# Patient Record
Sex: FEMALE | Race: WHITE | NOT HISPANIC OR LATINO | Employment: OTHER | URBAN - METROPOLITAN AREA
[De-identification: names, ages, dates, MRNs, and addresses within clinical notes are randomized per-mention and may not be internally consistent; named-entity substitution may affect disease eponyms.]

---

## 2024-08-14 ENCOUNTER — APPOINTMENT (OUTPATIENT)
Dept: CT IMAGING | Facility: CLINIC | Age: 84
DRG: 871 | End: 2024-08-14
Attending: HOSPITALIST
Payer: COMMERCIAL

## 2024-08-14 ENCOUNTER — HOSPITAL ENCOUNTER (INPATIENT)
Facility: CLINIC | Age: 84
LOS: 2 days | Discharge: ANOTHER HOSPITAL | DRG: 871 | End: 2024-08-16
Attending: EMERGENCY MEDICINE | Admitting: HOSPITALIST
Payer: COMMERCIAL

## 2024-08-14 ENCOUNTER — APPOINTMENT (OUTPATIENT)
Dept: RADIOLOGY | Facility: CLINIC | Age: 84
DRG: 871 | End: 2024-08-14
Attending: EMERGENCY MEDICINE
Payer: COMMERCIAL

## 2024-08-14 DIAGNOSIS — N17.9 SEPSIS WITH ACUTE RENAL FAILURE, DUE TO UNSPECIFIED ORGANISM, UNSPECIFIED ACUTE RENAL FAILURE TYPE, UNSPECIFIED WHETHER SEPTIC SHOCK PRESENT (H): ICD-10-CM

## 2024-08-14 DIAGNOSIS — R65.20 SEPSIS WITH ACUTE RENAL FAILURE, DUE TO UNSPECIFIED ORGANISM, UNSPECIFIED ACUTE RENAL FAILURE TYPE, UNSPECIFIED WHETHER SEPTIC SHOCK PRESENT (H): ICD-10-CM

## 2024-08-14 DIAGNOSIS — A41.9 SEPSIS WITH ACUTE RENAL FAILURE, DUE TO UNSPECIFIED ORGANISM, UNSPECIFIED ACUTE RENAL FAILURE TYPE, UNSPECIFIED WHETHER SEPTIC SHOCK PRESENT (H): ICD-10-CM

## 2024-08-14 LAB
ALBUMIN SERPL BCG-MCNC: 4.1 G/DL (ref 3.5–5.2)
ALBUMIN UR-MCNC: 20 MG/DL
ALP SERPL-CCNC: 47 U/L (ref 40–150)
ALT SERPL W P-5'-P-CCNC: 19 U/L (ref 0–50)
ANION GAP SERPL CALCULATED.3IONS-SCNC: 10 MMOL/L (ref 7–15)
APPEARANCE UR: CLEAR
AST SERPL W P-5'-P-CCNC: 36 U/L (ref 0–45)
BASE EXCESS BLDV CALC-SCNC: 4.9 MMOL/L (ref -3–3)
BASOPHILS # BLD AUTO: 0 10E3/UL (ref 0–0.2)
BASOPHILS NFR BLD AUTO: 0 %
BILIRUB SERPL-MCNC: 1 MG/DL
BILIRUB UR QL STRIP: NEGATIVE
BUN SERPL-MCNC: 25.5 MG/DL (ref 8–23)
C PNEUM DNA SPEC QL NAA+PROBE: NOT DETECTED
CALCIUM SERPL-MCNC: 9.4 MG/DL (ref 8.8–10.4)
CHLORIDE SERPL-SCNC: 97 MMOL/L (ref 98–107)
COLOR UR AUTO: ABNORMAL
CREAT SERPL-MCNC: 1.92 MG/DL (ref 0.51–0.95)
EGFRCR SERPLBLD CKD-EPI 2021: 25 ML/MIN/1.73M2
ENTEROCOCCUS FAECALIS: NOT DETECTED
ENTEROCOCCUS FAECIUM: NOT DETECTED
EOSINOPHIL # BLD AUTO: 0 10E3/UL (ref 0–0.7)
EOSINOPHIL NFR BLD AUTO: 0 %
ERYTHROCYTE [DISTWIDTH] IN BLOOD BY AUTOMATED COUNT: 13.9 % (ref 10–15)
FLUAV H1 2009 PAND RNA SPEC QL NAA+PROBE: NOT DETECTED
FLUAV H1 RNA SPEC QL NAA+PROBE: NOT DETECTED
FLUAV H3 RNA SPEC QL NAA+PROBE: NOT DETECTED
FLUAV RNA SPEC QL NAA+PROBE: NEGATIVE
FLUAV RNA SPEC QL NAA+PROBE: NOT DETECTED
FLUBV RNA RESP QL NAA+PROBE: NEGATIVE
FLUBV RNA SPEC QL NAA+PROBE: NOT DETECTED
GLUCOSE SERPL-MCNC: 148 MG/DL (ref 70–99)
GLUCOSE UR STRIP-MCNC: NEGATIVE MG/DL
HADV DNA SPEC QL NAA+PROBE: NOT DETECTED
HCO3 BLDV-SCNC: 29 MMOL/L (ref 21–28)
HCO3 SERPL-SCNC: 26 MMOL/L (ref 22–29)
HCOV PNL SPEC NAA+PROBE: NOT DETECTED
HCT VFR BLD AUTO: 39 % (ref 35–47)
HGB BLD-MCNC: 13.1 G/DL (ref 11.7–15.7)
HGB UR QL STRIP: NEGATIVE
HMPV RNA SPEC QL NAA+PROBE: NOT DETECTED
HPIV1 RNA SPEC QL NAA+PROBE: NOT DETECTED
HPIV2 RNA SPEC QL NAA+PROBE: NOT DETECTED
HPIV3 RNA SPEC QL NAA+PROBE: NOT DETECTED
HPIV4 RNA SPEC QL NAA+PROBE: NOT DETECTED
IMM GRANULOCYTES # BLD: 0.1 10E3/UL
IMM GRANULOCYTES NFR BLD: 1 %
KETONES UR STRIP-MCNC: NEGATIVE MG/DL
LACTATE SERPL-SCNC: 1.7 MMOL/L (ref 0.7–2)
LEUKOCYTE ESTERASE UR QL STRIP: ABNORMAL
LISTERIA SPECIES (DETECTED/NOT DETECTED): NOT DETECTED
LYMPHOCYTES # BLD AUTO: 0.3 10E3/UL (ref 0.8–5.3)
LYMPHOCYTES NFR BLD AUTO: 3 %
M PNEUMO DNA SPEC QL NAA+PROBE: NOT DETECTED
MCH RBC QN AUTO: 31 PG (ref 26.5–33)
MCHC RBC AUTO-ENTMCNC: 33.6 G/DL (ref 31.5–36.5)
MCV RBC AUTO: 92 FL (ref 78–100)
MONOCYTES # BLD AUTO: 0.3 10E3/UL (ref 0–1.3)
MONOCYTES NFR BLD AUTO: 3 %
MRSA DNA SPEC QL NAA+PROBE: NEGATIVE
MUCOUS THREADS #/AREA URNS LPF: PRESENT /LPF
NEUTROPHILS # BLD AUTO: 9.8 10E3/UL (ref 1.6–8.3)
NEUTROPHILS NFR BLD AUTO: 93 %
NITRATE UR QL: NEGATIVE
NRBC # BLD AUTO: 0 10E3/UL
NRBC BLD AUTO-RTO: 0 /100
O2/TOTAL GAS SETTING VFR VENT: 35 %
OXYHGB MFR BLDV: 34 % (ref 70–75)
PCO2 BLDV: 43 MM HG (ref 40–50)
PH BLDV: 7.44 [PH] (ref 7.32–7.43)
PH UR STRIP: 6 [PH] (ref 5–7)
PLATELET # BLD AUTO: 142 10E3/UL (ref 150–450)
PO2 BLDV: 22 MM HG (ref 25–47)
POTASSIUM SERPL-SCNC: 3.9 MMOL/L (ref 3.4–5.3)
PROT SERPL-MCNC: 7 G/DL (ref 6.4–8.3)
RBC # BLD AUTO: 4.23 10E6/UL (ref 3.8–5.2)
RBC URINE: 2 /HPF
RSV RNA SPEC NAA+PROBE: NEGATIVE
RSV RNA SPEC QL NAA+PROBE: NOT DETECTED
RSV RNA SPEC QL NAA+PROBE: NOT DETECTED
RV+EV RNA SPEC QL NAA+PROBE: NOT DETECTED
SA TARGET DNA: POSITIVE
SAO2 % BLDV: 34.4 % (ref 70–75)
SARS-COV-2 RNA RESP QL NAA+PROBE: NEGATIVE
SODIUM SERPL-SCNC: 133 MMOL/L (ref 135–145)
SP GR UR STRIP: 1.01 (ref 1–1.03)
SQUAMOUS EPITHELIAL: 2 /HPF
STAPHYLOCOCCUS AUREUS: DETECTED
STAPHYLOCOCCUS EPIDERMIDIS: NOT DETECTED
STAPHYLOCOCCUS LUGDUNENSIS: NOT DETECTED
STREPTOCOCCUS AGALACTIAE: NOT DETECTED
STREPTOCOCCUS ANGINOSUS GROUP: NOT DETECTED
STREPTOCOCCUS PNEUMONIAE: NOT DETECTED
STREPTOCOCCUS PYOGENES: NOT DETECTED
STREPTOCOCCUS SPECIES: NOT DETECTED
UROBILINOGEN UR STRIP-MCNC: <2 MG/DL
WBC # BLD AUTO: 10.4 10E3/UL (ref 4–11)
WBC URINE: 4 /HPF

## 2024-08-14 PROCEDURE — 82805 BLOOD GASES W/O2 SATURATION: CPT | Performed by: EMERGENCY MEDICINE

## 2024-08-14 PROCEDURE — 99223 1ST HOSP IP/OBS HIGH 75: CPT | Performed by: STUDENT IN AN ORGANIZED HEALTH CARE EDUCATION/TRAINING PROGRAM

## 2024-08-14 PROCEDURE — 83605 ASSAY OF LACTIC ACID: CPT | Performed by: EMERGENCY MEDICINE

## 2024-08-14 PROCEDURE — 87186 SC STD MICRODIL/AGAR DIL: CPT | Performed by: EMERGENCY MEDICINE

## 2024-08-14 PROCEDURE — 71250 CT THORAX DX C-: CPT | Mod: MG

## 2024-08-14 PROCEDURE — 36415 COLL VENOUS BLD VENIPUNCTURE: CPT | Performed by: EMERGENCY MEDICINE

## 2024-08-14 PROCEDURE — 258N000003 HC RX IP 258 OP 636: Performed by: STUDENT IN AN ORGANIZED HEALTH CARE EDUCATION/TRAINING PROGRAM

## 2024-08-14 PROCEDURE — 250N000011 HC RX IP 250 OP 636: Performed by: STUDENT IN AN ORGANIZED HEALTH CARE EDUCATION/TRAINING PROGRAM

## 2024-08-14 PROCEDURE — 85041 AUTOMATED RBC COUNT: CPT | Performed by: EMERGENCY MEDICINE

## 2024-08-14 PROCEDURE — 96360 HYDRATION IV INFUSION INIT: CPT | Mod: 59

## 2024-08-14 PROCEDURE — 87633 RESP VIRUS 12-25 TARGETS: CPT | Performed by: STUDENT IN AN ORGANIZED HEALTH CARE EDUCATION/TRAINING PROGRAM

## 2024-08-14 PROCEDURE — 120N000001 HC R&B MED SURG/OB

## 2024-08-14 PROCEDURE — 250N000013 HC RX MED GY IP 250 OP 250 PS 637: Performed by: STUDENT IN AN ORGANIZED HEALTH CARE EDUCATION/TRAINING PROGRAM

## 2024-08-14 PROCEDURE — 258N000003 HC RX IP 258 OP 636: Performed by: HOSPITALIST

## 2024-08-14 PROCEDURE — 80053 COMPREHEN METABOLIC PANEL: CPT | Performed by: EMERGENCY MEDICINE

## 2024-08-14 PROCEDURE — 87640 STAPH A DNA AMP PROBE: CPT | Performed by: STUDENT IN AN ORGANIZED HEALTH CARE EDUCATION/TRAINING PROGRAM

## 2024-08-14 PROCEDURE — 71046 X-RAY EXAM CHEST 2 VIEWS: CPT

## 2024-08-14 PROCEDURE — 250N000011 HC RX IP 250 OP 636: Performed by: EMERGENCY MEDICINE

## 2024-08-14 PROCEDURE — 87149 DNA/RNA DIRECT PROBE: CPT | Performed by: EMERGENCY MEDICINE

## 2024-08-14 PROCEDURE — 250N000013 HC RX MED GY IP 250 OP 250 PS 637: Performed by: EMERGENCY MEDICINE

## 2024-08-14 PROCEDURE — 258N000003 HC RX IP 258 OP 636: Performed by: EMERGENCY MEDICINE

## 2024-08-14 PROCEDURE — 99291 CRITICAL CARE FIRST HOUR: CPT | Mod: 25

## 2024-08-14 PROCEDURE — 87637 SARSCOV2&INF A&B&RSV AMP PRB: CPT | Performed by: EMERGENCY MEDICINE

## 2024-08-14 PROCEDURE — 250N000011 HC RX IP 250 OP 636: Performed by: HOSPITALIST

## 2024-08-14 PROCEDURE — 81001 URINALYSIS AUTO W/SCOPE: CPT | Performed by: EMERGENCY MEDICINE

## 2024-08-14 RX ORDER — CEFAZOLIN SODIUM 2 G/100ML
2 INJECTION, SOLUTION INTRAVENOUS EVERY 12 HOURS
Status: DISCONTINUED | OUTPATIENT
Start: 2024-08-14 | End: 2024-08-16 | Stop reason: HOSPADM

## 2024-08-14 RX ORDER — VANCOMYCIN HYDROCHLORIDE
1500 ONCE
Status: COMPLETED | OUTPATIENT
Start: 2024-08-14 | End: 2024-08-14

## 2024-08-14 RX ORDER — CEFTRIAXONE 2 G/1
2 INJECTION, POWDER, FOR SOLUTION INTRAMUSCULAR; INTRAVENOUS EVERY 24 HOURS
Status: DISCONTINUED | OUTPATIENT
Start: 2024-08-15 | End: 2024-08-14

## 2024-08-14 RX ORDER — CARBOXYMETHYLCELLULOSE SODIUM 5 MG/ML
1 SOLUTION/ DROPS OPHTHALMIC
Status: DISCONTINUED | OUTPATIENT
Start: 2024-08-14 | End: 2024-08-16 | Stop reason: HOSPADM

## 2024-08-14 RX ORDER — ONDANSETRON 4 MG/1
4 TABLET, ORALLY DISINTEGRATING ORAL EVERY 6 HOURS PRN
Status: DISCONTINUED | OUTPATIENT
Start: 2024-08-14 | End: 2024-08-16 | Stop reason: HOSPADM

## 2024-08-14 RX ORDER — PIPERACILLIN SODIUM, TAZOBACTAM SODIUM 3; .375 G/15ML; G/15ML
3.38 INJECTION, POWDER, LYOPHILIZED, FOR SOLUTION INTRAVENOUS EVERY 8 HOURS
Status: DISCONTINUED | OUTPATIENT
Start: 2024-08-14 | End: 2024-08-14

## 2024-08-14 RX ORDER — SODIUM CHLORIDE 9 MG/ML
INJECTION, SOLUTION INTRAVENOUS CONTINUOUS
Status: DISCONTINUED | OUTPATIENT
Start: 2024-08-14 | End: 2024-08-14

## 2024-08-14 RX ORDER — ACETAMINOPHEN 650 MG/1
650 SUPPOSITORY RECTAL EVERY 4 HOURS PRN
Status: DISCONTINUED | OUTPATIENT
Start: 2024-08-14 | End: 2024-08-16 | Stop reason: HOSPADM

## 2024-08-14 RX ORDER — CEFTRIAXONE 2 G/1
2 INJECTION, POWDER, FOR SOLUTION INTRAMUSCULAR; INTRAVENOUS ONCE
Status: COMPLETED | OUTPATIENT
Start: 2024-08-14 | End: 2024-08-14

## 2024-08-14 RX ORDER — AMOXICILLIN 250 MG
1 CAPSULE ORAL 2 TIMES DAILY PRN
Status: DISCONTINUED | OUTPATIENT
Start: 2024-08-14 | End: 2024-08-16 | Stop reason: HOSPADM

## 2024-08-14 RX ORDER — LIDOCAINE 40 MG/G
CREAM TOPICAL
Status: DISCONTINUED | OUTPATIENT
Start: 2024-08-14 | End: 2024-08-16 | Stop reason: HOSPADM

## 2024-08-14 RX ORDER — HEPARIN SODIUM 5000 [USP'U]/.5ML
5000 INJECTION, SOLUTION INTRAVENOUS; SUBCUTANEOUS 2 TIMES DAILY
Status: DISCONTINUED | OUTPATIENT
Start: 2024-08-14 | End: 2024-08-16 | Stop reason: HOSPADM

## 2024-08-14 RX ORDER — ACETAMINOPHEN 325 MG/1
650 TABLET ORAL EVERY 4 HOURS PRN
Status: DISCONTINUED | OUTPATIENT
Start: 2024-08-14 | End: 2024-08-16 | Stop reason: HOSPADM

## 2024-08-14 RX ORDER — ACETAMINOPHEN 500 MG
500-1000 TABLET ORAL 2 TIMES DAILY PRN
Status: ON HOLD | COMMUNITY

## 2024-08-14 RX ORDER — ONDANSETRON 2 MG/ML
4 INJECTION INTRAMUSCULAR; INTRAVENOUS EVERY 6 HOURS PRN
Status: DISCONTINUED | OUTPATIENT
Start: 2024-08-14 | End: 2024-08-16 | Stop reason: HOSPADM

## 2024-08-14 RX ORDER — PIPERACILLIN SODIUM, TAZOBACTAM SODIUM 3; .375 G/15ML; G/15ML
3.38 INJECTION, POWDER, LYOPHILIZED, FOR SOLUTION INTRAVENOUS ONCE
Status: COMPLETED | OUTPATIENT
Start: 2024-08-14 | End: 2024-08-14

## 2024-08-14 RX ORDER — ACETAMINOPHEN 325 MG/1
650 TABLET ORAL ONCE
Status: COMPLETED | OUTPATIENT
Start: 2024-08-14 | End: 2024-08-14

## 2024-08-14 RX ORDER — CALCIUM CARBONATE 500 MG/1
1000 TABLET, CHEWABLE ORAL 4 TIMES DAILY PRN
Status: DISCONTINUED | OUTPATIENT
Start: 2024-08-14 | End: 2024-08-16 | Stop reason: HOSPADM

## 2024-08-14 RX ORDER — AMOXICILLIN 250 MG
2 CAPSULE ORAL 2 TIMES DAILY PRN
Status: DISCONTINUED | OUTPATIENT
Start: 2024-08-14 | End: 2024-08-16 | Stop reason: HOSPADM

## 2024-08-14 RX ORDER — AZITHROMYCIN 500 MG/5ML
500 INJECTION, POWDER, LYOPHILIZED, FOR SOLUTION INTRAVENOUS ONCE
Status: DISCONTINUED | OUTPATIENT
Start: 2024-08-14 | End: 2024-08-14

## 2024-08-14 RX ADMIN — ACETAMINOPHEN 650 MG: 325 TABLET ORAL at 03:01

## 2024-08-14 RX ADMIN — ACETAMINOPHEN 650 MG: 325 TABLET ORAL at 09:38

## 2024-08-14 RX ADMIN — ACETAMINOPHEN 650 MG: 325 TABLET ORAL at 16:08

## 2024-08-14 RX ADMIN — CEFAZOLIN SODIUM 2 G: 2 INJECTION, SOLUTION INTRAVENOUS at 19:29

## 2024-08-14 RX ADMIN — SODIUM CHLORIDE: 9 INJECTION, SOLUTION INTRAVENOUS at 10:18

## 2024-08-14 RX ADMIN — SODIUM CHLORIDE 1000 ML: 9 INJECTION, SOLUTION INTRAVENOUS at 03:00

## 2024-08-14 RX ADMIN — SODIUM CHLORIDE 1000 ML: 9 INJECTION, SOLUTION INTRAVENOUS at 12:24

## 2024-08-14 RX ADMIN — SODIUM CHLORIDE 1000 ML: 9 INJECTION, SOLUTION INTRAVENOUS at 04:38

## 2024-08-14 RX ADMIN — HEPARIN SODIUM 5000 UNITS: 5000 INJECTION, SOLUTION INTRAVENOUS; SUBCUTANEOUS at 20:15

## 2024-08-14 RX ADMIN — SODIUM CHLORIDE 1000 ML: 9 INJECTION, SOLUTION INTRAVENOUS at 19:29

## 2024-08-14 RX ADMIN — PIPERACILLIN AND TAZOBACTAM 3.38 G: 3; .375 INJECTION, POWDER, FOR SOLUTION INTRAVENOUS at 14:07

## 2024-08-14 RX ADMIN — SODIUM CHLORIDE, POTASSIUM CHLORIDE, SODIUM LACTATE AND CALCIUM CHLORIDE 1000 ML: 600; 310; 30; 20 INJECTION, SOLUTION INTRAVENOUS at 05:56

## 2024-08-14 RX ADMIN — CEFTRIAXONE SODIUM 2 G: 2 INJECTION, POWDER, FOR SOLUTION INTRAMUSCULAR; INTRAVENOUS at 04:40

## 2024-08-14 RX ADMIN — VANCOMYCIN HYDROCHLORIDE 1500 MG: 5 INJECTION, POWDER, LYOPHILIZED, FOR SOLUTION INTRAVENOUS at 05:24

## 2024-08-14 ASSESSMENT — ACTIVITIES OF DAILY LIVING (ADL)
ADLS_ACUITY_SCORE: 27
ADLS_ACUITY_SCORE: 38
ADLS_ACUITY_SCORE: 37
ADLS_ACUITY_SCORE: 37
ADLS_ACUITY_SCORE: 35
ADLS_ACUITY_SCORE: 37
DEPENDENT_IADLS:: TRANSPORTATION
ADLS_ACUITY_SCORE: 38
ADLS_ACUITY_SCORE: 38
ADLS_ACUITY_SCORE: 27
ADLS_ACUITY_SCORE: 38
ADLS_ACUITY_SCORE: 38
ADLS_ACUITY_SCORE: 24
ADLS_ACUITY_SCORE: 37
ADLS_ACUITY_SCORE: 38
ADLS_ACUITY_SCORE: 37
ADLS_ACUITY_SCORE: 24
ADLS_ACUITY_SCORE: 37
ADLS_ACUITY_SCORE: 23
ADLS_ACUITY_SCORE: 24
ADLS_ACUITY_SCORE: 37
ADLS_ACUITY_SCORE: 38

## 2024-08-14 ASSESSMENT — COLUMBIA-SUICIDE SEVERITY RATING SCALE - C-SSRS
6. HAVE YOU EVER DONE ANYTHING, STARTED TO DO ANYTHING, OR PREPARED TO DO ANYTHING TO END YOUR LIFE?: NO
1. IN THE PAST MONTH, HAVE YOU WISHED YOU WERE DEAD OR WISHED YOU COULD GO TO SLEEP AND NOT WAKE UP?: NO
2. HAVE YOU ACTUALLY HAD ANY THOUGHTS OF KILLING YOURSELF IN THE PAST MONTH?: NO

## 2024-08-14 NOTE — CONSULTS
Care Management Initial Consult      General Information  Assessment completed with: Patient, Spouse or significant other,    Type of CM/SW Visit: Initial Assessment  Primary Care Provider verified and updated as needed: Yes   Readmission within the last 30 days: no previous admission in last 30 days   Reason for Consult: discharge planning, health care directive, transportation  Advance Care Planning: Advance Care Planning Reviewed: concerns discussed, questions answered  Pt is working on completing HCD at home currently     Communication Assessment  Patient's communication style: spoken language (English or Bilingual)        Cognitive  Cognitive/Neuro/Behavioral: WDL                      Living Environment:   People in home: spouse     Current living Arrangements: house      Able to return to prior arrangements: yes     Family/Social Support:  Care provided by: self, spouse/significant other  Provides care for: no one, unable/limited ability to care for self  Marital Status:     Ed       Description of Support System: Supportive, Involved    Support Assessment: Adequate family and caregiver support, Adequate social supports    Current Resources:   Patient receiving home care services: No  Community Resources: None  Equipment currently used at home: grab bar, tub/shower  Supplies currently used at home: None    Employment/Financial:  Employment Status: retired     Financial Concerns: none   Referral to Financial Worker: No     Does the patient's insurance plan have a 3 day qualifying hospital stay waiver?  No    Lifestyle & Psychosocial Needs:  Social Determinants of Health     Food Insecurity: Not on file   Depression: Not at risk (3/25/2024)    Received from [facility]    PHQ-2     PHQ-2 TOTAL SCORE: 0   Housing Stability: Not on file   Tobacco Use: High Risk (5/6/2024)    Received from [facility]    Patient History      "Smoking Tobacco Use: Every Day     Smokeless Tobacco Use: Never     Passive Exposure: Not on file   Financial Resource Strain: High Risk (1/1/2022)    [facility]    Financial Resource Strain     Difficulty of Paying Living Expenses: Not on file     Difficulty of Paying Living Expenses: Not on file   Alcohol Use: Not on file   Transportation Needs: Not on file   Physical Activity: Not on file   Interpersonal Safety: Not on file   Stress: Not on file   Social Connections: Unknown (1/3/2024)    [facility] from [facility]    Social Connections     Frequency of Communication with Friends and Family: Not on file   Health Literacy: Not on file     Functional Status:  Prior to admission patient needed assistance:   Dependent ADLs:: Independent  Dependent IADLs:: Transportation  Assessment of Functional Status: Not at  functional baseline    Mental Health Status:  Mental Health Status: No Current Concerns       Chemical Dependency Status:  Chemical Dependency Status: No Current Concerns           Values/Beliefs:  Spiritual, Cultural Beliefs, Hoahaoism Practices, Values that affect care: no             Additional Information:  Writer met with pt and her  Ed to introduce Care Management(CM), obtain an initial assessment, and offer discharge support. Pt resides in a house with her  and is reported as mostly I/ADL independent when at baseline.  assists with transport and as needed in general. No DME use or current in-home/outpatient services identified. Pt and  do not state any concerns in regard to eventual return home after medical progression.    8/14 per DANNI Parker.-\"84 year old female with a pertinent history of Diastolic CHF, CKD 3, hypertension, hyperlipidemia, spinal stenosis of cervicothoracic region who presents to this ED for evaluation of generalized weakness and other complaints. The patient reports 2 days " "ago she developed \"the shakes\" as well as chills and generalized weakness.  The patient did not check for a fever at home, but reports feeling warm.  The patient also reports a productive cough with clear phlegm.  The patient also reports diarrhea as well as pain to her upper back.  The patient notes she has had left upper quadrant abdominal pain \"on and off\" for the past few months, but the patient's  notes that the patient has been evaluated for this.  The patient took Tylenol for her symptoms without adequate relief.  The patient denies any recent travel or sick contacts.\"    No current pending consults. Anticipate improvement and return home to the care of  at time of discharge. CM to follow for changes to current anticipated discharge disposition vs. CM signing off.  to transport.    Jordy Lopez RN      "

## 2024-08-14 NOTE — H&P
"[facility]    History and Physical - Hospitalist Service       Date of Admission:  8/14/2024    Assessment & Plan      Felicitas Elliott is a 84 year old female admitted on 8/14/2024. She has a past med history significant for diastolic CHF, CKD 3, hypertension, hyperlipidemia who presented with 2 days of fevers, chills and generalized weakness admitted for sepsis with unknown source at this time.    Sepsis-ED provider reports pneumonia/developing pneumonia as source however nothing on imaging and remains on room air.  She has no localizing symptoms, no urinary symptoms, she does have what she calls \" diarrhea\" which sounds like incontinence from coughing.  Has chronic left upper quadrant abdominal pain is not present now and patient reports is at baseline and has been worked up before.  -X-ray negative  -UA pending  -Blood culture pending  -Ceftriaxone, and vancomycin, discontinued azithromycin ordered by ED  -Stool studies  -Viral respiratory panel  -S/P 2L NS in ED, patient remains hypotensive will give additional bolus  -IVF after maintenance fluid  -If still hypotensive after 3L will start pressors, nursing to update on response to 3rd liter    HAMMAD on CKD 3b-Likely in the setting of decreased PO intake. No flank pain or CVA tenderness  -Trend  -Avoid nephrotoxins    Chronic problems:  HTN  CHF  HLD  -Med rec pending        Diet: Combination Diet Regular Diet Adult    DVT Prophylaxis: Pneumatic Compression Devices  Le Catheter: Not present  Lines: None     Cardiac Monitoring: ACTIVE order. Indication: Sepsis, hypotensive  Code Status: No CPR- Do NOT Intubate      Clinically Significant Risk Factors Present on Admission                          # Overweight: Estimated body mass index is 27.41 kg/m  as calculated from the following:    Height as of this encounter: 1.702 m (5' 7\").    Weight as of this encounter: 79.4 kg (175 lb).                    Disposition Plan     Medically Ready " "for Discharge: Anticipated in 2-4 Days           Werner Rojas MD  Hospitalist Service  [facility]  Securely message with [employer] (more info)  Text page via [employer] Paging/Directory     ______________________________________________________________________    Chief Complaint   Chills    History is obtained from the patient    History of Present Illness   Felicitas Elliott is a 84 year old female who per ED    \"The patient reports 2 days ago she developed \"the shakes\" as well as chills and generalized weakness.  The patient did not check for a fever at home, but reports feeling warm.  The patient also reports a productive cough with clear phlegm.  The patient also reports diarrhea as well as pain to her upper back.  The patient notes she has had left upper quadrant abdominal pain \"on and off\" for the past few months, but the patient's  notes that the patient has been evaluated for this.  The patient took Tylenol for her symptoms without adequate relief.  The patient denies any recent travel or sick contacts.   The patient denies constipation, dysuria, hematuria, or any other symptoms or complaints\"    Evaluated bedside in the emergency department, confirmed above history.  Does have a cough this is unchanged from her chronic cough also reports that her cough is causing her to lose control of both her bowel and bladder she thinks she may also have diarrhea but is unclear if she is actually having loose/diarrhea bowel movements or just from coughing difficult to pinpoint a history at times.    Past Medical History    History reviewed. No pertinent past medical history.    Past Surgical History   History reviewed. No pertinent surgical history.    Prior to Admission Medications   None           Physical Exam   Vital Signs: Temp: (!) 102  F (38.9  C) Temp src: Oral BP: (!) 83/50 Pulse: 90   Resp: 29 SpO2: 94 % O2 Device: None (Room air)    Weight: 175 lbs 0 oz    Gen:Fatigued, well nourished, " in no acute distress  HEENT: NC/AT, MMM, ALISIA, EOMI, Supple  CV: RRR, normal s1, normal s2, no m/r/g  Resp: CTAB, normal I/E effort, no additional respiratory sounds  Abd: +BS, non-tender, non-distended, no guarding or rebound tenderness, no CVA tenderness  Ext:No significant deformities or trauma, moving all ext freely  Skin: No erythema, no lesions or rashes.   Neuro:No focal neurologic deficit, AxOx4. Strength and sensation grossly intact  Psych: Pleasant, answering questions appropriately, normal mood/affect. Insight good, judgement intact.      Medical Decision Making       75 MINUTES SPENT BY ME on the date of service doing chart review, history, exam, documentation & further activities per the note.      Data   ------------------------- PAST 24 HR DATA REVIEWED -----------------------------------------------    I have personally reviewed the following data over the past 24 hrs:    10.4  \   13.1   / 142 (L)     133 (L) 97 (L) 25.5 (H) /  148 (H)   3.9 26 1.92 (H) \     ALT: 19 AST: 36 AP: 47 TBILI: 1.0   ALB: 4.1 TOT PROTEIN: 7.0 LIPASE: N/A     Procal: N/A CRP: N/A Lactic Acid: 1.7         Imaging results reviewed over the past 24 hrs:   Recent Results (from the past 24 hour(s))   Chest XR,  PA & LAT    Narrative    EXAM: XR CHEST 2 VIEWS  LOCATION: [facility]  DATE: 8/14/2024    INDICATION: Cough. Fever.  COMPARISON: Chest x-ray 2 views 3/22/2024 at 1138 hours.      Impression    IMPRESSION: Both lungs remain clear. No adenopathy or effusion. Normal cardiac size and pulmonary vascularity. Atherosclerotic thoracic aorta. Degenerative changes both shoulders and the spine. No significant interval change.

## 2024-08-14 NOTE — PHARMACY-VANCOMYCIN DOSING SERVICE
Pharmacy Vancomycin Initial Note  Date of Service 2024  Patient's  1940  84 year old, female    Indication: Sepsis    Current estimated CrCl = Estimated Creatinine Clearance: 23.7 mL/min (A) (based on SCr of 1.92 mg/dL (H)).    Creatinine for last 3 days  2024:  2:54 AM Creatinine 1.92 mg/dL    Recent Vancomycin Level(s) for last 3 days  No results found for requested labs within last 3 days.      Vancomycin IV Administrations (past 72 hours)                     vancomycin (VANCOCIN) 1,500 mg in 0.9% NaCl 250 mL intermittent infusion (mg) 1,500 mg New Bag 24 0524                    Nephrotoxins and other renal medications (From now, onward)      Start     Dose/Rate Route Frequency Ordered Stop    08/15/24 0500  vancomycin (VANCOCIN) 750 mg in 0.9% NaCl 250 mL intermittent infusion         750 mg  over 60 Minutes Intravenous EVERY 24 HOURS 24 0614      24 0500  vancomycin (VANCOCIN) 1,500 mg in 0.9% NaCl 250 mL intermittent infusion         1,500 mg  166.7 mL/hr over 90 Minutes Intravenous ONCE 24 0459              Contrast Orders - past 72 hours (72h ago, onward)      None            InsightRX Prediction of Planned Initial Vancomycin Regimen  Loading dose: 1500 mg at 05:24 on 24  Regimen: 750 mg IV every 24 hours.  Start time: 05:00 on 08/15/2024  Exposure target: AUC24 (range)400-600 mg/L.hr   AUC24,ss: 530 mg/L.hr  Probability of AUC24 > 400: 79 %  Ctrough,ss: 18.4 mg/L  Probability of Ctrough,ss > 20: 42 %  Probability of nephrotoxicity (Lodise ARIELA ): 15 %          Plan:  Start vancomycin  750 mg IV q24h.   Vancomycin monitoring method: AUC  Vancomycin therapeutic monitoring goal: 400-600 mg*h/L  Pharmacy will check vancomycin levels as appropriate in 1-3 Days.    Serum creatinine levels will be ordered daily for the first week of therapy and at least twice weekly for subsequent weeks.      Dari Thornton [facility]

## 2024-08-14 NOTE — PROGRESS NOTES
[facility]    Medicine Progress Note - Hospitalist Service    Date of Admission:  8/14/2024    Assessment & Plan   Felicitas Elliott is a 84 year old female admitted with undifferentiated sepsis.  She remains tachycardia and has had improvement in hypotension.  Bolus additional IVF given ongoing tachycardia.  Unclear etiology to sepsis.  Urinalysis had few WBCs.  CXR was unremarkable.  Intraabdominal infection is possible given recurrence of abdominal pain after a period of quiescence.  Further evaluation with CT c/a/p, without contrast given renal dysfunction.  Expand empiric abx to cover for intraabdominal infection.  Bacteremia due to superficial skin infection is possible, as she identifies a chronic issue with skin wounds.  She has multiple wounds noted on exam.  She does appear to have chronic kidney disease given Cr 1.7 in March 2024.    # Sepsis  - empiric abx  - cultures pending  - CT c/a/p  - bolus IVF    # Skin wounds  - appears to be a chronic problem  - outpatient followup    # Right renal lesion  - outpatient followup    # CKD 4  # HTN  # HLD  # HFpEF      Addendum:  Patient with episode of dizziness getting up to the bathroom.  She had associated hypotension, suspected to be related to sepsis.  IV bolus administered. Blood pressure since improved.  Patient subsequently able to get to the bathroom without dizziness.    Addendum:  Blood cx with MSSA, suspected related to wounds under breast and on abdomen.  Abx narrowed to cefazolin.  Repeat blood cx ordered for tomorrow, as well as a TTE.  Will need infectious disease consultation.  She has a history of bilateral knee replacement, which raises concern for this as a nidus for infection.    On followup exam, patient is unwell appearing and diaphoretic.  She confirms a history of bilateral knee replacement.  Bilateral knees without erythema or tenderness.  She is able to flex both knees.  She reports a history of chronic headache  "with pain behind her right eye, which has been ongoing for years.  She also reports chronic neck pain which has been worse lately.  On exam, she has midline and bilateral paraspinous C spine tenderness to palpation.  Further evaluation with MRI C spine to assess for abscess/osteomyelitis.    Addendum:  On subsequent following, clarified with patient regarding symptoms earlier.  Nurse had reported patient had burning eyes.  Patient confirmed this and associated it with fever.  She reports it is now resolved.  Denies blurry or double vision.  Denies numbness or tingling in the hands.  She is able to  with both hands on exam.  MRI C spine ordered previously.    If patient is not fluid responsive after this bolus, recommend starting norepinephrine and transfer to ICU.          Diet: Combination Diet Regular Diet Adult      Le Catheter: Not present  Lines: None     Cardiac Monitoring: ACTIVE order. Indication: Sepsis, hypotensive  Code Status: No CPR- Do NOT Intubate      Clinically Significant Risk Factors Present on Admission                          # Overweight: Estimated body mass index is 27.41 kg/m  as calculated from the following:    Height as of this encounter: 1.702 m (5' 7\").    Weight as of this encounter: 79.4 kg (175 lb).                    Disposition Plan     Medically Ready for Discharge: Anticipated in 2-4 Days             Noé Velez MD  Hospitalist Service  [facility]  Securely message with [email or web address] (more info)  Text page via [facility] Paging/Directory   ______________________________________________________________________    Interval History   Reports weakness has improved.  Has chronic dyspnea and cough.  Dyspnea is at baseline.  Denies chest pain or chest pressure.  Reports nausea with coughing hard.  Has had chronic left sided abdominal pain which had previously resolved.  This recurred two days ago.  Had two bowel movements yesterday which had a formed component.  " Denies having ticks and does not have pets.  Notes having chronic wounds, identifies having two under her breast and one on her abdomen.    Physical Exam   Vital Signs: Temp: 98.3  F (36.8  C) Temp src: Oral BP: 122/86 Pulse: 100   Resp: 27 SpO2: (!) 86 % O2 Device: None (Room air)    Weight: 175 lbs 0 oz    Gen:  lying in bed in no extremis  Neuro: alert, conversant  CV:  tachycardia, regular rhythm  Pulm: no acute resp distress, rhonchi bilaterally to anterior auscultation  GI:  abdomen soft, mild suprapubic and LUQ TTP  Skin:  two 5mm ulceration/wounds under the left breast and one crusted 5mm lesion on the right lower quadrant of the abdomen    Medical Decision Making             Data

## 2024-08-14 NOTE — PROGRESS NOTES
Patient admitted with sepsis, positive blood cultures with MSSA.  Per nursing, patient had a positive screen, based on standardized criteria, for TB.  Her presentation is not consistent with TB and fever can otherwise be explained by bacteremia.

## 2024-08-14 NOTE — PLAN OF CARE
Patient up to floor at 1045, vitals stable at this time, pressure 130/66 heart rate 107. Patient is on room air. Alert and oriented x4.     Receiving IV zosyn, and received another 1,000 ml fluid bolus since coming up to floor. Pt had a CT completed.     At 1300 patient needed to use restroom. Nursing assistant helping patient. Patient had a liquid bowel movement at this time. Patient standing up by toilet and wiping herself when she suddenly became dizzy and seemed to lose balance and suddenly sat back on the toilet. While siting she hit her left elbow and got a small cut, the area was cleaned and covered w/ a mepilex. Writer and NA helped patient back into bed. Blood pressure when returning was 98/57, a few minutes later pressure was 89/51. MD paged at this time. Pressure up to 104/57 shortly after.

## 2024-08-14 NOTE — ED TRIAGE NOTES
Pt reports fatigue, weakness, chills, and decreased appetite since Monday. Took a home covid test and was negative. Has cough, but states it is not changed from her usual cough. Is having intermittent L abdominal pain, though none at present.     Triage Assessment (Adult)       Row Name 08/14/24 0236          Triage Assessment    Airway WDL WDL        Respiratory WDL    Respiratory WDL X;cough     Cough Frequency infrequent        Skin Circulation/Temperature WDL    Skin Circulation/Temperature WDL WDL        Cardiac WDL    Cardiac WDL X;rhythm     Pulse Rate & Regularity tachycardic        Peripheral/Neurovascular WDL    Peripheral Neurovascular WDL WDL        Cognitive/Neuro/Behavioral WDL    Cognitive/Neuro/Behavioral WDL WDL

## 2024-08-14 NOTE — PHARMACY-ADMISSION MEDICATION HISTORY
Pharmacy Intern Admission Medication History    Admission medication history is complete. The information provided in this note is only as accurate as the sources available at the time of the update.    Information Source(s): Patient, Family member, Hospital records, and CareEverywhere/SureScripts via in-person    Pertinent Information: She takes tylenol every day, 2 tablets in the morning and 1 tablet before bedtime for arthritis, back pain and to help her sleep comfortably.     Changes made to PTA medication list:  Added: Tylenol  Deleted: None  Changed: None    Allergies reviewed with patient and updates made in EHR: yes    Medication History Completed By: Heidy Lockwood 8/14/2024 8:02 AM    PTA Med List   Medication Sig Last Dose    acetaminophen (TYLENOL) 500 MG tablet Take 500-1,000 mg by mouth every 6 hours as needed for mild pain 8/13/2024 at hs

## 2024-08-14 NOTE — ED PROVIDER NOTES
EMERGENCY DEPARTMENT ENCOUNTER      NAME: Felicitas Elliott  AGE: 84 year old female  YOB: 1940  MRN: [id number]  EVALUATION DATE & TIME: 8/14/2024  2:29 AM    PCP: No primary care provider on file.    ED PROVIDER: Jose Carlos Sanchez M.D.      Chief Complaint   Patient presents with    Fever    Fatigue         FINAL IMPRESSION:  1. Sepsis with acute renal failure, due to unspecified organism, unspecified acute renal failure type, unspecified whether septic shock present (H)          ED COURSE & MEDICAL DECISION MAKING:    Pertinent Labs & Imaging studies reviewed. (See chart for details)  84 year old female presents to the Emergency Department for evaluation of fever chills and cough.  Patient does have a fever.  Tachycardic.  Did develop some hypotension over the course of her ER visit.  Given IV fluids for this.  Does seem to be septic.  Initial concern for pneumonia so did start antibiotics for this.  Cultures are drawn.  COVID influenza RSV are negative.  Chest x-ray does not show obvious pneumonia.  Clinically this does seem to be a pneumonia though.  Given her sepsis I do think she needs to be admitted.  Does have a mildly elevated creatinine though looking back her baseline has been this high in the past.  Will continue to hydrate with fluids.  No signs of cardiac disease currently.  Will admit for further care.  Discussed with the hospitalist.    2:40 AM I met with the patient and her  to gather history and to perform my initial exam. I discussed the plan for care while in the Emergency Department.   4:35 AM I discussed the patient with Dr. Rojas from the hospitalist service who agrees to admit the patient.        At the conclusion of the encounter I discussed the results of all of the tests and the disposition. The questions were answered. The patient or family acknowledged understanding and was agreeable with the care plan.     Medical Decision Making  Obtained supplemental  history:Supplemental history obtained?: Documented in chart and Family Member/Significant Other  Reviewed external records: External records reviewed?: No  Care impacted by chronic illness:N/A  Care significantly affected by social determinants of health:N/A  Did you consider but not order tests?: Work up considered but not performed and documented in chart, if applicable  Did you interpret images independently?: Independent interpretation of ECG and images noted in documentation, when applicable.  Consultation discussion with other provider:Did you involve another provider (consultant, , pharmacy, etc.)?: I discussed the care with another health care provider, see documentation for details.  Admit.      Critical Care     Performed by: Dr Jose Carlos Sanchez  Authorized by: Dr Jose Carlos Sanchez  Total critical care time: 60 minutes  Critical care was necessary to treat or prevent imminent or life-threatening deterioration of the following conditions: sepsis  Critical care was time spent personally by me on the following activities: development of treatment plan with patient or surrogate, discussions with consultants, examination of patient, evaluation of patient's response to treatment, obtaining history from patient or surrogate, ordering and performing treatments and interventions, ordering and review of laboratory studies, ordering and review of radiographic studies, re-evaluation of patient's condition and monitoring for potential decompensation.  Critical care time was exclusive of separately billable procedures and treating other patients.       MEDICATIONS GIVEN IN THE EMERGENCY:  Medications   sodium chloride (PF) 0.9% PF flush 3 mL (has no administration in time range)   sodium chloride (PF) 0.9% PF flush 3 mL (3 mLs Intracatheter Not Given 8/14/24 0301)   sodium chloride 0.9% BOLUS 1,000 mL (0 mLs Intravenous Stopped 8/14/24 0514)     Followed by   sodium chloride 0.9 % infusion ( Intravenous Not Given 8/14/24  8115)   lidocaine 1 % 0.1-1 mL (has no administration in time range)   lidocaine (LMX4) cream (has no administration in time range)   sodium chloride (PF) 0.9% PF flush 3 mL (3 mLs Intracatheter Not Given 8/14/24 0451)   sodium chloride (PF) 0.9% PF flush 3 mL (has no administration in time range)   senna-docusate (SENOKOT-S/PERICOLACE) 8.6-50 MG per tablet 1 tablet (has no administration in time range)     Or   senna-docusate (SENOKOT-S/PERICOLACE) 8.6-50 MG per tablet 2 tablet (has no administration in time range)   calcium carbonate (TUMS) chewable tablet 1,000 mg (has no administration in time range)   acetaminophen (TYLENOL) tablet 650 mg (has no administration in time range)     Or   acetaminophen (TYLENOL) Suppository 650 mg (has no administration in time range)   ondansetron (ZOFRAN ODT) ODT tab 4 mg (has no administration in time range)     Or   ondansetron (ZOFRAN) injection 4 mg (has no administration in time range)   cefTRIAXone (ROCEPHIN) 2 g vial to attach to  ml bag for ADULTS or NS 50 ml bag for PEDS (has no administration in time range)   vancomycin (VANCOCIN) 1,500 mg in 0.9% NaCl 250 mL intermittent infusion (1,500 mg Intravenous $New Bag 8/14/24 0524)   lactated ringers BOLUS 1,000 mL (has no administration in time range)   sodium chloride 0.9% BOLUS 1,000 mL (0 mLs Intravenous Stopped 8/14/24 0430)   acetaminophen (TYLENOL) tablet 650 mg (650 mg Oral $Given 8/14/24 0301)   cefTRIAXone (ROCEPHIN) 2 g vial to attach to  ml bag for ADULTS or NS 50 ml bag for PEDS (0 g Intravenous Stopped 8/14/24 0514)       NEW PRESCRIPTIONS STARTED AT TODAY'S ER VISIT  New Prescriptions    No medications on file          =================================================================    HPI    Patient information was obtained from: Patient and her      Use of : N/A      Felicitas Schultzdell is a 84 year old female with a pertinent history of Diastolic CHF, CKD 3, hypertension,  "hyperlipidemia, spinal stenosis of cervicothoracic region who presents to this ED for evaluation of generalized weakness and other complaints    The patient reports 2 days ago she developed \"the shakes\" as well as chills and generalized weakness.  The patient did not check for a fever at home, but reports feeling warm.  The patient also reports a productive cough with clear phlegm.  The patient also reports diarrhea as well as pain to her upper back.  The patient notes she has had left upper quadrant abdominal pain \"on and off\" for the past few months, but the patient's  notes that the patient has been evaluated for this.  The patient took Tylenol for her symptoms without adequate relief.  The patient denies any recent travel or sick contacts.   The patient denies constipation, dysuria, hematuria, or any other symptoms or complaints    PAST MEDICAL HISTORY:  History reviewed. No pertinent past medical history.    PAST SURGICAL HISTORY:  History reviewed. No pertinent surgical history.        CURRENT MEDICATIONS:    Current Facility-Administered Medications   Medication Dose Route Frequency Provider Last Rate Last Admin    acetaminophen (TYLENOL) tablet 650 mg  650 mg Oral Q4H PRN Werner Rojas MD        Or    acetaminophen (TYLENOL) Suppository 650 mg  650 mg Rectal Q4H PRN Werner Rojas MD        calcium carbonate (TUMS) chewable tablet 1,000 mg  1,000 mg Oral 4x Daily PRN Werner Rojas MD        [START ON 8/15/2024] cefTRIAXone (ROCEPHIN) 2 g vial to attach to  ml bag for ADULTS or NS 50 ml bag for PEDS  2 g Intravenous Q24H Werner Rojas MD        lactated ringers BOLUS 1,000 mL  1,000 mL Intravenous Once Werner Rojas MD        lidocaine (LMX4) cream   Topical Q1H PRN Werner Rojas MD        lidocaine 1 % 0.1-1 mL  0.1-1 mL Other Q1H PRN Werner Rojas MD        ondansetron (ZOFRAN ODT) ODT tab 4 mg  4 mg Oral Q6H PRN Werner Rojas MD        Or    ondansetron (ZOFRAN) injection 4 mg  4 mg " "Intravenous Q6H PRN Werner Rojas MD        senna-docusate (SENOKOT-S/PERICOLACE) 8.6-50 MG per tablet 1 tablet  1 tablet Oral BID PRN Werner Rojas MD        Or    senna-docusate (SENOKOT-S/PERICOLACE) 8.6-50 MG per tablet 2 tablet  2 tablet Oral BID PRN Werner Rojas MD        sodium chloride (PF) 0.9% PF flush 3 mL  3 mL Intracatheter q1 min prn Jose Carlos Sanchez MD        sodium chloride (PF) 0.9% PF flush 3 mL  3 mL Intracatheter Q8H Jose Carlos Sanchez MD        sodium chloride (PF) 0.9% PF flush 3 mL  3 mL Intracatheter Q8H Werner Rojas MD        sodium chloride (PF) 0.9% PF flush 3 mL  3 mL Intracatheter q1 min prn Werner Rojas MD        sodium chloride 0.9 % infusion   Intravenous Continuous Jose Carlos Sanchez MD        vancomycin (VANCOCIN) 1,500 mg in 0.9% NaCl 250 mL intermittent infusion  1,500 mg Intravenous Once Werner Rojas .7 mL/hr at 08/14/24 0524 1,500 mg at 08/14/24 0524     No current outpatient medications on file.         ALLERGIES:  Allergies   Allergen Reactions    Aspirin Rash       FAMILY HISTORY:  History reviewed. No pertinent family history.    SOCIAL HISTORY:   Social History     Socioeconomic History    Marital status:      Social Determinants of Health      Received from [facility]    Financial Resource Strain    Received from [facility]    Social Connections       VITALS:  BP (!) 84/53   Pulse 85   Temp (!) 102  F (38.9  C) (Oral)   Resp 24   Ht 1.702 m (5' 7\")   Wt 79.4 kg (175 lb)   SpO2 94%   BMI 27.41 kg/m      PHYSICAL EXAM    Physical Exam  Vitals and nursing note reviewed.   Constitutional:       General: She is not in acute distress.     Appearance: She is not diaphoretic.   HENT:      Head: Atraumatic.      Mouth/Throat:      Pharynx: No oropharyngeal exudate.   Eyes:      General: No scleral icterus.     Pupils: Pupils are equal, round, and reactive to light.   Cardiovascular: "      Rate and Rhythm: Regular rhythm. Tachycardia present.      Heart sounds: Normal heart sounds.   Pulmonary:      Effort: No respiratory distress.      Breath sounds: Rhonchi (right base) present.   Abdominal:      Palpations: Abdomen is soft.      Tenderness: There is no abdominal tenderness. There is no guarding or rebound. Negative signs include Morales's sign.   Musculoskeletal:         General: No tenderness.   Skin:     General: Skin is warm.      Findings: No rash.   Neurological:      General: No focal deficit present.      Mental Status: She is alert.           LAB:  All pertinent labs reviewed and interpreted.  Labs Ordered and Resulted from Time of ED Arrival to Time of ED Departure   COMPREHENSIVE METABOLIC PANEL - Abnormal       Result Value    Sodium 133 (*)     Potassium 3.9      Carbon Dioxide (CO2) 26      Anion Gap 10      Urea Nitrogen 25.5 (*)     Creatinine 1.92 (*)     GFR Estimate 25 (*)     Calcium 9.4      Chloride 97 (*)     Glucose 148 (*)     Alkaline Phosphatase 47      AST 36      ALT 19      Protein Total 7.0      Albumin 4.1      Bilirubin Total 1.0     BLOOD GAS VENOUS - Abnormal    pH Venous 7.44 (*)     pCO2 Venous 43      pO2 Venous 22 (*)     Bicarbonate Venous 29 (*)     Base Excess/Deficit Venous 4.9 (*)     FIO2 35      Oxyhemoglobin Venous 34 (*)     O2 Sat, Venous 34.4 (*)    CBC WITH PLATELETS AND DIFFERENTIAL - Abnormal    WBC Count 10.4      RBC Count 4.23      Hemoglobin 13.1      Hematocrit 39.0      MCV 92      MCH 31.0      MCHC 33.6      RDW 13.9      Platelet Count 142 (*)     % Neutrophils 93      % Lymphocytes 3      % Monocytes 3      % Eosinophils 0      % Basophils 0      % Immature Granulocytes 1      NRBCs per 100 WBC 0      Absolute Neutrophils 9.8 (*)     Absolute Lymphocytes 0.3 (*)     Absolute Monocytes 0.3      Absolute Eosinophils 0.0      Absolute Basophils 0.0      Absolute Immature Granulocytes 0.1      Absolute NRBCs 0.0     LACTIC ACID WHOLE  BLOOD WITH 1X REPEAT IN 2 HR WHEN >2 - Normal    Lactic Acid, Initial 1.7     INFLUENZA A/B, RSV, & SARS-COV2 PCR - Normal    Influenza A PCR Negative      Influenza B PCR Negative      RSV PCR Negative      SARS CoV2 PCR Negative     ROUTINE UA WITH MICROSCOPIC REFLEX TO CULTURE   BLOOD CULTURE   MRSA MSSA PCR, NASAL SWAB   ENTERIC BACTERIA AND VIRUS PANEL BY PCR   RESPIRATORY PANEL PCR       RADIOLOGY:  Reviewed all pertinent imaging. Please see official radiology report.  Chest XR,  PA & LAT   Final Result   IMPRESSION: Both lungs remain clear. No adenopathy or effusion. Normal cardiac size and pulmonary vascularity. Atherosclerotic thoracic aorta. Degenerative changes both shoulders and the spine. No significant interval change.            I, Gail Kearns, am serving as a scribe to document services personally performed by Dr. Jose Carlos Sanchez, based on my observation and the provider's statements to me. I, Jose Carlos Sanchez MD attest that Gail Kearns is acting in a scribe capacity, has observed my performance of the services and has documented them in accordance with my direction.    Jose Carlos Sanchez M.D.  Emergency Medicine  [facility] EMERGENCY ROOM  [address] [facility] [id number] [phone]  Dept: [phone]       Jose Carlos Sanchez MD  08/14/24 0566

## 2024-08-15 ENCOUNTER — TELEPHONE (OUTPATIENT)
Dept: NEUROLOGY | Facility: CLINIC | Age: 84
End: 2024-08-15

## 2024-08-15 ENCOUNTER — APPOINTMENT (OUTPATIENT)
Dept: CARDIOLOGY | Facility: CLINIC | Age: 84
DRG: 871 | End: 2024-08-15
Attending: HOSPITALIST
Payer: COMMERCIAL

## 2024-08-15 ENCOUNTER — APPOINTMENT (OUTPATIENT)
Dept: MRI IMAGING | Facility: CLINIC | Age: 84
DRG: 871 | End: 2024-08-15
Attending: HOSPITALIST
Payer: COMMERCIAL

## 2024-08-15 ENCOUNTER — HOME INFUSION (PRE-WILLOW HOME INFUSION) (OUTPATIENT)
Dept: PHARMACY | Facility: CLINIC | Age: 84
End: 2024-08-15

## 2024-08-15 LAB
CREAT SERPL-MCNC: 1.41 MG/DL (ref 0.51–0.95)
CRP SERPL-MCNC: 310 MG/L
EGFRCR SERPLBLD CKD-EPI 2021: 37 ML/MIN/1.73M2
LVEF ECHO: NORMAL

## 2024-08-15 PROCEDURE — 99152 MOD SED SAME PHYS/QHP 5/>YRS: CPT | Performed by: INTERNAL MEDICINE

## 2024-08-15 PROCEDURE — 36415 COLL VENOUS BLD VENIPUNCTURE: CPT | Performed by: HOSPITALIST

## 2024-08-15 PROCEDURE — 82565 ASSAY OF CREATININE: CPT | Performed by: HOSPITALIST

## 2024-08-15 PROCEDURE — 72156 MRI NECK SPINE W/O & W/DYE: CPT

## 2024-08-15 PROCEDURE — 93306 TTE W/DOPPLER COMPLETE: CPT | Mod: 26 | Performed by: INTERNAL MEDICINE

## 2024-08-15 PROCEDURE — A9585 GADOBUTROL INJECTION: HCPCS | Performed by: HOSPITALIST

## 2024-08-15 PROCEDURE — 93306 TTE W/DOPPLER COMPLETE: CPT

## 2024-08-15 PROCEDURE — 120N000001 HC R&B MED SURG/OB

## 2024-08-15 PROCEDURE — 99207 PR NO CHARGE LOS: CPT | Performed by: PSYCHIATRY & NEUROLOGY

## 2024-08-15 PROCEDURE — 250N000011 HC RX IP 250 OP 636: Performed by: HOSPITALIST

## 2024-08-15 PROCEDURE — 99223 1ST HOSP IP/OBS HIGH 75: CPT | Mod: 25 | Performed by: INTERNAL MEDICINE

## 2024-08-15 PROCEDURE — 86140 C-REACTIVE PROTEIN: CPT | Performed by: STUDENT IN AN ORGANIZED HEALTH CARE EDUCATION/TRAINING PROGRAM

## 2024-08-15 PROCEDURE — 250N000013 HC RX MED GY IP 250 OP 250 PS 637: Performed by: HOSPITALIST

## 2024-08-15 PROCEDURE — 99222 1ST HOSP IP/OBS MODERATE 55: CPT | Performed by: STUDENT IN AN ORGANIZED HEALTH CARE EDUCATION/TRAINING PROGRAM

## 2024-08-15 PROCEDURE — 87040 BLOOD CULTURE FOR BACTERIA: CPT | Performed by: HOSPITALIST

## 2024-08-15 PROCEDURE — 255N000002 HC RX 255 OP 636: Performed by: HOSPITALIST

## 2024-08-15 PROCEDURE — 250N000013 HC RX MED GY IP 250 OP 250 PS 637: Performed by: STUDENT IN AN ORGANIZED HEALTH CARE EDUCATION/TRAINING PROGRAM

## 2024-08-15 PROCEDURE — 99232 SBSQ HOSP IP/OBS MODERATE 35: CPT | Performed by: HOSPITALIST

## 2024-08-15 RX ORDER — MAGNESIUM SULFATE HEPTAHYDRATE 40 MG/ML
2 INJECTION, SOLUTION INTRAVENOUS
Status: CANCELLED | OUTPATIENT
Start: 2024-08-15

## 2024-08-15 RX ORDER — POTASSIUM CHLORIDE 1500 MG/1
40 TABLET, EXTENDED RELEASE ORAL
Status: CANCELLED | OUTPATIENT
Start: 2024-08-15

## 2024-08-15 RX ORDER — GADOBUTROL 604.72 MG/ML
9 INJECTION INTRAVENOUS ONCE
Status: COMPLETED | OUTPATIENT
Start: 2024-08-15 | End: 2024-08-15

## 2024-08-15 RX ADMIN — ACETAMINOPHEN 650 MG: 325 TABLET ORAL at 20:55

## 2024-08-15 RX ADMIN — GADOBUTROL 9 ML: 604.72 INJECTION INTRAVENOUS at 10:17

## 2024-08-15 RX ADMIN — HEPARIN SODIUM 5000 UNITS: 5000 INJECTION, SOLUTION INTRAVENOUS; SUBCUTANEOUS at 08:53

## 2024-08-15 RX ADMIN — CEFAZOLIN SODIUM 2 G: 2 INJECTION, SOLUTION INTRAVENOUS at 06:47

## 2024-08-15 RX ADMIN — ACETAMINOPHEN 650 MG: 325 TABLET ORAL at 00:59

## 2024-08-15 RX ADMIN — CEFAZOLIN SODIUM 2 G: 2 INJECTION, SOLUTION INTRAVENOUS at 19:13

## 2024-08-15 RX ADMIN — ACETAMINOPHEN 650 MG: 325 TABLET ORAL at 10:53

## 2024-08-15 ASSESSMENT — ACTIVITIES OF DAILY LIVING (ADL)
ADLS_ACUITY_SCORE: 25
ADLS_ACUITY_SCORE: 27
ADLS_ACUITY_SCORE: 25
ADLS_ACUITY_SCORE: 24
ADLS_ACUITY_SCORE: 24
ADLS_ACUITY_SCORE: 25

## 2024-08-15 NOTE — PLAN OF CARE
Felicitas is A & O x4, up with 1x assist, GB, walker. BP variable. Blood culture positive for staph aureas. Has small lesion on left breast (underside), scab on low center abdomen. States they come and go. IV Ancef given, followed by 1000 mL bolus. Edema increasing in bilat hands, ankles, feet. Instructed to remove rings. Continued diarrhea, continent of bowel and bladder. Poor appetite, bites for dinner. IV painful, reddened, removed. Family present. MD visited with patient for diagnosis.       Problem: Infection  Goal: Absence of Infection Signs and Symptoms  Outcome: Not Progressing  Intervention: Prevent or Manage Infection  Recent Flowsheet Documentation  Taken 8/14/2024 1843 by Leisa Barajas, RN  Isolation Precautions: airborne precautions initiated  Taken 8/14/2024 1800 by Leisa Barajas, RN  Isolation Precautions: airborne precautions initiated

## 2024-08-15 NOTE — CONSULTS
"  HEART CARE CONSULTATON NOTE        Assessment/Recommendations   Assessment/Plan:  Severe aortic stenosis (peak onmi: 4.3 m/sec, SHU:1.0, mean gradient: 47 mmHG).  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     Plan:   MIGDALIA given bacteremia and poor valve visualization on TTE.    Outpatient Valve clinic evaluation   IV antibiotics per infectious disease specialist, note reviewed      Clinically Significant Risk Factors                              # Obesity: Estimated body mass index is 31.11 kg/m  as calculated from the following:    Height as of this encounter: 1.702 m (5' 7\").    Weight as of this encounter: 90.1 kg (198 lb 9.6 oz)., PRESENT ON ADMISSION       # Financial/Environmental Concerns: none           History of Present Illness/Subjective    HPI: Felicitas Elliott is a 84 year old female with no prior cardiac history who presented to [facility] with fever chills and rigors found to have sepsis associated with bacteremia Staph aureus possibly from a skin source.  Given patient had bacteremia she underwent transthoracic echocardiogram which did not demonstrate any clear evidence of endocarditis but demonstrated severe aortic stenosis.  Valves were not well-visualized.    Currently she denies any active chest pain.  She has chronic dyspnea.  She is an active smoker.  She denies any syncopal episode.  Does note some lightheadedness at times.    Echo:   Echocardiogram was personally reviewed.  Demonstrated left trickle ejection fraction of 60 to 65%.  Mild left hypertrophy.  There is significant mitral annular calcification.  Poor visualization of mitral leaflets.  Severe aortic calcification with severe aortic stenosis.  Personally reviewed images.    Reviewed ID notes       Physical Examination     VITALS: BP 93/54 (BP Location: Left arm)   Pulse 92   Temp 99.4 " " F (37.4  C) (Oral)   Resp 18   Ht 1.702 m (5' 7\")   Wt 90.1 kg (198 lb 9.6 oz)   SpO2 91%   BMI 31.11 kg/m    BMI: Body mass index is 31.11 kg/m .  Wt Readings from Last 3 Encounters:   08/15/24 90.1 kg (198 lb 9.6 oz)       Intake/Output Summary (Last 24 hours) at 8/15/2024 1314  Last data filed at 8/15/2024 0632  Gross per 24 hour   Intake 120 ml   Output 800 ml   Net -680 ml     General Appearance:   no distress, normal body habitus   ENT/Mouth: membranes moist, no oral lesions or bleeding gums.      EYES:  no scleral icterus, normal conjunctivae   Neck: no carotid bruits or thyromegaly   Chest/Lungs:   Decreased breath sounds.  Faint wheezing.  Frequent cough noted   Cardiovascular:   Regular.  3 out of 6 mid-to-late peaking systolic murmur.  No pitting edema.   Abdomen:  no organomegaly, masses, bruits, or tenderness; bowel sounds are present   Extremities: no cyanosis or clubbing       Neurologic: normal  bilateral, no tremors     Psychiatric: alert and oriented x3, calm           Lab Results    Chemistry/lipid CBC Cardiac Enzymes/BNP/TSH/INR   No results for input(s): \"CHOL\", \"HDL\", \"LDL\", \"TRIG\", \"CHOLHDLRATIO\" in the last 98599 hours.  No results for input(s): \"LDL\" in the last 50134 hours.  Recent Labs   Lab Test 08/15/24  0532 08/14/24 0254   NA  --  133*   POTASSIUM  --  3.9   CHLORIDE  --  97*   CO2  --  26   GLC  --  148*   BUN  --  25.5*   CR 1.41* 1.92*   GFRESTIMATED 37* 25*   KAELYN  --  9.4     Recent Labs   Lab Test 08/15/24  0532 08/14/24 0254   CR 1.41* 1.92*     No results for input(s): \"A1C\" in the last 04582 hours.       Recent Labs   Lab Test 08/14/24 0254   WBC 10.4   HGB 13.1   HCT 39.0   MCV 92   *     Recent Labs   Lab Test 08/14/24 0254   HGB 13.1    No results for input(s): \"TROPONINI\" in the last 61853 hours.  No results for input(s): \"BNP\", \"NTBNPI\", \"NTBNP\" in the last 20309 hours.  No results for input(s): \"TSH\" in the last 86114 hours.  No results for input(s): " "\"INR\" in the last 61744 hours.     Medical History  Surgical History Family History Social History   History reviewed. No pertinent past medical history.  History reviewed. No pertinent surgical history.  History reviewed. No pertinent family history.     Social History     Socioeconomic History    Marital status:      Spouse name: Not on file    Number of children: Not on file    Years of education: Not on file    Highest education level: Not on file   Occupational History    Not on file   Tobacco Use    Smoking status: Not on file    Smokeless tobacco: Not on file   Substance and Sexual Activity    Alcohol use: Not on file    Drug use: Not on file    Sexual activity: Not on file   Other Topics Concern    Not on file   Social History Narrative    Not on file     Social Determinants of Health     Financial Resource Strain: High Risk (1/1/2022)    [facility]    Financial Resource Strain     Difficulty of Paying Living Expenses: Not on file     Difficulty of Paying Living Expenses: Not on file   Food Insecurity: Not on file   Transportation Needs: Not on file   Physical Activity: Not on file   Stress: Not on file   Social Connections: Unknown (1/3/2024)    [facility] [employer] [facility]    Social Connections     Frequency of Communication with Friends and Family: Not on file   Interpersonal Safety: Not on file   Housing Stability: Not on file         Medications  Allergies   No current outpatient medications on file.        Allergies   Allergen Reactions    Aspirin Rash    Cats Rash         Vishnu Meadows, DO   "

## 2024-08-15 NOTE — PROGRESS NOTES
Consultation - INFECTIOUS DISEASE CONSULTATION  Felicitas Elliott ,  1940, MRN [id number]      Sepsis with acute renal failure, due to unspecified organism, unspecified acute renal failure type, unspecified whether septic shock present (H) [A41.9, R65.20, N17.9]    PCP: Carol, Timbo Malik, [phone]   Code status:  No CPR- Do NOT Intubate               Assessment:  Felicitas Elliott is a 84-year-old female with    MSSA bacteremia -positive blood culture on 2024.  Subsequent blood culture on 8/15/2024 in process.  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  With the associated neck pain, cervical discitis is of concern.  Cervical MRI could not totally rule this out.  On IV Ancef.  Feels better.      Recommendations:   -continue cefazolin   -TTE ordered   -consider repeat MRI before discharge   -miconazole to intertriginous areas     John Esqueda MD      HPI:    Felicitas Elliott is a 84 year old female. History is provided by patient.  She reports that she is feeling about 80% better fro yesterday. She denies shortness of breath, fever, chills, nausea, vomiting. She reports continued cervical neck pain, which she reports is chronic for her. She denies urinary frequency or burning. She does affirms new onset loose stools. Reports skin breakdown beneath breasts and in inguinal region, which has in the past had purulent drainage. She reports no additional concerns at this time.     Chief complaint: Active Problems:    Sepsis with acute renal failure, due to unspecified organism, unspecified acute renal failure type, unspecified whether septic shock present (H)      Medical History  Active Ambulatory Problems     Diagnosis Date Noted    No Active Ambulatory Problems     Resolved Ambulatory Problems     Diagnosis Date Noted    No Resolved Ambulatory Problems     No Additional Past Medical History         Surgical History  She  has no past surgical history on file.       Social  History  Reviewed, and she          Family History  Reviewed and noncontributory to present problem, and family history is not on file. No FH frequent infections   Psychosocial Needs  Social History     Social History Narrative    Not on file     Additional psychosocial needs reviewed per nursing assessment.       Allergies   Allergen Reactions    Aspirin Rash    Cats Rash      Medications Prior to Admission   Medication Sig Dispense Refill Last Dose    acetaminophen (TYLENOL) 500 MG tablet Take 500-1,000 mg by mouth 2 times daily as needed for other (arthritis)   8/13/2024 at hs        Review of Systems:  A 12 point comprehensive review of systems was negative except as noted. Physical Exam:  Temp:  [97  F (36.1  C)-100.1  F (37.8  C)] 99.4  F (37.4  C)  Pulse:  [] 92  Resp:  [18-22] 18  BP: ()/(51-67) 93/54  SpO2:  [91 %-94 %] 91 %    GEN: alert and oriented x3, NAD  HEAD: atraumatic  ENT: moist membranes, no thrush, anicteric sclera, no thrush.   NECK: supple, pain with palpation of cervical spine midline  CARDIOVASCULAR: regular rate and rhythm, soft systolic murmur, no rubs, or gallops  PULMONARY: lungs clear to ausculation bilaterally  ABDOMEN: soft, nontender, nondistended. Normal bowel sounds  SKIN: skin breakdown in intertriginous regions   LYMPHADENOPATHY: no cervical, supraclavicular, axillary, or inguinal lymphadenopathy  PSYCH: grossly intact  MUSCULOSKELETAL: no synovitis               Pertinent Labs  personally reviewed.   CBC RESULTS:   Recent Labs   Lab Test 08/14/24  0254   WBC 10.4   RBC 4.23   HGB 13.1   HCT 39.0   MCV 92   MCH 31.0   MCHC 33.6   RDW 13.9   *        Last Comprehensive Metabolic Panel:  Sodium   Date Value Ref Range Status   08/14/2024 133 (L) 135 - 145 mmol/L Final     Potassium   Date Value Ref Range Status   08/14/2024 3.9 3.4 - 5.3 mmol/L Final     Chloride   Date Value Ref Range Status   08/14/2024 97 (L) 98 - 107 mmol/L Final     Carbon Dioxide (CO2)  "  Date Value Ref Range Status   08/14/2024 26 22 - 29 mmol/L Final     Anion Gap   Date Value Ref Range Status   08/14/2024 10 7 - 15 mmol/L Final     Glucose   Date Value Ref Range Status   08/14/2024 148 (H) 70 - 99 mg/dL Final     Urea Nitrogen   Date Value Ref Range Status   08/14/2024 25.5 (H) 8.0 - 23.0 mg/dL Final     Creatinine   Date Value Ref Range Status   08/15/2024 1.41 (H) 0.51 - 0.95 mg/dL Final     GFR Estimate   Date Value Ref Range Status   08/15/2024 37 (L) >60 mL/min/1.73m2 Final     Comment:     eGFR calculated using 2021 CKD-EPI equation.     Calcium   Date Value Ref Range Status   08/14/2024 9.4 8.8 - 10.4 mg/dL Final     Comment:     Reference intervals for this test were updated on 7/16/2024 to reflect our healthy population more accurately. There may be differences in the flagging of prior results with similar values performed with this method. Those prior results can be interpreted in the context of the updated reference intervals.       No results found for: \"CRP\"     The following microbiology studies were personally reviewed:  No results found for: \"CULT\"    Urine Studies    Recent Labs   Lab Test 08/14/24  0825   LEUKEST 25 Marty/uL*   WBCU 4       Vancomycin Levels  No lab results found.    Invalid input(s): \"VANCO\"    MICROBIOLOGY DATA:    All cultures:  7-Day Micro Results       Collected Updated Procedure Result Status      08/15/2024 0532 08/15/2024 0542 Blood Culture Peripheral Blood [[id number]]   Peripheral Blood    In process Component Value   No component results               08/14/2024 0942 08/14/2024 1429 Respiratory Panel PCR [[id number]]    Swab from Nasopharyngeal    Final result Component Value   Adenovirus Not Detected   Coronavirus Not Detected   This test detects Coronavirus 229E, HKU1, NL63 and OC43 but does not distinguish between them. It does not detect MERS ( Respiratory Syndrome), SARS (Severe Acute Respiratory Syndrome) or 2019-nCoV (Novel " 2019) Coronavirus.   Human Metapneumovirus Not Detected   Human Rhin/Enterovirus Not Detected   Influenza A Not Detected   Influenza A, H1 Not Detected   Influenza A 2009 H1N1 Not Detected   Influenza A, H3 Not Detected   Influenza B Not Detected   Parainfluenza Virus 1 Not Detected   Parainfluenza Virus 2 Not Detected   Parainfluenza Virus 3 Not Detected   Parainfluenza Virus 4 Not Detected   Respiratory Syncytial Virus A Not Detected   Respiratory Syncytial Virus B Not Detected   Chlamydia Pneumoniae Not Detected   Mycoplasma Pneumoniae Not Detected            08/14/2024 0524 08/14/2024 1008 MRSA MSSA PCR, Nasal Swab [[id number]]    Swab from Nose    Final result Component Value   MRSA Target DNA Negative   SA Target DNA Positive            08/14/2024 0317 08/14/2024 0402 Symptomatic Influenza A/B, RSV, & SARS-CoV2 PCR (COVID-19) Nasopharyngeal [[id number]]    Swab from Nasopharyngeal    Final result Component Value   Influenza A PCR Negative   Influenza B PCR Negative   RSV PCR Negative   SARS CoV2 PCR Negative   NEGATIVE: SARS-CoV-2 (COVID-19) RNA not detected, presumed negative.            08/14/2024 0254 08/15/2024 1047 Blood Culture Peripheral Blood [[id number]]   (Abnormal)   Peripheral Blood    Preliminary result Component Value   Culture Positive on the 1st day of incubation  [P]     Staphylococcus aureus  [P]     2 of 2 bottles               08/14/2024 0254 08/14/2024 1815 Verigene GP Panel [[id number]]    (Abnormal)   Peripheral Blood    Final result Component Value   Staphylococcus aureus Detected   Positive for Staphylococcus aureus and negative for the mecA gene (not resistant to methicillin) by [employer]igene multiplex nucleic acid test. Final identification and antimicrobial susceptibility testing will be verified by standard methods.   Staphylococcus epidermidis Not Detected   Staphylococcus lugdunensis Not Detected   Enterococcus faecalis Not Detected   Enterococcus faecium Not Detected    Streptococcus species Not Detected   Streptococcus agalactiae Not Detected   Streptococcus anginosus group Not Detected   Streptococcus pneumoniae Not Detected   Streptococcus pyogenes Not Detected   Listeria species Not Detected                     Pertinent Radiology  personally reviewed.       CT Chest Abdomen Pelvis w/o Contrast    Result Date: 8/14/2024  EXAM: CT CHEST ABDOMEN PELVIS W/O CONTRAST LOCATION: [facility] DATE: 8/14/2024 INDICATION: undifferentiated sepsis, eval for source of infection COMPARISON: Radiograph 8/14/2024 0340 hours TECHNIQUE: CT scan of the chest, abdomen, and pelvis was performed without IV contrast. Multiplanar reformats were obtained. Dose reduction techniques were used. CONTRAST: None. FINDINGS: LUNGS AND PLEURA: Diffuse bronchial wall thickening. Mild emphysema. Dependent opacities represent atelectasis. There are a few tiny groundglass opacities in the upper lungs which measure up to 13 mm x 9 mm (series 4 image 65). A small noncalcified left pleural plaque is noted. MEDIASTINUM/AXILLAE: Mitral annular calcification. Aortic leaflet calcification. CORONARY ARTERY CALCIFICATION: Moderate. HEPATOBILIARY: Normal. PANCREAS: Normal. SPLEEN: Normal. ADRENAL GLANDS: A benign left adrenal adenoma does not require follow-up. KIDNEYS/BLADDER: Mildly atrophic left kidney. There is mild left greater than right perinephric stranding. No hydronephrosis or hydroureter. No renal or ureteral calculi. A 13 mm x 11 mm low-attenuation right renal lesion is incompletely assessed (series  3 image 205). BOWEL: Air-fluid levels in the colon. Mild colonic diverticulosis without diverticulitis. The appendix is 9 mm in maximum diameter. LYMPH NODES: Normal. VASCULATURE: Atherosclerotic disease. PELVIC ORGANS: Normal. MUSCULOSKELETAL: Normal.     IMPRESSION: 1.  Mild perinephric stranding suggests edema. Inflammation/infection is not excluded. No ureteral obstruction. A right  renal lesion is likely to be benign; ultrasound may be helpful. 2.  Air-fluid levels in the colon can be seen with liquid stool. The prominent appendix is likely within normal limits. 3.  Small subtle ground glass pulmonary opacities may be a nonspecific infectious or inflammatory process. These are not a source of sepsis. Bronchial wall thickening can be seen with bronchitis.    Chest XR,  PA & LAT    Result Date: 8/14/2024  EXAM: XR CHEST 2 VIEWS LOCATION: [facility] DATE: 8/14/2024 INDICATION: Cough. Fever. COMPARISON: Chest x-ray 2 views 3/22/2024 at 1138 hours.     IMPRESSION: Both lungs remain clear. No adenopathy or effusion. Normal cardiac size and pulmonary vascularity. Atherosclerotic thoracic aorta. Degenerative changes both shoulders and the spine. No significant interval change.

## 2024-08-15 NOTE — PROGRESS NOTES
Therapy: IV ABX  Insurance: [facility] Medicare Advantage     The patient has all Medicare products, which do not cover IV ABX in the home. (Pt would have coverage for short term TCU or IC). Below is what the patient would be responsible for if the patient wanted to go with [facility].   -Drug would go to the Part-D Prescription Plan- PT would be responsible for the co-pay per dispense.   -Patient would have to self-pay for the per-paulie ($30.00 daily)   -[facility] cannot bill for nursing, Pt will require outside agency for nursing needs.    In reference to Radha for admission date 08/14/2024 to check IVABX coverage.  Please contact Intake with any questions, [phone] or In Basket pool, [facility] ([phone]).

## 2024-08-15 NOTE — PROGRESS NOTES
[facility] Medicine PROGRESS NOTE      Identification/Summary:   83 yo f presented with fatigue, weakness, chills, poor appetite. Eval with no clear source of infection. Treated with cefazolin, vanco. Blood cultures ordered. Prelim blood cx pos. Also with HAMMAD.     Vitals today OK. Creat today improved to 1.4. verigene panel with MSSA. MR cervical spine ordered due to neck pain, echo ordered, ID consulted.    Assessment and Plan:  Sepsis due to unclear source  Blood cx pos for staph aureus, not MRSA  ?skin wounds as source  Continue cefazolin  Will consult ID  Check TTE  Has hx of bilateral total knees, not painful now  Hx of cervical spine surgery, more painful lately, MR neck pending    CKD, HAMMAD  Baseline creat around 1.6  Creat on admission of 1.92  Creat improved today to 1.4    Diet: Combination Diet Regular Diet Adult  Fluids: none  Pain meds:tylenol  Therapy:none  DVT Prophylaxis:  heparin  Code Status: No CPR- Do NOT Intubate  Medically Ready for Discharge: Anticipated in 2-4 Days        Interval History/Subjective:  Feeling better. Says she often has pustular skin lesions, none currently. No knee pain. Does have neck pain.     Physical Exam/Objective:  Gen: no acute distress, comfortable, alert, pleasant  ENT: no scleral icterus  Pulm: lungs are diminished, soft exp wheezing  CV: regular rate and rhythm, systolic murmur heard best at rt sternal border  MSK: knees look good  Derm: not pale, no jaundice  Psych: appropriate affect    Medications:   Current Facility-Administered Medications   Medication Dose Route Frequency Provider Last Rate Last Admin    ceFAZolin (ANCEF) 2 g in 100 mL D5W intermittent infusion  2 g Intravenous Q12H Noé Velez  mL/hr at 08/15/24 0647 2 g at 08/15/24 0647    heparin ANTICOAGULANT injection 5,000 Units  5,000 Units Subcutaneous BID Noé Velez MD   5,000 Units at 08/14/24 2015    sodium chloride (PF) 0.9% PF flush 3 mL  3 mL Intracatheter Q8H Laura  "Jose Carlos COLEMAN MD   3 mL at 08/15/24 0107    sodium chloride (PF) 0.9% PF flush 3 mL  3 mL Intracatheter Q8H Werner Rojas MD   3 mL at 08/15/24 0624     Clinically Significant Risk Factors                            # Obesity: Estimated body mass index is 31.11 kg/m  as calculated from the following:    Height as of this encounter: 1.702 m (5' 7\").    Weight as of this encounter: 90.1 kg (198 lb 9.6 oz)., PRESENT ON ADMISSION     # Financial/Environmental Concerns: none                  Catrachito Alexander DO   [facility]              "

## 2024-08-15 NOTE — PLAN OF CARE
Problem: Infection  Goal: Absence of Infection Signs and Symptoms  Outcome: Progressing  Intervention: Prevent or Manage Infection  Recent Flowsheet Documentation  Taken 8/15/2024 0853 by Fiona Phelps, RN  Isolation Precautions: airborne precautions initiated     Problem: Fever  Goal: Body Temperature in Desired Range  Outcome: Progressing   Goal Outcome Evaluation:    Pt A&Ox4, reports having a headache, prn tylenol given. Room air. Tolerating diet. Transfers with ast x1 & walker. Soft BP. Continues on IV ABX for sepsis. MRI and echo complete. Results pending.  present in room. Bed & chair alarm on, call light within reach.

## 2024-08-15 NOTE — CONSULTS
Received a page regarding this patient for cervical spinal stenosis.  Called back and will for her to hold for 8 minutes before hanging up.  There does appear to be cervical spinal stenosis but I do not see any clear cord signal change.  Appears she is admitted for sepsis.  I do not see any clear epidural abscess on my review.   Suspect there is no acute intervention needed, would recommend to reach out to neurosurgery regarding imaging review and need for follow-up if question of cervical spinal stenosis as stated in page.  If additional questions for neurology please contact back to assist.     Sandeep Ruiz, DO

## 2024-08-15 NOTE — PROGRESS NOTES
[facility]    Referral received  from Princess Alvarez RN CM, for IV antibiotics.    Benefits verified.  Pt has all Medicare products which do not fully cover IV antibiotics in the home.  Patient would be responsible for the copay and the supplies.  For the current IV antibiotic, Cefazolin, pt's cost would be $30/day.      Pt would need to be homebound in order to have coverage for home nurse visits.    Should pt require IV antibiotics at discharge, writer will speak with pt to review benefits, home infusion and to offer choice of agency/home infusion provider.    Thank you for the referral.    Alva Blackmon RN, BSN  [facility] Liaison  [phone] (Mon through Fri, 8:00 am-5:00 pm)  [phone] (Office)

## 2024-08-15 NOTE — PROGRESS NOTES
"CLINICAL NUTRITION SERVICES - ASSESSMENT NOTE     Nutrition Prescription    RECOMMENDATIONS FOR MDs/PROVIDERS TO ORDER:  None    Malnutrition Status:    Patient does not meet two of the established criteria necessary for diagnosing malnutrition    Recommendations already ordered by Registered Dietitian (RD):  Medical food supplement therapy - Ensure Enlive BID with meals    Future/Additional Recommendations:  Monitor po intake, ONS tolerance     REASON FOR ASSESSMENT  Felicitas Elliott is a/an 84 year old female assessed by the dietitian for Admission Nutrition Risk Screen for positive: unsure wt loss, no decreased appetite    NUTRITION HISTORY  Dx: Sepsis 2/2 unclear source, weakness, chills, poor appetite  PMH: total knee replacement, CKD    Met with pt and pt's sister in room this morning. Pt reports that her appetite decreased suddenly on Monday with onset of symptoms leading to her admission (shaking, chills, weakness). Since Monday her appetite has remained poor but seems to be improving gradually. Pt is mostly tolerating fruit.  Pt is agreeable to getting Ensure Enlive BID to help her meet protein/kcal needs while appetite is poor.     CURRENT NUTRITION ORDERS  Diet: Regular  Intake/Tolerance: Pt ordered breakfast and dinner yesterday, but only ate a few bites per flow sheets data    LABS  Labs reviewed  Na: 133 (L)  BUN: 25.5 (H)  Cr: 1.41 (H)  B     MEDICATIONS  Medications reviewed    Skin:  Edema: 1+ trace in hands, legs, ankles, feet    GI:   LBM on 8/15, diarrhea    ANTHROPOMETRICS  Height: 170.2 cm (5' 7\")  Most Recent Weight: 90.1 kg (198 lb 9.6 oz)    IBW: 61.6 kg  BMI: Obesity Grade I BMI 30-34.9  Weight History:   08/15/24 : 90.1 kg (198 lb 9.6 oz)  24 : 91.2 kg (201 lb)    Dosing Weight: 68.7 kg adjusted body wt    ASSESSED NUTRITION NEEDS  Estimated Energy Needs: 6607-6827 kcals/day (25 - 30 kcals/kg)  Justification: Maintenance  Estimated Protein Needs: 55-68 grams protein/day (0.8 - 1 " grams of pro/kg)  Justification: Maintenance  Estimated Fluid Needs: 1148-6180 mL/day (1 mL/kcal)   Justification: Maintenance    PHYSICAL FINDINGS  See malnutrition section below.    MALNUTRITION  % Intake: Decreased intake does not meet criteria  % Weight Loss: Weight loss does not meet criteria  Subcutaneous Fat Loss: None observed  Muscle Loss: None observed  Fluid Accumulation/Edema: Does not meet criteria  Malnutrition Diagnosis: Patient does not meet two of the established criteria necessary for diagnosing malnutrition    NUTRITION DIAGNOSIS  Inadequate oral intake related to poor appetite as evidenced by pt report of low appetite x3 days      INTERVENTIONS  Implementation   Medical food supplement therapy - Ensure Enlive BID with meals    Goals  Patient to consume % of nutritionally adequate meal trays TID, or the equivalent with supplements/snacks.     Monitoring/Evaluation  Progress toward goals will be monitored and evaluated per protocol.

## 2024-08-15 NOTE — PROGRESS NOTES
Care Management Follow Up    Length of Stay (days): 1    Expected Discharge Date: 08/16/2024     Concerns to be Addressed: care coordination, discharge planning      Patient plan of care discussed at interdisciplinary rounds: Yes    Anticipated Discharge Disposition: Home with possible home infusion)     Anticipated Discharge Services: None    Anticipated Discharge DME: None    Education Provided on the Discharge Plan: Yes (AVS per bedside RN)    Patient/Family in Agreement with the Plan: yes    Referrals Placed by CM/SW: [facility]         Additional Information:  CM reviewed chart. Anticipate discharge home with home infusion needs. RN CM will follow. Home infusion referral pending. Family to provide transportation home at discharge. RN CM to follow.         Referral pending  [facility]   [address] [facility] [id number]         Princess Alvarez RN  Care Coordinator

## 2024-08-15 NOTE — PLAN OF CARE
Problem: Adult Inpatient Plan of Care  Goal: Absence of Hospital-Acquired Illness or Injury  Intervention: Identify and Manage Fall Risk  Recent Flowsheet Documentation  Taken 8/15/2024 0424 by Queenie Youssef RN  Safety Promotion/Fall Prevention: safety round/check completed  Taken 8/15/2024 0059 by Queenie Youssef RN  Safety Promotion/Fall Prevention: safety round/check completed     Problem: Adult Inpatient Plan of Care  Goal: Optimal Comfort and Wellbeing  Outcome: Progressing  Intervention: Monitor Pain and Promote Comfort  Recent Flowsheet Documentation  Taken 8/15/2024 0059 by Queenie Youssef RN  Pain Management Interventions: medication (see MAR)   Goal Outcome Evaluation:       Pt is alert and oriented x4, can be forgetful. C/o headache relieved with PRN Tylenol. Infrequent productive cough noted. Dyspnea on exertion. Ambulates to the bathroom w/ assist of 1, gait belt and walker. O2 sat stable at 90-94%. Last BP checked was @ 06/32, soft BP 92/51 with MAP of 69. Dressing to left elbow intact. Episodes of diarrhea, small amount, x6 occurrences since yesterday evening. Denies abdominal cramping or discomfort.

## 2024-08-15 NOTE — PROGRESS NOTES
Notified by Dr Meadows that MIGDALIA to be scheduled for Friday.  Floor called and spoke with Fiona Phelps RN.  Notified that pt is to be NPO after midnight and may give medication in AM with small sip of water.  Tentative procedure time 1030.  Sylvia Purvis RN

## 2024-08-15 NOTE — PLAN OF CARE
Problem: Infection  Goal: Absence of Infection Signs and Symptoms  Outcome: Progressing     Problem: Fever  Goal: Body Temperature in Desired Range  Outcome: Progressing     Goal Outcome Evaluation:       Pt is afebrile, BP improved with last bolus. IV antibiotics.

## 2024-08-16 ENCOUNTER — APPOINTMENT (OUTPATIENT)
Dept: CARDIOLOGY | Facility: CLINIC | Age: 84
DRG: 871 | End: 2024-08-16
Attending: INTERNAL MEDICINE
Payer: COMMERCIAL

## 2024-08-16 ENCOUNTER — HOSPITAL ENCOUNTER (INPATIENT)
Facility: HOSPITAL | Age: 84
LOS: 31 days | Discharge: NURSING, REHAB OR LONG-TERM CARE | DRG: 853 | End: 2024-09-16
Attending: STUDENT IN AN ORGANIZED HEALTH CARE EDUCATION/TRAINING PROGRAM | Admitting: STUDENT IN AN ORGANIZED HEALTH CARE EDUCATION/TRAINING PROGRAM
Payer: COMMERCIAL

## 2024-08-16 VITALS
BODY MASS INDEX: 31.17 KG/M2 | OXYGEN SATURATION: 91 % | RESPIRATION RATE: 22 BRPM | SYSTOLIC BLOOD PRESSURE: 96 MMHG | DIASTOLIC BLOOD PRESSURE: 55 MMHG | HEIGHT: 67 IN | WEIGHT: 198.6 LBS | TEMPERATURE: 98.2 F | HEART RATE: 77 BPM

## 2024-08-16 DIAGNOSIS — Z98.61 POSTSURGICAL PERCUTANEOUS TRANSLUMINAL CORONARY ANGIOPLASTY STATUS: ICD-10-CM

## 2024-08-16 DIAGNOSIS — I44.2 ATRIOVENTRICULAR BLOCK, COMPLETE (H): ICD-10-CM

## 2024-08-16 DIAGNOSIS — I45.2 BIFASCICULAR BLOCK: ICD-10-CM

## 2024-08-16 DIAGNOSIS — Z95.1 S/P CABG (CORONARY ARTERY BYPASS GRAFT): Primary | ICD-10-CM

## 2024-08-16 DIAGNOSIS — I25.10 CORONARY ARTERY DISEASE INVOLVING NATIVE CORONARY ARTERY OF NATIVE HEART, UNSPECIFIED WHETHER ANGINA PRESENT: ICD-10-CM

## 2024-08-16 DIAGNOSIS — I05.9 MITRAL VALVE DISORDER: ICD-10-CM

## 2024-08-16 DIAGNOSIS — I35.0 NONRHEUMATIC AORTIC VALVE STENOSIS: ICD-10-CM

## 2024-08-16 PROBLEM — R78.81 BACTEREMIA: Status: ACTIVE | Noted: 2024-08-16

## 2024-08-16 PROBLEM — I38 ENDOCARDITIS: Status: ACTIVE | Noted: 2024-08-16

## 2024-08-16 PROBLEM — A41.9 SEPSIS (H): Status: ACTIVE | Noted: 2024-08-16

## 2024-08-16 LAB
ANION GAP SERPL CALCULATED.3IONS-SCNC: 10 MMOL/L (ref 7–15)
ATRIAL RATE - MUSE: 88 BPM
BACTERIA BLD CULT: ABNORMAL
BACTERIA BLD CULT: ABNORMAL
BUN SERPL-MCNC: 17.3 MG/DL (ref 8–23)
CALCIUM SERPL-MCNC: 8.8 MG/DL (ref 8.8–10.4)
CHLORIDE SERPL-SCNC: 104 MMOL/L (ref 98–107)
CREAT SERPL-MCNC: 1.36 MG/DL (ref 0.51–0.95)
DIASTOLIC BLOOD PRESSURE - MUSE: NORMAL MMHG
EGFRCR SERPLBLD CKD-EPI 2021: 38 ML/MIN/1.73M2
ERYTHROCYTE [DISTWIDTH] IN BLOOD BY AUTOMATED COUNT: 14.2 % (ref 10–15)
GLUCOSE SERPL-MCNC: 110 MG/DL (ref 70–99)
HBA1C MFR BLD: 6.4 %
HCO3 SERPL-SCNC: 23 MMOL/L (ref 22–29)
HCT VFR BLD AUTO: 32.6 % (ref 35–47)
HGB BLD-MCNC: 10.8 G/DL (ref 11.7–15.7)
INTERPRETATION ECG - MUSE: NORMAL
LVEF ECHO: NORMAL
MAGNESIUM SERPL-MCNC: 1.9 MG/DL (ref 1.7–2.3)
MCH RBC QN AUTO: 31.1 PG (ref 26.5–33)
MCHC RBC AUTO-ENTMCNC: 33.1 G/DL (ref 31.5–36.5)
MCV RBC AUTO: 94 FL (ref 78–100)
P AXIS - MUSE: 63 DEGREES
PLATELET # BLD AUTO: 95 10E3/UL (ref 150–450)
POTASSIUM SERPL-SCNC: 3.4 MMOL/L (ref 3.4–5.3)
PR INTERVAL - MUSE: 198 MS
QRS DURATION - MUSE: 146 MS
QT - MUSE: 418 MS
QTC - MUSE: 505 MS
R AXIS - MUSE: -31 DEGREES
RBC # BLD AUTO: 3.47 10E6/UL (ref 3.8–5.2)
SODIUM SERPL-SCNC: 137 MMOL/L (ref 135–145)
SYSTOLIC BLOOD PRESSURE - MUSE: NORMAL MMHG
T AXIS - MUSE: 23 DEGREES
VENTRICULAR RATE- MUSE: 88 BPM
WBC # BLD AUTO: 7.3 10E3/UL (ref 4–11)

## 2024-08-16 PROCEDURE — 36415 COLL VENOUS BLD VENIPUNCTURE: CPT | Performed by: HOSPITALIST

## 2024-08-16 PROCEDURE — 93005 ELECTROCARDIOGRAM TRACING: CPT | Performed by: HOSPITALIST

## 2024-08-16 PROCEDURE — 93320 DOPPLER ECHO COMPLETE: CPT | Mod: 26 | Performed by: INTERNAL MEDICINE

## 2024-08-16 PROCEDURE — 93010 ELECTROCARDIOGRAM REPORT: CPT | Performed by: STUDENT IN AN ORGANIZED HEALTH CARE EDUCATION/TRAINING PROGRAM

## 2024-08-16 PROCEDURE — 93325 DOPPLER ECHO COLOR FLOW MAPG: CPT | Mod: 26 | Performed by: INTERNAL MEDICINE

## 2024-08-16 PROCEDURE — 93312 ECHO TRANSESOPHAGEAL: CPT | Mod: 26 | Performed by: INTERNAL MEDICINE

## 2024-08-16 PROCEDURE — 258N000003 HC RX IP 258 OP 636: Performed by: STUDENT IN AN ORGANIZED HEALTH CARE EDUCATION/TRAINING PROGRAM

## 2024-08-16 PROCEDURE — 250N000013 HC RX MED GY IP 250 OP 250 PS 637: Performed by: INTERNAL MEDICINE

## 2024-08-16 PROCEDURE — 99232 SBSQ HOSP IP/OBS MODERATE 35: CPT | Performed by: HOSPITALIST

## 2024-08-16 PROCEDURE — 258N000003 HC RX IP 258 OP 636: Performed by: INTERNAL MEDICINE

## 2024-08-16 PROCEDURE — 93325 DOPPLER ECHO COLOR FLOW MAPG: CPT

## 2024-08-16 PROCEDURE — 250N000013 HC RX MED GY IP 250 OP 250 PS 637: Performed by: STUDENT IN AN ORGANIZED HEALTH CARE EDUCATION/TRAINING PROGRAM

## 2024-08-16 PROCEDURE — 250N000009 HC RX 250: Performed by: INTERNAL MEDICINE

## 2024-08-16 PROCEDURE — 99232 SBSQ HOSP IP/OBS MODERATE 35: CPT | Performed by: STUDENT IN AN ORGANIZED HEALTH CARE EDUCATION/TRAINING PROGRAM

## 2024-08-16 PROCEDURE — 85027 COMPLETE CBC AUTOMATED: CPT | Performed by: HOSPITALIST

## 2024-08-16 PROCEDURE — 99222 1ST HOSP IP/OBS MODERATE 55: CPT | Performed by: PHYSICIAN ASSISTANT

## 2024-08-16 PROCEDURE — 250N000011 HC RX IP 250 OP 636: Performed by: HOSPITALIST

## 2024-08-16 PROCEDURE — 83735 ASSAY OF MAGNESIUM: CPT | Performed by: FAMILY MEDICINE

## 2024-08-16 PROCEDURE — 36415 COLL VENOUS BLD VENIPUNCTURE: CPT | Performed by: STUDENT IN AN ORGANIZED HEALTH CARE EDUCATION/TRAINING PROGRAM

## 2024-08-16 PROCEDURE — 250N000011 HC RX IP 250 OP 636: Performed by: INTERNAL MEDICINE

## 2024-08-16 PROCEDURE — 99232 SBSQ HOSP IP/OBS MODERATE 35: CPT | Performed by: INTERNAL MEDICINE

## 2024-08-16 PROCEDURE — 120N000004 HC R&B MS OVERFLOW

## 2024-08-16 PROCEDURE — 80048 BASIC METABOLIC PNL TOTAL CA: CPT | Performed by: HOSPITALIST

## 2024-08-16 PROCEDURE — 83036 HEMOGLOBIN GLYCOSYLATED A1C: CPT | Performed by: STUDENT IN AN ORGANIZED HEALTH CARE EDUCATION/TRAINING PROGRAM

## 2024-08-16 PROCEDURE — 99223 1ST HOSP IP/OBS HIGH 75: CPT | Performed by: STUDENT IN AN ORGANIZED HEALTH CARE EDUCATION/TRAINING PROGRAM

## 2024-08-16 PROCEDURE — 93005 ELECTROCARDIOGRAM TRACING: CPT

## 2024-08-16 PROCEDURE — 87040 BLOOD CULTURE FOR BACTERIA: CPT | Performed by: HOSPITALIST

## 2024-08-16 PROCEDURE — 250N000011 HC RX IP 250 OP 636: Performed by: STUDENT IN AN ORGANIZED HEALTH CARE EDUCATION/TRAINING PROGRAM

## 2024-08-16 PROCEDURE — 250N000013 HC RX MED GY IP 250 OP 250 PS 637: Performed by: HOSPITALIST

## 2024-08-16 RX ORDER — FENTANYL CITRATE 50 UG/ML
INJECTION, SOLUTION INTRAMUSCULAR; INTRAVENOUS
Status: COMPLETED | OUTPATIENT
Start: 2024-08-16 | End: 2024-08-16

## 2024-08-16 RX ORDER — LIDOCAINE 40 MG/G
CREAM TOPICAL
Status: DISCONTINUED | OUTPATIENT
Start: 2024-08-16 | End: 2024-08-22

## 2024-08-16 RX ORDER — SODIUM CHLORIDE 9 MG/ML
30 INJECTION, SOLUTION INTRAVENOUS CONTINUOUS
Status: DISCONTINUED | OUTPATIENT
Start: 2024-08-16 | End: 2024-08-16 | Stop reason: HOSPADM

## 2024-08-16 RX ORDER — AMOXICILLIN 250 MG
2 CAPSULE ORAL 2 TIMES DAILY PRN
Status: DISCONTINUED | OUTPATIENT
Start: 2024-08-16 | End: 2024-09-16 | Stop reason: HOSPADM

## 2024-08-16 RX ORDER — ONDANSETRON 2 MG/ML
4 INJECTION INTRAMUSCULAR; INTRAVENOUS EVERY 6 HOURS PRN
Status: DISCONTINUED | OUTPATIENT
Start: 2024-08-16 | End: 2024-08-27

## 2024-08-16 RX ORDER — SODIUM CHLORIDE 9 MG/ML
30 INJECTION, SOLUTION INTRAVENOUS CONTINUOUS
Status: CANCELLED | OUTPATIENT
Start: 2024-08-16

## 2024-08-16 RX ORDER — LIDOCAINE HYDROCHLORIDE 20 MG/ML
SOLUTION OROPHARYNGEAL
Status: COMPLETED | OUTPATIENT
Start: 2024-08-16 | End: 2024-08-16

## 2024-08-16 RX ORDER — BENZOCAINE/MENTHOL 6 MG-10 MG
1 LOZENGE MUCOUS MEMBRANE 3 TIMES DAILY PRN
Status: ACTIVE | OUTPATIENT
Start: 2024-08-16 | End: 2024-08-18

## 2024-08-16 RX ORDER — LIDOCAINE 40 MG/G
CREAM TOPICAL
Status: DISCONTINUED | OUTPATIENT
Start: 2024-08-16 | End: 2024-09-07

## 2024-08-16 RX ORDER — AMOXICILLIN 250 MG
1 CAPSULE ORAL 2 TIMES DAILY PRN
Status: CANCELLED | OUTPATIENT
Start: 2024-08-16

## 2024-08-16 RX ORDER — CALCIUM CARBONATE 500 MG/1
1000 TABLET, CHEWABLE ORAL 4 TIMES DAILY PRN
Status: CANCELLED | OUTPATIENT
Start: 2024-08-16

## 2024-08-16 RX ORDER — AMOXICILLIN 250 MG
2 CAPSULE ORAL 2 TIMES DAILY PRN
Status: CANCELLED | OUTPATIENT
Start: 2024-08-16

## 2024-08-16 RX ORDER — HEPARIN SODIUM 5000 [USP'U]/.5ML
5000 INJECTION, SOLUTION INTRAVENOUS; SUBCUTANEOUS 2 TIMES DAILY
Status: DISCONTINUED | OUTPATIENT
Start: 2024-08-16 | End: 2024-08-27

## 2024-08-16 RX ORDER — ACETAMINOPHEN 325 MG/1
650 TABLET ORAL EVERY 4 HOURS PRN
Status: CANCELLED | OUTPATIENT
Start: 2024-08-16 | End: 2024-08-18

## 2024-08-16 RX ORDER — ONDANSETRON 4 MG/1
4 TABLET, ORALLY DISINTEGRATING ORAL EVERY 6 HOURS PRN
Status: DISCONTINUED | OUTPATIENT
Start: 2024-08-16 | End: 2024-08-27

## 2024-08-16 RX ORDER — CEFAZOLIN SODIUM 2 G/100ML
2 INJECTION, SOLUTION INTRAVENOUS EVERY 12 HOURS
Status: CANCELLED | OUTPATIENT
Start: 2024-08-16

## 2024-08-16 RX ORDER — LIDOCAINE 40 MG/G
CREAM TOPICAL
Status: DISCONTINUED | OUTPATIENT
Start: 2024-08-16 | End: 2024-08-16 | Stop reason: HOSPADM

## 2024-08-16 RX ORDER — CALCIUM CARBONATE 500 MG/1
1000 TABLET, CHEWABLE ORAL 4 TIMES DAILY PRN
Status: DISCONTINUED | OUTPATIENT
Start: 2024-08-16 | End: 2024-09-03

## 2024-08-16 RX ORDER — MAGNESIUM HYDROXIDE/ALUMINUM HYDROXICE/SIMETHICONE 120; 1200; 1200 MG/30ML; MG/30ML; MG/30ML
30 SUSPENSION ORAL EVERY 8 HOURS PRN
Status: CANCELLED | OUTPATIENT
Start: 2024-08-16 | End: 2024-08-18

## 2024-08-16 RX ORDER — ONDANSETRON 2 MG/ML
4 INJECTION INTRAMUSCULAR; INTRAVENOUS EVERY 6 HOURS PRN
Status: CANCELLED | OUTPATIENT
Start: 2024-08-16

## 2024-08-16 RX ORDER — ACETAMINOPHEN 325 MG/1
650 TABLET ORAL EVERY 4 HOURS PRN
Status: CANCELLED | OUTPATIENT
Start: 2024-08-16

## 2024-08-16 RX ORDER — HEPARIN SODIUM 5000 [USP'U]/.5ML
5000 INJECTION, SOLUTION INTRAVENOUS; SUBCUTANEOUS 2 TIMES DAILY
Status: CANCELLED | OUTPATIENT
Start: 2024-08-16

## 2024-08-16 RX ORDER — CEFAZOLIN SODIUM 2 G/100ML
2 INJECTION, SOLUTION INTRAVENOUS EVERY 12 HOURS
Status: DISCONTINUED | OUTPATIENT
Start: 2024-08-16 | End: 2024-08-17

## 2024-08-16 RX ORDER — ACETAMINOPHEN 650 MG/1
650 SUPPOSITORY RECTAL EVERY 4 HOURS PRN
Status: CANCELLED | OUTPATIENT
Start: 2024-08-16

## 2024-08-16 RX ORDER — ACETAMINOPHEN 650 MG/1
650 SUPPOSITORY RECTAL EVERY 4 HOURS PRN
Status: DISCONTINUED | OUTPATIENT
Start: 2024-08-16 | End: 2024-09-03

## 2024-08-16 RX ORDER — AMOXICILLIN 250 MG
1 CAPSULE ORAL 2 TIMES DAILY PRN
Status: DISCONTINUED | OUTPATIENT
Start: 2024-08-16 | End: 2024-09-16 | Stop reason: HOSPADM

## 2024-08-16 RX ORDER — LIDOCAINE 40 MG/G
CREAM TOPICAL
Status: CANCELLED | OUTPATIENT
Start: 2024-08-16

## 2024-08-16 RX ORDER — BENZOCAINE/MENTHOL 6 MG-10 MG
1 LOZENGE MUCOUS MEMBRANE 3 TIMES DAILY PRN
Status: CANCELLED | OUTPATIENT
Start: 2024-08-16 | End: 2024-08-18

## 2024-08-16 RX ORDER — CARBOXYMETHYLCELLULOSE SODIUM 5 MG/ML
1 SOLUTION/ DROPS OPHTHALMIC
Status: CANCELLED | OUTPATIENT
Start: 2024-08-16

## 2024-08-16 RX ORDER — ONDANSETRON 4 MG/1
4 TABLET, ORALLY DISINTEGRATING ORAL EVERY 6 HOURS PRN
Status: CANCELLED | OUTPATIENT
Start: 2024-08-16

## 2024-08-16 RX ORDER — ACETAMINOPHEN 325 MG/1
650 TABLET ORAL EVERY 4 HOURS PRN
Status: DISCONTINUED | OUTPATIENT
Start: 2024-08-16 | End: 2024-08-16

## 2024-08-16 RX ORDER — ACETAMINOPHEN 325 MG/1
650 TABLET ORAL EVERY 4 HOURS PRN
Status: DISCONTINUED | OUTPATIENT
Start: 2024-08-16 | End: 2024-09-03

## 2024-08-16 RX ORDER — CARBOXYMETHYLCELLULOSE SODIUM 5 MG/ML
1 SOLUTION/ DROPS OPHTHALMIC
Status: DISCONTINUED | OUTPATIENT
Start: 2024-08-16 | End: 2024-09-05

## 2024-08-16 RX ORDER — MAGNESIUM HYDROXIDE/ALUMINUM HYDROXICE/SIMETHICONE 120; 1200; 1200 MG/30ML; MG/30ML; MG/30ML
30 SUSPENSION ORAL EVERY 8 HOURS PRN
Status: ACTIVE | OUTPATIENT
Start: 2024-08-16 | End: 2024-08-18

## 2024-08-16 RX ORDER — POTASSIUM CHLORIDE 1500 MG/1
20 TABLET, EXTENDED RELEASE ORAL
Status: COMPLETED | OUTPATIENT
Start: 2024-08-16 | End: 2024-08-16

## 2024-08-16 RX ORDER — SODIUM CHLORIDE 9 MG/ML
INJECTION, SOLUTION INTRAVENOUS CONTINUOUS PRN
Status: COMPLETED | OUTPATIENT
Start: 2024-08-16 | End: 2024-08-16

## 2024-08-16 RX ORDER — SODIUM CHLORIDE 9 MG/ML
30 INJECTION, SOLUTION INTRAVENOUS CONTINUOUS
Status: DISCONTINUED | OUTPATIENT
Start: 2024-08-16 | End: 2024-08-17

## 2024-08-16 RX ADMIN — SODIUM CHLORIDE 30 ML/HR: 9 INJECTION, SOLUTION INTRAVENOUS at 19:24

## 2024-08-16 RX ADMIN — HEPARIN SODIUM 5000 UNITS: 5000 INJECTION, SOLUTION INTRAVENOUS; SUBCUTANEOUS at 11:13

## 2024-08-16 RX ADMIN — FENTANYL CITRATE 50 MCG: 50 INJECTION INTRAMUSCULAR; INTRAVENOUS at 12:10

## 2024-08-16 RX ADMIN — CEFAZOLIN SODIUM 2 G: 2 INJECTION, SOLUTION INTRAVENOUS at 07:01

## 2024-08-16 RX ADMIN — LIDOCAINE HYDROCHLORIDE 15 ML: 20 SOLUTION ORAL; TOPICAL at 12:08

## 2024-08-16 RX ADMIN — POTASSIUM CHLORIDE 20 MEQ: 1500 TABLET, EXTENDED RELEASE ORAL at 09:05

## 2024-08-16 RX ADMIN — TOPICAL ANESTHETIC 0.5 ML: 200 SPRAY DENTAL; PERIODONTAL at 12:09

## 2024-08-16 RX ADMIN — SODIUM CHLORIDE 50 ML/HR: 9 INJECTION, SOLUTION INTRAVENOUS at 11:53

## 2024-08-16 RX ADMIN — SODIUM CHLORIDE 500 ML: 9 INJECTION, SOLUTION INTRAVENOUS at 08:40

## 2024-08-16 RX ADMIN — MIDAZOLAM 1 MG: 1 INJECTION INTRAMUSCULAR; INTRAVENOUS at 12:15

## 2024-08-16 RX ADMIN — CEFAZOLIN SODIUM 2 G: 2 INJECTION, SOLUTION INTRAVENOUS at 19:24

## 2024-08-16 RX ADMIN — ACETAMINOPHEN 650 MG: 325 TABLET ORAL at 11:13

## 2024-08-16 RX ADMIN — ACETAMINOPHEN 650 MG: 325 TABLET ORAL at 00:59

## 2024-08-16 RX ADMIN — SODIUM CHLORIDE 30 ML/HR: 9 INJECTION, SOLUTION INTRAVENOUS at 09:44

## 2024-08-16 RX ADMIN — FENTANYL CITRATE 25 MCG: 50 INJECTION INTRAMUSCULAR; INTRAVENOUS at 12:14

## 2024-08-16 RX ADMIN — ACETAMINOPHEN 650 MG: 325 TABLET ORAL at 19:39

## 2024-08-16 RX ADMIN — MIDAZOLAM 1 MG: 1 INJECTION INTRAMUSCULAR; INTRAVENOUS at 12:11

## 2024-08-16 ASSESSMENT — ACTIVITIES OF DAILY LIVING (ADL)
ADLS_ACUITY_SCORE: 26
ADLS_ACUITY_SCORE: 26
ADLS_ACUITY_SCORE: 24
ADLS_ACUITY_SCORE: 24
ADLS_ACUITY_SCORE: 36
ADLS_ACUITY_SCORE: 24
ADLS_ACUITY_SCORE: 26
ADLS_ACUITY_SCORE: 24

## 2024-08-16 NOTE — TREATMENT PLAN
Patient tolerated MIGDALIA. Large posterior leaflet mitral valve vegetation and small aortic valve vegetation with severe aortic stenosis.  + Blood culture positive for MSSA.  Persistent fever today.  Recommend transfer to [facility] for CT surgery evaluation.  Case discussed with Dr. Alexander. Recommend telemetry monitoring for AV block.

## 2024-08-16 NOTE — PROGRESS NOTES
[facility]    Review of \[facility]\""'s benefit check: Pt has all Medicare products which do not fully cover IV antibiotics in the home.  Patient would be responsible for the copay and the supplies.  For the current IV antibiotic, Cefazolin, pt's cost would be $30/day.  Pt would need to be homebound in order to have coverage for home nurse visits.     Today, writer spoke with pt while still at [facility] to review benefits, home infusion and to offer choice of agency/home infusion provider.  Pt states she is on limited/fixed income and would need the daily cost to be as low as possible, should she need IV abx at discharge.  The cost for patient through \[facility]\"" for Cefazolin 2g q 24 hours, is $30/day.    Writer informed pt that I would have the [facility] check with [facility] to get a quote from them.  If Cefazolin is the ordered IV abx at discharge, another option could be the infusion center, however, they would need to be willing to put the Ancef on a CADD pump and she would need to go in to the infusion center 3x/week for a bag change.     Pt would need to be homebound in order to have home nursing visits covered under Medicare.  Currently, it appears pt is not homebound.  This may change, should pt require surgery.  Updated Alize Plasencia RN CM.    Alva Blackmon RN, BSN  [facility] Liaison  [phone] (Mon through Fri, 8:00 am-5:00 pm)  [phone] (Office)

## 2024-08-16 NOTE — Clinical Note
Tested the right ventricular lead. See vendor printout/MD dictation. [employer] MRI SureScan 3830-69cm  SN: [id number]  Exp: 6/30/2026

## 2024-08-16 NOTE — PROGRESS NOTES
[facility] Medicine PROGRESS NOTE      Identification/Summary:   83 yo f presented with fatigue, weakness, chills, poor appetite. Does not really have much for chronic medical conditions other than chronic back pain, furunculosis, uterine prolapse, tobacco use. Eval with no clear source of infection. Treated with cefazolin, vanco. Blood cultures ordered. Blood cultures pos for MSSA. Also with HAMMAD.     TTE with severe aortic stenosis. MR cervical spine with mod-severe spinal canal stenosis, facet complex edema concerning for septic arthropathy given recent increase in neck pain. Cardiology and neurosurgery consulted. MIGDALIA ordered, pending. ID consulted, remained on cefazolin    Assessment and Plan:  Sepsis due to unclear source  Severe aortic stenosis  Mod-severe cervical spinal stenosis, foraminal stenosis, facet arthropathy  Blood cx pos for MSSA  ?skin wounds as source  Continue cefazolin  Appreciate ID help  MIGDALIA pending  Will ask for neurosurg review of cervical MR, neck more painful lately    CKD, HAMMAD  Baseline creat around 1.6  Creat on admission of 1.92  Creat improved today to 1.4    Diet: Snacks/Supplements Adult: Ensure Enlive; With Meals  NPO for Medical/Clinical Reasons Except for: Meds  Fluids: none  Pain meds:tylenol  Therapy:none  DVT Prophylaxis:  heparin  Code Status: No CPR- Do NOT Intubate  Medically Ready for Discharge: Anticipated in 2-4 Days      Interval History/Subjective:  Had a fever this morning just before my visit. No chest pain, cough, dyspnea, abdominal pain, urinary complaints. No active skin lesions.     Physical Exam/Objective:  Gen: no acute distress, comfortable, alert, pleasant  ENT: no scleral icterus  Pulm: lungs are diminished, soft exp wheezing  CV: regular rate and rhythm  Derm: not pale, no jaundice, healing area on the lower abdominal wall without evidence of purulence, no drainage, no fluctuance or erythema  Psych: appropriate affect    Medications:   Current  "Facility-Administered Medications   Medication Dose Route Frequency Provider Last Rate Last Admin    ceFAZolin (ANCEF) 2 g in 100 mL D5W intermittent infusion  2 g Intravenous Q12H Noé Velez  mL/hr at 08/15/24 1913 2 g at 08/15/24 1913    heparin ANTICOAGULANT injection 5,000 Units  5,000 Units Subcutaneous BID Noé Velez MD   5,000 Units at 08/15/24 0853    miconazole (MICATIN) 2 % powder   Topical BID Alejandro Lynch,         sodium chloride (PF) 0.9% PF flush 3 mL  3 mL Intracatheter Q8H Vishnu Meadows,         sodium chloride (PF) 0.9% PF flush 3 mL  3 mL Intracatheter Q8H Catrachito Alexander,    3 mL at 08/15/24 1913    sodium chloride (PF) 0.9% PF flush 3 mL  3 mL Intracatheter Q8H Catrachito Alexander,    3 mL at 08/15/24 2055     Clinically Significant Risk Factors                # Thrombocytopenia: Lowest platelets = 95 in last 2 days, will monitor for bleeding             # Obesity: Estimated body mass index is 31.11 kg/m  as calculated from the following:    Height as of this encounter: 1.702 m (5' 7\").    Weight as of this encounter: 90.1 kg (198 lb 9.6 oz)., PRESENT ON ADMISSION       # Financial/Environmental Concerns: none                  Catrachito Alexander DO   [facility]              "

## 2024-08-16 NOTE — PROGRESS NOTES
Care Management Follow Up    Length of Stay (days): 2    Expected Discharge Date: 08/17/2024     Concerns to be Addressed:  discharge planning, IV abx    Patient plan of care discussed at interdisciplinary rounds: No    Anticipated Discharge Disposition: Home     Anticipated Discharge Services: Home Infusion vs OP Infusion  Anticipated Discharge DME: None    Patient/family educated on Medicare website which has current facility and service quality ratings: no  Education Provided on the Discharge Plan: Yes (AVS per bedside RN)  Patient/Family in Agreement with the Plan: yes    Referrals Placed by CM/SW:  Home Infusion  Private pay costs discussed: insurance costs out of pocket expenses    Additional Information:  Chart reviewed.  MIGDALIA today.    Per Alva Blackmon, RN Liaison for [facility], pt does not have full coverage through Medicare product for home IV abx infusions and pt's cost for meds and supplies will be $30/day.    Met with pt to discuss discharge planning and options for IV abx infusions if needed, including home infusions, TCU and OP infusions.  Pt states she does not like any of those options, but will wait to see what she needs for discharge.    11:10 AM  Per ID, plan for IV Cefazolin x 4 wks.    Received update from Alva Blackmon.  She met with pt to discuss IV abx infusion options.  She requested writer check with [facility] regarding out of pocket quote.    Referral sent and writer spoke with Johanna, who will review benefits and call back.    1:20 PM  Received call from Lexis at [facility].  She states pt's out of pockets costs for home IV abx, based on current plan of Cefazolin 2 gm every 24 hrs, would be $26.12/wk for meds and per paulie for supplies and pharmacy monitoring of $140/wk.  Total out of pocket cost would be $156.12/wk.    Met with pt and provided updated.  She states she is waiting to see what plan will be after MIGDALIA results.    1:22 PM  Cardiology update following MIGDALIA today,  with recommendation to transfer to [facility] for CT Surgery evaluation.    Alize Plasencia RN

## 2024-08-16 NOTE — PHARMACY-ADMISSION MEDICATION HISTORY
Admission medication history completed at [facility]. Please see Pharmacy Intern Admission Medication History note from 8/14/2024.

## 2024-08-16 NOTE — INTERVAL H&P NOTE
I have reviewed the surgical (or preoperative) H&P that is linked to this encounter, and examined the patient. There are no significant changes  .  MIGDALIA for bacteremia with severe aortic stenosis.    Clinical Conditions Present on Arrival:  Clinically Significant Risk Factors Present on Admission

## 2024-08-16 NOTE — PLAN OF CARE
Problem: Infection  Goal: Absence of Infection Signs and Symptoms  Outcome: Progressing     Problem: Fall Injury Risk  Goal: Absence of Fall and Fall-Related Injury  Outcome: Progressing     Goal Outcome Evaluation:       Pt doing well. States headache, tylenol was effective. Voiding well, having a little diarrhea still. BP improving. IV antibiotics. She will be NPO at 0000, for MIGDALIA procedure tomorrow.

## 2024-08-16 NOTE — Clinical Note
Tested the atrial lead. See vendor printout/MD dictation. [email or web address] MRI SureScan 5076-52cm  SN: [id number]  Exp: 6-

## 2024-08-16 NOTE — Clinical Note
Generator attached  Bharti ACOSTA [facility]  Model: [id number]  SN:[id number]  EXP:01/14/2026

## 2024-08-16 NOTE — DISCHARGE SUMMARY
[facility] MEDICINE  DISCHARGE SUMMARY     Primary Care Physician: Clinic, [facility]  Admission Date: 8/14/2024   Discharge Provider: Catrachito Alexander DO Discharge Date: 8/16/2024   Diet:   Active Diet and Nourishment Order   Procedures    Snacks/Supplements Adult: Ensure Enlive; With Meals    NPO for Medical/Clinical Reasons Except for: Meds       Code Status: No CPR- Do NOT Intubate   Activity: as tolerated        Condition at Discharge: Fair     REASON FOR PRESENTATION(See Admission Note for Details)   Fever, weakness  PRINCIPAL & ACTIVE DISCHARGE DIAGNOSES   Endocarditis, MSSA bacteremia  Severe aortic stenosis  PENDING LABS     Unresulted Labs Ordered in the Past 30 Days of this Admission       Date and Time Order Name Status Description    8/16/2024 12:01 AM Blood Culture Peripheral Blood In process     8/15/2024 12:01 AM Blood Culture Peripheral Blood Preliminary           PROCEDURES ( this hospitalization only)    MIGDALIA  RECOMMENDATIONS TO OUTPATIENT PROVIDER FOR F/U VISIT     N/a  DISPOSITION   [facility]  SUMMARY OF HOSPITAL COURSE:    85 yo f presented with fatigue, weakness, chills, poor appetite. Does not really have much for chronic medical conditions other than chronic back pain, furunculosis, uterine prolapse, tobacco use. Eval with no clear source of infection. Treated with cefazolin, vanco. Blood cultures ordered. Blood cultures pos for MSSA. Also with HAMMAD.      TTE with severe aortic stenosis. MR cervical spine with mod-severe spinal canal stenosis, facet complex edema concerning for septic arthropathy given recent increase in neck pain. Cardiology and neurosurgery consulted. MIGDALIA pos for mitral and aortic valvular vegetations. Transferred to [facility] for CT surgery evaluation.     Discharge Medications with Med changes:     Current Discharge Medication List        CONTINUE these medications which have NOT CHANGED    Details   acetaminophen  (TYLENOL) 500 MG tablet Take 500-1,000 mg by mouth 2 times daily as needed for other (arthritis)           Rationale for medication changes:    N/a  Consults   Infectious disease   Cardiology  Neurosurgery  Anticoagulation Information    Has been on subcutaneous heparin for VTE proph  SIGNIFICANT IMAGING FINDINGS     Results for orders placed or performed during the hospital encounter of 08/14/24   Chest XR,  PA & LAT    Impression    IMPRESSION: Both lungs remain clear. No adenopathy or effusion. Normal cardiac size and pulmonary vascularity. Atherosclerotic thoracic aorta. Degenerative changes both shoulders and the spine. No significant interval change.   CT Chest Abdomen Pelvis w/o Contrast    Impression    IMPRESSION:  1.  Mild perinephric stranding suggests edema. Inflammation/infection is not excluded. No ureteral obstruction. A right renal lesion is likely to be benign; ultrasound may be helpful.  2.  Air-fluid levels in the colon can be seen with liquid stool. The prominent appendix is likely within normal limits.  3.  Small subtle ground glass pulmonary opacities may be a nonspecific infectious or inflammatory process. These are not a source of sepsis. Bronchial wall thickening can be seen with bronchitis.   MR Cervical Spine w/o & w Contrast    Impression    IMPRESSION:  1.  Multilevel cervical spondylosis. Facet complex edema at C4-5 on the left is favored to be reactive/degenerative in nature. In the setting of bacteremia early changes of septic facet arthropathy are difficult to entirely exclude. Consider follow-up   MRI if the patient's symptoms are persistent or worsening. Otherwise, no evidence for spinal infection.  2.  At C4-5, moderate to severe spinal canal stenosis with asymmetric left ventral cord flattening. Severe right and moderate to severe left neural foraminal stenosis. Correlate with C5 radicular symptoms.  3.  At C5-6, moderate spinal canal stenosis and asymmetric left ventral cord  flattening. Severe bilateral neural foraminal stenosis. Correlate with C6 radicular symptoms.  4.  At C7-T1, mild spinal canal stenosis with severe right and mild to moderate left neural foraminal stenosis. Correlate with right C8 radicular symptoms.  5.  At C3-4, moderate bilateral neural foraminal stenosis.     Echocardiogram Complete   Result Value Ref Range    LVEF  60-65%    Echocardiogram MIGDALIA   Result Value Ref Range    LVEF  55-60%      SIGNIFICANT LABORATORY FINDINGS   See EMR  Discharge Orders   No discharge procedures on file.  Examination   Physical Exam   Temp:  [97.6  F (36.4  C)-100.3  F (37.9  C)] 99.3  F (37.4  C)  Pulse:  [80-98] 81  Resp:  [18-33] 23  BP: (101-139)/(54-77) 107/58  SpO2:  [86 %-98 %] 92 %  Wt Readings from Last 1 Encounters:   08/15/24 90.1 kg (198 lb 9.6 oz)       Please see EMR for more detailed significant labs, imaging, consultant notes etc.    I, Catrachito Alexander DO, personally saw the patient today and spent greater than 30 minutes discharging this patient.    Catrachito Alexander DO  [facility]    CC:Clinic, [facility]

## 2024-08-16 NOTE — CONSULTS
Neurosurgery Consult    HPI    Ms. Elliott is an 84-year-old female who was admitted on 8/14/2024 with 2 days of fever, chills, generalized weakness, and was admitted for sepsis of unknown source at that time.    She reports a history of chronic neck pain and back pain.    She underwent a cervical MRI yesterday which demonstrated degenerative changes in her cervical spine which appears similar to report from 2015.  Radiology commented on a left L4-5 degenerative facet, with findings that could not entirely rule out septic arthritis.    Today patient underwent a MIGDALIA, and was found to have large posterior leaflet mitral valve vegetation and a small aortic valve vegetation with severe aortic stenosis, patient is going to be transferred to [facility] for CT surgery evaluation, per cardiology.    Today patient denies radicular pain, denies numbness or weakness in her upper or lower extremities, denies loss of dexterity in her fingers, denies loss of coordination of her lower extremities.    She reports her neck pain is similar to her bit baseline of chronic neck pain which she has had for many years.    Medical history  diastolic CHF, CKD 3, hypertension, hyperlipidemia      B/P: 107/58, T: 99.3, P: 81, R: 23       Exam    Alert and oriented no acute distress  Bilateral upper extremities with 5/5 strength  Olga sign negative  Reflexes absent triceps, biceps, brachioradialis    Bilateral lower extremities with 5/5 strength  Reflexes absent patella/ankle  Negative straight leg raise bilaterally  Negative ankle clonus negative Babinski bilaterally    Cervical spine mildly tender to palpation in the midline    Imaging    Cervical MRI 8/15/2024 demonstrated    IMPRESSION:  1.  Multilevel cervical spondylosis. Facet complex edema at C4-5 on the left is favored to be reactive/degenerative in nature. In the setting of bacteremia early changes of septic facet arthropathy are difficult to entirely exclude. Consider  follow-up   MRI if the patient's symptoms are persistent or worsening. Otherwise, no evidence for spinal infection.  2.  At C4-5, moderate to severe spinal canal stenosis with asymmetric left ventral cord flattening. Severe right and moderate to severe left neural foraminal stenosis. Correlate with C5 radicular symptoms.  3.  At C5-6, moderate spinal canal stenosis and asymmetric left ventral cord flattening. Severe bilateral neural foraminal stenosis. Correlate with C6 radicular symptoms.  4.  At C7-T1, mild spinal canal stenosis with severe right and mild to moderate left neural foraminal stenosis. Correlate with right C8 radicular symptoms.  5.  At C3-4, moderate bilateral neural foraminal stenosis.    Report from 2015 MRI demonstrated    Impression:   1. Straightening with reversal of the normal cervical lordosis.  Focal kyphosis at the C4-5 level.   2. Moderate spinal canal narrowing C4-5 and C5-6 due to disc osteophyte protrusions and indentation on the cervical cord.  This is worse at C4-5.  Indentation of the left ventral cervical cord at C5-6.  Cord signal remains normal.   3. Multilevel bony foraminal narrowing worse at bilateral C4-5, bilateral C5-6, worse on the left.   4. Mild spinal canal narrowing C7-T1 and T1-T2 with small disc protrusions.    Assessment    Chronic neck pain  Cervical stenosis  and facet arthropathy at C4-5, without radiculopathy or myelopathy.  Mitral and aortic valve vegetations  MSSA Bacteremia    Plan:      No neurosurgical intervention recommended for the finding of left C4-5 facet arthropathy/possible septic arthritis in the setting of MSSA bacteremia. If neck pain worsens beyond her chronic baseline, despite antibiotic treatment and treatment of cardiac vegetations, recommend repeat cervical MRI with and without contrast for reevaluation to see if any further development of infectious findings are noted.    Neurosurgery will sign off at this time, please reconsult for  worsening symptoms.

## 2024-08-16 NOTE — PROGRESS NOTES
"[facility] Inpatient follow up       Patient:  Felicitas Elliott  Date of birth 1940, Medical record number [id number]  Date of Visit:  2024  Attending Physician: Catrachito Alexander,          Assessment and Recommendations:   Assessment:  Felicitas Elliott is a 84 year old female with   History of severe aortic stenosis.  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  MSSA bacteremia.  Positive blood culture on 2024.  Subsequent blood culture on 8/15 and  in process.  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  TTE with no evidence of vegetation.  Having neck pain and neck MRI showed some edema at C4-C5 on the left side.  No clear evidence of infection.    Recommendations:  Continue IV Ancef, renally dosed.  Follow-up pending daily blood cultures.  MIGDALIA.  May need to repeat cervical MRI if neck pain worsens.    Discussed with the patient, nursing staff.    ID will follow.    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  Will change to nafcillin.  Patient being transferred to [facility].  Switch can be made at [facility].    John Esqueda MD.  [facility].   [facility]  Office Telephone [phone].  Fax [phone]  [facility] paging            Interval History:     HPI:  The interval history was reviewed.   Doing okay today.  Chart reviewed.  TTE followed by MIGDALIA reviewed.  Endocarditis as above.    Pertinent cultures include:  No results found for: \"CULT\"    Recent Inflammatory Biomarkers:   Recent Labs   Lab Test 24  0557 24  0254   WBC 7.3 10.4            Review of Systems:   CONSTITUTIONAL:    Temp Max: Temp (24hrs), Av.3  F (36.8  C), Min:97.6  F (36.4  C), Max:99  F (37.2  C)   .  Negative except for findings in the HPI.           Current Medications (antimicrobials listed in bold):     Current Facility-Administered Medications   Medication Dose Route " Frequency Provider Last Rate Last Admin    ceFAZolin (ANCEF) 2 g in 100 mL D5W intermittent infusion  2 g Intravenous Q12H Noé Velez  mL/hr at 08/16/24 0701 2 g at 08/16/24 0701    heparin ANTICOAGULANT injection 5,000 Units  5,000 Units Subcutaneous BID Noé Velez MD   5,000 Units at 08/15/24 0853    miconazole (MICATIN) 2 % powder   Topical BID Alejandro Lynch, DO        sodium chloride (PF) 0.9% PF flush 3 mL  3 mL Intracatheter Q8H Vishnu Meadows, DO        sodium chloride (PF) 0.9% PF flush 3 mL  3 mL Intracatheter Q8H Catrachito Alexander, DO   3 mL at 08/15/24 1913    sodium chloride (PF) 0.9% PF flush 3 mL  3 mL Intracatheter Q8H Catrachito Alexander, DO   3 mL at 08/15/24 2055              Allergies:     Allergies   Allergen Reactions    Aspirin Rash    Cats Rash            Physical Exam:   Vitals were reviewed  Patient Vitals for the past 24 hrs:   BP Temp Temp src Pulse Resp SpO2   08/16/24 0831 139/77 -- -- 84 20 93 %   08/16/24 0407 125/70 97.6  F (36.4  C) Oral 88 22 93 %   08/15/24 2357 120/67 99  F (37.2  C) Oral 84 20 91 %   08/15/24 1641 115/54 98.2  F (36.8  C) Oral 87 18 95 %   08/15/24 1437 (P) 90/52 -- -- -- -- --       Physical Examination:  Gen: Pleasant in no acute distress.  HEENT: NCAT. EOMI. PERRL.  Neck: No bruit, JVD or thyromegaly.  Lungs: Clear to ascultation bilat with no crackles or wheezes.  Card: RRR. NSR. No RMG. Peripheral pulses present and symmetric. No edema.  Abd: Soft NT ND. No mass. Normal bowel sounds.  Skin: No rash.  Extr: No edema.  Neuro: Alert and oriented to place time and person.        Laboratory Data:   ID Labs:  Microbiology labs:   Contains critical data Blood Culture Peripheral Blood  Order: [id number]  Collected 8/14/2024  2:54 AM       Status: Final result       Visible to patient: No (inaccessible in MyChart)    Specimen Information: Peripheral Blood   0 Result Notes  Culture Positive on the 1st day of incubation Abnormal       Staphylococcus  aureus Panic    2 of 2 bottles        Resulting Agency: [facility]     Susceptibility     Staphylococcus aureus     RAMIRO     Ciprofloxacin <=0.5 ug/mL Susceptible *     Clindamycin 0.25 ug/mL Susceptible     Daptomycin 0.25 ug/mL Susceptible     Doxycycline <=0.5 ug/mL Susceptible     Erythromycin <=0.25 ug/mL Susceptible     Gentamicin <=0.5 ug/mL Susceptible     Inducible macrolide resistance test Negative ug/mL Negative *     Levofloxacin 0.25 ug/mL Susceptible *     Linezolid 2 ug/mL Susceptible *     Moxifloxacin <=0.25 ug/mL Susceptible *     Nitrofurantoin <=16 ug/mL Susceptible *     Oxacillin 0.5 ug/mL Susceptible 1     Rifampin <=0.5 ug/mL Susceptible *     Tetracycline <=1 ug/mL Susceptible     Tigecycline <=0.12 ug/mL Susceptible *     Trimethoprim/Sulfamethoxazole <=0.5/9.5 u... Susceptible     Vancomycin <=0.5 ug/mL Susceptible              * Suppressed Antibiotic   1 Oxacillin susceptible isolates are susceptible to cephalosporins (example: cefazolin and cephalexin) and beta lactam combination agents. Oxacillin resistant isolates are resistant to these agents.             Specimen Collected: 08/14/24  2:54 AM Last Resulted: 08/16/24  7:40 AM        No lab results found.  Recent Labs   Lab Test 08/16/24  0557 08/14/24  0254   WBC 7.3 10.4     Recent Labs   Lab Test 08/16/24  0557 08/15/24  0532 08/14/24  0254   CR 1.36* 1.41* 1.92*   GFRESTIMATED 38* 37* 25*       Hematology Studies  Recent Labs   Lab Test 08/16/24  0557 08/14/24  0254   WBC 7.3 10.4   HGB 10.8* 13.1   HCT 32.6* 39.0   PLT 95* 142*       Metabolic  Recent Labs   Lab Test 08/16/24  0557 08/15/24  0532 08/14/24  0254     --  133*   BUN 17.3  --  25.5*   CO2 23  --  26   CR 1.36* 1.41* 1.92*   GFRESTIMATED 38* 37* 25*       Hepatic Studies  Recent Labs   Lab Test 08/14/24  0254   BILITOTAL 1.0   ALKPHOS 47   ALBUMIN 4.1   AST 36   ALT 19       ImmunologlobulinsNo lab results found.         Imaging Data:   Reviewed   TTE   Interpretation  Summary     Left ventricular size, wall motion and function are normal. The ejection  fraction is 60-65%.  Normal right ventricle size and systolic function.  Severe valvular aortic stenosis.  There is no evidence of a mass or vegetation. This does not rule out  endocarditis. The study was technically difficult.  ________________________________________      Study Result    Narrative & Impression   EXAM: MR CERVICAL SPINE W/O and W CONTRAST  LOCATION: [facility]  DATE: 8/15/2024     INDICATION: MSSA bacteremia, neck pain,;?abscess or osteomyelitis  COMPARISON: None.  CONTRAST: 9 mL gadavist  TECHNIQUE: MRI Cervical Spine without and with IV contrast.     FINDINGS:   Reversal the cervical lordosis centered at C4-5. 2 mm retrolisthesis at C5-6 and 3 mm anterolisthesis at C7-T1. Solid interbody fusion at C6-7. Preserved vertebral body heights. Mild facet complex edema on the left at C4-C5 with minimal reactive   enhancement. No definite cord signal abnormality. No extraspinal abnormality.     Craniovertebral junction and C1-C2: Normal.     C2-C3: Preserved disc height. No herniation. Facet complex ankylosis and mild to moderate facet hypertrophy. No spinal canal stenosis. Mild bilateral neural foraminal stenosis.      C3-C4: Mild loss of disc height. Tiny central disc protrusion. Facet ankylosis and mild to moderate facet hypertrophy. No spinal canal stenosis. Moderate bilateral neural foraminal stenosis.      C4-C5: Advanced loss of disc height. Circumferential disc osteophyte complex and bilateral uncovertebral spurring. Mild bilateral facet arthropathy. Moderate to severe spinal canal stenosis with mild asymmetric left ventral cord flattening. Severe right   neural foraminal stenosis. Moderate to severe left neural foraminal stenosis.      C5-C6: Advanced loss of disc height and signal. Circumferential disc osteophyte complex with asymmetric left central component and left greater than  right uncovertebral spurring. Minimal facet arthropathy. Moderate spinal canal stenosis and asymmetric   left ventral cord flattening. Severe bilateral neural foraminal stenosis.      C6-C7: Solid interbody fusion. No significant facet arthropathy. No spinal canal stenosis. Mild bilateral neural foraminal stenosis.      C7-T1: Moderate to advanced loss of disc height. Shallow broad-based posterior disc bulge. Mild bilateral uncovertebral spurring. Moderate to advanced facet arthropathy, greater on the right. Mild spinal canal stenosis. Severe right neural foraminal   stenosis. Mild to moderate left neural foraminal stenosis.                                                                      IMPRESSION:  1.  Multilevel cervical spondylosis. Facet complex edema at C4-5 on the left is favored to be reactive/degenerative in nature. In the setting of bacteremia early changes of septic facet arthropathy are difficult to entirely exclude. Consider follow-up   MRI if the patient's symptoms are persistent or worsening. Otherwise, no evidence for spinal infection.  2.  At C4-5, moderate to severe spinal canal stenosis with asymmetric left ventral cord flattening. Severe right and moderate to severe left neural foraminal stenosis. Correlate with C5 radicular symptoms.  3.  At C5-6, moderate spinal canal stenosis and asymmetric left ventral cord flattening. Severe bilateral neural foraminal stenosis. Correlate with C6 radicular symptoms.  4.  At C7-T1, mild spinal canal stenosis with severe right and mild to moderate left neural foraminal stenosis. Correlate with right C8 radicular symptoms.  5.  At C3-4, moderate bilateral neural foraminal stenosis.

## 2024-08-16 NOTE — PLAN OF CARE
Problem: Infection  Goal: Absence of Infection Signs and Symptoms  Outcome: Progressing     Patient has been NPO since midnight for MIGDALIA procedure today. Complaints of some back pain and neck pain so PRN Tylenol was given. Vitals stable, blood pressures are improved. Up with assist of one, gait belt, and walker to the bathroom. Patient calls appropriately.

## 2024-08-16 NOTE — H&P
[facility]    History and Physical - Hospitalist Service       Date of Admission:  8/16/2024    Assessment & Plan    Assessment:  Felicitas Elliott is a 85 yo f presented with fatigue, weakness, chills, poor appetite. Does not really have much for chronic medical conditions other than chronic back pain, furunculosis, uterine prolapse, tobacco use. Eval with no clear source of infection. Treated with cefazolin, vanco. Blood cultures ordered. Blood cultures pos for MSSA. Also with HAMMAD. TTE with severe aortic stenosis. MR cervical spine with mod-severe spinal canal stenosis, facet complex edema concerning for septic arthropathy given recent increase in neck pain. Cardiology and neurosurgery consulted. MIGDALIA pos for mitral and aortic valvular vegetations. Transferred to [facility] for CT surgery evaluation.     Problem list and plan:  Sepsis due to unclear source(hypotension, fever, hypoxia, HAMMAD, bacteremia)  Severe aortic stenosis  Mod-severe cervical spinal stenosis, foraminal stenosis, facet arthropathy  Presented with fatigue, weakness, chills and poor appetite  Blood cx pos for MSSA  Questionable skin wounds as source  Continue cefazolin  ID following  As per cardiology patient tolerated MIGDALIA, large posterior leaflet mitral valve vegetation and small aortic valve vegetation with severe aortic stenosis and positive blood cultures for MSSA, patient with persistent fevers and cardiology recommended transfer to [facility] for CT surgery evaluation  Consulted neurosurg for review of cervical MR, neck more painful lately  Neurosurgery following, as per neurosurgery no current neurosurgical intervention recommended at the finding of left C4/C5 facet arthropathy/possible septic arthritis in the setting of MSSA bacteremia, if neck pain worsens beyond her chronic baseline despite antibiotic treatment and treatment of cardiac vegetation recommended repeating MRI with and without contrast for  "evaluation to see if any further development of infectious finding are noted and neurosurgery signed off.  Continue with gentle IV hydration  Continue Tylenol  CRP elevated, trend  Repeat blood culture showing no growth to date    Acute hypoxic respiratory failure  Patient was seen to be hypoxic and was placed on 2 L nasal cannula with improvement in oxygenation  Monitor vital signs as per protocol and wean off of oxygen as appropriate    Hyperglycemia most likely secondary to prediabetes  Hemoglobin A1c 6.4  Monitor blood sugar level intermittently    Mild and hypochloremia hyponatremia resolved  Monitor sodium and chloride levels  Gentle IV hydration     HAMMAD on CKD stage III  Baseline creat around 1.6  Creat on admission of 1.92  Creat improved today to 1.36  Continue gentle IV hydration   Monitor renal function closely    Dilutional anemia  Thrombocytopenia possibly in the setting of sepsis  Hemoglobin trended down from 13.1-10.8 suspecting most likely initially hemoconcentrated that improved with fluid resuscitation  Monitor CBC    DVT PPx  Heparin 5000 subcu every 12        Diet: Snacks/Supplements Adult: Ensure Enlive; With Meals  Regular Diet Adult    DVT Prophylaxis: Pneumatic Compression Devices  Le Catheter: Not present  Lines: None     Cardiac Monitoring: ACTIVE order. Indication: endocarditis  Code Status: No CPR- Do NOT Intubate      Clinically Significant Risk Factors Present on Admission                # Thrombocytopenia: Lowest platelets = 95 in last 2 days, will monitor for bleeding      # Anemia: based on hgb <11       # Obesity: Estimated body mass index is 31.73 kg/m  as calculated from the following:    Height as of this encounter: 1.702 m (5' 7\").    Weight as of this encounter: 91.9 kg (202 lb 9.6 oz).       # Financial/Environmental Concerns:         Disposition Plan     Medically Ready for Discharge: Anticipated in 2-4 Days     Saad J. Gondal, MD  Hospitalist Service  [facility]" [facility]  Securely message with [employer] (more info)  Text page via [facility] Paging/Directory     ______________________________________________________________________    Chief Complaint   Fatigue, weakness, chills, poor appetite    History is obtained from the patient    History of Present Illness   Felicitas Elliott is a 83 yo f presented with fatigue, weakness, chills, poor appetite. Does not really have much for chronic medical conditions other than chronic back pain, furunculosis, uterine prolapse, tobacco use. Eval with no clear source of infection. Treated with cefazolin, vanco. Blood cultures ordered. Blood cultures pos for MSSA. Also with HAMMAD. TTE with severe aortic stenosis. MR cervical spine with mod-severe spinal canal stenosis, facet complex edema concerning for septic arthropathy given recent increase in neck pain. Cardiology and neurosurgery consulted. MIGDALIA pos for mitral and aortic valvular vegetations. Transferred to [facility] for CT surgery evaluation.     Past Medical History    No past medical history on file.    Past Surgical History   No past surgical history on file.    Prior to Admission Medications   Prior to Admission Medications   Prescriptions Last Dose Informant Patient Reported? Taking?   acetaminophen (TYLENOL) 500 MG tablet Unknown at prn  Yes Yes   Sig: Take 500-1,000 mg by mouth 2 times daily as needed for other (arthritis)      Facility-Administered Medications: None        Review of Systems    The 10 point Review of Systems is negative other than noted in the HPI or here. Fatigue, weakness, chills, poor appetite    Physical Exam   Vital Signs: Temp: 99  F (37.2  C) Temp src: Oral BP: 113/65 Pulse: 86   Resp: 22 SpO2: 94 % O2 Device: None (Room air)    Weight: 202 lbs 9.6 oz    GENERAL: Patient was seen and examined at bedside with no acute distress  HENT:  Head is normocephalic, atraumatic.   EYES:  Eyes have anicteric sclerae without conjunctival injection   LUNGS: Bilateral air  entry, clear to auscultation bilaterally  CARDIOVASCULAR:  Regular rate and rhythm with no murmurs, gallops or rubs.  ABDOMEN:  Normal bowel sounds. Soft; nontender   EXT: no edema    SKIN:  No acute rashes.   NEUROLOGIC:  Grossly nonfocal.    Medical Decision Making       80 MINUTES SPENT BY ME on the date of service doing chart review, history, exam, documentation & further activities per the note.      Data     I have personally reviewed the following data over the past 24 hrs:    7.3  \   10.8 (L)   / 95 (L)     137 104 17.3 /  110 (H)   3.4 23 1.36 (H) \       Imaging results reviewed over the past 24 hrs:   Recent Results (from the past 24 hour(s))   Echocardiogram MIGDALIA   Result Value    LVEF  55-60%    Narrative    [id number]  [id number]  [id number]^JACOB^BRIAN^[place] [address]     Name: RONNIE MATTSON  MRN: [id number]  : 1940  Study Date: 2024 11:59 AM  Age: 84 yrs  Gender: Female  Patient Location: [facility]  Reason For Study: Aortic Valve Disorder  Ordering Physician: BRIAN JAMES  Performed By: MT/OSCAR     BSA: 2.0 m2  Height: 67 in  Weight: 198 lb  HR: 69  ______________________________________________________________________________  Procedure  Complete MIGDALIA Adult.  ______________________________________________________________________________  Interpretation Summary     1. The left ventricle is normal in size. Left ventricular function is  normal.The ejection fraction is 55-60%.  2. Normal right ventricle size and systolic function.  3. Large vegetation on mitral valve (8 mm x 15) attached to posterior leaflet.  4. There is a small vegetation on the aortic valve (6 mm). No evidence of  aortic root abscess identified.  5. Severe valvular aortic stenosis (SHU:0.8 cm2, peak nomi: 4.6 m/sec, mean  gradient: 51 mmHg).  ______________________________________________________________________________  MIGDALIA  Consent to the procedure  was obtained prior to sedation. There were no  complications associated with this procedure. I determined this patient to be  an appropriate candidate for the planned sedation and procedure and have  reassessed the patient immediately prior to sedation and procedure. Total  sedation time: 22 minutes of continuous bedside 1:1 monitoring. Patient was  sedated using Versed 2 mg. The heart rate, respiratory rate, oxygen  saturations, blood pressure, and response to care were monitored throughout  the procedure with the assistance of the nurse. Patient was sedated using  Fentanyl 75 mcg. 15mL viscous lidocaine. 0.5mL benzocaine spray. The procedure  was performed in the Echo Lab.     Left Ventricle  The left ventricle is normal in size. Left ventricular function is normal.The  ejection fraction is 55-60%. There is mild concentric left ventricular  hypertrophy. Normal left ventricular wall motion.     Right Ventricle  Normal right ventricle size and systolic function.     Atria  Normal left atrial size. Right atrial size is normal. There is no atrial shunt  seen.     Mitral Valve  There is mild to moderate mitral annular calcification. Vegetation on mitral  valve. There is mild (1+) mitral regurgitation. There is no mitral valve  stenosis.     Tricuspid Valve  Normal tricuspid valve. There is no vegetation on the tricuspid valve. There  is trace tricuspid regurgitation. There is no tricuspid stenosis.     Aortic Valve  There is a small vegetation or mass on the aortic valve. There is mild (1+)  aortic regurgitation. Severe valvular aortic stenosis.     Pulmonic Valve  The pulmonic valve is not well visualized. There is mild to moderate (1-2+)  pulmonic valvular regurgitation.     Vessels  Normal size aorta. An abscess cavity is not identified.     Pericardial/Pleural  The pericardium appears normal.  ______________________________________________________________________________  MMode/2D Measurements & Calculations  LVOT  diam: 2.1 cm  LVOT area: 3.6 cm2     Doppler Measurements & Calculations  Ao V2 max: 461.1 cm/sec  Ao max P.0 mmHg  Ao V2 mean: 339.6 cm/sec  Ao mean P.0 mmHg  Ao V2 VTI: 89.7 cm  SHU(I,D): 0.75 cm2  SHU(V,D): 0.80 cm2  LV V1 max P.2 mmHg  LV V1 max: 103.0 cm/sec  LV V1 VTI: 18.8 cm  SV(LVOT): 66.8 ml  SI(LVOT): 33.2 ml/m2  AV Chandan Ratio (DI): 0.22  SHU Index (cm2/m2): 0.37     ______________________________________________________________________________  Report approved by: Juan Antonio Gómez 2024 12:57 PM

## 2024-08-16 NOTE — PROGRESS NOTES
Pt tolerated MIGDALIA well see report for details.  Pt to remain NPO till 1332.  No hot food or liquids till 1832 today.  Pt running fever pre procedure.  Report to Su Wolf RN.  Sylvia Purvis RN

## 2024-08-16 NOTE — PROGRESS NOTES
"  HEART CARE CONSULTATON NOTE        Assessment/Recommendations   Assessment/Plan:  Severe aortic stenosis (peak nomi: 4.3 m/sec, SHU:1.0, mean gradient: 47 mmHG).  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     Plan:   MIGDALIA given bacteremia and poor valve visualization on TTE.  Plan today for around 11 AM  Outpatient Valve clinic evaluation, will request pending MIGDALIA results.    IV antibiotics per infectious disease specialist, note reviewed      Clinically Significant Risk Factors                # Thrombocytopenia: Lowest platelets = 95 in last 2 days, will monitor for bleeding               # Obesity: Estimated body mass index is 31.11 kg/m  as calculated from the following:    Height as of this encounter: 1.702 m (5' 7\").    Weight as of this encounter: 90.1 kg (198 lb 9.6 oz)., PRESENT ON ADMISSION       # Financial/Environmental Concerns: none           History of Present Illness/Subjective    HPI: Felicitas Elliott is a 84 year old female with no prior cardiac history who presented to [facility] with fever chills and rigors found to have sepsis associated with bacteremia Staph aureus possibly from a skin source.  Given patient had bacteremia she underwent transthoracic echocardiogram which did not demonstrate any clear evidence of endocarditis but demonstrated severe aortic stenosis.      No acute events overnight.  Patient denies any active chest pain or dyspnea.  No fevers or chills or rigors.  Planning for transesophageal echo today.      Echo:   Echocardiogram was personally reviewed.  Demonstrated left trickle ejection fraction of 60 to 65%.  Mild left hypertrophy.  There is significant mitral annular calcification.  Poor visualization of mitral leaflets.  Severe aortic calcification with severe aortic stenosis.  Personally reviewed images.           Physical " "Examination     VITALS: /77 (BP Location: Left arm, Patient Position: Sitting)   Pulse 84   Temp 97.6  F (36.4  C) (Oral)   Resp 20   Ht 1.702 m (5' 7\")   Wt 90.1 kg (198 lb 9.6 oz)   SpO2 93%   BMI 31.11 kg/m    BMI: Body mass index is 31.11 kg/m .  Wt Readings from Last 3 Encounters:   08/15/24 90.1 kg (198 lb 9.6 oz)       Intake/Output Summary (Last 24 hours) at 8/15/2024 1314  Last data filed at 8/15/2024 0632  Gross per 24 hour   Intake 120 ml   Output 800 ml   Net -680 ml     General Appearance:   no distress, normal body habitus   ENT/Mouth: membranes moist, no oral lesions or bleeding gums.      EYES:  no scleral icterus, normal conjunctivae   Neck: no carotid bruits or thyromegaly   Chest/Lungs:   Decreased breath sounds.  Faint wheezing.  Frequent cough noted   Cardiovascular:   Regular.  3 out of 6 mid-to-late peaking systolic murmur.  No pitting edema.   Abdomen:  no organomegaly, masses, bruits, or tenderness; bowel sounds are present   Extremities: no cyanosis or clubbing       Neurologic: normal  bilateral, no tremors     Psychiatric: alert and oriented x3, calm           Lab Results    Chemistry/lipid CBC Cardiac Enzymes/BNP/TSH/INR   No results for input(s): \"CHOL\", \"HDL\", \"LDL\", \"TRIG\", \"CHOLHDLRATIO\" in the last 96993 hours.  No results for input(s): \"LDL\" in the last 06545 hours.  Recent Labs   Lab Test 08/15/24  0532 08/14/24 0254   NA  --  133*   POTASSIUM  --  3.9   CHLORIDE  --  97*   CO2  --  26   GLC  --  148*   BUN  --  25.5*   CR 1.41* 1.92*   GFRESTIMATED 37* 25*   KAELYN  --  9.4     Recent Labs   Lab Test 08/15/24  0532 08/14/24  0254   CR 1.41* 1.92*     No results for input(s): \"A1C\" in the last 55819 hours.       Recent Labs   Lab Test 08/14/24 0254   WBC 10.4   HGB 13.1   HCT 39.0   MCV 92   *     Recent Labs   Lab Test 08/14/24  0254   HGB 13.1    No results for input(s): \"TROPONINI\" in the last 11814 hours.  No results for input(s): \"BNP\", \"NTBNPI\", " "\"NTBNP\" in the last 55700 hours.  No results for input(s): \"TSH\" in the last 28070 hours.  No results for input(s): \"INR\" in the last 61928 hours.     Medical History  Surgical History Family History Social History   History reviewed. No pertinent past medical history.  History reviewed. No pertinent surgical history.  History reviewed. No pertinent family history.     Social History     Socioeconomic History    Marital status:      Spouse name: Not on file    Number of children: Not on file    Years of education: Not on file    Highest education level: Not on file   Occupational History    Not on file   Tobacco Use    Smoking status: Not on file    Smokeless tobacco: Not on file   Substance and Sexual Activity    Alcohol use: Not on file    Drug use: Not on file    Sexual activity: Not on file   Other Topics Concern    Not on file   Social History Narrative    Not on file     Social Determinants of Health     Financial Resource Strain: High Risk (1/1/2022)    Received from [employer]    Financial Resource Strain     Difficulty of Paying Living Expenses: Not on file     Difficulty of Paying Living Expenses: Not on file   Food Insecurity: Not on file   Transportation Needs: Not on file   Physical Activity: Not on file   Stress: Not on file   Social Connections: Unknown (1/3/2024)    Received from [employer]    Social Connections     Frequency of Communication with Friends and Family: Not on file   Interpersonal Safety: Not on file   Housing Stability: Not on file         Medications  Allergies   No current outpatient medications on file.        Allergies   Allergen Reactions    Aspirin Rash    Cats Rash         Vishnu Meadows, DO   "

## 2024-08-17 ENCOUNTER — APPOINTMENT (OUTPATIENT)
Dept: MRI IMAGING | Facility: HOSPITAL | Age: 84
DRG: 853 | End: 2024-08-17
Attending: STUDENT IN AN ORGANIZED HEALTH CARE EDUCATION/TRAINING PROGRAM
Payer: COMMERCIAL

## 2024-08-17 LAB
ANION GAP SERPL CALCULATED.3IONS-SCNC: 11 MMOL/L (ref 7–15)
BUN SERPL-MCNC: 14 MG/DL (ref 8–23)
CALCIUM SERPL-MCNC: 8.3 MG/DL (ref 8.8–10.4)
CHLORIDE SERPL-SCNC: 102 MMOL/L (ref 98–107)
CREAT SERPL-MCNC: 1.23 MG/DL (ref 0.51–0.95)
CRP SERPL-MCNC: 242.3 MG/L
EGFRCR SERPLBLD CKD-EPI 2021: 43 ML/MIN/1.73M2
ERYTHROCYTE [DISTWIDTH] IN BLOOD BY AUTOMATED COUNT: 14.2 % (ref 10–15)
GLUCOSE SERPL-MCNC: 115 MG/DL (ref 70–99)
HCO3 SERPL-SCNC: 24 MMOL/L (ref 22–29)
HCT VFR BLD AUTO: 31.4 % (ref 35–47)
HGB BLD-MCNC: 10.3 G/DL (ref 11.7–15.7)
MAGNESIUM SERPL-MCNC: 1.7 MG/DL (ref 1.7–2.3)
MCH RBC QN AUTO: 31.2 PG (ref 26.5–33)
MCHC RBC AUTO-ENTMCNC: 32.8 G/DL (ref 31.5–36.5)
MCV RBC AUTO: 95 FL (ref 78–100)
PHOSPHATE SERPL-MCNC: 1.9 MG/DL (ref 2.5–4.5)
PHOSPHATE SERPL-MCNC: 2.8 MG/DL (ref 2.5–4.5)
PLATELET # BLD AUTO: 105 10E3/UL (ref 150–450)
POTASSIUM SERPL-SCNC: 3.4 MMOL/L (ref 3.4–5.3)
POTASSIUM SERPL-SCNC: 4.1 MMOL/L (ref 3.4–5.3)
RBC # BLD AUTO: 3.3 10E6/UL (ref 3.8–5.2)
SODIUM SERPL-SCNC: 137 MMOL/L (ref 135–145)
WBC # BLD AUTO: 7.5 10E3/UL (ref 4–11)

## 2024-08-17 PROCEDURE — 80048 BASIC METABOLIC PNL TOTAL CA: CPT | Performed by: STUDENT IN AN ORGANIZED HEALTH CARE EDUCATION/TRAINING PROGRAM

## 2024-08-17 PROCEDURE — 83735 ASSAY OF MAGNESIUM: CPT | Performed by: STUDENT IN AN ORGANIZED HEALTH CARE EDUCATION/TRAINING PROGRAM

## 2024-08-17 PROCEDURE — 250N000013 HC RX MED GY IP 250 OP 250 PS 637: Performed by: STUDENT IN AN ORGANIZED HEALTH CARE EDUCATION/TRAINING PROGRAM

## 2024-08-17 PROCEDURE — 99222 1ST HOSP IP/OBS MODERATE 55: CPT | Performed by: STUDENT IN AN ORGANIZED HEALTH CARE EDUCATION/TRAINING PROGRAM

## 2024-08-17 PROCEDURE — 258N000003 HC RX IP 258 OP 636: Performed by: STUDENT IN AN ORGANIZED HEALTH CARE EDUCATION/TRAINING PROGRAM

## 2024-08-17 PROCEDURE — 120N000004 HC R&B MS OVERFLOW

## 2024-08-17 PROCEDURE — 36415 COLL VENOUS BLD VENIPUNCTURE: CPT | Performed by: STUDENT IN AN ORGANIZED HEALTH CARE EDUCATION/TRAINING PROGRAM

## 2024-08-17 PROCEDURE — 36415 COLL VENOUS BLD VENIPUNCTURE: CPT | Performed by: INTERNAL MEDICINE

## 2024-08-17 PROCEDURE — 87040 BLOOD CULTURE FOR BACTERIA: CPT | Performed by: INTERNAL MEDICINE

## 2024-08-17 PROCEDURE — 85027 COMPLETE CBC AUTOMATED: CPT | Performed by: STUDENT IN AN ORGANIZED HEALTH CARE EDUCATION/TRAINING PROGRAM

## 2024-08-17 PROCEDURE — 86140 C-REACTIVE PROTEIN: CPT | Performed by: STUDENT IN AN ORGANIZED HEALTH CARE EDUCATION/TRAINING PROGRAM

## 2024-08-17 PROCEDURE — 84100 ASSAY OF PHOSPHORUS: CPT | Performed by: STUDENT IN AN ORGANIZED HEALTH CARE EDUCATION/TRAINING PROGRAM

## 2024-08-17 PROCEDURE — 250N000009 HC RX 250: Performed by: STUDENT IN AN ORGANIZED HEALTH CARE EDUCATION/TRAINING PROGRAM

## 2024-08-17 PROCEDURE — 99233 SBSQ HOSP IP/OBS HIGH 50: CPT | Performed by: INTERNAL MEDICINE

## 2024-08-17 PROCEDURE — 70553 MRI BRAIN STEM W/O & W/DYE: CPT

## 2024-08-17 PROCEDURE — 84100 ASSAY OF PHOSPHORUS: CPT | Performed by: INTERNAL MEDICINE

## 2024-08-17 PROCEDURE — 255N000002 HC RX 255 OP 636: Performed by: INTERNAL MEDICINE

## 2024-08-17 PROCEDURE — 250N000011 HC RX IP 250 OP 636: Mod: JZ | Performed by: STUDENT IN AN ORGANIZED HEALTH CARE EDUCATION/TRAINING PROGRAM

## 2024-08-17 PROCEDURE — 99232 SBSQ HOSP IP/OBS MODERATE 35: CPT | Performed by: INTERNAL MEDICINE

## 2024-08-17 PROCEDURE — 250N000013 HC RX MED GY IP 250 OP 250 PS 637: Performed by: INTERNAL MEDICINE

## 2024-08-17 PROCEDURE — A9585 GADOBUTROL INJECTION: HCPCS | Performed by: INTERNAL MEDICINE

## 2024-08-17 PROCEDURE — 84132 ASSAY OF SERUM POTASSIUM: CPT | Performed by: INTERNAL MEDICINE

## 2024-08-17 RX ORDER — NALOXONE HYDROCHLORIDE 0.4 MG/ML
0.2 INJECTION, SOLUTION INTRAMUSCULAR; INTRAVENOUS; SUBCUTANEOUS
Status: DISCONTINUED | OUTPATIENT
Start: 2024-08-17 | End: 2024-09-16 | Stop reason: HOSPADM

## 2024-08-17 RX ORDER — POTASSIUM CHLORIDE 1500 MG/1
40 TABLET, EXTENDED RELEASE ORAL ONCE
Status: COMPLETED | OUTPATIENT
Start: 2024-08-17 | End: 2024-08-17

## 2024-08-17 RX ORDER — GADOBUTROL 604.72 MG/ML
9 INJECTION INTRAVENOUS ONCE
Status: COMPLETED | OUTPATIENT
Start: 2024-08-17 | End: 2024-08-17

## 2024-08-17 RX ORDER — NALOXONE HYDROCHLORIDE 0.4 MG/ML
0.4 INJECTION, SOLUTION INTRAMUSCULAR; INTRAVENOUS; SUBCUTANEOUS
Status: DISCONTINUED | OUTPATIENT
Start: 2024-08-17 | End: 2024-09-16 | Stop reason: HOSPADM

## 2024-08-17 RX ADMIN — POTASSIUM & SODIUM PHOSPHATES POWDER PACK 280-160-250 MG 2 PACKET: 280-160-250 PACK at 16:32

## 2024-08-17 RX ADMIN — POTASSIUM & SODIUM PHOSPHATES POWDER PACK 280-160-250 MG 2 PACKET: 280-160-250 PACK at 20:56

## 2024-08-17 RX ADMIN — HEPARIN SODIUM 5000 UNITS: 10000 INJECTION, SOLUTION INTRAVENOUS; SUBCUTANEOUS at 10:04

## 2024-08-17 RX ADMIN — ACETAMINOPHEN 650 MG: 325 TABLET ORAL at 12:22

## 2024-08-17 RX ADMIN — ACETAMINOPHEN 650 MG: 325 TABLET ORAL at 16:47

## 2024-08-17 RX ADMIN — NAFCILLIN 12 G: 10 INJECTION, POWDER, FOR SOLUTION INTRAVENOUS at 16:32

## 2024-08-17 RX ADMIN — TRAMADOL HYDROCHLORIDE 25 MG: 50 TABLET, COATED ORAL at 21:05

## 2024-08-17 RX ADMIN — NAFCILLIN 8 G: 2 POWDER, FOR SOLUTION INTRAMUSCULAR; INTRAVENOUS at 12:11

## 2024-08-17 RX ADMIN — ACETAMINOPHEN 650 MG: 325 TABLET ORAL at 00:27

## 2024-08-17 RX ADMIN — HEPARIN SODIUM 5000 UNITS: 10000 INJECTION, SOLUTION INTRAVENOUS; SUBCUTANEOUS at 21:00

## 2024-08-17 RX ADMIN — POTASSIUM CHLORIDE 40 MEQ: 1500 TABLET, EXTENDED RELEASE ORAL at 12:12

## 2024-08-17 RX ADMIN — CEFAZOLIN SODIUM 2 G: 2 INJECTION, SOLUTION INTRAVENOUS at 07:22

## 2024-08-17 RX ADMIN — ACETAMINOPHEN 650 MG: 325 TABLET ORAL at 05:38

## 2024-08-17 RX ADMIN — GADOBUTROL 9 ML: 604.72 INJECTION INTRAVENOUS at 14:55

## 2024-08-17 RX ADMIN — POTASSIUM & SODIUM PHOSPHATES POWDER PACK 280-160-250 MG 2 PACKET: 280-160-250 PACK at 12:12

## 2024-08-17 ASSESSMENT — ACTIVITIES OF DAILY LIVING (ADL)
ADLS_ACUITY_SCORE: 26

## 2024-08-17 NOTE — PLAN OF CARE
Goal Outcome Evaluation:      Plan of Care Reviewed With: patient    Overall Patient Progress: improvingOverall Patient Progress: improving     Pt. Doing well overnight., although still having mild fevers and c/o headache. Tylenol relieved the headache, but it returned about 5 hours later. A few expiratory wheezes heard on auscultation in anterior lung fields. Pt. Denied nausea, dyspnea,or lightheadedness. VSS,  Continue to monitor.

## 2024-08-17 NOTE — CONSULTS
Consultation - INFECTIOUS DISEASE CONSULTATION  Felicitas Elliott,  1940, MRN [id number]      Valve Vegetation  Endocarditis  Bacteremia  Sepsis (H)    PCP: Carol, Timbo Malik, [phone]   Code status:  No CPR- Do NOT Intubate               Chief Complaint: Mitral valve endocarditis with MSSA bacteremia     Assessment:  Felicitas Elliott is a 84 year old old female with   History of severe aortic stenosis.  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  MSSA bacteremia.  Positive blood culture on 2024.  Subsequent blood culture on 8/15 and  in process.  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  Mitral and aortic valve endocarditis as evidenced with large vegetation on mitral valve (8 mm x 15) attached to posterior leaflet and a small vegetation on the aortic valve.  Neck pain worrisome for cervical discitis.  Neck MRI showed some edema at C4-C5 on the left side.  No clear evidence of infection.  Headache.  In a patient with MSSA bacteremia and aortic valve endocarditis, cerebral septic emboli need to be ruled out.    Recommendations:   Change IV cefazolin to continuous infusion of nafcillin.  Okay for pharmacy to change the dose.  Follow-up pending blood cultures.  CVTS consult pending.  MRI of the brain looking for cerebral septic emboli.  Daily blood cultures.  Will need to check immunoglobin level down the road.  Sister with history of hypogammaglobulinemia    Discussed with the patient, family at bedside, and nursing staff.    Thank you for letting us be part of the patient care team. We will follow.    John Esqueda MD,MD  [facility].   [facility]  Office Telephone [phone].  Fax [phone]  [facility] paging    HPI:    Felicitas Elliott is a 84 year old old female. History is provided by the patient, chart review  ID is asked to see this patient for MSSA bacteremia and mitral valve endocarditis.  The patient has a history of aortic  stenosis.  She was admitted to [facility] with sepsis and found to have MSSA bacteremia.  TTE followed by MIGDALIA confirmed mitral valve endocarditis and aortic valve endocarditis.  The patient is transferred to [facility] for CVTS consult.  She is currently on IV Ancef.  CRP significantly better.  Seen today, the patient is having a lot of headache.      ===========================================    Medical History  Aortic stenosis.  Hypertension     Surgical History  No history of recurrent infections.         Social History  Reviewed, and she          Family History  No family history of recurrent infections.   Psychosocial Needs  Social History     Social History Narrative    Not on file     Additional psychosocial needs reviewed per nursing assessment.       Allergies   Allergen Reactions    Aspirin Rash    Cats Rash        Medications Prior to Admission   Medication Sig Dispense Refill Last Dose    acetaminophen (TYLENOL) 500 MG tablet Take 500-1,000 mg by mouth 2 times daily as needed for other (arthritis)   Unknown at prn        Review of Systems:  Negative except for findings in the HPI Physical Exam:  Temp:  [98.2  F (36.8  C)-100.3  F (37.9  C)] 99.2  F (37.3  C)  Pulse:  [] 81  Resp:  [18-33] 18  BP: ()/(55-74) 125/66  SpO2:  [86 %-98 %] 91 %    Gen: Pleasant in no acute distress.  HEENT: NCAT. EOMI. PERRL.  Neck: No bruit, JVD or thyromegaly.  Lungs: Clear to ascultation bilat with no crackles or wheezes.  Card: RRR. NSR. No RMG. Peripheral pulses present and symmetric. No edema.  Abd: Soft NT ND. No mass. Normal bowel sounds.  Skin: No rash.  Extr: No edema.  Neuro: Alert and oriented to place time and person.        ============================    Pertinent Labs  Recent Labs   Lab 08/17/24  0428 08/16/24  0557 08/14/24  0254   WBC 7.5 7.3 10.4   HGB 10.3* 10.8* 13.1   HCT 31.4* 32.6* 39.0   * 95* 142*       Recent Labs   Lab 08/17/24  0428 08/16/24  0557 08/14/24  0254     137 133*   CO2 24 23 26   BUN 14.0 17.3 25.5*   ALBUMIN  --   --  4.1   ALKPHOS  --   --  47   ALT  --   --  19   AST  --   --  36       MICROBIOLOGY DATA:  Personally reviewed   Contains critical data Blood Culture Peripheral Blood  Order: [id number]  Collected 8/14/2024  2:54 AM       Status: Final result       Visible to patient: No (inaccessible in MyChart)    Specimen Information: Peripheral Blood   0 Result Notes  Culture Positive on the 1st day of incubation Abnormal       Staphylococcus aureus Panic    2 of 2 bottles        Resulting Agency: [facility]     Susceptibility     Staphylococcus aureus     RAMIRO     Ciprofloxacin <=0.5 ug/mL Susceptible *     Clindamycin 0.25 ug/mL Susceptible     Daptomycin 0.25 ug/mL Susceptible     Doxycycline <=0.5 ug/mL Susceptible     Erythromycin <=0.25 ug/mL Susceptible     Gentamicin <=0.5 ug/mL Susceptible     Inducible macrolide resistance test Negative ug/mL Negative *     Levofloxacin 0.25 ug/mL Susceptible *     Linezolid 2 ug/mL Susceptible *     Moxifloxacin <=0.25 ug/mL Susceptible *     Nitrofurantoin <=16 ug/mL Susceptible *     Oxacillin 0.5 ug/mL Susceptible 1     Rifampin <=0.5 ug/mL Susceptible *     Tetracycline <=1 ug/mL Susceptible     Tigecycline <=0.12 ug/mL Susceptible *     Trimethoprim/Sulfamethoxazole <=0.5/9.5 u... Susceptible     Vancomycin <=0.5 ug/mL Susceptible              * Suppressed Antibiotic   1 Oxacillin susceptible isolates are susceptible to cephalosporins (example: cefazolin and cephalexin) and beta lactam combination agents. Oxacillin resistant isolates are resistant to these agents.             Specimen Collected: 08/14/24  2:54 AM Last Resulted: 08/16/24  7:40 AM              Pertinent Radiology  personally reviewed.   Study Result    Narrative & Impression   EXAM: MR CERVICAL SPINE W/O and W CONTRAST  LOCATION: [facility]  DATE: 8/15/2024     INDICATION: MSSA bacteremia, neck pain,;?abscess or  osteomyelitis  COMPARISON: None.  CONTRAST: 9 mL gadavist  TECHNIQUE: MRI Cervical Spine without and with IV contrast.     FINDINGS:   Reversal the cervical lordosis centered at C4-5. 2 mm retrolisthesis at C5-6 and 3 mm anterolisthesis at C7-T1. Solid interbody fusion at C6-7. Preserved vertebral body heights. Mild facet complex edema on the left at C4-C5 with minimal reactive   enhancement. No definite cord signal abnormality. No extraspinal abnormality.     Craniovertebral junction and C1-C2: Normal.     C2-C3: Preserved disc height. No herniation. Facet complex ankylosis and mild to moderate facet hypertrophy. No spinal canal stenosis. Mild bilateral neural foraminal stenosis.      C3-C4: Mild loss of disc height. Tiny central disc protrusion. Facet ankylosis and mild to moderate facet hypertrophy. No spinal canal stenosis. Moderate bilateral neural foraminal stenosis.      C4-C5: Advanced loss of disc height. Circumferential disc osteophyte complex and bilateral uncovertebral spurring. Mild bilateral facet arthropathy. Moderate to severe spinal canal stenosis with mild asymmetric left ventral cord flattening. Severe right   neural foraminal stenosis. Moderate to severe left neural foraminal stenosis.      C5-C6: Advanced loss of disc height and signal. Circumferential disc osteophyte complex with asymmetric left central component and left greater than right uncovertebral spurring. Minimal facet arthropathy. Moderate spinal canal stenosis and asymmetric   left ventral cord flattening. Severe bilateral neural foraminal stenosis.      C6-C7: Solid interbody fusion. No significant facet arthropathy. No spinal canal stenosis. Mild bilateral neural foraminal stenosis.      C7-T1: Moderate to advanced loss of disc height. Shallow broad-based posterior disc bulge. Mild bilateral uncovertebral spurring. Moderate to advanced facet arthropathy, greater on the right. Mild spinal canal stenosis. Severe right neural  foraminal   stenosis. Mild to moderate left neural foraminal stenosis.                                                                      IMPRESSION:  1.  Multilevel cervical spondylosis. Facet complex edema at C4-5 on the left is favored to be reactive/degenerative in nature. In the setting of bacteremia early changes of septic facet arthropathy are difficult to entirely exclude. Consider follow-up   MRI if the patient's symptoms are persistent or worsening. Otherwise, no evidence for spinal infection.  2.  At C4-5, moderate to severe spinal canal stenosis with asymmetric left ventral cord flattening. Severe right and moderate to severe left neural foraminal stenosis. Correlate with C5 radicular symptoms.  3.  At C5-6, moderate spinal canal stenosis and asymmetric left ventral cord flattening. Severe bilateral neural foraminal stenosis. Correlate with C6 radicular symptoms.  4.  At C7-T1, mild spinal canal stenosis with severe right and mild to moderate left neural foraminal stenosis. Correlate with right C8 radicular symptoms.  5.  At C3-4, moderate bilateral neural foraminal stenosis.          MIGDALIA 8/16/2024  1. The left ventricle is normal in size. Left ventricular function is  normal.The ejection fraction is 55-60%.  2. Normal right ventricle size and systolic function.  3. Large vegetation on mitral valve (8 mm x 15) attached to posterior leaflet.  4. There is a small vegetation on the aortic valve (6 mm). No evidence of  aortic root abscess identified.  5. Severe valvular aortic stenosis (SHU:0.8 cm2, peak nomi: 4.6 m/sec, mean  gradient: 51 mmHg).

## 2024-08-17 NOTE — PLAN OF CARE
Goal Outcome Evaluation:      Plan of Care Reviewed With: patient    Overall Patient Progress: improvingOverall Patient Progress: improving    Outcome Evaluation: Pt ambulated x1 in hallway with nurse.  Pt up in chair all day.  Pt continues on room air.  Pt continues to be hemodynamically stable and on room air.  Nifcillin gtt started per order.  New dose of Nifcillin to start at 1600 per pharmacy instruction.  Pt continues with intermittent headache.  Tylenol helped.  Pt does have tramadol ordered if needed.   Lots of family involvement who have been updated at bedside per pt request.

## 2024-08-17 NOTE — CONSULTS
Cardiothoracic Surgery Consult Note    Consult Reason: Aortic and mitral valve endocarditis    HPI: Felicitas Elliott is a 84 year old female with PMH diastolic HF, CKD 3, HTN, and HLD who originally presented to [facility] with fevers, chills, rigors, headaches, and weakness.  She was found to have MSSA positive blood cultures and was started on Ancef.  Echographic evaluation of her heart via TTE and MIGDALIA showed mitral and aortic valve endocarditis in addition to severe aortic valve stenosis.  CV Surgery was consulted for consideration of surgical treatment.    Feeling better since having started antibiotics but does endorse progressive worsening of fatigue with activity of the past few weeks to months though she has been able to continue her usual ADL.    Pertinent Positives:  Edentulous with complete dentures on both top and bottom, hx of recurrent skin lesions to the thorax  Pertinent Negatives:  No dental pain or trauma  Activity:  Lives with spouse, independent in ADL, >4 mets activity tolerance    PMH:  No past medical history on file.  Spinal stenosis  HTN  HLD  Diastolic HF  Furunculosis  Tobacco use disorder  CKD 3    PSH:  No past surgical history on file.  Bilat knee replacement  Cervical fusion  Breast lumpectomy    FH:  No family history on file.  Daughter with hypogammaglobulinemia    SH:  Social History     Socioeconomic History    Marital status:      Social Determinants of Health      Received from [employer]    Financial Resource Strain    Received from [employer]    Social Connections       Home Meds:  Medications Prior to Admission   Medication Sig Dispense Refill Last Dose    acetaminophen (TYLENOL) 500 MG tablet Take 500-1,000 mg by mouth 2 times daily as needed for other (arthritis)   Unknown at prn       Allergies:  Allergies   Allergen Reactions    Aspirin Rash    Cats Rash       ROS: Negative except as noted  above, under HPI.    Physical Exam:  Temp:  [98.2  F (36.8  C)-100.1  F (37.8  C)] 99.2  F (37.3  C)  Pulse:  [] 81  Resp:  [18-33] 18  BP: ()/(55-74) 125/66  SpO2:  [86 %-98 %] 91 %    Gen: NAD, sitting up in chair visiting with family, conversant, pleasant, calm, cooperative on exam  HEENT: normocephalic, atraumatic cranium, EOMI, sclerae anicteric, midline trachea  Lungs: unlabored breathing on RA  CV: RRR on monitor  Musculoskeletal: HENDERSON, no gross deformities  Neuro: A&O, CN II-VII grossly intact, HENDERSON, face symmetric, speech clear  Mental: normal mood and affect, regular rate of speech, inattentive/short attention span at times    Labs:  ABG No lab results found in last 7 days.  CBC  Recent Labs   Lab 08/17/24 0428 08/16/24  0557 08/14/24  0254   WBC 7.5 7.3 10.4   HGB 10.3* 10.8* 13.1   * 95* 142*     BMP  Recent Labs   Lab 08/17/24 0428 08/16/24  0557 08/15/24  0532 08/14/24  0254    137  --  133*   POTASSIUM 3.4 3.4  --  3.9   CHLORIDE 102 104  --  97*   CO2 24 23  --  26   BUN 14.0 17.3  --  25.5*   CR 1.23* 1.36* 1.41* 1.92*   * 110*  --  148*     LFT  Recent Labs   Lab 08/14/24  0254   AST 36   ALT 19   ALKPHOS 47   BILITOTAL 1.0   ALBUMIN 4.1     PancreasNo lab results found in last 7 days.    Imaging:  No orders to display       A/P: Felicitas Elliott is a 84 year old female with MSSA bacteremia, severe aortic stenosis, and infective aortic and mitral valve endocarditis.    - No indication for emergent surgery; anticipate Ms. Elliott will likely be a surgical candidate once work-up is complete  - CT cor angio to evaluate for CAD  - Dental imaging not indicated as pt is edentulous without implanted prostheses  - Brain MRI and Neurology consultation to assess perioperative hemorrhagic stroke risk in s/o planned CPB heparinization  - Agree with ID consultation and antibiotic therapy along with serial BC  - Consider repeat MIGDALIA in early part of next week to re-evaluate veges once  BC have cleared  - Consider Derm consultation for further evaluation of chronic thoracic skin lesions to better understand if some course of treatment would reduce risk of recurrence and development of surgical site infection following cardiac surgery  - Thank you for the opportunity to participate in the care of this patient.    Patient seen and discussed with attending, Dr. Barba.    Eddie Way CNP, [facility]-  Cardiothoracic Surgery  Pager [phone]    11:05 AM  August 17, 2024

## 2024-08-17 NOTE — PROGRESS NOTES
[facility]    Medicine Progress Note - Hospitalist Service    Date of Admission:  8/16/2024    Assessment & Plan   83 yo female with history of chronic back pain, furunculosis, uterine prolapse, and tobacco use presents 8/14/24 with sepsis secondary to MSSA bacteremia and HAMMAD. MIGDALIA pos for mitral and aortic valvular vegetations. Transferred to [facility] 8/16/24 for CT surgery evaluation.          Sepsis due to MSSA bacteremia  MIGDALIA shows large posterior leaflet mitral valve vegetation and small aortic valve vegetation with severe aortic stenosis   LVEF 55-60%  -- cardiology following  -- ID following  -- continue IV nafcillin  -- CTS consult pending  -- MRI brain pending      Mod-severe cervical spinal stenosis, foraminal stenosis, facet arthropathy  MRI 8/15 showed degenerative changes in her cervical spine which appears similar to report from 2015. Radiology commented on a left L4-5 degenerative facet with findings that could not entirely rule out septic arthritis.   Neck pain is chronic and has no new or worsening symptoms  Neurosurgery recommends non operative management and to obtain repeat MRI with and w/o contrast if neck pain worsens beyond chronic baseline despite antibiotics and treatment of cardiac vegetations  -- supportive cares       Acute hypoxic respiratory failure: resolved  -- monitor      HAMMAD on CKD3: resolved  -- trend GFR for stability         Hyperglycemia most likely secondary to prediabetes  Hemoglobin A1c 6.4  -- Monitor glucoses intermittently           Dilutional anemia  Thrombocytopenia in the setting of sepsis  Hemoglobin trended down from 13.1-10 and now stable  -- trend CBC      Mild Hypokalemia: replace and recheck      Hypophosphatemia: replace and recheck       Diet: Snacks/Supplements Adult: Ensure Enlive; With Meals  Regular Diet Adult  Advance Diet as Tolerated: Clear Liquid Diet; Regular Diet Adult    DVT Prophylaxis: Heparin SQ  Le Catheter: Not  "present  Lines: None     Cardiac Monitoring: ACTIVE order. Indication: endocarditis  Code Status: No CPR- Do NOT Intubate      Clinically Significant Risk Factors Present on Admission                # Thrombocytopenia: Lowest platelets = 95 in last 2 days, will monitor for bleeding      # Anemia: based on hgb <11       # Obesity: Estimated body mass index is 30.78 kg/m  as calculated from the following:    Height as of this encounter: 1.702 m (5' 7\").    Weight as of this encounter: 89.1 kg (196 lb 8 oz).       # Financial/Environmental Concerns:                 Disposition Plan   Medically Ready for Discharge: Anticipated in 2-4 Days      Erick Patino DO  Hospitalist Service  [facility]  Securely message with [employer] (more info)  Text page via [employer] Paging/Directory   ______________________________________________________________________    Interval History   NAD. Denies any current complaints, wants to discharge to home    Physical Exam   Vital Signs: Temp: 98.9  F (37.2  C) Temp src: Oral BP: 118/74 Pulse: 91   Resp: 18 SpO2: 94 % O2 Device: None (Room air)    Weight: 196 lbs 8 oz  General: NAD  RESPIRATORY: Breathing nonlabored  CARDIOVASCULAR: No le edema bilat.   NEUROLOGIC: Motor and sensory intact, speech clear        >50 MINUTES SPENT BY ME on the date of service doing chart review, history, exam, documentation & further activities per the note.  Data       "

## 2024-08-17 NOTE — PROGRESS NOTES
"[facility]  Cardiac Electrophysiology  [address]  [place] [address]   Office: [phone]  Fax: [phone]     Cardiology Progress Note    Patient: Felicitas Elliott   : 1940       Assessment/Recommendations     MV and AV NVE in the setting of MSSA bacteremia - 2024 MIGDALIA with large posterior MV leaflet vegetation (8x15mm) and small aortic valve vegetation (6mm) without paravalvular extension, severe new valve dysfunction or heart failure symptoms.  Severe aortic stenosis.  Normal LV function.  - CT surgery consultation for consideration of AVR/MVR vs antibiotic therapy and valve clinic follow-up for aortic stenosis  - continue cefazolin  - appreciate ID consultation    Severe aortic stenosis - moderate aortic stenosis (mG 27mmHg, SHU 1.3cm ) by 2020 TTE.  Asymptomatic.  Mild mitral stenosis - moderate-severe mitral annular calcification  RBBB and borderline left axis deviation, QRS 146ms  HAMMAD on CKD         Interval Events   Low grade fevers - Tm 100.3F.  She notes feeling fatigued and having a headache.  She reports good functional status at baseline, including recently dancing at her granddaughter's wedding.  She denies syncope or chest pain.       Physical Examination  Review of Systems   VITALS: /58 (BP Location: Left arm)   Pulse 79   Temp 99.2  F (37.3  C) (Oral)   Resp 18   Ht 1.702 m (5' 7\")   Wt 89.1 kg (196 lb 8 oz)   SpO2 91%   BMI 30.78 kg/m    Wt Readings from Last 3 Encounters:   24 89.1 kg (196 lb 8 oz)   08/15/24 90.1 kg (198 lb 9.6 oz)       Intake/Output Summary (Last 24 hours) at 2024 0706  Last data filed at 2024 0032  Gross per 24 hour   Intake 220 ml   Output 350 ml   Net -130 ml     CONSTITUTIONAL: no distress  EYES:  Conjunctivae pink, sclerae clear.    E/N/T:  Oral mucosa pink, moist mucous membranes  RESPIRATORY:  Respiratory effort is normal  CARDIOVASCULAR:  PAVAN, normal S1 and S2  GASTROINTESTINAL: "  Abdomen without masses or tenderness  EXTREMITIES:  No clubbing or cyanosis.    MUSCULOSKELETAL:  Overall grossly normal muscle strength  SKIN:  Overall, skin warm and dry, no lesions.  NEURO/PSYCH:  Oriented x 3 with normal affect.   Constitutional:  No weight loss or loss of appetite    Cardiovascular: As detailed above  Respiratory: No cough  Genitourinary: No trouble urinating           Medical History  Surgical History   No past medical history on file. No past surgical history on file.      Family History Social History   No family history on file.            Medications  Allergies     Current Facility-Administered Medications:     acetaminophen (TYLENOL) tablet 650 mg, 650 mg, Oral, Q4H PRN, 650 mg at 08/17/24 0538 **OR** acetaminophen (TYLENOL) Suppository 650 mg, 650 mg, Rectal, Q4H PRN, Gondal, Saad J, MD    alum & mag hydroxide-simethicone (MAALOX) suspension 30 mL, 30 mL, Oral, Q8H PRN, Vishnu Meadows DO    calcium carbonate (TUMS) chewable tablet 1,000 mg, 1,000 mg, Oral, 4x Daily PRN, Gondal, Saad J, MD    carboxymethylcellulose PF (REFRESH PLUS) 0.5 % ophthalmic solution 1 drop, 1 drop, Both Eyes, Q1H PRN, Gondal, Saad J, MD    ceFAZolin (ANCEF) 2 g in 100 mL D5W intermittent infusion, 2 g, Intravenous, Q12H, Gondal, Saad J, MD, Last Rate: 200 mL/hr at 08/16/24 1924, 2 g at 08/16/24 1924    heparin ANTICOAGULANT injection 5,000 Units, 5,000 Units, Subcutaneous, BID, Gondal, Saad J, MD    hydrocortisone (CORTAID) 1 % cream 1 g, 1 g, Topical, TID PRN, Vishnu Meadows DO    lidocaine (LMX4) cream, , Topical, Q1H PRN, Gondal, Saad J, MD    lidocaine (LMX4) cream, , Topical, Q1H PRN, Gondal, Saad J, MD    lidocaine 1 % 0.1-1 mL, 0.1-1 mL, Other, Q1H PRN, Gondal, Saad J, MD    lidocaine 1 % 0.1-1 mL, 0.1-1 mL, Other, Q1H PRN, Gondal, Saad J, MD    miconazole (MICATIN) 2 % powder, , Topical, BID, Gondal, Saad J, MD    nicotine (NICORETTE) gum 2 mg, 2 mg, Buccal, Q1H PRN, Gondal, Saad J,  "MD    ondansetron (ZOFRAN ODT) ODT tab 4 mg, 4 mg, Oral, Q6H PRN **OR** ondansetron (ZOFRAN) injection 4 mg, 4 mg, Intravenous, Q6H PRN, Gondal, Saad J, MD    senna-docusate (SENOKOT-S/PERICOLACE) 8.6-50 MG per tablet 1 tablet, 1 tablet, Oral, BID PRN **OR** senna-docusate (SENOKOT-S/PERICOLACE) 8.6-50 MG per tablet 2 tablet, 2 tablet, Oral, BID PRN, Gondal, Saad J, MD    sodium chloride (PF) 0.9% PF flush 3 mL, 3 mL, Intracatheter, q1 min prn, Gondal, Saad J, MD    sodium chloride (PF) 0.9% PF flush 3 mL, 3 mL, Intracatheter, Q8H, Gondal, Saad J, MD    sodium chloride (PF) 0.9% PF flush 3 mL, 3 mL, Intracatheter, Q8H, Gondal, Saad J, MD    sodium chloride (PF) 0.9% PF flush 3 mL, 3 mL, Intracatheter, q1 min prn, Gondal, Saad J, MD    sodium chloride (PF) 0.9% PF flush 3 mL, 3 mL, Intracatheter, Q8H, Gondal, Saad J, MD, 3 mL at 08/16/24 1935    sodium chloride (PF) 0.9% PF flush 3 mL, 3 mL, Intracatheter, q1 min prn, Gondal, Saad J, MD    sodium chloride 0.9 % infusion, 30 mL/hr, Intravenous, Continuous, Gondal, Saad J, MD, Stopped at 08/17/24 0032     Allergies   Allergen Reactions    Aspirin Rash    Cats Rash          Lab Results    Chemistry CBC Cardiac Enzymes/BNP/TSH/INR   Recent Labs   Lab Test 08/17/24 0428      POTASSIUM 3.4   CHLORIDE 102   CO2 24   *   BUN 14.0   CR 1.23*   GFRESTIMATED 43*   KAELYN 8.3*     Recent Labs   Lab Test 08/17/24 0428 08/16/24  0557 08/15/24  0532   CR 1.23* 1.36* 1.41*          Recent Labs   Lab Test 08/17/24  0428   WBC 7.5   HGB 10.3*   HCT 31.4*   MCV 95   *     Recent Labs   Lab Test 08/17/24  0428 08/16/24  0557 08/14/24  0254   HGB 10.3* 10.8* 13.1    No results for input(s): \"TROPONINI\" in the last 98356 hours.  No results for input(s): \"BNP\", \"NTBNPI\", \"NTBNP\" in the last 28256 hours.  No results for input(s): \"TSH\" in the last 79113 hours.  No results for input(s): \"INR\" in the last 14697 hours.         Baltazar Brar MD  8/17/2024  7:06 " AM

## 2024-08-17 NOTE — PLAN OF CARE
Problem: Adult Inpatient Plan of Care  Goal: Plan of Care Review  Description: The Plan of Care Review/Shift note should be completed every shift.  The Outcome Evaluation is a brief statement about your assessment that the patient is improving, declining, or no change.  This information will be displayed automatically on your shift  note.  Outcome: Progressing     Problem: Adult Inpatient Plan of Care  Goal: Optimal Comfort and Wellbeing  Intervention: Monitor Pain and Promote Comfort  Recent Flowsheet Documentation  Taken 8/16/2024 1938 by Yulisa Black RN  Pain Management Interventions: medication (see MAR)     Problem: Risk for Delirium  Goal: Optimal Coping  Outcome: Progressing  Intervention: Optimize Psychosocial Adjustment to Delirium  Recent Flowsheet Documentation  Taken 8/16/2024 2126 by Yulisa Black RN  Supportive Measures:   active listening utilized   verbalization of feelings encouraged  Taken 8/16/2024 1715 by Yulisa Black RN  Supportive Measures:   active listening utilized   verbalization of feelings encouraged     Problem: Skin Injury Risk Increased  Goal: Skin Health and Integrity  Outcome: Progressing  Intervention: Plan: Nurse Driven Intervention: Moisture Management  Recent Flowsheet Documentation  Taken 8/16/2024 2126 by Yulisa Black RN  Moisture Interventions: Encourage regular toileting  Taken 8/16/2024 1715 by Yulisa Black RN  Moisture Interventions: Encourage regular toileting  Intervention: Optimize Skin Protection  Recent Flowsheet Documentation  Taken 8/16/2024 2126 by Yulisa Black RN  Activity Management: activity adjusted per tolerance  Taken 8/16/2024 1715 by Yulisa Black RN  Activity Management: activity adjusted per tolerance     Problem: Sepsis/Septic Shock  Goal: Optimal Coping  Outcome: Progressing  Intervention: Optimize Psychosocial Adjustment to Illness  Recent Flowsheet Documentation  Taken 8/16/2024 2126 by Yulisa Black RN  Supportive Measures:   active listening  utilized   verbalization of feelings encouraged  Taken 8/16/2024 1715 by Yulisa Black RN  Supportive Measures:   active listening utilized   verbalization of feelings encouraged   Goal Outcome Evaluation:                    Patient was a direct admit from [facility] this afternoon.     Alert. Oriented x 4.    PRN acetaminophen 650 po given around 1930 for headache with relief. That time her temp was 99.4. She's been having fever. Temp was 100.1 tonight. Will give another acetaminophen after 4 hrs from last dose.     Denied back pain at this time.    She has some shortness of breath with activity but not worst than her baseline. On room air, O2 sat mid 90s.     On IV atbx.     Very tired and weak. Standby assist, walker and gait belt which patient agreed. Falls education, precaution and interventions placed.

## 2024-08-18 ENCOUNTER — APPOINTMENT (OUTPATIENT)
Dept: CT IMAGING | Facility: HOSPITAL | Age: 84
DRG: 853 | End: 2024-08-18
Attending: PSYCHIATRY & NEUROLOGY
Payer: COMMERCIAL

## 2024-08-18 LAB
ANION GAP SERPL CALCULATED.3IONS-SCNC: 12 MMOL/L (ref 7–15)
BUN SERPL-MCNC: 13 MG/DL (ref 8–23)
CALCIUM SERPL-MCNC: 8.4 MG/DL (ref 8.8–10.4)
CHLORIDE SERPL-SCNC: 100 MMOL/L (ref 98–107)
CREAT SERPL-MCNC: 1.22 MG/DL (ref 0.51–0.95)
EGFRCR SERPLBLD CKD-EPI 2021: 44 ML/MIN/1.73M2
ERYTHROCYTE [DISTWIDTH] IN BLOOD BY AUTOMATED COUNT: 14.3 % (ref 10–15)
GLUCOSE SERPL-MCNC: 126 MG/DL (ref 70–99)
HCO3 SERPL-SCNC: 24 MMOL/L (ref 22–29)
HCT VFR BLD AUTO: 31.6 % (ref 35–47)
HGB BLD-MCNC: 10.4 G/DL (ref 11.7–15.7)
MAGNESIUM SERPL-MCNC: 1.7 MG/DL (ref 1.7–2.3)
MCH RBC QN AUTO: 31 PG (ref 26.5–33)
MCHC RBC AUTO-ENTMCNC: 32.9 G/DL (ref 31.5–36.5)
MCV RBC AUTO: 94 FL (ref 78–100)
PHOSPHATE SERPL-MCNC: 2.9 MG/DL (ref 2.5–4.5)
PLATELET # BLD AUTO: 126 10E3/UL (ref 150–450)
POTASSIUM SERPL-SCNC: 3.8 MMOL/L (ref 3.4–5.3)
RBC # BLD AUTO: 3.36 10E6/UL (ref 3.8–5.2)
SODIUM SERPL-SCNC: 136 MMOL/L (ref 135–145)
WBC # BLD AUTO: 7.5 10E3/UL (ref 4–11)

## 2024-08-18 PROCEDURE — 99232 SBSQ HOSP IP/OBS MODERATE 35: CPT | Performed by: INTERNAL MEDICINE

## 2024-08-18 PROCEDURE — 250N000011 HC RX IP 250 OP 636: Performed by: INTERNAL MEDICINE

## 2024-08-18 PROCEDURE — 36415 COLL VENOUS BLD VENIPUNCTURE: CPT | Performed by: STUDENT IN AN ORGANIZED HEALTH CARE EDUCATION/TRAINING PROGRAM

## 2024-08-18 PROCEDURE — 84100 ASSAY OF PHOSPHORUS: CPT | Performed by: INTERNAL MEDICINE

## 2024-08-18 PROCEDURE — 250N000013 HC RX MED GY IP 250 OP 250 PS 637: Performed by: STUDENT IN AN ORGANIZED HEALTH CARE EDUCATION/TRAINING PROGRAM

## 2024-08-18 PROCEDURE — 99232 SBSQ HOSP IP/OBS MODERATE 35: CPT | Performed by: STUDENT IN AN ORGANIZED HEALTH CARE EDUCATION/TRAINING PROGRAM

## 2024-08-18 PROCEDURE — 83735 ASSAY OF MAGNESIUM: CPT | Performed by: INTERNAL MEDICINE

## 2024-08-18 PROCEDURE — 120N000004 HC R&B MS OVERFLOW

## 2024-08-18 PROCEDURE — 250N000011 HC RX IP 250 OP 636: Performed by: STUDENT IN AN ORGANIZED HEALTH CARE EDUCATION/TRAINING PROGRAM

## 2024-08-18 PROCEDURE — 85027 COMPLETE CBC AUTOMATED: CPT | Performed by: INTERNAL MEDICINE

## 2024-08-18 PROCEDURE — 99223 1ST HOSP IP/OBS HIGH 75: CPT | Performed by: PSYCHIATRY & NEUROLOGY

## 2024-08-18 PROCEDURE — 250N000009 HC RX 250: Performed by: STUDENT IN AN ORGANIZED HEALTH CARE EDUCATION/TRAINING PROGRAM

## 2024-08-18 PROCEDURE — 80048 BASIC METABOLIC PNL TOTAL CA: CPT | Performed by: INTERNAL MEDICINE

## 2024-08-18 PROCEDURE — 258N000003 HC RX IP 258 OP 636: Performed by: STUDENT IN AN ORGANIZED HEALTH CARE EDUCATION/TRAINING PROGRAM

## 2024-08-18 PROCEDURE — 87040 BLOOD CULTURE FOR BACTERIA: CPT | Performed by: STUDENT IN AN ORGANIZED HEALTH CARE EDUCATION/TRAINING PROGRAM

## 2024-08-18 PROCEDURE — 70496 CT ANGIOGRAPHY HEAD: CPT

## 2024-08-18 PROCEDURE — 36415 COLL VENOUS BLD VENIPUNCTURE: CPT | Performed by: INTERNAL MEDICINE

## 2024-08-18 RX ORDER — LIDOCAINE 40 MG/G
CREAM TOPICAL EVERY 4 HOURS PRN
Status: DISCONTINUED | OUTPATIENT
Start: 2024-08-18 | End: 2024-08-22

## 2024-08-18 RX ORDER — IOPAMIDOL 755 MG/ML
75 INJECTION, SOLUTION INTRAVASCULAR ONCE
Status: COMPLETED | OUTPATIENT
Start: 2024-08-18 | End: 2024-08-18

## 2024-08-18 RX ORDER — FUROSEMIDE 10 MG/ML
20 INJECTION INTRAMUSCULAR; INTRAVENOUS
Status: DISCONTINUED | OUTPATIENT
Start: 2024-08-18 | End: 2024-08-19

## 2024-08-18 RX ORDER — TRAMADOL HYDROCHLORIDE 50 MG/1
50 TABLET ORAL EVERY 6 HOURS PRN
Status: DISCONTINUED | OUTPATIENT
Start: 2024-08-18 | End: 2024-09-07

## 2024-08-18 RX ADMIN — FUROSEMIDE 20 MG: 10 INJECTION, SOLUTION INTRAMUSCULAR; INTRAVENOUS at 10:34

## 2024-08-18 RX ADMIN — ACETAMINOPHEN 650 MG: 325 TABLET ORAL at 19:39

## 2024-08-18 RX ADMIN — HEPARIN SODIUM 5000 UNITS: 10000 INJECTION, SOLUTION INTRAVENOUS; SUBCUTANEOUS at 10:35

## 2024-08-18 RX ADMIN — FUROSEMIDE 20 MG: 10 INJECTION, SOLUTION INTRAMUSCULAR; INTRAVENOUS at 17:15

## 2024-08-18 RX ADMIN — ACETAMINOPHEN 650 MG: 325 TABLET ORAL at 00:55

## 2024-08-18 RX ADMIN — NAFCILLIN 12 G: 10 INJECTION, POWDER, FOR SOLUTION INTRAVENOUS at 15:45

## 2024-08-18 RX ADMIN — ACETAMINOPHEN 650 MG: 325 TABLET ORAL at 05:53

## 2024-08-18 RX ADMIN — HEPARIN SODIUM 5000 UNITS: 10000 INJECTION, SOLUTION INTRAVENOUS; SUBCUTANEOUS at 19:39

## 2024-08-18 RX ADMIN — IOPAMIDOL 75 ML: 755 INJECTION, SOLUTION INTRAVENOUS at 13:53

## 2024-08-18 ASSESSMENT — ACTIVITIES OF DAILY LIVING (ADL)
ADLS_ACUITY_SCORE: 26
ADLS_ACUITY_SCORE: 30
ADLS_ACUITY_SCORE: 26
ADLS_ACUITY_SCORE: 30
ADLS_ACUITY_SCORE: 26
ADLS_ACUITY_SCORE: 30
ADLS_ACUITY_SCORE: 30
ADLS_ACUITY_SCORE: 26

## 2024-08-18 NOTE — PROGRESS NOTES
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  Severe aortic stenosis.  Normal LV function.  - CT surgery consultation appreciated  - coronary CTA  - neurology consultation requested  - continue nafcillin  - appreciate ID consultation    Edema - likely related to fluid overload in the setting on ongoing therapies, aortic stenosis, diastolic dysfunction  - start lasix 20mg IV twice daily     Severe aortic stenosis - moderate aortic stenosis (mG 27mmHg, SHU 1.3cm ) by 2020 TTE.  Asymptomatic.  Mild mitral stenosis - moderate-severe mitral annular calcification  RBBB and borderline left axis deviation, QRS 146ms  HAMMAD on CKD         Interval Events   Low grade fevers - Tm 100.3F.    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  She notes feeling fatigued and ongoing intermittent headache.  She notes increased edema in her hands and legs.  She had lightheadedness with ambulation yesterday.       Physical Examination  Review of Systems   VITALS: /66 (BP Location: Left arm)   Pulse 76   Temp 97.9  F (36.6  C) (Oral)   Resp 18   Ht 1.702 m (5' 7\")   Wt 88.9 kg (196 lb)   SpO2 94%   BMI 30.70 kg/m    Wt Readings from Last 3 Encounters:   24 88.9 kg (196 lb)   08/15/24 90.1 kg (198 lb 9.6 oz)       Intake/Output Summary (Last 24 hours) at 2024 " 0739  Last data filed at 8/18/2024 0600  Gross per 24 hour   Intake 1351 ml   Output 950 ml   Net 401 ml     CONSTITUTIONAL: no distress  EYES:  Conjunctivae pink, sclerae clear.    E/N/T:  Oral mucosa pink, moist mucous membranes  RESPIRATORY:  Respiratory effort is normal  CARDIOVASCULAR:  PAVAN, normal S1 and S2  GASTROINTESTINAL:  Abdomen without masses or tenderness  EXTREMITIES:  No clubbing or cyanosis.    MUSCULOSKELETAL:  Overall grossly normal muscle strength  SKIN:  Overall, skin warm and dry, no lesions.  NEURO/PSYCH:  Oriented x 3 with normal affect.  Constitutional:  No weight loss or loss of appetite    Cardiovascular: As detailed above  Respiratory: No cough  Genitourinary: No trouble urinating           Medical History  Surgical History   No past medical history on file. No past surgical history on file.      Family History Social History   No family history on file.            Medications  Allergies     Current Facility-Administered Medications:     acetaminophen (TYLENOL) tablet 650 mg, 650 mg, Oral, Q4H PRN, 650 mg at 08/18/24 0553 **OR** acetaminophen (TYLENOL) Suppository 650 mg, 650 mg, Rectal, Q4H PRN, Gondal, Saad J, MD    alum & mag hydroxide-simethicone (MAALOX) suspension 30 mL, 30 mL, Oral, Q8H PRN, Vishnu Meaodws DO    calcium carbonate (TUMS) chewable tablet 1,000 mg, 1,000 mg, Oral, 4x Daily PRN, Gondal, Saad J, MD    carboxymethylcellulose PF (REFRESH PLUS) 0.5 % ophthalmic solution 1 drop, 1 drop, Both Eyes, Q1H PRN, Gondal, Saad J, MD    heparin ANTICOAGULANT injection 5,000 Units, 5,000 Units, Subcutaneous, BID, Gondal, Saad J, MD, 5,000 Units at 08/17/24 2100    hydrocortisone (CORTAID) 1 % cream 1 g, 1 g, Topical, TID PRN, Vishnu Meadows DO    lidocaine (LMX4) cream, , Topical, Q1H PRN, Gondal, Saad J, MD    lidocaine (LMX4) cream, , Topical, Q1H PRN, Gondal, Saad J, MD    lidocaine 1 % 0.1-1 mL, 0.1-1 mL, Other, Q1H PRN, Gondal, Saad J, MD    lidocaine 1 %  0.1-1 mL, 0.1-1 mL, Other, Q1H PRN, Gondal, Saad J, MD    miconazole (MICATIN) 2 % powder, , Topical, BID PRN, Erick Patino DO    nafcillin 12 g in sodium chloride 0.9 % 550 mL continuous infusion, 12 g, Intravenous, Q24H, John Esqueda MD, 12 g at 08/17/24 1632    naloxone (NARCAN) injection 0.2 mg, 0.2 mg, Intravenous, Q2 Min PRN **OR** naloxone (NARCAN) injection 0.4 mg, 0.4 mg, Intravenous, Q2 Min PRN **OR** naloxone (NARCAN) injection 0.2 mg, 0.2 mg, Intramuscular, Q2 Min PRN **OR** naloxone (NARCAN) injection 0.4 mg, 0.4 mg, Intramuscular, Q2 Min PRN, Erick Patino DO    nicotine (NICORETTE) gum 2 mg, 2 mg, Buccal, Q1H PRN, Gondal, Saad J, MD    ondansetron (ZOFRAN ODT) ODT tab 4 mg, 4 mg, Oral, Q6H PRN **OR** ondansetron (ZOFRAN) injection 4 mg, 4 mg, Intravenous, Q6H PRN, Gondal, Saad J, MD    senna-docusate (SENOKOT-S/PERICOLACE) 8.6-50 MG per tablet 1 tablet, 1 tablet, Oral, BID PRN **OR** senna-docusate (SENOKOT-S/PERICOLACE) 8.6-50 MG per tablet 2 tablet, 2 tablet, Oral, BID PRN, Gondal, Saad J, MD    sodium chloride (PF) 0.9% PF flush 3 mL, 3 mL, Intracatheter, q1 min prn, Gondal, Saad J, MD    sodium chloride (PF) 0.9% PF flush 3 mL, 3 mL, Intracatheter, Q8H, Gondal, Saad J, MD, 3 mL at 08/17/24 0856    traMADol (ULTRAM) half-tab 25 mg, 25 mg, Oral, Q6H PRN, Erick Patino DO, 25 mg at 08/17/24 2105     Allergies   Allergen Reactions    Aspirin Rash    Cats Rash          Lab Results    Chemistry CBC Cardiac Enzymes/BNP/TSH/INR   Recent Labs   Lab Test 08/18/24  0455      POTASSIUM 3.8   CHLORIDE 100   CO2 24   *   BUN 13.0   CR 1.22*   GFRESTIMATED 44*   KAELYN 8.4*     Recent Labs   Lab Test 08/18/24  0455 08/17/24  0428 08/16/24  0557   CR 1.22* 1.23* 1.36*          Recent Labs   Lab Test 08/18/24  0455   WBC 7.5   HGB 10.4*   HCT 31.6*   MCV 94   *     Recent Labs   Lab Test 08/18/24  0455 08/17/24  0428 08/16/24  0557   HGB 10.4* 10.3* 10.8*    No results  "for input(s): \"TROPONINI\" in the last 46124 hours.  No results for input(s): \"BNP\", \"NTBNPI\", \"NTBNP\" in the last 48604 hours.  No results for input(s): \"TSH\" in the last 16486 hours.  No results for input(s): \"INR\" in the last 86491 hours.         Baltazar Brar MD  8/18/2024  7:39 AM    "

## 2024-08-18 NOTE — PLAN OF CARE
"Goal Outcome Evaluation:      Plan of Care Reviewed With: patient    Overall Patient Progress: improvingOverall Patient Progress: improving     Pt. Doing well overnight - did get \"sick of laying in bed\" and moved to the recliner chair to sleep the rest of the night. Noted her oxygenation dropped while she was sleeping, so placed nasal cannula at 1.5L to maintain O2 saturation. Now back on room air 91-92%. Mild fever at beginning of shift of 100.3, normal at later recheck. Continues to c/o headache, partially relieved with prn tylenol. Denied dyspnea, nausea, or dizziness.VSS, continue to monitor.       "

## 2024-08-18 NOTE — PROGRESS NOTES
"    BRIEF CVTS PROGRESS NOTE    S:  Felicitas Elliott is a 84 year old female with PMH diastolic HF, CKD 3, HTN, and HLD who originally presented to [facility] with fevers, chills, rigors, headaches, and weakness.  She was found to have MSSA positive blood cultures and was started on Ancef.  Echographic evaluation of her heart via TTE and MIGDALIA showed mitral and aortic valve endocarditis in addition to severe aortic valve stenosis.  CV Surgery was consulted for consideration of surgical treatment.     8/16 BC returned positive overnight; MRI complete with notable findings.  Neurology consultation pending.  Otherwise, improvement in generalized symptoms of infection though she continues to have HAs.    O:  /64 (BP Location: Left arm)   Pulse 86   Temp 98  F (36.7  C) (Oral)   Resp 18   Ht 1.702 m (5' 7\")   Wt 88.9 kg (196 lb)   SpO2 95%   BMI 30.70 kg/m      CBC RESULTS:   Recent Labs   Lab Test 08/18/24 0455 08/17/24 0428 08/16/24  0557   WBC 7.5 7.5 7.3   HGB 10.4* 10.3* 10.8*   HCT 31.6* 31.4* 32.6*   * 105* 95*     CMP RESULTS:  Recent Labs   Lab Test 08/18/24 0455 08/17/24  1556 08/17/24 0428 08/16/24  0557 08/15/24  0532 08/14/24  0254     --  137 137  --  133*   POTASSIUM 3.8 4.1 3.4 3.4  --  3.9   CHLORIDE 100  --  102 104  --  97*   CO2 24  --  24 23  --  26   ANIONGAP 12  --  11 10  --  10   *  --  115* 110*  --  148*   BUN 13.0  --  14.0 17.3  --  25.5*   CR 1.22*  --  1.23* 1.36*   < > 1.92*   GFRESTIMATED 44*  --  43* 38*   < > 25*   KAELYN 8.4*  --  8.3* 8.8  --  9.4   BILITOTAL  --   --   --   --   --  1.0   ALKPHOS  --   --   --   --   --  47   ALT  --   --   --   --   --  19   AST  --   --   --   --   --  36    < > = values in this interval not displayed.     No lab results found.    Recent Results (from the past 48 hour(s))   Echocardiogram MIGDALIA   Result Value    LVEF  55-60%    [facility]    [id number]  Critical access hospital  [id number]  [id number]^JACOB^BRIAN^[facility]. " [place] [address]     Name: RONNIE MATTSON  MRN: [id number]  : 1940  Study Date: 2024 11:59 AM  Age: 84 yrs  Gender: Female  Patient Location: [facility]  Reason For Study: Aortic Valve Disorder  Ordering Physician: BRIAN JAMES  Performed By: MT/AD     BSA: 2.0 m2  Height: 67 in  Weight: 198 lb  HR: 69  ______________________________________________________________________________  Procedure  Complete MIGDALIA Adult.  ______________________________________________________________________________  Interpretation Summary     1. The left ventricle is normal in size. Left ventricular function is  normal.The ejection fraction is 55-60%.  2. Normal right ventricle size and systolic function.  3. Large vegetation on mitral valve (8 mm x 15) attached to posterior leaflet.  4. There is a small vegetation on the aortic valve (6 mm). No evidence of  aortic root abscess identified.  5. Severe valvular aortic stenosis (SHU:0.8 cm2, peak nomi: 4.6 m/sec, mean  gradient: 51 mmHg).  ______________________________________________________________________________  MIGDALIA  Consent to the procedure was obtained prior to sedation. There were no  complications associated with this procedure. I determined this patient to be  an appropriate candidate for the planned sedation and procedure and have  reassessed the patient immediately prior to sedation and procedure. Total  sedation time: 22 minutes of continuous bedside 1:1 monitoring. Patient was  sedated using Versed 2 mg. The heart rate, respiratory rate, oxygen  saturations, blood pressure, and response to care were monitored throughout  the procedure with the assistance of the nurse. Patient was sedated using  Fentanyl 75 mcg. 15mL viscous lidocaine. 0.5mL benzocaine spray. The procedure  was performed in the Echo Lab.     Left Ventricle  The left ventricle is normal in size. Left ventricular function is normal.The  ejection fraction is  55-60%. There is mild concentric left ventricular  hypertrophy. Normal left ventricular wall motion.     Right Ventricle  Normal right ventricle size and systolic function.     Atria  Normal left atrial size. Right atrial size is normal. There is no atrial shunt  seen.     Mitral Valve  There is mild to moderate mitral annular calcification. Vegetation on mitral  valve. There is mild (1+) mitral regurgitation. There is no mitral valve  stenosis.     Tricuspid Valve  Normal tricuspid valve. There is no vegetation on the tricuspid valve. There  is trace tricuspid regurgitation. There is no tricuspid stenosis.     Aortic Valve  There is a small vegetation or mass on the aortic valve. There is mild (1+)  aortic regurgitation. Severe valvular aortic stenosis.     Pulmonic Valve  The pulmonic valve is not well visualized. There is mild to moderate (1-2+)  pulmonic valvular regurgitation.     Vessels  Normal size aorta. An abscess cavity is not identified.     Pericardial/Pleural  The pericardium appears normal.  ______________________________________________________________________________  MMode/2D Measurements & Calculations  LVOT diam: 2.1 cm  LVOT area: 3.6 cm2     Doppler Measurements & Calculations  Ao V2 max: 461.1 cm/sec  Ao max P.0 mmHg  Ao V2 mean: 339.6 cm/sec  Ao mean P.0 mmHg  Ao V2 VTI: 89.7 cm  SHU(I,D): 0.75 cm2  SHU(V,D): 0.80 cm2  LV V1 max P.2 mmHg  LV V1 max: 103.0 cm/sec  LV V1 VTI: 18.8 cm  SV(LVOT): 66.8 ml  SI(LVOT): 33.2 ml/m2  AV Chandan Ratio (DI): 0.22  SHU Index (cm2/m2): 0.37     ______________________________________________________________________________  Report approved by: Juan Antnoio Gómez 2024 12:57 PM         MR Brain w/o & w Contrast    Narrative    EXAM: MR BRAIN W/O and W CONTRAST  LOCATION: [facility]  DATE: 2024    INDICATION: Headache in a patient with MSSA bacteremia secondary to aortic valve endocarditis.  Look for cerebral  septic emboli; Headache; Acute HA (< 3 months), no complicating features  COMPARISON: None.  CONTRAST: 9 ml gadavist  TECHNIQUE: Routine multiplanar multisequence head MRI without and with intravenous contrast.    FINDINGS: Assessment degraded due to motion.  INTRACRANIAL CONTENTS:   Apparent focus of diffusion restriction with T2/FLAIR hyperintensity within the left superior parietal lobule cortex is hyperintense on ADC map, representing T2 shine through.  Focus of signal abnormality measures 13 x 9 mm in the axial plane and does   not have internal enhancement.    Additional punctate foci of hyperintensity on diffusion weighted with similar signal characteristics (periventricular right corona radiata, left superior parietal lobule, and left cerebellar hemisphere).    Patchy and confluent nonspecific T2/FLAIR hyperintensities within the cerebral white matter most consistent with moderate chronic microvascular ischemic change. Moderate generalized cerebral atrophy. No hydrocephalus. Normal position of the cerebellar   tonsils. No discernible abnormal intracranial enhancement; however, assessment substantially degraded due to motion. 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    SELLA: No abnormality accounting for technique.    OSSEOUS STRUCTURES/SOFT TISSUES: Normal marrow signal. The major intracranial vascular flow voids are maintained.     ORBITS: No abnormality accounting for technique.     SINUSES/MASTOIDS: Mild mucosal thickening scattered about the paranasal sinuses. Scattered fluid/membrane thickening in the left mastoid air cells. No apparent mass in the posterior nasopharynx or skull base.       Impression    IMPRESSION: Assessment degraded due to motion.  1.  13 mm focus of cortical signal abnormality within the left superior parietal lobule which is favored to represent a subacute infarct; low-grade glial neoplasm could conceivably have a similar appearance. Hyperacute intraparenchymal hematoma could   also  have a similar appearance but is considered less likely. Recommend noncontrast head CT for further characterization. Also recommend follow-up MRI brain without and with contrast in 8-12 weeks to document expected evolution.  2.  Additional smaller foci of signal abnormality with similar characteristics favored to represent punctate subacute infarcts.       PHYSICAL EXAM  Gen: NAD, up in chair visiting with spouse  Neuro: A&Ox4, no focal deficits, face symmetric, speech clear  CV: WWP  Pulm: Unlabored breathing on RA    A/P:   Felicitas Elliott is a 84 year old female with MSSA bacteremia, severe aortic stenosis, and infective aortic and mitral valve endocarditis.      - Surgical planning pending completion of work-up and clearance of BC   - CT cor angio pending   - Appreciate Neurology input in review of brain imaging and surgical risk stratification in setting of perioperative systemic heparinization   - Additional imaging per Neurology; anticipate likely inclusion of carotids (to evaluate for carotid stenosis) on planned imaging however if not captured elsewhere, would recommend pursuit of dedicated carotid US   - Appreciate ongoing ID involvement including considerations r/t FH of hypogammaglobulinemia   - Continue IV abx per ID; anticipate prolonged course of therapy regardless of surgical intervention so will need PICC once BC cleared   - Follow serial BC for clearing; last positive result 8/16   - Consider repeat MIGDALIA in early part of next week to re-evaluate veges once BC have cleared    - Consider Derm consultation for further evaluation of chronic thoracic skin lesions to better understand if some course of treatment would reduce risk of recurrence and development of surgical site infection following cardiac surgery    - Appreciate primary team attention to surgical optimization and prehabilitation, as able, while work-up is ongoing     Patient seen and discussed with attending, Dr. Barba; CV Surgery will  continue to follow.    Eddie Way CNP, [facility]-  Cardiothoracic Surgery  Pager [phone]

## 2024-08-18 NOTE — PROGRESS NOTES
[facility]    Medicine Progress Note - Hospitalist Service    Date of Admission:  8/16/2024    Assessment & Plan   83 yo female with history of chronic back pain, furunculosis, uterine prolapse, and tobacco use presents 8/14/24 with sepsis secondary to MSSA bacteremia and HAMMAD. MIGDALIA pos for mitral and aortic valvular vegetations. Transferred to [facility] 8/16/24 for CT surgery evaluation.          Sepsis due to MSSA bacteremia  MIGDALIA shows large posterior leaflet mitral valve vegetation and small aortic valve vegetation with severe aortic stenosis   LVEF 55-60%  Found to have small stroke likely related to septic emboli  Also with slight volume overload likely related to valvular heart disease and diastolic CHF exacerbation  -- cardiology, ID, neuro and CTS following  -- continue IV nafcillin, repeat blood cultures until cleared, last + blood culture 8/16 for now  -- plan coronary CTA per cardiology  -- defer diuresis to cardiology        Acute and subacute CVA: likely related to septic emboli  MRI brain shows 13 mm focus of cortical signal abnormality within the left superior parietal lobule which is favored to represent a subacute infarct, smaller foci of signal abnormality with similar characteristics favored to represent punctate subacute infarcts   -- neurology obtaining CTA head and neck         HAMMAD on CKD3: resolved  -- trend GFR for stability         Mod-severe cervical spinal stenosis, foraminal stenosis, facet arthropathy  MRI 8/15 showed degenerative changes in her cervical spine which appears similar to report from 2015. Radiology commented on a left L4-5 degenerative facet with findings that could not entirely rule out septic arthritis.   Neck pain is chronic and has no new or worsening symptoms  Neurosurgery recommends non operative management and to obtain repeat MRI with and w/o contrast if neck pain worsens beyond chronic baseline despite antibiotics and treatment of cardiac  "vegetations  -- supportive cares  -- start lidocaine cream, tramadol and continue tylenol for pain control and headache control        Hyperglycemia most likely secondary to prediabetes  Hemoglobin A1c 6.4  -- Monitor glucoses intermittently           Dilutional anemia  Thrombocytopenia in the setting of sepsis  Hemoglobin trended down from 13.1-10 and now stable  -- trend CBC      Mild Hypokalemia: replaced      Hypophosphatemia: replaced       Diet: Snacks/Supplements Adult: Ensure Enlive; With Meals  Regular Diet Adult  Advance Diet as Tolerated: Clear Liquid Diet; Regular Diet Adult    DVT Prophylaxis: Heparin SQ  Le Catheter: Not present  Lines: None     Cardiac Monitoring: None  Code Status: No CPR- Do NOT Intubate      Clinically Significant Risk Factors                # Thrombocytopenia: Lowest platelets = 105 in last 2 days, will monitor for bleeding             # Obesity: Estimated body mass index is 30.7 kg/m  as calculated from the following:    Height as of this encounter: 1.702 m (5' 7\").    Weight as of this encounter: 88.9 kg (196 lb)., PRESENT ON ADMISSION       # Financial/Environmental Concerns:                 Disposition Plan   Medically Ready for Discharge: Anticipated in 2-4 Days      Erick Patino DO  Hospitalist Service  [facility]  Securely message with [employer] (more info)  Text page via [employer] Paging/Directory   ______________________________________________________________________    Interval History   NAD. Complains of intermittent headaches    Physical Exam   Vital Signs: Temp: 98  F (36.7  C) Temp src: Oral BP: 139/64 Pulse: 86   Resp: 18 SpO2: 95 % O2 Device: None (Room air) Oxygen Delivery: 1.5 LPM  Weight: 196 lbs 0 oz  General: NAD  RESPIRATORY: Breathing nonlabored  CARDIOVASCULAR: Marvin le edema bilat.   NEUROLOGIC: Motor and sensory intact, speech clear        >50 MINUTES SPENT BY ME on the date of service doing chart review, history, exam, " documentation & further activities per the note.  Data

## 2024-08-18 NOTE — PROGRESS NOTES
"[facility] Inpatient follow up       Patient:  Felicitas Elliott  Date of birth 1940, Medical record number [id number]  Date of Visit:  08/18/2024  Attending Physician: Erick Patino DO         Assessment and Recommendations:   Assessment:  Felicitas Elliott is a 84 year old female with   History of severe aortic stenosis.  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  MSSA bacteremia.  Positive blood culture on 8/14/2024 and 8/16.  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  Mitral and aortic valve endocarditis as evidenced with large vegetation on mitral valve (8 mm x 15) attached to posterior leaflet and a small vegetation on the aortic valve.  With the size of the vegetation combined with brain infarct, surgery is indicated.  Neck pain worrisome for cervical discitis.  Neck MRI showed some edema at C4-C5 on the left side.  No clear evidence of infection.  Abnormal brain MRI suggestive of subacute infarct. In a patient with MSSA bacteremia and aortic valve endocarditis, this is cerebral septic emboli.    Recommendations:  Continue IV nafcillin continuous infusion.  Monitor kidney function.  Daily blood cultures until negative.  Valve surgery is being arranged.  Will need to check immunoglobin level down the road. Sister with history of hypogammaglobulinemia     Discussed with the patient, nursing staff.    ID will follow.    John Esqueda MD.  [facility].   [facility]  Office Telephone [phone].  Fax [phone]  [facility] paging            Interval History:     HPI:  The interval history was reviewed.   Overall doing okay today.  MRI reviewed.  Results are as above.  Tolerating IV nafcillin.    Pertinent cultures include:  No results found for: \"CULT\"    Recent Inflammatory Biomarkers:   Recent Labs   Lab Test 08/18/24  0455 08/17/24  0428 08/16/24  0557 08/14/24  0254   WBC 7.5 7.5 7.3 10.4            Review of Systems: "   CONSTITUTIONAL:    Temp Max: Temp (24hrs), Av.8  F (37.1  C), Min:97.9  F (36.6  C), Max:100.3  F (37.9  C)   .  Negative except for findings in the HPI.           Current Medications (antimicrobials listed in bold):     Current Facility-Administered Medications   Medication Dose Route Frequency Provider Last Rate Last Admin    furosemide (LASIX) injection 20 mg  20 mg Intravenous two times daily Baltazar Brar MD   20 mg at 24 1034    heparin ANTICOAGULANT injection 5,000 Units  5,000 Units Subcutaneous BID Gondal, Saad J, MD   5,000 Units at 24 1035    nafcillin 12 g in sodium chloride 0.9 % 550 mL continuous infusion  12 g Intravenous Q24H John Esqueda MD   12 g at 24 1632    sodium chloride (PF) 0.9% PF flush 3 mL  3 mL Intracatheter Q8H Gondal, Saad J, MD   3 mL at 24 0856              Allergies:     Allergies   Allergen Reactions    Aspirin Rash    Cats Rash            Physical Exam:   Vitals were reviewed  Patient Vitals for the past 24 hrs:   BP Temp Temp src Pulse Resp SpO2 Weight   24 1130 139/64 98  F (36.7  C) Oral 86 18 95 % --   24 0900 98/58 97.9  F (36.6  C) Oral 81 18 92 % --   24 0537 115/66 97.9  F (36.6  C) Oral 76 18 94 % 88.9 kg (196 lb)   24 2347 128/70 100.3  F (37.9  C) Oral 85 18 93 % --   24 1932 102/58 99.5  F (37.5  C) Oral 76 18 94 % --   24 1541 108/58 98.9  F (37.2  C) Oral 96 18 94 % --       Physical Examination:  Gen: Pleasant in no acute distress.  HEENT: NCAT. EOMI. PERRL.  Neck: No bruit, JVD or thyromegaly.  Lungs: Clear to ascultation bilat with no crackles or wheezes.  Card: RRR. NSR. No RMG. Peripheral pulses present and symmetric. No edema.  Abd: Soft NT ND. No mass. Normal bowel sounds.  Skin: No rash.  Extr: No edema.  Neuro: Alert and oriented to place time and person. Cranial nerves II to XII intact. Motor and sensory intact. Normal gait.            Laboratory Data:   ID  Labs:  Microbiology labs:  Reviewed      No lab results found.  Recent Labs   Lab Test 08/18/24  0455 08/17/24  0428 08/16/24  0557 08/14/24  0254   WBC 7.5 7.5 7.3 10.4     Recent Labs   Lab Test 08/18/24  0455 08/17/24  0428 08/16/24  0557 08/15/24  0532   CR 1.22* 1.23* 1.36* 1.41*   GFRESTIMATED 44* 43* 38* 37*       Hematology Studies  Recent Labs   Lab Test 08/18/24  0455 08/17/24  0428 08/16/24  0557 08/14/24  0254   WBC 7.5 7.5 7.3 10.4   HGB 10.4* 10.3* 10.8* 13.1   HCT 31.6* 31.4* 32.6* 39.0   * 105* 95* 142*       Metabolic  Recent Labs   Lab Test 08/18/24  0455 08/17/24  0428 08/16/24  0557    137 137   BUN 13.0 14.0 17.3   CO2 24 24 23   CR 1.22* 1.23* 1.36*   GFRESTIMATED 44* 43* 38*       Hepatic Studies  Recent Labs   Lab Test 08/14/24  0254   BILITOTAL 1.0   ALKPHOS 47   ALBUMIN 4.1   AST 36   ALT 19       ImmunologlobulinsNo lab results found.         Imaging Data:   Reviewed     Study Result    Narrative & Impression   EXAM: MR BRAIN W/O and W CONTRAST  LOCATION: [facility]  DATE: 8/17/2024     INDICATION: Headache in a patient with MSSA bacteremia secondary to aortic valve endocarditis.  Look for cerebral septic emboli; Headache; Acute HA (< 3 months), no complicating features  COMPARISON: None.  CONTRAST: 9 ml gadavist  TECHNIQUE: Routine multiplanar multisequence head MRI without and with intravenous contrast.     FINDINGS: Assessment degraded due to motion.  INTRACRANIAL CONTENTS:   Apparent focus of diffusion restriction with T2/FLAIR hyperintensity within the left superior parietal lobule cortex is hyperintense on ADC map, representing T2 shine through.  Focus of signal abnormality measures 13 x 9 mm in the axial plane and does   not have internal enhancement.     Additional punctate foci of hyperintensity on diffusion weighted with similar signal characteristics (periventricular right corona radiata, left superior parietal lobule, and left cerebellar  hemisphere).     Patchy and confluent nonspecific T2/FLAIR hyperintensities within the cerebral white matter most consistent with moderate chronic microvascular ischemic change. Moderate generalized cerebral atrophy. No hydrocephalus. Normal position of the cerebellar   tonsils. No discernible abnormal intracranial enhancement; however, assessment substantially degraded due to motion. 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     SELLA: No abnormality accounting for technique.     OSSEOUS STRUCTURES/SOFT TISSUES: Normal marrow signal. The major intracranial vascular flow voids are maintained.      ORBITS: No abnormality accounting for technique.      SINUSES/MASTOIDS: Mild mucosal thickening scattered about the paranasal sinuses. Scattered fluid/membrane thickening in the left mastoid air cells. No apparent mass in the posterior nasopharynx or skull base.                                                                       IMPRESSION: Assessment degraded due to motion.  1.  13 mm focus of cortical signal abnormality within the left superior parietal lobule which is favored to represent a subacute infarct; low-grade glial neoplasm could conceivably have a similar appearance. Hyperacute intraparenchymal hematoma could   also have a similar appearance but is considered less likely. Recommend noncontrast head CT for further characterization. Also recommend follow-up MRI brain without and with contrast in 8-12 weeks to document expected evolution.  2.  Additional smaller foci of signal abnormality with similar characteristics favored to represent punctate subacute infarcts.

## 2024-08-18 NOTE — PLAN OF CARE
Problem: Adult Inpatient Plan of Care  Goal: Plan of Care Review  Description: The Plan of Care Review/Shift note should be completed every shift.  The Outcome Evaluation is a brief statement about your assessment that the patient is improving, declining, or no change.  This information will be displayed automatically on your shift  note.  8/17/2024 2124 by Yulisa Black RN  Outcome: Progressing  8/17/2024 2124 by Yulisa Black RN  Outcome: Progressing     Problem: Risk for Delirium  Goal: Optimal Coping  8/17/2024 2124 by Yulisa Black RN  Outcome: Progressing  8/17/2024 2124 by Yulisa Black RN  Outcome: Progressing  Intervention: Optimize Psychosocial Adjustment to Delirium  Recent Flowsheet Documentation  Taken 8/17/2024 2059 by Yulisa Black RN  Supportive Measures:   active listening utilized   verbalization of feelings encouraged  Taken 8/17/2024 1623 by Yulisa Black RN  Supportive Measures:   active listening utilized   verbalization of feelings encouraged     Problem: Sepsis/Septic Shock  Goal: Optimal Coping  8/17/2024 2124 by Yulisa Black RN  Outcome: Progressing  8/17/2024 2124 by Yulisa Black RN  Outcome: Progressing  Intervention: Optimize Psychosocial Adjustment to Illness  Recent Flowsheet Documentation  Taken 8/17/2024 2059 by Yulisa Black RN  Supportive Measures:   active listening utilized   verbalization of feelings encouraged  Taken 8/17/2024 1623 by Yulisa Black RN  Supportive Measures:   active listening utilized   verbalization of feelings encouraged     Problem: Pain Acute  Goal: Optimal Pain Control and Function  Outcome: Progressing  Intervention: Develop Pain Management Plan  Recent Flowsheet Documentation  Taken 8/17/2024 2048 by Yulisa Black RN  Pain Management Interventions:   medication (see MAR)   emotional support  Taken 8/17/2024 1623 by Yulisa Black RN  Pain Management Interventions:   medication (see MAR)   cold applied  Intervention: Prevent or Manage Pain  Recent  Flowsheet Documentation  Taken 8/17/2024 2059 by Yulisa Black RN  Medication Review/Management: medications reviewed  Taken 8/17/2024 1623 by Yulisa Black RN  Medication Review/Management: medications reviewed  Intervention: Optimize Psychosocial Wellbeing  Recent Flowsheet Documentation  Taken 8/17/2024 2059 by Yulisa Black RN  Supportive Measures:   active listening utilized   verbalization of feelings encouraged  Taken 8/17/2024 1623 by Yulisa Black RN  Supportive Measures:   active listening utilized   verbalization of feelings encouraged     Problem: Gas Exchange Impaired  Goal: Optimal Gas Exchange  Outcome: Progressing  Intervention: Optimize Oxygenation and Ventilation  Recent Flowsheet Documentation  Taken 8/17/2024 2059 by Yulisa Black RN  Head of Bed (HOB) Positioning: HOB at 30 degrees   Goal Outcome Evaluation:                    Alert. Oriented x 4.    Patient has chronic low back, patient chose to sit up in chair most of the time this afternoon to help ease her back pain. PRN acetaminophen 650 po and ice pack applied to back, bilateral shoulders with not much change to pain. PRN Toradol 25 mg po given at bedtime.     New dose started for Nefcillin continuous gtt this afternoon. IV site intact.     MD informed re patient's 8/16 blood culture result positive with cocci in clusters, MD ordered another blood culture.      Potassium recheck was 4.1, recheck tomorrow morning.    Phosphorous replaced per protocol.    Tired. Little weak. Standby assist with activity. Gait belt, walker for long distance. Falls precaution and interventions placed.

## 2024-08-18 NOTE — CONSULTS
"[facility]  Neurology Consultation    Patient Name:  Felicitas Elliott  MRN:  [id number]    :  1940  Date of Service:  2024  Primary care provider:  Clinic, [facility]      Neurology consultation service was asked to see Felicitas Elliott by Dr. Brar to evaluate septic emboli and anticoagulation risk.    Chief Complaint:  bactermia    History of Present Illness:   Felicitas Elliott is a 84 year old female with history of back.,  Tobacco use who presents with fatigue, weakness, chills and was found to have MSSA bacteremia.  As part of workup was also found to have aortic and mitral valve endocarditis as well as severe aortic stenosis.  ID and cardiothoracic surgery have already been consulted.  She is still up and cardiothoracic surgery is planning doing valve surgery although timing is not entirely clear.  They mention they would tell her bacteremia is further treated.  She is already on nafcillin.  Neurosurgery has been consulted for concern for septic facet arthritis in the cervical spine.  No surgical plan is recommended.    As part of workup they got a brain MRI that showed scattered embolic appearing infarcts.  Largest of which is in the left parietal lobe is approximately 1 cm.  Differential for this included low-grade glial neoplasm or hyperacute intraparenchymal hemorrhage although scattered other infarcts makes this less likely    ROS  A comprehensive ROS was performed and pertinent findings were included in HPI.     PMH  Chronic back pain    Medications   I have personally reviewed the patient's medication list.     Allergies  I have personally reviewed the patient's allergy list.     Social History  Tobacco use, denies intravenous drug use    Family History    Denies family history of stroke      Physical Examination   Vitals: BP 98/58 (BP Location: Left arm)   Pulse 81   Temp 97.9  F (36.6  C) (Oral)   Resp 18   Ht 1.702 m (5' 7\")   Wt " 88.9 kg (196 lb)   SpO2 92%   BMI 30.70 kg/m    General: Sitting in chair, NAD  Head: NC/AT  Eyes: no icterus,   Cardiac:  Extremities warm, no edema, regular rate  Respiratory: non-labored on RA  Skin: No rash or lesion on exposed skin  Psych: Mood pleasant, affect congruent  Neuro:  Mental status: Awake, alert, attentive, oriented to self, time, place, and circumstance. Language is fluent and coherent with intact comprehension of complex commands, naming and repetition.  Cranial nerves: PERRL, conjugate gaze, EOMI, facial sensation intact, face symmetric, shoulder shrug strong, tongue/uvula midline, no dysarthria.   Motor: Normal bulk and tone. No abnormal movements. 5/5 strength bilaterally in deltoids, biceps, triceps, hand , hip flexors, hip extensors, knee flexion, knee extension, plantarflexion, dorsiflexion.   Reflexes: Socks was present and symmetric in upper extremities.  Difficult to elicit lower extremity reflexes and patella due to surgical knees.  .  Sensory: Intact to light touch throughout no sensory extinction.    Coordination: FNF is finger intact without dysmetria  Gait: For    Investigations   I have personally reviewed pertinent labs, tests, and radiological imaging. Discussion of notable findings is included under Impression.     MRI brain personally reviewed there are scattered diffusion restriction lesions likely representing acute to subacute infarcts.  No clear ADC correlate.  Differential per radiology on 1 includes a hyperacute intraparenchymal hematoma.    Was patient transferred from outside hospital?   Yes - I have personally reviewed pertinent notes, labs, and images (if available) from outside hospital.    Reviewed CBC, BMP, mag Phos, hemoglobin A1c, CRP, blood cultures, UA    Impression  #Bacteremia  #Septic emboli  #Endocarditis  #Abnormal brain MRI    Ms. Elliott is a 84-year-old female with bacteremia and endocarditis who neurology is consulted primarily to assess  anticoagulation risk in the setting of possible cardiothoracic intervention.  It appears based on reviewing her notes that she is going to require surgery.  There is a course of risk with anticoagulation, but I suspect risk of not doing surgery is substantially higher.  Best assess the rest may help for optimal timing.  In particular the radiology report of questionable hyperacute intraparenchymal hematoma requires further workup as if she does have a hyperacute hemorrhage this would require likely a delay in confirming stability prior to pursuing any surgical options.  In addition we will get a CTA with a head CT to ensure no mycotic aneurysms although this would be early to develop in the course of bacteremia.  Received would further assess risk of surgery.      Recommendations  -CT head/CTA head/neck ordered.      Thank you for involving Neurology in the care of Felicitas SOM Elilott.      Sandeep Ruiz, DO      Medical Decision Making     Patient with septic emboli and aortic valve endocarditis.  High risk for further stroke and morbidity.  There is also question of an intraparenchymal hematoma on imaging that requires confirmation/management      NOTE(S)/MEDICAL RECORDS REVIEWED over the past 24 hours: CT surgery, medicine, ID, cardiology notes  Tests personally interpreted in the past 24 hours:  - BRAIN MRI showing as above  Tests ORDERED & REVIEWED in the past 24 hours:  -See above  -MRI cervical spine

## 2024-08-19 ENCOUNTER — APPOINTMENT (OUTPATIENT)
Dept: CT IMAGING | Facility: HOSPITAL | Age: 84
DRG: 853 | End: 2024-08-19
Attending: INTERNAL MEDICINE
Payer: COMMERCIAL

## 2024-08-19 PROBLEM — R03.0 BORDERLINE HYPERTENSION: Status: ACTIVE | Noted: 2024-04-08

## 2024-08-19 PROBLEM — N18.32 CKD STAGE 3B, GFR 30-44 ML/MIN (H): Status: ACTIVE | Noted: 2024-05-06

## 2024-08-19 PROBLEM — M48.03 SPINAL STENOSIS OF CERVICOTHORACIC REGION: Status: ACTIVE | Noted: 2021-03-24

## 2024-08-19 PROBLEM — E78.2 MIXED HYPERLIPIDEMIA: Status: ACTIVE | Noted: 2021-03-24

## 2024-08-19 LAB
ANION GAP SERPL CALCULATED.3IONS-SCNC: 14 MMOL/L (ref 7–15)
BACTERIA BLD CULT: ABNORMAL
BACTERIA BLD CULT: ABNORMAL
BSA FOR ECHO PROCEDURE: 0 M2
BUN SERPL-MCNC: 13.3 MG/DL (ref 8–23)
CALCIUM SERPL-MCNC: 8.6 MG/DL (ref 8.8–10.4)
CHLORIDE SERPL-SCNC: 98 MMOL/L (ref 98–107)
CREAT SERPL-MCNC: 1.33 MG/DL (ref 0.51–0.95)
EGFRCR SERPLBLD CKD-EPI 2021: 39 ML/MIN/1.73M2
GLUCOSE SERPL-MCNC: 110 MG/DL (ref 70–99)
HCO3 SERPL-SCNC: 28 MMOL/L (ref 22–29)
HOLD SPECIMEN: NORMAL
MAGNESIUM SERPL-MCNC: 1.7 MG/DL (ref 1.7–2.3)
NT-PROBNP SERPL-MCNC: 7005 PG/ML (ref 0–1800)
PHOSPHATE SERPL-MCNC: 3.7 MG/DL (ref 2.5–4.5)
POTASSIUM SERPL-SCNC: 3.2 MMOL/L (ref 3.4–5.3)
POTASSIUM SERPL-SCNC: 3.3 MMOL/L (ref 3.4–5.3)
POTASSIUM SERPL-SCNC: 3.3 MMOL/L (ref 3.4–5.3)
POTASSIUM SERPL-SCNC: 3.5 MMOL/L (ref 3.4–5.3)
SODIUM SERPL-SCNC: 140 MMOL/L (ref 135–145)

## 2024-08-19 PROCEDURE — 250N000011 HC RX IP 250 OP 636: Performed by: STUDENT IN AN ORGANIZED HEALTH CARE EDUCATION/TRAINING PROGRAM

## 2024-08-19 PROCEDURE — 36415 COLL VENOUS BLD VENIPUNCTURE: CPT | Performed by: INTERNAL MEDICINE

## 2024-08-19 PROCEDURE — 83880 ASSAY OF NATRIURETIC PEPTIDE: CPT | Performed by: INTERNAL MEDICINE

## 2024-08-19 PROCEDURE — 250N000013 HC RX MED GY IP 250 OP 250 PS 637: Performed by: INTERNAL MEDICINE

## 2024-08-19 PROCEDURE — 250N000011 HC RX IP 250 OP 636: Performed by: INTERNAL MEDICINE

## 2024-08-19 PROCEDURE — 99232 SBSQ HOSP IP/OBS MODERATE 35: CPT | Performed by: PSYCHIATRY & NEUROLOGY

## 2024-08-19 PROCEDURE — 75574 CT ANGIO HRT W/3D IMAGE: CPT

## 2024-08-19 PROCEDURE — 250N000009 HC RX 250: Performed by: STUDENT IN AN ORGANIZED HEALTH CARE EDUCATION/TRAINING PROGRAM

## 2024-08-19 PROCEDURE — 99232 SBSQ HOSP IP/OBS MODERATE 35: CPT | Performed by: INTERNAL MEDICINE

## 2024-08-19 PROCEDURE — 87040 BLOOD CULTURE FOR BACTERIA: CPT | Performed by: STUDENT IN AN ORGANIZED HEALTH CARE EDUCATION/TRAINING PROGRAM

## 2024-08-19 PROCEDURE — 84100 ASSAY OF PHOSPHORUS: CPT | Performed by: INTERNAL MEDICINE

## 2024-08-19 PROCEDURE — 80048 BASIC METABOLIC PNL TOTAL CA: CPT | Performed by: INTERNAL MEDICINE

## 2024-08-19 PROCEDURE — 99233 SBSQ HOSP IP/OBS HIGH 50: CPT | Performed by: INTERNAL MEDICINE

## 2024-08-19 PROCEDURE — 250N000013 HC RX MED GY IP 250 OP 250 PS 637: Performed by: STUDENT IN AN ORGANIZED HEALTH CARE EDUCATION/TRAINING PROGRAM

## 2024-08-19 PROCEDURE — 120N000004 HC R&B MS OVERFLOW

## 2024-08-19 PROCEDURE — 258N000003 HC RX IP 258 OP 636: Performed by: STUDENT IN AN ORGANIZED HEALTH CARE EDUCATION/TRAINING PROGRAM

## 2024-08-19 PROCEDURE — 75574 CT ANGIO HRT W/3D IMAGE: CPT | Mod: 26 | Performed by: INTERNAL MEDICINE

## 2024-08-19 PROCEDURE — 250N000009 HC RX 250: Performed by: INTERNAL MEDICINE

## 2024-08-19 PROCEDURE — 83735 ASSAY OF MAGNESIUM: CPT | Performed by: INTERNAL MEDICINE

## 2024-08-19 RX ORDER — NITROGLYCERIN 0.4 MG/1
0.4 TABLET SUBLINGUAL
Status: DISCONTINUED | OUTPATIENT
Start: 2024-08-19 | End: 2024-08-20 | Stop reason: HOSPADM

## 2024-08-19 RX ORDER — POTASSIUM CHLORIDE 1500 MG/1
40 TABLET, EXTENDED RELEASE ORAL ONCE
Status: COMPLETED | OUTPATIENT
Start: 2024-08-19 | End: 2024-08-19

## 2024-08-19 RX ORDER — METOPROLOL TARTRATE 1 MG/ML
5-15 INJECTION, SOLUTION INTRAVENOUS
Status: DISCONTINUED | OUTPATIENT
Start: 2024-08-19 | End: 2024-08-20 | Stop reason: HOSPADM

## 2024-08-19 RX ORDER — FUROSEMIDE 10 MG/ML
20 INJECTION INTRAMUSCULAR; INTRAVENOUS DAILY
Status: DISCONTINUED | OUTPATIENT
Start: 2024-08-20 | End: 2024-08-21

## 2024-08-19 RX ORDER — IOPAMIDOL 755 MG/ML
75 INJECTION, SOLUTION INTRAVASCULAR ONCE
Status: COMPLETED | OUTPATIENT
Start: 2024-08-19 | End: 2024-08-19

## 2024-08-19 RX ADMIN — IOPAMIDOL 75 ML: 755 INJECTION, SOLUTION INTRAVENOUS at 14:35

## 2024-08-19 RX ADMIN — ACETAMINOPHEN 650 MG: 325 TABLET ORAL at 07:04

## 2024-08-19 RX ADMIN — POTASSIUM CHLORIDE 40 MEQ: 1500 TABLET, EXTENDED RELEASE ORAL at 12:19

## 2024-08-19 RX ADMIN — ACETAMINOPHEN 650 MG: 325 TABLET ORAL at 18:36

## 2024-08-19 RX ADMIN — ACETAMINOPHEN 650 MG: 325 TABLET ORAL at 00:41

## 2024-08-19 RX ADMIN — HEPARIN SODIUM 5000 UNITS: 10000 INJECTION, SOLUTION INTRAVENOUS; SUBCUTANEOUS at 09:00

## 2024-08-19 RX ADMIN — NITROGLYCERIN 0.4 MG: 0.4 TABLET, ORALLY DISINTEGRATING SUBLINGUAL at 14:19

## 2024-08-19 RX ADMIN — HEPARIN SODIUM 5000 UNITS: 10000 INJECTION, SOLUTION INTRAVENOUS; SUBCUTANEOUS at 21:04

## 2024-08-19 RX ADMIN — FUROSEMIDE 20 MG: 10 INJECTION, SOLUTION INTRAMUSCULAR; INTRAVENOUS at 09:01

## 2024-08-19 RX ADMIN — POTASSIUM CHLORIDE 40 MEQ: 1500 TABLET, EXTENDED RELEASE ORAL at 07:04

## 2024-08-19 RX ADMIN — NAFCILLIN 12 G: 10 INJECTION, POWDER, FOR SOLUTION INTRAVENOUS at 15:00

## 2024-08-19 RX ADMIN — ACETAMINOPHEN 650 MG: 325 TABLET ORAL at 23:27

## 2024-08-19 RX ADMIN — METOPROLOL TARTRATE 5 MG: 5 INJECTION INTRAVENOUS at 14:15

## 2024-08-19 ASSESSMENT — ACTIVITIES OF DAILY LIVING (ADL)
ADLS_ACUITY_SCORE: 30
ADLS_ACUITY_SCORE: 29
ADLS_ACUITY_SCORE: 30

## 2024-08-19 NOTE — PLAN OF CARE
Problem: Pain Acute  Goal: Optimal Pain Control and Function  Outcome: Progressing  Intervention: Prevent or Manage Pain  Recent Flowsheet Documentation  Taken 8/19/2024 1700 by Lena Puente RN  Medication Review/Management: medications reviewed  Intervention: Optimize Psychosocial Wellbeing  Recent Flowsheet Documentation  Taken 8/19/2024 1700 by Lena Puente RN  Supportive Measures: active listening utilized     Problem: Sepsis/Septic Shock  Goal: Optimal Coping  Outcome: Progressing  Intervention: Optimize Psychosocial Adjustment to Illness  Recent Flowsheet Documentation  Taken 8/19/2024 1700 by Lena Puente RN  Supportive Measures: active listening utilized  Goal: Absence of Infection Signs and Symptoms  Outcome: Progressing  Intervention: Promote Recovery  Recent Flowsheet Documentation  Taken 8/19/2024 1700 by Lena Puente RN  Activity Management: activity adjusted per tolerance   Goal Outcome Evaluation:      A&O x4. C/o 7/10 headache pain. PRN Tylenol given x1. Family here visiting until after supper. Pt ate well. 1700 K+ lab came back within range- recheck in AM. Call-light within reach. Will continue to monitor. NPO at midnight.

## 2024-08-19 NOTE — PROGRESS NOTES
NEUROLOGY INPATIENT PROGRESS NOTE       [facility] [place]  [address] [place] [address]  Tel: [phone]  Fax: [phone]  [email or web address]     ASSESSMENT & PLAN   Date: 08/19/2024  Hospital Day#: 3  Visit diagnosis: Septic emboli    Left parietal septic emboli (Aortic& mitral vegetation)  84-year-old female with mitral and aortic valve vegetations, cigarette smoking admitted with fatigue fever and chill and was found to have MSSA bacteremia and acute kidney injury (history of CKD).  She was diagnosed with endocarditis and cardiothoracic surgery recommended valve surgery.  MRI of the brain showed 13 mm area of cortical signal abnormality within the left superior parietal lobule likely septic emboli.  However possibility of a low-grade glioma, subacute infarct and hemorrhage was raised  CT of the brain and CTA head and neck showed low-density lesion in the left parietal lobe corresponding to the changes noted on MRI scan and likely is a septic emboli.  CTA did not show any mycotic aneurysm.  Marked stenosis of the right P2 was noted.  Neurosurgery was consulted for multilevel cervical spondylosis with facet arthropathy at C4-C5 and although likely reactive/degenerative in the setting of bacteremia septic facet arthropathy possibility was also raised.  However, neurosurgery does not recommend any intervention at present  This is a difficult situation and she is at increased risk of perioperative complications but not doing surgery also carries significant risk.  As no hemorrhage was noted on CT brain, it is okay to proceed with surgery (once clearance of blood cultures)..  Studies have shown no significant increase in symptomatic hemorrhage/complications after valvular surgery in patients with ischemic stroke .  Nothing more to add from neurology standpoint.  I will sign off, please call if any questions    Neurology Discharge Planning :   MD LUCERO Emerson  [facility]     PROBLEM LIST      Patient Active Problem List   Diagnosis    Sepsis with acute renal failure, due to unspecified organism, unspecified acute renal failure type, unspecified whether septic shock present (H)    Bacteremia    Endocarditis    Sepsis (H)    Borderline hypertension    CKD stage 3b, GFR 30-44 ml/min (H)    Mixed hyperlipidemia    Spinal stenosis of cervicothoracic region       Past Medical History:   Patient  has no past medical history on file.     SUBJECTIVE     Patient alert and oriented, able to stand up with assist complain of some headache got better with Tylenol.     OBJECTIVE     Vital signs in last 24 hours  Temp:  [97.9  F (36.6  C)-100.1  F (37.8  C)] 99.6  F (37.6  C)  Pulse:  [81-87] 82  Resp:  [18-20] 18  BP: ()/(58-73) 120/73  SpO2:  [92 %-95 %] 93 %    Review of Systems   Pertinent items are noted in HPI.    General Physical Exam: Patient is alert and oriented x 3. Vital signs were reviewed and are documented in EMR. Neck was supple, no Carotid bruit, thyromegaly, JVD or lymphadenopathy noted.  Neurological Exam:  Patient is alert and oriented x 3 speech is normal cranial nerves II through XII are intact.  Normal mass and tone with 5/5 strength.  Reflexes are symmetric and present in upper extremity absent in the lower extremity toes equivocal.  Sensation intact to light touch.  No double simultaneous extinction.  No dysmetria noted on finger-nose testing.  Gait testing deferred.     DIAGNOSTIC STUDIES     Pertinent Radiology   Following imaging studies were reviewed:    CT  HEAD CT:   1.  Small focus of juxtacortical low attenuation within the left parietal lobe corresponds to signal abnormality seen on the prior MRI. As suggested previously this is favored to reflect evolving small vessel ischemic change; however, MRI follow-up to   exclude a low-grade neoplastic process is advised.   2.  No evidence for intracranial hemorrhage or significant  mass effect.   3.  Generalized brain atrophy and presumed chronic microvascular ischemic change.     HEAD CTA:   1.  Intracranial atherosclerosis including moderate to marked stenosis of the right P2 posterior cerebral artery segment.   2.  No definite proximal branch occlusion, aneurysm, or high flow vascular malformation identified.     NECK CTA:   1.  Atherosclerotic plaque at the carotid bifurcations bilaterally without hemodynamically significant stenosis in the neck vessels.   2.  No evidence for dissection.     MRI  BRAIN 8/17/2024  1.  13 mm focus of cortical signal abnormality within the left superior parietal lobule which is favored to represent a subacute infarct; low-grade glial neoplasm could conceivably have a similar appearance. Hyperacute intraparenchymal hematoma could   also have a similar appearance but is considered less likely. Recommend noncontrast head CT for further characterization. Also recommend follow-up MRI brain without and with contrast in 8-12 weeks to document expected evolution.   2.  Additional smaller foci of signal abnormality with similar characteristics favored to represent punctate subacute infarcts.     CERVICAL SPINE 8/15/2024   1. Straightening with reversal of the normal cervical lordosis.  Focal kyphosis at the C4-5 level.    2. Moderate spinal canal narrowing C4-5 and C5-6 due to disc osteophyte protrusions and indentation on the cervical cord.  This is worse at C4-5.  Indentation of the left ventral cervical cord at C5-6.  Cord signal remains normal.    3. Multilevel bony foraminal narrowing worse at bilateral C4-5, bilateral C5-6, worse on the left.    4. Mild spinal canal narrowing C7-T1 and T1-T2 with small disc protrusions.     TRANSESOPHAGEAL ECHOCARDIOGRAM 8/14/2024  1. The left ventricle is normal in size. Left ventricular function is  normal.The ejection fraction is 55-60%.  2. Normal right ventricle size and systolic function.  3. Large vegetation on mitral  valve (8 mm x 15) attached to posterior leaflet.  4. There is a small vegetation on the aortic valve (6 mm). No evidence of  aortic root abscess identified.  5. Severe valvular aortic stenosis (SHU:0.8 cm2, peak nomi: 4.6 m/sec, mean  gradient: 51 mmHg).    Pertinent Labs   Lab Results: Personally Reviewed  Recent Results (from the past 24 hour(s))   Blood Culture Peripheral Blood    Collection Time: 08/18/24  1:02 PM    Specimen: Peripheral Blood   Result Value Ref Range    Culture No growth after 12 hours    Potassium    Collection Time: 08/19/24  6:13 AM   Result Value Ref Range    Potassium 3.3 (L) 3.4 - 5.3 mmol/L   Magnesium    Collection Time: 08/19/24  6:13 AM   Result Value Ref Range    Magnesium 1.7 1.7 - 2.3 mg/dL   Phosphorus    Collection Time: 08/19/24  6:13 AM   Result Value Ref Range    Phosphorus 3.7 2.5 - 4.5 mg/dL         HOSPITAL MEDICATIONS     Current Facility-Administered Medications   Medication Dose Route Frequency Provider Last Rate Last Admin    furosemide (LASIX) injection 20 mg  20 mg Intravenous two times daily Baltazar Brar MD   20 mg at 08/18/24 1715    heparin ANTICOAGULANT injection 5,000 Units  5,000 Units Subcutaneous BID Gondal, Saad J, MD   5,000 Units at 08/18/24 1939    nafcillin 12 g in sodium chloride 0.9 % 550 mL continuous infusion  12 g Intravenous Q24H John Esqueda MD   12 g at 08/18/24 1545    sodium chloride (PF) 0.9% PF flush 3 mL  3 mL Intracatheter Q8H Gondal, Saad J, MD   3 mL at 08/19/24 0033        Total time spent for face to face visit, reviewing labs/imaging studies, counseling and coordination of care was: 45 Minutes More than 50% of this time was spent on counseling and coordination of care.      This note was dictated using voice recognition software.  Any grammatical or context distortions are unintentional and inherent to the software.

## 2024-08-19 NOTE — PROGRESS NOTES
"BRIEF CVTS PROGRESS NOTE    S:  Felicitas Elliott is a 84 year old female with PMH diastolic HF, CKD 3, HTN, and HLD who originally presented to [facility] with fevers, chills, rigors, headaches, and weakness.  She was found to have MSSA positive blood cultures and was started on Ancef.  Echographic evaluation of her heart via TTE and MIGDALIA showed mitral and aortic valve endocarditis in addition to severe aortic valve stenosis.  CV Surgery was consulted for consideration of surgical treatment.     Patient followed via chart check today. 8/16 BC positive. 8/17 and 8/18 NGTD. CTA head/neck w/o evidence of hemorrhage, neurology cleared patient for valvular surgery when BC clear.      O:  /65 (BP Location: Left arm)   Pulse 75   Temp 98.2  F (36.8  C) (Oral)   Resp 18   Ht 1.702 m (5' 7\")   Wt 86.3 kg (190 lb 3.2 oz)   SpO2 94%   BMI 29.79 kg/m      CBC RESULTS:   Recent Labs   Lab Test 08/18/24  0455 08/17/24  0428 08/16/24  0557   WBC 7.5 7.5 7.3   HGB 10.4* 10.3* 10.8*   HCT 31.6* 31.4* 32.6*   * 105* 95*     CMP RESULTS:  Recent Labs   Lab Test 08/19/24  1111 08/19/24  0613 08/18/24  0455 08/17/24  1556 08/17/24  0428 08/15/24  0532 08/14/24  0254   NA  --  140 136  --  137   < > 133*   POTASSIUM 3.2* 3.3*  3.3* 3.8   < > 3.4   < > 3.9   CHLORIDE  --  98 100  --  102   < > 97*   CO2  --  28 24  --  24   < > 26   ANIONGAP  --  14 12  --  11   < > 10   GLC  --  110* 126*  --  115*   < > 148*   BUN  --  13.3 13.0  --  14.0   < > 25.5*   CR  --  1.33* 1.22*  --  1.23*   < > 1.92*   GFRESTIMATED  --  39* 44*  --  43*   < > 25*   KAELYN  --  8.6* 8.4*  --  8.3*   < > 9.4   BILITOTAL  --   --   --   --   --   --  1.0   ALKPHOS  --   --   --   --   --   --  47   ALT  --   --   --   --   --   --  19   AST  --   --   --   --   --   --  36    < > = values in this interval not displayed.     No lab results found.    Recent Results (from the past 48 hour(s))   MR Brain w/o & w Contrast    Narrative    EXAM: " MR BRAIN W/O and W CONTRAST  LOCATION: [facility]  DATE: 8/17/2024    INDICATION: Headache in a patient with MSSA bacteremia secondary to aortic valve endocarditis.  Look for cerebral septic emboli; Headache; Acute HA (< 3 months), no complicating features  COMPARISON: None.  CONTRAST: 9 ml gadavist  TECHNIQUE: Routine multiplanar multisequence head MRI without and with intravenous contrast.    FINDINGS: Assessment degraded due to motion.  INTRACRANIAL CONTENTS:   Apparent focus of diffusion restriction with T2/FLAIR hyperintensity within the left superior parietal lobule cortex is hyperintense on ADC map, representing T2 shine through.  Focus of signal abnormality measures 13 x 9 mm in the axial plane and does   not have internal enhancement.    Additional punctate foci of hyperintensity on diffusion weighted with similar signal characteristics (periventricular right corona radiata, left superior parietal lobule, and left cerebellar hemisphere).    Patchy and confluent nonspecific T2/FLAIR hyperintensities within the cerebral white matter most consistent with moderate chronic microvascular ischemic change. Moderate generalized cerebral atrophy. No hydrocephalus. Normal position of the cerebellar   tonsils. No discernible abnormal intracranial enhancement; however, assessment substantially degraded due to motion. 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    SELLA: No abnormality accounting for technique.    OSSEOUS STRUCTURES/SOFT TISSUES: Normal marrow signal. The major intracranial vascular flow voids are maintained.     ORBITS: No abnormality accounting for technique.     SINUSES/MASTOIDS: Mild mucosal thickening scattered about the paranasal sinuses. Scattered fluid/membrane thickening in the left mastoid air cells. No apparent mass in the posterior nasopharynx or skull base.       Impression    IMPRESSION: Assessment degraded due to motion.  1.  13 mm focus of cortical signal abnormality  within the left superior parietal lobule which is favored to represent a subacute infarct; low-grade glial neoplasm could conceivably have a similar appearance. Hyperacute intraparenchymal hematoma could   also have a similar appearance but is considered less likely. Recommend noncontrast head CT for further characterization. Also recommend follow-up MRI brain without and with contrast in 8-12 weeks to document expected evolution.  2.  Additional smaller foci of signal abnormality with similar characteristics favored to represent punctate subacute infarcts.   CTA Head Neck with Contrast    Narrative    EXAM: CTA HEAD NECK W CONTRAST  LOCATION: [facility]  DATE: 8/18/2024    INDICATION: Follow-up abnormality on previous MRI. Endocarditis. 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  COMPARISON: MRI 8/17/2024.  CONTRAST: isovue 370 75ml  TECHNIQUE: Head and neck CT angiogram with IV contrast. Noncontrast head CT followed by axial helical CT images of the head and neck vessels obtained during the arterial phase of intravenous contrast administration. Axial 2D reconstructed images and   multiplanar 3D MIP reconstructed images of the head and neck vessels were performed by the technologist. Dose reduction techniques were used. All stenosis measurements made according to NASCET criteria unless otherwise specified.    FINDINGS:   NONCONTRAST HEAD CT:   INTRACRANIAL CONTENTS: No intracranial hemorrhage, extraaxial collection, or mass effect.  Juxtacortical low attenuation focus involving the left parietal lobe laterally consistent with findings on the previous MRI. A small focus of evolving subacute   infarct in this location is possible. Small focus of chronic lacunar change in the right cerebellum. No large territorial infarct identified. Mild presumed chronic small vessel ischemic changes. Mild generalized volume loss. No hydrocephalus.     VISUALIZED ORBITS/SINUSES/MASTOIDS: Prior bilateral cataract surgery.  Visualized portions of the orbits are otherwise unremarkable. No paranasal sinus mucosal disease. No middle ear or mastoid effusion.    BONES/SOFT TISSUES: No acute abnormality.    HEAD CTA:  ANTERIOR CIRCULATION: Calcified atherosclerotic plaque involving the carotid siphons bilaterally with minimal associated luminal stenosis. No evidence for proximal large vessel occlusion or high-grade stenosis elsewhere. No definite aneurysm or evidence   for high flow vascular malformation. Specifically, no definite vascular abnormality associated with the area of abnormal left parietal lobe low-attenuation. Standard [place] anatomy.    POSTERIOR CIRCULATION: Atherosclerotic changes involving the posterior cerebral arteries including moderate to marked stenosis of the right P2 posterior cerebral artery segment. No definite proximal large vessel occlusion. No additional stenosis. No   aneurysm or high flow vascular malformation identified. Dominant left and smaller right vertebral artery contribute to a normal basilar artery.     DURAL VENOUS SINUSES: Expected enhancement of the major dural venous sinuses.    NECK CTA:  RIGHT CAROTID: Mixed calcified and noncalcified atherosclerotic plaque involving the right carotid bulb results in 30-40% stenosis of the proximal right ICA segment. No evidence for dissection.    LEFT CAROTID: Atherosclerotic plaque results in approximately 30% stenosis in the left ICA. No dissection.    VERTEBRAL ARTERIES: No focal stenosis or dissection. Dominant left and smaller right vertebral arteries.    AORTIC ARCH: Vascular variant aortic arch origin left vertebral artery.  No significant stenosis at the origin of the great vessels.    NONVASCULAR STRUCTURES: Multilevel cervical spondylosis greatest at C4-5 and C5-6. In evaluating fusion at C6-7. Included lung apices are clear.      Impression    IMPRESSION:   HEAD CT:  1.  Small focus of juxtacortical low attenuation within the left parietal  lobe corresponds to signal abnormality seen on the prior MRI. As suggested previously this is favored to reflect evolving small vessel ischemic change; however, MRI follow-up to   exclude a low-grade neoplastic process is advised.  2.  No evidence for intracranial hemorrhage or significant mass effect.  3.  Generalized brain atrophy and presumed chronic microvascular ischemic change.    HEAD CTA:   1.  Intracranial atherosclerosis including moderate to marked stenosis of the right P2 posterior cerebral artery segment.  2.  No definite proximal branch occlusion, aneurysm, or high flow vascular malformation identified.    NECK CTA:  1.  Atherosclerotic plaque at the carotid bifurcations bilaterally without hemodynamically significant stenosis in the neck vessels.   2.  No evidence for dissection.         A/P:   Felicitas Elliott is a 84 year old female with MSSA bacteremia, severe aortic stenosis, and infective aortic and mitral valve endocarditis.      - Surgical candidacy pending completion of work-up and clearance of BC   - CT cor angio pending, remainder of workup complete   - Ok to proceed w/ CV surgery per neuro   - Appreciate ongoing ID involvement including considerations r/t FH of hypogammaglobulinemia   - Continue IV abx per ID; anticipate prolonged course of therapy regardless of surgical intervention so will need PICC once BC cleared   - Follow serial BC for clearing; last positive result 8/16   - Consider repeat MIGDALIA once BC clear   - Consider Derm consult for chronic thoracic skin lesions    - Remainder of cares per primary team  - CV surgery will continue to follow until candidacy for/timing of surgery can be determined       Jessica Lynch PA-C  [facility] Cardiothoracic Surgery  Pager: [phone]  August 19, 2024

## 2024-08-19 NOTE — PROGRESS NOTES
"[facility] Inpatient follow up       Patient:  Felicitas Elliott  Date of birth 1940, Medical record number [id number]  Date of Visit:  08/19/2024  Attending Physician: Erick Patino DO         Assessment and Recommendations:   Assessment:  Felicitas Elliott is a 84 year old female with   History of severe aortic stenosis.  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  MSSA bacteremia.  Positive blood culture on 8/14/2024 and 8/16.  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   Mitral and aortic valve endocarditis as evidenced with large vegetation on mitral valve (8 mm x 15) attached to posterior leaflet and a small vegetation on the aortic valve.  With the size of the vegetation combined with brain infarct, surgery is indicated.  Neck pain worrisome for cervical discitis.  Neck MRI showed some edema at C4-C5 on the left side.  No clear evidence of infection.  Abnormal brain MRI suggestive of subacute infarct. In a patient with MSSA bacteremia and aortic valve endocarditis, this is cerebral septic emboli. Active issue.     Recommendations:  Continue IV nafcillin continuous infusion.  Monitor kidney function.  Daily blood cultures until negative-last positive 8/16 so far  Valve surgery is being arranged.  Will need to check immunoglobin level down the road. Sister with history of hypogammaglobulinemia       Bell Gómez MD.  [facility].   [facility]  Office Telephone [phone].  Fax [phone]  [facility] paging            Interval History:     HPI:  The interval history was reviewed.   First visit by me. Tolerating antibiotics. Diuresing. Feels better today.     Pertinent cultures include:  No results found for: \"CULT\"    Recent Inflammatory Biomarkers:   Recent Labs   Lab Test 08/18/24  0455 08/17/24  0428 08/16/24  0557 08/14/24  0254   WBC 7.5 7.5 7.3 10.4            Review of Systems:   CONSTITUTIONAL:    Temp Max: Temp " (24hrs), Av.8  F (37.1  C), Min:97.9  F (36.6  C), Max:100.3  F (37.9  C)   .  Negative except for findings in the HPI.           Current Medications (antimicrobials listed in bold):     Current Facility-Administered Medications   Medication Dose Route Frequency Provider Last Rate Last Admin    [START ON 2024] furosemide (LASIX) injection 20 mg  20 mg Intravenous Daily Navneet Branch MD        heparin ANTICOAGULANT injection 5,000 Units  5,000 Units Subcutaneous BID Gondal, Saad J, MD   5,000 Units at 24 0900    nafcillin 12 g in sodium chloride 0.9 % 550 mL continuous infusion  12 g Intravenous Q24H John Esqueda MD   12 g at 24 1545    sodium chloride (PF) 0.9% PF flush 3 mL  3 mL Intracatheter Q8H Gondal, Saad J, MD   3 mL at 24 0033              Allergies:     Allergies   Allergen Reactions    Aspirin Rash    Cats Rash            Physical Exam:   Vitals were reviewed  Patient Vitals for the past 24 hrs:   BP Temp Temp src Pulse Resp SpO2 Weight   24 0731 125/65 98.2  F (36.8  C) Oral 75 18 94 % --   24 0521 -- -- -- -- -- -- 86.3 kg (190 lb 3.2 oz)   24 2331 120/73 99.6  F (37.6  C) Oral 82 18 93 % --   24 1926 107/58 100.1  F (37.8  C) Oral 87 19 93 % --   24 1900 -- 98.1  F (36.7  C) Oral -- -- -- --   24 1549 110/64 99.7  F (37.6  C) Oral 84 20 94 % --   24 1130 139/64 98  F (36.7  C) Oral 86 18 95 % --       Physical Examination:  Gen: Pleasant in no acute distress.  HEENT: NCAT. EOMI. PERRL.  Neck: No bruit, JVD or thyromegaly.  Lungs: Clear to ascultation bilat with no crackles or wheezes.  Card: RRR.   Abd: Soft NT ND. No mass. Normal bowel sounds.  Skin: No rash.  Extr: No edema.  Neuro: Alert and oriented to place time and person.            Laboratory Data:   ID Labs:  Microbiology labs:  Reviewed      No lab results found.  Recent Labs   Lab Test 24  0455 24  0428 24  0557 24  0254   WBC 7.5 7.5  7.3 10.4     Recent Labs   Lab Test 08/19/24  0613 08/18/24  0455 08/17/24  0428 08/16/24  0557   CR 1.33* 1.22* 1.23* 1.36*   GFRESTIMATED 39* 44* 43* 38*       Hematology Studies  Recent Labs   Lab Test 08/18/24  0455 08/17/24  0428 08/16/24  0557 08/14/24  0254   WBC 7.5 7.5 7.3 10.4   HGB 10.4* 10.3* 10.8* 13.1   HCT 31.6* 31.4* 32.6* 39.0   * 105* 95* 142*       Metabolic  Recent Labs   Lab Test 08/19/24  0613 08/18/24  0455 08/17/24  0428    136 137   BUN 13.3 13.0 14.0   CO2 28 24 24   CR 1.33* 1.22* 1.23*   GFRESTIMATED 39* 44* 43*       Hepatic Studies  Recent Labs   Lab Test 08/14/24 0254   BILITOTAL 1.0   ALKPHOS 47   ALBUMIN 4.1   AST 36   ALT 19       ImmunologlobulinsNo lab results found.         Imaging Data:   Reviewed     Study Result    Narrative & Impression   EXAM: MR BRAIN W/O and W CONTRAST  LOCATION: [facility]  DATE: 8/17/2024     INDICATION: Headache in a patient with MSSA bacteremia secondary to aortic valve endocarditis.  Look for cerebral septic emboli; Headache; Acute HA (< 3 months), no complicating features  COMPARISON: None.  CONTRAST: 9 ml gadavist  TECHNIQUE: Routine multiplanar multisequence head MRI without and with intravenous contrast.     FINDINGS: Assessment degraded due to motion.  INTRACRANIAL CONTENTS:   Apparent focus of diffusion restriction with T2/FLAIR hyperintensity within the left superior parietal lobule cortex is hyperintense on ADC map, representing T2 shine through.  Focus of signal abnormality measures 13 x 9 mm in the axial plane and does   not have internal enhancement.     Additional punctate foci of hyperintensity on diffusion weighted with similar signal characteristics (periventricular right corona radiata, left superior parietal lobule, and left cerebellar hemisphere).     Patchy and confluent nonspecific T2/FLAIR hyperintensities within the cerebral white matter most consistent with moderate chronic microvascular  ischemic change. Moderate generalized cerebral atrophy. No hydrocephalus. Normal position of the cerebellar   tonsils. No discernible abnormal intracranial enhancement; however, assessment substantially degraded due to motion. 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     SELLA: No abnormality accounting for technique.     OSSEOUS STRUCTURES/SOFT TISSUES: Normal marrow signal. The major intracranial vascular flow voids are maintained.      ORBITS: No abnormality accounting for technique.      SINUSES/MASTOIDS: Mild mucosal thickening scattered about the paranasal sinuses. Scattered fluid/membrane thickening in the left mastoid air cells. No apparent mass in the posterior nasopharynx or skull base.                                                                       IMPRESSION: Assessment degraded due to motion.  1.  13 mm focus of cortical signal abnormality within the left superior parietal lobule which is favored to represent a subacute infarct; low-grade glial neoplasm could conceivably have a similar appearance. Hyperacute intraparenchymal hematoma could   also have a similar appearance but is considered less likely. Recommend noncontrast head CT for further characterization. Also recommend follow-up MRI brain without and with contrast in 8-12 weeks to document expected evolution.  2.  Additional smaller foci of signal abnormality with similar characteristics favored to represent punctate subacute infarcts.

## 2024-08-19 NOTE — PROGRESS NOTES
Care Management Follow Up    Length of Stay (days): 3    Expected Discharge Date: 08/21/2024    Anticipated Discharge Plan:  Home, Home Care    Transportation: TBD    PT Recommendations:    OT Recommendations:        Barriers to Discharge: medical stability    Prior Living Situation:   with       Patient/Spokesperson Updated: No    Additional Information:  Chart reviewed. Patient transferred from . Assessed by JUSTIN at .     CM at  sent referrals to [facility] and [facility] for potential IV abx needs.     2:47 PM  Received a call from [facility] with [facility]. Patient does not have coverage for home infusion. They can get private pay quote if needed. Naficillin infusion is $500/week     Alicia Cunningham, RN

## 2024-08-19 NOTE — PROGRESS NOTES
[facility]    Medicine Progress Note - Hospitalist Service    Date of Admission:  8/16/2024    Assessment & Plan   83 yo female with history of chronic back pain, furunculosis, uterine prolapse, and tobacco use presents 8/14/24 with sepsis secondary to MSSA bacteremia and HAMMAD. MIGDALIA pos for mitral and aortic valvular vegetations. Transferred to [facility] 8/16/24 for CT surgery evaluation.          Sepsis due to MSSA bacteremia  MIGDALIA shows large posterior leaflet mitral valve vegetation and small aortic valve vegetation with severe aortic stenosis   LVEF 55-60%  Found to have small stroke likely related to septic emboli  Also with slight volume overload likely related to valvular heart disease and diastolic CHF exacerbation  -- cardiology, ID, and CTS following  -- continue IV nafcillin, repeat blood cultures until cleared, last + blood culture 8/16 for now  -- plan coronary CTA per cardiology  -- defer diuresis to cardiology        Acute and subacute CVA: likely related to septic emboli  MRI brain shows 13 mm focus of cortical signal abnormality within the left superior parietal lobule which is favored to represent a subacute infarct, smaller foci of signal abnormality with similar characteristics favored to represent punctate subacute infarcts   -- neurology obtained CTA head and neck showing no hemorrhage, there is intracranial atherosclerosis, moderate to marked stenosis of the right P2 posterior cerebral artery segment. No definite proximal branch occlusion, aneurysm, or high flow vascular malformation identified. Atherosclerotic plaque at the carotid bifurcations bilaterally without hemodynamically significant stenosis in the neck vessels.   No evidence for dissection.   -- no contraindications to valve surgery from neurologic standpoint         HAMMAD on CKD3:   -- GFR stable   -- diuretics reduced per cardiology  -- trend GFR for stability         Mod-severe cervical spinal stenosis,  "foraminal stenosis, facet arthropathy  MRI 8/15 showed degenerative changes in her cervical spine which appears similar to report from 2015. Radiology commented on a left L4-5 degenerative facet with findings that could not entirely rule out septic arthritis.   Neck pain is chronic and has no new or worsening symptoms  Neurosurgery recommends non operative management and to obtain repeat MRI with and w/o contrast if neck pain worsens beyond chronic baseline despite antibiotics and treatment of cardiac vegetations  -- supportive cares  -- continue lidocaine cream, tramadol and tylenol for pain control and headache control        Hyperglycemia most likely secondary to prediabetes  Hemoglobin A1c 6.4  -- Monitor glucoses intermittently           Dilutional anemia  Thrombocytopenia in the setting of sepsis  Hemoglobin trended down from 13.1-10 and now stable  -- trend CBC      Mild Hypokalemia: replace and recheck      Hypophosphatemia: replaced       Diet: Snacks/Supplements Adult: Ensure Enlive; With Meals  No Caffeine Diet  Room Service  Fluid restriction 1800 ML FLUID    DVT Prophylaxis: Heparin SQ  Le Catheter: Not present  Lines: None     Cardiac Monitoring: None  Code Status: No CPR- Do NOT Intubate      Clinically Significant Risk Factors        # Hypokalemia: Lowest K = 3.2 mmol/L in last 2 days, will replace as needed         # Thrombocytopenia: Lowest platelets = 126 in last 2 days, will monitor for bleeding             # Overweight: Estimated body mass index is 29.79 kg/m  as calculated from the following:    Height as of this encounter: 1.702 m (5' 7\").    Weight as of this encounter: 86.3 kg (190 lb 3.2 oz)., PRESENT ON ADMISSION       # Financial/Environmental Concerns:                 Disposition Plan   Medically Ready for Discharge: Anticipated in 2-4 Days      Erick Patino DO  Hospitalist Service  [facility]  Securely message with [employer] (more info)  Text page via " [facility] Paging/Directory   ______________________________________________________________________    Interval History   NAD. Denies any current complaints, headaches improved    Physical Exam   Vital Signs: Temp: 98.2  F (36.8  C) Temp src: Oral BP: 125/65 Pulse: 75   Resp: 18 SpO2: 94 % O2 Device: None (Room air)    Weight: 190 lbs 3.2 oz  General: NAD  RESPIRATORY: Breathing nonlabored  CARDIOVASCULAR: Trace le edema bilat.   NEUROLOGIC: Motor and sensory intact, speech clear        >45 MINUTES SPENT BY ME on the date of service doing chart review, history, exam, documentation & further activities per the note.  Data

## 2024-08-19 NOTE — PROGRESS NOTES
HEART CARE NOTE          Assessment/Recommendations     1. Severe valvular disease c/b ever HFpEF  Assessment / Plan  Nearing euvolemia - will reduce IV diuretic frequency to daily; continue to monitor renal function/repeat BMP, UOP and hemodynamics closely    Patient is high risk for adverse cardiac events 2/2 advanced age, frailty, valvular heart disease, renal dysfunction  GDMT as detailed below; mainstay of treatment for HFpEF includes diuretics and adequate BP control (class I) and SGLT2-I (class 2a); additional medical therapy (ARNI, MRA, ARB) demonstrated less robust evidence for indication but may be considered per guideline recommendations (2b); no indication for BBlockers     Current Pharmacotherapy AHA Guideline-Directed Medical Therapy   Losartan  - not started 2/2 renal dysfunction ARNI/ARB   Spironolactone not started  MRA   SGLT2 inhibitor: not started SGLT2-I    Furosemide IV Loop diuretic      2. 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 Anemia  Assessment / Plan  Management and supportive care per primary team    Plan of care discussed on August 19, 2024 with patient and family at bedside, and primary team overseeing patient's care      History of Present Illness/Subjective    Ms. Felicitas Elliott is a 84 year old female with a PMHx significant for (per Epic notation) presented with fatigue, weakness, chills, poor appetite. Does not really have much for chronic medical conditions other than chronic back pain, furunculosis, uterine prolapse, tobacco use     Today, Mrs. Elliott denies acute cardiac events or complaints; Management plan as detailed above    ECG: personally reviewed; normal sinus rhythm, nonspecific ST and T waves changes, RBBB, left axis  "deviation.    ECHO (personnaly Reviewed on 8/19/24):   1. The left ventricle is normal in size. Left ventricular function is  normal.The ejection fraction is 55-60%.  2. Normal right ventricle size and systolic function.  3. Large vegetation on mitral valve (8 mm x 15) attached to posterior leaflet.  4. There is a small vegetation on the aortic valve (6 mm). No evidence of  aortic root abscess identified.  5. Severe valvular aortic stenosis (SHU:0.8 cm2, peak nomi: 4.6 m/sec, mean  gradient: 51 mmHg).    [facility] remote HF monitoring (personally reviewed via external database on August 19, 2024):     Telemetry: personally reviewed August 19, 2024; notable for sinus rhythm     Lab results: personally reviewed August 19, 2024; notable for HAMMAD on CKD    Medical history and pertinent documents reviewed in Care Everywhere please where applicable see details above        Physical Examination Review of Systems   /73 (BP Location: Left arm)   Pulse 82   Temp 99.6  F (37.6  C) (Oral)   Resp 18   Ht 1.702 m (5' 7\")   Wt 88.9 kg (196 lb)   SpO2 93%   BMI 30.70 kg/m    Body mass index is 30.7 kg/m .  Wt Readings from Last 3 Encounters:   08/18/24 88.9 kg (196 lb)   08/15/24 90.1 kg (198 lb 9.6 oz)     General Appearance:   no distress, normal body habitus   ENT/Mouth: membranes moist, no oral lesions or bleeding gums.      EYES:  no scleral icterus, normal conjunctivae   Neck: no carotid bruits or thyromegaly   Chest/Lungs:   lungs are clear to auscultation, no rales or wheezing, equal chest wall expansion    Cardiovascular:   Regular. Normal first and second heart sounds with +PAVAN; no rubs, or gallops; the carotid, radial and posterior tibial pulses are intact, no JVD and mild LE edema bilaterally    Abdomen:  no organomegaly, masses, bruits, or tenderness; bowel sounds are present   Extremities: no cyanosis or clubbing   Skin: no xanthelasma, warm.    Neurologic: NAD     Psychiatric: alert and oriented x3, calm     A " "complete 10 systems ROS was reviewed  And is negative except what is listed in the HPI.          Medical History  Surgical History Family History Social History   No past medical history on file. No past surgical history on file. no family history of premature coronary artery disease Social History     Socioeconomic History    Marital status:      Spouse name: Not on file    Number of children: Not on file    Years of education: Not on file    Highest education level: Not on file   Occupational History    Not on file   Tobacco Use    Smoking status: Not on file    Smokeless tobacco: Not on file   Substance and Sexual Activity    Alcohol use: Not on file    Drug use: Not on file    Sexual activity: Not on file   Other Topics Concern    Not on file   Social History Narrative    Not on file     Social Determinants of Health     Financial Resource Strain: High Risk (1/1/2022)    Received from [facility] [employer]    Financial Resource Strain     Difficulty of Paying Living Expenses: Not on file     Difficulty of Paying Living Expenses: Not on file   Food Insecurity: Not on file   Transportation Needs: Not on file   Physical Activity: Not on file   Stress: Not on file   Social Connections: Unknown (1/3/2024)    Received from [facility]    Social Connections     Frequency of Communication with Friends and Family: Not on file   Interpersonal Safety: Not on file   Housing Stability: Not on file           Lab Results    Chemistry/lipid CBC Cardiac Enzymes/BNP/TSH/INR   Lab Results   Component Value Date    BUN 13.0 08/18/2024     08/18/2024    CO2 24 08/18/2024    Lab Results   Component Value Date    WBC 7.5 08/18/2024    HGB 10.4 (L) 08/18/2024    HCT 31.6 (L) 08/18/2024    MCV 94 08/18/2024     (L) 08/18/2024    No results found for: \"CKTOTAL\", \"CKMB\", \"TROPONINI\", \"BNP\", \"TSH\", \"INR\"  No results found for: \"CKTOTAL\", \"CKMB\", \"TROPONINI\"   "     Weight:    Wt Readings from Last 3 Encounters:   08/18/24 88.9 kg (196 lb)   08/15/24 90.1 kg (198 lb 9.6 oz)       Allergies  Allergies   Allergen Reactions    Aspirin Rash    Cats Rash         Surgical History  No past surgical history on file.    Social History  Tobacco:   History   Smoking Status    Not on file   Smokeless Tobacco    Not on file    Alcohol:   Social History    Substance and Sexual Activity      Alcohol use: Not on file   Illicit Drugs:   History   Drug Use Not on file       Family History  No family history on file.       Navneet Branch MD on 8/19/2024      cc: [facility]

## 2024-08-19 NOTE — PLAN OF CARE
Problem: Adult Inpatient Plan of Care  Goal: Plan of Care Review  Description: The Plan of Care Review/Shift note should be completed every shift.  The Outcome Evaluation is a brief statement about your assessment that the patient is improving, declining, or no change.  This information will be displayed automatically on your shift  note.  Outcome: Progressing     Problem: Risk for Delirium  Goal: Optimal Coping  Outcome: Progressing  Intervention: Optimize Psychosocial Adjustment to Delirium  Recent Flowsheet Documentation  Taken 8/19/2024 0041 by Patria Bob RN  Supportive Measures: active listening utilized     Problem: Risk for Delirium  Goal: Optimal Coping  Intervention: Optimize Psychosocial Adjustment to Delirium  Recent Flowsheet Documentation  Taken 8/19/2024 0041 by Patria Bob RN  Supportive Measures: active listening utilized     Problem: Sepsis/Septic Shock  Goal: Optimal Coping  Outcome: Progressing  Intervention: Optimize Psychosocial Adjustment to Illness  Recent Flowsheet Documentation  Taken 8/19/2024 0041 by Patria Bob RN  Supportive Measures: active listening utilized     Problem: Sepsis/Septic Shock  Goal: Optimal Coping  Intervention: Optimize Psychosocial Adjustment to Illness  Recent Flowsheet Documentation  Taken 8/19/2024 0041 by Patria Bob RN  Supportive Measures: active listening utilized     Problem: Pain Acute  Goal: Optimal Pain Control and Function  Outcome: Progressing  Intervention: Prevent or Manage Pain  Recent Flowsheet Documentation  Taken 8/19/2024 0041 by Patria Bob RN  Medication Review/Management: medications reviewed  Intervention: Optimize Psychosocial Wellbeing  Recent Flowsheet Documentation  Taken 8/19/2024 0041 by Patria Bob RN  Supportive Measures: active listening utilized     Problem: Pain Acute  Goal: Optimal Pain Control and Function  Intervention: Prevent or Manage Pain  Recent Flowsheet Documentation  Taken  8/19/2024 0041 by Patria Bob, RN  Medication Review/Management: medications reviewed     Problem: Pain Acute  Goal: Optimal Pain Control and Function  Intervention: Optimize Psychosocial Wellbeing  Recent Flowsheet Documentation  Taken 8/19/2024 0041 by Patria Bob RN  Supportive Measures: active listening utilized     Problem: Gas Exchange Impaired  Goal: Optimal Gas Exchange  Outcome: Progressing  Intervention: Optimize Oxygenation and Ventilation  Recent Flowsheet Documentation  Taken 8/19/2024 0041 by Patria Bob RN  Head of Bed (HOB) Positioning: HOB at 20-30 degrees     Problem: Gas Exchange Impaired  Goal: Optimal Gas Exchange  Intervention: Optimize Oxygenation and Ventilation  Recent Flowsheet Documentation  Taken 8/19/2024 0041 by Patria Bob RN  Head of Bed (HOB) Positioning: HOB at 20-30 degrees   Goal Outcome Evaluation:         Patient is A&Ox4, Tylenol 650 mg's given for headacheand effective. 02 sat's 93% on room air. Plan is CTA when scheduled. Nafcillin infusing at 22.9 ml's/hr. NPO for procedure.

## 2024-08-19 NOTE — PLAN OF CARE
Goal Outcome Evaluation:      Plan of Care Reviewed With: patient, family    Overall Patient Progress: improving  Problem: Adult Inpatient Plan of Care  Goal: Plan of Care Review  Description: The Plan of Care Review/Shift note should be completed every shift.  The Outcome Evaluation is a brief statement about your assessment that the patient is improving, declining, or no change.  This information will be displayed automatically on your shift  note.  Outcome: Progressing  Flowsheets (Taken 8/18/2024 2249)  Plan of Care Reviewed With:   patient   family  Overall Patient Progress: improving     Problem: Risk for Delirium  Goal: Optimal Coping  Outcome: Progressing     Problem: Skin Injury Risk Increased  Goal: Skin Health and Integrity  Outcome: Progressing  Intervention: Plan: Nurse Driven Intervention: Moisture Management  Recent Flowsheet Documentation  Taken 8/18/2024 1549 by Marie Figueroa, RN  Moisture Interventions: Encourage regular toileting  Intervention: Optimize Skin Protection  Recent Flowsheet Documentation  Taken 8/18/2024 1831 by Marie Figueroa, RN  Activity Management: ambulated to bathroom  Taken 8/18/2024 1549 by Marie Figueroa, RN  Activity Management:   activity adjusted per tolerance   activity encouraged     Problem: Sepsis/Septic Shock  Goal: Optimal Coping  Outcome: Progressing     Problem: Pain Acute  Goal: Optimal Pain Control and Function  Outcome: Progressing  Intervention: Prevent or Manage Pain  Recent Flowsheet Documentation  Taken 8/18/2024 1549 by Marie Figueroa, RN  Medication Review/Management: medications reviewed  Felicitas had a good shift. Family here much of the shift.  Up with standby assist. Complaints of headache, tylenol helpful. Swelling in hands and feet has improved after lasix.

## 2024-08-19 NOTE — PROGRESS NOTES
CCTA completed as ordered.  Tolerated well.  Received one dose of metoprolol for heart rate and nitroglycerin per protocol.  Interpretation pending.   Restrictions for test are removed at this time.  Sylvia Purvis RN

## 2024-08-19 NOTE — PLAN OF CARE
Goal Outcome Evaluation:      Plan of Care Reviewed With: patient    Overall Patient Progress: improvingOverall Patient Progress: improving    Outcome Evaluation: Pt up to bathroom independently.  Pt continues on room air.  Pt's LE edema much improved from yesterday.  Pt to have CT angio today.  Pt NPO since 1130am.

## 2024-08-20 ENCOUNTER — APPOINTMENT (OUTPATIENT)
Dept: CARDIOLOGY | Facility: HOSPITAL | Age: 84
DRG: 853 | End: 2024-08-20
Payer: COMMERCIAL

## 2024-08-20 PROBLEM — I35.0 NONRHEUMATIC AORTIC VALVE STENOSIS: Status: ACTIVE | Noted: 2024-08-20

## 2024-08-20 LAB
ABO/RH(D): NORMAL
ANION GAP SERPL CALCULATED.3IONS-SCNC: 16 MMOL/L (ref 7–15)
ANTIBODY SCREEN: NEGATIVE
BACTERIA BLD CULT: NO GROWTH
BUN SERPL-MCNC: 12.3 MG/DL (ref 8–23)
CALCIUM SERPL-MCNC: 8.7 MG/DL (ref 8.8–10.4)
CHLORIDE SERPL-SCNC: 97 MMOL/L (ref 98–107)
CREAT SERPL-MCNC: 1.27 MG/DL (ref 0.51–0.95)
EGFRCR SERPLBLD CKD-EPI 2021: 41 ML/MIN/1.73M2
GLUCOSE SERPL-MCNC: 99 MG/DL (ref 70–99)
HCO3 SERPL-SCNC: 26 MMOL/L (ref 22–29)
HOLD SPECIMEN: NORMAL
INR PPP: 1.18 (ref 0.85–1.15)
LVEF ECHO: NORMAL
MAGNESIUM SERPL-MCNC: 1.6 MG/DL (ref 1.7–2.3)
MAGNESIUM SERPL-MCNC: 1.7 MG/DL (ref 1.7–2.3)
PHOSPHATE SERPL-MCNC: 2.7 MG/DL (ref 2.5–4.5)
POTASSIUM SERPL-SCNC: 3.4 MMOL/L (ref 3.4–5.3)
POTASSIUM SERPL-SCNC: 3.4 MMOL/L (ref 3.4–5.3)
POTASSIUM SERPL-SCNC: 3.9 MMOL/L (ref 3.4–5.3)
SODIUM SERPL-SCNC: 139 MMOL/L (ref 135–145)
SPECIMEN EXPIRATION DATE: NORMAL

## 2024-08-20 PROCEDURE — 250N000013 HC RX MED GY IP 250 OP 250 PS 637: Performed by: STUDENT IN AN ORGANIZED HEALTH CARE EDUCATION/TRAINING PROGRAM

## 2024-08-20 PROCEDURE — 93325 DOPPLER ECHO COLOR FLOW MAPG: CPT | Mod: 26 | Performed by: INTERNAL MEDICINE

## 2024-08-20 PROCEDURE — 250N000009 HC RX 250: Performed by: INTERNAL MEDICINE

## 2024-08-20 PROCEDURE — 258N000003 HC RX IP 258 OP 636: Performed by: INTERNAL MEDICINE

## 2024-08-20 PROCEDURE — 93321 DOPPLER ECHO F-UP/LMTD STD: CPT | Mod: 26 | Performed by: INTERNAL MEDICINE

## 2024-08-20 PROCEDURE — 93325 DOPPLER ECHO COLOR FLOW MAPG: CPT

## 2024-08-20 PROCEDURE — 80048 BASIC METABOLIC PNL TOTAL CA: CPT | Performed by: INTERNAL MEDICINE

## 2024-08-20 PROCEDURE — 87040 BLOOD CULTURE FOR BACTERIA: CPT | Performed by: STUDENT IN AN ORGANIZED HEALTH CARE EDUCATION/TRAINING PROGRAM

## 2024-08-20 PROCEDURE — 250N000011 HC RX IP 250 OP 636: Performed by: INTERNAL MEDICINE

## 2024-08-20 PROCEDURE — C1887 CATHETER, GUIDING: HCPCS | Performed by: INTERNAL MEDICINE

## 2024-08-20 PROCEDURE — 36415 COLL VENOUS BLD VENIPUNCTURE: CPT | Performed by: INTERNAL MEDICINE

## 2024-08-20 PROCEDURE — 272N000001 HC OR GENERAL SUPPLY STERILE: Performed by: INTERNAL MEDICINE

## 2024-08-20 PROCEDURE — 93308 TTE F-UP OR LMTD: CPT | Mod: 26 | Performed by: INTERNAL MEDICINE

## 2024-08-20 PROCEDURE — 86900 BLOOD TYPING SEROLOGIC ABO: CPT | Performed by: INTERNAL MEDICINE

## 2024-08-20 PROCEDURE — 250N000013 HC RX MED GY IP 250 OP 250 PS 637: Performed by: INTERNAL MEDICINE

## 2024-08-20 PROCEDURE — 99152 MOD SED SAME PHYS/QHP 5/>YRS: CPT | Performed by: INTERNAL MEDICINE

## 2024-08-20 PROCEDURE — 120N000004 HC R&B MS OVERFLOW

## 2024-08-20 PROCEDURE — 85610 PROTHROMBIN TIME: CPT | Performed by: INTERNAL MEDICINE

## 2024-08-20 PROCEDURE — 99232 SBSQ HOSP IP/OBS MODERATE 35: CPT | Performed by: INTERNAL MEDICINE

## 2024-08-20 PROCEDURE — 255N000002 HC RX 255 OP 636: Performed by: INTERNAL MEDICINE

## 2024-08-20 PROCEDURE — 99207 PR NO CHARGE LOS: CPT | Performed by: INTERNAL MEDICINE

## 2024-08-20 PROCEDURE — 84100 ASSAY OF PHOSPHORUS: CPT | Performed by: INTERNAL MEDICINE

## 2024-08-20 PROCEDURE — 99233 SBSQ HOSP IP/OBS HIGH 50: CPT | Performed by: INTERNAL MEDICINE

## 2024-08-20 PROCEDURE — 93454 CORONARY ARTERY ANGIO S&I: CPT | Performed by: INTERNAL MEDICINE

## 2024-08-20 PROCEDURE — 83735 ASSAY OF MAGNESIUM: CPT | Performed by: INTERNAL MEDICINE

## 2024-08-20 PROCEDURE — 250N000011 HC RX IP 250 OP 636: Performed by: STUDENT IN AN ORGANIZED HEALTH CARE EDUCATION/TRAINING PROGRAM

## 2024-08-20 PROCEDURE — 84132 ASSAY OF SERUM POTASSIUM: CPT | Performed by: INTERNAL MEDICINE

## 2024-08-20 PROCEDURE — C1894 INTRO/SHEATH, NON-LASER: HCPCS | Performed by: INTERNAL MEDICINE

## 2024-08-20 PROCEDURE — 93454 CORONARY ARTERY ANGIO S&I: CPT | Mod: 26 | Performed by: INTERNAL MEDICINE

## 2024-08-20 PROCEDURE — B211YZZ FLUOROSCOPY OF MULTIPLE CORONARY ARTERIES USING OTHER CONTRAST: ICD-10-PCS | Performed by: INTERNAL MEDICINE

## 2024-08-20 PROCEDURE — P9047 ALBUMIN (HUMAN), 25%, 50ML: HCPCS | Performed by: INTERNAL MEDICINE

## 2024-08-20 PROCEDURE — 999N000248 HC STATISTIC IV INSERT WITH US BY RN

## 2024-08-20 PROCEDURE — 210N000001 HC R&B IMCU HEART CARE

## 2024-08-20 RX ORDER — FENTANYL CITRATE 50 UG/ML
INJECTION, SOLUTION INTRAMUSCULAR; INTRAVENOUS
Status: DISCONTINUED | OUTPATIENT
Start: 2024-08-20 | End: 2024-08-20 | Stop reason: HOSPADM

## 2024-08-20 RX ORDER — POTASSIUM CHLORIDE 1500 MG/1
40 TABLET, EXTENDED RELEASE ORAL ONCE
Status: COMPLETED | OUTPATIENT
Start: 2024-08-20 | End: 2024-08-20

## 2024-08-20 RX ORDER — IODIXANOL 320 MG/ML
INJECTION, SOLUTION INTRAVASCULAR
Status: DISCONTINUED | OUTPATIENT
Start: 2024-08-20 | End: 2024-08-20 | Stop reason: HOSPADM

## 2024-08-20 RX ORDER — LIDOCAINE 40 MG/G
CREAM TOPICAL
Status: DISCONTINUED | OUTPATIENT
Start: 2024-08-20 | End: 2024-08-20 | Stop reason: HOSPADM

## 2024-08-20 RX ORDER — ASPIRIN 325 MG
325 TABLET ORAL ONCE
Status: CANCELLED | OUTPATIENT
Start: 2024-08-20 | End: 2024-08-20

## 2024-08-20 RX ORDER — SODIUM CHLORIDE 9 MG/ML
75 INJECTION, SOLUTION INTRAVENOUS CONTINUOUS
Status: ACTIVE | OUTPATIENT
Start: 2024-08-20 | End: 2024-08-20

## 2024-08-20 RX ORDER — ATROPINE SULFATE 0.1 MG/ML
0.5 INJECTION INTRAVENOUS
Status: ACTIVE | OUTPATIENT
Start: 2024-08-20 | End: 2024-08-20

## 2024-08-20 RX ORDER — FLUMAZENIL 0.1 MG/ML
0.2 INJECTION, SOLUTION INTRAVENOUS
Status: ACTIVE | OUTPATIENT
Start: 2024-08-20 | End: 2024-08-20

## 2024-08-20 RX ORDER — FENTANYL CITRATE 50 UG/ML
25 INJECTION, SOLUTION INTRAMUSCULAR; INTRAVENOUS
Status: DISCONTINUED | OUTPATIENT
Start: 2024-08-20 | End: 2024-08-20 | Stop reason: HOSPADM

## 2024-08-20 RX ORDER — FENTANYL CITRATE 50 UG/ML
25 INJECTION, SOLUTION INTRAMUSCULAR; INTRAVENOUS
Status: DISCONTINUED | OUTPATIENT
Start: 2024-08-20 | End: 2024-08-22

## 2024-08-20 RX ORDER — NALOXONE HYDROCHLORIDE 0.4 MG/ML
0.2 INJECTION, SOLUTION INTRAMUSCULAR; INTRAVENOUS; SUBCUTANEOUS
Status: ACTIVE | OUTPATIENT
Start: 2024-08-20 | End: 2024-08-20

## 2024-08-20 RX ORDER — ALBUMIN (HUMAN) 12.5 G/50ML
50 SOLUTION INTRAVENOUS ONCE
Status: COMPLETED | OUTPATIENT
Start: 2024-08-20 | End: 2024-08-20

## 2024-08-20 RX ORDER — NALOXONE HYDROCHLORIDE 0.4 MG/ML
0.4 INJECTION, SOLUTION INTRAMUSCULAR; INTRAVENOUS; SUBCUTANEOUS
Status: ACTIVE | OUTPATIENT
Start: 2024-08-20 | End: 2024-08-20

## 2024-08-20 RX ORDER — HYDRALAZINE HYDROCHLORIDE 20 MG/ML
10 INJECTION INTRAMUSCULAR; INTRAVENOUS
Status: DISCONTINUED | OUTPATIENT
Start: 2024-08-20 | End: 2024-08-22

## 2024-08-20 RX ADMIN — HEPARIN SODIUM 5000 UNITS: 10000 INJECTION, SOLUTION INTRAVENOUS; SUBCUTANEOUS at 21:02

## 2024-08-20 RX ADMIN — ACETAMINOPHEN 650 MG: 325 TABLET ORAL at 23:16

## 2024-08-20 RX ADMIN — NAFCILLIN 12 G: 10 INJECTION, POWDER, FOR SOLUTION INTRAVENOUS at 14:42

## 2024-08-20 RX ADMIN — NAFCILLIN 12 G: 10 INJECTION, POWDER, FOR SOLUTION INTRAVENOUS at 16:07

## 2024-08-20 RX ADMIN — SODIUM CHLORIDE 75 ML/HR: 9 INJECTION, SOLUTION INTRAVENOUS at 16:07

## 2024-08-20 RX ADMIN — ACETAMINOPHEN 650 MG: 325 TABLET ORAL at 14:15

## 2024-08-20 RX ADMIN — ACETAMINOPHEN 650 MG: 325 TABLET ORAL at 19:18

## 2024-08-20 RX ADMIN — ACETAMINOPHEN 650 MG: 325 TABLET ORAL at 06:45

## 2024-08-20 RX ADMIN — POTASSIUM CHLORIDE 40 MEQ: 1500 TABLET, EXTENDED RELEASE ORAL at 08:21

## 2024-08-20 RX ADMIN — TRAMADOL HYDROCHLORIDE 50 MG: 50 TABLET, COATED ORAL at 21:02

## 2024-08-20 RX ADMIN — ALBUMIN HUMAN 50 G: 0.25 SOLUTION INTRAVENOUS at 06:31

## 2024-08-20 ASSESSMENT — ACTIVITIES OF DAILY LIVING (ADL)
ADLS_ACUITY_SCORE: 30

## 2024-08-20 NOTE — PROGRESS NOTES
HEART CARE NOTE          Assessment/Recommendations   1. Severe valvular disease c/b ever HFpEF  Assessment / Plan  Diuresing well on current regimen - no changes at this time; continue to monitor renal function/repeat BMP, UOP and hemodynamics closely    CT coronary concerning for LAD disease - will proceed with coronary angiogram to conc;ue pre-op testing  Patient is high risk for adverse cardiac events 2/2 advanced age, frailty, valvular heart disease, renal dysfunction  GDMT as detailed below; mainstay of treatment for HFpEF includes diuretics and adequate BP control (class I) and SGLT2-I (class 2a); additional medical therapy (ARNI, MRA, ARB) demonstrated less robust evidence for indication but may be considered per guideline recommendations (2b); no indication for BBlockers      Current Pharmacotherapy AHA Guideline-Directed Medical Therapy   Losartan  - not started 2/2 renal dysfunction ARNI/ARB   Spironolactone not started  MRA   SGLT2 inhibitor: not started SGLT2-I    Furosemide IV Loop diuretic       2. 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 Anemia  Assessment / Plan  Management and supportive care per primary team    Plan of care discussed on August 20, 2024 with patient and family at bedside, and primary team overseeing patient's care    History of Present Illness/Subjective    Ms. Felicitas Elliott is a 84 year old female with a PMHx significant for (per Epic notation) presented with fatigue, weakness, chills, poor appetite. Does not really have much for chronic medical conditions other than chronic back pain, furunculosis, uterine prolapse, tobacco use      Today, Mrs. Elliott denies acute cardiac events or complaints; Management plan as detailed  "above     ECG: personally reviewed; normal sinus rhythm, nonspecific ST and T waves changes, RBBB, left axis deviation.     ECHO (personnaly Reviewed on 8/19/24):   1. The left ventricle is normal in size. Left ventricular function is  normal.The ejection fraction is 55-60%.  2. Normal right ventricle size and systolic function.  3. Large vegetation on mitral valve (8 mm x 15) attached to posterior leaflet.  4. There is a small vegetation on the aortic valve (6 mm). No evidence of  aortic root abscess identified.  5. Severe valvular aortic stenosis (SHU:0.8 cm2, peak nomi: 4.6 m/sec, mean  gradient: 51 mmHg).    Telemetry: personally reviewed August 20, 2024; notable for sinus rhythm     Lab results: personally reviewed August 20, 2024; notable for elevated NTproBNP    Medical history and pertinent documents reviewed in Care Everywhere please where applicable see details above        Physical Examination Review of Systems   /66 (BP Location: Left arm)   Pulse 76   Temp 99.3  F (37.4  C) (Oral)   Resp 18   Ht 1.702 m (5' 7\")   Wt 86.2 kg (190 lb)   SpO2 91%   BMI 29.76 kg/m    Body mass index is 29.76 kg/m .  Wt Readings from Last 3 Encounters:   08/19/24 86.2 kg (190 lb)   08/15/24 90.1 kg (198 lb 9.6 oz)     General Appearance:   no distress, normal body habitus   ENT/Mouth: membranes moist, no oral lesions or bleeding gums.      EYES:  no scleral icterus, normal conjunctivae   Neck: no carotid bruits or thyromegaly   Chest/Lungs:   lungs are clear to auscultation, no rales or wheezing, equal chest wall expansion    Cardiovascular:   Regular. Normal first and second heart sounds with +PAVAN; rubs, or gallops; the carotid, radial and posterior tibial pulses are intact, no JVD and mild LE edema bilaterally    Abdomen:  no organomegaly, masses, bruits, or tenderness; bowel sounds are present   Extremities: no cyanosis or clubbing   Skin: no xanthelasma, warm.    Neurologic: NAD     Psychiatric: alert and " "oriented x3, calm     A complete 10 systems ROS was reviewed  And is negative except what is listed in the HPI.          Medical History  Surgical History Family History Social History   No past medical history on file. No past surgical history on file. no family history of premature coronary artery disease Social History     Socioeconomic History    Marital status:      Spouse name: Not on file    Number of children: Not on file    Years of education: Not on file    Highest education level: Not on file   Occupational History    Not on file   Tobacco Use    Smoking status: Not on file    Smokeless tobacco: Not on file   Substance and Sexual Activity    Alcohol use: Not on file    Drug use: Not on file    Sexual activity: Not on file   Other Topics Concern    Not on file   Social History Narrative    Not on file     Social Determinants of Health     Financial Resource Strain: High Risk (1/1/2022)    Received from [facility]    Financial Resource Strain     Difficulty of Paying Living Expenses: Not on file     Difficulty of Paying Living Expenses: Not on file   Food Insecurity: Not on file   Transportation Needs: Not on file   Physical Activity: Not on file   Stress: Not on file   Social Connections: Unknown (1/3/2024)    Received from [facility]    Social Connections     Frequency of Communication with Friends and Family: Not on file   Interpersonal Safety: Not on file   Housing Stability: Not on file           Lab Results    Chemistry/lipid CBC Cardiac Enzymes/BNP/TSH/INR   Lab Results   Component Value Date    BUN 13.3 08/19/2024     08/19/2024    CO2 28 08/19/2024    Lab Results   Component Value Date    WBC 7.5 08/18/2024    HGB 10.4 (L) 08/18/2024    HCT 31.6 (L) 08/18/2024    MCV 94 08/18/2024     (L) 08/18/2024    No results found for: \"CKTOTAL\", \"CKMB\", \"TROPONINI\", \"BNP\", \"TSH\", \"INR\"  No results found for: \"CKTOTAL\", " "\"CKMB\", \"TROPONINI\"       Weight:    Wt Readings from Last 3 Encounters:   08/19/24 86.2 kg (190 lb)   08/15/24 90.1 kg (198 lb 9.6 oz)       Allergies  Allergies   Allergen Reactions    Aspirin Rash    Cats Rash         Surgical History  No past surgical history on file.    Social History  Tobacco:   History   Smoking Status    Not on file   Smokeless Tobacco    Not on file    Alcohol:   Social History    Substance and Sexual Activity      Alcohol use: Not on file   Illicit Drugs:   History   Drug Use Not on file       Family History  No family history on file.       Navneet Branch MD on 8/20/2024      cc: [facility]    "

## 2024-08-20 NOTE — PROGRESS NOTES
"BRIEF CVTS PROGRESS NOTE    S:  Felicitas Elliott is a 84 year old female with PMH diastolic HF, CKD 3, HTN, and HLD who originally presented to [facility] with fevers, chills, rigors, headaches, and weakness.  She was found to have MSSA positive blood cultures and was started on Ancef.  Echographic evaluation of her heart via TTE and MIGDALIA showed mitral and aortic valve endocarditis in addition to severe aortic valve stenosis.  CV Surgery was consulted for consideration of surgical treatment.     Patient followed via chart check today. 8/16 BC positive. 8/17-8/20 NGTD. CTA head/neck w/o evidence of hemorrhage, neurology cleared patient for valvular surgery when BC clear. CTA coronary angiogram w/ evidence of LAD disease, cards planning for cath today.       O:  /59 (BP Location: Left arm)   Pulse 75   Temp 98.4  F (36.9  C) (Oral)   Resp 16   Ht 1.702 m (5' 7\")   Wt 86 kg (189 lb 9.6 oz)   SpO2 93%   BMI 29.70 kg/m      CBC RESULTS:   Recent Labs   Lab Test 08/18/24  0455 08/17/24  0428 08/16/24  0557   WBC 7.5 7.5 7.3   HGB 10.4* 10.3* 10.8*   HCT 31.6* 31.4* 32.6*   * 105* 95*     CMP RESULTS:  Recent Labs   Lab Test 08/20/24  0519 08/19/24  1705 08/19/24  1111 08/19/24  0613 08/18/24  0455 08/15/24  0532 08/14/24  0254     --   --  140 136   < > 133*   POTASSIUM 3.4  3.4 3.5 3.2* 3.3*  3.3* 3.8   < > 3.9   CHLORIDE 97*  --   --  98 100   < > 97*   CO2 26  --   --  28 24   < > 26   ANIONGAP 16*  --   --  14 12   < > 10   GLC 99  --   --  110* 126*   < > 148*   BUN 12.3  --   --  13.3 13.0   < > 25.5*   CR 1.27*  --   --  1.33* 1.22*   < > 1.92*   GFRESTIMATED 41*  --   --  39* 44*   < > 25*   KAELYN 8.7*  --   --  8.6* 8.4*   < > 9.4   BILITOTAL  --   --   --   --   --   --  1.0   ALKPHOS  --   --   --   --   --   --  47   ALT  --   --   --   --   --   --  19   AST  --   --   --   --   --   --  36    < > = values in this interval not displayed.     No lab results found.    Recent " Results (from the past 48 hour(s))   CTA Head Neck with Contrast    Narrative    EXAM: CTA HEAD NECK W CONTRAST  LOCATION: [facility]  DATE: 8/18/2024    INDICATION: Follow-up abnormality on previous MRI. Endocarditis. 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  COMPARISON: MRI 8/17/2024.  CONTRAST: isovue 370 75ml  TECHNIQUE: Head and neck CT angiogram with IV contrast. Noncontrast head CT followed by axial helical CT images of the head and neck vessels obtained during the arterial phase of intravenous contrast administration. Axial 2D reconstructed images and   multiplanar 3D MIP reconstructed images of the head and neck vessels were performed by the technologist. Dose reduction techniques were used. All stenosis measurements made according to NASCET criteria unless otherwise specified.    FINDINGS:   NONCONTRAST HEAD CT:   INTRACRANIAL CONTENTS: No intracranial hemorrhage, extraaxial collection, or mass effect.  Juxtacortical low attenuation focus involving the left parietal lobe laterally consistent with findings on the previous MRI. A small focus of evolving subacute   infarct in this location is possible. Small focus of chronic lacunar change in the right cerebellum. No large territorial infarct identified. Mild presumed chronic small vessel ischemic changes. Mild generalized volume loss. No hydrocephalus.     VISUALIZED ORBITS/SINUSES/MASTOIDS: Prior bilateral cataract surgery. Visualized portions of the orbits are otherwise unremarkable. No paranasal sinus mucosal disease. No middle ear or mastoid effusion.    BONES/SOFT TISSUES: No acute abnormality.    HEAD CTA:  ANTERIOR CIRCULATION: Calcified atherosclerotic plaque involving the carotid siphons bilaterally with minimal associated luminal stenosis. No evidence for proximal large vessel occlusion or high-grade stenosis elsewhere. No definite aneurysm or evidence   for high flow vascular malformation. Specifically, no definite vascular  abnormality associated with the area of abnormal left parietal lobe low-attenuation. Standard [place] anatomy.    POSTERIOR CIRCULATION: Atherosclerotic changes involving the posterior cerebral arteries including moderate to marked stenosis of the right P2 posterior cerebral artery segment. No definite proximal large vessel occlusion. No additional stenosis. No   aneurysm or high flow vascular malformation identified. Dominant left and smaller right vertebral artery contribute to a normal basilar artery.     DURAL VENOUS SINUSES: Expected enhancement of the major dural venous sinuses.    NECK CTA:  RIGHT CAROTID: Mixed calcified and noncalcified atherosclerotic plaque involving the right carotid bulb results in 30-40% stenosis of the proximal right ICA segment. No evidence for dissection.    LEFT CAROTID: Atherosclerotic plaque results in approximately 30% stenosis in the left ICA. No dissection.    VERTEBRAL ARTERIES: No focal stenosis or dissection. Dominant left and smaller right vertebral arteries.    AORTIC ARCH: Vascular variant aortic arch origin left vertebral artery.  No significant stenosis at the origin of the great vessels.    NONVASCULAR STRUCTURES: Multilevel cervical spondylosis greatest at C4-5 and C5-6. In evaluating fusion at C6-7. Included lung apices are clear.      Impression    IMPRESSION:   HEAD CT:  1.  Small focus of juxtacortical low attenuation within the left parietal lobe corresponds to signal abnormality seen on the prior MRI. As suggested previously this is favored to reflect evolving small vessel ischemic change; however, MRI follow-up to   exclude a low-grade neoplastic process is advised.  2.  No evidence for intracranial hemorrhage or significant mass effect.  3.  Generalized brain atrophy and presumed chronic microvascular ischemic change.    HEAD CTA:   1.  Intracranial atherosclerosis including moderate to marked stenosis of the right P2 posterior cerebral artery  segment.  2.  No definite proximal branch occlusion, aneurysm, or high flow vascular malformation identified.    NECK CTA:  1.  Atherosclerotic plaque at the carotid bifurcations bilaterally without hemodynamically significant stenosis in the neck vessels.   2.  No evidence for dissection.   CTA  ANGIOGRAM CORONARY ARTERY   Result Value    BSA 0.00    Narrative      Right dominant coronary artery system    Left main: Mild calcified stenosis in the ostial segment.    Left anterior descending artery: Moderate to severe mixed calcified   stenosis in the proximal segment and mild stenosis in the first diagonal   branch.    Left circumflex artery: Mild calcified stenosis in the proximal   segment.    Right coronary artery: Minimal calcified stenosis in the proximal   segment.    Mitral ValveThere is moderate valve leaflet calcification.    Aortic ValveThere is calcification of the aortic valve present.     Radiologist Consult For Cardiology    Narrative    OVERREAD: DETAILED [place] [facility] EXTRACARDIAC OVERREAD OF CARDIAC CT   LOCATION: [facility] [place]  DATE: 8/19/2024    INDICATION: Cardiology consultation after coronary CT angiogram.  TECHNIQUE: Dose reduction techniques were used.  COMPARISON: CT chest, abdomen and pelvis on 8/14/2024.  CONTRAST: Isovue 370 75mL.    FINDINGS:    LIMITED CHEST: No pleural effusion or pneumothorax in the visualized portion of the chest. Mild basilar pulmonary opacities, likely atelectasis.  LIMITED MEDIASTINUM: Partially visualized few mildly prominent but not significantly enlarged mediastinal and hilar lymph nodes, could be reactive.  LIMITED UPPER ABDOMEN: Limited evaluation of the upper abdomen due to lack of coverage. Partially visualized left breast nodule, could represent adrenal adenoma.      Impression    IMPRESSION:    1.  Partially visualized left adrenal nodule, indeterminate, could represent adrenal adenoma.  2.  Please refer to cardiologist's  dictation for the cardiac CT report.         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       - Surgical candidacy pending completion of work-up and clearance of BC   - Cardiac cath pending today, remainder of w/u complete   - Ok to proceed w/ CV surgery per neuro   - Appreciate ongoing ID involvement including considerations r/t FH of hypogammaglobulinemia   - Continue IV abx per ID; anticipate prolonged course of therapy regardless of surgical intervention so will need PICC once BC cleared   - Follow serial BC for clearing; last positive result 8/16   - Consider repeat MIGDALIA once BC clear   - Consider Derm consult for chronic thoracic skin lesions    - Remainder of cares per primary team  - CV surgery will continue to follow until surgical candidacy/timing can be determined.         Jesscia Lynch PA-C  [facility] Cardiothoracic Surgery  Pager: [phone]  August 20, 2024

## 2024-08-20 NOTE — PRE-PROCEDURE
GENERAL PRE-PROCEDURE:   Procedure:  Coronary angiogram, possible percutaneous coronary  Date/Time:  8/20/2024 9:34 AM    Written consent obtained?: Yes    Risks and benefits: Risks, benefits and alternatives were discussed    Consent given by:  Patient  Patient states understanding of procedure being performed: Yes    Patient's understanding of procedure matches consent: Yes    Procedure consent matches procedure scheduled: Yes    Expected level of sedation:  Moderate  Appropriately NPO:  Yes  ASA Class:  4 (Severe aortic stenosis, aortic valve endocarditis, MSSA bacteremia, HTN, HLD, tobacco use disorder, CKD stage IIIb, anemia chronic back pain)  Mallampati  :  Grade 2- soft palate, base of uvula, tonsillar pillars, and portion of posterior pharyngeal wall visible  Lungs:  Lungs clear with good breath sounds bilaterally  Heart:  Systolic murmur  History & Physical reviewed:  History and physical reviewed and updates made (see comment)  H&P Comments:  Clinically Significant Risk Factors Present on Admission    Cardiovascular : valvular heart disease with severe aortic stenosis and  moderate-severe mitral valve stenosis; complicated by aortic and mitral valve vegetations and MSSA bacteremia, HTN, HLD, tobacco use disorder    Fluid & Electrolyte Disorders : Not present on admission    Gastroenterology : Not present on admission    Hematology/Oncology : Anemia; in the setting of CKD and aortic stenosis     Nephrology : Not present on admission    Neurology : Not present on admission    Pulmonology : tobacco use disorder    Systemic : chronic back pain    Patient is a DNR/DNI and understands this will be placed on hold for today's procedure  Statement of review:  I have reviewed the lab findings, diagnostic data, medications, and the plan for sedation

## 2024-08-20 NOTE — PROGRESS NOTES
Interventional Cardiology    Chart reviewed. Patient seen and examined.   Procedure discussed with patient  and daughter at bedside.  Questions answered.   Right radial pulse good.  Agree with assessment and plan outlined by ROD Ye CNP  Will proceed with study as planned.  Den Wylie MD on 8/20/2024 at 1:00 PM

## 2024-08-20 NOTE — PROGRESS NOTES
[facility]    Medicine Progress Note - Hospitalist Service    Date of Admission:  8/16/2024    Assessment & Plan   85 yo female with history of chronic back pain, furunculosis, uterine prolapse, and tobacco use presents 8/14/24 with sepsis secondary to MSSA bacteremia and HAMMAD. MIGDALIA pos for mitral and aortic valvular vegetations. Transferred to [facility] 8/16/24 for CT surgery evaluation.          Sepsis due to MSSA bacteremia  MIGDALIA shows large posterior leaflet mitral valve vegetation and small aortic valve vegetation with severe aortic stenosis and mitral regurgitation  LVEF 55-60%  Found to have small stroke likely related to septic emboli  Also with slight volume overload likely related to valvular heart disease and diastolic CHF exacerbation  -- cardiology, ID, and CTS following  -- continue IV nafcillin, repeat blood cultures until cleared, last + blood culture 8/16 for now  -- plan coronary CTA per cardiology today, results still pending  -- defer diuresis to cardiology  -- defer derm consultation for history of chronic thoracic skin lesions as derm is not available here  -- defer to cardiology/CTS regarding timing of repeat MIGDALIA  -- defer timing of valve surgery to CTS pending coronary cath results and blood culture clearance        Acute and subacute CVA: likely related to septic emboli  MRI brain shows 13 mm focus of cortical signal abnormality within the left superior parietal lobule which is favored to represent a subacute infarct, smaller foci of signal abnormality with similar characteristics favored to represent punctate subacute infarcts   -- neurology obtained CTA head and neck showing no hemorrhage, there is intracranial atherosclerosis, moderate to marked stenosis of the right P2 posterior cerebral artery segment. No definite proximal branch occlusion, aneurysm, or high flow vascular malformation identified. Atherosclerotic plaque at the carotid bifurcations bilaterally  without hemodynamically significant stenosis in the neck vessels.   No evidence for dissection.   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 Radiology commented on a left L4-5 degenerative facet with findings that could not entirely rule out septic arthritis.   Neck pain is chronic and has no new or worsening symptoms  Neurosurgery recommends non operative management and to obtain repeat MRI with and w/o contrast if neck pain worsens beyond chronic baseline despite antibiotics and treatment of cardiac vegetations  -- supportive cares  -- continue lidocaine cream, tramadol and tylenol for pain control and headache control        Hyperglycemia most likely secondary to prediabetes  Hemoglobin A1c 6.4  -- Monitor glucoses intermittently           Dilutional anemia  Thrombocytopenia in the setting of sepsis  Hemoglobin trended down from 13.1-10 and now stable      Hypokalemia: replaced      Hypomagnesemia: replace and recheck      Hypophosphatemia: replaced       Diet: Snacks/Supplements Adult: Ensure Enlive; With Meals  Room Service  Fluid restriction 1800 ML FLUID  Advance Diet as Tolerated: Clear Liquid Diet    DVT Prophylaxis: Heparin SQ  Le Catheter: Not present  Lines: None     Cardiac Monitoring: ACTIVE order. Indication: Post- PCI/Angiogram (24 hours)  Code Status: Full Code      Clinically Significant Risk Factors        # Hypokalemia: Lowest K = 3.2 mmol/L in last 2 days, will replace as needed     # Hypomagnesemia: Lowest Mg = 1.6 mg/dL in last 2 days, will replace as needed    # Coagulation Defect: INR = 1.18 (Ref range: 0.85 - 1.15) and/or PTT = N/A, will monitor for bleeding              #  "Overweight: Estimated body mass index is 29.74 kg/m  as calculated from the following:    Height as of this encounter: 1.702 m (5' 7\").    Weight as of this encounter: 86.1 kg (189 lb 14.4 oz)., PRESENT ON ADMISSION       # Financial/Environmental Concerns:                 Disposition Plan   Medically Ready for Discharge: Anticipated in 2-4 Days      Erick Patino DO  Hospitalist Service  [facility]  Securely message with [employer] (more info)  Text page via [employer] Paging/Directory   ______________________________________________________________________    Interval History   NAD. Denies any current complaints    Physical Exam   Vital Signs: Temp: 99.5  F (37.5  C) Temp src: Oral BP: 127/64 Pulse: 85   Resp: 18 SpO2: 94 % O2 Device: None (Room air) Oxygen Delivery: 2 LPM  Weight: 189 lbs 14.4 oz  General: NAD  RESPIRATORY: Breathing nonlabored  CARDIOVASCULAR: Trace le edema bilat.   NEUROLOGIC: Motor and sensory intact, speech clear        >45 MINUTES SPENT BY ME on the date of service doing chart review, history, exam, documentation & further activities per the note.  Data       "

## 2024-08-20 NOTE — PLAN OF CARE
Problem: Pain Acute  Goal: Optimal Pain Control and Function  Outcome: Progressing  Intervention: Develop Pain Management Plan  Recent Flowsheet Documentation  Taken 8/20/2024 0000 by Lissett Alexander RN  Pain Management Interventions: medication (see MAR)  Intervention: Prevent or Manage Pain  Recent Flowsheet Documentation  Taken 8/20/2024 0400 by Lissett Alexander RN  Sensory Stimulation Regulation:   lighting decreased   quiet environment promoted  Sleep/Rest Enhancement:   noise level reduced   regular sleep/rest pattern promoted   room darkened  Medication Review/Management: medications reviewed  Taken 8/20/2024 0000 by Lissett Alexander RN  Sensory Stimulation Regulation:   lighting decreased   quiet environment promoted  Sleep/Rest Enhancement:   noise level reduced   regular sleep/rest pattern promoted   room darkened  Medication Review/Management: medications reviewed  Intervention: Optimize Psychosocial Wellbeing  Recent Flowsheet Documentation  Taken 8/20/2024 0400 by Lissett Alexander RN  Supportive Measures:   active listening utilized   decision-making supported  Taken 8/20/2024 0000 by Lissett Alexander RN  Supportive Measures:   active listening utilized   decision-making supported     Problem: Gas Exchange Impaired  Goal: Optimal Gas Exchange  Outcome: Progressing  Intervention: Optimize Oxygenation and Ventilation  Recent Flowsheet Documentation  Taken 8/20/2024 0400 by Lissett Alexander RN  Head of Bed (HOB) Positioning: HOB at 30-45 degrees  Taken 8/20/2024 0000 by Lissett Alexander RN  Head of Bed (HOB) Positioning: HOB at 30-45 degrees     Problem: Fluid Volume Excess  Goal: Fluid Balance  Outcome: Progressing  Intervention: Monitor and Manage Hypervolemia  Recent Flowsheet Documentation  Taken 8/20/2024 0400 by Lissett Alexander RN  Fluid/Electrolyte Management: fluids restricted  Taken 8/20/2024 0000 by Lissett Alexander RN  Fluid/Electrolyte Management: fluids  restricted     Goal Outcome Evaluation:  Pt endorses pain in her head that she describes as 7/10 throbbing, generalized all over pain. PRN tylenol given with some relief. NPO since MN in case of any procedure. Continuous IV abx given as scheduled. Pt ambulating independently in room. A/O. VSS. Will continue to monitor and notify MD of any changes.

## 2024-08-20 NOTE — PROGRESS NOTES
ID chart check  Getting angio. Continue nafcillin, will follow please call with questions  Bell Gómez MD  [facility]

## 2024-08-20 NOTE — PLAN OF CARE
Goal Outcome Evaluation:       Returned from cath lab at 1405. VSS. O2 sat on RA was 91%. Right wrist TR band site intact. 10cc air in TR band. C/o headache. Med. X1 with Tylenol 2 tabs PO. On bedrest. Instructed in keeping right arm quiet. Arm board in place. Taking sips H20. Family here. Echo tech here to do echo.

## 2024-08-20 NOTE — PLAN OF CARE
Goal Outcome Evaluation:       VSS. NPO except meds. K+3.4. Given 40meq K-dur PO per order. Consent signed per NP with pt. Given angio packet to review. Report given to CSC nurse. Down to CSC per w/c with chart to be prepped. Saline locked for procedure.

## 2024-08-20 NOTE — PLAN OF CARE
Problem: Cardiac Catheterization (Diagnostic/Interventional)  Goal: Absence of Bleeding  Outcome: Progressing  Goal: Absence of Contrast-Induced Injury  Outcome: Progressing  Goal: Stable Heart Rate and Rhythm  Outcome: Progressing  Intervention: Monitor and Manage Cardiac Rhythm Effect  Recent Flowsheet Documentation  Taken 8/20/2024 1517 by Lena Puente RN  Fluid/Electrolyte Management: fluids restricted  Goal: Absence of Embolism Signs and Symptoms  Outcome: Progressing  Goal: Anesthesia/Sedation Recovery  Intervention: Optimize Anesthesia Recovery  Recent Flowsheet Documentation  Taken 8/20/2024 1517 by Lena Puente RN  Safety Promotion/Fall Prevention:   assistive device/personal items within reach   clutter free environment maintained   nonskid shoes/slippers when out of bed  Reorientation Measures: clock in view  Goal: Absence of Vascular Access Complication  Outcome: Progressing   Goal Outcome Evaluation:    A&O x4. C/o 7/10 generalized headache pain. PRN Tylenol and Tramadol given for pain. The pt said she only had relief from pain with the Tylenol, and that the Tramadol was ineffective altogether. TR band removed this afternoon with no complications. She can make her needs known. Call-light within reach.       Tele- SR w/ 1AVB

## 2024-08-21 LAB
ANION GAP SERPL CALCULATED.3IONS-SCNC: 14 MMOL/L (ref 7–15)
BUN SERPL-MCNC: 11.1 MG/DL (ref 8–23)
CALCIUM SERPL-MCNC: 8.7 MG/DL (ref 8.8–10.4)
CHLORIDE SERPL-SCNC: 99 MMOL/L (ref 98–107)
CREAT SERPL-MCNC: 1.12 MG/DL (ref 0.51–0.95)
EGFRCR SERPLBLD CKD-EPI 2021: 48 ML/MIN/1.73M2
ERYTHROCYTE [DISTWIDTH] IN BLOOD BY AUTOMATED COUNT: 14.6 % (ref 10–15)
GLUCOSE SERPL-MCNC: 92 MG/DL (ref 70–99)
HCO3 SERPL-SCNC: 25 MMOL/L (ref 22–29)
HCT VFR BLD AUTO: 31 % (ref 35–47)
HGB BLD-MCNC: 10 G/DL (ref 11.7–15.7)
MAGNESIUM SERPL-MCNC: 1.7 MG/DL (ref 1.7–2.3)
MCH RBC QN AUTO: 31.3 PG (ref 26.5–33)
MCHC RBC AUTO-ENTMCNC: 32.3 G/DL (ref 31.5–36.5)
MCV RBC AUTO: 97 FL (ref 78–100)
PHOSPHATE SERPL-MCNC: 2.7 MG/DL (ref 2.5–4.5)
PLATELET # BLD AUTO: 271 10E3/UL (ref 150–450)
POTASSIUM SERPL-SCNC: 3.7 MMOL/L (ref 3.4–5.3)
RBC # BLD AUTO: 3.2 10E6/UL (ref 3.8–5.2)
SODIUM SERPL-SCNC: 138 MMOL/L (ref 135–145)
WBC # BLD AUTO: 9.4 10E3/UL (ref 4–11)

## 2024-08-21 PROCEDURE — 99232 SBSQ HOSP IP/OBS MODERATE 35: CPT | Performed by: INTERNAL MEDICINE

## 2024-08-21 PROCEDURE — 250N000009 HC RX 250: Performed by: INTERNAL MEDICINE

## 2024-08-21 PROCEDURE — 36415 COLL VENOUS BLD VENIPUNCTURE: CPT | Performed by: INTERNAL MEDICINE

## 2024-08-21 PROCEDURE — 85027 COMPLETE CBC AUTOMATED: CPT | Performed by: INTERNAL MEDICINE

## 2024-08-21 PROCEDURE — 250N000013 HC RX MED GY IP 250 OP 250 PS 637: Performed by: INTERNAL MEDICINE

## 2024-08-21 PROCEDURE — 250N000011 HC RX IP 250 OP 636: Performed by: STUDENT IN AN ORGANIZED HEALTH CARE EDUCATION/TRAINING PROGRAM

## 2024-08-21 PROCEDURE — 210N000001 HC R&B IMCU HEART CARE

## 2024-08-21 PROCEDURE — 83735 ASSAY OF MAGNESIUM: CPT | Performed by: INTERNAL MEDICINE

## 2024-08-21 PROCEDURE — 84100 ASSAY OF PHOSPHORUS: CPT | Performed by: INTERNAL MEDICINE

## 2024-08-21 PROCEDURE — 80048 BASIC METABOLIC PNL TOTAL CA: CPT | Performed by: INTERNAL MEDICINE

## 2024-08-21 PROCEDURE — 36569 INSJ PICC 5 YR+ W/O IMAGING: CPT

## 2024-08-21 PROCEDURE — 272N000450 HC KIT 4FR POWER PICC SINGLE LUMEN

## 2024-08-21 PROCEDURE — 120N000004 HC R&B MS OVERFLOW

## 2024-08-21 PROCEDURE — 258N000003 HC RX IP 258 OP 636: Performed by: INTERNAL MEDICINE

## 2024-08-21 PROCEDURE — 250N000013 HC RX MED GY IP 250 OP 250 PS 637: Performed by: STUDENT IN AN ORGANIZED HEALTH CARE EDUCATION/TRAINING PROGRAM

## 2024-08-21 PROCEDURE — 99233 SBSQ HOSP IP/OBS HIGH 50: CPT | Performed by: INTERNAL MEDICINE

## 2024-08-21 RX ORDER — LIDOCAINE 40 MG/G
CREAM TOPICAL
Status: DISCONTINUED | OUTPATIENT
Start: 2024-08-21 | End: 2024-08-22

## 2024-08-21 RX ORDER — BUMETANIDE 2 MG/1
2 TABLET ORAL DAILY
Status: DISCONTINUED | OUTPATIENT
Start: 2024-08-21 | End: 2024-08-22

## 2024-08-21 RX ADMIN — BUMETANIDE 2 MG: 2 TABLET ORAL at 09:31

## 2024-08-21 RX ADMIN — ACETAMINOPHEN 650 MG: 325 TABLET ORAL at 05:16

## 2024-08-21 RX ADMIN — ACETAMINOPHEN 650 MG: 325 TABLET ORAL at 13:22

## 2024-08-21 RX ADMIN — ACETAMINOPHEN 650 MG: 325 TABLET ORAL at 09:31

## 2024-08-21 RX ADMIN — ACETAMINOPHEN 650 MG: 325 TABLET ORAL at 18:09

## 2024-08-21 RX ADMIN — HEPARIN SODIUM 5000 UNITS: 10000 INJECTION, SOLUTION INTRAVENOUS; SUBCUTANEOUS at 09:31

## 2024-08-21 RX ADMIN — NAFCILLIN 12 G: 10 INJECTION, POWDER, FOR SOLUTION INTRAVENOUS at 15:51

## 2024-08-21 RX ADMIN — HEPARIN SODIUM 5000 UNITS: 10000 INJECTION, SOLUTION INTRAVENOUS; SUBCUTANEOUS at 22:16

## 2024-08-21 RX ADMIN — ACETAMINOPHEN 650 MG: 325 TABLET ORAL at 22:18

## 2024-08-21 RX ADMIN — LIDOCAINE HYDROCHLORIDE 2 ML: 10 INJECTION, SOLUTION EPIDURAL; INFILTRATION; INTRACAUDAL; PERINEURAL at 18:16

## 2024-08-21 ASSESSMENT — ACTIVITIES OF DAILY LIVING (ADL)
ADLS_ACUITY_SCORE: 30

## 2024-08-21 NOTE — PLAN OF CARE
Problem: Pain Acute  Goal: Optimal Pain Control and Function  Outcome: Progressing  Intervention: Develop Pain Management Plan  Recent Flowsheet Documentation  Taken 8/21/2024 0520 by Lissett Alexander RN  Pain Management Interventions: medication (see MAR)  Taken 8/21/2024 0000 by Lissett Alexander RN  Pain Management Interventions: emotional support  Intervention: Prevent or Manage Pain  Recent Flowsheet Documentation  Taken 8/21/2024 0400 by Lissett Alexander RN  Sensory Stimulation Regulation:   lighting decreased   quiet environment promoted  Sleep/Rest Enhancement:   noise level reduced   regular sleep/rest pattern promoted   room darkened  Medication Review/Management: medications reviewed  Taken 8/21/2024 0000 by Lissett Alexander RN  Sensory Stimulation Regulation:   lighting decreased   quiet environment promoted  Sleep/Rest Enhancement:   noise level reduced   regular sleep/rest pattern promoted   room darkened  Medication Review/Management: medications reviewed  Intervention: Optimize Psychosocial Wellbeing  Recent Flowsheet Documentation  Taken 8/21/2024 0400 by Lissett Alexander RN  Supportive Measures:   active listening utilized   decision-making supported  Taken 8/21/2024 0000 by Lissett Alexander RN  Supportive Measures:   active listening utilized   decision-making supported     Problem: Fluid Volume Excess  Goal: Fluid Balance  Outcome: Progressing  Intervention: Monitor and Manage Hypervolemia  Recent Flowsheet Documentation  Taken 8/21/2024 0400 by Lissett Alexander RN  Fluid/Electrolyte Management: fluids restricted  Taken 8/21/2024 0000 by Lissett Alexander RN  Fluid/Electrolyte Management: fluids restricted     Problem: Cardiac Catheterization (Diagnostic/Interventional)  Goal: Stable Heart Rate and Rhythm  Outcome: Progressing  Intervention: Monitor and Manage Cardiac Rhythm Effect  Recent Flowsheet Documentation  Taken 8/21/2024 0400 by Lissett Alexander  RN  Fluid/Electrolyte Management: fluids restricted  Taken 8/21/2024 0000 by Lissett Alexander RN  Fluid/Electrolyte Management: fluids restricted     Problem: Cardiac Catheterization (Diagnostic/Interventional)  Goal: Absence of Vascular Access Complication  Outcome: Progressing  Intervention: Prevent and Manage Access Complications  Recent Flowsheet Documentation  Taken 8/21/2024 0400 by Lissett Alexander RN  Activity Management: activity adjusted per tolerance  Head of Bed (HOB) Positioning: HOB at 20-30 degrees  Taken 8/21/2024 0000 by Lissett Alexander RN  Activity Management: activity adjusted per tolerance  Head of Bed (HOB) Positioning: HOB at 20-30 degrees     Goal Outcome Evaluation:  Pt endorses pain in her back (per pt from chronic arthritis) that she says travels up into her head and contributes to her headaches. PRN tylenol given with good relief. Right radial site WDL with pulses intact. Nafcillin cont infusion as scheduled. 1800 FR maintained. Up in chair early this morning. NSR. A/O. VSS. Will continue to monitor and notify MD of any changes.

## 2024-08-21 NOTE — PROGRESS NOTES
Care Management Follow Up    Length of Stay (days): 5    Expected Discharge Date: 08/24/2024    Anticipated Discharge Plan:  Home, Home Care    Transportation: TBD    PT Recommendations:    OT Recommendations:        Barriers to Discharge: medical stability    Prior Living Situation:   with       Patient/Spokesperson Updated: No    Additional Information:  Chart reviewed. Per ID will need 6 weeks IV abx. Per [facility] and [facility] patient's insurance will not cover home infusion.     Patient now on Nafcillin continuous. This is a barrier for outpatient infusion/ TCU placement for discharge. RNCM submitted referral to [facility] - uncertain if patient would qualify for this. Also awaiting PT OT consults for medical stability to assist with discharge planning.     Sylvia Reyes RN

## 2024-08-21 NOTE — PROCEDURES
"PICC Line Insertion Procedure Note  Pt. Name: Felicitas Elliott  MRN:        [id number]    Procedure: Insertion of a  single Lumen  4 fr  Bard SOLO (valved) Power PICC, Lot number [id number]    Indications: Long term Antibiotic    Contraindications : none    Procedure Details     Patient identified with 2 identifiers and \"Time Out\" conducted.  .     Central line insertion bundle followed: hand hygiene performed prior to procedure, site cleansed with cholraprep, hat, mask, sterile gloves, sterile gown worn, patient draped with maximum barrier head to toe drape, sterile field maintained.    The vein was assessed and found to be compressible and of adequate size.     Lidocaine 1% 2 ml administered sq to the insertion site. A 4 Fr PICC was inserted into the basilic vein of the right arm with ultrasound guidance. 1 attempt(s) required to access vein.   Catheter threaded without difficulty. Good blood return noted.    Modified Seldinger Technique used for insertion.    The 8 sharps that are included in the PICC insertion kit were accounted for and disposed of in the sharps container prior to breakdown of the sterile field.    Catheter secured with Statlock, biopatch and Tegaderm dressing applied.    Findings:    Total catheter length  40 cm, with 0 cm exposed. Mid upper arm circumference is 31 cm. Catheter was flushed with 30 cc NS. Patient  tolerated procedure well.    Tip placement verified by 3CG technology . Tip placement in the SVC.    CLABSI prevention brochure left at bedside.    Patient's primary RN notified PICC is ready for use.      Comments:        Dany Corneliustadell RN        "

## 2024-08-21 NOTE — PLAN OF CARE
Problem: Sepsis/Septic Shock  Goal: Optimal Coping  8/21/2024 1838 by eBcky Sage RN  Outcome: Progressing  8/21/2024 1837 by Becky Sage RN  Outcome: Progressing  Intervention: Optimize Psychosocial Adjustment to Illness  Recent Flowsheet Documentation  Taken 8/21/2024 1540 by Becky Sage RN  Supportive Measures:   active listening utilized   decision-making supported  Taken 8/21/2024 1326 by Becky Sage RN  Supportive Measures:   active listening utilized   decision-making supported  Taken 8/21/2024 0800 by Becky Sage RN  Supportive Measures:   active listening utilized   decision-making supported  Goal: Absence of Bleeding  8/21/2024 1838 by Becky Sage RN  Outcome: Progressing  8/21/2024 1837 by Becky Sage RN  Outcome: Progressing  Goal: Blood Glucose Level Within Targeted Range  8/21/2024 1838 by Becky Sage RN  Outcome: Progressing  8/21/2024 1837 by Becky Sage RN  Outcome: Progressing  Goal: Absence of Infection Signs and Symptoms  Outcome: Progressing  Intervention: Initiate Sepsis Management  Recent Flowsheet Documentation  Taken 8/21/2024 1540 by Becky Sage RN  Infection Prevention:   hand hygiene promoted   rest/sleep promoted  Taken 8/21/2024 1326 by Becky Sage RN  Infection Prevention:   hand hygiene promoted   rest/sleep promoted  Taken 8/21/2024 0800 by Becky Sage RN  Infection Prevention:   hand hygiene promoted   rest/sleep promoted  Intervention: Promote Stabilization  Recent Flowsheet Documentation  Taken 8/21/2024 1540 by Becky Sage RN  Fluid/Electrolyte Management: fluids restricted  Taken 8/21/2024 1326 by Becky Sage RN  Fluid/Electrolyte Management: fluids restricted  Taken 8/21/2024 0800 by Becky Sage RN  Fluid/Electrolyte Management: fluids restricted  Intervention: Promote Recovery  Recent Flowsheet Documentation  Taken 8/21/2024 1540 by Becky Sage RN  Sleep/Rest Enhancement:   noise  level reduced   regular sleep/rest pattern promoted   room darkened  Taken 8/21/2024 1326 by Becky Sage RN  Sleep/Rest Enhancement:   noise level reduced   regular sleep/rest pattern promoted   room darkened  Activity Management: activity adjusted per tolerance  Taken 8/21/2024 0800 by Becky Sage RN  Sleep/Rest Enhancement:   noise level reduced   regular sleep/rest pattern promoted   room darkened  Activity Management: activity adjusted per tolerance     Problem: Pain Acute  Goal: Optimal Pain Control and Function  Outcome: Progressing  Intervention: Develop Pain Management Plan  Recent Flowsheet Documentation  Taken 8/21/2024 1835 by Becky Sage RN  Pain Management Interventions: heat applied  Taken 8/21/2024 1809 by Becky Sage RN  Pain Management Interventions: medication (see MAR)  Taken 8/21/2024 1537 by Becky Sage RN  Pain Management Interventions: heat applied  Taken 8/21/2024 1423 by Becky Sage RN  Pain Management Interventions: heat applied  Taken 8/21/2024 1322 by Becky Sage RN  Pain Management Interventions: medication (see MAR)  Taken 8/21/2024 1031 by Becky Sage RN  Pain Management Interventions: heat applied  Taken 8/21/2024 0931 by Becky Sage RN  Pain Management Interventions:   medication (see MAR)   MD notified (comment)  Taken 8/21/2024 0749 by Becky Sage RN  Pain Management Interventions: distraction  Intervention: Prevent or Manage Pain  Recent Flowsheet Documentation  Taken 8/21/2024 1540 by Becyk Sage RN  Sensory Stimulation Regulation:   lighting decreased   quiet environment promoted  Sleep/Rest Enhancement:   noise level reduced   regular sleep/rest pattern promoted   room darkened  Medication Review/Management: medications reviewed  Taken 8/21/2024 1326 by Becky Sage RN  Sensory Stimulation Regulation:   lighting decreased   quiet environment promoted  Sleep/Rest Enhancement:   noise level reduced   regular  sleep/rest pattern promoted   room darkened  Medication Review/Management: medications reviewed  Taken 8/21/2024 0800 by Becky Sage, RN  Sensory Stimulation Regulation:   lighting decreased   quiet environment promoted  Sleep/Rest Enhancement:   noise level reduced   regular sleep/rest pattern promoted   room darkened  Medication Review/Management: medications reviewed  Intervention: Optimize Psychosocial Wellbeing  Recent Flowsheet Documentation  Taken 8/21/2024 1540 by Becky Sage, RN  Supportive Measures:   active listening utilized   decision-making supported  Taken 8/21/2024 1326 by Becky Sage RN  Supportive Measures:   active listening utilized   decision-making supported  Taken 8/21/2024 0800 by Becky Sage RN  Supportive Measures:   active listening utilized   decision-making supported   Goal Outcome Evaluation:  Patient to have CT TAVR angiogram to see if TAVR is even an option would need to go [facility] to have done. Patient had single lumen PICC placed today due to two iv's infiltrating. One on upper arm painful slightly red improved with ice. Continue to monitor. Strict I/O. Chronic back pain, requesting tylenol every 4 hours. T pump heating pump  improved pain from 6 to 4.

## 2024-08-21 NOTE — PROGRESS NOTES
HEART CARE NOTE          Assessment/Recommendations   1. Severe valvular disease c/b ever HFpEF  Assessment / Plan  Transition to oral diuretic regimen and continue to monitor renal function/repeat BMP, UOP and hemodynamics closely    Patient is high risk for adverse cardiac events 2/2 advanced age, frailty, valvular heart disease, renal dysfunction  GDMT as detailed below; mainstay of treatment for HFpEF includes diuretics and adequate BP control (class I) and SGLT2-I (class 2a); additional medical therapy (ARNI, MRA, ARB) demonstrated less robust evidence for indication but may be considered per guideline recommendations (2b); no indication for BBlockers      Current Pharmacotherapy AHA Guideline-Directed Medical Therapy   Losartan  - not started 2/2 renal dysfunction ARNI/ARB   Spironolactone not started  MRA   SGLT2 inhibitor: not started SGLT2-I    Bumex 2 mg BID Loop diuretic       2. Valvular heart disease  Assessment / Plan  Severe aortic stenosis and mod-severe MS c/b MMSA bacteremia and MV leaflet vegetation - management per CT surgery, neurology and ID - awaiting final recs regarding staged PCI vs MVR + AVR + CABG     3. 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 Anemia  Assessment / Plan  Management and supportive care per primary team     5. CAD  Assessment / Plan  Coronary angiogram significant for severe prox LAD and ost-prox Lcx lesions (both amenable to PCI) with mod D2 - awaiting final CTS recs    Plan of care discussed on August 21, 2024 with patient and family at bedside, and primary team overseeing patient's care      History of Present Illness/Subjective    Ms. Felicitas Elliott is a 84 year old female with a PMHx significant for (per Epic notation) presented with fatigue, weakness, chills, poor appetite. Does not really have much for chronic medical conditions other than chronic back pain, furunculosis, uterine prolapse,  "tobacco use     Today, Mrs. Elliott denies acute cardiac events or complaints; Management plan as detailed above     ECG: personally reviewed; normal sinus rhythm, nonspecific ST and T waves changes, RBBB, left axis deviation.     ECHO (personnaly Reviewed on 8/19/24):   1. The left ventricle is normal in size. Left ventricular function is  normal.The ejection fraction is 55-60%.  2. Normal right ventricle size and systolic function.  3. Large vegetation on mitral valve (8 mm x 15) attached to posterior leaflet.  4. There is a small vegetation on the aortic valve (6 mm). No evidence of  aortic root abscess identified.  5. Severe valvular aortic stenosis (SHU:0.8 cm2, peak nomi: 4.6 m/sec, mean  gradient: 51 mmHg).    Telemetry: personally reviewed August 21, 2024; notable for sinus     Lab results: personally reviewed August 21, 2024; notable for resolving HAMMAD    Medical history and pertinent documents reviewed in Care Everywhere please where applicable see details above        Physical Examination Review of Systems   BP (!) 145/76 (BP Location: Left arm)   Pulse 82   Temp 98.3  F (36.8  C) (Oral)   Resp 20   Ht 1.702 m (5' 7\")   Wt 86.1 kg (189 lb 14.4 oz)   SpO2 92%   BMI 29.74 kg/m    Body mass index is 29.74 kg/m .  Wt Readings from Last 3 Encounters:   08/20/24 86.1 kg (189 lb 14.4 oz)   08/15/24 90.1 kg (198 lb 9.6 oz)     General Appearance:   no distress, normal body habitus   ENT/Mouth: membranes moist, no oral lesions or bleeding gums.      EYES:  no scleral icterus, normal conjunctivae   Neck: no carotid bruits or thyromegaly   Chest/Lungs:   lungs are clear to auscultation, no rales or wheezing,  equal chest wall expansion    Cardiovascular:   Regular. Normal first and second heart sounds with +PAVAN; no rubs, or gallops; the carotid, radial and posterior tibial pulses are intact, no JVD and trace LE edema bilaterally    Abdomen:  no organomegaly, masses, bruits, or tenderness; bowel sounds are present "   Extremities: no cyanosis or clubbing   Skin: no xanthelasma, warm.    Neurologic: NAD     Psychiatric: alert and oriented x3, calm     A complete 10 systems ROS was reviewed  And is negative except what is listed in the HPI.          Medical History  Surgical History Family History Social History   No past medical history on file. No past surgical history on file. no family history of premature coronary artery disease Social History     Socioeconomic History    Marital status:      Spouse name: Not on file    Number of children: Not on file    Years of education: Not on file    Highest education level: Not on file   Occupational History    Not on file   Tobacco Use    Smoking status: Not on file    Smokeless tobacco: Not on file   Substance and Sexual Activity    Alcohol use: Not on file    Drug use: Not on file    Sexual activity: Not on file   Other Topics Concern    Not on file   Social History Narrative    Not on file     Social Determinants of Health     Financial Resource Strain: High Risk (1/1/2022)    Received from [facility]    Financial Resource Strain     Difficulty of Paying Living Expenses: Not on file     Difficulty of Paying Living Expenses: Not on file   Food Insecurity: Not on file   Transportation Needs: Not on file   Physical Activity: Not on file   Stress: Not on file   Social Connections: Unknown (1/3/2024)    Received from [facility]    Social Connections     Frequency of Communication with Friends and Family: Not on file   Interpersonal Safety: Not on file   Housing Stability: Not on file           Lab Results    Chemistry/lipid CBC Cardiac Enzymes/BNP/TSH/INR   Lab Results   Component Value Date    BUN 11.1 08/21/2024     08/21/2024    CO2 25 08/21/2024    Lab Results   Component Value Date    WBC 9.4 08/21/2024    HGB 10.0 (L) 08/21/2024    HCT 31.0 (L) 08/21/2024    MCV 97 08/21/2024     08/21/2024     "Lab Results   Component Value Date    INR 1.18 (H) 08/20/2024     No results found for: \"CKTOTAL\", \"CKMB\", \"TROPONINI\"       Weight:    Wt Readings from Last 3 Encounters:   08/20/24 86.1 kg (189 lb 14.4 oz)   08/15/24 90.1 kg (198 lb 9.6 oz)       Allergies  Allergies   Allergen Reactions    Aspirin Rash    Cats Rash         Surgical History  No past surgical history on file.    Social History  Tobacco:   History   Smoking Status    Not on file   Smokeless Tobacco    Not on file    Alcohol:   Social History    Substance and Sexual Activity      Alcohol use: Not on file   Illicit Drugs:   History   Drug Use Not on file       Family History  No family history on file.       Navneet Branch MD on 8/21/2024      cc: Clinic, [facility]    "

## 2024-08-21 NOTE — PROGRESS NOTES
[facility]    Medicine Progress Note - Hospitalist Service    Date of Admission:  8/16/2024    Assessment & Plan   83 yo female with history of chronic back pain, furunculosis, uterine prolapse, and tobacco use presents 8/14/24 with sepsis secondary to MSSA bacteremia and HAMMAD. MIGDALIA pos for mitral and aortic valvular vegetations. Transferred to [facility] 8/16/24 for CT surgery evaluation.      Sepsis due to MSSA bacteremia  Bacterial endocarditis  MIGDALIA shows large posterior leaflet mitral valve vegetation and small aortic valve vegetation with severe aortic stenosis and mitral regurgitation  LVEF 55-60%  Found to have small stroke likely related to septic emboli  Also with slight volume overload likely related to valvular heart disease and diastolic CHF exacerbation  -- cardiology, ID, and CTS following  -- continue IV nafcillin, repeat blood cultures until cleared, last + blood culture 8/16 for now  -- coronary angiogram 8/20 showed 70% prox LAD and 90% ost Cx to Prox Cx.  -- defer diuresis to cardiology. Changed to PO bumex 8/21  -- defer derm consultation for history of chronic thoracic skin lesions as derm is not available here  -- defer to cardiology/CTS regarding timing of repeat MIGDALIA  -- defer timing of valve surgery to CTS pending coronary cath results and blood culture clearance     Acute and subacute CVA: likely related to septic emboli  MRI brain shows 13 mm focus of cortical signal abnormality within the left superior parietal lobule which is favored to represent a subacute infarct, smaller foci of signal abnormality with similar characteristics favored to represent punctate subacute infarcts   -- neurology obtained CTA head and neck showing no hemorrhage, there is intracranial atherosclerosis, moderate to marked stenosis of the right P2 posterior cerebral artery segment. No definite proximal branch occlusion, aneurysm, or high flow vascular malformation identified. Atherosclerotic  plaque at the carotid bifurcations bilaterally without hemodynamically significant stenosis in the neck vessels.   No evidence for dissection.   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 Radiology commented on a left L4-5 degenerative facet with findings that could not entirely rule out septic arthritis.   Neck pain is chronic and has no new or worsening symptoms  Neurosurgery recommends non operative management and to obtain repeat MRI with and w/o contrast if neck pain worsens beyond chronic baseline despite antibiotics and treatment of cardiac vegetations  -- supportive cares  -- continue lidocaine cream, tramadol and tylenol for pain control and headache control     Hyperglycemia most likely secondary to prediabetes  Hemoglobin A1c 6.4  -- Monitor glucoses intermittently      Dilutional anemia  Thrombocytopenia in the setting of sepsis  Hemoglobin trended down from 13.1-10 and now stable     Hypokalemia: replaced     Hypomagnesemia: replace and recheck     Hypophosphatemia: replaced             Diet: Snacks/Supplements Adult: Ensure Enlive; With Meals  Room Service  Fluid restriction 1800 ML FLUID  Advance Diet as Tolerated: Regular Diet Adult    DVT Prophylaxis: Heparin SQ  Le Catheter: Not present  Lines: None     Cardiac Monitoring: ACTIVE order. Indication: Post- PCI/Angiogram (24 hours)  Code Status: Full Code      Clinically Significant Risk Factors            # Hypomagnesemia: Lowest Mg = 1.6 mg/dL in last 2 days, will replace as needed    # Coagulation Defect: INR = 1.18 (Ref range: 0.85 - 1.15) and/or PTT = N/A, will monitor for bleeding              # Obesity: Estimated body mass index is  "30.02 kg/m  as calculated from the following:    Height as of this encounter: 1.702 m (5' 7\").    Weight as of this encounter: 87 kg (191 lb 11.2 oz).      # Financial/Environmental Concerns:                 Disposition Plan     Medically Ready for Discharge: Anticipated in 2-4 Days             SERENA Pyle  Hospitalist Service  [facility]  Securely message with [employer] (more info)  Text page via [employer] Paging/Directory   ______________________________________________________________________    Interval History   Patient is new to me today. Sitting up in a recliner. Reported doing well with no chest pain, shortness of breath or palpitations at rest. No fever or chills. Surrounded by several family members; I updated them and answered their questions to the best of my abilities.     Physical Exam   Vital Signs: Temp: 98.6  F (37  C) Temp src: Oral BP: 97/53 Pulse: 88   Resp: 20 SpO2: 94 % O2 Device: None (Room air)    Weight: 191 lbs 11.2 oz      General: Not in obvious distress.  HEENT: NC, AT   Chest: Clear to auscultation bilaterally  Heart: S1S2 normal, regular. Systolic murmur noted  Abdomen: Soft. NT, ND. Bowel sounds- active.  Extremities: Trace leg swelling bilaterally  Neuro: Alert and awake, grossly non-focal      Medical Decision Making             Data     I have personally reviewed the following data over the past 24 hrs:    9.4  \   10.0 (L)   / 271     138 99 11.1 /  92   3.7 25 1.12 (H) \       Imaging results reviewed over the past 24 hrs:   No results found for this or any previous visit (from the past 24 hour(s)).  "

## 2024-08-21 NOTE — PLAN OF CARE
Patient was seen and examined by me.  She presented with a MSSA bacteremia and was found to have vegetations on her aortic and mitral valves.  The vegetation on the mitral valve is large.  She appears to have had a subacute infarct related to same.  She is on Nafcillin at the present time.  Her work-up has included a coronary angiogram done yesterday.  It revealed double vessel coronary artery disease involving the LAD and LCX systems.  Her STS calculated operative mortality is 12%.  This does not include the calcific involvement of her ascending aorta.  Dr. Briggs suggested that a BAV could be done and after some period of antibiotics a TAVR procedure could be done of the aortic valve.  We would have to hope that the mitral lesion would heal.  Additionally she would require stents in her LAD and LCX.  She would like to not have open heart surgery unless it is her only option.  I spoke to Dr. Branch and she will make arrangements for a CT TAVR angiogram to see if TAVR is even an option.  Dr. Barajas is also aware of this patient.

## 2024-08-21 NOTE — PROGRESS NOTES
"[facility] Inpatient follow up       Patient:  Felicitas Elliott  Date of birth 1940, Medical record number [id number]  Date of Visit:  08/21/2024  Attending Physician: Erick Patino DO         Assessment and Recommendations:   Assessment:  Felicitas Elliott is a 84 year old female with   History of severe aortic stenosis.  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  MSSA bacteremia.  Positive blood culture on 8/14/2024 and 8/16.  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   Mitral and aortic valve endocarditis as evidenced with large vegetation on mitral valve (8 mm x 15) attached to posterior leaflet and a small vegetation on the aortic valve.  With the size of the vegetation combined with brain infarct, surgery is indicated.  Neck pain worrisome for cervical discitis.  Neck MRI showed some edema at C4-C5 on the left side.  No clear evidence of infection.  Abnormal brain MRI suggestive of subacute infarct. In a patient with MSSA bacteremia and aortic valve endocarditis, this is cerebral septic emboli. Active issue.     Recommendations:  continue IV nafcillin continuous infusion.  Monitor kidney function.  Daily blood cultures until negative-last positive 8/16 so far  Ok for single lumen PICC anytime from ID standpoint  Valve surgery is being arranged-timing TBD.  Will need to check immunoglobin level down the road. Sister with history of hypogammaglobulinemia   Overall will require at least 6 weeks of IV nafcillin      Bell Gómez MD.  [facility].   [facility]  Office Telephone [phone].  Fax [phone]  [facility] paging            Interval History:     HPI:  The interval history was reviewed.   Tolerating antibiotics. Has visitors. Eating donuts.     Pertinent cultures include:  No results found for: \"CULT\"    Recent Inflammatory Biomarkers:   Recent Labs   Lab Test 08/21/24  0444 08/18/24  0455 08/17/24  0428 " 24  0557 24  0254   WBC 9.4 7.5 7.5 7.3 10.4            Review of Systems:   CONSTITUTIONAL:    Temp Max: Temp (24hrs), Av.8  F (37.1  C), Min:97.9  F (36.6  C), Max:100.3  F (37.9  C)   .  Negative except for findings in the HPI.           Current Medications (antimicrobials listed in bold):     Current Facility-Administered Medications   Medication Dose Route Frequency Provider Last Rate Last Admin    bumetanide (BUMEX) tablet 2 mg  2 mg Oral Daily Navneet Branch MD   2 mg at 24 0931    heparin ANTICOAGULANT injection 5,000 Units  5,000 Units Subcutaneous BID Gondal, Saad J, MD   5,000 Units at 24 0931    nafcillin 12 g in sodium chloride 0.9 % 550 mL continuous infusion  12 g Intravenous Q24H Erick Patino DO   12 g at 24 1607    sodium chloride (PF) 0.9% PF flush 3 mL  3 mL Intracatheter Q8H Gondal, Saad J, MD   3 mL at 24 1618              Allergies:     Allergies   Allergen Reactions    Aspirin Rash    Cats Rash            Physical Exam:   Vitals were reviewed  Patient Vitals for the past 24 hrs:   BP Temp Temp src Pulse Resp SpO2 Weight   24 0749 133/79 98.2  F (36.8  C) Oral 79 18 95 % --   24 0646 -- -- -- -- -- -- 87 kg (191 lb 11.2 oz)   24 0513 (!) 145/76 98.3  F (36.8  C) Oral 82 20 92 % --   24 0009 129/71 98.9  F (37.2  C) Oral 80 20 90 % --   24 1914 127/70 99.4  F (37.4  C) Oral 90 19 91 % --   24 1545 129/67 -- -- 84 -- -- --   24 1530 122/60 -- -- 84 -- -- --   24 1517 127/64 99.5  F (37.5  C) Oral 85 18 94 % --   24 1500 136/76 -- -- 84 -- 94 % --   24 1450 132/69 -- -- -- -- 91 % --   24 1435 136/74 -- -- 85 -- 92 % --   24 1420 138/73 -- -- 85 -- 90 % --   24 1405 (!) 142/70 99.1  F (37.3  C) Oral 87 26 91 % --       Physical Examination:  Gen: Pleasant in no acute distress. In chair.   HEENT: NCAT.   Lungs: no respiratory distress  Skin: No rash.  Extr: No  edema.  Neuro: Alert and oriented to place time and person.            Laboratory Data:   ID Labs:  Microbiology labs:  Reviewed      No lab results found.  Recent Labs   Lab Test 08/21/24 0444 08/18/24 0455 08/17/24  0428 08/16/24  0557 08/14/24  0254   WBC 9.4 7.5 7.5 7.3 10.4     Recent Labs   Lab Test 08/21/24  0444 08/20/24  0519 08/19/24  0613 08/18/24  0455   CR 1.12* 1.27* 1.33* 1.22*   GFRESTIMATED 48* 41* 39* 44*       Hematology Studies  Recent Labs   Lab Test 08/21/24 0444 08/18/24  0455 08/17/24  0428 08/16/24  0557 08/14/24  0254   WBC 9.4 7.5 7.5 7.3 10.4   HGB 10.0* 10.4* 10.3* 10.8* 13.1   HCT 31.0* 31.6* 31.4* 32.6* 39.0    126* 105* 95* 142*       Metabolic  Recent Labs   Lab Test 08/21/24 0444 08/20/24  0519 08/19/24  0613    139 140   BUN 11.1 12.3 13.3   CO2 25 26 28   CR 1.12* 1.27* 1.33*   GFRESTIMATED 48* 41* 39*       Hepatic Studies  Recent Labs   Lab Test 08/14/24 0254   BILITOTAL 1.0   ALKPHOS 47   ALBUMIN 4.1   AST 36   ALT 19       ImmunologlobulinsNo lab results found.         Imaging Data:   Reviewed     Study Result    Narrative & Impression   EXAM: MR BRAIN W/O and W CONTRAST  LOCATION: [facility]  DATE: 8/17/2024     INDICATION: Headache in a patient with MSSA bacteremia secondary to aortic valve endocarditis.  Look for cerebral septic emboli; Headache; Acute HA (< 3 months), no complicating features  COMPARISON: None.  CONTRAST: 9 ml gadavist  TECHNIQUE: Routine multiplanar multisequence head MRI without and with intravenous contrast.     FINDINGS: Assessment degraded due to motion.  INTRACRANIAL CONTENTS:   Apparent focus of diffusion restriction with T2/FLAIR hyperintensity within the left superior parietal lobule cortex is hyperintense on ADC map, representing T2 shine through.  Focus of signal abnormality measures 13 x 9 mm in the axial plane and does   not have internal enhancement.     Additional punctate foci of hyperintensity on  diffusion weighted with similar signal characteristics (periventricular right corona radiata, left superior parietal lobule, and left cerebellar hemisphere).     Patchy and confluent nonspecific T2/FLAIR hyperintensities within the cerebral white matter most consistent with moderate chronic microvascular ischemic change. Moderate generalized cerebral atrophy. No hydrocephalus. Normal position of the cerebellar   tonsils. No discernible abnormal intracranial enhancement; however, assessment substantially degraded due to motion. 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     SELLA: No abnormality accounting for technique.     OSSEOUS STRUCTURES/SOFT TISSUES: Normal marrow signal. The major intracranial vascular flow voids are maintained.      ORBITS: No abnormality accounting for technique.      SINUSES/MASTOIDS: Mild mucosal thickening scattered about the paranasal sinuses. Scattered fluid/membrane thickening in the left mastoid air cells. No apparent mass in the posterior nasopharynx or skull base.                                                                       IMPRESSION: Assessment degraded due to motion.  1.  13 mm focus of cortical signal abnormality within the left superior parietal lobule which is favored to represent a subacute infarct; low-grade glial neoplasm could conceivably have a similar appearance. Hyperacute intraparenchymal hematoma could   also have a similar appearance but is considered less likely. Recommend noncontrast head CT for further characterization. Also recommend follow-up MRI brain without and with contrast in 8-12 weeks to document expected evolution.  2.  Additional smaller foci of signal abnormality with similar characteristics favored to represent punctate subacute infarcts.

## 2024-08-22 ENCOUNTER — PREP FOR PROCEDURE (OUTPATIENT)
Dept: ADMINISTRATIVE | Facility: CLINIC | Age: 84
End: 2024-08-22
Payer: COMMERCIAL

## 2024-08-22 ENCOUNTER — APPOINTMENT (OUTPATIENT)
Dept: ULTRASOUND IMAGING | Facility: HOSPITAL | Age: 84
DRG: 853 | End: 2024-08-22
Attending: SURGERY
Payer: COMMERCIAL

## 2024-08-22 DIAGNOSIS — I25.10 CAD (CORONARY ARTERY DISEASE): Primary | ICD-10-CM

## 2024-08-22 DIAGNOSIS — I35.0 AS (AORTIC STENOSIS): ICD-10-CM

## 2024-08-22 DIAGNOSIS — I34.0 MITRAL REGURGITATION: ICD-10-CM

## 2024-08-22 LAB
APTT PPP: 37 SECONDS (ref 22–38)
BACTERIA BLD CULT: NO GROWTH
MAGNESIUM SERPL-MCNC: 1.7 MG/DL (ref 1.7–2.3)
PHOSPHATE SERPL-MCNC: 3.2 MG/DL (ref 2.5–4.5)
POTASSIUM SERPL-SCNC: 3.1 MMOL/L (ref 3.4–5.3)
POTASSIUM SERPL-SCNC: 3.3 MMOL/L (ref 3.4–5.3)
POTASSIUM SERPL-SCNC: 3.7 MMOL/L (ref 3.4–5.3)
PREALB SERPL-MCNC: 8.6 MG/DL (ref 20–40)

## 2024-08-22 PROCEDURE — 84132 ASSAY OF SERUM POTASSIUM: CPT | Performed by: INTERNAL MEDICINE

## 2024-08-22 PROCEDURE — 99233 SBSQ HOSP IP/OBS HIGH 50: CPT | Performed by: INTERNAL MEDICINE

## 2024-08-22 PROCEDURE — 250N000011 HC RX IP 250 OP 636: Performed by: STUDENT IN AN ORGANIZED HEALTH CARE EDUCATION/TRAINING PROGRAM

## 2024-08-22 PROCEDURE — 250N000013 HC RX MED GY IP 250 OP 250 PS 637: Performed by: INTERNAL MEDICINE

## 2024-08-22 PROCEDURE — 999N000111 HC STATISTIC OT IP EVAL DEFER

## 2024-08-22 PROCEDURE — 84100 ASSAY OF PHOSPHORUS: CPT | Performed by: INTERNAL MEDICINE

## 2024-08-22 PROCEDURE — 83735 ASSAY OF MAGNESIUM: CPT | Performed by: INTERNAL MEDICINE

## 2024-08-22 PROCEDURE — 99232 SBSQ HOSP IP/OBS MODERATE 35: CPT | Performed by: INTERNAL MEDICINE

## 2024-08-22 PROCEDURE — 84134 ASSAY OF PREALBUMIN: CPT | Performed by: SURGERY

## 2024-08-22 PROCEDURE — 93880 EXTRACRANIAL BILAT STUDY: CPT

## 2024-08-22 PROCEDURE — 250N000009 HC RX 250: Performed by: INTERNAL MEDICINE

## 2024-08-22 PROCEDURE — 120N000004 HC R&B MS OVERFLOW

## 2024-08-22 PROCEDURE — 258N000003 HC RX IP 258 OP 636: Performed by: INTERNAL MEDICINE

## 2024-08-22 PROCEDURE — 250N000013 HC RX MED GY IP 250 OP 250 PS 637: Performed by: STUDENT IN AN ORGANIZED HEALTH CARE EDUCATION/TRAINING PROGRAM

## 2024-08-22 PROCEDURE — 85730 THROMBOPLASTIN TIME PARTIAL: CPT | Performed by: SURGERY

## 2024-08-22 PROCEDURE — 210N000001 HC R&B IMCU HEART CARE

## 2024-08-22 RX ORDER — POTASSIUM CHLORIDE 1500 MG/1
40 TABLET, EXTENDED RELEASE ORAL ONCE
Status: COMPLETED | OUTPATIENT
Start: 2024-08-22 | End: 2024-08-22

## 2024-08-22 RX ORDER — BUMETANIDE 2 MG/1
2 TABLET ORAL
Status: DISCONTINUED | OUTPATIENT
Start: 2024-08-22 | End: 2024-08-25

## 2024-08-22 RX ORDER — METOLAZONE 2.5 MG/1
2.5 TABLET ORAL ONCE
Status: COMPLETED | OUTPATIENT
Start: 2024-08-22 | End: 2024-08-22

## 2024-08-22 RX ADMIN — POTASSIUM CHLORIDE 40 MEQ: 1500 TABLET, EXTENDED RELEASE ORAL at 05:34

## 2024-08-22 RX ADMIN — BUMETANIDE 2 MG: 2 TABLET ORAL at 17:45

## 2024-08-22 RX ADMIN — NAFCILLIN 12 G: 10 INJECTION, POWDER, FOR SOLUTION INTRAVENOUS at 15:13

## 2024-08-22 RX ADMIN — ACETAMINOPHEN 650 MG: 325 TABLET ORAL at 04:35

## 2024-08-22 RX ADMIN — POTASSIUM CHLORIDE 40 MEQ: 1500 TABLET, EXTENDED RELEASE ORAL at 13:26

## 2024-08-22 RX ADMIN — HEPARIN SODIUM 5000 UNITS: 10000 INJECTION, SOLUTION INTRAVENOUS; SUBCUTANEOUS at 10:12

## 2024-08-22 RX ADMIN — METOLAZONE 2.5 MG: 2.5 TABLET ORAL at 06:29

## 2024-08-22 RX ADMIN — ACETAMINOPHEN 650 MG: 325 TABLET ORAL at 23:44

## 2024-08-22 RX ADMIN — BUMETANIDE 2 MG: 2 TABLET ORAL at 08:44

## 2024-08-22 RX ADMIN — ACETAMINOPHEN 650 MG: 325 TABLET ORAL at 19:38

## 2024-08-22 RX ADMIN — ACETAMINOPHEN 650 MG: 325 TABLET ORAL at 13:26

## 2024-08-22 RX ADMIN — ACETAMINOPHEN 650 MG: 325 TABLET ORAL at 08:44

## 2024-08-22 RX ADMIN — HEPARIN SODIUM 5000 UNITS: 10000 INJECTION, SOLUTION INTRAVENOUS; SUBCUTANEOUS at 22:09

## 2024-08-22 ASSESSMENT — ACTIVITIES OF DAILY LIVING (ADL)
ADLS_ACUITY_SCORE: 30

## 2024-08-22 NOTE — PLAN OF CARE
Problem: Sepsis/Septic Shock  Goal: Optimal Coping  Outcome: Progressing  Goal: Absence of Bleeding  Outcome: Progressing  Goal: Blood Glucose Level Within Targeted Range  Outcome: Progressing  Goal: Absence of Infection Signs and Symptoms  Outcome: Progressing  Intervention: Initiate Sepsis Management  Recent Flowsheet Documentation  Taken 8/22/2024 0740 by Becky Sage, RN  Infection Prevention:   hand hygiene promoted   rest/sleep promoted  Goal: Optimal Nutrition Intake  Outcome: Progressing     Problem: Pain Acute  Goal: Optimal Pain Control and Function  Outcome: Progressing   Goal Outcome Evaluation:       Patient seen by dr Barajas. CT cancelled now plan for CABG. PICC in place, continuous nafcillin running. Tylenol and Heating pad PRN for lower back pain. Strict I/O.

## 2024-08-22 NOTE — PROGRESS NOTES
"[facility] Inpatient follow up       Patient:  Felicitas Elliott  Date of birth 1940, Medical record number [id number]  Date of Visit:  08/22/2024  Attending Physician: Erick Patino DO         Assessment and Recommendations:   Assessment:  Felicitas Elliott is a 84 year old female with   History of severe aortic stenosis.  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  MSSA bacteremia.  Positive blood culture on 8/14/2024 and 8/16.  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   Mitral and aortic valve endocarditis as evidenced with large vegetation on mitral valve (8 mm x 15) attached to posterior leaflet and a small vegetation on the aortic valve.  With the size of the vegetation combined with brain infarct, surgery is indicated.  Neck pain worrisome for cervical discitis.  Neck MRI showed some edema at C4-C5 on the left side.  No clear evidence of infection.  Abnormal brain MRI suggestive of subacute infarct. In a patient with MSSA bacteremia and aortic valve endocarditis, this is cerebral septic emboli. Active issue.     Recommendations:  continue IV nafcillin continuous infusion.  Monitor kidney function.  Daily blood cultures until negative-last positive 8/16 so far  Ok for single lumen PICC anytime from ID standpoint  Valve surgery is being arranged-planned 8/26  Will need to check immunoglobin level down the road. Sister with history of hypogammaglobulinemia   Overall will require at least 6 weeks of IV nafcillin      Bell Gómez MD.  [facility].   [facility]  Office Telephone [phone].  Fax [phone]  [facility] paging            Interval History:     HPI:  The interval history was reviewed.   Tolerating antibiotics. She states open heart surgery has been scheduled for Monday.     Pertinent cultures include:  No results found for: \"CULT\"    Recent Inflammatory Biomarkers:   Recent Labs   Lab Test 08/21/24  0444 " 24  0455 24  0428 24  0557 24  0254   WBC 9.4 7.5 7.5 7.3 10.4            Review of Systems:   CONSTITUTIONAL:    Temp Max: Temp (24hrs), Av.8  F (37.1  C), Min:97.9  F (36.6  C), Max:100.3  F (37.9  C)   .  Negative except for findings in the HPI.           Current Medications (antimicrobials listed in bold):     Current Facility-Administered Medications   Medication Dose Route Frequency Provider Last Rate Last Admin    bumetanide (BUMEX) tablet 2 mg  2 mg Oral BID Navneet Branch MD   2 mg at 24 0844    heparin ANTICOAGULANT injection 5,000 Units  5,000 Units Subcutaneous BID Gondal, Saad J, MD   5,000 Units at 24 1012    nafcillin 12 g in sodium chloride 0.9 % 550 mL continuous infusion  12 g Intravenous Q24H Erick Patino DO   12 g at 24 1551    sodium chloride (PF) 0.9% PF flush 3 mL  3 mL Intracatheter Q8H Gondal, Saad J, MD   3 mL at 24 0846              Allergies:     Allergies   Allergen Reactions    Aspirin Rash    Cats Rash            Physical Exam:   Vitals were reviewed  Patient Vitals for the past 24 hrs:   BP Temp Temp src Pulse Resp SpO2 Weight   24 1133 101/58 98  F (36.7  C) Oral 87 18 93 % --   24 0835 (!) 140/75 98.7  F (37.1  C) Oral 79 18 94 % --   24 0642 -- -- -- -- -- -- 87 kg (191 lb 11.2 oz)   24 0037 131/74 98.9  F (37.2  C) Oral 77 18 91 % --   24 1900 122/66 99.2  F (37.3  C) Oral 78 20 94 % --   24 1513 125/61 98.8  F (37.1  C) Oral 72 20 94 % --       Physical Examination:  Gen: Pleasant in no acute distress. In chair   HEENT: NCAT.   Lungs: no respiratory distress  Skin: No rash.  Extr: No edema.  Neuro: Alert and oriented to place time and person.            Laboratory Data:   ID Labs:  Microbiology labs:  Reviewed      No lab results found.  Recent Labs   Lab Test 24  0444 24  0455 24  0428 24  0557 24  0254   WBC 9.4 7.5 7.5 7.3 10.4     Recent Labs    Lab Test 08/21/24  0444 08/20/24  0519 08/19/24  0613 08/18/24  0455   CR 1.12* 1.27* 1.33* 1.22*   GFRESTIMATED 48* 41* 39* 44*       Hematology Studies  Recent Labs   Lab Test 08/21/24  0444 08/18/24  0455 08/17/24  0428 08/16/24  0557 08/14/24  0254   WBC 9.4 7.5 7.5 7.3 10.4   HGB 10.0* 10.4* 10.3* 10.8* 13.1   HCT 31.0* 31.6* 31.4* 32.6* 39.0    126* 105* 95* 142*       Metabolic  Recent Labs   Lab Test 08/21/24 0444 08/20/24  0519 08/19/24  0613    139 140   BUN 11.1 12.3 13.3   CO2 25 26 28   CR 1.12* 1.27* 1.33*   GFRESTIMATED 48* 41* 39*       Hepatic Studies  Recent Labs   Lab Test 08/14/24 0254   BILITOTAL 1.0   ALKPHOS 47   ALBUMIN 4.1   AST 36   ALT 19       ImmunologlobulinsNo lab results found.         Imaging Data:   Reviewed     Study Result    Narrative & Impression   EXAM: MR BRAIN W/O and W CONTRAST  LOCATION: [facility]  DATE: 8/17/2024     INDICATION: Headache in a patient with MSSA bacteremia secondary to aortic valve endocarditis.  Look for cerebral septic emboli; Headache; Acute HA (< 3 months), no complicating features  COMPARISON: None.  CONTRAST: 9 ml gadavist  TECHNIQUE: Routine multiplanar multisequence head MRI without and with intravenous contrast.     FINDINGS: Assessment degraded due to motion.  INTRACRANIAL CONTENTS:   Apparent focus of diffusion restriction with T2/FLAIR hyperintensity within the left superior parietal lobule cortex is hyperintense on ADC map, representing T2 shine through.  Focus of signal abnormality measures 13 x 9 mm in the axial plane and does   not have internal enhancement.     Additional punctate foci of hyperintensity on diffusion weighted with similar signal characteristics (periventricular right corona radiata, left superior parietal lobule, and left cerebellar hemisphere).     Patchy and confluent nonspecific T2/FLAIR hyperintensities within the cerebral white matter most consistent with moderate chronic  microvascular ischemic change. Moderate generalized cerebral atrophy. No hydrocephalus. Normal position of the cerebellar   tonsils. No discernible abnormal intracranial enhancement; however, assessment substantially degraded due to motion. 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     SELLA: No abnormality accounting for technique.     OSSEOUS STRUCTURES/SOFT TISSUES: Normal marrow signal. The major intracranial vascular flow voids are maintained.      ORBITS: No abnormality accounting for technique.      SINUSES/MASTOIDS: Mild mucosal thickening scattered about the paranasal sinuses. Scattered fluid/membrane thickening in the left mastoid air cells. No apparent mass in the posterior nasopharynx or skull base.                                                                       IMPRESSION: Assessment degraded due to motion.  1.  13 mm focus of cortical signal abnormality within the left superior parietal lobule which is favored to represent a subacute infarct; low-grade glial neoplasm could conceivably have a similar appearance. Hyperacute intraparenchymal hematoma could   also have a similar appearance but is considered less likely. Recommend noncontrast head CT for further characterization. Also recommend follow-up MRI brain without and with contrast in 8-12 weeks to document expected evolution.  2.  Additional smaller foci of signal abnormality with similar characteristics favored to represent punctate subacute infarcts.

## 2024-08-22 NOTE — PROGRESS NOTES
08/22/24 1400   Appointment Info   Appointment Canceled Reason (PT) Other (see Cancel Comments row)  (pt scheduled for CABG, will discontinue PT order at this time until after CABG, please reorder after CABG)

## 2024-08-22 NOTE — PLAN OF CARE
Problem: Risk for Delirium  Goal: Improved Sleep  Outcome: Progressing     Problem: Sepsis/Septic Shock  Goal: Absence of Infection Signs and Symptoms  Intervention: Initiate Sepsis Management  Recent Flowsheet Documentation  Taken 8/21/2024 2037 by Emmanuel Chavez RN  Infection Prevention:   hand hygiene promoted   rest/sleep promoted     Problem: Cardiac Catheterization (Diagnostic/Interventional)  Goal: Optimal Pain Control and Function  Outcome: Progressing   Goal Outcome Evaluation:             A&Ox4. Tele,NS w/first degree. VSS. RA. Has chronic back pain and requested tylenol every 4 hours and used T pump. 1800 F.R. PICC line WDL. Nafcillin running at 22.9 ml/hr. Declined Bed alarm overnight, was educated on why its used, still declined. Care clustered to promote sleep. 0435 pt got up to chair.

## 2024-08-22 NOTE — PROGRESS NOTES
HEART CARE NOTE          Assessment/Recommendations   1. Severe valvular disease c/b ever HFpEF  Assessment / Plan  Tolerating oral diuretic regimen - no changes at this time; continue to monitor UOP and renal function closely  Patient is high risk for adverse cardiac events 2/2 advanced age, frailty, valvular heart disease, renal dysfunction  GDMT as detailed below; mainstay of treatment for HFpEF includes diuretics and adequate BP control (class I) and SGLT2-I (class 2a); additional medical therapy (ARNI, MRA, ARB) demonstrated less robust evidence for indication but may be considered per guideline recommendations (2b); no indication for BBlockers      Current Pharmacotherapy AHA Guideline-Directed Medical Therapy   Losartan  - not started 2/2 renal dysfunction ARNI/ARB   Spironolactone not started  MRA   SGLT2 inhibitor: not started SGLT2-I    Bumex 2 mg BID Loop diuretic       2. Valvular heart disease  Assessment / Plan  Severe aortic stenosis and mod-severe MS c/b MMSA bacteremia and MV leaflet vegetation - management per CT surgery, neurology and ID - plan for AVR +MVR+ CABG on 8/26/24 with Dr. Renae     3. 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 Anemia  Assessment / Plan  Management and supportive care per primary team     5. CAD  Assessment / Plan  Coronary angiogram significant for severe prox LAD and ost-prox Lcx lesions (both amenable to PCI) with mod D2 - plan for staged PCI    Plan of care discussed on August 22, 2024 with patient at bedside, and primary team overseeing patient's care      History of Present Illness/Subjective    Ms. Felicitas Elliott is a 84 year old female with a PMHx significant for (per Epic notation) presented with fatigue, weakness, chills, poor appetite. Does not really have much for chronic medical conditions other than chronic back pain, furunculosis, uterine prolapse, tobacco use     "  Today, Mrs. Elliott denies acute cardiac events or complaints; Management plan as detailed above     ECG: personally reviewed; normal sinus rhythm, nonspecific ST and T waves changes, RBBB, left axis deviation.     ECHO (personnaly Reviewed on 8/19/24):   1. The left ventricle is normal in size. Left ventricular function is  normal.The ejection fraction is 55-60%.  2. Normal right ventricle size and systolic function.  3. Large vegetation on mitral valve (8 mm x 15) attached to posterior leaflet.  4. There is a small vegetation on the aortic valve (6 mm). No evidence of  aortic root abscess identified.  5. Severe valvular aortic stenosis (SHU:0.8 cm2, peak nomi: 4.6 m/sec, mean  gradient: 51 mmHg).    Telemetry: personally reviewed August 22, 2024; notable for sinus rhythm     Lab results: personally reviewed August 22, 2024; notable for hypokalemia    Medical history and pertinent documents reviewed in Care Everywhere please where applicable see details above        Physical Examination Review of Systems   /74 (BP Location: Left arm)   Pulse 77   Temp 98.9  F (37.2  C) (Oral)   Resp 18   Ht 1.702 m (5' 7\")   Wt 87 kg (191 lb 11.2 oz)   SpO2 91%   BMI 30.02 kg/m    Body mass index is 30.02 kg/m .  Wt Readings from Last 3 Encounters:   08/21/24 87 kg (191 lb 11.2 oz)   08/15/24 90.1 kg (198 lb 9.6 oz)     General Appearance:   no distress, normal body habitus   ENT/Mouth: membranes moist, no oral lesions or bleeding gums.      EYES:  no scleral icterus, normal conjunctivae   Neck: no carotid bruits or thyromegaly   Chest/Lungs:   lungs are clear to auscultation, no rales or wheezing, equal chest wall expansion    Cardiovascular:   Regular. Normal first and second heart sounds with +PAVAN; no rubs, or gallops; the carotid, radial and posterior tibial pulses are intact, no JVD or LE edema bilaterally    Abdomen:  no organomegaly, masses, bruits, or tenderness; bowel sounds are present   Extremities: no " cyanosis or clubbing   Skin: no xanthelasma, warm.    Neurologic: NAD     Psychiatric: alert and oriented x3, calm     A complete 10 systems ROS was reviewed  And is negative except what is listed in the HPI.          Medical History  Surgical History Family History Social History   No past medical history on file. Past Surgical History:   Procedure Laterality Date    CV CORONARY ANGIOGRAM N/A 8/20/2024    Procedure: CV CORONARY ANGIOGRAM;  Surgeon: Den Wylie MD;  Location: [facility] CV    no family history of premature coronary artery disease Social History     Socioeconomic History    Marital status:      Spouse name: Not on file    Number of children: Not on file    Years of education: Not on file    Highest education level: Not on file   Occupational History    Not on file   Tobacco Use    Smoking status: Not on file    Smokeless tobacco: Not on file   Substance and Sexual Activity    Alcohol use: Not on file    Drug use: Not on file    Sexual activity: Not on file   Other Topics Concern    Not on file   Social History Narrative    Not on file     Social Determinants of Health     Financial Resource Strain: High Risk (1/1/2022)    Received from [facility]    Financial Resource Strain     Difficulty of Paying Living Expenses: Not on file     Difficulty of Paying Living Expenses: Not on file   Food Insecurity: Not on file   Transportation Needs: Not on file   Physical Activity: Not on file   Stress: Not on file   Social Connections: Unknown (1/3/2024)    Received from [facility]    Social Connections     Frequency of Communication with Friends and Family: Not on file   Interpersonal Safety: Not on file   Housing Stability: Not on file           Lab Results    Chemistry/lipid CBC Cardiac Enzymes/BNP/TSH/INR   Lab Results   Component Value Date    BUN 11.1 08/21/2024     08/21/2024    CO2 25 08/21/2024    Lab Results  "  Component Value Date    WBC 9.4 08/21/2024    HGB 10.0 (L) 08/21/2024    HCT 31.0 (L) 08/21/2024    MCV 97 08/21/2024     08/21/2024    Lab Results   Component Value Date    INR 1.18 (H) 08/20/2024     No results found for: \"CKTOTAL\", \"CKMB\", \"TROPONINI\"       Weight:    Wt Readings from Last 3 Encounters:   08/21/24 87 kg (191 lb 11.2 oz)   08/15/24 90.1 kg (198 lb 9.6 oz)       Allergies  Allergies   Allergen Reactions    Aspirin Rash    Cats Rash         Surgical History  Past Surgical History:   Procedure Laterality Date    CV CORONARY ANGIOGRAM N/A 8/20/2024    Procedure: CV CORONARY ANGIOGRAM;  Surgeon: Den Wylie MD;  Location: [facility] LAB CV       Social History  Tobacco:   History   Smoking Status    Not on file   Smokeless Tobacco    Not on file    Alcohol:   Social History    Substance and Sexual Activity      Alcohol use: Not on file   Illicit Drugs:   History   Drug Use Not on file       Family History  No family history on file.       Navneet Branch MD on 8/22/2024      cc: Clinic, [facility]    "

## 2024-08-22 NOTE — PROGRESS NOTES
[facility]    Medicine Progress Note - Hospitalist Service    Date of Admission:  8/16/2024    Assessment & Plan   85 yo female with history of chronic back pain, furunculosis, uterine prolapse, and tobacco use presents 8/14/24 with sepsis secondary to MSSA bacteremia and HAMMAD. MIGDALIA pos for mitral and aortic valvular vegetations. Transferred to [facility] 8/16/24 for CT surgery evaluation.      Sepsis due to MSSA bacteremia  Bacterial endocarditis  MIGDALIA shows large posterior leaflet mitral valve vegetation and small aortic valve vegetation with severe aortic stenosis and mitral regurgitation  LVEF 55-60%  Found to have small stroke likely related to septic emboli  Also with slight volume overload likely related to valvular heart disease and diastolic CHF exacerbation  -- cardiology, ID, and CTS following  -- continue IV nafcillin, repeat blood cultures until cleared, last + blood culture 8/16 for now  -- coronary angiogram 8/20 showed 70% prox LAD and 90% ost Cx to Prox Cx.  -- defer diuresis to cardiology. Changed to PO bumex 8/21  -- defer derm consultation for history of chronic thoracic skin lesions as derm is not available here  -- defer to cardiology/CTS regarding timing of repeat MIGDALIA  -- possible open heart surgery on 8/26/24 per patient     Acute and subacute CVA: likely related to septic emboli  MRI brain shows 13 mm focus of cortical signal abnormality within the left superior parietal lobule which is favored to represent a subacute infarct, smaller foci of signal abnormality with similar characteristics favored to represent punctate subacute infarcts   -- neurology obtained CTA head and neck showing no hemorrhage, there is intracranial atherosclerosis, moderate to marked stenosis of the right P2 posterior cerebral artery segment. No definite proximal branch occlusion, aneurysm, or high flow vascular malformation identified. Atherosclerotic plaque at the carotid bifurcations  bilaterally without hemodynamically significant stenosis in the neck vessels.   No evidence for dissection.   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 Radiology commented on a left L4-5 degenerative facet with findings that could not entirely rule out septic arthritis.   Neck pain is chronic and has no new or worsening symptoms  Neurosurgery recommends non operative management and to obtain repeat MRI with and w/o contrast if neck pain worsens beyond chronic baseline despite antibiotics and treatment of cardiac vegetations  -- supportive cares  -- continue lidocaine cream, tramadol and tylenol for pain control and headache control     Hyperglycemia most likely secondary to prediabetes  Hemoglobin A1c 6.4  -- Monitor glucoses intermittently      Dilutional anemia  Thrombocytopenia in the setting of sepsis  Hemoglobin trended down from 13.1-10 and now stable     Hypokalemia: replaced     Hypomagnesemia: replace and recheck     Hypophosphatemia: replaced             Diet: Snacks/Supplements Adult: Ensure Enlive; With Meals  Room Service  Fluid restriction 1800 ML FLUID  Advance Diet as Tolerated: Regular Diet Adult    DVT Prophylaxis: Heparin SQ  El Catheter: Not present  Lines: PRESENT      PICC 08/21/24 Single Lumen Right Basilic Long term Antibiotic-Site Assessment: WDL    Cardiac Monitoring: ACTIVE order. Indication: Post- PCI/Angiogram (24 hours)  Code Status: Full Code      Clinically Significant Risk Factors        # Hypokalemia: Lowest K = 3.1 mmol/L in last 2 days, will replace as needed          # Coagulation Defect: INR = 1.18 (Ref range: 0.85 - 1.15) and/or PTT = N/A, will monitor for  "bleeding              # Obesity: Estimated body mass index is 30.02 kg/m  as calculated from the following:    Height as of this encounter: 1.702 m (5' 7\").    Weight as of this encounter: 87 kg (191 lb 11.2 oz).        # Financial/Environmental Concerns:                 Disposition Plan     Medically Ready for Discharge: Anticipate several days of hospitalization             SERENA Pyle  Hospitalist Service  [facility]  Securely message with [employer] (more info)  Text page via [email or web address] Paging/Directory   ______________________________________________________________________    Interval History   Patient seen and examined this morning.  was at bedside. Patient reports doing well with no chest pain or shortness of breath. No fever or chills. Patient/family state that Dr Renae was in this morning, and is planning for open heart surgery on Monday.     Physical Exam   Vital Signs: Temp: 98  F (36.7  C) Temp src: Oral BP: 101/58 Pulse: 87   Resp: 18 SpO2: 93 % O2 Device: None (Room air)    Weight: 191 lbs 11.2 oz      General: Not in obvious distress.  HEENT: NC, AT   Chest: Clear to auscultation bilaterally  Heart: S1S2 normal, regular. Systolic murmur noted  Abdomen: Soft. NT, ND. Bowel sounds- active.  Extremities: Trace leg swelling bilaterally  Neuro: Alert and awake, grossly non-focal      Medical Decision Making             Data     I have personally reviewed the following data over the past 24 hrs:    N/A  \   N/A   / N/A     N/A N/A N/A /  N/A   3.1 (L) N/A N/A \       Imaging results reviewed over the past 24 hrs:   No results found for this or any previous visit (from the past 24 hour(s)).  "

## 2024-08-22 NOTE — PROGRESS NOTES
ADDENDUM 8/22/24 at 820 AM- Structural/IC team spoke to Dr. Renae regarding patient. Concern for inability to clear MSSA endocarditis and feasibility of TAVR+PCI considering this. Dr. Renae will see patient today, likely plan for OHS-- TAVR CTA was cancelled. Structural team can be re consulted if needed.     RN Valve Team Inpatient Note-Brief Update      Summary: Patient is an 85 y/o female hospitalized for sepsis, acute resp failure, HAMMAD on CKD, anemia. Patient found to have significant endocarditis vegetation on her mitral valve and her aortic valve along with severe aortic stenosis. Pt underwent diagnostic cor angio with two vessel obstructive CAD. Pt was evaluated by the CT surgery team, patient may be a surgical candidate following completion of antibiotic regimen for treatment of vegetation. Structural/interventional team was consulted by CT surgery for evaluation of potential staged PCI and TAVR.      Plan: Patient will have TAVR CTA today to evaluate for access and candidacy for TAVR. Team would recommend if going PCI+TAVR route finishing appropriate course of antibiotics per ID team recommendations and confirming with imaging vegetation has cleared. Would bring patient back for OP follow up with our team prior to interventions. Dr. Turner will see patient today and evaluate/discuss if TAVR and PCI are an option for her.     Arnold Myers RN BSN  Structural Heart Coordinator   [facility]  [phone]  C: [phone]

## 2024-08-22 NOTE — PLAN OF CARE
Problem: Cardiovascular Surgery  Goal: Optimal Coping with Heart Surgery  Outcome: Progressing  Goal: Fluid and Electrolyte Balance  Intervention: Monitor and Manage Fluid and Electrolyte Balance  Recent Flowsheet Documentation  Taken 8/22/2024 1501 by Becky Sage, RN  Fluid/Electrolyte Management: fluids restricted     Goal Outcome Evaluation:  Patient down for Carotid ultrasound. Tylenol for back pain Q4, heating pad. Potassium protocol K. 3.7 recheck in am.

## 2024-08-23 LAB
ANION GAP SERPL CALCULATED.3IONS-SCNC: 12 MMOL/L (ref 7–15)
BACTERIA BLD CULT: NO GROWTH
BASOPHILS # BLD AUTO: 0 10E3/UL (ref 0–0.2)
BASOPHILS NFR BLD AUTO: 0 %
BUN SERPL-MCNC: 12 MG/DL (ref 8–23)
CALCIUM SERPL-MCNC: 8.9 MG/DL (ref 8.8–10.4)
CHLORIDE SERPL-SCNC: 94 MMOL/L (ref 98–107)
CREAT SERPL-MCNC: 1.5 MG/DL (ref 0.51–0.95)
EGFRCR SERPLBLD CKD-EPI 2021: 34 ML/MIN/1.73M2
EOSINOPHIL # BLD AUTO: 0.1 10E3/UL (ref 0–0.7)
EOSINOPHIL NFR BLD AUTO: 1 %
ERYTHROCYTE [DISTWIDTH] IN BLOOD BY AUTOMATED COUNT: 14.6 % (ref 10–15)
GLUCOSE SERPL-MCNC: 111 MG/DL (ref 70–99)
HCO3 SERPL-SCNC: 31 MMOL/L (ref 22–29)
HCT VFR BLD AUTO: 30.9 % (ref 35–47)
HGB BLD-MCNC: 10 G/DL (ref 11.7–15.7)
IMM GRANULOCYTES # BLD: 0.1 10E3/UL
IMM GRANULOCYTES NFR BLD: 1 %
LYMPHOCYTES # BLD AUTO: 1.4 10E3/UL (ref 0.8–5.3)
LYMPHOCYTES NFR BLD AUTO: 13 %
MAGNESIUM SERPL-MCNC: 1.6 MG/DL (ref 1.7–2.3)
MCH RBC QN AUTO: 31.1 PG (ref 26.5–33)
MCHC RBC AUTO-ENTMCNC: 32.4 G/DL (ref 31.5–36.5)
MCV RBC AUTO: 96 FL (ref 78–100)
MONOCYTES # BLD AUTO: 0.7 10E3/UL (ref 0–1.3)
MONOCYTES NFR BLD AUTO: 6 %
NEUTROPHILS # BLD AUTO: 8.3 10E3/UL (ref 1.6–8.3)
NEUTROPHILS NFR BLD AUTO: 78 %
NRBC # BLD AUTO: 0 10E3/UL
NRBC BLD AUTO-RTO: 0 /100
PHOSPHATE SERPL-MCNC: 4.1 MG/DL (ref 2.5–4.5)
PLATELET # BLD AUTO: 295 10E3/UL (ref 150–450)
POTASSIUM SERPL-SCNC: 3.1 MMOL/L (ref 3.4–5.3)
POTASSIUM SERPL-SCNC: 3.3 MMOL/L (ref 3.4–5.3)
POTASSIUM SERPL-SCNC: 3.5 MMOL/L (ref 3.4–5.3)
RBC # BLD AUTO: 3.22 10E6/UL (ref 3.8–5.2)
SODIUM SERPL-SCNC: 137 MMOL/L (ref 135–145)
WBC # BLD AUTO: 10.7 10E3/UL (ref 4–11)

## 2024-08-23 PROCEDURE — 250N000013 HC RX MED GY IP 250 OP 250 PS 637: Performed by: INTERNAL MEDICINE

## 2024-08-23 PROCEDURE — 120N000004 HC R&B MS OVERFLOW

## 2024-08-23 PROCEDURE — 250N000009 HC RX 250: Performed by: INTERNAL MEDICINE

## 2024-08-23 PROCEDURE — 84132 ASSAY OF SERUM POTASSIUM: CPT | Performed by: INTERNAL MEDICINE

## 2024-08-23 PROCEDURE — 82374 ASSAY BLOOD CARBON DIOXIDE: CPT | Performed by: INTERNAL MEDICINE

## 2024-08-23 PROCEDURE — 84100 ASSAY OF PHOSPHORUS: CPT | Performed by: INTERNAL MEDICINE

## 2024-08-23 PROCEDURE — 210N000001 HC R&B IMCU HEART CARE

## 2024-08-23 PROCEDURE — P9047 ALBUMIN (HUMAN), 25%, 50ML: HCPCS | Performed by: INTERNAL MEDICINE

## 2024-08-23 PROCEDURE — 99233 SBSQ HOSP IP/OBS HIGH 50: CPT | Performed by: INTERNAL MEDICINE

## 2024-08-23 PROCEDURE — 250N000011 HC RX IP 250 OP 636: Performed by: STUDENT IN AN ORGANIZED HEALTH CARE EDUCATION/TRAINING PROGRAM

## 2024-08-23 PROCEDURE — 80048 BASIC METABOLIC PNL TOTAL CA: CPT | Performed by: INTERNAL MEDICINE

## 2024-08-23 PROCEDURE — 258N000003 HC RX IP 258 OP 636: Performed by: INTERNAL MEDICINE

## 2024-08-23 PROCEDURE — 250N000011 HC RX IP 250 OP 636: Performed by: INTERNAL MEDICINE

## 2024-08-23 PROCEDURE — 85025 COMPLETE CBC W/AUTO DIFF WBC: CPT | Performed by: INTERNAL MEDICINE

## 2024-08-23 PROCEDURE — 250N000013 HC RX MED GY IP 250 OP 250 PS 637: Performed by: STUDENT IN AN ORGANIZED HEALTH CARE EDUCATION/TRAINING PROGRAM

## 2024-08-23 PROCEDURE — 99232 SBSQ HOSP IP/OBS MODERATE 35: CPT | Performed by: INTERNAL MEDICINE

## 2024-08-23 PROCEDURE — 83735 ASSAY OF MAGNESIUM: CPT | Performed by: INTERNAL MEDICINE

## 2024-08-23 RX ORDER — POTASSIUM CHLORIDE 1500 MG/1
40 TABLET, EXTENDED RELEASE ORAL ONCE
Status: COMPLETED | OUTPATIENT
Start: 2024-08-23 | End: 2024-08-23

## 2024-08-23 RX ORDER — LACTOBACILLUS RHAMNOSUS GG 10B CELL
1 CAPSULE ORAL 2 TIMES DAILY
Status: DISCONTINUED | OUTPATIENT
Start: 2024-08-23 | End: 2024-09-04

## 2024-08-23 RX ORDER — MAGNESIUM SULFATE HEPTAHYDRATE 40 MG/ML
2 INJECTION, SOLUTION INTRAVENOUS ONCE
Status: COMPLETED | OUTPATIENT
Start: 2024-08-23 | End: 2024-08-23

## 2024-08-23 RX ORDER — ALBUMIN (HUMAN) 12.5 G/50ML
50 SOLUTION INTRAVENOUS ONCE
Status: COMPLETED | OUTPATIENT
Start: 2024-08-23 | End: 2024-08-23

## 2024-08-23 RX ADMIN — ACETAMINOPHEN 650 MG: 325 TABLET ORAL at 20:15

## 2024-08-23 RX ADMIN — ACETAMINOPHEN 650 MG: 325 TABLET ORAL at 16:10

## 2024-08-23 RX ADMIN — NAFCILLIN 12 G: 10 INJECTION, POWDER, FOR SOLUTION INTRAVENOUS at 14:36

## 2024-08-23 RX ADMIN — POTASSIUM CHLORIDE 40 MEQ: 1500 TABLET, EXTENDED RELEASE ORAL at 05:17

## 2024-08-23 RX ADMIN — Medication 1 CAPSULE: at 20:16

## 2024-08-23 RX ADMIN — POTASSIUM CHLORIDE 40 MEQ: 1500 TABLET, EXTENDED RELEASE ORAL at 11:41

## 2024-08-23 RX ADMIN — HEPARIN SODIUM 5000 UNITS: 10000 INJECTION, SOLUTION INTRAVENOUS; SUBCUTANEOUS at 09:45

## 2024-08-23 RX ADMIN — HEPARIN SODIUM 5000 UNITS: 10000 INJECTION, SOLUTION INTRAVENOUS; SUBCUTANEOUS at 21:14

## 2024-08-23 RX ADMIN — ACETAMINOPHEN 650 MG: 325 TABLET ORAL at 08:54

## 2024-08-23 RX ADMIN — ALBUMIN HUMAN 50 G: 0.25 SOLUTION INTRAVENOUS at 08:36

## 2024-08-23 RX ADMIN — MAGNESIUM SULFATE HEPTAHYDRATE 2 G: 40 INJECTION, SOLUTION INTRAVENOUS at 09:45

## 2024-08-23 RX ADMIN — Medication 1 CAPSULE: at 11:41

## 2024-08-23 ASSESSMENT — ACTIVITIES OF DAILY LIVING (ADL)
ADLS_ACUITY_SCORE: 30
ADLS_ACUITY_SCORE: 30
ADLS_ACUITY_SCORE: 29
ADLS_ACUITY_SCORE: 36
ADLS_ACUITY_SCORE: 30
ADLS_ACUITY_SCORE: 30
ADLS_ACUITY_SCORE: 36
ADLS_ACUITY_SCORE: 30
ADLS_ACUITY_SCORE: 30
ADLS_ACUITY_SCORE: 29
ADLS_ACUITY_SCORE: 30
ADLS_ACUITY_SCORE: 36
ADLS_ACUITY_SCORE: 34
ADLS_ACUITY_SCORE: 30
ADLS_ACUITY_SCORE: 29
ADLS_ACUITY_SCORE: 30
ADLS_ACUITY_SCORE: 34
ADLS_ACUITY_SCORE: 36
ADLS_ACUITY_SCORE: 36

## 2024-08-23 NOTE — PROGRESS NOTES
[facility] Progress Note - Hospitalist Service    Date of Admission:  8/16/2024    Assessment & Plan     83 yo female with history of chronic back pain, furunculosis, uterine prolapse, and tobacco use presents 8/14/24 with sepsis secondary to MSSA bacteremia and HAMMAD. MIGDALIA pos for mitral and aortic valvular vegetations. Transferred to [facility] 8/16/24 for CT surgery evaluation.      #Sepsis due to MSSA bacteremia  #Acute bacterial endocarditis  #HFpEF with ADHF, hypervolemia resolved  #CAD  -MIGDALIA shows large posterior leaflet mitral valve vegetation and small aortic valve vegetation with severe aortic stenosis and mitral regurgitation  LVEF 55-60%  -Last BC positive with MSSA on 8/16/24.  BC's from 8/17-8/20 NGTD  -coronary angiogram (8/20/24) showed 70% prox LAD and 90% ost Cx to Prox Cx.  -IV Nafcillin continuous infusion  -plan for AVR +MVR+ CABG on 8/26/24 with Dr. Renae   -ID, Cardiology, CVS assistance appreciated     #Acute and subacute CVA  -likely related to septic emboli  -MRI brain showed 13 mm focus of cortical signal abnormality within the left superior parietal lobule favored to represent a subacute infarct, smaller foci of signal abnormality with similar characteristics favored to represent punctate subacute infarcts.   -CTA head and neck no hemorrhage, intracranial atherosclerosis, moderate to marked stenosis of the right P2 posterior cerebral artery segment. No definite proximal branch occlusion, aneurysm, or high flow vascular malformation identified. Atherosclerotic plaque at the carotid bifurcations bilaterally without hemodynamically significant stenosis in the neck vessels.   No evidence for dissection.   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     #Mod-severe cervical  "spinal stenosis, foraminal stenosis, facet arthropathy  -MRI 8/15 showed degenerative changes in her cervical spine which appears similar to report from 2015. Radiology commented on a left L4-5 degenerative facet with findings that could not entirely rule out septic arthritis.   -Neck pain is chronic and has no new or worsening symptoms  -Neurosurgery recommends non operative management and to obtain repeat MRI with and w/o contrast if neck pain worsens beyond chronic baseline despite antibiotics and treatment of cardiac vegetations  -supportive cares  -continue lidocaine cream, tramadol and tylenol for pain control and headache control     #Prediabetes  -Hemoglobin A1c 6.4  -Monitor glucoses intermittently  -Glucose 111 this morning      #Normocytic anemia due to hemodilution from hypervolemia, resolved  #Thrombocytopenia in the setting of sepsis, resolved    #Hypokalemia  -monitor and replace accordingly    #Hypomagnesemia  -monitor and replace accordingly    #Hypophosphatemia  -monitor and replace accordingly        Diet: Snacks/Supplements Adult: Ensure Enlive; With Meals  Room Service  Fluid restriction 1800 ML FLUID  Advance Diet as Tolerated: Regular Diet Adult    DVT Prophylaxis: Heparin SQ  Le Catheter: Not present  Lines: PRESENT      PICC 08/21/24 Single Lumen Right Basilic Long term Antibiotic-Site Assessment: WDL      Cardiac Monitoring: ACTIVE order. Indication: QTc prolonging medication (48 hours)  Code Status: Full Code      Clinically Significant Risk Factors        # Hypokalemia: Lowest K = 3.1 mmol/L in last 2 days, will replace as needed     # Hypomagnesemia: Lowest Mg = 1.6 mg/dL in last 2 days, will replace as needed                 # Overweight: Estimated body mass index is 29.15 kg/m  as calculated from the following:    Height as of this encounter: 1.702 m (5' 7\").    Weight as of this encounter: 84.4 kg (186 lb 1.6 oz).      # Financial/Environmental Concerns:                 Disposition " Plan     Medically Ready for Discharge: Anticipated in 5+ Days             Zach Foreman DO, DO  Hospitalist Service  [facility]  Securely message with [email or web address] (more info)  Text page via [employer] Paging/Directory   ______________________________________________________________________    Interval History   Afebrile. No acute events overnight.    Physical Exam   Vital Signs: Temp: 98.8  F (37.1  C) Temp src: Oral BP: 92/52 Pulse: 87   Resp: 18 SpO2: 94 % O2 Device: None (Room air)    Weight: 186 lbs 1.6 oz    Physical Examination:   General appearance - NAD  Eyes -sclera anicteric  Mouth - mucous membranes moist, pharynx normal without lesions  Lungs - CTAB, non labored  Heart - rrr. II/VI SM. No peripheral edema.  Abdomen - soft, nontender, nondistended, no masses or organomegaly, BS+  Neurological - a&ox3, non focal  Skin - no c/c/p    Lab/imaging reviewed

## 2024-08-23 NOTE — PLAN OF CARE
Problem: Risk for Delirium  Goal: Improved Sleep  Outcome: Progressing   Goal Outcome Evaluation:               A&Ox4. Tele,  SR w/1st degree and BBB., Tylenol  per pt request for chronic back pain. Has SOB w/movement. Denies dizziness or feeling lightheaded. SBA. Slept in chair overnight, pt states she can't sleep in bed because its hard on her back. Declined all alarms, was educated, and still declined. Good urine output overnight. CABG planned for Mon 8/26.

## 2024-08-23 NOTE — PROGRESS NOTES
Care Management Follow Up    Length of Stay (days): 7    Expected Discharge Date: 08/28/2024    Anticipated Discharge Plan:  TBD    Transportation: Anticipate Family/friend    PT Recommendations:  will need to re-consult after CABG on 8/26  OT Recommendations:   will need to re-consult after CABG on 8/26     Barriers to Discharge: medical stability, CABG on 8/26, PT/OT recommendations, 6 weeks IV ABX-per [facility] and [facility] pt's insurance will not cover home infusion. CM will send referral to [place].    Prior Living Situation: Lives in a house with her spouse. Independent at baseline. Family to transport at discharge.     Patient/Spokesperson Updated: No    Additional Information: none      Hien Carney, RN

## 2024-08-23 NOTE — PLAN OF CARE
Problem: Pain Acute  Goal: Optimal Pain Control and Function  Outcome: Progressing  Intervention: Develop Pain Management Plan  Recent Flowsheet Documentation  Taken 8/23/2024 8248 by Zollinger, Nancy K, RN  Pain Management Interventions: medication (see MAR)   Goal Outcome Evaluation:  Patient complaining of back pain.  Taking tylenol for pain relief.  Albumin infused.  K+ 3.3, replaced per protocol.  Received one IV mag bump.  Nafcillin drip infusing at 22.9ml/hr.  Plan for surgery on Monday.

## 2024-08-23 NOTE — PROGRESS NOTES
"CLINICAL NUTRITION SERVICES - ASSESSMENT NOTE     Nutrition Prescription    RECOMMENDATIONS FOR MDs/PROVIDERS TO ORDER:    Malnutrition Status:    Not noted    Recommendations already ordered by Registered Dietitian (RD):  Continue chocolate ensure enlive twice daily with breakfast and lunch  Encouraged po/protein intake    Future/Additional Recommendations:  Monitor po/supplement intake, weight, labs, need for diet education     REASON FOR ASSESSMENT  Felicitas Elliott is a/an 84 year old female assessed by the dietitian for LOS    Pt with past medical history of chronic back pain, furunculosis, uterine prolapse, and tobacco use admitted with sepsis secondary to MSSA bacteremia and HAMMAD.  MIGDALIA pos for mitral and aortic valvular vegetations. Transferred to [facility] 8/16/2024 for CT surgery evaluation. RUBIN on ACK3, Mod-severe cervical spinal stenosis, foraminal stenosis, facet arthropathy, Prediabetes ( hemoglobin A1C 6.4), dilutional anemia.    NUTRITION HISTORY  Pt states she watches her sodium intake at home.  She has had a poor appetite for weeks and does not feel hungry at all.  She is eating small amounts of food and taking 1 ensure enlive/day. After talking with her she will try to take 2 ensure enlive per day. She states her  does all the cooking at home now.    CURRENT NUTRITION ORDERS  Diet: Regular with fluid restriction of 1800 ml/day  Intake/Tolerance: Only 1 to 2 meals reacorded daily since 8/18 ( taking 0-100% of meals)    LABS  Labs reviewed: Na 137, K 3.3 (L), Cr 1.5 (H), Glu 111, Mg 1.6 (L), phos 4.1    MEDICATIONS  Medications reviewed: Magnesium sulfate, nafcillin, did get potassium replacement yesterday    ANTHROPOMETRICS  Height: 170.2 cm (5' 7\")  Most Recent Weight: 84.4 kg (186 lb 1.6 oz)    IBW: 61.4 kg ( 135 lb)  BMI: Overweight BMI 25-29.9  Weight History:   Wt Readings from Last 10 Encounters:   08/23/24 84.4 kg (186 lb 1.6 oz)   08/15/24 90.1 kg (198 lb 9.6 oz)   Weight down 12 lb in " 1 week could be fluid loss as was on bumex    Dosing Weight: 67.2 kg/ adjusted weight    ASSESSED NUTRITION NEEDS  Estimated Energy Needs: 2846-8864 kcals/day (25 - 30 kcals/kg)  Justification: Maintenance  Estimated Protein Needs: 54-67+ grams protein/day (0.6 - 0.8 grams of pro/kg)  Justification: CKD-upcoming surgery  Estimated Fluid Needs: 1800 mL/day   Justification: On a fluid restriction    PHYSICAL FINDINGS  See malnutrition section below.  Skin: Pale, warm.  Left elbow abrasion, abdominal scab  Sergey score=22, nutrition noted as adequate  GI: diarrhea  Last BM noted on 8/20/2024    MALNUTRITION:  % Weight Loss:  Weight loss does not meet criteria for malnutrition   % Intake:  <75% for > 7 days (moderate malnutrition)  Subcutaneous Fat Loss:  None observed  Muscle Loss:  None observed  Fluid Retention:  Trace 1+    Malnutrition Diagnosis: Patient does not meet two of the above criteria necessary for diagnosing malnutrition    NUTRITION DIAGNOSIS  Inadequate oral intake related to poor appetite as evidenced by variable po intake ( 0-100%)      INTERVENTIONS  Implementation  Nutrition Education: Will be provided if education needs arise   Continue ensure enlive twice daily    Goals  Patient to consume % of nutritionally adequate meals three times per day, or the equivalent with supplements/snacks.     Monitoring/Evaluation  Progress toward goals will be monitored and evaluated per protocol.

## 2024-08-23 NOTE — PROGRESS NOTES
HEART CARE NOTE          Assessment/Recommendations   1. Severe valvular disease c/b sever HFpEF  Assessment / Plan  HAMMAD noted - hold loop diuretic; give IV albumin; continue to monitor UOP and renal function closely  Patient is high risk for adverse cardiac events 2/2 advanced age, frailty, valvular heart disease, renal dysfunction  GDMT as detailed below; mainstay of treatment for HFpEF includes diuretics and adequate BP control (class I) and SGLT2-I (class 2a); additional medical therapy (ARNI, MRA, ARB) demonstrated less robust evidence for indication but may be considered per guideline recommendations (2b); no indication for BBlockers      Current Pharmacotherapy AHA Guideline-Directed Medical Therapy   Losartan  - not started 2/2 renal dysfunction ARNI/ARB   Spironolactone not started  MRA   SGLT2 inhibitor: not started SGLT2-I    Bumex 2 mg BID - held Loop diuretic       2. Valvular heart disease  Assessment / Plan  Severe aortic stenosis and mod-severe MS c/b MMSA bacteremia and MV leaflet vegetation - management per CT surgery, neurology and ID - plan for AVR +MVR+ CABG on 8/26/24 with Dr. Renae     3. 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 Anemia  Assessment / Plan  Management and supportive care per primary team     5. CAD  Assessment / Plan  Coronary angiogram significant for severe prox LAD and ost-prox Lcx lesions (both amenable to PCI) with mod D2 - plan for staged PCI    Plan of care discussed on August 23, 2024 with patient at bedside, and primary team overseeing patient's care      History of Present Illness/Subjective    Ms. Felicitas Elliott is a 84 year old female with a PMHx significant for (per Epic notation) presented with fatigue, weakness, chills, poor appetite. Does not really have much for chronic medical conditions other than chronic back pain, furunculosis, uterine prolapse, tobacco use     "  Today, Mrs. Elliott denies acute cardiac events or complaints; Management plan as detailed above     ECG: personally reviewed; normal sinus rhythm, nonspecific ST and T waves changes, RBBB, left axis deviation.     ECHO (personnaly Reviewed on 8/19/24):   1. The left ventricle is normal in size. Left ventricular function is  normal.The ejection fraction is 55-60%.  2. Normal right ventricle size and systolic function.  3. Large vegetation on mitral valve (8 mm x 15) attached to posterior leaflet.  4. There is a small vegetation on the aortic valve (6 mm). No evidence of  aortic root abscess identified.  5. Severe valvular aortic stenosis (SHU:0.8 cm2, peak nomi: 4.6 m/sec, mean  gradient: 51 mmHg).    Telemetry: personally reviewed August 23, 2024; notable for sinus rhythm     Lab results: personally reviewed August 23, 2024; notable for HAMMAD    Medical history and pertinent documents reviewed in Care Everywhere please where applicable see details above        Physical Examination Review of Systems   /70 (BP Location: Left arm)   Pulse 82   Temp 98.8  F (37.1  C) (Oral)   Resp 18   Ht 1.702 m (5' 7\")   Wt 84.4 kg (186 lb 1.6 oz)   SpO2 91%   BMI 29.15 kg/m    Body mass index is 29.15 kg/m .  Wt Readings from Last 3 Encounters:   08/23/24 84.4 kg (186 lb 1.6 oz)   08/15/24 90.1 kg (198 lb 9.6 oz)     General Appearance:   no distress, normal body habitus   ENT/Mouth: membranes moist, no oral lesions or bleeding gums.      EYES:  no scleral icterus, normal conjunctivae   Neck: no carotid bruits or thyromegaly   Chest/Lungs:   lungs are clear to auscultation, no rales or wheezing, equal chest wall expansion    Cardiovascular:   Regular. Normal first and second heart sounds with +PAVAN; no rubs, or gallops; the carotid, radial and posterior tibial pulses are intact, no JVD or LE edema bilaterally    Abdomen:  no organomegaly, masses, bruits, or tenderness; bowel sounds are present   Extremities: no cyanosis or " clubbing   Skin: no xanthelasma, warm.    Neurologic: NAD     Psychiatric: alert and oriented x3, calm     A complete 10 systems ROS was reviewed  And is negative except what is listed in the HPI.          Medical History  Surgical History Family History Social History   No past medical history on file. Past Surgical History:   Procedure Laterality Date    CV CORONARY ANGIOGRAM N/A 8/20/2024    Procedure: CV CORONARY ANGIOGRAM;  Surgeon: Den Wylie MD;  Location: [facility] CV    no family history of premature coronary artery disease Social History     Socioeconomic History    Marital status:      Spouse name: Not on file    Number of children: Not on file    Years of education: Not on file    Highest education level: Not on file   Occupational History    Not on file   Tobacco Use    Smoking status: Not on file    Smokeless tobacco: Not on file   Substance and Sexual Activity    Alcohol use: Not on file    Drug use: Not on file    Sexual activity: Not on file   Other Topics Concern    Not on file   Social History Narrative    Not on file     Social Determinants of Health     Financial Resource Strain: High Risk (1/1/2022)    [facility]    Financial Resource Strain     Difficulty of Paying Living Expenses: Not on file     Difficulty of Paying Living Expenses: Not on file   Food Insecurity: Not on file   Transportation Needs: Not on file   Physical Activity: Not on file   Stress: Not on file   Social Connections: Unknown (1/3/2024)    Received from [employer]    Social Connections     Frequency of Communication with Friends and Family: Not on file   Interpersonal Safety: Not on file   Housing Stability: Not on file           Lab Results    Chemistry/lipid CBC Cardiac Enzymes/BNP/TSH/INR   Lab Results   Component Value Date    BUN 11.1 08/21/2024     08/21/2024    CO2 25 08/21/2024    Lab Results   Component  "Value Date    WBC 9.4 08/21/2024    HGB 10.0 (L) 08/21/2024    HCT 31.0 (L) 08/21/2024    MCV 97 08/21/2024     08/21/2024    Lab Results   Component Value Date    INR 1.18 (H) 08/20/2024     No results found for: \"CKTOTAL\", \"CKMB\", \"TROPONINI\"       Weight:    Wt Readings from Last 3 Encounters:   08/23/24 84.4 kg (186 lb 1.6 oz)   08/15/24 90.1 kg (198 lb 9.6 oz)       Allergies  Allergies   Allergen Reactions    Aspirin Rash    Cats Rash         Surgical History  Past Surgical History:   Procedure Laterality Date    CV CORONARY ANGIOGRAM N/A 8/20/2024    Procedure: CV CORONARY ANGIOGRAM;  Surgeon: Den Wylie MD;  Location: [facility] LAB CV       Social History  Tobacco:   History   Smoking Status    Not on file   Smokeless Tobacco    Not on file    Alcohol:   Social History    Substance and Sexual Activity      Alcohol use: Not on file   Illicit Drugs:   History   Drug Use Not on file       Family History  No family history on file.       Navneet Branch MD on 8/23/2024      cc: [facility]    "

## 2024-08-23 NOTE — PROGRESS NOTES
Heart Failure Care Map  GOALS TO BE MET BEFORE DISCHARGE:    1. Decrease congestion and/or edema with diuretic therapy to achieve near optimal volume status.     Dyspnea improved: No, further care required to meet this goal. Please explain Has SOB with movement.    Edema improved: No, further care required to meet this goal. Please explain edema noted in LE's         Last 24 hour I/O:   Intake/Output Summary (Last 24 hours) at 8/23/2024 0304  Last data filed at 8/22/2024 2349  Gross per 24 hour   Intake 922 ml   Output 2275 ml   Net -1353 ml           Net I/O and Weights since admission:   07/24 0700 - 08/23 0659  In: 5409.25 [P.O.:4677; I.V.:732.25]  Out: 8200 [Urine:8200]  Net: -2790.75     Vitals:    08/16/24 1622 08/17/24 0028 08/18/24 0537 08/19/24 0521   Weight: 91.9 kg (202 lb 9.6 oz) 89.1 kg (196 lb 8 oz) 88.9 kg (196 lb) 86.3 kg (190 lb 3.2 oz)    08/19/24 1409 08/20/24 0632 08/20/24 1053 08/21/24 0646   Weight: 86.2 kg (190 lb) 86 kg (189 lb 9.6 oz) 86.1 kg (189 lb 14.4 oz) 87 kg (191 lb 11.2 oz)    08/22/24 0642   Weight: 87 kg (191 lb 11.2 oz)       2.  O2 sats > 90% on room air, or at prior home O2 therapy level.      Able to wean O2 this shift to keep sats above 90%?: Yes, satisfactory for discharge.   Does patient use Home O2? No          Current oxygenation status:   SpO2: 92 %     O2 Device: None (Room air),      3.  Tolerates ambulation and mobility near baseline.     Ambulation: Yes, satisfactory for discharge.   Times patient ambulated with staff this shift: 3    Please review the Heart Failure Care Map for additional HF goal outcomes.    Emmanuel Chavez RN  8/23/2024

## 2024-08-23 NOTE — PROGRESS NOTES
[facility] ID Inpatient follow up       Patient:  Felicitas Elliott  Date of birth 1940, Medical record number [id number]  Date of Visit:  08/23/2024  Attending Physician: Erick Patino DO         Assessment and Recommendations:   Assessment:  Felicitas Elliott is a 84 year old female with   History of severe aortic stenosis.  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  MSSA bacteremia.  Positive blood culture on 8/14/2024 and 8/16.  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   Mitral and aortic valve endocarditis as evidenced with large vegetation on mitral valve (8 mm x 15) attached to posterior leaflet and a small vegetation on the aortic valve.  With the size of the vegetation combined with brain infarct, surgery is indicated, planned 8/26. Active issue.   Neck pain worrisome for cervical discitis.  Neck MRI showed some edema at C4-C5 on the left side.  No clear evidence of infection.  Abnormal brain MRI suggestive of subacute infarct. In a patient with MSSA bacteremia and aortic valve endocarditis, this is cerebral septic emboli. Active issue.     Recommendations:  continue IV nafcillin continuous infusion.  Monitor kidney function.  Ok for single lumen PICC anytime from ID standpoint  Valve surgery is being arranged-planned 8/26  Will need to check immunoglobin level down the road. Sister with history of hypogammaglobulinemia   Overall will require at least 6 weeks of IV nafcillin  Please call with questions or change in clinical status over the weekend. ID will follow up next week.       Bell Gómez MD.  [facility].   [facility]  Office Telephone [phone].  Fax [phone]  [facility] paging            Interval History:     HPI:  The interval history was reviewed.   Doing ok. Tolerating antibiotics. Family visiting. Itchy area on back, no rash in that area.     Pertinent cultures include:  No results found for:  "\"CULT\"    Recent Inflammatory Biomarkers:   Recent Labs   Lab Test 24  0946 24  0444 24  0455 24  0428 24  0557 24  0254   WBC 10.7 9.4 7.5 7.5 7.3 10.4            Review of Systems:   CONSTITUTIONAL:    Temp Max: Temp (24hrs), Av.8  F (37.1  C), Min:97.9  F (36.6  C), Max:100.3  F (37.9  C)   .  Negative except for findings in the HPI.           Current Medications (antimicrobials listed in bold):     Current Facility-Administered Medications   Medication Dose Route Frequency Provider Last Rate Last Admin    [Held by provider] bumetanide (BUMEX) tablet 2 mg  2 mg Oral BID Navneet Branch MD   2 mg at 24 1745    heparin ANTICOAGULANT injection 5,000 Units  5,000 Units Subcutaneous BID Gondal, Saad J, MD   5,000 Units at 24 0945    lactobacillus rhamnosus (GG) (CULTURELL) capsule 1 capsule  1 capsule Oral BID Zach Foreman DO   1 capsule at 24 1141    nafcillin 12 g in sodium chloride 0.9 % 550 mL continuous infusion  12 g Intravenous Q24H Erick Patino DO   12 g at 24 1513    sodium chloride (PF) 0.9% PF flush 3 mL  3 mL Intracatheter Q8H Gondal, Saad J, MD   3 mL at 24 0836              Allergies:     Allergies   Allergen Reactions    Aspirin Rash    Cats Rash            Physical Exam:   Vitals were reviewed  Patient Vitals for the past 24 hrs:   BP Temp Temp src Pulse Resp SpO2 Weight   24 1113 92/52 98.8  F (37.1  C) Oral 87 18 -- --   24 0721 122/73 98.8  F (37.1  C) Oral 88 18 94 % --   24 0416 118/70 98.8  F (37.1  C) Oral 82 18 91 % --   24 0403 -- -- -- -- -- -- 84.4 kg (186 lb 1.6 oz)   24 2345 (!) 149/82 99.4  F (37.4  C) Oral 81 18 92 % --   24 1931 134/66 99.3  F (37.4  C) Oral 87 18 95 % --   24 1530 100/56 98.2  F (36.8  C) Oral 85 18 93 % --       Physical Examination:  Gen: Pleasant in no acute distress. In chair   HEENT: NCAT.   Lungs: no respiratory distress  Skin: No " rash.  Extr: No edema.  Neuro: Alert and oriented to place time and person.            Laboratory Data:   ID Labs:  Microbiology labs:  Reviewed      No lab results found.  Recent Labs   Lab Test 08/23/24  0946 08/21/24  0444 08/18/24  0455 08/17/24  0428 08/16/24  0557 08/14/24  0254   WBC 10.7 9.4 7.5 7.5 7.3 10.4     Recent Labs   Lab Test 08/23/24  0601 08/21/24  0444 08/20/24  0519 08/19/24  0613   CR 1.50* 1.12* 1.27* 1.33*   GFRESTIMATED 34* 48* 41* 39*       Hematology Studies  Recent Labs   Lab Test 08/23/24  0946 08/21/24  0444 08/18/24  0455 08/17/24  0428 08/16/24  0557 08/14/24  0254   WBC 10.7 9.4 7.5 7.5 7.3 10.4   HGB 10.0* 10.0* 10.4* 10.3* 10.8* 13.1   HCT 30.9* 31.0* 31.6* 31.4* 32.6* 39.0    271 126* 105* 95* 142*       Metabolic  Recent Labs   Lab Test 08/23/24  0601 08/21/24 0444 08/20/24  0519    138 139   BUN 12.0 11.1 12.3   CO2 31* 25 26   CR 1.50* 1.12* 1.27*   GFRESTIMATED 34* 48* 41*       Hepatic Studies  Recent Labs   Lab Test 08/14/24  0254   BILITOTAL 1.0   ALKPHOS 47   ALBUMIN 4.1   AST 36   ALT 19       ImmunologlobulinsNo lab results found.         Imaging Data:   Reviewed     Study Result    Narrative & Impression   EXAM: MR BRAIN W/O and W CONTRAST  LOCATION: [facility]  DATE: 8/17/2024     INDICATION: Headache in a patient with MSSA bacteremia secondary to aortic valve endocarditis.  Look for cerebral septic emboli; Headache; Acute HA (< 3 months), no complicating features  COMPARISON: None.  CONTRAST: 9 ml gadavist  TECHNIQUE: Routine multiplanar multisequence head MRI without and with intravenous contrast.     FINDINGS: Assessment degraded due to motion.  INTRACRANIAL CONTENTS:   Apparent focus of diffusion restriction with T2/FLAIR hyperintensity within the left superior parietal lobule cortex is hyperintense on ADC map, representing T2 shine through.  Focus of signal abnormality measures 13 x 9 mm in the axial plane and does   not have  internal enhancement.     Additional punctate foci of hyperintensity on diffusion weighted with similar signal characteristics (periventricular right corona radiata, left superior parietal lobule, and left cerebellar hemisphere).     Patchy and confluent nonspecific T2/FLAIR hyperintensities within the cerebral white matter most consistent with moderate chronic microvascular ischemic change. Moderate generalized cerebral atrophy. No hydrocephalus. Normal position of the cerebellar   tonsils. No discernible abnormal intracranial enhancement; however, assessment substantially degraded due to motion. 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     SELLA: No abnormality accounting for technique.     OSSEOUS STRUCTURES/SOFT TISSUES: Normal marrow signal. The major intracranial vascular flow voids are maintained.      ORBITS: No abnormality accounting for technique.      SINUSES/MASTOIDS: Mild mucosal thickening scattered about the paranasal sinuses. Scattered fluid/membrane thickening in the left mastoid air cells. No apparent mass in the posterior nasopharynx or skull base.                                                                       IMPRESSION: Assessment degraded due to motion.  1.  13 mm focus of cortical signal abnormality within the left superior parietal lobule which is favored to represent a subacute infarct; low-grade glial neoplasm could conceivably have a similar appearance. Hyperacute intraparenchymal hematoma could   also have a similar appearance but is considered less likely. Recommend noncontrast head CT for further characterization. Also recommend follow-up MRI brain without and with contrast in 8-12 weeks to document expected evolution.  2.  Additional smaller foci of signal abnormality with similar characteristics favored to represent punctate subacute infarcts.

## 2024-08-24 LAB
ALBUMIN UR-MCNC: 10 MG/DL
ANION GAP SERPL CALCULATED.3IONS-SCNC: 9 MMOL/L (ref 7–15)
APPEARANCE UR: CLEAR
BACTERIA #/AREA URNS HPF: ABNORMAL /HPF
BACTERIA BLD CULT: NO GROWTH
BILIRUB UR QL STRIP: NEGATIVE
BUN SERPL-MCNC: 14.1 MG/DL (ref 8–23)
CALCIUM SERPL-MCNC: 9.1 MG/DL (ref 8.8–10.4)
CHLORIDE SERPL-SCNC: 96 MMOL/L (ref 98–107)
CK SERPL-CCNC: 40 U/L (ref 26–192)
COLOR UR AUTO: ABNORMAL
CREAT SERPL-MCNC: 1.53 MG/DL (ref 0.51–0.95)
EGFRCR SERPLBLD CKD-EPI 2021: 33 ML/MIN/1.73M2
GLUCOSE SERPL-MCNC: 108 MG/DL (ref 70–99)
GLUCOSE UR STRIP-MCNC: NEGATIVE MG/DL
HCO3 SERPL-SCNC: 31 MMOL/L (ref 22–29)
HGB UR QL STRIP: NEGATIVE
HYALINE CASTS: 3 /LPF
KETONES UR STRIP-MCNC: NEGATIVE MG/DL
LEUKOCYTE ESTERASE UR QL STRIP: ABNORMAL
MAGNESIUM SERPL-MCNC: 2.2 MG/DL (ref 1.7–2.3)
NITRATE UR QL: NEGATIVE
PH UR STRIP: 7 [PH] (ref 5–7)
PHOSPHATE SERPL-MCNC: 3.4 MG/DL (ref 2.5–4.5)
POTASSIUM SERPL-SCNC: 3.5 MMOL/L (ref 3.4–5.3)
RBC URINE: 1 /HPF
SODIUM SERPL-SCNC: 136 MMOL/L (ref 135–145)
SP GR UR STRIP: 1.02 (ref 1–1.03)
SQUAMOUS EPITHELIAL: 11 /HPF
TRANSITIONAL EPI: 1 /HPF
UROBILINOGEN UR STRIP-MCNC: <2 MG/DL
WBC URINE: 13 /HPF

## 2024-08-24 PROCEDURE — 84100 ASSAY OF PHOSPHORUS: CPT | Performed by: INTERNAL MEDICINE

## 2024-08-24 PROCEDURE — 81001 URINALYSIS AUTO W/SCOPE: CPT | Performed by: INTERNAL MEDICINE

## 2024-08-24 PROCEDURE — 250N000011 HC RX IP 250 OP 636: Performed by: STUDENT IN AN ORGANIZED HEALTH CARE EDUCATION/TRAINING PROGRAM

## 2024-08-24 PROCEDURE — 258N000003 HC RX IP 258 OP 636: Performed by: INTERNAL MEDICINE

## 2024-08-24 PROCEDURE — 99233 SBSQ HOSP IP/OBS HIGH 50: CPT | Performed by: INTERNAL MEDICINE

## 2024-08-24 PROCEDURE — 250N000013 HC RX MED GY IP 250 OP 250 PS 637: Performed by: INTERNAL MEDICINE

## 2024-08-24 PROCEDURE — 87086 URINE CULTURE/COLONY COUNT: CPT | Performed by: INTERNAL MEDICINE

## 2024-08-24 PROCEDURE — 250N000009 HC RX 250: Performed by: INTERNAL MEDICINE

## 2024-08-24 PROCEDURE — 83735 ASSAY OF MAGNESIUM: CPT | Performed by: INTERNAL MEDICINE

## 2024-08-24 PROCEDURE — 80048 BASIC METABOLIC PNL TOTAL CA: CPT | Performed by: INTERNAL MEDICINE

## 2024-08-24 PROCEDURE — 250N000013 HC RX MED GY IP 250 OP 250 PS 637: Performed by: STUDENT IN AN ORGANIZED HEALTH CARE EDUCATION/TRAINING PROGRAM

## 2024-08-24 PROCEDURE — 120N000004 HC R&B MS OVERFLOW

## 2024-08-24 PROCEDURE — 99232 SBSQ HOSP IP/OBS MODERATE 35: CPT | Performed by: INTERNAL MEDICINE

## 2024-08-24 PROCEDURE — 210N000001 HC R&B IMCU HEART CARE

## 2024-08-24 PROCEDURE — 82550 ASSAY OF CK (CPK): CPT | Performed by: INTERNAL MEDICINE

## 2024-08-24 RX ADMIN — ACETAMINOPHEN 650 MG: 325 TABLET ORAL at 01:18

## 2024-08-24 RX ADMIN — ACETAMINOPHEN 650 MG: 325 TABLET ORAL at 05:23

## 2024-08-24 RX ADMIN — TRAMADOL HYDROCHLORIDE 50 MG: 50 TABLET, COATED ORAL at 17:37

## 2024-08-24 RX ADMIN — ACETAMINOPHEN 650 MG: 325 TABLET ORAL at 13:41

## 2024-08-24 RX ADMIN — ACETAMINOPHEN 650 MG: 325 TABLET ORAL at 09:12

## 2024-08-24 RX ADMIN — NAFCILLIN 12 G: 10 INJECTION, POWDER, FOR SOLUTION INTRAVENOUS at 13:41

## 2024-08-24 RX ADMIN — ACETAMINOPHEN 650 MG: 325 TABLET ORAL at 17:37

## 2024-08-24 RX ADMIN — Medication 1 CAPSULE: at 09:12

## 2024-08-24 RX ADMIN — HEPARIN SODIUM 5000 UNITS: 10000 INJECTION, SOLUTION INTRAVENOUS; SUBCUTANEOUS at 20:01

## 2024-08-24 RX ADMIN — Medication 1 CAPSULE: at 20:01

## 2024-08-24 RX ADMIN — HEPARIN SODIUM 5000 UNITS: 10000 INJECTION, SOLUTION INTRAVENOUS; SUBCUTANEOUS at 09:12

## 2024-08-24 ASSESSMENT — ACTIVITIES OF DAILY LIVING (ADL)
ADLS_ACUITY_SCORE: 29
ADLS_ACUITY_SCORE: 27
ADLS_ACUITY_SCORE: 29
ADLS_ACUITY_SCORE: 29
ADLS_ACUITY_SCORE: 27
ADLS_ACUITY_SCORE: 27
ADLS_ACUITY_SCORE: 29
ADLS_ACUITY_SCORE: 27
ADLS_ACUITY_SCORE: 29
ADLS_ACUITY_SCORE: 27
ADLS_ACUITY_SCORE: 27
ADLS_ACUITY_SCORE: 29
ADLS_ACUITY_SCORE: 27
ADLS_ACUITY_SCORE: 29
ADLS_ACUITY_SCORE: 27
ADLS_ACUITY_SCORE: 29
ADLS_ACUITY_SCORE: 27

## 2024-08-24 NOTE — PROGRESS NOTES
Cardiac surgery    Plan for AVR/MVR/CABG on Tuesday 8/27/2024. I was going to do it as a second case Monday, but a spot opened up Tuesday morning and I think that is best for a long complicated case. I talked to the patient.    Dylan Renae MD

## 2024-08-24 NOTE — PROGRESS NOTES
HEART CARE NOTE          Assessment/Recommendations   1. Severe valvular disease c/b sever HFpEF  Assessment / Plan  Renal function stable - continue to hold loop diuretic for today and continue to monitor UOP and renal function closely  Patient is high risk for adverse cardiac events 2/2 advanced age, frailty, valvular heart disease, renal dysfunction  GDMT as detailed below; mainstay of treatment for HFpEF includes diuretics and adequate BP control (class I) and SGLT2-I (class 2a); additional medical therapy (ARNI, MRA, ARB) demonstrated less robust evidence for indication but may be considered per guideline recommendations (2b); no indication for BBlockers      Current Pharmacotherapy AHA Guideline-Directed Medical Therapy   Losartan  - not started 2/2 renal dysfunction ARNI/ARB   Spironolactone not started  MRA   SGLT2 inhibitor: not started SGLT2-I    Bumex 2 mg BID - held Loop diuretic       2. Valvular heart disease  Assessment / Plan  Severe aortic stenosis and mod-severe MS c/b MMSA bacteremia and MV leaflet vegetation - management per CT surgery, neurology and ID - plan for AVR +MVR+ CABG on 8/27/24 with Dr. Renae     3. 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 Anemia  Assessment / Plan  Management and supportive care per primary team     5. CAD  Assessment / Plan  Coronary angiogram significant for severe prox LAD and ost-prox Lcx lesions (both amenable to PCI) with mod D2 - plan for staged PCI       Plan of care discussed on August 24, 2024 with patient at bedside, and primary team overseeing patient's care      History of Present Illness/Subjective    Ms. Felicitas Elliott is a 84 year old female with a PMHx significant for (per Epic notation) presented with fatigue, weakness, chills, poor appetite. Does not really have much for chronic medical conditions other than chronic back pain, furunculosis, uterine prolapse, tobacco  "use      Today, Mrs. Elliott denies acute cardiac events or complaints; Management plan as detailed above     ECG: personally reviewed; normal sinus rhythm, nonspecific ST and T waves changes, RBBB, left axis deviation.     ECHO (personnaly Reviewed on 8/19/24):   1. The left ventricle is normal in size. Left ventricular function is  normal.The ejection fraction is 55-60%.  2. Normal right ventricle size and systolic function.  3. Large vegetation on mitral valve (8 mm x 15) attached to posterior leaflet.  4. There is a small vegetation on the aortic valve (6 mm). No evidence of  aortic root abscess identified.  5. Severe valvular aortic stenosis (SHU:0.8 cm2, peak nomi: 4.6 m/sec, mean  gradient: 51 mmHg).    Telemetry: personally reviewed August 24, 2024; notable for sinus rhythm     Lab results: personally reviewed August 24, 2024; notable for stable renal function    Medical history and pertinent documents reviewed in Care Everywhere please where applicable see details above        Physical Examination Review of Systems   /73 (BP Location: Left arm)   Pulse 79   Temp 98.8  F (37.1  C) (Oral)   Resp 16   Ht 1.702 m (5' 7\")   Wt 85.4 kg (188 lb 4.8 oz)   SpO2 93%   BMI 29.49 kg/m    Body mass index is 29.49 kg/m .  Wt Readings from Last 3 Encounters:   08/24/24 85.4 kg (188 lb 4.8 oz)   08/15/24 90.1 kg (198 lb 9.6 oz)     General Appearance:   no distress, normal body habitus   ENT/Mouth: membranes moist, no oral lesions or bleeding gums.      EYES:  no scleral icterus, normal conjunctivae   Neck: no carotid bruits or thyromegaly   Chest/Lungs:   lungs are clear to auscultation, no rales or wheezing, equal chest wall expansion    Cardiovascular:   Regular. Normal first and second heart sounds with +PAVAN; no murmurs, rubs, or gallops; the carotid, radial and posterior tibial pulses are intact, no JVD or LE edema bilaterally    Abdomen:  no organomegaly, masses, bruits, or tenderness; bowel sounds are " present   Extremities: no cyanosis or clubbing   Skin: no xanthelasma, warm.    Neurologic: NAD     Psychiatric: alert and oriented x3, calm     A complete 10 systems ROS was reviewed  And is negative except what is listed in the HPI.          Medical History  Surgical History Family History Social History   No past medical history on file. Past Surgical History:   Procedure Laterality Date    CV CORONARY ANGIOGRAM N/A 8/20/2024    Procedure: CV CORONARY ANGIOGRAM;  Surgeon: Den Wylie MD;  Location: [facility] CV    no family history of premature coronary artery disease Social History     Socioeconomic History    Marital status:      Spouse name: Not on file    Number of children: Not on file    Years of education: Not on file    Highest education level: Not on file   Occupational History    Not on file   Tobacco Use    Smoking status: Not on file    Smokeless tobacco: Not on file   Substance and Sexual Activity    Alcohol use: Not on file    Drug use: Not on file    Sexual activity: Not on file   Other Topics Concern    Not on file   Social History Narrative    Not on file     Social Determinants of Health     Financial Resource Strain: High Risk (1/1/2022)    Received from [facility]    Financial Resource Strain     Difficulty of Paying Living Expenses: Not on file     Difficulty of Paying Living Expenses: Not on file   Food Insecurity: Not on file   Transportation Needs: Not on file   Physical Activity: Not on file   Stress: Not on file   Social Connections: Unknown (1/3/2024)    Received from [facility]    Social Connections     Frequency of Communication with Friends and Family: Not on file   Interpersonal Safety: High Risk (8/23/2024)    Interpersonal Safety     Do you feel physically and emotionally safe where you currently live?: No     Within the past 12 months, have you been hit, slapped, kicked or otherwise  "physically hurt by someone?: No     Within the past 12 months, have you been humiliated or emotionally abused in other ways by your partner or ex-partner?: No   Housing Stability: Not on file           Lab Results    Chemistry/lipid CBC Cardiac Enzymes/BNP/TSH/INR   Lab Results   Component Value Date    BUN 14.1 08/24/2024     08/24/2024    CO2 31 (H) 08/24/2024    Lab Results   Component Value Date    WBC 10.7 08/23/2024    HGB 10.0 (L) 08/23/2024    HCT 30.9 (L) 08/23/2024    MCV 96 08/23/2024     08/23/2024    Lab Results   Component Value Date    INR 1.18 (H) 08/20/2024     No results found for: \"CKTOTAL\", \"CKMB\", \"TROPONINI\"       Weight:    Wt Readings from Last 3 Encounters:   08/24/24 85.4 kg (188 lb 4.8 oz)   08/15/24 90.1 kg (198 lb 9.6 oz)       Allergies  Allergies   Allergen Reactions    Aspirin Rash    Cats Rash         Surgical History  Past Surgical History:   Procedure Laterality Date    CV CORONARY ANGIOGRAM N/A 8/20/2024    Procedure: CV CORONARY ANGIOGRAM;  Surgeon: Den Wylie MD;  Location: [facility] CV       Social History  Tobacco:   History   Smoking Status    Not on file   Smokeless Tobacco    Not on file    Alcohol:   Social History    Substance and Sexual Activity      Alcohol use: Not on file   Illicit Drugs:   History   Drug Use Not on file       Family History  No family history on file.       Navneet Branch MD on 8/24/2024      cc: [facility]    "

## 2024-08-24 NOTE — PROGRESS NOTES
[facility] Progress Note - Hospitalist Service    Date of Admission:  8/16/2024    Assessment & Plan   85 yo female with history of chronic back pain, furunculosis, uterine prolapse, and tobacco use presents 8/14/24 with sepsis secondary to MSSA bacteremia and HAMMAD. MIGDALIA pos for mitral and aortic valvular vegetations. Transferred to [facility] 8/16/24 for CT surgery evaluation.      #Sepsis due to MSSA bacteremia  #Acute bacterial endocarditis  #HFpEF with ADHF, hypervolemia resolved  #CAD  -MIGDALIA shows large posterior leaflet mitral valve vegetation and small aortic valve vegetation with severe aortic stenosis and mitral regurgitation  LVEF 55-60%  -Last BC positive with MSSA on 8/16/24.  BC's from 8/17-8/20 NGTD  -coronary angiogram (8/20/24) showed 70% prox LAD and 90% ost Cx to Prox Cx.  -IV Nafcillin continuous infusion  -plan for AVR +MVR+ CABG on 8/27/24 with Dr. Renae   -ID, Cardiology, CVS assistance appreciated     #Acute and subacute CVA  -likely related to septic emboli  -MRI brain showed 13 mm focus of cortical signal abnormality within the left superior parietal lobule favored to represent a subacute infarct, smaller foci of signal abnormality with similar characteristics favored to represent punctate subacute infarcts.   -CTA head and neck no hemorrhage, intracranial atherosclerosis, moderate to marked stenosis of the right P2 posterior cerebral artery segment. No definite proximal branch occlusion, aneurysm, or high flow vascular malformation identified. Atherosclerotic plaque at the carotid bifurcations bilaterally without hemodynamically significant stenosis in the neck vessels.   No evidence for dissection.   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  Baseline range 1.2-1.3  -net negative  2.1L  -Wt 91.9kg (8/16) -> 85.4kg today  -Check UA/UC, CK  -Bumex on hold  -Cr a.m.     #Mod-severe cervical spinal stenosis, foraminal stenosis, facet arthropathy  -MRI 8/15 showed degenerative changes in her cervical spine which appears similar to report from 2015. Radiology commented on a left L4-5 degenerative facet with findings that could not entirely rule out septic arthritis.   -Neck pain is chronic and has no new or worsening symptoms  -Neurosurgery recommends non operative management and to obtain repeat MRI with and w/o contrast if neck pain worsens beyond chronic baseline despite antibiotics and treatment of cardiac vegetations  -supportive cares  -continue lidocaine cream, tramadol and tylenol for pain control and headache control     #Prediabetes  -Hemoglobin A1c 6.4  -Monitor glucoses intermittently  -Glucose 108 this morning      #Normocytic anemia due to hemodilution from hypervolemia, resolved  #Thrombocytopenia in the setting of sepsis, resolved    #Hypokalemia  -monitor and replace accordingly  -K 3.5 today    #Hypomagnesemia  -monitor and replace accordingly  -Mg 2.2 today    #Hypophosphatemia  -monitor and replace accordingly  -Phos 3.4 today          Diet: Snacks/Supplements Adult: Ensure Enlive; With Meals  Room Service  Fluid restriction 1800 ML FLUID  Advance Diet as Tolerated: Regular Diet Adult    DVT Prophylaxis: Heparin SQ  Le Catheter: Not present  Lines: PRESENT      PICC 08/21/24 Single Lumen Right Basilic Long term Antibiotic-Site Assessment: WDL      Cardiac Monitoring: ACTIVE order. Indication: QTc prolonging medication (48 hours)  Code Status: Full Code      Clinically Significant Risk Factors        # Hypokalemia: Lowest K = 3.1 mmol/L in last 2 days, will replace as needed     # Hypomagnesemia: Lowest Mg = 1.6 mg/dL in last 2 days, will replace as needed                 # Overweight: Estimated body mass index is 29.49 kg/m  as calculated from the following:    Height as of  "this encounter: 1.702 m (5' 7\").    Weight as of this encounter: 85.4 kg (188 lb 4.8 oz).      # Financial/Environmental Concerns:                 Disposition Plan     Medically Ready for Discharge: Anticipated in 5+ Days             Zach Foreman DO,   Hospitalist Service  [facility]  Securely message with [employer] (more info)  Text page via [employer] Paging/Directory   ______________________________________________________________________    Interval History   Afebrile. No acute events overnight.    Physical Exam   Vital Signs: Temp: 98.7  F (37.1  C) Temp src: Oral BP: 111/55 Pulse: 85   Resp: 16 SpO2: 92 % O2 Device: None (Room air)    Weight: 188 lbs 4.8 oz    General appearance - NAD  Eyes -sclera anicteric  Lungs - CTAB, non labored  Heart - rrr. II/VI SM. trace peripheral edema.  Abdomen - soft, nontender, nondistended, BS+  Neurological - a&ox3, non focal  Skin - no c/c/p     Labs reviewed  "

## 2024-08-24 NOTE — PLAN OF CARE
Problem: Adult Inpatient Plan of Care  Goal: Absence of Hospital-Acquired Illness or Injury  Intervention: Identify and Manage Fall Risk  Recent Flowsheet Documentation  Taken 8/24/2024 1607 by Mona Sarabia RN  Safety Promotion/Fall Prevention: safety round/check completed  Taken 8/24/2024 1130 by Mona Sarabia RN  Safety Promotion/Fall Prevention: safety round/check completed  Taken 8/24/2024 0815 by Mona Sarabia RN  Safety Promotion/Fall Prevention: safety round/check completed     Problem: Risk for Delirium  Goal: Improved Behavioral Control  Intervention: Minimize Safety Risk  Recent Flowsheet Documentation  Taken 8/24/2024 1607 by Mona Sarabia RN  Communication Enhancement Strategies: call light answered in person  Enhanced Safety Measures: pain management  Taken 8/24/2024 1130 by Mona Sarabia RN  Communication Enhancement Strategies: call light answered in person  Enhanced Safety Measures: pain management  Taken 8/24/2024 0815 by Mona Sarabia RN  Communication Enhancement Strategies: call light answered in person  Enhanced Safety Measures: pain management     Problem: Skin Injury Risk Increased  Goal: Skin Health and Integrity  Intervention: Plan: Nurse Driven Intervention: Moisture Management  Recent Flowsheet Documentation  Taken 8/24/2024 1607 by Mona Sarabia RN  Moisture Interventions: Encourage regular toileting  Taken 8/24/2024 1130 by Mona Sarabia RN  Moisture Interventions: Encourage regular toileting  Taken 8/24/2024 0815 by Mona Sarabia RN  Moisture Interventions: Encourage regular toileting     Problem: Cardiac Catheterization (Diagnostic/Interventional)  Goal: Anesthesia/Sedation Recovery  Intervention: Optimize Anesthesia Recovery  Recent Flowsheet Documentation  Taken 8/24/2024 1607 by Mona Sarabia RN  Safety Promotion/Fall Prevention: safety round/check completed  Taken 8/24/2024 1130 by Mona Sarabia RN  Safety Promotion/Fall  Prevention: safety round/check completed  Taken 8/24/2024 0815 by Mona Sarabia RN  Safety Promotion/Fall Prevention: safety round/check completed     Problem: Cardiovascular Surgery  Goal: Anesthesia/Sedation Recovery  Intervention: Optimize Anesthesia Recovery  Recent Flowsheet Documentation  Taken 8/24/2024 1607 by Mona Sarabia RN  Safety Promotion/Fall Prevention: safety round/check completed  Taken 8/24/2024 1130 by Mona Sarabia RN  Safety Promotion/Fall Prevention: safety round/check completed  Taken 8/24/2024 0815 by Mona Sarabia RN  Safety Promotion/Fall Prevention: safety round/check completed   Goal Outcome Evaluation:    VSS No adverse effect r/t nafcillin administration. Remains ind in room without concern. Utilizes call light appropriately and is able to verbalize needs effectively.

## 2024-08-24 NOTE — PLAN OF CARE
Goal Outcome Evaluation:      Plan of Care Reviewed With: patient    Overall Patient Progress: improvingOverall Patient Progress: improving    Outcome Evaluation: A&Ox4.  Ambulates in room independently.  Tolerating room air.  Chronic neck/back pain moderate - heating pad and PRN acetaminophen helpful.  TELE:  SR with 1st degree AVB and BBB.  Reports diarrhea improved in consistency but still loose.  Per patient 4 BMs today.  Hair washed, CHG wipes done, clothing changed.      Problem: Pain Acute  Goal: Optimal Pain Control and Function  Outcome: Progressing  Intervention: Develop Pain Management Plan  Recent Flowsheet Documentation  Taken 8/23/2024 1650 by Khushbu Ojeda RN  Pain Management Interventions: heat applied  Taken 8/23/2024 1553 by Khushbu Ojeda RN  Pain Management Interventions:   medication (see MAR)   heat applied  Intervention: Prevent or Manage Pain  Recent Flowsheet Documentation  Taken 8/23/2024 2036 by Khushbu Ojeda RN  Sensory Stimulation Regulation: lighting decreased  Medication Review/Management: medications reviewed  Taken 8/23/2024 1600 by Khushbu Ojeda RN  Sensory Stimulation Regulation: lighting decreased  Medication Review/Management: medications reviewed

## 2024-08-24 NOTE — PLAN OF CARE
Problem: Sepsis/Septic Shock  Goal: Optimal Coping  Intervention: Optimize Psychosocial Adjustment to Illness  Recent Flowsheet Documentation  Taken 8/24/2024 0000 by Donta Fields RN  Supportive Measures: active listening utilized     Problem: Gas Exchange Impaired  Goal: Optimal Gas Exchange  Outcome: Progressing         Goal Outcome Evaluation:  Patient is aox4. VSS. Has SR with 1st degree AVB and inverted tw. Denied chest pain and dizziness. Patient has dyspnea with exertion. Lung sounds diminished w/ crackles on lower lobes. On room air. Patient also c/o headache and back/neck pain. Prn tylenol helpful. Patient prefers to sleep on recliner. SBA in the room. Call-light within reach. Nafcillin running at 22.9 ml/hr.

## 2024-08-25 LAB
ABO/RH(D): NORMAL
ANION GAP SERPL CALCULATED.3IONS-SCNC: 10 MMOL/L (ref 7–15)
ANTIBODY SCREEN: NEGATIVE
BACTERIA BLD CULT: NO GROWTH
BUN SERPL-MCNC: 14.5 MG/DL (ref 8–23)
CALCIUM SERPL-MCNC: 9 MG/DL (ref 8.8–10.4)
CHLORIDE SERPL-SCNC: 97 MMOL/L (ref 98–107)
CREAT SERPL-MCNC: 1.5 MG/DL (ref 0.51–0.95)
EGFRCR SERPLBLD CKD-EPI 2021: 34 ML/MIN/1.73M2
GLUCOSE SERPL-MCNC: 144 MG/DL (ref 70–99)
HCO3 SERPL-SCNC: 30 MMOL/L (ref 22–29)
HOLD SPECIMEN: NORMAL
MAGNESIUM SERPL-MCNC: 2.1 MG/DL (ref 1.7–2.3)
PHOSPHATE SERPL-MCNC: 3.5 MG/DL (ref 2.5–4.5)
POTASSIUM SERPL-SCNC: 3.5 MMOL/L (ref 3.4–5.3)
POTASSIUM SERPL-SCNC: 3.8 MMOL/L (ref 3.4–5.3)
SODIUM SERPL-SCNC: 137 MMOL/L (ref 135–145)
SPECIMEN EXPIRATION DATE: NORMAL

## 2024-08-25 PROCEDURE — 258N000003 HC RX IP 258 OP 636: Performed by: INTERNAL MEDICINE

## 2024-08-25 PROCEDURE — 83735 ASSAY OF MAGNESIUM: CPT | Performed by: INTERNAL MEDICINE

## 2024-08-25 PROCEDURE — 250N000013 HC RX MED GY IP 250 OP 250 PS 637: Performed by: INTERNAL MEDICINE

## 2024-08-25 PROCEDURE — 80048 BASIC METABOLIC PNL TOTAL CA: CPT | Performed by: INTERNAL MEDICINE

## 2024-08-25 PROCEDURE — 82310 ASSAY OF CALCIUM: CPT | Performed by: INTERNAL MEDICINE

## 2024-08-25 PROCEDURE — 86923 COMPATIBILITY TEST ELECTRIC: CPT | Performed by: SURGERY

## 2024-08-25 PROCEDURE — 250N000011 HC RX IP 250 OP 636: Performed by: STUDENT IN AN ORGANIZED HEALTH CARE EDUCATION/TRAINING PROGRAM

## 2024-08-25 PROCEDURE — 86900 BLOOD TYPING SEROLOGIC ABO: CPT | Performed by: SURGERY

## 2024-08-25 PROCEDURE — 250N000013 HC RX MED GY IP 250 OP 250 PS 637: Performed by: STUDENT IN AN ORGANIZED HEALTH CARE EDUCATION/TRAINING PROGRAM

## 2024-08-25 PROCEDURE — 84100 ASSAY OF PHOSPHORUS: CPT | Performed by: INTERNAL MEDICINE

## 2024-08-25 PROCEDURE — 250N000009 HC RX 250: Performed by: INTERNAL MEDICINE

## 2024-08-25 PROCEDURE — 120N000004 HC R&B MS OVERFLOW

## 2024-08-25 PROCEDURE — 99232 SBSQ HOSP IP/OBS MODERATE 35: CPT | Performed by: INTERNAL MEDICINE

## 2024-08-25 PROCEDURE — 86923 COMPATIBILITY TEST ELECTRIC: CPT | Performed by: ANESTHESIOLOGY

## 2024-08-25 PROCEDURE — 210N000001 HC R&B IMCU HEART CARE

## 2024-08-25 PROCEDURE — 99233 SBSQ HOSP IP/OBS HIGH 50: CPT | Performed by: INTERNAL MEDICINE

## 2024-08-25 RX ORDER — BUMETANIDE 2 MG/1
2 TABLET ORAL DAILY
Status: DISCONTINUED | OUTPATIENT
Start: 2024-08-25 | End: 2024-08-26

## 2024-08-25 RX ADMIN — ACETAMINOPHEN 650 MG: 325 TABLET ORAL at 15:59

## 2024-08-25 RX ADMIN — Medication 1 CAPSULE: at 20:56

## 2024-08-25 RX ADMIN — ACETAMINOPHEN 650 MG: 325 TABLET ORAL at 03:34

## 2024-08-25 RX ADMIN — Medication 1 CAPSULE: at 09:12

## 2024-08-25 RX ADMIN — HEPARIN SODIUM 5000 UNITS: 10000 INJECTION, SOLUTION INTRAVENOUS; SUBCUTANEOUS at 20:56

## 2024-08-25 RX ADMIN — BUMETANIDE 2 MG: 2 TABLET ORAL at 09:12

## 2024-08-25 RX ADMIN — ACETAMINOPHEN 650 MG: 325 TABLET ORAL at 20:54

## 2024-08-25 RX ADMIN — HEPARIN SODIUM 5000 UNITS: 10000 INJECTION, SOLUTION INTRAVENOUS; SUBCUTANEOUS at 09:13

## 2024-08-25 RX ADMIN — NAFCILLIN 12 G: 10 INJECTION, POWDER, FOR SOLUTION INTRAVENOUS at 13:20

## 2024-08-25 ASSESSMENT — ACTIVITIES OF DAILY LIVING (ADL)
ADLS_ACUITY_SCORE: 25

## 2024-08-25 NOTE — PROGRESS NOTES
[facility]    Medicine Progress Note - Hospitalist Service    Date of Admission:  8/16/2024    Assessment & Plan   83 yo female with history of chronic back pain, furunculosis, uterine prolapse, and tobacco use presents 8/14/24 with sepsis secondary to MSSA bacteremia and HAMMAD. MIGDALIA positive for mitral and aortic valvular vegetations. Transferred to [facility] 8/16/24 for CT surgery evaluation.  On Nafcillin continuous infusion via right PICC line.  Last positive BC with MSSA on 8/16/24.  BC's from 8/17-8/20 NGTD.  Coronary angiogram (8/20/24) showed 70% prox LAD and 90% ost Cx to Prox Cx.  Plan for AVR +MVR+ CABG on Tuesday, 8/27/24, with Dr. Renae.     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  Severe mitral valve stenosis with the mean gradient of mitral valve 10 mmHg.  Severe calcified aortic valve with reduced aortic valve excursion. The mean  gradient of aortic valve 41mmHg.  Small size mobile mass (3 x 4 mm) on aortic valve. Small size mobile mass (3 x 3 mm) on the posterior MV leaflet.  LVEF 55-60%  -Last BC positive with MSSA on 8/16/24.  BC's from 8/17-8/20 NGTD  -coronary angiogram (8/20/24) showed 70% prox LAD and 90% ost Cx to Prox Cx.  -IV Nafcillin continuous infusion  -plan for AVR +MVR+ CABG on Tuesday, 8/27/24, with Dr. Renae   -ID, Cardiology, CVS assistance appreciated     #Acute and subacute CVA  -likely related to septic emboli  -MRI brain showed 13 mm focus of cortical signal abnormality within the left superior parietal lobule favored to represent a subacute infarct, smaller foci of signal abnormality with similar characteristics favored to represent punctate subacute infarcts.   -CTA head and neck no hemorrhage, intracranial atherosclerosis, moderate to  marked stenosis of the right P2 posterior cerebral artery segment. No definite proximal branch occlusion, aneurysm, or high flow vascular malformation identified. Atherosclerotic plaque at the carotid bifurcations bilaterally without hemodynamically significant stenosis in the neck vessels.   No evidence for dissection.   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  Baseline range 1.2-1.3  -net negative 2.3L  -Wt 91.9kg (8/16) -> 85.4kg -> 86.3kgtoday  -UA no blood. 10mg/dL protein, 75 L.E., few bacteria, 13 WBC, 11 Squamous epithelial cells, 3 hyaline casts  -CK normal  -Bumex 2mg daily restarted today  -BMP a.m.     #Mod-severe cervical spinal stenosis, foraminal stenosis, facet arthropathy  -MRI 8/15 showed degenerative changes in her cervical spine which appears similar to report from 2015. Radiology commented on a left L4-5 degenerative facet with findings that could not entirely rule out septic arthritis.   -Neck pain is chronic and has no new or worsening symptoms  -Neurosurgery recommends non operative management and to obtain repeat MRI with and w/o contrast if neck pain worsens beyond chronic baseline despite antibiotics and treatment of cardiac vegetations  -supportive cares  -continue lidocaine cream, tramadol and tylenol for pain control and headache control     #Prediabetes  -Hemoglobin A1c 6.4  -Monitor glucoses intermittently  -Glucose 144 this morning      #Normocytic anemia due to hemodilution from hypervolemia, resolved  #Thrombocytopenia in the setting of sepsis, resolved    #Hypokalemia  -monitor and replace accordingly  -K 3.8 today    #Hypomagnesemia  -monitor and replace accordingly  -Mg 2.1 today    #Hypophosphatemia  -monitor and replace accordingly  -Phos 3.5 today          Diet: Snacks/Supplements Adult: Ensure Enlive; With  "Meals  Room Service  Fluid restriction 1800 ML FLUID  Advance Diet as Tolerated: Regular Diet Adult    DVT Prophylaxis: Heparin SQ  Le Catheter: Not present  Lines: PRESENT      PICC 08/21/24 Single Lumen Right Basilic Long term Antibiotic-Site Assessment: WDL      Cardiac Monitoring: ACTIVE order. Indication: QTc prolonging medication (48 hours)  Code Status: Full Code      Clinically Significant Risk Factors                            # Overweight: Estimated body mass index is 29.79 kg/m  as calculated from the following:    Height as of this encounter: 1.702 m (5' 7\").    Weight as of this encounter: 86.3 kg (190 lb 3.2 oz).      # Financial/Environmental Concerns:                 Disposition Plan     Medically Ready for Discharge: Anticipated in 5+ Days             Zach Foreman DO, DO  Hospitalist Service  [facility]  Securely message with [employer] (more info)  Text page via [employer] Paging/Directory   ______________________________________________________________________    Interval History   Afebrile. No acute events overnight.    Physical Exam   Vital Signs: Temp: 98.1  F (36.7  C) Temp src: Oral BP: 108/56 Pulse: 82   Resp: 16 SpO2: 92 % O2 Device: None (Room air)    Weight: 190 lbs 3.2 oz    General appearance - NAD  Lungs - CTAB, non labored  Heart - rrr. II/VI SM. trace peripheral edema.  Abdomen - soft, nontender, nondistended, BS+  Neurological - a&ox3, non focal  Skin - no c/c/p     Labs reviewed  "

## 2024-08-25 NOTE — PROGRESS NOTES
HEART CARE NOTE          Assessment/Recommendations   1. Severe valvular disease c/b sever HFpEF  Assessment / Plan  Renal function stable to improved while weight is trending up - resume loop diuretic  and continue to monitor UOP and renal function closely  Patient is high risk for adverse cardiac events 2/2 advanced age, frailty, valvular heart disease, renal dysfunction  GDMT as detailed below; mainstay of treatment for HFpEF includes diuretics and adequate BP control (class I) and SGLT2-I (class 2a); additional medical therapy (ARNI, MRA, ARB) demonstrated less robust evidence for indication but may be considered per guideline recommendations (2b); no indication for BBlockers      Current Pharmacotherapy AHA Guideline-Directed Medical Therapy   Losartan  - not started 2/2 renal dysfunction ARNI/ARB   Spironolactone not started  MRA   SGLT2 inhibitor: not started SGLT2-I    Bumex 2 mg daily Loop diuretic       2. Valvular heart disease  Assessment / Plan  Severe aortic stenosis and mod-severe MS c/b MMSA bacteremia and MV leaflet vegetation - management per CT surgery, neurology and ID - plan for AVR +MVR+ CABG on 8/27/24 with Dr. Renae     3. 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 Anemia  Assessment / Plan  Management and supportive care per primary team     5. CAD  Assessment / Plan  Coronary angiogram significant for severe prox LAD and ost-prox Lcx lesions - plan for CABG at the time of MVR + AVR       Plan of care discussed on August 25, 2024 with patient at bedside, and primary team overseeing patient's care      History of Present Illness/Subjective    Ms. Felicitas Elliott is a 84 year old female with a PMHx significant for (per Epic notation) presented with fatigue, weakness, chills, poor appetite. Does not really have much for chronic medical conditions other than chronic back pain, furunculosis, uterine prolapse, tobacco  "use      Today, Mrs. Elliott denies acute cardiac events or complaints; Management plan as detailed above     ECG: personally reviewed; normal sinus rhythm, nonspecific ST and T waves changes, RBBB, left axis deviation.     ECHO (personnaly Reviewed on 8/19/24):   1. The left ventricle is normal in size. Left ventricular function is  normal.The ejection fraction is 55-60%.  2. Normal right ventricle size and systolic function.  3. Large vegetation on mitral valve (8 mm x 15) attached to posterior leaflet.  4. There is a small vegetation on the aortic valve (6 mm). No evidence of  aortic root abscess identified.  5. Severe valvular aortic stenosis (SHU:0.8 cm2, peak nomi: 4.6 m/sec, mean  gradient: 51 mmHg).    Telemetry: personally reviewed August 25, 2024; notable for sinus     Lab results: personally reviewed August 25, 2024; notable for HAMMAD on CKD    Medical history and pertinent documents reviewed in Care Everywhere please where applicable see details above        Physical Examination Review of Systems   /66 (BP Location: Left arm)   Pulse 80   Temp 98.1  F (36.7  C) (Oral)   Resp 16   Ht 1.702 m (5' 7\")   Wt 86.3 kg (190 lb 3.2 oz)   SpO2 92%   BMI 29.79 kg/m    Body mass index is 29.79 kg/m .  Wt Readings from Last 3 Encounters:   08/25/24 86.3 kg (190 lb 3.2 oz)   08/15/24 90.1 kg (198 lb 9.6 oz)     General Appearance:   no distress, normal body habitus   ENT/Mouth: membranes moist, no oral lesions or bleeding gums.      EYES:  no scleral icterus, normal conjunctivae   Neck: no carotid bruits or thyromegaly   Chest/Lungs:   lungs are clear to auscultation, no rales or wheezing, equal chest wall expansion    Cardiovascular:   Regular. Normal first and second heart sounds with +PAVAN; no rubs, or gallops; the carotid, radial and posterior tibial pulses are intact, + JVD and trace - mild LE edema bilaterally    Abdomen:  no organomegaly, masses, bruits, or tenderness; bowel sounds are present "   Extremities: no cyanosis or clubbing   Skin: no xanthelasma, warm.    Neurologic: normal gait, normal  bilateral, no tremors     Psychiatric: alert and oriented x3, calm     A complete 10 systems ROS was reviewed  And is negative except what is listed in the HPI.          Medical History  Surgical History Family History Social History   No past medical history on file. Past Surgical History:   Procedure Laterality Date    CV CORONARY ANGIOGRAM N/A 8/20/2024    Procedure: CV CORONARY ANGIOGRAM;  Surgeon: Den Wylie MD;  Location: [facility] CATH LAB CV    no family history of premature coronary artery disease Social History     Socioeconomic History    Marital status:      Spouse name: Not on file    Number of children: Not on file    Years of education: Not on file    Highest education level: Not on file   Occupational History    Not on file   Tobacco Use    Smoking status: Not on file    Smokeless tobacco: Not on file   Substance and Sexual Activity    Alcohol use: Not on file    Drug use: Not on file    Sexual activity: Not on file   Other Topics Concern    Not on file   Social History Narrative    Not on file     Social Determinants of Health     Financial Resource Strain: Low Risk  (8/24/2024)    Financial Resource Strain     Within the past 12 months, have you or your family members you live with been unable to get utilities (heat, electricity) when it was really needed?: No   Food Insecurity: Low Risk  (8/24/2024)    Food Insecurity     Within the past 12 months, did you worry that your food would run out before you got money to buy more?: No     Within the past 12 months, did the food you bought just not last and you didn t have money to get more?: No   Transportation Needs: Low Risk  (8/24/2024)    Transportation Needs     Within the past 12 months, has lack of transportation kept you from medical appointments, getting your medicines, non-medical meetings or appointments, work, or from  "getting things that you need?: No   Physical Activity: Not on file   Stress: Not on file   Social Connections: Unknown (1/3/2024)    Received from [employer] [facility]    Social Connections     Frequency of Communication with Friends and Family: Not on file   Interpersonal Safety: High Risk (8/23/2024)    Interpersonal Safety     Do you feel physically and emotionally safe where you currently live?: No     Within the past 12 months, have you been hit, slapped, kicked or otherwise physically hurt by someone?: No     Within the past 12 months, have you been humiliated or emotionally abused in other ways by your partner or ex-partner?: No   Housing Stability: Low Risk  (8/24/2024)    Housing Stability     Do you have housing? : Yes     Are you worried about losing your housing?: No           Lab Results    Chemistry/lipid CBC Cardiac Enzymes/BNP/TSH/INR   Lab Results   Component Value Date    BUN 14.1 08/24/2024     08/24/2024    CO2 31 (H) 08/24/2024    Lab Results   Component Value Date    WBC 10.7 08/23/2024    HGB 10.0 (L) 08/23/2024    HCT 30.9 (L) 08/23/2024    MCV 96 08/23/2024     08/23/2024    Lab Results   Component Value Date    INR 1.18 (H) 08/20/2024     No results found for: \"CKTOTAL\", \"CKMB\", \"TROPONINI\"       Weight:    Wt Readings from Last 3 Encounters:   08/25/24 86.3 kg (190 lb 3.2 oz)   08/15/24 90.1 kg (198 lb 9.6 oz)       Allergies  Allergies   Allergen Reactions    Aspirin Rash    Cats Rash         Surgical History  Past Surgical History:   Procedure Laterality Date    CV CORONARY ANGIOGRAM N/A 8/20/2024    Procedure: CV CORONARY ANGIOGRAM;  Surgeon: Den Wylie MD;  Location: [facility] CV       Social History  Tobacco:   History   Smoking Status    Not on file   Smokeless Tobacco    Not on file    Alcohol:   Social History    Substance and Sexual Activity      Alcohol use: Not on file   Illicit Drugs:   History   Drug Use Not on file "       Family History  No family history on file.       Navneet Branch MD on 8/25/2024      cc: Clinic, [facility]ik

## 2024-08-25 NOTE — PLAN OF CARE
Problem: Adult Inpatient Plan of Care  Goal: Plan of Care Review  Outcome: Progressing  Flowsheets (Taken 8/25/2024 1434)  Plan of Care Reviewed With:   patient   family  Overall Patient Progress: improving  POC reviewed with patient and family at bedside. CABG education reviewed. All questions and concerns addressed. CABG folder and Ipad #2 left at bedside per family request. She has 7 children and many grandchildren visiting throughout the day, all supportive and attentive. Patient is in positive spirits that recovery will go well. CABG scheduled for 8/27 am. CT surgery team following.    Up ad dimple.  She has been practicing sternal precautions already. Heart pillow at bedside.      Problem: Sepsis/Septic Shock  Goal: Absence of Infection Signs and Symptoms  Outcome: Progressing  Intervention: Initiate Sepsis Management  Recent Flowsheet Documentation  Taken 8/25/2024 1300 by Caitlni Llanes RN  Infection Prevention:   environmental surveillance performed   hand hygiene promoted   rest/sleep promoted  Taken 8/25/2024 0900 by Caitlin Llanes RN  Infection Prevention:   environmental surveillance performed   hand hygiene promoted   rest/sleep promoted  Intervention: Promote Stabilization  Recent Flowsheet Documentation  Taken 8/25/2024 1300 by Caitlin Llanes RN  Fluid/Electrolyte Management: fluids restricted  Taken 8/25/2024 0900 by Caitlin Llanes RN  Fluid/Electrolyte Management: fluids restricted  Intervention: Promote Recovery  Recent Flowsheet Documentation  Taken 8/25/2024 1300 by Caitlin Llanes RN  Activity Management: up ad dimple  Taken 8/25/2024 0900 by Caitlin Llanes RN  Activity Management: up ad dimple  A-febrile.   WBC 10.7.  Around the clock Nafcillin infusing through R single lumen PICC line.   ID following. Continue 6 wk course of Nafcillin.        Problem: Cardiovascular Surgery  Goal: Fluid and Electrolyte Balance  Outcome: Progressing  Intervention: Monitor and Manage Fluid and Electrolyte  Balance  Recent Flowsheet Documentation  Taken 8/25/2024 1300 by Caitlin Llanes, RN  Fluid/Electrolyte Management: fluids restricted  Taken 8/25/2024 0900 by Caitlin Llanes, RN  Fluid/Electrolyte Management: fluids restricted  Wt Readings from Last 3 Encounters:   08/25/24 86.3 kg (190 lb 3.2 oz)   08/15/24 90.1 kg (198 lb 9.6 oz)   Cr 1.50.  Electrolytes within range.   Cards following.   PO Bumex resumed this am.   1800 ml fluid restriction.   Strict I&O's.   +2 BLE.  Fine crackles bilateral posterior lower lobes.  Denies sob. SpO2 >90% RA.      Goal Outcome Evaluation:      Plan of Care Reviewed With: patient, family    Overall Patient Progress: improvingOverall Patient Progress: improving

## 2024-08-25 NOTE — CONSULTS
SW received consult for SDOH-interpersonal safety. SW reached out to charge RN who confirmed this was marked incorrectly. Pt has no interpersonal safety concerns. Consult was discontinued.     JAM BakerW

## 2024-08-25 NOTE — PLAN OF CARE
[id number]    Problem: Adult Inpatient Plan of Care  Goal: Plan of Care Review  Description: The Plan of Care Review/Shift note should be completed every shift.  The Outcome Evaluation is a brief statement about your assessment that the patient is improving, declining, or no change.  This information will be displayed automatically on your shift  note.  Outcome: Progressing  Flowsheets (Taken 8/25/2024 0114)  Plan of Care Reviewed With: patient   Goal Outcome Evaluation:      Plan of Care Reviewed With: patient    Pt alert and oriented times 4. Vitals stable. C/o headache and lower back pain which are not new per pt. Gave tylenol for back pain and headache . Pain better after medication. Denied any SOB, lightheaded or dizziness.Pt ambulated to bathroom without any problem. Pt on K, mg and phos protocol. Recheck all labs in am per protocol. Pt has IV abx running through PICC line. All cares explained to pt.

## 2024-08-26 ENCOUNTER — TELEPHONE (OUTPATIENT)
Dept: CARDIOLOGY | Facility: CLINIC | Age: 84
End: 2024-08-26
Payer: COMMERCIAL

## 2024-08-26 ENCOUNTER — ANESTHESIA EVENT (OUTPATIENT)
Dept: SURGERY | Facility: HOSPITAL | Age: 84
DRG: 853 | End: 2024-08-26
Payer: COMMERCIAL

## 2024-08-26 DIAGNOSIS — I50.9 ACUTE DECOMPENSATED HEART FAILURE (H): Primary | ICD-10-CM

## 2024-08-26 LAB
ANION GAP SERPL CALCULATED.3IONS-SCNC: 11 MMOL/L (ref 7–15)
BACTERIA UR CULT: NORMAL
BASOPHILS # BLD AUTO: 0.1 10E3/UL (ref 0–0.2)
BASOPHILS NFR BLD AUTO: 1 %
BUN SERPL-MCNC: 16.3 MG/DL (ref 8–23)
CALCIUM SERPL-MCNC: 9.1 MG/DL (ref 8.8–10.4)
CHLORIDE SERPL-SCNC: 96 MMOL/L (ref 98–107)
CREAT SERPL-MCNC: 1.6 MG/DL (ref 0.51–0.95)
EGFRCR SERPLBLD CKD-EPI 2021: 31 ML/MIN/1.73M2
EOSINOPHIL # BLD AUTO: 0.2 10E3/UL (ref 0–0.7)
EOSINOPHIL NFR BLD AUTO: 2 %
ERYTHROCYTE [DISTWIDTH] IN BLOOD BY AUTOMATED COUNT: 14.9 % (ref 10–15)
GLUCOSE SERPL-MCNC: 110 MG/DL (ref 70–99)
HCO3 SERPL-SCNC: 30 MMOL/L (ref 22–29)
HCT VFR BLD AUTO: 31.9 % (ref 35–47)
HGB BLD-MCNC: 10.1 G/DL (ref 11.7–15.7)
IMM GRANULOCYTES # BLD: 0.1 10E3/UL
IMM GRANULOCYTES NFR BLD: 1 %
LYMPHOCYTES # BLD AUTO: 1.8 10E3/UL (ref 0.8–5.3)
LYMPHOCYTES NFR BLD AUTO: 17 %
MAGNESIUM SERPL-MCNC: 2.1 MG/DL (ref 1.7–2.3)
MCH RBC QN AUTO: 30.8 PG (ref 26.5–33)
MCHC RBC AUTO-ENTMCNC: 31.7 G/DL (ref 31.5–36.5)
MCV RBC AUTO: 97 FL (ref 78–100)
MONOCYTES # BLD AUTO: 0.9 10E3/UL (ref 0–1.3)
MONOCYTES NFR BLD AUTO: 9 %
NEUTROPHILS # BLD AUTO: 7.4 10E3/UL (ref 1.6–8.3)
NEUTROPHILS NFR BLD AUTO: 71 %
NRBC # BLD AUTO: 0 10E3/UL
NRBC BLD AUTO-RTO: 0 /100
PHOSPHATE SERPL-MCNC: 3.8 MG/DL (ref 2.5–4.5)
PLATELET # BLD AUTO: 345 10E3/UL (ref 150–450)
POTASSIUM SERPL-SCNC: 4 MMOL/L (ref 3.4–5.3)
RBC # BLD AUTO: 3.28 10E6/UL (ref 3.8–5.2)
SODIUM SERPL-SCNC: 137 MMOL/L (ref 135–145)
WBC # BLD AUTO: 10.4 10E3/UL (ref 4–11)

## 2024-08-26 PROCEDURE — 85025 COMPLETE CBC W/AUTO DIFF WBC: CPT | Performed by: INTERNAL MEDICINE

## 2024-08-26 PROCEDURE — 80048 BASIC METABOLIC PNL TOTAL CA: CPT | Performed by: INTERNAL MEDICINE

## 2024-08-26 PROCEDURE — 99232 SBSQ HOSP IP/OBS MODERATE 35: CPT | Performed by: INTERNAL MEDICINE

## 2024-08-26 PROCEDURE — 250N000013 HC RX MED GY IP 250 OP 250 PS 637: Performed by: INTERNAL MEDICINE

## 2024-08-26 PROCEDURE — 84100 ASSAY OF PHOSPHORUS: CPT | Performed by: INTERNAL MEDICINE

## 2024-08-26 PROCEDURE — 120N000004 HC R&B MS OVERFLOW

## 2024-08-26 PROCEDURE — 250N000013 HC RX MED GY IP 250 OP 250 PS 637: Performed by: STUDENT IN AN ORGANIZED HEALTH CARE EDUCATION/TRAINING PROGRAM

## 2024-08-26 PROCEDURE — 99233 SBSQ HOSP IP/OBS HIGH 50: CPT | Performed by: INTERNAL MEDICINE

## 2024-08-26 PROCEDURE — 250N000011 HC RX IP 250 OP 636: Performed by: STUDENT IN AN ORGANIZED HEALTH CARE EDUCATION/TRAINING PROGRAM

## 2024-08-26 PROCEDURE — 83735 ASSAY OF MAGNESIUM: CPT | Performed by: INTERNAL MEDICINE

## 2024-08-26 PROCEDURE — 258N000003 HC RX IP 258 OP 636: Performed by: INTERNAL MEDICINE

## 2024-08-26 PROCEDURE — 250N000009 HC RX 250: Performed by: INTERNAL MEDICINE

## 2024-08-26 RX ORDER — BUMETANIDE 1 MG/1
1 TABLET ORAL DAILY
Status: DISCONTINUED | OUTPATIENT
Start: 2024-08-26 | End: 2024-08-26

## 2024-08-26 RX ADMIN — NAFCILLIN 12 G: 10 INJECTION, POWDER, FOR SOLUTION INTRAVENOUS at 13:34

## 2024-08-26 RX ADMIN — Medication 1 CAPSULE: at 10:39

## 2024-08-26 RX ADMIN — HEPARIN SODIUM 5000 UNITS: 10000 INJECTION, SOLUTION INTRAVENOUS; SUBCUTANEOUS at 10:38

## 2024-08-26 RX ADMIN — ACETAMINOPHEN 650 MG: 325 TABLET ORAL at 06:01

## 2024-08-26 RX ADMIN — ACETAMINOPHEN 650 MG: 325 TABLET ORAL at 14:37

## 2024-08-26 RX ADMIN — Medication 1 CAPSULE: at 21:27

## 2024-08-26 RX ADMIN — ACETAMINOPHEN 650 MG: 325 TABLET ORAL at 21:27

## 2024-08-26 ASSESSMENT — ACTIVITIES OF DAILY LIVING (ADL)
ADLS_ACUITY_SCORE: 24
ADLS_ACUITY_SCORE: 25
ADLS_ACUITY_SCORE: 24
ADLS_ACUITY_SCORE: 24
ADLS_ACUITY_SCORE: 25
ADLS_ACUITY_SCORE: 24
ADLS_ACUITY_SCORE: 25
ADLS_ACUITY_SCORE: 24
ADLS_ACUITY_SCORE: 24
ADLS_ACUITY_SCORE: 25
ADLS_ACUITY_SCORE: 25
ADLS_ACUITY_SCORE: 24
ADLS_ACUITY_SCORE: 24
ADLS_ACUITY_SCORE: 25
ADLS_ACUITY_SCORE: 24

## 2024-08-26 ASSESSMENT — LIFESTYLE VARIABLES: TOBACCO_USE: 1

## 2024-08-26 NOTE — PROGRESS NOTES
"Brief CV Surgery Note        Briefly, Felicitas Elliott is a 84 year old female w/ a PMH of  diastolic HF, CKD 3, HTN, and HLD who originally presented to [facility] with fevers, chills, rigors, headaches, and weakness.  She was found to have MSSA positive blood cultures and was started on Ancef.  Echographic evaluation of her heart via TTE and MIGDALIA showed mitral and aortic valve endocarditis in addition to severe aortic valve stenosis. Coronary angiogram demonstrated double vessel severe coronary artery disease. CV Surgery was consulted for consideration of surgical treatment.    Patient seen and examined by our team today. Reports she feels strong and ready for surgery tomorrow. Denies SOB/chest pain/nausea/vomiting/fevers/chills. Patient and family have no questions surrounding surgery.     /55 (BP Location: Left arm)   Pulse 85   Temp 99.2  F (37.3  C) (Oral)   Resp 20   Ht 1.702 m (5' 7\")   Wt 86.2 kg (190 lb 1.6 oz)   SpO2 92%   BMI 29.77 kg/m       Labs today significant for Cr 1.6 (1.5), Hgb 10.1. Last +BC 8/16. BC 8/17-8/20 NGTD.    Remains on continuous nafcillin 12g [id number]. Diuresis held per cardiology in anticipation of surgery.    - Plan for AVR/MVR/CABG w/ Dr. Renae on 8/27/2024 at 0710.  - Preop workup complete.  - Preop orders signed and held.    - Remainder of cares per primary team.  - Please feel free to reach out w/ questions or concerns.       Jessica Lynch PA-C  [facility] Cardiothoracic Surgery  Pager: [phone]  August 26, 2024     "

## 2024-08-26 NOTE — PROGRESS NOTES
HEART CARE NOTE          Assessment/Recommendations   1. Severe valvular disease c/b sever HFpEF  Assessment / Plan  Hold loop diuretic in anticipation of OHS; continue to monitor UOP and renal function closely  Patient is high risk for adverse cardiac events 2/2 advanced age, frailty, valvular heart disease, renal dysfunction  GDMT as detailed below; mainstay of treatment for HFpEF includes diuretics and adequate BP control (class I) and SGLT2-I (class 2a); additional medical therapy (ARNI, MRA, ARB) demonstrated less robust evidence for indication but may be considered per guideline recommendations (2b); no indication for BBlockers      Current Pharmacotherapy AHA Guideline-Directed Medical Therapy   Losartan  - not started 2/2 renal dysfunction ARNI/ARB   Spironolactone not started  MRA   SGLT2 inhibitor: not started SGLT2-I    Bumex 2 mg daily - held Loop diuretic       2. Valvular heart disease  Assessment / Plan  Severe aortic stenosis and mod-severe MS c/b MMSA bacteremia and MV leaflet vegetation - management per CT surgery, neurology and ID - plan for AVR +MVR+ CABG on 8/27/24 with Dr. Renae     3. 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 Anemia  Assessment / Plan  Management and supportive care per primary team     5. CAD  Assessment / Plan  Coronary angiogram significant for severe prox LAD and ost-prox Lcx lesions - plan for CABG at the time of MVR + AVR    Plan of care discussed on August 26, 2024 with patient at bedside, and primary team overseeing patient's care      History of Present Illness/Subjective    Ms. Felicitas Elliott is a 84 year old female with a PMHx significant for (per Epic notation) presented with fatigue, weakness, chills, poor appetite. Does not really have much for chronic medical conditions other than chronic back pain, furunculosis, uterine prolapse, tobacco use      Today, Mrs. Elliott denies acute  "cardiac events or complaints; Management plan as detailed above     ECG: personally reviewed; normal sinus rhythm, nonspecific ST and T waves changes, RBBB, left axis deviation.     ECHO (personnaly Reviewed on 8/19/24):   1. The left ventricle is normal in size. Left ventricular function is  normal.The ejection fraction is 55-60%.  2. Normal right ventricle size and systolic function.  3. Large vegetation on mitral valve (8 mm x 15) attached to posterior leaflet.  4. There is a small vegetation on the aortic valve (6 mm). No evidence of  aortic root abscess identified.  5. Severe valvular aortic stenosis (SHU:0.8 cm2, peak nomi: 4.6 m/sec, mean  gradient: 51 mmHg).    Telemetry: personally reviewed August 26, 2024; notable for sinus rhythm     Lab results: personally reviewed August 26, 2024; notable for HAMMAD on CKD    Medical history and pertinent documents reviewed in Care Everywhere please where applicable see details above        Physical Examination Review of Systems   /68 (BP Location: Left arm)   Pulse 92   Temp 99.2  F (37.3  C) (Oral)   Resp 16   Ht 1.702 m (5' 7\")   Wt 86.2 kg (190 lb 1.6 oz)   SpO2 90%   BMI 29.77 kg/m    Body mass index is 29.77 kg/m .  Wt Readings from Last 3 Encounters:   08/26/24 86.2 kg (190 lb 1.6 oz)   08/15/24 90.1 kg (198 lb 9.6 oz)     General Appearance:   no distress, normal body habitus   ENT/Mouth: membranes moist, no oral lesions or bleeding gums.      EYES:  no scleral icterus, normal conjunctivae   Neck: no carotid bruits or thyromegaly   Chest/Lungs:   lungs are clear to auscultation, no rales or wheezing, equal chest wall expansion    Cardiovascular:   Regular. Normal first and second heart sounds with +PAVAN; no rubs, or gallops; the carotid, radial and posterior tibial pulses are intact, no JVD or LE edema bilaterally    Abdomen:  no organomegaly, masses, bruits, or tenderness; bowel sounds are present   Extremities: no cyanosis or clubbing   Skin: no " xanthelasma, warm.    Neurologic: NAD     Psychiatric: alert and oriented x3, calm     A complete 10 systems ROS was reviewed  And is negative except what is listed in the HPI.          Medical History  Surgical History Family History Social History   No past medical history on file. Past Surgical History:   Procedure Laterality Date    CV CORONARY ANGIOGRAM N/A 8/20/2024    Procedure: CV CORONARY ANGIOGRAM;  Surgeon: Den Wylie MD;  Location: [facility] CATH LAB CV    no family history of premature coronary artery disease Social History     Socioeconomic History    Marital status:      Spouse name: Not on file    Number of children: Not on file    Years of education: Not on file    Highest education level: Not on file   Occupational History    Not on file   Tobacco Use    Smoking status: Not on file    Smokeless tobacco: Not on file   Substance and Sexual Activity    Alcohol use: Not on file    Drug use: Not on file    Sexual activity: Not on file   Other Topics Concern    Not on file   Social History Narrative    Not on file     Social Determinants of Health     Financial Resource Strain: Low Risk  (8/24/2024)    Financial Resource Strain     Within the past 12 months, have you or your family members you live with been unable to get utilities (heat, electricity) when it was really needed?: No   Food Insecurity: Low Risk  (8/24/2024)    Food Insecurity     Within the past 12 months, did you worry that your food would run out before you got money to buy more?: No     Within the past 12 months, did the food you bought just not last and you didn t have money to get more?: No   Transportation Needs: Low Risk  (8/24/2024)    Transportation Needs     Within the past 12 months, has lack of transportation kept you from medical appointments, getting your medicines, non-medical meetings or appointments, work, or from getting things that you need?: No   Physical Activity: Not on file   Stress: Not on file   Social  "Connections: Unknown (1/3/2024)    Received from [facility] [employer] [facility]    Social Connections     Frequency of Communication with Friends and Family: Not on file   Interpersonal Safety: High Risk (8/23/2024)    Interpersonal Safety     Do you feel physically and emotionally safe where you currently live?: No     Within the past 12 months, have you been hit, slapped, kicked or otherwise physically hurt by someone?: No     Within the past 12 months, have you been humiliated or emotionally abused in other ways by your partner or ex-partner?: No   Housing Stability: Low Risk  (8/24/2024)    Housing Stability     Do you have housing? : Yes     Are you worried about losing your housing?: No           Lab Results    Chemistry/lipid CBC Cardiac Enzymes/BNP/TSH/INR   Lab Results   Component Value Date    BUN 14.5 08/25/2024     08/25/2024    CO2 30 (H) 08/25/2024    Lab Results   Component Value Date    WBC 10.7 08/23/2024    HGB 10.0 (L) 08/23/2024    HCT 30.9 (L) 08/23/2024    MCV 96 08/23/2024     08/23/2024    Lab Results   Component Value Date    INR 1.18 (H) 08/20/2024     No results found for: \"CKTOTAL\", \"CKMB\", \"TROPONINI\"       Weight:    Wt Readings from Last 3 Encounters:   08/26/24 86.2 kg (190 lb 1.6 oz)   08/15/24 90.1 kg (198 lb 9.6 oz)       Allergies  Allergies   Allergen Reactions    Aspirin Rash    Cats Rash         Surgical History  Past Surgical History:   Procedure Laterality Date    CV CORONARY ANGIOGRAM N/A 8/20/2024    Procedure: CV CORONARY ANGIOGRAM;  Surgeon: Den Wylie MD;  Location: [facility] CV       Social History  Tobacco:   History   Smoking Status    Not on file   Smokeless Tobacco    Not on file    Alcohol:   Social History    Substance and Sexual Activity      Alcohol use: Not on file   Illicit Drugs:   History   Drug Use Not on file       Family History  No family history on file.       Navneet Branch MD on 8/26/2024      cc: " Clinic, Timbo Malik

## 2024-08-26 NOTE — ANESTHESIA PREPROCEDURE EVALUATION
Anesthesia Pre-Procedure Evaluation    Patient: Felicitas Elliott   MRN: [id number] : 1940        Procedure : Procedure(s):  CORONARY ARTERY BYPASS GRAFT, WITH AORTIC VALVE REPLACEMENT, WITH INTERNAL MAMMARY ARTERY HARVEST, ENDOSCOPIC VESSEL PROCUREMENT, ANESTHESIA TRANSESOPHAGEAL ECHOCARDIOGRAM  MITRAL VALVE REPLACEMENT, PULMONARY VEIN ISOLATION  LIGATION, LEFT ATRIAL APPENDAGE, OPEN          No past medical history on file.   Past Surgical History:   Procedure Laterality Date     CV CORONARY ANGIOGRAM N/A 2024    Procedure: CV CORONARY ANGIOGRAM;  Surgeon: Den Wylie MD;  Location: [facility] CATH LAB CV      Allergies   Allergen Reactions     Aspirin Rash     Cats Rash      Social History     Tobacco Use     Smoking status: Not on file     Smokeless tobacco: Not on file   Substance Use Topics     Alcohol use: Not on file      Wt Readings from Last 1 Encounters:   24 86.2 kg (190 lb 1.6 oz)        Anesthesia Evaluation   Pt has had prior anesthetic.     No history of anesthetic complications       ROS/MED HX  ENT/Pulmonary:     (+)                tobacco use, Current use,        moderate,  COPD,              Neurologic:  - neg neurologic ROS     Cardiovascular: Comment: MIGDALIA Aug 2024  1. The left ventricle is normal in size. Left ventricular function is  normal.The ejection fraction is 55-60%.  2. Normal right ventricle size and systolic function.  3. Large vegetation on mitral valve (8 mm x 15) attached to posterior leaflet.  4. There is a small vegetation on the aortic valve (6 mm). No evidence of  aortic root abscess identified.  5. Severe valvular aortic stenosis (SHU:0.8 cm2, peak nomi: 4.6 m/sec, mean  gradient: 51 mmHg).      TTE Aug 2024  Left ventricular size, wall motion and function are normal. The ejection  fraction is 60-65%.  Normal right ventricle size and systolic function.  Severe mitral valve annulus calcification and thickened leaflets.  Severe mitral valve stenosis with the  mean gradient of mitral valve 10 mmHg.  Severe calcified aortic valve with reduced aortic valve excursion. The mean  gradient of aortic valve is 41mmHg.  There is a small size of mobile masss (3 x 4 mm) on aortic valve.  There is a small size of mobile mass (3 x 3 mm) on the posterior leaflet.     Vegetations on aortic valve and mitral valve are not well defined and diffult  to compare to MIGDALIA findings on 8-.    ECG: normal sinus rhythm, nonspecific ST and T waves changes, RBBB, left axis deviation.      (+)  - -  CAD (severe prox LAD and ost-prox Lcx lesions) -  - -                           valvular problems/murmurs type: AS MS.         METS/Exercise Tolerance:     Hematologic:     (+)      anemia,          Musculoskeletal: Comment: Per H&P 8/16/2024:  Neurosurgery following, as per neurosurgery no current neurosurgical intervention recommended at the finding of left C4/C5 facet arthropathy/possible septic arthritis in the setting of MSSA bacteremia, if neck pain worsens beyond her chronic baseline despite antibiotic treatment and treatment of cardiac vegetation recommended repeating MRI with and without contrast for evaluation to see if any further development of infectious finding are noted and neurosurgery signed off.  (+)  arthritis (cervical spine),             GI/Hepatic:  - neg GI/hepatic ROS     Renal/Genitourinary:     (+) renal disease, type: ARF,            Endo:     (+)               Obesity,       Psychiatric/Substance Use:       Infectious Disease:       Malignancy:       Other:            Physical Exam    Airway        Mallampati: II    Neck ROM: limited   Mouth opening: > 3 cm    Respiratory Devices and Support         Dental  no notable dental history   Comment: Upper and lower dentures    (+) Edentulous      Cardiovascular          Rhythm and rate: normal   (+) murmur       Pulmonary           (+) decreased breath sounds       Other findings: PICC on the right, PIV on the left    OUTSIDE  "LABS:  CBC:   Lab Results   Component Value Date    WBC 10.4 08/26/2024    WBC 10.7 08/23/2024    HGB 10.1 (L) 08/26/2024    HGB 10.0 (L) 08/23/2024    HCT 31.9 (L) 08/26/2024    HCT 30.9 (L) 08/23/2024     08/26/2024     08/23/2024     BMP:   Lab Results   Component Value Date     08/26/2024     08/25/2024    POTASSIUM 4.0 08/26/2024    POTASSIUM 3.5 08/25/2024    CHLORIDE 96 (L) 08/26/2024    CHLORIDE 97 (L) 08/25/2024    CO2 30 (H) 08/26/2024    CO2 30 (H) 08/25/2024    BUN 16.3 08/26/2024    BUN 14.5 08/25/2024    CR 1.60 (H) 08/26/2024    CR 1.50 (H) 08/25/2024     (H) 08/26/2024     (H) 08/25/2024     COAGS:   Lab Results   Component Value Date    PTT 37 08/22/2024    INR 1.18 (H) 08/20/2024     POC: No results found for: \"BGM\", \"HCG\", \"HCGS\"  HEPATIC:   Lab Results   Component Value Date    ALBUMIN 4.1 08/14/2024    PROTTOTAL 7.0 08/14/2024    ALT 19 08/14/2024    AST 36 08/14/2024    ALKPHOS 47 08/14/2024    BILITOTAL 1.0 08/14/2024     OTHER:   Lab Results   Component Value Date    LACT 1.7 08/14/2024    A1C 6.4 (H) 08/16/2024    KAELYN 9.1 08/26/2024    PHOS 3.8 08/26/2024    MAG 2.1 08/26/2024       Anesthesia Plan    ASA Status:  4    NPO Status:  NPO Appropriate    Anesthesia Type: General.     - Airway: ETT   Induction: Intravenous.   Maintenance: Inhalation.   Techniques and Equipment:     - Airway: Video-Laryngoscope     - Lines/Monitors: 2nd IV, Arterial Line, Central Line, CVP, MIGDALIA, NIRS            MIGDALIA Absolute Contra-indication: NONE            MIGDALIA Relative Contra-indication: NONE     - Blood: PRBC, Blood in Room, Cell Saver, T&C     - Drips/Meds: Epinephrine, Phenylephrine, Dexmed. infusion     Consents    Anesthesia Plan(s) and associated risks, benefits, and realistic alternatives discussed. Questions answered and patient/representative(s) expressed understanding.     - Discussed: Risks, Benefits and Alternatives for BOTH SEDATION and the PROCEDURE were " discussed     - Discussed with:  Patient      - Extended Intubation/Ventilatory Support Discussed: No.      - Patient is DNR/DNI Status: No     Use of blood products discussed: No .     Postoperative Care    Pain management: IV analgesics, Oral pain medications.     - Plan for long acting post-op opioid use   PONV prophylaxis: Ondansetron (or other 5HT-3), Dexamethasone or Solumedrol     Comments:    Other Comments: Glidescope, neutral intubation    Phenylephrine inline for induction    Ketamine  Precedex      RBA discussed re: line placement and MIGDALIA placement including infection, bleeding, nerve damage, damage to nearby structures.    Standard drips and lines.     Patient with appropriate questions which were answered. Patient encouraged to write down any questions that come up and she can ask the anesthesiologist in the morning.     Patient history and physical exam reviewed. NPO status appropriate. Focused physical and history performed, pre-op evaluation updated. RBA discussed re: anesthesia and patients questions answered.              Bell Kumar MD    I have reviewed the pertinent notes and labs in the chart from the past 30 days and (re)examined the patient.  Any updates or changes from those notes are reflected in this note.

## 2024-08-26 NOTE — PLAN OF CARE
Goal Outcome Evaluation:      Plan of Care Reviewed With: patient    Overall Patient Progress: improving Overall Patient Progress: improving    Outcome Evaluation: A&Ox4 with occasional forgetfulness. Pt ambulates in the room independently. Pt reported chronic pain ranging from 7-8, controlled with PRN Tylenol. Pt continues on room air.

## 2024-08-26 NOTE — PROGRESS NOTES
Care Management Follow Up    Length of Stay (days): 10    Expected Discharge Date: 09/01/2024    Anticipated Discharge Plan:  Home, Home Care    Transportation: TBD    PT Recommendations:    OT Recommendations:        Barriers to Discharge: medical stability    Prior Living Situation:   with       Patient/Spokesperson Updated: No    Additional Information:  Chart reviewed. CM following. CABG planned for tomorrow. Will need IV abx at discharge.     Alicia Cunningham RN

## 2024-08-26 NOTE — PLAN OF CARE
Goal Outcome Evaluation:      Plan of Care Reviewed With: patient    Overall Patient Progress: improvingOverall Patient Progress: improving           IV nafcillin running continuous. PICC dressing changed.  Plan for CABG, MVR, AVR tomorrow. Shower and clipped this evening. NPO at midnight. Education previously reviewed w/ pt. Heparin subcutaneous held this evening per provider.   Temp 100.3, MD made aware. Temp 98.8 later in evening.  PRN tylenol given for 8/10 neck pain, later rates pain 4/10.       AOx4; VSS on RA; SR w/ 1st degree AVB on tele; denies CP, SOB, dizziness.  Able to make needs known, call light in reach.    Lynette Strange RN

## 2024-08-26 NOTE — PROGRESS NOTES
[facility]    Medicine Progress Note - Hospitalist Service    Date of Admission:  8/16/2024    Assessment & Plan   83 yo female with history of chronic back pain, furunculosis, uterine prolapse, and tobacco use presents 8/14/24 with sepsis secondary to MSSA bacteremia and HAMMAD. MIGDALIA positive for mitral and aortic valvular vegetations. Transferred to [facility] 8/16/24 for CT surgery evaluation.  On Nafcillin continuous infusion via right PICC line.  Last positive BC with MSSA on 8/16/24.  BC's from 8/17-8/20 NGTD.  Coronary angiogram (8/20/24) showed 70% prox LAD and 90% ost Cx to Prox Cx.  Plan for AVR +MVR+ CABG on Tuesday, 8/27/24, with Dr. Renae.     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  Severe mitral valve stenosis with the mean gradient of mitral valve 10 mmHg.  Severe calcified aortic valve with reduced aortic valve excursion. The mean  gradient of aortic valve 41mmHg.  Small size mobile mass (3 x 4 mm) on aortic valve. Small size mobile mass (3 x 3 mm) on the posterior MV leaflet.  LVEF 55-60%  -Last BC positive with MSSA on 8/16/24.  BC's from 8/17-8/20 NGTD  -coronary angiogram (8/20/24) showed 70% prox LAD and 90% ost Cx to Prox Cx.  -IV Nafcillin continuous infusion  -plan for AVR +MVR+ CABG on Tuesday, 8/27/24, with Dr. Renae   -ID, Cardiology, CVS assistance appreciated     #Acute and subacute CVA  -likely related to septic emboli  -MRI brain showed 13 mm focus of cortical signal abnormality within the left superior parietal lobule favored to represent a subacute infarct, smaller foci of signal abnormality with similar characteristics favored to represent punctate subacute infarcts.   -CTA head and neck no hemorrhage, intracranial atherosclerosis, moderate to  marked stenosis of the right P2 posterior cerebral artery segment. No definite proximal branch occlusion, aneurysm, or high flow vascular malformation identified. Atherosclerotic plaque at the carotid bifurcations bilaterally without hemodynamically significant stenosis in the neck vessels.   No evidence for dissection.   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  Baseline range 1.2-1.3  -UA no blood. 10mg/dL protein, 75 L.E., few bacteria, 13 WBC, 11 Squamous epithelial cells, 3 hyaline casts  -CK normal     #Mod-severe cervical spinal stenosis, foraminal stenosis, facet arthropathy  -MRI 8/15 showed degenerative changes in her cervical spine which appears similar to report from 2015. Radiology commented on a left L4-5 degenerative facet with findings that could not entirely rule out septic arthritis.   -Neck pain is chronic and has no new or worsening symptoms  -Neurosurgery recommends non operative management and to obtain repeat MRI with and w/o contrast if neck pain worsens beyond chronic baseline despite antibiotics and treatment of cardiac vegetations  -supportive cares  -continue lidocaine cream, tramadol and tylenol for pain control and headache control     #Prediabetes  -Hemoglobin A1c 6.4  -Monitor glucoses intermittently  -Glucose 144 this morning      #Normocytic anemia due to hemodilution from hypervolemia, resolved  #Thrombocytopenia in the setting of sepsis, resolved     #Hypokalemia  -monitor and replace accordingly    #Hypomagnesemia  -monitor and replace accordingly    #Hypophosphatemia  -monitor and replace accordingly          Diet: Snacks/Supplements Adult: Ensure Enlive; With Meals  Room Service  Fluid restriction 1800 ML FLUID  Advance Diet as Tolerated: Regular Diet Adult    DVT Prophylaxis: Heparin SQ  Le Catheter: Not  "present  Lines: PRESENT      PICC 08/21/24 Single Lumen Right Basilic Long term Antibiotic-Site Assessment: WDL except;Drainage      Cardiac Monitoring: ACTIVE order. Indication: Acute decompensated heart failure (48 hours)  Code Status: Full Code      Clinically Significant Risk Factors                            # Overweight: Estimated body mass index is 29.77 kg/m  as calculated from the following:    Height as of this encounter: 1.702 m (5' 7\").    Weight as of this encounter: 86.2 kg (190 lb 1.6 oz).      # Financial/Environmental Concerns:                 Disposition Plan     Medically Ready for Discharge: Anticipated in 5+ Days             SERENA Pyle  Hospitalist Service  [facility]  Securely message with [employer] (more info)  Text page via [employer] Paging/Directory   ______________________________________________________________________    Interval History   Patient is new to me today. Sitting up in a recliner. No new issues overnight. Daughter at bedside. I updated the patient and her daughter on care plan.     Daughter's questions answered to the best of my knowledge.     Physical Exam   Vital Signs: Temp: 99.2  F (37.3  C) Temp src: Oral BP: 101/55 Pulse: 85   Resp: 20 SpO2: 92 % O2 Device: None (Room air)    Weight: 190 lbs 1.6 oz      General: Not in obvious distress.  HEENT: NC, AT   Chest: Clear to auscultation bilaterally  Heart: S1S2 normal, regular. No M/R/G  Abdomen: Soft. NT, ND. Bowel sounds- active.  Extremities: Trace pedal edema  Neuro: Alert and awake, grossly non-focal      Medical Decision Making             Data     I have personally reviewed the following data over the past 24 hrs:    10.4  \   10.1 (L)   / 345     137 96 (L) 16.3 /  110 (H)   4.0 30 (H) 1.60 (H) \       Imaging results reviewed over the past 24 hrs:   No results found for this or any previous visit (from the past 24 hour(s)).  "

## 2024-08-27 ENCOUNTER — APPOINTMENT (OUTPATIENT)
Dept: RADIOLOGY | Facility: HOSPITAL | Age: 84
DRG: 853 | End: 2024-08-27
Attending: PHYSICIAN ASSISTANT
Payer: COMMERCIAL

## 2024-08-27 ENCOUNTER — ANESTHESIA (OUTPATIENT)
Dept: SURGERY | Facility: HOSPITAL | Age: 84
DRG: 853 | End: 2024-08-27
Payer: COMMERCIAL

## 2024-08-27 PROBLEM — I05.9 MITRAL VALVE DISORDER: Status: ACTIVE | Noted: 2024-08-27

## 2024-08-27 PROBLEM — Z98.61 POSTSURGICAL PERCUTANEOUS TRANSLUMINAL CORONARY ANGIOPLASTY STATUS: Status: ACTIVE | Noted: 2024-08-27

## 2024-08-27 PROBLEM — I25.10 CORONARY ARTERY DISEASE INVOLVING NATIVE CORONARY ARTERY OF NATIVE HEART, UNSPECIFIED WHETHER ANGINA PRESENT: Status: ACTIVE | Noted: 2024-08-27

## 2024-08-27 LAB
ALBUMIN SERPL BCG-MCNC: 3.5 G/DL (ref 3.5–5.2)
ALP SERPL-CCNC: 40 U/L (ref 40–150)
ALT SERPL W P-5'-P-CCNC: 16 U/L (ref 0–50)
ANION GAP SERPL CALCULATED.3IONS-SCNC: 14 MMOL/L (ref 7–15)
APTT PPP: 63 SECONDS (ref 22–38)
APTT PPP: 71 SECONDS (ref 22–38)
APTT PPP: 96 SECONDS (ref 22–38)
AST SERPL W P-5'-P-CCNC: 63 U/L (ref 0–45)
BACTERIA SPEC CULT: NORMAL
BASE EXCESS BLDA CALC-SCNC: -0.4 MMOL/L (ref -3–3)
BASE EXCESS BLDA CALC-SCNC: -2 MMOL/L (ref -3–3)
BASE EXCESS BLDA CALC-SCNC: -2.5 MMOL/L (ref -3–3)
BASE EXCESS BLDA CALC-SCNC: -4.5 MMOL/L (ref -3–3)
BASE EXCESS BLDA CALC-SCNC: 2.1 MMOL/L (ref -3–3)
BASE EXCESS BLDA CALC-SCNC: 2.2 MMOL/L (ref -3–3)
BASE EXCESS BLDA CALC-SCNC: 4.1 MMOL/L (ref -3–3)
BASE EXCESS BLDA CALC-SCNC: 4.4 MMOL/L (ref -3–3)
BASE EXCESS BLDA CALC-SCNC: 5.3 MMOL/L (ref -3–3)
BASE EXCESS BLDV CALC-SCNC: -1.7 MMOL/L (ref -3–3)
BASE EXCESS BLDV CALC-SCNC: 2.7 MMOL/L (ref -3–3)
BASE EXCESS BLDV CALC-SCNC: 6.1 MMOL/L (ref -3–3)
BILIRUB SERPL-MCNC: 1.8 MG/DL
BLD PROD TYP BPU: NORMAL
BLOOD COMPONENT TYPE: NORMAL
BUN SERPL-MCNC: 12.9 MG/DL (ref 8–23)
CA-I BLD-MCNC: 3.9 MG/DL (ref 4.4–5.2)
CA-I BLD-MCNC: 4 MG/DL (ref 4.4–5.2)
CA-I BLD-MCNC: 4.2 MG/DL (ref 4.4–5.2)
CA-I BLD-MCNC: 4.3 MG/DL (ref 4.4–5.2)
CA-I BLD-MCNC: 4.5 MG/DL (ref 4.4–5.2)
CA-I BLD-MCNC: 4.5 MG/DL (ref 4.4–5.2)
CA-I BLD-MCNC: 4.7 MG/DL (ref 4.4–5.2)
CA-I BLD-MCNC: 4.8 MG/DL (ref 4.4–5.2)
CALCIUM SERPL-MCNC: 8.7 MG/DL (ref 8.8–10.4)
CHLORIDE SERPL-SCNC: 110 MMOL/L (ref 98–107)
CODING SYSTEM: NORMAL
COHGB MFR BLD: 96.8 % (ref 95–96)
CPB POCT: NO
CREAT SERPL-MCNC: 1.24 MG/DL (ref 0.51–0.95)
CROSSMATCH: NORMAL
EGFRCR SERPLBLD CKD-EPI 2021: 43 ML/MIN/1.73M2
ERYTHROCYTE [DISTWIDTH] IN BLOOD BY AUTOMATED COUNT: 16.5 % (ref 10–15)
FIBRINOGEN PPP-MCNC: 325 MG/DL (ref 170–510)
FIBRINOGEN PPP-MCNC: 349 MG/DL (ref 170–510)
GLUCOSE BLD-MCNC: 104 MG/DL (ref 70–99)
GLUCOSE BLD-MCNC: 119 MG/DL (ref 70–99)
GLUCOSE BLD-MCNC: 120 MG/DL (ref 70–99)
GLUCOSE BLD-MCNC: 131 MG/DL (ref 70–99)
GLUCOSE BLD-MCNC: 134 MG/DL (ref 70–99)
GLUCOSE BLD-MCNC: 135 MG/DL (ref 70–99)
GLUCOSE BLD-MCNC: 137 MG/DL (ref 70–99)
GLUCOSE BLD-MCNC: 154 MG/DL (ref 70–99)
GLUCOSE BLD-MCNC: 155 MG/DL (ref 70–99)
GLUCOSE BLD-MCNC: 176 MG/DL (ref 70–99)
GLUCOSE BLDC GLUCOMTR-MCNC: 119 MG/DL (ref 70–99)
GLUCOSE BLDC GLUCOMTR-MCNC: 123 MG/DL (ref 70–99)
GLUCOSE BLDC GLUCOMTR-MCNC: 125 MG/DL (ref 70–99)
GLUCOSE BLDC GLUCOMTR-MCNC: 155 MG/DL (ref 70–99)
GLUCOSE BLDC GLUCOMTR-MCNC: 161 MG/DL (ref 70–99)
GLUCOSE BLDC GLUCOMTR-MCNC: 173 MG/DL (ref 70–99)
GLUCOSE SERPL-MCNC: 133 MG/DL (ref 70–99)
GRAM STAIN RESULT: NORMAL
HCO3 BLD-SCNC: 20 MMOL/L (ref 21–28)
HCO3 BLDA-SCNC: 22 MMOL/L (ref 21–28)
HCO3 BLDA-SCNC: 24 MMOL/L (ref 21–28)
HCO3 BLDA-SCNC: 24 MMOL/L (ref 21–28)
HCO3 BLDA-SCNC: 26 MMOL/L (ref 21–28)
HCO3 BLDA-SCNC: 26 MMOL/L (ref 21–28)
HCO3 BLDA-SCNC: 28 MMOL/L (ref 21–28)
HCO3 BLDA-SCNC: 28 MMOL/L (ref 21–28)
HCO3 BLDA-SCNC: 29 MMOL/L (ref 21–28)
HCO3 BLDV-SCNC: 22 MMOL/L (ref 21–28)
HCO3 BLDV-SCNC: 26 MMOL/L (ref 21–28)
HCO3 BLDV-SCNC: 29 MMOL/L (ref 21–28)
HCO3 SERPL-SCNC: 22 MMOL/L (ref 22–29)
HCT VFR BLD AUTO: 22.5 % (ref 35–47)
HCT VFR BLD CALC: 27 % (ref 35–47)
HGB BLD-MCNC: 6.9 G/DL (ref 11.7–15.7)
HGB BLD-MCNC: 7.4 G/DL (ref 11.7–15.7)
HGB BLD-MCNC: 7.4 G/DL (ref 11.7–15.7)
HGB BLD-MCNC: 7.6 G/DL (ref 11.7–15.7)
HGB BLD-MCNC: 8.3 G/DL (ref 11.7–15.7)
HGB BLD-MCNC: 8.9 G/DL (ref 11.7–15.7)
HGB BLD-MCNC: 9 G/DL (ref 11.7–15.7)
HGB BLD-MCNC: 9.1 G/DL (ref 11.7–15.7)
HGB BLD-MCNC: 9.2 G/DL (ref 11.7–15.7)
HGB BLD-MCNC: 9.2 G/DL (ref 11.7–15.7)
HGB BLD-MCNC: 9.3 G/DL (ref 11.7–15.7)
HGB BLD-MCNC: 9.3 G/DL (ref 11.7–15.7)
HGB BLD-MCNC: 9.4 G/DL (ref 11.7–15.7)
INR PPP: 1.43 (ref 0.85–1.15)
INR PPP: 1.51 (ref 0.85–1.15)
INR PPP: 1.66 (ref 0.85–1.15)
ISSUE DATE AND TIME: NORMAL
LACTATE BLD-SCNC: 0.7 MMOL/L (ref 0.7–2)
LACTATE BLD-SCNC: 0.8 MMOL/L (ref 0.7–2)
LACTATE BLD-SCNC: 0.9 MMOL/L (ref 0.7–2)
LACTATE BLD-SCNC: 1.3 MMOL/L (ref 0.7–2)
LACTATE BLD-SCNC: 2.4 MMOL/L (ref 0.7–2)
LACTATE BLD-SCNC: 2.8 MMOL/L (ref 0.7–2)
LACTATE BLD-SCNC: 3.2 MMOL/L (ref 0.7–2)
LACTATE SERPL-SCNC: 3.2 MMOL/L (ref 0.7–2)
LACTATE SERPL-SCNC: 4.4 MMOL/L (ref 0.7–2)
MAGNESIUM SERPL-MCNC: 2.1 MG/DL (ref 1.7–2.3)
MAGNESIUM SERPL-MCNC: 2.5 MG/DL (ref 1.7–2.3)
MCH RBC QN AUTO: 31.4 PG (ref 26.5–33)
MCHC RBC AUTO-ENTMCNC: 32.9 G/DL (ref 31.5–36.5)
MCV RBC AUTO: 95 FL (ref 78–100)
O2/TOTAL GAS SETTING VFR VENT: 30 %
OXYHGB MFR BLDA: 100 % (ref 92–100)
OXYHGB MFR BLDA: 98 % (ref 92–100)
OXYHGB MFR BLDA: 98 % (ref 92–100)
OXYHGB MFR BLDA: 99 % (ref 92–100)
OXYHGB MFR BLDV: 66 % (ref 70–75)
OXYHGB MFR BLDV: 83 % (ref 70–75)
OXYHGB MFR BLDV: 93 % (ref 70–75)
PCO2 BLD: 31 MM HG (ref 35–45)
PCO2 BLDA: 40 MM HG (ref 35–45)
PCO2 BLDA: 41 MM HG (ref 35–45)
PCO2 BLDA: 42 MM HG (ref 35–45)
PCO2 BLDA: 44 MM HG (ref 35–45)
PCO2 BLDA: 45 MM HG (ref 35–45)
PCO2 BLDA: 47 MM HG (ref 35–45)
PCO2 BLDA: 48 MM HG (ref 35–45)
PCO2 BLDA: 50 MM HG (ref 35–45)
PCO2 BLDV: 58 MM HG (ref 40–50)
PCO2 BLDV: 60 MM HG (ref 40–50)
PCO2 BLDV: 63 MM HG (ref 40–50)
PEEP: 5 CM H2O
PH BLD: 7.42 [PH] (ref 7.35–7.45)
PH BLDA: 7.29 [PH] (ref 7.35–7.45)
PH BLDA: 7.34 [PH] (ref 7.35–7.45)
PH BLDA: 7.35 [PH] (ref 7.35–7.45)
PH BLDA: 7.39 [PH] (ref 7.35–7.45)
PH BLDA: 7.4 [PH] (ref 7.35–7.45)
PH BLDA: 7.41 [PH] (ref 7.35–7.45)
PH BLDA: 7.43 [PH] (ref 7.35–7.45)
PH BLDA: 7.46 [PH] (ref 7.35–7.45)
PH BLDV: 7.24 [PH] (ref 7.32–7.43)
PH BLDV: 7.28 [PH] (ref 7.32–7.43)
PH BLDV: 7.35 [PH] (ref 7.32–7.43)
PHOSPHATE SERPL-MCNC: 3 MG/DL (ref 2.5–4.5)
PHOSPHATE SERPL-MCNC: 3.5 MG/DL (ref 2.5–4.5)
PLATELET # BLD AUTO: 166 10E3/UL (ref 150–450)
PLATELET # BLD AUTO: 190 10E3/UL (ref 150–450)
PO2 BLD: 78 MM HG (ref 80–105)
PO2 BLDA: 171 MM HG (ref 80–105)
PO2 BLDA: 199 MM HG (ref 80–105)
PO2 BLDA: 216 MM HG (ref 80–105)
PO2 BLDA: 335 MM HG (ref 80–105)
PO2 BLDA: 426 MM HG (ref 80–105)
PO2 BLDA: 442 MM HG (ref 80–105)
PO2 BLDA: 463 MM HG (ref 80–105)
PO2 BLDA: 523 MM HG (ref 80–105)
PO2 BLDV: 38 MM HG (ref 25–47)
PO2 BLDV: 51 MM HG (ref 25–47)
PO2 BLDV: 76 MM HG (ref 25–47)
POTASSIUM BLD-SCNC: 3.2 MMOL/L (ref 3.4–5.3)
POTASSIUM BLD-SCNC: 3.3 MMOL/L (ref 3.4–5.3)
POTASSIUM BLD-SCNC: 3.4 MMOL/L (ref 3.4–5.3)
POTASSIUM BLD-SCNC: 3.4 MMOL/L (ref 3.4–5.3)
POTASSIUM BLD-SCNC: 3.8 MMOL/L (ref 3.4–5.3)
POTASSIUM BLD-SCNC: 3.9 MMOL/L (ref 3.4–5.3)
POTASSIUM BLD-SCNC: 3.9 MMOL/L (ref 3.4–5.3)
POTASSIUM BLD-SCNC: 4 MMOL/L (ref 3.4–5.3)
POTASSIUM BLD-SCNC: 4.1 MMOL/L (ref 3.4–5.3)
POTASSIUM BLD-SCNC: 4.3 MMOL/L (ref 3.4–5.3)
POTASSIUM BLD-SCNC: 4.7 MMOL/L (ref 3.4–5.3)
POTASSIUM SERPL-SCNC: 3.6 MMOL/L (ref 3.4–5.3)
POTASSIUM SERPL-SCNC: 3.7 MMOL/L (ref 3.4–5.3)
POTASSIUM SERPL-SCNC: 4.3 MMOL/L (ref 3.4–5.3)
PROT SERPL-MCNC: 5.6 G/DL (ref 6.4–8.3)
RBC # BLD AUTO: 2.36 10E6/UL (ref 3.8–5.2)
SAO2 % BLDA: 100 % (ref 92–100)
SAO2 % BLDA: 100 % (ref 95–96)
SAO2 % BLDA: 100 % (ref 95–96)
SAO2 % BLDA: 101 % (ref 95–96)
SAO2 % BLDA: 95 % (ref 92–100)
SAO2 % BLDV: 68 % (ref 70–75)
SAO2 % BLDV: 85 % (ref 70–75)
SAO2 % BLDV: 95 % (ref 70–75)
SODIUM BLD-SCNC: 138 MMOL/L (ref 135–145)
SODIUM BLD-SCNC: 139 MMOL/L (ref 135–145)
SODIUM BLD-SCNC: 140 MMOL/L (ref 135–145)
SODIUM BLD-SCNC: 141 MMOL/L (ref 135–145)
SODIUM BLD-SCNC: 142 MMOL/L (ref 135–145)
SODIUM BLD-SCNC: 142 MMOL/L (ref 135–145)
SODIUM BLD-SCNC: 144 MMOL/L (ref 135–145)
SODIUM BLD-SCNC: 144 MMOL/L (ref 135–145)
SODIUM BLD-SCNC: 146 MMOL/L (ref 135–145)
SODIUM SERPL-SCNC: 146 MMOL/L (ref 135–145)
UNIT ABO/RH: NORMAL
UNIT NUMBER: NORMAL
UNIT STATUS: NORMAL
UNIT TYPE ISBT: 5100
UNIT TYPE ISBT: 6200
UNIT TYPE ISBT: 9500
WBC # BLD AUTO: 15.3 10E3/UL (ref 4–11)

## 2024-08-27 PROCEDURE — 250N000011 HC RX IP 250 OP 636: Performed by: SURGERY

## 2024-08-27 PROCEDURE — 370N000017 HC ANESTHESIA TECHNICAL FEE, PER MIN: Performed by: SURGERY

## 2024-08-27 PROCEDURE — 33430 REPLACEMENT OF MITRAL VALVE: CPT | Performed by: SURGERY

## 2024-08-27 PROCEDURE — 250N000013 HC RX MED GY IP 250 OP 250 PS 637: Performed by: PHYSICIAN ASSISTANT

## 2024-08-27 PROCEDURE — 85610 PROTHROMBIN TIME: CPT | Performed by: SURGERY

## 2024-08-27 PROCEDURE — 999N000157 HC STATISTIC RCP TIME EA 10 MIN

## 2024-08-27 PROCEDURE — 250N000025 HC SEVOFLURANE, PER MIN: Performed by: SURGERY

## 2024-08-27 PROCEDURE — 250N000011 HC RX IP 250 OP 636: Performed by: NURSE ANESTHETIST, CERTIFIED REGISTERED

## 2024-08-27 PROCEDURE — 85049 AUTOMATED PLATELET COUNT: CPT | Performed by: SURGERY

## 2024-08-27 PROCEDURE — 258N000003 HC RX IP 258 OP 636: Performed by: NURSE ANESTHETIST, CERTIFIED REGISTERED

## 2024-08-27 PROCEDURE — 250N000011 HC RX IP 250 OP 636: Performed by: ANESTHESIOLOGY

## 2024-08-27 PROCEDURE — 87070 CULTURE OTHR SPECIMN AEROBIC: CPT | Performed by: SURGERY

## 2024-08-27 PROCEDURE — 33517 CABG ARTERY-VEIN SINGLE: CPT | Performed by: SURGERY

## 2024-08-27 PROCEDURE — 82330 ASSAY OF CALCIUM: CPT

## 2024-08-27 PROCEDURE — C1889 IMPLANT/INSERT DEVICE, NOC: HCPCS | Performed by: SURGERY

## 2024-08-27 PROCEDURE — 99207 PR NO CHARGE LOS: CPT | Performed by: INTERNAL MEDICINE

## 2024-08-27 PROCEDURE — 250N000009 HC RX 250: Performed by: NURSE ANESTHETIST, CERTIFIED REGISTERED

## 2024-08-27 PROCEDURE — 02BX0ZZ EXCISION OF THORACIC AORTA, ASCENDING/ARCH, OPEN APPROACH: ICD-10-PCS | Performed by: SURGERY

## 2024-08-27 PROCEDURE — 94002 VENT MGMT INPAT INIT DAY: CPT

## 2024-08-27 PROCEDURE — 33863 ASCENDING AORTIC GRAFT: CPT | Performed by: SURGERY

## 2024-08-27 PROCEDURE — 410N000004: Performed by: SURGERY

## 2024-08-27 PROCEDURE — 272N000004 HC RX 272: Performed by: SURGERY

## 2024-08-27 PROCEDURE — P9059 PLASMA, FRZ BETWEEN 8-24HOUR: HCPCS | Performed by: ANESTHESIOLOGY

## 2024-08-27 PROCEDURE — 82805 BLOOD GASES W/O2 SATURATION: CPT | Performed by: PHYSICIAN ASSISTANT

## 2024-08-27 PROCEDURE — 80053 COMPREHEN METABOLIC PANEL: CPT | Performed by: INTERNAL MEDICINE

## 2024-08-27 PROCEDURE — 85027 COMPLETE CBC AUTOMATED: CPT | Performed by: NURSE ANESTHETIST, CERTIFIED REGISTERED

## 2024-08-27 PROCEDURE — 84100 ASSAY OF PHOSPHORUS: CPT | Performed by: PHYSICIAN ASSISTANT

## 2024-08-27 PROCEDURE — 99291 CRITICAL CARE FIRST HOUR: CPT | Performed by: STUDENT IN AN ORGANIZED HEALTH CARE EDUCATION/TRAINING PROGRAM

## 2024-08-27 PROCEDURE — P9016 RBC LEUKOCYTES REDUCED: HCPCS | Performed by: ANESTHESIOLOGY

## 2024-08-27 PROCEDURE — C1898 LEAD, PMKR, OTHER THAN TRANS: HCPCS | Performed by: SURGERY

## 2024-08-27 PROCEDURE — 82330 ASSAY OF CALCIUM: CPT | Performed by: SURGERY

## 2024-08-27 PROCEDURE — 85610 PROTHROMBIN TIME: CPT | Performed by: PHYSICIAN ASSISTANT

## 2024-08-27 PROCEDURE — 85730 THROMBOPLASTIN TIME PARTIAL: CPT | Performed by: SURGERY

## 2024-08-27 PROCEDURE — 84132 ASSAY OF SERUM POTASSIUM: CPT | Performed by: STUDENT IN AN ORGANIZED HEALTH CARE EDUCATION/TRAINING PROGRAM

## 2024-08-27 PROCEDURE — C1713 ANCHOR/SCREW BN/BN,TIS/BN: HCPCS | Performed by: SURGERY

## 2024-08-27 PROCEDURE — 85730 THROMBOPLASTIN TIME PARTIAL: CPT | Performed by: PHYSICIAN ASSISTANT

## 2024-08-27 PROCEDURE — 021009W BYPASS CORONARY ARTERY, ONE ARTERY FROM AORTA WITH AUTOLOGOUS VENOUS TISSUE, OPEN APPROACH: ICD-10-PCS | Performed by: SURGERY

## 2024-08-27 PROCEDURE — 85730 THROMBOPLASTIN TIME PARTIAL: CPT | Performed by: NURSE ANESTHETIST, CERTIFIED REGISTERED

## 2024-08-27 PROCEDURE — 258N000003 HC RX IP 258 OP 636: Performed by: ANESTHESIOLOGY

## 2024-08-27 PROCEDURE — 85384 FIBRINOGEN ACTIVITY: CPT | Performed by: SURGERY

## 2024-08-27 PROCEDURE — 85610 PROTHROMBIN TIME: CPT | Performed by: NURSE ANESTHETIST, CERTIFIED REGISTERED

## 2024-08-27 PROCEDURE — 06BQ4ZZ EXCISION OF LEFT SAPHENOUS VEIN, PERCUTANEOUS ENDOSCOPIC APPROACH: ICD-10-PCS | Performed by: SURGERY

## 2024-08-27 PROCEDURE — 83735 ASSAY OF MAGNESIUM: CPT | Performed by: INTERNAL MEDICINE

## 2024-08-27 PROCEDURE — 250N000011 HC RX IP 250 OP 636: Performed by: STUDENT IN AN ORGANIZED HEALTH CARE EDUCATION/TRAINING PROGRAM

## 2024-08-27 PROCEDURE — 250N000009 HC RX 250: Performed by: PHYSICIAN ASSISTANT

## 2024-08-27 PROCEDURE — C1763 CONN TISS, NON-HUMAN: HCPCS | Performed by: SURGERY

## 2024-08-27 PROCEDURE — 250N000011 HC RX IP 250 OP 636: Performed by: PHYSICIAN ASSISTANT

## 2024-08-27 PROCEDURE — 99100 ANES PT EXTEME AGE<1 YR&>70: CPT | Performed by: NURSE ANESTHETIST, CERTIFIED REGISTERED

## 2024-08-27 PROCEDURE — 82805 BLOOD GASES W/O2 SATURATION: CPT

## 2024-08-27 PROCEDURE — 258N000003 HC RX IP 258 OP 636: Performed by: INTERNAL MEDICINE

## 2024-08-27 PROCEDURE — 250N000009 HC RX 250: Performed by: SURGERY

## 2024-08-27 PROCEDURE — 85018 HEMOGLOBIN: CPT | Performed by: SURGERY

## 2024-08-27 PROCEDURE — P9016 RBC LEUKOCYTES REDUCED: HCPCS | Performed by: SURGERY

## 2024-08-27 PROCEDURE — 82330 ASSAY OF CALCIUM: CPT | Performed by: PHYSICIAN ASSISTANT

## 2024-08-27 PROCEDURE — 87205 SMEAR GRAM STAIN: CPT | Performed by: SURGERY

## 2024-08-27 PROCEDURE — P9035 PLATELET PHERES LEUKOREDUCED: HCPCS | Performed by: ANESTHESIOLOGY

## 2024-08-27 PROCEDURE — 272N000202 HC AEROBIKA WITH MANOMETER

## 2024-08-27 PROCEDURE — 99232 SBSQ HOSP IP/OBS MODERATE 35: CPT | Performed by: INTERNAL MEDICINE

## 2024-08-27 PROCEDURE — C1760 CLOSURE DEV, VASC: HCPCS | Performed by: SURGERY

## 2024-08-27 PROCEDURE — 250N000013 HC RX MED GY IP 250 OP 250 PS 637: Performed by: SURGERY

## 2024-08-27 PROCEDURE — 02100Z9 BYPASS CORONARY ARTERY, ONE ARTERY FROM LEFT INTERNAL MAMMARY, OPEN APPROACH: ICD-10-PCS | Performed by: SURGERY

## 2024-08-27 PROCEDURE — 83605 ASSAY OF LACTIC ACID: CPT | Performed by: PHYSICIAN ASSISTANT

## 2024-08-27 PROCEDURE — P9012 CRYOPRECIPITATE EACH UNIT: HCPCS | Performed by: ANESTHESIOLOGY

## 2024-08-27 PROCEDURE — 02RG08Z REPLACEMENT OF MITRAL VALVE WITH ZOOPLASTIC TISSUE, OPEN APPROACH: ICD-10-PCS | Performed by: SURGERY

## 2024-08-27 PROCEDURE — 250N000012 HC RX MED GY IP 250 OP 636 PS 637: Performed by: SURGERY

## 2024-08-27 PROCEDURE — 410N000003 HC PER-PERFUSION 1ST 30 MIN: Performed by: SURGERY

## 2024-08-27 PROCEDURE — 33533 CABG ARTERIAL SINGLE: CPT | Performed by: NURSE ANESTHETIST, CERTIFIED REGISTERED

## 2024-08-27 PROCEDURE — 999N000065 XR CHEST PORT 1 VIEW

## 2024-08-27 PROCEDURE — 84100 ASSAY OF PHOSPHORUS: CPT | Performed by: INTERNAL MEDICINE

## 2024-08-27 PROCEDURE — 83605 ASSAY OF LACTIC ACID: CPT | Performed by: SURGERY

## 2024-08-27 PROCEDURE — 200N000001 HC R&B ICU

## 2024-08-27 PROCEDURE — 272N000002 HC OR SUPPLY OTHER OPNP: Performed by: SURGERY

## 2024-08-27 PROCEDURE — 250N000009 HC RX 250: Performed by: INTERNAL MEDICINE

## 2024-08-27 PROCEDURE — 84132 ASSAY OF SERUM POTASSIUM: CPT | Performed by: PHYSICIAN ASSISTANT

## 2024-08-27 PROCEDURE — 999N000141 HC STATISTIC PRE-PROCEDURE NURSING ASSESSMENT: Performed by: SURGERY

## 2024-08-27 PROCEDURE — 33533 CABG ARTERIAL SINGLE: CPT | Performed by: ANESTHESIOLOGY

## 2024-08-27 PROCEDURE — 258N000003 HC RX IP 258 OP 636: Performed by: SURGERY

## 2024-08-27 PROCEDURE — 999N000055 HC STATISTIC END TITIAL CO2 MONITORING

## 2024-08-27 PROCEDURE — 02RF08Z REPLACEMENT OF AORTIC VALVE WITH ZOOPLASTIC TISSUE, OPEN APPROACH: ICD-10-PCS | Performed by: SURGERY

## 2024-08-27 PROCEDURE — 87075 CULTR BACTERIA EXCEPT BLOOD: CPT | Performed by: SURGERY

## 2024-08-27 PROCEDURE — 360N000079 HC SURGERY LEVEL 6, PER MIN: Performed by: SURGERY

## 2024-08-27 PROCEDURE — 258N000003 HC RX IP 258 OP 636: Performed by: PHYSICIAN ASSISTANT

## 2024-08-27 PROCEDURE — 83735 ASSAY OF MAGNESIUM: CPT | Performed by: PHYSICIAN ASSISTANT

## 2024-08-27 PROCEDURE — 82803 BLOOD GASES ANY COMBINATION: CPT

## 2024-08-27 PROCEDURE — 5A1221Z PERFORMANCE OF CARDIAC OUTPUT, CONTINUOUS: ICD-10-PCS | Performed by: SURGERY

## 2024-08-27 PROCEDURE — 33533 CABG ARTERIAL SINGLE: CPT | Performed by: SURGERY

## 2024-08-27 PROCEDURE — 87102 FUNGUS ISOLATION CULTURE: CPT | Performed by: SURGERY

## 2024-08-27 PROCEDURE — 272N000001 HC OR GENERAL SUPPLY STERILE: Performed by: SURGERY

## 2024-08-27 PROCEDURE — C1762 CONN TISS, HUMAN(INC FASCIA): HCPCS | Performed by: SURGERY

## 2024-08-27 PROCEDURE — 93005 ELECTROCARDIOGRAM TRACING: CPT | Performed by: PHYSICIAN ASSISTANT

## 2024-08-27 PROCEDURE — 02UX0JZ SUPPLEMENT THORACIC AORTA, ASCENDING/ARCH WITH SYNTHETIC SUBSTITUTE, OPEN APPROACH: ICD-10-PCS | Performed by: SURGERY

## 2024-08-27 PROCEDURE — 85384 FIBRINOGEN ACTIVITY: CPT | Performed by: NURSE ANESTHETIST, CERTIFIED REGISTERED

## 2024-08-27 DEVICE — IMPLANTABLE DEVICE: Type: IMPLANTABLE DEVICE | Site: CHEST | Status: FUNCTIONAL

## 2024-08-27 DEVICE — GRAFT PERICARDIUM 8X14CM PERI-GUARD PG0814: Type: IMPLANTABLE DEVICE | Site: CHEST | Status: FUNCTIONAL

## 2024-08-27 DEVICE — STIMULAN® RAPID CURE PROVIDED STERILE FOR SINGLE PATIENT USE. STIMULAN® RAPID CURE CONTAINS CALCIUM SULFATE POWDER AND MIXING SOLUTION IN PRE-MEASURED QUANTITIES SO THAT WHEN MIXED TOGETHER IN A STERILE MIXING BOWL, THE RESULTANT PASTE IS TO BE DIGITALLY PACKED INTO OPEN BONE VOID/GAP TO SET INSITU OR PLACED INTO THE MOULD PROVIDED, THE MIXTURE SETS TO FORM BEADS. THE BIODEGRADABLE, RADIOPAQUE BEADS ARE RESORBED IN APPROXIMATELY 30 – 60 DAYS WHEN USED IN ACCORDANCE WITH THE DEVICE LABELLING. STIMULAN® RAPID CURE IS MANUFACTURED FROM SYNTHETIC IMPLANT GRADE CALCIUM SULFATE DIHYDRATE(CASO4.2H2O) THAT RESORBS AND IS REPLACED WITH BONE DURING THE HEALING PROCESS. ALSO, AS THE BONE VOID FILLER BEADS ARE BIODEGRADABLE AND BIOCOMPATIBLE, THEY MAY BE USED AT AN INFECTED SITE.
Type: IMPLANTABLE DEVICE | Site: CHEST | Status: FUNCTIONAL
Brand: STIMULAN® RAPID CURE

## 2024-08-27 RX ORDER — AMOXICILLIN 250 MG
1 CAPSULE ORAL 2 TIMES DAILY
Status: DISCONTINUED | OUTPATIENT
Start: 2024-08-27 | End: 2024-09-04

## 2024-08-27 RX ORDER — METHADONE HYDROCHLORIDE 10 MG/ML
10 INJECTION, SOLUTION INTRAMUSCULAR; INTRAVENOUS; SUBCUTANEOUS ONCE
Status: COMPLETED | OUTPATIENT
Start: 2024-08-27 | End: 2024-08-27

## 2024-08-27 RX ORDER — CEFAZOLIN SODIUM 2 G/100ML
2 INJECTION, SOLUTION INTRAVENOUS EVERY 8 HOURS
Status: COMPLETED | OUTPATIENT
Start: 2024-08-27 | End: 2024-08-28

## 2024-08-27 RX ORDER — SODIUM CHLORIDE, SODIUM GLUCONATE, SODIUM ACETATE, POTASSIUM CHLORIDE AND MAGNESIUM CHLORIDE 526; 502; 368; 37; 30 MG/100ML; MG/100ML; MG/100ML; MG/100ML; MG/100ML
1000 INJECTION, SOLUTION INTRAVENOUS
Status: DISCONTINUED | OUTPATIENT
Start: 2024-08-27 | End: 2024-08-27

## 2024-08-27 RX ORDER — HEPARIN SODIUM 1000 [USP'U]/ML
INJECTION, SOLUTION INTRAVENOUS; SUBCUTANEOUS PRN
Status: DISCONTINUED | OUTPATIENT
Start: 2024-08-27 | End: 2024-08-27

## 2024-08-27 RX ORDER — ACETAMINOPHEN 325 MG/1
975 TABLET ORAL EVERY 8 HOURS
Status: DISPENSED | OUTPATIENT
Start: 2024-08-27 | End: 2024-09-06

## 2024-08-27 RX ORDER — SODIUM CHLORIDE 9 MG/ML
INJECTION, SOLUTION INTRAVENOUS CONTINUOUS PRN
Status: DISCONTINUED | OUTPATIENT
Start: 2024-08-27 | End: 2024-08-27

## 2024-08-27 RX ORDER — LIDOCAINE HYDROCHLORIDE 10 MG/ML
INJECTION, SOLUTION INFILTRATION; PERINEURAL PRN
Status: DISCONTINUED | OUTPATIENT
Start: 2024-08-27 | End: 2024-08-27

## 2024-08-27 RX ORDER — CALCIUM CHLORIDE 100 MG/ML
INJECTION INTRAVENOUS; INTRAVENTRICULAR PRN
Status: DISCONTINUED | OUTPATIENT
Start: 2024-08-27 | End: 2024-08-27

## 2024-08-27 RX ORDER — ONDANSETRON 4 MG/1
4 TABLET, ORALLY DISINTEGRATING ORAL EVERY 6 HOURS PRN
Status: DISCONTINUED | OUTPATIENT
Start: 2024-08-27 | End: 2024-09-04

## 2024-08-27 RX ORDER — FENTANYL CITRATE 50 UG/ML
INJECTION, SOLUTION INTRAMUSCULAR; INTRAVENOUS PRN
Status: DISCONTINUED | OUTPATIENT
Start: 2024-08-27 | End: 2024-08-27

## 2024-08-27 RX ORDER — NOREPINEPHRINE BITARTRATE 0.02 MG/ML
.01-.1 INJECTION, SOLUTION INTRAVENOUS CONTINUOUS
Status: DISCONTINUED | OUTPATIENT
Start: 2024-08-27 | End: 2024-08-27 | Stop reason: HOSPADM

## 2024-08-27 RX ORDER — CEFAZOLIN SODIUM/WATER 2 G/20 ML
2 SYRINGE (ML) INTRAVENOUS
Status: COMPLETED | OUTPATIENT
Start: 2024-08-27 | End: 2024-08-27

## 2024-08-27 RX ORDER — LIDOCAINE HYDROCHLORIDE 10 MG/ML
INJECTION, SOLUTION INFILTRATION; PERINEURAL
Status: COMPLETED | OUTPATIENT
Start: 2024-08-27 | End: 2024-08-27

## 2024-08-27 RX ORDER — DEXMEDETOMIDINE HYDROCHLORIDE 4 UG/ML
.2-1 INJECTION, SOLUTION INTRAVENOUS CONTINUOUS
Status: DISCONTINUED | OUTPATIENT
Start: 2024-08-27 | End: 2024-08-27 | Stop reason: HOSPADM

## 2024-08-27 RX ORDER — HYDRALAZINE HYDROCHLORIDE 20 MG/ML
10 INJECTION INTRAMUSCULAR; INTRAVENOUS EVERY 30 MIN PRN
Status: DISCONTINUED | OUTPATIENT
Start: 2024-08-27 | End: 2024-09-16 | Stop reason: HOSPADM

## 2024-08-27 RX ORDER — SODIUM CHLORIDE, SODIUM LACTATE, POTASSIUM CHLORIDE, CALCIUM CHLORIDE 600; 310; 30; 20 MG/100ML; MG/100ML; MG/100ML; MG/100ML
INJECTION, SOLUTION INTRAVENOUS CONTINUOUS PRN
Status: DISCONTINUED | OUTPATIENT
Start: 2024-08-27 | End: 2024-08-27

## 2024-08-27 RX ORDER — KETAMINE HYDROCHLORIDE 10 MG/ML
INJECTION INTRAMUSCULAR; INTRAVENOUS PRN
Status: DISCONTINUED | OUTPATIENT
Start: 2024-08-27 | End: 2024-08-27

## 2024-08-27 RX ORDER — HEPARIN SODIUM 5000 [USP'U]/.5ML
5000 INJECTION, SOLUTION INTRAVENOUS; SUBCUTANEOUS EVERY 8 HOURS
Status: DISCONTINUED | OUTPATIENT
Start: 2024-08-28 | End: 2024-08-28

## 2024-08-27 RX ORDER — SODIUM CHLORIDE, SODIUM LACTATE, POTASSIUM CHLORIDE, CALCIUM CHLORIDE 600; 310; 30; 20 MG/100ML; MG/100ML; MG/100ML; MG/100ML
INJECTION, SOLUTION INTRAVENOUS CONTINUOUS
Status: DISCONTINUED | OUTPATIENT
Start: 2024-08-27 | End: 2024-08-27 | Stop reason: HOSPADM

## 2024-08-27 RX ORDER — DEXMEDETOMIDINE HYDROCHLORIDE 4 UG/ML
.1-1.2 INJECTION, SOLUTION INTRAVENOUS CONTINUOUS
Status: DISCONTINUED | OUTPATIENT
Start: 2024-08-27 | End: 2024-09-02

## 2024-08-27 RX ORDER — CALCIUM GLUCONATE 20 MG/ML
1 INJECTION, SOLUTION INTRAVENOUS
Status: DISPENSED | OUTPATIENT
Start: 2024-08-27 | End: 2024-09-04

## 2024-08-27 RX ORDER — OXYCODONE HYDROCHLORIDE 5 MG/1
5 TABLET ORAL EVERY 4 HOURS PRN
Status: DISCONTINUED | OUTPATIENT
Start: 2024-08-27 | End: 2024-09-03

## 2024-08-27 RX ORDER — ACETAMINOPHEN 650 MG/1
650 SUPPOSITORY RECTAL EVERY 8 HOURS
Status: DISPENSED | OUTPATIENT
Start: 2024-08-27 | End: 2024-08-30

## 2024-08-27 RX ORDER — GLYCOPYRROLATE 0.2 MG/ML
INJECTION, SOLUTION INTRAMUSCULAR; INTRAVENOUS PRN
Status: DISCONTINUED | OUTPATIENT
Start: 2024-08-27 | End: 2024-08-27

## 2024-08-27 RX ORDER — DEXMEDETOMIDINE HYDROCHLORIDE 4 UG/ML
.2-1.2 INJECTION, SOLUTION INTRAVENOUS CONTINUOUS
Status: DISCONTINUED | OUTPATIENT
Start: 2024-08-27 | End: 2024-08-27 | Stop reason: HOSPADM

## 2024-08-27 RX ORDER — CLOPIDOGREL BISULFATE 75 MG/1
75 TABLET ORAL DAILY
Status: DISCONTINUED | OUTPATIENT
Start: 2024-08-28 | End: 2024-09-16 | Stop reason: HOSPADM

## 2024-08-27 RX ORDER — MAGNESIUM HYDROXIDE 1200 MG/15ML
LIQUID ORAL PRN
Status: DISCONTINUED | OUTPATIENT
Start: 2024-08-27 | End: 2024-08-27 | Stop reason: HOSPADM

## 2024-08-27 RX ORDER — CHLORHEXIDINE GLUCONATE ORAL RINSE 1.2 MG/ML
10 SOLUTION DENTAL ONCE
Status: COMPLETED | OUTPATIENT
Start: 2024-08-27 | End: 2024-08-27

## 2024-08-27 RX ORDER — DEXTROSE MONOHYDRATE 25 G/50ML
25-50 INJECTION, SOLUTION INTRAVENOUS
Status: DISCONTINUED | OUTPATIENT
Start: 2024-08-27 | End: 2024-08-27

## 2024-08-27 RX ORDER — METHADONE HYDROCHLORIDE 10 MG/ML
INJECTION, SOLUTION INTRAMUSCULAR; INTRAVENOUS; SUBCUTANEOUS
Status: DISCONTINUED
Start: 2024-08-27 | End: 2024-08-27 | Stop reason: HOSPADM

## 2024-08-27 RX ORDER — ASPIRIN 81 MG/1
81 TABLET, CHEWABLE ORAL
Status: COMPLETED | OUTPATIENT
Start: 2024-08-27 | End: 2024-08-27

## 2024-08-27 RX ORDER — CEFAZOLIN SODIUM/WATER 2 G/20 ML
2 SYRINGE (ML) INTRAVENOUS SEE ADMIN INSTRUCTIONS
Status: DISCONTINUED | OUTPATIENT
Start: 2024-08-27 | End: 2024-08-27 | Stop reason: HOSPADM

## 2024-08-27 RX ORDER — LIDOCAINE 40 MG/G
CREAM TOPICAL
Status: DISCONTINUED | OUTPATIENT
Start: 2024-08-27 | End: 2024-08-27 | Stop reason: HOSPADM

## 2024-08-27 RX ORDER — DEXTROSE MONOHYDRATE 25 G/50ML
25-50 INJECTION, SOLUTION INTRAVENOUS
Status: DISCONTINUED | OUTPATIENT
Start: 2024-08-27 | End: 2024-09-16 | Stop reason: HOSPADM

## 2024-08-27 RX ORDER — PHENYLEPHRINE HCL IN 0.9% NACL 50MG/250ML
.1-4 PLASTIC BAG, INJECTION (ML) INTRAVENOUS CONTINUOUS PRN
Status: DISCONTINUED | OUTPATIENT
Start: 2024-08-27 | End: 2024-09-03

## 2024-08-27 RX ORDER — DEXMEDETOMIDINE HYDROCHLORIDE 4 UG/ML
INJECTION, SOLUTION INTRAVENOUS
Status: DISCONTINUED
Start: 2024-08-27 | End: 2024-08-27 | Stop reason: HOSPADM

## 2024-08-27 RX ORDER — PROPOFOL 10 MG/ML
INJECTION, EMULSION INTRAVENOUS PRN
Status: DISCONTINUED | OUTPATIENT
Start: 2024-08-27 | End: 2024-08-27

## 2024-08-27 RX ORDER — PHENYLEPHRINE HCL IN 0.9% NACL 50MG/250ML
.1-6 PLASTIC BAG, INJECTION (ML) INTRAVENOUS CONTINUOUS
Status: DISCONTINUED | OUTPATIENT
Start: 2024-08-27 | End: 2024-08-27 | Stop reason: HOSPADM

## 2024-08-27 RX ORDER — POTASSIUM CHLORIDE 29.8 MG/ML
20 INJECTION INTRAVENOUS
Status: COMPLETED | OUTPATIENT
Start: 2024-08-27 | End: 2024-08-27

## 2024-08-27 RX ORDER — VANCOMYCIN HYDROCHLORIDE 1 G/20ML
INJECTION, POWDER, LYOPHILIZED, FOR SOLUTION INTRAVENOUS PRN
Status: DISCONTINUED | OUTPATIENT
Start: 2024-08-27 | End: 2024-08-27 | Stop reason: HOSPADM

## 2024-08-27 RX ORDER — NEOSTIGMINE METHYLSULFATE 1 MG/ML
VIAL (ML) INJECTION PRN
Status: DISCONTINUED | OUTPATIENT
Start: 2024-08-27 | End: 2024-08-27

## 2024-08-27 RX ORDER — ASPIRIN 81 MG/1
162 TABLET, CHEWABLE ORAL
Status: COMPLETED | OUTPATIENT
Start: 2024-08-27 | End: 2024-08-27

## 2024-08-27 RX ORDER — NITROGLYCERIN 10 MG/100ML
INJECTION INTRAVENOUS PRN
Status: DISCONTINUED | OUTPATIENT
Start: 2024-08-27 | End: 2024-08-27

## 2024-08-27 RX ORDER — CALCIUM GLUCONATE 20 MG/ML
2 INJECTION, SOLUTION INTRAVENOUS
Status: DISPENSED | OUTPATIENT
Start: 2024-08-27 | End: 2024-09-04

## 2024-08-27 RX ORDER — PANTOPRAZOLE SODIUM 40 MG/1
40 TABLET, DELAYED RELEASE ORAL DAILY
Status: DISCONTINUED | OUTPATIENT
Start: 2024-08-28 | End: 2024-08-29

## 2024-08-27 RX ORDER — BISACODYL 10 MG
10 SUPPOSITORY, RECTAL RECTAL DAILY PRN
Status: DISCONTINUED | OUTPATIENT
Start: 2024-08-27 | End: 2024-09-16 | Stop reason: HOSPADM

## 2024-08-27 RX ORDER — PROTAMINE SULFATE 10 MG/ML
INJECTION, SOLUTION INTRAVENOUS PRN
Status: DISCONTINUED | OUTPATIENT
Start: 2024-08-27 | End: 2024-08-27

## 2024-08-27 RX ORDER — HYDROMORPHONE HCL IN WATER/PF 6 MG/30 ML
0.2 PATIENT CONTROLLED ANALGESIA SYRINGE INTRAVENOUS
Status: DISCONTINUED | OUTPATIENT
Start: 2024-08-27 | End: 2024-09-03

## 2024-08-27 RX ORDER — HYDROMORPHONE HCL IN WATER/PF 6 MG/30 ML
0.1 PATIENT CONTROLLED ANALGESIA SYRINGE INTRAVENOUS
Status: DISCONTINUED | OUTPATIENT
Start: 2024-08-27 | End: 2024-09-03

## 2024-08-27 RX ORDER — POTASSIUM CHLORIDE 29.8 MG/ML
INJECTION INTRAVENOUS PRN
Status: DISCONTINUED | OUTPATIENT
Start: 2024-08-27 | End: 2024-08-27

## 2024-08-27 RX ORDER — POLYETHYLENE GLYCOL 3350 17 G/17G
17 POWDER, FOR SOLUTION ORAL DAILY
Status: DISCONTINUED | OUTPATIENT
Start: 2024-08-28 | End: 2024-09-04

## 2024-08-27 RX ORDER — NICOTINE POLACRILEX 4 MG
15-30 LOZENGE BUCCAL
Status: DISCONTINUED | OUTPATIENT
Start: 2024-08-27 | End: 2024-09-16 | Stop reason: HOSPADM

## 2024-08-27 RX ORDER — PROCHLORPERAZINE MALEATE 5 MG
5 TABLET ORAL EVERY 6 HOURS PRN
Status: DISCONTINUED | OUTPATIENT
Start: 2024-08-27 | End: 2024-09-04

## 2024-08-27 RX ORDER — LIDOCAINE 4 G/G
1-2 PATCH TOPICAL EVERY 24 HOURS
Status: DISCONTINUED | OUTPATIENT
Start: 2024-08-27 | End: 2024-09-16 | Stop reason: HOSPADM

## 2024-08-27 RX ORDER — ACETAMINOPHEN 325 MG/1
650 TABLET ORAL EVERY 4 HOURS PRN
Status: DISCONTINUED | OUTPATIENT
Start: 2024-08-30 | End: 2024-08-30

## 2024-08-27 RX ORDER — NICOTINE POLACRILEX 4 MG
15-30 LOZENGE BUCCAL
Status: DISCONTINUED | OUTPATIENT
Start: 2024-08-27 | End: 2024-08-27

## 2024-08-27 RX ORDER — ONDANSETRON 2 MG/ML
4 INJECTION INTRAMUSCULAR; INTRAVENOUS EVERY 6 HOURS PRN
Status: DISCONTINUED | OUTPATIENT
Start: 2024-08-27 | End: 2024-09-04

## 2024-08-27 RX ORDER — PAPAVERINE HYDROCHLORIDE 30 MG/ML
INJECTION INTRAMUSCULAR; INTRAVENOUS PRN
Status: DISCONTINUED | OUTPATIENT
Start: 2024-08-27 | End: 2024-08-27 | Stop reason: HOSPADM

## 2024-08-27 RX ADMIN — ROCURONIUM BROMIDE 20 MG: 50 INJECTION, SOLUTION INTRAVENOUS at 10:18

## 2024-08-27 RX ADMIN — SODIUM CHLORIDE, POTASSIUM CHLORIDE, SODIUM LACTATE AND CALCIUM CHLORIDE: 600; 310; 30; 20 INJECTION, SOLUTION INTRAVENOUS at 06:32

## 2024-08-27 RX ADMIN — Medication 2 G: at 07:33

## 2024-08-27 RX ADMIN — SODIUM CHLORIDE 1500 MG: 9 INJECTION, SOLUTION INTRAVENOUS at 06:35

## 2024-08-27 RX ADMIN — PROPOFOL 40 MG: 10 INJECTION, EMULSION INTRAVENOUS at 07:53

## 2024-08-27 RX ADMIN — HYDROMORPHONE HYDROCHLORIDE 0.2 MG: 0.2 INJECTION, SOLUTION INTRAMUSCULAR; INTRAVENOUS; SUBCUTANEOUS at 17:18

## 2024-08-27 RX ADMIN — Medication 5 MG: at 07:53

## 2024-08-27 RX ADMIN — HYDROMORPHONE HYDROCHLORIDE 0.2 MG: 0.2 INJECTION, SOLUTION INTRAMUSCULAR; INTRAVENOUS; SUBCUTANEOUS at 19:57

## 2024-08-27 RX ADMIN — EPINEPHRINE 0.06 MCG/KG/MIN: 1 INJECTION INTRAMUSCULAR; INTRAVENOUS; SUBCUTANEOUS at 13:43

## 2024-08-27 RX ADMIN — DEXMEDETOMIDINE HYDROCHLORIDE 1.2 MCG/KG/HR: 400 INJECTION INTRAVENOUS at 21:00

## 2024-08-27 RX ADMIN — PHENYLEPHRINE HYDROCHLORIDE 250 MCG: 10 INJECTION INTRAVENOUS at 11:24

## 2024-08-27 RX ADMIN — NAFCILLIN 12 G: 10 INJECTION, POWDER, FOR SOLUTION INTRAVENOUS at 12:01

## 2024-08-27 RX ADMIN — FENTANYL CITRATE 100 MCG: 50 INJECTION INTRAMUSCULAR; INTRAVENOUS at 08:58

## 2024-08-27 RX ADMIN — CHLORHEXIDINE GLUCONATE 0.12% ORAL RINSE 10 ML: 1.2 LIQUID ORAL at 05:26

## 2024-08-27 RX ADMIN — LIDOCAINE HYDROCHLORIDE 2 ML: 10 INJECTION, SOLUTION INFILTRATION; PERINEURAL at 07:40

## 2024-08-27 RX ADMIN — SODIUM CHLORIDE: 0.9 INJECTION, SOLUTION INTRAVENOUS at 08:20

## 2024-08-27 RX ADMIN — HYDROMORPHONE HYDROCHLORIDE 0.2 MG: 0.2 INJECTION, SOLUTION INTRAMUSCULAR; INTRAVENOUS; SUBCUTANEOUS at 21:53

## 2024-08-27 RX ADMIN — FENTANYL CITRATE 50 MCG: 50 INJECTION INTRAMUSCULAR; INTRAVENOUS at 07:54

## 2024-08-27 RX ADMIN — POTASSIUM CHLORIDE 20 MEQ: 29.8 INJECTION, SOLUTION INTRAVENOUS at 19:58

## 2024-08-27 RX ADMIN — FENTANYL CITRATE 50 MCG: 50 INJECTION INTRAMUSCULAR; INTRAVENOUS at 07:23

## 2024-08-27 RX ADMIN — LIDOCAINE HYDROCHLORIDE 25 MG: 10 INJECTION, SOLUTION INFILTRATION; PERINEURAL at 07:41

## 2024-08-27 RX ADMIN — CALCIUM CHLORIDE INJECTION 250 MG: 100 INJECTION, SOLUTION INTRAVENOUS at 14:33

## 2024-08-27 RX ADMIN — PHENYLEPHRINE HYDROCHLORIDE 100 MCG: 10 INJECTION INTRAVENOUS at 08:07

## 2024-08-27 RX ADMIN — CALCIUM CHLORIDE INJECTION 250 MG: 100 INJECTION, SOLUTION INTRAVENOUS at 14:28

## 2024-08-27 RX ADMIN — SODIUM CHLORIDE, POTASSIUM CHLORIDE, SODIUM LACTATE AND CALCIUM CHLORIDE 1000 ML: 600; 310; 30; 20 INJECTION, SOLUTION INTRAVENOUS at 23:12

## 2024-08-27 RX ADMIN — POTASSIUM CHLORIDE 20 MEQ: 29.8 INJECTION, SOLUTION INTRAVENOUS at 18:27

## 2024-08-27 RX ADMIN — SODIUM CHLORIDE 1500 MG: 9 INJECTION, SOLUTION INTRAVENOUS at 19:55

## 2024-08-27 RX ADMIN — SODIUM CHLORIDE: 0.9 INJECTION, SOLUTION INTRAVENOUS at 12:03

## 2024-08-27 RX ADMIN — AMINOCAPROIC ACID 5 G: 250 INJECTION, SOLUTION INTRAVENOUS at 08:40

## 2024-08-27 RX ADMIN — PHENYLEPHRINE HYDROCHLORIDE 100 MCG: 10 INJECTION INTRAVENOUS at 14:35

## 2024-08-27 RX ADMIN — ROCURONIUM BROMIDE 20 MG: 50 INJECTION, SOLUTION INTRAVENOUS at 13:05

## 2024-08-27 RX ADMIN — SODIUM CHLORIDE, POTASSIUM CHLORIDE, SODIUM LACTATE AND CALCIUM CHLORIDE: 600; 310; 30; 20 INJECTION, SOLUTION INTRAVENOUS at 07:20

## 2024-08-27 RX ADMIN — NEOSTIGMINE METHYLSULFATE 4 MG: 1 INJECTION, SOLUTION INTRAVENOUS at 16:28

## 2024-08-27 RX ADMIN — PHENYLEPHRINE HYDROCHLORIDE 0.5 MCG/KG/MIN: 10 INJECTION INTRAVENOUS at 07:45

## 2024-08-27 RX ADMIN — PHENYLEPHRINE HYDROCHLORIDE 100 MCG: 10 INJECTION INTRAVENOUS at 15:31

## 2024-08-27 RX ADMIN — PROTAMINE SULFATE 300 MG: 10 INJECTION, SOLUTION INTRAVENOUS at 14:11

## 2024-08-27 RX ADMIN — DESMOPRESSIN ACETATE 26.12 MCG: 4 INJECTION, SOLUTION INTRAVENOUS; SUBCUTANEOUS at 14:14

## 2024-08-27 RX ADMIN — GLYCOPYRROLATE 0.7 MG: 0.2 INJECTION INTRAMUSCULAR; INTRAVENOUS at 16:28

## 2024-08-27 RX ADMIN — KETAMINE HYDROCHLORIDE 20 MG: 10 INJECTION INTRAMUSCULAR; INTRAVENOUS at 07:41

## 2024-08-27 RX ADMIN — POTASSIUM CHLORIDE 20 MEQ: 29.8 INJECTION, SOLUTION INTRAVENOUS at 15:00

## 2024-08-27 RX ADMIN — ROCURONIUM BROMIDE 60 MG: 50 INJECTION, SOLUTION INTRAVENOUS at 07:54

## 2024-08-27 RX ADMIN — PHENYLEPHRINE HYDROCHLORIDE 100 MCG: 10 INJECTION INTRAVENOUS at 08:46

## 2024-08-27 RX ADMIN — CALCIUM CHLORIDE INJECTION 500 MG: 100 INJECTION, SOLUTION INTRAVENOUS at 15:34

## 2024-08-27 RX ADMIN — PHENYLEPHRINE HYDROCHLORIDE 150 MCG: 10 INJECTION INTRAVENOUS at 08:35

## 2024-08-27 RX ADMIN — METOPROLOL TARTRATE 12.5 MG: 25 TABLET, FILM COATED ORAL at 05:32

## 2024-08-27 RX ADMIN — ACETAMINOPHEN 650 MG: 650 SUPPOSITORY RECTAL at 20:05

## 2024-08-27 RX ADMIN — LIDOCAINE 1 PATCH: 246 PATCH TOPICAL at 18:27

## 2024-08-27 RX ADMIN — ROCURONIUM BROMIDE 10 MG: 50 INJECTION, SOLUTION INTRAVENOUS at 15:04

## 2024-08-27 RX ADMIN — PHENYLEPHRINE HYDROCHLORIDE 100 MCG: 10 INJECTION INTRAVENOUS at 09:49

## 2024-08-27 RX ADMIN — CEFAZOLIN SODIUM 2 G: 2 INJECTION, SOLUTION INTRAVENOUS at 23:05

## 2024-08-27 RX ADMIN — INSULIN HUMAN 2 UNITS/HR: 1 INJECTION, SOLUTION INTRAVENOUS at 10:06

## 2024-08-27 RX ADMIN — PHENYLEPHRINE HYDROCHLORIDE 50 MCG: 10 INJECTION INTRAVENOUS at 15:39

## 2024-08-27 RX ADMIN — Medication 5 MG: at 07:55

## 2024-08-27 RX ADMIN — ROCURONIUM BROMIDE 20 MG: 50 INJECTION, SOLUTION INTRAVENOUS at 09:26

## 2024-08-27 RX ADMIN — NAFCILLIN 12 G: 10 INJECTION, POWDER, FOR SOLUTION INTRAVENOUS at 06:28

## 2024-08-27 RX ADMIN — PROTAMINE SULFATE 50 MG: 10 INJECTION, SOLUTION INTRAVENOUS at 14:46

## 2024-08-27 RX ADMIN — AMINOCAPROIC ACID 1 G: 250 INJECTION, SOLUTION INTRAVENOUS at 09:29

## 2024-08-27 RX ADMIN — ROCURONIUM BROMIDE 20 MG: 50 INJECTION, SOLUTION INTRAVENOUS at 11:49

## 2024-08-27 RX ADMIN — Medication 2 G: at 14:40

## 2024-08-27 RX ADMIN — SODIUM CHLORIDE, POTASSIUM CHLORIDE, SODIUM LACTATE AND CALCIUM CHLORIDE 1000 ML: 600; 310; 30; 20 INJECTION, SOLUTION INTRAVENOUS at 18:24

## 2024-08-27 RX ADMIN — DEXMEDETOMIDINE HYDROCHLORIDE 0.5 MCG/KG/HR: 400 INJECTION INTRAVENOUS at 08:56

## 2024-08-27 RX ADMIN — SODIUM CHLORIDE, POTASSIUM CHLORIDE, SODIUM LACTATE AND CALCIUM CHLORIDE 250 ML: 600; 310; 30; 20 INJECTION, SOLUTION INTRAVENOUS at 21:51

## 2024-08-27 RX ADMIN — HEPARIN SODIUM 30000 UNITS: 1000 INJECTION INTRAVENOUS; SUBCUTANEOUS at 09:24

## 2024-08-27 RX ADMIN — KETAMINE HYDROCHLORIDE 10 MG: 10 INJECTION INTRAMUSCULAR; INTRAVENOUS at 07:57

## 2024-08-27 ASSESSMENT — ACTIVITIES OF DAILY LIVING (ADL)
ADLS_ACUITY_SCORE: 26
ADLS_ACUITY_SCORE: 26
ADLS_ACUITY_SCORE: 29
ADLS_ACUITY_SCORE: 26
ADLS_ACUITY_SCORE: 29
ADLS_ACUITY_SCORE: 26
ADLS_ACUITY_SCORE: 29
ADLS_ACUITY_SCORE: 26
ADLS_ACUITY_SCORE: 29
ADLS_ACUITY_SCORE: 37
ADLS_ACUITY_SCORE: 26

## 2024-08-27 ASSESSMENT — COPD QUESTIONNAIRES
CAT_SEVERITY: MODERATE
COPD: 1

## 2024-08-27 NOTE — ANESTHESIA PROCEDURE NOTES
Airway       Patient location during procedure: OR       Procedure Start/Stop Times: 8/27/2024 7:57 AM  Staff -        CRNA: Chandler Penn APRN CRNA       Performed By: CRNA  Consent for Airway        Urgency: elective  Indications and Patient Condition       Indications for airway management: diana-procedural       Induction type:intravenous       Mask difficulty assessment: 1 - vent by mask    Final Airway Details       Final airway type: endotracheal airway       Successful airway: ETT - single  Endotracheal Airway Details        ETT size (mm): 7.0       Cuffed: yes       Cuff volume (mL): 8       Successful intubation technique: video laryngoscopy       VL Blade Size: Glidescope 3       Grade View of Cords: 1       Adjucts: stylet       Position: Right       Measured from: lips       Secured at (cm): 23       Bite block used: None    Post intubation assessment        Placement verified by: capnometry, equal breath sounds and chest rise        Number of attempts at approach: 1       Number of other approaches attempted: 0       Secured with: tape       Ease of procedure: easy       Dentition: Intact and Unchanged    Medication(s) Administered   Medication Administration Time: 8/27/2024 7:57 AM

## 2024-08-27 NOTE — OP NOTE
DATE OF SERVICE: 8/27/2024.      PREOPERATIVE DIAGNOSES:  1.  Severe aortic stenosis and moderate aortic regurgitation.  2.  Severe mitral stenosis.  3.  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  4.  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  5.  Severe multivessel coronary artery disease.      POSTOPERATIVE DIAGNOSES:  1.  Severe aortic stenosis and moderate aortic regurgitation.  2.  Severe mitral stenosis.  3.  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  4.  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  5.  Severe multivessel coronary artery disease.  6.  Aortic root abscess.      PROCEDURE PERFORMED:  Aortic root abscess debridement and aortic annular reconstruction.  Aortic root replacement (Konect composite 25 mm Silva Inspiris Resilia bioprosthetic valve within 30 mm Gelweave Valsalva graft with reimplantation of the coronary arteries).  Mitral valve replacement (31 mm St. Tim Silva bioprosthetic valve).  Coronary artery bypass grafting x 2 (reversed saphenous vein graft to the obtuse marginal branch of the left circumflex coronary artery, and pedicled left internal mammary artery to left anterior descending coronary artery).  Endoscopic vein harvest of the greater saphenous vein from the left lower extremity.     SURGEON:  Dylan Renae MD, PhD.    FIRST ASSISTANT:  Sandra Mario, surgical first assistant/surgical technician.    SECOND ASSISTANT: Maryam Anthony PA-C. The role of a physician assistant was necessary in order to procure the vein graft conduit.      ANESTHESIA:  General endotracheal anesthesia.      ANESTHESIOLOGIST: Montrell Baker MD.      ESTIMATED BLOOD LOSS:  1000 mL.      SPECIMEN:  Aortic sinuses and aortic valve cusps with attached vegetation. Mitral valve leaflets and chordae with attached vegetation.      INDICATIONS FOR PROCEDURE:  Ms. Felicitas Elliott is an 84-year-old woman with a history of severe aortic stenosis who now  has  recent methicillin sensitive Staphylococcus aureus bacteremia secondary to cutaneous pustulosis. She was treated with intravenous antibiotics and blood cultures have been negative from 8/17/24. Unfortunately, she developed mitral and aortic valve endocarditis with a large vegetation on the mitral valve (8 mm x 15) attached to posterior leaflet and a small vegetation on the aortic valve. No root abscess was seen on transesophageal echocardiography or CT scan. Brain MRI is suggestive of subacute infarct thought to be due to cerebral septic emboli. She also has acute kidney injury in the setting of chronic kidney disease. Coronary angiography demonstrates severe proximal left circumflex coronary artery disease and moderate-severe proximal left anterior descending coronary artery disease. She understands the risks and benefits of the procedure and wishes to undergo the operation.    I discussed the option of a bioprosthetic versus mechanical valves with the patients. The patient understands that a bioprosthetic valve would not require lifelong anticoagulation, but there is a risk of prosthetic valve degeneration. I also explained that while a mechanical valve has unlimited durability, this would require lifelong anticoagulation with Coumadin. There is also a small risk of hearing the valve click. The risk of infection is equal between the two. I recommend bioprosthetic valves given her advanced age, and she is in agreement with this.    OPERATIVE FINDINGS:  The ascending aorta was free of calcified plaque. The distal ascending aorta was less than 4 cm diameter. The aortic root was dilated to 5 cm. The aortic valve was trileaflet and there were multiple fenestrations at the commissures. The left main coronary ostium was slightly distal to the sinotubular junction, and right coronary ostium was in the usual anatomic position. After reperfusion and defibrillation, sinus rhythm resumed.  Left ventricular function was  low normal preoperatively and unchanged after bypass with low dose inotropic support.    The left internal mammary artery was 2 mm in diameter and had excellent flow.  The greater saphenous vein from the left lower extremity was 4-5 mm in diameter and suitable for conduit.  The ascending aorta had calcified plaque, and using epiaortic ultrasound, this was avoided for cannulation and aortic cross clamp placement.  The obtuse marginal branch of the left circumflex coronary artery was 2 mm in diameter and free of disease at the site of anastomosis.  The left anterior descending coronary artery 2 mm in diameter and free of disease at the site of anastomosis.      There was a small vegetation on the ventricular side of the non-coronary cusp. The aortic valve cusps were severely calcified, and the aortic annulus was mildly calcified. There was an aortic annular abscess just below the right side of the left-right commissure. This perforated into an abscess cavity posterior to the main pulmonary artery, and this contained green-brown purulent fluid. There was a large greater than 1 cm vegetation on the P2 scallop of the posterior leaflet of the mitral valve and this was adherent to the posterior mitral annular calcification. There was moderate mitral annular calcification.    After aortic root replacement, there was normal bioprosthetic valve function on transesophageal echocardiography, and after mitral valve replacement, there was normal bioprosthetic valve function on transesophageal echocardiography.    After reperfusion and defibrillation, junctional rhythm was present and ventricular pacing was commenced.  Left ventricular function was normal preoperatively and unchanged after bypass on low-dose inotropic support.      OPERATIVE DESCRIPTION IN DETAIL:  After obtaining informed consent, the patient was brought to the operating room and placed in the supine position on the operating room table.  Appropriate lines and  devices for monitoring were placed by the anesthesia team.  The patient underwent smooth induction of general anesthesia, and the trachea was intubated orally.  A right internal jugular Cordis introducer was placed, and a Gadsden-Dana catheter was placed through this.  The patient was prepped and draped, and a timeout was performed to confirm the correct patient identity, as well as the procedure to be performed.      A median sternotomy was performed and the left internal mammary artery was harvested.  The greater saphenous vein was harvested from the left lower extremity using endoscopic vein harvesting by the physician assistant, Maryam Anthony PA-C.      The patient was given full dose heparin to achieve and activated clotting time over 480 seconds, and after cannulation of the distal ascending aorta and the superior vena cava and inferior vena cava through the right atrium, cardiopulmonary bypass was commenced. In addition to a left ventricular vent placed through the right superior pulmonary vein, an antegrade cardioplegia cannula we placed, and after snaring the cavae, a retrograde cardioplegia cannula was placed through a right atriotomy. Next, the heart was arrested with 1 liter of cold retrograde blood cardioplegia followed 600 mL of handheld antegrade carioplegia. Subsequent maintenance doses of 300 mL of cold retrograde blood cardioplegia were given approximately every 20 minutes. Topical cold saline slush was applied and the patient was cooled to 30 degrees Celsius for additional protection,    The aorta was divided transversely just proximal to the aortic cross clamp and the aorta was excised down to the sinotubular junction. The aortic valve was examined with the above findings noted. The aortic sinuses and cusps were excised sharply. The aortic annulus was divided just to the right of the right-left commissure to unroof the annular abscess, and this was thoroughly debrided. The aortic annulus was  debrided meticulously. Through the right atriotomy, an interatrial septostomy was performed starting in the fossa ovalis and extending into the muscular interatrial septum. The right atriotomy was continued over onto the dome of the left atrium to establish an extended trans-septal approach. The mitral valve was exposed with the above findings noted. The mitral valve leaflets with attached cords and the posterior leaflet vegetation were excised. The posterior mitral annular calcification was debrided.     The following grafts were constructed in end-to-side fashion using running 7-0 Prolene:  A reversed saphenous vein graft was sewn to the mid obtuse marginal branch of the left circumflex coronary artery, and the pedicled left internal mammary artery was sewn to the mid left anterior descending coronary artery.     Turning attention to the mitral valve, pledgeted 2-0 Ethibond transannular sutures were placed in everting fashion (from the atrial to the ventricular side) circumferentially. After appropriate sizing, these annular sutures were brought through the sewing ring of a 31 mm St. Tim Medical Epic mitral valve. The valve was seated in place and the sutures were tied down and cut with the CoreKnot device. The motion of the valve leaflets and competence of the mitral valve was confirmed with saline. The dome of the left atrium and the interatrial septum were closed in two layers with running 4-0 Prolene in simple continuous fashion.    The left atrium, aortic root and left ventricle were irrigated with 500 mL of dilute betadine followed by 2 liters of antibiotic (vancomycin and gentamicin) containing cold saline. Next, pledgeted 2-0 Ethibond transannular sutures were placed in everting fashion (from the atrial to the ventricular side) circumferentially. After appropriate sizing, these annular sutures were brought through the sewing ring of a 31 mm St. Tim Medical Epic mitral valve. The valve was seated in place  "and the sutures were secured and cut with the CoreKnot device. The motion of the valve leaflets and competence of the mitral valve was confirmed with saline. The dome of the left atrium and the interatrial septum were closed in two layers with running 4-0 Prolene.      The aortic annulus was reconstructed with 2 strips of bovine pericardium; one along the ventricular side and one along the aortic side. These strips were fixed in placed with simple interrupted, pledgeted annular 2-0 Ethibond sutures placed circumferentially in everting fashion (aortic to ventricular side). These annular sutures were brought through the proximal sewing cuff of a Konect composite 25 mm Silva Inspiris Resilia bioprosthetic valve within 30 mm Gelweave Valsalva graft. The bioprosthetic valve and conduit was lowered into place and the sutures were secured and cut with the CoreKnot device. A new left main coronary ostium was fashioned sharply. The left main coronary button anastomosis was completed in end-to-side fashion with running 5-0 Prolene buttressing the suture line with a strip of bovine pericardium. A new right main coronary ostium was fashioned sharply. The right main coronary button anastomosis was completed in end-to-side fashion with running 5-0 Prolene buttressing the suture line with a strip of bovine pericardium.  The graft was cut to length in beveled fashion, and the distal aortic anastomosis was completed in end-to-end fashion with running 4-0 Prolene buttressed with a strip of bovine pericardium.     The proximal anastomosis of the vein graft was constructed on the ascending aorta in end-to-side fashion using running 6-0 Prolene under a single crossclamp.  The crossclamp was released after a retrograde dose of terminal warm blood cardioplegia (\"hotshot\") was delivered, and the heart was resuscitated.        All bleeding points were controlled.  A bipolar ventricular pacing lead was placed and brought out through a " separate stab incision. The patient weaned from cardiopulmonary bypass, was given protamine and decannulated.  A 24-[place] Xavier drain was placed in the mediastinum and brought out through a separate stab incision.  A 28-[place] chest tube was placed in the anterior mediastinum and brought out through a separate stab incision. A 24-[place] Xavier drain was placed in the left pleural space and brought out through a separate stab incision.  The sternal edges were reapposed with 3 interrupted #6 stainless steel sternal wires in the manubrium, 3 double wires in the body of the sternum, and one additional #6 stainless steel sternal wire at the lower aspect of the sternum.  The muscle, fascia, and skin were closed in layers with absorbable suture.  Dermabond was applied.  All sponge, needle and instrument counts were correct at the end of the case.          ETTA PAREKH MD

## 2024-08-27 NOTE — PROGRESS NOTES
[facility]    Medicine Progress Note - Hospitalist Service    Date of Admission:  8/16/2024    Assessment & Plan   85 yo female with history of chronic back pain, furunculosis, uterine prolapse, and tobacco use presents 8/14/24 with sepsis secondary to MSSA bacteremia and HAMMAD. MIGDALIA positive for mitral and aortic valvular vegetations. Transferred to [facility] 8/16/24 for CT surgery evaluation.  On Nafcillin continuous infusion via right PICC line.  Last positive BC with MSSA on 8/16/24.  BC's from 8/17-8/20 NGTD.  Coronary angiogram (8/20/24) showed 70% prox LAD and 90% ost Cx to Prox Cx.  Plan for AVR +MVR+ CABG on Tuesday, 8/27/24, with Dr. Renae.     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  Severe mitral valve stenosis with the mean gradient of mitral valve 10 mmHg.  Severe calcified aortic valve with reduced aortic valve excursion. The mean  gradient of aortic valve 41mmHg.  Small size mobile mass (3 x 4 mm) on aortic valve. Small size mobile mass (3 x 3 mm) on the posterior MV leaflet.  LVEF 55-60%  -Last BC positive with MSSA on 8/16/24.  BC's from 8/17-8/20 NGTD  -coronary angiogram (8/20/24) showed 70% prox LAD and 90% ost Cx to Prox Cx.  -IV Nafcillin continuous infusion  -plan for AVR +MVR+ CABG today with Dr. Renae   -ID, Cardiology, CVS assistance appreciated     #Acute and subacute CVA  -likely related to septic emboli  -MRI brain showed 13 mm focus of cortical signal abnormality within the left superior parietal lobule favored to represent a subacute infarct, smaller foci of signal abnormality with similar characteristics favored to represent punctate subacute infarcts.   -CTA head and neck no hemorrhage, intracranial atherosclerosis, moderate to marked stenosis of  the right P2 posterior cerebral artery segment. No definite proximal branch occlusion, aneurysm, or high flow vascular malformation identified. Atherosclerotic plaque at the carotid bifurcations bilaterally without hemodynamically significant stenosis in the neck vessels.   No evidence for dissection.   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  Baseline range 1.2-1.3  -UA no blood. 10mg/dL protein, 75 L.E., few bacteria, 13 WBC, 11 Squamous epithelial cells, 3 hyaline casts  -CK normal     #Mod-severe cervical spinal stenosis, foraminal stenosis, facet arthropathy  -MRI 8/15 showed degenerative changes in her cervical spine which appears similar to report from 2015. Radiology commented on a left L4-5 degenerative facet with findings that could not entirely rule out septic arthritis.   -Neck pain is chronic and has no new or worsening symptoms  -Neurosurgery recommends non operative management and to obtain repeat MRI with and w/o contrast if neck pain worsens beyond chronic baseline despite antibiotics and treatment of cardiac vegetations  -supportive cares  -continue lidocaine cream, tramadol and tylenol for pain control and headache control     #Prediabetes  -Hemoglobin A1c 6.4  -Monitor glucoses intermittently  -Glucose 144 this morning      #Normocytic anemia due to hemodilution from hypervolemia, resolved  #Thrombocytopenia in the setting of sepsis, resolved     #Hypokalemia  -monitor and replace accordingly    #Hypomagnesemia  -monitor and replace accordingly    #Hypophosphatemia  -monitor and replace accordingly          Diet: Snacks/Supplements Adult: Ensure Enlive; With Meals  Room Service  Fluid restriction 1800 ML FLUID  NPO per Anesthesia Guidelines for Procedure/Surgery Except for: Meds    DVT Prophylaxis: Heparin SQ  Le Catheter:  "PRESENT, indication:    Lines: PRESENT      PICC 08/21/24 Single Lumen Right Basilic Long term Antibiotic-Site Assessment: WDL      Cardiac Monitoring: ACTIVE order. Indication: Acute decompensated heart failure (48 hours)  Code Status: Full Code      Clinically Significant Risk Factors        # Hypokalemia: Lowest K = 3.2 mmol/L in last 2 days, will replace as needed   # Hypocalcemia: Lowest iCa = 3.9 mg/dL in last 2 days, will monitor and replace as appropriate      # Coagulation Defect: INR = 1.66 (Ref range: 0.85 - 1.15) and/or PTT = 71 Seconds (Ref range: 22 - 38 Seconds), will monitor for bleeding              # Overweight: Estimated body mass index is 29.84 kg/m  as calculated from the following:    Height as of this encounter: 1.702 m (5' 7\").    Weight as of this encounter: 86.4 kg (190 lb 8 oz).        # Financial/Environmental Concerns:                 Disposition Plan     Medically Ready for Discharge: Anticipated in 5+ Days             SERENA Pyle  Hospitalist Service  [facility]  Securely message with [employer] (more info)  Text page via [facility] Paging/Directory   ______________________________________________________________________    Interval History   No new issues overnight    Daughter's questions answered to the best of my knowledge.     Physical Exam   Vital Signs: Temp: 99  F (37.2  C) Temp src: Oral BP: 128/68 Pulse: 74   Resp: 16 SpO2: 92 % O2 Device: None (Room air)    Weight: 190 lbs 8 oz      General: Not in obvious distress.  HEENT: NC, AT   Chest: Clear to auscultation bilaterally  Heart: S1S2 normal, regular. No M/R/G  Abdomen: Soft. NT, ND. Bowel sounds- active.  Extremities: Trace pedal edema  Neuro: Alert and awake, grossly non-focal      Medical Decision Making             Data     I have personally reviewed the following data over the past 24 hrs:    N/A  \   7.4 (L)   / N/A     144 N/A N/A /  176 (H)   3.4 N/A N/A \     Procal: N/A CRP: N/A Lactic " "Acid: 3.2 (H)       INR:  1.66 (H) PTT:  71 (H)   D-dimer:  N/A Fibrinogen:  325       Imaging results reviewed over the past 24 hrs:   Recent Results (from the past 24 hour(s))   POC US Guidance Needle Placement    Narrative    Ultrasound was performed as guidance to an anesthesia procedure.  Click   \"PACS images\" hyperlink below to view any stored images.  For specific   procedure details, view procedure note authored by anesthesia.   POC US Guided Vascular Access    Narrative    Ultrasound was performed as guidance to an anesthesia procedure.  Click   \"PACS images\" hyperlink below to view any stored images.  For specific   procedure details, view procedure note authored by anesthesia.     "

## 2024-08-27 NOTE — PLAN OF CARE
Goal Outcome Evaluation:       Pt. Had an ok night. C/O some chronic back pain after being prepped for CABG/valve replacement surgery. Peridex rinse given, nasal pre-op pop-swab done, given metoprolol per pre-op order. Pt. States allergy to aspirin, so this was not given. VSS. Transferring at this time to pre-op.

## 2024-08-27 NOTE — PROGRESS NOTES
RT PROGRESS NOTE    VENT DAY # 1    CURRENT SETTINGS:  FiO2 (%): 80 %, Resp: 18, Vent Mode: CMV/AC, Resp Rate (Set): 18 breaths/min, Tidal Volume (Set, mL): 500 mL, PEEP (cm H2O): 5 cmH2O, Resp Rate (Set): 18 breaths/min, Tidal Volume (Set, mL): 500 mL, PEEP (cm H2O): 5 cmH2O    PATIENT PARAMETERS:  PIP 27  Pplat:  21  Pmean:  12  Compliance: 40  SBT: Yes   Secretions:  None   02 Sats:  100%  BS: clear to auscultations and decreased @ bases.     ETT SIZE 7.0 Secured at 23 cm at teeth/gums    Respiratory Medications:     ABG:     NOTE / SHIFT SUMMARY:       Pt has just arrived from OR and placed on vent with above mentioned vent settings. Weaning trial will be initiated when appropriate.    Efrem Garcia, RRT

## 2024-08-27 NOTE — OR NURSING
Dr Baker at bedside, he was informed of chronic back pain and tylenol use at home.  He was also informed last subcutaneous heparin was given last evening and that Felicitas refused her ASA because of her allergy concern with it.

## 2024-08-27 NOTE — ANESTHESIA POSTPROCEDURE EVALUATION
Patient: Felicitas Elliott    Procedure: Procedure(s):  CORONARY ARTERY BYPASS GRAFT TIMES TWO, WITH AORTIC ROOT REPLACEMENT, WITH LEFT INTERNAL MAMMARY ARTERY HARVEST, LEFT SAPHNENOUS ENDOSCOPIC VESSEL PROCUREMENT, ANESTHESIA TRANSESOPHAGEAL ECHOCARDIOGRAM, EPI-AORTIC ULTRASOUND  MITRAL VALVE REPLACEMENT,       Anesthesia Type:  General    Note:  Disposition: ICU            ICU Sign Out: Anesthesiologist/ICU physician sign out WAS performed   Postop Pain Control: Uneventful            Sign Out: Well controlled pain   PONV: No   Neuro/Psych:             Sign Out: PLANNED postop sedation   Airway/Respiratory:             Sign Out: AIRWAY IN SITU/Resp. Support               Airway in situ/Resp. Support: ETT                 Reason: Planned Pre-op   CV/Hemodynamics:             Sign Out: Detailed CV status               Blood Pressure: Normal               Rate/Rhythm: PM dependent               Perfusion:  Inotropes   Other NRE: NONE   DID A NON-ROUTINE EVENT OCCUR? No    Event details/Postop Comments:  Please do not fast track per surgeon's request.  Pt to stay ventilated overnoc.  Ventilator management per ICU.  Monitoring for coagulopathy/bleeding.         Last vitals:  Vitals:    08/27/24 0500 08/27/24 0611 08/27/24 1620   BP: 123/68 128/68    Pulse: 81 74 80   Resp: 18 16 18   Temp: 37.3  C (99.1  F) 37.2  C (99  F)    SpO2: 92% 92% 100%       Electronically Signed By: Montrell Baker MD  August 27, 2024  4:48 PM

## 2024-08-27 NOTE — CONSULTS
"Critical Care Consult Note     08/27/2024     Name: Felicitas Elliott MRN#: [id number]   Age: 84 year old YOB: 1940        Summary     Past medical history of moderate aortic stenosis, HFpEF, HTN, HLD, CKD 3, chronic intertriginous skin wounds, chronic neck pain      Presented 8/14/24 for chills & generalized weakness. 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 8/27/24 s/p bioprosthetic aortic & mitral valve replacement, aortic root replacement, & two vessel coronary artery bypass graft. Post-operative invasive mechanical ventilation requirement (8/27 - current) & hemodynamic instability requiring vasopressors/inotropes.        Assessment & Plan      Neurology, Psychiatry, Sedation, Analgesia:  L-parietal septic embolic stroke   8/17/24 MRI brain \"cortical signal abnormality within the left superior parietal lobule which is favored to represent a subacute infarct [...] smaller foci of signal abnormality with similar characteristics favored to represent punctate subacute infarcts.\" 8/18/24 CTA head & neck \"low attenuation within the left parietal lobe [...] this is favored to reflect evolving small vessel ischemic change; however, MRI follow-up to   exclude a low-grade neoplastic process is advised\"  - neurology was consulted     Cervical spinal stenosis   Chronic neck pain   8/15/24  cervical MRI \"multilevel cervical spondylosis [...] complex edema at C4-5 on the left is favored to be reactive/degenerative [...] septic facet arthropathy are difficult to entirely exclude [...] C4-5, moderate to severe spinal canal stenosis  [...] severe right and moderate to severe left neural foraminal stenosis  [...] C5-6, moderate spinal canal stenosis  [...]Severe bilateral neural foraminal stenosis  [...] C7-T1, mild spinal canal stenosis with severe right " "and mild to moderate left neural foraminal stenosis [...] C3-4, moderate bilateral neural foraminal stenosis.\"  - neurosurgery was consulted, no current surgical interventions advised  - consider cervical MRI w/wo if neck pain worsens to evaluate for sequelae of infection     Sedation & Analgesia   - RASS goal 0 to -1  - dexmedetomidine infusion    - prn APAP & tramadol     Cardiovascular:  Post-operative hemodynamic instability  Post-cardiopulmonary bypass vasoplegia & low cardiac output   - routine hemodynamic management per CV surgery: epinephrine, phenylephrine, & nicardipine have been made available   - MAP 60-70, SBP ; Preload: PPV <11%; Cardiac index > 2.2-2.5 L/min/m^2; ScVO2 >70%, SvO2 >60%    Severe aortic stenosis, aortic & mitral valve infective endocarditis, aortic root abscess  8/27/24 bioprosthetic aortic & mitral valve replacement, aortic root replacement    Severe multivessel CAD  8/27/24 2 vessel CABG    Preserved pre & post- operative biventricular systolic function   Left ventricular hypertrophy with impaired diastolic function     Carotid arterial stenosis, mild  8/22/24 carotid US    Post-operative bradyarrhythmia   S/p placement of temporary epicardial pacing wires   Presumed transient post-operative sinus node dysfunction or AV conduction defect.   - V paced at 80 BPM, underlying rhythm ~60 without convincing P waves reportedly     Respiratory, Airway:  Post-operative invasive mechanical ventilation   8/27/24 radiograph pending   - supplemental O2 to maintain spO2 >= 90-96% & PaO2 >= 60 mmHg  - lung protective ventilation (TV 6-8 mL/kg IBW, Pplat <30, driving pressure <15)   - target normocarbia pH 7.35 - 7.45, continuously monitored end tidal CO2    Post-operative chest tubes  - monitor output      Gastrointestinal:  No active issues     Renal, Acid-base, Electrolytes, Volume :  Non-oliguric HAMMAD on CKD   Baseline 1.2 - 1.3  - monitor     Infectious Disease:  MSSA bloodstream infection " "(last + culture 8/16/24)  Native aortic & mitral valve infective endocarditis   Question of cervical septic facet arthropathy   Presumed source intertriginous soft tissue lesions  - ID consulted   - nafcillin     Lennie-operative antibiotics per CV surgery     Hematology, Oncology:  Post-operative anemia  - monitor for bleeding: Hgb goal >7-8    Risk for post-cardiac bypass coagulopathy     Risk for post-cardiac bypass thrombocytopenia     High risk procedures for surgery bleeding  Including aortic root replacement, aortic arch surgery, bilateral internal mammary harvest, double valve, redo surgery, longer time on CPB    Endocrine:  MICU insulin/glucose protocol   - maintain BG of 140 to 180 mg/dL with a continuous IV insulin infusion    Checklist:  FEN: NPO  VTE ppx: subcutaneous UFH  GI ppx: pantoprazole   Bowel regimen: PEG & senna   VAP ppx: Head of bed >30 degrees, daily oral care  Lines/tube-size: R-internal jugular introducer & PAC, radial arterial line, PICC 8/21, chacko 8/27, ETT  Skin: sternotomy site clean & dry    PT/OT/SLP, early mobility: cardiac rehab when able   Code Status: full       History of the Present Illness      Airway - easy, glidescope, limited neck mobility    Methadone - 10 mg given    Pre-echo - preserved systolic function, LVH  Post-echo - \" \" with poor visualization of the bioprosthetic aortic valve, minimal mean gradient bioprosthetic mitral valve   Notable vitals & infusions - low dose epinephrine   Pacer settings & rate - V paced 80 BPM, native rhythm ~60s without convincing P waves    EBL - 1,500   Notable labs - Hgb 9.2, INR 1.5, ABG 7.34/44, SVO2 66   Products - 5 units PRBCs, 700+ cell saver, plts, cryo, FFP, & protamine    Reversal given - yes        Past Medical History      Moderate aortic stenosis, HFpEF, HTN, HLD, CKD 3, chronic intertriginous skin wounds, chronic neck pain        Social History      Reviewed in the electronic medical record      Family History      Reviewed " in the electronic medical record      Allergies      Allergies   Allergen Reactions    Aspirin Rash    Cats Rash    Nickel Rash         Review of Systems      Review of systems not obtained due to patient factors.      Medications      Prior to Admission Medications  Medications Prior to Admission   Medication Sig Dispense Refill Last Dose    acetaminophen (TYLENOL) 500 MG tablet Take 500-1,000 mg by mouth 2 times daily as needed for other (arthritis)   Unknown at prn         Physical Exam      Neurologic: Sedated. Does not open eyes, track, or follow commands. PERRL.   HEENT: Head and face normal. No nasal discharge. Oropharynx normal. Eyelids, conjunctiva, & sclera normal.   Neck: Neck appearance normal. No neck masses. Thyroid not enlarged.  Respiratory: Lungs clear bilaterally. No wheezes, crackles, or rhonchi.   Cardiovascular: Regular rate & rhythm  Heart sounds distant   Gastrointestinal: Soft. Obese.   Musculoskeletal: Skeletal configuration normal and muscle mass normal for age. Joint appearance overall normal.  Skin, Hair, & Nails: Skin color normal. Sternotomy site clean & dry  Hair & nails normal.  Extremities: 2+ lower extremity pitting edema. Warm      All pertinent vital signs, ventilator settings, I&Os, laboratory, microbiology, ECGs, & imaging data has been personally reviewed. Total Critical Care time, excluding procedures, was 50 minutes     VIRGINIA Park MD  Critical Care Medicine     \

## 2024-08-27 NOTE — ANESTHESIA PROCEDURE NOTES
Central Line/PA Catheter Placement    Pre-Procedure   Staff -        Anesthesiologist:  Montrell Baker MD       Performed By: anesthesiologist       Location: OR       Pre-Anesthestic Checklist: patient identified, IV checked, site marked, risks and benefits discussed, informed consent, monitors and equipment checked, pre-op evaluation and at physician/surgeon's request  Timeout:       Correct Patient: Yes        Correct Procedure: Yes        Correct Site: Yes        Correct Position: Yes        Correct Laterality: Yes   Line Placement:   This line was placed Post Induction starting at 8/27/2024 7:56 AM and ending at 8/27/2024 8:07 AM    Procedure   Procedure: central line       Laterality: right       Insertion Site: internal jugular.       Patient Position: Trendelenburg  Sterile Prep        All elements of maximal sterile barrier technique followed       Patient Prep/Sterile Barriers: draped, hand hygiene, gloves , hat , mask , draped, gown, sterile gel and probe cover       Skin prep: Chloraprep  Insertion/Injection        Technique: ultrasound guided and Seldinger Technique        1. Ultrasound was used to evaluate the access site.       2. Vein evaluated via ultrasound for patency/adequacy.       3. Using real-time ultrasound the needle/catheter was observed entering the artery/vein.       4. Permanent image was captured and entered into the patient's record.       5. The visualized structures were anatomically normal.       6. There were no apparent abnormal pathologic findings.       Introducer Type: 9 Fr, 2-lumen MAC        Type: Introducer  Narrative         Secured by: suture       Tegaderm and Biopatch dressing used.       Complications: None apparent,        blood aspirated from all lumens,        All lumens flushed: Yes       Verification method: Placement to be verified post-op and Ultrasound

## 2024-08-27 NOTE — ANESTHESIA CARE TRANSFER NOTE
Patient: Felicitas Elliott    Procedure: Procedure(s):  CORONARY ARTERY BYPASS GRAFT TIMES TWO, WITH AORTIC ROOT REPLACEMENT, WITH LEFT INTERNAL MAMMARY ARTERY HARVEST, LEFT SAPHNENOUS ENDOSCOPIC VESSEL PROCUREMENT, ANESTHESIA TRANSESOPHAGEAL ECHOCARDIOGRAM, EPI-AORTIC ULTRASOUND  MITRAL VALVE REPLACEMENT,       Diagnosis: CAD (coronary artery disease) [I25.10]  AS (aortic stenosis) [I35.0]  Mitral regurgitation [I34.0]  Diagnosis Additional Information: No value filed.    Anesthesia Type:   General     Note:    Oropharynx: ventilatory support  Level of Consciousness: iatrogenic sedation      Independent Airway: airway patency not satisfactory and stable  Dentition: dentition unchanged  Vital Signs Stable: post-procedure vital signs reviewed and stable  Report to RN Given: handoff report given  Patient transferred to: ICU    ICU Handoff: Call for PAUSE to initiate/utilize ICU HANDOFF, Identified Patient, Identified Responsible Provider, Reviewed the Pertinent Medical History, Discussed Surgical Course, Reviewed Intra-OP Anesthesia Management and Issues during Anesthesia, Set Expectations for Post Procedure Period and Allowed Opportunity for Questions and Acknowledgement of Understanding      Vitals:  Vitals Value Taken Time   /74    Temp 36.0    Pulse 80 08/27/24 1639   Resp 18 08/27/24 1620   SpO2 100 % 08/27/24 1639   Vitals shown include unfiled device data.    Electronically Signed By: CRUZITO Silva CRNA  August 27, 2024  4:40 PM

## 2024-08-27 NOTE — ANESTHESIA PROCEDURE NOTES
Perioperative MIGDALIA Procedure Note    Staff -        Anesthesiologist:  Montrell Baker MD       Performed By: anesthesiologist  Preanesthesia Checklist:  Patient identified, IV assessed, risks and benefits discussed, monitors and equipment assessed, procedure being performed at surgeon's request and anesthesia consent obtained.    MIGDALIA Probe Insertion    Probe Status PRE Insertion: NO obvious damage  Probe type:  Adult 3D  Bite block used:   Soft  Insertion Technique: Easy, no oropharyngeal manipulation  Insertion complications: None obvious  Billing Report:A MIGDALIA report is NOT being generated.  Probe Status POST Removal: NO obvious damage

## 2024-08-27 NOTE — ANESTHESIA PROCEDURE NOTES
Arterial Line Procedure Note    Pre-Procedure   Staff -        Anesthesiologist:  Montrell Baker MD       Performed By: anesthesiologist       Location: OR       Pre-Anesthestic Checklist: patient identified, IV checked, risks and benefits discussed, informed consent, monitors and equipment checked, pre-op evaluation and at physician/surgeon's request  Timeout:       Correct Patient: Yes        Correct Procedure: Yes        Correct Site: Yes        Correct Position: Yes   Line Placement:   This line was placed Pre Induction starting at 8/27/2024 7:40 AM and ending at 8/27/2024 7:45 AM  Procedure   Procedure: new line, arterial line and elective       Insertion Site: radial.  Sterile Prep        Standard elements of sterile barrier followed       Skin prep: Chloraprep  Insertion/Injection        Technique: ultrasound guided, Seldinger Technique and Kurt's test completed        1. Ultrasound was used to evaluate the access site.       2. Artery evaluated via ultrasound for patency/adequacy.       3. Using real-time ultrasound the needle/catheter was observed entering the artery/vein.       4. Permanent image was captured and entered into the patient's record.       5. The visualized structures were anatomically normal.       6. There were no apparent abnormal pathologic findings.       Catheter Type/Size: 20 G, 12 cm  Narrative         Secured by: suture       Tegaderm and Biopatch dressing used.       Complications: None apparent,        Arterial waveform: Yes        IBP within 10% of NIBP: Yes

## 2024-08-27 NOTE — PROGRESS NOTES
"[facility] ID Inpatient follow up        08/27/2024    Chart reviewed  Patient in OR           Assessment and Recommendations:   Assessment:  Felicitas Elliott is a 84 year old female with   History of severe aortic stenosis.  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  MSSA bacteremia.  Positive blood culture on 8/14/2024 and 8/16.  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  With the size of the vegetation combined with brain infarct, surgery is indicated, planned 8/26. Active issue.   Neck pain worrisome for cervical discitis.  Neck MRI showed some edema at C4-C5 on the left side.  No clear evidence of infection.  Abnormal brain MRI suggestive of subacute infarct. In a patient with MSSA bacteremia and aortic valve endocarditis, this is cerebral septic emboli. Active issue.     Recommendations:  continue IV nafcillin continuous infusion.  Duration 6 weeks  Monitor kidney function.  PICC placed 8/21  Valve surgery is being arranged-planned 8/27  Will need to check immunoglobin level down the road. Sister with history of hypogammaglobulinemia   Overall will require at least 6 weeks of IV nafcillin  Please see previous ID notes by Dr. Rico Lebron MD  [facility]  Answering Service: [phone]  On-Call ID provider: [phone], option: 9              Interval History:     HPI: chart reviewed  Patient in OR       The interval history was reviewed.   Doing ok. Tolerating antibiotics. Family visiting. Itchy area on back, no rash in that area.     Pertinent cultures include:  No results found for: \"CULT\"    Recent Inflammatory Biomarkers:   Recent Labs   Lab Test 08/26/24  0603 08/23/24  0946 08/21/24  0444 08/18/24  0455 08/17/24  0428 " 24  0557   WBC 10.4 10.7 9.4 7.5 7.5 7.3            Review of Systems:   CONSTITUTIONAL:    Temp Max: Temp (24hrs), Av.8  F (37.1  C), Min:97.9  F (36.6  C), Max:100.3  F (37.9  C)   .  Negative except for findings in the HPI.           Current Medications (antimicrobials listed in bold):     Current Facility-Administered Medications   Medication Dose Route Frequency Provider Last Rate Last Admin    cardioplegia low K - standard PERFUSION   PERFUSION On Call to OR Dylan Renae MD        cardioplegic soln Hi K - Standard PERFUSION   PERFUSION On Call to OR Dylan Renae MD        cardioplegic soln [place] - Standard PERFUSION   PERFUSION On Call to OR Dylan Renae MD        ceFAZolin Sodium (ANCEF) injection 2 g  2 g Intravenous Pre-Op/Pre-procedure x 1 dose Dylan Renae MD        ceFAZolin Sodium (ANCEF) injection 2 g  2 g Intravenous See Admin Instructions Dylan Renae MD        desmopressin (DDAVP) 26.12 mcg in sodium chloride 0.9 % 50 mL intermittent infusion  0.3 mcg/kg Intravenous Once Dylan Renae MD        [Auto Hold] heparin ANTICOAGULANT injection 5,000 Units  5,000 Units Subcutaneous BID Gondal, Saad J, MD   5,000 Units at 24 1038    heparin cell saver PERFUSION solution   PERFUSION On Call to OR Dylan Renae MD        [Auto Hold] lactobacillus rhamnosus (GG) (CULTURELL) capsule 1 capsule  1 capsule Oral BID Zach Foreman DO   1 capsule at 24    [Auto Hold] nafcillin 12 g in sodium chloride 0.9 % 550 mL continuous infusion  12 g Intravenous Q24H [address] [employer]   12 g at 24 0628    prime solution for OR INFUSION   Intravenous On Call to OR Dylan Renae MD        sodium chloride (PF) 0.9% PF flush 3 mL  3 mL Intracatheter Q8H Dylan Renae MD        sodium chloride (PF) 0.9% PF flush 3 mL  3 mL Intracatheter Q8H Bell Kumar MD         [Auto Hold] sodium chloride (PF) 0.9% PF flush 3 mL  3 mL Intracatheter Q8H Gondal, Saad J, MD   3 mL at 08/27/24 0029              Allergies:     Allergies   Allergen Reactions    Aspirin Rash    Cats Rash    Nickel Rash            Physical Exam:   Vitals were reviewed  Patient Vitals for the past 24 hrs:   BP Temp Temp src Pulse Resp SpO2 Weight   08/27/24 0611 128/68 99  F (37.2  C) Oral 74 16 92 % --   08/27/24 0500 123/68 99.1  F (37.3  C) Oral 81 18 92 % 86.4 kg (190 lb 8 oz)   08/26/24 2347 125/67 99.3  F (37.4  C) Oral 95 16 93 % --   08/26/24 1921 108/57 98.8  F (37.1  C) Oral 82 18 93 % --   08/26/24 1542 120/67 100.3  F (37.9  C) Oral 88 18 92 % --       Physical Examination:  Gen: Pleasant in no acute distress. In chair   HEENT: NCAT.   Lungs: no respiratory distress  Skin: No rash.  Extr: No edema.  Neuro: Alert and oriented to place time and person.            Laboratory Data:   ID Labs:  Microbiology labs:  Reviewed      No lab results found.  Recent Labs   Lab Test 08/26/24  0603 08/23/24  0946 08/21/24  0444 08/18/24  0455 08/17/24  0428 08/16/24  0557   WBC 10.4 10.7 9.4 7.5 7.5 7.3     Recent Labs   Lab Test 08/26/24  0603 08/25/24  0930 08/24/24  0526 08/23/24  0601   CR 1.60* 1.50* 1.53* 1.50*   GFRESTIMATED 31* 34* 33* 34*       Hematology Studies  Recent Labs   Lab Test 08/27/24  1113 08/27/24  1036 08/27/24  1000 08/27/24  0834 08/26/24  0603 08/23/24  0946 08/21/24  0444 08/18/24  0455 08/17/24  0428 08/16/24  0557   WBC  --   --   --   --  10.4 10.7 9.4 7.5 7.5 7.3   HGB 8.9* 8.3* 6.9* 9.3* 10.1* 10.0* 10.0* 10.4* 10.3* 10.8*   HCT  --   --   --   --  31.9* 30.9* 31.0* 31.6* 31.4* 32.6*   PLT  --   --   --   --  345 295 271 126* 105* 95*       Metabolic  Recent Labs   Lab Test 08/27/24  1113 08/27/24  1036 08/27/24  1000 08/27/24  0834 08/26/24  0603 08/25/24  0930 08/24/24  0526    139 138   < > 137 137 136   BUN  --   --   --   --  16.3 14.5 14.1   CO2  --   --   --   --  30* 30*  31*   CR  --   --   --   --  1.60* 1.50* 1.53*   GFRESTIMATED  --   --   --   --  31* 34* 33*    < > = values in this interval not displayed.       Hepatic Studies  Recent Labs   Lab Test 08/14/24  0254   BILITOTAL 1.0   ALKPHOS 47   ALBUMIN 4.1   AST 36   ALT 19       ImmunologlobulinsNo lab results found.         Imaging Data:   Reviewed     Study Result    Narrative & Impression   EXAM: MR BRAIN W/O and W CONTRAST  LOCATION: [facility]  DATE: 8/17/2024     INDICATION: Headache in a patient with MSSA bacteremia secondary to aortic valve endocarditis.  Look for cerebral septic emboli; Headache; Acute HA (< 3 months), no complicating features  COMPARISON: None.  CONTRAST: 9 ml gadavist  TECHNIQUE: Routine multiplanar multisequence head MRI without and with intravenous contrast.     FINDINGS: Assessment degraded due to motion.  INTRACRANIAL CONTENTS:   Apparent focus of diffusion restriction with T2/FLAIR hyperintensity within the left superior parietal lobule cortex is hyperintense on ADC map, representing T2 shine through.  Focus of signal abnormality measures 13 x 9 mm in the axial plane and does   not have internal enhancement.     Additional punctate foci of hyperintensity on diffusion weighted with similar signal characteristics (periventricular right corona radiata, left superior parietal lobule, and left cerebellar hemisphere).     Patchy and confluent nonspecific T2/FLAIR hyperintensities within the cerebral white matter most consistent with moderate chronic microvascular ischemic change. Moderate generalized cerebral atrophy. No hydrocephalus. Normal position of the cerebellar   tonsils. No discernible abnormal intracranial enhancement; however, assessment substantially degraded due to motion. 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     SELLA: No abnormality accounting for technique.     OSSEOUS STRUCTURES/SOFT TISSUES: Normal marrow signal. The major intracranial vascular flow voids  are maintained.      ORBITS: No abnormality accounting for technique.      SINUSES/MASTOIDS: Mild mucosal thickening scattered about the paranasal sinuses. Scattered fluid/membrane thickening in the left mastoid air cells. No apparent mass in the posterior nasopharynx or skull base.                                                                       IMPRESSION: Assessment degraded due to motion.  1.  13 mm focus of cortical signal abnormality within the left superior parietal lobule which is favored to represent a subacute infarct; low-grade glial neoplasm could conceivably have a similar appearance. Hyperacute intraparenchymal hematoma could   also have a similar appearance but is considered less likely. Recommend noncontrast head CT for further characterization. Also recommend follow-up MRI brain without and with contrast in 8-12 weeks to document expected evolution.  2.  Additional smaller foci of signal abnormality with similar characteristics favored to represent punctate subacute infarcts.

## 2024-08-28 ENCOUNTER — APPOINTMENT (OUTPATIENT)
Dept: RADIOLOGY | Facility: HOSPITAL | Age: 84
DRG: 853 | End: 2024-08-28
Attending: PHYSICIAN ASSISTANT
Payer: COMMERCIAL

## 2024-08-28 DIAGNOSIS — Z95.1 S/P CABG (CORONARY ARTERY BYPASS GRAFT): Primary | ICD-10-CM

## 2024-08-28 LAB
ALBUMIN SERPL BCG-MCNC: 3.3 G/DL (ref 3.5–5.2)
ALP SERPL-CCNC: 40 U/L (ref 40–150)
ALT SERPL W P-5'-P-CCNC: 15 U/L (ref 0–50)
ANION GAP SERPL CALCULATED.3IONS-SCNC: 15 MMOL/L (ref 7–15)
AST SERPL W P-5'-P-CCNC: 75 U/L (ref 0–45)
BASE EXCESS BLDA CALC-SCNC: -2.6 MMOL/L (ref -3–3)
BASE EXCESS BLDV CALC-SCNC: -0.4 MMOL/L (ref -3–3)
BILIRUB SERPL-MCNC: 1.6 MG/DL
BUN SERPL-MCNC: 14.5 MG/DL (ref 8–23)
CA-I BLD-MCNC: 4.7 MG/DL (ref 4.4–5.2)
CALCIUM SERPL-MCNC: 8.4 MG/DL (ref 8.8–10.4)
CHLORIDE SERPL-SCNC: 109 MMOL/L (ref 98–107)
COHGB MFR BLD: 95.4 % (ref 95–96)
CREAT SERPL-MCNC: 1.37 MG/DL (ref 0.51–0.95)
EGFRCR SERPLBLD CKD-EPI 2021: 38 ML/MIN/1.73M2
ERYTHROCYTE [DISTWIDTH] IN BLOOD BY AUTOMATED COUNT: 17.3 % (ref 10–15)
GLUCOSE BLDC GLUCOMTR-MCNC: 105 MG/DL (ref 70–99)
GLUCOSE BLDC GLUCOMTR-MCNC: 108 MG/DL (ref 70–99)
GLUCOSE BLDC GLUCOMTR-MCNC: 113 MG/DL (ref 70–99)
GLUCOSE BLDC GLUCOMTR-MCNC: 113 MG/DL (ref 70–99)
GLUCOSE BLDC GLUCOMTR-MCNC: 114 MG/DL (ref 70–99)
GLUCOSE BLDC GLUCOMTR-MCNC: 124 MG/DL (ref 70–99)
GLUCOSE BLDC GLUCOMTR-MCNC: 126 MG/DL (ref 70–99)
GLUCOSE BLDC GLUCOMTR-MCNC: 127 MG/DL (ref 70–99)
GLUCOSE BLDC GLUCOMTR-MCNC: 128 MG/DL (ref 70–99)
GLUCOSE BLDC GLUCOMTR-MCNC: 129 MG/DL (ref 70–99)
GLUCOSE BLDC GLUCOMTR-MCNC: 131 MG/DL (ref 70–99)
GLUCOSE BLDC GLUCOMTR-MCNC: 134 MG/DL (ref 70–99)
GLUCOSE BLDC GLUCOMTR-MCNC: 136 MG/DL (ref 70–99)
GLUCOSE BLDC GLUCOMTR-MCNC: 138 MG/DL (ref 70–99)
GLUCOSE SERPL-MCNC: 143 MG/DL (ref 70–99)
HCO3 BLD-SCNC: 21 MMOL/L (ref 21–28)
HCO3 BLDV-SCNC: 23 MMOL/L (ref 21–28)
HCO3 SERPL-SCNC: 20 MMOL/L (ref 22–29)
HCT VFR BLD AUTO: 27.6 % (ref 35–47)
HGB BLD-MCNC: 9.2 G/DL (ref 11.7–15.7)
INR PPP: 1.47 (ref 0.85–1.15)
MAGNESIUM SERPL-MCNC: 2.1 MG/DL (ref 1.7–2.3)
MCH RBC QN AUTO: 30 PG (ref 26.5–33)
MCHC RBC AUTO-ENTMCNC: 33.3 G/DL (ref 31.5–36.5)
MCV RBC AUTO: 90 FL (ref 78–100)
O2/TOTAL GAS SETTING VFR VENT: 30 %
O2/TOTAL GAS SETTING VFR VENT: 30 %
OXYHGB MFR BLDV: 55 % (ref 70–75)
PCO2 BLD: 27 MM HG (ref 35–45)
PCO2 BLDV: 33 MM HG (ref 40–50)
PEEP: 5 CM H2O
PH BLD: 7.49 [PH] (ref 7.35–7.45)
PH BLDV: 7.46 [PH] (ref 7.32–7.43)
PHOSPHATE SERPL-MCNC: 2.2 MG/DL (ref 2.5–4.5)
PLATELET # BLD AUTO: 200 10E3/UL (ref 150–450)
PO2 BLD: 63 MM HG (ref 80–105)
PO2 BLDV: 28 MM HG (ref 25–47)
POTASSIUM SERPL-SCNC: 3.9 MMOL/L (ref 3.4–5.3)
POTASSIUM SERPL-SCNC: 4 MMOL/L (ref 3.4–5.3)
PROT SERPL-MCNC: 5.3 G/DL (ref 6.4–8.3)
RBC # BLD AUTO: 3.07 10E6/UL (ref 3.8–5.2)
SAO2 % BLDA: 93 % (ref 92–100)
SAO2 % BLDV: 56 % (ref 70–75)
SODIUM SERPL-SCNC: 144 MMOL/L (ref 135–145)
WBC # BLD AUTO: 10.8 10E3/UL (ref 4–11)

## 2024-08-28 PROCEDURE — 250N000011 HC RX IP 250 OP 636: Performed by: PHYSICIAN ASSISTANT

## 2024-08-28 PROCEDURE — 85027 COMPLETE CBC AUTOMATED: CPT | Performed by: PHYSICIAN ASSISTANT

## 2024-08-28 PROCEDURE — 99291 CRITICAL CARE FIRST HOUR: CPT | Performed by: INTERNAL MEDICINE

## 2024-08-28 PROCEDURE — 258N000003 HC RX IP 258 OP 636: Performed by: SURGERY

## 2024-08-28 PROCEDURE — 250N000009 HC RX 250: Performed by: INTERNAL MEDICINE

## 2024-08-28 PROCEDURE — 250N000011 HC RX IP 250 OP 636: Performed by: SURGERY

## 2024-08-28 PROCEDURE — 999N000055 HC STATISTIC END TITIAL CO2 MONITORING

## 2024-08-28 PROCEDURE — 83735 ASSAY OF MAGNESIUM: CPT | Performed by: PHYSICIAN ASSISTANT

## 2024-08-28 PROCEDURE — 80053 COMPREHEN METABOLIC PANEL: CPT | Performed by: PHYSICIAN ASSISTANT

## 2024-08-28 PROCEDURE — 250N000011 HC RX IP 250 OP 636: Mod: JZ | Performed by: PHYSICIAN ASSISTANT

## 2024-08-28 PROCEDURE — 82330 ASSAY OF CALCIUM: CPT | Performed by: PHYSICIAN ASSISTANT

## 2024-08-28 PROCEDURE — 82805 BLOOD GASES W/O2 SATURATION: CPT | Performed by: PHYSICIAN ASSISTANT

## 2024-08-28 PROCEDURE — 99291 CRITICAL CARE FIRST HOUR: CPT | Mod: 24 | Performed by: STUDENT IN AN ORGANIZED HEALTH CARE EDUCATION/TRAINING PROGRAM

## 2024-08-28 PROCEDURE — 999N000253 HC STATISTIC WEANING TRIALS

## 2024-08-28 PROCEDURE — 999N000156 HC STATISTIC RCP CONSULT EA 30 MIN

## 2024-08-28 PROCEDURE — 258N000003 HC RX IP 258 OP 636: Performed by: PHYSICIAN ASSISTANT

## 2024-08-28 PROCEDURE — 250N000013 HC RX MED GY IP 250 OP 250 PS 637: Performed by: INTERNAL MEDICINE

## 2024-08-28 PROCEDURE — 71045 X-RAY EXAM CHEST 1 VIEW: CPT

## 2024-08-28 PROCEDURE — 250N000011 HC RX IP 250 OP 636: Performed by: INTERNAL MEDICINE

## 2024-08-28 PROCEDURE — 999N000009 HC STATISTIC AIRWAY CARE

## 2024-08-28 PROCEDURE — 250N000013 HC RX MED GY IP 250 OP 250 PS 637: Performed by: PHYSICIAN ASSISTANT

## 2024-08-28 PROCEDURE — P9047 ALBUMIN (HUMAN), 25%, 50ML: HCPCS | Performed by: INTERNAL MEDICINE

## 2024-08-28 PROCEDURE — 94003 VENT MGMT INPAT SUBQ DAY: CPT

## 2024-08-28 PROCEDURE — 250N000009 HC RX 250: Performed by: SURGERY

## 2024-08-28 PROCEDURE — 200N000001 HC R&B ICU

## 2024-08-28 PROCEDURE — 999N000259 HC STATISTIC EXTUBATION

## 2024-08-28 PROCEDURE — 99233 SBSQ HOSP IP/OBS HIGH 50: CPT | Performed by: INTERNAL MEDICINE

## 2024-08-28 PROCEDURE — 82805 BLOOD GASES W/O2 SATURATION: CPT | Performed by: SURGERY

## 2024-08-28 PROCEDURE — 250N000013 HC RX MED GY IP 250 OP 250 PS 637: Performed by: STUDENT IN AN ORGANIZED HEALTH CARE EDUCATION/TRAINING PROGRAM

## 2024-08-28 PROCEDURE — 250N000009 HC RX 250: Performed by: PHYSICIAN ASSISTANT

## 2024-08-28 PROCEDURE — 85610 PROTHROMBIN TIME: CPT | Performed by: PHYSICIAN ASSISTANT

## 2024-08-28 PROCEDURE — 258N000003 HC RX IP 258 OP 636: Performed by: INTERNAL MEDICINE

## 2024-08-28 PROCEDURE — 999N000157 HC STATISTIC RCP TIME EA 10 MIN

## 2024-08-28 PROCEDURE — 84100 ASSAY OF PHOSPHORUS: CPT | Performed by: PHYSICIAN ASSISTANT

## 2024-08-28 PROCEDURE — 84132 ASSAY OF SERUM POTASSIUM: CPT | Performed by: SURGERY

## 2024-08-28 RX ORDER — WARFARIN SODIUM 5 MG/1
5 TABLET ORAL
Status: COMPLETED | OUTPATIENT
Start: 2024-08-28 | End: 2024-08-28

## 2024-08-28 RX ORDER — FUROSEMIDE 10 MG/ML
20 INJECTION INTRAMUSCULAR; INTRAVENOUS
Status: DISCONTINUED | OUTPATIENT
Start: 2024-08-28 | End: 2024-08-28

## 2024-08-28 RX ORDER — ALBUMIN (HUMAN) 12.5 G/50ML
50 SOLUTION INTRAVENOUS ONCE
Status: COMPLETED | OUTPATIENT
Start: 2024-08-28 | End: 2024-08-28

## 2024-08-28 RX ORDER — ATORVASTATIN CALCIUM 40 MG/1
80 TABLET, FILM COATED ORAL EVERY EVENING
Status: DISCONTINUED | OUTPATIENT
Start: 2024-08-28 | End: 2024-09-16 | Stop reason: HOSPADM

## 2024-08-28 RX ORDER — FUROSEMIDE 10 MG/ML
80 INJECTION INTRAMUSCULAR; INTRAVENOUS EVERY 8 HOURS
Status: DISCONTINUED | OUTPATIENT
Start: 2024-08-28 | End: 2024-08-28

## 2024-08-28 RX ORDER — FUROSEMIDE 10 MG/ML
40 INJECTION INTRAMUSCULAR; INTRAVENOUS
Status: DISCONTINUED | OUTPATIENT
Start: 2024-08-28 | End: 2024-08-29

## 2024-08-28 RX ADMIN — ACETAMINOPHEN 975 MG: 325 TABLET ORAL at 11:43

## 2024-08-28 RX ADMIN — Medication 1 CAPSULE: at 20:09

## 2024-08-28 RX ADMIN — DEXMEDETOMIDINE HYDROCHLORIDE 1.2 MCG/KG/HR: 400 INJECTION INTRAVENOUS at 00:42

## 2024-08-28 RX ADMIN — CEFAZOLIN SODIUM 2 G: 2 INJECTION, SOLUTION INTRAVENOUS at 06:19

## 2024-08-28 RX ADMIN — SENNOSIDES AND DOCUSATE SODIUM 1 TABLET: 8.6; 5 TABLET ORAL at 20:09

## 2024-08-28 RX ADMIN — ACETAMINOPHEN 650 MG: 650 SUPPOSITORY RECTAL at 04:07

## 2024-08-28 RX ADMIN — CEFAZOLIN SODIUM 2 G: 2 INJECTION, SOLUTION INTRAVENOUS at 15:08

## 2024-08-28 RX ADMIN — OXYCODONE HYDROCHLORIDE 2.5 MG: 5 TABLET ORAL at 11:43

## 2024-08-28 RX ADMIN — ACETAMINOPHEN 975 MG: 325 TABLET ORAL at 20:09

## 2024-08-28 RX ADMIN — POTASSIUM PHOSPHATE, MONOBASIC POTASSIUM PHOSPHATE, DIBASIC 15 MMOL: 224; 236 INJECTION, SOLUTION, CONCENTRATE INTRAVENOUS at 08:20

## 2024-08-28 RX ADMIN — NICARDIPINE HYDROCHLORIDE 1 MG/HR: 0.2 INJECTION, SOLUTION INTRAVENOUS at 23:56

## 2024-08-28 RX ADMIN — HYDROMORPHONE HYDROCHLORIDE 0.2 MG: 0.2 INJECTION, SOLUTION INTRAMUSCULAR; INTRAVENOUS; SUBCUTANEOUS at 00:07

## 2024-08-28 RX ADMIN — WARFARIN SODIUM 5 MG: 5 TABLET ORAL at 18:48

## 2024-08-28 RX ADMIN — NAFCILLIN 12 G: 10 INJECTION, POWDER, FOR SOLUTION INTRAVENOUS at 12:14

## 2024-08-28 RX ADMIN — DEXMEDETOMIDINE HYDROCHLORIDE 1.2 MCG/KG/HR: 400 INJECTION INTRAVENOUS at 04:06

## 2024-08-28 RX ADMIN — FUROSEMIDE 40 MG: 10 INJECTION, SOLUTION INTRAMUSCULAR; INTRAVENOUS at 20:09

## 2024-08-28 RX ADMIN — FUROSEMIDE 80 MG: 10 INJECTION, SOLUTION INTRAVENOUS at 09:50

## 2024-08-28 RX ADMIN — OXYCODONE HYDROCHLORIDE 5 MG: 5 TABLET ORAL at 20:13

## 2024-08-28 RX ADMIN — HYDROMORPHONE HYDROCHLORIDE 0.2 MG: 0.2 INJECTION, SOLUTION INTRAMUSCULAR; INTRAVENOUS; SUBCUTANEOUS at 06:18

## 2024-08-28 RX ADMIN — HYDROMORPHONE HYDROCHLORIDE 0.2 MG: 0.2 INJECTION, SOLUTION INTRAMUSCULAR; INTRAVENOUS; SUBCUTANEOUS at 04:07

## 2024-08-28 RX ADMIN — ALBUMIN HUMAN 50 G: 0.25 SOLUTION INTRAVENOUS at 11:43

## 2024-08-28 RX ADMIN — EPINEPHRINE 0.04 MCG/KG/MIN: 1 INJECTION INTRAMUSCULAR; INTRAVENOUS; SUBCUTANEOUS at 14:50

## 2024-08-28 RX ADMIN — OXYCODONE HYDROCHLORIDE 5 MG: 5 TABLET ORAL at 15:28

## 2024-08-28 RX ADMIN — HYDROMORPHONE HYDROCHLORIDE 0.2 MG: 0.2 INJECTION, SOLUTION INTRAMUSCULAR; INTRAVENOUS; SUBCUTANEOUS at 13:34

## 2024-08-28 RX ADMIN — ONDANSETRON 4 MG: 2 INJECTION INTRAMUSCULAR; INTRAVENOUS at 16:44

## 2024-08-28 RX ADMIN — HYDROMORPHONE HYDROCHLORIDE 0.2 MG: 0.2 INJECTION, SOLUTION INTRAMUSCULAR; INTRAVENOUS; SUBCUTANEOUS at 02:08

## 2024-08-28 RX ADMIN — ATORVASTATIN CALCIUM 80 MG: 40 TABLET, FILM COATED ORAL at 20:10

## 2024-08-28 RX ADMIN — SODIUM CHLORIDE 1500 MG: 9 INJECTION, SOLUTION INTRAVENOUS at 06:11

## 2024-08-28 RX ADMIN — ACETAMINOPHEN 650 MG: 325 TABLET ORAL at 15:28

## 2024-08-28 RX ADMIN — HYDROMORPHONE HYDROCHLORIDE 0.2 MG: 0.2 INJECTION, SOLUTION INTRAMUSCULAR; INTRAVENOUS; SUBCUTANEOUS at 22:19

## 2024-08-28 ASSESSMENT — ACTIVITIES OF DAILY LIVING (ADL)
ADLS_ACUITY_SCORE: 36
ADLS_ACUITY_SCORE: 38
ADLS_ACUITY_SCORE: 36
ADLS_ACUITY_SCORE: 42
ADLS_ACUITY_SCORE: 36
ADLS_ACUITY_SCORE: 42
ADLS_ACUITY_SCORE: 37
ADLS_ACUITY_SCORE: 36
ADLS_ACUITY_SCORE: 42
ADLS_ACUITY_SCORE: 36
ADLS_ACUITY_SCORE: 36
ADLS_ACUITY_SCORE: 42
ADLS_ACUITY_SCORE: 36

## 2024-08-28 NOTE — PROGRESS NOTES
Care Management Follow Up    Length of Stay (days): 12    Expected Discharge Date: 09/03/2024    Anticipated Discharge Plan:   TBD    Transportation: TBD    PT Recommendations:  eval and recs pending  OT Recommendations:  eval and recs pending    Barriers to Discharge: medical stability, post procedure care and monitoring, infection, IV medications, O2 2L    Prior Living Situation:   Patient resides in a house with her  and is reported as mostly I/ADL independent when at baseline.  assists with transport and as needed in general. No DME use or current in-home/outpatient services identified.     Discussed  Partnership in Safe Discharge Planning  document with patient/family: No     Handoff Completed: No, handoff not indicated or clinically appropriate    Patient/Spokesperson Updated: No    Additional Information:  Medical:  Patient with PMH of moderate aortic stenosis, HFpEF, HTN, HLD, CKD 3, chronic intertriginous skin wounds, chronic neck pain.  Presented 8/14/24 for chills & generalized weakness. 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 8/27/24 s/p bioprosthetic aortic & mitral valve replacement, aortic root replacement, & two vessel coronary artery bypass graft. Post-operative invasive mechanical ventilation requirement (8/27 - 8/28) & hemodynamic instability requiring vasopressors/inotropes.      CT Surgery and Cardiology following.   Neurology consulted-signed off 8/19.  ID following and recommending 6 weeks of IV Nafcillin.      8/28/24:  Patient is not medically stable to initiate discharge planning.   Per CM notes: [facility] and [facility] patient's insurance will not cover home infusion.  If patient goes home at discharge, CM to follow up with Carrie regarding coverage and cost for home IV antibiotic.        Lindsey Mena RN

## 2024-08-28 NOTE — PROVIDER NOTIFICATION
08/28/24 1130   Vitals   Pulse 79   Oximeter Heart Rate 80 bpm   BP (!) 89/50   BP - Mean 63   Resp (!) 39   Art Line   Arterial Line BP 72/47   Arterial Line MAP (mmHg) 57 mmHg   Invasive Hemodynamic Monitoring   CVP (mmHg) 19 mmHg   Pulm Artery Pressure (PAP) 37/21   Pulm Artery Pressure (PAP) Mean 29 mmHg   Saturated Venous Oxygen (SVO2) 54 %   Cardiac Output (CO) 4.2 L/min   Cardiac Index (CI) 2.1 L/min/m2   Systemic Vasular Resistance (SVR) 741 (dyne*sec)/cm5   Oxygen Therapy   SpO2 95 %       Attempted to dangle, pt became hypotensive with symptoms of lightheadedness and feeling dizzy.

## 2024-08-28 NOTE — PROGRESS NOTES
HEART CARE NOTE          Assessment/Recommendations     1. Severe valvular disease c/b post-op cardiogenic shock  Assessment / Plan  Initiate IV diuresis; Patient is high risk for adverse cardiac events 2/2 advanced age, frailty, valvular heart disease, renal dysfunction  Currently on epi for hemodynamic support - wean pressors as tolerated  GDMT as detailed below; mainstay of treatment for HFpEF includes diuretics and adequate BP control (class I) and SGLT2-I (class 2a); additional medical therapy (ARNI, MRA, ARB) demonstrated less robust evidence for indication but may be considered per guideline recommendations (2b); no indication for BBlockers      Current Pharmacotherapy AHA Guideline-Directed Medical Therapy   Losartan  - not started 2/2 renal dysfunction ARNI/ARB   Spironolactone not started  MRA   SGLT2 inhibitor: not started SGLT2-I    Furosemide IV Loop diuretic       2. 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 Anemia  Assessment / Plan  Management and supportive care per primary team     5. CAD  Assessment / Plan  Coronary angiogram significant for severe prox LAD and ost-prox Lcx lesions - sp CABG x 2 vz + Bentall + MVR     Plan of care discussed on August 28, 2024 with patient and  at bedside, and primary team overseeing patient's care      Patient remains critically ill in the ICU requiring hemodynamic support via vasopressors s/p OHS c/b cardiogenic shock. 65 minutes spent on critical care.        History of Present Illness/Subjective    Ms. Felicitas Elliott is a 84 year old female with a PMHx significant for (per Epic notation) presented with fatigue, weakness, chills, poor appetite. Does not really have  "much for chronic medical conditions other than chronic back pain, furunculosis, uterine prolapse, tobacco use      Today, Mrs. Elliott denies acute cardiac events or complaints; Management plan as detailed above     ECG: personally reviewed; normal sinus rhythm, nonspecific ST and T waves changes, RBBB, left axis deviation.     ECHO (personnaly Reviewed on 8/19/24):   1. The left ventricle is normal in size. Left ventricular function is  normal.The ejection fraction is 55-60%.  2. Normal right ventricle size and systolic function.  3. Large vegetation on mitral valve (8 mm x 15) attached to posterior leaflet.  4. There is a small vegetation on the aortic valve (6 mm). No evidence of  aortic root abscess identified.  5. Severe valvular aortic stenosis (SHU:0.8 cm2, peak nomi: 4.6 m/sec, mean  gradient: 51 mmHg).    Telemetry: personally reviewed August 28, 2024; notable for paced rhythm    Lab results: personally reviewed August 28, 2024; notable for resolving HAMMAD    Medical history and pertinent documents reviewed in Care Everywhere please where applicable see details above        Physical Examination Review of Systems   /56   Pulse 80   Temp 99.7  F (37.6  C) (Pulmonary Artery)   Resp 29   Ht 1.702 m (5' 7\")   Wt 95.3 kg (210 lb 1.6 oz)   SpO2 97%   BMI 32.91 kg/m    Body mass index is 32.91 kg/m .  Wt Readings from Last 3 Encounters:   08/28/24 95.3 kg (210 lb 1.6 oz)   08/15/24 90.1 kg (198 lb 9.6 oz)     General Appearance:   no distress, normal body habitus   ENT/Mouth: membranes moist, no oral lesions or bleeding gums.      EYES:  no scleral icterus, normal conjunctivae   Neck: no carotid bruits or thyromegaly   Chest/Lungs:   lungs are clear to auscultation, no rales or wheezing, equal chest wall expansion    Cardiovascular:   Regular. Normal first and second heart sounds with no murmurs, rubs, or gallops; the carotid, radial and posterior tibial pulses are intact, + JVD and LE edema bilaterally  "   Abdomen:  no organomegaly, masses, bruits, or tenderness; bowel sounds are present   Extremities: no cyanosis or clubbing   Skin: no xanthelasma, warm.    Neurologic: NAD     Psychiatric: alert and oriented x3, calm     A complete 10 systems ROS was reviewed  And is negative except what is listed in the HPI.          Medical History  Surgical History Family History Social History   History reviewed. No pertinent past medical history. Past Surgical History:   Procedure Laterality Date    CV CORONARY ANGIOGRAM N/A 8/20/2024    Procedure: CV CORONARY ANGIOGRAM;  Surgeon: Den yWlie MD;  Location: [facility] CATH LAB CV    no family history of premature coronary artery disease Social History     Socioeconomic History    Marital status:      Spouse name: Not on file    Number of children: Not on file    Years of education: Not on file    Highest education level: Not on file   Occupational History    Not on file   Tobacco Use    Smoking status: Not on file    Smokeless tobacco: Not on file   Substance and Sexual Activity    Alcohol use: Not on file    Drug use: Not on file    Sexual activity: Not on file   Other Topics Concern    Not on file   Social History Narrative    Not on file     Social Determinants of Health     Financial Resource Strain: Low Risk  (8/24/2024)    Financial Resource Strain     Within the past 12 months, have you or your family members you live with been unable to get utilities (heat, electricity) when it was really needed?: No   Food Insecurity: Low Risk  (8/24/2024)    Food Insecurity     Within the past 12 months, did you worry that your food would run out before you got money to buy more?: No     Within the past 12 months, did the food you bought just not last and you didn t have money to get more?: No   Transportation Needs: Low Risk  (8/24/2024)    Transportation Needs     Within the past 12 months, has lack of transportation kept you from medical appointments, getting your  "medicines, non-medical meetings or appointments, work, or from getting things that you need?: No   Physical Activity: Not on file   Stress: Not on file   Social Connections: Unknown (1/3/2024)    Received from [facility]    Social Connections     Frequency of Communication with Friends and Family: Not on file   Interpersonal Safety: High Risk (8/23/2024)    Interpersonal Safety     Do you feel physically and emotionally safe where you currently live?: No     Within the past 12 months, have you been hit, slapped, kicked or otherwise physically hurt by someone?: No     Within the past 12 months, have you been humiliated or emotionally abused in other ways by your partner or ex-partner?: No   Housing Stability: Low Risk  (8/24/2024)    Housing Stability     Do you have housing? : Yes     Are you worried about losing your housing?: No           Lab Results    Chemistry/lipid CBC Cardiac Enzymes/BNP/TSH/INR   Lab Results   Component Value Date    BUN 14.5 08/28/2024     08/28/2024    CO2 20 (L) 08/28/2024    Lab Results   Component Value Date    WBC 10.8 08/28/2024    HGB 9.2 (L) 08/28/2024    HCT 27.6 (L) 08/28/2024    MCV 90 08/28/2024     08/28/2024    Lab Results   Component Value Date    INR 1.43 (H) 08/27/2024     No results found for: \"CKTOTAL\", \"CKMB\", \"TROPONINI\"       Weight:    Wt Readings from Last 3 Encounters:   08/28/24 95.3 kg (210 lb 1.6 oz)   08/15/24 90.1 kg (198 lb 9.6 oz)       Allergies  Allergies   Allergen Reactions    Aspirin Rash    Cats Rash    Nickel Rash         Surgical History  Past Surgical History:   Procedure Laterality Date    CV CORONARY ANGIOGRAM N/A 8/20/2024    Procedure: CV CORONARY ANGIOGRAM;  Surgeon: Den Wylie MD;  Location: [facility] CV       Social History  Tobacco:   History   Smoking Status    Not on file   Smokeless Tobacco    Not on file    Alcohol:   Social History    Substance and Sexual Activity      Alcohol " use: Not on file   Illicit Drugs:   History   Drug Use Not on file       Family History  History reviewed. No pertinent family history.       Navneet Branch MD on 8/28/2024      cc: Clinic, Timbo Malik

## 2024-08-28 NOTE — PROGRESS NOTES
"  Chief Complaint   Patient presents with     Worsing pain on Knees     for last 2 weeks.         HPI:   Troy Azevedo is a 29 y.o. male c/o bilateral knee pain for the last 2 weeks, worse over the last 2-3 days.  Worse in the morning for 5-10 minutes.  Has been awakening in the middle of the night with the pain.  No swelling. No redness.    No fevers.  No other joints hurt.  No known recent injuries.  No past history.  No giving out.  Left knee feels a little less secure.  Left knee pops some.  No locking up.    Had herniated disc this last summer, mild radiation into left leg.  Has been doing leg machines--stopped once the knees started hurting.  Goes to gym 4-5 times a week--inclined treadmill.   Plays basketball for 10-15 minutes. No pain with this.    The more he moves, the less it hurts.    Works in , working two jobs now.    Uses ibuprofen some.  Occasional heat.      ROS:  A 10 point comprehensive review of systems was negative except as noted.     Medications:  No current outpatient medications on file prior to visit.     No current facility-administered medications on file prior to visit.          Social History:  Social History     Tobacco Use     Smoking status: Former Smoker     Packs/day: 0.00     Types: Cigarettes     Last attempt to quit: 10/14/2016     Years since quitting: 3.0     Smokeless tobacco: Never Used   Substance Use Topics     Alcohol use: Not Currently         Physical Exam:   Vitals:    11/11/19 1828   BP: 116/76   Pulse: 64   Resp: 20   Temp: 98.4  F (36.9  C)   TempSrc: Oral   SpO2: 98%   Weight: 220 lb 4 oz (99.9 kg)   Height: 5' 9.5\" (1.765 m)       GEN:  NAD  KNEES:  BL--No effusion, no erythema,  no ecchymosis, no crepitus, no pain with pressure over patella  Pain:  none  ROM: full  McMurrays: negative  Anterior Drawer: negative  Ligamental laxity: none           Assessment/Plan:    1. Bilateral patellofemoral syndrome  naproxen (NAPROSYN) 500 MG tablet    " "Critical Care Progress Note     08/28/2024     Name: Felicitas Elliott MRN#: [id number]   Age: 84 year old YOB: 1940        Summary     Past medical history of moderate aortic stenosis, HFpEF, HTN, HLD, CKD 3, chronic intertriginous skin wounds, chronic neck pain       Presented 8/14/24 for chills & generalized weakness. 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 8/27/24 s/p bioprosthetic aortic & mitral valve replacement, aortic root replacement, & two vessel coronary artery bypass graft. Post-operative invasive mechanical ventilation requirement (8/27 - current) & hemodynamic instability requiring vasopressors/inotropes.        Interval Events     Agitation overnight. Remained on the ventilator     FiO2 35%, nicardipine + epinephrine for MAP>65 & SBP<110, given at least 3 L IVF overnight, CO/CI 4.1/2.1, CVP 14, PAP 34/19. UO 2 L yesterday & 250 mL since 0000. Edematous, weight 95.3 kg up from baseline closer to 90.1 kg, CVP mid 10s    Overnight BMP bicarb 20, AG 15, lactate 3.2 -> 4.4, mild hypo-P, Hgb stable 9.2, plts 200. Last INR overnight 1.43, fibrinogen 349    [place] - 1) pursuit of spontaneous awakening & breathing trial; 2) she appears grossly hypervolemic       Assessment & Plan      Neurology, Psychiatry, Sedation, Analgesia:  L-parietal septic embolic stroke   8/17/24 MRI brain \"cortical signal abnormality within the left superior parietal lobule which is favored to represent a subacute infarct [...] smaller foci of signal abnormality with similar characteristics favored to represent punctate subacute infarcts.\" 8/18/24 CTA head & neck \"low attenuation within the left parietal lobe [...] this is favored to reflect evolving small vessel ischemic change; however, MRI follow-up to   exclude a low-grade neoplastic process is " "advised\"  - neurology was consulted      Cervical spinal stenosis   Chronic neck pain   8/15/24  cervical MRI \"multilevel cervical spondylosis [...] complex edema at C4-5 on the left is favored to be reactive/degenerative [...] septic facet arthropathy are difficult to entirely exclude [...] C4-5, moderate to severe spinal canal stenosis  [...] severe right and moderate to severe left neural foraminal stenosis  [...] C5-6, moderate spinal canal stenosis  [...]Severe bilateral neural foraminal stenosis  [...] C7-T1, mild spinal canal stenosis with severe right and mild to moderate left neural foraminal stenosis [...] C3-4, moderate bilateral neural foraminal stenosis.\"  - neurosurgery was consulted, no current surgical interventions advised  - consider cervical MRI w/wo if neck pain worsens to evaluate for sequelae of infection      Sedation & Analgesia   - RASS goal 0 to -1  - dexmedetomidine infusion    - prn APAP & tramadol      Cardiovascular:  Post-operative hemodynamic instability  Post-cardiopulmonary bypass vasoplegia & low cardiac output   - routine hemodynamic management per CV surgery: epinephrine, phenylephrine, & nicardipine have been made available   - MAP 60-70, SBP ; Preload: PPV <11%; Cardiac index > 2.2-2.5 L/min/m^2; ScVO2 >70%, SvO2 >60%     Severe aortic stenosis, aortic & mitral valve infective endocarditis, aortic root abscess  8/27/24 bioprosthetic aortic & mitral valve replacement, aortic root replacement     Severe multivessel CAD  8/27/24 2 vessel CABG  - clopidogrel per CV surgery      Preserved pre & post- operative biventricular systolic function   Left ventricular hypertrophy with impaired diastolic function      Carotid arterial stenosis, mild  8/22/24 carotid US     Post-operative bradyarrhythmia   S/p placement of temporary epicardial pacing wires   Presumed transient post-operative sinus node dysfunction or AV conduction defect.   - V paced at 80 BPM, underlying rhythm ~60 " Ambulatory referral to PT/OT    Ambulatory referral to Orthopedics      Recommended naproxen two times a day for 14 days and then as needed.  Ice several times daily.  REferred for PT.  Also put in a referral for ortho if he wants to pursue that.          Víctor Peoples MD      11/11/2019    The following portions of the patient's history were reviewed and updated as appropriate: allergies, current medications, past family history, past medical history, past social history, past surgical history and problem list.       without convincing P waves reportedly      Respiratory, Airway:  Post-operative invasive mechanical ventilation   8/27/24 radiograph retrocardiac, left basilar opacity with blunting of the costophrenic angle    - supplemental O2 to maintain spO2 >= 90-96% & PaO2 >= 60 mmHg  - lung protective ventilation (TV 6-8 mL/kg IBW, Pplat <30, driving pressure <15)   - target normocarbia pH 7.35 - 7.45, continuously monitored end tidal CO2  - pursuit of spontaneous awakening & breathing trial     Post-operative chest tubes  - monitor output       Gastrointestinal:  No active issues      Renal, Acid-base, Electrolytes, Volume :  Non-oliguric HAMMAD on CKD   Baseline 1.2 - 1.3  - monitor     Hypervolemia  Grossly edematous throughout.      Infectious Disease:  MSSA bloodstream infection (last + culture 8/16/24)  Native aortic & mitral valve infective endocarditis   Question of cervical septic facet arthropathy   Presumed source intertriginous soft tissue lesions  - ID consulted   - nafcillin      Lennie-operative antibiotics per CV surgery      Hematology, Oncology:  Post-operative anemia  - monitor for bleeding: Hgb goal >7-8     Risk for post-cardiac bypass coagulopathy      Risk for post-cardiac bypass thrombocytopenia      High risk procedures for surgery bleeding  Including aortic root replacement, aortic arch surgery, bilateral internal mammary harvest, double valve, redo surgery, longer time on CPB     Endocrine:  MICU insulin/glucose protocol   - maintain BG of 140 to 180 mg/dL with a continuous IV insulin infusion     Checklist:  FEN: advance as tolerated   VTE ppx: subcutaneous UFH  GI ppx: pantoprazole   Bowel regimen: PEG & senna   VAP ppx: Head of bed >30 degrees, daily oral care  Lines/tube-size: R-internal jugular introducer & PAC 8/27, radial arterial line 8/27, PICC 8/21, chacko 8/27, ETT  Skin: sternotomy site clean & dry    PT/OT/SLP, early mobility: cardiac rehab when able   Code Status: full       Physical Exam       Neurologic: Sedated. Opens eyes, tracks, & follows commands in all extremities symmetrically   HEENT: Head and face normal. No nasal discharge. Oropharynx normal. Eyelids, conjunctiva, & sclera normal.   Neck: Neck appearance normal. No neck masses. Thyroid not enlarged.  Respiratory: Lungs clear bilaterally. No wheezes, crackles, or rhonchi.   Cardiovascular: Regular rate & rhythm  Heart sounds distant   Gastrointestinal: Soft. Obese.   Musculoskeletal: Skeletal configuration normal and muscle mass normal for age. Joint appearance overall normal.  Skin, Hair, & Nails: Skin color normal. Sternotomy site clean & dry  Hair & nails normal.  Extremities: 2+ lower extremity pitting edema. Warm     All pertinent vital signs, ventilator settings, I&Os, laboratory, microbiology, ECGs, & imaging data has been personally reviewed. Total Critical Care time, excluding procedures, was 50 minutes     VIRGINIA Park MD  Critical Care Medicine

## 2024-08-28 NOTE — PLAN OF CARE
[facility] - ICU    RN Progress Note:            Pertinent Assessments:      Please refer to flowsheet rows for full assessment     Cardiac monitor showing v-paced, underlying rhythm junctional 55-60's. CVP was 13 and now is 25. PA pressures were 32/17 and now 49/27. Cardiac Index remains >1.9.   At 1400 patient reports she is hear voices in her room, she stated she has been hearing them all day. She knows that the voices are hallucinations.  at bedside throughout shift.            Key Events - This Shift:       80 mg IV lasix given. Followed by 50 g 25% albumin. Robust urine output. Remains on epi 0.04, unable to wean. Attempted to wean and stand, pt became hypotensive with dizziness. Tolerating clear liquids.                 Barriers to Discharge / Downgrade:     ICU level cares.          Point of Contact Update: YES-OR-NO: Yes  If No, reason:    Name:   Phone Number:   Summary of Conversation: Updated  Ed throughout shift.

## 2024-08-28 NOTE — PHARMACY-ANTICOAGULATION SERVICE
Clinical Pharmacy - Warfarin Dosing Consult     Pharmacy has been consulted to manage this patient s warfarin therapy.  Indication: Mechanical Mitral Valve Replacement  Therapy Goal: INR 2.5-3.5  Provider/Team: Cardiothoracic surgery  Warfarin Prior to Admission: No  Significant drug interactions: tramadol and nafcillin    INR   Date Value Ref Range Status   08/28/2024 1.47 (H) 0.85 - 1.15 Final   08/27/2024 1.43 (H) 0.85 - 1.15 Final       Recommend warfarin 5 mg today.  Pharmacy will monitor Felicitas Elliott daily and order warfarin doses to achieve specified goal.      Please contact pharmacy as soon as possible if the warfarin needs to be held for a procedure or if the warfarin goals change.

## 2024-08-28 NOTE — PROGRESS NOTES
RESPIRATORY CARE NOTE     Patient Name: Felicitas Elliott  Today's Date: 8/28/2024         Patient extubated at 08:25 am and placed on 4LNC. BS clear to auscultations and decreased @ bases. Patient did exhibit fair productive cough upon extubation. Patient is able to verbalize upon extubation, no complications have been observed at this time.  Will continue to monitor closely.     Efrem Garcia, RRT

## 2024-08-28 NOTE — PROGRESS NOTES
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  MSSA bacteremia.  Positive blood culture on 8/14/2024 and 8/16.  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  With the size of the vegetation combined with brain infarct, status post CV surgery, see details below active issue.   Neck pain worrisome for cervical discitis.  Neck MRI showed some edema at C4-C5 on the left side.  No clear evidence of infection.  Abnormal brain MRI suggestive of subacute infarct. In a patient with MSSA bacteremia and aortic valve endocarditis, this is cerebral septic emboli. Active issue.     POSTOPERATIVE DIAGNOSES:  1.  Severe aortic stenosis and moderate aortic regurgitation.  2.  Severe mitral stenosis.  3.  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  4.  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  5.  Severe multivessel coronary artery disease.  6.  Aortic root abscess.      PROCEDURE PERFORMED: 8/27/24  Aortic root abscess debridement and aortic annular reconstruction.  Aortic root replacement (Konect composite 25 mm Silva Inspiris Resilia bioprosthetic valve within 30 mm Gelweave Valsalva graft with reimplantation of the coronary arteries).  Mitral valve replacement (31 mm St. Tim Silva bioprosthetic valve).  Coronary artery bypass grafting x 2 (reversed saphenous vein graft to the obtuse marginal branch of  "the left circumflex coronary artery, and pedicled left internal mammary artery to left anterior descending coronary artery).  Endoscopic vein harvest of the greater saphenous vein from the left lower extremity.    Recommendations:  continue IV nafcillin continuous infusion.  Duration 6 weeks  Monitor kidney function.  PICC placed   Monitor CBC, CMP  Status post vascular surgery, chest tube, CV surgery following closely  Will need to check immunoglobin level down the road. Sister with history of hypogammaglobulinemia   Overall will require at least 6 weeks of IV nafcillin  Please see previous ID notes by Dr. Gómez  Patient seen in ICU.  Appreciate input from patient's nurse at bedside      Nannette Lebron MD  [facility]  Answering Service: [phone]  On-Call ID provider: [phone], option: 9              Interval History:     HPI: chart reviewed  Patient seen in ICU   Chest tube in place, postop pain  Small amount of diarrhea  Discussed antibiotics with patient's nurse         The interval history was reviewed.   Doing ok. Tolerating antibiotics. Family visiting. Itchy area on back, no rash in that area.     Pertinent cultures include:  No results found for: \"CULT\"    Recent Inflammatory Biomarkers:   Recent Labs   Lab Test 24  0552 24  1448 24  0603 24  0946 24  0444 24  0455   WBC 10.8 15.3* 10.4 10.7 9.4 7.5            Review of Systems:   CONSTITUTIONAL:    Temp Max: Temp (24hrs), Av.8  F (37.1  C), Min:97.9  F (36.6  C), Max:100.3  F (37.9  C)   .  Negative except for findings in the HPI.           Current Medications (antimicrobials listed in bold):     Current Facility-Administered Medications   Medication Dose Route Frequency Provider Last Rate Last Admin    acetaminophen (TYLENOL) Suppository 650 mg  650 mg Rectal Q8H Maryam Anthony PA-C   650 mg at 24 0407    acetaminophen (TYLENOL) tablet 975 mg  975 mg Oral Q8H " Maryam Anthony PA-C   975 mg at 08/28/24 1143    atorvastatin (LIPITOR) tablet 80 mg  80 mg Oral QPM Jessica Tolbert PA-C        ceFAZolin (ANCEF) 2 g in 100 mL D5W intermittent infusion  2 g Intravenous Q8H Maryam Anthony PA-C 200 mL/hr at 08/28/24 0619 2 g at 08/28/24 0619    [Held by provider] clopidogrel (PLAVIX) tablet 75 mg  75 mg Oral Daily Maryam Anthony PA-C        furosemide (LASIX) injection 80 mg  80 mg Intravenous Q8H Navneet Branch MD   80 mg at 08/28/24 0950    lactobacillus rhamnosus (GG) (CULTURELL) capsule 1 capsule  1 capsule Oral BID Zach Foreman DO   1 capsule at 08/26/24 2127    Lidocaine (LIDOCARE) 4 % Patch 1-2 patch  1-2 patch Transdermal Q24H Maryam Anthony PA-C   1 patch at 08/27/24 1827    nafcillin 12 g in sodium chloride 0.9 % 550 mL continuous infusion  12 g Intravenous Q24H [address] [employer]   12 g at 08/28/24 1214    pantoprazole (PROTONIX) 2 mg/mL suspension 40 mg  40 mg Oral or NG Tube Daily Maryam Anthony PA-C        Or    pantoprazole (PROTONIX) EC tablet 40 mg  40 mg Oral Daily Maryam Anthony PA-C        polyethylene glycol (MIRALAX) Packet 17 g  17 g Oral Daily Maryam Anthony PA-C        senna-docusate (SENOKOT-S/PERICOLACE) 8.6-50 MG per tablet 1 tablet  1 tablet Oral BID Maryam Anthony PA-C        sodium chloride (PF) 0.9% PF flush 3 mL  3 mL Intracatheter Q8H Gondal, Saad J, MD   3 mL at 08/28/24 0820    warfarin ANTICOAGULANT (COUMADIN) tablet 5 mg  5 mg Oral ONCE at 18:00 Zach Foreman DO        Warfarin Dose Required Daily - Pharmacist Managed  1 each Oral See Admin Instructions Jessica Tolbert PA-C                  Allergies:     Allergies   Allergen Reactions    Aspirin Rash    Cats Rash    Nickel Rash            Physical Exam:   Vitals were reviewed  Patient Vitals for the past 24 hrs:   BP Temp Temp src Pulse Resp SpO2 Weight   08/28/24 1315 -- -- -- 79 30 95 % --    08/28/24 1300 -- 98.4  F (36.9  C) Pulm Art 79 30 95 % --   08/28/24 1254 -- -- -- -- 24 96 % --   08/28/24 1245 -- -- -- 79 28 95 % --   08/28/24 1230 -- -- -- 79 (!) 33 94 % --   08/28/24 1215 -- -- -- 79 30 95 % --   08/28/24 1200 -- 98.8  F (37.1  C) Pulm Art 79 25 94 % --   08/28/24 1145 -- -- -- 79 22 91 % --   08/28/24 1130 (!) 89/50 -- -- 79 (!) 39 95 % --   08/28/24 1115 -- -- -- 79 (!) 32 96 % --   08/28/24 1100 -- -- -- 79 30 96 % --   08/28/24 1045 -- -- -- 79 25 96 % --   08/28/24 1041 -- -- -- 80 24 97 % --   08/28/24 1030 -- -- -- 79 (!) 32 96 % --   08/28/24 1015 -- -- -- 79 30 97 % --   08/28/24 1000 -- 99.3  F (37.4  C) Pulm Art 79 29 97 % --   08/28/24 0945 -- -- -- 79 28 97 % --   08/28/24 0930 -- -- -- 80 26 97 % --   08/28/24 0915 -- -- -- 80 27 97 % --   08/28/24 0900 -- 99.5  F (37.5  C) Pulm Art 80 27 96 % --   08/28/24 0845 -- -- -- 80 29 97 % --   08/28/24 0830 -- -- -- 80 22 97 % --   08/28/24 0825 -- -- -- 80 22 97 % --   08/28/24 0815 -- -- -- 80 -- 97 % --   08/28/24 0800 -- 99.7  F (37.6  C) Pulm Art 80 20 96 % --   08/28/24 0745 -- -- -- 80 -- 96 % --   08/28/24 0730 -- 100  F (37.8  C) Pulm Art 80 -- 93 % --   08/28/24 0728 -- -- -- 80 22 94 % --   08/28/24 0715 -- -- -- 80 -- 95 % --   08/28/24 0700 -- 100  F (37.8  C) Pulm Art 80 20 95 % --   08/28/24 0645 -- 100.2  F (37.9  C) Pulm Art 80 -- 95 % --   08/28/24 0630 -- -- -- 80 -- 95 % --   08/28/24 0618 -- -- -- 80 -- 95 % --   08/28/24 0615 -- -- -- 80 -- 94 % --   08/28/24 0600 -- (!) 100.8  F (38.2  C) Pulm Art 80 20 95 % 95.3 kg (210 lb 1.6 oz)   08/28/24 0545 -- -- -- 80 -- 95 % --   08/28/24 0530 -- -- -- 80 -- 95 % --   08/28/24 0515 -- -- -- 80 -- 95 % --   08/28/24 0500 -- (!) 100.6  F (38.1  C) Pulm Art 80 20 95 % --   08/28/24 0445 -- -- -- 80 -- 95 % --   08/28/24 0442 -- -- -- 80 20 95 % --   08/28/24 0430 -- -- -- 80 -- 96 % --   08/28/24 0415 -- -- -- 80 -- 95 % --   08/28/24 0407 -- -- -- 80 -- 97 % --    08/28/24 0400 -- 100.2  F (37.9  C) Pulm Art 80 20 97 % --   08/28/24 0345 -- -- -- 80 -- 96 % --   08/28/24 0330 -- -- -- 80 -- 96 % --   08/28/24 0315 -- -- -- 80 -- 96 % --   08/28/24 0300 -- 99.7  F (37.6  C) Pulm Art 80 20 96 % --   08/28/24 0245 -- -- -- 80 -- 96 % --   08/28/24 0238 -- -- -- 80 -- 96 % --   08/28/24 0230 -- -- -- 80 -- 96 % --   08/28/24 0215 -- -- -- 80 -- 96 % --   08/28/24 0208 -- -- -- 80 -- 96 % --   08/28/24 0200 -- -- -- 80 -- 96 % --   08/28/24 0145 -- -- -- 80 -- 97 % --   08/28/24 0130 -- -- -- 80 -- 96 % --   08/28/24 0115 -- -- -- 80 -- 96 % --   08/28/24 0100 -- 99.5  F (37.5  C) -- 80 20 96 % --   08/28/24 0045 -- -- -- 80 -- 96 % --   08/28/24 0042 -- -- -- 80 -- 95 % --   08/28/24 0037 -- -- -- 80 -- 95 % --   08/28/24 0035 -- -- -- 80 20 94 % --   08/28/24 0030 -- -- -- 80 -- 95 % --   08/28/24 0015 -- -- -- 80 -- 96 % --   08/28/24 0007 -- -- -- 80 -- 95 % --   08/28/24 0000 -- 99.5  F (37.5  C) Pulm Art 80 20 96 % --   08/27/24 2345 -- -- -- 80 -- 96 % --   08/27/24 2330 -- -- -- 80 -- 96 % --   08/27/24 2315 -- -- -- 80 -- 97 % --   08/27/24 2300 -- 99  F (37.2  C) Pulm Art 80 20 96 % --   08/27/24 2255 -- -- -- 80 -- 96 % --   08/27/24 2250 -- -- -- 80 -- 97 % --   08/27/24 2245 -- -- -- 80 -- 99 % --   08/27/24 2230 -- -- -- 80 -- 99 % --   08/27/24 2227 -- -- -- 80 -- 99 % --   08/27/24 2215 -- -- -- 80 -- 98 % --   08/27/24 2200 -- 98.8  F (37.1  C) Pulm Art 80 20 98 % --   08/27/24 2153 -- -- -- 80 -- 97 % --   08/27/24 2145 -- -- -- 80 -- 98 % --   08/27/24 2130 -- -- -- 80 -- 99 % --   08/27/24 2115 -- -- -- 80 -- 99 % --   08/27/24 2100 -- 98.8  F (37.1  C) Pulm Art 80 20 98 % --   08/27/24 2045 109/56 -- -- 80 -- 99 % --   08/27/24 2030 95/53 -- -- 80 -- 98 % --   08/27/24 2027 -- -- -- 80 -- 98 % --   08/27/24 2017 -- -- -- 80 24 100 % --   08/27/24 2015 -- -- -- 80 -- 100 % --   08/27/24 2000 109/59 98.4  F (36.9  C) Pulm Art 80 20 100 % --   08/27/24 1957  109/59 -- -- 80 -- 100 % --   08/27/24 1945 105/59 -- -- 80 -- 100 % --   08/27/24 1930 104/58 -- -- 80 -- 100 % --   08/27/24 1915 106/55 -- -- 80 -- 100 % --   08/27/24 1900 100/56 -- -- 80 -- 100 % --   08/27/24 1845 109/59 -- -- 80 -- 100 % --   08/27/24 1830 106/57 97.3  F (36.3  C) Pulm Art 80 -- 100 % --   08/27/24 1815 109/57 -- -- 80 -- 100 % --   08/27/24 1800 110/59 97  F (36.1  C) Pulm Art 80 20 100 % --   08/27/24 1745 110/56 -- -- 80 -- 99 % --   08/27/24 1730 106/59 -- -- 80 -- 100 % --   08/27/24 1718 94/55 -- -- 80 -- 100 % --   08/27/24 1715 94/55 -- -- 80 -- 100 % --   08/27/24 1700 (!) 86/51 96.8  F (36  C) Pulm Art 80 20 98 % --   08/27/24 1645 111/69 -- -- 80 -- 100 % --   08/27/24 1620 -- -- -- 80 18 100 % --       Physical Examination:  Gen: Moderate distress, chest tubes  HEENT: NCAT.   CV: Sternal incision  Lungs: no respiratory distress  Skin: Warm  Extr: edema.  Neuro: Alert and oriented to place time and person.  Deconditioned, able to follow commands           Laboratory Data:   ID Labs:  Microbiology labs:  Reviewed      No lab results found.  Recent Labs   Lab Test 08/28/24  0552 08/27/24  1448 08/26/24  0603 08/23/24  0946 08/21/24  0444 08/18/24  0455   WBC 10.8 15.3* 10.4 10.7 9.4 7.5     Recent Labs   Lab Test 08/28/24  0012 08/27/24  1644 08/26/24  0603 08/25/24  0930   CR 1.37* 1.24* 1.60* 1.50*   GFRESTIMATED 38* 43* 31* 34*       Hematology Studies  Recent Labs   Lab Test 08/28/24  0552 08/27/24  2158 08/27/24  1648 08/27/24  1553 08/27/24  1451 08/27/24  1448 08/27/24  0834 08/26/24  0603 08/23/24  0946 08/21/24  0444 08/18/24  0455   WBC 10.8  --   --   --   --  15.3*  --  10.4 10.7 9.4 7.5   HGB 9.2* 9.1* 9.2* 9.0* 7.4* 7.4*   < > 10.1* 10.0* 10.0* 10.4*   HCT 27.6*  --  27*  --   --  22.5*  --  31.9* 30.9* 31.0* 31.6*    190  --   --   --  166  --  345 295 271 126*    < > = values in this interval not displayed.       Metabolic  Recent Labs   Lab Test 08/28/24  0012  08/27/24  1648 08/27/24  1644 08/27/24  0834 08/26/24  0603    144 146*   < > 137   BUN 14.5  --  12.9  --  16.3   CO2 20*  --  22  --  30*   CR 1.37*  --  1.24*  --  1.60*   GFRESTIMATED 38*  --  43*  --  31*    < > = values in this interval not displayed.       Hepatic Studies  Recent Labs   Lab Test 08/28/24  0012 08/27/24  1644 08/14/24  0254   BILITOTAL 1.6* 1.8* 1.0   ALKPHOS 40 40 47   ALBUMIN 3.3* 3.5 4.1   AST 75* 63* 36   ALT 15 16 19       ImmunologlobulinsNo lab results found.         Imaging Data:   Reviewed     Study Result    Narrative & Impression   EXAM: MR BRAIN W/O and W CONTRAST  LOCATION: [facility]  DATE: 8/17/2024     INDICATION: Headache in a patient with MSSA bacteremia secondary to aortic valve endocarditis.  Look for cerebral septic emboli; Headache; Acute HA (< 3 months), no complicating features  COMPARISON: None.  CONTRAST: 9 ml gadavist  TECHNIQUE: Routine multiplanar multisequence head MRI without and with intravenous contrast.     FINDINGS: Assessment degraded due to motion.  INTRACRANIAL CONTENTS:   Apparent focus of diffusion restriction with T2/FLAIR hyperintensity within the left superior parietal lobule cortex is hyperintense on ADC map, representing T2 shine through.  Focus of signal abnormality measures 13 x 9 mm in the axial plane and does   not have internal enhancement.     Additional punctate foci of hyperintensity on diffusion weighted with similar signal characteristics (periventricular right corona radiata, left superior parietal lobule, and left cerebellar hemisphere).     Patchy and confluent nonspecific T2/FLAIR hyperintensities within the cerebral white matter most consistent with moderate chronic microvascular ischemic change. Moderate generalized cerebral atrophy. No hydrocephalus. Normal position of the cerebellar   tonsils. No discernible abnormal intracranial enhancement; however, assessment substantially degraded due to motion.  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     SELLA: No abnormality accounting for technique.     OSSEOUS STRUCTURES/SOFT TISSUES: Normal marrow signal. The major intracranial vascular flow voids are maintained.      ORBITS: No abnormality accounting for technique.      SINUSES/MASTOIDS: Mild mucosal thickening scattered about the paranasal sinuses. Scattered fluid/membrane thickening in the left mastoid air cells. No apparent mass in the posterior nasopharynx or skull base.                                                                       IMPRESSION: Assessment degraded due to motion.  1.  13 mm focus of cortical signal abnormality within the left superior parietal lobule which is favored to represent a subacute infarct; low-grade glial neoplasm could conceivably have a similar appearance. Hyperacute intraparenchymal hematoma could   also have a similar appearance but is considered less likely. Recommend noncontrast head CT for further characterization. Also recommend follow-up MRI brain without and with contrast in 8-12 weeks to document expected evolution.  2.  Additional smaller foci of signal abnormality with similar characteristics favored to represent punctate subacute infarcts.       Medical Decision Making       MANAGEMENT DISCUSSED with the following over the past 24 hours: patient, ICU nurse, family   NOTE(S)/MEDICAL RECORDS REVIEWED over the past 24 hours: reviewed  Tests ORDERED & REVIEWED in the past 24 hours:  - See lab/imaging results included in the data section of the note  Medical complexity over the past 24 hours:  - Intensive monitoring for MEDICATION TOXICITY  - Decision regarding ESCALATION OF LEVEL OF CARE

## 2024-08-28 NOTE — PROGRESS NOTES
CT EXTUBATION FAST TRACK:    [facility] - ICU     FastTrack Candidate: No,  Extubation Goal Time ( 24 Hours ) 0800       Patient Status: Remains Intubated, Continue with 24 Goal time of 0800          Per Dr Renae it was decided to keep pt intubated overnight d/t length/difficulty of surgery and oozing after surgery; will wean right away in the AM in hopes to extubate.

## 2024-08-28 NOTE — PLAN OF CARE
[facility] - ICU    RN Progress Note:            Pertinent Assessments:      Please refer to flowsheet rows for full assessment     Epi and cardene titrated to kp MAP > 65 and SBP < 110 per Dr Renae's order. Pt becomes restless and agitated, bears down an drops BP into 60's systolic-pt bangs on side rails, tries to talk over ETT, air writes and was doing a weird R eye blinking looking like christopher code and then taping hand on bed rail same rhythm. Able to talk to pt and calm her down. Precedex titrated to RASS order see MAR. PRN dilaudid given for pain. Dr Renae as updated around 2300 with lab results/pt status-3L of LR given total throughout shift. UO is marginal. Titrated down precedex this AM to ready pt for weaning, she is very alert, calm and following commands.            Key Events - This Shift:       See above                Barriers to Discharge / Downgrade:     Pressors and intubated         Point of Contact Update: YES-OR-NO: Yes  If No, reason:   Name:  Phone Number:  Summary of Conversation:         Problem: Adult Inpatient Plan of Care  Goal: Absence of Hospital-Acquired Illness or Injury  Intervention: Prevent Infection  Recent Flowsheet Documentation  Taken 8/28/2024 0400 by Lissette Sky, RN  Infection Prevention:   environmental surveillance performed   equipment surfaces disinfected   hand hygiene promoted   rest/sleep promoted   single patient room provided  Taken 8/28/2024 0000 by Lissette Sky, RN  Infection Prevention:   environmental surveillance performed   equipment surfaces disinfected   hand hygiene promoted   rest/sleep promoted   single patient room provided  Taken 8/27/2024 2000 by Lissette Sky, RN  Infection Prevention:   environmental surveillance performed   equipment surfaces disinfected   hand hygiene promoted   rest/sleep promoted   single patient room provided     Problem: Sepsis/Septic Shock  Goal: Blood Glucose Level Within Targeted  Range  Intervention: Optimize Glycemic Control  Recent Flowsheet Documentation  Taken 8/28/2024 0400 by Lissette Sky RN  Glycemic Management: blood glucose monitored  Taken 8/28/2024 0000 by Lissette Sky RN  Glycemic Management: blood glucose monitored  Taken 8/27/2024 2000 by Lissette Sky RN  Glycemic Management: blood glucose monitored     Problem: Sepsis/Septic Shock  Goal: Absence of Infection Signs and Symptoms  Outcome: Progressing  Intervention: Initiate Sepsis Management  Recent Flowsheet Documentation  Taken 8/28/2024 0400 by Lissette Sky RN  Infection Prevention:   environmental surveillance performed   equipment surfaces disinfected   hand hygiene promoted   rest/sleep promoted   single patient room provided  Taken 8/28/2024 0000 by Lissette Sky RN  Infection Prevention:   environmental surveillance performed   equipment surfaces disinfected   hand hygiene promoted   rest/sleep promoted   single patient room provided  Taken 8/27/2024 2000 by Lissette Sky RN  Infection Prevention:   environmental surveillance performed   equipment surfaces disinfected   hand hygiene promoted   rest/sleep promoted   single patient room provided  Intervention: Promote Stabilization  Recent Flowsheet Documentation  Taken 8/28/2024 0400 by Lissette Sky RN  Lung Protection Measures:   optimal PEEP applied   ventilator synchrony promoted  Fluid/Electrolyte Management: electrolyte supplement adjusted  Taken 8/28/2024 0000 by Lissette Sky RN  Lung Protection Measures:   optimal PEEP applied   ventilator synchrony promoted  Fluid/Electrolyte Management: electrolyte supplement adjusted  Taken 8/27/2024 2000 by Lissette Sky RN  Lung Protection Measures:   optimal PEEP applied   ventilator synchrony promoted  Fluid/Electrolyte Management: electrolyte supplement adjusted  Intervention: Promote Recovery  Recent Flowsheet Documentation  Taken 8/28/2024 0400 by Lissette Sky RN  Airway/Ventilation Support: pulmonary  hygiene promoted  Sleep/Rest Enhancement:   awakenings minimized   comfort measures   medication   noise level reduced   regular sleep/rest pattern promoted   room darkened  Activity Management:   activity adjusted per tolerance   bedrest  Taken 8/28/2024 0000 by Lissette Sky RN  Airway/Ventilation Support: pulmonary hygiene promoted  Sleep/Rest Enhancement:   awakenings minimized   comfort measures   medication   noise level reduced   regular sleep/rest pattern promoted   room darkened  Activity Management:   activity adjusted per tolerance   bedrest  Taken 8/27/2024 2000 by Lissette Sky RN  Airway/Ventilation Support: pulmonary hygiene promoted  Sleep/Rest Enhancement:   awakenings minimized   comfort measures   medication   noise level reduced   regular sleep/rest pattern promoted   room darkened  Activity Management:   activity adjusted per tolerance   bedrest     Problem: Fluid Volume Excess  Goal: Fluid Balance  Outcome: Progressing  Intervention: Monitor and Manage Hypervolemia  Recent Flowsheet Documentation  Taken 8/28/2024 0400 by Lissette Sky RN  Fluid/Electrolyte Management: electrolyte supplement adjusted  Taken 8/28/2024 0000 by Lissette Sky RN  Fluid/Electrolyte Management: electrolyte supplement adjusted  Taken 8/27/2024 2000 by Lissette Sky RN  Fluid/Electrolyte Management: electrolyte supplement adjusted     Problem: Cardiovascular Surgery  Goal: Fluid and Electrolyte Balance  Intervention: Monitor and Manage Fluid and Electrolyte Balance  Recent Flowsheet Documentation  Taken 8/28/2024 0400 by Lissette Sky RN  Fluid/Electrolyte Management: electrolyte supplement adjusted  Taken 8/28/2024 0000 by Lissette Sky RN  Fluid/Electrolyte Management: electrolyte supplement adjusted  Taken 8/27/2024 2000 by Lissette Sky RN  Fluid/Electrolyte Management: electrolyte supplement adjusted   Goal Outcome Evaluation:      Plan of Care Reviewed With: patient    Overall Patient Progress: no  changeOverall Patient Progress: no change    Outcome Evaluation: Pt will be kept intubated through night and weaned in AM

## 2024-08-28 NOTE — CONSULTS
NUTRITION EDUCATION    REASON FOR ASSESSMENT:  Provider Order- Nutrition Education Post CV Surgery     Received standing order to educate patient.     Will follow and complete diet education after patient is extubated and/or is transferred to medical unit.

## 2024-08-28 NOTE — TREATMENT PLAN
RCAT Treatment Plan    Patient Score: 12  Patient Acuity: 3    Clinical Indication for Therapy: atelectasis    Therapy Ordered: Continue current plan of care.    Assessment Summary: Supplemental oxygen has been titrated from 4L down to 3LNC, pt still able to maintain adequate saturations. Volume expansion therapy initiated, education provided and pt demonstrated some competency with devices. Will re-evaluate RCAT lackey and as needed per respiratory therapist protocol.     Efrem Garcia, RT  8/28/2024

## 2024-08-28 NOTE — PROGRESS NOTES
CVTS Daily Progress Note   POD#1 s/p Aortic root abscess debridement and aortic annular reconstruction, aortic root replacement (Konect composite 25 mm Silva Inspiris Resilia bioprosthetic valve within 30 mm Gelweave Valsalva graft with reimplantation of the coronary arteries), Mitral valve replacement (31 mm St. Tim Silva bioprosthetic valve) and CAB x 2.  Attending: Dr Renae  LOS: 12    SUBJECTIVE/INTERVAL EVENTS:    Patient arrived to ICU from OR yesterday afternoon. She was extubated this am and weaning from pressors. No acute events overnight. Patient progressing well. Maintaining oxygen saturations on 4L NC. Normotensive on Epi 0.04. Seen in in bed. Pain well controlled. -BM / flatus. NPO. UOP adequate. Chest tube output appropriate. Hgb 9.2. Cr 1.37. Patient denies new chest pain, shortness of breath, abdominal pain, calf pain, nausea. Patient has no questions today.     OBJECTIVE:  Temp:  [96.8  F (36  C)-100.8  F (38.2  C)] 99.7  F (37.6  C)  Pulse:  [80] 80  Resp:  [18-24] 22  BP: ()/(51-69) 109/56  MAP:  [63 mmHg-86 mmHg] 74 mmHg  Arterial Line BP: ()/(51-70) 107/58  FiO2 (%):  [30 %-80 %] 35 %  SpO2:  [93 %-100 %] 97 %  Vitals:    08/24/24 0406 08/25/24 0458 08/26/24 0330 08/27/24 0500   Weight: 85.4 kg (188 lb 4.8 oz) 86.3 kg (190 lb 3.2 oz) 86.2 kg (190 lb 1.6 oz) 86.4 kg (190 lb 8 oz)    08/28/24 0600   Weight: 95.3 kg (210 lb 1.6 oz)       Clinically Significant Risk Factors        # Hypokalemia: Lowest K = 3.2 mmol/L in last 2 days, will replace as needed  # Hypernatremia: Highest Na = 146 mmol/L in last 2 days, will monitor as appropriate  # Hypocalcemia: Lowest Ca = 8.4 mg/dL in last 2 days, will monitor and replace as appropriate     # Hypoalbuminemia: Lowest albumin = 3.3 g/dL at 8/28/2024 12:12 AM, will monitor as appropriate    # Coagulation Defect: INR = 1.43 (Ref range: 0.85 - 1.15) and/or PTT = 96 Seconds (Ref range: 22 - 38 Seconds), will monitor for bleeding        "       # Obesity: Estimated body mass index is 32.91 kg/m  as calculated from the following:    Height as of this encounter: 1.702 m (5' 7\").    Weight as of this encounter: 95.3 kg (210 lb 1.6 oz).        # Financial/Environmental Concerns:            Current Medications:    Scheduled Meds:  Current Facility-Administered Medications   Medication Dose Route Frequency Provider Last Rate Last Admin    acetaminophen (TYLENOL) Suppository 650 mg  650 mg Rectal Q8H Maryam Anthony PA-C   650 mg at 08/28/24 0407    acetaminophen (TYLENOL) tablet 975 mg  975 mg Oral Q8H Maryam Anthony PA-C        ceFAZolin (ANCEF) 2 g in 100 mL D5W intermittent infusion  2 g Intravenous Q8H Maryam Anthony PA-C 200 mL/hr at 08/28/24 0619 2 g at 08/28/24 0619    clopidogrel (PLAVIX) tablet 75 mg  75 mg Oral Daily Maryam Anthony PA-C        heparin ANTICOAGULANT injection 5,000 Units  5,000 Units Subcutaneous Q8H Maryam Anthony PA-C        lactobacillus rhamnosus (GG) (CULTURELL) capsule 1 capsule  1 capsule Oral BID Zach Foreman DO   1 capsule at 08/26/24 2127    Lidocaine (LIDOCARE) 4 % Patch 1-2 patch  1-2 patch Transdermal Q24H Maryam Anthony PA-C   1 patch at 08/27/24 1827    nafcillin 12 g in sodium chloride 0.9 % 550 mL continuous infusion  12 g Intravenous Q24H [address] MBBS   12 g at 08/27/24 1201    pantoprazole (PROTONIX) 2 mg/mL suspension 40 mg  40 mg Oral or NG Tube Daily Maryam Anthony PA-C        Or    pantoprazole (PROTONIX) EC tablet 40 mg  40 mg Oral Daily Maryam Anthony PA-C        polyethylene glycol (MIRALAX) Packet 17 g  17 g Oral Daily Maryam Anthony PA-C        potassium phosphate 15 mmol in D5W 250 mL intermittent infusion  15 mmol Intravenous Once Dylan Renae MD   15 mmol at 08/28/24 0820    senna-docusate (SENOKOT-S/PERICOLACE) 8.6-50 MG per tablet 1 tablet  1 tablet Oral BID Maryam Anthony PA-C "        sodium chloride (PF) 0.9% PF flush 3 mL  3 mL Intracatheter Q8H Gondal, Saad J, MD   3 mL at 08/28/24 0820     Continuous Infusions:  Current Facility-Administered Medications   Medication Dose Route Frequency Provider Last Rate Last Admin    dexmedeTOMIDine (PRECEDEX) 4 mcg/mL in sodium chloride 0.9 % 100 mL infusion  0.1-1.2 mcg/kg/hr Intravenous Continuous aMryam Anthony PA-C   Stopped at 08/28/24 0810    EPINEPHrine (ADRENALIN) 5 mg in sodium chloride 0.9 % 250 mL infusion CENTRAL  0.01-0.1 mcg/kg/min Intravenous Continuous Dylan Renae MD 10.4 mL/hr at 08/28/24 0600 0.04 mcg/kg/min at 08/28/24 0600    insulin regular (MYXREDLIN) 1 unit/mL infusion  0-24 Units/hr Intravenous Continuous Maryam Anthony PA-C 1 mL/hr at 08/28/24 0817 1 Units/hr at 08/28/24 0817    niCARdipine 40 mg in 200 mL NS (CARDENE) infusion  0.5-15 mg/hr Intravenous Continuous Dylan Renae MD   Stopped at 08/28/24 0725    phenylephrine (ZEYAD-SYNEPHRINE) 50 mg in NaCl 0.9 % 250 mL infusion  0.1-4 mcg/kg/min Intravenous Continuous PRN Maryam Anthony PA-C         PRN Meds:.  Current Facility-Administered Medications   Medication Dose Route Frequency Provider Last Rate Last Admin    acetaminophen (TYLENOL) tablet 650 mg  650 mg Oral Q4H PRN Gondal, Saad J, MD   650 mg at 08/26/24 2127    Or    acetaminophen (TYLENOL) Suppository 650 mg  650 mg Rectal Q4H PRN Gondal, Saad J, MD        [START ON 8/30/2024] acetaminophen (TYLENOL) tablet 650 mg  650 mg Oral Q4H PRN Maryam Anthony PA-C        bisacodyl (DULCOLAX) suppository 10 mg  10 mg Rectal Daily PRN Maryam Anthony PA-C        calcium carbonate (TUMS) chewable tablet 1,000 mg  1,000 mg Oral 4x Daily PRN Gondal, Saad J, MD        calcium gluconate 1 g in 50 mL in sodium chloride intermittent infusion  1 g Intravenous Once PRN Maryam Anthony PA-C        calcium gluconate 2 g in  mL intermittent infusion  2  g Intravenous Once PRN Maryam Anthony PA-C        calcium gluconate 3 g in sodium chloride 0.9 % 100 mL intermittent infusion  3 g Intravenous Once PRN Maryam Anthony PA-C        carboxymethylcellulose PF (REFRESH PLUS) 0.5 % ophthalmic solution 1 drop  1 drop Both Eyes Q1H PRN Gondal, Saad J, MD        glucose gel 15-30 g  15-30 g Oral Q15 Min PRN Maryam Anthony PA-C        Or    dextrose 50 % injection 25-50 mL  25-50 mL Intravenous Q15 Min PRN Maryam Anthony PA-C        Or    glucagon injection 1 mg  1 mg Subcutaneous Q15 Min PRN Maryam Anthony PA-C        hydrALAZINE (APRESOLINE) injection 10 mg  10 mg Intravenous Q30 Min PRN Maryam Anthony PA-C        HYDROmorphone (DILAUDID) injection 0.1 mg  0.1 mg Intravenous Q2H PRN Maryam Anthony PA-C        Or    HYDROmorphone (DILAUDID) injection 0.2 mg  0.2 mg Intravenous Q2H PRN Maryam Anthony PA-C   0.2 mg at 08/28/24 0618    lactated ringers BOLUS 250 mL  250 mL Intravenous Q15 Min PRN Maryam Anthony PA-C   250 mL at 08/27/24 2151    lidocaine (LMX4) cream   Topical Q1H PRN Gondal, Saad J, MD        lidocaine 1 % 0.1-1 mL  0.1-1 mL Other Q1H PRN Gondal, Saad J, MD        magnesium hydroxide (MILK OF MAGNESIA) suspension 30 mL  30 mL Oral Daily PRN Maryam Anthony PA-C        miconazole (MICATIN) 2 % powder   Topical BID PRN Erick Patino DO        naloxone (NARCAN) injection 0.2 mg  0.2 mg Intravenous Q2 Min PRN Shawn Patinoinic A, DO        Or    naloxone (NARCAN) injection 0.4 mg  0.4 mg Intravenous Q2 Min PRN Carey Erick A, DO        Or    naloxone (NARCAN) injection 0.2 mg  0.2 mg Intramuscular Q2 Min PRN Shawn Patinoinic A, DO        Or    naloxone (NARCAN) injection 0.4 mg  0.4 mg Intramuscular Q2 Min PRN Erick Patino DO        nicotine (NICORETTE) gum 2 mg  2 mg Buccal Q1H PRN Gondal, Saad J, MD        ondansetron (ZOFRAN ODT) ODT tab 4 mg  4 mg Oral Q6H  PRN Maryam Anthony PA-C        Or    ondansetron (ZOFRAN) injection 4 mg  4 mg Intravenous Q6H PRN Maryam Anthony PA-C        oxyCODONE IR (ROXICODONE) half-tab 2.5 mg  2.5 mg Oral Q4H PRN Maryam Anthony PA-C        Or    oxyCODONE (ROXICODONE) tablet 5 mg  5 mg Oral Q4H PRN Maryam Anthony PA-C        phenylephrine (ZEYAD-SYNEPHRINE) 50 mg in NaCl 0.9 % 250 mL infusion  0.1-4 mcg/kg/min Intravenous Continuous PRN Maryam Anthony PA-C        prochlorperazine (COMPAZINE) injection 5 mg  5 mg Intravenous Q6H PRN Maryam Anthony PA-C        Or    prochlorperazine (COMPAZINE) tablet 5 mg  5 mg Oral Q6H PRN Maryam Anthony PA-C        senna-docusate (SENOKOT-S/PERICOLACE) 8.6-50 MG per tablet 1 tablet  1 tablet Oral BID PRN Gondal, Saad J, MD        Or    senna-docusate (SENOKOT-S/PERICOLACE) 8.6-50 MG per tablet 2 tablet  2 tablet Oral BID PRN Gondal, Saad J, MD        sodium chloride (PF) 0.9% PF flush 10-40 mL  10-40 mL Intracatheter Once PRN [address] MBBS        sodium chloride (PF) 0.9% PF flush 3 mL  3 mL Intracatheter q1 min prn Gondal, Saad J, MD        traMADol (ULTRAM) tablet 50 mg  50 mg Oral Q6H PRN Erick Patino, DO   50 mg at 08/24/24 9237       Cardiographics:    Telemetry monitoring demonstrates NSR with paced rate at 80 bmp per my personal review.    Imaging:  Results for orders placed or performed during the hospital encounter of 08/16/24   MR Brain w/o & w Contrast    Impression    IMPRESSION: Assessment degraded due to motion.  1.  13 mm focus of cortical signal abnormality within the left superior parietal lobule which is favored to represent a subacute infarct; low-grade glial neoplasm could conceivably have a similar appearance. Hyperacute intraparenchymal hematoma could   also have a similar appearance but is considered less likely. Recommend noncontrast head CT for further characterization. Also recommend follow-up MRI brain  without and with contrast in 8-12 weeks to document expected evolution.  2.  Additional smaller foci of signal abnormality with similar characteristics favored to represent punctate subacute infarcts.   CTA Head Neck with Contrast    Impression    IMPRESSION:   HEAD CT:  1.  Small focus of juxtacortical low attenuation within the left parietal lobe corresponds to signal abnormality seen on the prior MRI. As suggested previously this is favored to reflect evolving small vessel ischemic change; however, MRI follow-up to   exclude a low-grade neoplastic process is advised.  2.  No evidence for intracranial hemorrhage or significant mass effect.  3.  Generalized brain atrophy and presumed chronic microvascular ischemic change.    HEAD CTA:   1.  Intracranial atherosclerosis including moderate to marked stenosis of the right P2 posterior cerebral artery segment.  2.  No definite proximal branch occlusion, aneurysm, or high flow vascular malformation identified.    NECK CTA:  1.  Atherosclerotic plaque at the carotid bifurcations bilaterally without hemodynamically significant stenosis in the neck vessels.   2.  No evidence for dissection.   Radiologist Consult For Cardiology    Impression    IMPRESSION:    1.  Partially visualized left adrenal nodule, indeterminate, could represent adrenal adenoma.  2.  Please refer to cardiologist's dictation for the cardiac CT report.   US Carotid Bilateral    Impression    IMPRESSION:  1.  Mild plaque formation, velocities consistent with less than 50% stenosis in the right internal carotid artery.  2.  Mild plaque formation, velocities consistent with less than 50% stenosis in the left internal carotid artery.  3.  Flow within the vertebral arteries is antegrade.   XR Chest Port 1 View    Impression    IMPRESSION: Endotracheal tube tip 4.8 cm proximal to the anuj. Meigs-Dana catheter with tip in the pulmonary outflow tract. Mediastinal drain with aortic valve prosthesis. Sternotomy.  Small left pleural effusion with left basilar infiltrate.   XR Chest Port 1 View    Impression    IMPRESSION: Endotracheal tube in the distal aspect of the trachea. Sternotomy. AVR. Mediastinal drain. Right IJ [place]-Dana catheter with tip in the right ventricle/MPA. Left-sided chest tube. No pneumothorax. Mild cardiac enlargement with normal pulmonary   vascularity. Minimal left basilar atelectasis and pleural fluid. Degenerative changes in the spine.   Echocardiogram Limited   Result Value Ref Range    LVEF  60-65%    CTA  ANGIOGRAM CORONARY ARTERY   Result Value Ref Range    BSA 0.00 m2       Labs, personally reviewed.  Hemoglobin   Date Value Ref Range Status   08/28/2024 9.2 (L) 11.7 - 15.7 g/dL Final   08/27/2024 9.1 (L) 11.7 - 15.7 g/dL Final   08/27/2024 7.4 (L) 11.7 - 15.7 g/dL Final     Hemoglobin POCT   Date Value Ref Range Status   08/27/2024 9.2 (L) 11.7 - 15.7 g/dL Final   08/27/2024 9.0 (L) 11.7 - 15.7 g/dL Final   08/27/2024 7.4 (L) 11.7 - 15.7 g/dL Final     WBC Count   Date Value Ref Range Status   08/28/2024 10.8 4.0 - 11.0 10e3/uL Final   08/27/2024 15.3 (H) 4.0 - 11.0 10e3/uL Final   08/26/2024 10.4 4.0 - 11.0 10e3/uL Final     Platelet Count   Date Value Ref Range Status   08/28/2024 200 150 - 450 10e3/uL Final   08/27/2024 190 150 - 450 10e3/uL Final   08/27/2024 166 150 - 450 10e3/uL Final     Creatinine   Date Value Ref Range Status   08/28/2024 1.37 (H) 0.51 - 0.95 mg/dL Final   08/27/2024 1.24 (H) 0.51 - 0.95 mg/dL Final   08/26/2024 1.60 (H) 0.51 - 0.95 mg/dL Final     Potassium   Date Value Ref Range Status   08/28/2024 3.9 3.4 - 5.3 mmol/L Final   08/28/2024 4.0 3.4 - 5.3 mmol/L Final   08/27/2024 4.3 3.4 - 5.3 mmol/L Final     Potassium POCT   Date Value Ref Range Status   08/27/2024 3.4 3.4 - 5.3 mmol/L Final   08/27/2024 3.9 3.4 - 5.3 mmol/L Final   08/27/2024 3.4 3.4 - 5.3 mmol/L Final     Magnesium   Date Value Ref Range Status   08/28/2024 2.1 1.7 - 2.3 mg/dL Final   08/27/2024 2.5  (H) 1.7 - 2.3 mg/dL Final   08/27/2024 2.1 1.7 - 2.3 mg/dL Final          I/O:  I/O last 3 completed shifts:  In: 78450.49 [I.V.:4360.49; Other:760; IV Piggyback:2000]  Out: 3934 [Urine:1872; Blood:1500; Chest Tube:562]       Physical Exam:    General: Patient seen in bed. Awake and alert. Intubated but recently extubated. NAD. Conversant. Pleasant.   HEENT: JULIO, no sclera icterus, moist mucosa, ET tube secure, no tracheal deviation  CV: RRR on monitor. 2+ peripheral pulses in all extremities. Mild edema.   Pulm: Non-labored effort on 4L NC. Chest tubes in place, serosanguinous output, no air leak. Incision C/D/I.  Abd: Soft, NT, ND  : Le with pita urine  Ext: Mild pedal edema, SCDs in place, warm, distal pulses intact  Neuro:unable to fully eval-intubated      ASSESSMENT/PLAN:    Felicitas Elliott is a 84 year old female with a history of endocarditis, CAD, aortic stenosis and mitral valve disorder who is s/p Aortic root abscess debridement and aortic annular reconstruction, aortic root replacement (Konect composite 25 mm Silva Inspiris Resilia bioprosthetic valve within 30 mm Gelweave Valsalva graft with reimplantation of the coronary arteries), Mitral valve replacement (31 mm St. Tim Silva bioprosthetic valve) and CAB x 2.    Active Problems:    Bacteremia    Endocarditis    Sepsis (H)    Nonrheumatic aortic valve stenosis    Postsurgical percutaneous transluminal coronary angioplasty status    Mitral valve disorder    Coronary artery disease involving native coronary artery of native heart, unspecified whether angina present        NEURO:   - Scheduled Tylenol/lidocaine patches and PRN Tylenol/oxycodone/dilaudid for pain    CV:   - Pre-op EF 60-65%  - Normotensive  - Weaning pressors as hemodynamics allow, currently on Epi 0.04  - Beta blocker pending weaning from pressors  - ASA Allergy- Plavix 75 mg PO daily-held until PPM determined   - Atorvastatin 80 mg daily  - Chest tubes to remain today.  -  Cards following-apprec reqs  - New baseline echo before discharge and after CT removed  - Coumadin x 3 months per surgeon preference. INR goal 2.5-3.5. Rx to dose    PULM:   - Extubated POD#1  - Maintaining oxygen saturations on 4L NC  - Encourage pulmonary toilet    FEN/GI:  - Continue electrolyte replacement protocol  - Diet: Cardiac, ADAT   - Bowel regimen    RENAL:  - Adequate UOP/hr. Continue to monitor closely.  - Cr 1.37  - Le to remain in for close monitoring of I/O and during period of diuresis/relative immobility.   - Diuresis PRN and pending weaning from pressors. Lasix 80 mg IV q 8hrs per cards    HEME:  - Acute blood loss anemia post-op.   - Hgb 9.2, no bleeding concerns. Hep subcutaneous-stopped,   - Plavix 75 mg PO daily(held)- ASA allergy  - Coumadin pharmacy to dose. Goal INR 2.5-3.5 x 3 mos.    ID:  - Lennie op ppx complete, afebrile.  -ID following-apprec reqs- MSSA bacteremia. Cultures NGTD from 8/17          -Nafcillin for 6 wks          - new surgical cultures sent-pending  ENDO:   - Continue insulin gtt.     PPx:   - DVT: SCDs, SQ heparin TID, ambulation   - GI: Protonix 40mg PO daily    DISPO:   - Continue critical care in ICU  - Medically Ready for Discharge: Anticipated in 5+ Days       Patient discussed with Dr. Renae.      Jessica Tolbert PA-C  [facility] Cardiothoracic Surgery  Vocera or pager [phone]

## 2024-08-29 ENCOUNTER — APPOINTMENT (OUTPATIENT)
Dept: RADIOLOGY | Facility: HOSPITAL | Age: 84
DRG: 853 | End: 2024-08-29
Attending: INTERNAL MEDICINE
Payer: COMMERCIAL

## 2024-08-29 ENCOUNTER — APPOINTMENT (OUTPATIENT)
Dept: RADIOLOGY | Facility: HOSPITAL | Age: 84
DRG: 853 | End: 2024-08-29
Attending: STUDENT IN AN ORGANIZED HEALTH CARE EDUCATION/TRAINING PROGRAM
Payer: COMMERCIAL

## 2024-08-29 LAB
ALBUMIN SERPL BCG-MCNC: 3.7 G/DL (ref 3.5–5.2)
ALBUMIN SERPL BCG-MCNC: 3.8 G/DL (ref 3.5–5.2)
ALP SERPL-CCNC: 41 U/L (ref 40–150)
ALT SERPL W P-5'-P-CCNC: 19 U/L (ref 0–50)
ANION GAP SERPL CALCULATED.3IONS-SCNC: 19 MMOL/L (ref 7–15)
ANION GAP SERPL CALCULATED.3IONS-SCNC: 21 MMOL/L (ref 7–15)
AST SERPL W P-5'-P-CCNC: 137 U/L (ref 0–45)
ATRIAL RATE - MUSE: 53 BPM
BASE EXCESS BLDA CALC-SCNC: -3.9 MMOL/L (ref -3–3)
BASE EXCESS BLDA CALC-SCNC: -6.9 MMOL/L (ref -3–3)
BASE EXCESS BLDA CALC-SCNC: -9.9 MMOL/L (ref -3–3)
BASE EXCESS BLDV CALC-SCNC: -2.9 MMOL/L (ref -3–3)
BASE EXCESS BLDV CALC-SCNC: -6 MMOL/L (ref -3–3)
BASE EXCESS BLDV CALC-SCNC: -7.2 MMOL/L (ref -3–3)
BASOPHILS # BLD AUTO: 0 10E3/UL (ref 0–0.2)
BASOPHILS NFR BLD AUTO: 0 %
BILIRUB SERPL-MCNC: 1.2 MG/DL
BUN SERPL-MCNC: 19 MG/DL (ref 8–23)
BUN SERPL-MCNC: 21.3 MG/DL (ref 8–23)
C PNEUM DNA SPEC QL NAA+PROBE: NOT DETECTED
CA-I BLD-MCNC: 4.3 MG/DL (ref 4.4–5.2)
CA-I BLD-MCNC: 4.4 MG/DL (ref 4.4–5.2)
CA-I BLD-MCNC: 4.6 MG/DL (ref 4.4–5.2)
CALCIUM SERPL-MCNC: 8.3 MG/DL (ref 8.8–10.4)
CALCIUM SERPL-MCNC: 8.5 MG/DL (ref 8.8–10.4)
CHLORIDE SERPL-SCNC: 100 MMOL/L (ref 98–107)
CHLORIDE SERPL-SCNC: 102 MMOL/L (ref 98–107)
COHGB MFR BLD: 93.9 % (ref 95–96)
COHGB MFR BLD: 94 % (ref 95–96)
COHGB MFR BLD: 95.1 % (ref 95–96)
CREAT SERPL-MCNC: 2.14 MG/DL (ref 0.51–0.95)
CREAT SERPL-MCNC: 2.24 MG/DL (ref 0.51–0.95)
DIASTOLIC BLOOD PRESSURE - MUSE: NORMAL MMHG
EGFRCR SERPLBLD CKD-EPI 2021: 21 ML/MIN/1.73M2
EGFRCR SERPLBLD CKD-EPI 2021: 22 ML/MIN/1.73M2
EOSINOPHIL # BLD AUTO: 0 10E3/UL (ref 0–0.7)
EOSINOPHIL NFR BLD AUTO: 0 %
ERYTHROCYTE [DISTWIDTH] IN BLOOD BY AUTOMATED COUNT: 17.9 % (ref 10–15)
FLUAV H1 2009 PAND RNA SPEC QL NAA+PROBE: NOT DETECTED
FLUAV H1 RNA SPEC QL NAA+PROBE: NOT DETECTED
FLUAV H3 RNA SPEC QL NAA+PROBE: NOT DETECTED
FLUAV RNA SPEC QL NAA+PROBE: NOT DETECTED
FLUBV RNA SPEC QL NAA+PROBE: NOT DETECTED
GLUCOSE BLDC GLUCOMTR-MCNC: 100 MG/DL (ref 70–99)
GLUCOSE BLDC GLUCOMTR-MCNC: 101 MG/DL (ref 70–99)
GLUCOSE BLDC GLUCOMTR-MCNC: 108 MG/DL (ref 70–99)
GLUCOSE BLDC GLUCOMTR-MCNC: 117 MG/DL (ref 70–99)
GLUCOSE BLDC GLUCOMTR-MCNC: 119 MG/DL (ref 70–99)
GLUCOSE BLDC GLUCOMTR-MCNC: 94 MG/DL (ref 70–99)
GLUCOSE BLDC GLUCOMTR-MCNC: 97 MG/DL (ref 70–99)
GLUCOSE BLDC GLUCOMTR-MCNC: 98 MG/DL (ref 70–99)
GLUCOSE SERPL-MCNC: 94 MG/DL (ref 70–99)
GLUCOSE SERPL-MCNC: 95 MG/DL (ref 70–99)
HADV DNA SPEC QL NAA+PROBE: NOT DETECTED
HCO3 BLD-SCNC: 17 MMOL/L (ref 21–28)
HCO3 BLD-SCNC: 19 MMOL/L (ref 21–28)
HCO3 BLD-SCNC: 22 MMOL/L (ref 21–28)
HCO3 BLDV-SCNC: 19 MMOL/L (ref 21–28)
HCO3 BLDV-SCNC: 21 MMOL/L (ref 21–28)
HCO3 BLDV-SCNC: 24 MMOL/L (ref 21–28)
HCO3 SERPL-SCNC: 17 MMOL/L (ref 22–29)
HCO3 SERPL-SCNC: 21 MMOL/L (ref 22–29)
HCOV PNL SPEC NAA+PROBE: NOT DETECTED
HCT VFR BLD AUTO: 26.7 % (ref 35–47)
HGB BLD-MCNC: 8.7 G/DL (ref 11.7–15.7)
HMPV RNA SPEC QL NAA+PROBE: NOT DETECTED
HPIV1 RNA SPEC QL NAA+PROBE: NOT DETECTED
HPIV2 RNA SPEC QL NAA+PROBE: NOT DETECTED
HPIV3 RNA SPEC QL NAA+PROBE: NOT DETECTED
HPIV4 RNA SPEC QL NAA+PROBE: NOT DETECTED
IMM GRANULOCYTES # BLD: 0.2 10E3/UL
IMM GRANULOCYTES NFR BLD: 1 %
INR PPP: 2.28 (ref 0.85–1.15)
INTERPRETATION ECG - MUSE: NORMAL
LACTATE SERPL-SCNC: 1.3 MMOL/L (ref 0.7–2)
LYMPHOCYTES # BLD AUTO: 0.8 10E3/UL (ref 0.8–5.3)
LYMPHOCYTES NFR BLD AUTO: 5 %
M PNEUMO DNA SPEC QL NAA+PROBE: NOT DETECTED
MAGNESIUM SERPL-MCNC: 2.1 MG/DL (ref 1.7–2.3)
MAGNESIUM SERPL-MCNC: 2.2 MG/DL (ref 1.7–2.3)
MCH RBC QN AUTO: 30.4 PG (ref 26.5–33)
MCHC RBC AUTO-ENTMCNC: 32.6 G/DL (ref 31.5–36.5)
MCV RBC AUTO: 93 FL (ref 78–100)
MONOCYTES # BLD AUTO: 1.2 10E3/UL (ref 0–1.3)
MONOCYTES NFR BLD AUTO: 8 %
MRSA DNA SPEC QL NAA+PROBE: NEGATIVE
NEUTROPHILS # BLD AUTO: 13.1 10E3/UL (ref 1.6–8.3)
NEUTROPHILS NFR BLD AUTO: 86 %
NRBC # BLD AUTO: 0 10E3/UL
NRBC BLD AUTO-RTO: 0 /100
O2/TOTAL GAS SETTING VFR VENT: 100 %
O2/TOTAL GAS SETTING VFR VENT: 100 %
O2/TOTAL GAS SETTING VFR VENT: 60 %
O2/TOTAL GAS SETTING VFR VENT: 70 %
O2/TOTAL GAS SETTING VFR VENT: 90 %
O2/TOTAL GAS SETTING VFR VENT: 90 %
OXYHGB MFR BLDV: 57 % (ref 70–75)
OXYHGB MFR BLDV: 59 % (ref 70–75)
OXYHGB MFR BLDV: 60 % (ref 70–75)
P AXIS - MUSE: NORMAL DEGREES
PCO2 BLD: 35 MM HG (ref 35–45)
PCO2 BLD: 37 MM HG (ref 35–45)
PCO2 BLD: 42 MM HG (ref 35–45)
PCO2 BLDV: 38 MM HG (ref 40–50)
PCO2 BLDV: 43 MM HG (ref 40–50)
PCO2 BLDV: 51 MM HG (ref 40–50)
PEEP: 10 CM H2O
PH BLD: 7.29 [PH] (ref 7.35–7.45)
PH BLD: 7.32 [PH] (ref 7.35–7.45)
PH BLD: 7.33 [PH] (ref 7.35–7.45)
PH BLDV: 7.28 [PH] (ref 7.32–7.43)
PH BLDV: 7.29 [PH] (ref 7.32–7.43)
PH BLDV: 7.31 [PH] (ref 7.32–7.43)
PHOSPHATE SERPL-MCNC: 5.8 MG/DL (ref 2.5–4.5)
PHOSPHATE SERPL-MCNC: 6.2 MG/DL (ref 2.5–4.5)
PLATELET # BLD AUTO: 151 10E3/UL (ref 150–450)
PO2 BLD: 71 MM HG (ref 80–105)
PO2 BLD: 72 MM HG (ref 80–105)
PO2 BLD: 75 MM HG (ref 80–105)
PO2 BLDV: 34 MM HG (ref 25–47)
PO2 BLDV: 34 MM HG (ref 25–47)
PO2 BLDV: 37 MM HG (ref 25–47)
POTASSIUM SERPL-SCNC: 3.5 MMOL/L (ref 3.4–5.3)
POTASSIUM SERPL-SCNC: 4 MMOL/L (ref 3.4–5.3)
PR INTERVAL - MUSE: NORMAL MS
PROCALCITONIN SERPL IA-MCNC: 1.24 NG/ML
PROT SERPL-MCNC: 5.9 G/DL (ref 6.4–8.3)
QRS DURATION - MUSE: 122 MS
QT - MUSE: 504 MS
QTC - MUSE: 482 MS
R AXIS - MUSE: -67 DEGREES
RBC # BLD AUTO: 2.86 10E6/UL (ref 3.8–5.2)
RSV RNA SPEC QL NAA+PROBE: NOT DETECTED
RSV RNA SPEC QL NAA+PROBE: NOT DETECTED
RV+EV RNA SPEC QL NAA+PROBE: NOT DETECTED
SA TARGET DNA: NEGATIVE
SAO2 % BLDA: 92 % (ref 92–100)
SAO2 % BLDA: 93 % (ref 92–100)
SAO2 % BLDA: 93 % (ref 92–100)
SAO2 % BLDV: 57.7 % (ref 70–75)
SAO2 % BLDV: 60.8 % (ref 70–75)
SAO2 % BLDV: 61 % (ref 70–75)
SODIUM SERPL-SCNC: 140 MMOL/L (ref 135–145)
SODIUM SERPL-SCNC: 140 MMOL/L (ref 135–145)
SYSTOLIC BLOOD PRESSURE - MUSE: NORMAL MMHG
T AXIS - MUSE: 73 DEGREES
VENTRICULAR RATE- MUSE: 55 BPM
WBC # BLD AUTO: 15.3 10E3/UL (ref 4–11)

## 2024-08-29 PROCEDURE — 99207 PR NO BILLABLE SERVICE THIS VISIT: CPT | Performed by: INTERNAL MEDICINE

## 2024-08-29 PROCEDURE — 258N000003 HC RX IP 258 OP 636: Performed by: INTERNAL MEDICINE

## 2024-08-29 PROCEDURE — 250N000009 HC RX 250: Performed by: INTERNAL MEDICINE

## 2024-08-29 PROCEDURE — 80053 COMPREHEN METABOLIC PANEL: CPT | Performed by: INTERNAL MEDICINE

## 2024-08-29 PROCEDURE — 250N000013 HC RX MED GY IP 250 OP 250 PS 637: Performed by: PHYSICIAN ASSISTANT

## 2024-08-29 PROCEDURE — 94640 AIRWAY INHALATION TREATMENT: CPT | Mod: 76

## 2024-08-29 PROCEDURE — 90947 DIALYSIS REPEATED EVAL: CPT

## 2024-08-29 PROCEDURE — 84145 PROCALCITONIN (PCT): CPT | Performed by: STUDENT IN AN ORGANIZED HEALTH CARE EDUCATION/TRAINING PROGRAM

## 2024-08-29 PROCEDURE — 82805 BLOOD GASES W/O2 SATURATION: CPT | Performed by: INTERNAL MEDICINE

## 2024-08-29 PROCEDURE — 82330 ASSAY OF CALCIUM: CPT | Performed by: PHYSICIAN ASSISTANT

## 2024-08-29 PROCEDURE — 999N000065 XR CHEST PORT 1 VIEW

## 2024-08-29 PROCEDURE — 272N000054 HC CANNULA HIGH FLOW, ADULT

## 2024-08-29 PROCEDURE — 999N000157 HC STATISTIC RCP TIME EA 10 MIN

## 2024-08-29 PROCEDURE — 200N000001 HC R&B ICU

## 2024-08-29 PROCEDURE — 31500 INSERT EMERGENCY AIRWAY: CPT | Performed by: STUDENT IN AN ORGANIZED HEALTH CARE EDUCATION/TRAINING PROGRAM

## 2024-08-29 PROCEDURE — 99291 CRITICAL CARE FIRST HOUR: CPT | Mod: 24 | Performed by: STUDENT IN AN ORGANIZED HEALTH CARE EDUCATION/TRAINING PROGRAM

## 2024-08-29 PROCEDURE — 250N000011 HC RX IP 250 OP 636: Performed by: INTERNAL MEDICINE

## 2024-08-29 PROCEDURE — 87640 STAPH A DNA AMP PROBE: CPT | Performed by: STUDENT IN AN ORGANIZED HEALTH CARE EDUCATION/TRAINING PROGRAM

## 2024-08-29 PROCEDURE — 250N000013 HC RX MED GY IP 250 OP 250 PS 637: Performed by: INTERNAL MEDICINE

## 2024-08-29 PROCEDURE — 250N000009 HC RX 250: Performed by: STUDENT IN AN ORGANIZED HEALTH CARE EDUCATION/TRAINING PROGRAM

## 2024-08-29 PROCEDURE — 250N000013 HC RX MED GY IP 250 OP 250 PS 637: Performed by: STUDENT IN AN ORGANIZED HEALTH CARE EDUCATION/TRAINING PROGRAM

## 2024-08-29 PROCEDURE — 83735 ASSAY OF MAGNESIUM: CPT | Performed by: INTERNAL MEDICINE

## 2024-08-29 PROCEDURE — 84100 ASSAY OF PHOSPHORUS: CPT | Performed by: INTERNAL MEDICINE

## 2024-08-29 PROCEDURE — 82330 ASSAY OF CALCIUM: CPT | Performed by: INTERNAL MEDICINE

## 2024-08-29 PROCEDURE — 999N000215 HC STATISTIC HFNC ADULT NON-CPAP

## 2024-08-29 PROCEDURE — 85610 PROTHROMBIN TIME: CPT | Performed by: PHYSICIAN ASSISTANT

## 2024-08-29 PROCEDURE — 82805 BLOOD GASES W/O2 SATURATION: CPT | Performed by: SURGERY

## 2024-08-29 PROCEDURE — 5A1D90Z PERFORMANCE OF URINARY FILTRATION, CONTINUOUS, GREATER THAN 18 HOURS PER DAY: ICD-10-PCS | Performed by: INTERNAL MEDICINE

## 2024-08-29 PROCEDURE — 83605 ASSAY OF LACTIC ACID: CPT | Performed by: PHYSICIAN ASSISTANT

## 2024-08-29 PROCEDURE — 93005 ELECTROCARDIOGRAM TRACING: CPT

## 2024-08-29 PROCEDURE — 258N000003 HC RX IP 258 OP 636: Performed by: STUDENT IN AN ORGANIZED HEALTH CARE EDUCATION/TRAINING PROGRAM

## 2024-08-29 PROCEDURE — 36556 INSERT NON-TUNNEL CV CATH: CPT | Performed by: STUDENT IN AN ORGANIZED HEALTH CARE EDUCATION/TRAINING PROGRAM

## 2024-08-29 PROCEDURE — 99292 CRITICAL CARE ADDL 30 MIN: CPT | Mod: 24 | Performed by: STUDENT IN AN ORGANIZED HEALTH CARE EDUCATION/TRAINING PROGRAM

## 2024-08-29 PROCEDURE — 250N000011 HC RX IP 250 OP 636: Performed by: STUDENT IN AN ORGANIZED HEALTH CARE EDUCATION/TRAINING PROGRAM

## 2024-08-29 PROCEDURE — 82805 BLOOD GASES W/O2 SATURATION: CPT | Performed by: STUDENT IN AN ORGANIZED HEALTH CARE EDUCATION/TRAINING PROGRAM

## 2024-08-29 PROCEDURE — 94640 AIRWAY INHALATION TREATMENT: CPT

## 2024-08-29 PROCEDURE — 250N000011 HC RX IP 250 OP 636: Performed by: PHYSICIAN ASSISTANT

## 2024-08-29 PROCEDURE — 99223 1ST HOSP IP/OBS HIGH 75: CPT | Mod: 25 | Performed by: INTERNAL MEDICINE

## 2024-08-29 PROCEDURE — 87641 MR-STAPH DNA AMP PROBE: CPT | Performed by: STUDENT IN AN ORGANIZED HEALTH CARE EDUCATION/TRAINING PROGRAM

## 2024-08-29 PROCEDURE — 999N000065 XR ABDOMEN PORT 1 VIEW

## 2024-08-29 PROCEDURE — 71045 X-RAY EXAM CHEST 1 VIEW: CPT

## 2024-08-29 PROCEDURE — 83735 ASSAY OF MAGNESIUM: CPT | Performed by: SURGERY

## 2024-08-29 PROCEDURE — 87205 SMEAR GRAM STAIN: CPT | Performed by: STUDENT IN AN ORGANIZED HEALTH CARE EDUCATION/TRAINING PROGRAM

## 2024-08-29 PROCEDURE — 99233 SBSQ HOSP IP/OBS HIGH 50: CPT | Performed by: INTERNAL MEDICINE

## 2024-08-29 PROCEDURE — 93010 ELECTROCARDIOGRAM REPORT: CPT | Performed by: STUDENT IN AN ORGANIZED HEALTH CARE EDUCATION/TRAINING PROGRAM

## 2024-08-29 PROCEDURE — 99291 CRITICAL CARE FIRST HOUR: CPT | Performed by: INTERNAL MEDICINE

## 2024-08-29 PROCEDURE — P9047 ALBUMIN (HUMAN), 25%, 50ML: HCPCS | Performed by: INTERNAL MEDICINE

## 2024-08-29 PROCEDURE — 84100 ASSAY OF PHOSPHORUS: CPT | Performed by: SURGERY

## 2024-08-29 PROCEDURE — 85025 COMPLETE CBC W/AUTO DIFF WBC: CPT | Performed by: INTERNAL MEDICINE

## 2024-08-29 PROCEDURE — 5A1945Z RESPIRATORY VENTILATION, 24-96 CONSECUTIVE HOURS: ICD-10-PCS | Performed by: STUDENT IN AN ORGANIZED HEALTH CARE EDUCATION/TRAINING PROGRAM

## 2024-08-29 PROCEDURE — 94002 VENT MGMT INPAT INIT DAY: CPT

## 2024-08-29 PROCEDURE — 87633 RESP VIRUS 12-25 TARGETS: CPT | Performed by: STUDENT IN AN ORGANIZED HEALTH CARE EDUCATION/TRAINING PROGRAM

## 2024-08-29 PROCEDURE — 90945 DIALYSIS ONE EVALUATION: CPT | Performed by: INTERNAL MEDICINE

## 2024-08-29 PROCEDURE — 250N000009 HC RX 250: Performed by: PHYSICIAN ASSISTANT

## 2024-08-29 RX ORDER — CALCIUM CHLORIDE, MAGNESIUM CHLORIDE, SODIUM CHLORIDE, SODIUM BICARBONATE, POTASSIUM CHLORIDE AND SODIUM PHOSPHATE DIBASIC DIHYDRATE 3.68; 3.05; 6.34; 3.09; .314; .187 G/L; G/L; G/L; G/L; G/L; G/L
200 INJECTION INTRAVENOUS CONTINUOUS
Status: DISCONTINUED | OUTPATIENT
Start: 2024-08-29 | End: 2024-09-07

## 2024-08-29 RX ORDER — NOREPINEPHRINE BITARTRATE 0.02 MG/ML
.01-.6 INJECTION, SOLUTION INTRAVENOUS CONTINUOUS
Status: DISCONTINUED | OUTPATIENT
Start: 2024-08-29 | End: 2024-08-29

## 2024-08-29 RX ORDER — DOBUTAMINE HYDROCHLORIDE 200 MG/100ML
1.5 INJECTION INTRAVENOUS CONTINUOUS
Status: DISCONTINUED | OUTPATIENT
Start: 2024-08-29 | End: 2024-09-05

## 2024-08-29 RX ORDER — POTASSIUM CHLORIDE 7.45 MG/ML
10 INJECTION INTRAVENOUS ONCE
Status: COMPLETED | OUTPATIENT
Start: 2024-08-29 | End: 2024-08-29

## 2024-08-29 RX ORDER — ONDANSETRON 2 MG/ML
4 INJECTION INTRAMUSCULAR; INTRAVENOUS ONCE
Status: COMPLETED | OUTPATIENT
Start: 2024-08-29 | End: 2024-08-29

## 2024-08-29 RX ORDER — CEFTAZIDIME 1 G/1
1 INJECTION, POWDER, FOR SOLUTION INTRAMUSCULAR; INTRAVENOUS EVERY 8 HOURS
Status: DISCONTINUED | OUTPATIENT
Start: 2024-08-29 | End: 2024-08-30

## 2024-08-29 RX ORDER — PANTOPRAZOLE SODIUM 40 MG/1
40 TABLET, DELAYED RELEASE ORAL
Status: DISCONTINUED | OUTPATIENT
Start: 2024-08-29 | End: 2024-09-16 | Stop reason: HOSPADM

## 2024-08-29 RX ORDER — FENTANYL CITRATE 50 UG/ML
100 INJECTION, SOLUTION INTRAMUSCULAR; INTRAVENOUS ONCE
Status: COMPLETED | OUTPATIENT
Start: 2024-08-29 | End: 2024-08-29

## 2024-08-29 RX ORDER — SODIUM CHLORIDE FOR INHALATION 3 %
3 VIAL, NEBULIZER (ML) INHALATION EVERY 6 HOURS
Status: DISCONTINUED | OUTPATIENT
Start: 2024-08-29 | End: 2024-09-03

## 2024-08-29 RX ORDER — DOBUTAMINE HYDROCHLORIDE 200 MG/100ML
5 INJECTION INTRAVENOUS CONTINUOUS
Status: DISCONTINUED | OUTPATIENT
Start: 2024-08-29 | End: 2024-08-29

## 2024-08-29 RX ORDER — FUROSEMIDE 10 MG/ML
80 INJECTION INTRAMUSCULAR; INTRAVENOUS EVERY 8 HOURS
Status: DISCONTINUED | OUTPATIENT
Start: 2024-08-29 | End: 2024-08-29

## 2024-08-29 RX ORDER — WARFARIN SODIUM 1 MG/1
1 TABLET ORAL
Status: COMPLETED | OUTPATIENT
Start: 2024-08-29 | End: 2024-08-29

## 2024-08-29 RX ORDER — CEFTAZIDIME 2 G/1
2 INJECTION, POWDER, FOR SOLUTION INTRAVENOUS EVERY 24 HOURS
Status: DISCONTINUED | OUTPATIENT
Start: 2024-08-29 | End: 2024-08-29

## 2024-08-29 RX ORDER — CHLOROTHIAZIDE SODIUM 500 MG/1
500 INJECTION INTRAVENOUS ONCE
Status: COMPLETED | OUTPATIENT
Start: 2024-08-29 | End: 2024-08-29

## 2024-08-29 RX ORDER — FUROSEMIDE 10 MG/ML
20 INJECTION INTRAMUSCULAR; INTRAVENOUS ONCE
Status: COMPLETED | OUTPATIENT
Start: 2024-08-29 | End: 2024-08-29

## 2024-08-29 RX ORDER — MAGNESIUM SULFATE HEPTAHYDRATE 40 MG/ML
2 INJECTION, SOLUTION INTRAVENOUS EVERY 8 HOURS PRN
Status: DISCONTINUED | OUTPATIENT
Start: 2024-08-29 | End: 2024-09-07

## 2024-08-29 RX ORDER — POTASSIUM CHLORIDE 29.8 MG/ML
20 INJECTION INTRAVENOUS EVERY 8 HOURS PRN
Status: DISCONTINUED | OUTPATIENT
Start: 2024-08-29 | End: 2024-09-07

## 2024-08-29 RX ORDER — ALBUTEROL SULFATE 0.83 MG/ML
2.5 SOLUTION RESPIRATORY (INHALATION)
Status: DISCONTINUED | OUTPATIENT
Start: 2024-08-29 | End: 2024-09-03

## 2024-08-29 RX ORDER — CALCIUM CHLORIDE, MAGNESIUM CHLORIDE, SODIUM CHLORIDE, SODIUM BICARBONATE, POTASSIUM CHLORIDE AND SODIUM PHOSPHATE DIBASIC DIHYDRATE 3.68; 3.05; 6.34; 3.09; .314; .187 G/L; G/L; G/L; G/L; G/L; G/L
10 INJECTION INTRAVENOUS CONTINUOUS
Status: DISCONTINUED | OUTPATIENT
Start: 2024-08-29 | End: 2024-09-07

## 2024-08-29 RX ORDER — FUROSEMIDE 10 MG/ML
80 INJECTION INTRAMUSCULAR; INTRAVENOUS ONCE
Status: DISCONTINUED | OUTPATIENT
Start: 2024-08-29 | End: 2024-08-29 | Stop reason: DRUGHIGH

## 2024-08-29 RX ORDER — FUROSEMIDE 10 MG/ML
60 INJECTION INTRAMUSCULAR; INTRAVENOUS
Status: DISCONTINUED | OUTPATIENT
Start: 2024-08-29 | End: 2024-08-29

## 2024-08-29 RX ORDER — CALCIUM CHLORIDE, MAGNESIUM CHLORIDE, SODIUM CHLORIDE, SODIUM BICARBONATE, POTASSIUM CHLORIDE AND SODIUM PHOSPHATE DIBASIC DIHYDRATE 3.68; 3.05; 6.34; 3.09; .314; .187 G/L; G/L; G/L; G/L; G/L; G/L
24.5 INJECTION INTRAVENOUS CONTINUOUS
Status: DISCONTINUED | OUTPATIENT
Start: 2024-08-29 | End: 2024-09-07

## 2024-08-29 RX ORDER — ALBUMIN (HUMAN) 12.5 G/50ML
50 SOLUTION INTRAVENOUS ONCE
Status: COMPLETED | OUTPATIENT
Start: 2024-08-29 | End: 2024-08-29

## 2024-08-29 RX ORDER — CALCIUM GLUCONATE 20 MG/ML
2 INJECTION, SOLUTION INTRAVENOUS EVERY 8 HOURS PRN
Status: DISCONTINUED | OUTPATIENT
Start: 2024-08-29 | End: 2024-09-07

## 2024-08-29 RX ORDER — ETOMIDATE 2 MG/ML
10 INJECTION INTRAVENOUS ONCE
Status: COMPLETED | OUTPATIENT
Start: 2024-08-29 | End: 2024-08-29

## 2024-08-29 RX ORDER — CHLORHEXIDINE GLUCONATE ORAL RINSE 1.2 MG/ML
15 SOLUTION DENTAL 2 TIMES DAILY
Status: DISCONTINUED | OUTPATIENT
Start: 2024-08-29 | End: 2024-09-01

## 2024-08-29 RX ADMIN — POTASSIUM CHLORIDE 10 MEQ: 7.46 INJECTION, SOLUTION INTRAVENOUS at 06:50

## 2024-08-29 RX ADMIN — CALCIUM GLUCONATE 1 G: 20 INJECTION, SOLUTION INTRAVENOUS at 22:44

## 2024-08-29 RX ADMIN — ALBUTEROL SULFATE 2.5 MG: 2.5 SOLUTION RESPIRATORY (INHALATION) at 19:59

## 2024-08-29 RX ADMIN — FUROSEMIDE 15 MG/HR: 10 INJECTION, SOLUTION INTRAVENOUS at 09:41

## 2024-08-29 RX ADMIN — DEXMEDETOMIDINE HYDROCHLORIDE 0.2 MCG/KG/HR: 400 INJECTION INTRAVENOUS at 15:59

## 2024-08-29 RX ADMIN — HYDROMORPHONE HYDROCHLORIDE 0.2 MG: 0.2 INJECTION, SOLUTION INTRAMUSCULAR; INTRAVENOUS; SUBCUTANEOUS at 00:19

## 2024-08-29 RX ADMIN — NOREPINEPHRINE BITARTRATE 0.07 MCG/KG/MIN: 1 INJECTION, SOLUTION, CONCENTRATE INTRAVENOUS at 16:21

## 2024-08-29 RX ADMIN — PANTOPRAZOLE SODIUM 40 MG: 40 INJECTION, POWDER, FOR SOLUTION INTRAVENOUS at 11:07

## 2024-08-29 RX ADMIN — SODIUM CHLORIDE 30 MG/ML INHALATION SOLUTION 3 ML: 30 SOLUTION INHALANT at 19:59

## 2024-08-29 RX ADMIN — LIDOCAINE 2 PATCH: 246 PATCH TOPICAL at 18:25

## 2024-08-29 RX ADMIN — Medication 25 MCG/HR: at 12:21

## 2024-08-29 RX ADMIN — CHLORHEXIDINE GLUCONATE 0.12% ORAL RINSE 15 ML: 1.2 LIQUID ORAL at 20:40

## 2024-08-29 RX ADMIN — ETOMIDATE 10 MG: 20 INJECTION, SOLUTION INTRAVENOUS at 12:14

## 2024-08-29 RX ADMIN — Medication 1 CAPSULE: at 20:38

## 2024-08-29 RX ADMIN — CALCIUM CHLORIDE, MAGNESIUM CHLORIDE, SODIUM CHLORIDE, SODIUM BICARBONATE, POTASSIUM CHLORIDE AND SODIUM PHOSPHATE DIBASIC DIHYDRATE 12.5 ML/KG/HR: 3.68; 3.05; 6.34; 3.09; .314; .187 INJECTION INTRAVENOUS at 19:10

## 2024-08-29 RX ADMIN — CALCIUM CHLORIDE, MAGNESIUM CHLORIDE, SODIUM CHLORIDE, SODIUM BICARBONATE, POTASSIUM CHLORIDE AND SODIUM PHOSPHATE DIBASIC DIHYDRATE 200 ML/HR: 3.68; 3.05; 6.34; 3.09; .314; .187 INJECTION INTRAVENOUS at 15:31

## 2024-08-29 RX ADMIN — ACETAMINOPHEN 975 MG: 325 TABLET ORAL at 16:52

## 2024-08-29 RX ADMIN — HYDROMORPHONE HYDROCHLORIDE 0.2 MG: 0.2 INJECTION, SOLUTION INTRAMUSCULAR; INTRAVENOUS; SUBCUTANEOUS at 09:20

## 2024-08-29 RX ADMIN — ALBUTEROL SULFATE 2.5 MG: 2.5 SOLUTION RESPIRATORY (INHALATION) at 11:18

## 2024-08-29 RX ADMIN — DOBUTAMINE HYDROCHLORIDE 2.5 MCG/KG/MIN: 200 INJECTION INTRAVENOUS at 08:37

## 2024-08-29 RX ADMIN — CEFTAZIDIME 1 G: 1 INJECTION, POWDER, FOR SOLUTION INTRAMUSCULAR; INTRAVENOUS at 18:25

## 2024-08-29 RX ADMIN — ROCURONIUM 100 MG: 50 INJECTION, SOLUTION INTRAVENOUS at 12:07

## 2024-08-29 RX ADMIN — CALCIUM CHLORIDE, MAGNESIUM CHLORIDE, SODIUM CHLORIDE, SODIUM BICARBONATE, POTASSIUM CHLORIDE AND SODIUM PHOSPHATE DIBASIC DIHYDRATE 12.5 ML/KG/HR: 3.68; 3.05; 6.34; 3.09; .314; .187 INJECTION INTRAVENOUS at 15:16

## 2024-08-29 RX ADMIN — FENTANYL CITRATE 100 MCG: 50 INJECTION, SOLUTION INTRAMUSCULAR; INTRAVENOUS at 12:17

## 2024-08-29 RX ADMIN — SODIUM CHLORIDE 30 MG/ML INHALATION SOLUTION 3 ML: 30 SOLUTION INHALANT at 14:30

## 2024-08-29 RX ADMIN — CALCIUM GLUCONATE 2 G: 20 INJECTION, SOLUTION INTRAVENOUS at 16:18

## 2024-08-29 RX ADMIN — CALCIUM CHLORIDE, MAGNESIUM CHLORIDE, SODIUM CHLORIDE, SODIUM BICARBONATE, POTASSIUM CHLORIDE AND SODIUM PHOSPHATE DIBASIC DIHYDRATE 12.5 ML/KG/HR: 3.68; 3.05; 6.34; 3.09; .314; .187 INJECTION INTRAVENOUS at 23:00

## 2024-08-29 RX ADMIN — ALBUMIN HUMAN 50 G: 0.25 SOLUTION INTRAVENOUS at 08:22

## 2024-08-29 RX ADMIN — PROCHLORPERAZINE EDISYLATE 5 MG: 5 INJECTION INTRAMUSCULAR; INTRAVENOUS at 06:58

## 2024-08-29 RX ADMIN — FUROSEMIDE 20 MG: 10 INJECTION, SOLUTION INTRAMUSCULAR; INTRAVENOUS at 08:16

## 2024-08-29 RX ADMIN — Medication 50 MCG: at 21:45

## 2024-08-29 RX ADMIN — ATORVASTATIN CALCIUM 80 MG: 40 TABLET, FILM COATED ORAL at 20:38

## 2024-08-29 RX ADMIN — HYDROMORPHONE HYDROCHLORIDE 0.2 MG: 0.2 INJECTION, SOLUTION INTRAMUSCULAR; INTRAVENOUS; SUBCUTANEOUS at 03:27

## 2024-08-29 RX ADMIN — VASOPRESSIN 2.4 UNITS/HR: 20 INJECTION, SOLUTION INTRAMUSCULAR; SUBCUTANEOUS at 22:06

## 2024-08-29 RX ADMIN — WARFARIN SODIUM 1 MG: 1 TABLET ORAL at 18:26

## 2024-08-29 RX ADMIN — SENNOSIDES AND DOCUSATE SODIUM 1 TABLET: 8.6; 5 TABLET ORAL at 20:39

## 2024-08-29 RX ADMIN — Medication 50 MCG: at 20:33

## 2024-08-29 RX ADMIN — MIDAZOLAM HYDROCHLORIDE 4 MG: 1 INJECTION, SOLUTION INTRAMUSCULAR; INTRAVENOUS at 12:17

## 2024-08-29 RX ADMIN — ONDANSETRON 4 MG: 2 INJECTION INTRAMUSCULAR; INTRAVENOUS at 06:30

## 2024-08-29 RX ADMIN — CALCIUM CHLORIDE, MAGNESIUM CHLORIDE, SODIUM CHLORIDE, SODIUM BICARBONATE, POTASSIUM CHLORIDE AND SODIUM PHOSPHATE DIBASIC DIHYDRATE 12.5 ML/KG/HR: 3.68; 3.05; 6.34; 3.09; .314; .187 INJECTION INTRAVENOUS at 15:19

## 2024-08-29 RX ADMIN — FUROSEMIDE 60 MG: 10 INJECTION, SOLUTION INTRAMUSCULAR; INTRAVENOUS at 07:41

## 2024-08-29 RX ADMIN — CALCIUM CHLORIDE, MAGNESIUM CHLORIDE, SODIUM CHLORIDE, SODIUM BICARBONATE, POTASSIUM CHLORIDE AND SODIUM PHOSPHATE DIBASIC DIHYDRATE 12.5 ML/KG/HR: 3.68; 3.05; 6.34; 3.09; .314; .187 INJECTION INTRAVENOUS at 22:56

## 2024-08-29 RX ADMIN — ALBUTEROL SULFATE 2.5 MG: 2.5 SOLUTION RESPIRATORY (INHALATION) at 14:30

## 2024-08-29 RX ADMIN — CHLOROTHIAZIDE SODIUM 500 MG: 500 INJECTION, POWDER, LYOPHILIZED, FOR SOLUTION INTRAVENOUS at 11:00

## 2024-08-29 RX ADMIN — ONDANSETRON 4 MG: 2 INJECTION INTRAMUSCULAR; INTRAVENOUS at 06:31

## 2024-08-29 RX ADMIN — ACETAMINOPHEN 975 MG: 325 TABLET ORAL at 20:37

## 2024-08-29 RX ADMIN — SODIUM CHLORIDE 30 MG/ML INHALATION SOLUTION 3 ML: 30 SOLUTION INHALANT at 11:14

## 2024-08-29 RX ADMIN — NOREPINEPHRINE BITARTRATE 0.03 MCG/KG/MIN: 0.02 INJECTION, SOLUTION INTRAVENOUS at 11:50

## 2024-08-29 RX ADMIN — CEFTAZIDIME 2 G: 2 INJECTION, POWDER, FOR SOLUTION INTRAVENOUS at 09:45

## 2024-08-29 ASSESSMENT — ACTIVITIES OF DAILY LIVING (ADL)
ADLS_ACUITY_SCORE: 42

## 2024-08-29 NOTE — PROGRESS NOTES
Care Management Follow Up    Length of Stay (days): 13    Expected Discharge Date: 09/03/2024    Anticipated Discharge Plan:   To be determined.     Transportation: TBD    PT Recommendations:  Pending  OT Recommendations:  Pending    Barriers to Discharge: medical stability, post procedure care monitoring, IV antibiotics (Fortaz and Nafcillin), IV medications (diuresis), Highflow oxygen (60 liters, 100% FiO2). Nephrology consult pending, ID following.     Prior Living Situation:   Patient resides in a house with her  and is reported as mostly independent when at baseline.  assists with transport and as needed in general. No DME use or current in-home/outpatient services identified.     Discussed  Partnership in Safe Discharge Planning  document with patient/family: No     Handoff Completed: No, handoff not indicated or clinically appropriate    Patient/Spokesperson Updated: No    Additional Information:  Patient is not medically stable to initiate discharge planning.   Per CM notes: [facility] and [facility] patient's insurance will not cover home infusion.  If patient goes home at discharge, CM to follow up with Carrie regarding coverage and cost for home IV antibiotic.    Next steps:  Watching for team recommendations.     Alicia Fajardo RN

## 2024-08-29 NOTE — CONSULTS
NEPHROLOGY CONSULTATION      ASSESSMENT/PLAN:  84 year old female with hx of CHF, HFpEF , CKD 3/4, HLD , left renal atrophy, CAD and chronic neck pain , admitted with chills and weakness. Found to have MSSA bacteremia cb MV and AV infective endocarditis , 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 , MvCAD: underwent biprothetic Avand MV replacement , Aortic root replacement and 2vCABG,. Post Sx cb Pedro , anuria , acute resp failure and hemodynamic instability on pressers. With worsening resp status and need for oxygenation , concerns for near re-intubation and anuria with no response to diuresis nephrology consulted for advanced CKD , Pedro and volume management    PEDRO   anuria and anephric rise in creat in last 24hrs  Bl CKD 3/4 with creatinine 1.3-1.8 and GFR ~30-35  UA with hyaline casts , mild proteins and WBC  UPCR 0.1g/g minimal  Imaging with left renal cortical thinning ~8cm kidney , rt 9.7cm kideny with simple cysts (4/2024)  Follows with [facility]  CKD has been attributed to long standing NSAID use : reviewed extensive rheum work up -ve in 4/2024 , no prior paraprotein work up  Current PEDRO , appears CRS with likely a hemodynamic insult to the kidney; with recent embolic process hard to exclude embolic disease , will check complements and LDH  --> with anuria , started on lasix 80mg and followed with lasix infusion at 15mg/hr , will add one time diuril 500mg and follow on response  Low threshold to start on CRRT with immanent intubation 100% fiO2 on HFNC, did not tolerate BiPAP  --> discussed with ICU , will follow on response closely  -> follow on BMP at 2pm , unless started on CRRT then labs per CRRT protocols  --> strict IO    Hypervolemia  Hypotension with likely cardiogenic shock state  PTA no anti Htn , BP managed with diet alone  --> on Dobutamine , was on Phenyleph and epi prior to that post Sx--> BP wo improvement post 200gm albumin  --> with low BP/low Renal plasma flow , unlikely  to respond to diuresis : will plan for CRRT after initial trial with diuresis    Electrolytes stable  Mild acidosis    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  Monitor on Labs    Mild hypokalemia  Monitor on labs    Mild hypernatremia , improved    Lactic acidosis  Improved with better hemodynamic support  Trend per primary team    Anemia  Acute on chronic  Likely inflamm  --> transfuse if less then 8 or per cardiology recs    MSSA bacteremia  Cb valve endocarditis  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  Some concerns for cervical septic facet arthropathy  On Naficillin  ID following    Acute resp failure  On HF NC with 100% FiO2  Sec to hypervolemia and cardiogenic shock  Volume management as above  Management o resp failure per ICU  High risk for re-intubation    MV and AV endocarditis   Aortic Root Abscess   CT Sx dr Renae, following  8/27/24: s/p Aortic root abscess debridement and replacement AV and MV replacement and CABG  x 2       Thank you for the consultation   We will follow    Moses Mcnair MD  [facility]  [phone]    CC:HAMMAD , Anuria  CKD 3/4    REASON FOR CONSULTATION: We are asked to see pt by Dr Park    HISTORY OF PRESENT ILLNESS:  84 year old female with hx of CHF, HFpEF , CKD 3/4, HLD , left renal atrophy, CAD and chronic neck pain , admitted with chills and weakness. Found to have MSSA bacteremia cb MV and AV infective endocarditis , 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 , MvCAD: underwent biprothetic Avand MV replacement , Aortic root replacement and 2vCABG,. Post Sx cb Hammad , anuria , acute resp failure and hemodynamic instability on pressers. With worsening resp status and need for oxygenation , concerns for near re-intubation and anuria with no response to diuresis nephrology consulted for advanced CKD , Hammad and volume management  She is seen at bedside , appears very exhausted , report significant dyspnea. Did not tolerate BiPAP and  vomited  UO 50ccafter lasix 80mg x2 and starting on infusion now  Had >8.8L on fluids an colloid for stabilization around cardiac Sx , and with hemodynamic issues , UO has declined tremendously. Creatinine anephric rise in last 24hrs  She has minimal drainage from the CT   She had creatinine at her baseline with wide fluctuations as she has OP as well , but post Sx has been declining quite rapidly      REVIEW OF SYSTEMS:  ROS was completely reviewed and otherwise negative and non-contributory    History reviewed. No pertinent past medical history.    Social History     Socioeconomic History    Marital status:      Spouse name: Not on file    Number of children: Not on file    Years of education: Not on file    Highest education level: Not on file   Occupational History    Not on file   Tobacco Use    Smoking status: Not on file    Smokeless tobacco: Not on file   Substance and Sexual Activity    Alcohol use: Not on file    Drug use: Not on file    Sexual activity: Not on file   Other Topics Concern    Not on file   Social History Narrative    Not on file     Social Determinants of Health     Financial Resource Strain: Low Risk  (8/24/2024)    Financial Resource Strain     Within the past 12 months, have you or your family members you live with been unable to get utilities (heat, electricity) when it was really needed?: No   Food Insecurity: Low Risk  (8/24/2024)    Food Insecurity     Within the past 12 months, did you worry that your food would run out before you got money to buy more?: No     Within the past 12 months, did the food you bought just not last and you didn t have money to get more?: No   Transportation Needs: Low Risk  (8/24/2024)    Transportation Needs     Within the past 12 months, has lack of transportation kept you from medical appointments, getting your medicines, non-medical meetings or appointments, work, or from getting things that you need?: No   Physical Activity: Not on file    Stress: Not on file   Social Connections: Unknown (1/3/2024)    Received from [facility]    Social Connections     Frequency of Communication with Friends and Family: Not on file   Interpersonal Safety: High Risk (8/23/2024)    Interpersonal Safety     Do you feel physically and emotionally safe where you currently live?: No     Within the past 12 months, have you been hit, slapped, kicked or otherwise physically hurt by someone?: No     Within the past 12 months, have you been humiliated or emotionally abused in other ways by your partner or ex-partner?: No   Housing Stability: Low Risk  (8/24/2024)    Housing Stability     Do you have housing? : Yes     Are you worried about losing your housing?: No       History reviewed. No pertinent family history.    Allergies   Allergen Reactions    Aspirin Rash    Cats Rash    Nickel Rash       MEDICATIONS:  Current Facility-Administered Medications   Medication Dose Route Frequency Provider Last Rate Last Admin    acetaminophen (TYLENOL) Suppository 650 mg  650 mg Rectal Q8H Maryam Anthony PA-C   650 mg at 08/28/24 0407    acetaminophen (TYLENOL) tablet 975 mg  975 mg Oral Q8H Maryam Anthony PA-C   975 mg at 08/28/24 2009    sodium chloride (NEBUSAL) 3 % neb solution 3 mL  3 mL Nebulization Q6H Zach Park MD        And    albuterol (PROVENTIL) neb solution 2.5 mg  2.5 mg Nebulization Q6H Zach Park MD        atorvastatin (LIPITOR) tablet 80 mg  80 mg Oral QPM Jessica Tolbert PA-C   80 mg at 08/28/24 2010    [Held by provider] clopidogrel (PLAVIX) tablet 75 mg  75 mg Oral Daily Maryam Anthony PA-C        furosemide (LASIX) injection 60 mg  60 mg Intravenous BID Jessica Tolbert PA-C   60 mg at 08/29/24 0741    lactobacillus rhamnosus (GG) (CULTURELL) capsule 1 capsule  1 capsule Oral BID Zach Foreman,    1 capsule at 08/28/24 2009    Lidocaine (LIDOCARE) 4 % Patch 1-2 patch  1-2 patch Transdermal  "Q24H Maryam Anthony PA-C   1 patch at 08/27/24 1827    nafcillin 12 g in sodium chloride 0.9 % 550 mL continuous infusion  12 g Intravenous Q24H [address] [employer]   12 g at 08/28/24 1214    pantoprazole (PROTONIX) 2 mg/mL suspension 40 mg  40 mg Oral or NG Tube Daily Maryam Anthony PA-C        Or    pantoprazole (PROTONIX) EC tablet 40 mg  40 mg Oral Daily Maryam Anthony PA-C        polyethylene glycol (MIRALAX) Packet 17 g  17 g Oral Daily Maryam Anthony PA-C        senna-docusate (SENOKOT-S/PERICOLACE) 8.6-50 MG per tablet 1 tablet  1 tablet Oral BID Maryam Anthony PA-C   1 tablet at 08/28/24 2009    sodium chloride (PF) 0.9% PF flush 3 mL  3 mL Intracatheter Q8H Gondal, Saad J, MD   3 mL at 08/29/24 0820    Warfarin Dose Required Daily - Pharmacist Managed  1 each Oral See Admin Instructions Jessica Tolbert PA-C             PHYSICAL EXAM    BP (!) 89/50   Pulse 79   Temp 97.3  F (36.3  C) (Pulmonary Artery)   Resp (!) 32   Ht 1.702 m (5' 7\")   Wt 101 kg (222 lb 10.6 oz)   SpO2 94%   BMI 34.87 kg/m        Intake/Output Summary (Last 24 hours) at 8/29/2024 0852  Last data filed at 8/29/2024 0822  Gross per 24 hour   Intake 1281.8 ml   Output 3030 ml   Net -1748.2 ml       Awake but fatigued and NAD  HEENT NC/AT; perrla; OP clear without lesions; mmm  Neck supple without LAD, TM  CV; RRR without rub or murmur  Lung: clear and equal; no extra sounds: HFNC  Ab: soft and NT; not distended; normal bs  Ext: +++ edema and well perfused  Skin; no rash  Neuro; grossly intact  Rt Cordis    LABORATORIES    Recent Labs   Lab 08/29/24  0359 08/28/24  0552 08/27/24  2158 08/27/24  1648 08/27/24  1451 08/27/24  1448   WBC 15.3* 10.8  --   --   --  15.3*   HGB 8.7* 9.2* 9.1* 9.2*   < > 7.4*   HCT 26.7* 27.6*  --  27*  --  22.5*    200 190  --   --  166    < > = values in this interval not displayed.     Recent Labs   Lab 08/29/24  0359 08/28/24  0012 08/27/24  1648 " "08/27/24  1644    144 144 146*   CO2 21* 20*  --  22   BUN 19.0 14.5  --  12.9   ALKPHOS 41 40  --  40   ALT 19 15  --  16   * 75*  --  63*     Recent Labs   Lab 08/29/24  0359 08/28/24  1000 08/27/24  2158 08/27/24  1644 08/27/24  1448   INR 2.28* 1.47* 1.43* 1.51* 1.66*   PTT  --   --  96* 63* 71*     Invalid input(s): \"FERRITIN\"  No results for input(s): \"IRON\" in the last 168 hours.    Invalid input(s): \"TIBC\"    I reviewed all labs and imaging    Thank you for the consultation we will follow    Moses Mcnair MD  [facility]  [phone]     "

## 2024-08-29 NOTE — PROGRESS NOTES
HEART CARE NOTE          Assessment/Recommendations   1. Severe valvular disease c/b post-op cardiogenic shock  Assessment / Plan  HAMMAD noted - However, patient remains significantly volume overloaded with increasing respiratory requirements - give IV albumin and continue IV diuresis; Patient is high risk for adverse cardiac events 2/2 advanced age, frailty, valvular heart disease, renal dysfunction  Currently on epi for hemodynamic support - wean pressors as tolerated and transition to dobutamine   GDMT as detailed below; mainstay of treatment for HFpEF includes diuretics and adequate BP control (class I) and SGLT2-I (class 2a); additional medical therapy (ARNI, MRA, ARB) demonstrated less robust evidence for indication but may be considered per guideline recommendations (2b); no indication for BBlockers      Current Pharmacotherapy AHA Guideline-Directed Medical Therapy   Losartan  - not started 2/2 renal dysfunction ARNI/ARB   Spironolactone not started  MRA   SGLT2 inhibitor: not started SGLT2-I    Furosemide gtt Loop diuretic       2. 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 Anemia  Assessment / Plan  Management and supportive care per primary team     5. CAD  Assessment / Plan  Coronary angiogram significant for severe prox LAD and ost-prox Lcx lesions - sp CABG x 2 vz + Bentall + MVR        Plan of care discussed on August 29, 2024 with patient at bedside, and primary team overseeing patient's care    Patient remains critically ill in the ICU requiring hemodynamic support via vasopressors s/p OHS c/b cardiogenic shock. 65 minutes spent on critical care time.       History of Present Illness/Subjective    Ms. Felicitas KEARNS  "Earl is a 84 year old female with a PMHx significant for (per Epic notation) presented with fatigue, weakness, chills, poor appetite. Does not really have much for chronic medical conditions other than chronic back pain, furunculosis, uterine prolapse, tobacco use      Today, Mrs. Elliott denies acute cardiac events or complaints; Management plan as detailed above     ECG: personally reviewed; normal sinus rhythm, nonspecific ST and T waves changes, RBBB, left axis deviation.     ECHO (personnaly Reviewed on 8/19/24):   1. The left ventricle is normal in size. Left ventricular function is  normal.The ejection fraction is 55-60%.  2. Normal right ventricle size and systolic function.  3. Large vegetation on mitral valve (8 mm x 15) attached to posterior leaflet.  4. There is a small vegetation on the aortic valve (6 mm). No evidence of  aortic root abscess identified.  5. Severe valvular aortic stenosis (SHU:0.8 cm2, peak nomi: 4.6 m/sec, mean  gradient: 51 mmHg).    Telemetry: personally reviewed August 29, 2024; notable for paced rhythm     Lab results: personally reviewed August 29, 2024; notable for HAMMAD    Medical history and pertinent documents reviewed in Care Everywhere please where applicable see details above        Physical Examination Review of Systems   BP (!) 89/50   Pulse 80   Temp 97.3  F (36.3  C) (Pulmonary Artery)   Resp 29   Ht 1.702 m (5' 7\")   Wt 95.3 kg (210 lb 1.6 oz)   SpO2 92%   BMI 32.91 kg/m    Body mass index is 32.91 kg/m .  Wt Readings from Last 3 Encounters:   08/28/24 95.3 kg (210 lb 1.6 oz)   08/15/24 90.1 kg (198 lb 9.6 oz)     General Appearance:   no distress, normal body habitus   ENT/Mouth: membranes moist, no oral lesions or bleeding gums.      EYES:  no scleral icterus, normal conjunctivae   Neck: no carotid bruits or thyromegaly   Chest/Lungs:   lungs are clear to auscultation, no rales or wheezing, equal chest wall expansion    Cardiovascular:   Regular. Normal first and " second heart sounds with no murmurs, rubs, or gallops; the carotid, radial and posterior tibial pulses are intact, + JVD and LE edema bilaterally    Abdomen:  no organomegaly, masses, bruits, or tenderness; bowel sounds are present   Extremities: no cyanosis or clubbing   Skin: no xanthelasma, warm.    Neurologic: NAD     Psychiatric: alert and oriented x3, calm     A complete 10 systems ROS was reviewed  And is negative except what is listed in the HPI.          Medical History  Surgical History Family History Social History   History reviewed. No pertinent past medical history. Past Surgical History:   Procedure Laterality Date    CORONARY ARTERY BYPASS GRAFT, WITH AORTIC VALVE REPLACEMENT, WITH ENDOSCOPIC VESSEL PROCUREMENT N/A 8/27/2024    Procedure: CORONARY ARTERY BYPASS GRAFT TIMES TWO, WITH AORTIC ROOT REPLACEMENT, WITH LEFT INTERNAL MAMMARY ARTERY HARVEST, LEFT SAPHNENOUS ENDOSCOPIC VESSEL PROCUREMENT,;  Surgeon: Dylan Renae MD;  Location: [facility] OR    CV CORONARY ANGIOGRAM N/A 8/20/2024    Procedure: CV CORONARY ANGIOGRAM;  Surgeon: Den Wylie MD;  Location: [facility] CATH LAB CV    REPLACE VALVE MITRAL N/A 8/27/2024    Procedure: MITRAL VALVE REPLACEMENT,;  Surgeon: Dylan Renae MD;  Location: [facility] OR    TRANSESOPHAGEAL ECHOCARDIOGRAM INTRAOPERATIVE  8/27/2024    Procedure: ANESTHESIA TRANSESOPHAGEAL ECHOCARDIOGRAM, EPI-AORTIC ULTRASOUND;  Surgeon: Dylan Renae MD;  Location: [facility] OR    no family history of premature coronary artery disease Social History     Socioeconomic History    Marital status:      Spouse name: Not on file    Number of children: Not on file    Years of education: Not on file    Highest education level: Not on file   Occupational History    Not on file   Tobacco Use    Smoking status: Not on file    Smokeless tobacco: Not on file   Substance and Sexual Activity    Alcohol use: Not on file    Drug use:  Not on file    Sexual activity: Not on file   Other Topics Concern    Not on file   Social History Narrative    Not on file     Social Determinants of Health     Financial Resource Strain: Low Risk  (8/24/2024)    Financial Resource Strain     Within the past 12 months, have you or your family members you live with been unable to get utilities (heat, electricity) when it was really needed?: No   Food Insecurity: Low Risk  (8/24/2024)    Food Insecurity     Within the past 12 months, did you worry that your food would run out before you got money to buy more?: No     Within the past 12 months, did the food you bought just not last and you didn t have money to get more?: No   Transportation Needs: Low Risk  (8/24/2024)    Transportation Needs     Within the past 12 months, has lack of transportation kept you from medical appointments, getting your medicines, non-medical meetings or appointments, work, or from getting things that you need?: No   Physical Activity: Not on file   Stress: Not on file   Social Connections: Unknown (1/3/2024)    Received from [facility] [employer] [facility]    Social Connections     Frequency of Communication with Friends and Family: Not on file   Interpersonal Safety: High Risk (8/23/2024)    Interpersonal Safety     Do you feel physically and emotionally safe where you currently live?: No     Within the past 12 months, have you been hit, slapped, kicked or otherwise physically hurt by someone?: No     Within the past 12 months, have you been humiliated or emotionally abused in other ways by your partner or ex-partner?: No   Housing Stability: Low Risk  (8/24/2024)    Housing Stability     Do you have housing? : Yes     Are you worried about losing your housing?: No           Lab Results    Chemistry/lipid CBC Cardiac Enzymes/BNP/TSH/INR   Lab Results   Component Value Date    BUN 19.0 08/29/2024     08/29/2024    CO2 21 (L) 08/29/2024    Lab Results   Component Value  "Date    WBC 15.3 (H) 08/29/2024    HGB 8.7 (L) 08/29/2024    HCT 26.7 (L) 08/29/2024    MCV 93 08/29/2024     08/29/2024    Lab Results   Component Value Date    INR 2.28 (H) 08/29/2024     No results found for: \"CKTOTAL\", \"CKMB\", \"TROPONINI\"       Weight:    Wt Readings from Last 3 Encounters:   08/28/24 95.3 kg (210 lb 1.6 oz)   08/15/24 90.1 kg (198 lb 9.6 oz)       Allergies  Allergies   Allergen Reactions    Aspirin Rash    Cats Rash    Nickel Rash         Surgical History  Past Surgical History:   Procedure Laterality Date    CORONARY ARTERY BYPASS GRAFT, WITH AORTIC VALVE REPLACEMENT, WITH ENDOSCOPIC VESSEL PROCUREMENT N/A 8/27/2024    Procedure: CORONARY ARTERY BYPASS GRAFT TIMES TWO, WITH AORTIC ROOT REPLACEMENT, WITH LEFT INTERNAL MAMMARY ARTERY HARVEST, LEFT SAPHNENOUS ENDOSCOPIC VESSEL PROCUREMENT,;  Surgeon: Dylan Renae MD;  Location: [facility] OR    CV CORONARY ANGIOGRAM N/A 8/20/2024    Procedure: CV CORONARY ANGIOGRAM;  Surgeon: Den Wylie MD;  Location: [facility] CATH LAB CV    REPLACE VALVE MITRAL N/A 8/27/2024    Procedure: MITRAL VALVE REPLACEMENT,;  Surgeon: Dylan Renae MD;  Location: [facility] OR    TRANSESOPHAGEAL ECHOCARDIOGRAM INTRAOPERATIVE  8/27/2024    Procedure: ANESTHESIA TRANSESOPHAGEAL ECHOCARDIOGRAM, EPI-AORTIC ULTRASOUND;  Surgeon: Dylan Renae MD;  Location: [facility] OR       Social History  Tobacco:   History   Smoking Status    Not on file   Smokeless Tobacco    Not on file    Alcohol:   Social History    Substance and Sexual Activity      Alcohol use: Not on file   Illicit Drugs:   History   Drug Use Not on file       Family History  History reviewed. No pertinent family history.       Navneet Branch MD on 8/29/2024      cc: Carol, [facility]    "

## 2024-08-29 NOTE — PLAN OF CARE
Goal Outcome Evaluation:    Restraints initiated due to reintubation pulling at lines, ETT. Spouse notified and understands.

## 2024-08-29 NOTE — PROGRESS NOTES
[facility] note    Felicitas remains in ICU and since she required re-intubation today , [facility] team will sign off now and defer care to ICU and [facility] teams. Please re-consult us when she is ready to move out of ICU.

## 2024-08-29 NOTE — PROCEDURES
[facility]  Procedure: Ultrasound guided R-femoral central venous catheter placement: double lumen HD Catheter        Indication: Renal replacement therapy for renal failure   Consent: Informed consent obtained   Performed by: VIRGINIA Park MD  Medications: 5 mL Lidocaine 1%    Universal Protocol: Patient Identification was verified. Time out was performed. Site was identified. The skin overlying the vein was sterilized with Chlorhexidine. Sterile barrier precautions were utilized: hands washed, sterile gloves, gown, drape, bouffant cap, and eye protection were applied.     Summary: The left internal jugular vein was cannulated but the wire could not be advanced greater than 15 cm. The needle & wire were removed & pressure was applied. The right femoral vein was visualized on ultrasound. The vein was successfully cannulated on the first pass. Dark, low flow venous blood was visualized and the vein was then threaded with a guidewire using the Seldinger technique.  The needle was removed and ultrasound visualized the wire in the vein. A dilator was threaded over the wire followed by the catheter. The wire was removed. The catheter was sutured into place. Blood returned through all ports & flushed. The patient tolerated the procedure well without immediate complications. Minimal estimated blood loss. The area was cleansed and a sterile dressing was applied.      VIRGINIA Park MD  Critical Care Medicine

## 2024-08-29 NOTE — PROGRESS NOTES
Patient is alert/oriented; desaturated to low 80s increased O2; did not tolerate CPAP via V60. Remains on high flow 50 lpm/90% FIO2; sats low 90s.    Alexandr Daniel, RT

## 2024-08-29 NOTE — PROGRESS NOTES
ICU Update Note    Pt with increased FiO2 needs overnight. Was switched to HFNC.  ABG with some hypercapnia. CXR with atelectasis and vasc congestion  Gave lasix, switched to bipap overnight, was titrated down to 75%, comfortable    This morning became nauseated and had a small emesis, switched back to HFNC     Concern for progression of resp failure and need for re-intubation.    Jenifer Nance,   Pulmonary and Critical Care Attending  pgr [phone]

## 2024-08-29 NOTE — PLAN OF CARE
[facility] - ICU    RN Progress Note:            Pertinent Assessments:      Please refer to flowsheet rows for full assessment     As charted            Key Events - This Shift:       - Epi titrated to meet parameters, Cardene started due to SBP >140 & elevated SVR, titrated off by end of shift  - Insulin titrated as ordered  - pt had smear of BM x1 during shift  - pt had a productive cough throughout night, O2 sat able to recover after exertion of cough until 0000. At this time O2 sat in mid 80s for a sustained period of time, Oxymask applied at 15L, no change in O2 saturation, RT and provider notified, HFNC started but pt was switched to Cpap shortly after. Pt able to tolerate Cpap for a while, but started coughing again. At this point pt switched back to HFNC. Pt managedon HFNC, but coughs progressed, O2 sat didn't recover from high 80s, pt placed on Cpap, morning labs drawn, provider notified of results, pt placed on Bipap. Pt tolerated Bipap until she had an episode of emesis and switched over to HFNC.                Barriers to Discharge / Downgrade:     HFNC/Bipap/Cpap, pressors         Problem: Adult Inpatient Plan of Care  Goal: Absence of Hospital-Acquired Illness or Injury  Intervention: Prevent Infection  Recent Flowsheet Documentation  Taken 8/29/2024 0400 by Werner Méndez, RN  Infection Prevention:   environmental surveillance performed   equipment surfaces disinfected   hand hygiene promoted   rest/sleep promoted   single patient room provided  Taken 8/29/2024 0000 by Werner Méndez, RN  Infection Prevention:   environmental surveillance performed   equipment surfaces disinfected   hand hygiene promoted   rest/sleep promoted   single patient room provided  Taken 8/28/2024 2000 by Werner Méndez, RN  Infection Prevention:   environmental surveillance performed   equipment surfaces disinfected   hand hygiene promoted   rest/sleep promoted   single patient room  provided     Problem: Risk for Delirium  Goal: Optimal Coping  Outcome: Progressing  Intervention: Optimize Psychosocial Adjustment to Delirium  Recent Flowsheet Documentation  Taken 8/29/2024 0400 by Werner Méndez RN  Supportive Measures:   active listening utilized   decision-making supported   positive reinforcement provided  Taken 8/29/2024 0000 by Werner Méndez RN  Supportive Measures:   active listening utilized   decision-making supported   positive reinforcement provided  Taken 8/28/2024 2000 by Werner Méndez RN  Supportive Measures:   active listening utilized   decision-making supported   positive reinforcement provided     Problem: Risk for Delirium  Goal: Improved Behavioral Control  Outcome: Progressing  Intervention: Prevent and Manage Agitation  Recent Flowsheet Documentation  Taken 8/29/2024 0400 by Werner Méndez RN  Environment Familiarity/Consistency: daily routine followed  Taken 8/29/2024 0000 by Werner Méndez RN  Environment Familiarity/Consistency: daily routine followed  Taken 8/28/2024 2000 by Werner Méndez RN  Environment Familiarity/Consistency: daily routine followed  Intervention: Minimize Safety Risk  Recent Flowsheet Documentation  Taken 8/29/2024 0400 by Werner Méndez RN  Communication Enhancement Strategies:   call light answered in person   one-step directions provided  Enhanced Safety Measures:   pain management   review medications for side effects with activity   room near unit station  Taken 8/29/2024 0000 by Werner Méndez RN  Communication Enhancement Strategies:   call light answered in person   one-step directions provided  Enhanced Safety Measures:   pain management   review medications for side effects with activity   room near unit station  Taken 8/28/2024 2000 by Werner Méndez RN  Communication Enhancement Strategies:   call light answered in person   one-step directions provided  Enhanced Safety Measures:   pain management   review  medications for side effects with activity   room near unit station     Problem: Risk for Delirium  Goal: Improved Attention and Thought Clarity  Outcome: Progressing  Intervention: Maximize Cognitive Function  Recent Flowsheet Documentation  Taken 8/29/2024 0400 by Werner Méndez, RN  Sensory Stimulation Regulation:   care clustered   lighting decreased   quiet environment promoted  Reorientation Measures:   calendar in view   clock in view  Taken 8/29/2024 0000 by Werner Méndez, RN  Sensory Stimulation Regulation:   care clustered   lighting decreased   quiet environment promoted  Reorientation Measures:   calendar in view   clock in view  Taken 8/28/2024 2000 by Werner Méndez, RN  Sensory Stimulation Regulation:   care clustered   lighting decreased   quiet environment promoted  Reorientation Measures:   calendar in view   clock in view

## 2024-08-29 NOTE — PROGRESS NOTES
CVTS Daily Progress Note   POD#2 s/p Aortic root abscess debridement and aortic annular reconstruction, aortic root replacement (Konect composite 25 mm Silva Inspiris Resilia bioprosthetic valve within 30 mm Gelweave Valsalva graft with reimplantation of the coronary arteries), Mitral valve replacement (31 mm St. Tim Silva bioprosthetic valve) and CAB x 2.  Attending: Dr Renae  LOS: 13    SUBJECTIVE/INTERVAL EVENTS:    She had increased oxygen needs overnight requiring HFNC. Didn't tolerate CPAP. NSR.  Normotensive.Weaned off Epi and Dobutamine gtt started. Lasix gtt started. CXR with congestion. Seen in in bed. Pain well controlled. +BM / +flatus. Tolerating Cl liquid diet. UOP adequate. Chest tube output appropriate. Hgb 8.7. Cr 2.14(1.37). INR 2.28.  Patient denies new chest pain, shortness of breath, abdominal pain, calf pain, nausea. Patient has no questions today.     OBJECTIVE:  Temp:  [97.3  F (36.3  C)-98.8  F (37.1  C)] 97.3  F (36.3  C)  Pulse:  [79-80] 79  Resp:  [0-39] 30  BP: (89)/(50) 89/50  MAP:  [57 mmHg-96 mmHg] 68 mmHg  Arterial Line BP: ()/(47-76) 103/52  FiO2 (%):  [32 %-100 %] 100 %  SpO2:  [84 %-97 %] 94 %  Vitals:    08/25/24 0458 08/26/24 0330 08/27/24 0500 08/28/24 0600   Weight: 86.3 kg (190 lb 3.2 oz) 86.2 kg (190 lb 1.6 oz) 86.4 kg (190 lb 8 oz) 95.3 kg (210 lb 1.6 oz)    08/29/24 0400   Weight: 101 kg (222 lb 10.6 oz)       Clinically Significant Risk Factors         # Hypernatremia: Highest Na = 146 mmol/L in last 2 days, will monitor as appropriate  # Hypocalcemia: Lowest Ca = 8.3 mg/dL in last 2 days, will monitor and replace as appropriate    # Anion Gap Metabolic Acidosis: Highest Anion Gap = 19 mmol/L in last 2 days, will monitor and treat as appropriate  # Hypoalbuminemia: Lowest albumin = 3.3 g/dL at 8/28/2024 12:12 AM, will monitor as appropriate      # Acute Kidney Injury, unspecified: based on a >150% or 0.3 mg/dL increase in last creatinine compared to past  "90 day average, will monitor renal function            # Obesity: Estimated body mass index is 34.87 kg/m  as calculated from the following:    Height as of this encounter: 1.702 m (5' 7\").    Weight as of this encounter: 101 kg (222 lb 10.6 oz).        # Financial/Environmental Concerns:     # History of CABG: noted on surgical history        Current Medications:    Scheduled Meds:  Current Facility-Administered Medications   Medication Dose Route Frequency Provider Last Rate Last Admin    acetaminophen (TYLENOL) Suppository 650 mg  650 mg Rectal Q8H Maryam Anthony PA-C   650 mg at 08/28/24 0407    acetaminophen (TYLENOL) tablet 975 mg  975 mg Oral Q8H Maryam Anthony PA-C   975 mg at 08/28/24 2009    sodium chloride (NEBUSAL) 3 % neb solution 3 mL  3 mL Nebulization Q6H Zach Park MD        And    albuterol (PROVENTIL) neb solution 2.5 mg  2.5 mg Nebulization Q6H Zach Park MD        atorvastatin (LIPITOR) tablet 80 mg  80 mg Oral QPM Jessica Tolbert PA-C   80 mg at 08/28/24 2010    cefTAZidime (FORTAZ) 2 g vial to attach to  ml bag for ADULTS or NS 50 ml bag for PEDS  2 g Intravenous Q24H Nannette Lebron MD   2 g at 08/29/24 0945    [Held by provider] clopidogrel (PLAVIX) tablet 75 mg  75 mg Oral Daily Maryam Anthony PA-C        lactobacillus rhamnosus (GG) (CULTURELL) capsule 1 capsule  1 capsule Oral BID Zach Foreman DO   1 capsule at 08/28/24 2009    Lidocaine (LIDOCARE) 4 % Patch 1-2 patch  1-2 patch Transdermal Q24H Maryam Anthony PA-C   1 patch at 08/27/24 1827    [Held by provider] nafcillin 12 g in sodium chloride 0.9 % 550 mL continuous infusion  12 g Intravenous Q24H [address] MBBS   12 g at 08/28/24 1214    pantoprazole (PROTONIX) 2 mg/mL suspension 40 mg  40 mg Oral or NG Tube QAM AC Maryam Anthony PA-C        Or    pantoprazole (PROTONIX) EC tablet 40 mg  40 mg Oral KIN Maryam Chicas PA-C        Or    " pantoprazole (PROTONIX) IV push injection 40 mg  40 mg Intravenous QAM AC Maryam Anthony PA-C        polyethylene glycol (MIRALAX) Packet 17 g  17 g Oral Daily Maryam Anthony PA-C        senna-docusate (SENOKOT-S/PERICOLACE) 8.6-50 MG per tablet 1 tablet  1 tablet Oral BID Maryam Anthony PA-C   1 tablet at 08/28/24 2009    sodium chloride (PF) 0.9% PF flush 3 mL  3 mL Intracatheter Q8H Gondal, Saad J, MD   3 mL at 08/29/24 0820    Warfarin Dose Required Daily - Pharmacist Managed  1 each Oral See Admin Instructions Jessica Tolbert PA-C         Continuous Infusions:  Current Facility-Administered Medications   Medication Dose Route Frequency Provider Last Rate Last Admin    dexmedeTOMIDine (PRECEDEX) 4 mcg/mL in sodium chloride 0.9 % 100 mL infusion  0.1-1.2 mcg/kg/hr Intravenous Continuous Maryam Anthony PA-C   Stopped at 08/28/24 0810    DOBUTamine (DOBUTREX) 500 mg in D5W 250 mL infusion (adult std conc)  2.5 mcg/kg/min Intravenous Continuous Jessica Tolbert PA-C 14.3 mL/hr at 08/29/24 0951 5 mcg/kg/min at 08/29/24 0951    EPINEPHrine (ADRENALIN) 5 mg in sodium chloride 0.9 % 250 mL infusion CENTRAL  0.01-0.1 mcg/kg/min Intravenous Continuous Dylan Renae MD   Stopped at 08/29/24 0844    furosemide (LASIX) 500 mg/50mL infusion ADULT MAX CONC  15 mg/hr Intravenous Continuous Navneet Branch MD 1.5 mL/hr at 08/29/24 0941 15 mg/hr at 08/29/24 0941    insulin regular (MYXREDLIN) 1 unit/mL infusion  0-24 Units/hr Intravenous Continuous Maryam Anthony PA-C   Held at 08/29/24 0614    niCARdipine 40 mg in 200 mL NS (CARDENE) infusion  0.5-15 mg/hr Intravenous Continuous Dylan Renae MD   Stopped at 08/29/24 0705    phenylephrine (ZEYAD-SYNEPHRINE) 50 mg in NaCl 0.9 % 250 mL infusion  0.1-4 mcg/kg/min Intravenous Continuous PRN Maryam Anthony PA-C         PRN Meds:.  Current Facility-Administered Medications   Medication Dose Route  Frequency Provider Last Rate Last Admin    acetaminophen (TYLENOL) tablet 650 mg  650 mg Oral Q4H PRN Gondal, Saad J, MD   650 mg at 08/28/24 1528    Or    acetaminophen (TYLENOL) Suppository 650 mg  650 mg Rectal Q4H PRN Gondal, Saad J, MD        [START ON 8/30/2024] acetaminophen (TYLENOL) tablet 650 mg  650 mg Oral Q4H PRN Maryam Anthony PA-C        bisacodyl (DULCOLAX) suppository 10 mg  10 mg Rectal Daily PRN Maryam Anthony PA-C        calcium carbonate (TUMS) chewable tablet 1,000 mg  1,000 mg Oral 4x Daily PRN Gondal, Saad J, MD        calcium gluconate 1 g in 50 mL in sodium chloride intermittent infusion  1 g Intravenous Once PRN Maryam Anthony PA-C        calcium gluconate 2 g in  mL intermittent infusion  2 g Intravenous Once PRN Maryam Anthony PA-C        calcium gluconate 3 g in sodium chloride 0.9 % 100 mL intermittent infusion  3 g Intravenous Once PRN Maryam Anthony PA-C        carboxymethylcellulose PF (REFRESH PLUS) 0.5 % ophthalmic solution 1 drop  1 drop Both Eyes Q1H PRN Gondal, Saad J, MD        glucose gel 15-30 g  15-30 g Oral Q15 Min PRN Maryam Anthony PA-C        Or    dextrose 50 % injection 25-50 mL  25-50 mL Intravenous Q15 Min PRN Maryam Anthony PA-C        Or    glucagon injection 1 mg  1 mg Subcutaneous Q15 Min PRN Maryam Anthony PA-C        hydrALAZINE (APRESOLINE) injection 10 mg  10 mg Intravenous Q30 Min PRN Maryam Anthony PA-C        HYDROmorphone (DILAUDID) injection 0.1 mg  0.1 mg Intravenous Q2H PRN Maryam Anthony PA-C        Or    HYDROmorphone (DILAUDID) injection 0.2 mg  0.2 mg Intravenous Q2H PRN Maryam Anthony PA-C   0.2 mg at 08/29/24 0920    lactated ringers BOLUS 250 mL  250 mL Intravenous Q15 Min PRN Maryam Anthony PA-C   250 mL at 08/27/24 2151    lidocaine (LMX4) cream   Topical Q1H PRN Gondal, Saad J, MD        lidocaine 1 % 0.1-1 mL  0.1-1 mL Other Q1H  PRN Gondal, Saad J, MD        magnesium hydroxide (MILK OF MAGNESIA) suspension 30 mL  30 mL Oral Daily PRN Maryam Anthony PA-C        miconazole (MICATIN) 2 % powder   Topical BID PRN Erick Patino A, DO        naloxone (NARCAN) injection 0.2 mg  0.2 mg Intravenous Q2 Min PRN Carey Erick A, DO        Or    naloxone (NARCAN) injection 0.4 mg  0.4 mg Intravenous Q2 Min PRN Carey Erick A, DO        Or    naloxone (NARCAN) injection 0.2 mg  0.2 mg Intramuscular Q2 Min PRN Carey Erick A, DO        Or    naloxone (NARCAN) injection 0.4 mg  0.4 mg Intramuscular Q2 Min PRN Erick Patino DO        nicotine (NICORETTE) gum 2 mg  2 mg Buccal Q1H PRN Gondal, Saad J, MD        ondansetron (ZOFRAN ODT) ODT tab 4 mg  4 mg Oral Q6H PRN Maryam Anthony PA-C        Or    ondansetron (ZOFRAN) injection 4 mg  4 mg Intravenous Q6H PRN Maryam Anthony PA-C   4 mg at 08/29/24 0630    oxyCODONE IR (ROXICODONE) half-tab 2.5 mg  2.5 mg Oral Q4H PRN Maryam Anthony PA-C   2.5 mg at 08/28/24 1143    Or    oxyCODONE (ROXICODONE) tablet 5 mg  5 mg Oral Q4H PRN Maryam Anthony PA-C   5 mg at 08/28/24 2013    phenylephrine (ZEYAD-SYNEPHRINE) 50 mg in NaCl 0.9 % 250 mL infusion  0.1-4 mcg/kg/min Intravenous Continuous PRN Maryam Anthony PA-C        prochlorperazine (COMPAZINE) injection 5 mg  5 mg Intravenous Q6H PRN Maryam Anthony PA-C   5 mg at 08/29/24 0658    Or    prochlorperazine (COMPAZINE) tablet 5 mg  5 mg Oral Q6H PRN Maryam Anthony PA-C        senna-docusate (SENOKOT-S/PERICOLACE) 8.6-50 MG per tablet 1 tablet  1 tablet Oral BID PRN Gondal, Saad J, MD        Or    senna-docusate (SENOKOT-S/PERICOLACE) 8.6-50 MG per tablet 2 tablet  2 tablet Oral BID PRN Gondal, Saad J, MD        sodium chloride (PF) 0.9% PF flush 10-40 mL  10-40 mL Intracatheter Once PRN [address] MBBS        sodium chloride (PF) 0.9% PF flush 3 mL  3 mL Intracatheter q1  min prn Gondal, Saad J, MD        traMADol (ULTRAM) tablet 50 mg  50 mg Oral Q6H PRN Erick Patino, DO   50 mg at 08/24/24 3902       Cardiographics:    Telemetry monitoring demonstrates NSR with paced rate at 80 bmp per my personal review.    Imaging:  Results for orders placed or performed during the hospital encounter of 08/16/24   MR Brain w/o & w Contrast    Impression    IMPRESSION: Assessment degraded due to motion.  1.  13 mm focus of cortical signal abnormality within the left superior parietal lobule which is favored to represent a subacute infarct; low-grade glial neoplasm could conceivably have a similar appearance. Hyperacute intraparenchymal hematoma could   also have a similar appearance but is considered less likely. Recommend noncontrast head CT for further characterization. Also recommend follow-up MRI brain without and with contrast in 8-12 weeks to document expected evolution.  2.  Additional smaller foci of signal abnormality with similar characteristics favored to represent punctate subacute infarcts.   CTA Head Neck with Contrast    Impression    IMPRESSION:   HEAD CT:  1.  Small focus of juxtacortical low attenuation within the left parietal lobe corresponds to signal abnormality seen on the prior MRI. As suggested previously this is favored to reflect evolving small vessel ischemic change; however, MRI follow-up to   exclude a low-grade neoplastic process is advised.  2.  No evidence for intracranial hemorrhage or significant mass effect.  3.  Generalized brain atrophy and presumed chronic microvascular ischemic change.    HEAD CTA:   1.  Intracranial atherosclerosis including moderate to marked stenosis of the right P2 posterior cerebral artery segment.  2.  No definite proximal branch occlusion, aneurysm, or high flow vascular malformation identified.    NECK CTA:  1.  Atherosclerotic plaque at the carotid bifurcations bilaterally without hemodynamically significant stenosis in the  neck vessels.   2.  No evidence for dissection.   Radiologist Consult For Cardiology    Impression    IMPRESSION:    1.  Partially visualized left adrenal nodule, indeterminate, could represent adrenal adenoma.  2.  Please refer to cardiologist's dictation for the cardiac CT report.   US Carotid Bilateral    Impression    IMPRESSION:  1.  Mild plaque formation, velocities consistent with less than 50% stenosis in the right internal carotid artery.  2.  Mild plaque formation, velocities consistent with less than 50% stenosis in the left internal carotid artery.  3.  Flow within the vertebral arteries is antegrade.   XR Chest Port 1 View    Impression    IMPRESSION: Endotracheal tube tip 4.8 cm proximal to the anuj. Rutledge-Dana catheter with tip in the pulmonary outflow tract. Mediastinal drain with aortic valve prosthesis. Sternotomy. Small left pleural effusion with left basilar infiltrate.   XR Chest Port 1 View    Impression    IMPRESSION: Endotracheal tube in the distal aspect of the trachea. Sternotomy. AVR. Mediastinal drain. Right IJ Rutledge-Dana catheter with tip in the right ventricle/MPA. Left-sided chest tube. No pneumothorax. Mild cardiac enlargement with normal pulmonary   vascularity. Minimal left basilar atelectasis and pleural fluid. Degenerative changes in the spine.   Echocardiogram Limited   Result Value Ref Range    LVEF  60-65%    CTA  ANGIOGRAM CORONARY ARTERY   Result Value Ref Range    BSA 0.00 m2       Labs, personally reviewed.  Hemoglobin   Date Value Ref Range Status   08/29/2024 8.7 (L) 11.7 - 15.7 g/dL Final   08/28/2024 9.2 (L) 11.7 - 15.7 g/dL Final   08/27/2024 9.1 (L) 11.7 - 15.7 g/dL Final     WBC Count   Date Value Ref Range Status   08/29/2024 15.3 (H) 4.0 - 11.0 10e3/uL Final   08/28/2024 10.8 4.0 - 11.0 10e3/uL Final   08/27/2024 15.3 (H) 4.0 - 11.0 10e3/uL Final     Platelet Count   Date Value Ref Range Status   08/29/2024 151 150 - 450 10e3/uL Final   08/28/2024 200 150 - 450  10e3/uL Final   08/27/2024 190 150 - 450 10e3/uL Final     Creatinine   Date Value Ref Range Status   08/29/2024 2.14 (H) 0.51 - 0.95 mg/dL Final   08/28/2024 1.37 (H) 0.51 - 0.95 mg/dL Final   08/27/2024 1.24 (H) 0.51 - 0.95 mg/dL Final     Potassium   Date Value Ref Range Status   08/29/2024 3.5 3.4 - 5.3 mmol/L Final   08/28/2024 3.9 3.4 - 5.3 mmol/L Final   08/28/2024 4.0 3.4 - 5.3 mmol/L Final     Potassium POCT   Date Value Ref Range Status   08/27/2024 3.4 3.4 - 5.3 mmol/L Final   08/27/2024 3.9 3.4 - 5.3 mmol/L Final   08/27/2024 3.4 3.4 - 5.3 mmol/L Final     Magnesium   Date Value Ref Range Status   08/29/2024 2.1 1.7 - 2.3 mg/dL Final   08/28/2024 2.1 1.7 - 2.3 mg/dL Final   08/27/2024 2.5 (H) 1.7 - 2.3 mg/dL Final          I/O:  I/O last 3 completed shifts:  In: 1250.36 [I.V.:1050.36]  Out: 2990 [Urine:2330; Chest Tube:660]       Physical Exam:    General: Patient seen in bed. Awake and alert.  Pleasant.   HEENT: JULIO, no sclera icterus, moist mucosa  CV: RRR on monitor. 2+ peripheral pulses in all extremities. Mild edema.   Pulm: Non-labored effort on HFNC. Chest tubes in place, serosanguinous output, no air leak. Incision C/D/I.  Abd: Soft, NT, ND  : Le with pita urine  Ext: Mild pedal edema, SCDs in place, warm, distal pulses intact  Neuro:CNs grossly intact      ASSESSMENT/PLAN:    Felicitas Elliott is a 84 year old female with a history of endocarditis, CAD, aortic stenosis and mitral valve disorder who is s/p Aortic root abscess debridement and aortic annular reconstruction, aortic root replacement (Konect composite 25 mm Silva Inspiris Resilia bioprosthetic valve within 30 mm Gelweave Valsalva graft with reimplantation of the coronary arteries), Mitral valve replacement (31 mm St. Tim Silva bioprosthetic valve) and CAB x 2.    Active Problems:    Bacteremia    Endocarditis    Sepsis (H)    Nonrheumatic aortic valve stenosis    Postsurgical percutaneous transluminal coronary angioplasty  status    Mitral valve disorder    Coronary artery disease involving native coronary artery of native heart, unspecified whether angina present        NEURO:   - Scheduled Tylenol/lidocaine patches and PRN Tylenol/oxycodone/dilaudid for pain    CV:   - Pre-op EF 60-65%  - Normotensive  - Weaning pressors as hemodynamics allow, currently on Epi 0.04-off this am  - Dobutamine gtt started  - Beta blocker pending weaning from pressors  - ASA Allergy- Plavix 75 mg PO daily-held until PPM determined   - Atorvastatin 80 mg daily  - Chest tubes to remain today.  - Cards following-apprec reqs  - New baseline echo before discharge and after CT removed  - Coumadin x 3 months per surgeon preference. INR goal 2.5-3.5. Rx to dose. INR 2.14    PULM:   - Extubated POD#1  - Maintaining oxygen saturations on HFNC  - Encourage pulmonary toilet  - CXR-8/29 mild venous congestion,Small right pleural effusion has developed with associated passive atelectasis right base.    FEN/GI:  - Continue electrolyte replacement protocol  - Diet: Cardiac, ADAT   - Bowel regimen    RENAL:  - Adequate UOP/hr. Continue to monitor closely.  - Cr 2.14(1.37)  - Le to remain in for close monitoring of I/O and during period of diuresis/relative immobility.   - Diuresis with Lasix gtt  -Consult nephrology-apprec reqs    HEME:  - Acute blood loss anemia post-op.   - Hgb 8.7, no bleeding concerns. Hep subcutaneous-stopped,   - Plavix 75 mg PO daily(held)- ASA allergy  - Coumadin pharmacy to dose. Goal INR 2.5-3.5 x 3 mos. INR 2.28(1.47)    ID:  - Lennie op ppx complete, afebrile.  -ID following-apprec reqs- MSSA bacteremia. Cultures NGTD from 8/17          -Nafcillin for 6 wks-held today with increase Cr          - new surgical cultures sent-pending    ENDO:   - Continue insulin gtt.     PPx:   - DVT: SCDs, SQ heparin TID, ambulation   - GI: Protonix 40mg PO daily    DISPO:   - Continue critical care in ICU  - Medically Ready for Discharge: Anticipated in 5+  Days       Patient discussed with Dr. Renae.      Jessica Tolbert PA-C  [facility] Cardiothoracic Surgery  [place] or pager [phone]

## 2024-08-29 NOTE — PROGRESS NOTES
"Critical Care Progress Note     08/29/2024     Name: Felicitas Elliott MRN#: [id number]   Age: 84 year old YOB: 1940        Summary     Past medical history of moderate aortic stenosis, HFpEF, HTN, HLD, CKD 3, chronic intertriginous skin wounds, chronic neck pain       Presented 8/14/24 for chills & generalized weakness. 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 8/27/24 s/p bioprosthetic aortic & mitral valve replacement, aortic root replacement, & two vessel coronary artery bypass graft. Course complicated by post operative hypoxemic respiratory failure due to cardiogenic pulmonary edema, atelectasis, & possible nosocomial aspiration in conjunction with cardiogenic-distributive shock       Interval Events     Progressive hypoxemia overnight. CPAP, emesis, to HFNC. Given more furosemide.     Febrile yesterday morning 38.2. remains on epi & nicardipine overnight, now off. CVP low 20s overnight now supine mid 10's, weight continues to rise, PAP 40s/20s, CO/CI 5/2.5, last SVO2 56. UO insufficient, 2 L yesterday, net even.     BMP bicarb 21, AG up 19, Cr 2.14 (up from 1.37, 1.24), AST uptrending 137, WC waxing waning 15.3 (10.8, 15.3), Hgb stable 8.7, plts decreased 151.     INR 2.28 on warfarin     Radiograph yesterday with progressive right basilar opacity with blunting of the costophrenic angle    Pleural US demonstrates bilateral B lines, small right sided pleural effusion      [place] - 1) failed diuresis trial, CRRT; 2) empirically broadening of antimicrobials to cover hospital acquired respiratory pathogens; 3) intubation, higher PEEP lower TV strategy (lung protective)         Assessment & Plan      Neurology, Psychiatry, Sedation, Analgesia:  L-parietal septic embolic stroke   8/17/24 MRI brain \"cortical signal abnormality within the left " "superior parietal lobule which is favored to represent a subacute infarct [...] smaller foci of signal abnormality with similar characteristics favored to represent punctate subacute infarcts.\" 8/18/24 CTA head & neck \"low attenuation within the left parietal lobe [...] this is favored to reflect evolving small vessel ischemic change; however, MRI follow-up to   exclude a low-grade neoplastic process is advised\"  - neurology was consulted      Cervical spinal stenosis   Chronic neck pain   8/15/24  cervical MRI \"multilevel cervical spondylosis [...] complex edema at C4-5 on the left is favored to be reactive/degenerative [...] septic facet arthropathy are difficult to entirely exclude [...] C4-5, moderate to severe spinal canal stenosis  [...] severe right and moderate to severe left neural foraminal stenosis  [...] C5-6, moderate spinal canal stenosis  [...]Severe bilateral neural foraminal stenosis  [...] C7-T1, mild spinal canal stenosis with severe right and mild to moderate left neural foraminal stenosis [...] C3-4, moderate bilateral neural foraminal stenosis.\"  - neurosurgery was consulted, no current surgical interventions advised  - consider cervical MRI w/wo if neck pain worsens to evaluate for sequelae of infection      Sedation & Analgesia   - RASS goal 0 to -1  - dexmedetomidine & fentanyl infusions    - prn APAP & tramadol      Cardiovascular:  Post-operative hemodynamic instability  Post-cardiopulmonary bypass cardiogenic & distributive   - hemodynamic management per CV surgery & cardiology: dobutamine & NE  - MAP 60-70, SBP ; Preload: PPV <11%; Cardiac index > 2.2-2.5 L/min/m^2; ScVO2 >70%, SvO2 >60%  - CRRT as below      Severe aortic stenosis, aortic & mitral valve infective endocarditis, aortic root abscess  8/27/24 bioprosthetic aortic & mitral valve replacement, aortic root replacement  - warfarin x 3 months per surgeon preference. INR goal 2.5-3.5      Severe multivessel CAD  8/27/24 2 " vessel CABG  - clopidogrel per CV surgery      Preserved pre & post- operative biventricular systolic function   Left ventricular hypertrophy with impaired diastolic function      Carotid arterial stenosis, mild  8/22/24 carotid US     Post-operative bradyarrhythmia   S/p placement of temporary epicardial pacing wires   Presumed transient post-operative sinus node dysfunction or AV conduction defect.   - V paced at 80 BPM, underlying rhythm ~60 without convincing P waves reportedly      Respiratory, Airway:  Acute hypoxemic respiratory failure   Cardiogenic pulmonary edema, suspect nosocomial aspiration pneumonia-pneumonitis   Overnight 8/28-8/29 over 30 minutes period exhibited progressive rapidly worsening hypoxemia, nausea, vomiting, & multiple sequelae of hypervolemia with oliguria. 8/29/24 radiograph with progressive right basilar opacity & blunting of the costophrenic angle. 8/29/24 pleural US small right pleural effusion, no ipsilateral chest tube   - supplemental O2 to maintain spO2 >= 90-96% & PaO2 >= 60 mmHg  - lung protective ventilation (TV 6-8 mL/kg IBW, Pplat <30, driving pressure <15)   - target normocarbia pH 7.35 - 7.45, continuously monitored end tidal CO2  - empiric antimicrobials & infectious evaluation as below  - CRRT for UF as below     Post-operative chest tubes  - monitor output       Gastrointestinal:  No active issues      Renal, Acid-base, Electrolytes, Volume :  Oliguric 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 Baseline 1.2 - 1.3  - nephrology consulted for CRRT-UF     Hypervolemia  Grossly edematous throughout. Elevated CVP. 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   Question of cervical septic facet arthropathy   Presumed source intertriginous soft tissue lesions  - ID consulted   - hold nafcillin while  broadened, as below     Concern for nosocomial aspiration pneumonia-pneumonitis   - check procal, sputum culture, MRSA nares, viral panel   - broadened to ceftazidime, per ID  - consider vancomycin pending MRSA nares      Lennie-operative antibiotics per CV surgery      Hematology, Oncology:  Post-operative anemia  - monitor for bleeding: Hgb goal >7-8     Risk for post-cardiac bypass coagulopathy      Risk for post-cardiac bypass thrombocytopenia      Endocrine:  MICU insulin/glucose protocol   - maintain BG of 140 to 180 mg/dL with a continuous IV insulin infusion     Checklist:  FEN: NPO  VTE ppx: therapeutic warfarin   GI ppx: pantoprazole   Bowel regimen: PEG & senna   VAP ppx: Head of bed >30 degrees, daily oral care  Lines/tube-size: R-internal jugular introducer & PAC 8/27, radial arterial line 8/27, PICC 8/21, R-femoral HD catheter 8/29, chacko 8/27, ETT 8/29   Skin: sternotomy site clean & dry    PT/OT/SLP, early mobility: cardiac rehab when able   Code Status: full       Physical Exam      Neurologic: Sedated. Opens eyes, tracks, & follows commands in all extremities symmetrically   HEENT: Head and face normal. No nasal discharge. Oropharynx normal. Eyelids, conjunctiva, & sclera normal.   Neck: Neck appearance normal. No neck masses. Thyroid not enlarged.  Respiratory: Crackles bilaterally. No wheezes or rhonchi.   Cardiovascular: Regular rate & rhythm  Heart sounds distant   Gastrointestinal: Soft. Obese.   Musculoskeletal: Skeletal configuration normal and muscle mass normal for age. Joint appearance overall normal.  Skin, Hair, & Nails: Skin color normal. Sternotomy site clean & dry  Hair & nails normal.  Extremities: 2+ lower extremity pitting edema. Warm     All pertinent vital signs, ventilator settings, I&Os, laboratory, microbiology, ECGs, & imaging data has been personally reviewed. Total Critical Care time, excluding procedures, was 50 minutes     VIRGINIA Park MD  Critical Care Medicine

## 2024-08-29 NOTE — PROGRESS NOTES
[facility] Inpatient follow up        08/29/2024    Chart reviewed  Patient seen in ICU             Assessment and Recommendations:   Assessment:  Felicitas Elliott is a 84 year old female with     History of severe aortic stenosis  S/P aortic root abscess debridement and aortic annular reconstruction, aortic root replacement, bioprosthetic aortic valve on 8/27/2024  Acute respiratory failure, extubated 8/28/2024, reintubated 8/29/2024, acute kidney injury, currently on CRRT  MRSA nares negative, respiratory PCR negative  HAMMAD on CKD.  MSSA bacteremia.  Positive blood culture on 8/14/2024 and 8/16.  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  With the size of the vegetation combined with brain infarct, status post CV surgery, see details below active issue.   Neck pain worrisome for cervical discitis.  Neck MRI showed some edema at C4-C5 on the left side.  No clear evidence of infection.  Abnormal brain MRI suggestive of subacute infarct. In a patient with MSSA bacteremia and aortic valve endocarditis, this is cerebral septic emboli. Active issue.     POSTOPERATIVE DIAGNOSES:  1.  Severe aortic stenosis and moderate aortic regurgitation.  2.  Severe mitral stenosis.  3.  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  4.  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  5.  Severe multivessel coronary artery disease.  6.  Aortic root abscess.      PROCEDURE PERFORMED: 8/27/24  Aortic root abscess debridement and aortic annular reconstruction.  Aortic root replacement (Konect composite 25 mm Silva Inspiris Resilia bioprosthetic valve within 30 mm Gelweave Valsalva graft with reimplantation of the coronary arteries).  Mitral valve  "replacement (31 mm St. Tim Silva bioprosthetic valve).  Coronary artery bypass grafting x 2 (reversed saphenous vein graft to the obtuse marginal branch of the left circumflex coronary artery, and pedicled left internal mammary artery to left anterior descending coronary artery).  Endoscopic vein harvest of the greater saphenous vein from the left lower extremity.    Recommendations:  Acute respiratory failure, pneumonia, switch nafcillin to ceftazidime, renal dosing  Currently intubated, discussed with ICU nurse at bedside  Discussed with ICU attending  Hold IV nafcillin continuous infusion.  Per previous notes plan IV antibiotics duration 6 weeks  Monitor kidney function.  PICC placed 8/21  Monitor CBC, CMP  Status post vascular surgery, chest tube, CV surgery following closely  Will need to check immunoglobin level in 4 to 6 weeks sister with history of hypogammaglobulinemia   Overall will require at least 6 weeks of IV antibiotics, holding nafcillin due to acute kidney injury  Please see previous ID notes by Dr. Gómez  Patient seen in ICU.  Appreciate input from patient's nurse at bedside  Discussed with ICU staff.        Nannette Lebron MD  [facility]  Answering Service: [phone]  On-Call ID provider: [phone], option: 9              Interval History:     HPI: chart reviewed  Patient seen in ICU -intubated 8/29/2024   Had emesis, decrease in urine output  Currently getting CRRT  Previous note:  Chest tube in place, postop pain  Small amount of diarrhea  Discussed antibiotics with patient's nurse         The interval history was reviewed.   Doing ok. Tolerating antibiotics. Family visiting. Itchy area on back, no rash in that area.     Pertinent cultures include:  No results found for: \"CULT\"    Recent Inflammatory Biomarkers:   Recent Labs   Lab Test 08/29/24  0359 08/28/24  0552 08/27/24  1448 08/26/24  0603 08/23/24  0946 08/21/24  0444   PCAL 1.24*  --   --   --   --   " --    WBC 15.3* 10.8 15.3* 10.4 10.7 9.4            Review of Systems:   CONSTITUTIONAL:    Temp Max: Temp (24hrs), Av.8  F (37.1  C), Min:97.9  F (36.6  C), Max:100.3  F (37.9  C)   .  Negative except for findings in the HPI.           Current Medications (antimicrobials listed in bold):     Current Facility-Administered Medications   Medication Dose Route Frequency Provider Last Rate Last Admin    acetaminophen (TYLENOL) Suppository 650 mg  650 mg Rectal Q8H Maryam Anthony PA-C   650 mg at 24 0407    acetaminophen (TYLENOL) tablet 975 mg  975 mg Oral Q8H Maryam Anthony PA-C   975 mg at 24    sodium chloride (NEBUSAL) 3 % neb solution 3 mL  3 mL Nebulization Q6H Zach Park MD   3 mL at 24 1114    And    albuterol (PROVENTIL) neb solution 2.5 mg  2.5 mg Nebulization Q6H Zach Park MD   2.5 mg at 24 1118    atorvastatin (LIPITOR) tablet 80 mg  80 mg Oral QPM Jessica Tolbert PA-C   80 mg at 24    cefTAZidime (FORTAZ) 1 g vial to attach to  ml bag for ADULTS or NS 50 ml bag for PEDS  1 g Intravenous Q8H Alize Flores MD        [Held by provider] clopidogrel (PLAVIX) tablet 75 mg  75 mg Oral Daily Maryam Anthony PA-C        sodium chloride 0.9% DIALYSIS Cath LOCK - RED Lumen  10 mL Intracatheter Once in dialysis/CRRT Moses Mcnair MD        Followed by    heparin 1000 unit/mL DIALYSIS Cath LOCK - RED Lumen  1.3-2.6 mL Intracatheter Once in dialysis/CRRT Moses Mcnair MD        sodium chloride 0.9% DIALYSIS Cath LOCK - BLUE Lumen  10 mL Intracatheter Once in dialysis/CRRT Moses Mcnair MD        Followed by    heparin 1000 unit/mL DIALYSIS Cath LOCK -BLUE Lumen  1.3-2.6 mL Intracatheter Once in dialysis/CRRT Moses Mcnair MD        lactobacillus rhamnosus (GG) (CULTURELL) capsule 1 capsule  1 capsule Oral BID Zach Foreman,    1 capsule at 24    Lidocaine (LIDOCARE) 4 % Patch 1-2 patch  1-2 patch Transdermal  Q24H Maryam Anthony PA-C   1 patch at 08/27/24 1827    [Held by provider] nafcillin 12 g in sodium chloride 0.9 % 550 mL continuous infusion  12 g Intravenous Q24H [address] [employer]   12 g at 08/28/24 1214    pantoprazole (PROTONIX) 2 mg/mL suspension 40 mg  40 mg Oral or NG Tube QAM AC Maryam Anthony PA-C        Or    pantoprazole (PROTONIX) EC tablet 40 mg  40 mg Oral QAM AC Maryam Anthony PA-C        Or    pantoprazole (PROTONIX) IV push injection 40 mg  40 mg Intravenous QAM AC Maryam Anthony PA-C   40 mg at 08/29/24 1107    polyethylene glycol (MIRALAX) Packet 17 g  17 g Oral Daily Maryam Anthony PA-C        senna-docusate (SENOKOT-S/PERICOLACE) 8.6-50 MG per tablet 1 tablet  1 tablet Oral BID Maryam Anthony PA-C   1 tablet at 08/28/24 2009    sodium chloride (PF) 0.9% PF flush 3 mL  3 mL Intracatheter Q8H Gondal, Saad J, MD   3 mL at 08/29/24 0820    warfarin ANTICOAGULANT (COUMADIN) tablet 1 mg  1 mg Oral ONCE at 18:00 Alize Flores MD        Warfarin Dose Required Daily - Pharmacist Managed  1 each Oral See Admin Instructions Jessica Tolbert PA-C                  Allergies:     Allergies   Allergen Reactions    Aspirin Rash    Cats Rash    Nickel Rash            Physical Exam:   Vitals were reviewed  Patient Vitals for the past 24 hrs:   Temp Temp src Pulse Resp SpO2 Weight   08/29/24 1300 -- -- 79 24 94 % --   08/29/24 1245 -- -- 80 24 94 % --   08/29/24 1230 -- -- 82 24 93 % --   08/29/24 1215 -- -- 83 29 94 % --   08/29/24 1200 -- -- 82 (!) 33 91 % --   08/29/24 1145 -- -- 82 30 92 % --   08/29/24 1130 -- -- 80 30 92 % --   08/29/24 1115 -- -- 80 (!) 32 92 % --   08/29/24 1100 -- -- 81 (!) 32 92 % --   08/29/24 1045 -- -- 80 (!) 31 92 % --   08/29/24 1030 -- -- 80 30 91 % --   08/29/24 1015 -- -- 79 (!) 32 91 % --   08/29/24 1000 -- -- 79 27 92 % --   08/29/24 0945 -- -- 79 30 92 % --   08/29/24 0930 -- -- 79 30 93 % --   08/29/24 0915 -- --  79 30 93 % --   08/29/24 0900 -- -- 79 30 94 % --   08/29/24 0845 -- -- 79 (!) 31 93 % --   08/29/24 0831 -- -- -- -- 94 % --   08/29/24 0830 -- -- 79 (!) 32 94 % --   08/29/24 0815 -- -- 79 30 95 % --   08/29/24 0800 -- -- 79 (!) 34 94 % --   08/29/24 0745 -- -- 79 (!) 31 94 % --   08/29/24 0730 -- -- 80 (!) 31 94 % --   08/29/24 0715 -- -- 80 (!) 32 95 % --   08/29/24 0700 -- -- 80 (!) 32 94 % --   08/29/24 0600 -- -- 80 (!) 31 93 % --   08/29/24 0500 -- -- 80 27 94 % --   08/29/24 0400 97.3  F (36.3  C) Pulm Art 80 29 92 % 101 kg (222 lb 10.6 oz)   08/29/24 0335 -- -- -- -- 96 % --   08/29/24 0300 -- -- 80 22 91 % --   08/29/24 0200 -- -- 80 28 93 % --   08/29/24 0100 -- -- 80 (!) 0 91 % --   08/29/24 0034 -- -- -- -- 90 % --   08/29/24 0019 -- -- -- -- (!) 84 % --   08/29/24 0000 97.9  F (36.6  C) Pulm Art 80 24 93 % --   08/28/24 2300 -- -- 80 25 94 % --   08/28/24 2200 -- -- 79 (!) 32 93 % --   08/28/24 2100 -- -- 80 29 93 % --   08/28/24 2000 98.4  F (36.9  C) Pulm Art 80 26 93 % --   08/28/24 1945 -- -- 80 26 92 % --   08/28/24 1930 -- -- 80 27 90 % --   08/28/24 1915 -- -- 80 26 90 % --   08/28/24 1900 -- -- 80 27 90 % --   08/28/24 1845 -- -- 80 22 90 % --   08/28/24 1830 -- -- 80 (!) 33 90 % --   08/28/24 1815 -- -- 80 25 91 % --   08/28/24 1800 98.2  F (36.8  C) Pulm Art 80 28 90 % --   08/28/24 1745 -- -- 80 24 91 % --   08/28/24 1730 -- -- 80 27 90 % --   08/28/24 1715 -- -- 80 25 91 % --   08/28/24 1700 -- -- 80 (!) 32 (!) 88 % --   08/28/24 1645 -- -- 80 (!) 32 92 % --   08/28/24 1630 -- -- 80 30 93 % --   08/28/24 1615 -- -- 79 29 94 % --   08/28/24 1600 -- -- 79 27 94 % --   08/28/24 1545 -- -- 79 26 94 % --   08/28/24 1540 -- -- 79 28 94 % --   08/28/24 1530 -- -- 79 26 94 % --   08/28/24 1520 -- -- 79 30 94 % --   08/28/24 1515 -- -- 79 28 95 % --   08/28/24 1510 -- -- 79 24 94 % --   08/28/24 1500 -- -- 79 27 95 % --   08/28/24 1450 -- -- 79 29 95 % --   08/28/24 1445 -- -- 79 28 95 % --    08/28/24 1440 -- -- 79 30 94 % --   08/28/24 1430 -- -- 79 27 94 % --   08/28/24 1420 -- -- 79 27 94 % --   08/28/24 1415 -- -- 79 26 95 % --   08/28/24 1410 -- -- 79 28 95 % --       Physical Examination:    FiO2 (%): 80 %, Resp: 26, Vent Mode: CMV/AC, Resp Rate (Set): 26 breaths/min, Tidal Volume (Set, mL): 400 mL, PEEP (cm H2O): 10 cmH2O, Pressure Support (cm H2O): (S) 5 cmH2O, Resp Rate (Set): 26 breaths/min, Tidal Volume (Set, mL): 400 mL, PEEP (cm H2O): 10 cmH2O    Gen: Intubated  HEENT: ETT  CV: Sternal incision, chest tube  Lungs: coarse, intubated  Skin: Warm  Extr: edema.  Neuro: intubated, opens eyes           Laboratory Data:   ID Labs:  Microbiology labs:  Reviewed      No lab results found.  Recent Labs   Lab Test 08/29/24  0359 08/28/24  0552 08/27/24  1448 08/26/24  0603 08/23/24  0946 08/21/24  0444   WBC 15.3* 10.8 15.3* 10.4 10.7 9.4     Recent Labs   Lab Test 08/29/24  0359 08/28/24  0012 08/27/24  1644 08/26/24  0603   CR 2.14* 1.37* 1.24* 1.60*   GFRESTIMATED 22* 38* 43* 31*       Hematology Studies  Recent Labs   Lab Test 08/29/24  0359 08/28/24  0552 08/27/24  2158 08/27/24  1648 08/27/24  1553 08/27/24  1451 08/27/24  1448 08/27/24  0834 08/26/24  0603 08/23/24  0946 08/21/24  0444   WBC 15.3* 10.8  --   --   --   --  15.3*  --  10.4 10.7 9.4   HGB 8.7* 9.2* 9.1* 9.2* 9.0* 7.4* 7.4*   < > 10.1* 10.0* 10.0*   HCT 26.7* 27.6*  --  27*  --   --  22.5*  --  31.9* 30.9* 31.0*    200 190  --   --   --  166  --  345 295 271    < > = values in this interval not displayed.       Metabolic  Recent Labs   Lab Test 08/29/24 0359 08/28/24  0012 08/27/24  1648 08/27/24  1644    144 144 146*   BUN 19.0 14.5  --  12.9   CO2 21* 20*  --  22   CR 2.14* 1.37*  --  1.24*   GFRESTIMATED 22* 38*  --  43*       Hepatic Studies  Recent Labs   Lab Test 08/29/24 0359 08/28/24 0012 08/27/24  1644   BILITOTAL 1.2 1.6* 1.8*   ALKPHOS 41 40 40   ALBUMIN 3.7 3.3* 3.5   * 75* 63*   ALT 19 15 16        ImmunologlobulinsNo lab results found.         Imaging Data:   Reviewed     Study Result    Narrative & Impression   EXAM: MR BRAIN W/O and W CONTRAST  LOCATION: [facility]  DATE: 8/17/2024     INDICATION: Headache in a patient with MSSA bacteremia secondary to aortic valve endocarditis.  Look for cerebral septic emboli; Headache; Acute HA (< 3 months), no complicating features  COMPARISON: None.  CONTRAST: 9 ml gadavist  TECHNIQUE: Routine multiplanar multisequence head MRI without and with intravenous contrast.     FINDINGS: Assessment degraded due to motion.  INTRACRANIAL CONTENTS:   Apparent focus of diffusion restriction with T2/FLAIR hyperintensity within the left superior parietal lobule cortex is hyperintense on ADC map, representing T2 shine through.  Focus of signal abnormality measures 13 x 9 mm in the axial plane and does   not have internal enhancement.     Additional punctate foci of hyperintensity on diffusion weighted with similar signal characteristics (periventricular right corona radiata, left superior parietal lobule, and left cerebellar hemisphere).     Patchy and confluent nonspecific T2/FLAIR hyperintensities within the cerebral white matter most consistent with moderate chronic microvascular ischemic change. Moderate generalized cerebral atrophy. No hydrocephalus. Normal position of the cerebellar   tonsils. No discernible abnormal intracranial enhancement; however, assessment substantially degraded due to motion. 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     SELLA: No abnormality accounting for technique.     OSSEOUS STRUCTURES/SOFT TISSUES: Normal marrow signal. The major intracranial vascular flow voids are maintained.      ORBITS: No abnormality accounting for technique.      SINUSES/MASTOIDS: Mild mucosal thickening scattered about the paranasal sinuses. Scattered fluid/membrane thickening in the left mastoid air cells. No apparent mass in the posterior  nasopharynx or skull base.                                                                       IMPRESSION: Assessment degraded due to motion.  1.  13 mm focus of cortical signal abnormality within the left superior parietal lobule which is favored to represent a subacute infarct; low-grade glial neoplasm could conceivably have a similar appearance. Hyperacute intraparenchymal hematoma could   also have a similar appearance but is considered less likely. Recommend noncontrast head CT for further characterization. Also recommend follow-up MRI brain without and with contrast in 8-12 weeks to document expected evolution.  2.  Additional smaller foci of signal abnormality with similar characteristics favored to represent punctate subacute infarcts.       Medical Decision Making       MANAGEMENT DISCUSSED with the following over the past 24 hours: ICU nurse, ICU staff physician   NOTE(S)/MEDICAL RECORDS REVIEWED over the past 24 hours: reviewed  Tests ORDERED & REVIEWED in the past 24 hours:  - See lab/imaging results included in the data section of the note  Medical complexity over the past 24 hours:  - Intensive monitoring for MEDICATION TOXICITY  - Decision regarding ESCALATION OF LEVEL OF CARE

## 2024-08-30 ENCOUNTER — APPOINTMENT (OUTPATIENT)
Dept: ULTRASOUND IMAGING | Facility: HOSPITAL | Age: 84
DRG: 853 | End: 2024-08-30
Attending: STUDENT IN AN ORGANIZED HEALTH CARE EDUCATION/TRAINING PROGRAM
Payer: COMMERCIAL

## 2024-08-30 LAB
ABO/RH(D): NORMAL
ALBUMIN SERPL BCG-MCNC: 3.5 G/DL (ref 3.5–5.2)
ALBUMIN SERPL BCG-MCNC: 3.7 G/DL (ref 3.5–5.2)
ALBUMIN SERPL BCG-MCNC: 3.8 G/DL (ref 3.5–5.2)
ALBUMIN SERPL BCG-MCNC: 3.8 G/DL (ref 3.5–5.2)
ALBUMIN UR-MCNC: 20 MG/DL
ALP SERPL-CCNC: 39 U/L (ref 40–150)
ALP SERPL-CCNC: 49 U/L (ref 40–150)
ALT SERPL W P-5'-P-CCNC: 155 U/L (ref 0–50)
ALT SERPL W P-5'-P-CCNC: 207 U/L (ref 0–50)
ANION GAP SERPL CALCULATED.3IONS-SCNC: 20 MMOL/L (ref 7–15)
ANION GAP SERPL CALCULATED.3IONS-SCNC: 23 MMOL/L (ref 7–15)
ANION GAP SERPL CALCULATED.3IONS-SCNC: 23 MMOL/L (ref 7–15)
ANION GAP SERPL CALCULATED.3IONS-SCNC: 24 MMOL/L (ref 7–15)
ANTIBODY SCREEN: NEGATIVE
APPEARANCE UR: ABNORMAL
APTT PPP: 52 SECONDS (ref 22–38)
AST SERPL W P-5'-P-CCNC: 1002 U/L (ref 0–45)
AST SERPL W P-5'-P-CCNC: 1172 U/L (ref 0–45)
B-OH-BUTYR SERPL-SCNC: 3.41 MMOL/L
BACTERIA #/AREA URNS HPF: ABNORMAL /HPF
BASE EXCESS BLDA CALC-SCNC: -2.2 MMOL/L (ref -3–3)
BASE EXCESS BLDV CALC-SCNC: -8.6 MMOL/L (ref -3–3)
BILIRUB DIRECT SERPL-MCNC: 0.44 MG/DL (ref 0–0.3)
BILIRUB SERPL-MCNC: 0.7 MG/DL
BILIRUB SERPL-MCNC: 0.9 MG/DL
BILIRUB UR QL STRIP: NEGATIVE
BLD PROD TYP BPU: NORMAL
BLOOD COMPONENT TYPE: NORMAL
BUN SERPL-MCNC: 14 MG/DL (ref 8–23)
BUN SERPL-MCNC: 15.3 MG/DL (ref 8–23)
BUN SERPL-MCNC: 15.7 MG/DL (ref 8–23)
BUN SERPL-MCNC: 18.7 MG/DL (ref 8–23)
C DIFF TOX B STL QL: NEGATIVE
CA-I BLD-MCNC: 4.2 MG/DL (ref 4.4–5.2)
CA-I BLD-MCNC: 4.4 MG/DL (ref 4.4–5.2)
CA-I BLD-MCNC: 4.5 MG/DL (ref 4.4–5.2)
CA-I BLD-MCNC: 4.5 MG/DL (ref 4.4–5.2)
CALCIUM SERPL-MCNC: 8.3 MG/DL (ref 8.8–10.4)
CALCIUM SERPL-MCNC: 8.4 MG/DL (ref 8.8–10.4)
CALCIUM SERPL-MCNC: 8.6 MG/DL (ref 8.8–10.4)
CALCIUM SERPL-MCNC: 8.7 MG/DL (ref 8.8–10.4)
CHLORIDE SERPL-SCNC: 101 MMOL/L (ref 98–107)
CHLORIDE SERPL-SCNC: 102 MMOL/L (ref 98–107)
CK SERPL-CCNC: 190 U/L (ref 26–192)
CODING SYSTEM: NORMAL
COHGB MFR BLD: 95.3 % (ref 95–96)
COLOR UR AUTO: ABNORMAL
CREAT SERPL-MCNC: 1.31 MG/DL (ref 0.51–0.95)
CREAT SERPL-MCNC: 1.48 MG/DL (ref 0.51–0.95)
CREAT SERPL-MCNC: 1.51 MG/DL (ref 0.51–0.95)
CREAT SERPL-MCNC: 1.87 MG/DL (ref 0.51–0.95)
CROSSMATCH: NORMAL
EGFRCR SERPLBLD CKD-EPI 2021: 26 ML/MIN/1.73M2
EGFRCR SERPLBLD CKD-EPI 2021: 34 ML/MIN/1.73M2
EGFRCR SERPLBLD CKD-EPI 2021: 35 ML/MIN/1.73M2
EGFRCR SERPLBLD CKD-EPI 2021: 40 ML/MIN/1.73M2
ERYTHROCYTE [DISTWIDTH] IN BLOOD BY AUTOMATED COUNT: 17.6 % (ref 10–15)
ERYTHROCYTE [DISTWIDTH] IN BLOOD BY AUTOMATED COUNT: 18.1 % (ref 10–15)
ERYTHROCYTE [DISTWIDTH] IN BLOOD BY AUTOMATED COUNT: 18.1 % (ref 10–15)
FIBRINOGEN PPP-MCNC: 416 MG/DL (ref 170–510)
GLUCOSE BLDC GLUCOMTR-MCNC: 101 MG/DL (ref 70–99)
GLUCOSE BLDC GLUCOMTR-MCNC: 103 MG/DL (ref 70–99)
GLUCOSE BLDC GLUCOMTR-MCNC: 108 MG/DL (ref 70–99)
GLUCOSE BLDC GLUCOMTR-MCNC: 115 MG/DL (ref 70–99)
GLUCOSE SERPL-MCNC: 115 MG/DL (ref 70–99)
GLUCOSE SERPL-MCNC: 93 MG/DL (ref 70–99)
GLUCOSE SERPL-MCNC: 95 MG/DL (ref 70–99)
GLUCOSE SERPL-MCNC: 95 MG/DL (ref 70–99)
GLUCOSE UR STRIP-MCNC: NEGATIVE MG/DL
HCO3 BLD-SCNC: 22 MMOL/L (ref 21–28)
HCO3 BLDV-SCNC: 18 MMOL/L (ref 21–28)
HCO3 SERPL-SCNC: 15 MMOL/L (ref 22–29)
HCO3 SERPL-SCNC: 15 MMOL/L (ref 22–29)
HCO3 SERPL-SCNC: 16 MMOL/L (ref 22–29)
HCO3 SERPL-SCNC: 17 MMOL/L (ref 22–29)
HCT VFR BLD AUTO: 23.1 % (ref 35–47)
HCT VFR BLD AUTO: 25.9 % (ref 35–47)
HCT VFR BLD AUTO: 26.1 % (ref 35–47)
HEMOCCULT STL QL: POSITIVE
HGB BLD-MCNC: 7.3 G/DL (ref 11.7–15.7)
HGB BLD-MCNC: 8.5 G/DL (ref 11.7–15.7)
HGB BLD-MCNC: 8.5 G/DL (ref 11.7–15.7)
HGB UR QL STRIP: NEGATIVE
HYALINE CASTS: 4 /LPF
INR PPP: 1.84 (ref 0.85–1.15)
INR PPP: 10 (ref 0.85–1.15)
INR PPP: 2.6 (ref 0.85–1.15)
INR PPP: 8.38 (ref 0.85–1.15)
INR PPP: 9.47 (ref 0.85–1.15)
ISSUE DATE AND TIME: NORMAL
KETONES UR STRIP-MCNC: NEGATIVE MG/DL
LACTATE SERPL-SCNC: 1.2 MMOL/L (ref 0.7–2)
LACTATE SERPL-SCNC: 1.7 MMOL/L (ref 0.7–2)
LACTATE SERPL-SCNC: 2.5 MMOL/L (ref 0.7–2)
LDH SERPL L TO P-CCNC: 1324 U/L (ref 0–250)
LEUKOCYTE ESTERASE UR QL STRIP: NEGATIVE
MAGNESIUM SERPL-MCNC: 2.2 MG/DL (ref 1.7–2.3)
MAGNESIUM SERPL-MCNC: 2.3 MG/DL (ref 1.7–2.3)
MAGNESIUM SERPL-MCNC: 2.4 MG/DL (ref 1.7–2.3)
MCH RBC QN AUTO: 29.8 PG (ref 26.5–33)
MCH RBC QN AUTO: 30 PG (ref 26.5–33)
MCH RBC QN AUTO: 30.4 PG (ref 26.5–33)
MCHC RBC AUTO-ENTMCNC: 31.6 G/DL (ref 31.5–36.5)
MCHC RBC AUTO-ENTMCNC: 32.6 G/DL (ref 31.5–36.5)
MCHC RBC AUTO-ENTMCNC: 32.8 G/DL (ref 31.5–36.5)
MCV RBC AUTO: 92 FL (ref 78–100)
MCV RBC AUTO: 93 FL (ref 78–100)
MCV RBC AUTO: 94 FL (ref 78–100)
MUCOUS THREADS #/AREA URNS LPF: PRESENT /LPF
NITRATE UR QL: NEGATIVE
O2/TOTAL GAS SETTING VFR VENT: 35 %
O2/TOTAL GAS SETTING VFR VENT: 60 %
OXYHGB MFR BLDV: 56 % (ref 70–75)
PCO2 BLD: 31 MM HG (ref 35–45)
PCO2 BLDV: 36 MM HG (ref 40–50)
PEEP: 8 CM H2O
PH BLD: 7.46 [PH] (ref 7.35–7.45)
PH BLDV: 7.3 [PH] (ref 7.32–7.43)
PH UR STRIP: 5 [PH] (ref 5–7)
PHOSPHATE SERPL-MCNC: 4.1 MG/DL (ref 2.5–4.5)
PHOSPHATE SERPL-MCNC: 5.2 MG/DL (ref 2.5–4.5)
PHOSPHATE SERPL-MCNC: 5.3 MG/DL (ref 2.5–4.5)
PLATELET # BLD AUTO: 100 10E3/UL (ref 150–450)
PLATELET # BLD AUTO: 76 10E3/UL (ref 150–450)
PLATELET # BLD AUTO: 91 10E3/UL (ref 150–450)
PO2 BLD: 68 MM HG (ref 80–105)
PO2 BLDV: 36 MM HG (ref 25–47)
POTASSIUM SERPL-SCNC: 3.8 MMOL/L (ref 3.4–5.3)
POTASSIUM SERPL-SCNC: 3.9 MMOL/L (ref 3.4–5.3)
POTASSIUM SERPL-SCNC: 4.1 MMOL/L (ref 3.4–5.3)
POTASSIUM SERPL-SCNC: 4.2 MMOL/L (ref 3.4–5.3)
PROT SERPL-MCNC: 5.7 G/DL (ref 6.4–8.3)
PROT SERPL-MCNC: 6 G/DL (ref 6.4–8.3)
RBC # BLD AUTO: 2.45 10E6/UL (ref 3.8–5.2)
RBC # BLD AUTO: 2.8 10E6/UL (ref 3.8–5.2)
RBC # BLD AUTO: 2.83 10E6/UL (ref 3.8–5.2)
RBC URINE: 1 /HPF
SAO2 % BLDA: 93 % (ref 92–100)
SAO2 % BLDV: 57 % (ref 70–75)
SODIUM SERPL-SCNC: 139 MMOL/L (ref 135–145)
SODIUM SERPL-SCNC: 139 MMOL/L (ref 135–145)
SODIUM SERPL-SCNC: 140 MMOL/L (ref 135–145)
SODIUM SERPL-SCNC: 140 MMOL/L (ref 135–145)
SP GR UR STRIP: 1.01 (ref 1–1.03)
SPECIMEN EXPIRATION DATE: NORMAL
SQUAMOUS EPITHELIAL: 1 /HPF
TROPONIN T SERPL HS-MCNC: 897 NG/L
TROPONIN T SERPL HS-MCNC: 997 NG/L
UNIT ABO/RH: NORMAL
UNIT NUMBER: NORMAL
UNIT STATUS: NORMAL
UNIT TYPE ISBT: 5100
UNIT TYPE ISBT: 5100
UNIT TYPE ISBT: 9500
UROBILINOGEN UR STRIP-MCNC: <2 MG/DL
WBC # BLD AUTO: 10.4 10E3/UL (ref 4–11)
WBC # BLD AUTO: 12.5 10E3/UL (ref 4–11)
WBC # BLD AUTO: 12.7 10E3/UL (ref 4–11)
WBC URINE: 4 /HPF

## 2024-08-30 PROCEDURE — 82248 BILIRUBIN DIRECT: CPT | Performed by: INTERNAL MEDICINE

## 2024-08-30 PROCEDURE — 250N000011 HC RX IP 250 OP 636: Performed by: STUDENT IN AN ORGANIZED HEALTH CARE EDUCATION/TRAINING PROGRAM

## 2024-08-30 PROCEDURE — 94003 VENT MGMT INPAT SUBQ DAY: CPT

## 2024-08-30 PROCEDURE — 250N000011 HC RX IP 250 OP 636: Performed by: PHYSICIAN ASSISTANT

## 2024-08-30 PROCEDURE — 90945 DIALYSIS ONE EVALUATION: CPT | Performed by: INTERNAL MEDICINE

## 2024-08-30 PROCEDURE — 250N000013 HC RX MED GY IP 250 OP 250 PS 637: Performed by: PHYSICIAN ASSISTANT

## 2024-08-30 PROCEDURE — 83605 ASSAY OF LACTIC ACID: CPT | Performed by: STUDENT IN AN ORGANIZED HEALTH CARE EDUCATION/TRAINING PROGRAM

## 2024-08-30 PROCEDURE — 250N000011 HC RX IP 250 OP 636: Mod: JW | Performed by: PHYSICIAN ASSISTANT

## 2024-08-30 PROCEDURE — 250N000013 HC RX MED GY IP 250 OP 250 PS 637: Performed by: STUDENT IN AN ORGANIZED HEALTH CARE EDUCATION/TRAINING PROGRAM

## 2024-08-30 PROCEDURE — 82247 BILIRUBIN TOTAL: CPT | Performed by: INTERNAL MEDICINE

## 2024-08-30 PROCEDURE — 82805 BLOOD GASES W/O2 SATURATION: CPT | Performed by: INTERNAL MEDICINE

## 2024-08-30 PROCEDURE — 82272 OCCULT BLD FECES 1-3 TESTS: CPT | Performed by: PHYSICIAN ASSISTANT

## 2024-08-30 PROCEDURE — 250N000009 HC RX 250: Performed by: PHYSICIAN ASSISTANT

## 2024-08-30 PROCEDURE — 83735 ASSAY OF MAGNESIUM: CPT | Performed by: INTERNAL MEDICINE

## 2024-08-30 PROCEDURE — 250N000011 HC RX IP 250 OP 636: Performed by: INTERNAL MEDICINE

## 2024-08-30 PROCEDURE — P9016 RBC LEUKOCYTES REDUCED: HCPCS | Performed by: PHYSICIAN ASSISTANT

## 2024-08-30 PROCEDURE — 85610 PROTHROMBIN TIME: CPT | Performed by: PHYSICIAN ASSISTANT

## 2024-08-30 PROCEDURE — 84100 ASSAY OF PHOSPHORUS: CPT | Performed by: INTERNAL MEDICINE

## 2024-08-30 PROCEDURE — 258N000003 HC RX IP 258 OP 636: Performed by: PHYSICIAN ASSISTANT

## 2024-08-30 PROCEDURE — 87040 BLOOD CULTURE FOR BACTERIA: CPT | Performed by: STUDENT IN AN ORGANIZED HEALTH CARE EDUCATION/TRAINING PROGRAM

## 2024-08-30 PROCEDURE — 86160 COMPLEMENT ANTIGEN: CPT | Performed by: INTERNAL MEDICINE

## 2024-08-30 PROCEDURE — 200N000001 HC R&B ICU

## 2024-08-30 PROCEDURE — 87493 C DIFF AMPLIFIED PROBE: CPT | Performed by: INTERNAL MEDICINE

## 2024-08-30 PROCEDURE — 76705 ECHO EXAM OF ABDOMEN: CPT

## 2024-08-30 PROCEDURE — 82374 ASSAY BLOOD CARBON DIOXIDE: CPT | Performed by: PHYSICIAN ASSISTANT

## 2024-08-30 PROCEDURE — 83615 LACTATE (LD) (LDH) ENZYME: CPT | Performed by: INTERNAL MEDICINE

## 2024-08-30 PROCEDURE — 250N000009 HC RX 250: Performed by: STUDENT IN AN ORGANIZED HEALTH CARE EDUCATION/TRAINING PROGRAM

## 2024-08-30 PROCEDURE — 250N000013 HC RX MED GY IP 250 OP 250 PS 637: Performed by: INTERNAL MEDICINE

## 2024-08-30 PROCEDURE — 999N000157 HC STATISTIC RCP TIME EA 10 MIN

## 2024-08-30 PROCEDURE — 85027 COMPLETE CBC AUTOMATED: CPT | Performed by: INTERNAL MEDICINE

## 2024-08-30 PROCEDURE — 258N000003 HC RX IP 258 OP 636: Performed by: INTERNAL MEDICINE

## 2024-08-30 PROCEDURE — 84484 ASSAY OF TROPONIN QUANT: CPT | Performed by: PHYSICIAN ASSISTANT

## 2024-08-30 PROCEDURE — 94640 AIRWAY INHALATION TREATMENT: CPT

## 2024-08-30 PROCEDURE — P9059 PLASMA, FRZ BETWEEN 8-24HOUR: HCPCS | Performed by: PHYSICIAN ASSISTANT

## 2024-08-30 PROCEDURE — 82330 ASSAY OF CALCIUM: CPT | Performed by: PHYSICIAN ASSISTANT

## 2024-08-30 PROCEDURE — 99291 CRITICAL CARE FIRST HOUR: CPT | Mod: 24 | Performed by: STUDENT IN AN ORGANIZED HEALTH CARE EDUCATION/TRAINING PROGRAM

## 2024-08-30 PROCEDURE — 99233 SBSQ HOSP IP/OBS HIGH 50: CPT | Performed by: INTERNAL MEDICINE

## 2024-08-30 PROCEDURE — 84155 ASSAY OF PROTEIN SERUM: CPT | Performed by: INTERNAL MEDICINE

## 2024-08-30 PROCEDURE — 258N000003 HC RX IP 258 OP 636: Performed by: STUDENT IN AN ORGANIZED HEALTH CARE EDUCATION/TRAINING PROGRAM

## 2024-08-30 PROCEDURE — 999N000287 HC ICU ADULT ROUNDING, EACH 10 MINS

## 2024-08-30 PROCEDURE — 99291 CRITICAL CARE FIRST HOUR: CPT | Performed by: INTERNAL MEDICINE

## 2024-08-30 PROCEDURE — 84484 ASSAY OF TROPONIN QUANT: CPT | Performed by: STUDENT IN AN ORGANIZED HEALTH CARE EDUCATION/TRAINING PROGRAM

## 2024-08-30 PROCEDURE — 999N000009 HC STATISTIC AIRWAY CARE

## 2024-08-30 PROCEDURE — 82330 ASSAY OF CALCIUM: CPT | Performed by: INTERNAL MEDICINE

## 2024-08-30 PROCEDURE — 250N000009 HC RX 250: Performed by: INTERNAL MEDICINE

## 2024-08-30 PROCEDURE — 86900 BLOOD TYPING SEROLOGIC ABO: CPT | Performed by: PHYSICIAN ASSISTANT

## 2024-08-30 PROCEDURE — 36415 COLL VENOUS BLD VENIPUNCTURE: CPT | Performed by: STUDENT IN AN ORGANIZED HEALTH CARE EDUCATION/TRAINING PROGRAM

## 2024-08-30 PROCEDURE — 85730 THROMBOPLASTIN TIME PARTIAL: CPT | Performed by: PHYSICIAN ASSISTANT

## 2024-08-30 PROCEDURE — 84075 ASSAY ALKALINE PHOSPHATASE: CPT | Performed by: INTERNAL MEDICINE

## 2024-08-30 PROCEDURE — 82550 ASSAY OF CK (CPK): CPT | Performed by: STUDENT IN AN ORGANIZED HEALTH CARE EDUCATION/TRAINING PROGRAM

## 2024-08-30 PROCEDURE — 82010 KETONE BODYS QUAN: CPT | Performed by: STUDENT IN AN ORGANIZED HEALTH CARE EDUCATION/TRAINING PROGRAM

## 2024-08-30 PROCEDURE — 258N000001 HC RX 258: Performed by: STUDENT IN AN ORGANIZED HEALTH CARE EDUCATION/TRAINING PROGRAM

## 2024-08-30 PROCEDURE — 84460 ALANINE AMINO (ALT) (SGPT): CPT | Performed by: INTERNAL MEDICINE

## 2024-08-30 PROCEDURE — 83605 ASSAY OF LACTIC ACID: CPT | Performed by: PHYSICIAN ASSISTANT

## 2024-08-30 PROCEDURE — 93005 ELECTROCARDIOGRAM TRACING: CPT | Performed by: PHYSICIAN ASSISTANT

## 2024-08-30 PROCEDURE — 81001 URINALYSIS AUTO W/SCOPE: CPT | Performed by: STUDENT IN AN ORGANIZED HEALTH CARE EDUCATION/TRAINING PROGRAM

## 2024-08-30 PROCEDURE — 250N000011 HC RX IP 250 OP 636: Mod: JZ | Performed by: INTERNAL MEDICINE

## 2024-08-30 PROCEDURE — 250N000009 HC RX 250: Performed by: SURGERY

## 2024-08-30 PROCEDURE — 3E043XZ INTRODUCTION OF VASOPRESSOR INTO CENTRAL VEIN, PERCUTANEOUS APPROACH: ICD-10-PCS | Performed by: INTERNAL MEDICINE

## 2024-08-30 PROCEDURE — 86923 COMPATIBILITY TEST ELECTRIC: CPT | Performed by: PHYSICIAN ASSISTANT

## 2024-08-30 PROCEDURE — 85384 FIBRINOGEN ACTIVITY: CPT | Performed by: PHYSICIAN ASSISTANT

## 2024-08-30 PROCEDURE — 94640 AIRWAY INHALATION TREATMENT: CPT | Mod: 76

## 2024-08-30 RX ORDER — ALBUMIN (HUMAN) 12.5 G/50ML
50 SOLUTION INTRAVENOUS EVERY 8 HOURS PRN
Status: DISCONTINUED | OUTPATIENT
Start: 2024-08-30 | End: 2024-09-06

## 2024-08-30 RX ORDER — VANCOMYCIN HYDROCHLORIDE 50 MG/ML
125 KIT ORAL 2 TIMES DAILY
Status: DISCONTINUED | OUTPATIENT
Start: 2024-08-30 | End: 2024-09-02

## 2024-08-30 RX ORDER — DEXTROSE 20 G/100ML
INJECTION, SOLUTION INTRAVENOUS CONTINUOUS
Status: DISCONTINUED | OUTPATIENT
Start: 2024-08-30 | End: 2024-08-31

## 2024-08-30 RX ORDER — CEFEPIME HYDROCHLORIDE 2 G/1
2 INJECTION, POWDER, FOR SOLUTION INTRAVENOUS EVERY 12 HOURS
Status: COMPLETED | OUTPATIENT
Start: 2024-08-30 | End: 2024-09-05

## 2024-08-30 RX ORDER — CEFAZOLIN SODIUM 1 G/50ML
750 SOLUTION INTRAVENOUS EVERY 12 HOURS
Status: DISCONTINUED | OUTPATIENT
Start: 2024-08-31 | End: 2024-09-01

## 2024-08-30 RX ORDER — NICOTINE POLACRILEX 4 MG
15-30 LOZENGE BUCCAL
Status: DISCONTINUED | OUTPATIENT
Start: 2024-08-30 | End: 2024-08-30

## 2024-08-30 RX ORDER — DEXTROSE MONOHYDRATE 25 G/50ML
25-50 INJECTION, SOLUTION INTRAVENOUS
Status: DISCONTINUED | OUTPATIENT
Start: 2024-08-30 | End: 2024-08-30

## 2024-08-30 RX ADMIN — CALCIUM CHLORIDE, MAGNESIUM CHLORIDE, SODIUM CHLORIDE, SODIUM BICARBONATE, POTASSIUM CHLORIDE AND SODIUM PHOSPHATE DIBASIC DIHYDRATE 12.5 ML/KG/HR: 3.68; 3.05; 6.34; 3.09; .314; .187 INJECTION INTRAVENOUS at 06:43

## 2024-08-30 RX ADMIN — CALCIUM CHLORIDE, MAGNESIUM CHLORIDE, SODIUM CHLORIDE, SODIUM BICARBONATE, POTASSIUM CHLORIDE AND SODIUM PHOSPHATE DIBASIC DIHYDRATE 22.5 ML/KG/HR: 3.68; 3.05; 6.34; 3.09; .314; .187 INJECTION INTRAVENOUS at 11:58

## 2024-08-30 RX ADMIN — CALCIUM GLUCONATE 1 G: 20 INJECTION, SOLUTION INTRAVENOUS at 11:13

## 2024-08-30 RX ADMIN — DEXTROSE: 20 INJECTION, SOLUTION INTRAVENOUS at 17:30

## 2024-08-30 RX ADMIN — VASOPRESSIN 2.4 UNITS/HR: 20 INJECTION, SOLUTION INTRAMUSCULAR; SUBCUTANEOUS at 04:54

## 2024-08-30 RX ADMIN — ACETAMINOPHEN 975 MG: 325 TABLET ORAL at 13:36

## 2024-08-30 RX ADMIN — ALBUTEROL SULFATE 2.5 MG: 2.5 SOLUTION RESPIRATORY (INHALATION) at 19:58

## 2024-08-30 RX ADMIN — CALCIUM CHLORIDE, MAGNESIUM CHLORIDE, SODIUM CHLORIDE, SODIUM BICARBONATE, POTASSIUM CHLORIDE AND SODIUM PHOSPHATE DIBASIC DIHYDRATE 12.5 ML/KG/HR: 3.68; 3.05; 6.34; 3.09; .314; .187 INJECTION INTRAVENOUS at 10:41

## 2024-08-30 RX ADMIN — CEFEPIME HYDROCHLORIDE 2 G: 2 INJECTION, POWDER, FOR SOLUTION INTRAVENOUS at 16:34

## 2024-08-30 RX ADMIN — HYDROCORTISONE SODIUM SUCCINATE 50 MG: 100 INJECTION, POWDER, FOR SOLUTION INTRAMUSCULAR; INTRAVENOUS at 16:28

## 2024-08-30 RX ADMIN — CALCIUM CHLORIDE, MAGNESIUM CHLORIDE, SODIUM CHLORIDE, SODIUM BICARBONATE, POTASSIUM CHLORIDE AND SODIUM PHOSPHATE DIBASIC DIHYDRATE 200 ML/HR: 3.68; 3.05; 6.34; 3.09; .314; .187 INJECTION INTRAVENOUS at 17:20

## 2024-08-30 RX ADMIN — Medication 50 MCG: at 17:01

## 2024-08-30 RX ADMIN — DEXMEDETOMIDINE HYDROCHLORIDE 0.5 MCG/KG/HR: 400 INJECTION INTRAVENOUS at 12:11

## 2024-08-30 RX ADMIN — DEXMEDETOMIDINE HYDROCHLORIDE 0.5 MCG/KG/HR: 400 INJECTION INTRAVENOUS at 02:47

## 2024-08-30 RX ADMIN — HYDROCORTISONE SODIUM SUCCINATE 50 MG: 100 INJECTION, POWDER, FOR SOLUTION INTRAMUSCULAR; INTRAVENOUS at 10:25

## 2024-08-30 RX ADMIN — Medication 50 MCG: at 01:45

## 2024-08-30 RX ADMIN — HYDROCORTISONE SODIUM SUCCINATE 50 MG: 100 INJECTION, POWDER, FOR SOLUTION INTRAMUSCULAR; INTRAVENOUS at 23:13

## 2024-08-30 RX ADMIN — CALCIUM CHLORIDE, MAGNESIUM CHLORIDE, SODIUM CHLORIDE, SODIUM BICARBONATE, POTASSIUM CHLORIDE AND SODIUM PHOSPHATE DIBASIC DIHYDRATE 22.5 ML/KG/HR: 3.68; 3.05; 6.34; 3.09; .314; .187 INJECTION INTRAVENOUS at 09:57

## 2024-08-30 RX ADMIN — Medication 1 CAPSULE: at 20:31

## 2024-08-30 RX ADMIN — SODIUM CHLORIDE 30 MG/ML INHALATION SOLUTION 3 ML: 30 SOLUTION INHALANT at 19:58

## 2024-08-30 RX ADMIN — NOREPINEPHRINE BITARTRATE 0.1 MCG/KG/MIN: 1 INJECTION, SOLUTION, CONCENTRATE INTRAVENOUS at 18:36

## 2024-08-30 RX ADMIN — DOBUTAMINE HYDROCHLORIDE 5 MCG/KG/MIN: 200 INJECTION INTRAVENOUS at 18:27

## 2024-08-30 RX ADMIN — ONDANSETRON 4 MG: 2 INJECTION INTRAMUSCULAR; INTRAVENOUS at 17:01

## 2024-08-30 RX ADMIN — CALCIUM CHLORIDE, MAGNESIUM CHLORIDE, SODIUM CHLORIDE, SODIUM BICARBONATE, POTASSIUM CHLORIDE AND SODIUM PHOSPHATE DIBASIC DIHYDRATE 22.5 ML/KG/HR: 3.68; 3.05; 6.34; 3.09; .314; .187 INJECTION INTRAVENOUS at 14:18

## 2024-08-30 RX ADMIN — ALBUTEROL SULFATE 2.5 MG: 2.5 SOLUTION RESPIRATORY (INHALATION) at 01:26

## 2024-08-30 RX ADMIN — CALCIUM CHLORIDE, MAGNESIUM CHLORIDE, SODIUM CHLORIDE, SODIUM BICARBONATE, POTASSIUM CHLORIDE AND SODIUM PHOSPHATE DIBASIC DIHYDRATE 12.5 ML/KG/HR: 3.68; 3.05; 6.34; 3.09; .314; .187 INJECTION INTRAVENOUS at 14:37

## 2024-08-30 RX ADMIN — CALCIUM CHLORIDE, MAGNESIUM CHLORIDE, SODIUM CHLORIDE, SODIUM BICARBONATE, POTASSIUM CHLORIDE AND SODIUM PHOSPHATE DIBASIC DIHYDRATE 22.5 ML/KG/HR: 3.68; 3.05; 6.34; 3.09; .314; .187 INJECTION INTRAVENOUS at 16:31

## 2024-08-30 RX ADMIN — PHYTONADIONE 5 MG: 10 INJECTION, EMULSION INTRAMUSCULAR; INTRAVENOUS; SUBCUTANEOUS at 08:35

## 2024-08-30 RX ADMIN — VANCOMYCIN HYDROCHLORIDE 2000 MG: 1 INJECTION, POWDER, LYOPHILIZED, FOR SOLUTION INTRAVENOUS at 12:11

## 2024-08-30 RX ADMIN — SODIUM BICARBONATE 50 MEQ: 84 INJECTION, SOLUTION INTRAVENOUS at 15:56

## 2024-08-30 RX ADMIN — CALCIUM CHLORIDE, MAGNESIUM CHLORIDE, SODIUM CHLORIDE, SODIUM BICARBONATE, POTASSIUM CHLORIDE AND SODIUM PHOSPHATE DIBASIC DIHYDRATE 22.5 ML/KG/HR: 3.68; 3.05; 6.34; 3.09; .314; .187 INJECTION INTRAVENOUS at 20:45

## 2024-08-30 RX ADMIN — CALCIUM CHLORIDE, MAGNESIUM CHLORIDE, SODIUM CHLORIDE, SODIUM BICARBONATE, POTASSIUM CHLORIDE AND SODIUM PHOSPHATE DIBASIC DIHYDRATE 22.5 ML/KG/HR: 3.68; 3.05; 6.34; 3.09; .314; .187 INJECTION INTRAVENOUS at 23:03

## 2024-08-30 RX ADMIN — CALCIUM CHLORIDE, MAGNESIUM CHLORIDE, SODIUM CHLORIDE, SODIUM BICARBONATE, POTASSIUM CHLORIDE AND SODIUM PHOSPHATE DIBASIC DIHYDRATE 12.5 ML/KG/HR: 3.68; 3.05; 6.34; 3.09; .314; .187 INJECTION INTRAVENOUS at 22:22

## 2024-08-30 RX ADMIN — VANCOMYCIN HYDROCHLORIDE 125 MG: KIT at 21:21

## 2024-08-30 RX ADMIN — ALBUTEROL SULFATE 2.5 MG: 2.5 SOLUTION RESPIRATORY (INHALATION) at 16:13

## 2024-08-30 RX ADMIN — CALCIUM CHLORIDE, MAGNESIUM CHLORIDE, SODIUM CHLORIDE, SODIUM BICARBONATE, POTASSIUM CHLORIDE AND SODIUM PHOSPHATE DIBASIC DIHYDRATE 22.5 ML/KG/HR: 3.68; 3.05; 6.34; 3.09; .314; .187 INJECTION INTRAVENOUS at 18:29

## 2024-08-30 RX ADMIN — ALBUTEROL SULFATE 2.5 MG: 2.5 SOLUTION RESPIRATORY (INHALATION) at 07:37

## 2024-08-30 RX ADMIN — CHLORHEXIDINE GLUCONATE 0.12% ORAL RINSE 15 ML: 1.2 LIQUID ORAL at 20:30

## 2024-08-30 RX ADMIN — CALCIUM CHLORIDE, MAGNESIUM CHLORIDE, SODIUM CHLORIDE, SODIUM BICARBONATE, POTASSIUM CHLORIDE AND SODIUM PHOSPHATE DIBASIC DIHYDRATE 12.5 ML/KG/HR: 3.68; 3.05; 6.34; 3.09; .314; .187 INJECTION INTRAVENOUS at 06:46

## 2024-08-30 RX ADMIN — CALCIUM CHLORIDE, MAGNESIUM CHLORIDE, SODIUM CHLORIDE, SODIUM BICARBONATE, POTASSIUM CHLORIDE AND SODIUM PHOSPHATE DIBASIC DIHYDRATE 12.5 ML/KG/HR: 3.68; 3.05; 6.34; 3.09; .314; .187 INJECTION INTRAVENOUS at 02:50

## 2024-08-30 RX ADMIN — CALCIUM GLUCONATE 1 G: 20 INJECTION, SOLUTION INTRAVENOUS at 06:49

## 2024-08-30 RX ADMIN — SODIUM BICARBONATE 30 MEQ/HR: 84 INJECTION, SOLUTION INTRAVENOUS at 23:15

## 2024-08-30 RX ADMIN — SODIUM CHLORIDE 30 MG/ML INHALATION SOLUTION 3 ML: 30 SOLUTION INHALANT at 07:37

## 2024-08-30 RX ADMIN — Medication 50 MCG: at 21:39

## 2024-08-30 RX ADMIN — Medication 1 CAPSULE: at 08:23

## 2024-08-30 RX ADMIN — Medication 50 MCG: at 11:24

## 2024-08-30 RX ADMIN — LIDOCAINE 1 PATCH: 246 PATCH TOPICAL at 17:16

## 2024-08-30 RX ADMIN — PANTOPRAZOLE SODIUM 40 MG: 40 INJECTION, POWDER, FOR SOLUTION INTRAVENOUS at 06:51

## 2024-08-30 RX ADMIN — SENNOSIDES AND DOCUSATE SODIUM 1 TABLET: 8.6; 5 TABLET ORAL at 20:30

## 2024-08-30 RX ADMIN — Medication 50 MCG: at 18:20

## 2024-08-30 RX ADMIN — CALCIUM CHLORIDE, MAGNESIUM CHLORIDE, SODIUM CHLORIDE, SODIUM BICARBONATE, POTASSIUM CHLORIDE AND SODIUM PHOSPHATE DIBASIC DIHYDRATE 12.5 ML/KG/HR: 3.68; 3.05; 6.34; 3.09; .314; .187 INJECTION INTRAVENOUS at 02:48

## 2024-08-30 RX ADMIN — CALCIUM CHLORIDE, MAGNESIUM CHLORIDE, SODIUM CHLORIDE, SODIUM BICARBONATE, POTASSIUM CHLORIDE AND SODIUM PHOSPHATE DIBASIC DIHYDRATE 12.5 ML/KG/HR: 3.68; 3.05; 6.34; 3.09; .314; .187 INJECTION INTRAVENOUS at 18:18

## 2024-08-30 RX ADMIN — SODIUM BICARBONATE 50 MEQ: 84 INJECTION, SOLUTION INTRAVENOUS at 15:40

## 2024-08-30 RX ADMIN — CHLORHEXIDINE GLUCONATE 0.12% ORAL RINSE 15 ML: 1.2 LIQUID ORAL at 08:29

## 2024-08-30 RX ADMIN — DEXMEDETOMIDINE HYDROCHLORIDE 0.5 MCG/KG/HR: 400 INJECTION INTRAVENOUS at 21:18

## 2024-08-30 RX ADMIN — DOBUTAMINE HYDROCHLORIDE 5 MCG/KG/MIN: 200 INJECTION INTRAVENOUS at 01:58

## 2024-08-30 RX ADMIN — ACETAMINOPHEN 975 MG: 325 TABLET ORAL at 04:16

## 2024-08-30 RX ADMIN — SODIUM CHLORIDE 750 MG: 9 INJECTION, SOLUTION INTRAVENOUS at 23:20

## 2024-08-30 RX ADMIN — SODIUM CHLORIDE 30 MG/ML INHALATION SOLUTION 3 ML: 30 SOLUTION INHALANT at 01:28

## 2024-08-30 RX ADMIN — SODIUM CHLORIDE 30 MG/ML INHALATION SOLUTION 3 ML: 30 SOLUTION INHALANT at 16:14

## 2024-08-30 RX ADMIN — CEFTAZIDIME 1 G: 1 INJECTION, POWDER, FOR SOLUTION INTRAMUSCULAR; INTRAVENOUS at 10:26

## 2024-08-30 RX ADMIN — CEFTAZIDIME 1 G: 1 INJECTION, POWDER, FOR SOLUTION INTRAMUSCULAR; INTRAVENOUS at 01:56

## 2024-08-30 ASSESSMENT — ACTIVITIES OF DAILY LIVING (ADL)
ADLS_ACUITY_SCORE: 44

## 2024-08-30 NOTE — PROGRESS NOTES
[facility] Inpatient follow up        08/30/2024    Chart reviewed  Patient seen in ICU             Assessment and Recommendations:   Assessment:  Felicitas Elliott is a 84 year old female with     Respiratory failure, shock liver  Decreased urine output, on dobutamine norepinephrine and vasopressin  Intubated  History of severe aortic stenosis  S/P aortic root abscess debridement and aortic annular reconstruction, aortic root replacement, bioprosthetic aortic valve on 8/27/2024  Acute respiratory failure, extubated 8/28/2024, reintubated 8/29/2024, acute kidney injury, currently on CRRT- Gram negative bacilli in resp culture-     2+ Enterobacter cloacae complex     MRSA nares negative, respiratory PCR negative  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  MSSA bacteremia.  Positive blood culture on 8/14/2024 and 8/16.  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  With the size of the vegetation combined with brain infarct, status post CV surgery, see details below active issue.   Neck pain worrisome for cervical discitis.  Neck MRI showed some edema at C4-C5 on the left side.  No clear evidence of infection.  Abnormal brain MRI suggestive of subacute infarct. In a patient with MSSA bacteremia and aortic valve endocarditis, this is cerebral septic emboli. Active issue.     POSTOPERATIVE DIAGNOSES:  1.  Severe aortic stenosis and moderate aortic regurgitation.  2.  Severe mitral stenosis.  3.  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  4.  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  5.  Severe multivessel coronary artery disease.  6.  Aortic root abscess.      PROCEDURE PERFORMED: 8/27/24  Aortic root abscess debridement and aortic annular  reconstruction.  Aortic root replacement (Konect composite 25 mm Silva Inspiris Resilia bioprosthetic valve within 30 mm Gelweave Valsalva graft with reimplantation of the coronary arteries).  Mitral valve replacement (31 mm St. Tim Silva bioprosthetic valve).  Coronary artery bypass grafting x 2 (reversed saphenous vein graft to the obtuse marginal branch of the left circumflex coronary artery, and pedicled left internal mammary artery to left anterior descending coronary artery).  Endoscopic vein harvest of the greater saphenous vein from the left lower extremity.    Recommendations:  Follow susceptibility results of Enterobacter.  If cephalosporin intermediate or resistant, may need to switch to meropenem, renal dosing.  Acute respiratory failure, pneumonia, switch nafcillin to ceftazidime, renal dosing  Currently intubated, discussed with ICU nurse at bedside  IV vancomycin until cultures finalized and stop if no MRSA isolated  Empiric oral vancomycin for C. difficile prophylaxis, diarrhea, rectal tube in place.  C. difficile negative  Hold IV nafcillin continuous infusion.  Per previous notes plan IV antibiotics duration 6 weeks  Monitor kidney function.  PICC placed 8/21  Monitor CBC, CMP  Status post vascular surgery, chest tube, CV surgery following closely  Will need to check immunoglobin level in 4 to 6 weeks sister with history of hypogammaglobulinemia   Overall will require at least 6 weeks of IV antibiotics, holding nafcillin due to acute kidney injury  Please see previous ID notes by Dr. Gómez  Discussed with ICU nurse at bedside          Nannette Lebron MD  [facility]  Answering Service: [phone]  On-Call ID provider: [phone], option: 9              Interval History:     HPI: chart reviewed  Opens eyes  Remains on pressors  Received blood transfusion  Chest tube in place  Diarrhea, C. difficile PCR negative  Previous note:  Patient seen in ICU -intubated  "2024   Had emesis, decrease in urine output  Currently getting CRRT  Previous note:  Chest tube in place, postop pain  Small amount of diarrhea  Discussed antibiotics with patient's nurse         The interval history was reviewed.   Doing ok. Tolerating antibiotics. Family visiting. Itchy area on back, no rash in that area.     Pertinent cultures include:  No results found for: \"CULT\"    Recent Inflammatory Biomarkers:   Recent Labs   Lab Test 24  0437 24  0359 24  0552 24  1448 24  0603 24  0946   PCAL  --  1.24*  --   --   --   --    WBC 10.4 15.3* 10.8 15.3* 10.4 10.7            Review of Systems:   CONSTITUTIONAL:    Temp Max: Temp (24hrs), Av.8  F (37.1  C), Min:97.9  F (36.6  C), Max:100.3  F (37.9  C)   .  Negative except for findings in the HPI.           Current Medications (antimicrobials listed in bold):     Current Facility-Administered Medications   Medication Dose Route Frequency Provider Last Rate Last Admin    acetaminophen (TYLENOL) Suppository 650 mg  650 mg Rectal Q8H Maryam Anthony PA-C   650 mg at 24 0407    acetaminophen (TYLENOL) tablet 975 mg  975 mg Oral Q8H Maryam Anthony PA-C   975 mg at 24 0416    sodium chloride (NEBUSAL) 3 % neb solution 3 mL  3 mL Nebulization Q6H Zach Park MD   3 mL at 24 0737    And    albuterol (PROVENTIL) neb solution 2.5 mg  2.5 mg Nebulization Q6H Zach Park MD   2.5 mg at 24 0737    atorvastatin (LIPITOR) tablet 80 mg  80 mg Oral QPM Jessica Tolbert PA-C   80 mg at 24    cefTAZidime (FORTAZ) 1 g vial to attach to  ml bag for ADULTS or NS 50 ml bag for PEDS  1 g Intravenous Q8H Alize Flores MD   1 g at 24 1026    chlorhexidine (PERIDEX) 0.12 % solution 15 mL  15 mL Mouth/Throat BID Zach Park MD   15 mL at 24 0829    [Held by provider] clopidogrel (PLAVIX) tablet 75 mg  75 mg Oral Daily Maryam Anthony PA-C        " hydrocortisone sodium succinate PF (solu-CORTEF) injection 50 mg  50 mg Intravenous Q6H Zach Park MD   50 mg at 08/30/24 1025    lactobacillus rhamnosus (GG) (CULTURELL) capsule 1 capsule  1 capsule Oral BID Zach Foreman DO   1 capsule at 08/30/24 0823    Lidocaine (LIDOCARE) 4 % Patch 1-2 patch  1-2 patch Transdermal Q24H Maryam Anthony PA-C   2 patch at 08/29/24 1825    [Held by provider] nafcillin 12 g in sodium chloride 0.9 % 550 mL continuous infusion  12 g Intravenous Q24H [address] [employer]   12 g at 08/28/24 1214    pantoprazole (PROTONIX) 2 mg/mL suspension 40 mg  40 mg Oral or NG Tube QAM AC Maryam Anthony PA-C        Or    pantoprazole (PROTONIX) EC tablet 40 mg  40 mg Oral QAM AC Maryam Anthony PA-C        Or    pantoprazole (PROTONIX) IV push injection 40 mg  40 mg Intravenous QAM AC Maryam Anthony PA-C   40 mg at 08/30/24 0651    polyethylene glycol (MIRALAX) Packet 17 g  17 g Oral Daily Maryam Anthony PA-C        senna-docusate (SENOKOT-S/PERICOLACE) 8.6-50 MG per tablet 1 tablet  1 tablet Oral BID Maryam Anthony PA-C   1 tablet at 08/29/24 2039    sodium chloride (PF) 0.9% PF flush 3 mL  3 mL Intracatheter Q8H Gondal, Saad J, MD   3 mL at 08/30/24 0813    vancomycin (VANCOCIN) 2,000 mg in sodium chloride 0.9 % 250 mL intermittent infusion  2,000 mg (central catheter) Intravenous Once Zach Park MD   2,000 mg at 08/30/24 1211    [START ON 8/31/2024] vancomycin (VANCOCIN) 750 mg in sodium chloride 0.9 % 250 mL intermittent infusion  750 mg Intravenous Q12H Zach Park MD        [Held by provider] Warfarin Dose Required Daily - Pharmacist Managed  1 each Oral See Admin Instructions Jessica Tolbert PA-C                  Allergies:     Allergies   Allergen Reactions    Aspirin Rash    Cats Rash    Nickel Rash            Physical Exam:   Vitals were reviewed  Patient Vitals for the past 24 hrs:   Temp Temp src Pulse Resp SpO2  Weight   08/30/24 1300 98.2  F (36.8  C) Pulm Art 92 27 95 % --   08/30/24 1245 98.2  F (36.8  C) Pulm Art 92 29 95 % --   08/30/24 1230 98.4  F (36.9  C) Pulm Art 95 27 94 % --   08/30/24 1215 -- -- 97 28 93 % --   08/30/24 1200 98.1  F (36.7  C) Pulm Art 94 26 95 % --   08/30/24 1145 -- -- 93 26 95 % --   08/30/24 1130 -- -- 98 26 96 % --   08/30/24 1120 98.4  F (36.9  C) Pulm Art 97 29 96 % --   08/30/24 1115 -- -- 95 27 95 % --   08/30/24 1100 98.4  F (36.9  C) Pulm Art 98 28 95 % --   08/30/24 1045 -- -- 92 29 95 % --   08/30/24 1030 98.4  F (36.9  C) Pulm Art 91 27 96 % --   08/30/24 1015 -- -- 91 29 95 % --   08/30/24 1000 98.4  F (36.9  C) Pulm Art 91 29 95 % --   08/30/24 0945 -- -- 91 29 95 % --   08/30/24 0930 -- -- 87 29 96 % --   08/30/24 0915 -- -- 102 27 98 % --   08/30/24 0900 -- -- 82 24 97 % --   08/30/24 0845 -- -- 79 23 97 % --   08/30/24 0830 -- -- 79 26 96 % --   08/30/24 0815 -- -- 80 26 97 % --   08/30/24 0800 99.1  F (37.3  C) Pulm Art 80 26 97 % --   08/30/24 0745 -- -- 80 26 96 % --   08/30/24 0730 -- -- 80 26 97 % --   08/30/24 0715 -- -- 80 26 100 % --   08/30/24 0700 99.3  F (37.4  C) -- 80 26 100 % --   08/30/24 0645 -- -- 80 26 100 % --   08/30/24 0630 -- -- 80 26 100 % --   08/30/24 0615 -- -- 80 26 100 % --   08/30/24 0600 99.5  F (37.5  C) -- 79 26 100 % --   08/30/24 0545 -- -- 79 26 100 % --   08/30/24 0530 -- -- 79 26 100 % --   08/30/24 0515 -- -- 79 26 100 % --   08/30/24 0500 99.1  F (37.3  C) -- 79 26 100 % --   08/30/24 0450 -- -- -- -- 100 % --   08/30/24 0445 -- -- 79 26 100 % --   08/30/24 0430 -- -- 79 26 100 % --   08/30/24 0415 -- -- 79 26 100 % --   08/30/24 0400 99  F (37.2  C) Pulm Art 79 26 100 % 99.8 kg (220 lb)   08/30/24 0345 -- -- 79 26 100 % --   08/30/24 0330 -- -- 79 26 100 % --   08/30/24 0315 -- -- 79 26 100 % --   08/30/24 0300 99.1  F (37.3  C) -- 79 26 100 % --   08/30/24 0245 -- -- 79 26 99 % --   08/30/24 0230 -- -- 79 26 99 % --   08/30/24 0215 --  -- 79 26 100 % --   08/30/24 0200 99.1  F (37.3  C) -- 79 26 100 % --   08/30/24 0145 -- -- 79 26 100 % --   08/30/24 0136 -- -- 79 26 100 % --   08/30/24 0130 -- -- 79 26 100 % --   08/30/24 0126 -- -- -- -- 98 % --   08/30/24 0115 -- -- 79 26 98 % --   08/30/24 0100 99.1  F (37.3  C) -- 79 26 97 % --   08/30/24 0056 -- -- 79 27 (!) 89 % --   08/30/24 0045 -- -- 80 26 100 % --   08/30/24 0030 -- -- 80 26 99 % --   08/30/24 0015 -- -- 80 26 99 % --   08/30/24 0000 99.1  F (37.3  C) Pulm Art 80 26 98 % --   08/29/24 2348 -- -- 80 26 98 % --   08/29/24 2345 -- -- 80 26 98 % --   08/29/24 2330 -- -- 80 26 98 % --   08/29/24 2315 -- -- 80 26 98 % --   08/29/24 2300 98.8  F (37.1  C) -- 80 26 98 % --   08/29/24 2254 -- -- 80 26 98 % --   08/29/24 2245 -- -- 80 26 98 % --   08/29/24 2240 -- -- 80 26 98 % --   08/29/24 2235 -- -- 80 26 98 % --   08/29/24 2230 -- -- 80 26 98 % --   08/29/24 2215 -- -- 80 26 98 % --   08/29/24 2200 98.8  F (37.1  C) -- 80 26 97 % --   08/29/24 2145 -- -- 80 26 98 % --   08/29/24 2130 -- -- 80 26 98 % --   08/29/24 2115 -- -- 80 26 97 % --   08/29/24 2100 99  F (37.2  C) Pulm Art 80 26 98 % --   08/29/24 2045 -- -- 80 23 97 % --   08/29/24 2036 -- -- 80 26 97 % --   08/29/24 2030 -- -- 80 26 97 % --   08/29/24 2025 -- -- 80 26 97 % --   08/29/24 2015 -- -- 80 26 97 % --   08/29/24 2000 99.1  F (37.3  C) Pulm Art 80 26 97 % --   08/29/24 1959 -- -- 80 26 97 % --   08/29/24 1945 -- -- 80 26 97 % --   08/29/24 1930 -- -- 80 26 97 % --   08/29/24 1915 -- -- 80 26 97 % --   08/29/24 1900 -- -- 80 26 97 % --   08/29/24 1854 -- -- 80 26 97 % --   08/29/24 1845 -- -- 80 26 97 % --   08/29/24 1830 -- -- 80 26 96 % --   08/29/24 1815 -- -- 80 26 96 % --   08/29/24 1800 98.8  F (37.1  C) Pulm Art 80 26 96 % --   08/29/24 1745 -- -- 80 26 96 % --   08/29/24 1730 -- -- 80 26 96 % --   08/29/24 1715 -- -- 80 26 95 % --   08/29/24 1700 -- -- 80 26 95 % --   08/29/24 1645 -- -- 80 26 95 % --   08/29/24 1630  -- -- 80 26 95 % --   08/29/24 1615 -- -- 80 26 95 % --   08/29/24 1600 99  F (37.2  C) Pulm Art 80 26 95 % --   08/29/24 1545 -- -- 80 26 94 % --   08/29/24 1530 -- -- 79 26 94 % --   08/29/24 1515 -- -- 80 26 93 % --   08/29/24 1500 -- -- 82 (!) 31 93 % --   08/29/24 1445 -- -- 79 26 93 % --   08/29/24 1430 -- -- 79 26 94 % --   08/29/24 1415 -- -- 79 24 94 % --   08/29/24 1400 -- -- 79 24 94 % --   08/29/24 1345 -- -- 80 24 94 % --   08/29/24 1330 -- -- 80 24 93 % --   08/29/24 1315 -- -- 80 24 93 % --       Physical Examination:    FiO2 (%): 35 %, Resp: 27, Vent Mode: CMV/AC, Resp Rate (Set): 26 breaths/min, Tidal Volume (Set, mL): 400 mL, PEEP (cm H2O): 8 cmH2O, Resp Rate (Set): 26 breaths/min, Tidal Volume (Set, mL): 400 mL, PEEP (cm H2O): 8 cmH2O    Gen: Intubated  HEENT: ETT opens eyes  PICC, Femoral catheter for dialysis, rectal tube  CV: Sternal incision, chest tube  Lungs: coarse, intubated  Skin: Warm  Extr: edema.  Neuro: intubated, opens eyes           Laboratory Data:   ID Labs:  Microbiology labs:  Reviewed      No lab results found.  Recent Labs   Lab Test 08/30/24  0437 08/29/24  0359 08/28/24  0552 08/27/24  1448 08/26/24  0603 08/23/24  0946   WBC 10.4 15.3* 10.8 15.3* 10.4 10.7     Recent Labs   Lab Test 08/30/24  0437 08/29/24  2030 08/29/24  0359 08/28/24  0012   CR 1.87* 2.24* 2.14* 1.37*   GFRESTIMATED 26* 21* 22* 38*       Hematology Studies  Recent Labs   Lab Test 08/30/24  0437 08/29/24  0359 08/28/24  0552 08/27/24  2158 08/27/24  1648 08/27/24  1553 08/27/24  1451 08/27/24  1448 08/27/24  0834 08/26/24  0603 08/23/24  0946   WBC 10.4 15.3* 10.8  --   --   --   --  15.3*  --  10.4 10.7   HGB 7.3* 8.7* 9.2* 9.1* 9.2* 9.0*   < > 7.4*   < > 10.1* 10.0*   HCT 23.1* 26.7* 27.6*  --  27*  --   --  22.5*  --  31.9* 30.9*   * 151 200 190  --   --   --  166  --  345 295    < > = values in this interval not displayed.       Metabolic  Recent Labs   Lab Test 08/30/24 0437 08/29/24  2030  08/29/24  0359    140 140   BUN 18.7 21.3 19.0   CO2 17* 17* 21*   CR 1.87* 2.24* 2.14*   GFRESTIMATED 26* 21* 22*       Hepatic Studies  Recent Labs   Lab Test 08/30/24  0437 08/29/24 2030 08/29/24 0359 08/28/24  0012   BILITOTAL 0.7  --  1.2 1.6*   ALKPHOS 39*  --  41 40   ALBUMIN 3.5 3.8 3.7 3.3*   AST 1,002*  --  137* 75*   *  --  19 15       ImmunologlobulinsNo lab results found.         Imaging Data:   Reviewed     Study Result    Narrative & Impression   EXAM: MR BRAIN W/O and W CONTRAST  LOCATION: [facility]  DATE: 8/17/2024     INDICATION: Headache in a patient with MSSA bacteremia secondary to aortic valve endocarditis.  Look for cerebral septic emboli; Headache; Acute HA (< 3 months), no complicating features  COMPARISON: None.  CONTRAST: 9 ml gadavist  TECHNIQUE: Routine multiplanar multisequence head MRI without and with intravenous contrast.     FINDINGS: Assessment degraded due to motion.  INTRACRANIAL CONTENTS:   Apparent focus of diffusion restriction with T2/FLAIR hyperintensity within the left superior parietal lobule cortex is hyperintense on ADC map, representing T2 shine through.  Focus of signal abnormality measures 13 x 9 mm in the axial plane and does   not have internal enhancement.     Additional punctate foci of hyperintensity on diffusion weighted with similar signal characteristics (periventricular right corona radiata, left superior parietal lobule, and left cerebellar hemisphere).     Patchy and confluent nonspecific T2/FLAIR hyperintensities within the cerebral white matter most consistent with moderate chronic microvascular ischemic change. Moderate generalized cerebral atrophy. No hydrocephalus. Normal position of the cerebellar   tonsils. No discernible abnormal intracranial enhancement; however, assessment substantially degraded due to motion. 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     SELLA: No abnormality accounting for technique.      OSSEOUS STRUCTURES/SOFT TISSUES: Normal marrow signal. The major intracranial vascular flow voids are maintained.      ORBITS: No abnormality accounting for technique.      SINUSES/MASTOIDS: Mild mucosal thickening scattered about the paranasal sinuses. Scattered fluid/membrane thickening in the left mastoid air cells. No apparent mass in the posterior nasopharynx or skull base.                                                                       IMPRESSION: Assessment degraded due to motion.  1.  13 mm focus of cortical signal abnormality within the left superior parietal lobule which is favored to represent a subacute infarct; low-grade glial neoplasm could conceivably have a similar appearance. Hyperacute intraparenchymal hematoma could   also have a similar appearance but is considered less likely. Recommend noncontrast head CT for further characterization. Also recommend follow-up MRI brain without and with contrast in 8-12 weeks to document expected evolution.  2.  Additional smaller foci of signal abnormality with similar characteristics favored to represent punctate subacute infarcts.       Medical Decision Making       MANAGEMENT DISCUSSED with the following over the past 24 hours: ICU nurse at bedside   NOTE(S)/MEDICAL RECORDS REVIEWED over the past 24 hours: reviewed  Tests ORDERED & REVIEWED in the past 24 hours:  - See lab/imaging results included in the data section of the note  Medical complexity over the past 24 hours:  - Intensive monitoring for MEDICATION TOXICITY  - Decision regarding ESCALATION OF LEVEL OF CARE

## 2024-08-30 NOTE — PROGRESS NOTES
[facility] - ICU    RN Progress Note:            Pertinent Assessments:      Please refer to flowsheet rows for full assessment     Early in shift decreased UOP, even with diuril and lasix. Increased work of breathing. SBP<100 epinephrine changed to dobutamine and norepinephrine.            Key Events - This Shift:       Pt intubated, dialysis catheter placed and CRRT started. Drips include dobutamine, lasix, norepinephrine, fentanyl and precedex.                  Barriers to Discharge / Downgrade:     Critically ill         Point of Contact Update: YES-OR-NO: Yes  If No, reason: na  Name:spouse  Phone Number:at bedside  Summary of Conversation: updated throughout shift by MD and RN

## 2024-08-30 NOTE — PROGRESS NOTES
Renal progress note  CC:PEDRO  Assessment and Plan:  84 year old female with hx of CHF, HFpEF , CKD 3/4, HLD , left renal atrophy, CAD and chronic neck pain , admitted with chills and weakness. Found to have MSSA bacteremia cb MV and AV infective endocarditis , 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 , MvCAD: underwent biprothetic Avand MV replacement , Aortic root replacement and 2vCABG,. Post Sx cb Pedro , anuria , acute resp failure and hemodynamic instability on pressers. With worsening resp status and need for oxygenation , concerns for near re-intubation and anuria with no response to diuresis nephrology consulted for advanced CKD , Pedro and volume management     PEDRO   anuria and anephric rise in creat in last 24hrs  Bl CKD 3/4 with creatinine 1.3-1.8 and GFR ~30-35  UA with hyaline casts , mild proteins and WBC  UPCR 0.1g/g minimal  Imaging with left renal cortical thinning ~8cm kidney , rt 9.7cm kideny with simple cysts (4/2024)  Follows with [facility]  CKD has been attributed to long standing NSAID use : reviewed extensive rheum work up -ve in 4/2024 , no prior paraprotein work up  Current PEDRO , appears CRS with likely a hemodynamic insult to the kidney; with recent embolic process hard to exclude embolic disease , will check complements and LDH  --> with anuria and hypervolemia  Started on CRRT with significant hemodynamic instability  CRRT prescription increased to 35ml/kg from 25ml/kg with acidosis   UFR changed to I=O  --> Labs per CRRT protocols , 4K  --> strict IO     Hypervolemia  Hypotension with likely cardiogenic shock state  PTA no anti Htn , BP managed with diet alone  --> on Dobutamine , was on Phenyleph and epi prior to that post Sx--> BP wo improvement started on vaso , levo and dobutamine currently, received pRBC and FFP  --> plans for PRN albumin for increased vasopresser needs  Goal UFR decreased to I=O  --> follow with CRRT  Now off lasix     Worsening acidosis bicarb  down  Lactic acidosis--> checks per primary  Acidosis worsening despite increased prescription  Will dose x1 50meq bicarb      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  Monitor on Labs     Mild hypokalemia  Monitor on labs     Mild hypernatremia , improved     Anemia  Acute on chronic  Likely inflamm  --> transfuse if less then 8 or per cardiology recs     MSSA bacteremia  Cb valve endocarditis  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  Thrombocytopenia and coagulopathy concerning for septic shock  Some concerns for cervical septic facet arthropathy  On Naficillin  ID following     Acute resp failure  On HF NC with 100% FiO2  Sec to hypervolemia and cardiogenic shock  Volume management as above  Management o resp failure per ICU  High risk for re-intubation     MV and AV endocarditis   Aortic Root Abscess   CT Sx dr Renae, following  8/27/24: s/p Aortic root abscess debridement and replacement AV and MV replacement and CABG  x 2         Thank you for the consultation we will follow  Moses Mcnair MD  [facility]  [phone]      Subjective  Patient continues to decline with hypoTN and likely cardiogenic and septuc shock , on multiple pressers  UFR decreased but still hypoTN , receiving blood and Ffp  Plans reviewed with cardiology  Opens eyes on mild sedation  Oxygenation needs better    Objective    Vital signs in last 24 hours  Temp:  [98.8  F (37.1  C)-99.5  F (37.5  C)] 99.1  F (37.3  C)  Pulse:  [79-83] 80  Resp:  [23-33] 26  MAP:  [54 mmHg-89 mmHg] 67 mmHg  Arterial Line BP: ()/(38-68) 104/49  FiO2 (%):  [60 %-100 %] 60 %  SpO2:  [89 %-100 %] 97 %  Weight:   [unfilled]    Intake/Output last 3 shifts  I/O last 3 completed shifts:  In: 2732.74 [I.V.:2282.74; NG/GT:250]  Out: 3436.5 [Urine:500; Other:2536.5; Stool:50; Chest Tube:350]  Intake/Output this shift:  I/O this shift:  In: 197.43 [I.V.:197.43]  Out: 427.5 [Urine:48; Other:339.5; Chest Tube:40]    Physical  Exam  Intubated NAD  CV: RRR +  murmur or rub  Lung: clear and equal; no extra sounds  Ab: soft and NT; not distended; normal bs  Ext: + edema and well perfused  Skin; no rash    Pertinent Labs   Lab Results   Component Value Date    WBC 10.4 08/30/2024    HGB 7.3 (L) 08/30/2024    HCT 23.1 (L) 08/30/2024    MCV 94 08/30/2024     (L) 08/30/2024     Lab Results   Component Value Date    BUN 18.7 08/30/2024     08/30/2024    CO2 17 (L) 08/30/2024       Lab Results   Component Value Date    ALBUMIN 3.5 08/30/2024     Lab Results   Component Value Date    PHOS 5.2 (H) 08/30/2024     I reviewed all lab results  Moses Mcnair MD

## 2024-08-30 NOTE — PLAN OF CARE
Goal Outcome Evaluation:      Plan of Care Reviewed With: patient, spouse, family    Overall Patient Progress: no changeOverall Patient Progress: no change    Outcome Evaluation: Intubated, pt opens eyes to voice, follows commands, calm/cooperative. CRRT to net even, due to presser needs.  see orders for titration, keeping MAP closer to 85 before decreasing needs and above 65 with 1 unit PRBC and 2 Units FFP.  Family at bedside.  Updated through CV surgeon

## 2024-08-30 NOTE — PLAN OF CARE
Problem: Gas Exchange Impaired  Goal: Optimal Gas Exchange  8/30/2024 0537 by Cristian Camacho, RN  Outcome: Progressing    Intervention: Optimize Oxygenation and Ventilation      Goal Outcome Evaluation:  [facility] - ICU    RN Progress Note:            Pertinent Assessments:      Please refer to flowsheet rows for full assessment     VSS on pressors, low grade fever. V-paced 100% per telemetry. Rass -1, follows command.            Key Events - This Shift:         -Increased pressor needs, vasopressin gtt ordered at fix rate. Levophed titrated off at this time.  -Patient tolerating CRRT well, pulling net negative 100 mL/hr.  -Producing some urine, but minimal.  -Large watery BM, dignicare placed for skin protection.                Barriers to Discharge / Downgrade:     Need for ICU level care: ETT/on mech vent, CRRT, pressors,

## 2024-08-30 NOTE — PROGRESS NOTES
CVTS Daily Progress Note   POD#3 s/p Aortic root abscess debridement and aortic annular reconstruction, aortic root replacement (Konect composite 25 mm Silva Inspiris Resilia bioprosthetic valve within 30 mm Gelweave Valsalva graft with reimplantation of the coronary arteries), Mitral valve replacement (31 mm St. Tim Islva bioprosthetic valve) and CAB x 2.  Attending: Dr Renae  LOS: 14    SUBJECTIVE/INTERVAL EVENTS:    Reintubated yesterday afternoon for hypoxia and resp failure. CRRT initiated with 2.5L removed. Minimal urine output. Dobutamine 5 mg, Norepi 0.22 and Vaso 2.4 and weaning as tolerated. Seen in in bed. Pain well controlled. +BM / +flatus with rectal tube in place. Occult blood positive today. NPO. Chest tube output appropriate. Hgb 7.3. Transfuse 1u PRBC;s today. INR 9.4. Vit K admin. Cr 1.87.  Patient denies new chest pain, shortness of breath, abdominal pain, calf pain, nausea. Patient unable to ask questions dt the above.    OBJECTIVE:  Temp:  [98.4  F (36.9  C)-99.5  F (37.5  C)] 98.4  F (36.9  C)  Pulse:  [] 97  Resp:  [23-33] 29  MAP:  [54 mmHg-89 mmHg] 79 mmHg  Arterial Line BP: ()/(38-68) 124/58  FiO2 (%):  [35 %-80 %] 35 %  SpO2:  [89 %-100 %] 96 %  Vitals:    08/26/24 0330 08/27/24 0500 08/28/24 0600 08/29/24 0400   Weight: 86.2 kg (190 lb 1.6 oz) 86.4 kg (190 lb 8 oz) 95.3 kg (210 lb 1.6 oz) 101 kg (222 lb 10.6 oz)    08/30/24 0400   Weight: 99.8 kg (220 lb)       Clinically Significant Risk Factors          # Hypocalcemia: Lowest Ca = 8.3 mg/dL in last 2 days, will monitor and replace as appropriate    # Anion Gap Metabolic Acidosis: Highest Anion Gap = 21 mmol/L in last 2 days, will monitor and treat as appropriate  # Hypoalbuminemia: Lowest albumin = 3.3 g/dL at 8/28/2024 12:12 AM, will monitor as appropriate     # Thrombocytopenia: Lowest platelets = 100 in last 2 days, will monitor for bleeding  # Acute Kidney Injury, unspecified: based on a >150% or 0.3 mg/dL  "increase in last creatinine compared to past 90 day average, will monitor renal function            # Obesity: Estimated body mass index is 34.46 kg/m  as calculated from the following:    Height as of this encounter: 1.702 m (5' 7\").    Weight as of this encounter: 99.8 kg (220 lb).        # Financial/Environmental Concerns:     # History of CABG: noted on surgical history        Current Medications:    Scheduled Meds:  Current Facility-Administered Medications   Medication Dose Route Frequency Provider Last Rate Last Admin    acetaminophen (TYLENOL) Suppository 650 mg  650 mg Rectal Q8H Maryam Anthony PA-C   650 mg at 08/28/24 0407    acetaminophen (TYLENOL) tablet 975 mg  975 mg Oral Q8H Maryam Anthony PA-C   975 mg at 08/30/24 0416    sodium chloride (NEBUSAL) 3 % neb solution 3 mL  3 mL Nebulization Q6H Zach Park MD   3 mL at 08/30/24 0737    And    albuterol (PROVENTIL) neb solution 2.5 mg  2.5 mg Nebulization Q6H Zach Park MD   2.5 mg at 08/30/24 0737    atorvastatin (LIPITOR) tablet 80 mg  80 mg Oral QPM Jessica Tolbert PA-C   80 mg at 08/29/24 2038    cefTAZidime (FORTAZ) 1 g vial to attach to  ml bag for ADULTS or NS 50 ml bag for PEDS  1 g Intravenous Q8H Alize Flores MD   1 g at 08/30/24 1026    chlorhexidine (PERIDEX) 0.12 % solution 15 mL  15 mL Mouth/Throat BID Zach Park MD   15 mL at 08/30/24 0829    [Held by provider] clopidogrel (PLAVIX) tablet 75 mg  75 mg Oral Daily Maryam Anthony PA-C        hydrocortisone sodium succinate PF (solu-CORTEF) injection 50 mg  50 mg Intravenous Q6H Zach Park MD   50 mg at 08/30/24 1025    lactobacillus rhamnosus (GG) (CULTURELL) capsule 1 capsule  1 capsule Oral BID Zach Foreman DO   1 capsule at 08/30/24 0823    Lidocaine (LIDOCARE) 4 % Patch 1-2 patch  1-2 patch Transdermal Q24H Maryam Anthony PA-C   2 patch at 08/29/24 1825    [Held by provider] nafcillin 12 g in sodium chloride 0.9 % " 550 mL continuous infusion  12 g Intravenous Q24H [address] MBBS   12 g at 08/28/24 1214    pantoprazole (PROTONIX) 2 mg/mL suspension 40 mg  40 mg Oral or NG Tube QAM AC Maryam Anthony PA-C        Or    pantoprazole (PROTONIX) EC tablet 40 mg  40 mg Oral QAM AC Maryam Anthony PA-C        Or    pantoprazole (PROTONIX) IV push injection 40 mg  40 mg Intravenous QAM AC Maryam Anthony PA-C   40 mg at 08/30/24 0651    polyethylene glycol (MIRALAX) Packet 17 g  17 g Oral Daily Maryam Anthony PA-C        senna-docusate (SENOKOT-S/PERICOLACE) 8.6-50 MG per tablet 1 tablet  1 tablet Oral BID Maryam Anthony PA-C   1 tablet at 08/29/24 2039    sodium chloride (PF) 0.9% PF flush 3 mL  3 mL Intracatheter Q8H Gondal, Saad J, MD   3 mL at 08/30/24 0813    vancomycin (VANCOCIN) 2,000 mg in sodium chloride 0.9 % 250 mL intermittent infusion  2,000 mg (central catheter) Intravenous Once Zach Park MD        [START ON 8/31/2024] vancomycin (VANCOCIN) 750 mg in sodium chloride 0.9 % 250 mL intermittent infusion  750 mg Intravenous Q12H Zach Park MD        [Held by provider] Warfarin Dose Required Daily - Pharmacist Managed  1 each Oral See Admin Instructions Jessica Tolbert PA-C         Continuous Infusions:  Current Facility-Administered Medications   Medication Dose Route Frequency Provider Last Rate Last Admin    CRRT Pre-Filter replacement solution for CVVHD & CVVHDF (Phoxillum BK4/2.5)  12.5 mL/kg/hr CRRT Continuous Moses Mcnair MD 1,300 mL/hr at 08/30/24 1041 12.5 mL/kg/hr at 08/30/24 1041    CRRT Replacement solution for CVVHD and CVVHDF premixed replacement solution (Phoxillum 4/2.5)  200 mL/hr CRRT Continuous Moses Mcnair  mL/hr at 08/29/24 1531 200 mL/hr at 08/29/24 1531    dexmedeTOMIDine (PRECEDEX) 4 mcg/mL in sodium chloride 0.9 % 100 mL infusion  0.1-1.2 mcg/kg/hr Intravenous Continuous Maryam Anthony PA-C 10.8 mL/hr at 08/30/24 0600 0.5  mcg/kg/hr at 08/30/24 0600    dialysate for CVVHD & CVVHDF premixed replacement solution (Phoxillum 4/2.5) - total potassium 4 mEq/L  22.5 mL/kg/hr CRRT Continuous Moses Mcnair MD 2,300 mL/hr at 08/30/24 0957 22.5 mL/kg/hr at 08/30/24 0957    DOBUTamine (DOBUTREX) 500 mg in D5W 250 mL infusion (adult std conc)  5 mcg/kg/min Intravenous Continuous Navneet Branch MD 14.3 mL/hr at 08/30/24 0600 5 mcg/kg/min at 08/30/24 0600    EPINEPHrine (ADRENALIN) 5 mg in sodium chloride 0.9 % 250 mL infusion CENTRAL  0.01-0.1 mcg/kg/min Intravenous Continuous Dylan Renae MD   Stopped at 08/29/24 0844    fentaNYL (SUBLIMAZE) infusion   mcg/hr Intravenous Continuous Zach Park MD 1 mL/hr at 08/30/24 0812 50 mcg/hr at 08/30/24 0812    insulin regular (MYXREDLIN) 1 unit/mL infusion  0-24 Units/hr Intravenous Continuous Maryam Anthony PA-C   Held at 08/29/24 0614    niCARdipine 40 mg in 200 mL NS (CARDENE) infusion  0.5-15 mg/hr Intravenous Continuous Dylan Renae MD   Stopped at 08/29/24 0705    No heparin required   Does not apply Continuous PRN Moses Mcnair MD        norepinephrine (LEVOPHED) 16 mg in sodium chloride 0.9 % 250 mL infusion CENTRAL  0.01-0.6 mcg/kg/min Intravenous Continuous Zach Park MD 20.8 mL/hr at 08/30/24 0916 0.22 mcg/kg/min at 08/30/24 0916    phenylephrine (ZEYAD-SYNEPHRINE) 50 mg in NaCl 0.9 % 250 mL infusion  0.1-4 mcg/kg/min Intravenous Continuous PRN Maryam Anthony PA-C        vasopressin (VASOSTRICT) 20 Units in sodium chloride 0.9 % 100 mL standard conc infusion  2.4 Units/hr Intravenous Continuous Navneet Branch MD 10 mL/hr at 08/30/24 1032 2 Units/hr at 08/30/24 1032     PRN Meds:.  Current Facility-Administered Medications   Medication Dose Route Frequency Provider Last Rate Last Admin    acetaminophen (TYLENOL) tablet 650 mg  650 mg Oral Q4H PRN Gondal, Saad J, MD   650 mg at 08/28/24 1528    Or    acetaminophen (TYLENOL)  Suppository 650 mg  650 mg Rectal Q4H PRN Gondal, Saad J, MD        acetaminophen (TYLENOL) tablet 650 mg  650 mg Oral Q4H PRN Maryam Anthony PA-C        bisacodyl (DULCOLAX) suppository 10 mg  10 mg Rectal Daily PRN Maryam Anthony PA-C        calcium carbonate (TUMS) chewable tablet 1,000 mg  1,000 mg Oral 4x Daily PRN Gondal, Saad J, MD        calcium gluconate 1 g in 50 mL in sodium chloride intermittent infusion  1 g Intravenous Once PRN Maryam Anthony PA-C   1 g at 08/30/24 1113    calcium gluconate 2 g in  mL intermittent infusion  2 g Intravenous Q8H PRN Moses Mcnair MD   2 g at 08/29/24 1618    calcium gluconate 2 g in  mL intermittent infusion  2 g Intravenous Once PRN Maryam Anthony PA-C        calcium gluconate 3 g in sodium chloride 0.9 % 100 mL intermittent infusion  3 g Intravenous Once PRN Maryam Anthony PA-C        calcium gluconate 4 g in sodium chloride 0.9 % 100 mL intermittent infusion  4 g Intravenous Q8H PRN Moses Mcnair MD        carboxymethylcellulose PF (REFRESH PLUS) 0.5 % ophthalmic solution 1 drop  1 drop Both Eyes Q1H PRN Gondal, Saad J, MD        glucose gel 15-30 g  15-30 g Oral Q15 Min PRN Maryam Anthony PA-C        Or    dextrose 50 % injection 25-50 mL  25-50 mL Intravenous Q15 Min PRN Maryam Anthony PA-C        Or    glucagon injection 1 mg  1 mg Subcutaneous Q15 Min PRN Maryam Anthony PA-C        fentaNYL (SUBLIMAZE) 50 mcg/mL bolus from pump  50 mcg Intravenous Q1H PRN Zach Park MD   50 mcg at 08/30/24 1124    hydrALAZINE (APRESOLINE) injection 10 mg  10 mg Intravenous Q30 Min PRN Maryam Anthony PA-C        HYDROmorphone (DILAUDID) injection 0.1 mg  0.1 mg Intravenous Q2H PRN Maryam Anthony PA-C        Or    HYDROmorphone (DILAUDID) injection 0.2 mg  0.2 mg Intravenous Q2H PRN Maryam Anthony PA-C   0.2 mg at 08/29/24 0920    lactated ringers BOLUS 250 mL  250 mL  Intravenous Q15 Min PRN Maryam Anthony PA-C   250 mL at 08/27/24 2151    lidocaine (LMX4) cream   Topical Q1H PRN Gondal, Saad J, MD        lidocaine 1 % 0.1-1 mL  0.1-1 mL Other Q1H PRN Gondal, Saad J, MD        magnesium hydroxide (MILK OF MAGNESIA) suspension 30 mL  30 mL Oral Daily PRN Maryam Anthony PA-C        magnesium sulfate 2 g in 50 mL sterile water intermittent infusion  2 g Intravenous Q8H PRN Moses Mcnair MD        miconazole (MICATIN) 2 % powder   Topical BID PRN Erick Patino DO        naloxone (NARCAN) injection 0.2 mg  0.2 mg Intravenous Q2 Min PRN Shawn Patinoinic A, DO        Or    naloxone (NARCAN) injection 0.4 mg  0.4 mg Intravenous Q2 Min PRN Shawn Patinoinic A, DO        Or    naloxone (NARCAN) injection 0.2 mg  0.2 mg Intramuscular Q2 Min PRN Shawn Patinoinic A, DO        Or    naloxone (NARCAN) injection 0.4 mg  0.4 mg Intramuscular Q2 Min PRN Erick Patino A, DO        nicotine (NICORETTE) gum 2 mg  2 mg Buccal Q1H PRN Gondal, Saad J, MD        No heparin required   Does not apply Continuous PRN Moses Mcnair MD        ondansetron (ZOFRAN ODT) ODT tab 4 mg  4 mg Oral Q6H PRN Maryam Anthony PA-C        Or    ondansetron (ZOFRAN) injection 4 mg  4 mg Intravenous Q6H PRN Maryam Anthony PA-C   4 mg at 08/29/24 0630    oxyCODONE IR (ROXICODONE) half-tab 2.5 mg  2.5 mg Oral Q4H PRN Maryam Anthony PA-C   2.5 mg at 08/28/24 1143    Or    oxyCODONE (ROXICODONE) tablet 5 mg  5 mg Oral Q4H PRN Maryam Anthony PA-C   5 mg at 08/28/24 2013    phenylephrine (ZEYAD-SYNEPHRINE) 50 mg in NaCl 0.9 % 250 mL infusion  0.1-4 mcg/kg/min Intravenous Continuous PRN Maryam Anthony PA-C        potassium chloride 20 mEq in 50 mL intermittent infusion  20 mEq Intravenous Q8H PRN Moses Mcnair MD        prochlorperazine (COMPAZINE) injection 5 mg  5 mg Intravenous Q6H PRN Maryam Anthony PA-C   5 mg at 08/29/24 0658    Or     prochlorperazine (COMPAZINE) tablet 5 mg  5 mg Oral Q6H PRN Maryam Anthony PA-C        senna-docusate (SENOKOT-S/PERICOLACE) 8.6-50 MG per tablet 1 tablet  1 tablet Oral BID PRN Gondal, Saad J, MD        Or    senna-docusate (SENOKOT-S/PERICOLACE) 8.6-50 MG per tablet 2 tablet  2 tablet Oral BID PRN Gondal, Saad J, MD        sodium chloride (PF) 0.9% PF flush 10-40 mL  10-40 mL Intracatheter Once PRN [address] MBBS        sodium chloride (PF) 0.9% PF flush 3 mL  3 mL Intracatheter q1 min prn Gondal, Saad J, MD        sodium chloride 0.9% BOLUS 1-250 mL  1-250 mL Intravenous Q1H PRN Jessica Tolbert PA-C        sodium phosphate 15 mmol in sodium chloride 0.9 % 250 mL intermittent infusion  15 mmol Intravenous Q8H PRN Moses Mcnair MD        traMADol (ULTRAM) tablet 50 mg  50 mg Oral Q6H PRN Erick Patino DO   50 mg at 08/24/24 1757       Cardiographics:    Telemetry monitoring demonstrates NSR with short run of afib with paced rate at 90 bmp per my personal review.    Imaging:  Results for orders placed or performed during the hospital encounter of 08/16/24   MR Brain w/o & w Contrast    Impression    IMPRESSION: Assessment degraded due to motion.  1.  13 mm focus of cortical signal abnormality within the left superior parietal lobule which is favored to represent a subacute infarct; low-grade glial neoplasm could conceivably have a similar appearance. Hyperacute intraparenchymal hematoma could   also have a similar appearance but is considered less likely. Recommend noncontrast head CT for further characterization. Also recommend follow-up MRI brain without and with contrast in 8-12 weeks to document expected evolution.  2.  Additional smaller foci of signal abnormality with similar characteristics favored to represent punctate subacute infarcts.   CTA Head Neck with Contrast    Impression    IMPRESSION:   HEAD CT:  1.  Small focus of juxtacortical low attenuation within the left parietal lobe  corresponds to signal abnormality seen on the prior MRI. As suggested previously this is favored to reflect evolving small vessel ischemic change; however, MRI follow-up to   exclude a low-grade neoplastic process is advised.  2.  No evidence for intracranial hemorrhage or significant mass effect.  3.  Generalized brain atrophy and presumed chronic microvascular ischemic change.    HEAD CTA:   1.  Intracranial atherosclerosis including moderate to marked stenosis of the right P2 posterior cerebral artery segment.  2.  No definite proximal branch occlusion, aneurysm, or high flow vascular malformation identified.    NECK CTA:  1.  Atherosclerotic plaque at the carotid bifurcations bilaterally without hemodynamically significant stenosis in the neck vessels.   2.  No evidence for dissection.   Radiologist Consult For Cardiology    Impression    IMPRESSION:    1.  Partially visualized left adrenal nodule, indeterminate, could represent adrenal adenoma.  2.  Please refer to cardiologist's dictation for the cardiac CT report.   US Carotid Bilateral    Impression    IMPRESSION:  1.  Mild plaque formation, velocities consistent with less than 50% stenosis in the right internal carotid artery.  2.  Mild plaque formation, velocities consistent with less than 50% stenosis in the left internal carotid artery.  3.  Flow within the vertebral arteries is antegrade.   XR Chest Port 1 View    Impression    IMPRESSION: Endotracheal tube tip 4.8 cm proximal to the anuj. [place] catheter with tip in the pulmonary outflow tract. Mediastinal drain with aortic valve prosthesis. Sternotomy. Small left pleural effusion with left basilar infiltrate.   XR Chest Port 1 View    Impression    IMPRESSION: Endotracheal tube in the distal aspect of the trachea. Sternotomy. AVR. Mediastinal drain. Right IJ [place] catheter with tip in the right ventricle/MPA. Left-sided chest tube. No pneumothorax. Mild cardiac enlargement with normal  pulmonary   vascularity. Minimal left basilar atelectasis and pleural fluid. Degenerative changes in the spine.   Echocardiogram Limited   Result Value Ref Range    LVEF  60-65%    CTA  ANGIOGRAM CORONARY ARTERY   Result Value Ref Range    BSA 0.00 m2       Labs, personally reviewed.  Hemoglobin   Date Value Ref Range Status   08/30/2024 7.3 (L) 11.7 - 15.7 g/dL Final   08/29/2024 8.7 (L) 11.7 - 15.7 g/dL Final   08/28/2024 9.2 (L) 11.7 - 15.7 g/dL Final     WBC Count   Date Value Ref Range Status   08/30/2024 10.4 4.0 - 11.0 10e3/uL Final   08/29/2024 15.3 (H) 4.0 - 11.0 10e3/uL Final   08/28/2024 10.8 4.0 - 11.0 10e3/uL Final     Platelet Count   Date Value Ref Range Status   08/30/2024 100 (L) 150 - 450 10e3/uL Final   08/29/2024 151 150 - 450 10e3/uL Final   08/28/2024 200 150 - 450 10e3/uL Final     Creatinine   Date Value Ref Range Status   08/30/2024 1.87 (H) 0.51 - 0.95 mg/dL Final   08/29/2024 2.24 (H) 0.51 - 0.95 mg/dL Final   08/29/2024 2.14 (H) 0.51 - 0.95 mg/dL Final     Potassium   Date Value Ref Range Status   08/30/2024 3.9 3.4 - 5.3 mmol/L Final   08/29/2024 4.0 3.4 - 5.3 mmol/L Final   08/29/2024 3.5 3.4 - 5.3 mmol/L Final     Potassium POCT   Date Value Ref Range Status   08/27/2024 3.4 3.4 - 5.3 mmol/L Final   08/27/2024 3.9 3.4 - 5.3 mmol/L Final   08/27/2024 3.4 3.4 - 5.3 mmol/L Final     Magnesium   Date Value Ref Range Status   08/30/2024 2.2 1.7 - 2.3 mg/dL Final   08/29/2024 2.2 1.7 - 2.3 mg/dL Final   08/29/2024 2.1 1.7 - 2.3 mg/dL Final          I/O:  I/O last 3 completed shifts:  In: 2732.74 [I.V.:2282.74; NG/GT:250]  Out: 3436.5 [Urine:500; Other:2536.5; Stool:50; Chest Tube:350]       Physical Exam:    General: Patient seen in bed. Lightly sedated. Responds and opens eyes appropriately.    HEENT: JULIO, no sclera icterus, moist mucosa. ETT with deviation,  CV: RRR on monitor. 2+ peripheral pulses in all extremities. Mild edema.   Pulm: Non-labored effort on CMV. Chest tubes in place,  serosanguinous output, no air leak. Incision C/D/I.  Abd: Soft, NT, ND  : Le with pita urine  Ext: Mild pedal edema, SCDs in place, warm, distal pulses intact. Right femoral CRRT line in place.   Neuro:CNs grossly intact      ASSESSMENT/PLAN:    Felicitas Elliott is a 84 year old female with a history of endocarditis, CAD, aortic stenosis and mitral valve disorder who is s/p Aortic root abscess debridement and aortic annular reconstruction, aortic root replacement (Konect composite 25 mm Silva Inspiris Resilia bioprosthetic valve within 30 mm Gelweave Valsalva graft with reimplantation of the coronary arteries), Mitral valve replacement (31 mm St. Tim Silva bioprosthetic valve) and CAB x 2.    Active Problems:    Bacteremia    Endocarditis    Sepsis (H)    Nonrheumatic aortic valve stenosis    Postsurgical percutaneous transluminal coronary angioplasty status    Mitral valve disorder    Coronary artery disease involving native coronary artery of native heart, unspecified whether angina present        NEURO:   - Scheduled Tylenol/lidocaine patches and PRN Tylenol/oxycodone/dilaudid for pain    CV:   - Pre-op EF 60-65%  - Normotensive  - Weaning pressors as hemodynamics allow, currently on Dobutamine 5 mg, Vaso 2.4u and Norepi 0.22  - Beta blocker pending weaning from pressors  - ASA Allergy- Plavix 75 mg PO daily-held until PPM determined   - Atorvastatin 80 mg daily  - Chest tubes to remain today.  - Cards following-apprec reqs  - New baseline echo before discharge and after CT removed  - Coumadin x 3 months per surgeon preference. INR goal 2.5-3.5. Rx to dose. INR 9.4(2.14). Held today. Vit K and FP plasma given    PULM:   - Extubated POD#1, reintubated POD #2  - Maintaining oxygen saturations on CMV  - Encourage pulmonary toilet  - CXR-8/29 mild venous congestion,Small right pleural effusion has developed with associated passive atelectasis right base.    FEN/GI:  - Continue electrolyte replacement  protocol  - Diet: Cardiac, ADAT   - Bowel regimen    RENAL:  - Adequate UOP/hr. Continue to monitor closely.  - Cr 1.87(2.14)(1.37)  - Le to remain in for close monitoring of I/O and during period of diuresis/relative immobility.   - Diuresis with Lasix gtt-stopped  -Consult nephrology-apprec reqs         CRRT 8/29. Removed 2.5L. Keep net even today.    HEME:  - Acute blood loss anemia post-op.   - Hgb 7.3, Hep subcutaneous-stopped,  - Occult blood positive POD#3, rectal tube in place   - Plavix 75 mg PO daily(held)- ASA allergy  - Coumadin pharmacy to dose. Goal INR 2.5-3.5 x 3 mos. INR 9.4(2.28)(1.47)-Held       -Vit K 5 mg and 2 FFP given 8/30       -Recheck INR, CBC, CMP,Trop. Lactate q 8 hrs.    ID:  - Lennie op ppx complete, afebrile.  -ID following-apprec reqs- MSSA bacteremia. Cultures NGTD from 8/17          -Nafcillin for 6 wks-held today with increase Cr          - new surgical cultures sent-pending          - Vanc 2,000 mg then 750 mg q 12 hrs and ceftazidime 1 gm q 8hrs    ENDO:   - Continue insulin gtt.     PPx:   - DVT: SCDs, SQ heparin TID, ambulation   - GI: Protonix 40mg PO daily    DISPO:   - Continue critical care in ICU  - Medically Ready for Discharge: Anticipated in 5+ Days       Patient discussed with Dr. Renae.      Jessica Tolbert PA-C  [facility]  Vocera or pager [phone]

## 2024-08-30 NOTE — PROGRESS NOTES
MAP70's with .1Levo and 5 dobutamine.  CVP 18-19 started pulling 50 with CRRT at 1700.  No increase needs in Levo, Vaso remains off.  D20 initiated due to elevated quatnitative ketones, will check Q hour blood sugars and start insulin gtt when BS >150

## 2024-08-30 NOTE — PROGRESS NOTES
Critical Care Progress Note     08/30/2024     Name: Felicitas Elliott MRN#: [id number]   Age: 84 year old YOB: 1940        Summary     Past medical history of moderate aortic stenosis, HFpEF, HTN, HLD, CKD 3, chronic intertriginous skin wounds, chronic neck pain       Presented 8/14/24 for chills & generalized weakness. 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 8/27/24 s/p bioprosthetic aortic & mitral valve replacement, aortic root replacement, & two vessel coronary artery bypass graft. Course complicated by post operative hypoxemic respiratory failure due to cardiogenic pulmonary edema, atelectasis, & possible Enterobacter hospital acquired pneumonia in conjunction with cardiogenic-distributive shock, oliguric HAMMAD requiring CRRT, euglycemic ketoacidosis, & suspected ischemic hepatitis       Interval Events     Atrial fibrillation     Improved oxygenation. Started vasopressin & NE was weaned to off. CVP 10-15, CO/CI 4.7/2.4, PAP 30-40s/10s. Net - 700 mL yesterday & 800 mL overnight. Elevated transaminases AST >>> ALT with normal ALP & Tbili. CBC WC down 10.4, Hgb down-drifting 7.3, plts 100 (151-200)    VBG 7.3/36 & SVO2 57     4 T score 3 low probability HIT (fall>50%, timing >10 days, no known thrombus, definite alternative causes in sepsis, CRRT, post-cardiac surgery     No new micro data     Supratherapeutic INR 9.4, no bleeding     [place]:   - 1) elevated AST out of proportion to ALT/bili, will check US liver doppler, troponin, CK, & cardiac POCUS  - 2) consider empiric NAC pending trajectory   - 3) vitamin K per supratherapeutic INR per surgery   - 4) MAP goal ~85 mmHg + albumin per cardiology   - 5) pacer rate 90 BPM  - 6) change ceftazidime to cefepime to cover Enterobacter in sputum & the MSSA, add empiric vanco in the setting of  "severe mixed shock while cultures are pending  - 7) she may have euglycemic ketosis, potentially due to the CRRT (PMID: 56800299), &/or starvation ketosis therefore will start D20 infusion & insulin infusion       Assessment & Plan      Neurology, Psychiatry, Sedation, Analgesia:  L-parietal septic embolic stroke   8/17/24 MRI brain \"cortical signal abnormality within the left superior parietal lobule which is favored to represent a subacute infarct [...] smaller foci of signal abnormality with similar characteristics favored to represent punctate subacute infarcts.\" 8/18/24 CTA head & neck \"low attenuation within the left parietal lobe [...] this is favored to reflect evolving small vessel ischemic change; however, MRI follow-up to   exclude a low-grade neoplastic process is advised\"  - neurology was consulted      Cervical spinal stenosis   Chronic neck pain   8/15/24  cervical MRI \"multilevel cervical spondylosis [...] complex edema at C4-5 on the left is favored to be reactive/degenerative [...] septic facet arthropathy are difficult to entirely exclude [...] C4-5, moderate to severe spinal canal stenosis  [...] severe right and moderate to severe left neural foraminal stenosis  [...] C5-6, moderate spinal canal stenosis  [...]Severe bilateral neural foraminal stenosis  [...] C7-T1, mild spinal canal stenosis with severe right and mild to moderate left neural foraminal stenosis [...] C3-4, moderate bilateral neural foraminal stenosis.\"  - neurosurgery was consulted, no current surgical interventions advised  - consider cervical MRI w/wo if neck pain worsens to evaluate for sequelae of infection      Sedation & Analgesia   - RASS goal 0 to -1  - dexmedetomidine & fentanyl infusions    - prn APAP & tramadol      Cardiovascular:  Cardiogenic & distributive shock   Etiology multifactorial, post-cardiopulmonary bypass vasoplegia & low cardiac output syndrome in conjunction with sepsis due to residual aortic root " abscess identified intraoperatively & possible Enterobacter HAP  - remains on dobutamine  NE &   - hydrocortisone 50 mg q6h   - MAP goal ~85 mmHg     Severe aortic stenosis, aortic & mitral valve infective endocarditis, aortic root abscess  8/27/24 bioprosthetic aortic & mitral valve replacement, aortic root replacement  - warfarin x 3 months per surgeon preference. INR goal 2.5-3.5      Severe multivessel CAD  8/27/24 2 vessel CABG  - clopidogrel per CV surgery      Preserved pre & post- operative biventricular systolic function   Left ventricular hypertrophy with impaired diastolic function      Carotid arterial stenosis, mild  8/22/24 carotid US     Post-operative atrial fibrillation slow ventricular response   S/p placement of temporary epicardial pacing wires   Presumed post-operative sinus node dysfunction or AV conduction defect.   - V paced at 90 BPM     Respiratory, Airway:  Acute hypoxemic respiratory failure - improved   Cardiogenic pulmonary edema, suspect nosocomial aspiration pneumonia-pneumonitis   Overnight 8/28-8/29 over 30 minutes period exhibited progressive rapidly worsening hypoxemia, nausea, vomiting, & multiple sequelae of hypervolemia with oliguria. 8/29/24 radiograph with progressive right basilar opacity & blunting of the costophrenic angle. 8/29/24 pleural US small right pleural effusion, no ipsilateral chest tube   - supplemental O2 to maintain spO2 >= 90-96% & PaO2 >= 60 mmHg  - lung protective ventilation (TV 6-8 mL/kg IBW, Pplat <30, driving pressure <15)   - target normocarbia pH 7.35 - 7.45, continuously monitored end tidal CO2  - empiric antimicrobials & infectious evaluation as below    Post-operative chest tubes  - monitor output       Gastrointestinal:  Acute liver injury   Suspect ischemic hepatitis. Differential includes medications/toxins, viral hepatitis, autoimmune hepatitis, Budd-Chiari syndrome, hepatic artery thrombus, veno-occlusive disease, secondary hemophagocytic  lymphohistiocytosis, et al  - consider further evaluation   - ordered liver ultrasound with doppler to evaluate for portal vein thrombus or hepatic arterial thormbus   - trend liver panels, INR, fibrinogen (transfuse for <100 mg/dL)  - remains on CRRT which should reduce serum level of ammonia if elevated   - consider N-Acetylcysteine for non-acetaminophen RACHEL (PMID [id number]; PMID 42909222; PMID 70326299; PMID [id number])  - continue prophylactic PPI & avoid hypotonic fluid     Renal, Acid-base, Electrolytes, Volume :  Oliguric 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 Baseline 1.2 - 1.3  - nephrology consulted for CRRT-UF     Hypervolemia  Grossly edematous throughout. Elevated CVP. Weight up several kilograms    Ketoacidosis, euglycemic   Etiology unclear, suspect CRRT implicated (PMID: [id number]) &/or starvation ketosis. 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   Question of cervical septic facet arthropathy   Presumed source intertriginous soft tissue lesions  - ID consulted   - hold nafcillin while broadened, as below     Concern for Enterbacter hospital acquired pneumonia    - pending - 8/29 sputum culture; 830 blood culture, UA with reflex   - negative - 8/29 MRSA nares, viral panel   - broadened to cefepime & vancomycin      Lennie-operative antibiotics per CV surgery      Hematology, Oncology:  Post-operative anemia  - monitor for bleeding: Hgb goal >7-8      Thrombocytopenia   Etiology multifactorial, sepsis, beta lactam antimicrobials, CRRT, & post-cardiopulmonary bypass, at least. Anticipate fall in platelet count after cardiopulmonary bypass in most patients, typically to levels approximately half of  baseline, abel between postoperative days 2-4, & persists for 4 - 6 days following surgery. Anti-PF4-heparin CASPER testing is positive in 25% to 50% of patients between postoperative days 3 and 10, while HIT is confirmed in only 1% to 4% of patients following CPB   - 8/30/24 4 T score 3 low probability HIT (fall>50%, timing >10 days, no known thrombus, definite alternative causes in sepsis, CRRT, post-cardiac surgery     Supratherapeutic INR   - vitamin K 5 mg per CV surgery     Endocrine:  MICU insulin/glucose protocol   - maintain BG of 140 to 180 mg/dL with a continuous IV insulin infusion     Euglycemic ketoacidosis, as above     Checklist:  FEN: NPO  VTE ppx: supratherapeutic INR  GI ppx: pantoprazole   Bowel regimen: PEG & senna   VAP ppx: Head of bed >30 degrees, daily oral care  Lines/tube-size: R-internal jugular introducer & PAC 8/27, radial arterial line 8/27, PICC 8/21, R-femoral HD catheter 8/29, chacko 8/27, ETT 8/29   Skin: sternotomy site clean & dry    PT/OT/SLP, early mobility: cardiac rehab when able   Code Status: full       Physical Exam      Neurologic: Sedated. Opens eyes, tracks, & follows commands in all extremities symmetrically   HEENT: Head and face normal. No nasal discharge. Oropharynx normal. Eyelids, conjunctiva, & sclera normal.   Neck: Neck appearance normal. No neck masses. Thyroid not enlarged.  Respiratory: Lungs clear bilaterally. No wheezes or rhonchi.   Cardiovascular: Regular rate & rhythm  Heart sounds distant   Gastrointestinal: Soft. Obese.   Musculoskeletal: Skeletal configuration normal and muscle mass normal for age. Joint appearance overall normal.  Skin, Hair, & Nails: Skin color normal. Sternotomy site clean & dry  Hair & nails normal.  Extremities: 2+ lower extremity pitting edema. Warm     All pertinent vital signs, ventilator settings, I&Os, laboratory, microbiology, ECGs, & imaging data has been personally reviewed. Total Critical Care time, excluding procedures,  was 50 minutes     VIRGINIA Park MD  Critical Care Medicine

## 2024-08-30 NOTE — PROGRESS NOTES
INR high given vitamin K US ordered, stool sent, pacer on stand by.  Rhythm Afib . EKG ordered Notified NP

## 2024-08-30 NOTE — PROGRESS NOTES
"    ICU Cross Cover Note (08/29/2024)     Date of Hospital Admission: 8/16/2024  Code Status: Full Code    Reason for Visit  Shock    Summary  84 year old female with PMH of moderate aortic stenosis, HFpEF, HTN, HLD, CKD 3, chronic intertriginous skin wounds, chronic neck pain. She presented 8/14/24 for chills & generalized weakness. 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 8/27/24 s/p bioprosthetic aortic & mitral valve replacement, aortic root replacement, & two vessel coronary artery bypass graft. Course complicated by post operative hypoxemic respiratory failure due to cardiogenic pulmonary edema, atelectasis, & possible nosocomial aspiration in conjunction with cardiogenic-distributive shock     Subjective  She is seen and examined in the intensive care unit tonight.  She has had increased Levophed requirements, currently up to 0.18.  She was started on CRRT earlier this evening.    Objective   Vital Signs  Blood pressure (!) 89/50, pulse 80, temperature 99  F (37.2  C), temperature source Pulmonary Artery, resp. rate 26, height 1.702 m (5' 7\"), weight 101 kg (222 lb 10.6 oz), SpO2 98%.  I/O last 3 completed shifts:  In: 1035.91 [I.V.:835.91]  Out: 2290 [Urine:1640; Chest Tube:650]  FiO2 (%): 60 %, Resp: 26, Vent Mode: CMV/AC, Resp Rate (Set): 26 breaths/min, Tidal Volume (Set, mL): 400 mL, PEEP (cm H2O): 10 cmH2O, Pressure Support (cm H2O): (S) 5 cmH2O, Resp Rate (Set): 26 breaths/min, Tidal Volume (Set, mL): 400 mL, PEEP (cm H2O): 10 cmH2O    Physical Exam   General:  Ill looking, intubated  Head:  normocephalic, atraumatic    Eyes: conjunctiva clear, PERRL.   Throat:  Orally intubated. No erythema, exudates or lesions.   Neck:  Supple, no masses  Heart:  RRR, no murmur   Lungs:  Coarse bilaterally. .   Abdomen:  Soft, NT/ND, no HSM, no masses. "   Extremities: No deformities, clubbing, cyanosis, or edema.   Neurologic: Intubated, and sedated, moving all extremities.      Diagnostic Data  The following portions of the patient's chart were reviewed: current medications, problem list, laboratory workup, and diagnostic data.    Most Recent Pertinent Labs  Lab Results   Component Value Date    WBC 15.3 (H) 08/29/2024    HGB 8.7 (L) 08/29/2024    HCT 26.7 (L) 08/29/2024     08/29/2024     08/29/2024    POTASSIUM 4.0 08/29/2024    CHLORIDE 102 08/29/2024    CO2 17 (L) 08/29/2024    BUN 21.3 08/29/2024    CR 2.24 (H) 08/29/2024    GLC 94 08/29/2024    NTBNPI 7,005 (H) 08/19/2024     (H) 08/29/2024    ALT 19 08/29/2024    ALKPHOS 41 08/29/2024    INR 2.28 (H) 08/29/2024     Infusion Medications  Current Facility-Administered Medications   Medication Dose Route Frequency Provider Last Rate Last Admin    CRRT Pre-Filter replacement solution for CVVHD & CVVHDF (Phoxillum BK4/2.5)  12.5 mL/kg/hr CRRT Continuous Moses Mcnair MD 1,300 mL/hr at 08/29/24 1910 12.5 mL/kg/hr at 08/29/24 1910    CRRT Replacement solution for CVVHD and CVVHDF premixed replacement solution (Phoxillum 4/2.5)  200 mL/hr CRRT Continuous Moses Mcnair  mL/hr at 08/29/24 1531 200 mL/hr at 08/29/24 1531    dexmedeTOMIDine (PRECEDEX) 4 mcg/mL in sodium chloride 0.9 % 100 mL infusion  0.1-1.2 mcg/kg/hr Intravenous Continuous Maryam Anthony PA-C 4.3 mL/hr at 08/29/24 2000 0.2 mcg/kg/hr at 08/29/24 2000    dialysate for CVVHD & CVVHDF premixed replacement solution (Phoxillum 4/2.5) - total potassium 4 mEq/L  12.5 mL/kg/hr CRRT Continuous Moses Mcnair MD 1,300 mL/hr at 08/29/24 1910 12.5 mL/kg/hr at 08/29/24 1910    DOBUTamine (DOBUTREX) 500 mg in D5W 250 mL infusion (adult std conc)  5 mcg/kg/min Intravenous Continuous Navneet Branch MD 14.3 mL/hr at 08/29/24 2000 5 mcg/kg/min at 08/29/24 2000    EPINEPHrine (ADRENALIN) 5 mg in sodium chloride 0.9 % 250 mL  infusion CENTRAL  0.01-0.1 mcg/kg/min Intravenous Continuous Dylan Renae MD   Stopped at 08/29/24 0844    fentaNYL (SUBLIMAZE) infusion   mcg/hr Intravenous Continuous Zach Park MD 1 mL/hr at 08/29/24 2000 50 mcg/hr at 08/29/24 2000    insulin regular (MYXREDLIN) 1 unit/mL infusion  0-24 Units/hr Intravenous Continuous Maryam Anthony PA-C   Held at 08/29/24 0614    niCARdipine 40 mg in 200 mL NS (CARDENE) infusion  0.5-15 mg/hr Intravenous Continuous Dylan Renae MD   Stopped at 08/29/24 0705    No heparin required   Does not apply Continuous PRN Moses Mcnair MD        norepinephrine (LEVOPHED) 16 mg in sodium chloride 0.9 % 250 mL infusion CENTRAL  0.01-0.6 mcg/kg/min Intravenous Continuous Zach Park MD 13.3 mL/hr at 08/29/24 2130 0.14 mcg/kg/min at 08/29/24 2130    phenylephrine (ZEYAD-SYNEPHRINE) 50 mg in NaCl 0.9 % 250 mL infusion  0.1-4 mcg/kg/min Intravenous Continuous PRN Maryam Anthony PA-C        vasopressin (VASOSTRICT) 20 Units in sodium chloride 0.9 % 100 mL standard conc infusion  2.4 Units/hr Intravenous Continuous Domingo Nagy MD         Scheduled Medications  Current Facility-Administered Medications   Medication Dose Route Frequency Provider Last Rate Last Admin    acetaminophen (TYLENOL) Suppository 650 mg  650 mg Rectal Q8H Maryam Anthony PA-C   650 mg at 08/28/24 0407    acetaminophen (TYLENOL) tablet 975 mg  975 mg Oral Q8H Maryam Anthony PA-C   975 mg at 08/29/24 2037    sodium chloride (NEBUSAL) 3 % neb solution 3 mL  3 mL Nebulization Q6H Zach Park MD   3 mL at 08/29/24 1959    And    albuterol (PROVENTIL) neb solution 2.5 mg  2.5 mg Nebulization Q6H Zach Park MD   2.5 mg at 08/29/24 1959    atorvastatin (LIPITOR) tablet 80 mg  80 mg Oral QPM Jessica Tolbert PA-C   80 mg at 08/29/24 2038    cefTAZidime (FORTAZ) 1 g vial to attach to  ml bag for ADULTS or NS 50 ml bag for PEDS  1 g Intravenous  Q8H Alize Flores MD   1 g at 08/29/24 1825    chlorhexidine (PERIDEX) 0.12 % solution 15 mL  15 mL Mouth/Throat BID Zach Park MD   15 mL at 08/29/24 2040    [Held by provider] clopidogrel (PLAVIX) tablet 75 mg  75 mg Oral Daily Maryam Anthony PA-C        lactobacillus rhamnosus (GG) (CULTURELL) capsule 1 capsule  1 capsule Oral BID Zach Foreman DO   1 capsule at 08/29/24 2038    Lidocaine (LIDOCARE) 4 % Patch 1-2 patch  1-2 patch Transdermal Q24H Maryam Anthony PA-C   2 patch at 08/29/24 1825    [Held by provider] nafcillin 12 g in sodium chloride 0.9 % 550 mL continuous infusion  12 g Intravenous Q24H [address] [employer]   12 g at 08/28/24 1214    pantoprazole (PROTONIX) 2 mg/mL suspension 40 mg  40 mg Oral or NG Tube QAM AC Maryam Anthony PA-C        Or    pantoprazole (PROTONIX) EC tablet 40 mg  40 mg Oral QAM AC Maryam Anthony PA-C        Or    pantoprazole (PROTONIX) IV push injection 40 mg  40 mg Intravenous QAM AC Maryam Anthony PA-C   40 mg at 08/29/24 1107    polyethylene glycol (MIRALAX) Packet 17 g  17 g Oral Daily Maryam Anthony PA-C        senna-docusate (SENOKOT-S/PERICOLACE) 8.6-50 MG per tablet 1 tablet  1 tablet Oral BID Maryam Anthony PA-C   1 tablet at 08/29/24 2039    sodium chloride (PF) 0.9% PF flush 3 mL  3 mL Intracatheter Q8H Gondal, Saad J, MD   3 mL at 08/29/24 0820    Warfarin Dose Required Daily - Pharmacist Managed  1 each Oral See Admin Instructions Jessica Tolbert PA-C            Assessment & Plan   Active Problems:    Bacteremia    Endocarditis    Sepsis (H)    Nonrheumatic aortic valve stenosis    Postsurgical percutaneous transluminal coronary angioplasty status    Mitral valve disorder    Coronary artery disease involving native coronary artery of native heart, unspecified whether angina present      - On CRRT with net fluid removal of negative 100 mL/h as tolerated.  She is anuric.  Thus, I  will stop her Lasix infusion.  - Add vasopressin given hypotension and shock.  - Titrate Levophed for MAP of 65.  - On dobutamine 5 per cardiology.  Checking SVO 2.    Critical care time excluding any procedural time is 35 minutes.    Domingo Nagy MD  Pager [phone]  [employer] ([facility])   Vocera Web Console ([employer])

## 2024-08-30 NOTE — PHARMACY-VANCOMYCIN DOSING SERVICE
Pharmacy Vancomycin Initial Note  Date of Service 2024  Patient's  1940  84 year old, female    Indication: Aspiration Pneumonia    Current estimated CrCl = Estimated Creatinine Clearance: 27.2 mL/min (A) (based on SCr of 1.87 mg/dL (H)).    Creatinine for last 3 days  2024:  4:44 PM Creatinine 1.24 mg/dL  2024: 12:12 AM Creatinine 1.37 mg/dL  2024:  3:59 AM Creatinine 2.14 mg/dL;  8:30 PM Creatinine 2.24 mg/dL  2024:  4:37 AM Creatinine 1.87 mg/dL    Recent Vancomycin Level(s) for last 3 days  No results found for requested labs within last 3 days.      Vancomycin IV Administrations (past 72 hours)                     vancomycin (VANCOCIN) 1,500 mg in sodium chloride 0.9 % 250 mL intermittent infusion (mg) 1,500 mg New Bag 24 0611     1,500 mg New Bag 24 195                    Nephrotoxins and other renal medications (From now, onward)      Start     Dose/Rate Route Frequency Ordered Stop    24 0000  vancomycin (VANCOCIN) 750 mg in sodium chloride 0.9 % 250 mL intermittent infusion         750 mg  over 60 Minutes Intravenous EVERY 12 HOURS 24 1112      24 1200  vancomycin (VANCOCIN) 2,000 mg in sodium chloride 0.9 % 250 mL intermittent infusion         2,000 mg (central catheter)  over 90 Minutes Intravenous ONCE 24 0955      24 2200  vasopressin (VASOSTRICT) 20 Units in sodium chloride 0.9 % 100 mL standard conc infusion         2.4 Units/hr  12 mL/hr  Intravenous CONTINUOUS 24 2130      24 1500  norepinephrine (LEVOPHED) 16 mg in sodium chloride 0.9 % 250 mL infusion CENTRAL         0.01-0.6 mcg/kg/min × 101 kg  0.9-56.8 mL/hr  Intravenous CONTINUOUS 24 1449      24 1600  [Held by provider]  nafcillin 12 g in sodium chloride 0.9 % 550 mL continuous infusion        (On hold since yesterday at 0907 until manually unheld; held by Nannette Lebron MDHold Reason: Acute Kidney Injury)    12 g  20 mL/hr over 24 Hours  Intravenous EVERY 24 HOURS 08/20/24 0716              Contrast Orders - past 72 hours (72h ago, onward)      None                  Plan:  Start vancomycin  2000 mg loading dose then 750 mg (7.5mg/kg) IV q12h while on CRRT  Vancomycin monitoring method: Renal Replacement Therapy  Vancomycin therapeutic monitoring goal: 15-20 mg/L  Pharmacy will check vancomycin levels as appropriate in 1-3 Days.    Serum creatinine levels will be ordered  q8h with CRRT labs .      Vianey Lenz RPH     no

## 2024-08-30 NOTE — PROGRESS NOTES
Care Management Follow Up    Length of Stay (days): 14    Expected Discharge Date: 09/03/2024    Anticipated Discharge Plan:  TBD    Transportation: TBD    PT Recommendations:    OT Recommendations:        Barriers to Discharge: medical stability, Reintubated 8/29 due to hypoxia and resp failure.    Prior Living Situation:   Patient resides in a house with her  and is reported as mostly independent when at baseline.  assists with transport and as needed in general. No DME use or current in-home/outpatient services identified.       Discussed  Partnership in Safe Discharge Planning  document with patient/family: No     Handoff Completed: No, handoff not indicated or clinically appropriate    Patient/Spokesperson Updated: No    Additional Information:  Pt is intubated and not medically stable to initiate discharge planning. Per previous CM notes: [facility] and [facility] patient's insurance will not cover home infusion. If patient goes home at discharge, CM to follow up with [facility] regarding coverage and cost for home IV antibiotic.     Next Steps: follow for medical progression, recommendations and final discharge plan.    Hien Carney RN

## 2024-08-30 NOTE — PROGRESS NOTES
RT PROGRESS NOTE    VENT DAY# 2    CURRENT SETTINGS:   FiO2 (%): (S) 35 %, Resp: 26, Vent Mode: CMV/AC, Resp Rate (Set): 26 breaths/min, Tidal Volume (Set, mL): 400 mL, PEEP (cm H2O): (S) 8 cmH2O, Resp Rate (Set): 26 breaths/min, Tidal Volume (Set, mL): 400 mL, PEEP (cm H2O): (S) 8 cmH2O    ETT SIZE 7.5 Secured at 23 cm at teeth/gums    NOTE / SHIFT SUMMARY:  FiO2 weaned to 35%, PEEP changed to +8.     Rolanda Helms, RT

## 2024-08-30 NOTE — PLAN OF CARE
Goal Outcome Evaluation:      Plan of Care Reviewed With: patient, spouse    Overall Patient Progress: improvingOverall Patient Progress: improving    Outcome Evaluation: Continue net zero.  Vaso off per Dr. Barajas, after blood products and MAP cont 70-80.  given 2 amps bicarb and addressed with nephrology.  Levo infuse and order for  Albumin if presser titration needed

## 2024-08-30 NOTE — PROGRESS NOTES
HEART CARE NOTE          Assessment/Recommendations     1. Severe valvular disease c/b post-op cardiogenic shock  Assessment / Plan  Patient re-intubated 2/2 worsening respiratory requirements with subsequent initiation of CRRT  Currently on vaso and dobutamine for hemodynamic support - hold og weaning while requiring CRRT  GDMT as detailed below; mainstay of treatment for HFpEF includes diuretics and adequate BP control (class I) and SGLT2-I (class 2a); additional medical therapy (ARNI, MRA, ARB) demonstrated less robust evidence for indication but may be considered per guideline recommendations (2b); no indication for BBlockers      Current Pharmacotherapy AHA Guideline-Directed Medical Therapy   Losartan  - not started 2/2 renal dysfunction ARNI/ARB   Spironolactone not started  MRA   SGLT2 inhibitor: not started SGLT2-I    Furosemide gtt - currently on CRRT Loop diuretic       2. 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 Anemia  Assessment / Plan  Management and supportive care per primary team     5. CAD  Assessment / Plan  Coronary angiogram significant for severe prox LAD and ost-prox Lcx lesions - sp CABG x 2 vz + Bentall + MVR      6. Afib   Assessment / Plan  Afib noted with hemodynamic instability - improvement in hemodynamics noted with increased pacing rates to 90; continue high heart rates as it appears necessary for cardiac compensation     Patient remains critically ill in the ICU requiring hemodynamic support via vasopressors s/p OHS c/b cardiogenic shock. 70 minutes spent on critical care time    History of Present Illness/Subjective    Ms. Felicitas Elliott is a 84 year old female with a PMHx significant for  "(per Epic notation) presented with fatigue, weakness, chills, poor appetite. Does not really have much for chronic medical conditions other than chronic back pain, furunculosis, uterine prolapse, tobacco use      Today, Mrs. Elliott is intubated and sedated; Management plan as detailed above     ECG: personally reviewed; normal sinus rhythm, nonspecific ST and T waves changes, RBBB, left axis deviation.     ECHO (personnaly Reviewed on 8/19/24):   1. The left ventricle is normal in size. Left ventricular function is  normal.The ejection fraction is 55-60%.  2. Normal right ventricle size and systolic function.  3. Large vegetation on mitral valve (8 mm x 15) attached to posterior leaflet.  4. There is a small vegetation on the aortic valve (6 mm). No evidence of  aortic root abscess identified.  5. Severe valvular aortic stenosis (SHU:0.8 cm2, peak nomi: 4.6 m/sec, mean  gradient: 51 mmHg).    Telemetry: personally reviewed August 30, 2024; notable for paced rhythm     Medical history and pertinent documents reviewed in Care Everywhere please where applicable see details above        Physical Examination Review of Systems   BP (!) 89/50   Pulse 80   Temp 99.1  F (37.3  C)   Resp 26   Ht 1.702 m (5' 7\")   Wt 99.8 kg (220 lb)   SpO2 100%   BMI 34.46 kg/m    Body mass index is 34.46 kg/m .  Wt Readings from Last 3 Encounters:   08/30/24 99.8 kg (220 lb)   08/15/24 90.1 kg (198 lb 9.6 oz)     General Appearance:   Intubated/sedated   ENT/Mouth: membranes moist, no oral lesions or bleeding gums.      EYES:  no scleral icterus, normal conjunctivae   Neck: no carotid bruits or thyromegaly   Chest/Lungs:   lungs are clear to auscultation, no rales or wheezing, equal chest wall expansion    Cardiovascular:   Regular. Normal first and second heart sounds with no murmurs, rubs, or gallops; the carotid, radial and posterior tibial pulses are intact, anasarca    Abdomen:  no organomegaly, masses, bruits, or tenderness; bowel " sounds are present   Extremities: no cyanosis or clubbing   Skin: no xanthelasma, warm.    Neurologic: Intubated/sedated     Psychiatric: Intubated/sedated    A complete 10 systems ROS was reviewed  And is negative except what is listed in the HPI.          Medical History  Surgical History Family History Social History   History reviewed. No pertinent past medical history. Past Surgical History:   Procedure Laterality Date    CORONARY ARTERY BYPASS GRAFT, WITH AORTIC VALVE REPLACEMENT, WITH ENDOSCOPIC VESSEL PROCUREMENT N/A 8/27/2024    Procedure: CORONARY ARTERY BYPASS GRAFT TIMES TWO, WITH AORTIC ROOT REPLACEMENT, WITH LEFT INTERNAL MAMMARY ARTERY HARVEST, LEFT SAPHNENOUS ENDOSCOPIC VESSEL PROCUREMENT,;  Surgeon: Dylan Renae MD;  Location: [facility] [place] OR    CV CORONARY ANGIOGRAM N/A 8/20/2024    Procedure: CV CORONARY ANGIOGRAM;  Surgeon: Den Wylie MD;  Location: [facility] CATH LAB CV    REPLACE VALVE MITRAL N/A 8/27/2024    Procedure: MITRAL VALVE REPLACEMENT,;  Surgeon: Dylan Renae MD;  Location: [facility] [place] OR    TRANSESOPHAGEAL ECHOCARDIOGRAM INTRAOPERATIVE  8/27/2024    Procedure: ANESTHESIA TRANSESOPHAGEAL ECHOCARDIOGRAM, EPI-AORTIC ULTRASOUND;  Surgeon: Dylan Renae MD;  Location: [facility] [place] OR    no family history of premature coronary artery disease Social History     Socioeconomic History    Marital status:      Spouse name: Not on file    Number of children: Not on file    Years of education: Not on file    Highest education level: Not on file   Occupational History    Not on file   Tobacco Use    Smoking status: Not on file    Smokeless tobacco: Not on file   Substance and Sexual Activity    Alcohol use: Not on file    Drug use: Not on file    Sexual activity: Not on file   Other Topics Concern    Not on file   Social History Narrative    Not on file     Social Determinants of Health     Financial Resource Strain: Low Risk  (8/24/2024)     Financial Resource Strain     Within the past 12 months, have you or your family members you live with been unable to get utilities (heat, electricity) when it was really needed?: No   Food Insecurity: Low Risk  (8/24/2024)    Food Insecurity     Within the past 12 months, did you worry that your food would run out before you got money to buy more?: No     Within the past 12 months, did the food you bought just not last and you didn t have money to get more?: No   Transportation Needs: Low Risk  (8/24/2024)    Transportation Needs     Within the past 12 months, has lack of transportation kept you from medical appointments, getting your medicines, non-medical meetings or appointments, work, or from getting things that you need?: No   Physical Activity: Not on file   Stress: Not on file   Social Connections: Unknown (1/3/2024)    Received from [facility] [employer] [facility]    Social Connections     Frequency of Communication with Friends and Family: Not on file   Interpersonal Safety: High Risk (8/23/2024)    Interpersonal Safety     Do you feel physically and emotionally safe where you currently live?: No     Within the past 12 months, have you been hit, slapped, kicked or otherwise physically hurt by someone?: No     Within the past 12 months, have you been humiliated or emotionally abused in other ways by your partner or ex-partner?: No   Housing Stability: Low Risk  (8/24/2024)    Housing Stability     Do you have housing? : Yes     Are you worried about losing your housing?: No           Lab Results    Chemistry/lipid CBC Cardiac Enzymes/BNP/TSH/INR   Lab Results   Component Value Date    BUN 18.7 08/30/2024     08/30/2024    CO2 17 (L) 08/30/2024    Lab Results   Component Value Date    WBC 10.4 08/30/2024    HGB 7.3 (L) 08/30/2024    HCT 23.1 (L) 08/30/2024    MCV 94 08/30/2024     (L) 08/30/2024    Lab Results   Component Value Date    INR 2.28 (H) 08/29/2024     No results found  "for: \"CKTOTAL\", \"CKMB\", \"TROPONINI\"       Weight:    Wt Readings from Last 3 Encounters:   08/30/24 99.8 kg (220 lb)   08/15/24 90.1 kg (198 lb 9.6 oz)       Allergies  Allergies   Allergen Reactions    Aspirin Rash    Cats Rash    Nickel Rash         Surgical History  Past Surgical History:   Procedure Laterality Date    CORONARY ARTERY BYPASS GRAFT, WITH AORTIC VALVE REPLACEMENT, WITH ENDOSCOPIC VESSEL PROCUREMENT N/A 8/27/2024    Procedure: CORONARY ARTERY BYPASS GRAFT TIMES TWO, WITH AORTIC ROOT REPLACEMENT, WITH LEFT INTERNAL MAMMARY ARTERY HARVEST, LEFT SAPHNENOUS ENDOSCOPIC VESSEL PROCUREMENT,;  Surgeon: Dylan Renae MD;  Location: [facility] [place]    CV CORONARY ANGIOGRAM N/A 8/20/2024    Procedure: CV CORONARY ANGIOGRAM;  Surgeon: Den Wylie MD;  Location: [facility] CATH LAB CV    REPLACE VALVE MITRAL N/A 8/27/2024    Procedure: MITRAL VALVE REPLACEMENT,;  Surgeon: Dylan Renae MD;  Location: [facility] [place]    TRANSESOPHAGEAL ECHOCARDIOGRAM INTRAOPERATIVE  8/27/2024    Procedure: ANESTHESIA TRANSESOPHAGEAL ECHOCARDIOGRAM, EPI-AORTIC ULTRASOUND;  Surgeon: Dylan Renae MD;  Location: [facility] [place] OR       Social History  Tobacco:   History   Smoking Status    Not on file   Smokeless Tobacco    Not on file    Alcohol:   Social History    Substance and Sexual Activity      Alcohol use: Not on file   Illicit Drugs:   History   Drug Use Not on file       Family History  History reviewed. No pertinent family history.       Navneet Branch MD on 8/30/2024      cc: Carol, [facility] McGuffey    "

## 2024-08-30 NOTE — PROGRESS NOTES
Seen at bedside  Initiated on CRRT 25ml/kg/hr dose  4K   UFR 50-100ml/hr with max 2L/day for now  Pt intubated on 60% fiO2  On levo and dobutamine    Will follow labs per CRRT protocols    Moses Mcnair MD  [facility]  [phone]

## 2024-08-30 NOTE — PROGRESS NOTES
"CLINICAL NUTRITION SERVICES - REASSESSMENT NOTE     Nutrition Prescription    RECOMMENDATIONS FOR MDs/PROVIDERS TO ORDER:  Recommend Diet adv vs Nutrition Support 1-2 days    Malnutrition Status:    Patient does not meet two of the  criteria necessary for diagnosing malnutrition     Recommendations already ordered by Registered Dietitian (RD):  None at this time     Future/Additional Recommendations:  Monitor diet adv vs nutrition support, weight, labs, I/Os.   Provide Diet education post CV Surgery  as able      EVALUATION OF THE PROGRESS TOWARD GOALS   Diet: NPO    Day 4 NPO/CLD   Pt eating po this admit, last oral intakes 8/26     NEW FINDINGS   Discussed pt in care rounds this AM. Pt re-intubated 8/29 with worsening respiratory requirements with subsequent initiation of CRRT. OG clamped.     ANTHROPOMETRICS  Height: 170.2 cm (5' 7\")  Most Recent Weight: 99.8 kg (220 lb)    Weight History: Current weight up, likely r/t fluid status and/or bed weight inaccurate   Date/Time Weight Weight Method   08/30/24 0400 99.8 kg (220 lb) Bed scale   08/29/24 0400 101 kg (222 lb 10.6 oz) Bed scale   08/28/24 0600 95.3 kg (210 lb 1.6 oz) Bed scale   08/27/24 0500 86.4 kg (190 lb 8 oz) Standing scale   08/26/24 0330 86.2 kg (190 lb 1.6 oz) Standing scale   08/25/24 0458 86.3 kg (190 lb 3.2 oz) Standing scale   08/24/24 0406 85.4 kg (188 lb 4.8 oz) Standing scale   08/23/24 0403 84.4 kg (186 lb 1.6 oz) Standing scale   08/22/24 0642 87 kg (191 lb 11.2 oz) --   08/21/24 0646 87 kg (191 lb 11.2 oz) Standing scale   08/20/24 1053 86.1 kg (189 lb 14.4 oz) --   08/20/24 0632 86 kg (189 lb 9.6 oz) Standing scale   08/19/24 1409 86.2 kg (190 lb) --   08/19/24 0521 86.3 kg (190 lb 3.2 oz) Standing scale   08/18/24 0537 88.9 kg (196 lb) Standing scale   08/17/24 0028 89.1 kg (196 lb 8 oz) Standing scale   08/16/24 1622 91.9 kg (202 lb 9.6 oz) Standing scale     Wt Readings from Last 10 Encounters:   08/30/24 99.8 kg (220 lb)   08/15/24 " 90.1 kg (198 lb 9.6 oz)   3/25/24  95 kg (109 lb 8 oz)  6/18/21  210 lb     PHYSICAL FINDINGS/GI CONCERNS  See malnutrition section below.  Per [employer]:  Intubated   OG, clamped   Rounded abd  CRRT, 2 L OP this AM   Rectal tube, 70 ml OP this AM   X1 unmeasured BM this AM  Chest tubes   Trace/Mild edema hands, BLE   Surgical sites   Elbow skin tear    LABS  Reviewed  Creat 1.87(H)   Phos 5.2(H)   Alk Phos 39(L)   (H)   AST 1002(H)   Bili Direct 0.44(H)  BG  last 24 hrs     MEDICATIONS  Reviewed  Continuous precedex, dialysate, dobutamine, fentanyl, levophed, vasopressin   Scheduled lipitor, fortaz, peridex, solu-cortef, protonix, miralax, senna, vancomycin    MALNUTRITION  Malnutrition Diagnosis: Patient does not meet two of the above criteria necessary for diagnosing malnutrition     CURRENT NUTRITION DIAGNOSIS  Inadequate oral intake related to intubation as evidenced by NPO.   Evaluation: Declining    INTERVENTIONS  Implementation  Recommend Diet adv vs Nutrition Support 1-2 days    Education- Will follow and complete diet education after patient is extubated and/or is transferred to medical unit.      Goals  Diet adv vs Nutrition Support 1-2 days - NEW   Patient to consume % of nutritionally adequate meals three times per day, or the equivalent with supplements/snacks - Not Met     Monitoring/Evaluation  Progress toward goals will be monitored and evaluated per protocol.

## 2024-08-30 NOTE — PLAN OF CARE
Goal Outcome Evaluation:         Allow Map to 85 then titrate vaso according to order, keep MAP above 65 but slowly titrating pressers allowing MAP to be above 85 before additionally titrating gtts.  Keep pacer at HR 90. Transfuse 1 unit PRBC, INR recheck 10, Jessica aware. UA,stool, blood cx and Liver US complete.  Use albumin prn after transfusion as blood pressure requires.  No fluid removasl with CRRT at this time. CV surgeon, cardiologist and intensivist in room and agree with plan.  Nephrology updated

## 2024-08-31 LAB
ALBUMIN SERPL BCG-MCNC: 3.4 G/DL (ref 3.5–5.2)
ALBUMIN SERPL BCG-MCNC: 3.4 G/DL (ref 3.5–5.2)
ALBUMIN SERPL BCG-MCNC: 3.5 G/DL (ref 3.5–5.2)
ALBUMIN SERPL BCG-MCNC: 3.6 G/DL (ref 3.5–5.2)
ALBUMIN SERPL BCG-MCNC: 3.7 G/DL (ref 3.5–5.2)
ALP SERPL-CCNC: 49 U/L (ref 40–150)
ALP SERPL-CCNC: 53 U/L (ref 40–150)
ALP SERPL-CCNC: 54 U/L (ref 40–150)
ALP SERPL-CCNC: 54 U/L (ref 40–150)
ALT SERPL W P-5'-P-CCNC: 167 U/L (ref 0–50)
ALT SERPL W P-5'-P-CCNC: 216 U/L (ref 0–50)
ALT SERPL W P-5'-P-CCNC: 228 U/L (ref 0–50)
ALT SERPL W P-5'-P-CCNC: 294 U/L (ref 0–50)
ANION GAP SERPL CALCULATED.3IONS-SCNC: 17 MMOL/L (ref 7–15)
ANION GAP SERPL CALCULATED.3IONS-SCNC: 17 MMOL/L (ref 7–15)
ANION GAP SERPL CALCULATED.3IONS-SCNC: 18 MMOL/L (ref 7–15)
ANION GAP SERPL CALCULATED.3IONS-SCNC: 19 MMOL/L (ref 7–15)
ANION GAP SERPL CALCULATED.3IONS-SCNC: 21 MMOL/L (ref 7–15)
ANION GAP SERPL CALCULATED.3IONS-SCNC: 21 MMOL/L (ref 7–15)
ANION GAP SERPL CALCULATED.3IONS-SCNC: 24 MMOL/L (ref 7–15)
AST SERPL W P-5'-P-CCNC: 1172 U/L (ref 0–45)
AST SERPL W P-5'-P-CCNC: 1533 U/L (ref 0–45)
AST SERPL W P-5'-P-CCNC: 555 U/L (ref 0–45)
AST SERPL W P-5'-P-CCNC: 919 U/L (ref 0–45)
ATRIAL RATE - MUSE: 83 BPM
B-OH-BUTYR SERPL-SCNC: 3.97 MMOL/L
BACTERIA SPT CULT: ABNORMAL
BACTERIA SPT CULT: ABNORMAL
BASE EXCESS BLDA CALC-SCNC: -6.2 MMOL/L (ref -3–3)
BASE EXCESS BLDV CALC-SCNC: -1.1 MMOL/L (ref -3–3)
BILIRUB DIRECT SERPL-MCNC: 0.41 MG/DL (ref 0–0.3)
BILIRUB SERPL-MCNC: 0.8 MG/DL
BILIRUB SERPL-MCNC: 0.9 MG/DL
BLD PROD TYP BPU: NORMAL
BLOOD COMPONENT TYPE: NORMAL
BUN SERPL-MCNC: 11.7 MG/DL (ref 8–23)
BUN SERPL-MCNC: 11.7 MG/DL (ref 8–23)
BUN SERPL-MCNC: 12 MG/DL (ref 8–23)
BUN SERPL-MCNC: 12.1 MG/DL (ref 8–23)
BUN SERPL-MCNC: 13.6 MG/DL (ref 8–23)
BUN SERPL-MCNC: 13.8 MG/DL (ref 8–23)
BUN SERPL-MCNC: 13.8 MG/DL (ref 8–23)
CA-I BLD-MCNC: 4.2 MG/DL (ref 4.4–5.2)
CA-I BLD-MCNC: 4.3 MG/DL (ref 4.4–5.2)
CA-I BLD-MCNC: 4.4 MG/DL (ref 4.4–5.2)
CA-I BLD-MCNC: 4.5 MG/DL (ref 4.4–5.2)
CALCIUM SERPL-MCNC: 8 MG/DL (ref 8.8–10.4)
CALCIUM SERPL-MCNC: 8 MG/DL (ref 8.8–10.4)
CALCIUM SERPL-MCNC: 8.1 MG/DL (ref 8.8–10.4)
CALCIUM SERPL-MCNC: 8.3 MG/DL (ref 8.8–10.4)
CALCIUM SERPL-MCNC: 8.3 MG/DL (ref 8.8–10.4)
CALCIUM SERPL-MCNC: 8.4 MG/DL (ref 8.8–10.4)
CALCIUM SERPL-MCNC: 8.4 MG/DL (ref 8.8–10.4)
CHLORIDE SERPL-SCNC: 102 MMOL/L (ref 98–107)
CHLORIDE SERPL-SCNC: 102 MMOL/L (ref 98–107)
CHLORIDE SERPL-SCNC: 103 MMOL/L (ref 98–107)
CHLORIDE SERPL-SCNC: 104 MMOL/L (ref 98–107)
CHLORIDE SERPL-SCNC: 104 MMOL/L (ref 98–107)
CHLORIDE SERPL-SCNC: 105 MMOL/L (ref 98–107)
CHLORIDE SERPL-SCNC: 99 MMOL/L (ref 98–107)
CODING SYSTEM: NORMAL
COHGB MFR BLD: 98.8 % (ref 95–96)
CREAT SERPL-MCNC: 1.06 MG/DL (ref 0.51–0.95)
CREAT SERPL-MCNC: 1.13 MG/DL (ref 0.51–0.95)
CREAT SERPL-MCNC: 1.16 MG/DL (ref 0.51–0.95)
CREAT SERPL-MCNC: 1.19 MG/DL (ref 0.51–0.95)
CREAT SERPL-MCNC: 1.33 MG/DL (ref 0.51–0.95)
CREAT SERPL-MCNC: 1.37 MG/DL (ref 0.51–0.95)
CREAT SERPL-MCNC: 1.37 MG/DL (ref 0.51–0.95)
DIASTOLIC BLOOD PRESSURE - MUSE: NORMAL MMHG
EGFRCR SERPLBLD CKD-EPI 2021: 38 ML/MIN/1.73M2
EGFRCR SERPLBLD CKD-EPI 2021: 38 ML/MIN/1.73M2
EGFRCR SERPLBLD CKD-EPI 2021: 39 ML/MIN/1.73M2
EGFRCR SERPLBLD CKD-EPI 2021: 45 ML/MIN/1.73M2
EGFRCR SERPLBLD CKD-EPI 2021: 46 ML/MIN/1.73M2
EGFRCR SERPLBLD CKD-EPI 2021: 48 ML/MIN/1.73M2
EGFRCR SERPLBLD CKD-EPI 2021: 52 ML/MIN/1.73M2
ERYTHROCYTE [DISTWIDTH] IN BLOOD BY AUTOMATED COUNT: 17.8 % (ref 10–15)
ERYTHROCYTE [DISTWIDTH] IN BLOOD BY AUTOMATED COUNT: 18.1 % (ref 10–15)
GLUCOSE BLDC GLUCOMTR-MCNC: 106 MG/DL (ref 70–99)
GLUCOSE BLDC GLUCOMTR-MCNC: 108 MG/DL (ref 70–99)
GLUCOSE BLDC GLUCOMTR-MCNC: 109 MG/DL (ref 70–99)
GLUCOSE BLDC GLUCOMTR-MCNC: 111 MG/DL (ref 70–99)
GLUCOSE BLDC GLUCOMTR-MCNC: 117 MG/DL (ref 70–99)
GLUCOSE BLDC GLUCOMTR-MCNC: 122 MG/DL (ref 70–99)
GLUCOSE BLDC GLUCOMTR-MCNC: 126 MG/DL (ref 70–99)
GLUCOSE BLDC GLUCOMTR-MCNC: 127 MG/DL (ref 70–99)
GLUCOSE BLDC GLUCOMTR-MCNC: 145 MG/DL (ref 70–99)
GLUCOSE SERPL-MCNC: 108 MG/DL (ref 70–99)
GLUCOSE SERPL-MCNC: 114 MG/DL (ref 70–99)
GLUCOSE SERPL-MCNC: 121 MG/DL (ref 70–99)
GLUCOSE SERPL-MCNC: 124 MG/DL (ref 70–99)
GLUCOSE SERPL-MCNC: 126 MG/DL (ref 70–99)
GLUCOSE SERPL-MCNC: 140 MG/DL (ref 70–99)
GLUCOSE SERPL-MCNC: 140 MG/DL (ref 70–99)
GRAM STAIN RESULT: ABNORMAL
HCO3 BLD-SCNC: 19 MMOL/L (ref 21–28)
HCO3 BLDV-SCNC: 24 MMOL/L (ref 21–28)
HCO3 SERPL-SCNC: 16 MMOL/L (ref 22–29)
HCO3 SERPL-SCNC: 19 MMOL/L (ref 22–29)
HCO3 SERPL-SCNC: 20 MMOL/L (ref 22–29)
HCO3 SERPL-SCNC: 22 MMOL/L (ref 22–29)
HCO3 SERPL-SCNC: 22 MMOL/L (ref 22–29)
HCT VFR BLD AUTO: 23.9 % (ref 35–47)
HCT VFR BLD AUTO: 24.4 % (ref 35–47)
HGB BLD-MCNC: 7.9 G/DL (ref 11.7–15.7)
HGB BLD-MCNC: 8.2 G/DL (ref 11.7–15.7)
INR PPP: 1.4 (ref 0.85–1.15)
INR PPP: 1.48 (ref 0.85–1.15)
INR PPP: 1.56 (ref 0.85–1.15)
INR PPP: 1.65 (ref 0.85–1.15)
INTERPRETATION ECG - MUSE: NORMAL
ISSUE DATE AND TIME: NORMAL
LACTATE SERPL-SCNC: 1.1 MMOL/L (ref 0.7–2)
MAGNESIUM SERPL-MCNC: 2.4 MG/DL (ref 1.7–2.3)
MAGNESIUM SERPL-MCNC: 2.4 MG/DL (ref 1.7–2.3)
MAGNESIUM SERPL-MCNC: 2.5 MG/DL (ref 1.7–2.3)
MCH RBC QN AUTO: 30 PG (ref 26.5–33)
MCH RBC QN AUTO: 30.1 PG (ref 26.5–33)
MCHC RBC AUTO-ENTMCNC: 33.1 G/DL (ref 31.5–36.5)
MCHC RBC AUTO-ENTMCNC: 33.6 G/DL (ref 31.5–36.5)
MCV RBC AUTO: 90 FL (ref 78–100)
MCV RBC AUTO: 91 FL (ref 78–100)
O2/TOTAL GAS SETTING VFR VENT: 35 %
O2/TOTAL GAS SETTING VFR VENT: 35 %
OXYHGB MFR BLDV: 56 % (ref 70–75)
P AXIS - MUSE: NORMAL DEGREES
PCO2 BLD: 30 MM HG (ref 35–45)
PCO2 BLDV: 37 MM HG (ref 40–50)
PEEP: 8 CM H2O
PF4 HEPARIN CMPLX AB SER QL: NEGATIVE
PH BLD: 7.41 [PH] (ref 7.35–7.45)
PH BLDV: 7.42 [PH] (ref 7.32–7.43)
PHOSPHATE SERPL-MCNC: 2.9 MG/DL (ref 2.5–4.5)
PHOSPHATE SERPL-MCNC: 3 MG/DL (ref 2.5–4.5)
PHOSPHATE SERPL-MCNC: 3.3 MG/DL (ref 2.5–4.5)
PLATELET # BLD AUTO: 53 10E3/UL (ref 150–450)
PLATELET # BLD AUTO: 65 10E3/UL (ref 150–450)
PO2 BLD: 108 MM HG (ref 80–105)
PO2 BLDV: 31 MM HG (ref 25–47)
POTASSIUM SERPL-SCNC: 3.2 MMOL/L (ref 3.4–5.3)
POTASSIUM SERPL-SCNC: 3.2 MMOL/L (ref 3.4–5.3)
POTASSIUM SERPL-SCNC: 3.4 MMOL/L (ref 3.4–5.3)
POTASSIUM SERPL-SCNC: 3.5 MMOL/L (ref 3.4–5.3)
POTASSIUM SERPL-SCNC: 3.6 MMOL/L (ref 3.4–5.3)
POTASSIUM SERPL-SCNC: 3.7 MMOL/L (ref 3.4–5.3)
POTASSIUM SERPL-SCNC: 3.9 MMOL/L (ref 3.4–5.3)
PR INTERVAL - MUSE: NORMAL MS
PROT SERPL-MCNC: 5.6 G/DL (ref 6.4–8.3)
PROT SERPL-MCNC: 5.7 G/DL (ref 6.4–8.3)
PROT SERPL-MCNC: 5.8 G/DL (ref 6.4–8.3)
PROT SERPL-MCNC: 5.9 G/DL (ref 6.4–8.3)
QRS DURATION - MUSE: 148 MS
QT - MUSE: 432 MS
QTC - MUSE: 514 MS
R AXIS - MUSE: 11 DEGREES
RBC # BLD AUTO: 2.63 10E6/UL (ref 3.8–5.2)
RBC # BLD AUTO: 2.72 10E6/UL (ref 3.8–5.2)
SAO2 % BLDA: 97 % (ref 92–100)
SAO2 % BLDV: 57.1 % (ref 70–75)
SODIUM SERPL-SCNC: 139 MMOL/L (ref 135–145)
SODIUM SERPL-SCNC: 142 MMOL/L (ref 135–145)
SODIUM SERPL-SCNC: 143 MMOL/L (ref 135–145)
SODIUM SERPL-SCNC: 143 MMOL/L (ref 135–145)
SYSTOLIC BLOOD PRESSURE - MUSE: NORMAL MMHG
T AXIS - MUSE: -51 DEGREES
TROPONIN T SERPL HS-MCNC: 513 NG/L
TROPONIN T SERPL HS-MCNC: 602 NG/L
TROPONIN T SERPL HS-MCNC: 875 NG/L
TROPONIN T SERPL HS-MCNC: 896 NG/L
UNIT ABO/RH: NORMAL
UNIT NUMBER: NORMAL
UNIT STATUS: NORMAL
UNIT TYPE ISBT: 5100
VENTRICULAR RATE- MUSE: 85 BPM
WBC # BLD AUTO: 10.1 10E3/UL (ref 4–11)
WBC # BLD AUTO: 10.5 10E3/UL (ref 4–11)

## 2024-08-31 PROCEDURE — 250N000011 HC RX IP 250 OP 636: Performed by: INTERNAL MEDICINE

## 2024-08-31 PROCEDURE — 250N000009 HC RX 250: Performed by: INTERNAL MEDICINE

## 2024-08-31 PROCEDURE — 999N000009 HC STATISTIC AIRWAY CARE

## 2024-08-31 PROCEDURE — 99291 CRITICAL CARE FIRST HOUR: CPT | Performed by: INTERNAL MEDICINE

## 2024-08-31 PROCEDURE — 80053 COMPREHEN METABOLIC PANEL: CPT | Performed by: PHYSICIAN ASSISTANT

## 2024-08-31 PROCEDURE — 84100 ASSAY OF PHOSPHORUS: CPT | Performed by: INTERNAL MEDICINE

## 2024-08-31 PROCEDURE — 82330 ASSAY OF CALCIUM: CPT | Performed by: INTERNAL MEDICINE

## 2024-08-31 PROCEDURE — 82805 BLOOD GASES W/O2 SATURATION: CPT | Performed by: INTERNAL MEDICINE

## 2024-08-31 PROCEDURE — 250N000013 HC RX MED GY IP 250 OP 250 PS 637: Performed by: INTERNAL MEDICINE

## 2024-08-31 PROCEDURE — 90945 DIALYSIS ONE EVALUATION: CPT | Performed by: INTERNAL MEDICINE

## 2024-08-31 PROCEDURE — 250N000013 HC RX MED GY IP 250 OP 250 PS 637: Performed by: PHYSICIAN ASSISTANT

## 2024-08-31 PROCEDURE — 82330 ASSAY OF CALCIUM: CPT | Performed by: SURGERY

## 2024-08-31 PROCEDURE — 94640 AIRWAY INHALATION TREATMENT: CPT

## 2024-08-31 PROCEDURE — 250N000013 HC RX MED GY IP 250 OP 250 PS 637: Performed by: STUDENT IN AN ORGANIZED HEALTH CARE EDUCATION/TRAINING PROGRAM

## 2024-08-31 PROCEDURE — 82805 BLOOD GASES W/O2 SATURATION: CPT | Performed by: SURGERY

## 2024-08-31 PROCEDURE — 94003 VENT MGMT INPAT SUBQ DAY: CPT

## 2024-08-31 PROCEDURE — 250N000011 HC RX IP 250 OP 636: Performed by: SURGERY

## 2024-08-31 PROCEDURE — 82040 ASSAY OF SERUM ALBUMIN: CPT | Performed by: INTERNAL MEDICINE

## 2024-08-31 PROCEDURE — 99232 SBSQ HOSP IP/OBS MODERATE 35: CPT | Performed by: INTERNAL MEDICINE

## 2024-08-31 PROCEDURE — 999N000157 HC STATISTIC RCP TIME EA 10 MIN

## 2024-08-31 PROCEDURE — 83605 ASSAY OF LACTIC ACID: CPT | Performed by: PHYSICIAN ASSISTANT

## 2024-08-31 PROCEDURE — 250N000011 HC RX IP 250 OP 636: Performed by: PHYSICIAN ASSISTANT

## 2024-08-31 PROCEDURE — P9035 PLATELET PHERES LEUKOREDUCED: HCPCS | Performed by: RADIOLOGY

## 2024-08-31 PROCEDURE — 85027 COMPLETE CBC AUTOMATED: CPT | Performed by: INTERNAL MEDICINE

## 2024-08-31 PROCEDURE — 250N000009 HC RX 250: Performed by: PHYSICIAN ASSISTANT

## 2024-08-31 PROCEDURE — 258N000003 HC RX IP 258 OP 636: Performed by: STUDENT IN AN ORGANIZED HEALTH CARE EDUCATION/TRAINING PROGRAM

## 2024-08-31 PROCEDURE — 93010 ELECTROCARDIOGRAM REPORT: CPT | Performed by: STUDENT IN AN ORGANIZED HEALTH CARE EDUCATION/TRAINING PROGRAM

## 2024-08-31 PROCEDURE — 85041 AUTOMATED RBC COUNT: CPT | Performed by: INTERNAL MEDICINE

## 2024-08-31 PROCEDURE — 83735 ASSAY OF MAGNESIUM: CPT | Performed by: INTERNAL MEDICINE

## 2024-08-31 PROCEDURE — 200N000001 HC R&B ICU

## 2024-08-31 PROCEDURE — 82010 KETONE BODYS QUAN: CPT | Performed by: INTERNAL MEDICINE

## 2024-08-31 PROCEDURE — 99233 SBSQ HOSP IP/OBS HIGH 50: CPT | Performed by: INTERNAL MEDICINE

## 2024-08-31 PROCEDURE — 94640 AIRWAY INHALATION TREATMENT: CPT | Mod: 76

## 2024-08-31 PROCEDURE — 86022 PLATELET ANTIBODIES: CPT | Performed by: PHYSICIAN ASSISTANT

## 2024-08-31 PROCEDURE — 250N000009 HC RX 250: Performed by: STUDENT IN AN ORGANIZED HEALTH CARE EDUCATION/TRAINING PROGRAM

## 2024-08-31 PROCEDURE — 250N000011 HC RX IP 250 OP 636: Performed by: STUDENT IN AN ORGANIZED HEALTH CARE EDUCATION/TRAINING PROGRAM

## 2024-08-31 PROCEDURE — 93005 ELECTROCARDIOGRAM TRACING: CPT

## 2024-08-31 PROCEDURE — 85610 PROTHROMBIN TIME: CPT | Performed by: PHYSICIAN ASSISTANT

## 2024-08-31 PROCEDURE — 84484 ASSAY OF TROPONIN QUANT: CPT | Performed by: PHYSICIAN ASSISTANT

## 2024-08-31 PROCEDURE — 82248 BILIRUBIN DIRECT: CPT | Performed by: INTERNAL MEDICINE

## 2024-08-31 PROCEDURE — 93005 ELECTROCARDIOGRAM TRACING: CPT | Performed by: SURGERY

## 2024-08-31 RX ORDER — DEXTROSE MONOHYDRATE 100 MG/ML
INJECTION, SOLUTION INTRAVENOUS CONTINUOUS PRN
Status: DISCONTINUED | OUTPATIENT
Start: 2024-08-31 | End: 2024-09-07

## 2024-08-31 RX ADMIN — SODIUM CHLORIDE 750 MG: 9 INJECTION, SOLUTION INTRAVENOUS at 12:37

## 2024-08-31 RX ADMIN — DEXMEDETOMIDINE HYDROCHLORIDE 0.5 MCG/KG/HR: 400 INJECTION INTRAVENOUS at 06:29

## 2024-08-31 RX ADMIN — DOBUTAMINE HYDROCHLORIDE 4 MCG/KG/MIN: 200 INJECTION INTRAVENOUS at 12:42

## 2024-08-31 RX ADMIN — HYDROCORTISONE SODIUM SUCCINATE 50 MG: 100 INJECTION, POWDER, FOR SOLUTION INTRAMUSCULAR; INTRAVENOUS at 10:35

## 2024-08-31 RX ADMIN — CALCIUM CHLORIDE, MAGNESIUM CHLORIDE, SODIUM CHLORIDE, SODIUM BICARBONATE, POTASSIUM CHLORIDE AND SODIUM PHOSPHATE DIBASIC DIHYDRATE 24.5 ML/KG/HR: 3.68; 3.05; 6.34; 3.09; .314; .187 INJECTION INTRAVENOUS at 18:08

## 2024-08-31 RX ADMIN — CALCIUM CHLORIDE, MAGNESIUM CHLORIDE, SODIUM CHLORIDE, SODIUM BICARBONATE, POTASSIUM CHLORIDE AND SODIUM PHOSPHATE DIBASIC DIHYDRATE 22.5 ML/KG/HR: 3.68; 3.05; 6.34; 3.09; .314; .187 INJECTION INTRAVENOUS at 08:49

## 2024-08-31 RX ADMIN — SODIUM CHLORIDE 30 MG/ML INHALATION SOLUTION 3 ML: 30 SOLUTION INHALANT at 02:20

## 2024-08-31 RX ADMIN — ALBUTEROL SULFATE 2.5 MG: 2.5 SOLUTION RESPIRATORY (INHALATION) at 20:07

## 2024-08-31 RX ADMIN — CALCIUM CHLORIDE, MAGNESIUM CHLORIDE, SODIUM CHLORIDE, SODIUM BICARBONATE, POTASSIUM CHLORIDE AND SODIUM PHOSPHATE DIBASIC DIHYDRATE 22.5 ML/KG/HR: 3.68; 3.05; 6.34; 3.09; .314; .187 INJECTION INTRAVENOUS at 10:51

## 2024-08-31 RX ADMIN — CALCIUM CHLORIDE, MAGNESIUM CHLORIDE, SODIUM CHLORIDE, SODIUM BICARBONATE, POTASSIUM CHLORIDE AND SODIUM PHOSPHATE DIBASIC DIHYDRATE 22.5 ML/KG/HR: 3.68; 3.05; 6.34; 3.09; .314; .187 INJECTION INTRAVENOUS at 15:20

## 2024-08-31 RX ADMIN — CALCIUM CHLORIDE, MAGNESIUM CHLORIDE, SODIUM CHLORIDE, SODIUM BICARBONATE, POTASSIUM CHLORIDE AND SODIUM PHOSPHATE DIBASIC DIHYDRATE 24.5 ML/KG/HR: 3.68; 3.05; 6.34; 3.09; .314; .187 INJECTION INTRAVENOUS at 22:15

## 2024-08-31 RX ADMIN — CALCIUM CHLORIDE, MAGNESIUM CHLORIDE, SODIUM CHLORIDE, SODIUM BICARBONATE, POTASSIUM CHLORIDE AND SODIUM PHOSPHATE DIBASIC DIHYDRATE 12.5 ML/KG/HR: 3.68; 3.05; 6.34; 3.09; .314; .187 INJECTION INTRAVENOUS at 13:48

## 2024-08-31 RX ADMIN — CALCIUM CHLORIDE, MAGNESIUM CHLORIDE, SODIUM CHLORIDE, SODIUM BICARBONATE, POTASSIUM CHLORIDE AND SODIUM PHOSPHATE DIBASIC DIHYDRATE 12.5 ML/KG/HR: 3.68; 3.05; 6.34; 3.09; .314; .187 INJECTION INTRAVENOUS at 06:23

## 2024-08-31 RX ADMIN — CEFEPIME HYDROCHLORIDE 2 G: 2 INJECTION, POWDER, FOR SOLUTION INTRAVENOUS at 05:19

## 2024-08-31 RX ADMIN — PANTOPRAZOLE SODIUM 40 MG: 40 INJECTION, POWDER, FOR SOLUTION INTRAVENOUS at 06:34

## 2024-08-31 RX ADMIN — Medication 50 MCG: at 20:04

## 2024-08-31 RX ADMIN — DEXMEDETOMIDINE HYDROCHLORIDE 1.2 MCG/KG/HR: 400 INJECTION INTRAVENOUS at 21:21

## 2024-08-31 RX ADMIN — CALCIUM CHLORIDE, MAGNESIUM CHLORIDE, SODIUM CHLORIDE, SODIUM BICARBONATE, POTASSIUM CHLORIDE AND SODIUM PHOSPHATE DIBASIC DIHYDRATE 12.5 ML/KG/HR: 3.68; 3.05; 6.34; 3.09; .314; .187 INJECTION INTRAVENOUS at 02:14

## 2024-08-31 RX ADMIN — NICARDIPINE HYDROCHLORIDE 2.5 MG/HR: 0.2 INJECTION, SOLUTION INTRAVENOUS at 12:09

## 2024-08-31 RX ADMIN — Medication 50 MCG: at 09:56

## 2024-08-31 RX ADMIN — CALCIUM CHLORIDE, MAGNESIUM CHLORIDE, SODIUM CHLORIDE, SODIUM BICARBONATE, POTASSIUM CHLORIDE AND SODIUM PHOSPHATE DIBASIC DIHYDRATE 22.5 ML/KG/HR: 3.68; 3.05; 6.34; 3.09; .314; .187 INJECTION INTRAVENOUS at 13:07

## 2024-08-31 RX ADMIN — Medication 50 MCG: at 11:35

## 2024-08-31 RX ADMIN — DEXMEDETOMIDINE HYDROCHLORIDE 0.9 MCG/KG/HR: 400 INJECTION INTRAVENOUS at 12:33

## 2024-08-31 RX ADMIN — SODIUM BICARBONATE 30 MEQ/HR: 84 INJECTION, SOLUTION INTRAVENOUS at 08:13

## 2024-08-31 RX ADMIN — SODIUM CHLORIDE 30 MG/ML INHALATION SOLUTION 3 ML: 30 SOLUTION INHALANT at 13:00

## 2024-08-31 RX ADMIN — POTASSIUM CHLORIDE 20 MEQ: 29.8 INJECTION, SOLUTION INTRAVENOUS at 05:57

## 2024-08-31 RX ADMIN — Medication 1 CAPSULE: at 08:30

## 2024-08-31 RX ADMIN — DEXMEDETOMIDINE HYDROCHLORIDE 1 MCG/KG/HR: 400 INJECTION INTRAVENOUS at 17:08

## 2024-08-31 RX ADMIN — Medication 75 MCG/HR: at 01:21

## 2024-08-31 RX ADMIN — ONDANSETRON 4 MG: 2 INJECTION INTRAMUSCULAR; INTRAVENOUS at 07:34

## 2024-08-31 RX ADMIN — CALCIUM GLUCONATE 1 G: 20 INJECTION, SOLUTION INTRAVENOUS at 18:38

## 2024-08-31 RX ADMIN — ALBUTEROL SULFATE 2.5 MG: 2.5 SOLUTION RESPIRATORY (INHALATION) at 13:00

## 2024-08-31 RX ADMIN — Medication 50 MCG: at 05:04

## 2024-08-31 RX ADMIN — SODIUM CHLORIDE 30 MG/ML INHALATION SOLUTION 3 ML: 30 SOLUTION INHALANT at 20:07

## 2024-08-31 RX ADMIN — CALCIUM GLUCONATE 2 G: 20 INJECTION, SOLUTION INTRAVENOUS at 07:03

## 2024-08-31 RX ADMIN — CALCIUM CHLORIDE, MAGNESIUM CHLORIDE, SODIUM CHLORIDE, SODIUM BICARBONATE, POTASSIUM CHLORIDE AND SODIUM PHOSPHATE DIBASIC DIHYDRATE 10 ML/KG/HR: 3.68; 3.05; 6.34; 3.09; .314; .187 INJECTION INTRAVENOUS at 23:56

## 2024-08-31 RX ADMIN — SODIUM BICARBONATE 30 MEQ/HR: 84 INJECTION, SOLUTION INTRAVENOUS at 04:56

## 2024-08-31 RX ADMIN — CALCIUM CHLORIDE, MAGNESIUM CHLORIDE, SODIUM CHLORIDE, SODIUM BICARBONATE, POTASSIUM CHLORIDE AND SODIUM PHOSPHATE DIBASIC DIHYDRATE 24.5 ML/KG/HR: 3.68; 3.05; 6.34; 3.09; .314; .187 INJECTION INTRAVENOUS at 20:17

## 2024-08-31 RX ADMIN — SODIUM BICARBONATE 30 MEQ/HR: 84 INJECTION, SOLUTION INTRAVENOUS at 01:42

## 2024-08-31 RX ADMIN — Medication 1 CAPSULE: at 21:10

## 2024-08-31 RX ADMIN — Medication 50 MCG: at 16:48

## 2024-08-31 RX ADMIN — CALCIUM CHLORIDE, MAGNESIUM CHLORIDE, SODIUM CHLORIDE, SODIUM BICARBONATE, POTASSIUM CHLORIDE AND SODIUM PHOSPHATE DIBASIC DIHYDRATE 10 ML/KG/HR: 3.68; 3.05; 6.34; 3.09; .314; .187 INJECTION INTRAVENOUS at 18:38

## 2024-08-31 RX ADMIN — CEFEPIME HYDROCHLORIDE 2 G: 2 INJECTION, POWDER, FOR SOLUTION INTRAVENOUS at 17:08

## 2024-08-31 RX ADMIN — CHLORHEXIDINE GLUCONATE 0.12% ORAL RINSE 15 ML: 1.2 LIQUID ORAL at 08:30

## 2024-08-31 RX ADMIN — CALCIUM CHLORIDE, MAGNESIUM CHLORIDE, SODIUM CHLORIDE, SODIUM BICARBONATE, POTASSIUM CHLORIDE AND SODIUM PHOSPHATE DIBASIC DIHYDRATE 22.5 ML/KG/HR: 3.68; 3.05; 6.34; 3.09; .314; .187 INJECTION INTRAVENOUS at 01:18

## 2024-08-31 RX ADMIN — Medication 50 MCG: at 17:37

## 2024-08-31 RX ADMIN — VANCOMYCIN HYDROCHLORIDE 125 MG: KIT at 21:24

## 2024-08-31 RX ADMIN — CALCIUM GLUCONATE 1 G: 20 INJECTION, SOLUTION INTRAVENOUS at 12:28

## 2024-08-31 RX ADMIN — CALCIUM CHLORIDE, MAGNESIUM CHLORIDE, SODIUM CHLORIDE, SODIUM BICARBONATE, POTASSIUM CHLORIDE AND SODIUM PHOSPHATE DIBASIC DIHYDRATE 200 ML/HR: 3.68; 3.05; 6.34; 3.09; .314; .187 INJECTION INTRAVENOUS at 19:27

## 2024-08-31 RX ADMIN — SODIUM CHLORIDE 30 MG/ML INHALATION SOLUTION 3 ML: 30 SOLUTION INHALANT at 07:29

## 2024-08-31 RX ADMIN — CALCIUM CHLORIDE, MAGNESIUM CHLORIDE, SODIUM CHLORIDE, SODIUM BICARBONATE, POTASSIUM CHLORIDE AND SODIUM PHOSPHATE DIBASIC DIHYDRATE 22.5 ML/KG/HR: 3.68; 3.05; 6.34; 3.09; .314; .187 INJECTION INTRAVENOUS at 03:25

## 2024-08-31 RX ADMIN — CALCIUM CHLORIDE, MAGNESIUM CHLORIDE, SODIUM CHLORIDE, SODIUM BICARBONATE, POTASSIUM CHLORIDE AND SODIUM PHOSPHATE DIBASIC DIHYDRATE 12.5 ML/KG/HR: 3.68; 3.05; 6.34; 3.09; .314; .187 INJECTION INTRAVENOUS at 10:13

## 2024-08-31 RX ADMIN — CHLORHEXIDINE GLUCONATE 0.12% ORAL RINSE 15 ML: 1.2 LIQUID ORAL at 21:10

## 2024-08-31 RX ADMIN — ALBUTEROL SULFATE 2.5 MG: 2.5 SOLUTION RESPIRATORY (INHALATION) at 07:29

## 2024-08-31 RX ADMIN — POTASSIUM CHLORIDE 20 MEQ: 29.8 INJECTION, SOLUTION INTRAVENOUS at 13:17

## 2024-08-31 RX ADMIN — Medication 50 MCG: at 07:45

## 2024-08-31 RX ADMIN — VANCOMYCIN HYDROCHLORIDE 125 MG: KIT at 08:32

## 2024-08-31 RX ADMIN — Medication 50 MCG: at 06:44

## 2024-08-31 RX ADMIN — LIDOCAINE 1 PATCH: 246 PATCH TOPICAL at 17:34

## 2024-08-31 RX ADMIN — ALBUTEROL SULFATE 2.5 MG: 2.5 SOLUTION RESPIRATORY (INHALATION) at 02:20

## 2024-08-31 RX ADMIN — HYDROCORTISONE SODIUM SUCCINATE 50 MG: 100 INJECTION, POWDER, FOR SOLUTION INTRAMUSCULAR; INTRAVENOUS at 05:18

## 2024-08-31 RX ADMIN — Medication 50 MCG: at 01:21

## 2024-08-31 RX ADMIN — CALCIUM CHLORIDE, MAGNESIUM CHLORIDE, SODIUM CHLORIDE, SODIUM BICARBONATE, POTASSIUM CHLORIDE AND SODIUM PHOSPHATE DIBASIC DIHYDRATE 22.5 ML/KG/HR: 3.68; 3.05; 6.34; 3.09; .314; .187 INJECTION INTRAVENOUS at 06:20

## 2024-08-31 ASSESSMENT — ACTIVITIES OF DAILY LIVING (ADL)
ADLS_ACUITY_SCORE: 44

## 2024-08-31 NOTE — PROGRESS NOTES
HEART CARE NOTE          Assessment/Recommendations     1. Severe valvular disease c/b post-op cardiogenic shock  Assessment / Plan  Patient intubated 2/2 worsening respiratory requirements with subsequent initiation of CRRT  Currently on dobutamine for hemodynamic support - hold on weaning while requiring CRRT  GDMT as detailed below; mainstay of treatment for HFpEF includes diuretics and adequate BP control (class I) and SGLT2-I (class 2a); additional medical therapy (ARNI, MRA, ARB) demonstrated less robust evidence for indication but may be considered per guideline recommendations (2b); no indication for BBlockers      Current Pharmacotherapy AHA Guideline-Directed Medical Therapy   Losartan  - not started 2/2 renal dysfunction ARNI/ARB   Spironolactone not started  MRA   SGLT2 inhibitor: not started SGLT2-I    Furosemide gtt - currently on CRRT Loop diuretic       2. 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 Anemia  Assessment / Plan  Management and supportive care per primary team     5. CAD  Assessment / Plan  Coronary angiogram significant for severe prox LAD and ost-prox Lcx lesions - sp CABG x 2 vz + Bentall + MVR      6. Afib   Assessment / Plan  Afib noted with hemodynamic instability - improvement in hemodynamics noted with increased pacing rates to 80; continue high heart rates as it appears necessary for cardiac compensation     7. Elevated liver enzymes  - continue to monitor    Patient remains critically ill in the ICU requiring hemodynamic support via vasopressors s/p OHS c/b cardiogenic shock. 70 minutes spent on critical care time    History of Present Illness/Subjective    Ms. Felicitas Elliott is a 84 year  Subjective:       Patient ID: Sana Crenshaw is a 71 y.o. female.    Chief Complaint: Sinus Problem and Cough      HPI Comments:       Current Outpatient Medications:     albuterol (PROVENTIL/VENTOLIN HFA) 90 mcg/actuation inhaler, Inhale 2 puffs into the lungs every 6 (six) hours as needed for Wheezing. Dispense with spacer., Disp: 18 g, Rfl: 3    amLODIPine (NORVASC) 5 MG tablet, TAKE 1 TABLET EVERY DAY, Disp: 90 tablet, Rfl: 3    aspirin (ECOTRIN) 81 MG EC tablet, Take 81 mg by mouth once daily., Disp: , Rfl:     DULoxetine (CYMBALTA) 30 MG capsule, TAKE 1 CAPSULE ONE TIME DAILY, Disp: 90 capsule, Rfl: 0    levothyroxine (SYNTHROID) 50 MCG tablet, TAKE 1 TABLET EVERY DAY, Disp: 90 tablet, Rfl: 2    lisinopriL (PRINIVIL,ZESTRIL) 20 MG tablet, Take 1 tablet (20 mg total) by mouth once daily., Disp: 90 tablet, Rfl: 1    metoprolol succinate (TOPROL-XL) 100 MG 24 hr tablet, Take 1 tablet (100 mg total) by mouth once daily., Disp: 90 tablet, Rfl: 3    omeprazole (PRILOSEC) 20 MG capsule, Take 1 capsule (20 mg total) by mouth 2 (two) times daily., Disp: 180 capsule, Rfl: 3    oxybutynin (DITROPAN-XL) 10 MG 24 hr tablet, Take 10 mg by mouth once daily., Disp: , Rfl:     rOPINIRole (REQUIP) 1 MG tablet, TAKE 1 TABLET BY MOUTH IN  THE EVENING, Disp: 90 tablet, Rfl: 3    amoxicillin-clavulanate 875-125mg (AUGMENTIN) 875-125 mg per tablet, Take 1 tablet by mouth every 12 (twelve) hours., Disp: 20 tablet, Rfl: 0    promethazine-dextromethorphan (PROMETHAZINE-DM) 6.25-15 mg/5 mL Syrp, Take 5 mLs by mouth 3 (three) times daily as needed., Disp: 118 mL, Rfl: 0    tiZANidine (ZANAFLEX) 4 MG tablet, Take 1 tablet (4 mg total) by mouth every 8 (eight) hours as needed (muscle spasms)., Disp: 270 tablet, Rfl: 1  No current facility-administered medications for this visit.    Facility-Administered Medications Ordered in Other Visits:     chlorhexidine 0.12 % solution 10 mL, 10 mL, Mouth/Throat, On Call Procedure, Scarlet Cooney,  "PA-C    lactated ringers infusion, , Intravenous, Continuous, Franky Hartman MD, Last Rate: 20 mL/hr at 01/08/21 0731, New Bag at 01/08/21 0925    lactated ringers infusion, , Intravenous, Continuous, Estevan Rodriguez MD, Stopped at 02/23/21 1645      Prolonged cough over the last 2-3 weeks.  Recently exposed to influenza.  Now has green nasal discharge in wheezing.  Some chills headache but no body aches, fever or GI symptoms.  Using Robitussin and albuterol.  Some wheezing or shortness a breath    Has started physical therapy twice week.  Says she finds it "hard"    Sinus Problem  Associated symptoms include chills, coughing and a sore throat. Pertinent negatives include no ear pain or headaches.   Cough  The current episode started 1 to 4 weeks ago. The problem has been rapidly worsening. The problem occurs constantly. The cough is Productive of brown sputum. Associated symptoms include chills, a fever, myalgias, nasal congestion, a sore throat, sweats and wheezing. Pertinent negatives include no chest pain, ear congestion, ear pain, headaches, heartburn, hemoptysis, postnasal drip, rash or rhinorrhea. The symptoms are aggravated by lying down. She has tried OTC cough suppressant and a beta-agonist inhaler for the symptoms. The treatment provided no relief. Her past medical history is significant for asthma and bronchitis. There is no history of bronchiectasis, COPD, environmental allergies or pneumonia.     Review of Systems   Constitutional:  Positive for chills and fever.   HENT:  Positive for sore throat. Negative for ear pain, postnasal drip and rhinorrhea.    Respiratory:  Positive for cough and wheezing. Negative for hemoptysis.    Cardiovascular:  Negative for chest pain.   Gastrointestinal:  Negative for heartburn.   Musculoskeletal:  Positive for myalgias.   Skin:  Negative for rash.   Allergic/Immunologic: Negative for environmental allergies.   Neurological:  Negative for headaches.     " "old female with a PMHx significant for (per Epic notation) presented with fatigue, weakness, chills, poor appetite. Does not really have much for chronic medical conditions other than chronic back pain, furunculosis, uterine prolapse, tobacco use      Today, Mrs. Elliott is intubated and sedated; Management plan as detailed above     ECG: personally reviewed; normal sinus rhythm, nonspecific ST and T waves changes, RBBB, left axis deviation.     ECHO (personnaly Reviewed on 8/19/24):   1. The left ventricle is normal in size. Left ventricular function is  normal.The ejection fraction is 55-60%.  2. Normal right ventricle size and systolic function.  3. Large vegetation on mitral valve (8 mm x 15) attached to posterior leaflet.  4. There is a small vegetation on the aortic valve (6 mm). No evidence of  aortic root abscess identified.  5. Severe valvular aortic stenosis (SHU:0.8 cm2, peak nomi: 4.6 m/sec, mean  gradient: 51 mmHg).    Telemetry: personally reviewed August 31, 2024; notable for paced rhythm     Medical history and pertinent documents reviewed in Care Everywhere please where applicable see details above        Physical Examination Review of Systems   BP (!) 89/50   Pulse 84   Temp 98.6  F (37  C) (Pulmonary Artery)   Resp 26   Ht 1.702 m (5' 7\")   Wt 99.4 kg (219 lb 1.6 oz)   SpO2 99%   BMI 34.32 kg/m    Body mass index is 34.32 kg/m .  Wt Readings from Last 3 Encounters:   08/31/24 99.4 kg (219 lb 1.6 oz)   08/15/24 90.1 kg (198 lb 9.6 oz)     General Appearance:   Intubated/sedated   ENT/Mouth: membranes moist, no oral lesions or bleeding gums.      EYES:  no scleral icterus, normal conjunctivae   Neck: no carotid bruits or thyromegaly   Chest/Lungs:   lungs are clear to auscultation, no rales or wheezing, equal chest wall expansion    Cardiovascular:   Regular. Normal first and second heart sounds with no murmurs, rubs, or gallops; the carotid, radial and posterior tibial pulses are intact, " "  Objective:      Vitals:    01/29/24 1038 01/29/24 1053   BP: (!) 142/70 119/78   Pulse: 108 65   Resp: 19    Temp: 99.2 °F (37.3 °C)    TempSrc: Tympanic    SpO2: 100%    Weight: 93.6 kg (206 lb 7.4 oz)    Height: 5' 2" (1.575 m)    PainSc: 0-No pain      Physical Exam  Vitals and nursing note reviewed.   Constitutional:       General: She is not in acute distress.     Appearance: She is well-developed. She is ill-appearing. She is not diaphoretic.   HENT:      Head: Normocephalic.      Nose: Mucosal edema and rhinorrhea present.      Mouth/Throat:      Pharynx: Pharyngeal swelling present. No oropharyngeal exudate or posterior oropharyngeal erythema.      Tonsils: No tonsillar exudate.   Neck:      Thyroid: No thyromegaly.   Cardiovascular:      Rate and Rhythm: Normal rate and regular rhythm.      Heart sounds: Normal heart sounds. No murmur heard.  Pulmonary:      Effort: Pulmonary effort is normal.      Breath sounds: Normal breath sounds. No wheezing or rales.   Abdominal:      General: There is no distension.      Palpations: Abdomen is soft.   Musculoskeletal:      Cervical back: Neck supple.   Lymphadenopathy:      Cervical: No cervical adenopathy.   Skin:     General: Skin is warm and dry.   Neurological:      Mental Status: She is alert and oriented to person, place, and time.   Psychiatric:         Behavior: Behavior normal.         Thought Content: Thought content normal.         Judgment: Judgment normal.         Assessment:       1. Acute non-recurrent sinusitis, unspecified location    2. Limited mobility    3. Essential hypertension    4. Prediabetes    5. Severe obesity with body mass index (BMI) of 36.0 to 36.9 with serious comorbidity    6. Hypothyroidism, unspecified type        Plan:   Acute non-recurrent sinusitis, unspecified location  Comments:  Augmentin.  Fluids and rest.    Limited mobility  Comments:  Participating in PT    Essential " anasarca    Abdomen:  no organomegaly, masses, bruits, or tenderness; bowel sounds are present   Extremities: no cyanosis or clubbing   Skin: no xanthelasma, warm.    Neurologic: Intubated/sedated     Psychiatric: Intubated/sedated    A complete 10 systems ROS was reviewed  And is negative except what is listed in the HPI.          Medical History  Surgical History Family History Social History   History reviewed. No pertinent past medical history. Past Surgical History:   Procedure Laterality Date    CORONARY ARTERY BYPASS GRAFT, WITH AORTIC VALVE REPLACEMENT, WITH ENDOSCOPIC VESSEL PROCUREMENT N/A 8/27/2024    Procedure: CORONARY ARTERY BYPASS GRAFT TIMES TWO, WITH AORTIC ROOT REPLACEMENT, WITH LEFT INTERNAL MAMMARY ARTERY HARVEST, LEFT SAPHNENOUS ENDOSCOPIC VESSEL PROCUREMENT,;  Surgeon: Dylan Renae MD;  Location: [facility] OR    CV CORONARY ANGIOGRAM N/A 8/20/2024    Procedure: CV CORONARY ANGIOGRAM;  Surgeon: Den Wylie MD;  Location: [facility] CATH LAB CV    REPLACE VALVE MITRAL N/A 8/27/2024    Procedure: MITRAL VALVE REPLACEMENT,;  Surgeon: Dylan Renae MD;  Location: [facility] OR    TRANSESOPHAGEAL ECHOCARDIOGRAM INTRAOPERATIVE  8/27/2024    Procedure: ANESTHESIA TRANSESOPHAGEAL ECHOCARDIOGRAM, EPI-AORTIC ULTRASOUND;  Surgeon: Dylan Renae MD;  Location: [facility] OR    no family history of premature coronary artery disease Social History     Socioeconomic History    Marital status:      Spouse name: Not on file    Number of children: Not on file    Years of education: Not on file    Highest education level: Not on file   Occupational History    Not on file   Tobacco Use    Smoking status: Not on file    Smokeless tobacco: Not on file   Substance and Sexual Activity    Alcohol use: Not on file    Drug use: Not on file    Sexual activity: Not on file   Other Topics Concern    Not on file   Social History Narrative    Not on file     Social  Determinants of Health     Financial Resource Strain: Low Risk  (8/24/2024)    Financial Resource Strain     Within the past 12 months, have you or your family members you live with been unable to get utilities (heat, electricity) when it was really needed?: No   Food Insecurity: Low Risk  (8/24/2024)    Food Insecurity     Within the past 12 months, did you worry that your food would run out before you got money to buy more?: No     Within the past 12 months, did the food you bought just not last and you didn t have money to get more?: No   Transportation Needs: Low Risk  (8/24/2024)    Transportation Needs     Within the past 12 months, has lack of transportation kept you from medical appointments, getting your medicines, non-medical meetings or appointments, work, or from getting things that you need?: No   Physical Activity: Not on file   Stress: Not on file   Social Connections: Unknown (1/3/2024)    Received from [facility]    Social Connections     Frequency of Communication with Friends and Family: Not on file   Interpersonal Safety: High Risk (8/23/2024)    Interpersonal Safety     Do you feel physically and emotionally safe where you currently live?: No     Within the past 12 months, have you been hit, slapped, kicked or otherwise physically hurt by someone?: No     Within the past 12 months, have you been humiliated or emotionally abused in other ways by your partner or ex-partner?: No   Housing Stability: Low Risk  (8/24/2024)    Housing Stability     Do you have housing? : Yes     Are you worried about losing your housing?: No           Lab Results    Chemistry/lipid CBC Cardiac Enzymes/BNP/TSH/INR   Lab Results   Component Value Date    BUN 13.8 08/31/2024    BUN 13.8 08/31/2024     08/31/2024     08/31/2024    CO2 19 (L) 08/31/2024    CO2 19 (L) 08/31/2024    Lab Results   Component Value Date    WBC 10.5 08/31/2024    HGB 7.9 (L) 08/31/2024    HCT 23.9  hypertension  Comments:  Controlled    Prediabetes  Comments:  A1c 6.0    Severe obesity with body mass index (BMI) of 36.0 to 36.9 with serious comorbidity  Comments:  No change in weight    Hypothyroidism, unspecified type  Comments:  Euthyroid on current dose    Other orders  -     amoxicillin-clavulanate 875-125mg (AUGMENTIN) 875-125 mg per tablet; Take 1 tablet by mouth every 12 (twelve) hours.  Dispense: 20 tablet; Refill: 0  -     promethazine-dextromethorphan (PROMETHAZINE-DM) 6.25-15 mg/5 mL Syrp; Take 5 mLs by mouth 3 (three) times daily as needed.  Dispense: 118 mL; Refill: 0             "(L) 08/31/2024    MCV 91 08/31/2024    PLT 65 (L) 08/31/2024    Lab Results   Component Value Date    INR 1.65 (H) 08/31/2024     No results found for: \"CKTOTAL\", \"CKMB\", \"TROPONINI\"       Weight:    Wt Readings from Last 3 Encounters:   08/31/24 99.4 kg (219 lb 1.6 oz)   08/15/24 90.1 kg (198 lb 9.6 oz)       Allergies  Allergies   Allergen Reactions    Aspirin Rash    Cats Rash    Nickel Rash         Surgical History  Past Surgical History:   Procedure Laterality Date    CORONARY ARTERY BYPASS GRAFT, WITH AORTIC VALVE REPLACEMENT, WITH ENDOSCOPIC VESSEL PROCUREMENT N/A 8/27/2024    Procedure: CORONARY ARTERY BYPASS GRAFT TIMES TWO, WITH AORTIC ROOT REPLACEMENT, WITH LEFT INTERNAL MAMMARY ARTERY HARVEST, LEFT SAPHNENOUS ENDOSCOPIC VESSEL PROCUREMENT,;  Surgeon: Dylan Renae MD;  Location: [facility] [place] [place] OR    CV CORONARY ANGIOGRAM N/A 8/20/2024    Procedure: CV CORONARY ANGIOGRAM;  Surgeon: Den Wylie MD;  Location: [facility] CATH LAB CV    REPLACE VALVE MITRAL N/A 8/27/2024    Procedure: MITRAL VALVE REPLACEMENT,;  Surgeon: Dylan Renae MD;  Location: [facility] [place] [place] OR    TRANSESOPHAGEAL ECHOCARDIOGRAM INTRAOPERATIVE  8/27/2024    Procedure: ANESTHESIA TRANSESOPHAGEAL ECHOCARDIOGRAM, EPI-AORTIC ULTRASOUND;  Surgeon: Dylan Renae MD;  Location: [facility] [place] [place] OR       Social History  Tobacco:   History   Smoking Status    Not on file   Smokeless Tobacco    Not on file    Alcohol:   Social History    Substance and Sexual Activity      Alcohol use: Not on file   Illicit Drugs:   History   Drug Use Not on file       Family History  History reviewed. No pertinent family history.       Jaswant Solis MD  Clinical Cardiac Electrophysiology        cc: Clinic, [facility]    "

## 2024-08-31 NOTE — PROGRESS NOTES
Pacemaker HR turned to 80 with EKG showing sinus rhythm HR 84. Began junctional and bigeminal ectopy so  HR turned back to 90.  Now 100% paced no ectopy, no change in presser support

## 2024-08-31 NOTE — PROGRESS NOTES
-160, pt agitated when awake. Given fent bolus' and increased gtt, increased Precedex.   Pt calm, follows commands. Blood pressure remains elevated, started cardene, clarified with Jessica CUEVAS. Family at bedside

## 2024-08-31 NOTE — PLAN OF CARE
Problem: Gas Exchange Impaired  Goal: Optimal Gas Exchange  Outcome: Progressing  Intervention: Optimize Oxygenation and Ventilation    Problem: Fluid Volume Excess  Goal: Fluid Balance  Outcome: Progressing  Intervention: Monitor and Manage Hypervolemia    Goal Outcome Evaluation:  [facility] - ICU    RN Progress Note:            Pertinent Assessments:      Please refer to flowsheet rows for full assessment     VSS/low grade fever. Pacemaker set rate prior to this shift at 92 bpm, patient V-paced 100% rhythm per telemetry, Minimal UOP per chacko. Minimal serosanguineous output in chest tube. Tolerating [facility] ventilator well, increased cough noted.           Key Events - This Shift:       -Levophed titrated off, Vaso remained off. Continued dobutamine at fixed rate as ordered. Patient maintained map >65 the entire shift.  -NPO. D20 continued. Blood sugar remained <150 this shift.  -Bicarb gtt started per nephrology  -CRRT circuit changed due to high filter/membrane pressure. Blood was returned, patient tolerated well. Otherwise tolerating CRRT well, pulling  net negative as tolerated as ordered.  -Calcium gluconate 2g this am for ionized Ca level 4.2.   -Potassium 3.2, 20mEq KCL given per CRRT prn order.           Barriers to Discharge / Downgrade:     Need for ICU level care[facility], sedatives, Dobutamine

## 2024-08-31 NOTE — PROGRESS NOTES
"Critical Care Progress Note     08/31/2024     Name: Felicitas Elliott MRN#: [id number]   Age: 84 year old YOB: 1940        Summary     Past medical history of moderate aortic stenosis, HFpEF, HTN, HLD, CKD 3, chronic intertriginous skin wounds, chronic neck pain       Presented 8/14/24 for chills & generalized weakness. 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 8/27/24 s/p bioprosthetic aortic & mitral valve replacement, aortic root replacement, & two vessel coronary artery bypass graft. Course complicated by post operative hypoxemic respiratory failure due to cardiogenic pulmonary edema, atelectasis, & possible Enterobacter hospital acquired pneumonia in conjunction with cardiogenic-distributive shock, oliguric HAMMAD requiring CRRT, euglycemic ketoacidosis, & suspected ischemic hepatitis       Interval Events     Remains on CRRT, net  mL/hr, still edematous  AGMA with elevated ketones persists, on D20 infusion  Able to awaken, some agitation      Assessment & Plan      Neurology, Psychiatry, Sedation, Analgesia:  L-parietal septic embolic stroke   8/17/24 MRI brain \"cortical signal abnormality within the left superior parietal lobule which is favored to represent a subacute infarct [...] smaller foci of signal abnormality with similar characteristics favored to represent punctate subacute infarcts.\" 8/18/24 CTA head & neck \"low attenuation within the left parietal lobe [...] this is favored to reflect evolving small vessel ischemic change; however, MRI follow-up to   exclude a low-grade neoplastic process is advised\"  - neurology was consulted  - ongoing daily neuro assessment  - awakens, follows commands, no obvious focal deficits on exam     Cervical spinal stenosis   Chronic neck pain   8/15/24  cervical MRI \"multilevel cervical " "spondylosis [...] complex edema at C4-5 on the left is favored to be reactive/degenerative [...] septic facet arthropathy are difficult to entirely exclude [...] C4-5, moderate to severe spinal canal stenosis  [...] severe right and moderate to severe left neural foraminal stenosis  [...] C5-6, moderate spinal canal stenosis  [...]Severe bilateral neural foraminal stenosis  [...] C7-T1, mild spinal canal stenosis with severe right and mild to moderate left neural foraminal stenosis [...] C3-4, moderate bilateral neural foraminal stenosis.\"  - neurosurgery was consulted, no current surgical interventions advised  - consider cervical MRI w/wo if neck pain worsens to evaluate for sequelae of infection      Sedation & Analgesia   - RASS goal 0 to -1  - dexmedetomidine & fentanyl infusions    - prn APAP & tramadol     Cardiovascular:  Cardiogenic & distributive shock   Etiology multifactorial, post-cardiopulmonary bypass vasoplegia & low cardiac output syndrome in conjunction with sepsis due to residual aortic root abscess identified intraoperatively & possible Enterobacter HAP  - appreciate cardiology and CT surgery  - dobutamine ongoing  - off vasopressin and norepi  - will stop hydrocortisone  - MAP goal ~85 mmHg     Severe aortic stenosis, aortic & mitral valve infective endocarditis, aortic root abscess  8/27/24 bioprosthetic aortic & mitral valve replacement, aortic root replacement  - CT surgery managing: warfarin x 3 months, INR goal 2.5-3.5, no bridging needed     Severe multivessel CAD  8/27/24 2 vessel CABG  - clopidogrel on hold      Preserved pre & post- operative biventricular systolic function   Left ventricular hypertrophy with impaired diastolic function      Carotid arterial stenosis, mild  8/22/24 carotid US     Post-operative atrial fibrillation slow ventricular response   S/p placement of temporary epicardial pacing wires   Presumed post-operative sinus node dysfunction or AV conduction defect.   - V " paced at 90 BPM     Respiratory, Airway:  Acute hypoxemic respiratory failure - improved   Cardiogenic pulmonary edema, suspect nosocomial aspiration pneumonia-pneumonitis   Overnight 8/28-8/29 over 30 minutes period exhibited progressive rapidly worsening hypoxemia, nausea, vomiting, & multiple sequelae of hypervolemia with oliguria. 8/29/24 radiograph with progressive right basilar opacity & blunting of the costophrenic angle. 8/29/24 pleural US small right pleural effusion, no ipsilateral chest tube   - supplemental O2 to maintain spO2 >= 90-96% & PaO2 >= 60 mmHg  - lung protective ventilation (TV 6-8 mL/kg IBW, Pplat <30, driving pressure <15)   - target normocarbia pH 7.35 - 7.45, continuously monitored end tidal CO2  - empiric antimicrobials & infectious evaluation as below  - UF on CRRT, clinically still hypervolemic  - daily sedation holiday and SBT, likely tomorrow when more euvolemic and acidosis improved, ideally when off CRRT but not required    Post-operative chest tubes  - monitor output       Gastrointestinal:  Acute liver injury   Suspect ischemic hepatitis. Differential includes medications/toxins, viral hepatitis, autoimmune hepatitis, Budd-Chiari syndrome, hepatic artery thrombus, veno-occlusive disease, secondary hemophagocytic lymphohistiocytosis, et al  - consider further evaluation   - ordered liver ultrasound with doppler to evaluate for portal vein thrombus or hepatic arterial thormbus   - trend liver panels, INR, fibrinogen (transfuse for <100 mg/dL)  - remains on CRRT which should reduce serum level of ammonia if elevated   - consider N-Acetylcysteine for non-acetaminophen RACHEL (PMID 48029909; PMID 61507946; PMID 82948391; PMID 37159504), though transaminases now downtrending  - continue prophylactic PPI & avoid hypotonic fluid     Renal, Acid-base, Electrolytes, Volume :  Oliguric 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  Baseline 1.2 - 1.3. 8/31: UOP 10 mL/hr, still edematous  - appreciate nephrology consultation  - continue CRRT with UF today, discussed with nephrology  - may be able to transition to IHD soon  - right femoral dialysis catheter, possible IR for temporary or tunneled left internal jugular (has right internal jugular introducer with PAC); prior attempted bedside left internal jugular dialysis catheter aborted due to inability to advance wire    Hypervolemia  Grossly edematous throughout. Elevated CVP. Weight up several kilograms    Ketoacidosis, euglycemic   Etiology unclear, suspect CRRT implicated (PMID: [id number]) &/or starvation ketosis. 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   Question of cervical septic facet arthropathy   Presumed source intertriginous soft tissue lesions  - appreciate ID consultation  - hold nafcillin while broadened, as below     Concern for Enterbacter hospital acquired pneumonia    - pending - 8/29 sputum culture; 8m30 blood culture, UA with reflex   - negative - 8/29 MRSA nares, viral panel   - broadened to cefepime & vancomycin      Lennie-operative antibiotics per CV surgery      Hematology, Oncology:  Post-operative anemia  - monitor for bleeding: Hgb goal >7-8      Thrombocytopenia   Etiology multifactorial, sepsis, beta lactam antimicrobials, CRRT, & post-cardiopulmonary bypass, at least. Anticipate fall in platelet count after cardiopulmonary bypass in most patients, typically to levels approximately half of baseline, abel between postoperative days 2-4, & persists for 4 - 6 days following surgery. Anti-PF4-heparin CASPER testing is positive in 25% to 50% of patients between postoperative days 3 and 10, while HIT is confirmed in only 1% to 4% of patients following CPB    - 8/30/24 4 T score 3 low probability HIT (fall>50%, timing >10 days, no known thrombus, definite alternative causes in sepsis, CRRT, post-cardiac surgery)    Supratherapeutic INR   - vitamin K 5 mg per CV surgery     Endocrine:  MICU insulin/glucose protocol   - maintain BG of 140 to 180 mg/dL, currently on D20 for euglycemic ketoacidosis, not needing insulin     Euglycemic ketoacidosis, as above     Checklist:  FEN: NPO, start enteral nutrition via OG tube  VTE ppx: warfarin with goal INR 2.5-3.5  GI ppx: pantoprazole   Bowel regimen: PEG & senna   VAP ppx: Head of bed >30 degrees, daily oral care, chlorhexidine  Lines/tube-size: R-internal jugular introducer & PAC 8/27, radial arterial line 8/27, PICC 8/21, R-femoral HD catheter 8/29, chacko 8/27, ETT 8/29   Skin: sternotomy site clean & dry    PT/OT/SLP, early mobility: cardiac rehab when able   Code Status: full       Physical Exam      Neurologic: Sedated. Opens eyes, tracks, & follows commands in all extremities symmetrically   HEENT: Head and face normal. No nasal discharge. Oropharynx normal. Eyelids, conjunctiva, & sclera normal.   Neck: Neck appearance normal. No neck masses. Thyroid not enlarged.  Respiratory: Lungs clear bilaterally. No wheezes or rhonchi.   Cardiovascular: Regular rate & rhythm  Heart sounds distant   Gastrointestinal: Soft. Obese.   Musculoskeletal: Skeletal configuration normal and muscle mass normal for age. Joint appearance overall normal.  Skin, Hair, & Nails: Skin color normal. Sternotomy site clean & dry  Hair & nails normal.  Extremities: 1+ lower extremity pitting edema. Warm     All pertinent vital signs, ventilator settings, I&Os, laboratory, microbiology, ECGs, & imaging data has been personally reviewed. Total Critical Care time, excluding procedures, was 66 minutes     Zachary Gonzalez MD  Pulmonary and Critical Care Medicine  [facility]  Office [phone]  he/him

## 2024-08-31 NOTE — PROGRESS NOTES
Labs reviewed  PH 7.46 , Pco2 31 and bicarb 16  Checked after 100meq of bicarb   Will recheck ~1100 start on 30meq/hr bicarb 1:1  Ketones+, started on D20 noted  Started on gentle UF     Will follow on labs     Moses Mcnair MD  [facility]  [phone]

## 2024-08-31 NOTE — PROGRESS NOTES
Renal progress note  CC:PEDRO  Assessment and Plan:  84 year old female with hx of CHF, HFpEF , CKD 3/4, HLD , left renal atrophy, CAD and chronic neck pain , admitted with chills and weakness. Found to have MSSA bacteremia cb MV and AV infective endocarditis , 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 , MvCAD: underwent biprothetic Avand MV replacement , Aortic root replacement and 2vCABG,. Post Sx cb Pedro , anuria , acute resp failure and hemodynamic instability on pressers. With worsening resp status and need for oxygenation , concerns for near re-intubation and anuria with no response to diuresis nephrology consulted for advanced CKD , Pedro and volume management     PEDRO anuric  anuria and anephric rise in creat in last 24hrs  Bl CKD 3/4 with creatinine 1.3-1.8 and GFR ~30-35  UA with hyaline casts , mild proteins and WBC  UPCR 0.1g/g minimal  Imaging with left renal cortical thinning ~8cm kidney , rt 9.7cm kideny with simple cysts (4/2024)  Follows with [facility]  CKD has been attributed to long standing NSAID use : reviewed extensive rheum work up -ve in 4/2024 , no prior paraprotein work up  Current PEDRO , appears CRS with likely a hemodynamic insult to the kidney; with recent embolic process hard to exclude embolic disease , will check complements and LDH  --> with anuria and hypervolemia  Started on CRRT with significant hemodynamic instability  CRRT prescription increased to 35ml/kg with acidosis   UFR 50-100ml/hr as tolerated  --> Ordered peripheral bicarb 30 mEq/h due to ongoing acidosis  --> Labs per CRRT protocols , 4K, saline potassium replacement  --> strict IO    Access femoral   Unable to place left IJ may need to consult IR for nontunneled catheter placement: With ongoing need for CRRT     Hypervolemia  Hypotension with likely cardiogenic shock state  PTA no anti Htn , BP managed with diet alone  --> on Dobutamine , was also on vaso and levo after correction of acidosis PRBCs and  FFP's yesterday now stabilizing blood pressure and plans for dobutamine wean  --> plans for PRN albumin for increased vasopresser needs  Goal UFR  per  --> follow with CRRT  Now off lasix     Worsening acidosis bicarb down  Had to be started on peripheral bicarb for stability  Lactic acidosis--> improving   Ketosis likely starvation ketosis now ED 20  With high-dose CRRT     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  Monitor on Labs     Mild hypokalemia  Monitor on labs replace per protocol     Mild hypernatremia , improved     Anemia  Acute on chronic  Likely inflamm  --> transfuse if less then 8 or per cardiology recs     MSSA bacteremia  Cb valve endocarditis  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  Thrombocytopenia and coagulopathy concerning for septic shock  Some concerns for cervical septic facet arthropathy  On Naficillin  ID following     Acute resp failure  Sec to hypervolemia and cardiogenic shock  Volume management as above  Management o resp failure per ICU  Reintubated 8/29/2024 improving FiO2 needs with UF  Vent management per ICU     MV and AV endocarditis   Aortic Root Abscess   CT Sx dr Renae, following  8/27/24: s/p Aortic root abscess debridement and replacement AV and MV replacement and CABG  x 2         Thank you for the consultation we will follow  Moses Mcnair MD  [facility]  [phone]      Subjective  Patient continues on mechanical ventilation oxygenation   needs have been stable has been tolerating UF far better minimal sedation   opens eyes and follows commands remains with minimal to no urine output  Acidosis mildly improved overnight with starting of peripheral bicarb  Case was reviewed with ICU and CT surgery  Will continue with femoral line for now  Possible plan for IR placed left IJ if needed    Objective    Vital signs in last 24 hours  Temp:  [98.1  F (36.7  C)-99.3  F (37.4  C)] 98.8  F (37.1  C)  Pulse:  [] 92  Resp:   [23-33] 26  MAP:  [56 mmHg-195 mmHg] 106 mmHg  Arterial Line BP: ()/() 156/80  FiO2 (%):  [35 %] 35 %  SpO2:  [87 %-100 %] 100 %  Weight:   [unfilled]    Intake/Output last 3 shifts  I/O last 3 completed shifts:  In: 5058.98 [I.V.:3617.98; Other:373; NG/GT:240]  Out: 4934 [Urine:362; Other:4162; Stool:20; Chest Tube:390]  Intake/Output this shift:  I/O this shift:  In: 129.45 [I.V.:129.45]  Out: 296.6 [Other:296.6]    Physical Exam  Intubated NAD  CV: RRR +  murmur or rub  Lung: clear and equal; no extra sounds  Ab: soft and NT; not distended; normal bs  Ext: + edema and well perfused  Skin; no rash    Pertinent Labs   Lab Results   Component Value Date    WBC 10.5 08/31/2024    HGB 7.9 (L) 08/31/2024    HCT 23.9 (L) 08/31/2024    MCV 91 08/31/2024    PLT 65 (L) 08/31/2024     Lab Results   Component Value Date    BUN 13.8 08/31/2024    BUN 13.8 08/31/2024     08/31/2024     08/31/2024    CO2 19 (L) 08/31/2024    CO2 19 (L) 08/31/2024       Lab Results   Component Value Date    ALBUMIN 3.4 (L) 08/31/2024    ALBUMIN 3.4 (L) 08/31/2024     Lab Results   Component Value Date    PHOS 3.3 08/31/2024     I reviewed all lab results  Moses Mcnair MD

## 2024-08-31 NOTE — PROGRESS NOTES
RT PROGRESS NOTE    VENT DAY# 3    CURRENT SETTINGS:   FiO2 (%): 35 %, Resp: 26, Vent Mode: CMV/AC, Resp Rate (Set): 24 breaths/min, Tidal Volume (Set, mL): 420 mL, PEEP (cm H2O): 5 cmH2O, Resp Rate (Set): 24 breaths/min, Tidal Volume (Set, mL): 420 mL, PEEP (cm H2O): 5 cmH2O    Rolanda Helms, RT

## 2024-08-31 NOTE — CONSULTS
CLINICAL NUTRITION SERVICES - REASSESSMENT NOTE     Nutrition Prescription                                                                                                                                                                                                                                                                                                                                                                                                                                        RECOMMENDATIONS FOR MDs/PROVIDERS TO ORDER:                                                                                                                                                                                                                                                                                                                                                                                                                                                                                                                                                                                                                                                                                                                                                                                                                                                                                                                                                                                                                                                                                                                                                                                                                                                                                                                                                                                                                                                                                                                                                                                                                                                                                                                                                                                                                                                                                                                                                                  Recommendations already ordered by Registered Dietitian (RD):  Recommend starting with vital high protein at 15 ml/hr x 8 hrs and advancing  By 10 ml q 8 hrs to a goal rate of 65 ml/hr.  Recommend H2O flushes of 90 ml  Three times per day to provide: 1560 ml/Calories, 136 g protein, 173 g CHO,  0 g fiber and 1304 ml fluid (TF) plus H2O vfxakvf=5899 ml fluid/day.  We have   Cartons of vital HP which has a hang time of 8 hrs    Future/Additional Recommendations:  Follow TF tolerance, fluid status, labs, weight   Consulted for RD to order TF per medical nutrition therapy guidelines  Pt has an OG tube                                                                                                                                                                                                                                                                                                                                                                                                                                                                                                                                                                                                                                                                                                                                 "                                                                                                                                                                                                                                                                                                                                                                                                                                                                                                                  Past medical history of moderate aortic stenosis                                                 HFpEF, hypertension, HLD, CDK 3, chronic intertriginous skin wounds, chronic neck pain.  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 8/27/2024 s/p bioprosthetic aortic and mitral valve replacement, aortic root replacement and 2 vessel coronary artery bypass graft. Post operative hypoxemic respiratory failure due to cardiogenic pulmonary edema, atelectasis and possible enterobacter hospital acquired pneumonia in conjunction with cardiogenic distributive shock, oliguric ADKI requiring CRRT, euglycemia keto acidosis and suspected ischemic hepatitis    EVALUATION OF THE PROGRESS TOWARD GOALS   Diet: NPO/intubated  Per pt's , Ed, pt ate ~ 2 meals per day.  She was recently following a lower sodium diet and Ed states she lost some weight but nothing drastic.     ANTHROPOMETRICS  Height: 170.2 cm (5' 7\")  Most Recent Weight: 99.4 kg (219 lb 1.6 oz)    IBW: 61.4 kg  BMI: Obesity Grade I BMI 30-34.9  Weight History:   Wt Readings from Last 10 Encounters:   08/31/24 99.4 kg (219 lb 1.6 oz)   08/15/24 90.1 kg (198 lb 9.6 oz)     Estimated nutrition needs:  Dosing weight= 61.4 Kg ( IBW)    Estimated energy needs: 7641-6959+/- Calories/day ( 25-30 Freddy/Kg)  Justification: " vented  Estimated protein needs:: 123-154 g/day ( 2-2.5 g/Kg)  Justification CRRT  Estimated fluid needs: 1535 +/- ml/day ( 25 +/- ml/Kg)  Justification: per provider pending fluid status    PHYSICAL FINDINGS  See malnutrition section below.  Skin: pale, warm, dry  Blister right lower chest  Sergey score= 13, nutrition noted as very poor  GI: Abdomen rounded  + diarrhea  Last BM 8/30/2024    LABS  Reviewed: Na 142, K 3.2 (L), urea nitrogen 13.8, Cr 1.37 (H), Ca 8 (L), Mg 2.4 (H), phos 3.3,  (H), AST 1,172 (H), Glu 140 (H), 145 (H)    MEDICATIONS  Reviewed: cefepime, peridex, culturell, pantoprazole, miralax, senna-docusate, vancomycin    CURRENT NUTRITION DIAGNOSIS  Inadequate oral intake related to vent as evidenced by NPO/ need for TF      INTERVENTIONS  Implementation  Start TF Vital HP at 15 ml/hr x 8 hrs and advance by 10 ml q 8 hrs to a goal rate of 65 ml/hr to provide: 1560 ml/Calories, 136 g protein, 173 g CHO, 0 g fiber and 1304 ml fluid (TF) Recommend 90 ml H2O flush 3 times per day and RD will follow fluid status    Goals  Diet advancement vs nutrition support within 2-3 days.    Monitoring/Evaluation  Progress toward goals will be monitored and evaluated per protocol.

## 2024-08-31 NOTE — PROGRESS NOTES
Care Management Follow Up    Length of Stay (days): 15    Expected Discharge Date: 09/04/2024    Anticipated Discharge Plan:  To be determined.  Goal was Home, Home Care    Transportation: TBD    PT Recommendations:  Pending  OT Recommendations:   Pending     Barriers to Discharge: medical stability (intubated, renal function, ID following)     Prior Living Situation:  Patient resides in a house with her  and is reported as mostly independent when at baseline.  assists with transport and as needed in general. No DME use or current in-home/outpatient services identified.        Discussed  Partnership in Safe Discharge Planning  document with patient/family: No     Handoff Completed: No, handoff not indicated or clinically appropriate    Patient/Spokesperson Updated: No    Additional Information:  Initial goal was home but will need to have therapy evaluation and recommendations when appropriate.     Next Steps: CM will continue to monitor progression of care, review team recommendations and provide discharge planning assist as needed.      Alicia Fajardo RN

## 2024-08-31 NOTE — PROGRESS NOTES
CVTS Daily Progress Note   POD#4 s/p Aortic root abscess debridement and aortic annular reconstruction, aortic root replacement (Konect composite 25 mm Silva Inspiris Resilia bioprosthetic valve within 30 mm Gelweave Valsalva graft with reimplantation of the coronary arteries), Mitral valve replacement (31 mm St. Tim Silva bioprosthetic valve) and CAB x 2.  Attending: Dr Renae  LOS: 15    SUBJECTIVE/INTERVAL EVENTS:    No acute events overnight. Remains intubated. Pulling 100 with CRRT. Minimal urine output. Dobutamine 5 mg. Levo and vaso off.  Seen in in bed. Pain well controlled. +BM / +flatus with rectal tube in place. Occult blood positive today. NPO. Chest tube output appropriate. Hgb 7.9. after 1u PRBC's. INR 1.65. Plt 65. HIT sent today.  Patient denies new chest pain, shortness of breath, abdominal pain, calf pain, nausea. Patient unable to ask questions dt the above.    OBJECTIVE:  Temp:  [98.1  F (36.7  C)-99.3  F (37.4  C)] 98.6  F (37  C)  Pulse:  [80-98] 87  Resp:  [26-33] 26  MAP:  [69 mmHg-195 mmHg] 98 mmHg  Arterial Line BP: ()/() 141/75  FiO2 (%):  [35 %] 35 %  SpO2:  [87 %-100 %] 99 %  Vitals:    08/27/24 0500 08/28/24 0600 08/29/24 0400 08/30/24 0400   Weight: 86.4 kg (190 lb 8 oz) 95.3 kg (210 lb 1.6 oz) 101 kg (222 lb 10.6 oz) 99.8 kg (220 lb)    08/31/24 0400   Weight: 99.4 kg (219 lb 1.6 oz)       Clinically Significant Risk Factors        # Hypokalemia: Lowest K = 3.2 mmol/L in last 2 days, will replace as needed   # Hypocalcemia: Lowest Ca = 8 mg/dL in last 2 days, will monitor and replace as appropriate    # Anion Gap Metabolic Acidosis: Highest Anion Gap = 24 mmol/L in last 2 days, will monitor and treat as appropriate  # Hypoalbuminemia: Lowest albumin = 3.3 g/dL at 8/28/2024 12:12 AM, will monitor as appropriate     # Thrombocytopenia: Lowest platelets = 65 in last 2 days, will monitor for bleeding             # Obesity: Estimated body mass index is 34.32 kg/m  as  "calculated from the following:    Height as of this encounter: 1.702 m (5' 7\").    Weight as of this encounter: 99.4 kg (219 lb 1.6 oz).        # Financial/Environmental Concerns:     # History of CABG: noted on surgical history        Current Medications:    Scheduled Meds:  Current Facility-Administered Medications   Medication Dose Route Frequency Provider Last Rate Last Admin    [Held by provider] acetaminophen (TYLENOL) tablet 975 mg  975 mg Oral Q8H Maryam Anthony PA-C   975 mg at 08/30/24 1336    sodium chloride (NEBUSAL) 3 % neb solution 3 mL  3 mL Nebulization Q6H Zach Park MD   3 mL at 08/31/24 0729    And    albuterol (PROVENTIL) neb solution 2.5 mg  2.5 mg Nebulization Q6H Zach Park MD   2.5 mg at 08/31/24 0729    [Held by provider] atorvastatin (LIPITOR) tablet 80 mg  80 mg Oral QPM Jessica Tolbert PA-C   80 mg at 08/29/24 2038    ceFEPIme (MAXIPIME) 2 g vial to attach to  mL bag for ADULTS or NS 50 mL bag for PEDS  2 g Intravenous Q12H Zach Park MD   2 g at 08/31/24 0519    chlorhexidine (PERIDEX) 0.12 % solution 15 mL  15 mL Mouth/Throat BID Zach Park MD   15 mL at 08/31/24 0830    [Held by provider] clopidogrel (PLAVIX) tablet 75 mg  75 mg Oral Daily Maryam Anthony PA-C        lactobacillus rhamnosus (GG) (CULTURELL) capsule 1 capsule  1 capsule Oral BID Zach Foreman DO   1 capsule at 08/31/24 0830    Lidocaine (LIDOCARE) 4 % Patch 1-2 patch  1-2 patch Transdermal Q24H Maryam Anthony PA-C   1 patch at 08/30/24 1716    pantoprazole (PROTONIX) 2 mg/mL suspension 40 mg  40 mg Oral or NG Tube QAM AC Maryam Anthony PA-C        Or    pantoprazole (PROTONIX) EC tablet 40 mg  40 mg Oral QAM AC Gabrielle, Maryam Rema, PA-C        Or    pantoprazole (PROTONIX) IV push injection 40 mg  40 [facility], PA-C   40 mg at 08/31/24 0634    polyethylene glycol (MIRALAX) Packet 17 g  17 g Oral Daily Maryam Anthony " MARION Hodgson        senna-docusate (SENOKOT-S/PERICOLACE) 8.6-50 MG per tablet 1 tablet  1 tablet Oral BID Maryam Anthony PA-C   1 tablet at 08/30/24 2030    sodium chloride (PF) 0.9% PF flush 3 mL  3 mL Intracatheter Q8H Gondal, Saad J, MD   3 mL at 08/31/24 0831    vancomycin (FIRVANQ) oral solution 125 mg  125 mg Oral BID Nannette Lebron MD   125 mg at 08/31/24 0832    vancomycin (VANCOCIN) 750 mg in sodium chloride 0.9 % 250 mL intermittent infusion  750 mg Intravenous Q12H Zach Park MD   750 mg at 08/30/24 2320    [Held by provider] Warfarin Dose Required Daily - Pharmacist Managed  1 each Oral See Admin Instructions Jessica Tolbert PA-C         Continuous Infusions:  Current Facility-Administered Medications   Medication Dose Route Frequency Provider Last Rate Last Admin    CRRT Pre-Filter replacement solution for CVVHD & CVVHDF (Phoxillum BK4/2.5)  12.5 mL/kg/hr CRRT Continuous Moses Mcnair MD 1,300 mL/hr at 08/31/24 1013 12.5 mL/kg/hr at 08/31/24 1013    CRRT Replacement solution for CVVHD and CVVHDF premixed replacement solution (Phoxillum 4/2.5)  200 mL/hr CRRT Continuous Moses Mcnair  mL/hr at 08/30/24 1720 200 mL/hr at 08/30/24 1720    dexmedeTOMIDine (PRECEDEX) 4 mcg/mL in sodium chloride 0.9 % 100 mL infusion  0.1-1.2 mcg/kg/hr Intravenous Continuous Maryam Anthony PA-C 17.3 mL/hr at 08/31/24 0853 0.8 mcg/kg/hr at 08/31/24 0853    dextrose 20 % infusion   Intravenous Continuous Zach Park MD 20 mL/hr at 08/31/24 0600 Rate Verify at 08/31/24 0600    dialysate for CVVHD & CVVHDF premixed replacement solution (Phoxillum 4/2.5) - total potassium 4 mEq/L  22.5 mL/kg/hr CRRT Continuous Moses Mcnair MD 2,300 mL/hr at 08/31/24 0849 22.5 mL/kg/hr at 08/31/24 0849    DOBUTamine (DOBUTREX) 500 mg in D5W 250 mL infusion (adult std conc)  5 mcg/kg/min Intravenous Continuous Jessica Tolbert PA-C 11.4 mL/hr at 08/31/24 0944 4 mcg/kg/min at 08/31/24 0944    fentaNYL (SUBLIMAZE)  infusion   mcg/hr Intravenous Continuous Zach Park MD 1 mL/hr at 08/31/24 0821 50 mcg/hr at 08/31/24 0821    insulin regular (MYXREDLIN) 1 unit/mL infusion  0-24 Units/hr Intravenous Continuous Zach Park MD   Held at 08/30/24 2029    niCARdipine 40 mg in 200 mL NS (CARDENE) infusion  0.5-15 mg/hr Intravenous Continuous Dylan Renae MD   Stopped at 08/29/24 0705    No heparin required   Does not apply Continuous PRN Moses Mcnair MD        norepinephrine (LEVOPHED) 16 mg in sodium chloride 0.9 % 250 mL infusion CENTRAL  0.01-0.6 mcg/kg/min Intravenous Continuous Domingo Nagy MD   Stopped at 08/30/24 2329    phenylephrine (ZEYAD-SYNEPHRINE) 50 mg in NaCl 0.9 % 250 mL infusion  0.1-4 mcg/kg/min Intravenous Continuous PRN Maryam Anthony PA-C        vasopressin (VASOSTRICT) 20 Units in sodium chloride 0.9 % 100 mL standard conc infusion  2.4 Units/hr Intravenous Continuous Navneet Branch MD   Stopped at 08/30/24 1530     PRN Meds:.  Current Facility-Administered Medications   Medication Dose Route Frequency Provider Last Rate Last Admin    acetaminophen (TYLENOL) tablet 650 mg  650 mg Oral Q4H PRN Gondal, Saad J, MD   650 mg at 08/28/24 1528    Or    acetaminophen (TYLENOL) Suppository 650 mg  650 mg Rectal Q4H PRN Gondal, Saad J, MD        albumin human 25 % injection 50 g  50 g Intravenous Q8H PRN Moses Mcnair MD        bisacodyl (DULCOLAX) suppository 10 mg  10 mg Rectal Daily PRN Maryam Anthony PA-C        calcium carbonate (TUMS) chewable tablet 1,000 mg  1,000 mg Oral 4x Daily PRN Gondal, Saad J, MD        calcium gluconate 1 g in 50 mL in sodium chloride intermittent infusion  1 g Intravenous Once PRN Maryam Anthony PA-C   1 g at 08/30/24 1113    calcium gluconate 2 g in  mL intermittent infusion  2 g Intravenous Q8H PRN Moses Mcnair MD   2 g at 08/29/24 1618    calcium gluconate 2 g in  mL intermittent infusion  2 g Intravenous Once  PRN Maryam Anthony PA-C   2 g at 08/31/24 0703    calcium gluconate 3 g in sodium chloride 0.9 % 100 mL intermittent infusion  3 g Intravenous Once PRN Maryam Anthony PA-C        calcium gluconate 4 g in sodium chloride 0.9 % 100 mL intermittent infusion  4 g Intravenous Q8H PRN Moses Mcnair MD        carboxymethylcellulose PF (REFRESH PLUS) 0.5 % ophthalmic solution 1 drop  1 drop Both Eyes Q1H PRN Gondal, Saad J, MD        glucose gel 15-30 g  15-30 g Oral Q15 Min PRN Maryam Anthony PA-C        Or    dextrose 50 % injection 25-50 mL  25-50 mL Intravenous Q15 Min PRN Maryam Anthony PA-C        Or    glucagon injection 1 mg  1 mg Subcutaneous Q15 Min PRN Maryam Anthony PA-C        fentaNYL (SUBLIMAZE) 50 mcg/mL bolus from pump  50 mcg Intravenous Q1H PRN Zach Park MD   50 mcg at 08/31/24 0956    hydrALAZINE (APRESOLINE) injection 10 mg  10 mg Intravenous Q30 Min PRN Maryam Anthony PA-C        HYDROmorphone (DILAUDID) injection 0.1 mg  0.1 mg Intravenous Q2H PRN Maryam Anthony PA-C        Or    HYDROmorphone (DILAUDID) injection 0.2 mg  0.2 mg Intravenous Q2H PRN Maryam Anthony PA-C   0.2 mg at 08/29/24 0920    lactated ringers BOLUS 250 mL  250 mL Intravenous Q15 Min PRN Maryam Anthony PA-C   250 mL at 08/27/24 2151    lidocaine (LMX4) cream   Topical Q1H PRN Gondal, Saad J, MD        lidocaine 1 % 0.1-1 mL  0.1-1 mL Other Q1H PRN Gondal, Saad J, MD        magnesium hydroxide (MILK OF MAGNESIA) suspension 30 mL  30 mL Oral Daily PRN Maryam Anthony PA-C        magnesium sulfate 2 g in 50 mL sterile water intermittent infusion  2 g Intravenous Q8H PRN Moses Mcnair MD        miconazole (MICATIN) 2 % powder   Topical BID PRN Erick Patino,         naloxone (NARCAN) injection 0.2 mg  0.2 mg Intravenous Q2 Min PRN Erick Patino,         Or    naloxone (NARCAN) injection 0.4 mg  0.4 mg Intravenous Q2 Min PRN  Erick Patino DO        Or    naloxone (NARCAN) injection 0.2 mg  0.2 mg Intramuscular Q2 Min PRN Erick Patino DO        Or    naloxone (NARCAN) injection 0.4 mg  0.4 mg Intramuscular Q2 Min PRN Erick Patino DO        nicotine (NICORETTE) gum 2 mg  2 mg Buccal Q1H PRN Gondal, Saad J, MD        No heparin required   Does not apply Continuous PRN Moses Mcnair MD        ondansetron (ZOFRAN ODT) ODT tab 4 mg  4 mg Oral Q6H PRN Maryam Anthony PA-C        Or    ondansetron (ZOFRAN) injection 4 mg  4 mg Intravenous Q6H PRN Maryam Anthony PA-C   4 mg at 08/31/24 0734    oxyCODONE IR (ROXICODONE) half-tab 2.5 mg  2.5 mg Oral Q4H PRN Maryam Anthony PA-C   2.5 mg at 08/28/24 1143    Or    oxyCODONE (ROXICODONE) tablet 5 mg  5 mg Oral Q4H PRN Maryam Anthony PA-C   5 mg at 08/28/24 2013    phenylephrine (ZEYAD-SYNEPHRINE) 50 mg in NaCl 0.9 % 250 mL infusion  0.1-4 mcg/kg/min Intravenous Continuous PRN Maryam Anthony PA-C        potassium chloride 20 mEq in 50 mL intermittent infusion  20 mEq Intravenous Q8H PRN Moses Mcnair MD 50 mL/hr at 08/31/24 0557 20 mEq at 08/31/24 0557    prochlorperazine (COMPAZINE) injection 5 mg  5 mg Intravenous Q6H PRN Maryam Anthony PA-C   5 mg at 08/29/24 0658    Or    prochlorperazine (COMPAZINE) tablet 5 mg  5 mg Oral Q6H PRN Maryam Anthony PA-C        senna-docusate (SENOKOT-S/PERICOLACE) 8.6-50 MG per tablet 1 tablet  1 tablet Oral BID PRN Gondal, Saad J, MD        Or    senna-docusate (SENOKOT-S/PERICOLACE) 8.6-50 MG per tablet 2 tablet  2 tablet Oral BID PRN Gondal, Saad J, MD        sodium chloride (PF) 0.9% PF flush 10-40 mL  10-40 mL Intracatheter Once PRN [address] MBBS        sodium chloride (PF) 0.9% PF flush 3 mL  3 mL Intracatheter q1 min prn Gondal, Saad J, MD        sodium chloride 0.9% BOLUS 1-250 mL  1-250 mL Intravenous Q1H PRN Jessica Tolbert PA-C        sodium phosphate 15 mmol in sodium  chloride 0.9 % 250 mL intermittent infusion  15 mmol Intravenous Q8H PRN Moses Mcnair MD        traMADol (ULTRAM) tablet 50 mg  50 mg Oral Q6H PRN Erick Patino DO   50 mg at 08/24/24 2684       Cardiographics:    Telemetry monitoring demonstrates NSR with paced rate at 80 bmp per my personal review.    Imaging:  Results for orders placed or performed during the hospital encounter of 08/16/24   MR Brain w/o & w Contrast    Impression    IMPRESSION: Assessment degraded due to motion.  1.  13 mm focus of cortical signal abnormality within the left superior parietal lobule which is favored to represent a subacute infarct; low-grade glial neoplasm could conceivably have a similar appearance. Hyperacute intraparenchymal hematoma could   also have a similar appearance but is considered less likely. Recommend noncontrast head CT for further characterization. Also recommend follow-up MRI brain without and with contrast in 8-12 weeks to document expected evolution.  2.  Additional smaller foci of signal abnormality with similar characteristics favored to represent punctate subacute infarcts.   CTA Head Neck with Contrast    Impression    IMPRESSION:   HEAD CT:  1.  Small focus of juxtacortical low attenuation within the left parietal lobe corresponds to signal abnormality seen on the prior MRI. As suggested previously this is favored to reflect evolving small vessel ischemic change; however, MRI follow-up to   exclude a low-grade neoplastic process is advised.  2.  No evidence for intracranial hemorrhage or significant mass effect.  3.  Generalized brain atrophy and presumed chronic microvascular ischemic change.    HEAD CTA:   1.  Intracranial atherosclerosis including moderate to marked stenosis of the right P2 posterior cerebral artery segment.  2.  No definite proximal branch occlusion, aneurysm, or high flow vascular malformation identified.    NECK CTA:  1.  Atherosclerotic plaque at the carotid bifurcations  bilaterally without hemodynamically significant stenosis in the neck vessels.   2.  No evidence for dissection.   Radiologist Consult For Cardiology    Impression    IMPRESSION:    1.  Partially visualized left adrenal nodule, indeterminate, could represent adrenal adenoma.  2.  Please refer to cardiologist's dictation for the cardiac CT report.   US Carotid Bilateral    Impression    IMPRESSION:  1.  Mild plaque formation, velocities consistent with less than 50% stenosis in the right internal carotid artery.  2.  Mild plaque formation, velocities consistent with less than 50% stenosis in the left internal carotid artery.  3.  Flow within the vertebral arteries is antegrade.   XR Chest Port 1 View    Impression    IMPRESSION: Endotracheal tube tip 4.8 cm proximal to the anuj. [place] catheter with tip in the pulmonary outflow tract. Mediastinal drain with aortic valve prosthesis. Sternotomy. Small left pleural effusion with left basilar infiltrate.   XR Chest Port 1 View    Impression    IMPRESSION: Endotracheal tube in the distal aspect of the trachea. Sternotomy. AVR. Mediastinal drain. Right IJ [place]-D[place] catheter with tip in the right ventricle/MPA. Left-sided chest tube. No pneumothorax. Mild cardiac enlargement with normal pulmonary   vascularity. Minimal left basilar atelectasis and pleural fluid. Degenerative changes in the spine.   Echocardiogram Limited   Result Value Ref Range    LVEF  60-65%    CTA  ANGIOGRAM CORONARY ARTERY   Result Value Ref Range    BSA 0.00 m2       Labs, personally reviewed.  Hemoglobin   Date Value Ref Range Status   08/31/2024 7.9 (L) 11.7 - 15.7 g/dL Final   08/30/2024 8.5 (L) 11.7 - 15.7 g/dL Final   08/30/2024 8.5 (L) 11.7 - 15.7 g/dL Final     WBC Count   Date Value Ref Range Status   08/31/2024 10.5 4.0 - 11.0 10e3/uL Final   08/30/2024 12.5 (H) 4.0 - 11.0 10e3/uL Final   08/30/2024 12.7 (H) 4.0 - 11.0 10e3/uL Final     Platelet Count   Date Value Ref Range Status    08/31/2024 65 (L) 150 - 450 10e3/uL Final   08/30/2024 76 (L) 150 - 450 10e3/uL Final   08/30/2024 91 (L) 150 - 450 10e3/uL Final     Creatinine   Date Value Ref Range Status   08/31/2024 1.37 (H) 0.51 - 0.95 mg/dL Final   08/31/2024 1.37 (H) 0.51 - 0.95 mg/dL Final   08/30/2024 1.33 (H) 0.51 - 0.95 mg/dL Final     Potassium   Date Value Ref Range Status   08/31/2024 3.2 (L) 3.4 - 5.3 mmol/L Final   08/31/2024 3.2 (L) 3.4 - 5.3 mmol/L Final   08/30/2024 3.6 3.4 - 5.3 mmol/L Final     Potassium POCT   Date Value Ref Range Status   08/27/2024 3.4 3.4 - 5.3 mmol/L Final   08/27/2024 3.9 3.4 - 5.3 mmol/L Final   08/27/2024 3.4 3.4 - 5.3 mmol/L Final     Magnesium   Date Value Ref Range Status   08/31/2024 2.4 (H) 1.7 - 2.3 mg/dL Final   08/30/2024 2.4 (H) 1.7 - 2.3 mg/dL Final   08/30/2024 2.3 1.7 - 2.3 mg/dL Final          I/O:  I/O last 3 completed shifts:  In: 5058.98 [I.V.:3617.98; Other:373; NG/GT:240]  Out: 4934 [Urine:362; Other:4162; Stool:20; Chest Tube:390]       Physical Exam:    General: Patient seen in bed. Lightly sedated. Responds and opens eyes appropriately.    HEENT: JULIO, no sclera icterus, moist mucosa. ETT with deviation,  CV: RRR on monitor. 2+ peripheral pulses in all extremities. Mild edema.   Pulm: Non-labored effort on CMV 35%. Chest tubes in place, serosanguinous output, no air leak. Incision C/D/I.  Abd: Soft, NT, ND  : Le with pita urine  Ext: Mild pedal edema, SCDs in place, warm, distal pulses intact. Right femoral CRRT line in place.   Neuro:CNs grossly intact      ASSESSMENT/PLAN:    Felicitas Elliott is a 84 year old female with a history of endocarditis, CAD, aortic stenosis and mitral valve disorder who is s/p Aortic root abscess debridement and aortic annular reconstruction, aortic root replacement (Konect composite 25 mm Silva Inspiris Resilia bioprosthetic valve within 30 mm Gelweave Valsalva graft with reimplantation of the coronary arteries), Mitral valve replacement (31  mm St. Tim Silva bioprosthetic valve) and CAB x 2.    Active Problems:    Bacteremia    Endocarditis    Sepsis (H)    Nonrheumatic aortic valve stenosis    Postsurgical percutaneous transluminal coronary angioplasty status    Mitral valve disorder    Coronary artery disease involving native coronary artery of native heart, unspecified whether angina present        NEURO:   - Scheduled Tylenol/lidocaine patches and PRN Tylenol/oxycodone/dilaudid for pain    CV:   - Pre-op EF 60-65%  - Normotensive  - Weaning pressors as hemodynamics allow, currently on Dobutamine 4 mcg- decrease every 8 hrs by 1 mcg if SBP stable  - Vaso  and Norepi OFF  - Beta blocker pending weaning from pressors  - ASA Allergy- Plavix 75 mg PO daily-held until PPM determined   - Atorvastatin 80 mg daily  - Chest tubes to remain today.  - Cards following-apprec reqs  - New baseline echo before discharge and after CT removed  - Coumadin x 3 months per surgeon preference. INR goal 2.5-3.5. Rx to dose. INR 1.65- Held today.       PULM:   - Extubated POD#1, reintubated POD #2  - Maintaining oxygen saturations on CMV  - Encourage pulmonary toilet  - CXR-8/29 mild venous congestion,Small right pleural effusion has developed with associated passive atelectasis right base.    FEN/GI:  - Continue electrolyte replacement protocol  - Diet: Cardiac, ADAT   - Bowel regimen    RENAL:  - Adequate UOP/hr. Continue to monitor closely.  - Cr 1.37(1.87)  - Le to remain in for close monitoring of I/O and during period of diuresis/relative immobility.   - Diuresis with Lasix gtt-stopped  -Consult nephrology-apprec reqs         CRRT 8/29. Removed 2.5L.Pulling 100 if able to tolerate    HEME:  - Acute blood loss anemia post-op.   - Hgb 7.9, Hep subcutaneous-stopped,  - Occult blood positive POD#3, rectal tube in place   - Plavix 75 mg PO daily(held)- ASA allergy  - Coumadin pharmacy to dose. Goal INR 2.5-3.5 x 3 mos. INR 1.65(9.4)(2.28)(1.47)-Held       -Vit K 5 mg  and 2 FFP given 8/30       -Recheck INR, CBC, CMP,Trop. Lactate q 8 hrs.  - Plt 65-HIT sent    ID:  - Lennie op ppx complete, afebrile.  - WBC 10.5  -ID following-apprec reqs- MSSA bacteremia. Cultures NGTD from 8/17          -Nafcillin for 6 wks-held today with increase Cr          - new surgical cultures sent-pending          - Vanc 2,000 mg then 750 mg q 12 hrs          -  ceftazidime 1 gm q 8 hrs-stopped 8/30          - Cefepime 2 gm q 12 hrs  - Blood cultures 8/30-NGTD    ENDO:   - Continue insulin gtt.     PPx:   - DVT: SCDs, SQ heparin TID, ambulation   - GI: Protonix 40mg PO daily    DISPO:   - Continue critical care in ICU  - Medically Ready for Discharge: Anticipated in 5+ Days       Patient discussed with Dr. Renae and seen with Dr Dayron Tolbert PA-C  [facility] Cardioth[facility]acic Surgery  Vocera or pager [phone]

## 2024-08-31 NOTE — PROGRESS NOTES
With recurrent filter clotting  Decreased pre filter to 10ml/kg, dialysate increased to 24.5ml/kg  Added heparin in circuit  Follow on CBC , if platelets drop  <50 will need to discontinue heparin again  BFR increase to 250 if tolerated     Follow on next draw    Moses Mcnair MD  [facility]  [phone]

## 2024-08-31 NOTE — PLAN OF CARE
Goal Outcome Evaluation:      Plan of Care Reviewed With: patient    Overall Patient Progress: improvingOverall Patient Progress: improving    Outcome Evaluation: Pulling 100 with CRRT, MAP 80-90 with Levo and vaso off.  Dobutamine remains at 5; bicarb gtt until 10:30; Pacer with back up rate of 80. pt appears sinus and will confirm with 12 lead. Pt more agitated when awake; increase precedex. decrease peep, sat remain 99% on current vent settlings.  MD's discussed plan for today and will continue intubation with attempt to continue to pull fluid with CRRT as long as hemodynamically stable

## 2024-08-31 NOTE — PROGRESS NOTES
[facility] Inpatient follow up        08/31/2024    Chart reviewed  Patient seen in ICU             Assessment and Recommendations:   Assessment:  Felicitas Elliott is a 84 year old female with     Respiratory failure, shock liver  Decreased urine output, on dobutamine norepinephrine and vasopressin  Intubated  History of severe aortic stenosis  S/P aortic root abscess debridement and aortic annular reconstruction, aortic root replacement, bioprosthetic aortic valve on 8/27/2024  Acute respiratory failure, extubated 8/28/2024, reintubated 8/29/2024, acute kidney injury, currently on CRRT- Gram negative bacilli in resp culture-     2+ Enterobacter cloacae complex     MRSA nares negative, respiratory PCR negative  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  MSSA bacteremia.  Positive blood culture on 8/14/2024 and 8/16.  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  With the size of the vegetation combined with brain infarct, status post CV surgery, see details below active issue.   Neck pain worrisome for cervical discitis.  Neck MRI showed some edema at C4-C5 on the left side.  No clear evidence of infection.  Abnormal brain MRI suggestive of subacute infarct. In a patient with MSSA bacteremia and aortic valve endocarditis, this is cerebral septic emboli. Active issue.     POSTOPERATIVE DIAGNOSES:  1.  Severe aortic stenosis and moderate aortic regurgitation.  2.  Severe mitral stenosis.  3.  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  4.  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  5.  Severe multivessel coronary artery disease.  6.  Aortic root abscess.      PROCEDURE PERFORMED: 8/27/24  Aortic root abscess debridement and aortic annular  reconstruction.  Aortic root replacement (Konect composite 25 mm Silva Inspiris Resilia bioprosthetic valve within 30 mm Gelweave Valsalva graft with reimplantation of the coronary arteries).  Mitral valve replacement (31 mm St. Tim Silva bioprosthetic valve).  Coronary artery bypass grafting x 2 (reversed saphenous vein graft to the obtuse marginal branch of the left circumflex coronary artery, and pedicled left internal mammary artery to left anterior descending coronary artery).  Endoscopic vein harvest of the greater saphenous vein from the left lower extremity.    Recommendations:  Follow susceptibility results of Enterobacter.  Cefepime for now  Off nafcillin. Per previous notes plan IV antibiotics duration 6 weeks  Currently intubated, discussed with ICU nurse at bedside  IV vancomycin until cultures finalized and stop if no MRSA isolated, stop soon.  Empiric oral vancomycin for C. difficile prophylaxis, diarrhea, rectal tube in place.  C. difficile negative  PICC placed   Monitor CBC, CMP  Status post vascular surgery, chest tube, CV surgery following closely  Will need to check immunoglobin level in 4 to 6 weeks sister with history of hypogammaglobulinemia   Overall will require at least 6 weeks of IV antibiotics, holding nafcillin due to acute kidney injury      Fernando Wynne MD   [facility]  Answering Service: [phone]  On-Call ID provider: [phone], option: 9              Interval History:     HPI: discussed with sister at bedside. Noted she has had wounds in groin and under breasts in the past.     Not much for ETT secretions per nurse.        Recent Inflammatory Biomarkers:   Recent Labs   Lab Test 24  1141 24  0416 24  1322 24  0437 24  0359   PCAL  --   --   --   --   --  1.24*   WBC 10.1 10.5 12.5* 12.7* 10.4 15.3*            Review of Systems:   CONSTITUTIONAL:    Temp Max: Temp (24hrs), Av.8  F (37.1   C), Min:97.9  F (36.6  C), Max:100.3  F (37.9  C)   .  Negative except for findings in the HPI.           Current Medications (antimicrobials listed in bold):     Current Facility-Administered Medications   Medication Dose Route Frequency Provider Last Rate Last Admin    [Held by provider] acetaminophen (TYLENOL) tablet 975 mg  975 mg Oral Q8H Maryam Anthony PA-C   975 mg at 08/30/24 1336    sodium chloride (NEBUSAL) 3 % neb solution 3 mL  3 mL Nebulization Q6H Zach Park MD   3 mL at 08/31/24 0729    And    albuterol (PROVENTIL) neb solution 2.5 mg  2.5 mg Nebulization Q6H Zach Park MD   2.5 mg at 08/31/24 0729    [Held by provider] atorvastatin (LIPITOR) tablet 80 mg  80 mg Oral QPM Jessica Tolbert PA-C   80 mg at 08/29/24 2038    ceFEPIme (MAXIPIME) 2 g vial to attach to  mL bag for ADULTS or NS 50 mL bag for PEDS  2 g Intravenous Q12H Zach Park MD   2 g at 08/31/24 0519    chlorhexidine (PERIDEX) 0.12 % solution 15 mL  15 mL Mouth/Throat BID Zach Park MD   15 mL at 08/31/24 0830    [Held by provider] clopidogrel (PLAVIX) tablet 75 mg  75 mg Oral Daily Maryam Anthony PA-C        lactobacillus rhamnosus (GG) (CULTURELL) capsule 1 capsule  1 capsule Oral BID Zach Foreman DO   1 capsule at 08/31/24 0830    Lidocaine (LIDOCARE) 4 % Patch 1-2 patch  1-2 patch Transdermal Q24H Maryam Anthony PA-C   1 patch at 08/30/24 1716    pantoprazole (PROTONIX) 2 mg/mL suspension 40 mg  40 mg Oral or NG Tube QAM AC Maryam Anthony PA-C        Or    pantoprazole (PROTONIX) EC tablet 40 mg  40 mg Oral QAM AC Maryam Anthony PA-C        Or    pantoprazole (PROTONIX) IV push injection 40 mg  40 mg Intravenous QAM AC Maryam Anthony PA-C   40 mg at 08/31/24 0634    polyethylene glycol (MIRALAX) Packet 17 g  17 g Oral Daily Maryam Anthony PA-C        senna-docusate (SENOKOT-S/PERICOLACE) 8.6-50 MG per tablet 1 tablet  1 tablet Oral BID  Maryam Anthony PA-C   1 tablet at 08/30/24 2030    sodium chloride (PF) 0.9% PF flush 3 mL  3 mL Intracatheter Q8H Gondal, Saad J, MD   3 mL at 08/31/24 0831    vancomycin (FIRVANQ) oral solution 125 mg  125 mg Oral BID Nannette Lebron MD   125 mg at 08/31/24 0832    vancomycin (VANCOCIN) 750 mg in sodium chloride 0.9 % 250 mL intermittent infusion  750 mg Intravenous Q12H Zach Park MD   750 mg at 08/30/24 2320    [Held by provider] Warfarin Dose Required Daily - Pharmacist Managed  1 each Oral See Admin Instructions Jessica Tolbert PA-C                  Allergies:     Allergies   Allergen Reactions    Aspirin Rash    Cats Rash    Nickel Rash            Physical Exam:   Vitals were reviewed  Patient Vitals for the past 24 hrs:   Temp Temp src Pulse Resp SpO2 Weight   08/31/24 1220 -- -- 81 26 97 % --   08/31/24 1215 -- -- 84 26 99 % --   08/31/24 1200 99  F (37.2  C) Pulm Art 80 26 99 % --   08/31/24 1150 -- -- 83 26 99 % --   08/31/24 1145 -- -- 82 26 99 % --   08/31/24 1137 -- -- 81 26 99 % --   08/31/24 1130 -- -- 83 26 99 % --   08/31/24 1115 -- -- 83 26 98 % --   08/31/24 1100 -- -- 84 27 99 % --   08/31/24 1045 -- -- 82 26 100 % --   08/31/24 1030 -- -- 87 26 99 % --   08/31/24 1015 -- -- 80 26 99 % --   08/31/24 1000 -- -- 81 26 100 % --   08/31/24 0945 -- -- 85 26 99 % --   08/31/24 0930 -- -- 85 26 99 % --   08/31/24 0915 -- -- 85 26 100 % --   08/31/24 0900 -- -- 84 26 99 % --   08/31/24 0845 -- -- 84 26 99 % --   08/31/24 0830 -- -- 85 26 99 % --   08/31/24 0815 -- -- 83 26 99 % --   08/31/24 0800 98.6  F (37  C) Pulm Art 86 26 100 % --   08/31/24 0745 -- -- 92 26 99 % --   08/31/24 0730 -- -- 92 26 100 % --   08/31/24 0715 -- -- 92 26 99 % --   08/31/24 0700 98.8  F (37.1  C) -- 92 26 99 % --   08/31/24 0645 -- -- 92 26 98 % --   08/31/24 0630 -- -- 92 26 99 % --   08/31/24 0615 -- -- 92 26 98 % --   08/31/24 0600 99  F (37.2  C) -- 92 26 98 % --   08/31/24 0545 -- -- 92 26 99 % --    08/31/24 0530 -- -- 92 26 99 % --   08/31/24 0515 -- -- 92 26 99 % --   08/31/24 0500 -- -- 92 26 99 % --   08/31/24 0445 -- -- 92 26 99 % --   08/31/24 0438 -- -- 92 -- 100 % --   08/31/24 0430 -- -- 92 26 99 % --   08/31/24 0415 -- -- 92 26 99 % --   08/31/24 0400 98.8  F (37.1  C) Pulm Art 92 26 99 % 99.4 kg (219 lb 1.6 oz)   08/31/24 0345 -- -- 92 26 99 % --   08/31/24 0330 -- -- 92 26 98 % --   08/31/24 0315 -- -- 92 26 98 % --   08/31/24 0300 -- -- 92 26 98 % --   08/31/24 0245 -- -- 92 26 98 % --   08/31/24 0230 -- -- 92 26 99 % --   08/31/24 0220 -- -- 92 26 99 % --   08/31/24 0215 -- -- 92 26 99 % --   08/31/24 0200 98.6  F (37  C) -- 92 26 99 % --   08/31/24 0145 -- -- 92 26 99 % --   08/31/24 0130 -- -- 92 26 99 % --   08/31/24 0115 -- -- 92 26 99 % --   08/31/24 0100 98.4  F (36.9  C) -- 92 26 100 % --   08/31/24 0045 -- -- 92 26 100 % --   08/31/24 0030 -- -- 92 26 99 % --   08/31/24 0022 -- -- 92 -- 100 % --   08/31/24 0015 -- -- 92 26 100 % --   08/31/24 0000 98.6  F (37  C) Pulm Art 92 26 100 % --   08/30/24 2345 -- -- 92 26 100 % --   08/30/24 2330 -- -- 92 26 100 % --   08/30/24 2329 -- -- 92 26 100 % --   08/30/24 2315 -- -- 92 26 99 % --   08/30/24 2300 98.8  F (37.1  C) -- 92 26 99 % --   08/30/24 2245 -- -- 92 26 99 % --   08/30/24 2230 -- -- 92 26 99 % --   08/30/24 2226 -- -- 92 26 99 % --   08/30/24 2215 -- -- 92 26 99 % --   08/30/24 2200 99  F (37.2  C) -- 93 26 99 % --   08/30/24 2150 -- -- 92 28 99 % --   08/30/24 2148 -- -- 92 29 98 % --   08/30/24 2145 -- -- 92 (!) 33 98 % --   08/30/24 2130 -- -- 92 28 97 % --   08/30/24 2118 -- -- 92 27 99 % --   08/30/24 2115 -- -- 92 27 99 % --   08/30/24 2100 99.1  F (37.3  C) -- 92 26 98 % --   08/30/24 2045 -- -- 92 26 98 % --   08/30/24 2030 -- -- 92 26 98 % --   08/30/24 2015 -- -- 92 29 99 % --   08/30/24 2004 -- -- 92 -- 98 % --   08/30/24 2000 99.1  F (37.3  C) Pulm Art 92 26 98 % --   08/30/24 1945 -- -- 92 26 98 % --   08/30/24 1930 --  -- 92 26 98 % --   08/30/24 1915 -- -- 92 28 98 % --   08/30/24 1900 99.3  F (37.4  C) Pulm Art 92 27 98 % --   08/30/24 1845 -- -- 92 27 98 % --   08/30/24 1830 -- -- 92 28 98 % --   08/30/24 1815 -- -- 92 29 97 % --   08/30/24 1800 99.1  F (37.3  C) Pulm Art 92 27 97 % --   08/30/24 1745 -- -- 92 27 97 % --   08/30/24 1730 -- -- 92 29 97 % --   08/30/24 1715 -- -- 92 26 97 % --   08/30/24 1700 99  F (37.2  C) Pulm Art 92 27 94 % --   08/30/24 1645 -- -- 92 28 97 % --   08/30/24 1630 99  F (37.2  C) Pulm Art 92 27 97 % --   08/30/24 1615 -- -- 92 26 96 % --   08/30/24 1600 98.8  F (37.1  C) Pulm Art 92 26 96 % --   08/30/24 1545 -- -- 92 28 97 % --   08/30/24 1530 -- -- 92 27 96 % --   08/30/24 1515 -- -- 92 26 96 % --   08/30/24 1500 99  F (37.2  C) Pulm Art 92 28 96 % --   08/30/24 1445 99  F (37.2  C) Pulm Art 92 27 96 % --   08/30/24 1430 -- -- 92 26 95 % --   08/30/24 1415 98.8  F (37.1  C) Pulm Art 92 26 95 % --   08/30/24 1400 98.6  F (37  C) Pulm Art 92 27 95 % --   08/30/24 1350 98.4  F (36.9  C) Pulm Art 92 26 95 % --   08/30/24 1345 98.4  F (36.9  C) Pulm Art 92 28 95 % --   08/30/24 1330 98.4  F (36.9  C) Pulm Art 92 28 (!) 87 % --   08/30/24 1315 98.4  F (36.9  C) Pulm Art 92 27 94 % --   08/30/24 1300 98.2  F (36.8  C) Pulm Art 92 27 95 % --   08/30/24 1245 98.2  F (36.8  C) Pulm Art 92 29 95 % --       Physical Examination:    FiO2 (%): 35 %, Resp: 26, Vent Mode: CMV/AC, Resp Rate (Set): 26 breaths/min, Tidal Volume (Set, mL): 420 mL, PEEP (cm H2O): 8 cmH2O, Resp Rate (Set): 26 breaths/min, Tidal Volume (Set, mL): 420 mL, PEEP (cm H2O): 8 cmH2O    Gen: Intubated  HEENT: ETT opens eyes  PICC, Femoral catheter for dialysis, rectal tube  CV: Sternal incision, chest tube  Lungs: coarse, intubated, chest tubes.  Skin: Warm  Extr: edema.  Neuro: intubated, opens eyes  Art line           Laboratory Data:   ID Labs:  Microbiology labs:  Reviewed      No lab results found.  Recent Labs   Lab Test  08/31/24  1141 08/31/24 0416 08/30/24 2017 08/30/24 1322 08/30/24 0437 08/29/24  0359   WBC 10.1 10.5 12.5* 12.7* 10.4 15.3*     Recent Labs   Lab Test 08/31/24  1141 08/31/24  0416 08/30/24 2220 08/30/24 2017   CR 1.19* 1.37*  1.37* 1.33* 1.31*   GFRESTIMATED 45* 38*  38* 39* 40*       Hematology Studies  Recent Labs   Lab Test 08/31/24  1141 08/31/24 0416 08/30/24 2017 08/30/24 1322 08/30/24 0437 08/29/24  0359   WBC 10.1 10.5 12.5* 12.7* 10.4 15.3*   HGB 8.2* 7.9* 8.5* 8.5* 7.3* 8.7*   HCT 24.4* 23.9* 25.9* 26.1* 23.1* 26.7*   PLT 53* 65* 76* 91* 100* 151       Metabolic  Recent Labs   Lab Test 08/31/24 1141 08/31/24 0416 08/30/24 2220    142  142 139   BUN 11.7 13.8  13.8 13.6   CO2 22 19*  19* 16*   CR 1.19* 1.37*  1.37* 1.33*   GFRESTIMATED 45* 38*  38* 39*       Hepatic Studies  Recent Labs   Lab Test 08/31/24  1141 08/31/24 0416 08/30/24 2220 08/30/24 2017 08/30/24  1322   BILITOTAL  --  0.8 0.9  --  0.9   ALKPHOS  --  49 54  --  49   ALBUMIN 3.6 3.4*  3.4* 3.7   < > 3.8  3.8   AST  --  1,172* 1,533*  --  1,172*   ALT  --  228* 294*  --  207*    < > = values in this interval not displayed.     Susceptibility data from last 90 days.  Collected Specimen Info Organism Clindamycin Daptomycin Doxycycline Erythromycin Gentamicin Oxacillin Tetracycline Trimethoprim/Sulfamethoxazole  Vancomycin   08/29/24 Sputum from Expectorate Enterobacter cloacae complex              Normal samara            08/16/24 Peripheral Blood Staphylococcus aureus            08/14/24 Peripheral Blood Staphylococcus aureus  S  S  S  S  S  S  S  S  S             Imaging Data:   Reviewed     Study Result    Narrative & Impression   EXAM: MR BRAIN W/O and W CONTRAST  LOCATION: [facility]  DATE: 8/17/2024     INDICATION: Headache in a patient with MSSA bacteremia secondary to aortic valve endocarditis.  Look for cerebral septic emboli; Headache; Acute HA (< 3 months), no complicating  features  COMPARISON: None.  CONTRAST: 9 ml gadavist  TECHNIQUE: Routine multiplanar multisequence head MRI without and with intravenous contrast.     FINDINGS: Assessment degraded due to motion.  INTRACRANIAL CONTENTS:   Apparent focus of diffusion restriction with T2/FLAIR hyperintensity within the left superior parietal lobule cortex is hyperintense on ADC map, representing T2 shine through.  Focus of signal abnormality measures 13 x 9 mm in the axial plane and does   not have internal enhancement.     Additional punctate foci of hyperintensity on diffusion weighted with similar signal characteristics (periventricular right corona radiata, left superior parietal lobule, and left cerebellar hemisphere).     Patchy and confluent nonspecific T2/FLAIR hyperintensities within the cerebral white matter most consistent with moderate chronic microvascular ischemic change. Moderate generalized cerebral atrophy. No hydrocephalus. Normal position of the cerebellar   tonsils. No discernible abnormal intracranial enhancement; however, assessment substantially degraded due to motion. 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     SELLA: No abnormality accounting for technique.     OSSEOUS STRUCTURES/SOFT TISSUES: Normal marrow signal. The major intracranial vascular flow voids are maintained.      ORBITS: No abnormality accounting for technique.      SINUSES/MASTOIDS: Mild mucosal thickening scattered about the paranasal sinuses. Scattered fluid/membrane thickening in the left mastoid air cells. No apparent mass in the posterior nasopharynx or skull base.                                                                       IMPRESSION: Assessment degraded due to motion.  1.  13 mm focus of cortical signal abnormality within the left superior parietal lobule which is favored to represent a subacute infarct; low-grade glial neoplasm could conceivably have a similar appearance. Hyperacute intraparenchymal hematoma could   also have a  similar appearance but is considered less likely. Recommend noncontrast head CT for further characterization. Also recommend follow-up MRI brain without and with contrast in 8-12 weeks to document expected evolution.  2.  Additional smaller foci of signal abnormality with similar characteristics favored to represent punctate subacute infarcts.       Medical Decision Making       MANAGEMENT DISCUSSED with the following over the past 24 hours: ICU nurse at bedside   NOTE(S)/MEDICAL RECORDS REVIEWED over the past 24 hours: reviewed  Tests ORDERED & REVIEWED in the past 24 hours:  - See lab/imaging results included in the data section of the note  Medical complexity over the past 24 hours:  - Intensive monitoring for MEDICATION TOXICITY  - Decision regarding ESCALATION OF LEVEL OF CARE

## 2024-09-01 ENCOUNTER — APPOINTMENT (OUTPATIENT)
Dept: INTERVENTIONAL RADIOLOGY/VASCULAR | Facility: HOSPITAL | Age: 84
DRG: 853 | End: 2024-09-01
Attending: RADIOLOGY
Payer: COMMERCIAL

## 2024-09-01 LAB
ALBUMIN SERPL BCG-MCNC: 3.1 G/DL (ref 3.5–5.2)
ALBUMIN SERPL BCG-MCNC: 3.3 G/DL (ref 3.5–5.2)
ALBUMIN SERPL BCG-MCNC: 3.4 G/DL (ref 3.5–5.2)
ALBUMIN SERPL BCG-MCNC: 3.4 G/DL (ref 3.5–5.2)
ALBUMIN SERPL BCG-MCNC: 3.5 G/DL (ref 3.5–5.2)
ALBUMIN SERPL BCG-MCNC: 3.5 G/DL (ref 3.5–5.2)
ALP SERPL-CCNC: 51 U/L (ref 40–150)
ALP SERPL-CCNC: 53 U/L (ref 40–150)
ALP SERPL-CCNC: 55 U/L (ref 40–150)
ALP SERPL-CCNC: 57 U/L (ref 40–150)
ALT SERPL W P-5'-P-CCNC: 129 U/L (ref 0–50)
ALT SERPL W P-5'-P-CCNC: 132 U/L (ref 0–50)
ALT SERPL W P-5'-P-CCNC: 153 U/L (ref 0–50)
ALT SERPL W P-5'-P-CCNC: 159 U/L (ref 0–50)
ANION GAP SERPL CALCULATED.3IONS-SCNC: 12 MMOL/L (ref 7–15)
ANION GAP SERPL CALCULATED.3IONS-SCNC: 12 MMOL/L (ref 7–15)
ANION GAP SERPL CALCULATED.3IONS-SCNC: 15 MMOL/L (ref 7–15)
ANION GAP SERPL CALCULATED.3IONS-SCNC: 16 MMOL/L (ref 7–15)
ANION GAP SERPL CALCULATED.3IONS-SCNC: 17 MMOL/L (ref 7–15)
ANION GAP SERPL CALCULATED.3IONS-SCNC: 18 MMOL/L (ref 7–15)
AST SERPL W P-5'-P-CCNC: 282 U/L (ref 0–45)
AST SERPL W P-5'-P-CCNC: 309 U/L (ref 0–45)
AST SERPL W P-5'-P-CCNC: 426 U/L (ref 0–45)
AST SERPL W P-5'-P-CCNC: 462 U/L (ref 0–45)
BACTERIA TISS BX CULT: NO GROWTH
BASE EXCESS BLDA CALC-SCNC: -4 MMOL/L (ref -3–3)
BASE EXCESS BLDV CALC-SCNC: -2.4 MMOL/L (ref -3–3)
BASO STIPL BLD QL SMEAR: PRESENT
BILIRUB DIRECT SERPL-MCNC: 0.4 MG/DL (ref 0–0.3)
BILIRUB SERPL-MCNC: 0.9 MG/DL
BILIRUB SERPL-MCNC: 1 MG/DL
BILIRUB SERPL-MCNC: 1 MG/DL
BILIRUB SERPL-MCNC: 1.2 MG/DL
BLD PROD TYP BPU: NORMAL
BLOOD COMPONENT TYPE: NORMAL
BUN SERPL-MCNC: 12.5 MG/DL (ref 8–23)
BUN SERPL-MCNC: 12.6 MG/DL (ref 8–23)
BUN SERPL-MCNC: 12.7 MG/DL (ref 8–23)
BUN SERPL-MCNC: 12.9 MG/DL (ref 8–23)
BUN SERPL-MCNC: 14.6 MG/DL (ref 8–23)
BUN SERPL-MCNC: 15.3 MG/DL (ref 8–23)
BURR CELLS BLD QL SMEAR: SLIGHT
CA-I BLD-MCNC: 4.4 MG/DL (ref 4.4–5.2)
CALCIUM SERPL-MCNC: 7.5 MG/DL (ref 8.8–10.4)
CALCIUM SERPL-MCNC: 7.8 MG/DL (ref 8.8–10.4)
CALCIUM SERPL-MCNC: 7.8 MG/DL (ref 8.8–10.4)
CALCIUM SERPL-MCNC: 8 MG/DL (ref 8.8–10.4)
CALCIUM SERPL-MCNC: 8.1 MG/DL (ref 8.8–10.4)
CALCIUM SERPL-MCNC: 8.2 MG/DL (ref 8.8–10.4)
CHLORIDE SERPL-SCNC: 101 MMOL/L (ref 98–107)
CHLORIDE SERPL-SCNC: 102 MMOL/L (ref 98–107)
CHLORIDE SERPL-SCNC: 103 MMOL/L (ref 98–107)
CHLORIDE SERPL-SCNC: 105 MMOL/L (ref 98–107)
CHLORIDE SERPL-SCNC: 107 MMOL/L (ref 98–107)
CHLORIDE SERPL-SCNC: 108 MMOL/L (ref 98–107)
CODING SYSTEM: NORMAL
COHGB MFR BLD: 97.2 % (ref 95–96)
CREAT SERPL-MCNC: 0.95 MG/DL (ref 0.51–0.95)
CREAT SERPL-MCNC: 0.97 MG/DL (ref 0.51–0.95)
CREAT SERPL-MCNC: 1.01 MG/DL (ref 0.51–0.95)
CREAT SERPL-MCNC: 1.05 MG/DL (ref 0.51–0.95)
CREAT SERPL-MCNC: 1.07 MG/DL (ref 0.51–0.95)
CREAT SERPL-MCNC: 1.14 MG/DL (ref 0.51–0.95)
EGFRCR SERPLBLD CKD-EPI 2021: 47 ML/MIN/1.73M2
EGFRCR SERPLBLD CKD-EPI 2021: 51 ML/MIN/1.73M2
EGFRCR SERPLBLD CKD-EPI 2021: 52 ML/MIN/1.73M2
EGFRCR SERPLBLD CKD-EPI 2021: 55 ML/MIN/1.73M2
EGFRCR SERPLBLD CKD-EPI 2021: 57 ML/MIN/1.73M2
EGFRCR SERPLBLD CKD-EPI 2021: 59 ML/MIN/1.73M2
ERYTHROCYTE [DISTWIDTH] IN BLOOD BY AUTOMATED COUNT: 17.5 % (ref 10–15)
ERYTHROCYTE [DISTWIDTH] IN BLOOD BY AUTOMATED COUNT: 17.9 % (ref 10–15)
ERYTHROCYTE [DISTWIDTH] IN BLOOD BY AUTOMATED COUNT: 17.9 % (ref 10–15)
GLUCOSE BLDC GLUCOMTR-MCNC: 101 MG/DL (ref 70–99)
GLUCOSE BLDC GLUCOMTR-MCNC: 105 MG/DL (ref 70–99)
GLUCOSE BLDC GLUCOMTR-MCNC: 110 MG/DL (ref 70–99)
GLUCOSE BLDC GLUCOMTR-MCNC: 113 MG/DL (ref 70–99)
GLUCOSE BLDC GLUCOMTR-MCNC: 79 MG/DL (ref 70–99)
GLUCOSE BLDC GLUCOMTR-MCNC: 87 MG/DL (ref 70–99)
GLUCOSE SERPL-MCNC: 100 MG/DL (ref 70–99)
GLUCOSE SERPL-MCNC: 110 MG/DL (ref 70–99)
GLUCOSE SERPL-MCNC: 110 MG/DL (ref 70–99)
GLUCOSE SERPL-MCNC: 69 MG/DL (ref 70–99)
GLUCOSE SERPL-MCNC: 72 MG/DL (ref 70–99)
GLUCOSE SERPL-MCNC: 99 MG/DL (ref 70–99)
HCO3 BLD-SCNC: 21 MMOL/L (ref 21–28)
HCO3 BLDV-SCNC: 22 MMOL/L (ref 21–28)
HCO3 SERPL-SCNC: 19 MMOL/L (ref 22–29)
HCO3 SERPL-SCNC: 20 MMOL/L (ref 22–29)
HCT VFR BLD AUTO: 24.1 % (ref 35–47)
HCT VFR BLD AUTO: 24.3 % (ref 35–47)
HCT VFR BLD AUTO: 24.5 % (ref 35–47)
HGB BLD-MCNC: 7.9 G/DL (ref 11.7–15.7)
HGB BLD-MCNC: 7.9 G/DL (ref 11.7–15.7)
HGB BLD-MCNC: 8 G/DL (ref 11.7–15.7)
INR PPP: 1.38 (ref 0.85–1.15)
ISSUE DATE AND TIME: NORMAL
LACTATE SERPL-SCNC: 0.8 MMOL/L (ref 0.7–2)
MAGNESIUM SERPL-MCNC: 2.2 MG/DL (ref 1.7–2.3)
MAGNESIUM SERPL-MCNC: 2.3 MG/DL (ref 1.7–2.3)
MAGNESIUM SERPL-MCNC: 2.3 MG/DL (ref 1.7–2.3)
MCH RBC QN AUTO: 30 PG (ref 26.5–33)
MCH RBC QN AUTO: 30.2 PG (ref 26.5–33)
MCH RBC QN AUTO: 30.4 PG (ref 26.5–33)
MCHC RBC AUTO-ENTMCNC: 32.2 G/DL (ref 31.5–36.5)
MCHC RBC AUTO-ENTMCNC: 32.8 G/DL (ref 31.5–36.5)
MCHC RBC AUTO-ENTMCNC: 32.9 G/DL (ref 31.5–36.5)
MCV RBC AUTO: 92 FL (ref 78–100)
MCV RBC AUTO: 92 FL (ref 78–100)
MCV RBC AUTO: 94 FL (ref 78–100)
O2/TOTAL GAS SETTING VFR VENT: 30 %
O2/TOTAL GAS SETTING VFR VENT: 35 %
OXYHGB MFR BLDV: 50 % (ref 70–75)
PCO2 BLD: 36 MM HG (ref 35–45)
PCO2 BLDV: 31 MM HG (ref 40–50)
PEEP: 5 CM H2O
PH BLD: 7.37 [PH] (ref 7.35–7.45)
PH BLDV: 7.45 [PH] (ref 7.32–7.43)
PHOSPHATE SERPL-MCNC: 2.7 MG/DL (ref 2.5–4.5)
PHOSPHATE SERPL-MCNC: 2.9 MG/DL (ref 2.5–4.5)
PHOSPHATE SERPL-MCNC: 3.6 MG/DL (ref 2.5–4.5)
PLAT MORPH BLD: ABNORMAL
PLATELET # BLD AUTO: 46 10E3/UL (ref 150–450)
PLATELET # BLD AUTO: 63 10E3/UL (ref 150–450)
PLATELET # BLD AUTO: 74 10E3/UL (ref 150–450)
PO2 BLD: 85 MM HG (ref 80–105)
PO2 BLDV: 26 MM HG (ref 25–47)
POTASSIUM SERPL-SCNC: 3.6 MMOL/L (ref 3.4–5.3)
POTASSIUM SERPL-SCNC: 3.7 MMOL/L (ref 3.4–5.3)
POTASSIUM SERPL-SCNC: 3.7 MMOL/L (ref 3.4–5.3)
POTASSIUM SERPL-SCNC: 3.8 MMOL/L (ref 3.4–5.3)
POTASSIUM SERPL-SCNC: 3.9 MMOL/L (ref 3.4–5.3)
POTASSIUM SERPL-SCNC: 4.5 MMOL/L (ref 3.4–5.3)
PROT SERPL-MCNC: 5.2 G/DL (ref 6.4–8.3)
PROT SERPL-MCNC: 5.6 G/DL (ref 6.4–8.3)
PROT SERPL-MCNC: 5.7 G/DL (ref 6.4–8.3)
PROT SERPL-MCNC: 6 G/DL (ref 6.4–8.3)
RBC # BLD AUTO: 2.6 10E6/UL (ref 3.8–5.2)
RBC # BLD AUTO: 2.63 10E6/UL (ref 3.8–5.2)
RBC # BLD AUTO: 2.65 10E6/UL (ref 3.8–5.2)
RBC MORPH BLD: ABNORMAL
SAO2 % BLDA: 95 % (ref 92–100)
SAO2 % BLDV: 50.6 % (ref 70–75)
SODIUM SERPL-SCNC: 136 MMOL/L (ref 135–145)
SODIUM SERPL-SCNC: 137 MMOL/L (ref 135–145)
SODIUM SERPL-SCNC: 139 MMOL/L (ref 135–145)
SODIUM SERPL-SCNC: 140 MMOL/L (ref 135–145)
SODIUM SERPL-SCNC: 140 MMOL/L (ref 135–145)
SODIUM SERPL-SCNC: 141 MMOL/L (ref 135–145)
TROPONIN T SERPL HS-MCNC: 600 NG/L
TROPONIN T SERPL HS-MCNC: 656 NG/L
TROPONIN T SERPL HS-MCNC: 659 NG/L
UFH PPP CHRO-ACNC: <0.1 IU/ML
UNIT ABO/RH: NORMAL
UNIT NUMBER: NORMAL
UNIT STATUS: NORMAL
UNIT TYPE ISBT: 5100
VANCOMYCIN SERPL-MCNC: 24.3 UG/ML
WBC # BLD AUTO: 10.5 10E3/UL (ref 4–11)
WBC # BLD AUTO: 9.3 10E3/UL (ref 4–11)
WBC # BLD AUTO: 9.4 10E3/UL (ref 4–11)

## 2024-09-01 PROCEDURE — 84100 ASSAY OF PHOSPHORUS: CPT | Performed by: INTERNAL MEDICINE

## 2024-09-01 PROCEDURE — 250N000013 HC RX MED GY IP 250 OP 250 PS 637: Performed by: INTERNAL MEDICINE

## 2024-09-01 PROCEDURE — 82040 ASSAY OF SERUM ALBUMIN: CPT | Performed by: INTERNAL MEDICINE

## 2024-09-01 PROCEDURE — 999N000157 HC STATISTIC RCP TIME EA 10 MIN

## 2024-09-01 PROCEDURE — 84155 ASSAY OF PROTEIN SERUM: CPT | Performed by: INTERNAL MEDICINE

## 2024-09-01 PROCEDURE — P9035 PLATELET PHERES LEUKOREDUCED: HCPCS | Performed by: PHYSICIAN ASSISTANT

## 2024-09-01 PROCEDURE — 999N000259 HC STATISTIC EXTUBATION

## 2024-09-01 PROCEDURE — 94640 AIRWAY INHALATION TREATMENT: CPT | Mod: 76

## 2024-09-01 PROCEDURE — 85520 HEPARIN ASSAY: CPT | Performed by: INTERNAL MEDICINE

## 2024-09-01 PROCEDURE — C1769 GUIDE WIRE: HCPCS

## 2024-09-01 PROCEDURE — 250N000011 HC RX IP 250 OP 636: Performed by: STUDENT IN AN ORGANIZED HEALTH CARE EDUCATION/TRAINING PROGRAM

## 2024-09-01 PROCEDURE — 84484 ASSAY OF TROPONIN QUANT: CPT | Performed by: PHYSICIAN ASSISTANT

## 2024-09-01 PROCEDURE — 82805 BLOOD GASES W/O2 SATURATION: CPT | Performed by: INTERNAL MEDICINE

## 2024-09-01 PROCEDURE — 999N000185 HC STATISTIC TRANSPORT TIME EA 15 MIN

## 2024-09-01 PROCEDURE — 80202 ASSAY OF VANCOMYCIN: CPT | Performed by: INTERNAL MEDICINE

## 2024-09-01 PROCEDURE — 80069 RENAL FUNCTION PANEL: CPT | Performed by: INTERNAL MEDICINE

## 2024-09-01 PROCEDURE — 250N000013 HC RX MED GY IP 250 OP 250 PS 637: Performed by: PHYSICIAN ASSISTANT

## 2024-09-01 PROCEDURE — 999N000287 HC ICU ADULT ROUNDING, EACH 10 MINS

## 2024-09-01 PROCEDURE — 999N000009 HC STATISTIC AIRWAY CARE

## 2024-09-01 PROCEDURE — 90945 DIALYSIS ONE EVALUATION: CPT | Performed by: INTERNAL MEDICINE

## 2024-09-01 PROCEDURE — 83605 ASSAY OF LACTIC ACID: CPT | Performed by: PHYSICIAN ASSISTANT

## 2024-09-01 PROCEDURE — 82805 BLOOD GASES W/O2 SATURATION: CPT | Performed by: SURGERY

## 2024-09-01 PROCEDURE — 85027 COMPLETE CBC AUTOMATED: CPT | Performed by: INTERNAL MEDICINE

## 2024-09-01 PROCEDURE — 272N000500 HC NEEDLE CR2

## 2024-09-01 PROCEDURE — 94003 VENT MGMT INPAT SUBQ DAY: CPT

## 2024-09-01 PROCEDURE — 83735 ASSAY OF MAGNESIUM: CPT | Performed by: INTERNAL MEDICINE

## 2024-09-01 PROCEDURE — 250N000009 HC RX 250: Performed by: PHYSICIAN ASSISTANT

## 2024-09-01 PROCEDURE — 200N000001 HC R&B ICU

## 2024-09-01 PROCEDURE — 250N000011 HC RX IP 250 OP 636: Performed by: RADIOLOGY

## 2024-09-01 PROCEDURE — 82330 ASSAY OF CALCIUM: CPT | Performed by: INTERNAL MEDICINE

## 2024-09-01 PROCEDURE — 84460 ALANINE AMINO (ALT) (SGPT): CPT | Performed by: INTERNAL MEDICINE

## 2024-09-01 PROCEDURE — 250N000009 HC RX 250: Performed by: STUDENT IN AN ORGANIZED HEALTH CARE EDUCATION/TRAINING PROGRAM

## 2024-09-01 PROCEDURE — 80069 RENAL FUNCTION PANEL: CPT | Performed by: PHYSICIAN ASSISTANT

## 2024-09-01 PROCEDURE — 84075 ASSAY ALKALINE PHOSPHATASE: CPT | Performed by: INTERNAL MEDICINE

## 2024-09-01 PROCEDURE — 77001 FLUOROGUIDE FOR VEIN DEVICE: CPT

## 2024-09-01 PROCEDURE — 255N000002 HC RX 255 OP 636: Performed by: RADIOLOGY

## 2024-09-01 PROCEDURE — 250N000011 HC RX IP 250 OP 636: Mod: JZ | Performed by: PHYSICIAN ASSISTANT

## 2024-09-01 PROCEDURE — 99291 CRITICAL CARE FIRST HOUR: CPT | Performed by: INTERNAL MEDICINE

## 2024-09-01 PROCEDURE — 82248 BILIRUBIN DIRECT: CPT | Performed by: INTERNAL MEDICINE

## 2024-09-01 PROCEDURE — 250N000013 HC RX MED GY IP 250 OP 250 PS 637: Performed by: STUDENT IN AN ORGANIZED HEALTH CARE EDUCATION/TRAINING PROGRAM

## 2024-09-01 PROCEDURE — 84450 TRANSFERASE (AST) (SGOT): CPT | Performed by: INTERNAL MEDICINE

## 2024-09-01 PROCEDURE — 250N000009 HC RX 250: Performed by: INTERNAL MEDICINE

## 2024-09-01 PROCEDURE — C1752 CATH,HEMODIALYSIS,SHORT-TERM: HCPCS

## 2024-09-01 PROCEDURE — 94640 AIRWAY INHALATION TREATMENT: CPT

## 2024-09-01 PROCEDURE — 258N000003 HC RX IP 258 OP 636: Performed by: STUDENT IN AN ORGANIZED HEALTH CARE EDUCATION/TRAINING PROGRAM

## 2024-09-01 PROCEDURE — 99232 SBSQ HOSP IP/OBS MODERATE 35: CPT | Performed by: INTERNAL MEDICINE

## 2024-09-01 PROCEDURE — 85610 PROTHROMBIN TIME: CPT | Performed by: PHYSICIAN ASSISTANT

## 2024-09-01 PROCEDURE — 82247 BILIRUBIN TOTAL: CPT | Performed by: INTERNAL MEDICINE

## 2024-09-01 PROCEDURE — 999N000253 HC STATISTIC WEANING TRIALS

## 2024-09-01 PROCEDURE — 99233 SBSQ HOSP IP/OBS HIGH 50: CPT | Performed by: INTERNAL MEDICINE

## 2024-09-01 RX ORDER — CEFAZOLIN SODIUM 1 G/50ML
750 SOLUTION INTRAVENOUS EVERY 24 HOURS
Status: DISCONTINUED | OUTPATIENT
Start: 2024-09-02 | End: 2024-09-02

## 2024-09-01 RX ORDER — HEPARIN SODIUM 1000 [USP'U]/ML
2800 INJECTION, SOLUTION INTRAVENOUS; SUBCUTANEOUS ONCE
Status: COMPLETED | OUTPATIENT
Start: 2024-09-01 | End: 2024-09-01

## 2024-09-01 RX ORDER — HEPARIN SODIUM 1000 [USP'U]/ML
2500 INJECTION, SOLUTION INTRAVENOUS; SUBCUTANEOUS ONCE
Status: DISCONTINUED | OUTPATIENT
Start: 2024-09-01 | End: 2024-09-01

## 2024-09-01 RX ADMIN — ALBUTEROL SULFATE 2.5 MG: 2.5 SOLUTION RESPIRATORY (INHALATION) at 20:29

## 2024-09-01 RX ADMIN — Medication 50 MCG: at 01:51

## 2024-09-01 RX ADMIN — PANTOPRAZOLE SODIUM 40 MG: 40 INJECTION, POWDER, FOR SOLUTION INTRAVENOUS at 07:37

## 2024-09-01 RX ADMIN — CALCIUM CHLORIDE, MAGNESIUM CHLORIDE, SODIUM CHLORIDE, SODIUM BICARBONATE, POTASSIUM CHLORIDE AND SODIUM PHOSPHATE DIBASIC DIHYDRATE 10 ML/KG/HR: 3.68; 3.05; 6.34; 3.09; .314; .187 INJECTION INTRAVENOUS at 17:14

## 2024-09-01 RX ADMIN — LIDOCAINE 1 PATCH: 246 PATCH TOPICAL at 18:22

## 2024-09-01 RX ADMIN — Medication 50 MCG: at 07:55

## 2024-09-01 RX ADMIN — Medication 1 CAPSULE: at 08:29

## 2024-09-01 RX ADMIN — CALCIUM CHLORIDE, MAGNESIUM CHLORIDE, SODIUM CHLORIDE, SODIUM BICARBONATE, POTASSIUM CHLORIDE AND SODIUM PHOSPHATE DIBASIC DIHYDRATE 24.5 ML/KG/HR: 3.68; 3.05; 6.34; 3.09; .314; .187 INJECTION INTRAVENOUS at 23:24

## 2024-09-01 RX ADMIN — CHLORHEXIDINE GLUCONATE 0.12% ORAL RINSE 15 ML: 1.2 LIQUID ORAL at 08:29

## 2024-09-01 RX ADMIN — HEPARIN SODIUM 2800 UNITS: 1000 INJECTION INTRAVENOUS; SUBCUTANEOUS at 12:02

## 2024-09-01 RX ADMIN — VANCOMYCIN HYDROCHLORIDE 125 MG: KIT at 08:29

## 2024-09-01 RX ADMIN — CALCIUM CHLORIDE, MAGNESIUM CHLORIDE, SODIUM CHLORIDE, SODIUM BICARBONATE, POTASSIUM CHLORIDE AND SODIUM PHOSPHATE DIBASIC DIHYDRATE 10 ML/KG/HR: 3.68; 3.05; 6.34; 3.09; .314; .187 INJECTION INTRAVENOUS at 23:24

## 2024-09-01 RX ADMIN — ALBUTEROL SULFATE 2.5 MG: 2.5 SOLUTION RESPIRATORY (INHALATION) at 08:15

## 2024-09-01 RX ADMIN — SODIUM CHLORIDE 30 MG/ML INHALATION SOLUTION 3 ML: 30 SOLUTION INHALANT at 08:15

## 2024-09-01 RX ADMIN — Medication 50 MCG: at 11:30

## 2024-09-01 RX ADMIN — Medication 1 CAPSULE: at 21:17

## 2024-09-01 RX ADMIN — CALCIUM CHLORIDE, MAGNESIUM CHLORIDE, SODIUM CHLORIDE, SODIUM BICARBONATE, POTASSIUM CHLORIDE AND SODIUM PHOSPHATE DIBASIC DIHYDRATE 24.5 ML/KG/HR: 3.68; 3.05; 6.34; 3.09; .314; .187 INJECTION INTRAVENOUS at 15:10

## 2024-09-01 RX ADMIN — TRAMADOL HYDROCHLORIDE 50 MG: 50 TABLET, COATED ORAL at 21:16

## 2024-09-01 RX ADMIN — DEXMEDETOMIDINE HYDROCHLORIDE 1.2 MCG/KG/HR: 400 INJECTION INTRAVENOUS at 07:54

## 2024-09-01 RX ADMIN — IOHEXOL 10 ML: 350 INJECTION, SOLUTION INTRAVENOUS at 12:07

## 2024-09-01 RX ADMIN — CALCIUM CHLORIDE, MAGNESIUM CHLORIDE, SODIUM CHLORIDE, SODIUM BICARBONATE, POTASSIUM CHLORIDE AND SODIUM PHOSPHATE DIBASIC DIHYDRATE 24.5 ML/KG/HR: 3.68; 3.05; 6.34; 3.09; .314; .187 INJECTION INTRAVENOUS at 06:33

## 2024-09-01 RX ADMIN — Medication 75 MCG/HR: at 02:22

## 2024-09-01 RX ADMIN — CEFEPIME HYDROCHLORIDE 2 G: 2 INJECTION, POWDER, FOR SOLUTION INTRAVENOUS at 04:14

## 2024-09-01 RX ADMIN — SODIUM CHLORIDE 750 MG: 9 INJECTION, SOLUTION INTRAVENOUS at 00:13

## 2024-09-01 RX ADMIN — SODIUM CHLORIDE 750 MG: 9 INJECTION, SOLUTION INTRAVENOUS at 13:48

## 2024-09-01 RX ADMIN — CALCIUM GLUCONATE 1 G: 20 INJECTION, SOLUTION INTRAVENOUS at 04:52

## 2024-09-01 RX ADMIN — SODIUM CHLORIDE 30 MG/ML INHALATION SOLUTION 3 ML: 30 SOLUTION INHALANT at 20:29

## 2024-09-01 RX ADMIN — CALCIUM CHLORIDE, MAGNESIUM CHLORIDE, SODIUM CHLORIDE, SODIUM BICARBONATE, POTASSIUM CHLORIDE AND SODIUM PHOSPHATE DIBASIC DIHYDRATE 24.5 ML/KG/HR: 3.68; 3.05; 6.34; 3.09; .314; .187 INJECTION INTRAVENOUS at 08:29

## 2024-09-01 RX ADMIN — CALCIUM GLUCONATE 1 G: 20 INJECTION, SOLUTION INTRAVENOUS at 23:37

## 2024-09-01 RX ADMIN — CALCIUM CHLORIDE, MAGNESIUM CHLORIDE, SODIUM CHLORIDE, SODIUM BICARBONATE, POTASSIUM CHLORIDE AND SODIUM PHOSPHATE DIBASIC DIHYDRATE 24.5 ML/KG/HR: 3.68; 3.05; 6.34; 3.09; .314; .187 INJECTION INTRAVENOUS at 13:06

## 2024-09-01 RX ADMIN — HYDROMORPHONE HYDROCHLORIDE 0.2 MG: 0.2 INJECTION, SOLUTION INTRAMUSCULAR; INTRAVENOUS; SUBCUTANEOUS at 22:24

## 2024-09-01 RX ADMIN — CALCIUM CHLORIDE, MAGNESIUM CHLORIDE, SODIUM CHLORIDE, SODIUM BICARBONATE, POTASSIUM CHLORIDE AND SODIUM PHOSPHATE DIBASIC DIHYDRATE 24.5 ML/KG/HR: 3.68; 3.05; 6.34; 3.09; .314; .187 INJECTION INTRAVENOUS at 02:20

## 2024-09-01 RX ADMIN — CEFEPIME HYDROCHLORIDE 2 G: 2 INJECTION, POWDER, FOR SOLUTION INTRAVENOUS at 18:22

## 2024-09-01 RX ADMIN — CALCIUM CHLORIDE, MAGNESIUM CHLORIDE, SODIUM CHLORIDE, SODIUM BICARBONATE, POTASSIUM CHLORIDE AND SODIUM PHOSPHATE DIBASIC DIHYDRATE 24.5 ML/KG/HR: 3.68; 3.05; 6.34; 3.09; .314; .187 INJECTION INTRAVENOUS at 04:25

## 2024-09-01 RX ADMIN — DEXMEDETOMIDINE HYDROCHLORIDE 1.2 MCG/KG/HR: 400 INJECTION INTRAVENOUS at 13:06

## 2024-09-01 RX ADMIN — SENNOSIDES AND DOCUSATE SODIUM 1 TABLET: 8.6; 5 TABLET ORAL at 21:17

## 2024-09-01 RX ADMIN — CALCIUM CHLORIDE, MAGNESIUM CHLORIDE, SODIUM CHLORIDE, SODIUM BICARBONATE, POTASSIUM CHLORIDE AND SODIUM PHOSPHATE DIBASIC DIHYDRATE 24.5 ML/KG/HR: 3.68; 3.05; 6.34; 3.09; .314; .187 INJECTION INTRAVENOUS at 19:20

## 2024-09-01 RX ADMIN — CALCIUM CHLORIDE, MAGNESIUM CHLORIDE, SODIUM CHLORIDE, SODIUM BICARBONATE, POTASSIUM CHLORIDE AND SODIUM PHOSPHATE DIBASIC DIHYDRATE 10 ML/KG/HR: 3.68; 3.05; 6.34; 3.09; .314; .187 INJECTION INTRAVENOUS at 09:26

## 2024-09-01 RX ADMIN — CALCIUM CHLORIDE, MAGNESIUM CHLORIDE, SODIUM CHLORIDE, SODIUM BICARBONATE, POTASSIUM CHLORIDE AND SODIUM PHOSPHATE DIBASIC DIHYDRATE 24.5 ML/KG/HR: 3.68; 3.05; 6.34; 3.09; .314; .187 INJECTION INTRAVENOUS at 00:15

## 2024-09-01 RX ADMIN — VANCOMYCIN HYDROCHLORIDE 125 MG: KIT at 21:17

## 2024-09-01 RX ADMIN — DEXMEDETOMIDINE HYDROCHLORIDE 1.2 MCG/KG/HR: 400 INJECTION INTRAVENOUS at 01:16

## 2024-09-01 RX ADMIN — ALBUTEROL SULFATE 2.5 MG: 2.5 SOLUTION RESPIRATORY (INHALATION) at 02:28

## 2024-09-01 RX ADMIN — CALCIUM CHLORIDE, MAGNESIUM CHLORIDE, SODIUM CHLORIDE, SODIUM BICARBONATE, POTASSIUM CHLORIDE AND SODIUM PHOSPHATE DIBASIC DIHYDRATE 10 ML/KG/HR: 3.68; 3.05; 6.34; 3.09; .314; .187 INJECTION INTRAVENOUS at 05:07

## 2024-09-01 RX ADMIN — CALCIUM GLUCONATE 1 G: 20 INJECTION, SOLUTION INTRAVENOUS at 14:50

## 2024-09-01 RX ADMIN — Medication 50 MCG: at 06:50

## 2024-09-01 RX ADMIN — Medication 50 MCG: at 12:30

## 2024-09-01 RX ADMIN — CALCIUM CHLORIDE, MAGNESIUM CHLORIDE, SODIUM CHLORIDE, SODIUM BICARBONATE, POTASSIUM CHLORIDE AND SODIUM PHOSPHATE DIBASIC DIHYDRATE 200 ML/HR: 3.68; 3.05; 6.34; 3.09; .314; .187 INJECTION INTRAVENOUS at 23:25

## 2024-09-01 RX ADMIN — SODIUM CHLORIDE 30 MG/ML INHALATION SOLUTION 3 ML: 30 SOLUTION INHALANT at 02:27

## 2024-09-01 RX ADMIN — DEXMEDETOMIDINE HYDROCHLORIDE 1.2 MCG/KG/HR: 400 INJECTION INTRAVENOUS at 05:00

## 2024-09-01 RX ADMIN — Medication 50 MCG: at 04:05

## 2024-09-01 RX ADMIN — CALCIUM CHLORIDE, MAGNESIUM CHLORIDE, SODIUM CHLORIDE, SODIUM BICARBONATE, POTASSIUM CHLORIDE AND SODIUM PHOSPHATE DIBASIC DIHYDRATE 24.5 ML/KG/HR: 3.68; 3.05; 6.34; 3.09; .314; .187 INJECTION INTRAVENOUS at 21:19

## 2024-09-01 RX ADMIN — Medication 50 MCG: at 10:30

## 2024-09-01 ASSESSMENT — ACTIVITIES OF DAILY LIVING (ADL)
ADLS_ACUITY_SCORE: 40

## 2024-09-01 NOTE — PLAN OF CARE
[facility] - ICU    RN Progress Note:            Pertinent Assessments:      Please refer to flowsheet rows for full assessment     Hemodynamics, CRRT           Key Events - This Shift:     Pt has tolerated 100ml pull via CRRT and has been without filter issues overnight. Making small amounts of urine. Remains intubated, lungs clear with scantseretions via ETT. Tolerating slow wan from Dobutamine. Titrating Nicardipine to keep SBP<140.                Barriers to Discharge / Downgrade:   CRRT, Remains intubated. Invasive lines         Problem: Sepsis/Septic Shock  Goal: Blood Glucose Level Within Targeted Range  Outcome: Progressing     Problem: Gas Exchange Impaired  Goal: Optimal Gas Exchange  Outcome: Progressing  Intervention: Optimize Oxygenation and Ventilation  Recent Flowsheet Documentation  Taken 9/1/2024 0400 by Hien Mart, RN  Head of Bed (HOB) Positioning: HOB at 15 degrees    Problem: Fluid Volume Excess  Goal: Fluid Balance  Outcome: Progressing     Problem: Cardiovascular Surgery  Goal: Effective Cardiac Function  Outcome: Progressing    Goal Outcome Evaluation:      Plan of Care Reviewed With: patient    Overall Patient Progress: improvingOverall Patient Progress: improving    Outcome Evaluation: Continues to tolerate 100mls of pull from CRRT. No filter issues overnight.

## 2024-09-01 NOTE — PROGRESS NOTES
Pt extubated at 1625 per MD. Placed on 2L NC. BS are clear, no stridor noted. Pt has a moderate nonproductive cough. (+) cuff leak prior to extubation. Pt able to verbalize.   Rolanda Helms, RT

## 2024-09-01 NOTE — PROGRESS NOTES
[facility] Inpatient follow up        09/01/2024    Chart reviewed  Patient seen in ICU             Assessment and Recommendations:   Assessment:  Felicitas Elliott is a 84 year old female with     Respiratory failure, shock liver  Decreased urine output, on dobutamine norepinephrine and vasopressin  Intubated  History of severe aortic stenosis  S/P aortic root abscess debridement and aortic annular reconstruction, aortic root replacement, bioprosthetic aortic valve on 8/27/2024  Acute respiratory failure, extubated 8/28/2024, reintubated 8/29/2024, acute kidney injury, currently on CRRT- Gram negative bacilli in resp culture-     2+ Enterobacter cloacae complex, S cefepime, R ceftriaxone     MRSA nares negative, respiratory PCR negative  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  MSSA bacteremia.  Positive blood culture on 8/14/2024 and 8/16.  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  With the size of the vegetation combined with brain infarct, status post CV surgery, see details below active issue.   Neck pain worrisome for cervical discitis.  Neck MRI showed some edema at C4-C5 on the left side.  No clear evidence of infection.  Abnormal brain MRI suggestive of subacute infarct. In a patient with MSSA bacteremia and aortic valve endocarditis, this is cerebral septic emboli. Active issue.     POSTOPERATIVE DIAGNOSES:  1.  Severe aortic stenosis and moderate aortic regurgitation.  2.  Severe mitral stenosis.  3.  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  4.  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  5.  Severe multivessel coronary artery disease.  6.  Aortic root abscess.      PROCEDURE PERFORMED: 8/27/24  Aortic root abscess debridement and  aortic annular reconstruction.  Aortic root replacement (Konect composite 25 mm Silva Inspiris Resilia bioprosthetic valve within 30 mm Gelweave Valsalva graft with reimplantation of the coronary arteries).  Mitral valve replacement (31 mm St. Tim Silva bioprosthetic valve).  Coronary artery bypass grafting x 2 (reversed saphenous vein graft to the obtuse marginal branch of the left circumflex coronary artery, and pedicled left internal mammary artery to left anterior descending coronary artery).  Endoscopic vein harvest of the greater saphenous vein from the left lower extremity.    Recommendations:  Cefepime for now, would likely plan this for enterobacter plus vancomycin for MSSA coverage 7 days, then go back to cefazolin.  Off nafcillin. Per previous notes plan IV antibiotics duration 6 weeks  Currently intubated, discussed with ICU nurse at bedside  Empiric oral vancomycin for C. difficile prophylaxis, diarrhea, rectal tube in place.  C. difficile negative  PICC placed 8/21  Monitor CBC, CMP  Status post vascular surgery, chest tube, CV surgery following closely  Will need to check immunoglobin level in 4 to 6 weeks sister with history of hypogammaglobulinemia   Overall will require at least 6 weeks of IV antibiotics, holding nafcillin due to acute kidney injury      Fernando Wynne MD   [facility]  Answering Service: [phone]  On-Call ID provider: [phone], option: 9              Interval History:     HPI: to have HD catheter today    Not much for ETT secretions per nurse.        Recent Inflammatory Biomarkers:   Recent Labs   Lab Test 09/01/24  0356 08/31/24  1818 08/31/24  1141 08/31/24  0416 08/30/24 2017 08/30/24  1322 08/30/24  0437 08/29/24  0359   PCAL  --   --   --   --   --   --   --  1.24*   WBC 10.5 10.8 10.1 10.5 12.5* 12.7*   < > 15.3*    < > = values in this interval not displayed.            Review of Systems:   CONSTITUTIONAL:    Temp Max: Temp  (24hrs), Av.8  F (37.1  C), Min:97.9  F (36.6  C), Max:100.3  F (37.9  C)   .  Negative except for findings in the HPI.           Current Medications (antimicrobials listed in bold):     Current Facility-Administered Medications   Medication Dose Route Frequency Provider Last Rate Last Admin    [Held by provider] acetaminophen (TYLENOL) tablet 975 mg  975 mg Oral Q8H Maryam Anthony PA-C   975 mg at 24 1336    sodium chloride (NEBUSAL) 3 % neb solution 3 mL  3 mL Nebulization Q6H Zach Park MD   3 mL at 24 0815    And    albuterol (PROVENTIL) neb solution 2.5 mg  2.5 mg Nebulization Q6H Zach Park MD   2.5 mg at 24 0815    [Held by provider] atorvastatin (LIPITOR) tablet 80 mg  80 mg Oral QPM Jessica Tolbert PA-C   80 mg at 24    ceFEPIme (MAXIPIME) 2 g vial to attach to  mL bag for ADULTS or NS 50 mL bag for PEDS  2 g Intravenous Q12H Zach Park MD   2 g at 24 0414    chlorhexidine (PERIDEX) 0.12 % solution 15 mL  15 mL Mouth/Throat BID Zach Park MD   15 mL at 24 0829    [Held by provider] clopidogrel (PLAVIX) tablet 75 mg  75 mg Oral Daily Maryam Anthony PA-C        lactobacillus rhamnosus (GG) (CULTURELL) capsule 1 capsule  1 capsule Oral BID Zach Foreman DO   1 capsule at 24 0829    Lidocaine (LIDOCARE) 4 % Patch 1-2 patch  1-2 patch Transdermal Q24H Maryam Anthony PA-C   1 patch at 24 1734    pantoprazole (PROTONIX) 2 mg/mL suspension 40 mg  40 mg Oral or NG Tube QAM AC Maryam Anthony PA-C        Or    pantoprazole (PROTONIX) EC tablet 40 mg  40 mg Oral QAM AC Maryam Anthony PA-C        Or    pantoprazole (PROTONIX) IV push injection 40 mg  40 mg Intravenous QAM AC Maryam Anthony PA-C   40 mg at 24 0737    polyethylene glycol (MIRALAX) Packet 17 g  17 g Oral Daily Maryam Anthony PA-C        senna-docusate (SENOKOT-S/PERICOLACE) 8.6-50 MG per tablet 1  tablet  1 tablet Oral BID Maryam Anthony PA-C   1 tablet at 08/30/24 2030    sodium chloride (PF) 0.9% PF flush 3 mL  3 mL Intracatheter Q8H Gondal, Saad J, MD   3 mL at 09/01/24 0830    vancomycin (FIRVANQ) oral solution 125 mg  125 mg Oral BID Nannette Lebron MD   125 mg at 09/01/24 0829    vancomycin (VANCOCIN) 750 mg in sodium chloride 0.9 % 250 mL intermittent infusion  750 mg Intravenous Q12H Zach Park MD   750 mg at 09/01/24 0013    [Held by provider] Warfarin Dose Required Daily - Pharmacist Managed  1 each Oral See Admin Instructions Jessica Tolbert PA-C                  Allergies:     Allergies   Allergen Reactions    Aspirin Rash    Cats Rash    Nickel Rash            Physical Exam:   Vitals were reviewed  Patient Vitals for the past 24 hrs:   BP Temp Temp src Pulse Resp SpO2 Weight   09/01/24 1000 117/70 -- -- 89 24 98 % --   09/01/24 0945 -- -- -- 89 24 98 % --   09/01/24 0930 -- -- -- 89 24 98 % --   09/01/24 0915 124/75 -- -- 89 24 98 % --   09/01/24 0900 -- 99  F (37.2  C) Pulm Art 89 24 99 % --   09/01/24 0845 -- -- -- 89 24 98 % --   09/01/24 0830 112/63 -- -- 89 24 96 % --   09/01/24 0815 -- -- -- 89 24 97 % --   09/01/24 0800 122/72 99  F (37.2  C) Oral 90 24 97 % --   09/01/24 0758 -- -- -- 89 24 97 % --   09/01/24 0745 -- -- -- 89 24 96 % --   09/01/24 0730 -- -- -- 89 24 96 % --   09/01/24 0715 -- -- -- 89 24 98 % --   09/01/24 0700 -- -- -- 89 24 97 % --   09/01/24 0648 -- -- -- 89 24 97 % --   09/01/24 0630 -- -- -- 89 24 98 % --   09/01/24 0600 -- 98.8  F (37.1  C) Core 89 24 98 % --   09/01/24 0530 -- -- -- 89 24 96 % --   09/01/24 0500 -- -- -- 89 24 97 % --   09/01/24 0430 -- -- -- 89 24 97 % --   09/01/24 0409 -- -- -- 89 -- 98 % --   09/01/24 0400 -- 99  F (37.2  C) -- 90 24 99 % 98.4 kg (216 lb 14.4 oz)   09/01/24 0345 -- -- -- 89 24 97 % --   09/01/24 0330 -- -- -- 89 24 98 % --   09/01/24 0300 -- -- -- 89 24 96 % --   09/01/24 0245 -- -- -- 89 24 96 % --   09/01/24  0230 -- -- -- 89 24 96 % --   09/01/24 0200 -- 99.1  F (37.3  C) -- 89 24 96 % --   09/01/24 0130 -- -- -- 89 24 95 % --   09/01/24 0100 -- -- -- 89 24 96 % --   09/01/24 0030 -- -- -- 89 24 96 % --   09/01/24 0000 -- 99.1  F (37.3  C) -- 89 24 95 % --   08/31/24 2350 -- -- -- 89 -- 95 % --   08/31/24 2330 -- -- -- 89 24 95 % --   08/31/24 2315 -- -- -- 89 24 96 % --   08/31/24 2300 -- 99.3  F (37.4  C) Oral 89 24 95 % --   08/31/24 2245 -- -- -- 89 24 96 % --   08/31/24 2230 -- -- -- 89 24 96 % --   08/31/24 2215 -- -- -- 89 24 97 % --   08/31/24 2200 -- 99.3  F (37.4  C) Pulm Art 89 24 97 % --   08/31/24 2145 -- -- -- 89 24 97 % --   08/31/24 2130 -- -- -- 89 24 97 % --   08/31/24 2115 -- -- -- 89 24 97 % --   08/31/24 2100 -- 99.5  F (37.5  C) Pulm Art 89 24 96 % --   08/31/24 2045 -- 99.5  F (37.5  C) Pulm Art 90 (!) 36 (!) 89 % --   08/31/24 2030 -- -- -- 89 24 99 % --   08/31/24 2015 -- -- -- 89 24 95 % --   08/31/24 2010 -- -- -- 89 20 96 % --   08/31/24 2007 -- -- -- 89 23 96 % --   08/31/24 2005 -- 99.5  F (37.5  C) Pulm Art 89 (!) 32 96 % --   08/31/24 2000 -- -- -- -- -- 96 % --   08/31/24 1955 -- -- -- 89 24 99 % --   08/31/24 1945 -- -- -- 89 24 98 % --   08/31/24 1930 -- -- -- 89 24 99 % --   08/31/24 1915 -- -- -- 89 24 97 % --   08/31/24 1900 -- 99.3  F (37.4  C) Pulm Art 89 24 97 % --   08/31/24 1845 -- -- -- 89 24 97 % --   08/31/24 1830 -- -- -- 89 24 98 % --   08/31/24 1815 -- -- -- 89 24 96 % --   08/31/24 1800 -- 99.5  F (37.5  C) Pulm Art 89 24 97 % --   08/31/24 1745 -- -- -- 89 24 96 % --   08/31/24 1730 -- -- -- 89 24 96 % --   08/31/24 1715 -- -- -- 89 24 97 % --   08/31/24 1700 -- -- -- 89 26 96 % --   08/31/24 1645 -- -- -- 89 24 97 % --   08/31/24 1630 -- -- -- 89 24 96 % --   08/31/24 1615 -- -- -- 89 24 95 % --   08/31/24 1600 -- 99.3  F (37.4  C) Pulm Art 89 24 97 % --   08/31/24 1545 -- -- -- 89 24 97 % --   08/31/24 1530 -- -- -- 89 24 97 % --   08/31/24 1515 -- -- -- 89 24 97 % --    08/31/24 1500 -- -- -- 89 24 97 % --   08/31/24 1445 -- -- -- 89 24 97 % --   08/31/24 1430 -- -- -- 89 24 97 % --   08/31/24 1415 -- -- -- 89 24 97 % --   08/31/24 1400 -- 99.1  F (37.3  C) Pulm Art 89 24 97 % --   08/31/24 1345 -- -- -- 89 24 97 % --   08/31/24 1330 -- -- -- 83 26 96 % --   08/31/24 1315 -- -- -- 83 24 96 % --   08/31/24 1300 -- 98.8  F (37.1  C) Pulm Art 81 26 98 % --   08/31/24 1245 -- -- -- 80 26 96 % --   08/31/24 1230 -- -- -- 81 26 96 % --   08/31/24 1220 -- -- -- 81 26 97 % --   08/31/24 1215 -- -- -- 84 26 99 % --   08/31/24 1200 -- 99  F (37.2  C) Pulm Art 80 26 99 % --   08/31/24 1150 -- -- -- 83 26 99 % --       Physical Examination:    FiO2 (%): 35 %, Resp: 24, Vent Mode: CMV/AC, Resp Rate (Set): 24 breaths/min, Tidal Volume (Set, mL): 420 mL, PEEP (cm H2O): 5 cmH2O, Resp Rate (Set): 24 breaths/min, Tidal Volume (Set, mL): 420 mL, PEEP (cm H2O): 5 cmH2O    Gen: Intubated  HEENT: ETT opens eyes  PICC, Femoral catheter for dialysis, rectal tube  CV: Sternal incision, chest tube  Lungs: coarse, intubated, chest tubes.  Skin: Warm  Extr: edema.  Neuro: intubated, opens eyes  Art line           Laboratory Data:   ID Labs:  Microbiology labs:  Reviewed      No lab results found.  Recent Labs   Lab Test 09/01/24  0356 08/31/24  1818 08/31/24  1141 08/31/24  0416 08/30/24 2017 08/30/24  1322   WBC 10.5 10.8 10.1 10.5 12.5* 12.7*     Recent Labs   Lab Test 09/01/24  0559 09/01/24 0356 08/31/24 2208 08/31/24 1957   CR 1.01* 1.05* 1.06* 1.13*   GFRESTIMATED 55* 52* 52* 48*       Hematology Studies  Recent Labs   Lab Test 09/01/24  0356 08/31/24  1818 08/31/24  1141 08/31/24 0416 08/30/24 2017 08/30/24  1322   WBC 10.5 10.8 10.1 10.5 12.5* 12.7*   HGB 8.0* 8.0* 8.2* 7.9* 8.5* 8.5*   HCT 24.3* 24.3* 24.4* 23.9* 25.9* 26.1*   PLT 63*  --  53* 65* 76* 91*       Metabolic  Recent Labs   Lab Test 09/01/24  0559 09/01/24 0356 08/31/24 2208    140 142   BUN 12.9 12.5 12.0   CO2 19* 19*  19*   CR 1.01* 1.05* 1.06*   GFRESTIMATED 55* 52* 52*       Hepatic Studies  Recent Labs   Lab Test 09/01/24  0559 09/01/24  0356 08/31/24  2208   BILITOTAL 1.0 0.9 0.9   ALKPHOS 53 57 53   ALBUMIN 3.4* 3.5 3.5   * 462* 555*   * 159* 167*     Susceptibility data from last 90 days.  Collected Specimen Info Organism Ampicillin Ampicillin/Sulbactam Cefepime Ceftazidime Ceftriaxone Ciprofloxacin Clindamycin Daptomycin Doxycycline Erythromycin Gentamicin Levofloxacin Meropenem Oxacillin   08/29/24 Sputum from Expectorate Enterobacter cloacae complex R R  S R R  S      S  S  S      Normal samara                 08/16/24 Peripheral Blood Staphylococcus aureus                 08/14/24 Peripheral Blood Staphylococcus aureus        S  S  S  S  S    S     Collected Specimen Info Organism Piperacillin/Tazobactam Tetracycline Tobramycin Trimethoprim/Sulfamethoxazole  Vancomycin   08/29/24 Sputum from Expectorate Enterobacter cloacae complex R   S  S      Normal smaara        08/16/24 Peripheral Blood Staphylococcus aureus        08/14/24 Peripheral Blood Staphylococcus aureus   S   S  S             Imaging Data:   Reviewed     Study Result    Narrative & Impression   EXAM: MR BRAIN W/O and W CONTRAST  LOCATION: [facility]  DATE: 8/17/2024     INDICATION: Headache in a patient with MSSA bacteremia secondary to aortic valve endocarditis.  Look for cerebral septic emboli; Headache; Acute HA (< 3 months), no complicating features  COMPARISON: None.  CONTRAST: 9 ml gadavist  TECHNIQUE: Routine multiplanar multisequence head MRI without and with intravenous contrast.     FINDINGS: Assessment degraded due to motion.  INTRACRANIAL CONTENTS:   Apparent focus of diffusion restriction with T2/FLAIR hyperintensity within the left superior parietal lobule cortex is hyperintense on ADC map, representing T2 shine through.  Focus of signal abnormality measures 13 x 9 mm in the axial plane and does   not have  internal enhancement.     Additional punctate foci of hyperintensity on diffusion weighted with similar signal characteristics (periventricular right corona radiata, left superior parietal lobule, and left cerebellar hemisphere).     Patchy and confluent nonspecific T2/FLAIR hyperintensities within the cerebral white matter most consistent with moderate chronic microvascular ischemic change. Moderate generalized cerebral atrophy. No hydrocephalus. Normal position of the cerebellar   tonsils. No discernible abnormal intracranial enhancement; however, assessment substantially degraded due to motion. 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     SELLA: No abnormality accounting for technique.     OSSEOUS STRUCTURES/SOFT TISSUES: Normal marrow signal. The major intracranial vascular flow voids are maintained.      ORBITS: No abnormality accounting for technique.      SINUSES/MASTOIDS: Mild mucosal thickening scattered about the paranasal sinuses. Scattered fluid/membrane thickening in the left mastoid air cells. No apparent mass in the posterior nasopharynx or skull base.                                                                       IMPRESSION: Assessment degraded due to motion.  1.  13 mm focus of cortical signal abnormality within the left superior parietal lobule which is favored to represent a subacute infarct; low-grade glial neoplasm could conceivably have a similar appearance. Hyperacute intraparenchymal hematoma could   also have a similar appearance but is considered less likely. Recommend noncontrast head CT for further characterization. Also recommend follow-up MRI brain without and with contrast in 8-12 weeks to document expected evolution.  2.  Additional smaller foci of signal abnormality with similar characteristics favored to represent punctate subacute infarcts.

## 2024-09-01 NOTE — PROGRESS NOTES
HEART CARE NOTE          Assessment/Recommendations     1. Severe valvular disease c/b post-op cardiogenic shock  Assessment / Plan  Patient intubated 2/2 worsening respiratory requirements with subsequent initiation of CRRT  Currently on dobutamine for hemodynamic support - hold on weaning while requiring CRRT  GDMT as detailed below; mainstay of treatment for HFpEF includes diuretics and adequate BP control (class I) and SGLT2-I (class 2a); additional medical therapy (ARNI, MRA, ARB) demonstrated less robust evidence for indication but may be considered per guideline recommendations (2b); no indication for BBlockers      Current Pharmacotherapy AHA Guideline-Directed Medical Therapy   Losartan  - not started 2/2 renal dysfunction ARNI/ARB   Spironolactone not started  MRA   SGLT2 inhibitor: not started SGLT2-I    Furosemide gtt - currently on CRRT Loop diuretic       2. 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 Anemia  Assessment / Plan  Management and supportive care per primary team     5. CAD s/p CABG  Assessment / Plan  Start statins and aspirin if and when feasible(Hb 8 and occult blood positive)     6. Afib   Assessment / Plan  Afib noted with hemodynamic instability - improvement in hemodynamics noted with increased pacing rates to 90; continue high heart rates as it appears necessary for cardiac compensation     7. Shock liver  - continue to monitor  - improvement noted since yesterday    Patient remains critically ill in the ICU requiring hemodynamic support via vasopressors s/p OHS c/b cardiogenic shock. 70 minutes spent on critical care time    History of Present Illness/Subjective    Ms. Felicitas Elliott is a 84  "year old female with a PMHx significant for (per Epic notation) presented with fatigue, weakness, chills, poor appetite. Does not really have much for chronic medical conditions other than chronic back pain, furunculosis, uterine prolapse, tobacco use      Today, Mrs. Elliott is intubated and sedated; Management plan as detailed above     ECG: personally reviewed; normal sinus rhythm, nonspecific ST and T waves changes, RBBB, left axis deviation.     ECHO (personnaly Reviewed on 8/19/24):   1. The left ventricle is normal in size. Left ventricular function is  normal.The ejection fraction is 55-60%.  2. Normal right ventricle size and systolic function.  3. Large vegetation on mitral valve (8 mm x 15) attached to posterior leaflet.  4. There is a small vegetation on the aortic valve (6 mm). No evidence of  aortic root abscess identified.  5. Severe valvular aortic stenosis (SHU:0.8 cm2, peak nomi: 4.6 m/sec, mean  gradient: 51 mmHg).    Telemetry: personally reviewed September 1, 2024; notable for paced rhythm     Medical history and pertinent documents reviewed in Care Everywhere please where applicable see details above        Physical Examination Review of Systems   /72   Pulse 89   Temp 99  F (37.2  C) (Oral)   Resp 24   Ht 1.702 m (5' 7\")   Wt 98.4 kg (216 lb 14.4 oz)   SpO2 97%   BMI 33.97 kg/m    Body mass index is 33.97 kg/m .  Wt Readings from Last 3 Encounters:   09/01/24 98.4 kg (216 lb 14.4 oz)   08/15/24 90.1 kg (198 lb 9.6 oz)     General Appearance:   Intubated/sedated   ENT/Mouth: membranes moist, no oral lesions or bleeding gums.      EYES:  no scleral icterus, normal conjunctivae   Neck: no carotid bruits or thyromegaly   Chest/Lungs:   lungs are clear to auscultation, no rales or wheezing, equal chest wall expansion    Cardiovascular:   Regular. Normal first and second heart sounds with no murmurs, rubs, or gallops; the carotid, radial and posterior tibial pulses are intact, anasarca  "   Abdomen:  no organomegaly, masses, bruits, or tenderness; bowel sounds are present   Extremities: no cyanosis or clubbing   Skin: no xanthelasma, warm.    Neurologic: Intubated/sedated     Psychiatric: Intubated/sedated    A complete 10 systems ROS was reviewed  And is negative except what is listed in the HPI.          Medical History  Surgical History Family History Social History   History reviewed. No pertinent past medical history. Past Surgical History:   Procedure Laterality Date    CORONARY ARTERY BYPASS GRAFT, WITH AORTIC VALVE REPLACEMENT, WITH ENDOSCOPIC VESSEL PROCUREMENT N/A 8/27/2024    Procedure: CORONARY ARTERY BYPASS GRAFT TIMES TWO, WITH AORTIC ROOT REPLACEMENT, WITH LEFT INTERNAL MAMMARY ARTERY HARVEST, LEFT SAPHNENOUS ENDOSCOPIC VESSEL PROCUREMENT,;  Surgeon: Dylan Renae MD;  Location: [facility] OR    CV CORONARY ANGIOGRAM N/A 8/20/2024    Procedure: CV CORONARY ANGIOGRAM;  Surgeon: Den Wylie MD;  Location: [facility] CATH LAB CV    REPLACE VALVE MITRAL N/A 8/27/2024    Procedure: MITRAL VALVE REPLACEMENT,;  Surgeon: Dylan Renae MD;  Location: [facility] OR    TRANSESOPHAGEAL ECHOCARDIOGRAM INTRAOPERATIVE  8/27/2024    Procedure: ANESTHESIA TRANSESOPHAGEAL ECHOCARDIOGRAM, EPI-AORTIC ULTRASOUND;  Surgeon: Dylan Renae MD;  Location: [facility] OR    no family history of premature coronary artery disease Social History     Socioeconomic History    Marital status:      Spouse name: Not on file    Number of children: Not on file    Years of education: Not on file    Highest education level: Not on file   Occupational History    Not on file   Tobacco Use    Smoking status: Not on file    Smokeless tobacco: Not on file   Substance and Sexual Activity    Alcohol use: Not on file    Drug use: Not on file    Sexual activity: Not on file   Other Topics Concern    Not on file   Social History Narrative    Not on file     Social  Determinants of Health     Financial Resource Strain: Low Risk  (8/24/2024)    Financial Resource Strain     Within the past 12 months, have you or your family members you live with been unable to get utilities (heat, electricity) when it was really needed?: No   Food Insecurity: Low Risk  (8/24/2024)    Food Insecurity     Within the past 12 months, did you worry that your food would run out before you got money to buy more?: No     Within the past 12 months, did the food you bought just not last and you didn t have money to get more?: No   Transportation Needs: Low Risk  (8/24/2024)    Transportation Needs     Within the past 12 months, has lack of transportation kept you from medical appointments, getting your medicines, non-medical meetings or appointments, work, or from getting things that you need?: No   Physical Activity: Not on file   Stress: Not on file   Social Connections: Unknown (1/3/2024)    Received [facility]    Social Connections     Frequency of Communication with Friends and Family: Not on file   Interpersonal Safety: High Risk (8/23/2024)    Interpersonal Safety     Do you feel physically and emotionally safe where you currently live?: No     Within the past 12 months, have you been hit, slapped, kicked or otherwise physically hurt by someone?: No     Within the past 12 months, have you been humiliated or emotionally abused in other ways by your partner or ex-partner?: No   Housing Stability: Low Risk  (8/24/2024)    Housing Stability     Do you have housing? : Yes     Are you worried about losing your housing?: No           Lab Results    Chemistry/lipid CBC Cardiac Enzymes/BNP/TSH/INR   Lab Results   Component Value Date    BUN 12.9 09/01/2024     09/01/2024    CO2 19 (L) 09/01/2024    Lab Results   Component Value Date    WBC 10.5 09/01/2024    HGB 8.0 (L) 09/01/2024    HCT 24.3 (L) 09/01/2024    MCV 92 09/01/2024    PLT 63 (L) 09/01/2024    Lab  "Results   Component Value Date    INR 1.38 (H) 09/01/2024     No results found for: \"CKTOTAL\", \"CKMB\", \"TROPONINI\"       Weight:    Wt Readings from Last 3 Encounters:   09/01/24 98.4 kg (216 lb 14.4 oz)   08/15/24 90.1 kg (198 lb 9.6 oz)       Allergies  Allergies   Allergen Reactions    Aspirin Rash    Cats Rash    Nickel Rash         Surgical History  Past Surgical History:   Procedure Laterality Date    CORONARY ARTERY BYPASS GRAFT, WITH AORTIC VALVE REPLACEMENT, WITH ENDOSCOPIC VESSEL PROCUREMENT N/A 8/27/2024    Procedure: CORONARY ARTERY BYPASS GRAFT TIMES TWO, WITH AORTIC ROOT REPLACEMENT, WITH LEFT INTERNAL MAMMARY ARTERY HARVEST, LEFT SAPHNENOUS ENDOSCOPIC VESSEL PROCUREMENT,;  Surgeon: Dylan Renae MD;  Location: [facility] OR    CV CORONARY ANGIOGRAM N/A 8/20/2024    Procedure: CV CORONARY ANGIOGRAM;  Surgeon: Den Wylie MD;  Location: [facility] CATH LAB CV    REPLACE VALVE MITRAL N/A 8/27/2024    Procedure: MITRAL VALVE REPLACEMENT,;  Surgeon: Dylan Renae MD;  Location: [facility] OR    TRANSESOPHAGEAL ECHOCARDIOGRAM INTRAOPERATIVE  8/27/2024    Procedure: ANESTHESIA TRANSESOPHAGEAL ECHOCARDIOGRAM, EPI-AORTIC ULTRASOUND;  Surgeon: Dylan Renae MD;  Location: [facility] OR       Social History  Tobacco:   History   Smoking Status    Not on file   Smokeless Tobacco    Not on file    Alcohol:   Social History    Substance and Sexual Activity      Alcohol use: Not on file   Illicit Drugs:   History   Drug Use Not on file       Family History  History reviewed. No pertinent family history.       Jaswant Solis MD  Clinical Cardiac Electrophysiology        cc: Carol, [facility]    "

## 2024-09-01 NOTE — PROCEDURES
[facility]    Procedure: Non-tunneled dialysis catheter placement    Date/Time: 9/1/2024 12:04 PM    Performed by: Catrachito Reyes MD  Authorized by: Catrachito Reyes MD  IR Fellow Physician:    Pre Procedure Diagnosis: HAMMAD  Post Procedure Diagnosis: Same    UNIVERSAL PROTOCOL   Site Marked: NA  Prior Images Obtained and Reviewed:  NA  Required items: Required blood products, implants, devices and special equipment available    Patient identity confirmed:  Verbally with patient  NA - No sedation, light sedation, or local anesthesia  Confirmation Checklist:  Patient's identity using two indicators  Universal Protocol: the Joint Commission Universal Protocol was followed      SEDATION    Patient Sedated: No    Findings: Successful non-tunneled dialysis catheter placement via the left internal jugular.      PROCEDURE    Length of time physician/provider present for 1:1 monitoring during sedation:  0 min    Pool Reyes MD  Interventional Radiology

## 2024-09-01 NOTE — PROGRESS NOTES
Patient was transported to , on parapac.  Patient tolerated well.  Transport was done with no complications.  Was placed back on ventilator on the following settings:    FiO2 (%): 35 %, Resp: 24, Vent Mode: CMV/AC, Resp Rate (Set): 24 breaths/min, Tidal Volume (Set, mL): 420 mL, PEEP (cm H2O): 5 cmH2O, Resp Rate (Set): 24 breaths/min, Tidal Volume (Set, mL): 420 mL, PEEP (cm H2O): 5 cmH2O    Rolanda Helms, RT

## 2024-09-01 NOTE — PROGRESS NOTES
"Critical Care Progress Note     09/01/2024     Name: Felicitas Elliott MRN#: [id number]   Age: 84 year old YOB: 1940        Summary     Past medical history of moderate aortic stenosis, HFpEF, HTN, HLD, CKD 3, chronic intertriginous skin wounds, chronic neck pain       Presented 8/14/24 for chills & generalized weakness. 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 8/27/24 s/p bioprosthetic aortic & mitral valve replacement, aortic root replacement, & two vessel coronary artery bypass graft. Course complicated by post operative hypoxemic respiratory failure due to cardiogenic pulmonary edema, atelectasis, & possible Enterobacter hospital acquired pneumonia in conjunction with cardiogenic-distributive shock, oliguric HAMMAD requiring CRRT, euglycemic ketoacidosis, & suspected ischemic hepatitis       Interval Events     Remains on CRRT, net  mL/hr, volume status improving      Assessment & Plan      Neurology, Psychiatry, Sedation, Analgesia:  L-parietal septic embolic stroke   8/17/24 MRI brain \"cortical signal abnormality within the left superior parietal lobule which is favored to represent a subacute infarct [...] smaller foci of signal abnormality with similar characteristics favored to represent punctate subacute infarcts.\" 8/18/24 CTA head & neck \"low attenuation within the left parietal lobe [...] this is favored to reflect evolving small vessel ischemic change; however, MRI follow-up to   exclude a low-grade neoplastic process is advised\"  - neurology was consulted  - ongoing daily neuro assessment  - awakens, follows commands, no obvious focal deficits on exam     Cervical spinal stenosis   Chronic neck pain   8/15/24  cervical MRI \"multilevel cervical spondylosis [...] complex edema at C4-5 on the left is favored to be " "reactive/degenerative [...] septic facet arthropathy are difficult to entirely exclude [...] C4-5, moderate to severe spinal canal stenosis  [...] severe right and moderate to severe left neural foraminal stenosis  [...] C5-6, moderate spinal canal stenosis  [...]Severe bilateral neural foraminal stenosis  [...] C7-T1, mild spinal canal stenosis with severe right and mild to moderate left neural foraminal stenosis [...] C3-4, moderate bilateral neural foraminal stenosis.\"  - neurosurgery was consulted, no current surgical interventions advised  - consider cervical MRI w/wo if neck pain worsens to evaluate for sequelae of infection      Sedation & Analgesia   - RASS goal 0 to -1  - dexmedetomidine & fentanyl infusions    - prn APAP & tramadol     Cardiovascular:  Cardiogenic & distributive shock   Etiology multifactorial, post-cardiopulmonary bypass vasoplegia & low cardiac output syndrome in conjunction with sepsis due to residual aortic root abscess identified intraoperatively & possible Enterobacter HAP  - appreciate cardiology and CT surgery  - dobutamine ongoing at 1  - off vasopressin and norepi  - stress-dose hydrocortisone discontinued on 8/31  - MAP goal ~85 mmHg     Severe aortic stenosis, aortic & mitral valve infective endocarditis, aortic root abscess  8/27/24 bioprosthetic aortic & mitral valve replacement, aortic root replacement  - CT surgery managing: warfarin x 3 months, INR goal 2.5-3.5, no bridging needed     Severe multivessel CAD  8/27/24 2 vessel CABG  - clopidogrel on hold      Preserved pre & post- operative biventricular systolic function   Left ventricular hypertrophy with impaired diastolic function      Carotid arterial stenosis, mild  8/22/24 carotid US     Post-operative atrial fibrillation slow ventricular response   S/p placement of temporary epicardial pacing wires   Presumed post-operative sinus node dysfunction or AV conduction defect.   - V paced at 90 BPM     Respiratory, " Airway:  Acute hypoxemic respiratory failure - improved   Cardiogenic pulmonary edema, suspect nosocomial aspiration pneumonia-pneumonitis   Overnight 8/28-8/29 over 30 minutes period exhibited progressive rapidly worsening hypoxemia, nausea, vomiting, & multiple sequelae of hypervolemia with oliguria. 8/29/24 radiograph with progressive right basilar opacity & blunting of the costophrenic angle. 8/29/24 pleural US small right pleural effusion, no ipsilateral chest tube   - supplemental O2 to maintain spO2 >= 90-96% & PaO2 >= 60 mmHg  - lung protective ventilation (TV 6-8 mL/kg IBW, Pplat <30, driving pressure <15)   - target normocarbia pH 7.35 - 7.45, continuously monitored end tidal CO2  - empiric antimicrobials & infectious evaluation as below  - UF on CRRT, clinically still hypervolemic  - daily sedation holiday and SBT, hope to extubate soon    Post-operative chest tubes  - monitor output       Gastrointestinal:  Acute liver injury   Suspect ischemic hepatitis. Differential includes medications/toxins, viral hepatitis, autoimmune hepatitis, Budd-Chiari syndrome, hepatic artery thrombus, veno-occlusive disease, secondary hemophagocytic lymphohistiocytosis, et al  - consider further evaluation   - ordered liver ultrasound with doppler to evaluate for portal vein thrombus or hepatic arterial thormbus   - trend liver panels, INR, fibrinogen (transfuse for <100 mg/dL)  - remains on CRRT which should reduce serum level of ammonia if elevated   - consider N-Acetylcysteine for non-acetaminophen penitentiary (PMID 55445420; PMID 39077232; PMID 96006291; PMID 45681434), though transaminases now downtrending  - continue prophylactic PPI & avoid hypotonic fluid     Renal, Acid-base, Electrolytes, Volume :  Oliguric 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 Baseline 1.2 - 1.3. 8/31: UOP 10 mL/hr, still edematous  - appreciate nephrology consultation  - continue CRRT with  UF today, discussed with nephrology  - may be able to transition to IHD soon  - right femoral dialysis catheter, changed to left internal jugular temporary catheter by IR on 9/1    Hypervolemia  Grossly edematous throughout. Elevated CVP. Weight up several kilograms    Ketoacidosis, euglycemic   Etiology unclear, suspect CRRT implicated (PMID: [id number]) &/or starvation ketosis. 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   Question of cervical septic facet arthropathy   Presumed source intertriginous soft tissue lesions  - appreciate ID consultation  - hold nafcillin while broadened, as below     Concern for Enterobacter hospital acquired pneumonia    - pending - 8/29 sputum culture; 8/30 blood culture, UA with reflex   - negative - 8/29 MSSA nares, viral panel  - appreciate ID  - cefepime and vanco x 7 days, then back to cefazolin  - empiric PO vanco for C diff prophylaxis    Lennie-operative antibiotics per CV surgery      Hematology, Oncology:  Post-operative anemia  - monitor for bleeding: Hgb goal >7-8      Thrombocytopenia   Etiology multifactorial, sepsis, beta lactam antimicrobials, CRRT, & post-cardiopulmonary bypass, at least. Anticipate fall in platelet count after cardiopulmonary bypass in most patients, typically to levels approximately half of baseline, abel between postoperative days 2-4, & persists for 4 - 6 days following surgery. Anti-PF4-heparin CASPER testing is positive in 25% to 50% of patients between postoperative days 3 and 10, while HIT is confirmed in only 1% to 4% of patients following CPB   - 8/30/24 4 T score 3 low probability HIT (fall>50%, timing >10 days, no known thrombus, definite alternative causes in sepsis, CRRT, post-cardiac surgery)    Supratherapeutic INR   - vitamin K 5 mg per CV surgery      Endocrine:  MICU insulin/glucose protocol   - maintain BG of 140 to 180 mg/dL, currently on D20 for euglycemic ketoacidosis, not needing insulin     Euglycemic ketoacidosis, as above     Checklist:  FEN: NPO, start enteral nutrition via OG tube  VTE ppx: warfarin with goal INR 2.5-3.5  GI ppx: pantoprazole   Bowel regimen: PEG & senna   VAP ppx: Head of bed >30 degrees, daily oral care, chlorhexidine  Lines/tube-size: R-internal jugular introducer & PAC 8/27, radial arterial line 8/27, PICC 8/21, R-femoral HD catheter 8/29, chacko 8/27, ETT 8/29   Skin: sternotomy site clean & dry    PT/OT/SLP, early mobility: cardiac rehab when able   Communication: Updated the patient's spouse, Ed, by telephone.  Code Status: full       Physical Exam      Neurologic: Sedated. Opens eyes, tracks, & follows commands in all extremities symmetrically   HEENT: Head and face normal. No nasal discharge. Oropharynx normal. Eyelids, conjunctiva, & sclera normal.   Neck: Neck appearance normal. No neck masses. Thyroid not enlarged.  Respiratory: Lungs clear bilaterally. No wheezes or rhonchi.   Cardiovascular: Regular rate & rhythm  Heart sounds distant   Gastrointestinal: Soft. Obese.   Musculoskeletal: Skeletal configuration normal and muscle mass normal for age. Joint appearance overall normal.  Skin, Hair, & Nails: Skin color normal. Sternotomy site clean & dry  Hair & nails normal.  Extremities: 1+ lower extremity pitting edema. Warm     All pertinent vital signs, ventilator settings, I&Os, laboratory, microbiology, ECGs, & imaging data has been personally reviewed. Total Critical Care time, excluding procedures, was 63 minutes     Zachary Gonzalez MD  Pulmonary and Critical Care Medicine  [facility]  Office [phone]  he/him

## 2024-09-01 NOTE — PLAN OF CARE
Goal Outcome Evaluation:      Plan of Care Reviewed With: patient, family    Overall Patient Progress: improvingOverall Patient Progress: improving    Outcome Evaluation: Pt restless when awake, follows commands, attempts to sit up, tolerating CRRT, blood flow decreased, clarified with CV surg regarding anticoag. plan to do SBT despite groin access and CRRT, waiting direction from Nephrology.  Tube feeding tolerated with gradual increase, gradual decrease dobutamine per CV surg.

## 2024-09-01 NOTE — PLAN OF CARE
Goal Outcome Evaluation:      Plan of Care Reviewed With: patient, spouse    Overall Patient Progress: improvingOverall Patient Progress: improving    Outcome Evaluation: Taken to IR to replace dialysis catheter, now in left IJ. Right femoral dc'd.  pt able to sit, SBT for 11/2 hours with ABG drawn prior to extubation.  pt answers orientation year/hospital/name/family members.  [place]. follows commands.  Calm/ cooperative. family at bedside.  Dobutamin increased to 2 due to low CI; will monitor and titrate as able.  Platelets low, given 1 pack and continue to monitor.

## 2024-09-01 NOTE — PROGRESS NOTES
"Care Management Follow Up    Length of Stay (days): 16    Expected Discharge Date: 09/04/2024    Anticipated Discharge Plan:  TBD    Transportation: TBD    PT Recommendations:    OT Recommendations:        Barriers to Discharge: medical stability/ Vent Day #4. On multiple gtts. ID following. Neph following. Non tunneled dialysis cath placed today . CTS surgery following POD#4 s/p Aortic root abscess debridement and aortic annular reconstruction, aortic root replacement , Mitral valve replacement and CAB x 2.     Prior Living Situation: \"Patient resides in a house with her  and is reported as mostly independent when at baseline.  assists with transport and as needed in general. No DME use or current in-home/outpatient services identified. \"    Discussed  Partnership in Safe Discharge Planning  document with patient/family: No     Handoff Completed: Not at this time     Patient/Spokesperson Updated: No    Additional Information:    Cm updates:  No Cm updates at this time. Pt remains on vent support.        Next Steps: Cm will continue to follow plan of care,review recommendations ,and assist with any discharge needs anticipated.       Nayeli Gamez RN      "

## 2024-09-01 NOTE — PHARMACY-VANCOMYCIN DOSING SERVICE
Pharmacy Vancomycin Note  Date of Service 2024  Patient's  [id number]   84 year old, female    Indication: Aspiration Pneumonia  Day of Therapy: 3  Current vancomycin regimen:  750 mg IV q12h  Current vancomycin monitoring method: Trough (Method 2 = manual dose calculation)  Current vancomycin therapeutic monitoring goal: 15-20 mg/L    Current estimated CrCl = Estimated Creatinine Clearance: 49.9 mL/min (A) (based on SCr of 1.01 mg/dL (H)).    Creatinine for last 3 days  2024:  8:30 PM Creatinine 2.24 mg/dL  2024:  4:37 AM Creatinine 1.87 mg/dL;  1:22 PM Creatinine 1.51 mg/dL;  1:22 PM Creatinine 1.48 mg/dL;  8:17 PM Creatinine 1.31 mg/dL; 10:20 PM Creatinine 1.33 mg/dL  2024:  4:16 AM Creatinine 1.37 mg/dL;  4:16 AM Creatinine 1.37 mg/dL; 11:41 AM Creatinine 1.19 mg/dL; 11:41 AM Creatinine 1.16 mg/dL;  7:57 PM Creatinine 1.13 mg/dL; 10:08 PM Creatinine 1.06 mg/dL  2024:  3:56 AM Creatinine 1.05 mg/dL;  5:59 AM Creatinine 1.01 mg/dL    Recent Vancomycin Levels (past 3 days)  2024: 10:55 AM Vancomycin 24.3 ug/mL    Vancomycin IV Administrations (past 72 hours)                     vancomycin (VANCOCIN) 750 mg in sodium chloride 0.9 % 250 mL intermittent infusion (mg) 750 mg New Bag 24 1348     750 mg New Bag  0013     750 mg New Bag 24 1237     750 mg New Bag 24 2320    vancomycin (VANCOCIN) 2,000 mg in sodium chloride 0.9 % 250 mL intermittent infusion (mg) 2,000 mg New Bag 24 1211                    Nephrotoxins and other renal medications (From now, onward)      Start     Dose/Rate Route Frequency Ordered Stop    24 0200  vancomycin (VANCOCIN) 750 mg in sodium chloride 0.9 % 250 mL intermittent infusion         750 mg  over 60 Minutes Intravenous EVERY 24 HOURS 24 1406      08/30/24 2100  vancomycin (FIRVANQ) oral solution 125 mg         125 mg Oral 2 TIMES DAILY 24 1450      24 2200  vasopressin (VASOSTRICT) 20 Units in sodium  chloride 0.9 % 100 mL standard conc infusion         2.4 Units/hr  12 mL/hr  Intravenous CONTINUOUS 08/29/24 2130      08/29/24 1500  norepinephrine (LEVOPHED) 16 mg in sodium chloride 0.9 % 250 mL infusion CENTRAL         0.01-0.6 mcg/kg/min × 101 kg  0.9-56.8 mL/hr  Intravenous CONTINUOUS 08/29/24 1449                 Contrast Orders - past 72 hours (72h ago, onward)      Start     Dose/Rate Route Frequency Stop    09/01/24 1230  iohexol (OMNIPAQUE) 350 MG/ML injectable solution 50 mL         50 mL Intravenous ONCE 09/01/24 1207            Interpretation of levels and current regimen:  Vancomycin level is reflective of supratherapeutic level      Plan:  Decrease Dose to vancomycin 750mg IV q24h  Vancomycin monitoring method: Trough (Method 2 = manual dose calculation)  Vancomycin therapeutic monitoring goal: 15-20 mg/L  Pharmacy will check vancomycin levels as appropriate in 1-3 Days.  Plan will change if/when patient changes from CRRT to iHD  Serum creatinine levels will be ordered  per CRRT orders .    Vianey Lenz [facility]

## 2024-09-01 NOTE — PROGRESS NOTES
Patient Name: Felicitas Elliott  Medical Record Number: [id number]  Today's Date: 9/1/2024    Procedure: non tunneled  Proceduralist: dr carrington    Sedation medications administered: 0 mg midazolam and 0 mcg fentanyl   Sedation time: 0 minutes    Primary RN remained with pt during duration of visit to IR for non tunneled placement report given while in IR and pt transported back to room

## 2024-09-01 NOTE — PROGRESS NOTES
Renal progress note  CC:PEDRO  Assessment and Plan:  84 year old female with hx of CHF, HFpEF , CKD 3/4, HLD , left renal atrophy, CAD and chronic neck pain , admitted with chills and weakness. Found to have MSSA bacteremia cb MV and AV infective endocarditis , 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 , MvCAD: underwent biprothetic Avand MV replacement , Aortic root replacement and 2vCABG,. Post Sx cb Pedro , anuria , acute resp failure and hemodynamic instability on pressers. With worsening resp status and need for oxygenation , concerns for near re-intubation and anuria with no response to diuresis nephrology consulted for advanced CKD , Pedro and volume management     PEDRO anuric  anuria and anephric rise in creat in last 24hrs  Bl CKD 3/4 with creatinine 1.3-1.8 and GFR ~30-35  UA with hyaline casts , mild proteins and WBC  UPCR 0.1g/g minimal  Imaging with left renal cortical thinning ~8cm kidney , rt 9.7cm kideny with simple cysts (4/2024)  Follows with [facility]  CKD has been attributed to long standing NSAID use : reviewed extensive rheum work up -ve in 4/2024 , no prior paraprotein work up  Current PEDRO , appears CRS with likely a hemodynamic insult to the kidney; with recent embolic process hard to exclude embolic disease , will check complements and LDH  --> with anuria and hypervolemia  Started on CRRT with significant hemodynamic instability  CRRT prescription increased to 35ml/kg with acidosis 10ml/kg pre filter and 24.5 ml/kg dialysate K4  UFR 50-150ml/hr as tolerated: D 20 for ketosis  Now off sodium bicarb with stable results  --> Labs per CRRT protocols , 4K, saline potassium replacement  --> strict IO    Access femoral   Unable to place left IJ on 8/29, placed fem , started on CRRT  LIJ placed by IR, nontunneled   Will continue on CRRt if anticipated long term needs then tunneled line next wk     Hypervolemia  Hypotension with likely cardiogenic shock state  PTA no anti Htn , BP  managed with diet alone  --> on Dobutamine , was also on vaso and levo after correction of acidosis PRBCs and FFP's yesterday now stabilizing blood pressure and plans for dobutamine wean  --> plans for PRN albumin for increased vasopresser needs  Goal UFR  per hr  --> follow with CRRT  Now off lasix     Worsening acidosis bicarb down  Had to be started on peripheral bicarb for stability  Lactic acidosis--> improving   Ketosis likely starvation ketosis now ED 20  With high-dose CRRT     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  Monitor on Labs     Mild hypokalemia  Monitor on labs replace per protocol     Mild hypernatremia , improved     Anemia  Acute on chronic  Likely inflamm  --> transfuse if less then 8 or per cardiology recs     MSSA bacteremia  Cb valve endocarditis  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  Thrombocytopenia and coagulopathy concerning for septic shock  Some concerns for cervical septic facet arthropathy  On Naficillin  ID following     Acute resp failure  Sec to hypervolemia and cardiogenic shock  Volume management as above  Management o resp failure per ICU  Reintubated 8/29/2024 improving FiO2 needs with UF  Vent management per ICU     MV and AV endocarditis   Aortic Root Abscess   CT Sx dr Renae, following  8/27/24: s/p Aortic root abscess debridement and replacement AV and MV replacement and CABG  x 2         Thank you for the consultation we will follow  Moses Mncair MD  [facility]  [phone]      Subjective  Patient continues on mechanical ventilation oxygenation , planning wean  LIJ non tunneled placed by IR today  needs have been stable has been tolerating UF far better minimal sedation   opens eyes and follows commands remains with minimal urine output  Acidosis mildly improved   Labs reviewed  Low platelets limit heparin in circuit  Filter clotted x2 over last 12 hrs , hoping better results with LIJ, BFR increased to  250    Objective    Vital signs in last 24 hours  Temp:  [98.8  F (37.1  C)-99.5  F (37.5  C)] 99  F (37.2  C)  Pulse:  [80-90] 89  Resp:  [20-36] 24  MAP:  [0 mmHg-175 mmHg] 90 mmHg  Arterial Line BP: (0-197)/(0-187) 130/70  FiO2 (%):  [35 %] 35 %  SpO2:  [89 %-100 %] 96 %  Weight:   [unfilled]    Intake/Output last 3 shifts  I/O last 3 completed shifts:  In: 4002.29 [I.V.:3253.29; NG/GT:509]  Out: 5887.2 [Urine:307; Other:5230.2; Stool:70; Chest Tube:280]  Intake/Output this shift:  I/O this shift:  In: 107.2 [I.V.:73.2; NG/GT:34]  Out: 218.8 [Other:218.8]    Physical Exam  Intubated NAD  CV: RRR +  murmur or rub  Lung: clear and equal; no extra sounds  Ab: soft and NT; not distended; normal bs  Ext: + edema and well perfused  Skin; no rash    Pertinent Labs   Lab Results   Component Value Date    WBC 10.5 09/01/2024    HGB 8.0 (L) 09/01/2024    HCT 24.3 (L) 09/01/2024    MCV 92 09/01/2024    PLT 63 (L) 09/01/2024     Lab Results   Component Value Date    BUN 12.9 09/01/2024     09/01/2024    CO2 19 (L) 09/01/2024       Lab Results   Component Value Date    ALBUMIN 3.4 (L) 09/01/2024     Lab Results   Component Value Date    PHOS 2.9 09/01/2024     I reviewed all lab results  Moses Mcnair MD

## 2024-09-02 ENCOUNTER — APPOINTMENT (OUTPATIENT)
Dept: SPEECH THERAPY | Facility: HOSPITAL | Age: 84
DRG: 853 | End: 2024-09-02
Attending: INTERNAL MEDICINE
Payer: COMMERCIAL

## 2024-09-02 LAB
ALBUMIN SERPL BCG-MCNC: 3.4 G/DL (ref 3.5–5.2)
ALBUMIN SERPL BCG-MCNC: 3.5 G/DL (ref 3.5–5.2)
ALBUMIN SERPL BCG-MCNC: 3.6 G/DL (ref 3.5–5.2)
ALBUMIN SERPL BCG-MCNC: 3.6 G/DL (ref 3.5–5.2)
ALP SERPL-CCNC: 57 U/L (ref 40–150)
ALP SERPL-CCNC: 59 U/L (ref 40–150)
ALP SERPL-CCNC: 59 U/L (ref 40–150)
ALP SERPL-CCNC: 60 U/L (ref 40–150)
ALT SERPL W P-5'-P-CCNC: 139 U/L (ref 0–50)
ALT SERPL W P-5'-P-CCNC: 147 U/L (ref 0–50)
ALT SERPL W P-5'-P-CCNC: 181 U/L (ref 0–50)
ALT SERPL W P-5'-P-CCNC: 287 U/L (ref 0–50)
ANION GAP SERPL CALCULATED.3IONS-SCNC: 16 MMOL/L (ref 7–15)
ANION GAP SERPL CALCULATED.3IONS-SCNC: 16 MMOL/L (ref 7–15)
ANION GAP SERPL CALCULATED.3IONS-SCNC: 18 MMOL/L (ref 7–15)
ANION GAP SERPL CALCULATED.3IONS-SCNC: 18 MMOL/L (ref 7–15)
ANION GAP SERPL CALCULATED.3IONS-SCNC: 19 MMOL/L (ref 7–15)
ANION GAP SERPL CALCULATED.3IONS-SCNC: 20 MMOL/L (ref 7–15)
AST SERPL W P-5'-P-CCNC: 276 U/L (ref 0–45)
AST SERPL W P-5'-P-CCNC: 296 U/L (ref 0–45)
AST SERPL W P-5'-P-CCNC: 586 U/L (ref 0–45)
AST SERPL W P-5'-P-CCNC: 992 U/L (ref 0–45)
BASE EXCESS BLDV CALC-SCNC: -6.1 MMOL/L (ref -3–3)
BASO STIPL BLD QL SMEAR: PRESENT
BILIRUB DIRECT SERPL-MCNC: 0.58 MG/DL (ref 0–0.3)
BILIRUB SERPL-MCNC: 1.2 MG/DL
BILIRUB SERPL-MCNC: 1.3 MG/DL
BUN SERPL-MCNC: 11.7 MG/DL (ref 8–23)
BUN SERPL-MCNC: 13.7 MG/DL (ref 8–23)
BUN SERPL-MCNC: 14.3 MG/DL (ref 8–23)
BUN SERPL-MCNC: 16 MG/DL (ref 8–23)
BUN SERPL-MCNC: 16.2 MG/DL (ref 8–23)
BUN SERPL-MCNC: 16.9 MG/DL (ref 8–23)
BURR CELLS BLD QL SMEAR: SLIGHT
C3 SERPL-MCNC: 61 MG/DL (ref 81–157)
C4 SERPL-MCNC: 10 MG/DL (ref 13–39)
CA-I BLD-MCNC: 4.3 MG/DL (ref 4.4–5.2)
CA-I BLD-MCNC: 4.5 MG/DL (ref 4.4–5.2)
CA-I BLD-MCNC: 4.5 MG/DL (ref 4.4–5.2)
CA-I BLD-MCNC: 4.7 MG/DL (ref 4.4–5.2)
CALCIUM SERPL-MCNC: 7.9 MG/DL (ref 8.8–10.4)
CALCIUM SERPL-MCNC: 8 MG/DL (ref 8.8–10.4)
CALCIUM SERPL-MCNC: 8 MG/DL (ref 8.8–10.4)
CALCIUM SERPL-MCNC: 8.2 MG/DL (ref 8.8–10.4)
CALCIUM SERPL-MCNC: 8.3 MG/DL (ref 8.8–10.4)
CALCIUM SERPL-MCNC: 8.3 MG/DL (ref 8.8–10.4)
CHLORIDE SERPL-SCNC: 100 MMOL/L (ref 98–107)
CHLORIDE SERPL-SCNC: 102 MMOL/L (ref 98–107)
CHLORIDE SERPL-SCNC: 102 MMOL/L (ref 98–107)
CHLORIDE SERPL-SCNC: 103 MMOL/L (ref 98–107)
CHLORIDE SERPL-SCNC: 104 MMOL/L (ref 98–107)
CHLORIDE SERPL-SCNC: 104 MMOL/L (ref 98–107)
CREAT SERPL-MCNC: 0.95 MG/DL (ref 0.51–0.95)
CREAT SERPL-MCNC: 1.12 MG/DL (ref 0.51–0.95)
CREAT SERPL-MCNC: 1.15 MG/DL (ref 0.51–0.95)
CREAT SERPL-MCNC: 1.27 MG/DL (ref 0.51–0.95)
CREAT SERPL-MCNC: 1.28 MG/DL (ref 0.51–0.95)
CREAT SERPL-MCNC: 1.28 MG/DL (ref 0.51–0.95)
EGFRCR SERPLBLD CKD-EPI 2021: 41 ML/MIN/1.73M2
EGFRCR SERPLBLD CKD-EPI 2021: 47 ML/MIN/1.73M2
EGFRCR SERPLBLD CKD-EPI 2021: 48 ML/MIN/1.73M2
EGFRCR SERPLBLD CKD-EPI 2021: 59 ML/MIN/1.73M2
ELLIPTOCYTES BLD QL SMEAR: SLIGHT
ERYTHROCYTE [DISTWIDTH] IN BLOOD BY AUTOMATED COUNT: 18.1 % (ref 10–15)
ERYTHROCYTE [DISTWIDTH] IN BLOOD BY AUTOMATED COUNT: 18.3 % (ref 10–15)
ERYTHROCYTE [DISTWIDTH] IN BLOOD BY AUTOMATED COUNT: 18.6 % (ref 10–15)
GLUCOSE BLDC GLUCOMTR-MCNC: 74 MG/DL (ref 70–99)
GLUCOSE BLDC GLUCOMTR-MCNC: 75 MG/DL (ref 70–99)
GLUCOSE BLDC GLUCOMTR-MCNC: 76 MG/DL (ref 70–99)
GLUCOSE BLDC GLUCOMTR-MCNC: 79 MG/DL (ref 70–99)
GLUCOSE BLDC GLUCOMTR-MCNC: 80 MG/DL (ref 70–99)
GLUCOSE BLDC GLUCOMTR-MCNC: 88 MG/DL (ref 70–99)
GLUCOSE SERPL-MCNC: 76 MG/DL (ref 70–99)
GLUCOSE SERPL-MCNC: 77 MG/DL (ref 70–99)
GLUCOSE SERPL-MCNC: 78 MG/DL (ref 70–99)
GLUCOSE SERPL-MCNC: 78 MG/DL (ref 70–99)
GLUCOSE SERPL-MCNC: 89 MG/DL (ref 70–99)
GLUCOSE SERPL-MCNC: 90 MG/DL (ref 70–99)
HCO3 BLDV-SCNC: 21 MMOL/L (ref 21–28)
HCO3 SERPL-SCNC: 18 MMOL/L (ref 22–29)
HCO3 SERPL-SCNC: 19 MMOL/L (ref 22–29)
HCO3 SERPL-SCNC: 20 MMOL/L (ref 22–29)
HCT VFR BLD AUTO: 25.7 % (ref 35–47)
HCT VFR BLD AUTO: 26.4 % (ref 35–47)
HCT VFR BLD AUTO: 27.2 % (ref 35–47)
HGB BLD-MCNC: 8.1 G/DL (ref 11.7–15.7)
HGB BLD-MCNC: 8.3 G/DL (ref 11.7–15.7)
HGB BLD-MCNC: 8.4 G/DL (ref 11.7–15.7)
INR PPP: 1.48 (ref 0.85–1.15)
MAGNESIUM SERPL-MCNC: 2.3 MG/DL (ref 1.7–2.3)
MAGNESIUM SERPL-MCNC: 2.4 MG/DL (ref 1.7–2.3)
MAGNESIUM SERPL-MCNC: 2.5 MG/DL (ref 1.7–2.3)
MCH RBC QN AUTO: 29.8 PG (ref 26.5–33)
MCH RBC QN AUTO: 30.1 PG (ref 26.5–33)
MCH RBC QN AUTO: 30.5 PG (ref 26.5–33)
MCHC RBC AUTO-ENTMCNC: 30.9 G/DL (ref 31.5–36.5)
MCHC RBC AUTO-ENTMCNC: 31.4 G/DL (ref 31.5–36.5)
MCHC RBC AUTO-ENTMCNC: 31.5 G/DL (ref 31.5–36.5)
MCV RBC AUTO: 96 FL (ref 78–100)
MCV RBC AUTO: 97 FL (ref 78–100)
MCV RBC AUTO: 97 FL (ref 78–100)
O2/TOTAL GAS SETTING VFR VENT: 30 %
OXYHGB MFR BLDV: 50 % (ref 70–75)
PCO2 BLDV: 45 MM HG (ref 40–50)
PH BLDV: 7.27 [PH] (ref 7.32–7.43)
PHOSPHATE SERPL-MCNC: 4.3 MG/DL (ref 2.5–4.5)
PHOSPHATE SERPL-MCNC: 5 MG/DL (ref 2.5–4.5)
PHOSPHATE SERPL-MCNC: 5.2 MG/DL (ref 2.5–4.5)
PLAT MORPH BLD: ABNORMAL
PLATELET # BLD AUTO: 49 10E3/UL (ref 150–450)
PLATELET # BLD AUTO: 60 10E3/UL (ref 150–450)
PLATELET # BLD AUTO: 61 10E3/UL (ref 150–450)
PO2 BLDV: 31 MM HG (ref 25–47)
POTASSIUM SERPL-SCNC: 4.5 MMOL/L (ref 3.4–5.3)
POTASSIUM SERPL-SCNC: 4.7 MMOL/L (ref 3.4–5.3)
POTASSIUM SERPL-SCNC: 4.7 MMOL/L (ref 3.4–5.3)
POTASSIUM SERPL-SCNC: 4.8 MMOL/L (ref 3.4–5.3)
POTASSIUM SERPL-SCNC: 4.8 MMOL/L (ref 3.4–5.3)
POTASSIUM SERPL-SCNC: 4.9 MMOL/L (ref 3.4–5.3)
PROT SERPL-MCNC: 5.9 G/DL (ref 6.4–8.3)
PROT SERPL-MCNC: 6 G/DL (ref 6.4–8.3)
PROT SERPL-MCNC: 6 G/DL (ref 6.4–8.3)
PROT SERPL-MCNC: 6.2 G/DL (ref 6.4–8.3)
RBC # BLD AUTO: 2.69 10E6/UL (ref 3.8–5.2)
RBC # BLD AUTO: 2.72 10E6/UL (ref 3.8–5.2)
RBC # BLD AUTO: 2.82 10E6/UL (ref 3.8–5.2)
RBC MORPH BLD: ABNORMAL
SAO2 % BLDV: 51 % (ref 70–75)
SODIUM SERPL-SCNC: 138 MMOL/L (ref 135–145)
SODIUM SERPL-SCNC: 139 MMOL/L (ref 135–145)
SODIUM SERPL-SCNC: 140 MMOL/L (ref 135–145)
SODIUM SERPL-SCNC: 140 MMOL/L (ref 135–145)
TROPONIN T SERPL HS-MCNC: 498 NG/L
TROPONIN T SERPL HS-MCNC: 549 NG/L
TROPONIN T SERPL HS-MCNC: 950 NG/L
UFH PPP CHRO-ACNC: <0.1 IU/ML
WBC # BLD AUTO: 10.6 10E3/UL (ref 4–11)
WBC # BLD AUTO: 12.8 10E3/UL (ref 4–11)
WBC # BLD AUTO: 13.2 10E3/UL (ref 4–11)

## 2024-09-02 PROCEDURE — 85027 COMPLETE CBC AUTOMATED: CPT | Performed by: INTERNAL MEDICINE

## 2024-09-02 PROCEDURE — 93010 ELECTROCARDIOGRAM REPORT: CPT | Mod: 76 | Performed by: STUDENT IN AN ORGANIZED HEALTH CARE EDUCATION/TRAINING PROGRAM

## 2024-09-02 PROCEDURE — 258N000003 HC RX IP 258 OP 636: Performed by: INTERNAL MEDICINE

## 2024-09-02 PROCEDURE — 82805 BLOOD GASES W/O2 SATURATION: CPT | Performed by: SURGERY

## 2024-09-02 PROCEDURE — 250N000011 HC RX IP 250 OP 636: Performed by: INTERNAL MEDICINE

## 2024-09-02 PROCEDURE — 250N000011 HC RX IP 250 OP 636

## 2024-09-02 PROCEDURE — 200N000001 HC R&B ICU

## 2024-09-02 PROCEDURE — 250N000009 HC RX 250: Performed by: PHYSICIAN ASSISTANT

## 2024-09-02 PROCEDURE — 250N000009 HC RX 250: Performed by: INTERNAL MEDICINE

## 2024-09-02 PROCEDURE — 85520 HEPARIN ASSAY: CPT | Performed by: INTERNAL MEDICINE

## 2024-09-02 PROCEDURE — 99233 SBSQ HOSP IP/OBS HIGH 50: CPT | Performed by: INTERNAL MEDICINE

## 2024-09-02 PROCEDURE — 83735 ASSAY OF MAGNESIUM: CPT | Performed by: INTERNAL MEDICINE

## 2024-09-02 PROCEDURE — 84100 ASSAY OF PHOSPHORUS: CPT | Performed by: INTERNAL MEDICINE

## 2024-09-02 PROCEDURE — 250N000011 HC RX IP 250 OP 636: Performed by: RADIOLOGY

## 2024-09-02 PROCEDURE — 80048 BASIC METABOLIC PNL TOTAL CA: CPT | Performed by: PHYSICIAN ASSISTANT

## 2024-09-02 PROCEDURE — 999N000157 HC STATISTIC RCP TIME EA 10 MIN

## 2024-09-02 PROCEDURE — 80069 RENAL FUNCTION PANEL: CPT | Performed by: INTERNAL MEDICINE

## 2024-09-02 PROCEDURE — 99232 SBSQ HOSP IP/OBS MODERATE 35: CPT | Performed by: INTERNAL MEDICINE

## 2024-09-02 PROCEDURE — 93005 ELECTROCARDIOGRAM TRACING: CPT

## 2024-09-02 PROCEDURE — 85610 PROTHROMBIN TIME: CPT | Performed by: PHYSICIAN ASSISTANT

## 2024-09-02 PROCEDURE — 250N000013 HC RX MED GY IP 250 OP 250 PS 637: Performed by: INTERNAL MEDICINE

## 2024-09-02 PROCEDURE — 250N000011 HC RX IP 250 OP 636: Performed by: STUDENT IN AN ORGANIZED HEALTH CARE EDUCATION/TRAINING PROGRAM

## 2024-09-02 PROCEDURE — 84484 ASSAY OF TROPONIN QUANT: CPT | Performed by: PHYSICIAN ASSISTANT

## 2024-09-02 PROCEDURE — 80053 COMPREHEN METABOLIC PANEL: CPT | Performed by: PHYSICIAN ASSISTANT

## 2024-09-02 PROCEDURE — 250N000009 HC RX 250: Performed by: STUDENT IN AN ORGANIZED HEALTH CARE EDUCATION/TRAINING PROGRAM

## 2024-09-02 PROCEDURE — 250N000011 HC RX IP 250 OP 636: Performed by: PHYSICIAN ASSISTANT

## 2024-09-02 PROCEDURE — 84155 ASSAY OF PROTEIN SERUM: CPT | Performed by: INTERNAL MEDICINE

## 2024-09-02 PROCEDURE — 90945 DIALYSIS ONE EVALUATION: CPT | Performed by: INTERNAL MEDICINE

## 2024-09-02 PROCEDURE — 94640 AIRWAY INHALATION TREATMENT: CPT

## 2024-09-02 PROCEDURE — 82330 ASSAY OF CALCIUM: CPT | Performed by: PHYSICIAN ASSISTANT

## 2024-09-02 PROCEDURE — 99291 CRITICAL CARE FIRST HOUR: CPT | Performed by: INTERNAL MEDICINE

## 2024-09-02 PROCEDURE — 94640 AIRWAY INHALATION TREATMENT: CPT | Mod: 76

## 2024-09-02 PROCEDURE — 82330 ASSAY OF CALCIUM: CPT | Performed by: INTERNAL MEDICINE

## 2024-09-02 PROCEDURE — 250N000013 HC RX MED GY IP 250 OP 250 PS 637: Performed by: PHYSICIAN ASSISTANT

## 2024-09-02 PROCEDURE — 92610 EVALUATE SWALLOWING FUNCTION: CPT | Mod: GN

## 2024-09-02 PROCEDURE — 82248 BILIRUBIN DIRECT: CPT | Performed by: INTERNAL MEDICINE

## 2024-09-02 PROCEDURE — 90947 DIALYSIS REPEATED EVAL: CPT

## 2024-09-02 RX ORDER — VANCOMYCIN HYDROCHLORIDE 125 MG/1
125 CAPSULE ORAL 2 TIMES DAILY
Status: DISCONTINUED | OUTPATIENT
Start: 2024-09-02 | End: 2024-09-04

## 2024-09-02 RX ADMIN — CALCIUM CHLORIDE, MAGNESIUM CHLORIDE, SODIUM CHLORIDE, SODIUM BICARBONATE, POTASSIUM CHLORIDE AND SODIUM PHOSPHATE DIBASIC DIHYDRATE 24.5 ML/KG/HR: 3.68; 3.05; 6.34; 3.09; .314; .187 INJECTION INTRAVENOUS at 22:25

## 2024-09-02 RX ADMIN — CALCIUM CHLORIDE, MAGNESIUM CHLORIDE, SODIUM CHLORIDE, SODIUM BICARBONATE, POTASSIUM CHLORIDE AND SODIUM PHOSPHATE DIBASIC DIHYDRATE 200 ML/HR: 3.68; 3.05; 6.34; 3.09; .314; .187 INJECTION INTRAVENOUS at 18:08

## 2024-09-02 RX ADMIN — HYDROMORPHONE HYDROCHLORIDE 0.2 MG: 0.2 INJECTION, SOLUTION INTRAMUSCULAR; INTRAVENOUS; SUBCUTANEOUS at 19:55

## 2024-09-02 RX ADMIN — PANTOPRAZOLE SODIUM 40 MG: 40 INJECTION, POWDER, FOR SOLUTION INTRAVENOUS at 09:45

## 2024-09-02 RX ADMIN — CEFEPIME HYDROCHLORIDE 2 G: 2 INJECTION, POWDER, FOR SOLUTION INTRAVENOUS at 05:23

## 2024-09-02 RX ADMIN — SODIUM CHLORIDE 30 MG/ML INHALATION SOLUTION 3 ML: 30 SOLUTION INHALANT at 08:13

## 2024-09-02 RX ADMIN — CALCIUM CHLORIDE, MAGNESIUM CHLORIDE, SODIUM CHLORIDE, SODIUM BICARBONATE, POTASSIUM CHLORIDE AND SODIUM PHOSPHATE DIBASIC DIHYDRATE 24.5 ML/KG/HR: 3.68; 3.05; 6.34; 3.09; .314; .187 INJECTION INTRAVENOUS at 12:53

## 2024-09-02 RX ADMIN — ALBUTEROL SULFATE 2.5 MG: 2.5 SOLUTION RESPIRATORY (INHALATION) at 19:36

## 2024-09-02 RX ADMIN — CALCIUM CHLORIDE, MAGNESIUM CHLORIDE, SODIUM CHLORIDE, SODIUM BICARBONATE, POTASSIUM CHLORIDE AND SODIUM PHOSPHATE DIBASIC DIHYDRATE 24.5 ML/KG/HR: 3.68; 3.05; 6.34; 3.09; .314; .187 INJECTION INTRAVENOUS at 16:59

## 2024-09-02 RX ADMIN — CALCIUM CHLORIDE, MAGNESIUM CHLORIDE, SODIUM CHLORIDE, SODIUM BICARBONATE, POTASSIUM CHLORIDE AND SODIUM PHOSPHATE DIBASIC DIHYDRATE 10 ML/KG/HR: 3.68; 3.05; 6.34; 3.09; .314; .187 INJECTION INTRAVENOUS at 23:14

## 2024-09-02 RX ADMIN — HYDROMORPHONE HYDROCHLORIDE 0.2 MG: 0.2 INJECTION, SOLUTION INTRAMUSCULAR; INTRAVENOUS; SUBCUTANEOUS at 02:32

## 2024-09-02 RX ADMIN — ALBUTEROL SULFATE 2.5 MG: 2.5 SOLUTION RESPIRATORY (INHALATION) at 08:13

## 2024-09-02 RX ADMIN — CALCIUM GLUCONATE 2 G: 20 INJECTION, SOLUTION INTRAVENOUS at 15:14

## 2024-09-02 RX ADMIN — CALCIUM CHLORIDE, MAGNESIUM CHLORIDE, SODIUM CHLORIDE, SODIUM BICARBONATE, POTASSIUM CHLORIDE AND SODIUM PHOSPHATE DIBASIC DIHYDRATE 24.5 ML/KG/HR: 3.68; 3.05; 6.34; 3.09; .314; .187 INJECTION INTRAVENOUS at 20:42

## 2024-09-02 RX ADMIN — CALCIUM CHLORIDE, MAGNESIUM CHLORIDE, SODIUM CHLORIDE, SODIUM BICARBONATE, POTASSIUM CHLORIDE AND SODIUM PHOSPHATE DIBASIC DIHYDRATE 10 ML/KG/HR: 3.68; 3.05; 6.34; 3.09; .314; .187 INJECTION INTRAVENOUS at 12:52

## 2024-09-02 RX ADMIN — ONDANSETRON 4 MG: 2 INJECTION INTRAMUSCULAR; INTRAVENOUS at 19:48

## 2024-09-02 RX ADMIN — AMIODARONE HYDROCHLORIDE 1 MG/MIN: 1.8 INJECTION, SOLUTION INTRAVENOUS at 16:51

## 2024-09-02 RX ADMIN — HEPARIN SODIUM 3000 UNITS: 1000 INJECTION INTRAVENOUS; SUBCUTANEOUS at 05:41

## 2024-09-02 RX ADMIN — SODIUM CHLORIDE 30 MG/ML INHALATION SOLUTION 3 ML: 30 SOLUTION INHALANT at 02:19

## 2024-09-02 RX ADMIN — AMIODARONE HYDROCHLORIDE 150 MG: 1.5 INJECTION, SOLUTION INTRAVENOUS at 16:21

## 2024-09-02 RX ADMIN — ALBUTEROL SULFATE 2.5 MG: 2.5 SOLUTION RESPIRATORY (INHALATION) at 02:19

## 2024-09-02 RX ADMIN — CALCIUM CHLORIDE, MAGNESIUM CHLORIDE, SODIUM CHLORIDE, SODIUM BICARBONATE, POTASSIUM CHLORIDE AND SODIUM PHOSPHATE DIBASIC DIHYDRATE 24.5 ML/KG/HR: 3.68; 3.05; 6.34; 3.09; .314; .187 INJECTION INTRAVENOUS at 19:05

## 2024-09-02 RX ADMIN — Medication 1 CAPSULE: at 09:45

## 2024-09-02 RX ADMIN — ONDANSETRON 4 MG: 2 INJECTION INTRAMUSCULAR; INTRAVENOUS at 07:23

## 2024-09-02 RX ADMIN — HYDROMORPHONE HYDROCHLORIDE 0.2 MG: 0.2 INJECTION, SOLUTION INTRAMUSCULAR; INTRAVENOUS; SUBCUTANEOUS at 13:18

## 2024-09-02 RX ADMIN — SODIUM CHLORIDE 30 MG/ML INHALATION SOLUTION 3 ML: 30 SOLUTION INHALANT at 19:36

## 2024-09-02 RX ADMIN — CALCIUM CHLORIDE, MAGNESIUM CHLORIDE, SODIUM CHLORIDE, SODIUM BICARBONATE, POTASSIUM CHLORIDE AND SODIUM PHOSPHATE DIBASIC DIHYDRATE 200 ML/HR: 3.68; 3.05; 6.34; 3.09; .314; .187 INJECTION INTRAVENOUS at 12:52

## 2024-09-02 RX ADMIN — HEPARIN SODIUM 3000 UNITS: 1000 INJECTION INTRAVENOUS; SUBCUTANEOUS at 05:40

## 2024-09-02 RX ADMIN — CEFEPIME HYDROCHLORIDE 2 G: 2 INJECTION, POWDER, FOR SOLUTION INTRAVENOUS at 16:24

## 2024-09-02 RX ADMIN — AMIODARONE HYDROCHLORIDE 0.5 MG/MIN: 1.8 INJECTION, SOLUTION INTRAVENOUS at 22:21

## 2024-09-02 RX ADMIN — LIDOCAINE 2 PATCH: 246 PATCH TOPICAL at 17:47

## 2024-09-02 RX ADMIN — SODIUM CHLORIDE 750 MG: 9 INJECTION, SOLUTION INTRAVENOUS at 02:05

## 2024-09-02 RX ADMIN — VANCOMYCIN HYDROCHLORIDE 125 MG: 125 CAPSULE ORAL at 20:21

## 2024-09-02 RX ADMIN — CALCIUM CHLORIDE, MAGNESIUM CHLORIDE, SODIUM CHLORIDE, SODIUM BICARBONATE, POTASSIUM CHLORIDE AND SODIUM PHOSPHATE DIBASIC DIHYDRATE 24.5 ML/KG/HR: 3.68; 3.05; 6.34; 3.09; .314; .187 INJECTION INTRAVENOUS at 14:58

## 2024-09-02 RX ADMIN — CALCIUM CHLORIDE, MAGNESIUM CHLORIDE, SODIUM CHLORIDE, SODIUM BICARBONATE, POTASSIUM CHLORIDE AND SODIUM PHOSPHATE DIBASIC DIHYDRATE 10 ML/KG/HR: 3.68; 3.05; 6.34; 3.09; .314; .187 INJECTION INTRAVENOUS at 03:23

## 2024-09-02 RX ADMIN — CALCIUM CHLORIDE, MAGNESIUM CHLORIDE, SODIUM CHLORIDE, SODIUM BICARBONATE, POTASSIUM CHLORIDE AND SODIUM PHOSPHATE DIBASIC DIHYDRATE 24.5 ML/KG/HR: 3.68; 3.05; 6.34; 3.09; .314; .187 INJECTION INTRAVENOUS at 03:26

## 2024-09-02 RX ADMIN — VANCOMYCIN HYDROCHLORIDE 125 MG: KIT at 10:02

## 2024-09-02 RX ADMIN — TRAMADOL HYDROCHLORIDE 50 MG: 50 TABLET, COATED ORAL at 06:20

## 2024-09-02 RX ADMIN — SODIUM CHLORIDE 30 MG/ML INHALATION SOLUTION 3 ML: 30 SOLUTION INHALANT at 13:22

## 2024-09-02 RX ADMIN — ALBUTEROL SULFATE 2.5 MG: 2.5 SOLUTION RESPIRATORY (INHALATION) at 13:22

## 2024-09-02 RX ADMIN — CALCIUM CHLORIDE, MAGNESIUM CHLORIDE, SODIUM CHLORIDE, SODIUM BICARBONATE, POTASSIUM CHLORIDE AND SODIUM PHOSPHATE DIBASIC DIHYDRATE 24.5 ML/KG/HR: 3.68; 3.05; 6.34; 3.09; .314; .187 INJECTION INTRAVENOUS at 17:09

## 2024-09-02 RX ADMIN — HYDROMORPHONE HYDROCHLORIDE 0.2 MG: 0.2 INJECTION, SOLUTION INTRAMUSCULAR; INTRAVENOUS; SUBCUTANEOUS at 06:20

## 2024-09-02 ASSESSMENT — ACTIVITIES OF DAILY LIVING (ADL)
ADLS_ACUITY_SCORE: 40

## 2024-09-02 NOTE — PROGRESS NOTES
"Care Management Follow Up    Length of Stay (days): 17    Expected Discharge Date: 09/05/2024    Anticipated Discharge Plan:  TBD    Transportation: TBD    PT Recommendations:    OT Recommendations:        Barriers to Discharge: medical stability, surgery following POD#5 s/p Aortic root abscess debridement and aortic annular reconstruction, aortic root replacement , Mitral valve replacement and CAB x 2. extubated 9/1, cont on Dobutamine gtt     Prior Living Situation:  \"Patient resides in a house with her  and is reported as mostly independent when at baseline.  assists with transport and as needed in general. No DME use or current in-home/outpatient services identified. \"     Discussed  Partnership in Safe Discharge Planning  document with patient/family: No     Handoff Completed: No, handoff not indicated or clinically appropriate    Patient/Spokesperson Updated: No    Additional Information:  Chart reviewed and plan of care discussed in ICU rounds. Pt was extubated yesterday. Plans are to cont to monitor for improvement. Cont IV ABX and pt will likely need these at discharge.     Next Steps: follow for medical progression and recommendations    Hien Carney RN      "

## 2024-09-02 NOTE — PROGRESS NOTES
CVTS Daily Progress Note   POD#6 s/p Aortic root abscess debridement and aortic annular reconstruction, aortic root replacement (Konect composite 25 mm Silva Inspiris Resilia bioprosthetic valve within 30 mm Gelweave Valsalva graft with reimplantation of the coronary arteries), Mitral valve replacement (31 mm St. Tim Silva bioprosthetic valve) and CAB x 2.  Attending: Dr Renae  LOS: 17    SUBJECTIVE/INTERVAL EVENTS:    No acute events overnight. Awake and alert. Extubated yesterday. Maintaining oxygenation on 3 L NC. CRRT stopped dt tubing clotted. Plan for HD today. Minimal urine output. Dobutamine 2.5 mg for CI>2 and during HD run. Seen in in bed. Pain well controlled. Rectal tube in place. Occult blood positive. Tolerating clear liquids.Chest tube output appropriate. Hgb 8.4(8.0). INR 1.48. Plt 61. HIT negative.  Patient denies new chest pain, shortness of breath, abdominal pain, calf pain, nausea. Patient unable to ask questions dt the above.    OBJECTIVE:  Temp:  [98.6  F (37  C)-99.3  F (37.4  C)] 98.6  F (37  C)  Pulse:  [56-89] 66  Resp:  [15-30] 20  BP: (110-138)/(56-80) 116/62  MAP:  [75 mmHg-159 mmHg] 87 mmHg  Arterial Line BP: (103-175)/() 132/62  FiO2 (%):  [30 %] 30 %  SpO2:  [47 %-100 %] 70 %  Vitals:    08/29/24 0400 08/30/24 0400 08/31/24 0400 09/01/24 0400   Weight: 101 kg (222 lb 10.6 oz) 99.8 kg (220 lb) 99.4 kg (219 lb 1.6 oz) 98.4 kg (216 lb 14.4 oz)    09/02/24 0400   Weight: 95.9 kg (211 lb 6.7 oz)       Clinically Significant Risk Factors          # Hypocalcemia: Lowest iCa = 4.3 mg/dL in last 2 days, will monitor and replace as appropriate    # Anion Gap Metabolic Acidosis: Highest Anion Gap = 19 mmol/L in last 2 days, will monitor and treat as appropriate  # Hypoalbuminemia: Lowest albumin = 3.1 g/dL at 9/1/2024  1:45 PM, will monitor as appropriate    # Coagulation Defect: INR = 1.48 (Ref range: 0.85 - 1.15) and/or PTT = 52 Seconds (Ref range: 22 - 38 Seconds), will  "monitor for bleeding  # Thrombocytopenia: Lowest platelets = 46 in last 2 days, will monitor for bleeding             # Obesity: Estimated body mass index is 33.11 kg/m  as calculated from the following:    Height as of this encounter: 1.702 m (5' 7\").    Weight as of this encounter: 95.9 kg (211 lb 6.7 oz).        # Financial/Environmental Concerns:     # History of CABG: noted on surgical history        Current Medications:    Scheduled Meds:  Current Facility-Administered Medications   Medication Dose Route Frequency Provider Last Rate Last Admin    [Held by provider] acetaminophen (TYLENOL) tablet 975 mg  975 mg Oral Q8H Maryam Anthony PA-C   975 mg at 08/30/24 1336    sodium chloride (NEBUSAL) 3 % neb solution 3 mL  3 mL Nebulization Q6H Zach Park MD   3 mL at 09/02/24 0813    And    albuterol (PROVENTIL) neb solution 2.5 mg  2.5 mg Nebulization Q6H Zach Park MD   2.5 mg at 09/02/24 0813    [Held by provider] atorvastatin (LIPITOR) tablet 80 mg  80 mg Oral QPM Jessica Tolbert PA-C   80 mg at 08/29/24 2038    ceFEPIme (MAXIPIME) 2 g vial to attach to  mL bag for ADULTS or NS 50 mL bag for PEDS  2 g Intravenous Q12H Zach Park MD   2 g at 09/02/24 0523    [Held by provider] clopidogrel (PLAVIX) tablet 75 mg  75 mg Oral Daily Maryam Anthony PA-C        lactobacillus rhamnosus (GG) (CULTURELL) capsule 1 capsule  1 capsule Oral BID Zach Foreman DO   1 capsule at 09/02/24 0945    Lidocaine (LIDOCARE) 4 % Patch 1-2 patch  1-2 patch Transdermal Q24H Maryam Anthony PA-C   1 patch at 09/01/24 1822    pantoprazole (PROTONIX) 2 mg/mL suspension 40 mg  40 mg Oral or NG Tube QAM AC Maryam Anthony PA-C        Or    pantoprazole (PROTONIX) EC tablet 40 mg  40 mg Oral QAM AC Maryam Anthony PA-C        Or    pantoprazole (PROTONIX) IV push injection 40 mg  40 mg Intravenous QAM Maryam Chicas PA-C   40 mg at 09/02/24 0945    polyethylene " glycol (MIRALAX) Packet 17 g  17 g Oral Daily Maryam Anthony PA-C        senna-docusate (SENOKOT-S/PERICOLACE) 8.6-50 MG per tablet 1 tablet  1 tablet Oral BID Maryam Anthony PA-C   1 tablet at 09/01/24 2117    sodium chloride (PF) 0.9% PF flush 3 mL  3 mL Intracatheter Q8H Gondal, Saad J, MD   3 mL at 09/02/24 0947    vancomycin (VANCOCIN) 750 mg in sodium chloride 0.9 % 250 mL intermittent infusion  750 mg Intravenous Q24H Fernando Wynne MD   750 mg at 09/02/24 0205    vancomycin (VANCOCIN) capsule 125 mg  125 mg Oral BID Nannette Lebron MD        [Held by provider] Warfarin Dose Required Daily - Pharmacist Managed  1 each Oral See Admin Instructions Jessica Tolbert PA-C         Continuous Infusions:  Current Facility-Administered Medications   Medication Dose Route Frequency Provider Last Rate Last Admin    CRRT Pre-Filter replacement solution for CVVHD & CVVHDF (Phoxillum BK4/2.5)  10 mL/kg/hr CRRT Continuous Moses Mcnair MD 1,000 mL/hr at 09/02/24 0323 10 mL/kg/hr at 09/02/24 0323    CRRT Replacement solution for CVVHD and CVVHDF premixed replacement solution (Phoxillum 4/2.5)  200 mL/hr CRRT Continuous Moses Mcnair  mL/hr at 09/01/24 2325 200 mL/hr at 09/01/24 2325    dextrose 10% infusion   Intravenous Continuous PRN Zachary Gonzalez MD        dialysate for CVVHD & CVVHDF premixed replacement solution (Phoxillum 4/2.5) - total potassium 4 mEq/L  24.5 mL/kg/hr CRRT Continuous Moses Mcnair MD 2,500 mL/hr at 09/02/24 0326 24.5 mL/kg/hr at 09/02/24 0326    DOBUTamine (DOBUTREX) 500 mg in D5W 250 mL infusion (adult std conc)  2.5 mcg/kg/min Intravenous Continuous Jessica Tolbert PA-C 7.1 mL/hr at 09/02/24 0945 2.5 mcg/kg/min at 09/02/24 0945    heparin (porcine) 20,000 units in 20 mL ANTICOAGULANT infusion (syringe from pharmacy)  500-1,500 Units/hr CRRT Continuous Moses Mcnair MD   Held at 08/31/24 1928    insulin regular (MYXREDLIN) 1 unit/mL infusion  0-24 Units/hr  Intravenous Continuous Zach Park MD   Held at 08/30/24 2029    niCARdipine 40 mg in 200 mL NS (CARDENE) infusion  0.5-15 mg/hr Intravenous Continuous Dylan Renae MD   Stopped at 09/01/24 1459    No heparin required   Does not apply Continuous PRN Moses Mcnair MD        phenylephrine (ZEYAD-SYNEPHRINE) 50 mg in NaCl 0.9 % 250 mL infusion  0.1-4 mcg/kg/min Intravenous Continuous PRN Maryam Anthony PA-C         PRN Meds:.  Current Facility-Administered Medications   Medication Dose Route Frequency Provider Last Rate Last Admin    acetaminophen (TYLENOL) tablet 650 mg  650 mg Oral Q4H PRN Gondal, Saad J, MD   650 mg at 08/28/24 1528    Or    acetaminophen (TYLENOL) Suppository 650 mg  650 mg Rectal Q4H PRN Gondal, Saad J, MD        albumin human 25 % injection 50 g  50 g Intravenous Q8H PRN Moses Mcnair MD        bisacodyl (DULCOLAX) suppository 10 mg  10 mg Rectal Daily PRN Maryam Anthony PA-C        calcium carbonate (TUMS) chewable tablet 1,000 mg  1,000 mg Oral 4x Daily PRN Gondal, Saad J, MD        calcium gluconate 1 g in 50 mL in sodium chloride intermittent infusion  1 g Intravenous Once PRN Maryam Anthony PA-C   1 g at 09/01/24 2337    calcium gluconate 2 g in  mL intermittent infusion  2 g Intravenous Q8H PRN Moses Mcnair MD   2 g at 08/29/24 1618    calcium gluconate 2 g in  mL intermittent infusion  2 g Intravenous Once PRN Maryam Anthony PA-C   2 g at 08/31/24 0703    calcium gluconate 3 g in sodium chloride 0.9 % 100 mL intermittent infusion  3 g Intravenous Once PRN Maryam Anthony PA-C        calcium gluconate 4 g in sodium chloride 0.9 % 100 mL intermittent infusion  4 g Intravenous Q8H PRN Moses Mcnair MD        carboxymethylcellulose PF (REFRESH PLUS) 0.5 % ophthalmic solution 1 drop  1 drop Both Eyes Q1H PRN Gondal, Saad J, MD        dextrose 10% infusion   Intravenous Continuous PRN Zachary Gonzalez MD         glucose gel 15-30 g  15-30 g Oral Q15 Min PRN Maryam Anthony PA-C        Or    dextrose 50 % injection 25-50 mL  25-50 mL Intravenous Q15 Min PRN Maryam Anthony PA-C        Or    glucagon injection 1 mg  1 mg Subcutaneous Q15 Min PRN Maryam Anthony PA-C        fentaNYL (SUBLIMAZE) 50 mcg/mL bolus from pump  50 mcg Intravenous Q1H PRN Zach Park MD   50 mcg at 09/01/24 1230    hydrALAZINE (APRESOLINE) injection 10 mg  10 mg Intravenous Q30 Min PRN Maryam Anthony PA-C        HYDROmorphone (DILAUDID) injection 0.1 mg  0.1 mg Intravenous Q2H PRN Maryam Anthony PA-C        Or    HYDROmorphone (DILAUDID) injection 0.2 mg  0.2 mg Intravenous Q2H PRN Maryam Anthony PA-C   0.2 mg at 09/02/24 0620    lactated ringers BOLUS 250 mL  250 mL Intravenous Q15 Min PRN Maryam Anthony PA-C   250 mL at 08/27/24 2151    lidocaine (LMX4) cream   Topical Q1H PRN Gondal, Saad J, MD        lidocaine 1 % 0.1-1 mL  0.1-1 mL Other Q1H PRN Gondal, Saad J, MD        magnesium hydroxide (MILK OF MAGNESIA) suspension 30 mL  30 mL Oral Daily PRN Maryam Anthony PA-C        magnesium sulfate 2 g in 50 mL sterile water intermittent infusion  2 g Intravenous Q8H PRN Moses Mcnair MD        miconazole (MICATIN) 2 % powder   Topical BID PRN Erick Patino DO        naloxone (NARCAN) injection 0.2 mg  0.2 mg Intravenous Q2 Min PRN Erick Patino DO        Or    naloxone (NARCAN) injection 0.4 mg  0.4 mg Intravenous Q2 Min PRN Shawn Patinoinic A, DO        Or    naloxone (NARCAN) injection 0.2 mg  0.2 mg Intramuscular Q2 Min PRN Erick Patino A DO        Or    naloxone (NARCAN) injection 0.4 mg  0.4 mg Intramuscular Q2 Min PRN Erick Patino DO        nicotine (NICORETTE) gum 2 mg  2 mg Buccal Q1H PRN Gondal, Saad J, MD        No heparin required   Does not apply Continuous PRN Moses Mcnair MD        ondansetron (ZOFRAN ODT) ODT tab 4 mg  4 mg Oral Q6H PRN  Maryam Anthony PA-C        Or    ondansetron (ZOFRAN) injection 4 mg  4 mg Intravenous Q6H PRN Maryam Anthony PA-C   4 mg at 09/02/24 0723    oxyCODONE IR (ROXICODONE) half-tab 2.5 mg  2.5 mg Oral Q4H PRN Maryam Anthony PA-C   2.5 mg at 08/28/24 1143    Or    oxyCODONE (ROXICODONE) tablet 5 mg  5 mg Oral Q4H PRN Maryam Anthony PA-C   5 mg at 08/28/24 2013    phenylephrine (ZEYAD-SYNEPHRINE) 50 mg in NaCl 0.9 % 250 mL infusion  0.1-4 mcg/kg/min Intravenous Continuous PRN Maryam Anthony PA-C        potassium chloride 20 mEq in 50 mL intermittent infusion  20 mEq Intravenous Q8H PRN Moses Mcnair MD 50 mL/hr at 08/31/24 1317 20 mEq at 08/31/24 1317    prochlorperazine (COMPAZINE) injection 5 mg  5 mg Intravenous Q6H PRN Maryam Anthony PA-C   5 mg at 08/29/24 0658    Or    prochlorperazine (COMPAZINE) tablet 5 mg  5 mg Oral Q6H PRN Maryam Anthony PA-C        senna-docusate (SENOKOT-S/PERICOLACE) 8.6-50 MG per tablet 1 tablet  1 tablet Oral BID PRN Gondal, Saad J, MD        Or    senna-docusate (SENOKOT-S/PERICOLACE) 8.6-50 MG per tablet 2 tablet  2 tablet Oral BID PRN Gondal, Saad J, MD        sodium chloride (PF) 0.9% PF flush 10-40 mL  10-40 mL Intracatheter Once PRN [address] MBBS        sodium chloride (PF) 0.9% PF flush 3 mL  3 mL Intracatheter q1 min prn Gondal, Saad J, MD        sodium chloride 0.9% BOLUS 1-250 mL  1-250 mL Intravenous Q1H PRN Jessica Tolbert PA-C        sodium phosphate 15 mmol in sodium chloride 0.9 % 250 mL intermittent infusion  15 mmol Intravenous Q8H PRN Moses Mcnair MD        traMADol (ULTRAM) tablet 50 mg  50 mg Oral Q6H PRN Erick Patino DO   50 mg at 09/02/24 0620       Cardiographics:    Telemetry monitoring demonstrates NSR with rate in the 60's bmp per my personal review.    Imaging:  Results for orders placed or performed during the hospital encounter of 08/16/24   MR Brain w/o & w Contrast    Impression     IMPRESSION: Assessment degraded due to motion.  1.  13 mm focus of cortical signal abnormality within the left superior parietal lobule which is favored to represent a subacute infarct; low-grade glial neoplasm could conceivably have a similar appearance. Hyperacute intraparenchymal hematoma could   also have a similar appearance but is considered less likely. Recommend noncontrast head CT for further characterization. Also recommend follow-up MRI brain without and with contrast in 8-12 weeks to document expected evolution.  2.  Additional smaller foci of signal abnormality with similar characteristics favored to represent punctate subacute infarcts.   CTA Head Neck with Contrast    Impression    IMPRESSION:   HEAD CT:  1.  Small focus of juxtacortical low attenuation within the left parietal lobe corresponds to signal abnormality seen on the prior MRI. As suggested previously this is favored to reflect evolving small vessel ischemic change; however, MRI follow-up to   exclude a low-grade neoplastic process is advised.  2.  No evidence for intracranial hemorrhage or significant mass effect.  3.  Generalized brain atrophy and presumed chronic microvascular ischemic change.    HEAD CTA:   1.  Intracranial atherosclerosis including moderate to marked stenosis of the right P2 posterior cerebral artery segment.  2.  No definite proximal branch occlusion, aneurysm, or high flow vascular malformation identified.    NECK CTA:  1.  Atherosclerotic plaque at the carotid bifurcations bilaterally without hemodynamically significant stenosis in the neck vessels.   2.  No evidence for dissection.   Radiologist Consult For Cardiology    Impression    IMPRESSION:    1.  Partially visualized left adrenal nodule, indeterminate, could represent adrenal adenoma.  2.  Please refer to cardiologist's dictation for the cardiac CT report.   US Carotid Bilateral    Impression    IMPRESSION:  1.  Mild plaque formation, velocities consistent  with less than 50% stenosis in the right internal carotid artery.  2.  Mild plaque formation, velocities consistent with less than 50% stenosis in the left internal carotid artery.  3.  Flow within the vertebral arteries is antegrade.   XR Chest Port 1 View    Impression    IMPRESSION: Endotracheal tube tip 4.8 cm proximal to the anuj. Partridge-Dana catheter with tip in the pulmonary outflow tract. Mediastinal drain with aortic valve prosthesis. Sternotomy. Small left pleural effusion with left basilar infiltrate.   XR Chest Port 1 View    Impression    IMPRESSION: Endotracheal tube in the distal aspect of the trachea. Sternotomy. AVR. Mediastinal drain. Right IJ Partridge-Dana catheter with tip in the right ventricle/MPA. Left-sided chest tube. No pneumothorax. Mild cardiac enlargement with normal pulmonary   vascularity. Minimal left basilar atelectasis and pleural fluid. Degenerative changes in the spine.   Echocardiogram Limited   Result Value Ref Range    LVEF  60-65%    CTA  ANGIOGRAM CORONARY ARTERY   Result Value Ref Range    BSA 0.00 m2       Labs, personally reviewed.  Hemoglobin   Date Value Ref Range Status   09/02/2024 8.4 (L) 11.7 - 15.7 g/dL Final   09/02/2024 8.1 (L) 11.7 - 15.7 g/dL Final   09/01/2024 7.9 (L) 11.7 - 15.7 g/dL Final     WBC Count   Date Value Ref Range Status   09/02/2024 12.8 (H) 4.0 - 11.0 10e3/uL Final   09/02/2024 10.6 4.0 - 11.0 10e3/uL Final   09/01/2024 9.4 4.0 - 11.0 10e3/uL Final     Platelet Count   Date Value Ref Range Status   09/02/2024 61 (L) 150 - 450 10e3/uL Final   09/02/2024 60 (L) 150 - 450 10e3/uL Final   09/01/2024 74 (L) 150 - 450 10e3/uL Final     Creatinine   Date Value Ref Range Status   09/02/2024 1.27 (H) 0.51 - 0.95 mg/dL Final   09/02/2024 1.28 (H) 0.51 - 0.95 mg/dL Final   09/02/2024 0.95 0.51 - 0.95 mg/dL Final     Potassium   Date Value Ref Range Status   09/02/2024 4.8 3.4 - 5.3 mmol/L Final   09/02/2024 4.7 3.4 - 5.3 mmol/L Final   09/02/2024 4.5 3.4 - 5.3  mmol/L Final     Potassium POCT   Date Value Ref Range Status   08/27/2024 3.4 3.4 - 5.3 mmol/L Final   08/27/2024 3.9 3.4 - 5.3 mmol/L Final   08/27/2024 3.4 3.4 - 5.3 mmol/L Final     Magnesium   Date Value Ref Range Status   09/02/2024 2.4 (H) 1.7 - 2.3 mg/dL Final   09/02/2024 2.3 1.7 - 2.3 mg/dL Final   09/01/2024 2.2 1.7 - 2.3 mg/dL Final          I/O:  I/O last 3 completed shifts:  In: 2397.01 [P.O.:510; I.V.:1355.01; NG/GT:264]  Out: 4318.6 [Urine:336; Other:3573.6; Stool:90; Chest Tube:319]       Physical Exam:    General: Patient seen in bed. Awake and alert. NAD. Pleasant and conversant   HEENT: JULIO, no sclera icterus, moist mucosa.   CV: RRR on monitor. 2+ peripheral pulses in all extremities. Mild edema.   Pulm: Non-labored effort on 3L NC. Chest tubes in place, serosanguinous output, no air leak. Incision C/D/I.  Abd: Soft, NT, ND  : Le with pita urine  Ext: Mod pedal edema, SCDs in place, warm, distal pulses intact.  Neuro:CNs grossly intact      ASSESSMENT/PLAN:    Felicitas Elliott is a 84 year old female with a history of endocarditis, CAD, aortic stenosis and mitral valve disorder who is s/p Aortic root abscess debridement and aortic annular reconstruction, aortic root replacement (Konect composite 25 mm Silva Inspiris Resilia bioprosthetic valve within 30 mm Gelweave Valsalva graft with reimplantation of the coronary arteries), Mitral valve replacement (31 mm St. Tim Silva bioprosthetic valve) and CAB x 2.    Active Problems:    Bacteremia    Endocarditis    Sepsis (H)    Nonrheumatic aortic valve stenosis    Postsurgical percutaneous transluminal coronary angioplasty status    Mitral valve disorder    Coronary artery disease involving native coronary artery of native heart, unspecified whether angina present        NEURO:   - Scheduled Tylenol/lidocaine patches and PRN Tylenol/oxycodone/dilaudid for pain    CV:   - Pre-op EF 60-65%  - Normotensive  - Currently on Dobutamine 2.5 mcg  -  Vaso,Norepi and nicardipine OFF  - Beta blocker pending weaning from pressors  - ASA Allergy- Plavix 75 mg PO daily-held until PPM determined and Plt above 100  - Atorvastatin 80 mg daily  - Chest tubes to remain today.  - Cards following-apprec reqs  - New baseline echo before discharge and after CT removed  - Coumadin x 3 months per surgeon preference. INR goal 2.5-3.5. Rx to dose. INR 1.65- Held today.       PULM:   - Extubated POD#1, reintubated POD #2, Extubated POD#5  - Maintaining oxygen saturations on 3 L NC  - Encourage pulmonary toilet  - CXR-8/29 mild venous congestion,Small right pleural effusion has developed with associated passive atelectasis right base.    FEN/GI:  - Continue electrolyte replacement protocol  - Diet: Cardiac, ADAT   - Bowel regimen    RENAL:  - Adequate UOP/hr. Continue to monitor closely.  - Cr 1.27  - Le to remain in for close monitoring of I/O and during period of diuresis/relative immobility.   -HD intermittent   -Consult nephrology-apprec reqs         CRRT 8/29- stopped 9/2    HEME:  - Acute blood loss anemia post-op.   - Hgb 8.0, Hep subcutaneous-stopped,  - Occult blood positive POD#3, rectal tube in place   - Plavix 75 mg PO daily(held)- ASA allergy  - Coumadin pharmacy to dose- Held Goal INR 2.5-3.5 x 3 mos. INR 1.48       -Vit K 5 mg and 2 FFP given 8/30       -Recheck INR, CBC, CMP,Trop. Lactate q 8 hrs.  - Plt 65-HIT negative  - Hold plavix and coumadin until plt >100    ID:  - Lennie op ppx complete, afebrile.  - WBC 12.8(10.6)  -ID following-apprec reqs- MSSA bacteremia. Cultures NGTD from 8/17          -Nafcillin -stopped          - new surgical cultures sent-pending          - Vanc 2,000 mg then 750 mg q 12 hrs          -  ceftazidime 1 gm q 8 hrs-stopped 8/30          - Cefepime 2 gm q 12 hrs  - Blood cultures 8/30-NGTD  - resp cultures-gram neg bacilli, 2+ Enterobacter cloacae complex    ENDO:   - A1c 6.4  - Euglycemic     PPx:   - DVT: SCDs, SQ heparin TID,  ambulation   - GI: Protonix 40mg PO daily    DISPO:   - Continue critical care in ICU  - Medically Ready for Discharge: Anticipated in 5+ Days       Patient discussed with Dr. Renae and seen with Dr Dayron Tolbert PA-C  [facility] Cardiothoracic Surgery  Vocera or pager [phone]

## 2024-09-02 NOTE — PROGRESS NOTES
Speech-Language Pathology: Clinical Swallow Evaluation     09/02/24 1200   Appointment Info   Signing Clinician's Name / Credentials (SLP) Ashley Mckay MA CCC-SLP   General Information   Onset of Illness/Injury or Date of Surgery 08/16/24   Referring Physician Dr. Gonzalez   Pertinent History of Current Problem Intubated 8/29/24-9/1/24; difficulty with liquid medication per RN; Per EMR: 8/14/24 for chills & generalized weakness. 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 8/27/24 s/p bioprosthetic aortic & mitral valve replacement, aortic root replacement, & two vessel coronary artery bypass graft. Course complicated by post operative hypoxemic respiratory failure due to cardiogenic pulmonary edema, atelectasis, & possible Enterobacter hospital acquired pneumonia in conjunction with cardiogenic-distributive shock, oliguric HAMMAD requiring CRRT, euglycemic ketoacidosis, & suspected ischemic hepatitis   General Observations Alert and cooperative, easily fatigued; nausea after intake, RN notified   Type of Evaluation   Type of Evaluation Swallow Evaluation   Oral Motor   Oral Musculature generally intact   Facial Symmetry (Oral Motor)   Comment, Facial Symmetry (Oral Motor) WFL-no overt asymmetry at rest; questionable reduced left labial movement with speaking and OME   Lip Function (Oral Motor)   Comment, Lip Function (Oral Motor) WFL   Tongue Function (Oral Motor)   Comment, Tongue Function (Oral Motor) WFL   Cough/Swallow/Gag Reflex (Oral Motor)   Gag Reflex (Oral Motor) hyperactive   Volitional Throat Clear/Cough (Oral Motor) reduced strength   Volitional Swallow (Oral Motor) mildly delayed   Vocal Quality/Secretion Management (Oral Motor)   Vocal Quality (Oral Motor) WFL  (appears weak but WFL)   General Swallowing Observations   Past History of Dysphagia  None per patient or records   Comment, General Swallowing Observations RN reports patient had difficulty with a liquid medication earlier and concern for dysphagia   Current Diet/Method of Nutritional Intake (General Swallowing Observations, NIS) NPO   Swallowing Evaluation Clinical swallow evaluation   Clinical Swallow Evaluation   Clinical Swallow Evaluation Textures Trialed thin liquids;pureed   Clinical Swallow Eval: Thin Liquid Texture Trial   Mode of Presentation, Thin Liquids spoon;straw;fed by clinician   Volume of Liquid or Food Presented 5 sips   Oral Phase of Swallow WFL   Pharyngeal Phase of Swallow repeated swallows;throat clearing  (questionable related cough x1)   Diagnostic Statement nausea with coughing and gagging after liquid wash   Clinical Swallow Evaluation: Puree Solid Texture Trial   Mode of Presentation, Puree spoon;fed by clinician   Volume of Puree Presented 3 tsp   Oral Phase, Puree WFL   Pharyngeal Phase, Puree intact   Diagnostic Statement nausea with coughing and gagging after puree then liquid wash   Esophageal Phase of Swallow   Esophageal comments gagging after intake   Swallowing Recommendations   Diet Consistency Recommendations ice chips only;NPO  (meds crushed in puree as needed)   Medication Administration Recommendations, Swallowing (SLP) per patient preference   Instrumental Assessment Recommendations VFSS (videofluoroscopic swallowing study)  (likely to be needed; not appropriat yet at this time given nausea after intake and significant weakness)   Comment, Swallowing Recommendations Vague signs of dysphagia and aspiration, high risk of fatigue with intake along with nausea concerns.   General Therapy Interventions   Planned Therapy Interventions Dysphagia Treatment   Clinical Impression   Criteria for Skilled Therapeutic Interventions Met (SLP Eval) Yes, treatment indicated   SLP Diagnosis Dysphagia   Risks & Benefits of therapy have been explained evaluation/treatment  results reviewed;care plan/treatment goals reviewed;participants included;patient   Clinical Impression Comments Clinical Swallow Evaluation completed. Patient had no direct s/s aspiration with puree and subtle s/s aspiration with thin liquids.  Signs of dysphagia are noted and patient is very weak and deconditioned. Oral motor function was grossly intact other than mild asymmetry on left with speaking. Mastication was not tested, patient declined solids. Hyolaryngeal elevation appears intact. Recommend conservative management at this time and reassess tomorrow. VFSS is likely to be needed.   SLP Total Evaluation Time   Eval: oral/pharyngeal swallow function, clinical swallow Minutes (59425) 15   SLP Goals   Therapy Frequency (SLP Eval) daily   SLP Predicted Duration/Target Date for Goal Attainment 09/09/24   SLP Goals Swallow   SLP: Safely tolerate diet without signs/symptoms of aspiration One P.O. texture   SLP Discharge Planning   SLP Plan Tue 1/7; trial intake, VFSS if indicated and ready   SLP Rationale for DC Rec Ongoing ST to be determined   SLP Brief overview of current status  NPO except ice chips and meds in puree as needed. SLP to follow for further assessment as n/v resolve and strength improves.   Total Session Time   Total Session Time (sum of timed and untimed services) 15

## 2024-09-02 NOTE — PROGRESS NOTES
Renal progress note  CC:PEDRO  Assessment and Plan:  84 year old female with hx of CHF, HFpEF , CKD 3/4, HLD , left renal atrophy, CAD and chronic neck pain , admitted with chills and weakness. Found to have MSSA bacteremia cb MV and AV infective endocarditis , 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 , MvCAD: underwent biprothetic Avand MV replacement , Aortic root replacement and 2vCABG,. Post Sx cb Pedro , anuria , acute resp failure and hemodynamic instability on pressers. With worsening resp status and need for oxygenation , concerns for near re-intubation and anuria with no response to diuresis nephrology consulted for advanced CKD , Pedro and volume management     PEDRO anuric  anuria and anephric rise in creat in last 24hrs  Bl CKD 3/4 with creatinine 1.3-1.8 and GFR ~30-35  UA with hyaline casts , mild proteins and WBC  UPCR 0.1g/g minimal  Imaging with left renal cortical thinning ~8cm kidney , rt 9.7cm kideny with simple cysts (4/2024)  Follows with [facility]  CKD has been attributed to long standing NSAID use : reviewed extensive rheum work up -ve in 4/2024 , no prior paraprotein work up  Current PEDRO , appears CRS with likely a hemodynamic insult to the kidney; with recent embolic process hard to exclude embolic disease , complements pending , LDH very high 1300+   --> with near anuria (5-15ml/hrUO) and hypervolemia  Started on CRRT with significant hemodynamic instability  CRRT prescription increased to 35ml/kg with acidosis 10ml/kg pre filter and 24.5 ml/kg dialysate K4  UFR 50-150ml/hr as tolerated: D 20 for ketosis  Now off sodium bicarb inf with stable results  Held CRRT at 4am and labs with worsening creat and acidosis , still +8-9kg and poor UO, will not tolerate transitional HD at ths point so will resume CRRT and re-evaluate in 48-72hrs  --> Labs per CRRT protocols , 4K, saline potassium replacement  --> strict IO    Access femoral   Unable to place left IJ on 8/29, placed fem    LIJ placed by IR, nontunneled 9/1/24  Will continue on CRRt if anticipated long term needs then tunneled line next wk     Hypervolemia  Hypotension with likely cardiogenic shock state  PTA no anti Htn , BP managed with diet alone  --> on Dobutamine , was also on vaso and levo after correction of acidosis PRBCs and FFP's yesterday now stabilizing blood pressure and plans for dobutamine wean  --> plans for PRN albumin for increased vasopresser needs  Goal UFR  per hr  --> follow with CRRT  Now off lasix     Worsening acidosis bicarb down  Had to be started on peripheral bicarb for stability  Lactic acidosis--> improving   Ketosis likely starvation ketosis now ED 20  With high-dose CRRT     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  Monitor on Labs     Mild hypokalemia  Monitor on labs replace per protocol     Mild hypernatremia , improved     Anemia  Acute on chronic  Likely inflamm  --> transfuse if less then 8 or per cardiology recs     MSSA bacteremia  Enterobacter in resp culture  Cb valve endocarditis  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  Thrombocytopenia and coagulopathy concerning for septic shock  Some concerns for cervical septic facet arthropathy  On Naficillin-- on cefepime and vanco since 8/30/24 due to decompensation and resp failure  ID following     Acute resp failure  Sec to hypervolemia and cardiogenic shock  Volume management as above  Management o resp failure per ICU  Reintubated 8/29/2024 improving FiO2 needs with UF  Vent management per ICU     MV and AV endocarditis   Aortic Root Abscess   CT Sx dr Renae, following  8/27/24: s/p Aortic root abscess debridement and replacement AV and MV replacement and CABG  x 2         Thank you for the consultation we will follow  Moses Mcnair MD  [facility]  [phone]      Subjective  Patient extubated and on NC O2  Doesnot want reintubation  Minimal UO and some resp distress but tolerating  Will resume  CRRT since she is still not strong enough hemodynamically to tolerate IHD  And needs UF 8-9kg  Discussed with her and family at bedside still not much recovery with 5-15ml/hr UO   Will continue on CRRT and re eval in 48-72hrs      Objective    Vital signs in last 24 hours  Temp:  [98.6  F (37  C)-99.3  F (37.4  C)] 98.6  F (37  C)  Pulse:  [79-90] 79  Resp:  [15-30] 26  BP: (106-138)/(56-80) 116/62  MAP:  [77 mmHg-174 mmHg] 94 mmHg  Arterial Line BP: (111-188)/() 143/68  FiO2 (%):  [30 %] 30 %  SpO2:  [81 %-100 %] 95 %  Weight:   [unfilled]    Intake/Output last 3 shifts  I/O last 3 completed shifts:  In: 2397.01 [P.O.:510; I.V.:1355.01; NG/GT:264]  Out: 4318.6 [Urine:336; Other:3573.6; Stool:90; Chest Tube:319]  Intake/Output this shift:  I/O this shift:  In: -   Out: 70 [Stool:70]    Physical Exam  Intubated NAD  CV: RRR +  murmur or rub  Lung: clear and equal; no extra sounds  Ab: soft and NT; not distended; normal bs  Ext: + edema and well perfused  Skin; no rash    Pertinent Labs   Lab Results   Component Value Date    WBC 10.6 09/02/2024    HGB 8.1 (L) 09/02/2024    HCT 25.7 (L) 09/02/2024    MCV 96 09/02/2024    PLT 60 (L) 09/02/2024     Lab Results   Component Value Date    BUN 11.7 09/02/2024     09/02/2024    CO2 20 (L) 09/02/2024       Lab Results   Component Value Date    ALBUMIN 3.4 (L) 09/02/2024     Lab Results   Component Value Date    PHOS 4.3 09/02/2024     I reviewed all lab results  Moses Mcnair MD

## 2024-09-02 NOTE — PROGRESS NOTES
"Critical Care Progress Note     09/02/2024     Name: Felicitas Elliott MRN#: [id number]   Age: 84 year old YOB: 1940        Summary     Past medical history of moderate aortic stenosis, HFpEF, HTN, HLD, CKD 3, chronic intertriginous skin wounds, chronic neck pain       Presented 8/14/24 for chills & generalized weakness. 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 8/27/24 s/p bioprosthetic aortic & mitral valve replacement, aortic root replacement, & two vessel coronary artery bypass graft. Course complicated by post operative hypoxemic respiratory failure due to cardiogenic pulmonary edema, atelectasis, & possible Enterobacter hospital acquired pneumonia in conjunction with cardiogenic-distributive shock, oliguric HAMMAD requiring CRRT, euglycemic ketoacidosis, & suspected ischemic hepatitis, extubated on 9/1.      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 Alert, conversant. CRRT filter clotted. Holding off, checking labs, possible IHD.      Assessment & Plan      Neurology, Psychiatry, Sedation, Analgesia:  L-parietal septic embolic stroke   8/17/24 MRI brain \"cortical signal abnormality within the left superior parietal lobule which is favored to represent a subacute infarct [...] smaller foci of signal abnormality with similar characteristics favored to represent punctate subacute infarcts.\" 8/18/24 CTA head & neck \"low attenuation within the left parietal lobe [...] this is favored to reflect evolving small vessel ischemic change; however, MRI follow-up to   exclude a low-grade neoplastic process is advised.\" Post-extubation on 9/1, patient was talking rapidly to  about \"inhumane treatment\" and lots of Catholic talk, calm and conversant 9/2, does not seem overtly delirious.  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 vs expected deconditioning     Cervical spinal stenosis   Chronic neck pain   8/15/24  cervical MRI \"multilevel cervical spondylosis [...] complex edema at C4-5 on the left is favored to be reactive/degenerative [...] septic facet arthropathy are difficult to entirely exclude [...] C4-5, moderate to severe spinal canal stenosis  [...] severe right and moderate to severe left neural foraminal stenosis  [...] C5-6, moderate spinal canal stenosis  [...]Severe bilateral neural foraminal stenosis  [...] C7-T1, mild spinal canal stenosis with severe right and mild to moderate left neural foraminal stenosis [...] C3-4, moderate bilateral neural foraminal stenosis.\"  - neurosurgery was consulted, no current surgical interventions advised  - consider cervical MRI w/wo if neck pain worsens to evaluate for sequelae of infection      Sedation & Analgesia   - prn APAP & tramadol     Cardiovascular:  Cardiogenic & distributive shock   Etiology multifactorial, post-cardiopulmonary bypass vasoplegia & low cardiac output syndrome in conjunction with sepsis due to residual aortic root abscess identified intraoperatively & possible Enterobacter HAP  - appreciate cardiology and CT surgery  - dobutamine ongoing at 2.5  - off vasopressin and norepi  - stress-dose hydrocortisone discontinued on 8/31  - MAP goal ~85 mmHg     Severe aortic stenosis, aortic & mitral valve infective endocarditis, aortic root abscess  8/27/24 bioprosthetic aortic & mitral valve replacement, aortic root replacement  - CT surgery managing: warfarin x 3 months, INR goal 2.5-3.5, no bridging needed, on hold until PLT >100     Severe multivessel CAD  8/27/24 2 vessel CABG  - clopidogrel on hold until PLT >100     Preserved pre & post- operative biventricular systolic function   Left ventricular hypertrophy with impaired " diastolic function      Carotid arterial stenosis, mild  8/22/24 carotid US     Post-operative atrial fibrillation slow ventricular response   S/p placement of temporary epicardial pacing wires   Presumed post-operative sinus node dysfunction or AV conduction defect.   - V paced at 90 BPM     Respiratory, Airway:  Acute hypoxemic respiratory failure - improved   Cardiogenic pulmonary edema, suspect nosocomial aspiration pneumonia-pneumonitis   Overnight 8/28-8/29 over 30 minutes period exhibited progressive rapidly worsening hypoxemia, nausea, vomiting, & multiple sequelae of hypervolemia with oliguria. 8/29/24 radiograph with progressive right basilar opacity & blunting of the costophrenic angle. 8/29/24 pleural US small right pleural effusion, no ipsilateral chest tube. Extubated on 9/1.  - supplemental O2 to maintain spO2 >= 90-96% & PaO2 >= 60 mmHg  - empiric antimicrobials & infectious evaluation as below  - IS, PT/OT  - CRRT of AM of 9/1 with filter clot, possible initiation of IHD for UF, making some urine but still oliguric - addendum: continuing CRRT per nephrology    Post-operative chest tubes  - monitor output  - management per CTS     Gastrointestinal:  Acute liver injury   Suspect ischemic hepatitis. Differential includes medications/toxins, viral hepatitis, autoimmune hepatitis, Budd-Chiari syndrome, hepatic artery thrombus, veno-occlusive disease, secondary hemophagocytic lymphohistiocytosis. RUQ US with GB thickening with pericholecystic fluid, possible acalculous cholecystitis. No portal venous or hepatic arterial thrombus seen.  - supportive care  - transaminases downtrended and stable  - continue prophylactic PPI     Renal, Acid-base, Electrolytes, Volume :  Oliguric 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 Baseline 1.2 - 1.3. 8/31: UOP 10-15 mL/hr, still edematous  - appreciate nephrology consultation  - was on CRRT, filter clotted AM  of 9/1, leaving off with possible initiation of IHD - addendum: continuing CRRT per nephrology  - right femoral dialysis catheter, changed to left internal jugular temporary catheter by IR on 9/1    Hypervolemia  See above    Ketoacidosis, euglycemic   Etiology unclear, suspect CRRT implicated (PMID: [id number]) &/or starvation ketosis. Last A1c 6.4    - D20 gtt transitioned to enteral nutrition via OG, now extubated, SLP eval  - insulin infusion for ketoacidosis, now discontinued     Infectious Disease:  MSSA bloodstream infection (last + culture 8/16/24)  Native aortic & mitral valve infective endocarditis   Question of cervical septic facet arthropathy   Presumed source intertriginous soft tissue lesions  - appreciate ID consultation  - hold nafcillin while broadened, as below    Concern for Enterobacter hospital acquired pneumonia    - pending - 8/29 sputum culture; 8/30 blood culture, UA with reflex   - negative - 8/29 MSSA nares, viral panel  - appreciate ID  - cefepime and vanco x 7 days, then back to cefazolin  - empiric PO vanco for C diff prophylaxis    Lennie-operative antibiotics per CV surgery      Hematology, Oncology:  Post-operative anemia  - monitor for bleeding: Hgb goal >7-8      Thrombocytopenia   Etiology multifactorial, sepsis, beta lactam antimicrobials, CRRT, & post-cardiopulmonary bypass, at least. Anticipate fall in platelet count after cardiopulmonary bypass in most patients, typically to levels approximately half of baseline, abel between postoperative days 2-4, & persists for 4 - 6 days following surgery. Anti-PF4-heparin CASPER testing is positive in 25% to 50% of patients between postoperative days 3 and 10, while HIT is confirmed in only 1% to 4% of patients following CPB   - 8/30/24 4 T score 3 low probability HIT (fall>50%, timing >10 days, no known thrombus, definite alternative causes in sepsis, CRRT, post-cardiac surgery); HIT screen negative  - PLT 61, monitoring for improvement,  transfuse as needed    Anticoagulation  Eventual goal INR 2.5-3.5  - warfarin on hold until PLT >100  - management per CTS    Endocrine:  MICU insulin/glucose protocol   - maintain BG of 140 to 180 mg/dL     Euglycemic ketoacidosis  - was on D20 without insulin, improved     Checklist:  FEN: SLP consultation  VTE ppx: warfarin with goal INR 2.5-3.5, currently on hold with thrombocytopenia, management per CTS  GI ppx: pantoprazole   Bowel regimen: PEG & senna   VAP ppx: Head of bed >30 degrees, daily oral care, chlorhexidine  Lines/tube-size: R-internal jugular introducer & PAC 8/27, radial arterial line 8/27, PICC 8/21, left internal jugular dialysis catheter 9/1, chacko 8/27   Skin: sternotomy site clean & dry    PT/OT/SLP, early mobility: cardiac rehab when able   Communication: Updated the patient's spouse, Ed, by telephone on 9/1.  Code Status: full       Physical Exam      Neurologic: alert, no distress, conversant and seems oriented, no focal deficits but generalized muscle weakness, decent hand squeeze and plantar flexion bilaterally but difficult to hold arms/legs against gravity  HEENT: Head and face normal. No nasal discharge. Oropharynx normal. Eyelids, conjunctiva, & sclera normal.   Neck: Neck appearance normal. No neck masses. Thyroid not enlarged. Left internal jugular catheter nonerythematous, nontender.  Respiratory: Lungs clear bilaterally. No wheezes or rhonchi.   Cardiovascular: Regular rate & rhythm  Heart sounds distant   Gastrointestinal: Soft. Obese.   Musculoskeletal: Skeletal configuration normal and muscle mass normal for age. Joint appearance overall normal.  Skin, Hair, & Nails: Skin color normal. Sternotomy site clean & dry  Hair & nails normal.  Extremities: 1+ lower extremity pitting edema. Warm     All pertinent vital signs, ventilator settings, I&Os, laboratory, microbiology, ECGs, & imaging data has been personally reviewed. Total Critical Care time, excluding procedures, was 59  minutes     Zachary Gonzalez MD  Pulmonary and Critical Care Medicine  [facility]  Office [phone]  he/him

## 2024-09-02 NOTE — PLAN OF CARE
"    [facility] - ICU    RN Progress Note:            Pertinent Assessments:      Please refer to flowsheet rows for full assessment     Hemodynamics, pain         Key Events - This Shift:   Pt awake most of night, states she doesn't sleep well \" on a good day\". C/O neck and back pain. Pain meds given as able and ice pack to neck helpful. Tolerating 150 ml pull on CRRT. Dobutamine off at 0200. Passed dysphagia, tolerating clear liquids. Blood sugars in the 70s.             Barriers to Discharge / Downgrade:     CRRT, Invasive lines         Problem: Risk for Delirium  Goal: Improved Sleep  Outcome: Progressing     Problem: Sepsis/Septic Shock  Goal: Optimal Nutrition Intake  Outcome: Progressing     Problem: Comorbidity Management  Goal: Maintenance of Osteoarthritis Symptom Control  Outcome: Progressing  Intervention: Maintain Osteoarthritis Symptom Control  Recent Flowsheet Documentation  Taken 9/2/2024 0000 by Hien Mart RN  Activity Management: bedrest  Medication Review/Management: medications reviewed  Taken 9/1/2024 2000 by Hien Mart, RN  Activity Management:   bedrest   activity adjusted per tolerance  Medication Review/Management: medications reviewed     Problem: Fluid Volume Excess  Goal: Fluid Balance  Outcome: Progressing  Intervention: Monitor and Manage Hypervolemia  Recent Flowsheet Documentation  Taken 9/2/2024 0000 by Hien Mart, RN  Skin Protection: pulse oximeter probe site changed    Goal Outcome Evaluation:      Plan of Care Reviewed With: patient    Overall Patient Progress: improvingOverall Patient Progress: improving    Outcome Evaluation: Tolerating 150 ml pull from CRRT. Dobutamine off at 0200.      "

## 2024-09-02 NOTE — PROGRESS NOTES
[facility] Inpatient follow up        09/02/2024    Chart reviewed  Patient seen in ICU             Assessment and Recommendations:   Assessment:  Felicitas Elliott is a 84 year old female with     Respiratory failure, shock liver  Decreased urine output, on dobutamine norepinephrine and vasopressin  Intubated  History of severe aortic stenosis  S/P aortic root abscess debridement and aortic annular reconstruction, aortic root replacement, bioprosthetic aortic valve on 8/27/2024  Acute respiratory failure, extubated 8/28/2024, reintubated 8/29/2024, acute kidney injury, currently on CRRT- Gram negative bacilli in resp culture-     2+ Enterobacter cloacae complex, S cefepime, R ceftriaxone     MRSA nares negative, respiratory PCR negative  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  MSSA bacteremia.  Positive blood culture on 8/14/2024 and 8/16.  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  With the size of the vegetation combined with brain infarct, status post CV surgery, see details below active issue.   Neck pain worrisome for cervical discitis.  Neck MRI showed some edema at C4-C5 on the left side.  No clear evidence of infection.  Abnormal brain MRI suggestive of subacute infarct. In a patient with MSSA bacteremia and aortic valve endocarditis, this is cerebral septic emboli. Active issue.     POSTOPERATIVE DIAGNOSES:  1.  Severe aortic stenosis and moderate aortic regurgitation.  2.  Severe mitral stenosis.  3.  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  4.  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  5.  Severe multivessel coronary artery disease.  6.  Aortic root abscess.      PROCEDURE PERFORMED: 8/27/24  Aortic root abscess debridement and  aortic annular reconstruction.  Aortic root replacement (Konect composite 25 mm Silva Inspiris Resilia bioprosthetic valve within 30 mm Gelweave Valsalva graft with reimplantation of the coronary arteries).  Mitral valve replacement (31 mm [employer]. [employer] Silva bioprosthetic valve).  Coronary artery bypass grafting x 2 (reversed saphenous vein graft to the obtuse marginal branch of the left circumflex coronary artery, and pedicled left internal mammary artery to left anterior descending coronary artery).  Endoscopic vein harvest of the greater saphenous vein from the left lower extremity.    Recommendations:  Cefepime for now, would likely plan this for enterobacter plus vancomycin for MSSA coverage 7 days, then go back to cefazolin.  Off nafcillin. Per previous notes plan IV antibiotics duration 6 weeks  Currently intubated,   Stop oral vanco  PICC placed   Monitor CBC, CMP  Status post vascular surgery, chest tube, CV surgery following closely  Will need to check immunoglobin level in 4 to 6 weeks sister with history of hypogammaglobulinemia   Overall will require at least 6 weeks of IV antibiotics, holding nafcillin due to acute kidney injury      RU Portillo MD  [facility]  Answering Service: [phone]  On-Call ID provider: [phone], option: 9              Interval History:     HPI: Extubated.  Sister here.  The pt is totally lucid.  CRRT as in renal note.        Recent Inflammatory Biomarkers:   Recent Labs   Lab Test 24  0903 24  0420 24  2022 24  1345 24  0356 24  1818 24  0437 24  0359   PCAL  --   --   --   --   --   --   --  1.24*   WBC 12.8* 10.6 9.4 9.3 10.5 10.8   < > 15.3*    < > = values in this interval not displayed.            Review of Systems:   CONSTITUTIONAL:    Temp Max: Temp (24hrs), Av.8  F (37.1  C), Min:97.9  F (36.6  C), Max:100.3  F (37.9  C)   .  Negative except for findings in the HPI.            Current Medications (antimicrobials listed in bold):     Current Facility-Administered Medications   Medication Dose Route Frequency Provider Last Rate Last Admin    [Held by provider] acetaminophen (TYLENOL) tablet 975 mg  975 mg Oral Q8H Maryam Anthony PA-C   975 mg at 08/30/24 1336    sodium chloride (NEBUSAL) 3 % neb solution 3 mL  3 mL Nebulization Q6H Zach Park MD   3 mL at 09/02/24 0813    And    albuterol (PROVENTIL) neb solution 2.5 mg  2.5 mg Nebulization Q6H Zach Park MD   2.5 mg at 09/02/24 0813    [Held by provider] atorvastatin (LIPITOR) tablet 80 mg  80 mg Oral QPM Jessica Tolbert PA-C   80 mg at 08/29/24 2038    ceFEPIme (MAXIPIME) 2 g vial to attach to  mL bag for ADULTS or NS 50 mL bag for PEDS  2 g Intravenous Q12H Zach Park MD   2 g at 09/02/24 0523    [Held by provider] clopidogrel (PLAVIX) tablet 75 mg  75 mg Oral Daily Maryam Anthony PA-C        lactobacillus rhamnosus (GG) (CULTURELL) capsule 1 capsule  1 capsule Oral BID Zach Foreman DO   1 capsule at 09/02/24 0945    Lidocaine (LIDOCARE) 4 % Patch 1-2 patch  1-2 patch Transdermal Q24H Maryam Anthony PA-C   1 patch at 09/01/24 1822    pantoprazole (PROTONIX) 2 mg/mL suspension 40 mg  40 mg Oral or NG Tube QAM AC Maryam Anthony PA-C        Or    pantoprazole (PROTONIX) EC tablet 40 mg  40 mg Oral QAM AC Maryam Anthony PA-C        Or    pantoprazole (PROTONIX) IV push injection 40 mg  40 mg Intravenous QAM AC Maryam Anthony PA-C   40 mg at 09/02/24 0945    polyethylene glycol (MIRALAX) Packet 17 g  17 g Oral Daily Maryam Anthony PA-C        senna-docusate (SENOKOT-S/PERICOLACE) 8.6-50 MG per tablet 1 tablet  1 tablet Oral BID Maryam Anthony PA-C   1 tablet at 09/01/24 2117    sodium chloride (PF) 0.9% PF flush 3 mL  3 mL Intracatheter Q8H Gondal, Saad J, MD   3 mL at 09/02/24 0947    vancomycin (FIRVANQ) oral solution 125 mg  125 mg Oral BID  Nannette Lebron MD   125 mg at 09/01/24 2117    vancomycin (VANCOCIN) 750 mg in sodium chloride 0.9 % 250 mL intermittent infusion  750 mg Intravenous Q24H Fernando Wynne MD   750 mg at 09/02/24 0205    [Held by provider] Warfarin Dose Required Daily - Pharmacist Managed  1 each Oral See Admin Instructions Jessica Tolbert PA-C                  Allergies:     Allergies   Allergen Reactions    Aspirin Rash    Cats Rash    Nickel Rash            Physical Exam:   Vitals were reviewed  Patient Vitals for the past 24 hrs:   BP Temp Temp src Pulse Resp SpO2 Weight   09/02/24 0815 -- -- -- 79 21 97 % --   09/02/24 0813 -- -- -- 79 26 95 % --   09/02/24 0800 -- -- -- 79 17 98 % --   09/02/24 0745 -- -- -- 79 18 100 % --   09/02/24 0730 -- -- -- 89 25 95 % --   09/02/24 0715 -- -- -- 89 18 96 % --   09/02/24 0700 -- -- -- 89 29 96 % --   09/02/24 0600 -- 98.6  F (37  C) -- 89 19 97 % --   09/02/24 0500 -- -- -- 89 22 97 % --   09/02/24 0400 -- 98.6  F (37  C) -- 89 19 91 % 95.9 kg (211 lb 6.7 oz)   09/02/24 0300 -- -- -- 89 22 95 % --   09/02/24 0219 -- -- -- 89 24 96 % --   09/02/24 0200 -- 98.8  F (37.1  C) -- 89 20 93 % --   09/02/24 0100 -- -- -- 89 20 95 % --   09/02/24 0000 -- 98.8  F (37.1  C) Pulm Art 89 21 95 % --   09/01/24 2300 116/62 -- -- 89 28 94 % --   09/01/24 2200 116/59 98.8  F (37.1  C) Pulm Art 89 19 95 % --   09/01/24 2100 112/56 -- -- 89 19 96 % --   09/01/24 2029 -- -- -- 89 19 100 % --   09/01/24 2000 110/56 98.8  F (37.1  C) -- 89 25 96 % --   09/01/24 1930 -- -- -- 89 26 94 % --   09/01/24 1915 -- -- -- 89 20 95 % --   09/01/24 1900 112/77 98.8  F (37.1  C) Pulm Art 89 28 94 % --   09/01/24 1845 -- -- -- 89 18 95 % --   09/01/24 1830 -- -- -- 89 24 96 % --   09/01/24 1815 -- -- -- 89 21 95 % --   09/01/24 1800 114/73 99  F (37.2  C) Pulm Art 89 30 94 % --   09/01/24 1745 -- -- -- 89 23 95 % --   09/01/24 1730 -- -- -- 89 18 94 % --   09/01/24 1715 -- 99  F (37.2  C) Pulm Art 89 28 94 % --    09/01/24 1700 119/59 -- -- 89 23 92 % --   09/01/24 1645 -- 99.1  F (37.3  C) Pulm Art 89 21 (!) 81 % --   09/01/24 1630 -- -- -- 89 23 94 % --   09/01/24 1615 -- -- -- 89 15 98 % --   09/01/24 1600 114/68 99  F (37.2  C) Pulm Art 89 16 98 % --   09/01/24 1545 -- 99  F (37.2  C) Pulm Art 89 18 98 % --   09/01/24 1530 -- -- -- 89 17 98 % --   09/01/24 1515 -- -- -- 89 16 98 % --   09/01/24 1500 112/65 99.3  F (37.4  C) Pulm Art 89 16 96 % --   09/01/24 1456 -- -- -- 89 17 93 % --   09/01/24 1445 123/75 -- -- 89 24 98 % --   09/01/24 1430 119/76 -- -- 89 24 99 % --   09/01/24 1415 118/74 -- -- 89 24 99 % --   09/01/24 1400 130/80 99.1  F (37.3  C) Pulm Art 89 24 99 % --   09/01/24 1345 129/80 -- -- 89 24 98 % --   09/01/24 1330 129/80 -- -- 89 24 99 % --   09/01/24 1315 133/80 -- -- 89 24 97 % --   09/01/24 1300 119/73 -- -- 89 24 97 % --   09/01/24 1245 121/73 -- -- 89 24 97 % --   09/01/24 1145 125/67 -- -- -- -- -- --   09/01/24 1130 138/75 -- -- -- -- -- --   09/01/24 1115 118/77 -- -- 89 24 99 % --   09/01/24 1100 106/61 -- -- 89 24 98 % --   09/01/24 1045 -- -- -- 90 24 98 % --   09/01/24 1030 -- -- -- 89 24 97 % --   09/01/24 1015 -- -- -- 89 24 98 % --   09/01/24 1000 117/70 -- -- 89 24 98 % --       Physical Examination:    FiO2 (%): 30 %, Resp: 21, Vent Mode: CPAP/PS, Resp Rate (Set): 24 breaths/min, Tidal Volume (Set, mL): 420 mL, PEEP (cm H2O): 5 cmH2O, Pressure Support (cm H2O): 5 cmH2O, Resp Rate (Set): 24 breaths/min, Tidal Volume (Set, mL): 420 mL, PEEP (cm H2O): 5 cmH2O    Gen: Intubated  HEENT: ETT opens eyes  PICC, Femoral catheter for dialysis, rectal tube  CV: Sternal incision, chest tube  Lungs: coarse, intubated, chest tubes.  Skin: Warm  Extr: edema. Neha L leg  Neuro: intubated, opens eyes  Art line           Laboratory Data:   ID Labs:  Microbiology labs:  Reviewed      No lab results found.  Recent Labs   Lab Test 09/02/24  0903 09/02/24  0420 09/01/24  2022 09/01/24  1345 09/01/24  0356  08/31/24  1818   WBC 12.8* 10.6 9.4 9.3 10.5 10.8     Recent Labs   Lab Test 09/02/24 0420 09/01/24 2211 09/01/24 2022 09/01/24  1345   CR 0.95 0.97* 0.95 1.14*  1.07*   GFRESTIMATED 59* 57* 59* 47*  51*       Hematology Studies  Recent Labs   Lab Test 09/02/24  0903 09/02/24 0420 09/01/24 2022 09/01/24  1345 09/01/24  0356 08/31/24  1818   WBC 12.8* 10.6 9.4 9.3 10.5 10.8   HGB 8.4* 8.1* 7.9* 7.9* 8.0* 8.0*   HCT 27.2* 25.7* 24.5* 24.1* 24.3* 24.3*   PLT 61* 60* 74* 46* 63*  --        Metabolic  Recent Labs   Lab Test 09/02/24 0420 09/01/24 2211 09/01/24 2022    136 137   BUN 11.7 12.6 12.7   CO2 20* 19* 20*   CR 0.95 0.97* 0.95   GFRESTIMATED 59* 57* 59*       Hepatic Studies  Recent Labs   Lab Test 09/02/24 0420 09/01/24 2211 09/01/24 2022 09/01/24  1345   BILITOTAL 1.2 1.2  --  1.0   ALKPHOS 59 55  --  51   ALBUMIN 3.4* 3.5 3.4* 3.3*  3.1*   * 282*  --  309*   * 129*  --  132*     Susceptibility data from last 90 days.  Collected Specimen Info Organism Ampicillin Ampicillin/Sulbactam Cefepime Ceftazidime Ceftriaxone Ciprofloxacin Clindamycin Daptomycin Doxycycline Erythromycin Gentamicin Levofloxacin Meropenem Oxacillin   08/29/24 Sputum from Expectorate Enterobacter cloacae complex R R  S R R  S      S  S  S      Normal samara                 08/16/24 Peripheral Blood Staphylococcus aureus                 08/14/24 Peripheral Blood Staphylococcus aureus        S  S  S  S  S    S     Collected Specimen Info Organism Piperacillin/Tazobactam Tetracycline Tobramycin Trimethoprim/Sulfamethoxazole  Vancomycin   08/29/24 Sputum from Expectorate Enterobacter cloacae complex R   S  S      Normal samara        08/16/24 Peripheral Blood Staphylococcus aureus        08/14/24 Peripheral Blood Staphylococcus aureus   S   S  S             Imaging Data:   Reviewed     Study Result    Narrative & Impression   EXAM: MR BRAIN W/O and W CONTRAST  LOCATION: [facility]  DATE:  8/17/2024     INDICATION: Headache in a patient with MSSA bacteremia secondary to aortic valve endocarditis.  Look for cerebral septic emboli; Headache; Acute HA (< 3 months), no complicating features  COMPARISON: None.  CONTRAST: 9 ml gadavist  TECHNIQUE: Routine multiplanar multisequence head MRI without and with intravenous contrast.     FINDINGS: Assessment degraded due to motion.  INTRACRANIAL CONTENTS:   Apparent focus of diffusion restriction with T2/FLAIR hyperintensity within the left superior parietal lobule cortex is hyperintense on ADC map, representing T2 shine through.  Focus of signal abnormality measures 13 x 9 mm in the axial plane and does   not have internal enhancement.     Additional punctate foci of hyperintensity on diffusion weighted with similar signal characteristics (periventricular right corona radiata, left superior parietal lobule, and left cerebellar hemisphere).     Patchy and confluent nonspecific T2/FLAIR hyperintensities within the cerebral white matter most consistent with moderate chronic microvascular ischemic change. Moderate generalized cerebral atrophy. No hydrocephalus. Normal position of the cerebellar   tonsils. No discernible abnormal intracranial enhancement; however, assessment substantially degraded due to motion. 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     SELLA: No abnormality accounting for technique.     OSSEOUS STRUCTURES/SOFT TISSUES: Normal marrow signal. The major intracranial vascular flow voids are maintained.      ORBITS: No abnormality accounting for technique.      SINUSES/MASTOIDS: Mild mucosal thickening scattered about the paranasal sinuses. Scattered fluid/membrane thickening in the left mastoid air cells. No apparent mass in the posterior nasopharynx or skull base.                                                                       IMPRESSION: Assessment degraded due to motion.  1.  13 mm focus of cortical signal abnormality within the left superior  parietal lobule which is favored to represent a subacute infarct; low-grade glial neoplasm could conceivably have a similar appearance. Hyperacute intraparenchymal hematoma could   also have a similar appearance but is considered less likely. Recommend noncontrast head CT for further characterization. Also recommend follow-up MRI brain without and with contrast in 8-12 weeks to document expected evolution.  2.  Additional smaller foci of signal abnormality with similar characteristics favored to represent punctate subacute infarcts.

## 2024-09-02 NOTE — PROGRESS NOTES
Critical Care Note    At 1845 the patient and her spouse and daughter asked the nurse to call me into the room. They did so to express that she wants to change her code status to DNR/DNI. I did not initiate this conversation. The patient clearly stated this wish. I did explain that the CT surgery team would likely want to discuss this further with her. I however will respect her explicitly stated wish and place a DNR/DNI order pending further conversation with the CT surgery team.    Zachary Gonzalez MD  Pulmonary and Critical Care Medicine  [facility]  Office [phone]  he/him

## 2024-09-02 NOTE — PROGRESS NOTES
HEART CARE NOTE          Assessment/Recommendations     1. Severe valvular disease c/b post-op cardiogenic shock  Assessment / Plan  Extubated on 9/1, now on NC  Weaned off dobutamine yesterday  GDMT as detailed below; mainstay of treatment for HFpEF includes diuretics and adequate BP control (class I) and SGLT2-I (class 2a); additional medical therapy (ARNI, MRA, ARB) demonstrated less robust evidence for indication but may be considered per guideline recommendations (2b); no indication for BBlockers      Current Pharmacotherapy AHA Guideline-Directed Medical Therapy   Losartan  - not started 2/2 renal dysfunction ARNI/ARB   Spironolactone not started  MRA   SGLT2 inhibitor: not started SGLT2-I    Furosemide gtt - currently on CRRT Loop diuretic       2. 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 Anemia  Assessment / Plan  Management and supportive care per primary team     5. CAD s/p CABG  Assessment / Plan  Start statins and aspirin if and when feasible(Hb 8 and occult blood positive)     6. Afib   Assessment / Plan  In sinus bradycardia with significant first degree AV delay on telemetry  Avoid AV node blocking agents  Consider Zio patch monitor at discharge  CHADSVA score ~4 or higher    7. Shock liver  - continue to monitor  - improvement noted     Patient remains critically ill in the ICU. 70 minutes spent on critical care time    History of Present Illness/Subjective    Ms. Felicitas Elliott is a 84 year old female with a PMHx significant for (per Epic notation) presented with fatigue, weakness, chills, poor appetite. Does not really have much for chronic medical conditions other  "than chronic back pain, furunculosis, uterine prolapse, tobacco use      Extubated at 1625 on 9/1.    Of pressors and off CRRT.     ECHO (personnaly Reviewed on 8/19/24):   1. The left ventricle is normal in size. Left ventricular function is  normal.The ejection fraction is 55-60%.  2. Normal right ventricle size and systolic function.  3. Large vegetation on mitral valve (8 mm x 15) attached to posterior leaflet.  4. There is a small vegetation on the aortic valve (6 mm). No evidence of  aortic root abscess identified.  5. Severe valvular aortic stenosis (SHU:0.8 cm2, peak nomi: 4.6 m/sec, mean  gradient: 51 mmHg).    Telemetry: personally reviewed September 2, 2024;sinus bradycardia with significant AV delay     Medical history and pertinent documents reviewed in Care Everywhere please where applicable see details above        Physical Examination Review of Systems   /62   Pulse 79   Temp 98.6  F (37  C)   Resp 21   Ht 1.702 m (5' 7\")   Wt 95.9 kg (211 lb 6.7 oz)   SpO2 97%   BMI 33.11 kg/m    Body mass index is 33.11 kg/m .  Wt Readings from Last 3 Encounters:   09/02/24 95.9 kg (211 lb 6.7 oz)   08/15/24 90.1 kg (198 lb 9.6 oz)     General Appearance:   Awake and comfortable in bed   ENT/Mouth: membranes moist, no oral lesions or bleeding gums.      EYES:  no scleral icterus, normal conjunctivae   Neck: no carotid bruits or thyromegaly   Chest/Lungs:   lungs are clear to auscultation, no rales or wheezing, equal chest wall expansion    Cardiovascular:   Regular. Normal first and second heart sounds with no murmurs, rubs, or gallops; the carotid, radial and posterior tibial pulses are intact, anasarca    Abdomen:  no organomegaly, masses, bruits, or tenderness; bowel sounds are present   Extremities: no cyanosis or clubbing   Skin: no xanthelasma, warm.    Neurologic: Following commands     Psychiatric: Stable affect    A complete 10 systems ROS was reviewed  And is negative except what is listed in " the [facility].          Medical History  Surgical History Family History Social History   History reviewed. No pertinent past medical history. Past Surgical History:   Procedure Laterality Date    CORONARY ARTERY BYPASS GRAFT, WITH AORTIC VALVE REPLACEMENT, WITH ENDOSCOPIC VESSEL PROCUREMENT N/A 8/27/2024    Procedure: CORONARY ARTERY BYPASS GRAFT TIMES TWO, WITH AORTIC ROOT REPLACEMENT, WITH LEFT INTERNAL MAMMARY ARTERY HARVEST, LEFT SAPHNENOUS ENDOSCOPIC VESSEL PROCUREMENT,;  Surgeon: Dylan Renae MD;  Location: [facility] OR    CV CORONARY ANGIOGRAM N/A 8/20/2024    Procedure: CV CORONARY ANGIOGRAM;  Surgeon: Den Wylie MD;  Location: [facility] CATH LAB CV    IR CVC NON TUNNEL PLACEMENT > 5 YRS  9/1/2024    REPLACE VALVE MITRAL N/A 8/27/2024    Procedure: MITRAL VALVE REPLACEMENT,;  Surgeon: Dylan Renae MD;  Location: [facility] OR    TRANSESOPHAGEAL ECHOCARDIOGRAM INTRAOPERATIVE  8/27/2024    Procedure: ANESTHESIA TRANSESOPHAGEAL ECHOCARDIOGRAM, EPI-AORTIC ULTRASOUND;  Surgeon: Dylan Renae MD;  Location: [facility] OR    no family history of premature coronary artery disease Social History     Socioeconomic History    Marital status:      Spouse name: Not on file    Number of children: Not on file    Years of education: Not on file    Highest education level: Not on file   Occupational History    Not on file   Tobacco Use    Smoking status: Not on file    Smokeless tobacco: Not on file   Substance and Sexual Activity    Alcohol use: Not on file    Drug use: Not on file    Sexual activity: Not on file   Other Topics Concern    Not on file   Social History Narrative    Not on file     Social Determinants of Health     Financial Resource Strain: Low Risk  (8/24/2024)    Financial Resource Strain     Within the past 12 months, have you or your family members you live with been unable to get utilities (heat, electricity) when it was really needed?: No   Food  "Insecurity: Low Risk  (8/24/2024)    Food Insecurity     Within the past 12 months, did you worry that your food would run out before you got money to buy more?: No     Within the past 12 months, did the food you bought just not last and you didn t have money to get more?: No   Transportation Needs: Low Risk  (8/24/2024)    Transportation Needs     Within the past 12 months, has lack of transportation kept you from medical appointments, getting your medicines, non-medical meetings or appointments, work, or from getting things that you need?: No   Physical Activity: Not on file   Stress: Not on file   Social Connections: Unknown (1/3/2024)    Received from [facility]    Social Connections     Frequency of Communication with Friends and Family: Not on file   Interpersonal Safety: High Risk (8/23/2024)    Interpersonal Safety     Do you feel physically and emotionally safe where you currently live?: No     Within the past 12 months, have you been hit, slapped, kicked or otherwise physically hurt by someone?: No     Within the past 12 months, have you been humiliated or emotionally abused in other ways by your partner or ex-partner?: No   Housing Stability: Low Risk  (8/24/2024)    Housing Stability     Do you have housing? : Yes     Are you worried about losing your housing?: No           Lab Results    Chemistry/lipid CBC Cardiac Enzymes/BNP/TSH/INR   Lab Results   Component Value Date    BUN 11.7 09/02/2024     09/02/2024    CO2 20 (L) 09/02/2024    Lab Results   Component Value Date    WBC 10.6 09/02/2024    HGB 8.1 (L) 09/02/2024    HCT 25.7 (L) 09/02/2024    MCV 96 09/02/2024    PLT 60 (L) 09/02/2024    Lab Results   Component Value Date    INR 1.48 (H) 09/02/2024     No results found for: \"CKTOTAL\", \"CKMB\", \"TROPONINI\"       Weight:    Wt Readings from Last 3 Encounters:   09/02/24 95.9 kg (211 lb 6.7 oz)   08/15/24 90.1 kg (198 lb 9.6 oz)       Allergies  Allergies "   Allergen Reactions    Aspirin Rash    Cats Rash    Nickel Rash         Surgical History  Past Surgical History:   Procedure Laterality Date    CORONARY ARTERY BYPASS GRAFT, WITH AORTIC VALVE REPLACEMENT, WITH ENDOSCOPIC VESSEL PROCUREMENT N/A 8/27/2024    Procedure: CORONARY ARTERY BYPASS GRAFT TIMES TWO, WITH AORTIC ROOT REPLACEMENT, WITH LEFT INTERNAL MAMMARY ARTERY HARVEST, LEFT SAPHNENOUS ENDOSCOPIC VESSEL PROCUREMENT,;  Surgeon: Dylan Renae MD;  Location: [facility] OR    CV CORONARY ANGIOGRAM N/A 8/20/2024    Procedure: CV CORONARY ANGIOGRAM;  Surgeon: Den Wylie MD;  Location: [facility] CATH LAB CV    IR CVC NON TUNNEL PLACEMENT > 5 YRS  9/1/2024    REPLACE VALVE MITRAL N/A 8/27/2024    Procedure: MITRAL VALVE REPLACEMENT,;  Surgeon: Dylan Renae MD;  Location: [facility] OR    TRANSESOPHAGEAL ECHOCARDIOGRAM INTRAOPERATIVE  8/27/2024    Procedure: ANESTHESIA TRANSESOPHAGEAL ECHOCARDIOGRAM, EPI-AORTIC ULTRASOUND;  Surgeon: Dylan Renae MD;  Location: [facility] OR       Social History  Tobacco:   History   Smoking Status    Not on file   Smokeless Tobacco    Not on file    Alcohol:   Social History    Substance and Sexual Activity      Alcohol use: Not on file   Illicit Drugs:   History   Drug Use Not on file       Family History  History reviewed. No pertinent family history.       Jaswant Solis MD  Clinical Cardiac Electrophysiology        cc: Clinic, [facility]

## 2024-09-02 NOTE — PHARMACY-VANCOMYCIN DOSING SERVICE
Pharmacy Vancomycin Note  Date of Service 2024  Patient's  1940   84 year old, female    Indication: Aspiration Pneumonia  Day of Therapy: 4  Current vancomycin regimen:  750mg IV q24h --   Current vancomycin monitoring method: Trough (Method 1 = dosing nomogram)  Current vancomycin therapeutic monitoring goal: 15-20 mg/L      Current estimated CrCl = Estimated Creatinine Clearance: 39.2 mL/min (A) (based on SCr of 1.27 mg/dL (H)).    Creatinine for last 3 days  2024:  1:22 PM Creatinine 1.51 mg/dL;  1:22 PM Creatinine 1.48 mg/dL;  8:17 PM Creatinine 1.31 mg/dL; 10:20 PM Creatinine 1.33 mg/dL  2024:  4:16 AM Creatinine 1.37 mg/dL;  4:16 AM Creatinine 1.37 mg/dL; 11:41 AM Creatinine 1.19 mg/dL; 11:41 AM Creatinine 1.16 mg/dL;  7:57 PM Creatinine 1.13 mg/dL; 10:08 PM Creatinine 1.06 mg/dL  2024:  3:56 AM Creatinine 1.05 mg/dL;  5:59 AM Creatinine 1.01 mg/dL;  1:45 PM Creatinine 1.14 mg/dL;  1:45 PM Creatinine 1.07 mg/dL;  8:22 PM Creatinine 0.95 mg/dL; 10:11 PM Creatinine 0.97 mg/dL  2024:  4:20 AM Creatinine 0.95 mg/dL;  9:03 AM Creatinine 1.27 mg/dL;  9:03 AM Creatinine 1.28 mg/dL    Recent Vancomycin Levels (past 3 days)  2024: 10:55 AM Vancomycin 24.3 ug/mL    Vancomycin IV Administrations (past 72 hours)                     vancomycin (VANCOCIN) 750 mg in sodium chloride 0.9 % 250 mL intermittent infusion (mg) 750 mg New Bag 24 0205    vancomycin (VANCOCIN) 750 mg in sodium chloride 0.9 % 250 mL intermittent infusion (mg) 750 mg New Bag 24 1348     750 mg New Bag  0013     750 mg New Bag 24 1237     750 mg New Bag 24 2320                    Nephrotoxins and other renal medications (From now, onward)      Start     Dose/Rate Route Frequency Ordered Stop    24 2100  vancomycin (VANCOCIN) capsule 125 mg         125 mg Oral 2 TIMES DAILY 24 1021      24 1300  vancomycin place horne - receiving intermittent dosing         1  "each Intravenous SEE ADMIN INSTRUCTIONS 09/02/24 1301                 Contrast Orders - past 72 hours (72h ago, onward)      Start     Dose/Rate Route Frequency Stop    09/01/24 1230  iohexol (OMNIPAQUE) 350 MG/ML injectable solution 50 mL         50 mL Intravenous ONCE 09/01/24 1207            Interpretation of levels and current regimen:  Dose adjusted on 9/1 based on level.  Last dose given 9/2 0205, CRRT paused for 6-8 hours due to \"increased membrane pressure\" alarm and decision made to continue CRRT this afternoon    Pausing CRRT will affect vancomycin clearance, will temporarily change to intermittent dosing, check level with 9/3 AM labs, and assess ongoing dosing at that time    Plan:  Temporarily change to intermittent dosing for paused CRRT  Vancomycin monitoring method: Trough (Method 1 = dosing nomogram)  Vancomycin therapeutic monitoring goal: 15-20 mg/L  Pharmacy will check vancomycin levels as appropriate in  9/3 AM labs .  Serum creatinine levels will be ordered  with CRRT labs .    Vianey Lenz RPH    "

## 2024-09-02 NOTE — TREATMENT PLAN
"CRRT alarming \" increased membrane pressure\". TMP, and filter pressure increasing. Updated Nephrology. Will stop CRRT for now and re-evaluate labs at 0900 and decide if HD is needed. Has remained off Dobutamine. Making small amounts of urine.  "

## 2024-09-03 LAB
ABO/RH(D): NORMAL
ALBUMIN SERPL BCG-MCNC: 3.2 G/DL (ref 3.5–5.2)
ALBUMIN SERPL BCG-MCNC: 3.3 G/DL (ref 3.5–5.2)
ALBUMIN SERPL BCG-MCNC: 3.4 G/DL (ref 3.5–5.2)
ALBUMIN SERPL BCG-MCNC: 3.4 G/DL (ref 3.5–5.2)
ALBUMIN SERPL BCG-MCNC: 3.5 G/DL (ref 3.5–5.2)
ALBUMIN SERPL BCG-MCNC: 3.5 G/DL (ref 3.5–5.2)
ALP SERPL-CCNC: 60 U/L (ref 40–150)
ALT SERPL W P-5'-P-CCNC: 248 U/L (ref 0–50)
ALT SERPL W P-5'-P-CCNC: 278 U/L (ref 0–50)
ALT SERPL W P-5'-P-CCNC: 307 U/L (ref 0–50)
ANION GAP SERPL CALCULATED.3IONS-SCNC: 20 MMOL/L (ref 7–15)
ANION GAP SERPL CALCULATED.3IONS-SCNC: 21 MMOL/L (ref 7–15)
ANION GAP SERPL CALCULATED.3IONS-SCNC: 22 MMOL/L (ref 7–15)
ANTIBODY SCREEN: NEGATIVE
AST SERPL W P-5'-P-CCNC: 1001 U/L (ref 0–45)
AST SERPL W P-5'-P-CCNC: 545 U/L (ref 0–45)
AST SERPL W P-5'-P-CCNC: 692 U/L (ref 0–45)
ATRIAL RATE - MUSE: 66 BPM
ATRIAL RATE - MUSE: 68 BPM
BACTERIA TISS BX CULT: NORMAL
BILIRUB DIRECT SERPL-MCNC: 0.68 MG/DL (ref 0–0.3)
BILIRUB SERPL-MCNC: 1 MG/DL
BILIRUB SERPL-MCNC: 1 MG/DL
BILIRUB SERPL-MCNC: 1.3 MG/DL
BLD PROD TYP BPU: NORMAL
BLOOD COMPONENT TYPE: NORMAL
BUN SERPL-MCNC: 13.3 MG/DL (ref 8–23)
BUN SERPL-MCNC: 13.3 MG/DL (ref 8–23)
BUN SERPL-MCNC: 14.1 MG/DL (ref 8–23)
BUN SERPL-MCNC: 14.6 MG/DL (ref 8–23)
BUN SERPL-MCNC: 18.7 MG/DL (ref 8–23)
BUN SERPL-MCNC: 19.2 MG/DL (ref 8–23)
CA-I BLD-MCNC: 4.4 MG/DL (ref 4.4–5.2)
CA-I BLD-MCNC: 4.5 MG/DL (ref 4.4–5.2)
CA-I BLD-MCNC: 4.5 MG/DL (ref 4.4–5.2)
CALCIUM SERPL-MCNC: 7.6 MG/DL (ref 8.8–10.4)
CALCIUM SERPL-MCNC: 7.7 MG/DL (ref 8.8–10.4)
CALCIUM SERPL-MCNC: 7.9 MG/DL (ref 8.8–10.4)
CALCIUM SERPL-MCNC: 7.9 MG/DL (ref 8.8–10.4)
CALCIUM SERPL-MCNC: 8.3 MG/DL (ref 8.8–10.4)
CALCIUM SERPL-MCNC: 8.3 MG/DL (ref 8.8–10.4)
CHLORIDE SERPL-SCNC: 101 MMOL/L (ref 98–107)
CHLORIDE SERPL-SCNC: 101 MMOL/L (ref 98–107)
CHLORIDE SERPL-SCNC: 102 MMOL/L (ref 98–107)
CHLORIDE SERPL-SCNC: 102 MMOL/L (ref 98–107)
CHLORIDE SERPL-SCNC: 99 MMOL/L (ref 98–107)
CHLORIDE SERPL-SCNC: 99 MMOL/L (ref 98–107)
CODING SYSTEM: NORMAL
CREAT SERPL-MCNC: 0.9 MG/DL (ref 0.51–0.95)
CREAT SERPL-MCNC: 1 MG/DL (ref 0.51–0.95)
CREAT SERPL-MCNC: 1.07 MG/DL (ref 0.51–0.95)
CREAT SERPL-MCNC: 1.07 MG/DL (ref 0.51–0.95)
CREAT SERPL-MCNC: 1.23 MG/DL (ref 0.51–0.95)
CREAT SERPL-MCNC: 1.25 MG/DL (ref 0.51–0.95)
CROSSMATCH: NORMAL
DIASTOLIC BLOOD PRESSURE - MUSE: NORMAL MMHG
DIASTOLIC BLOOD PRESSURE - MUSE: NORMAL MMHG
EGFRCR SERPLBLD CKD-EPI 2021: 42 ML/MIN/1.73M2
EGFRCR SERPLBLD CKD-EPI 2021: 43 ML/MIN/1.73M2
EGFRCR SERPLBLD CKD-EPI 2021: 51 ML/MIN/1.73M2
EGFRCR SERPLBLD CKD-EPI 2021: 51 ML/MIN/1.73M2
EGFRCR SERPLBLD CKD-EPI 2021: 55 ML/MIN/1.73M2
EGFRCR SERPLBLD CKD-EPI 2021: 63 ML/MIN/1.73M2
ERYTHROCYTE [DISTWIDTH] IN BLOOD BY AUTOMATED COUNT: 18 % (ref 10–15)
ERYTHROCYTE [DISTWIDTH] IN BLOOD BY AUTOMATED COUNT: 18.5 % (ref 10–15)
ERYTHROCYTE [DISTWIDTH] IN BLOOD BY AUTOMATED COUNT: 18.6 % (ref 10–15)
ERYTHROCYTE [DISTWIDTH] IN BLOOD BY AUTOMATED COUNT: 18.8 % (ref 10–15)
GLUCOSE BLDC GLUCOMTR-MCNC: 137 MG/DL (ref 70–99)
GLUCOSE BLDC GLUCOMTR-MCNC: 75 MG/DL (ref 70–99)
GLUCOSE SERPL-MCNC: 119 MG/DL (ref 70–99)
GLUCOSE SERPL-MCNC: 137 MG/DL (ref 70–99)
GLUCOSE SERPL-MCNC: 144 MG/DL (ref 70–99)
GLUCOSE SERPL-MCNC: 76 MG/DL (ref 70–99)
GLUCOSE SERPL-MCNC: 76 MG/DL (ref 70–99)
GLUCOSE SERPL-MCNC: 97 MG/DL (ref 70–99)
HCO3 SERPL-SCNC: 17 MMOL/L (ref 22–29)
HCO3 SERPL-SCNC: 18 MMOL/L (ref 22–29)
HCO3 SERPL-SCNC: 18 MMOL/L (ref 22–29)
HCT VFR BLD AUTO: 23.6 % (ref 35–47)
HCT VFR BLD AUTO: 24.3 % (ref 35–47)
HCT VFR BLD AUTO: 24.8 % (ref 35–47)
HCT VFR BLD AUTO: 25.4 % (ref 35–47)
HGB BLD-MCNC: 7.3 G/DL (ref 11.7–15.7)
HGB BLD-MCNC: 7.6 G/DL (ref 11.7–15.7)
HGB BLD-MCNC: 7.8 G/DL (ref 11.7–15.7)
HGB BLD-MCNC: 8 G/DL (ref 11.7–15.7)
INR PPP: 1.6 (ref 0.85–1.15)
INTERPRETATION ECG - MUSE: NORMAL
INTERPRETATION ECG - MUSE: NORMAL
ISSUE DATE AND TIME: NORMAL
LACTATE SERPL-SCNC: 1.2 MMOL/L (ref 0.7–2)
MAGNESIUM SERPL-MCNC: 2.4 MG/DL (ref 1.7–2.3)
MAGNESIUM SERPL-MCNC: 2.4 MG/DL (ref 1.7–2.3)
MAGNESIUM SERPL-MCNC: 2.6 MG/DL (ref 1.7–2.3)
MCH RBC QN AUTO: 29.9 PG (ref 26.5–33)
MCH RBC QN AUTO: 30.2 PG (ref 26.5–33)
MCH RBC QN AUTO: 30.2 PG (ref 26.5–33)
MCH RBC QN AUTO: 30.7 PG (ref 26.5–33)
MCHC RBC AUTO-ENTMCNC: 30.6 G/DL (ref 31.5–36.5)
MCHC RBC AUTO-ENTMCNC: 30.7 G/DL (ref 31.5–36.5)
MCHC RBC AUTO-ENTMCNC: 30.9 G/DL (ref 31.5–36.5)
MCHC RBC AUTO-ENTMCNC: 32.9 G/DL (ref 31.5–36.5)
MCV RBC AUTO: 91 FL (ref 78–100)
MCV RBC AUTO: 98 FL (ref 78–100)
MCV RBC AUTO: 98 FL (ref 78–100)
MCV RBC AUTO: 99 FL (ref 78–100)
P AXIS - MUSE: NORMAL DEGREES
P AXIS - MUSE: NORMAL DEGREES
PATH REV: NORMAL
PHOSPHATE SERPL-MCNC: 4.3 MG/DL (ref 2.5–4.5)
PHOSPHATE SERPL-MCNC: 4.8 MG/DL (ref 2.5–4.5)
PHOSPHATE SERPL-MCNC: 5.4 MG/DL (ref 2.5–4.5)
PLAT MORPH BLD: NORMAL
PLATELET # BLD AUTO: 40 10E3/UL (ref 150–450)
PLATELET # BLD AUTO: 41 10E3/UL (ref 150–450)
PLATELET # BLD AUTO: 42 10E3/UL (ref 150–450)
PLATELET # BLD AUTO: 42 10E3/UL (ref 150–450)
POTASSIUM SERPL-SCNC: 4.3 MMOL/L (ref 3.4–5.3)
POTASSIUM SERPL-SCNC: 4.4 MMOL/L (ref 3.4–5.3)
POTASSIUM SERPL-SCNC: 4.5 MMOL/L (ref 3.4–5.3)
POTASSIUM SERPL-SCNC: 4.5 MMOL/L (ref 3.4–5.3)
POTASSIUM SERPL-SCNC: 4.6 MMOL/L (ref 3.4–5.3)
POTASSIUM SERPL-SCNC: 4.6 MMOL/L (ref 3.4–5.3)
PR INTERVAL - MUSE: 432 MS
PR INTERVAL - MUSE: NORMAL MS
PROT SERPL-MCNC: 5.8 G/DL (ref 6.4–8.3)
PROT SERPL-MCNC: 5.9 G/DL (ref 6.4–8.3)
PROT SERPL-MCNC: 6.2 G/DL (ref 6.4–8.3)
QRS DURATION - MUSE: 100 MS
QRS DURATION - MUSE: 94 MS
QT - MUSE: 414 MS
QT - MUSE: 458 MS
QTC - MUSE: 462 MS
QTC - MUSE: 480 MS
R AXIS - MUSE: -33 DEGREES
R AXIS - MUSE: -42 DEGREES
RBC # BLD AUTO: 2.38 10E6/UL (ref 3.8–5.2)
RBC # BLD AUTO: 2.52 10E6/UL (ref 3.8–5.2)
RBC # BLD AUTO: 2.58 10E6/UL (ref 3.8–5.2)
RBC # BLD AUTO: 2.68 10E6/UL (ref 3.8–5.2)
RBC MORPH BLD: NORMAL
SODIUM SERPL-SCNC: 137 MMOL/L (ref 135–145)
SODIUM SERPL-SCNC: 137 MMOL/L (ref 135–145)
SODIUM SERPL-SCNC: 139 MMOL/L (ref 135–145)
SODIUM SERPL-SCNC: 140 MMOL/L (ref 135–145)
SODIUM SERPL-SCNC: 140 MMOL/L (ref 135–145)
SODIUM SERPL-SCNC: 141 MMOL/L (ref 135–145)
SPECIMEN EXPIRATION DATE: NORMAL
SYSTOLIC BLOOD PRESSURE - MUSE: NORMAL MMHG
SYSTOLIC BLOOD PRESSURE - MUSE: NORMAL MMHG
T AXIS - MUSE: -12 DEGREES
T AXIS - MUSE: -23 DEGREES
TROPONIN T SERPL HS-MCNC: 430 NG/L
TROPONIN T SERPL HS-MCNC: 563 NG/L
TROPONIN T SERPL HS-MCNC: 569 NG/L
UFH PPP CHRO-ACNC: <0.1 IU/ML
UNIT ABO/RH: NORMAL
UNIT NUMBER: NORMAL
UNIT STATUS: NORMAL
UNIT TYPE ISBT: 9500
VANCOMYCIN SERPL-MCNC: 15.6 UG/ML
VENTRICULAR RATE- MUSE: 66 BPM
VENTRICULAR RATE- MUSE: 75 BPM
WBC # BLD AUTO: 10.8 10E3/UL (ref 4–11)
WBC # BLD AUTO: 13.2 10E3/UL (ref 4–11)
WBC # BLD AUTO: 14.1 10E3/UL (ref 4–11)
WBC # BLD AUTO: 14.2 10E3/UL (ref 4–11)

## 2024-09-03 PROCEDURE — 82040 ASSAY OF SERUM ALBUMIN: CPT | Performed by: INTERNAL MEDICINE

## 2024-09-03 PROCEDURE — 85027 COMPLETE CBC AUTOMATED: CPT | Performed by: INTERNAL MEDICINE

## 2024-09-03 PROCEDURE — 250N000009 HC RX 250: Performed by: INTERNAL MEDICINE

## 2024-09-03 PROCEDURE — 99232 SBSQ HOSP IP/OBS MODERATE 35: CPT

## 2024-09-03 PROCEDURE — 272N000202 HC AEROBIKA WITH MANOMETER

## 2024-09-03 PROCEDURE — 94640 AIRWAY INHALATION TREATMENT: CPT

## 2024-09-03 PROCEDURE — 86923 COMPATIBILITY TEST ELECTRIC: CPT | Performed by: SURGERY

## 2024-09-03 PROCEDURE — 999N000157 HC STATISTIC RCP TIME EA 10 MIN

## 2024-09-03 PROCEDURE — 82040 ASSAY OF SERUM ALBUMIN: CPT | Performed by: PHYSICIAN ASSISTANT

## 2024-09-03 PROCEDURE — 83735 ASSAY OF MAGNESIUM: CPT | Performed by: INTERNAL MEDICINE

## 2024-09-03 PROCEDURE — 99291 CRITICAL CARE FIRST HOUR: CPT | Performed by: INTERNAL MEDICINE

## 2024-09-03 PROCEDURE — 80202 ASSAY OF VANCOMYCIN: CPT | Performed by: INTERNAL MEDICINE

## 2024-09-03 PROCEDURE — 250N000011 HC RX IP 250 OP 636: Performed by: PHYSICIAN ASSISTANT

## 2024-09-03 PROCEDURE — 258N000003 HC RX IP 258 OP 636: Performed by: INTERNAL MEDICINE

## 2024-09-03 PROCEDURE — 99232 SBSQ HOSP IP/OBS MODERATE 35: CPT | Performed by: INTERNAL MEDICINE

## 2024-09-03 PROCEDURE — 99233 SBSQ HOSP IP/OBS HIGH 50: CPT | Performed by: INTERNAL MEDICINE

## 2024-09-03 PROCEDURE — 82374 ASSAY BLOOD CARBON DIOXIDE: CPT | Performed by: INTERNAL MEDICINE

## 2024-09-03 PROCEDURE — 85610 PROTHROMBIN TIME: CPT | Performed by: PHYSICIAN ASSISTANT

## 2024-09-03 PROCEDURE — 94640 AIRWAY INHALATION TREATMENT: CPT | Mod: 76

## 2024-09-03 PROCEDURE — 82330 ASSAY OF CALCIUM: CPT | Performed by: INTERNAL MEDICINE

## 2024-09-03 PROCEDURE — 80069 RENAL FUNCTION PANEL: CPT | Performed by: INTERNAL MEDICINE

## 2024-09-03 PROCEDURE — 250N000009 HC RX 250: Performed by: STUDENT IN AN ORGANIZED HEALTH CARE EDUCATION/TRAINING PROGRAM

## 2024-09-03 PROCEDURE — 90947 DIALYSIS REPEATED EVAL: CPT

## 2024-09-03 PROCEDURE — 84100 ASSAY OF PHOSPHORUS: CPT | Performed by: INTERNAL MEDICINE

## 2024-09-03 PROCEDURE — 82374 ASSAY BLOOD CARBON DIOXIDE: CPT | Performed by: PHYSICIAN ASSISTANT

## 2024-09-03 PROCEDURE — 250N000013 HC RX MED GY IP 250 OP 250 PS 637: Performed by: PHYSICIAN ASSISTANT

## 2024-09-03 PROCEDURE — 85520 HEPARIN ASSAY: CPT | Performed by: INTERNAL MEDICINE

## 2024-09-03 PROCEDURE — 82248 BILIRUBIN DIRECT: CPT | Performed by: INTERNAL MEDICINE

## 2024-09-03 PROCEDURE — 250N000009 HC RX 250: Performed by: PHYSICIAN ASSISTANT

## 2024-09-03 PROCEDURE — 200N000001 HC R&B ICU

## 2024-09-03 PROCEDURE — 83605 ASSAY OF LACTIC ACID: CPT | Performed by: SURGERY

## 2024-09-03 PROCEDURE — 250N000011 HC RX IP 250 OP 636: Performed by: STUDENT IN AN ORGANIZED HEALTH CARE EDUCATION/TRAINING PROGRAM

## 2024-09-03 PROCEDURE — P9016 RBC LEUKOCYTES REDUCED: HCPCS | Performed by: SURGERY

## 2024-09-03 PROCEDURE — 86900 BLOOD TYPING SEROLOGIC ABO: CPT | Performed by: SURGERY

## 2024-09-03 PROCEDURE — 250N000011 HC RX IP 250 OP 636: Performed by: INTERNAL MEDICINE

## 2024-09-03 PROCEDURE — 84484 ASSAY OF TROPONIN QUANT: CPT | Performed by: PHYSICIAN ASSISTANT

## 2024-09-03 RX ORDER — CEFAZOLIN SODIUM 1 G/50ML
750 SOLUTION INTRAVENOUS EVERY 24 HOURS
Status: DISCONTINUED | OUTPATIENT
Start: 2024-09-03 | End: 2024-09-06

## 2024-09-03 RX ORDER — ALBUTEROL SULFATE 0.83 MG/ML
2.5 SOLUTION RESPIRATORY (INHALATION) EVERY 6 HOURS PRN
Status: DISCONTINUED | OUTPATIENT
Start: 2024-09-03 | End: 2024-09-16 | Stop reason: HOSPADM

## 2024-09-03 RX ORDER — SODIUM CHLORIDE FOR INHALATION 3 %
3 VIAL, NEBULIZER (ML) INHALATION EVERY 6 HOURS PRN
Status: DISCONTINUED | OUTPATIENT
Start: 2024-09-03 | End: 2024-09-16 | Stop reason: HOSPADM

## 2024-09-03 RX ORDER — FUROSEMIDE 10 MG/ML
100 INJECTION INTRAMUSCULAR; INTRAVENOUS ONCE
Status: COMPLETED | OUTPATIENT
Start: 2024-09-04 | End: 2024-09-04

## 2024-09-03 RX ADMIN — CALCIUM GLUCONATE 1 G: 20 INJECTION, SOLUTION INTRAVENOUS at 14:24

## 2024-09-03 RX ADMIN — ONDANSETRON 4 MG: 2 INJECTION INTRAMUSCULAR; INTRAVENOUS at 07:16

## 2024-09-03 RX ADMIN — CALCIUM CHLORIDE, MAGNESIUM CHLORIDE, SODIUM CHLORIDE, SODIUM BICARBONATE, POTASSIUM CHLORIDE AND SODIUM PHOSPHATE DIBASIC DIHYDRATE 24.5 ML/KG/HR: 3.68; 3.05; 6.34; 3.09; .314; .187 INJECTION INTRAVENOUS at 14:00

## 2024-09-03 RX ADMIN — ALBUTEROL SULFATE 2.5 MG: 2.5 SOLUTION RESPIRATORY (INHALATION) at 09:14

## 2024-09-03 RX ADMIN — AMIODARONE HYDROCHLORIDE 0.5 MG/MIN: 1.8 INJECTION, SOLUTION INTRAVENOUS at 22:50

## 2024-09-03 RX ADMIN — ACETYLCYSTEINE 19180 MG: 200 INJECTION, SOLUTION INTRAVENOUS at 10:46

## 2024-09-03 RX ADMIN — CEFEPIME HYDROCHLORIDE 2 G: 2 INJECTION, POWDER, FOR SOLUTION INTRAVENOUS at 04:32

## 2024-09-03 RX ADMIN — LIDOCAINE 1 PATCH: 246 PATCH TOPICAL at 17:40

## 2024-09-03 RX ADMIN — ONDANSETRON 4 MG: 2 INJECTION INTRAMUSCULAR; INTRAVENOUS at 20:59

## 2024-09-03 RX ADMIN — PANTOPRAZOLE SODIUM 40 MG: 40 INJECTION, POWDER, FOR SOLUTION INTRAVENOUS at 08:11

## 2024-09-03 RX ADMIN — ONDANSETRON 4 MG: 2 INJECTION INTRAMUSCULAR; INTRAVENOUS at 00:26

## 2024-09-03 RX ADMIN — CALCIUM CHLORIDE, MAGNESIUM CHLORIDE, SODIUM CHLORIDE, SODIUM BICARBONATE, POTASSIUM CHLORIDE AND SODIUM PHOSPHATE DIBASIC DIHYDRATE 24.5 ML/KG/HR: 3.68; 3.05; 6.34; 3.09; .314; .187 INJECTION INTRAVENOUS at 09:53

## 2024-09-03 RX ADMIN — PROCHLORPERAZINE EDISYLATE 5 MG: 5 INJECTION INTRAMUSCULAR; INTRAVENOUS at 09:17

## 2024-09-03 RX ADMIN — ONDANSETRON 4 MG: 2 INJECTION INTRAMUSCULAR; INTRAVENOUS at 12:11

## 2024-09-03 RX ADMIN — AMIODARONE HYDROCHLORIDE 0.5 MG/MIN: 1.8 INJECTION, SOLUTION INTRAVENOUS at 10:24

## 2024-09-03 RX ADMIN — CALCIUM CHLORIDE, MAGNESIUM CHLORIDE, SODIUM CHLORIDE, SODIUM BICARBONATE, POTASSIUM CHLORIDE AND SODIUM PHOSPHATE DIBASIC DIHYDRATE 10 ML/KG/HR: 3.68; 3.05; 6.34; 3.09; .314; .187 INJECTION INTRAVENOUS at 23:06

## 2024-09-03 RX ADMIN — CEFEPIME HYDROCHLORIDE 2 G: 2 INJECTION, POWDER, FOR SOLUTION INTRAVENOUS at 16:39

## 2024-09-03 RX ADMIN — SODIUM CHLORIDE 30 MG/ML INHALATION SOLUTION 3 ML: 30 SOLUTION INHALANT at 09:14

## 2024-09-03 RX ADMIN — SODIUM CHLORIDE 30 MG/ML INHALATION SOLUTION 3 ML: 30 SOLUTION INHALANT at 02:24

## 2024-09-03 RX ADMIN — PROCHLORPERAZINE EDISYLATE 5 MG: 5 INJECTION INTRAMUSCULAR; INTRAVENOUS at 03:45

## 2024-09-03 RX ADMIN — SODIUM CHLORIDE 750 MG: 9 INJECTION, SOLUTION INTRAVENOUS at 08:53

## 2024-09-03 RX ADMIN — ACETYLCYSTEINE 6.25 MG/KG/HR: 200 INJECTION, SOLUTION INTRAVENOUS at 14:38

## 2024-09-03 RX ADMIN — CALCIUM CHLORIDE, MAGNESIUM CHLORIDE, SODIUM CHLORIDE, SODIUM BICARBONATE, POTASSIUM CHLORIDE AND SODIUM PHOSPHATE DIBASIC DIHYDRATE 24.5 ML/KG/HR: 3.68; 3.05; 6.34; 3.09; .314; .187 INJECTION INTRAVENOUS at 21:36

## 2024-09-03 RX ADMIN — ALBUTEROL SULFATE 2.5 MG: 2.5 SOLUTION RESPIRATORY (INHALATION) at 02:24

## 2024-09-03 RX ADMIN — PROCHLORPERAZINE EDISYLATE 5 MG: 5 INJECTION INTRAMUSCULAR; INTRAVENOUS at 16:34

## 2024-09-03 RX ADMIN — CALCIUM CHLORIDE, MAGNESIUM CHLORIDE, SODIUM CHLORIDE, SODIUM BICARBONATE, POTASSIUM CHLORIDE AND SODIUM PHOSPHATE DIBASIC DIHYDRATE 24.5 ML/KG/HR: 3.68; 3.05; 6.34; 3.09; .314; .187 INJECTION INTRAVENOUS at 02:11

## 2024-09-03 RX ADMIN — CALCIUM CHLORIDE, MAGNESIUM CHLORIDE, SODIUM CHLORIDE, SODIUM BICARBONATE, POTASSIUM CHLORIDE AND SODIUM PHOSPHATE DIBASIC DIHYDRATE 24.5 ML/KG/HR: 3.68; 3.05; 6.34; 3.09; .314; .187 INJECTION INTRAVENOUS at 03:54

## 2024-09-03 RX ADMIN — HYDROMORPHONE HYDROCHLORIDE 0.2 MG: 0.2 INJECTION, SOLUTION INTRAMUSCULAR; INTRAVENOUS; SUBCUTANEOUS at 01:34

## 2024-09-03 RX ADMIN — CALCIUM CHLORIDE, MAGNESIUM CHLORIDE, SODIUM CHLORIDE, SODIUM BICARBONATE, POTASSIUM CHLORIDE AND SODIUM PHOSPHATE DIBASIC DIHYDRATE 24.5 ML/KG/HR: 3.68; 3.05; 6.34; 3.09; .314; .187 INJECTION INTRAVENOUS at 23:42

## 2024-09-03 RX ADMIN — CALCIUM CHLORIDE, MAGNESIUM CHLORIDE, SODIUM CHLORIDE, SODIUM BICARBONATE, POTASSIUM CHLORIDE AND SODIUM PHOSPHATE DIBASIC DIHYDRATE 24.5 ML/KG/HR: 3.68; 3.05; 6.34; 3.09; .314; .187 INJECTION INTRAVENOUS at 00:23

## 2024-09-03 ASSESSMENT — ACTIVITIES OF DAILY LIVING (ADL)
ADLS_ACUITY_SCORE: 44
ADLS_ACUITY_SCORE: 40
ADLS_ACUITY_SCORE: 44
ADLS_ACUITY_SCORE: 40
ADLS_ACUITY_SCORE: 44
ADLS_ACUITY_SCORE: 44

## 2024-09-03 NOTE — PROGRESS NOTES
"CLINICAL NUTRITION SERVICES - BRIEF NOTE    EVALUATION OF THE PROGRESS TOWARD GOALS   Diet: NPO     NEW FINDINGS   Discussed pt in care rounds this AM. Pt extubated 9/1/24, EN tube feeds discontinued- pt was on tube feeds 8/31-9/1. Ongoing CRRT. Per nsg, pt with nausea overnight. Plan for SLP to re-evaluate today. Pt remains NPO, not appropriate for diet education at this time.     Trace/Mild edema   Surgical sites   Rectal tube: 140 ml OP 9/2, 15 ml OP this AM  I/Os: -3.2 L since 8/20  Elbow skin tear  Height: 170.2 cm (5' 7\")  Most Recent Weight: 95.9 kg (211 lb 6.7 oz)    Weight History: No significant wt changes     Labs: Creat 1.07(H), Mg 2.6(H), Phos 5.4(H), (H), AST 1001(H), Bili Direct 0.68(H), Bili T 1.3(H), BG 75-79 last 24 hrs     MALNUTRITION  Malnutrition Diagnosis: Patient does not meet two of the criteria necessary for diagnosing malnutrition     INTERVENTIONS  Implementation  Recommend diet advance vs Nutrition Support as able     Education- Will provider diet education post CV Surgery as able    Goals  Diet advancement vs nutrition support within 2-3 days - Not Met, NPO now SLP to eval  Pt to meet nutritional needs - NEW  Electrolytes WNL - NEW  BG >70 - NEW     Monitoring/Evaluation  Progress toward goals will be monitored and evaluated per protocol.   "

## 2024-09-03 NOTE — PROGRESS NOTES
[facility] Inpatient follow up        09/03/2024    Chart reviewed  Patient seen in ICU             Assessment and Recommendations:   Assessment:  Felicitas Elliott is a 84 year old female with     Respiratory failure, shock liver  Decreased urine output, on dobutamine norepinephrine and vasopressin  Intubated  History of severe aortic stenosis  S/P aortic root abscess debridement and aortic annular reconstruction, aortic root replacement, bioprosthetic aortic valve on 8/27/2024  Acute respiratory failure, extubated 8/28/2024, reintubated 8/29/2024, acute kidney injury, currently on CRRT- Gram negative bacilli in resp culture-     2+ Enterobacter cloacae complex, S cefepime, R ceftriaxone     MRSA nares negative, respiratory PCR negative  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  MSSA bacteremia.  Positive blood culture on 8/14/2024 and 8/16.  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  With the size of the vegetation combined with brain infarct, status post CV surgery, see details below active issue.   Neck pain worrisome for cervical discitis.  Neck MRI showed some edema at C4-C5 on the left side.  No clear evidence of infection.  Abnormal brain MRI suggestive of subacute infarct. In a patient with MSSA bacteremia and aortic valve endocarditis, this is cerebral septic emboli. Active issue.     POSTOPERATIVE DIAGNOSES:  1.  Severe aortic stenosis and moderate aortic regurgitation.  2.  Severe mitral stenosis.  3.  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  4.  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  5.  Severe multivessel coronary artery disease.  6.  Aortic root abscess.      PROCEDURE PERFORMED: 8/27/24  Aortic root abscess debridement and  aortic annular reconstruction.  Aortic root replacement (Konect composite 25 mm Silva Inspiris Resilia bioprosthetic valve within 30 mm Gelweave Valsalva graft with reimplantation of the coronary arteries).  Mitral valve replacement (31 mm [employer]. Tim Silva bioprosthetic valve).  Coronary artery bypass grafting x 2 (reversed saphenous vein graft to the obtuse marginal branch of the left circumflex coronary artery, and pedicled left internal mammary artery to left anterior descending coronary artery).  Endoscopic vein harvest of the greater saphenous vein from the left lower extremity.    Recommendations:  Cefepime for now, would likely plan this for enterobacter plus vancomycin for MSSA coverage 7 days (until 9/5), then go back to cefazolin.  Off nafcillin. Per previous notes plan IV antibiotics duration 6 weeks  Currently extubated,   Stop oral vanco  PICC placed 8/21  Monitor CBC, CMP  Status post vascular surgery, chest tube, CV surgery following closely  Will need to check immunoglobin level in 4 to 6 weeks sister with history of hypogammaglobulinemia   Overall will require at least 6 weeks of IV antibiotics, holding nafcillin due to acute kidney injury    HEAVEN AGUIRRE MD   [facility]  Answering Service: [phone]  On-Call ID provider: [phone], option: 9              Interval History:     HPI: Extubated.  daughter here.  The pt is totally lucid.  CRRT as in renal note. Feels sore.       Recent Inflammatory Biomarkers:   Recent Labs   Lab Test 09/03/24  1130 09/03/24  0411 09/02/24  2017 09/02/24  0903 09/02/24  0420 09/01/24 2022 08/30/24  0437 08/29/24  0359   PCAL  --   --   --   --   --   --   --  1.24*   WBC 13.2* 14.2* 13.2* 12.8* 10.6 9.4   < > 15.3*    < > = values in this interval not displayed.            Review of Systems:      Negative except for findings in the HPI.           Current Medications (antimicrobials listed in bold):     Current  Facility-Administered Medications   Medication Dose Route Frequency Provider Last Rate Last Admin    [Held by provider] acetaminophen (TYLENOL) tablet 975 mg  975 mg Oral Q8H Maryam Anthony PA-C   975 mg at 08/30/24 1336    acetylcysteine (ACETADOTE) 19,180 mg in D5W STEP ONE infusion  200 mg/kg Intravenous Once Zachary Gonzalez  mL/hr at 09/03/24 1046 19,180 mg at 09/03/24 1046    [Held by provider] atorvastatin (LIPITOR) tablet 80 mg  80 mg Oral QPM Jessica Tolbert PA-C   80 mg at 08/29/24 2038    ceFEPIme (MAXIPIME) 2 g vial to attach to  mL bag for ADULTS or NS 50 mL bag for PEDS  2 g Intravenous Q12H Zach Park MD   2 g at 09/03/24 0432    [Held by provider] clopidogrel (PLAVIX) tablet 75 mg  75 mg Oral Daily Maryam Anthony PA-C        [START ON 9/4/2024] furosemide (LASIX) injection 100 mg  100 mg Intravenous Once Cheryle Jung MD        lactobacillus rhamnosus (GG) (CULTURELL) capsule 1 capsule  1 capsule Oral BID Zach Froeman DO   1 capsule at 09/02/24 0945    Lidocaine (LIDOCARE) 4 % Patch 1-2 patch  1-2 patch Transdermal Q24H Maryam Anthony PA-C   2 patch at 09/02/24 1747    [Held by provider] metoprolol tartrate (LOPRESSOR) half-tab 12.5 mg  12.5 mg Oral BID Jessica Tolbert PA-C        pantoprazole (PROTONIX) 2 mg/mL suspension 40 mg  40 mg Oral or NG Tube QAM AC Maryam Anthony PA-C        Or    pantoprazole (PROTONIX) EC tablet 40 mg  40 mg Oral QAM AC Maryam Anthony PA-C        Or    pantoprazole (PROTONIX) IV push injection 40 mg  40 mg Intravenous QAM AC Maryam Anthony PA-C   40 mg at 09/03/24 0811    polyethylene glycol (MIRALAX) Packet 17 g  17 g Oral Daily Maryam Anthony PA-C        senna-docusate (SENOKOT-S/PERICOLACE) 8.6-50 MG per tablet 1 tablet  1 tablet Oral BID Maryam Anthony PA-C   1 tablet at 09/01/24 2117    sodium chloride (PF) 0.9% PF flush 3 mL  3 mL Intracatheter Q8H Gondal, Saad J, MD    3 mL at 09/03/24 0813    vancomycin (VANCOCIN) 750 mg in sodium chloride 0.9 % 250 mL intermittent infusion  750 mg Intravenous Q24H Zachary Gonzalez MD   750 mg at 09/03/24 0853    vancomycin (VANCOCIN) capsule 125 mg  125 mg Oral BID Nannette Lebron MD   125 mg at 09/02/24 2021    [Held by provider] Warfarin Dose Required Daily - Pharmacist Managed  1 each Oral See Admin Instructions Jessica Tolbert PA-C                  Allergies:     Allergies   Allergen Reactions    Aspirin Rash    Cats Rash    Nickel Rash            Physical Exam:   Vitals were reviewed  Patient Vitals for the past 24 hrs:   BP Temp Temp src Pulse Resp SpO2   09/03/24 1245 118/64 -- -- 91 19 98 %   09/03/24 1230 122/62 -- -- 91 26 96 %   09/03/24 1215 133/66 -- -- 91 19 96 %   09/03/24 1200 139/70 97.7  F (36.5  C) Pulm Art 91 21 98 %   09/03/24 1145 109/58 -- -- 91 14 97 %   09/03/24 1130 110/55 -- -- 91 13 97 %   09/03/24 1115 117/55 -- -- 91 12 96 %   09/03/24 1100 129/60 98.1  F (36.7  C) Pulm Art 91 14 96 %   09/03/24 1045 138/61 -- -- 91 18 (!) 81 %   09/03/24 1030 111/58 -- -- 91 28 92 %   09/03/24 1015 108/57 -- -- 91 14 97 %   09/03/24 1000 112/55 98.2  F (36.8  C) Pulm Art 91 14 98 %   09/03/24 0945 104/54 -- -- 91 16 100 %   09/03/24 0930 119/57 -- -- 91 18 100 %   09/03/24 0915 122/58 -- -- 91 25 99 %   09/03/24 0900 121/59 98.4  F (36.9  C) Pulm Art 91 20 97 %   09/03/24 0845 127/63 -- -- 91 20 97 %   09/03/24 0830 108/55 -- -- 60 21 97 %   09/03/24 0815 111/53 -- -- 60 19 97 %   09/03/24 0800 122/58 98.4  F (36.9  C) Pulm Art 60 20 98 %   09/03/24 0745 -- -- -- 60 24 96 %   09/03/24 0730 126/64 -- -- 60 17 96 %   09/03/24 0715 -- -- -- 61 22 95 %   09/03/24 0700 117/58 -- -- 61 14 98 %   09/03/24 0645 -- -- -- 60 15 98 %   09/03/24 0630 133/60 -- -- 61 19 97 %   09/03/24 0615 -- -- -- 61 19 98 %   09/03/24 0600 124/60 -- -- 61 17 97 %   09/03/24 0530 (!) 145/74 -- -- 67 19 96 %   09/03/24 0500 101/59 -- -- 63 15 99 %    09/03/24 0430 104/57 98.6  F (37  C) -- 65 14 99 %   09/03/24 0400 113/55 -- -- 68 16 98 %   09/03/24 0330 105/52 98.4  F (36.9  C) Pulm Art 64 15 99 %   09/03/24 0300 104/51 -- -- 65 17 97 %   09/03/24 0230 (!) 147/70 -- -- 66 20 100 %   09/03/24 0200 118/55 -- -- 62 16 100 %   09/03/24 0130 (!) 142/66 98.2  F (36.8  C) Pulm Art 65 19 100 %   09/03/24 0100 136/65 -- -- 69 22 98 %   09/03/24 0030 (!) 146/64 -- -- 68 21 98 %   09/03/24 0000 128/60 98.2  F (36.8  C) Pulm Art 65 16 100 %   09/02/24 2330 (!) 142/65 -- -- 65 19 99 %   09/02/24 2300 118/58 -- -- 63 14 100 %   09/02/24 2230 121/57 98.2  F (36.8  C) Pulm Art 64 19 100 %   09/02/24 2222 -- -- -- 64 19 100 %   09/02/24 2200 132/62 -- -- 64 17 100 %   09/02/24 2130 126/58 -- -- 63 17 100 %   09/02/24 2100 123/57 98.4  F (36.9  C) Pulm Art 64 19 100 %   09/02/24 2030 136/60 -- -- 65 21 94 %   09/02/24 2000 125/58 -- -- 66 21 100 %   09/02/24 1930 128/60 98.6  F (37  C) Pulm Art 64 23 100 %   09/02/24 1915 -- -- -- 61 22 100 %   09/02/24 1900 131/61 -- -- 60 22 100 %   09/02/24 1830 124/58 -- -- 58 23 100 %   09/02/24 1815 113/60 -- -- 63 24 100 %   09/02/24 1800 -- -- -- 56 21 91 %   09/02/24 1745 -- -- -- 66 21 96 %   09/02/24 1730 -- -- -- 64 18 98 %   09/02/24 1715 -- -- -- 64 19 98 %   09/02/24 1700 -- 98.6  F (37  C) Pulm Art 62 20 97 %   09/02/24 1645 -- -- -- 55 26 93 %   09/02/24 1638 -- -- -- 54 23 96 %   09/02/24 1630 -- -- -- 61 21 95 %   09/02/24 1615 -- -- -- 78 21 96 %   09/02/24 1600 -- 98.6  F (37  C) Pulm Art 77 18 97 %   09/02/24 1545 -- -- -- 66 19 96 %   09/02/24 1530 -- -- -- 74 18 97 %   09/02/24 1515 -- -- -- 73 20 98 %   09/02/24 1500 -- -- -- 72 19 98 %   09/02/24 1445 -- -- -- 71 17 95 %   09/02/24 1430 -- -- -- 72 19 95 %   09/02/24 1415 -- -- -- 72 19 98 %   09/02/24 1400 -- -- -- 73 22 98 %   09/02/24 1345 -- -- -- 72 18 96 %   09/02/24 1330 -- -- -- 71 21 100 %   09/02/24 1322 -- -- -- 71 22 97 %   09/02/24 1315 -- -- -- 72 21 97  %   09/02/24 1300 -- -- -- 74 23 96 %       Physical Examination:    Resp: 19    Gen: Intubated  HEENT: ETT opens eyes  PICC, Femoral catheter for dialysis, rectal tube  CV: Sternal incision,    Lungs: coarse, intubated, chest tubes.  Skin: Warm  Extr: edema. Neha L leg  Neuro: intubated, opens eyes  Art line           Laboratory Data:   ID Labs:  Microbiology labs:  7-Day Micro Results       Collected Updated Procedure Result Status      08/30/2024 1020 09/03/2024 1207 Blood Culture Hand, Left [[id number]]   Blood from Hand, Left    Preliminary result Component Value   Culture No growth after 4 days  [P]                08/30/2024 0117 08/30/2024 0357 C. difficile Toxin B PCR with reflex to C. difficile Antigen and Toxins A/B EIA [[id number]]    Stool from Per Rectum    Final result Component Value   C Difficile Toxin B by PCR Negative   A negative result does not exclude actual disease due to C. difficile and may be due to improper collection, handling and storage of the specimen or the number of organisms in the specimen is below the detection limit of the assay.            08/29/2024 0906 08/29/2024 1505 Respiratory Panel PCR [[id number]]    Swab from Nasopharyngeal    Final result Component Value   Adenovirus Not Detected   Coronavirus Not Detected   This test detects Coronavirus 229E, HKU1, NL63 and OC43 but does not distinguish between them. It does not detect MERS ( Respiratory Syndrome), SARS (Severe Acute Respiratory Syndrome) or 2019-nCoV (Novel 2019) Coronavirus.   Human Metapneumovirus Not Detected   Human Rhin/Enterovirus Not Detected   Influenza A Not Detected   Influenza A, H1 Not Detected   Influenza A 2009 H1N1 Not Detected   Influenza A, H3 Not Detected   Influenza B Not Detected   Parainfluenza Virus 1 Not Detected   Parainfluenza Virus 2 Not Detected   Parainfluenza Virus 3 Not Detected   Parainfluenza Virus 4 Not Detected   Respiratory Syncytial Virus A Not Detected    Respiratory Syncytial Virus B Not Detected   Chlamydia Pneumoniae Not Detected   Mycoplasma Pneumoniae Not Detected            08/29/2024 0906 08/29/2024 1427 MRSA MSSA PCR, Nasal Swab [[id number]]    Swab from Nares, Bilateral    Final result Component Value   MRSA Target DNA Negative   SA Target DNA Negative            08/29/2024 0853 08/31/2024 2053 Respiratory Aerobic Bacterial Culture [[id number]]    (Abnormal)   Sputum from Expectorate    Final result Component Value   Culture 2+ Enterobacter cloacae complex    2+ Normal samara   Gram Stain Result <10 Squamous epithelial cells/low power field    <25 PMNs/low power field    1+ Mixed samara        Susceptibility        Enterobacter cloacae complex      RAMIRO      Amikacin <=2 ug/mL Susceptible  [*]       Ampicillin  Resistant  [1]       Ampicillin/ Sulbactam  Resistant  [1]       Cefazolin  Resistant  [*,1]       Cefepime <=1 ug/mL Susceptible      Cefoxitin  Resistant  [*,1]       Ceftazidime  Resistant      Ceftriaxone  Resistant      Ciprofloxacin <=0.25 ug/mL Susceptible      Gentamicin <=1 ug/mL Susceptible      Levofloxacin <=0.12 ug/mL Susceptible      Meropenem <=0.25 ug/mL Susceptible      Nitrofurantoin 64 ug/mL Intermediate  [*]       Piperacillin/Tazobactam  Resistant      Tobramycin <=1 ug/mL Susceptible      Trimethoprim/Sulfamethoxazole <=1/19 ug/mL Susceptible                   [*]  Suppressed Antibiotic     [1]  Intrinsically Resistant               Susceptibility Comments       Enterobacter cloacae complex    Enterobacter cloacae, Klebsiella aerogenes, and Citrobacter freundii have moderate to high levels of inducible AmpC ß-lactamase expression. The use of 3rd generation cephalosporins including ceftriaxone and ceftazidime, as well as   piperacillin-tazobactam, should be avoided for invasive infections, regardless of susceptibility results.                     Susceptibility data from last 90 days.  Collected Specimen Info Organism  Ampicillin Ampicillin/Sulbactam Cefepime Ceftazidime Ceftriaxone Ciprofloxacin Clindamycin Daptomycin Doxycycline Erythromycin Gentamicin Levofloxacin Meropenem Oxacillin   08/29/24 Sputum from Expectorate Enterobacter cloacae complex R R  S R R  S      S  S  S      Normal samara                 08/16/24 Peripheral Blood Staphylococcus aureus                 08/14/24 Peripheral Blood Staphylococcus aureus        S  S  S  S  S    S     Collected Specimen Info Organism Piperacillin/Tazobactam Tetracycline Tobramycin Trimethoprim/Sulfamethoxazole  Vancomycin   08/29/24 Sputum from Expectorate Enterobacter cloacae complex R   S  S      Normal samara        08/16/24 Peripheral Blood Staphylococcus aureus        08/14/24 Peripheral Blood Staphylococcus aureus   S   S  S       Reviewed      No lab results found.  Recent Labs   Lab Test 09/03/24  1130 09/03/24 0411 09/02/24 2017 09/02/24 0903 09/02/24 0420 09/01/24 2022   WBC 13.2* 14.2* 13.2* 12.8* 10.6 9.4     Recent Labs   Lab Test 09/03/24  1130 09/03/24 0411 09/02/24 2210 09/02/24 2017   CR 1.00* 1.07*  1.07* 1.12* 1.15*   GFRESTIMATED 55* 51*  51* 48* 47*       Hematology Studies  Recent Labs   Lab Test 09/03/24  1130 09/03/24 0411 09/02/24 2017 09/02/24  0903 09/02/24 0420 09/01/24 2022   WBC 13.2* 14.2* 13.2* 12.8* 10.6 9.4   HGB 7.6* 7.8* 8.3* 8.4* 8.1* 7.9*   HCT 24.8* 25.4* 26.4* 27.2* 25.7* 24.5*   PLT 42* 41* 49* 61* 60* 74*       Metabolic  Recent Labs   Lab Test 09/03/24  1130 09/03/24 0411 09/02/24 2210    137  137 138   BUN 14.6 13.3  13.3 13.7   CO2 17* 17*  17* 18*   CR 1.00* 1.07*  1.07* 1.12*   GFRESTIMATED 55* 51*  51* 48*       Hepatic Studies  Recent Labs   Lab Test 09/03/24  1130 09/03/24  0411 09/02/24  2210 09/02/24 2017 09/02/24  1431   BILITOTAL  --  1.3* 1.3*  --  1.2   ALKPHOS  --  60 60  --  57   ALBUMIN 3.4* 3.5  3.5 3.6   < > 3.5   AST  --  1,001* 992*  --  586*   ALT  --  307* 287*  --  181*    < > = values in  this interval not displayed.          Imaging Data:   Reviewed     IR CVC Non Tunnel Placement > 5 Yrs    Result Date: 9/1/2024  [place] [facility] LOCATION: [facility] DATE: 9/1/2024 PROCEDURE: NON-TUNNELED DIALYSIS CATHETER PLACEMENT INTERVENTIONAL RADIOLOGIST: RU Reyes MD. INDICATION: HAMMAD requiring hemodialysis. CONSENT: The risks, benefits and alternatives of non-tunneled dialysis catheter placement were discussed with the patient in detail. All questions were answered. Informed consent was given to proceed with the procedure. MODERATE SEDATION: None. CONTRAST: None. ANTIBIOTICS: None. ADDITIONAL MEDICATIONS: Heparin 3800 U IV FLUOROSCOPIC TIME: 2.0 minutes. RADIATION DOSE: Air Kerma: 36.04 mGy. COMPLICATIONS: No immediate complications. STERILE BARRIER TECHNIQUE: Maximum sterile barrier technique was used. Cutaneous antisepsis was performed at the operative site with application of 2% chlorhexidine and large sterile drape. Prior to the procedure, the  and assistant performed hand hygiene and wore hat, mask, sterile gown, and sterile gloves during the entire procedure. PROCEDURE: Limited left neck ultrasound was performed which demonstrated a patent internal jugular vein; images saved to the permanent patient medical record. The overlying skin and subcutaneous soft tissues were anesthetized using 1% lidocaine. Under ultrasound guidance, the left internal jugular vein was accessed using a micropuncture needle; images saved of the permanent patient medical record. Access was secured using a 4 Bangladeshi transitional sheath. A 115 network disksson guidewire was advanced into the SVC. Under fluoroscopic guidance, the overlying skin and subcutaneous soft tissues were dilated and a 15.5 Bangladeshi nontunneled dialysis catheter was placed with the tip within the cavoatrial junction. The catheter was tested and found to flush and aspirate appropriately. The catheter was heparinized and secured to  the skin.     IMPRESSION:  1.  Successful non-tunneled dialysis catheter placement. PLAN: 1. Dialysis catheter is ready to be used. 2. Nontunneled right groin dialysis catheter can be removed in ICU.       Study Result    Narrative & Impression   EXAM: MR BRAIN W/O and W CONTRAST  LOCATION: [facility]  DATE: 8/17/2024     INDICATION: Headache in a patient with MSSA bacteremia secondary to aortic valve endocarditis.  Look for cerebral septic emboli; Headache; Acute HA (< 3 months), no complicating features  COMPARISON: None.  CONTRAST: 9 ml gadavist  TECHNIQUE: Routine multiplanar multisequence head MRI without and with intravenous contrast.     FINDINGS: Assessment degraded due to motion.  INTRACRANIAL CONTENTS:   Apparent focus of diffusion restriction with T2/FLAIR hyperintensity within the left superior parietal lobule cortex is hyperintense on ADC map, representing T2 shine through.  Focus of signal abnormality measures 13 x 9 mm in the axial plane and does   not have internal enhancement.     Additional punctate foci of hyperintensity on diffusion weighted with similar signal characteristics (periventricular right corona radiata, left superior parietal lobule, and left cerebellar hemisphere).     Patchy and confluent nonspecific T2/FLAIR hyperintensities within the cerebral white matter most consistent with moderate chronic microvascular ischemic change. Moderate generalized cerebral atrophy. No hydrocephalus. Normal position of the cerebellar   tonsils. No discernible abnormal intracranial enhancement; however, assessment substantially degraded due to motion. 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     SELLA: No abnormality accounting for technique.     OSSEOUS STRUCTURES/SOFT TISSUES: Normal marrow signal. The major intracranial vascular flow voids are maintained.      ORBITS: No abnormality accounting for technique.      SINUSES/MASTOIDS: Mild mucosal thickening scattered about the  paranasal sinuses. Scattered fluid/membrane thickening in the left mastoid air cells. No apparent mass in the posterior nasopharynx or skull base.                                                                       IMPRESSION: Assessment degraded due to motion.  1.  13 mm focus of cortical signal abnormality within the left superior parietal lobule which is favored to represent a subacute infarct; low-grade glial neoplasm could conceivably have a similar appearance. Hyperacute intraparenchymal hematoma could   also have a similar appearance but is considered less likely. Recommend noncontrast head CT for further characterization. Also recommend follow-up MRI brain without and with contrast in 8-12 weeks to document expected evolution.  2.  Additional smaller foci of signal abnormality with similar characteristics favored to represent punctate subacute infarcts.

## 2024-09-03 NOTE — PROGRESS NOTES
HEART CARE NOTE          Assessment/Recommendations   1. Severe valvular disease c/b post-op cardiogenic shock  Assessment / Plan  Extubated on 9/1, now on NC; tolerated slow dobutamine wean - will hold on further weaning for today given ongoing CRRT and stable hemodynamics on current regimen  GDMT as detailed below; mainstay of treatment for HFpEF includes diuretics and adequate BP control (class I) and SGLT2-I (class 2a); additional medical therapy (ARNI, MRA, ARB) demonstrated less robust evidence for indication but may be considered per guideline recommendations (2b); no indication for BBlockers      Current Pharmacotherapy AHA Guideline-Directed Medical Therapy   Losartan  - not started 2/2 renal dysfunction ARNI/ARB   Spironolactone not started  MRA   SGLT2 inhibitor: not started SGLT2-I    Furosemide gtt - currently on CRRT Loop diuretic       2. 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 End organ dysfunction  Assessment / Plan  CRS; CRRT/volume removal per nephrology  Shock liver - hemodynamic support as above; continue to monitor UOP and renal fucntion closely     4. Anemia  Assessment / Plan  Management and supportive care per primary team     5. CAD s/p CABG  Assessment / Plan  Add and titrate GDMT as hemodynamics tolerate     6. Afib   Assessment / Plan  In sinus bradycardia with significant first degree AV delay on telemetry - temp pacer @ 90 with good hemodynamic response   Avoid AV node blocking agents  CHADSVA score ~4 or higher    Plan of care discussed on September 3, 2024 with patient and family at bedside, and primary team overseeing patient's care    Patient remains critically ill in the ICU requiring hemodynamic support via vasopressors s/p OHS c/b cardiogenic shock. 50 minutes spent on critical care time       History  "of Present Illness/Subjective    Ms. Felicitas Elliott is a 84 year old female with a PMHx significant for (per Epic notation) presented with fatigue, weakness, chills, poor appetite. Does not really have much for chronic medical conditions other than chronic back pain, furunculosis, uterine prolapse, tobacco use      Today, Mrs. Elliott denies acute cardiac events or complaints; Management plan as detailed above     ECHO (personnaly Reviewed on 8/19/24):   1. The left ventricle is normal in size. Left ventricular function is  normal.The ejection fraction is 55-60%.  2. Normal right ventricle size and systolic function.  3. Large vegetation on mitral valve (8 mm x 15) attached to posterior leaflet.  4. There is a small vegetation on the aortic valve (6 mm). No evidence of  aortic root abscess identified.  5. Severe valvular aortic stenosis (SHU:0.8 cm2, peak nomi: 4.6 m/sec, mean  gradient: 51 mmHg).    Telemetry: personally reviewed September 3, 2024; notable for paced rhythm     Medical history and pertinent documents reviewed in Care Everywhere please where applicable see details above        Physical Examination Review of Systems   /59   Pulse 63   Temp 98.6  F (37  C)   Resp 15   Ht 1.702 m (5' 7\")   Wt 95.9 kg (211 lb 6.7 oz)   SpO2 99%   BMI 33.11 kg/m    Body mass index is 33.11 kg/m .  Wt Readings from Last 3 Encounters:   09/02/24 95.9 kg (211 lb 6.7 oz)   08/15/24 90.1 kg (198 lb 9.6 oz)     General Appearance:   no distress, normal body habitus   ENT/Mouth: membranes moist, no oral lesions or bleeding gums.      EYES:  no scleral icterus, normal conjunctivae   Neck: no carotid bruits or thyromegaly   Chest/Lungs:   lungs are clear to auscultation, no rales or wheezing, equal chest wall expansion    Cardiovascular:   Regular. Normal first and second heart sounds with no murmurs, rubs, or gallops; the carotid, radial and posterior tibial pulses are intact, + JVD and LE edema bilaterally    Abdomen:  " no organomegaly, masses, bruits, or tenderness; bowel sounds are present   Extremities: no cyanosis or clubbing   Skin: no xanthelasma, warm.    Neurologic: NAD     Psychiatric: alert and calm     A complete 10 systems ROS was reviewed  And is negative except what is listed in the HPI.          Medical History  Surgical History Family History Social History   History reviewed. No pertinent past medical history. Past Surgical History:   Procedure Laterality Date    CORONARY ARTERY BYPASS GRAFT, WITH AORTIC VALVE REPLACEMENT, WITH ENDOSCOPIC VESSEL PROCUREMENT N/A 8/27/2024    Procedure: CORONARY ARTERY BYPASS GRAFT TIMES TWO, WITH AORTIC ROOT REPLACEMENT, WITH LEFT INTERNAL MAMMARY ARTERY HARVEST, LEFT SAPHNENOUS ENDOSCOPIC VESSEL PROCUREMENT,;  Surgeon: Dylan Renae MD;  Location: [facility] OR    CV CORONARY ANGIOGRAM N/A 8/20/2024    Procedure: CV CORONARY ANGIOGRAM;  Surgeon: Den Wylie MD;  Location: [facility] CATH LAB CV    IR CVC NON TUNNEL PLACEMENT > 5 YRS  9/1/2024    REPLACE VALVE MITRAL N/A 8/27/2024    Procedure: MITRAL VALVE REPLACEMENT,;  Surgeon: Dylan Renae MD;  Location: [facility] OR    TRANSESOPHAGEAL ECHOCARDIOGRAM INTRAOPERATIVE  8/27/2024    Procedure: ANESTHESIA TRANSESOPHAGEAL ECHOCARDIOGRAM, EPI-AORTIC ULTRASOUND;  Surgeon: Dylan Renae MD;  Location: [facility] OR    no family history of premature coronary artery disease Social History     Socioeconomic History    Marital status:      Spouse name: Not on file    Number of children: Not on file    Years of education: Not on file    Highest education level: Not on file   Occupational History    Not on file   Tobacco Use    Smoking status: Not on file    Smokeless tobacco: Not on file   Substance and Sexual Activity    Alcohol use: Not on file    Drug use: Not on file    Sexual activity: Not on file   Other Topics Concern    Not on file   Social History Narrative    Not on file      Social Determinants of Health     Financial Resource Strain: Low Risk  (8/24/2024)    Financial Resource Strain     Within the past 12 months, have you or your family members you live with been unable to get utilities (heat, electricity) when it was really needed?: No   Food Insecurity: Low Risk  (8/24/2024)    Food Insecurity     Within the past 12 months, did you worry that your food would run out before you got money to buy more?: No     Within the past 12 months, did the food you bought just not last and you didn t have money to get more?: No   Transportation Needs: Low Risk  (8/24/2024)    Transportation Needs     Within the past 12 months, has lack of transportation kept you from medical appointments, getting your medicines, non-medical meetings or appointments, work, or from getting things that you need?: No   Physical Activity: Not on file   Stress: Not on file   Social Connections: Unknown (1/3/2024)    Received from [facility]    Social Connections     Frequency of Communication with Friends and Family: Not on file   Interpersonal Safety: High Risk (8/23/2024)    Interpersonal Safety     Do you feel physically and emotionally safe where you currently live?: No     Within the past 12 months, have you been hit, slapped, kicked or otherwise physically hurt by someone?: No     Within the past 12 months, have you been humiliated or emotionally abused in other ways by your partner or ex-partner?: No   Housing Stability: Low Risk  (8/24/2024)    Housing Stability     Do you have housing? : Yes     Are you worried about losing your housing?: No           Lab Results    Chemistry/lipid CBC Cardiac Enzymes/BNP/TSH/INR   Lab Results   Component Value Date    BUN 13.3 09/03/2024    BUN 13.3 09/03/2024     09/03/2024     09/03/2024    CO2 17 (L) 09/03/2024    CO2 17 (L) 09/03/2024    Lab Results   Component Value Date    WBC 14.2 (H) 09/03/2024    HGB 7.8 (L) 09/03/2024  "   HCT 25.4 (L) 09/03/2024    MCV 98 09/03/2024    PLT 41 (LL) 09/03/2024    Lab Results   Component Value Date    INR 1.60 (H) 09/03/2024     No results found for: \"CKTOTAL\", \"CKMB\", \"TROPONINI\"       Weight:    Wt Readings from Last 3 Encounters:   09/02/24 95.9 kg (211 lb 6.7 oz)   08/15/24 90.1 kg (198 lb 9.6 oz)       Allergies  Allergies   Allergen Reactions    Aspirin Rash    Cats Rash    Nickel Rash         Surgical History  Past Surgical History:   Procedure Laterality Date    CORONARY ARTERY BYPASS GRAFT, WITH AORTIC VALVE REPLACEMENT, WITH ENDOSCOPIC VESSEL PROCUREMENT N/A 8/27/2024    Procedure: CORONARY ARTERY BYPASS GRAFT TIMES TWO, WITH AORTIC ROOT REPLACEMENT, WITH LEFT INTERNAL MAMMARY ARTERY HARVEST, LEFT SAPHNENOUS ENDOSCOPIC VESSEL PROCUREMENT,;  Surgeon: Dylan Renae MD;  Location: [facility] OR    CV CORONARY ANGIOGRAM N/A 8/20/2024    Procedure: CV CORONARY ANGIOGRAM;  Surgeon: Den Wylie MD;  Location: [facility] CATH LAB CV    IR CVC NON TUNNEL PLACEMENT > 5 YRS  9/1/2024    REPLACE VALVE MITRAL N/A 8/27/2024    Procedure: MITRAL VALVE REPLACEMENT,;  Surgeon: Dylan Renae MD;  Location: [facility] OR    TRANSESOPHAGEAL ECHOCARDIOGRAM INTRAOPERATIVE  8/27/2024    Procedure: ANESTHESIA TRANSESOPHAGEAL ECHOCARDIOGRAM, EPI-AORTIC ULTRASOUND;  Surgeon: Dylan Renae MD;  Location: [facility] OR       Social History  Tobacco:   History   Smoking Status    Not on file   Smokeless Tobacco    Not on file    Alcohol:   Social History    Substance and Sexual Activity      Alcohol use: Not on file   Illicit Drugs:   History   Drug Use Not on file       Family History  History reviewed. No pertinent family history.       Navneet Branch MD on 9/3/2024      cc: [facility], [facility]    "

## 2024-09-03 NOTE — PROGRESS NOTES
[facility]  [facility]   Follow up    Felicitas Elliott   MRN: [id number]  : 1940   DOA: 2024   CC: ARF      Assessment and Plan:  84 year old female    ARF/CKD: has underlying CKD with baseline CR 1.3-1.8; now ARF in the setting of cardiac surgery;  continues to have very poor urine output; trial off CRRT yesterday resulted in more acidosis so CRRT resumed; tolerating well now; continue for now; using LIJ placed by IR, nontunneled 24 for access; will consider another trial off CRRT tomorrow based on progress/clinical status  Volume status; elevated; UF with CRRT at 150 cchour; will try diuretics challenge in am  MSSA bacteremia; on abx and ID following  MV and AV infective endocarditis , 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: s/p biprothetic AoV and MV replacement, Aortic root replacement and 2vCABG  HoTN with septic and cardiogenic shock; on dobutamine and off other pressors; bp has stabilized  Acidosis; lactate and ARF and starvation ketoacidosis; improved with high dose CRRT  Anemia; following hgb  Hyperlipid; statin on hold  CAD: s/p CAB 2 v as part of valve surgery  A fib; on amio gtt  Resp failure; extubated; no plan to reintubate if deteriorates  Hepatitis; s/p acetylcysteine  Dispo; DNR, DNI      Subjective  Pt new to me; chart and meds reviewed  Pt says weak and tired; not in any pain unless she moves  Code status changed to DNR/DNI  On CRRT and removing 150 ml/hour; tolerating  Pt says not SOB or CP  Objective    Vital signs in last 24 hours  Temp:  [98.2  F (36.8  C)-98.6  F (37  C)] 98.2  F (36.8  C)  Pulse:  [54-91] 91  Resp:  [14-31] 14  BP: (101-147)/(51-74) 112/55  MAP:  [54 mmHg-162 mmHg] 94 mmHg  Arterial Line BP: ()/() 118/79  SpO2:  [70 %-100 %] 98 %      Intake/Output last 3 shifts  I/O last 3 completed shifts:  In: 1356.43 [I.V.:1354.23; Other:2.2]  Out: 3650.8 [Urine:85; Other:3052.8; Stool:95;  Blood:23; Chest Tube:395]  Intake/Output this shift:  I/O this shift:  In: 447.1 [I.V.:447.1]  Out: 403 [Urine:12; Other:361; Stool:10; Chest Tube:20]    Physical Exam  Alert/oriented x 3, awake, NAD; appears tired; on O2  CV: RRR without murmur or rub  Lung: clear and equal; no extra sounds  Ab: soft and NT; not distended; normal bs  Ext: some edema and well perfused  Skin; no rash    Pertinent Labs     Last Renal Panel:  Sodium   Date Value Ref Range Status   09/03/2024 137 135 - 145 mmol/L Final   09/03/2024 137 135 - 145 mmol/L Final     Potassium   Date Value Ref Range Status   09/03/2024 4.6 3.4 - 5.3 mmol/L Final   09/03/2024 4.6 3.4 - 5.3 mmol/L Final     Potassium POCT   Date Value Ref Range Status   08/27/2024 3.4 3.4 - 5.3 mmol/L Final     Chloride   Date Value Ref Range Status   09/03/2024 99 98 - 107 mmol/L Final   09/03/2024 99 98 - 107 mmol/L Final     Carbon Dioxide (CO2)   Date Value Ref Range Status   09/03/2024 17 (L) 22 - 29 mmol/L Final   09/03/2024 17 (L) 22 - 29 mmol/L Final     Anion Gap   Date Value Ref Range Status   09/03/2024 21 (H) 7 - 15 mmol/L Final   09/03/2024 21 (H) 7 - 15 mmol/L Final     Glucose   Date Value Ref Range Status   09/03/2024 76 70 - 99 mg/dL Final   09/03/2024 76 70 - 99 mg/dL Final     GLUCOSE BY METER POCT   Date Value Ref Range Status   09/03/2024 75 70 - 99 mg/dL Final     Urea Nitrogen   Date Value Ref Range Status   09/03/2024 13.3 8.0 - 23.0 mg/dL Final   09/03/2024 13.3 8.0 - 23.0 mg/dL Final     Creatinine   Date Value Ref Range Status   09/03/2024 1.07 (H) 0.51 - 0.95 mg/dL Final   09/03/2024 1.07 (H) 0.51 - 0.95 mg/dL Final     GFR Estimate   Date Value Ref Range Status   09/03/2024 51 (L) >60 mL/min/1.73m2 Final     Comment:     eGFR calculated using 2021 CKD-EPI equation.   09/03/2024 51 (L) >60 mL/min/1.73m2 Final     Comment:     eGFR calculated using 2021 CKD-EPI equation.  eGFR calculated using 2021 CKD-EPI equation.     Calcium   Date Value Ref Range  Status   09/03/2024 8.3 (L) 8.8 - 10.4 mg/dL Final     Comment:     Reference intervals for this test were updated on 7/16/2024 to reflect our healthy population more accurately. There may be differences in the flagging of prior results with similar values performed with this method. Those prior results can be interpreted in the context of the updated reference intervals.   09/03/2024 8.3 (L) 8.8 - 10.4 mg/dL Final     Comment:     Reference intervals for this test were updated on 7/16/2024 to reflect our healthy population more accurately. There may be differences in the flagging of prior results with similar values performed with this method. Those prior results can be interpreted in the context of the updated reference intervals.  Reference intervals for this test were updated on 7/16/2024 to reflect our healthy population more accurately. There may be differences in the flagging of prior results with similar values performed with this method. Those prior results can be interpreted in the context of the updated reference intervals.     Phosphorus   Date Value Ref Range Status   09/03/2024 5.4 (H) 2.5 - 4.5 mg/dL Final     Albumin   Date Value Ref Range Status   09/03/2024 3.5 3.5 - 5.2 g/dL Final   09/03/2024 3.5 3.5 - 5.2 g/dL Final     Recent Labs   Lab 09/03/24  0411 09/02/24 2017 09/02/24  0903 09/02/24  0420 09/01/24 2022   HGB 7.8* 8.3* 8.4* 8.1* 7.9*          I reviewed all lab results  Cheryle Jung MD

## 2024-09-03 NOTE — PLAN OF CARE
Goal Outcome Evaluation:                      Minimal urine output this shift. Lab values reported to nephrology, Dr. Mcnair here and spoke to pt and family of the need to restart CRRT. CRRT restarted at 1305.

## 2024-09-03 NOTE — PROGRESS NOTES
[facility] Progress Note    Saw pt Felicitas Elliott and offered [employer] sacrament of anointing for the healing of the sick.     Fr. Beto Coulter

## 2024-09-03 NOTE — CONSULTS
[facility]    Felicitas Elliott   [address]  [facility] [address]  84 year old female  Admission Date/Time: 8/16/2024  4:20 PM    Primary Care Provider:  [facility], [facility]    Requesting Physician: Cris att. providers found      REASON FOR THE CONSULT:  elevated liver tests.    HPI:   Felicitas Elliott is a 84 year old female with PMH diastolic HF, CKD 3, HTN, and HLD who originally presented to [facility] with fevers, chills, rigors, headaches, and weakness.  She was found to have MSSA positive blood cultures and was started on Ancef.  Echographic evaluation of her heart via TTE and MIGDALIA showed mitral and aortic valve endocarditis in addition to severe aortic valve stenosis.      She underwent aortic root abscess debridement, aortic root reconstruction and replacement, MVR, and CAB x 2 on 8/27/24 complicated cardiogenic pulmonary edema, respiratory failure, HAMMAD requiring CRRT. She was extubated 9/1/24.    Liver tests started to rise significantly on 8/30 with  and AST 1002. It carl further on 8/31. It started to improve on 9/1 with  and and  on 9/2. It is up again today with  and AST 1001.    She denies abdominal pain. Only complaint is nausea.      REVIEW OF SYSTEMS: 13 point review of systems is negative except that noted above.    PAST MEDICAL HISTORY: History reviewed. No pertinent past medical history.    PAST SURGICAL HISTORY:   Past Surgical History:   Procedure Laterality Date    CORONARY ARTERY BYPASS GRAFT, WITH AORTIC VALVE REPLACEMENT, WITH ENDOSCOPIC VESSEL PROCUREMENT N/A 8/27/2024    Procedure: CORONARY ARTERY BYPASS GRAFT TIMES TWO, WITH AORTIC ROOT REPLACEMENT, WITH LEFT INTERNAL MAMMARY ARTERY HARVEST, LEFT SAPHNENOUS ENDOSCOPIC VESSEL PROCUREMENT,;  Surgeon: Dylan Renae MD;  Location: [facility] OR    CV CORONARY ANGIOGRAM N/A 8/20/2024    Procedure: CV CORONARY ANGIOGRAM;  Surgeon: Den Wylie MD;   Location: [facility] CATH LAB CV    IR CVC NON TUNNEL PLACEMENT > 5 YRS  9/1/2024    REPLACE VALVE MITRAL N/A 8/27/2024    Procedure: MITRAL VALVE REPLACEMENT,;  Surgeon: Dylan Renae MD;  [place] [facility] [place]    TRANSESOPHAGEAL ECHOCARDIOGRAM INTRAOPERATIVE  8/27/2024    Procedure: ANESTHESIA TRANSESOPHAGEAL ECHOCARDIOGRAM, EPI-AORTIC ULTRASOUND;  Surgeon: Dylan Renae MD;  [place] [facility] [place]       FAMILY HISTORY: History reviewed. No pertinent family history.    SOCIAL HISTORY:   Social History     Tobacco Use    Smoking status: Not on file    Smokeless tobacco: Not on file   Substance Use Topics    Alcohol use: Not on file        MEDS:  Medications Prior to Admission   Medication Sig Dispense Refill Last Dose    acetaminophen (TYLENOL) 500 MG tablet Take 500-1,000 mg by mouth 2 times daily as needed for other (arthritis)   Unknown at prn       ALLERGIES/SENSITIVITIES: Aspirin, Cats, and Nickel    MEDICATIONS:  No current outpatient medications on file.       PHYSICAL EXAM:  Temp:  [98.1  F (36.7  C)-98.6  F (37  C)] 98.1  F (36.7  C)  Pulse:  [54-91] 91  Resp:  [12-31] 14  BP: (101-147)/(51-74) 109/58  MAP:  [54 mmHg-162 mmHg] 94 mmHg  Arterial Line BP: ()/() 118/79  SpO2:  [81 %-100 %] 97 %  Body mass index is 33.11 kg/m .  GEN: Chronically ill-appearing 84 year old in no acute distress.  HEENT: sclera anicteric, moist mucous membranes.   LYMPH: No cervical lymphadenopathy  PULM: Nonlabored breathing. Breath sounds equal.   CARDIO: Regular rate  GI: Non-distended. Soft.  Non-tender to palpation.  No guarding. No rebound tenderness.  EXT: warm, no lower extremity edema  NEURO: Alert. No focal defects.   PSYCH: Mental status appropriate, mood and affect normal.    SKIN: No rashes  MSK: No joint abnormalities    LABORATORY DATA:  CBC RESULTS:   Recent Labs   Lab Test 09/03/24  1130   WBC 13.2*   RBC 2.52*   HGB 7.6*   HCT 24.8*   MCV 98   MCH 30.2   MCHC 30.6*    RDW 18.6*   PLT 42*        CMP Results:   Recent Labs   Lab Test 09/03/24  1130 09/03/24  0411    137  137   POTASSIUM 4.5 4.6  4.6   CHLORIDE 102 99  99   CO2 17* 17*  17*   ANIONGAP 22* 21*  21*   GLC 97 76  76   BUN 14.6 13.3  13.3   CR 1.00* 1.07*  1.07*   BILITOTAL  --  1.3*   ALKPHOS  --  60   ALT  --  307*   AST  --  1,001*        INR Results:   Recent Labs   Lab Test 09/03/24  0411   INR 1.60*          RELEVANT IMAGING:      EXAM: US ABDOMEN LIMITED W ABDOMEN DOPPLER LIMITED  LOCATION: [facility]  DATE: 8/30/2024     INDICATION: Evaluate for portal venous or hepatic arterial thrombus.  COMPARISON: None.  TECHNIQUE: Limited abdominal ultrasound. Color flow with spectral Doppler and waveform analysis performed.     FINDINGS: Examination is technically compromised due to patient body habitus and large bandage across the mid abdomen.     GALLBLADDER: No cholelithiasis or sludge is identified. The gallbladder wall is thickened with adjacent pericholecystic fluid.      BILE DUCTS: The common bile duct measures 8 mm in diameter. This is within normal limits given patient's age.     LIVER: Normal parenchyma with smooth contour. No focal mass.      RIGHT KIDNEY: No hydronephrosis. There is a 1.6 cm benign-appearing cyst involving the lateral aspect of the right kidney. This does not require any further follow-up.      PANCREAS: Obscured by bowel gas.     No ascites.     ABDOMINAL DUPLEX: The hepatic artery, hepatic veins, IVC, portal veins, and splenic vein are patent with antegrade flow and without evidence of thrombus.                                                                      IMPRESSION:  1.  Gallbladder wall thickening with pericholecystic fluid. There is no obvious cholelithiasis or gallbladder sludge. These findings could reflect a calculus cholecystitis or be reactive from adjacent hepatic parenchymal disease.  2.  No evidence of portal venous or hepatic  arterial thrombus.    ASSESSMENT:   84 year old female with PMH diastolic HF, CKD 3, HTN, and HLD who came with sepsis from infective endocarditis. She underwent aortic root abscess debridement, aortic root reconstruction and replacement, MVR, and CAB x 2 on 8/27/24 complicated cardiogenic pulmonary edema, respiratory failure, HAMMAD requiring CRRT. Liver tests started to rise on 8/30. US showed gallbladder wall thickening and pericholecystic fluid without sludge or stones noted. There was also no portal, hepatic vein or arterial thrombus. I believe her elevated LFTs still compatible with ischemic hepatitis and less likely from acalculous cholecystitis. No hepatic arterial or venous thrombus noted as well. Another potential etiology is hepatoxicity to meds that is has been or currently on.     PLAN:  Continue supportive measures.  Trend LFTs.  Repeat RUQ US.    45 minutes of total time providing patient care, including patient evaluation, reviewing documents/test results, and .          Kurt Flores MD  Thank you for the opportunity to participate in the care of this patient.   Please feel free to call me with any questions or concerns.  Phone number [phone].            CC: [facility]

## 2024-09-03 NOTE — PROGRESS NOTES
"CVTS Daily Progress Note   POD#7 s/p Aortic root abscess debridement and aortic annular reconstruction, aortic root replacement (Konect composite 25 mm Silva Inspiris Resilia bioprosthetic valve within 30 mm Gelweave Valsalva graft with reimplantation of the coronary arteries), Mitral valve replacement (31 mm St. Tim Silva bioprosthetic valve) and CAB x 2.  Attending: Dr Renae  LOS: 18    SUBJECTIVE/INTERVAL EVENTS:    Some a-fib overnight; now NSR s/p amiodarone bolus and gtt. Patient and family did opt for code status change to DNR/DNI yesterday. CRRT clotted off overnight. Minimal urine output. Otherwise, normotensive on dobutamine at 1.5. Somewhat drowsy this morning and complains of being uncomfortable \"all over\". Maintaining oxygenation on 3 L NC. Seen in in bed. Pain well controlled. Rectal tube in place. Awaiting speec clearance for diet..Chest tube output appropriate. Hgb 7.8 (8.4). INR 1.46. Plt 41. LFT overall stable. HIT negative. WBC 14.2; remains on empiric Vanc/cefepime. Patient denies new chest pain, shortness of breath, abdominal pain, calf pain, nausea. She has no questions today.    OBJECTIVE:  Temp:  [98.2  F (36.8  C)-98.6  F (37  C)] 98.2  F (36.8  C)  Pulse:  [54-91] 91  Resp:  [14-31] 14  BP: (101-147)/(51-74) 112/55  MAP:  [54 mmHg-162 mmHg] 94 mmHg  Arterial Line BP: ()/() 118/79  SpO2:  [70 %-100 %] 98 %  Vitals:    08/29/24 0400 08/30/24 0400 08/31/24 0400 09/01/24 0400   Weight: 101 kg (222 lb 10.6 oz) 99.8 kg (220 lb) 99.4 kg (219 lb 1.6 oz) 98.4 kg (216 lb 14.4 oz)    09/02/24 0400   Weight: 95.9 kg (211 lb 6.7 oz)       Clinically Significant Risk Factors          # Hypocalcemia: Lowest iCa = 4.3 mg/dL in last 2 days, will monitor and replace as appropriate    # Anion Gap Metabolic Acidosis: Highest Anion Gap = 21 mmol/L in last 2 days, will monitor and treat as appropriate  # Hypoalbuminemia: Lowest albumin = 3.1 g/dL at 9/1/2024  1:45 PM, will monitor as " "appropriate    # Coagulation Defect: INR = 1.60 (Ref range: 0.85 - 1.15) and/or PTT = 52 Seconds (Ref range: 22 - 38 Seconds), will monitor for bleeding  # Thrombocytopenia: Lowest platelets = 41 in last 2 days, will monitor for bleeding          #Acute blood loss anemia: Lowest Hgb this hospitalization: 6.9 g/dL. Will continue to monitor and treat/transfuse as appropriate.     # Obesity: Estimated body mass index is 33.11 kg/m  as calculated from the following:    Height as of this encounter: 1.702 m (5' 7\").    Weight as of this encounter: 95.9 kg (211 lb 6.7 oz).        # Financial/Environmental Concerns:     # History of CABG: noted on surgical history        Current Medications:    Scheduled Meds:  Current Facility-Administered Medications   Medication Dose Route Frequency Provider Last Rate Last Admin    [Held by provider] acetaminophen (TYLENOL) tablet 975 mg  975 mg Oral Q8H Maryam Anthony PA-C   975 mg at 08/30/24 1336    acetylcysteine (ACETADOTE) 19,180 mg in D5W STEP ONE infusion  200 mg/kg Intravenous Once Zachary Gonzalez MD        [Held by provider] atorvastatin (LIPITOR) tablet 80 mg  80 mg Oral QPM Jessica Tolbert PA-C   80 mg at 08/29/24 2038    ceFEPIme (MAXIPIME) 2 g vial to attach to  mL bag for ADULTS or NS 50 mL bag for PEDS  2 g Intravenous Q12H Zach Park MD   2 g at 09/03/24 0432    [Held by provider] clopidogrel (PLAVIX) tablet 75 mg  75 mg Oral Daily Maryam Anthony PA-C        lactobacillus rhamnosus (GG) (CULTURELL) capsule 1 capsule  1 capsule Oral BID Zach Foreman DO   1 capsule at 09/02/24 0945    Lidocaine (LIDOCARE) 4 % Patch 1-2 patch  1-2 patch Transdermal Q24H Maryam Anthony PA-C   2 patch at 09/02/24 1747    [Held by provider] metoprolol tartrate (LOPRESSOR) half-tab 12.5 mg  12.5 mg Oral BID Jessica Tolbert PA-C        pantoprazole (PROTONIX) 2 mg/mL suspension 40 mg  40 mg Oral or NG Tube Maryam Tucker PA-C     "    Or    pantoprazole (PROTONIX) EC tablet 40 mg  40 mg Oral QAM AC Maryam Anthony PA-C        Or    pantoprazole (PROTONIX) IV push injection 40 mg  40 mg Intravenous QAM AC Maryam Anthony PA-C   40 mg at 09/03/24 0811    polyethylene glycol (MIRALAX) Packet 17 g  17 g Oral Daily Maryam Anthony PA-C        senna-docusate (SENOKOT-S/PERICOLACE) 8.6-50 MG per tablet 1 tablet  1 tablet Oral BID Maryam Anthony PA-C   1 tablet at 09/01/24 2117    sodium chloride (PF) 0.9% PF flush 3 mL  3 mL Intracatheter Q8H Gondal, Saad J, MD   3 mL at 09/03/24 0813    vancomycin (VANCOCIN) 750 mg in sodium chloride 0.9 % 250 mL intermittent infusion  750 mg Intravenous Q24H Zachary Gonzalez MD   750 mg at 09/03/24 0853    vancomycin (VANCOCIN) capsule 125 mg  125 mg Oral BID Nannette Lebron MD   125 mg at 09/02/24 2021    [Held by provider] Warfarin Dose Required Daily - Pharmacist Managed  1 each Oral See Admin Instructions Jessica Tolbert PA-C         Continuous Infusions:  Current Facility-Administered Medications   Medication Dose Route Frequency Provider Last Rate Last Admin    acetylcysteine (ACETADOTE) 6,000 mg in D5W STEP TWO infusion  6.25 mg/kg/hr Intravenous Continuous Zachary Gonzalez MD        amiodarone (NEXTERONE) 1.8 mg/mL in dextrose 5% 200 mL ADULT STANDARD infusion  0.5 mg/min Intravenous Continuous Jessica oTlbert PA-C 16.7 mL/hr at 09/02/24 2221 0.5 mg/min at 09/02/24 2221    CRRT Pre-Filter replacement solution for CVVHD & CVVHDF (Phoxillum BK4/2.5)  10 mL/kg/hr CRRT Continuous Moses Mcnair MD 1,000 mL/hr at 09/02/24 2314 10 mL/kg/hr at 09/02/24 2314    CRRT Replacement solution for CVVHD and CVVHDF premixed replacement solution (Phoxillum 4/2.5)  200 mL/hr CRRT Continuous Moses Mcnair  mL/hr at 09/02/24 1808 200 mL/hr at 09/02/24 1808    dextrose 10% infusion   Intravenous Continuous PRN Zachary Gonzalez MD        dialysate for CVVHD & CVVHDF premixed  replacement solution (Phoxillum 4/2.5) - total potassium 4 mEq/L  24.5 mL/kg/hr CRRT Continuous Moses Mcnair MD 2,500 mL/hr at 09/03/24 0953 24.5 mL/kg/hr at 09/03/24 0953    DOBUTamine (DOBUTREX) 500 mg in D5W 250 mL infusion (adult std conc)  2.5 mcg/kg/min Intravenous Continuous Jessica Tolbert PA-C 4.3 mL/hr at 09/03/24 0336 1.5 mcg/kg/min at 09/03/24 0336    heparin (porcine) 20,000 units in 20 mL ANTICOAGULANT infusion (syringe from pharmacy)  500-1,500 Units/hr CRRT Continuous Moses Mcnair MD   Held at 08/31/24 1928    No heparin required   Does not apply Continuous PRN Moses Mcnair MD         PRN Meds:.  Current Facility-Administered Medications   Medication Dose Route Frequency Provider Last Rate Last Admin    albumin human 25 % injection 50 g  50 g Intravenous Q8H PRN Moses Mcnair MD        sodium chloride (NEBUSAL) 3 % neb solution 3 mL  3 mL Nebulization Q6H PRN Zachary Gonzalez MD        And    albuterol (PROVENTIL) neb solution 2.5 mg  2.5 mg Nebulization Q6H PRN Zachary Gonzalez MD        bisacodyl (DULCOLAX) suppository 10 mg  10 mg Rectal Daily PRN Maryam Anthony PA-C        calcium carbonate (TUMS) chewable tablet 1,000 mg  1,000 mg Oral 4x Daily PRN Gondal, Saad J, MD        calcium gluconate 1 g in 50 mL in sodium chloride intermittent infusion  1 g Intravenous Once PRN Maryam Anthony PA-C   1 g at 09/01/24 2337    calcium gluconate 2 g in  mL intermittent infusion  2 g Intravenous Q8H PRN Moses Mcnair MD   2 g at 09/02/24 1514    calcium gluconate 2 g in  mL intermittent infusion  2 g Intravenous Once PRN Maryam Anthony PA-C   2 g at 08/31/24 0703    calcium gluconate 3 g in sodium chloride 0.9 % 100 mL intermittent infusion  3 g Intravenous Once PRN Maryam Anthony PA-C        calcium gluconate 4 g in sodium chloride 0.9 % 100 mL intermittent infusion  4 g Intravenous Q8H PRN Moses Mcnair MD        carboxymethylcellulose PF  (REFRESH PLUS) 0.5 % ophthalmic solution 1 drop  1 drop Both Eyes Q1H PRN Gondal, Saad J, MD        dextrose 10% infusion   Intravenous Continuous PRN Zachary Gonzalez MD        glucose gel 15-30 g  15-30 g Oral Q15 Min PRN Maryam Anthony PA-C        Or    dextrose 50 % injection 25-50 mL  25-50 mL Intravenous Q15 Min PRN Maryam Anthony PA-C        Or    glucagon injection 1 mg  1 mg Subcutaneous Q15 Min PRN Maryam Anthony PA-C        hydrALAZINE (APRESOLINE) injection 10 mg  10 mg Intravenous Q30 Min PRN Maryam Anthony PA-C        lactated ringers BOLUS 250 mL  250 mL Intravenous Q15 Min PRN Maryam Anthony PA-C   250 mL at 08/27/24 2151    lidocaine (LMX4) cream   Topical Q1H PRN Gondal, Saad J, MD        lidocaine 1 % 0.1-1 mL  0.1-1 mL Other Q1H PRN Gondal, Saad J, MD        magnesium hydroxide (MILK OF MAGNESIA) suspension 30 mL  30 mL Oral Daily PRN Maryam Anthony PA-C        magnesium sulfate 2 g in 50 mL sterile water intermittent infusion  2 g Intravenous Q8H PRN Moses Mcnair MD        miconazole (MICATIN) 2 % powder   Topical BID PRN Erick Patino DO        naloxone (NARCAN) injection 0.2 mg  0.2 mg Intravenous Q2 Min PRN Erick Patino DO        Or    naloxone (NARCAN) injection 0.4 mg  0.4 mg Intravenous Q2 Min PRN Erick Patino DO        Or    naloxone (NARCAN) injection 0.2 mg  0.2 mg Intramuscular Q2 Min PRN Erick Patino DO        Or    naloxone (NARCAN) injection 0.4 mg  0.4 mg Intramuscular Q2 Min PRN Erick Patino DO        nicotine (NICORETTE) gum 2 mg  2 mg Buccal Q1H PRN Gondal, Saad J, MD        No heparin required   Does not apply Continuous PRN Moses Mcnair MD        ondansetron (ZOFRAN ODT) ODT tab 4 mg  4 mg Oral Q6H PRN Maryam Anthony PA-C        Or    ondansetron (ZOFRAN) injection 4 mg  4 mg Intravenous Q6H PRN Maryam Anthony PA-C   4 mg at 09/03/24 0716    potassium chloride 20  mEq in 50 mL intermittent infusion  20 mEq Intravenous Q8H PRN Moses Mcnair MD 50 mL/hr at 08/31/24 1317 20 mEq at 08/31/24 1317    prochlorperazine (COMPAZINE) injection 5 mg  5 mg Intravenous Q6H PRN Maryam Anthony PA-C   5 mg at 09/03/24 0917    Or    prochlorperazine (COMPAZINE) tablet 5 mg  5 mg Oral Q6H PRN Maryam Anthony PA-C        senna-docusate (SENOKOT-S/PERICOLACE) 8.6-50 MG per tablet 1 tablet  1 tablet Oral BID PRN Gondal, Saad J, MD        Or    senna-docusate (SENOKOT-S/PERICOLACE) 8.6-50 MG per tablet 2 tablet  2 tablet Oral BID PRN Gondal, Saad J, MD        sodium chloride (PF) 0.9% PF flush 10-40 mL  10-40 mL Intracatheter Once PRN [address] MBBS        sodium chloride (PF) 0.9% PF flush 3 mL  3 mL Intracatheter q1 min prn Gondal, Saad J, MD        sodium chloride 0.9% BOLUS 1-250 mL  1-250 mL Intravenous Q1H PRN Jessica Tolbert PA-C        sodium phosphate 15 mmol in sodium chloride 0.9 % 250 mL intermittent infusion  15 mmol Intravenous Q8H PRN Moses Mcnair MD        traMADol (ULTRAM) tablet 50 mg  50 mg Oral Q6H PRN Erick Patino DO   50 mg at 09/02/24 0620       Cardiographics:    Telemetry monitoring demonstrates NSR with rate in the 60s per my personal review.    Imaging:  Results for orders placed or performed during the hospital encounter of 08/16/24   MR Brain w/o & w Contrast    Impression    IMPRESSION: Assessment degraded due to motion.  1.  13 mm focus of cortical signal abnormality within the left superior parietal lobule which is favored to represent a subacute infarct; low-grade glial neoplasm could conceivably have a similar appearance. Hyperacute intraparenchymal hematoma could   also have a similar appearance but is considered less likely. Recommend noncontrast head CT for further characterization. Also recommend follow-up MRI brain without and with contrast in 8-12 weeks to document expected evolution.  2.  Additional smaller foci of signal  abnormality with similar characteristics favored to represent punctate subacute infarcts.   CTA Head Neck with Contrast    Impression    IMPRESSION:   HEAD CT:  1.  Small focus of juxtacortical low attenuation within the left parietal lobe corresponds to signal abnormality seen on the prior MRI. As suggested previously this is favored to reflect evolving small vessel ischemic change; however, MRI follow-up to   exclude a low-grade neoplastic process is advised.  2.  No evidence for intracranial hemorrhage or significant mass effect.  3.  Generalized brain atrophy and presumed chronic microvascular ischemic change.    HEAD CTA:   1.  Intracranial atherosclerosis including moderate to marked stenosis of the right P2 posterior cerebral artery segment.  2.  No definite proximal branch occlusion, aneurysm, or high flow vascular malformation identified.    NECK CTA:  1.  Atherosclerotic plaque at the carotid bifurcations bilaterally without hemodynamically significant stenosis in the neck vessels.   2.  No evidence for dissection.   Radiologist Consult For Cardiology    Impression    IMPRESSION:    1.  Partially visualized left adrenal nodule, indeterminate, could represent adrenal adenoma.  2.  Please refer to cardiologist's dictation for the cardiac CT report.   US Carotid Bilateral    Impression    IMPRESSION:  1.  Mild plaque formation, velocities consistent with less than 50% stenosis in the right internal carotid artery.  2.  Mild plaque formation, velocities consistent with less than 50% stenosis in the left internal carotid artery.  3.  Flow within the vertebral arteries is antegrade.   XR Chest Port 1 View    Impression    IMPRESSION: Endotracheal tube tip 4.8 cm proximal to the anuj. Ray-Dana catheter with tip in the pulmonary outflow tract. Mediastinal drain with aortic valve prosthesis. Sternotomy. Small left pleural effusion with left basilar infiltrate.   XR Chest Port 1 View    Impression    IMPRESSION:  Endotracheal tube in the distal aspect of the trachea. Sternotomy. AVR. Mediastinal drain. Right IJ [place]-Dana catheter with tip in the right ventricle/MPA. Left-sided chest tube. No pneumothorax. Mild cardiac enlargement with normal pulmonary   vascularity. Minimal left basilar atelectasis and pleural fluid. Degenerative changes in the spine.   Echocardiogram Limited   Result Value Ref Range    LVEF  60-65%    CTA  ANGIOGRAM CORONARY ARTERY   Result Value Ref Range    BSA 0.00 m2       Labs, personally reviewed.  Hemoglobin   Date Value Ref Range Status   09/03/2024 7.8 (L) 11.7 - 15.7 g/dL Final   09/02/2024 8.3 (L) 11.7 - 15.7 g/dL Final   09/02/2024 8.4 (L) 11.7 - 15.7 g/dL Final     WBC Count   Date Value Ref Range Status   09/03/2024 14.2 (H) 4.0 - 11.0 10e3/uL Final   09/02/2024 13.2 (H) 4.0 - 11.0 10e3/uL Final   09/02/2024 12.8 (H) 4.0 - 11.0 10e3/uL Final     Platelet Count   Date Value Ref Range Status   09/03/2024 41 (LL) 150 - 450 10e3/uL Final   09/02/2024 49 (LL) 150 - 450 10e3/uL Final   09/02/2024 61 (L) 150 - 450 10e3/uL Final     Creatinine   Date Value Ref Range Status   09/03/2024 1.07 (H) 0.51 - 0.95 mg/dL Final   09/03/2024 1.07 (H) 0.51 - 0.95 mg/dL Final   09/02/2024 1.12 (H) 0.51 - 0.95 mg/dL Final     Potassium   Date Value Ref Range Status   09/03/2024 4.6 3.4 - 5.3 mmol/L Final   09/03/2024 4.6 3.4 - 5.3 mmol/L Final   09/02/2024 4.9 3.4 - 5.3 mmol/L Final     Potassium POCT   Date Value Ref Range Status   08/27/2024 3.4 3.4 - 5.3 mmol/L Final   08/27/2024 3.9 3.4 - 5.3 mmol/L Final   08/27/2024 3.4 3.4 - 5.3 mmol/L Final     Magnesium   Date Value Ref Range Status   09/03/2024 2.6 (H) 1.7 - 2.3 mg/dL Final   09/02/2024 2.5 (H) 1.7 - 2.3 mg/dL Final   09/02/2024 2.4 (H) 1.7 - 2.3 mg/dL Final          I/O:  I/O last 3 completed shifts:  In: 1356.43 [I.V.:1354.23; Other:2.2]  Out: 3650.8 [Urine:85; Other:3052.8; Stool:95; Blood:23; Chest Tube:395]       Physical Exam:    General: Patient  seen in bed. Sleepy but arousable. NAD.   HEENT: JULIO, no sclera icterus, moist mucosa.   CV: RRR on monitor. 2+ peripheral pulses in all extremities. Mild edema.   Pulm: Non-labored effort on 3L NC. Chest tubes in place, serosanguinous output, no air leak. Incision C/D/I.  Abd: Soft, NT, ND  : Le with pita urine  Ext: Mod pedal edema, SCDs in place, warm, distal pulses intact.  Neuro:CNs grossly intact      ASSESSMENT/PLAN:    Felicitas Elliott is a 84 year old female with a history of endocarditis, CAD, aortic stenosis and mitral valve disorder who is s/p Aortic root abscess debridement and aortic annular reconstruction, aortic root replacement (Konect composite 25 mm Silva Inspiris Resilia bioprosthetic valve within 30 mm Gelweave Valsalva graft with reimplantation of the coronary arteries), Mitral valve replacement (31 mm St. Tim Silva bioprosthetic valve) and CAB x 2.    Active Problems:    Bacteremia    Endocarditis    Sepsis (H)    Nonrheumatic aortic valve stenosis    Postsurgical percutaneous transluminal coronary angioplasty status    Mitral valve disorder    Coronary artery disease involving native coronary artery of native heart, unspecified whether angina present        NEURO:   - Tylenol held given LFT bump. PRN Tramadol  for pain  - PRN nicorette gum    CV:   - Pre-op EF 60-65%  - Normotensive  - Currently on Dobutamine 1.5 mcg  - Continue amio gtt  - Beta blocker pending weaning from pressors  - ASA allergy - Plavix 75mg PO daily-held for now, likely til plt >100  - Atorvastatin 80 mg daily (held in light of LFTs)  - Chest tubes removed this AM; TPW remain due to plt/poss need for pacing  - Cards following--appreciate recs  - New baseline echo before discharge and after CT removed  - Coumadin x3 months per surgeon preference--currently held.    PULM:   - Extubated POD#1, reintubated POD #2, Extubated POD#5  - Maintaining oxygen saturations on 3L NC  - Encourage pulmonary toilet  - CXR-8/29  mild venous congestion,Small right pleural effusion has developed with associated passive atelectasis right base.    FEN/GI:  - Continue electrolyte replacement protocol  - Diet: Cardiac, ADAT   - Bowel regimen    RENAL:  - Becoming anuric  - Le to remain in for close monitoring of I/O and during period of diuresis/relative immobility.   - Nephrology consult and managing CRRT--apprecaite    HEME:  - Acute blood loss anemia post-op.   - Hgb 7.8, hep subcutaneous currently held  - Occult blood positive POD#3, rectal tube in place   - Plavix 75mg PO daily(held) - ASA allergy  - Coumadin pharmacy to dose- Held Goal INR 2.5-3.5 x 3 mos.--held       -Recheck INR, CBC, CMP,Trop. Lactate q 8 hrs.  - HIT negative. Plt 41 today  - Likely hold plavix and coumadin until plt >100    ID:  - Lennie op ppx complete, afebrile.  - WBC 14.2 (12.8)  -ID following-apprec reqs- MSSA bacteremia.Cultures NGTD from 8/17          - Nafcillin -stopped          - Surgical cultures sent-pending          - Vanc 2,000 mg then 750mg q12 hrs          -  ceftazidime 1gm q 8 hrs-stopped 8/30          - Cefepime 2 gm q 12 hrs  - Blood cultures 8/30-NGTD  - Resp cultures-gram neg bacilli, 2+ Enterobacter cloacae complex    ENDO:   - A1c 6.4  - Euglycemic     PPx:   - DVT: SCDs, SQ heparin TID, ambulation   - GI: Protonix 40mg PO daily    DISPO:   - Continue critical care in ICU  - Medically Ready for Discharge: Anticipated in 5+ Days       Patient discussed with Dr. Renae.        _______  Maryam Anthony PA-C  Cardiothoracic Surgery  [phone]

## 2024-09-03 NOTE — PROGRESS NOTES
SPIRITUAL [facility] Progress Note    Saw pt Felicitas Elliott and offered Yarsanism sacrament of anointing for the healing of the sick.     Fr. Beto Coulter

## 2024-09-03 NOTE — PLAN OF CARE
Problem: Risk for Delirium  Goal: Improved Sleep  Outcome: Progressing     Problem: Skin Injury Risk Increased  Goal: Skin Health and Integrity  Outcome: Progressing  Intervention: Plan: Nurse Driven Intervention: Moisture Management  Recent Flowsheet Documentation  Taken 9/3/2024 0400 by Corina Brown RN  Moisture Interventions:   No brief in bed   Fecal collection device   Urinary collection device   Perineal cleanser  Taken 9/3/2024 0215 by Corina Brown RN  Bathing/Skin Care:   bath, chlorhexidine wipes   wipes, CHG   electrode patches/site rotation   linen changed  Taken 9/3/2024 0000 by Corina Brown RN  Moisture Interventions:   No brief in bed   Fecal collection device   Urinary collection device   Perineal cleanser  Taken 9/2/2024 2000 by Corina Brown RN  Moisture Interventions:   No brief in bed   Fecal collection device   Urinary collection device   Perineal cleanser  Intervention: Plan: Nurse Driven Intervention: Friction and Shear  Recent Flowsheet Documentation  Taken 9/3/2024 0400 by Corina Brown RN  Friction/Shear Interventions: HOB 30 degrees or less  Taken 9/3/2024 0000 by Corina Brown RN  Friction/Shear Interventions: HOB 30 degrees or less  Taken 9/2/2024 2000 by Corina Brown RN  Friction/Shear Interventions: HOB 30 degrees or less  Intervention: Optimize Skin Protection  Recent Flowsheet Documentation  Taken 9/3/2024 0430 by Corina Brown RN  Head of Bed (HOB) Positioning: HOB at 30 degrees  Taken 9/3/2024 0400 by Corina Brown RN  Pressure Reduction Techniques: heels elevated off bed  Skin Protection: pulse oximeter probe site changed  Head of Bed (HOB) Positioning: HOB at 30 degrees  Taken 9/3/2024 0215 by Corina Brown RN  Head of Bed (HOB) Positioning: HOB at 30 degrees  Taken 9/3/2024 0037 by Corina Brown RN  Head of Bed (HOB) Positioning: HOB at 30 degrees  Taken 9/3/2024 0000 by Corina Brown RN  Pressure Reduction Techniques: heels elevated off  bed  Skin Protection: pulse oximeter probe site changed  Head of Bed (HOB) Positioning: HOB at 30 degrees  Taken 9/2/2024 2040 by Corina Brown RN  Head of Bed (HOB) Positioning: HOB at 30 degrees  Taken 9/2/2024 2000 by Corina Brown RN  Pressure Reduction Techniques: heels elevated off bed  Skin Protection: pulse oximeter probe site changed  Head of Bed (HOB) Positioning: HOB at 30 degrees     Problem: Pain Acute  Goal: Optimal Pain Control and Function  Outcome: Progressing  Intervention: Develop Pain Management Plan  Recent Flowsheet Documentation  Taken 9/3/2024 0134 by Corina Brown RN  Pain Management Interventions: medication (see MAR)  Taken 9/2/2024 1955 by Corina Brown RN  Pain Management Interventions: medication (see MAR)  Intervention: Prevent or Manage Pain  Recent Flowsheet Documentation  Taken 9/3/2024 0400 by Corina Brown RN  Medication Review/Management: medications reviewed  Taken 9/3/2024 0000 by Corina Brown RN  Medication Review/Management: medications reviewed  Taken 9/2/2024 2000 by Corina Brown RN  Medication Review/Management: medications reviewed   Goal Outcome Evaluation:       Pt's vitals stable. Sleeping between cares.  Nauseas throughout night, zofran given as ordered and compazine given as ordered.  Pt went into Afib around 0345, rate 50-60's.  Dr. Oro notified of pt's heart rhythm.   Pt's CRRT filter clotted off around 0515.  Attempted to several times to start new filter set without success.  Day shift nurse given report on situation.  Day nurse getting new machine with new filter set started.     Corina Morataya RN

## 2024-09-03 NOTE — PROGRESS NOTES
Family asking what would happen if patients breathing became bad in the night. ( Pt has been on 3L oxygen all day with no respiratory distress) Family stated she would not want to be intubated if it came to that. Dr. Gonzalez was called into room to discuss with pt and family.

## 2024-09-03 NOTE — PHARMACY-VANCOMYCIN DOSING SERVICE
Pharmacy Vancomycin Note  Date of Service September 3, 2024  Patient's  1940   84 year old, female    Indication: Aspiration Pneumonia  Day of Therapy: 5  Current vancomycin regimen:  Intermittent as CRRT had stopped  Current vancomycin monitoring method: Trough (Method 2 = manual dose calculation)  Current vancomycin therapeutic monitoring goal: 15-20 mg/L      Current estimated CrCl = Estimated Creatinine Clearance: 46.5 mL/min (A) (based on SCr of 1.07 mg/dL (H)).    Creatinine for last 3 days  2024: 11:41 AM Creatinine 1.19 mg/dL; 11:41 AM Creatinine 1.16 mg/dL;  7:57 PM Creatinine 1.13 mg/dL; 10:08 PM Creatinine 1.06 mg/dL  2024:  3:56 AM Creatinine 1.05 mg/dL;  5:59 AM Creatinine 1.01 mg/dL;  1:45 PM Creatinine 1.14 mg/dL;  1:45 PM Creatinine 1.07 mg/dL;  8:22 PM Creatinine 0.95 mg/dL; 10:11 PM Creatinine 0.97 mg/dL  2024:  4:20 AM Creatinine 0.95 mg/dL;  9:03 AM Creatinine 1.27 mg/dL;  9:03 AM Creatinine 1.28 mg/dL;  2:31 PM Creatinine 1.28 mg/dL;  8:17 PM Creatinine 1.15 mg/dL; 10:10 PM Creatinine 1.12 mg/dL  9/3/2024:  4:11 AM Creatinine 1.07 mg/dL;  4:11 AM Creatinine 1.07 mg/dL    Recent Vancomycin Levels (past 3 days)  2024: 10:55 AM Vancomycin 24.3 ug/mL  9/3/2024:  4:11 AM Vancomycin 15.6 ug/mL    Vancomycin IV Administrations (past 72 hours)                     vancomycin (VANCOCIN) 750 mg in sodium chloride 0.9 % 250 mL intermittent infusion (mg) 750 mg New Bag 24 0853    vancomycin (VANCOCIN) 750 mg in sodium chloride 0.9 % 250 mL intermittent infusion (mg) 750 mg New Bag 24 0205    vancomycin (VANCOCIN) 750 mg in sodium chloride 0.9 % 250 mL intermittent infusion (mg) 750 mg New Bag 24 1348     750 mg New Bag  0013     750 mg New Bag 24 1237                    Nephrotoxins and other renal medications (From now, onward)      Start     Dose/Rate Route Frequency Ordered Stop    24 0800  vancomycin (VANCOCIN) 750 mg in sodium chloride 0.9 % 250 mL  intermittent infusion         750 mg  over 60 Minutes Intravenous EVERY 24 HOURS 09/03/24 0738      09/02/24 2100  vancomycin (VANCOCIN) capsule 125 mg         125 mg Oral 2 TIMES DAILY 09/02/24 1021                 Contrast Orders - past 72 hours (72h ago, onward)      Start     Dose/Rate Route Frequency Stop    09/01/24 1230  iohexol (OMNIPAQUE) 350 MG/ML injectable solution 50 mL         50 mL Intravenous ONCE 09/01/24 1207            Interpretation of levels and current regimen:  Vancomycin level is reflective of therapeutic level    Has serum creatinine changed greater than 50% in last 72 hours: On CRRT    Urine output:  diminished urine output    Renal Function:  ARF on CRRT    Plan:  Resume vancomycin 750 mg IV q24h now that CRRT has been resumed and vancomycin level is within range.  Vancomycin monitoring method: Trough (Method 2 = manual dose calculation)  Vancomycin therapeutic monitoring goal: 15-20 mg/L  Pharmacy will check vancomycin levels as appropriate in 1-3 Days.  Serum creatinine levels will be ordered daily for the first week of therapy and at least twice weekly for subsequent weeks.    Debbi Rowe, [facility]

## 2024-09-03 NOTE — PLAN OF CARE
Goal Outcome Evaluation:      Plan of Care Reviewed With: patient, spouse, family    Overall Patient Progress: improvingOverall Patient Progress: improving    Outcome Evaluation: Pt states weak,tired and discouraged.  answers orientation appropriate, follows commands.  CT's out, ice for back discomfort.  c/o nausea, given compazine.  pacer set to 92 per Dr. Renae DO NOT DECREASE.  Dobutamine @ 1.5 this will be fixed rate per Dr. Branch.  Amio @ .5; leave IV route for now.  Folye with minimal output.  Lungs clear dim, oxygen at 3L

## 2024-09-03 NOTE — PROGRESS NOTES
"Critical Care Progress Note     09/03/2024     Name: Felicitas Elliott MRN#: [id number]   Age: 84 year old YOB: 1940        Summary     Past medical history of moderate aortic stenosis, HFpEF, HTN, HLD, CKD 3, chronic intertriginous skin wounds, chronic neck pain       Presented 8/14/24 for chills & generalized weakness. 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 8/27/24 s/p bioprosthetic aortic & mitral valve replacement, aortic root replacement, & two vessel coronary artery bypass graft. Course complicated by post operative hypoxemic respiratory failure due to cardiogenic pulmonary edema, atelectasis, & possible Enterobacter hospital acquired pneumonia in conjunction with cardiogenic-distributive shock, oliguric HAMMAD requiring CRRT, euglycemic ketoacidosis, & suspected ischemic hepatitis, extubated on 9/1.      Interval Events     Patient changed her code status to DNR/DNI. Still on CRRT. Transaminases back up, not improving now. PLT 42.      Assessment & Plan      Neurology, Psychiatry, Sedation, Analgesia:  L-parietal septic embolic stroke   8/17/24 MRI brain \"cortical signal abnormality within the left superior parietal lobule which is favored to represent a subacute infarct [...] smaller foci of signal abnormality with similar characteristics favored to represent punctate subacute infarcts.\" 8/18/24 CTA head & neck \"low attenuation within the left parietal lobe [...] this is favored to reflect evolving small vessel ischemic change; however, MRI follow-up to   exclude a low-grade neoplastic process is advised.\" Post-extubation on 9/1, patient was talking rapidly to  about \"inhumane treatment\" and lots of Taoism talk, calm and conversant 9/2, does not seem overtly delirious.  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 vs deconditioning     Cervical spinal stenosis   Chronic neck pain   8/15/24  cervical MRI \"multilevel cervical spondylosis [...] complex edema at C4-5 on the left is favored to be reactive/degenerative [...] septic facet arthropathy are difficult to entirely exclude [...] C4-5, moderate to severe spinal canal stenosis  [...] severe right and moderate to severe left neural foraminal stenosis  [...] C5-6, moderate spinal canal stenosis  [...]Severe bilateral neural foraminal stenosis  [...] C7-T1, mild spinal canal stenosis with severe right and mild to moderate left neural foraminal stenosis [...] C3-4, moderate bilateral neural foraminal stenosis.\"  - neurosurgery was consulted, no current surgical interventions advised  - consider cervical MRI w/wo if neck pain worsens to evaluate for sequelae of infection     Generalized weakness  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 Able to squeeze bilaterally, good plantar flexion, but hard to lift limbs against gravity.  - aggressive PT/OT as able     Sedation & Analgesia   - prn APAP & tramadol     Cardiovascular:  Cardiogenic & distributive shock   Etiology multifactorial, post-cardiopulmonary bypass vasoplegia & low cardiac output syndrome in conjunction with sepsis due to residual aortic root abscess identified intraoperatively & possible Enterobacter HAP  - appreciate cardiology and CT surgery  - dobutamine ongoing at 1.5  - off vasopressin and norepi  - stress-dose hydrocortisone discontinued on 8/31  - MAP goal ~85 mmHg     Severe aortic stenosis, aortic & mitral valve infective endocarditis, aortic root abscess  8/27/24 bioprosthetic aortic & mitral valve replacement, aortic root replacement  - CT surgery managing: warfarin x 3 months, INR goal 2.5-3.5, no bridging needed, on hold until PLT >100, now down to 4.2  - plan per CT surgery     Severe " multivessel CAD  8/27/24 2 vessel CABG  - clopidogrel on hold until PLT >100     Preserved pre & post- operative biventricular systolic function   Left ventricular hypertrophy with impaired diastolic function      Carotid arterial stenosis, mild  8/22/24 carotid US     Post-operative atrial fibrillation slow ventricular response   S/p placement of temporary epicardial pacing wires   Presumed post-operative sinus node dysfunction or AV conduction defect.   - V-paced at 90 BPM, continue per CTS     Respiratory, Airway:  Acute hypoxemic respiratory failure - improved   Cardiogenic pulmonary edema, suspect nosocomial aspiration pneumonia-pneumonitis   Overnight 8/28-8/29 over 30 minutes period exhibited progressive rapidly worsening hypoxemia, nausea, vomiting, & multiple sequelae of hypervolemia with oliguria. 8/29/24 radiograph with progressive right basilar opacity & blunting of the costophrenic angle. 8/29/24 pleural US small right pleural effusion, no ipsilateral chest tube. Extubated on 9/1.  - supplemental O2 to maintain spO2 >= 90-96% & PaO2 >= 60 mmHg  - empiric antimicrobials & infectious evaluation as below  - IS, PT/OT  - CRRT ongoing with  mL/hr    Post-operative chest tubes  - CTS removing 9/3     Gastrointestinal:  Acute liver injury   Suspect ischemic hepatitis. Differential includes medications/toxins, viral hepatitis, autoimmune hepatitis, Budd-Chiari syndrome, hepatic artery thrombus, veno-occlusive disease, secondary hemophagocytic lymphohistiocytosis. RUQ US with GB thickening with pericholecystic fluid, possible acalculous cholecystitis. No portal venous or hepatic arterial thrombus seen. Likely ischemic hepatitis due to cardiorenal and CRRT perfusion issues. AST back up, now 1001, , tbili 1.3.  - GI consult; appreciated  - start IV acetylcysteine  - supportive care  - monitor  - continue prophylactic PPI     Renal, Acid-base, Electrolytes, Volume :  Oliguric HAMMAD on CKD   Suspected  congestive nephropathy &/or ischemic ATN in the setting of multifactorial shock. Baseline 1.2 - 1.3. 8/31: UOP 10-15 mL/hr, still edematous  - appreciate nephrology consultation  - ongoing CRRT via non-tunneled left internal jugular dialysis catheter    Hypervolemia  See above    Ketoacidosis, euglycemic   Etiology unclear, suspect CRRT implicated (PMID: [id number]) &/or starvation ketosis. 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   Question of cervical septic facet arthropathy   Presumed source intertriginous soft tissue lesions  - appreciate ID consultation  - hold nafcillin while broadened, as below    Concern for Enterobacter hospital acquired pneumonia    - pending - 8/29 sputum culture; 8/30 blood culture, UA with reflex   - negative - 8/29 MSSA nares, viral panel  - appreciate ID  - cefepime and vanco x 7 days, then back to cefazolin  - empiric PO vanco for C diff prophylaxis    Lennie-operative antibiotics per CV surgery      Hematology, Oncology:  Post-operative anemia  - monitor for bleeding: Hgb goal >7-8      Thrombocytopenia   Etiology multifactorial, sepsis, beta lactam antimicrobials, CRRT, & post-cardiopulmonary bypass, at least. 8/30/24 4 T score 3 low probability HIT (fall>50%, timing >10 days, no known thrombus, definite alternative causes in sepsis, CRRT, post-cardiac surgery); HIT screen negative. PLT down to 41.  - hold warfarin per CTS  - monitor for PLT improvement  - consider hematology consult if continues to fall    Anticoagulation  Eventual goal INR 2.5-3.5  - warfarin on hold until PLT >100  - management per CTS    Endocrine:  MICU insulin/glucose protocol   - maintain BG of 140 to 180 mg/dL     Euglycemic ketoacidosis  - was on D20  without insulin, improved     Checklist:  FEN: SLP consultation  VTE ppx: warfarin with goal INR 2.5-3.5, currently on hold with thrombocytopenia, management per CTS  GI ppx: pantoprazole   Bowel regimen: PEG & senna   VAP ppx: Head of bed >30 degrees, daily oral care, chlorhexidine  Lines/tube-size: R-internal jugular introducer & PAC 8/27, radial arterial line 8/27, PICC 8/21, left internal jugular dialysis catheter 9/1, chacko 8/27 , chest tubes being removed 9/3 by CTS  Skin: sternotomy site clean & dry    PT/OT/SLP, early mobility: cardiac rehab when able   Communication: Communicated with patient and spouse in person.  Code Status: DNR/DNI. Patient elected to change her code status on 9/2.      Physical Exam      Neurologic: alert, no distress, conversant and seems oriented, no focal deficits but generalized muscle weakness, decent hand squeeze and plantar flexion bilaterally but difficult to hold arms/legs against gravity  HEENT: Head and face normal. No nasal discharge. Oropharynx normal. Eyelids, conjunctiva, & sclera normal.   Neck: Neck appearance normal. No neck masses. Thyroid not enlarged. Left internal jugular catheter nonerythematous, nontender.  Respiratory: Lungs clear bilaterally. No wheezes or rhonchi.   Cardiovascular: Regular rate & rhythm  Heart sounds distant   Gastrointestinal: Soft. Obese.   Musculoskeletal: Skeletal configuration normal and muscle mass normal for age. Joint appearance overall normal.  Skin, Hair, & Nails: Skin color normal. Sternotomy site clean & dry  Hair & nails normal.  Extremities: 1+ lower extremity pitting edema. Warm     All pertinent vital signs, ventilator settings, I&Os, laboratory, microbiology, ECGs, & imaging data has been personally reviewed. Total Critical Care time, excluding procedures, was 59 minutes     Zachary Gonzalez MD  Pulmonary and Critical Care Medicine  [facility]  Office [phone]  he/him

## 2024-09-03 NOTE — PROGRESS NOTES
[facility] - ICU    RN Progress Note:            Pertinent Assessments:      Please refer to flowsheet rows for full assessment     CRRT restarted. Dobutamine on and off this shift per orders. Pt in afib, Amio bolus given and pt converted to SR 60's. Pacer set at backup rate of 50.            Key Events - This Shift:       CRRT restarted, code status changed per pt and family request. [place] removed. Dobutamine infusinf to keep CI>2.                Barriers to Discharge / Downgrade:     CRRT         Point of Contact Update: YES-OR-NO: Yes  If No, reason: NA  Name: and kids  Phone Number:at bedside  Summary of Conversation: updated plan of care.

## 2024-09-03 NOTE — PLAN OF CARE
Goal Outcome Evaluation:      Plan of Care Reviewed With: patient    Overall Patient Progress: no changeOverall Patient Progress: no change    Outcome Evaluation: Pt states weak,tired and discouraged.  answers orientation appropriate, follows commands.  CT's out, ice for back discomfort.  c/o nausea, given compazine.  pacer set to 92 per Dr. Renae DO NOT DECREASE.  Dobutamine @ 1.5 this will be fixed rate per Dr. Branch.  Amio @ .5; leave IV route for now.  Folye with minimal output.  Lungs clear dim, oxygen at 3L

## 2024-09-03 NOTE — TREATMENT PLAN
CRRT filter with high pressure alarm, unable to bypass alarm, or return blood.  Distribution contacting other sites for replacement filters. Renal notified of delay

## 2024-09-03 NOTE — PROGRESS NOTES
Texted Jessica from CT surgery to let her know of pt and family request to change code status and that they discussed with Dr. Gonzalez and code status was changed.

## 2024-09-03 NOTE — PROGRESS NOTES
SPIRITUAL HEALTH SERVICES Progress Note    Saw pt Felicitas Elliott and offered [employer] sacrament of anointing for the healing of the sick.     Fr. Beto Coulter

## 2024-09-03 NOTE — PROGRESS NOTES
"Care Management Follow Up    Length of Stay (days): 18    Expected Discharge Date: 09/06/2024    Anticipated Discharge Plan:  TBD    Transportation: TBD    PT Recommendations:    OT Recommendations:        Barriers to Discharge: medical stability,  surgery following POD#7 s/p Aortic root abscess debridement and aortic annular reconstruction, aortic root replacement , Mitral valve replacement and CAB x 2. extubated 9/1,  pt remains on CRRT and dobutamine gtt    Prior Living Situation:    \"Patient resides in a house with her  and is reported as mostly independent when at baseline.  assists with transport and as needed in general. No DME use or current in-home/outpatient services identified. \"     Discussed  Partnership in Safe Discharge Planning  document with patient/family: No     Handoff Completed: No, handoff not indicated or clinically appropriate    Patient/Spokesperson Updated: No    Additional Information:  Chart reviewed and plan of care discussed in ICU rounds: pt was extubated on 9/1. Pt remains on CRRT and dobutamine gtt. Also have PT/OT start working with pt as tolerates. Pt will likely need IV ABX at discharge.     Next Steps: follow for medical progression and recommendations    Hien Carney RN     "

## 2024-09-04 ENCOUNTER — APPOINTMENT (OUTPATIENT)
Dept: RADIOLOGY | Facility: HOSPITAL | Age: 84
DRG: 853 | End: 2024-09-04
Attending: INTERNAL MEDICINE
Payer: COMMERCIAL

## 2024-09-04 ENCOUNTER — APPOINTMENT (OUTPATIENT)
Dept: SPEECH THERAPY | Facility: HOSPITAL | Age: 84
DRG: 853 | End: 2024-09-04
Attending: STUDENT IN AN ORGANIZED HEALTH CARE EDUCATION/TRAINING PROGRAM
Payer: COMMERCIAL

## 2024-09-04 LAB
ALBUMIN SERPL BCG-MCNC: 3.4 G/DL (ref 3.5–5.2)
ALBUMIN SERPL BCG-MCNC: 3.4 G/DL (ref 3.5–5.2)
ALBUMIN SERPL BCG-MCNC: 3.5 G/DL (ref 3.5–5.2)
ALP SERPL-CCNC: 63 U/L (ref 40–150)
ALP SERPL-CCNC: 67 U/L (ref 40–150)
ALP SERPL-CCNC: 69 U/L (ref 40–150)
ALT SERPL W P-5'-P-CCNC: 203 U/L (ref 0–50)
ALT SERPL W P-5'-P-CCNC: 222 U/L (ref 0–50)
ALT SERPL W P-5'-P-CCNC: 235 U/L (ref 0–50)
AMYLASE SERPL-CCNC: 61 U/L (ref 28–100)
ANION GAP SERPL CALCULATED.3IONS-SCNC: 15 MMOL/L (ref 7–15)
ANION GAP SERPL CALCULATED.3IONS-SCNC: 16 MMOL/L (ref 7–15)
ANION GAP SERPL CALCULATED.3IONS-SCNC: 19 MMOL/L (ref 7–15)
ANION GAP SERPL CALCULATED.3IONS-SCNC: 20 MMOL/L (ref 7–15)
AST SERPL W P-5'-P-CCNC: 345 U/L (ref 0–45)
AST SERPL W P-5'-P-CCNC: 382 U/L (ref 0–45)
AST SERPL W P-5'-P-CCNC: 485 U/L (ref 0–45)
BACTERIA BLD CULT: NO GROWTH
BASE EXCESS BLDV CALC-SCNC: -7.4 MMOL/L (ref -3–3)
BILIRUB DIRECT SERPL-MCNC: 0.62 MG/DL (ref 0–0.3)
BILIRUB SERPL-MCNC: 1.1 MG/DL
BILIRUB SERPL-MCNC: 1.2 MG/DL
BILIRUB SERPL-MCNC: 1.2 MG/DL
BUN SERPL-MCNC: 12.2 MG/DL (ref 8–23)
BUN SERPL-MCNC: 12.9 MG/DL (ref 8–23)
BUN SERPL-MCNC: 13.3 MG/DL (ref 8–23)
BUN SERPL-MCNC: 14.6 MG/DL (ref 8–23)
BUN SERPL-MCNC: 14.6 MG/DL (ref 8–23)
BUN SERPL-MCNC: 15 MG/DL (ref 8–23)
CA-I BLD-MCNC: 4.4 MG/DL (ref 4.4–5.2)
CA-I BLD-MCNC: 4.5 MG/DL (ref 4.4–5.2)
CA-I BLD-MCNC: 4.5 MG/DL (ref 4.4–5.2)
CALCIUM SERPL-MCNC: 7.9 MG/DL (ref 8.8–10.4)
CALCIUM SERPL-MCNC: 8 MG/DL (ref 8.8–10.4)
CALCIUM SERPL-MCNC: 8 MG/DL (ref 8.8–10.4)
CHLORIDE SERPL-SCNC: 101 MMOL/L (ref 98–107)
CHLORIDE SERPL-SCNC: 101 MMOL/L (ref 98–107)
CHLORIDE SERPL-SCNC: 102 MMOL/L (ref 98–107)
CHLORIDE SERPL-SCNC: 103 MMOL/L (ref 98–107)
CREAT SERPL-MCNC: 0.97 MG/DL (ref 0.51–0.95)
CREAT SERPL-MCNC: 0.98 MG/DL (ref 0.51–0.95)
CREAT SERPL-MCNC: 1.1 MG/DL (ref 0.51–0.95)
CREAT SERPL-MCNC: 1.1 MG/DL (ref 0.51–0.95)
EGFRCR SERPLBLD CKD-EPI 2021: 49 ML/MIN/1.73M2
EGFRCR SERPLBLD CKD-EPI 2021: 49 ML/MIN/1.73M2
EGFRCR SERPLBLD CKD-EPI 2021: 57 ML/MIN/1.73M2
ERYTHROCYTE [DISTWIDTH] IN BLOOD BY AUTOMATED COUNT: 18.7 % (ref 10–15)
ERYTHROCYTE [DISTWIDTH] IN BLOOD BY AUTOMATED COUNT: 19.3 % (ref 10–15)
ERYTHROCYTE [DISTWIDTH] IN BLOOD BY AUTOMATED COUNT: 19.5 % (ref 10–15)
GLUCOSE BLDC GLUCOMTR-MCNC: 102 MG/DL (ref 70–99)
GLUCOSE BLDC GLUCOMTR-MCNC: 107 MG/DL (ref 70–99)
GLUCOSE SERPL-MCNC: 100 MG/DL (ref 70–99)
GLUCOSE SERPL-MCNC: 105 MG/DL (ref 70–99)
GLUCOSE SERPL-MCNC: 109 MG/DL (ref 70–99)
GLUCOSE SERPL-MCNC: 109 MG/DL (ref 70–99)
GLUCOSE SERPL-MCNC: 121 MG/DL (ref 70–99)
GLUCOSE SERPL-MCNC: 94 MG/DL (ref 70–99)
HCO3 BLDV-SCNC: 19 MMOL/L (ref 21–28)
HCO3 SERPL-SCNC: 18 MMOL/L (ref 22–29)
HCO3 SERPL-SCNC: 19 MMOL/L (ref 22–29)
HCO3 SERPL-SCNC: 20 MMOL/L (ref 22–29)
HCT VFR BLD AUTO: 26.2 % (ref 35–47)
HCT VFR BLD AUTO: 26.9 % (ref 35–47)
HCT VFR BLD AUTO: 27.5 % (ref 35–47)
HGB BLD-MCNC: 8.3 G/DL (ref 11.7–15.7)
HGB BLD-MCNC: 8.5 G/DL (ref 11.7–15.7)
HGB BLD-MCNC: 8.7 G/DL (ref 11.7–15.7)
INR PPP: 1.67 (ref 0.85–1.15)
LIPASE SERPL-CCNC: 44 U/L (ref 13–60)
MAGNESIUM SERPL-MCNC: 2.4 MG/DL (ref 1.7–2.3)
MAGNESIUM SERPL-MCNC: 2.4 MG/DL (ref 1.7–2.3)
MAGNESIUM SERPL-MCNC: 2.6 MG/DL (ref 1.7–2.3)
MCH RBC QN AUTO: 30.1 PG (ref 26.5–33)
MCH RBC QN AUTO: 30.4 PG (ref 26.5–33)
MCH RBC QN AUTO: 31 PG (ref 26.5–33)
MCHC RBC AUTO-ENTMCNC: 31.6 G/DL (ref 31.5–36.5)
MCHC RBC AUTO-ENTMCNC: 31.6 G/DL (ref 31.5–36.5)
MCHC RBC AUTO-ENTMCNC: 31.7 G/DL (ref 31.5–36.5)
MCV RBC AUTO: 95 FL (ref 78–100)
MCV RBC AUTO: 96 FL (ref 78–100)
MCV RBC AUTO: 98 FL (ref 78–100)
O2/TOTAL GAS SETTING VFR VENT: 21 %
OXYHGB MFR BLDV: 59 % (ref 70–75)
PCO2 BLDV: 39 MM HG (ref 40–50)
PH BLDV: 7.3 [PH] (ref 7.32–7.43)
PHOSPHATE SERPL-MCNC: 3.6 MG/DL (ref 2.5–4.5)
PHOSPHATE SERPL-MCNC: 3.7 MG/DL (ref 2.5–4.5)
PHOSPHATE SERPL-MCNC: 3.8 MG/DL (ref 2.5–4.5)
PLATELET # BLD AUTO: 33 10E3/UL (ref 150–450)
PLATELET # BLD AUTO: 36 10E3/UL (ref 150–450)
PLATELET # BLD AUTO: 37 10E3/UL (ref 150–450)
PO2 BLDV: 34 MM HG (ref 25–47)
POTASSIUM SERPL-SCNC: 4 MMOL/L (ref 3.4–5.3)
POTASSIUM SERPL-SCNC: 4.1 MMOL/L (ref 3.4–5.3)
POTASSIUM SERPL-SCNC: 4.2 MMOL/L (ref 3.4–5.3)
POTASSIUM SERPL-SCNC: 4.2 MMOL/L (ref 3.4–5.3)
POTASSIUM SERPL-SCNC: 4.3 MMOL/L (ref 3.4–5.3)
POTASSIUM SERPL-SCNC: 4.3 MMOL/L (ref 3.4–5.3)
PROT SERPL-MCNC: 6 G/DL (ref 6.4–8.3)
PROT SERPL-MCNC: 6.1 G/DL (ref 6.4–8.3)
PROT SERPL-MCNC: 6.2 G/DL (ref 6.4–8.3)
RBC # BLD AUTO: 2.68 10E6/UL (ref 3.8–5.2)
RBC # BLD AUTO: 2.82 10E6/UL (ref 3.8–5.2)
RBC # BLD AUTO: 2.86 10E6/UL (ref 3.8–5.2)
SAO2 % BLDV: 60.7 % (ref 70–75)
SODIUM SERPL-SCNC: 137 MMOL/L (ref 135–145)
SODIUM SERPL-SCNC: 138 MMOL/L (ref 135–145)
SODIUM SERPL-SCNC: 139 MMOL/L (ref 135–145)
SODIUM SERPL-SCNC: 140 MMOL/L (ref 135–145)
TROPONIN T SERPL HS-MCNC: 364 NG/L
TROPONIN T SERPL HS-MCNC: 402 NG/L
TROPONIN T SERPL HS-MCNC: 437 NG/L
UFH PPP CHRO-ACNC: <0.1 IU/ML
WBC # BLD AUTO: 15.1 10E3/UL (ref 4–11)
WBC # BLD AUTO: 16.3 10E3/UL (ref 4–11)
WBC # BLD AUTO: 16.4 10E3/UL (ref 4–11)

## 2024-09-04 PROCEDURE — 83735 ASSAY OF MAGNESIUM: CPT | Performed by: INTERNAL MEDICINE

## 2024-09-04 PROCEDURE — 250N000011 HC RX IP 250 OP 636: Performed by: STUDENT IN AN ORGANIZED HEALTH CARE EDUCATION/TRAINING PROGRAM

## 2024-09-04 PROCEDURE — 83690 ASSAY OF LIPASE: CPT | Performed by: PHYSICIAN ASSISTANT

## 2024-09-04 PROCEDURE — 85027 COMPLETE CBC AUTOMATED: CPT | Performed by: INTERNAL MEDICINE

## 2024-09-04 PROCEDURE — 82150 ASSAY OF AMYLASE: CPT | Performed by: PHYSICIAN ASSISTANT

## 2024-09-04 PROCEDURE — 250N000011 HC RX IP 250 OP 636

## 2024-09-04 PROCEDURE — 99291 CRITICAL CARE FIRST HOUR: CPT | Performed by: INTERNAL MEDICINE

## 2024-09-04 PROCEDURE — 258N000003 HC RX IP 258 OP 636: Performed by: INTERNAL MEDICINE

## 2024-09-04 PROCEDURE — 250N000011 HC RX IP 250 OP 636: Performed by: PHYSICIAN ASSISTANT

## 2024-09-04 PROCEDURE — 85610 PROTHROMBIN TIME: CPT | Performed by: PHYSICIAN ASSISTANT

## 2024-09-04 PROCEDURE — 250N000011 HC RX IP 250 OP 636: Performed by: INTERNAL MEDICINE

## 2024-09-04 PROCEDURE — 92526 ORAL FUNCTION THERAPY: CPT | Mod: GN

## 2024-09-04 PROCEDURE — 85520 HEPARIN ASSAY: CPT | Performed by: INTERNAL MEDICINE

## 2024-09-04 PROCEDURE — 84484 ASSAY OF TROPONIN QUANT: CPT | Performed by: PHYSICIAN ASSISTANT

## 2024-09-04 PROCEDURE — 250N000009 HC RX 250: Performed by: PHYSICIAN ASSISTANT

## 2024-09-04 PROCEDURE — 80069 RENAL FUNCTION PANEL: CPT | Performed by: INTERNAL MEDICINE

## 2024-09-04 PROCEDURE — 250N000013 HC RX MED GY IP 250 OP 250 PS 637: Performed by: PHYSICIAN ASSISTANT

## 2024-09-04 PROCEDURE — 74018 RADEX ABDOMEN 1 VIEW: CPT

## 2024-09-04 PROCEDURE — 200N000001 HC R&B ICU

## 2024-09-04 PROCEDURE — 99232 SBSQ HOSP IP/OBS MODERATE 35: CPT | Performed by: INTERNAL MEDICINE

## 2024-09-04 PROCEDURE — 90947 DIALYSIS REPEATED EVAL: CPT

## 2024-09-04 PROCEDURE — 82805 BLOOD GASES W/O2 SATURATION: CPT | Performed by: SURGERY

## 2024-09-04 PROCEDURE — 84100 ASSAY OF PHOSPHORUS: CPT | Performed by: INTERNAL MEDICINE

## 2024-09-04 PROCEDURE — 99232 SBSQ HOSP IP/OBS MODERATE 35: CPT

## 2024-09-04 PROCEDURE — 82330 ASSAY OF CALCIUM: CPT | Performed by: INTERNAL MEDICINE

## 2024-09-04 PROCEDURE — 82248 BILIRUBIN DIRECT: CPT | Performed by: INTERNAL MEDICINE

## 2024-09-04 PROCEDURE — 82040 ASSAY OF SERUM ALBUMIN: CPT | Performed by: PHYSICIAN ASSISTANT

## 2024-09-04 PROCEDURE — 250N000013 HC RX MED GY IP 250 OP 250 PS 637: Performed by: INTERNAL MEDICINE

## 2024-09-04 PROCEDURE — 250N000009 HC RX 250: Performed by: INTERNAL MEDICINE

## 2024-09-04 RX ORDER — ONDANSETRON 2 MG/ML
8 INJECTION INTRAMUSCULAR; INTRAVENOUS EVERY 6 HOURS PRN
Status: DISCONTINUED | OUTPATIENT
Start: 2024-09-04 | End: 2024-09-13

## 2024-09-04 RX ORDER — ONDANSETRON 4 MG/1
8 TABLET, ORALLY DISINTEGRATING ORAL EVERY 6 HOURS PRN
Status: DISCONTINUED | OUTPATIENT
Start: 2024-09-04 | End: 2024-09-13

## 2024-09-04 RX ORDER — LACTOBACILLUS RHAMNOSUS GG 10B CELL
1 CAPSULE ORAL 2 TIMES DAILY
Status: DISCONTINUED | OUTPATIENT
Start: 2024-09-04 | End: 2024-09-13

## 2024-09-04 RX ORDER — POLYETHYLENE GLYCOL 3350 17 G/17G
17 POWDER, FOR SOLUTION ORAL DAILY
Status: DISCONTINUED | OUTPATIENT
Start: 2024-09-05 | End: 2024-09-13

## 2024-09-04 RX ORDER — VANCOMYCIN HYDROCHLORIDE 50 MG/ML
125 KIT ORAL 2 TIMES DAILY
Status: DISCONTINUED | OUTPATIENT
Start: 2024-09-04 | End: 2024-09-05

## 2024-09-04 RX ORDER — ONDANSETRON 2 MG/ML
4 INJECTION INTRAMUSCULAR; INTRAVENOUS EVERY 6 HOURS
Status: COMPLETED | OUTPATIENT
Start: 2024-09-04 | End: 2024-09-05

## 2024-09-04 RX ORDER — DEXTROSE MONOHYDRATE 100 MG/ML
INJECTION, SOLUTION INTRAVENOUS CONTINUOUS PRN
Status: DISCONTINUED | OUTPATIENT
Start: 2024-09-04 | End: 2024-09-16 | Stop reason: HOSPADM

## 2024-09-04 RX ORDER — AMINO ACIDS/PROTEIN HYDROLYS 11G-40/45
1 LIQUID IN PACKET (ML) ORAL 2 TIMES DAILY
Status: DISCONTINUED | OUTPATIENT
Start: 2024-09-04 | End: 2024-09-09

## 2024-09-04 RX ADMIN — CALCIUM CHLORIDE, MAGNESIUM CHLORIDE, SODIUM CHLORIDE, SODIUM BICARBONATE, POTASSIUM CHLORIDE AND SODIUM PHOSPHATE DIBASIC DIHYDRATE 10 ML/KG/HR: 3.68; 3.05; 6.34; 3.09; .314; .187 INJECTION INTRAVENOUS at 14:21

## 2024-09-04 RX ADMIN — CALCIUM CHLORIDE, MAGNESIUM CHLORIDE, SODIUM CHLORIDE, SODIUM BICARBONATE, POTASSIUM CHLORIDE AND SODIUM PHOSPHATE DIBASIC DIHYDRATE 24.5 ML/KG/HR: 3.68; 3.05; 6.34; 3.09; .314; .187 INJECTION INTRAVENOUS at 08:17

## 2024-09-04 RX ADMIN — SENNOSIDES 5 ML: 8.8 LIQUID ORAL at 20:22

## 2024-09-04 RX ADMIN — CALCIUM CHLORIDE, MAGNESIUM CHLORIDE, SODIUM CHLORIDE, SODIUM BICARBONATE, POTASSIUM CHLORIDE AND SODIUM PHOSPHATE DIBASIC DIHYDRATE 24.5 ML/KG/HR: 3.68; 3.05; 6.34; 3.09; .314; .187 INJECTION INTRAVENOUS at 14:12

## 2024-09-04 RX ADMIN — Medication 1 CAPSULE: at 20:23

## 2024-09-04 RX ADMIN — ONDANSETRON 4 MG: 2 INJECTION INTRAMUSCULAR; INTRAVENOUS at 15:49

## 2024-09-04 RX ADMIN — DOBUTAMINE HYDROCHLORIDE 1.5 MCG/KG/MIN: 200 INJECTION INTRAVENOUS at 06:40

## 2024-09-04 RX ADMIN — FUROSEMIDE 100 MG: 10 INJECTION, SOLUTION INTRAVENOUS at 08:41

## 2024-09-04 RX ADMIN — LIDOCAINE 1 PATCH: 246 PATCH TOPICAL at 17:53

## 2024-09-04 RX ADMIN — CALCIUM CHLORIDE, MAGNESIUM CHLORIDE, SODIUM CHLORIDE, SODIUM BICARBONATE, POTASSIUM CHLORIDE AND SODIUM PHOSPHATE DIBASIC DIHYDRATE 24.5 ML/KG/HR: 3.68; 3.05; 6.34; 3.09; .314; .187 INJECTION INTRAVENOUS at 01:38

## 2024-09-04 RX ADMIN — ACETYLCYSTEINE 6.25 MG/KG/HR: 200 INJECTION, SOLUTION INTRAVENOUS at 01:37

## 2024-09-04 RX ADMIN — CALCIUM CHLORIDE, MAGNESIUM CHLORIDE, SODIUM CHLORIDE, SODIUM BICARBONATE, POTASSIUM CHLORIDE AND SODIUM PHOSPHATE DIBASIC DIHYDRATE 24.5 ML/KG/HR: 3.68; 3.05; 6.34; 3.09; .314; .187 INJECTION INTRAVENOUS at 03:44

## 2024-09-04 RX ADMIN — CEFEPIME HYDROCHLORIDE 2 G: 2 INJECTION, POWDER, FOR SOLUTION INTRAVENOUS at 05:07

## 2024-09-04 RX ADMIN — CALCIUM CHLORIDE, MAGNESIUM CHLORIDE, SODIUM CHLORIDE, SODIUM BICARBONATE, POTASSIUM CHLORIDE AND SODIUM PHOSPHATE DIBASIC DIHYDRATE 24.5 ML/KG/HR: 3.68; 3.05; 6.34; 3.09; .314; .187 INJECTION INTRAVENOUS at 20:58

## 2024-09-04 RX ADMIN — CALCIUM CHLORIDE, MAGNESIUM CHLORIDE, SODIUM CHLORIDE, SODIUM BICARBONATE, POTASSIUM CHLORIDE AND SODIUM PHOSPHATE DIBASIC DIHYDRATE 24.5 ML/KG/HR: 3.68; 3.05; 6.34; 3.09; .314; .187 INJECTION INTRAVENOUS at 17:00

## 2024-09-04 RX ADMIN — VANCOMYCIN HYDROCHLORIDE 125 MG: KIT at 20:22

## 2024-09-04 RX ADMIN — ONDANSETRON 4 MG: 2 INJECTION INTRAMUSCULAR; INTRAVENOUS at 22:26

## 2024-09-04 RX ADMIN — CALCIUM CHLORIDE, MAGNESIUM CHLORIDE, SODIUM CHLORIDE, SODIUM BICARBONATE, POTASSIUM CHLORIDE AND SODIUM PHOSPHATE DIBASIC DIHYDRATE 24.5 ML/KG/HR: 3.68; 3.05; 6.34; 3.09; .314; .187 INJECTION INTRAVENOUS at 18:55

## 2024-09-04 RX ADMIN — CALCIUM CHLORIDE, MAGNESIUM CHLORIDE, SODIUM CHLORIDE, SODIUM BICARBONATE, POTASSIUM CHLORIDE AND SODIUM PHOSPHATE DIBASIC DIHYDRATE 10 ML/KG/HR: 3.68; 3.05; 6.34; 3.09; .314; .187 INJECTION INTRAVENOUS at 09:35

## 2024-09-04 RX ADMIN — CALCIUM CHLORIDE, MAGNESIUM CHLORIDE, SODIUM CHLORIDE, SODIUM BICARBONATE, POTASSIUM CHLORIDE AND SODIUM PHOSPHATE DIBASIC DIHYDRATE 24.5 ML/KG/HR: 3.68; 3.05; 6.34; 3.09; .314; .187 INJECTION INTRAVENOUS at 10:17

## 2024-09-04 RX ADMIN — SODIUM CHLORIDE 750 MG: 9 INJECTION, SOLUTION INTRAVENOUS at 07:33

## 2024-09-04 RX ADMIN — CEFEPIME HYDROCHLORIDE 2 G: 2 INJECTION, POWDER, FOR SOLUTION INTRAVENOUS at 16:47

## 2024-09-04 RX ADMIN — CALCIUM CHLORIDE, MAGNESIUM CHLORIDE, SODIUM CHLORIDE, SODIUM BICARBONATE, POTASSIUM CHLORIDE AND SODIUM PHOSPHATE DIBASIC DIHYDRATE 10 ML/KG/HR: 3.68; 3.05; 6.34; 3.09; .314; .187 INJECTION INTRAVENOUS at 03:44

## 2024-09-04 RX ADMIN — Medication 60 ML: at 20:21

## 2024-09-04 RX ADMIN — AMIODARONE HYDROCHLORIDE 0.5 MG/MIN: 1.8 INJECTION, SOLUTION INTRAVENOUS at 20:11

## 2024-09-04 RX ADMIN — AMIODARONE HYDROCHLORIDE 0.5 MG/MIN: 1.8 INJECTION, SOLUTION INTRAVENOUS at 09:15

## 2024-09-04 RX ADMIN — DOCUSATE SODIUM 50 MG: 50 LIQUID ORAL at 20:27

## 2024-09-04 RX ADMIN — CALCIUM CHLORIDE, MAGNESIUM CHLORIDE, SODIUM CHLORIDE, SODIUM BICARBONATE, POTASSIUM CHLORIDE AND SODIUM PHOSPHATE DIBASIC DIHYDRATE 10 ML/KG/HR: 3.68; 3.05; 6.34; 3.09; .314; .187 INJECTION INTRAVENOUS at 19:52

## 2024-09-04 RX ADMIN — PANTOPRAZOLE SODIUM 40 MG: 40 INJECTION, POWDER, FOR SOLUTION INTRAVENOUS at 06:40

## 2024-09-04 RX ADMIN — ONDANSETRON 8 MG: 2 INJECTION INTRAMUSCULAR; INTRAVENOUS at 10:11

## 2024-09-04 ASSESSMENT — ACTIVITIES OF DAILY LIVING (ADL)
ADLS_ACUITY_SCORE: 44

## 2024-09-04 NOTE — PROGRESS NOTES
[facility] DIGESTIVE HEALTH PROGRESS NOTE      Subjective:              Awake/alert, tired, still nauseous     Objective:                  Vital signs in last 24 hrs;  Temp:  [97.5  F (36.4  C)-98.8  F (37.1  C)] 98.8  F (37.1  C)  Pulse:  [91-92] 92  Resp:  [13-38] 16  BP: ()/(51-84) 130/62  SpO2:  [89 %-100 %] 100 %    Physical Exam:   General: Weak. Awake.   Cardiovascular: Regular rate. No edema  Chest: Non-labored breathing. Symmetric chest rise.   Abdomen: soft, non-tender, non-distended  Neurologic: Alert, no focal defects.     Current Labs:  CBC RESULTS:   Recent Labs   Lab Test 09/04/24  1130   WBC 16.3*   RBC 2.86*   HGB 8.7*   HCT 27.5*   MCV 96   MCH 30.4   MCHC 31.6   RDW 19.3*   PLT 36*        CMP Results:   Recent Labs   Lab Test 09/04/24  1354      POTASSIUM 4.3   CHLORIDE 102   CO2 18*   ANIONGAP 20*   GLC 94   BUN 12.2   CR 0.97*   BILITOTAL 1.2   ALKPHOS 67   *   *        INR Results:   Recent Labs   Lab Test 09/04/24  0608   INR 1.67*          Assessment:       84 year old female with PMH diastolic HF, CKD 3, HTN, and HLD who came with sepsis from infective endocarditis. She underwent aortic root abscess debridement, aortic root reconstruction and replacement, MVR, and CAB x 2 on 8/27/24 complicated cardiogenic pulmonary edema, respiratory failure, HAMMAD requiring CRRT. Liver tests started to rise on 8/30. US showed gallbladder wall thickening and pericholecystic fluid without sludge or stones noted. There was also no portal, hepatic vein or arterial thrombus. LFTs significantly improved today compatible with ischemic hepatitis and less likely from acalculous cholecystitis. No hepatic arterial or venous thrombus noted as well. Another potential etiology is hepatoxicity to meds that she has been or currently on.          Plan:     Cancel repeat US. Repeat only if LFTs rise again.  Trend LFTs.  Continue supportive cares.  GI will sign off.    15 minutes of total time providing  patient care, including patient evaluation, reviewing documents/test results, and .          Kurt Flores MD  Thank you for the opportunity to participate in the care of this patient.   Please feel free to call me with any questions or concerns.  Phone number [phone].

## 2024-09-04 NOTE — CONSULTS
"CLINICAL NUTRITION SERVICES - REASSESSMENT NOTE     Nutrition Prescription    RECOMMENDATIONS FOR MDs/PROVIDERS TO ORDER:    Malnutrition Status:    Patient does not meet two of the criteria necessary for diagnosing malnutrition, at risk     Recommendations already ordered by Registered Dietitian (RD):  Enteral Nutrition - Initiate TF Novasource Renal at 15 ml/hr advancing by 10 ml q 8 hrs to goal 35 ml/hr x 24  hrs + 2 pkt Prosource TF20   FWF: 60 ml q 4 hrs     Future/Additional Recommendations:  Monitor TF teagan/adv, weight, labs, I/Os.      Felicitas Elliott is a/an 84 year old female assessed by the dietitian for Provider Order - Registered Dietitian to Assess and Order TF per Medical Nutrition Therapy Protocol    EVALUATION OF THE PROGRESS TOWARD GOALS   Diet: NPO  Intake: Very Poor    8/26-30 NPO/CL   8/31-9/1 NPO + TF while pt intubated. TF at 45 ml/hr prior to discontinued with extubation. TF meeting <75% nutrition needs  Pt with inadequate nutrition intakes <75% estimated nutrition needs >/= 9 days     NEW FINDINGS   Discussed pt in care rounds this AM. Pt with ongoing inadequate po intakes r/t NPO- need for SLP evaluation and poor appetite.     ANTHROPOMETRICS  Height: 170.2 cm (5' 7\")  Most Recent Weight: 92.2 kg (203 lb 4.2 oz)    Weight History: Wt changes difficult to assess r/t fluid changes, limited hx   Date/Time Weight Weight Method   09/04/24 0400 92.2 kg (203 lb 4.2 oz) Bed scale   09/02/24 0400 95.9 kg (211 lb 6.7 oz) Bed scale   09/01/24 0400 98.4 kg (216 lb 14.4 oz) Bed scale   08/31/24 0400 99.4 kg (219 lb 1.6 oz) Bed scale   08/30/24 0400 99.8 kg (220 lb) Bed scale   08/29/24 0400 101 kg (222 lb 10.6 oz) Bed scale   08/28/24 0600 95.3 kg (210 lb 1.6 oz) Bed scale   08/27/24 0500 86.4 kg (190 lb 8 oz) Standing scale   08/26/24 0330 86.2 kg (190 lb 1.6 oz) Standing scale   08/25/24 0458 86.3 kg (190 lb 3.2 oz) Standing scale   08/24/24 0406 85.4 kg (188 lb 4.8 oz) Standing scale   08/23/24 0403 " 84.4 kg (186 lb 1.6 oz) Standing scale   08/22/24 0642 87 kg (191 lb 11.2 oz) --   08/21/24 0646 87 kg (191 lb 11.2 oz) Standing scale   08/20/24 1053 86.1 kg (189 lb 14.4 oz) --   08/20/24 0632 86 kg (189 lb 9.6 oz) Standing scale   08/19/24 1409 86.2 kg (190 lb) --   08/19/24 0521 86.3 kg (190 lb 3.2 oz) Standing scale   08/18/24 0537 88.9 kg (196 lb) Standing scale   08/17/24 0028 89.1 kg (196 lb 8 oz) Standing scale   08/16/24 1622 91.9 kg (202 lb 9.6 oz) Standing scale   8/15/24  90.1 kg (198 lb 9.6 oz)  3/25/24  95 kg (209 lb 8 oz)     Dosing Weight: 67.2 kg/ adjusted weight     ASSESSED NUTRITION NEEDS  Estimated Energy Needs: 1520-6211+ kcals/day (25 - 30+ kcals/kg)  Justification: Increased Needs, CRRT  Estimated Protein Needs: 100-134+ grams protein/day (1.5-2+ grams of pro/kg)  Justification: Increased Needs, CRRT  Estimated Fluid Needs: 1680 mL/day (25 mL/kg)   Justification: Maintenance     PHYSICAL FINDINGS/GI CONCERNS  See malnutrition section below.  Per Flowhseets:  Intermittent nausea   Rectal tube: 25 ml 9/3, 10 ml AM  Surgical sites   Elbow skin tear  Edema: Trace/Mild hands, BLE  CRRT OP: 3.68 L 9/3, 3.13 L AM  UOP: 78 ml 9/3, 77 ml AM   I/Os: -4.78 L since 8/21    LABS  Reviewed  Creat 0.97(H)   Mg 2.4(H)   (H), (H)   Bili direct 0.62(H)   BG  last 24 hrs     MEDICATIONS  Reviewed  Continuous amiodarone, dobutamine  Scheduled culturell, zofran, protonix, mialax, senna, vancomycin    MALNUTRITION  Malnutrition Diagnosis: Patient does not meet two of the criteria necessary for diagnosing malnutrition, at risk     CURRENT NUTRITION DIAGNOSIS  Inadequate oral intake related to acute on chronic illness as evidenced by NPO.   Evaluation: No change    INTERVENTIONS  Implementation  Enteral Nutrition - Initiate TF Novasource Renal at 15 ml/hr advancing by 10 ml q 8 hrs to goal 35 ml/hr x 24  hrs + 2 pkt Prosource TF20   FWF: 60 ml q 4 hrs   TF + Modulars Provide: 840 ml/day, 1840  kcal, 116 g protein, 154 g cho, 84 g fat, 0 g fiber, 602 ml free water + 360 ml free water from flushes = 962 ml/day     Nutrition Education- Will provide as able Post CV Surgery     Goals  TF Tolerance - NEW  Diet advancement vs nutrition support within 2-3 days - Met, TF consult this afternoon  Pt to meet nutritional needs - Not Met, not progressing   Electrolytes WNL - Not Met, hypermagnesemia this AM  BG >70 - Met     Monitoring/Evaluation  Progress toward goals will be monitored and evaluated per protocol.

## 2024-09-04 NOTE — PLAN OF CARE
Problem: Fluid Volume Excess  Goal: Fluid Balance  Outcome: Progressing  Intervention: Monitor and Manage Hypervolemia    Goal Outcome Evaluation:  [facility] - ICU    RN Progress Note:            Pertinent Assessments:      Please refer to flowsheet rows for full assessment     Stable VS. Afebrile. SOB at times, otherwise Maintaining SpO2 on 2L NC. Dry heaving, nauseated, PRN zofran givenx1. V-paced 100%, HR 92. Pt slept between cares.            Key Events - This Shift:       -Received 1U pRBC after CRRT was started last evening, Hgb 8.5 this am.  -Pulled 150 ml on CRRT throughout the night, tolerated well.  -High filter pressure on CRRT this  morning, filter/circuit changed. Blood was returned to patient, patient tolerated well.   -Platelet level 37 this AM.           Barriers to Discharge / Downgrade:     CRRT

## 2024-09-04 NOTE — PROGRESS NOTES
PULMONARY / CRITICAL CARE PROGRESS NOTE    Date / Time of Admission:  8/16/2024  4:20 PM    Assessment:     Felicitas Elliott is a 84 year old female with history of HTN, HFpEF, moderate aortic stenosis, CKD 3, chronic intertriginous skin wounds, chronic neck pain.     Presented 8/14/24 for chills & generalized weakness. 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 8/27/24 s/p bioprosthetic aortic & mitral valve replacement, aortic root replacement, & two vessel coronary artery bypass graft. Course complicated by post operative hypoxemic respiratory failure due to cardiogenic pulmonary edema, atelectasis, & Enterobacter hospital acquired pneumonia. Treated for both cardiogenic-distributive shock, oliguric HAMMAD requiring CRRT, euglycemic ketoacidosis, & ischemic hepatitis.   Patient was extubated on 9/1.    S/p bioprosthetic aortic & mitral valve replacement, aortic root replacement and CABG x 2 on 8/27/24   Severe aortic stenosis, aortic & mitral valve infective endocarditis, aortic root abscess  Left parietal septic embolic stroke  MSSA bacteremia   Enterobacter pneumonia   Post op cardiogenic shock   Post op atrial fibrillation   Acute on CKD  Acute liver injury, ischemic hepatitis  Hx HTN, CAD  Thrombocytopenia   HIPA negative. Likely drug induced thrombocytopenia. Abx per ID recommendation    Advance Directives:  DNR    Plan:   Titrate FiO2 to keep SpO2 > 92%, currently on 2 LPM  On low dose dobutamine   Amiodarone drip   Abx per ID recommendation cefepime and vancomycin  Nephrology service is following  Currently on CRRT  Poor PO intake, on dysphagia diet  Place NJ tube and start tube feedings   Continue speech therapy sessions  PPI for GI prophylaxis   Monitor LFTs  Glucose level monitoring   DVT prophylaxis , SCDs, anticoagulation on hold due to  thrombocytopenia       Please contact me if you have any questions.  Total critical care time, not including separately billable procedure time: 45 minutes  This patient had a high probability of imminent or life threatening deterioration due to acute respiratory failure which required my direct attention, intervention and personal management.     Aurelio Lim  Pulmonary / Critical Care  09/04/2024  2:28 PM          ICU DAILY CHECKLIST                           Can patient transfer out of MICU? no    FAST HUG:    Feeding:  Feeding: yes.  Patient is receiving ORAL and TUBE FEEDS    Le: yes  Analgesia/Sedation:no    Thromboembolic prophylaxis: Yes; Mode:  SCDs  HOB>30:  Yes  Stress Ulcer Protocol Active: Yes; Mode: PPI  Glycemic Control: Any glucose > 180 no; Mode of Insulin Therapy: Sliding Scale Insulin    INTUBATED:  Can patient have daily waking:  No  Can patient have spontaneous breathing trial:  No    Restraints? no    PHYSICAL THERAPY AND MOBILITY:  Can patient have PT and mobility trial: yes  Activity: PT, OT    Subjective:   HPI:  Felicitas Elliott is a 84 year old female with history of HTN, HFpEF, moderate aortic stenosis, CKD 3, chronic intertriginous skin wounds, chronic neck pain.     Presented 8/14/24 for chills & generalized weakness. 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 8/27/24 s/p bioprosthetic aortic & mitral valve replacement, aortic root replacement, & two vessel coronary artery bypass graft. Course complicated by post operative hypoxemic respiratory failure due to cardiogenic pulmonary edema, atelectasis, & Enterobacter hospital acquired pneumonia. Treated for both cardiogenic-distributive shock, oliguric HAMMAD requiring CRRT, euglycemic ketoacidosis, & ischemic hepatitis.   Patient was extubated on 9/1.    Events  overnight  - Decrease O2 requirements to 2 LPM  - Decrease appetite  - Anuric, on CRRT  - On dobutamine and amiodarone drip    Allergies: Aspirin, Cats, and Nickel     MEDS:  Current Facility-Administered Medications   Medication Dose Route Frequency Provider Last Rate Last Admin    [Held by provider] acetaminophen (TYLENOL) tablet 975 mg  975 mg Oral Q8H Maryam Anthony PA-C   975 mg at 08/30/24 1336    albumin human 25 % injection 50 g  50 g Intravenous Q8H PRN Moses Mcnair MD        sodium chloride (NEBUSAL) 3 % neb solution 3 mL  3 mL Nebulization Q6H PRN Zachary Gonzalez MD        And    albuterol (PROVENTIL) neb solution 2.5 mg  2.5 mg Nebulization Q6H PRN Zachary Gonzalez MD        amiodarone (NEXTERONE) 1.8 mg/mL in dextrose 5% 200 mL ADULT STANDARD infusion  0.5 mg/min Intravenous Continuous Jessica Tolbert PA-C 16.7 mL/hr at 09/04/24 0915 0.5 mg/min at 09/04/24 0915    [Held by provider] atorvastatin (LIPITOR) tablet 80 mg  80 mg Oral QPM Jessica Tolbert PA-C   80 mg at 08/29/24 2038    bisacodyl (DULCOLAX) suppository 10 mg  10 mg Rectal Daily PRN Maryam Anthony PA-C        calcium gluconate 1 g in 50 mL in sodium chloride intermittent infusion  1 g Intravenous Once PRN Maryam Anthony PA-C   1 g at 09/03/24 1424    calcium gluconate 2 g in  mL intermittent infusion  2 g Intravenous Q8H PRN Moses Mcnair MD   2 g at 09/02/24 1514    calcium gluconate 2 g in  mL intermittent infusion  2 g Intravenous Once PRN Maryam Anthony PA-C   2 g at 08/31/24 0703    calcium gluconate 3 g in sodium chloride 0.9 % 100 mL intermittent infusion  3 g Intravenous Once PRN Maryam Anthony PA-C        calcium gluconate 4 g in sodium chloride 0.9 % 100 mL intermittent infusion  4 g Intravenous Q8H PRN Moses Mcnair MD        carboxymethylcellulose PF (REFRESH PLUS) 0.5 % ophthalmic solution 1 drop  1 drop Both Eyes Q1H PRN Gondal, Saad J, MD        ceFEPIme  (MAXIPIME) 2 g vial to attach to  mL bag for ADULTS or NS 50 mL bag for PEDS  2 g Intravenous Q12H Fede Kaufman MD   2 g at 09/04/24 0507    [Held by provider] clopidogrel (PLAVIX) tablet 75 mg  75 mg Oral Daily Maryam Anthony PA-C        CRRT Pre-Filter replacement solution for CVVHD & CVVHDF (Phoxillum BK4/2.5)  10 mL/kg/hr CRRT Continuous Moses Mcnair MD 1,000 mL/hr at 09/04/24 1421 10 mL/kg/hr at 09/04/24 1421    CRRT Replacement solution for CVVHD and CVVHDF premixed replacement solution (Phoxillum 4/2.5)  200 mL/hr CRRT Continuous Moses Mcnair  mL/hr at 09/02/24 1808 200 mL/hr at 09/02/24 1808    dextrose 10% infusion   Intravenous Continuous PRN Aurelio Huang MD        dextrose 10% infusion   Intravenous Continuous PRN Zachary Gonzalez MD        glucose gel 15-30 g  15-30 g Oral Q15 Min PRN Maryam Anthony PA-C        Or    dextrose 50 % injection 25-50 mL  25-50 mL Intravenous Q15 Min PRN Maryam Anthony PA-C        Or    glucagon injection 1 mg  1 mg Subcutaneous Q15 Min PRN Maryam Anthony PA-C        dialysate for CVVHD & CVVHDF premixed replacement solution (Phoxillum 4/2.5) - total potassium 4 mEq/L  24.5 mL/kg/hr CRRT Continuous Moses Mcnair MD 2,500 mL/hr at 09/04/24 1412 24.5 mL/kg/hr at 09/04/24 1412    DOBUTamine (DOBUTREX) 500 mg in D5W 250 mL infusion (adult std conc)  1.5 mcg/kg/min Intravenous Continuous Navneet Branch MD 4.3 mL/hr at 09/04/24 0640 1.5 mcg/kg/min at 09/04/24 0640    sennosides (SENOKOT) syrup 5 mL  5 mL Oral BID Aurelio Huang MD        And    docusate (COLACE) 50 MG/5ML liquid 50 mg  50 mg Oral BID Aurelio Huang MD        heparin (porcine) 20,000 units in 20 mL ANTICOAGULANT infusion (syringe from pharmacy)  500-1,500 Units/hr CRRT Continuous Moses Mcnair MD   Held at 08/31/24 1928    hydrALAZINE (APRESOLINE) injection 10 mg  10 mg Intravenous Q30 Min PRN Gabrielle,  Maryam Hodgson PA-C        lactated ringers BOLUS 250 mL  250 mL Intravenous Q15 Min PRN Maryam Anthony PA-C   250 mL at 08/27/24 2151    lactobacillus rhamnosus (GG) (CULTURELL) capsule 1 capsule  1 capsule Oral or Feeding Tube BID Aurelio Huang MD        Lidocaine (LIDOCARE) 4 % Patch 1-2 patch  1-2 patch Transdermal Q24H Maryam Anthony PA-C   1 patch at 09/03/24 1740    lidocaine (LMX4) cream   Topical Q1H PRN Gondal, Saad J, MD        lidocaine 1 % 0.1-1 mL  0.1-1 mL Other Q1H PRN Gondal, Saad J, MD        magnesium hydroxide (MILK OF MAGNESIA) suspension 30 mL  30 mL Oral Daily PRN Maryam Anthony PA-C        magnesium sulfate 2 g in 50 mL sterile water intermittent infusion  2 g Intravenous Q8H PRN Moses Mcnair MD        [Held by provider] metoprolol tartrate (LOPRESSOR) half-tab 12.5 mg  12.5 mg Oral BID Jessica Tolbert PA-C        miconazole (MICATIN) 2 % powder   Topical BID PRN Erick Patino, DO        naloxone (NARCAN) injection 0.2 mg  0.2 mg Intravenous Q2 Min PRN Shawn Patinoinic A, DO        Or    naloxone (NARCAN) injection 0.4 mg  0.4 mg Intravenous Q2 Min PRN Shawn Patinoinic A, DO        Or    naloxone (NARCAN) injection 0.2 mg  0.2 mg Intramuscular Q2 Min PRN Shawn Patinoinic A, DO        Or    naloxone (NARCAN) injection 0.4 mg  0.4 mg Intramuscular Q2 Min PRN Erick Patino, DO        nicotine (NICORETTE) gum 2 mg  2 mg Buccal Q1H PRN Gondal, Saad J, MD        No heparin required   Does not apply Continuous PRN Moses Mcnair MD        ondansetron (ZOFRAN ODT) ODT tab 8 mg  8 mg Oral Q6H PRN Maryam Anthony PA-C        Or    ondansetron (ZOFRAN) injection 8 mg  8 mg Intravenous Q6H PRN Maryam Anthony PA-C   8 mg at 09/04/24 1011    ondansetron (ZOFRAN) injection 4 mg  4 mg Intravenous Q6H Cheryle Jung MD        pantoprazole (PROTONIX) 2 mg/mL suspension 40 mg  40 mg Oral or NG Tube Maryam Tucker PA-C         Or    pantoprazole (PROTONIX) EC tablet 40 mg  40 mg Oral QAM AC Maryam Anthony PA-C        Or    pantoprazole (PROTONIX) IV push injection 40 mg  40 mg Intravenous QAM AC Maryam Anthony PA-C   40 mg at 09/04/24 0640    [START ON 9/5/2024] polyethylene glycol (MIRALAX) Packet 17 g  17 g Oral or Feeding Tube Daily Aurelio Huang MD        potassium chloride 20 mEq in 50 mL intermittent infusion  20 mEq Intravenous Q8H PRN Moses Mcnair MD 50 mL/hr at 08/31/24 1317 20 mEq at 08/31/24 1317    Prosource TF20 ENfit Compatibl EN LIQD (PROSOURCE TF20) packet 60 mL  1 packet Per Feeding Tube BID Aurelio Huang MD        senna-docusate (SENOKOT-S/PERICOLACE) 8.6-50 MG per tablet 1 tablet  1 tablet Oral BID PRN Gondal, Saad J, MD        Or    senna-docusate (SENOKOT-S/PERICOLACE) 8.6-50 MG per tablet 2 tablet  2 tablet Oral BID PRN Gondal, Saad J, MD        sodium chloride (PF) 0.9% PF flush 10-40 mL  10-40 mL Intracatheter Once PRN [address] MBBS        sodium chloride (PF) 0.9% PF flush 3 mL  3 mL Intracatheter q1 min prn Gondal, Saad J, MD        sodium chloride (PF) 0.9% PF flush 3 mL  3 mL Intracatheter Q8H Gondal, Saad J, MD   3 mL at 09/04/24 0818    sodium chloride 0.9% BOLUS 1-250 mL  1-250 mL Intravenous Q1H PRN Jessica Tolbert PA-C        sodium phosphate 15 mmol in sodium chloride 0.9 % 250 mL intermittent infusion  15 mmol Intravenous Q8H PRN Moses Mcnair MD        traMADol (ULTRAM) tablet 50 mg  50 mg Oral Q6H PRN Erick Patino, DO   50 mg at 09/02/24 0620    vancomycin (FIRVANQ) oral solution 125 mg  125 mg Oral or Feeding Tube BID ZeAurelio Mendoza MD        vancomycin (VANCOCIN) 750 mg in sodium chloride 0.9 % 250 mL intermittent infusion  750 mg Intravenous Q24H Zachary Gonzalez MD   750 mg at 09/04/24 0733    [Held by provider] Warfarin Dose Required Daily - Pharmacist Managed  1 each Oral See Admin Instructions Jessica Tolbert,  "MARION           Objective:   VITALS:  /68   Pulse 92   Temp 98.8  F (37.1  C) (Pulmonary Artery)   Resp 20   Ht 1.702 m (5' 7\")   Wt 92.2 kg (203 lb 4.2 oz)   SpO2 98%   BMI 31.84 kg/m    VENT:  Resp: 20  EXAM:   Gen: awake, alert, mild distress due to generalized weakness  HEENT: pink conjunctiva, moist mucosa  Neck: no thyromegaly, masses or JVD  Lungs: discrete ronchi  CV: regular, no murmurs   Abdomen: soft, NT, BS wnl  Ext: trace edema  Neuro: CN II-XII intact, non focal       Data Review:  Recent Labs   Lab 09/04/24  1354 09/04/24  1130 09/04/24  0414 09/04/24  0411 09/03/24  2200 09/03/24  1953   GLC 94 100* 107* 109*  109* 137* 137*        09/04/24 13:54   Sodium 140   Potassium 4.3   Chloride 102   Carbon Dioxide (CO2) 18 (L)   Urea Nitrogen 12.2   Creatinine 0.97 (H)   GFR Estimate 57 (L)   Calcium 7.9 (L)   Anion Gap 20 (H)   Albumin 3.4 (L)   Protein Total 6.1 (L)   Alkaline Phosphatase 67    (H)    (H)   Bilirubin Total 1.2   Calcium Ionized Whole Blood 4.4   Glucose 94      09/04/24 04:11 09/04/24 11:30   WBC 15.1 (H) 16.3 (H)   Hemoglobin 8.5 (L) 8.7 (L)   Hematocrit 26.9 (L) 27.5 (L)   Platelet Count 37 (LL) 36 (LL)   RBC Count 2.82 (L) 2.86 (L)   MCV 95 96   MCH 30.1 30.4   MCHC 31.6 31.6   RDW 18.7 (H) 19.3 (H)     Respiratory culture 2+ Enterobacter cloacae complex Abnormal       2+ Normal samara               Gram Stain Result <10 Squamous epithelial cells/low power field      <25 PMNs/low power field      1+ Mixed samara              Resulting Agency: [facility]     Susceptibility       Enterobacter cloacae complex     RAMIRO     Ampicillin  Resistant 1     Ampicillin/ Sulbactam  Resistant 1     Cefepime <=1 ug/mL Susceptible     Ceftazidime  Resistant     Ceftriaxone  Resistant     Ciprofloxacin <=0.25 ug/mL Susceptible     Gentamicin <=1 ug/mL Susceptible     Levofloxacin <=0.12 ug/mL Susceptible     Meropenem <=0.25 ug/mL Susceptible     Piperacillin/Tazobactam  Resistant     " Tobramycin <=1 ug/mL Susceptible     Trimethoprim/Sulfamethoxazole <=1/19 ug/mL Susceptible                   Blood Culture Positive on the 2nd day of incubation Abnormal       Staphylococcus aureus Panic    1 of 1 bottles  Susceptibilities done on previous cultures        Echocardiogram 8/20/2024  Interpretation Summary   Left ventricular size, wall motion and function are normal. The ejection  fraction is 60-65%.  Normal right ventricle size and systolic function.  Severe mitral valve annulus calcification and thickened leaflets.  Severe mitral valve stenosis with the mean gradient of mitral valve 10 mmHg.  Severe calcified aortic valve with reduced aortic valve excursion. The mean  gradient of aortic valve is 41mmHg.  There is a small size of mobile masss (3 x 4 mm) on aortic valve.  There is a small size of mobile mass (3 x 3 mm) on the posterior leaflet.  Vegetations on aortic valve and mitral valve are not well defined and diffult  to compare to MIGDALIA findings on 8-.    XR CHEST PORT 1 VIEW  LOCATION: [facility]  DATE: 8/29/2024  INDICATION: evaluate position of ETT  COMPARISON: Film earlier today.                                                      IMPRESSION: Patient's been reintubated. Endotracheal tube is in the mid trachea, 6 cm from the anuj.   Poststernotomy changes with AVR and MVR with mediastinal and pleural chest tubes. Right IJ Costa-Dana catheter is in the main pulmonary outflow tract.  Trace bilateral pleural effusions, decreased on the right. No pneumothorax. Heart and pulmonary vascularity are normal    XR ABDOMEN PORT 1 VIEW  LOCATION: [facility]  DATE: 9/4/2024   INDICATION: confirmation of NJ tube  COMPARISON: None.  IMPRESSION: Feeding tube passes through the stomach tip in the proximal duodenum.    IR CATHETER PLACEMENT 9/1/2024  IMPRESSION:    1.  Successful non-tunneled dialysis catheter placement.  PLAN:  1. Dialysis catheter is  ready to be used.  2. Nontunneled right groin dialysis catheter can be removed in ICU.    By:  Aurelio Lim MD, 09/04/2024  2:28 PM    Primary Care Physician:  Timbo Medina

## 2024-09-04 NOTE — PROGRESS NOTES
New CRRT filter received, CRRT restarted with new filter  at ~2134 tonight, patient tolerating well. 1U pRBC started subsequently per Dr. Renae.

## 2024-09-04 NOTE — PLAN OF CARE
Problem: Adult Inpatient Plan of Care  Goal: Plan of Care Review  Description: The Plan of Care Review/Shift note should be completed every shift.  The Outcome Evaluation is a brief statement about your assessment that the patient is improving, declining, or no change.  This information will be displayed automatically on your shift  note.  9/4/2024 1541 by Sandra Samaniego RN  Outcome: Progressing  Flowsheets (Taken 9/4/2024 1541)  Plan of Care Reviewed With:   patient   spouse  Overall Patient Progress: improving  9/4/2024 1541 by Sandra Samaniego RN  Outcome: Not Progressing  Flowsheets (Taken 9/4/2024 1541)  Plan of Care Reviewed With:   patient   spouse  Overall Patient Progress: improving  9/4/2024 1540 by Sandra Samaniego RN  Outcome: Progressing  9/4/2024 1539 by Sandra Samaniego RN  Outcome: Progressing  9/4/2024 1539 by Sandra Samaniego RN  Outcome: Progressing  9/4/2024 1535 by Sandra Samaniego RN  Outcome: Progressing  Flowsheets (Taken 9/4/2024 1535)  Outcome Evaluation: No results from Lasix this am, Nausea less frequent and pt more awwake today.  Agrred to tube feeding with NJ placed with Cortrak and verified with xray.  Pt tolerated procedure. + bowel sounds, liquid stool with rectal tube in place  Plan of Care Reviewed With:   spouse   patient  Overall Patient Progress: improving  9/4/2024 0850 by Sandra Samaniego RN  Outcome: Progressing  Flowsheets (Taken 9/4/2024 0850)  Outcome Evaluation: Pt remains nauseated,sleepy, discouraged.  CRRT discussed with nephrology. given Lasix and decreased rate.  sitting in bed in chair position and visiting with .  Unable to attempt chair due to CRRT on left and [place] access on Right.  Plan of Care Reviewed With: patient  Overall Patient Progress: improving  Goal: Absence of Hospital-Acquired Illness or Injury  Intervention: Identify and Manage Fall Risk  Recent Flowsheet Documentation  Taken 9/4/2024 1200 by Sandra Samaniego RN  Safety Promotion/Fall  Prevention:   increased rounding and observation   lighting adjusted   safety round/check completed   room door open   room near nurse's station  Taken 9/4/2024 0800 by Sandra Samaniego RN  Safety Promotion/Fall Prevention:   increased rounding and observation   lighting adjusted   safety round/check completed   room door open   room near nurse's station  Intervention: Prevent Skin Injury  Recent Flowsheet Documentation  Taken 9/4/2024 1200 by Sandra Samaniego RN  Body Position:   turned   weight shifting  Skin Protection: pulse oximeter probe site changed  Device Skin Pressure Protection: tubing/devices free from skin contact  Taken 9/4/2024 0930 by Sandra Samaniego RN  Body Position: sitting up in bed  Taken 9/4/2024 0800 by Sandra Samaniego RN  Body Position:   turned   left  Skin Protection: pulse oximeter probe site changed  Device Skin Pressure Protection: tubing/devices free from skin contact  Intervention: Prevent and Manage VTE (Venous Thromboembolism) Risk  Recent Flowsheet Documentation  Taken 9/4/2024 1200 by Sandra Samaniego RN  VTE Prevention/Management: SCDs on (sequential compression devices)  Taken 9/4/2024 0800 by Sandra Samaniego RN  VTE Prevention/Management: SCDs on (sequential compression devices)  Intervention: Prevent Infection  Recent Flowsheet Documentation  Taken 9/4/2024 1200 by Sandra Samaniego RN  Infection Prevention:   environmental surveillance performed   equipment surfaces disinfected   hand hygiene promoted   personal protective equipment utilized   rest/sleep promoted   single patient room provided   visitors restricted/screened  Taken 9/4/2024 0800 by Sandra Samaniego RN  Infection Prevention:   environmental surveillance performed   equipment surfaces disinfected   hand hygiene promoted   personal protective equipment utilized   rest/sleep promoted   single patient room provided   visitors restricted/screened  Goal: Optimal Comfort and Wellbeing  Intervention: Provide  Person-Centered Care  Recent Flowsheet Documentation  Taken 9/4/2024 1200 by Sandra Samaniego RN  Trust Relationship/Rapport:   care explained   reassurance provided  Taken 9/4/2024 0800 by Sandra Samaniego RN  Trust Relationship/Rapport:   care explained   reassurance provided     Problem: Risk for Delirium  Goal: Optimal Coping  Intervention: Optimize Psychosocial Adjustment to Delirium  Recent Flowsheet Documentation  Taken 9/4/2024 1200 by Sandra Samaniego RN  Supportive Measures:   positive reinforcement provided   active listening utilized  Taken 9/4/2024 0800 by Sandra Samaniego RN  Supportive Measures:   positive reinforcement provided   active listening utilized  Goal: Improved Behavioral Control  Intervention: Prevent and Manage Agitation  Recent Flowsheet Documentation  Taken 9/4/2024 1200 by Sandra Samaniego RN  Environment Familiarity/Consistency: daily routine followed  Taken 9/4/2024 0800 by Sandra Samaniego RN  Environment Familiarity/Consistency: daily routine followed  Intervention: Minimize Safety Risk  Recent Flowsheet Documentation  Taken 9/4/2024 1200 by Sandra Samaniego RN  Communication Enhancement Strategies:   call light answered in person   one-step directions provided  Enhanced Safety Measures:   pain management   monitor patients coagulation values  Trust Relationship/Rapport:   care explained   reassurance provided  Taken 9/4/2024 0800 by Sandra Samaniego RN  Communication Enhancement Strategies:   call light answered in person   one-step directions provided  Enhanced Safety Measures:   pain management   monitor patients coagulation values  Trust Relationship/Rapport:   care explained   reassurance provided  Goal: Improved Attention and Thought Clarity  Intervention: Maximize Cognitive Function  Recent Flowsheet Documentation  Taken 9/4/2024 1200 by Sandra Samaniego RN  Sensory Stimulation Regulation:   care clustered   lighting decreased   quiet environment promoted  Reorientation  Measures:   calendar in view   clock in view  Taken 9/4/2024 0800 by Sandra Samaniego RN  Sensory Stimulation Regulation:   care clustered   lighting decreased   quiet environment promoted  Reorientation Measures:   calendar in view   clock in view     Problem: Skin Injury Risk Increased  Goal: Skin Health and Integrity  Intervention: Plan: Nurse Driven Intervention: Moisture Management  Recent Flowsheet Documentation  Taken 9/4/2024 1200 by Sandra Samaniego RN  Moisture Interventions:   No brief in bed   Fecal collection device   Urinary collection device   Perineal cleanser  Bathing/Skin Care:   wipes, CHG   bath, partial  Taken 9/4/2024 0800 by Sandra Samaniego RN  Moisture Interventions:   No brief in bed   Fecal collection device   Urinary collection device   Perineal cleanser  Bathing/Skin Care:   back care   bath, partial   wipes, CHG   linen changed   incontinence care  Intervention: Plan: Nurse Driven Intervention: Friction and Shear  Recent Flowsheet Documentation  Taken 9/4/2024 1200 by Sandra Samaniego RN  Friction/Shear Interventions: HOB 30 degrees or less  Taken 9/4/2024 0800 by Sandra Samaniego RN  Friction/Shear Interventions: HOB 30 degrees or less  Intervention: Optimize Skin Protection  Recent Flowsheet Documentation  Taken 9/4/2024 1200 by Sandra Samaniego RN  Pressure Reduction Techniques: heels elevated off bed  Skin Protection: pulse oximeter probe site changed  Activity Management: bedrest  Head of Bed (HOB) Positioning: HOB at 60-90 degrees  Taken 9/4/2024 0930 by Sandra Samaniego RN  Head of Bed (HOB) Positioning: HOB at 60-90 degrees  Taken 9/4/2024 0800 by Sandra Samaniego RN  Pressure Reduction Techniques: heels elevated off bed  Skin Protection: pulse oximeter probe site changed  Activity Management: bedrest  Head of Bed (HOB) Positioning: HOB at 60-90 degrees     Problem: Sepsis/Septic Shock  Goal: Optimal Coping  Intervention: Optimize Psychosocial Adjustment to Illness  Recent  Flowsheet Documentation  Taken 9/4/2024 1200 by aSndra Samaniego RN  Supportive Measures:   positive reinforcement provided   active listening utilized  Taken 9/4/2024 0800 by Sandra Samaniego RN  Supportive Measures:   positive reinforcement provided   active listening utilized  Goal: Absence of Bleeding  Intervention: Monitor and Manage Bleeding  Recent Flowsheet Documentation  Taken 9/4/2024 1200 by Sandra Samaniego RN  Bleeding Precautions: gentle oral care promoted  Taken 9/4/2024 0800 by Sandra Samaniego RN  Bleeding Precautions: gentle oral care promoted  Goal: Absence of Infection Signs and Symptoms  Intervention: Initiate Sepsis Management  Recent Flowsheet Documentation  Taken 9/4/2024 1200 by Sandra Samaniego RN  Infection Prevention:   environmental surveillance performed   equipment surfaces disinfected   hand hygiene promoted   personal protective equipment utilized   rest/sleep promoted   single patient room provided   visitors restricted/screened  Taken 9/4/2024 0800 by Sandra Samaniego RN  Infection Prevention:   environmental surveillance performed   equipment surfaces disinfected   hand hygiene promoted   personal protective equipment utilized   rest/sleep promoted   single patient room provided   visitors restricted/screened  Intervention: Promote Recovery  Recent Flowsheet Documentation  Taken 9/4/2024 1200 by Sandra Samaniego RN  Activity Management: bedrest  Taken 9/4/2024 0800 by Sandra Samaniego RN  Activity Management: bedrest     Problem: Pain Acute  Goal: Optimal Pain Control and Function  Intervention: Prevent or Manage Pain  Recent Flowsheet Documentation  Taken 9/4/2024 1200 by Sandra Samaniego RN  Sensory Stimulation Regulation:   care clustered   lighting decreased   quiet environment promoted  Medication Review/Management: medications reviewed  Taken 9/4/2024 0800 by Sandra Samaniego RN  Sensory Stimulation Regulation:   care clustered   lighting decreased   quiet environment  promoted  Medication Review/Management: medications reviewed  Intervention: Optimize Psychosocial Wellbeing  Recent Flowsheet Documentation  Taken 9/4/2024 1200 by Sandra Samaniego RN  Supportive Measures:   positive reinforcement provided   active listening utilized  Taken 9/4/2024 0800 by Sandra Samaniego RN  Supportive Measures:   positive reinforcement provided   active listening utilized     Problem: Gas Exchange Impaired  Goal: Optimal Gas Exchange  Intervention: Optimize Oxygenation and Ventilation  Recent Flowsheet Documentation  Taken 9/4/2024 1200 by Sandra Samaniego RN  Head of Bed (HOB) Positioning: HOB at 60-90 degrees  Taken 9/4/2024 0930 by Sandra Samaniego RN  Head of Bed (HOB) Positioning: HOB at 60-90 degrees  Taken 9/4/2024 0800 by Sandra Samaniego RN  Head of Bed (HOB) Positioning: HOB at 60-90 degrees     Problem: Comorbidity Management  Goal: Maintenance of Osteoarthritis Symptom Control  Intervention: Maintain Osteoarthritis Symptom Control  Recent Flowsheet Documentation  Taken 9/4/2024 1200 by Sandra Samaniego RN  Assistive Device Utilized: lift device  Activity Management: bedrest  Medication Review/Management: medications reviewed  Taken 9/4/2024 0800 by Sandra Samaniego RN  Assistive Device Utilized: lift device  Activity Management: bedrest  Medication Review/Management: medications reviewed     Problem: Fluid Volume Excess  Goal: Fluid Balance  Intervention: Monitor and Manage Hypervolemia  Recent Flowsheet Documentation  Taken 9/4/2024 1200 by Sandra Samaniego RN  Skin Protection: pulse oximeter probe site changed  Taken 9/4/2024 0800 by Sandra Samaniego RN  Skin Protection: pulse oximeter probe site changed     Problem: Cardiac Catheterization (Diagnostic/Interventional)  Goal: Stable Heart Rate and Rhythm  Intervention: Monitor and Manage Cardiac Rhythm Effect  Recent Flowsheet Documentation  Taken 9/4/2024 1200 by Sandra Samaniego RN  Dysrhythmia Management: pacing wires  maintained  Taken 9/4/2024 0800 by Sandra Samaniego RN  Dysrhythmia Management: pacing wires maintained  Goal: Absence of Embolism Signs and Symptoms  Intervention: Prevent or Manage Embolism  Recent Flowsheet Documentation  Taken 9/4/2024 1200 by Sandra Samaniego RN  VTE Prevention/Management: SCDs on (sequential compression devices)  Taken 9/4/2024 0800 by Sandra Samaniego RN  VTE Prevention/Management: SCDs on (sequential compression devices)  Goal: Anesthesia/Sedation Recovery  Intervention: Optimize Anesthesia Recovery  Recent Flowsheet Documentation  Taken 9/4/2024 1200 by Sandra Samaniego RN  Safety Promotion/Fall Prevention:   increased rounding and observation   lighting adjusted   safety round/check completed   room door open   room near nurse's station  Reorientation Measures:   calendar in view   clock in view  Taken 9/4/2024 0800 by Sandra Samaniego RN  Safety Promotion/Fall Prevention:   increased rounding and observation   lighting adjusted   safety round/check completed   room door open   room near nurse's station  Reorientation Measures:   calendar in view   clock in view  Goal: Absence of Vascular Access Complication  Intervention: Prevent and Manage Access Complications  Recent Flowsheet Documentation  Taken 9/4/2024 1200 by Sandra Samaniego RN  Bleeding Precautions: gentle oral care promoted  Activity Management: bedrest  Head of Bed (HOB) Positioning: HOB at 60-90 degrees  Taken 9/4/2024 0930 by Sandra Samaniego RN  Head of Bed (HOB) Positioning: HOB at 60-90 degrees  Taken 9/4/2024 0800 by Sandra Samaniego RN  Bleeding Precautions: gentle oral care promoted  Activity Management: bedrest  Head of Bed (HOB) Positioning: HOB at 60-90 degrees     Problem: Cardiovascular Surgery  Goal: Improved Activity Tolerance  Intervention: Optimize Tolerance for Activity  Recent Flowsheet Documentation  Taken 9/4/2024 1200 by Sandra Samaniego RN  Environmental Support: calm environment promoted  Taken 9/4/2024  0800 by Sandra Samaniego RN  Environmental Support: calm environment promoted  Goal: Optimal Coping with Heart Surgery  Intervention: Support Psychosocial Response to Surgery  Recent Flowsheet Documentation  Taken 9/4/2024 1200 by Sandra Samaniego RN  Supportive Measures:   positive reinforcement provided   active listening utilized  Taken 9/4/2024 0800 by Sandra Samaniego RN  Supportive Measures:   positive reinforcement provided   active listening utilized  Goal: Effective Cardiac Function  Intervention: Optimize Cardiac Output and Blood Flow  Recent Flowsheet Documentation  Taken 9/4/2024 1200 by Sandra Samaniego RN  Dysrhythmia Management: pacing wires maintained  Taken 9/4/2024 0800 by Sandra Samaniego RN  Dysrhythmia Management: pacing wires maintained  Goal: Optimal Cerebral Tissue Perfusion  Intervention: Protect and Optimize Cerebral Perfusion  Recent Flowsheet Documentation  Taken 9/4/2024 1200 by Sandra Samaniego RN  Sensory Stimulation Regulation:   care clustered   lighting decreased   quiet environment promoted  Head of Bed (HOB) Positioning: HOB at 60-90 degrees  Taken 9/4/2024 0930 by Sandra Samaniego RN  Head of Bed (HOB) Positioning: HOB at 60-90 degrees  Taken 9/4/2024 0800 by Sandra Samaniego RN  Sensory Stimulation Regulation:   care clustered   lighting decreased   quiet environment promoted  Head of Bed (HOB) Positioning: HOB at 60-90 degrees  Goal: Absence of Infection Signs and Symptoms  Intervention: Prevent or Manage Infection  Recent Flowsheet Documentation  Taken 9/4/2024 1200 by Sandra Samaniego RN  Infection Prevention:   environmental surveillance performed   equipment surfaces disinfected   hand hygiene promoted   personal protective equipment utilized   rest/sleep promoted   single patient room provided   visitors restricted/screened  Taken 9/4/2024 0800 by Sandra Samaniego RN  Infection Prevention:   environmental surveillance performed   equipment surfaces disinfected   hand  hygiene promoted   personal protective equipment utilized   rest/sleep promoted   single patient room provided   visitors restricted/screened  Goal: Anesthesia/Sedation Recovery  Intervention: Optimize Anesthesia Recovery  Recent Flowsheet Documentation  Taken 9/4/2024 1200 by Sandra Samaniego RN  Safety Promotion/Fall Prevention:   increased rounding and observation   lighting adjusted   safety round/check completed   room door open   room near nurse's station  Reorientation Measures:   calendar in view   clock in view  Taken 9/4/2024 0800 by Sandra Samaniego RN  Safety Promotion/Fall Prevention:   increased rounding and observation   lighting adjusted   safety round/check completed   room door open   room near nurse's station  Reorientation Measures:   calendar in view   clock in view  Goal: Nausea and Vomiting Relief  Intervention: Prevent or Manage Nausea and Vomiting  Recent Flowsheet Documentation  Taken 9/4/2024 1200 by Sandra Samaniego RN  Nausea/Vomiting Interventions:   slow deep breathing encouraged   antiemetic  Taken 9/4/2024 0800 by Sandra Samaniego RN  Nausea/Vomiting Interventions:   slow deep breathing encouraged   antiemetic  Goal: Effective Oxygenation and Ventilation  Intervention: Promote Airway Secretion Clearance  Recent Flowsheet Documentation  Taken 9/4/2024 1200 by Sandra Samaniego RN  Cough And Deep Breathing: done with encouragement  Taken 9/4/2024 0800 by Sandra Samaniego RN  Cough And Deep Breathing: done with encouragement  Intervention: Optimize Oxygenation and Ventilation  Recent Flowsheet Documentation  Taken 9/4/2024 1200 by Sandra Samaniego RN  Chest Tube Safety: (sites draining purulent drainage; MD aware) other (see comments)  Taken 9/4/2024 0800 by Sandra Samaniego RN  Chest Tube Safety: (sites draining purulent drainage; MD aware) other (see comments)     Problem: Restraint, Nonviolent  Goal: Absence of Harm or Injury  Intervention: Protect Dignity, Rights and Personal  Wellbeing  Recent Flowsheet Documentation  Taken 9/4/2024 1200 by Sandra Samaniego RN  Trust Relationship/Rapport:   care explained   reassurance provided  Taken 9/4/2024 0800 by Sandra Samaniego RN  Trust Relationship/Rapport:   care explained   reassurance provided  Intervention: Protect Skin and Joint Integrity  Recent Flowsheet Documentation  Taken 9/4/2024 1200 by Sandra Samaniego RN  Body Position:   turned   weight shifting  Skin Protection: pulse oximeter probe site changed  Range of Motion: active ROM (range of motion) encouraged  Taken 9/4/2024 0930 by Sandra Samaniego RN  Body Position: sitting up in bed  Taken 9/4/2024 0800 by Sandra Samaniego RN  Body Position:   turned   left  Skin Protection: pulse oximeter probe site changed  Range of Motion: active ROM (range of motion) encouraged     Problem: CRRT (Continuous Renal Replacement Therapy)  Goal: Safe, Effective Therapy Delivery  Intervention: Optimize Device Care and Function  Recent Flowsheet Documentation  Taken 9/4/2024 1200 by Sandra Samaniego RN  Bleeding Precautions: gentle oral care promoted  Taken 9/4/2024 0800 by Sandra Samaniego RN  Bleeding Precautions: gentle oral care promoted  Goal: Hemodynamic Stability  Intervention: Optimize Blood Flow  Recent Flowsheet Documentation  Taken 9/4/2024 1200 by Sandra Samaniego RN  Bleeding Precautions: gentle oral care promoted  Taken 9/4/2024 0800 by Sandra Samaniego RN  Bleeding Precautions: gentle oral care promoted  Goal: Absence of Infection Signs and Symptoms  Intervention: Prevent or Manage Infection  Recent Flowsheet Documentation  Taken 9/4/2024 1200 by Sandra Samaniego RN  Infection Prevention:   environmental surveillance performed   equipment surfaces disinfected   hand hygiene promoted   personal protective equipment utilized   rest/sleep promoted   single patient room provided   visitors restricted/screened  Taken 9/4/2024 0800 by Sandra Samaniego RN  Infection Prevention:   environmental  surveillance performed   equipment surfaces disinfected   hand hygiene promoted   personal protective equipment utilized   rest/sleep promoted   single patient room provided   visitors restricted/screened   Goal Outcome Evaluation:      Plan of Care Reviewed With: patient, spouse    Overall Patient Progress: improvingOverall Patient Progress: improving    Outcome Evaluation: No results from Lasix this am, Nausea less frequent and pt more awwake today.  Agrred to tube feeding with [place] placed with Cortrak and verified with xray.  Pt tolerated procedure. + bowel sounds, liquid stool with rectal tube in place

## 2024-09-04 NOTE — PLAN OF CARE
Goal Outcome Evaluation:      Plan of Care Reviewed With: patient    Overall Patient Progress: improvingOverall Patient Progress: improving    Outcome Evaluation: Pt remains nauseated,sleepy, discouraged.  CRRT discussed with nephrology. given Lasix and decreased rate.  sitting in bed in chair position and visiting with .  Unable to attempt chair due to CRRT on left and [place] access on Right.

## 2024-09-04 NOTE — PROGRESS NOTES
HEART CARE NOTE          Assessment/Recommendations   1. Severe valvular disease c/b post-op cardiogenic shock  Assessment / Plan  Hemodynamics stable and tolerating RRT  - continue dobutamine fixed dosing without weaning for today as MVO2 remains marginal; inotrope not only supports cardiac hemodynamics, but is also necessary for renal and hepatic perfusion  GDMT as detailed below; mainstay of treatment for HFpEF includes diuretics and adequate BP control (class I) and SGLT2-I (class 2a); additional medical therapy (ARNI, MRA, ARB) demonstrated less robust evidence for indication but may be considered per guideline recommendations (2b); no indication for BBlockers      Current Pharmacotherapy AHA Guideline-Directed Medical Therapy   Losartan  - not started 2/2 renal dysfunction ARNI/ARB   Spironolactone not started  MRA   SGLT2 inhibitor: not started SGLT2-I    Furosemide gtt - currently on CRRT Loop diuretic       2. 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 End organ dysfunction  Assessment / Plan  CRS; CRRT/volume removal per nephrology  Shock liver - resolving; hemodynamic support as above; continue to monitor UOP and renal fucntion closely     4. Anemia  Assessment / Plan  Management and supportive care per primary team     5. CAD s/p CABG  Assessment / Plan  Add and titrate GDMT as hemodynamics tolerate     6. Afib   Assessment / Plan  In sinus bradycardia with significant first degree AV delay on telemetry - temp pacer @ 90 with good hemodynamic response   Avoid AV node blocking agents  CHADSVA score ~4 or higher    Plan of care discussed on September 4, 2024 with patient and  at bedside, and primary team overseeing patient's care    Patient remains critically ill in the ICU requiring hemodynamic support via vasopressors s/p  "OHS c/b cardiogenic shock. 60 minutes spent on critical care time     History of Present Illness/Subjective    Ms. Felicitas Elliott is a 84 year old female with a PMHx significant for (per Epic notation) presented with fatigue, weakness, chills, poor appetite. Does not really have much for chronic medical conditions other than chronic back pain, furunculosis, uterine prolapse, tobacco use      Today, Mrs. Elliott denies acute cardiac events or complaints; Management plan as detailed above      ECHO (personnaly Reviewed on 8/19/24):   1. The left ventricle is normal in size. Left ventricular function is  normal.The ejection fraction is 55-60%.  2. Normal right ventricle size and systolic function.  3. Large vegetation on mitral valve (8 mm x 15) attached to posterior leaflet.  4. There is a small vegetation on the aortic valve (6 mm). No evidence of  aortic root abscess identified.  5. Severe valvular aortic stenosis (SHU:0.8 cm2, peak nomi: 4.6 m/sec, mean  gradient: 51 mmHg).    Telemetry: personally reviewed September 4, 2024; notable for paced rhythm     Medical history and pertinent documents reviewed in Care Everywhere please where applicable see details above        Physical Examination Review of Systems   /72   Pulse 92   Temp 98.4  F (36.9  C) (Pulmonary Artery)   Resp 22   Ht 1.702 m (5' 7\")   Wt 92.2 kg (203 lb 4.2 oz)   SpO2 95%   BMI 31.84 kg/m    Body mass index is 31.84 kg/m .  Wt Readings from Last 3 Encounters:   09/04/24 92.2 kg (203 lb 4.2 oz)   08/15/24 90.1 kg (198 lb 9.6 oz)     General Appearance:   no distress, normal body habitus   ENT/Mouth: membranes moist, no oral lesions or bleeding gums.      EYES:  no scleral icterus, normal conjunctivae   Neck: no carotid bruits or thyromegaly   Chest/Lungs:   lungs are clear to auscultation, no rales or wheezing, equal chest wall expansion    Cardiovascular:   Regular. Normal first and second heart sounds with no murmurs, rubs, or gallops; the " carotid, radial and posterior tibial pulses are intact, + JVD and LE edema bilaterally    Abdomen:  no organomegaly, masses, bruits, or tenderness; bowel sounds are present   Extremities: no cyanosis or clubbing   Skin: no xanthelasma, warm.    Neurologic: NAD     Psychiatric: alert and oriented x3, calm     A complete 10 systems ROS was reviewed  And is negative except what is listed in the HPI.          Medical History  Surgical History Family History Social History   History reviewed. No pertinent past medical history. Past Surgical History:   Procedure Laterality Date    CORONARY ARTERY BYPASS GRAFT, WITH AORTIC VALVE REPLACEMENT, WITH ENDOSCOPIC VESSEL PROCUREMENT N/A 8/27/2024    Procedure: CORONARY ARTERY BYPASS GRAFT TIMES TWO, WITH AORTIC ROOT REPLACEMENT, WITH LEFT INTERNAL MAMMARY ARTERY HARVEST, LEFT SAPHNENOUS ENDOSCOPIC VESSEL PROCUREMENT,;  Surgeon: Dylan Renae MD;  Location: [facility] [place]    CV CORONARY ANGIOGRAM N/A 8/20/2024    Procedure: CV CORONARY ANGIOGRAM;  Surgeon: Den Wylie MD;  Location: [facility] CATH LAB CV    IR CVC NON TUNNEL PLACEMENT > 5 YRS  9/1/2024    REPLACE VALVE MITRAL N/A 8/27/2024    Procedure: MITRAL VALVE REPLACEMENT,;  Surgeon: Dylan Renae MD;  Location: [facility] [place] OR    TRANSESOPHAGEAL ECHOCARDIOGRAM INTRAOPERATIVE  8/27/2024    Procedure: ANESTHESIA TRANSESOPHAGEAL ECHOCARDIOGRAM, EPI-AORTIC ULTRASOUND;  Surgeon: Dylan Renae MD;  Location: [facility] [place] OR    no family history of premature coronary artery disease Social History     Socioeconomic History    Marital status:      Spouse name: Not on file    Number of children: Not on file    Years of education: Not on file    Highest education level: Not on file   Occupational History    Not on file   Tobacco Use    Smoking status: Not on file    Smokeless tobacco: Not on file   Substance and Sexual Activity    Alcohol use: Not on file    Drug use: Not on file     Sexual activity: Not on file   Other Topics Concern    Not on file   Social History Narrative    Not on file     Social Determinants of Health     Financial Resource Strain: Low Risk  (8/24/2024)    Financial Resource Strain     Within the past 12 months, have you or your family members you live with been unable to get utilities (heat, electricity) when it was really needed?: No   Food Insecurity: Low Risk  (8/24/2024)    Food Insecurity     Within the past 12 months, did you worry that your food would run out before you got money to buy more?: No     Within the past 12 months, did the food you bought just not last and you didn t have money to get more?: No   Transportation Needs: Low Risk  (8/24/2024)    Transportation Needs     Within the past 12 months, has lack of transportation kept you from medical appointments, getting your medicines, non-medical meetings or appointments, work, or from getting things that you need?: No   Physical Activity: Not on file   Stress: Not on file   Social Connections: Unknown (1/3/2024)    Received from [facility]    Social Connections     Frequency of Communication with Friends and Family: Not on file   Interpersonal Safety: High Risk (8/23/2024)    Interpersonal Safety     Do you feel physically and emotionally safe where you currently live?: No     Within the past 12 months, have you been hit, slapped, kicked or otherwise physically hurt by someone?: No     Within the past 12 months, have you been humiliated or emotionally abused in other ways by your partner or ex-partner?: No   Housing Stability: Low Risk  (8/24/2024)    Housing Stability     Do you have housing? : Yes     Are you worried about losing your housing?: No           Lab Results    Chemistry/lipid CBC Cardiac Enzymes/BNP/TSH/INR   Lab Results   Component Value Date    BUN 14.6 09/04/2024    BUN 14.6 09/04/2024     09/04/2024     09/04/2024    CO2 18 (L) 09/04/2024     "CO2 18 (L) 09/04/2024    Lab Results   Component Value Date    WBC 15.1 (H) 09/04/2024    HGB 8.5 (L) 09/04/2024    HCT 26.9 (L) 09/04/2024    MCV 95 09/04/2024    PLT 37 (LL) 09/04/2024    Lab Results   Component Value Date    INR 1.67 (H) 09/04/2024     No results found for: \"CKTOTAL\", \"CKMB\", \"TROPONINI\"       Weight:    Wt Readings from Last 3 Encounters:   09/04/24 92.2 kg (203 lb 4.2 oz)   08/15/24 90.1 kg (198 lb 9.6 oz)       Allergies  Allergies   Allergen Reactions    Aspirin Rash    Cats Rash    Nickel Rash         Surgical History  Past Surgical History:   Procedure Laterality Date    CORONARY ARTERY BYPASS GRAFT, WITH AORTIC VALVE REPLACEMENT, WITH ENDOSCOPIC VESSEL PROCUREMENT N/A 8/27/2024    Procedure: CORONARY ARTERY BYPASS GRAFT TIMES TWO, WITH AORTIC ROOT REPLACEMENT, WITH LEFT INTERNAL MAMMARY ARTERY HARVEST, LEFT SAPHNENOUS ENDOSCOPIC VESSEL PROCUREMENT,;  Surgeon: Dylan Renae MD;  Location: [facility] [place] OR    CV CORONARY ANGIOGRAM N/A 8/20/2024    Procedure: CV CORONARY ANGIOGRAM;  Surgeon: Den Wylie MD;  Location: [facility] CATH LAB CV    IR CVC NON TUNNEL PLACEMENT > 5 YRS  9/1/2024    REPLACE VALVE MITRAL N/A 8/27/2024    Procedure: MITRAL VALVE REPLACEMENT,;  Surgeon: Dylan Renae MD;  Location: [facility] [place] OR    TRANSESOPHAGEAL ECHOCARDIOGRAM INTRAOPERATIVE  8/27/2024    Procedure: ANESTHESIA TRANSESOPHAGEAL ECHOCARDIOGRAM, EPI-AORTIC ULTRASOUND;  Surgeon: Dylan Renae MD;  Location: [facility] [place] [facility]       Social History  Tobacco:   History   Smoking Status    Not on file   Smokeless Tobacco    Not on file    Alcohol:   Social History    Substance and Sexual Activity      Alcohol use: Not on file   Illicit Drugs:   History   Drug Use Not on file       Family History  History reviewed. No pertinent family history.       Navneet Branch MD on 9/4/2024      cc: Clinic, [facility]    "

## 2024-09-04 NOTE — PROGRESS NOTES
[facility] Inpatient follow up        09/04/2024    Chart reviewed  Patient seen in ICU             Assessment and Recommendations:   Assessment:  Felicitas Elliott is a 84 year old female with     Respiratory failure, shock liver  Decreased urine output, on dobutamine norepinephrine and vasopressin  Intubated  History of severe aortic stenosis  S/P aortic root abscess debridement and aortic annular reconstruction, aortic root replacement, bioprosthetic aortic valve on 8/27/2024  Acute respiratory failure, extubated 8/28/2024, reintubated 8/29/2024, acute kidney injury, currently on CRRT- Gram negative bacilli in resp culture-     2+ Enterobacter cloacae complex, S cefepime, R ceftriaxone     MRSA nares negative, respiratory PCR negative  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  MSSA bacteremia.  Positive blood culture on 8/14/2024 and 8/16.  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  With the size of the vegetation combined with brain infarct, status post CV surgery, see details below active issue.   Neck pain worrisome for cervical discitis.  Neck MRI showed some edema at C4-C5 on the left side.  No clear evidence of infection.  Abnormal brain MRI suggestive of subacute infarct. In a patient with MSSA bacteremia and aortic valve endocarditis, this is cerebral septic emboli. Active issue.     POSTOPERATIVE DIAGNOSES:  1.  Severe aortic stenosis and moderate aortic regurgitation.  2.  Severe mitral stenosis.  3.  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  4.  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  5.  Severe multivessel coronary artery disease.  6.  Aortic root abscess.      PROCEDURE PERFORMED: 8/27/24  Aortic root abscess debridement and  aortic annular reconstruction.  Aortic root replacement (Konect composite 25 mm Silva Inspiris Resilia bioprosthetic valve within 30 mm Gelweave Valsalva graft with reimplantation of the coronary arteries).  Mitral valve replacement (31 mm [employer]. [employer] Silva bioprosthetic valve).  Coronary artery bypass grafting x 2 (reversed saphenous vein graft to the obtuse marginal branch of the left circumflex coronary artery, and pedicled left internal mammary artery to left anterior descending coronary artery).  Endoscopic vein harvest of the greater saphenous vein from the left lower extremity.    Recommendations:  Cefepime for now, would likely plan this for enterobacter plus vancomycin for MSSA coverage 7 days (until 9/5), then continue vancomycin.   Off nafcillin. Per previous notes plan IV antibiotics duration 6 weeks  Currently extubated,   Stop oral vanco  PICC placed 8/21  Monitor CBC, CMP--thrombocytopenia, seems to be coincident with cefepime usage.   Status post vascular surgery, chest tube, CV surgery following closely  Will need to check immunoglobin level in 4 to 6 weeks sister with history of hypogammaglobulinemia   Overall will require at least 6 weeks of IV antibiotics, holding nafcillin due to acute kidney injury    HEAVEN AGUIRRE MD   [facility]  Answering Service: [phone]  On-Call ID provider: [phone], option: 9              Interval History:     HPI: Extubated.  sister here.  The pt is totally lucid.  CRRT as in renal note. Feels sore.       Recent Inflammatory Biomarkers:   Recent Labs   Lab Test 09/04/24 0411 09/03/24  1948 09/03/24  1130 09/03/24  0411 09/02/24 2017 09/02/24  0903 08/30/24  0437 08/29/24  0359   PCAL  --   --   --   --   --   --   --  1.24*   WBC 15.1* 14.1* 13.2* 14.2* 13.2* 12.8*   < > 15.3*    < > = values in this interval not displayed.            Review of Systems:      Negative except for findings in the HPI.           Current  Medications (antimicrobials listed in bold):     Current Facility-Administered Medications   Medication Dose Route Frequency Provider Last Rate Last Admin    [Held by provider] acetaminophen (TYLENOL) tablet 975 mg  975 mg Oral Q8H Maryam Anthony PA-C   975 mg at 08/30/24 1336    [Held by provider] atorvastatin (LIPITOR) tablet 80 mg  80 mg Oral QPM Jessica Tolbert PA-C   80 mg at 08/29/24 2038    ceFEPIme (MAXIPIME) 2 g vial to attach to  mL bag for ADULTS or NS 50 mL bag for PEDS  2 g Intravenous Q12H Zach Park MD   2 g at 09/04/24 0507    [Held by provider] clopidogrel (PLAVIX) tablet 75 mg  75 mg Oral Daily Maryam Anthony PA-C        lactobacillus rhamnosus (GG) (CULTURELL) capsule 1 capsule  1 capsule Oral BID Zach Foreman DO   1 capsule at 09/02/24 0945    Lidocaine (LIDOCARE) 4 % Patch 1-2 patch  1-2 patch Transdermal Q24H Maryam Anthony PA-C   1 patch at 09/03/24 1740    [Held by provider] metoprolol tartrate (LOPRESSOR) half-tab 12.5 mg  12.5 mg Oral BID Jessica Tolbert PA-C        ondansetron (ZOFRAN) injection 4 mg  4 mg Intravenous Q6H Cheryle Jung MD        pantoprazole (PROTONIX) 2 mg/mL suspension 40 mg  40 mg Oral or NG Tube QAM AC Maryam Anthony PA-C        Or    pantoprazole (PROTONIX) EC tablet 40 mg  40 mg Oral QAM AC Maryam Anthony PA-C        Or    pantoprazole (PROTONIX) IV push injection 40 mg  40 mg Intravenous QAM AC Maryam Anthony PA-C   40 mg at 09/04/24 0640    polyethylene glycol (MIRALAX) Packet 17 g  17 g Oral Daily Maryam Anthony PA-C        senna-docusate (SENOKOT-S/PERICOLACE) 8.6-50 MG per tablet 1 tablet  1 tablet Oral BID Maryam Anthony PA-C   1 tablet at 09/01/24 2117    sodium chloride (PF) 0.9% PF flush 3 mL  3 mL Intracatheter Q8H Gondal, Saad J, MD   3 mL at 09/04/24 0818    vancomycin (VANCOCIN) 750 mg in sodium chloride 0.9 % 250 mL intermittent infusion  750 mg Intravenous Q24H  Zachary Gonzalez MD   750 mg at 09/04/24 0733    vancomycin (VANCOCIN) capsule 125 mg  125 mg Oral BID Nannette Lebron MD   125 mg at 09/02/24 2021    [Held by provider] Warfarin Dose Required Daily - Pharmacist Managed  1 each Oral See Admin Instructions Jessica Tolbert PA-C                  Allergies:     Allergies   Allergen Reactions    Aspirin Rash    Cats Rash    Nickel Rash            Physical Exam:   Vitals were reviewed  Patient Vitals for the past 24 hrs:   BP Temp Temp src Pulse Resp SpO2 Weight   09/04/24 1015 139/72 -- -- 92 20 98 % --   09/04/24 1000 132/72 98.1  F (36.7  C) Pulm Art 92 21 100 % --   09/04/24 0945 (!) 142/69 -- -- 92 21 100 % --   09/04/24 0930 134/66 -- -- 92 19 100 % --   09/04/24 0915 139/68 -- -- 92 21 99 % --   09/04/24 0900 (!) 141/68 97.9  F (36.6  C) Pulm Art 92 22 99 % --   09/04/24 0845 129/59 -- -- 92 22 100 % --   09/04/24 0830 (!) 159/69 -- -- 92 20 100 % --   09/04/24 0815 (!) 148/70 -- -- 91 29 100 % --   09/04/24 0800 135/61 97.9  F (36.6  C) Pulm Art 91 (!) 38 100 % --   09/04/24 0745 (!) 147/67 -- -- 91 24 90 % --   09/04/24 0730 133/69 -- -- 92 19 98 % --   09/04/24 0715 122/65 -- -- 92 20 100 % --   09/04/24 0700 130/62 -- -- 92 19 99 % --   09/04/24 0645 139/60 -- -- 92 20 98 % --   09/04/24 0630 129/63 -- -- 92 22 98 % --   09/04/24 0615 131/72 -- -- 92 22 95 % --   09/04/24 0600 131/76 98.4  F (36.9  C) Pulm Art 92 27 97 % --   09/04/24 0545 132/69 -- -- 92 20 96 % --   09/04/24 0530 136/64 -- -- 92 22 97 % --   09/04/24 0515 130/63 -- -- 92 18 96 % --   09/04/24 0500 133/63 98.2  F (36.8  C) -- 92 21 96 % --   09/04/24 0445 (!) 142/67 -- -- 92 21 98 % --   09/04/24 0430 127/57 -- -- 92 19 98 % --   09/04/24 0415 (!) 141/66 -- -- 92 22 100 % --   09/04/24 0400 122/58 98.1  F (36.7  C) Pulm Art 92 19 99 % 92.2 kg (203 lb 4.2 oz)   09/04/24 0345 139/63 -- -- 92 21 99 % --   09/04/24 0330 (!) 142/70 -- -- 92 20 99 % --   09/04/24 0315 112/55 -- -- 92 15 100 %  --   09/04/24 0300 120/58 98.4  F (36.9  C) -- 92 15 100 % --   09/04/24 0245 121/55 -- -- 92 15 100 % --   09/04/24 0230 114/61 -- -- 92 17 100 % --   09/04/24 0215 120/60 -- -- 92 15 100 % --   09/04/24 0200 119/60 -- -- 92 15 97 % --   09/04/24 0145 123/63 -- -- 92 22 99 % --   09/04/24 0130 122/65 -- -- 92 17 100 % --   09/04/24 0115 124/61 -- -- 92 17 100 % --   09/04/24 0100 (!) 142/75 98.2  F (36.8  C) -- 92 20 98 % --   09/04/24 0045 (!) 147/84 -- -- 92 -- 99 % --   09/04/24 0030 139/66 -- -- 92 19 98 % --   09/04/24 0015 -- -- -- 92 14 100 % --   09/04/24 0010 125/64 98.2  F (36.8  C) -- 92 18 100 % --   09/04/24 0000 138/71 98.2  F (36.8  C) Pulm Art 92 17 100 % --   09/03/24 2345 136/70 -- -- 92 19 100 % --   09/03/24 2330 128/70 -- -- 92 22 100 % --   09/03/24 2315 116/71 -- -- 92 17 100 % --   09/03/24 2300 132/67 98.1  F (36.7  C) -- 92 17 99 % --   09/03/24 2245 132/65 -- -- 92 18 96 % --   09/03/24 2230 127/68 98.1  F (36.7  C) -- 92 17 95 % --   09/03/24 2215 117/60 97.9  F (36.6  C) -- 92 16 98 % --   09/03/24 2212 -- -- -- 92 20 99 % --   09/03/24 2210 120/58 97.9  F (36.6  C) Pulm Art 92 16 99 % --   09/03/24 2200 113/58 -- -- 92 17 99 % --   09/03/24 2145 127/59 -- -- 92 18 98 % --   09/03/24 2138 126/59 -- -- 92 18 96 % --   09/03/24 2136 123/60 -- -- 92 16 97 % --   09/03/24 2130 139/60 -- -- 92 17 97 % --   09/03/24 2115 130/57 -- -- 92 16 97 % --   09/03/24 2100 133/63 97.7  F (36.5  C) -- 92 19 98 % --   09/03/24 2045 127/61 -- -- 92 15 97 % --   09/03/24 2030 130/64 -- -- 92 17 96 % --   09/03/24 2015 127/66 -- -- 92 15 95 % --   09/03/24 2000 125/66 97.9  F (36.6  C) Pulm Art 92 16 97 % --   09/03/24 1945 129/65 -- -- 92 15 97 % --   09/03/24 1930 135/67 -- -- 92 22 96 % --   09/03/24 1915 110/55 -- -- 92 13 98 % --   09/03/24 1900 116/59 -- -- 92 15 96 % --   09/03/24 1845 116/57 -- -- 92 15 96 % --   09/03/24 1830 115/55 -- -- 92 14 97 % --   09/03/24 1815 120/60 -- -- 92 15 96 % --    09/03/24 1800 98/57 97.7  F (36.5  C) Pulm Art 92 15 93 % --   09/03/24 1745 93/54 -- -- 92 28 95 % --   09/03/24 1730 102/57 -- -- 92 18 99 % --   09/03/24 1715 95/51 -- -- 92 17 97 % --   09/03/24 1705 107/56 -- -- 92 19 96 % --   09/03/24 1700 98/57 97.5  F (36.4  C) Pulm Art 92 19 95 % --   09/03/24 1645 104/59 -- -- 92 19 95 % --   09/03/24 1630 119/58 -- -- 92 20 95 % --   09/03/24 1615 107/57 -- -- 92 16 97 % --   09/03/24 1600 105/56 97.5  F (36.4  C) Pulm Art 92 16 98 % --   09/03/24 1545 112/55 -- -- 92 20 99 % --   09/03/24 1530 108/57 -- -- 92 20 97 % --   09/03/24 1515 116/56 -- -- 92 20 96 % --   09/03/24 1500 125/60 97.3  F (36.3  C) Pulm Art 92 21 95 % --   09/03/24 1445 99/54 -- -- 92 14 100 % --   09/03/24 1430 104/59 -- -- 92 16 98 % --   09/03/24 1415 109/55 -- -- 92 18 97 % --   09/03/24 1400 115/57 97.7  F (36.5  C) Pulm Art 92 19 97 % --   09/03/24 1345 119/58 -- -- 92 19 96 % --   09/03/24 1330 107/56 -- -- 92 18 98 % --   09/03/24 1315 105/59 -- -- 92 17 98 % --   09/03/24 1300 107/56 -- -- 91 17 98 % --   09/03/24 1245 118/64 -- -- 91 19 98 % --   09/03/24 1230 122/62 -- -- 91 26 96 % --   09/03/24 1215 133/66 -- -- 91 19 96 % --   09/03/24 1200 139/70 97.7  F (36.5  C) Pulm Art 91 21 98 % --   09/03/24 1145 109/58 -- -- 91 14 97 % --   09/03/24 1130 110/55 -- -- 91 13 97 % --   09/03/24 1115 117/55 -- -- 91 12 96 % --   09/03/24 1100 129/60 98.1  F (36.7  C) Pulm Art 91 14 96 % --   09/03/24 1045 138/61 -- -- 91 18 (!) 81 % --   09/03/24 1030 111/58 -- -- 91 28 92 % --       Physical Examination:    Resp: 20    Gen: Intubated  HEENT: ETT opens eyes  PICC, Femoral catheter for dialysis, rectal tube  CV: Sternal incision,    Lungs: coarse, intubated, chest tubes.  Skin: Warm  Extr: edema. Neha L leg  Neuro: intubated, opens eyes  Art line           Laboratory Data:   ID Labs:  Microbiology labs:  7-Day Micro Results       Collected Updated Procedure Result Status      08/30/2024 1020  09/03/2024 1207 Blood Culture Hand, Left [[id number]]   Blood from Hand, Left    Preliminary result Component Value   Culture No growth after 4 days  [P]                08/30/2024 0117 08/30/2024 0357 C. difficile Toxin B PCR with reflex to C. difficile Antigen and Toxins A/B EIA [[id number]]    Stool from Per Rectum    Final result Component Value   C Difficile Toxin B by PCR Negative   A negative result does not exclude actual disease due to C. difficile and may be due to improper collection, handling and storage of the specimen or the number of organisms in the specimen is below the detection limit of the assay.            08/29/2024 0906 08/29/2024 1505 Respiratory Panel PCR [[id number]]    Swab from Nasopharyngeal    Final result Component Value   Adenovirus Not Detected   Coronavirus Not Detected   This test detects Coronavirus 229E, HKU1, NL63 and OC43 but does not distinguish between them. It does not detect MERS ( Respiratory Syndrome), SARS (Severe Acute Respiratory Syndrome) or 2019-nCoV (Novel 2019) Coronavirus.   Human Metapneumovirus Not Detected   Human Rhin/Enterovirus Not Detected   Influenza A Not Detected   Influenza A, H1 Not Detected   Influenza A 2009 H1N1 Not Detected   Influenza A, H3 Not Detected   Influenza B Not Detected   Parainfluenza Virus 1 Not Detected   Parainfluenza Virus 2 Not Detected   Parainfluenza Virus 3 Not Detected   Parainfluenza Virus 4 Not Detected   Respiratory Syncytial Virus A Not Detected   Respiratory Syncytial Virus B Not Detected   Chlamydia Pneumoniae Not Detected   Mycoplasma Pneumoniae Not Detected            08/29/2024 0906 08/29/2024 1427 MRSA MSSA PCR, Nasal Swab [[id number]]    Swab from Nares, Bilateral    Final result Component Value   MRSA Target DNA Negative   SA Target DNA Negative            08/29/2024 0853 08/31/2024 2053 Respiratory Aerobic Bacterial Culture [[id number]]    (Abnormal)   Sputum from Expectorate    Final result  Component Value   Culture 2+ Enterobacter cloacae complex    2+ Normal samara   Gram Stain Result <10 Squamous epithelial cells/low power field    <25 PMNs/low power field    1+ Mixed samara        Susceptibility        Enterobacter cloacae complex      RAMIRO      Amikacin <=2 ug/mL Susceptible  [*]       Ampicillin  Resistant  [1]       Ampicillin/ Sulbactam  Resistant  [1]       Cefazolin  Resistant  [*,1]       Cefepime <=1 ug/mL Susceptible      Cefoxitin  Resistant  [*,1]       Ceftazidime  Resistant      Ceftriaxone  Resistant      Ciprofloxacin <=0.25 ug/mL Susceptible      Gentamicin <=1 ug/mL Susceptible      Levofloxacin <=0.12 ug/mL Susceptible      Meropenem <=0.25 ug/mL Susceptible      Nitrofurantoin 64 ug/mL Intermediate  [*]       Piperacillin/Tazobactam  Resistant      Tobramycin <=1 ug/mL Susceptible      Trimethoprim/Sulfamethoxazole <=1/19 ug/mL Susceptible                   [*]  Suppressed Antibiotic     [1]  Intrinsically Resistant               Susceptibility Comments       Enterobacter cloacae complex    Enterobacter cloacae, Klebsiella aerogenes, and Citrobacter freundii have moderate to high levels of inducible AmpC ß-lactamase expression. The use of 3rd generation cephalosporins including ceftriaxone and ceftazidime, as well as   piperacillin-tazobactam, should be avoided for invasive infections, regardless of susceptibility results.                     Susceptibility data from last 90 days.  Collected Specimen Info Organism Ampicillin Ampicillin/Sulbactam Cefepime Ceftazidime Ceftriaxone Ciprofloxacin Clindamycin Daptomycin Doxycycline Erythromycin Gentamicin Levofloxacin Meropenem Oxacillin   08/29/24 Sputum from Expectorate Enterobacter cloacae complex R R  S R R  S      S  S  S      Normal samara                 08/16/24 Peripheral Blood Staphylococcus aureus                 08/14/24 Peripheral Blood Staphylococcus aureus        S  S  S  S  S    S     Collected Specimen Info Organism  Piperacillin/Tazobactam Tetracycline Tobramycin Trimethoprim/Sulfamethoxazole  Vancomycin   08/29/24 Sputum from Expectorate Enterobacter cloacae complex R   S  S      Normal samara        08/16/24 Peripheral Blood Staphylococcus aureus        08/14/24 Peripheral Blood Staphylococcus aureus   S   S  S       Reviewed      No lab results found.  Recent Labs   Lab Test 09/04/24 0411 09/03/24 1948 09/03/24 1130 09/03/24 0411 09/02/24 2017 09/02/24  0903   WBC 15.1* 14.1* 13.2* 14.2* 13.2* 12.8*     Recent Labs   Lab Test 09/04/24 0411 09/03/24 2200 09/03/24 1948 09/03/24  1346   CR 1.10*  1.10* 1.25* 1.23* 0.90   GFRESTIMATED 49*  49* 42* 43* 63       Hematology Studies  Recent Labs   Lab Test 09/04/24 0411 09/03/24 1948 09/03/24 1130 09/03/24 0411 09/02/24 2017 09/02/24  0903   WBC 15.1* 14.1* 13.2* 14.2* 13.2* 12.8*   HGB 8.5* 7.3* 7.6* 7.8* 8.3* 8.4*   HCT 26.9* 23.6* 24.8* 25.4* 26.4* 27.2*   PLT 37* 40* 42* 41* 49* 61*       Metabolic  Recent Labs   Lab Test 09/04/24 0411 09/03/24 2200 09/03/24 1948     139 140 139   BUN 14.6  14.6 19.2 18.7   CO2 18*  18* 18* 17*   CR 1.10*  1.10* 1.25* 1.23*   GFRESTIMATED 49*  49* 42* 43*       Hepatic Studies  Recent Labs   Lab Test 09/04/24 0411 09/03/24 2200 09/03/24 1948 09/03/24  1346   BILITOTAL 1.1 1.0  --  1.0   ALKPHOS 63 60  --  60   ALBUMIN 3.5  3.5 3.3* 3.2* 3.4*   * 545*  --  692*   * 248*  --  278*          Imaging Data:   Reviewed     IR CVC Non Tunnel Placement > 5 Yrs    Result Date: 9/1/2024  [place] [facility] LOCATION: [facility] DATE: 9/1/2024 PROCEDURE: NON-TUNNELED DIALYSIS CATHETER PLACEMENT INTERVENTIONAL RADIOLOGIST: RU Reyes MD. INDICATION: HAMMAD requiring hemodialysis. CONSENT: The risks, benefits and alternatives of non-tunneled dialysis catheter placement were discussed with the patient in detail. All questions were answered. Informed consent was given to proceed with the  procedure. MODERATE SEDATION: None. CONTRAST: None. ANTIBIOTICS: None. ADDITIONAL MEDICATIONS: Heparin 3800 U IV FLUOROSCOPIC TIME: 2.0 minutes. RADIATION DOSE: Air Kerma: 36.04 mGy. COMPLICATIONS: No immediate complications. STERILE BARRIER TECHNIQUE: Maximum sterile barrier technique was used. Cutaneous antisepsis was performed at the operative site with application of 2% chlorhexidine and large sterile drape. Prior to the procedure, the  and assistant performed hand hygiene and wore hat, mask, sterile gown, and sterile gloves during the entire procedure. PROCEDURE: Limited left neck ultrasound was performed which demonstrated a patent internal jugular vein; images saved to the permanent patient medical record. The overlying skin and subcutaneous soft tissues were anesthetized using 1% lidocaine. Under ultrasound guidance, the left internal jugular vein was accessed using a micropuncture needle; images saved of the permanent patient medical record. Access was secured using a 4 Indonesian transitional sheath. A [employer] guidewire was advanced into the SVC. Under fluoroscopic guidance, the overlying skin and subcutaneous soft tissues were dilated and a 15.5 Indonesian nontunneled dialysis catheter was placed with the tip within the cavoatrial junction. The catheter was tested and found to flush and aspirate appropriately. The catheter was heparinized and secured to the skin.     IMPRESSION:  1.  Successful non-tunneled dialysis catheter placement. PLAN: 1. Dialysis catheter is ready to be used. 2. Nontunneled right groin dialysis catheter can be removed in ICU.       Study Result    Narrative & Impression   EXAM: MR BRAIN W/O and W CONTRAST  LOCATION: [facility]  DATE: 8/17/2024     INDICATION: Headache in a patient with MSSA bacteremia secondary to aortic valve endocarditis.  Look for cerebral septic emboli; Headache; Acute HA (< 3 months), no complicating features  COMPARISON:  None.  CONTRAST: 9 ml gadavist  TECHNIQUE: Routine multiplanar multisequence head MRI without and with intravenous contrast.     FINDINGS: Assessment degraded due to motion.  INTRACRANIAL CONTENTS:   Apparent focus of diffusion restriction with T2/FLAIR hyperintensity within the left superior parietal lobule cortex is hyperintense on ADC map, representing T2 shine through.  Focus of signal abnormality measures 13 x 9 mm in the axial plane and does   not have internal enhancement.     Additional punctate foci of hyperintensity on diffusion weighted with similar signal characteristics (periventricular right corona radiata, left superior parietal lobule, and left cerebellar hemisphere).     Patchy and confluent nonspecific T2/FLAIR hyperintensities within the cerebral white matter most consistent with moderate chronic microvascular ischemic change. Moderate generalized cerebral atrophy. No hydrocephalus. Normal position of the cerebellar   tonsils. No discernible abnormal intracranial enhancement; however, assessment substantially degraded due to motion. 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     SELLA: No abnormality accounting for technique.     OSSEOUS STRUCTURES/SOFT TISSUES: Normal marrow signal. The major intracranial vascular flow voids are maintained.      ORBITS: No abnormality accounting for technique.      SINUSES/MASTOIDS: Mild mucosal thickening scattered about the paranasal sinuses. Scattered fluid/membrane thickening in the left mastoid air cells. No apparent mass in the posterior nasopharynx or skull base.                                                                       IMPRESSION: Assessment degraded due to motion.  1.  13 mm focus of cortical signal abnormality within the left superior parietal lobule which is favored to represent a subacute infarct; low-grade glial neoplasm could conceivably have a similar appearance. Hyperacute intraparenchymal hematoma could   also have a similar appearance but  is considered less likely. Recommend noncontrast head CT for further characterization. Also recommend follow-up MRI brain without and with contrast in 8-12 weeks to document expected evolution.  2.  Additional smaller foci of signal abnormality with similar characteristics favored to represent punctate subacute infarcts.

## 2024-09-04 NOTE — PROGRESS NOTES
[facility]  [facility]   Follow up    Felicitas Elliott   MRN: [id number]  : 1940   DOA: 2024   CC: ARF      Assessment and Plan:  84 year old female    ARF/CKD: has underlying CKD with baseline CR 1.3-1.8; now ARF in the setting of cardiac surgery;   tolerating CRRT and good control of lytes/acidosis; continues to have very poor urine output in the setting of volume overload;  continued slow UF with CRRT will promote more hemodynamic stability than IHD; will re-eval again tomorroww based on progress/clinical status  Volume status; elevated; UF with CRRT at 150 cchour; diuretics challenge this am without response  MSSA bacteremia; on abx and ID following  MV and AV infective endocarditis , 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: s/p biprothetic AoV and MV replacement, Aortic root replacement and 2vCABG  HoTN with septic and cardiogenic shock; resolved HoTN; on dobutamine and off other pressors  Acidosis; lactate and ARF and starvation ketoacidosis; improved with high dose CRRT  Anemia; following hgb  Hyperlipid; statin on hold  CAD: s/p CAB 2 v as part of valve surgery  A fib; on amio gtt  Resp failure; extubated; no plan to reintubate if deteriorates  Hepatitis; s/p acetylcysteine  Dispo; DNR, DNI  Nausea; try scheduled anti-emetics for 24 hours      Subjective  SOB when sitting up in bed; same as yesterday; still weak and tired  Poor urine response; lasix given today  Having nausea and dry heaves  Objective    Vital signs in last 24 hours  Temp:  [97.3  F (36.3  C)-98.4  F (36.9  C)] 97.9  F (36.6  C)  Pulse:  [91-92] 92  Resp:  [12-38] 22  BP: ()/(51-84) 141/68  SpO2:  [81 %-100 %] 99 %      Intake/Output last 3 shifts  I/O last 3 completed shifts:  In: 3244.97 [I.V.:2952.27]  Out: 4499 [Urine:108; Other:4351; Stool:20; Chest Tube:20]  Intake/Output this shift:  I/O this shift:  In: 631.4 [I.V.:557.4; Other:74]  Out: 794.7  [Urine:14; Other:770.7; Stool:10]    Physical Exam  Alert/oriented x 3, awake, NAD; appears tired; on O2  CV: RRR without murmur or rub  Lung:  decreased at bases  Ab: soft and NT; not distended; normal bs  Ext: ++edema and well perfused  Skin; no rash    Pertinent Labs     Last Renal Panel:  Sodium   Date Value Ref Range Status   09/04/2024 139 135 - 145 mmol/L Final   09/04/2024 139 135 - 145 mmol/L Final     Potassium   Date Value Ref Range Status   09/04/2024 4.2 3.4 - 5.3 mmol/L Final   09/04/2024 4.2 3.4 - 5.3 mmol/L Final     Potassium POCT   Date Value Ref Range Status   08/27/2024 3.4 3.4 - 5.3 mmol/L Final     Chloride   Date Value Ref Range Status   09/04/2024 101 98 - 107 mmol/L Final   09/04/2024 101 98 - 107 mmol/L Final     Carbon Dioxide (CO2)   Date Value Ref Range Status   09/04/2024 18 (L) 22 - 29 mmol/L Final   09/04/2024 18 (L) 22 - 29 mmol/L Final     Anion Gap   Date Value Ref Range Status   09/04/2024 20 (H) 7 - 15 mmol/L Final   09/04/2024 20 (H) 7 - 15 mmol/L Final     Glucose   Date Value Ref Range Status   09/04/2024 109 (H) 70 - 99 mg/dL Final   09/04/2024 109 (H) 70 - 99 mg/dL Final     GLUCOSE BY METER POCT   Date Value Ref Range Status   09/04/2024 107 (H) 70 - 99 mg/dL Final     Urea Nitrogen   Date Value Ref Range Status   09/04/2024 14.6 8.0 - 23.0 mg/dL Final   09/04/2024 14.6 8.0 - 23.0 mg/dL Final     Creatinine   Date Value Ref Range Status   09/04/2024 1.10 (H) 0.51 - 0.95 mg/dL Final   09/04/2024 1.10 (H) 0.51 - 0.95 mg/dL Final     GFR Estimate   Date Value Ref Range Status   09/04/2024 49 (L) >60 mL/min/1.73m2 Final     Comment:     eGFR calculated using 2021 CKD-EPI equation.   09/04/2024 49 (L) >60 mL/min/1.73m2 Final     Comment:     eGFR calculated using 2021 CKD-EPI equation.  eGFR calculated using 2021 CKD-EPI equation.     Calcium   Date Value Ref Range Status   09/04/2024 7.9 (L) 8.8 - 10.4 mg/dL Final     Comment:     Reference intervals for this test were updated  on 7/16/2024 to reflect our healthy population more accurately. There may be differences in the flagging of prior results with similar values performed with this method. Those prior results can be interpreted in the context of the updated reference intervals.   09/04/2024 7.9 (L) 8.8 - 10.4 mg/dL Final     Comment:     Reference intervals for this test were updated on 7/16/2024 to reflect our healthy population more accurately. There may be differences in the flagging of prior results with similar values performed with this method. Those prior results can be interpreted in the context of the updated reference intervals.  Reference intervals for this test were updated on 7/16/2024 to reflect our healthy population more accurately. There may be differences in the flagging of prior results with similar values performed with this method. Those prior results can be interpreted in the context of the updated reference intervals.     Phosphorus   Date Value Ref Range Status   09/04/2024 3.7 2.5 - 4.5 mg/dL Final     Albumin   Date Value Ref Range Status   09/04/2024 3.5 3.5 - 5.2 g/dL Final   09/04/2024 3.5 3.5 - 5.2 g/dL Final     Recent Labs   Lab 09/04/24  0411 09/03/24  1948 09/03/24  1130 09/03/24  0411 09/02/24 2017   HGB 8.5* 7.3* 7.6* 7.8* 8.3*          I reviewed all lab results  Cheryle Jung MD

## 2024-09-04 NOTE — PROGRESS NOTES
CVTS Daily Progress Note   POD#8 s/p aortic root abscess debridement and aortic annular reconstruction, aortic root replacement (Konect composite 25 mm Silva Inspiris Resilia bioprosthetic valve within 30 mm Gelweave Valsalva graft with reimplantation of the coronary arteries), mitral valve replacement (31 mm St. Tim Silva bioprosthetic valve) and CAB x 2.  Attending: Dr Renae  LOS: 19    SUBJECTIVE/INTERVAL EVENTS:    Np acute events overnight. Normotensive on dobutamine at 1.5. Paced at 90bpm. Discouraged overall. Maintaining oxygenation on 2L NC. Seen in in bed. Pain well controlled. Rectal tube in place. Still with nausea. Hgb 8.5. INR 1.60. Plt 41. LFT overall stable/downtrending. WBC 16.3; remains on empiric Vanc/cefepime. ID following. Remains on CRRT. Patient denies new chest pain, shortness of breath, abdominal pain, calf pain, nausea. She has no questions today.    OBJECTIVE:  Temp:  [97.3  F (36.3  C)-98.4  F (36.9  C)] 97.9  F (36.6  C)  Pulse:  [91-92] 92  Resp:  [12-38] 21  BP: ()/(51-84) 139/68  SpO2:  [81 %-100 %] 99 %  Vitals:    08/30/24 0400 08/31/24 0400 09/01/24 0400 09/02/24 0400   Weight: 99.8 kg (220 lb) 99.4 kg (219 lb 1.6 oz) 98.4 kg (216 lb 14.4 oz) 95.9 kg (211 lb 6.7 oz)    09/04/24 0400   Weight: 92.2 kg (203 lb 4.2 oz)       Clinically Significant Risk Factors          # Hypocalcemia: Lowest Ca = 7.6 mg/dL in last 2 days, will monitor and replace as appropriate    # Anion Gap Metabolic Acidosis: Highest Anion Gap = 22 mmol/L in last 2 days, will monitor and treat as appropriate  # Hypoalbuminemia: Lowest albumin = 3.1 g/dL at 9/1/2024  1:45 PM, will monitor as appropriate    # Coagulation Defect: INR = 1.67 (Ref range: 0.85 - 1.15) and/or PTT = 52 Seconds (Ref range: 22 - 38 Seconds), will monitor for bleeding  # Thrombocytopenia: Lowest platelets = 37 in last 2 days, will monitor for bleeding          #Acute blood loss anemia: Lowest Hgb this hospitalization: 6.9 g/dL.  "Will continue to monitor and treat/transfuse as appropriate.     # Obesity: Estimated body mass index is 31.84 kg/m  as calculated from the following:    Height as of this encounter: 1.702 m (5' 7\").    Weight as of this encounter: 92.2 kg (203 lb 4.2 oz).        # Financial/Environmental Concerns:     # History of CABG: noted on surgical history        Current Medications:    Scheduled Meds:  Current Facility-Administered Medications   Medication Dose Route Frequency Provider Last Rate Last Admin    [Held by provider] acetaminophen (TYLENOL) tablet 975 mg  975 mg Oral Q8H Maryam Anthony PA-C   975 mg at 08/30/24 1336    [Held by provider] atorvastatin (LIPITOR) tablet 80 mg  80 mg Oral QPM Jessica Tolbert PA-C   80 mg at 08/29/24 2038    ceFEPIme (MAXIPIME) 2 g vial to attach to  mL bag for ADULTS or NS 50 mL bag for PEDS  2 g Intravenous Q12H Zach Park MD   2 g at 09/04/24 0507    [Held by provider] clopidogrel (PLAVIX) tablet 75 mg  75 mg Oral Daily Maryam Anthony PA-C        lactobacillus rhamnosus (GG) (CULTURELL) capsule 1 capsule  1 capsule Oral BID Zach Foreman DO   1 capsule at 09/02/24 0945    Lidocaine (LIDOCARE) 4 % Patch 1-2 patch  1-2 patch Transdermal Q24H Maryam Anthony PA-C   1 patch at 09/03/24 1740    [Held by provider] metoprolol tartrate (LOPRESSOR) half-tab 12.5 mg  12.5 mg Oral BID Jessica Tolbert PA-C        pantoprazole (PROTONIX) 2 mg/mL suspension 40 mg  40 mg Oral or NG Tube QAM AC Maryam Anthony PA-C        Or    pantoprazole (PROTONIX) EC tablet 40 mg  40 mg Oral QAM AC Maryam Anthony PA-C        Or    pantoprazole (PROTONIX) IV push injection 40 mg  40 mg Intravenous QAM AC Maryam Anthony PA-C   40 mg at 09/04/24 0640    polyethylene glycol (MIRALAX) Packet 17 g  17 g Oral Daily Maryam Anthony PA-C        senna-docusate (SENOKOT-S/PERICOLACE) 8.6-50 MG per tablet 1 tablet  1 tablet Oral BID Maryam Anthony " MARION Hodgson   1 tablet at 09/01/24 2117    sodium chloride (PF) 0.9% PF flush 3 mL  3 mL Intracatheter Q8H Gondal, Saad J, MD   3 mL at 09/04/24 0818    vancomycin (VANCOCIN) 750 mg in sodium chloride 0.9 % 250 mL intermittent infusion  750 mg Intravenous Q24H Zachary Gonzalez MD   750 mg at 09/04/24 0733    vancomycin (VANCOCIN) capsule 125 mg  125 mg Oral BID Nannette Lebron MD   125 mg at 09/02/24 2021    [Held by provider] Warfarin Dose Required Daily - Pharmacist Managed  1 each Oral See Admin Instructions Jessica Tolbert PA-C         Continuous Infusions:  Current Facility-Administered Medications   Medication Dose Route Frequency Provider Last Rate Last Admin    acetylcysteine (ACETADOTE) 6,000 mg in D5W STEP TWO infusion  6.25 mg/kg/hr Intravenous Continuous Zachary Gonzalez MD 49.9 mL/hr at 09/04/24 0600 6.25 mg/kg/hr at 09/04/24 0600    amiodarone (NEXTERONE) 1.8 mg/mL in dextrose 5% 200 mL ADULT STANDARD infusion  0.5 mg/min Intravenous Continuous Jessica Tolbert PA-C 16.7 mL/hr at 09/04/24 0915 0.5 mg/min at 09/04/24 0915    CRRT Pre-Filter replacement solution for CVVHD & CVVHDF (Phoxillum BK4/2.5)  10 mL/kg/hr CRRT Continuous Moses Mcnair MD 1,000 mL/hr at 09/04/24 0344 10 mL/kg/hr at 09/04/24 0344    CRRT Replacement solution for CVVHD and CVVHDF premixed replacement solution (Phoxillum 4/2.5)  200 mL/hr CRRT Continuous Moses Mcnair  mL/hr at 09/02/24 1808 200 mL/hr at 09/02/24 1808    dextrose 10% infusion   Intravenous Continuous PRN Zachary Gonzalez MD        dialysate for CVVHD & CVVHDF premixed replacement solution (Phoxillum 4/2.5) - total potassium 4 mEq/L  24.5 mL/kg/hr CRRT Continuous Moses Mcnair MD 2,500 mL/hr at 09/04/24 0817 24.5 mL/kg/hr at 09/04/24 0817    DOBUTamine (DOBUTREX) 500 mg in D5W 250 mL infusion (adult std conc)  1.5 mcg/kg/min Intravenous Continuous Navneet Branch MD 4.3 mL/hr at 09/04/24 0640 1.5 mcg/kg/min at 09/04/24 0640    heparin  (porcine) 20,000 units in 20 mL ANTICOAGULANT infusion (syringe from pharmacy)  500-1,500 Units/hr CRRT Continuous Moses Mcnair MD   Held at 08/31/24 1928    No heparin required   Does not apply Continuous PRN Moses Mcnair MD         PRN Meds:.  Current Facility-Administered Medications   Medication Dose Route Frequency Provider Last Rate Last Admin    albumin human 25 % injection 50 g  50 g Intravenous Q8H PRN Moses Mcnair MD        sodium chloride (NEBUSAL) 3 % neb solution 3 mL  3 mL Nebulization Q6H PRN Zachary Gonzalez MD        And    albuterol (PROVENTIL) neb solution 2.5 mg  2.5 mg Nebulization Q6H PRN Zachary Gonzalez MD        bisacodyl (DULCOLAX) suppository 10 mg  10 mg Rectal Daily PRN Maryam Anthony PA-C        calcium gluconate 1 g in 50 mL in sodium chloride intermittent infusion  1 g Intravenous Once PRN Maryam Anthony PA-C   1 g at 09/03/24 1424    calcium gluconate 2 g in  mL intermittent infusion  2 g Intravenous Q8H PRN Moses Mcnair MD   2 g at 09/02/24 1514    calcium gluconate 2 g in  mL intermittent infusion  2 g Intravenous Once PRN Maryam Anthony PA-C   2 g at 08/31/24 0703    calcium gluconate 3 g in sodium chloride 0.9 % 100 mL intermittent infusion  3 g Intravenous Once PRN Maryam Anthony PA-C        calcium gluconate 4 g in sodium chloride 0.9 % 100 mL intermittent infusion  4 g Intravenous Q8H PRN Moses Mcnair MD        carboxymethylcellulose PF (REFRESH PLUS) 0.5 % ophthalmic solution 1 drop  1 drop Both Eyes Q1H PRN Gondal, Saad J, MD        dextrose 10% infusion   Intravenous Continuous PRN Zachary Gonzalez MD        glucose gel 15-30 g  15-30 g Oral Q15 Min PRN Maryam Anthony PA-C        Or    dextrose 50 % injection 25-50 mL  25-50 mL Intravenous Q15 Min PRN Maryam Anthony PA-C        Or    glucagon injection 1 mg  1 mg Subcutaneous Q15 Min PRN Maryam Anthony PA-C        hydrALAZINE  (APRESOLINE) injection 10 mg  10 mg Intravenous Q30 Min PRN Maryam Anthony PA-C        lactated ringers BOLUS 250 mL  250 mL Intravenous Q15 Min PRN Maryam Anthony PA-C   250 mL at 08/27/24 2151    lidocaine (LMX4) cream   Topical Q1H PRN Gondal, Saad J, MD        lidocaine 1 % 0.1-1 mL  0.1-1 mL Other Q1H PRN Gondal, Saad J, MD        magnesium hydroxide (MILK OF MAGNESIA) suspension 30 mL  30 mL Oral Daily PRN Maryam Anthony PA-C        magnesium sulfate 2 g in 50 mL sterile water intermittent infusion  2 g Intravenous Q8H PRN Moses Mcnair MD        miconazole (MICATIN) 2 % powder   Topical BID PRN Erick Patino DO        naloxone (NARCAN) injection 0.2 mg  0.2 mg Intravenous Q2 Min PRN Erick Patino DO        Or    naloxone (NARCAN) injection 0.4 mg  0.4 mg Intravenous Q2 Min PRN Erick Ptaino DO        Or    naloxone (NARCAN) injection 0.2 mg  0.2 mg Intramuscular Q2 Min PRN Erick Patino A, DO        Or    naloxone (NARCAN) injection 0.4 mg  0.4 mg Intramuscular Q2 Min PRN Erick Patino DO        nicotine (NICORETTE) gum 2 mg  2 mg Buccal Q1H PRN Gondal, Saad J, MD        No heparin required   Does not apply Continuous PRN Moses Mcnair MD        ondansetron (ZOFRAN ODT) ODT tab 4 mg  4 mg Oral Q6H PRN Maryam Anthony PA-C        Or    ondansetron (ZOFRAN) injection 4 mg  4 mg Intravenous Q6H PRN Maryam Anthony PA-C   4 mg at 09/03/24 2059    potassium chloride 20 mEq in 50 mL intermittent infusion  20 mEq Intravenous Q8H PRN Moses Mcnair MD 50 mL/hr at 08/31/24 1317 20 mEq at 08/31/24 1317    prochlorperazine (COMPAZINE) injection 5 mg  5 mg Intravenous Q6H PRN Maryam Anthony PA-C   5 mg at 09/03/24 1634    Or    prochlorperazine (COMPAZINE) tablet 5 mg  5 mg Oral Q6H PRN Maryam Anthony PA-C        senna-docusate (SENOKOT-S/PERICOLACE) 8.6-50 MG per tablet 1 tablet  1 tablet Oral BID PRN Gondal, Saad J, MD        Or     senna-docusate (SENOKOT-S/PERICOLACE) 8.6-50 MG per tablet 2 tablet  2 tablet Oral BID PRN Gondal, Saad J, MD        sodium chloride (PF) 0.9% PF flush 10-40 mL  10-40 mL Intracatheter Once PRN [address] MBBS        sodium chloride (PF) 0.9% PF flush 3 mL  3 mL Intracatheter q1 min prn Gondal, Saad J, MD        sodium chloride 0.9% BOLUS 1-250 mL  1-250 mL Intravenous Q1H PRN Jessica Tolbert PA-C        sodium phosphate 15 mmol in sodium chloride 0.9 % 250 mL intermittent infusion  15 mmol Intravenous Q8H PRN Moses Mcnair MD        traMADol (ULTRAM) tablet 50 mg  50 mg Oral Q6H PRN Erick Patino DO   50 mg at 09/02/24 0620       Cardiographics:    Telemetry monitoring demonstrates paced rhythm at 90bpm per my personal review.    Imaging:  Results for orders placed or performed during the hospital encounter of 08/16/24   MR Brain w/o & w Contrast    Impression    IMPRESSION: Assessment degraded due to motion.  1.  13 mm focus of cortical signal abnormality within the left superior parietal lobule which is favored to represent a subacute infarct; low-grade glial neoplasm could conceivably have a similar appearance. Hyperacute intraparenchymal hematoma could   also have a similar appearance but is considered less likely. Recommend noncontrast head CT for further characterization. Also recommend follow-up MRI brain without and with contrast in 8-12 weeks to document expected evolution.  2.  Additional smaller foci of signal abnormality with similar characteristics favored to represent punctate subacute infarcts.   CTA Head Neck with Contrast    Impression    IMPRESSION:   HEAD CT:  1.  Small focus of juxtacortical low attenuation within the left parietal lobe corresponds to signal abnormality seen on the prior MRI. As suggested previously this is favored to reflect evolving small vessel ischemic change; however, MRI follow-up to   exclude a low-grade neoplastic process is advised.  2.  No evidence for  intracranial hemorrhage or significant mass effect.  3.  Generalized brain atrophy and presumed chronic microvascular ischemic change.    HEAD CTA:   1.  Intracranial atherosclerosis including moderate to marked stenosis of the right P2 posterior cerebral artery segment.  2.  No definite proximal branch occlusion, aneurysm, or high flow vascular malformation identified.    NECK CTA:  1.  Atherosclerotic plaque at the carotid bifurcations bilaterally without hemodynamically significant stenosis in the neck vessels.   2.  No evidence for dissection.   Radiologist Consult For Cardiology    Impression    IMPRESSION:    1.  Partially visualized left adrenal nodule, indeterminate, could represent adrenal adenoma.  2.  Please refer to cardiologist's dictation for the cardiac CT report.   US Carotid Bilateral    Impression    IMPRESSION:  1.  Mild plaque formation, velocities consistent with less than 50% stenosis in the right internal carotid artery.  2.  Mild plaque formation, velocities consistent with less than 50% stenosis in the left internal carotid artery.  3.  Flow within the vertebral arteries is antegrade.   XR Chest Port 1 View    Impression    IMPRESSION: Endotracheal tube tip 4.8 cm proximal to the anuj. Dallas-Dana catheter with tip in the pulmonary outflow tract. Mediastinal drain with aortic valve prosthesis. Sternotomy. Small left pleural effusion with left basilar infiltrate.   XR Chest Port 1 View    Impression    IMPRESSION: Endotracheal tube in the distal aspect of the trachea. Sternotomy. AVR. Mediastinal drain. Right IJ Dallas-Dana catheter with tip in the right ventricle/MPA. Left-sided chest tube. No pneumothorax. Mild cardiac enlargement with normal pulmonary   vascularity. Minimal left basilar atelectasis and pleural fluid. Degenerative changes in the spine.   Echocardiogram Limited   Result Value Ref Range    LVEF  60-65%    CTA  ANGIOGRAM CORONARY ARTERY   Result Value Ref Range    BSA 0.00 m2        Labs, personally reviewed.  Hemoglobin   Date Value Ref Range Status   09/04/2024 8.5 (L) 11.7 - 15.7 g/dL Final   09/03/2024 7.3 (L) 11.7 - 15.7 g/dL Final   09/03/2024 7.6 (L) 11.7 - 15.7 g/dL Final     WBC Count   Date Value Ref Range Status   09/04/2024 15.1 (H) 4.0 - 11.0 10e3/uL Final   09/03/2024 14.1 (H) 4.0 - 11.0 10e3/uL Final   09/03/2024 13.2 (H) 4.0 - 11.0 10e3/uL Final     Platelet Count   Date Value Ref Range Status   09/04/2024 37 (LL) 150 - 450 10e3/uL Final   09/03/2024 40 (LL) 150 - 450 10e3/uL Final   09/03/2024 42 (LL) 150 - 450 10e3/uL Final     Creatinine   Date Value Ref Range Status   09/04/2024 1.10 (H) 0.51 - 0.95 mg/dL Final   09/04/2024 1.10 (H) 0.51 - 0.95 mg/dL Final   09/03/2024 1.25 (H) 0.51 - 0.95 mg/dL Final     Potassium   Date Value Ref Range Status   09/04/2024 4.2 3.4 - 5.3 mmol/L Final   09/04/2024 4.2 3.4 - 5.3 mmol/L Final   09/03/2024 4.3 3.4 - 5.3 mmol/L Final     Potassium POCT   Date Value Ref Range Status   08/27/2024 3.4 3.4 - 5.3 mmol/L Final   08/27/2024 3.9 3.4 - 5.3 mmol/L Final   08/27/2024 3.4 3.4 - 5.3 mmol/L Final     Magnesium   Date Value Ref Range Status   09/04/2024 2.4 (H) 1.7 - 2.3 mg/dL Final   09/03/2024 2.4 (H) 1.7 - 2.3 mg/dL Final   09/03/2024 2.4 (H) 1.7 - 2.3 mg/dL Final          I/O:  I/O last 3 completed shifts:  In: 3244.97 [I.V.:2952.27]  Out: 4499 [Urine:108; Other:4351; Stool:20; Chest Tube:20]       Physical Exam:    General: Patient seen in bed. NAD.   HEENT: JULIO, no sclera icterus, moist mucosa.   CV: Paced rhythm on monitor. 2+ peripheral pulses in all extremities. Mild edema.   Pulm: Non-labored effort on 2L NC. Incision C/D/I.  Abd: Soft, NT, ND  : Le with pita urine  Ext: Mod pedal edema, SCDs in place, warm, distal pulses intact.  Neuro: CNs grossly intact      ASSESSMENT/PLAN:    Felicitas Elliott is a 84 year old female with a history of endocarditis, CAD, aortic stenosis and mitral valve disorder who is s/p Aortic root  abscess debridement and aortic annular reconstruction, aortic root replacement (Konect composite 25 mm Silva Inspiris Resilia bioprosthetic valve within 30 mm Gelweave Valsalva graft with reimplantation of the coronary arteries), Mitral valve replacement (31 mm St. Tim Silva bioprosthetic valve) and CABx2.    Active Problems:    Bacteremia    Endocarditis    Sepsis (H)    Nonrheumatic aortic valve stenosis    Postsurgical percutaneous transluminal coronary angioplasty status    Mitral valve disorder    Coronary artery disease involving native coronary artery of native heart, unspecified whether angina present        NEURO:   - Tylenol held given LFT bump. PRN Tramadol  for pain  - PRN nicorette gum    CV:   - Pre-op EF 60-65%  - Normotensive  - Currently on Dobutamine 1.5mcg  - Amio gtt  - Beta blocker pending weaning from pressors/improvement in HR  - ASA allergy - Plavix 75mg PO daily-held for now, likely til plt >100  - Atorvastatin 80 mg daily (held in light of LFTs)  - Chest tubes removed 9/3; TPW remain due to plt/need for pacing  - Cards following--appreciate recs  - New baseline echo before discharge and after CT removed  - Coumadin x3 months per surgeon preference--currently held. INR goal 2-3 (clarified with pharmacy)    PULM:   - Extubated POD#1, reintubated POD #2, Extubated POD#5  - Maintaining oxygen saturations on 2L NC  - Encourage pulmonary toilet    FEN/GI:  - Continue electrolyte replacement protocol  - Diet: Cardiac, ADAT   - Bowel regimen  - Consider tube feeds if not cleared by speech  - Nausea, improving. Amylase and lipase WNL. GI following; appreciate    RENAL:  - Becoming anuric  - Le to remain in for close monitoring of I/O and during period of diuresis/relative immobility.   - Nephrology consult and managing CRRT--appreciate.    HEME:  - Acute blood loss anemia post-op.   - Hgb 8.5, hep subcutaneous currently held  - Occult blood positive POD#3. Rectal tube in place   - Plavix  75mg PO daily (held) - ASA allergy  - Coumadin pharmacy to dose-held.  - HIT negative. Plt 41 today  - Likely hold Plavix and coumadin until plt >100    ID:  - Lennie op ppx complete, afebrile.  - WBC 15.1  - ID following-appreciate recs  - Pertinent culture history/susceptibilties:   Susceptibility data from last 90 days.  Collected Specimen Info Organism Ampicillin Ampicillin/Sulbactam Cefepime Ceftazidime Ceftriaxone Ciprofloxacin Clindamycin Daptomycin Doxycycline Erythromycin Gentamicin Levofloxacin Meropenem Oxacillin   08/29/24 Sputum from Expectorate Enterobacter cloacae complex R R  S R R  S      S  S  S      Normal samara                 08/16/24 Peripheral Blood Staphylococcus aureus                 08/14/24 Peripheral Blood Staphylococcus aureus        S  S  S  S  S    S     Collected Specimen Info Organism Piperacillin/Tazobactam Tetracycline Tobramycin Trimethoprim/Sulfamethoxazole  Vancomycin   08/29/24 Sputum from Expectorate Enterobacter cloacae complex R   S  S      Normal samara        08/16/24 Peripheral Blood Staphylococcus aureus        08/14/24 Peripheral Blood Staphylococcus aureus   S   S  S     - Antibiotic history:   - 08/17 - 08/28 Nafcillin   - 08/29 - 08/30 Ceftazidine   - 08/30 - current Cefepime   - 08/30 - current IV and PO Vanco    ENDO:   - A1c 6.4  - Euglycemic     PPx:   - DVT: SCDs, SQ heparin TID (held), ambulation when able  - GI: Protonix 40mg IV/PO daily    DISPO:   - Continue critical care in ICU  - Medically Ready for Discharge: Anticipated in 5+ Days         Patient discussed with Dr. Renae.        _______  Maryam Anthony PA-C  Cardiothoracic Surgery  [phone]

## 2024-09-05 ENCOUNTER — APPOINTMENT (OUTPATIENT)
Dept: SPEECH THERAPY | Facility: HOSPITAL | Age: 84
DRG: 853 | End: 2024-09-05
Attending: STUDENT IN AN ORGANIZED HEALTH CARE EDUCATION/TRAINING PROGRAM
Payer: COMMERCIAL

## 2024-09-05 LAB
ALBUMIN SERPL BCG-MCNC: 3.5 G/DL (ref 3.5–5.2)
ALBUMIN SERPL BCG-MCNC: 3.7 G/DL (ref 3.5–5.2)
ALBUMIN SERPL BCG-MCNC: 3.7 G/DL (ref 3.5–5.2)
ALP SERPL-CCNC: 75 U/L (ref 40–150)
ALP SERPL-CCNC: 88 U/L (ref 40–150)
ALP SERPL-CCNC: 94 U/L (ref 40–150)
ALT SERPL W P-5'-P-CCNC: 168 U/L (ref 0–50)
ALT SERPL W P-5'-P-CCNC: 185 U/L (ref 0–50)
ALT SERPL W P-5'-P-CCNC: 188 U/L (ref 0–50)
ANION GAP SERPL CALCULATED.3IONS-SCNC: 11 MMOL/L (ref 7–15)
ANION GAP SERPL CALCULATED.3IONS-SCNC: 11 MMOL/L (ref 7–15)
ANION GAP SERPL CALCULATED.3IONS-SCNC: 13 MMOL/L (ref 7–15)
ANION GAP SERPL CALCULATED.3IONS-SCNC: 9 MMOL/L (ref 7–15)
AST SERPL W P-5'-P-CCNC: 242 U/L (ref 0–45)
AST SERPL W P-5'-P-CCNC: 267 U/L (ref 0–45)
AST SERPL W P-5'-P-CCNC: 307 U/L (ref 0–45)
BASE EXCESS BLDV CALC-SCNC: -3.5 MMOL/L (ref -3–3)
BILIRUB DIRECT SERPL-MCNC: 0.59 MG/DL (ref 0–0.3)
BILIRUB SERPL-MCNC: 1.1 MG/DL
BILIRUB SERPL-MCNC: 1.1 MG/DL
BILIRUB SERPL-MCNC: 1.3 MG/DL
BUN SERPL-MCNC: 14.3 MG/DL (ref 8–23)
BUN SERPL-MCNC: 14.3 MG/DL (ref 8–23)
BUN SERPL-MCNC: 15.2 MG/DL (ref 8–23)
BUN SERPL-MCNC: 15.8 MG/DL (ref 8–23)
BUN SERPL-MCNC: 16.3 MG/DL (ref 8–23)
BUN SERPL-MCNC: 16.7 MG/DL (ref 8–23)
CA-I BLD-MCNC: 4.5 MG/DL (ref 4.4–5.2)
CALCIUM SERPL-MCNC: 7.9 MG/DL (ref 8.8–10.4)
CALCIUM SERPL-MCNC: 8 MG/DL (ref 8.8–10.4)
CALCIUM SERPL-MCNC: 8.1 MG/DL (ref 8.8–10.4)
CHLORIDE SERPL-SCNC: 102 MMOL/L (ref 98–107)
CHLORIDE SERPL-SCNC: 102 MMOL/L (ref 98–107)
CHLORIDE SERPL-SCNC: 104 MMOL/L (ref 98–107)
CHLORIDE SERPL-SCNC: 104 MMOL/L (ref 98–107)
CHLORIDE SERPL-SCNC: 106 MMOL/L (ref 98–107)
CHLORIDE SERPL-SCNC: 106 MMOL/L (ref 98–107)
CREAT SERPL-MCNC: 0.93 MG/DL (ref 0.51–0.95)
CREAT SERPL-MCNC: 0.94 MG/DL (ref 0.51–0.95)
CREAT SERPL-MCNC: 0.95 MG/DL (ref 0.51–0.95)
CREAT SERPL-MCNC: 0.95 MG/DL (ref 0.51–0.95)
CREAT SERPL-MCNC: 0.97 MG/DL (ref 0.51–0.95)
CREAT SERPL-MCNC: 0.98 MG/DL (ref 0.51–0.95)
EGFRCR SERPLBLD CKD-EPI 2021: 57 ML/MIN/1.73M2
EGFRCR SERPLBLD CKD-EPI 2021: 57 ML/MIN/1.73M2
EGFRCR SERPLBLD CKD-EPI 2021: 59 ML/MIN/1.73M2
EGFRCR SERPLBLD CKD-EPI 2021: 59 ML/MIN/1.73M2
EGFRCR SERPLBLD CKD-EPI 2021: 60 ML/MIN/1.73M2
EGFRCR SERPLBLD CKD-EPI 2021: 60 ML/MIN/1.73M2
ERYTHROCYTE [DISTWIDTH] IN BLOOD BY AUTOMATED COUNT: 19.9 % (ref 10–15)
ERYTHROCYTE [DISTWIDTH] IN BLOOD BY AUTOMATED COUNT: 20 % (ref 10–15)
ERYTHROCYTE [DISTWIDTH] IN BLOOD BY AUTOMATED COUNT: 20.1 % (ref 10–15)
GLUCOSE SERPL-MCNC: 134 MG/DL (ref 70–99)
GLUCOSE SERPL-MCNC: 134 MG/DL (ref 70–99)
GLUCOSE SERPL-MCNC: 139 MG/DL (ref 70–99)
GLUCOSE SERPL-MCNC: 149 MG/DL (ref 70–99)
GLUCOSE SERPL-MCNC: 150 MG/DL (ref 70–99)
GLUCOSE SERPL-MCNC: 153 MG/DL (ref 70–99)
HCO3 BLDV-SCNC: 23 MMOL/L (ref 21–28)
HCO3 SERPL-SCNC: 21 MMOL/L (ref 22–29)
HCO3 SERPL-SCNC: 21 MMOL/L (ref 22–29)
HCO3 SERPL-SCNC: 22 MMOL/L (ref 22–29)
HCO3 SERPL-SCNC: 23 MMOL/L (ref 22–29)
HCO3 SERPL-SCNC: 23 MMOL/L (ref 22–29)
HCO3 SERPL-SCNC: 24 MMOL/L (ref 22–29)
HCT VFR BLD AUTO: 25.3 % (ref 35–47)
HCT VFR BLD AUTO: 25.5 % (ref 35–47)
HCT VFR BLD AUTO: 25.9 % (ref 35–47)
HGB BLD-MCNC: 8.1 G/DL (ref 11.7–15.7)
HGB BLD-MCNC: 8.2 G/DL (ref 11.7–15.7)
HGB BLD-MCNC: 8.3 G/DL (ref 11.7–15.7)
INR PPP: 1.49 (ref 0.85–1.15)
MAGNESIUM SERPL-MCNC: 2.5 MG/DL (ref 1.7–2.3)
MAGNESIUM SERPL-MCNC: 2.6 MG/DL (ref 1.7–2.3)
MAGNESIUM SERPL-MCNC: 2.6 MG/DL (ref 1.7–2.3)
MCH RBC QN AUTO: 31 PG (ref 26.5–33)
MCH RBC QN AUTO: 31.7 PG (ref 26.5–33)
MCH RBC QN AUTO: 31.9 PG (ref 26.5–33)
MCHC RBC AUTO-ENTMCNC: 31.3 G/DL (ref 31.5–36.5)
MCHC RBC AUTO-ENTMCNC: 32.4 G/DL (ref 31.5–36.5)
MCHC RBC AUTO-ENTMCNC: 32.5 G/DL (ref 31.5–36.5)
MCV RBC AUTO: 97 FL (ref 78–100)
MCV RBC AUTO: 98 FL (ref 78–100)
MCV RBC AUTO: 99 FL (ref 78–100)
O2/TOTAL GAS SETTING VFR VENT: 21 %
OXYHGB MFR BLDV: 57 % (ref 70–75)
PCO2 BLDV: 45 MM HG (ref 40–50)
PH BLDV: 7.32 [PH] (ref 7.32–7.43)
PHOSPHATE SERPL-MCNC: 2.8 MG/DL (ref 2.5–4.5)
PHOSPHATE SERPL-MCNC: 3.1 MG/DL (ref 2.5–4.5)
PHOSPHATE SERPL-MCNC: 3.6 MG/DL (ref 2.5–4.5)
PLATELET # BLD AUTO: 28 10E3/UL (ref 150–450)
PLATELET # BLD AUTO: 28 10E3/UL (ref 150–450)
PLATELET # BLD AUTO: 32 10E3/UL (ref 150–450)
PO2 BLDV: 33 MM HG (ref 25–47)
POTASSIUM SERPL-SCNC: 3.5 MMOL/L (ref 3.4–5.3)
POTASSIUM SERPL-SCNC: 3.7 MMOL/L (ref 3.4–5.3)
POTASSIUM SERPL-SCNC: 3.8 MMOL/L (ref 3.4–5.3)
PROT SERPL-MCNC: 6.1 G/DL (ref 6.4–8.3)
PROT SERPL-MCNC: 6.2 G/DL (ref 6.4–8.3)
PROT SERPL-MCNC: 6.4 G/DL (ref 6.4–8.3)
RBC # BLD AUTO: 2.57 10E6/UL (ref 3.8–5.2)
RBC # BLD AUTO: 2.61 10E6/UL (ref 3.8–5.2)
RBC # BLD AUTO: 2.62 10E6/UL (ref 3.8–5.2)
SAO2 % BLDV: 58.9 % (ref 70–75)
SODIUM SERPL-SCNC: 136 MMOL/L (ref 135–145)
SODIUM SERPL-SCNC: 136 MMOL/L (ref 135–145)
SODIUM SERPL-SCNC: 138 MMOL/L (ref 135–145)
SODIUM SERPL-SCNC: 139 MMOL/L (ref 135–145)
SODIUM SERPL-SCNC: 139 MMOL/L (ref 135–145)
SODIUM SERPL-SCNC: 140 MMOL/L (ref 135–145)
TROPONIN T SERPL HS-MCNC: 340 NG/L
TROPONIN T SERPL HS-MCNC: 399 NG/L
TROPONIN T SERPL HS-MCNC: 444 NG/L
UFH PPP CHRO-ACNC: <0.1 IU/ML
WBC # BLD AUTO: 15.8 10E3/UL (ref 4–11)
WBC # BLD AUTO: 15.9 10E3/UL (ref 4–11)
WBC # BLD AUTO: 16.3 10E3/UL (ref 4–11)

## 2024-09-05 PROCEDURE — 99232 SBSQ HOSP IP/OBS MODERATE 35: CPT | Performed by: INTERNAL MEDICINE

## 2024-09-05 PROCEDURE — 250N000013 HC RX MED GY IP 250 OP 250 PS 637: Performed by: INTERNAL MEDICINE

## 2024-09-05 PROCEDURE — 250N000009 HC RX 250: Performed by: INTERNAL MEDICINE

## 2024-09-05 PROCEDURE — 250N000009 HC RX 250: Performed by: PHYSICIAN ASSISTANT

## 2024-09-05 PROCEDURE — 250N000013 HC RX MED GY IP 250 OP 250 PS 637: Performed by: PHYSICIAN ASSISTANT

## 2024-09-05 PROCEDURE — 83735 ASSAY OF MAGNESIUM: CPT | Performed by: INTERNAL MEDICINE

## 2024-09-05 PROCEDURE — 90947 DIALYSIS REPEATED EVAL: CPT

## 2024-09-05 PROCEDURE — 85610 PROTHROMBIN TIME: CPT | Performed by: PHYSICIAN ASSISTANT

## 2024-09-05 PROCEDURE — 99233 SBSQ HOSP IP/OBS HIGH 50: CPT | Performed by: INTERNAL MEDICINE

## 2024-09-05 PROCEDURE — 99232 SBSQ HOSP IP/OBS MODERATE 35: CPT

## 2024-09-05 PROCEDURE — 999N000287 HC ICU ADULT ROUNDING, EACH 10 MINS

## 2024-09-05 PROCEDURE — 84484 ASSAY OF TROPONIN QUANT: CPT | Performed by: PHYSICIAN ASSISTANT

## 2024-09-05 PROCEDURE — 82805 BLOOD GASES W/O2 SATURATION: CPT | Performed by: SURGERY

## 2024-09-05 PROCEDURE — 85027 COMPLETE CBC AUTOMATED: CPT | Performed by: INTERNAL MEDICINE

## 2024-09-05 PROCEDURE — 85048 AUTOMATED LEUKOCYTE COUNT: CPT | Performed by: INTERNAL MEDICINE

## 2024-09-05 PROCEDURE — 200N000001 HC R&B ICU

## 2024-09-05 PROCEDURE — 92526 ORAL FUNCTION THERAPY: CPT | Mod: GN

## 2024-09-05 PROCEDURE — 80069 RENAL FUNCTION PANEL: CPT | Performed by: PHYSICIAN ASSISTANT

## 2024-09-05 PROCEDURE — 80069 RENAL FUNCTION PANEL: CPT | Performed by: INTERNAL MEDICINE

## 2024-09-05 PROCEDURE — 82248 BILIRUBIN DIRECT: CPT | Performed by: INTERNAL MEDICINE

## 2024-09-05 PROCEDURE — 99291 CRITICAL CARE FIRST HOUR: CPT | Performed by: INTERNAL MEDICINE

## 2024-09-05 PROCEDURE — 84100 ASSAY OF PHOSPHORUS: CPT | Performed by: INTERNAL MEDICINE

## 2024-09-05 PROCEDURE — 250N000011 HC RX IP 250 OP 636: Performed by: INTERNAL MEDICINE

## 2024-09-05 PROCEDURE — 250N000011 HC RX IP 250 OP 636

## 2024-09-05 PROCEDURE — 82330 ASSAY OF CALCIUM: CPT | Performed by: INTERNAL MEDICINE

## 2024-09-05 PROCEDURE — 250N000011 HC RX IP 250 OP 636: Performed by: PHYSICIAN ASSISTANT

## 2024-09-05 PROCEDURE — 85520 HEPARIN ASSAY: CPT | Performed by: INTERNAL MEDICINE

## 2024-09-05 PROCEDURE — 258N000003 HC RX IP 258 OP 636: Performed by: INTERNAL MEDICINE

## 2024-09-05 RX ORDER — DOBUTAMINE HYDROCHLORIDE 200 MG/100ML
0.75 INJECTION INTRAVENOUS CONTINUOUS
Status: DISCONTINUED | OUTPATIENT
Start: 2024-09-05 | End: 2024-09-10

## 2024-09-05 RX ADMIN — CALCIUM CHLORIDE, MAGNESIUM CHLORIDE, SODIUM CHLORIDE, SODIUM BICARBONATE, POTASSIUM CHLORIDE AND SODIUM PHOSPHATE DIBASIC DIHYDRATE 24.5 ML/KG/HR: 3.68; 3.05; 6.34; 3.09; .314; .187 INJECTION INTRAVENOUS at 05:17

## 2024-09-05 RX ADMIN — CEFEPIME HYDROCHLORIDE 2 G: 2 INJECTION, POWDER, FOR SOLUTION INTRAVENOUS at 04:15

## 2024-09-05 RX ADMIN — CALCIUM CHLORIDE, MAGNESIUM CHLORIDE, SODIUM CHLORIDE, SODIUM BICARBONATE, POTASSIUM CHLORIDE AND SODIUM PHOSPHATE DIBASIC DIHYDRATE 24.5 ML/KG/HR: 3.68; 3.05; 6.34; 3.09; .314; .187 INJECTION INTRAVENOUS at 08:48

## 2024-09-05 RX ADMIN — SODIUM CHLORIDE 750 MG: 9 INJECTION, SOLUTION INTRAVENOUS at 08:35

## 2024-09-05 RX ADMIN — CALCIUM CHLORIDE, MAGNESIUM CHLORIDE, SODIUM CHLORIDE, SODIUM BICARBONATE, POTASSIUM CHLORIDE AND SODIUM PHOSPHATE DIBASIC DIHYDRATE 24.5 ML/KG/HR: 3.68; 3.05; 6.34; 3.09; .314; .187 INJECTION INTRAVENOUS at 10:58

## 2024-09-05 RX ADMIN — Medication 60 ML: at 20:07

## 2024-09-05 RX ADMIN — LIDOCAINE 1 PATCH: 246 PATCH TOPICAL at 17:11

## 2024-09-05 RX ADMIN — AMIODARONE HYDROCHLORIDE 0.5 MG/MIN: 1.8 INJECTION, SOLUTION INTRAVENOUS at 07:45

## 2024-09-05 RX ADMIN — ONDANSETRON 4 MG: 2 INJECTION INTRAMUSCULAR; INTRAVENOUS at 22:12

## 2024-09-05 RX ADMIN — VANCOMYCIN HYDROCHLORIDE 125 MG: KIT at 08:35

## 2024-09-05 RX ADMIN — CALCIUM CHLORIDE, MAGNESIUM CHLORIDE, SODIUM CHLORIDE, SODIUM BICARBONATE, POTASSIUM CHLORIDE AND SODIUM PHOSPHATE DIBASIC DIHYDRATE 10 ML/KG/HR: 3.68; 3.05; 6.34; 3.09; .314; .187 INJECTION INTRAVENOUS at 06:12

## 2024-09-05 RX ADMIN — CALCIUM CHLORIDE, MAGNESIUM CHLORIDE, SODIUM CHLORIDE, SODIUM BICARBONATE, POTASSIUM CHLORIDE AND SODIUM PHOSPHATE DIBASIC DIHYDRATE 24.5 ML/KG/HR: 3.68; 3.05; 6.34; 3.09; .314; .187 INJECTION INTRAVENOUS at 07:05

## 2024-09-05 RX ADMIN — CALCIUM CHLORIDE, MAGNESIUM CHLORIDE, SODIUM CHLORIDE, SODIUM BICARBONATE, POTASSIUM CHLORIDE AND SODIUM PHOSPHATE DIBASIC DIHYDRATE 10 ML/KG/HR: 3.68; 3.05; 6.34; 3.09; .314; .187 INJECTION INTRAVENOUS at 22:04

## 2024-09-05 RX ADMIN — Medication 1 CAPSULE: at 08:36

## 2024-09-05 RX ADMIN — TRAMADOL HYDROCHLORIDE 50 MG: 50 TABLET, COATED ORAL at 20:23

## 2024-09-05 RX ADMIN — ONDANSETRON 4 MG: 2 INJECTION INTRAMUSCULAR; INTRAVENOUS at 16:10

## 2024-09-05 RX ADMIN — Medication 60 ML: at 08:44

## 2024-09-05 RX ADMIN — ONDANSETRON 4 MG: 2 INJECTION INTRAMUSCULAR; INTRAVENOUS at 09:50

## 2024-09-05 RX ADMIN — CALCIUM CHLORIDE, MAGNESIUM CHLORIDE, SODIUM CHLORIDE, SODIUM BICARBONATE, POTASSIUM CHLORIDE AND SODIUM PHOSPHATE DIBASIC DIHYDRATE 24.5 ML/KG/HR: 3.68; 3.05; 6.34; 3.09; .314; .187 INJECTION INTRAVENOUS at 19:55

## 2024-09-05 RX ADMIN — CALCIUM CHLORIDE, MAGNESIUM CHLORIDE, SODIUM CHLORIDE, SODIUM BICARBONATE, POTASSIUM CHLORIDE AND SODIUM PHOSPHATE DIBASIC DIHYDRATE 24.5 ML/KG/HR: 3.68; 3.05; 6.34; 3.09; .314; .187 INJECTION INTRAVENOUS at 13:00

## 2024-09-05 RX ADMIN — CALCIUM CHLORIDE, MAGNESIUM CHLORIDE, SODIUM CHLORIDE, SODIUM BICARBONATE, POTASSIUM CHLORIDE AND SODIUM PHOSPHATE DIBASIC DIHYDRATE 24.5 ML/KG/HR: 3.68; 3.05; 6.34; 3.09; .314; .187 INJECTION INTRAVENOUS at 15:04

## 2024-09-05 RX ADMIN — CALCIUM CHLORIDE, MAGNESIUM CHLORIDE, SODIUM CHLORIDE, SODIUM BICARBONATE, POTASSIUM CHLORIDE AND SODIUM PHOSPHATE DIBASIC DIHYDRATE 200 ML/HR: 3.68; 3.05; 6.34; 3.09; .314; .187 INJECTION INTRAVENOUS at 17:08

## 2024-09-05 RX ADMIN — CALCIUM CHLORIDE, MAGNESIUM CHLORIDE, SODIUM CHLORIDE, SODIUM BICARBONATE, POTASSIUM CHLORIDE AND SODIUM PHOSPHATE DIBASIC DIHYDRATE 24.5 ML/KG/HR: 3.68; 3.05; 6.34; 3.09; .314; .187 INJECTION INTRAVENOUS at 01:27

## 2024-09-05 RX ADMIN — CALCIUM CHLORIDE, MAGNESIUM CHLORIDE, SODIUM CHLORIDE, SODIUM BICARBONATE, POTASSIUM CHLORIDE AND SODIUM PHOSPHATE DIBASIC DIHYDRATE 24.5 ML/KG/HR: 3.68; 3.05; 6.34; 3.09; .314; .187 INJECTION INTRAVENOUS at 22:04

## 2024-09-05 RX ADMIN — Medication 1 CAPSULE: at 20:09

## 2024-09-05 RX ADMIN — CALCIUM CHLORIDE, MAGNESIUM CHLORIDE, SODIUM CHLORIDE, SODIUM BICARBONATE, POTASSIUM CHLORIDE AND SODIUM PHOSPHATE DIBASIC DIHYDRATE 24.5 ML/KG/HR: 3.68; 3.05; 6.34; 3.09; .314; .187 INJECTION INTRAVENOUS at 03:21

## 2024-09-05 RX ADMIN — CALCIUM CHLORIDE, MAGNESIUM CHLORIDE, SODIUM CHLORIDE, SODIUM BICARBONATE, POTASSIUM CHLORIDE AND SODIUM PHOSPHATE DIBASIC DIHYDRATE 10 ML/KG/HR: 3.68; 3.05; 6.34; 3.09; .314; .187 INJECTION INTRAVENOUS at 11:09

## 2024-09-05 RX ADMIN — CALCIUM CHLORIDE, MAGNESIUM CHLORIDE, SODIUM CHLORIDE, SODIUM BICARBONATE, POTASSIUM CHLORIDE AND SODIUM PHOSPHATE DIBASIC DIHYDRATE 10 ML/KG/HR: 3.68; 3.05; 6.34; 3.09; .314; .187 INJECTION INTRAVENOUS at 01:15

## 2024-09-05 RX ADMIN — CALCIUM CHLORIDE, MAGNESIUM CHLORIDE, SODIUM CHLORIDE, SODIUM BICARBONATE, POTASSIUM CHLORIDE AND SODIUM PHOSPHATE DIBASIC DIHYDRATE 24.5 ML/KG/HR: 3.68; 3.05; 6.34; 3.09; .314; .187 INJECTION INTRAVENOUS at 18:02

## 2024-09-05 RX ADMIN — PANTOPRAZOLE SODIUM 40 MG: 40 INJECTION, POWDER, FOR SOLUTION INTRAVENOUS at 06:30

## 2024-09-05 RX ADMIN — ONDANSETRON 4 MG: 2 INJECTION INTRAMUSCULAR; INTRAVENOUS at 04:15

## 2024-09-05 ASSESSMENT — ACTIVITIES OF DAILY LIVING (ADL)
ADLS_ACUITY_SCORE: 44
ADLS_ACUITY_SCORE: 40
ADLS_ACUITY_SCORE: 44
ADLS_ACUITY_SCORE: 40
ADLS_ACUITY_SCORE: 44
ADLS_ACUITY_SCORE: 40
ADLS_ACUITY_SCORE: 44
ADLS_ACUITY_SCORE: 44
ADLS_ACUITY_SCORE: 40
ADLS_ACUITY_SCORE: 44
ADLS_ACUITY_SCORE: 40
ADLS_ACUITY_SCORE: 40

## 2024-09-05 NOTE — PROGRESS NOTES
CLINICAL NUTRITION SERVICES - BRIEF NOTE    EVALUATION OF THE PROGRESS TOWARD GOALS   Diet: NPO  Nutrition Support: Enteral Nutrition - TF Novasource Renal at goal 35 ml/hr x 24  hrs + 2 pkt Prosource TF20   FWF: 60 ml q 4 hrs   TF + Modulars Provide: 840 ml/day, 1840 kcal, 116 g protein, 154 g cho, 84 g fat, 0 g fiber, 602 ml free water + 360 ml free water from flushes = 962 ml/day  Intake/Tolerance:   Pt tolerating tube feeds at goal 35 ml/hr, at goal rater since this AM. Pt with some nausea and report abdomen feeling empty. Pt requesting to eat po, working with SLP     Discussed pt in care rounds this AM. Pt remains on CRRT. Tube feed initiated yesterday 9/4, at goal rate this AM. Plan for VFSS as able per nsg with SLP following.     Edema: Trace/Mild/Moderate hands, BLE  Rectal Tube OP: 625 ml AM, 20 ml 9/4   I/Os: -7.8 L since 8/22  Most Recent Weight: 90.5 kg (199 lb 8.3 oz)   Labs: Mg 2.6(H), (H), (H), Bili Direct 0.59(H),     MALNUTRITION  % Weight Loss:  Difficult to assess  % Intake:  <75% for > 7 days (moderate malnutrition)  Subcutaneous Fat Loss:  Mild Buccal region depletion   Muscle Loss:  Temporal region moderate depletion, Clavicle bone region mild/moderate depletion, and Acromion bone region mild/moderate depletion  Fluid Retention:  Trace/Mild/Moderate hands, BLE    Malnutrition Diagnosis: Moderate malnutrition  In Context of:  Acute illness or injury    INTERVENTIONS  Implementation  Enteral Nutrition - Continue Current TF Regimen at goal rate     Nutrition Education- Will provide as able Post CV Surgery     Monitoring/Evaluation  Progress toward goals will be monitored and evaluated per protocol.

## 2024-09-05 NOTE — PROGRESS NOTES
"Care Management Follow Up    Length of Stay (days): 20    Expected Discharge Date: 09/10/2024    Anticipated Discharge Plan:  TBD    Transportation: TBD    PT Recommendations:    OT Recommendations:        Barriers to Discharge: medical stability  Prior Living Situation:    \"Patient resides in a house with her  and is reported as mostly independent when at baseline.  assists with transport and as needed in general. No DME use or current in-home/outpatient services identified. \"     Discussed  Partnership in Safe Discharge Planning  document with patient/family: No     Handoff Completed: No, handoff not indicated or clinically appropriate    Patient/Spokesperson Updated: No    Additional Information:  Discussed patient in ICU rounds. Remains on CRRT and TF.     Per ID note, will need 6 weeks of IV abx.     Referral to LTACH pending.     Next Steps: follow for medical progression and recommendations    Sylvia Reyes RN     "

## 2024-09-05 NOTE — PROGRESS NOTES
Pulmonary/Critical Care Consult Team Note    Felicitas Elliott,  1940, MRN [id number]  Admitting Dx: Valve Vegetation  Endocarditis  Bacteremia  Sepsis (H)  Postsurgical percutaneous transluminal coronary angioplasty status  Nonrheumatic aortic valve stenosis  Coronary artery disease involving native coronary artery of native heart, unspecified whether angina present  Mitral valve disorder  Date / Time of Admission:  2024  4:20 PM    Recent Events:  Intake/Output last 3 shifts:  I/O last 3 completed shifts:  In: 2457.6 [I.V.:1727.6; Other:185; NG/GT:323]  Out: 5464.7 [Urine:105; Other:4939.7; Stool:420]  She is comfortable  Reading and has family in room  She does cough and gag with certain PO food  Remains of CRRT, will clot off intermittently    Assessment/Plan: Felicitas Elliott is a 84 year old female with PMHx of HTN, HFpEF, moderate aortic stenosis, CKD 3, chronic intertriginous skin wounds, chronic neck pain.     Presented 24 for chills & generalized weakness. 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 24 s/p bioprosthetic aortic & mitral valve replacement, aortic root replacement, & two vessel coronary artery bypass graft. Course complicated by post operative hypoxemic respiratory failure due to cardiogenic pulmonary edema, atelectasis, & Enterobacter hospital acquired pneumonia. Treated for both cardiogenic-distributive shock, oliguric HAMMAD requiring CRRT, euglycemic ketoacidosis, & ischemic hepatitis. Patient was extubated on .    ID: MSSA bacteremia and Enterobacter pneumonia   - ID following and appreciate recs  - Cefepime stopped  due to concerns of dropping PLT count   - on Oral and IV Vancomycin - will need 6wks of IV Abx    CV: multivessel coronary artery disease. 24 s/p bioprosthetic aortic & mitral valve  replacement, aortic root replacement, & two vessel coronary artery bypass graft.  - cardiology following  - inotrope support with dobutamine fixed dose 1.5  - off amiodarone    Acute renal failure  - continue CRRT, pulling 150cc/hr  - nephrology following  - not making urine with lasix challenge    Pulm: Acute resp failure - multifactorial (PNA, pEdema, atelectasis) extubated 9/1  - no reintubation if she deteriorates    GI: Acute hepatic Injury likely due to ischemia  - trending down  - GI was consulted and has signed off    FEN: concern for dysphagia  - NJ in place, tube feeds  - PPI for ppx    Heme: Thrombocytopenia   - HIPA negative. Likely drug induced thrombocytopenia.   - switched Abx 9/4, will follow counts    DVT prophylaxis , SCDs, anticoagulation on hold due to thrombocytopenia     Total critical care time, not including separately billable procedure time: 45 minutes  This patient had a high probability of imminent or life threatening deterioration due to acute respiratory failure which required my direct attention, intervention and personal management.     Risk Factors Present on Admission:  Clinically Significant Risk Factors          # Hypocalcemia: Lowest Ca = 7.6 mg/dL in last 2 days, will monitor and replace as appropriate    # Anion Gap Metabolic Acidosis: Highest Anion Gap = 22 mmol/L in last 2 days, will monitor and treat as appropriate  # Hypoalbuminemia: Lowest albumin = 3.1 g/dL at 9/1/2024  1:45 PM, will monitor as appropriate  # Coagulation Defect: INR = 1.49 (Ref range: 0.85 - 1.15) and/or PTT = 52 Seconds (Ref range: 22 - 38 Seconds), will monitor for bleeding  # Thrombocytopenia: Lowest platelets = 32 in last 2 days, will monitor for bleeding          #Acute blood loss anemia: Lowest Hgb this hospitalization: 6.9 g/dL. Will continue to monitor and treat/transfuse as appropriate.     # Obesity: Estimated body mass index is 31.25 kg/m  as calculated from the following:    Height as of this  "encounter: 1.702 m (5' 7\").    Weight as of this encounter: 90.5 kg (199 lb 8.3 oz).      # Financial/Environmental Concerns:     # History of CABG: noted on surgical history            Code Status: No CPR- Do NOT Intubate         Jenifer Nance, DO  Pulmonary and Critical Care Attending  pgr [phone]    Allergies   Allergen Reactions    Aspirin Rash    Cats Rash    Nickel Rash       Meds: See MAR    Physical Exam:  /56   Pulse 92   Temp 98.4  F (36.9  C) (Pulmonary Artery)   Resp 22   Ht 1.702 m (5' 7\")   Wt 90.5 kg (199 lb 8.3 oz)   SpO2 96%   BMI 31.25 kg/m    Intake/Output this shift:  I/O this shift:  In: 407.8 [I.V.:191.8; NG/GT:140]  Out: 716.6 [Urine:5; Other:636.6; Stool:75]  GEN: sitting up in bed, NAD  HEENT: MMM, NG in place  CVS: regular rhythm, no murmurs  RESP: CTA BL, poor air mvmt   ABD: Soft, No abdominal pain with palpation, no guarding, no rigidity  EXT: Warm, well perfused, trace dependent LE edema   NEURO: moving all extremities, nonfocal  PSYCH: pleasant    Pertinent Labs: Latest lab results in EHR personally reviewed.   CMP  Recent Labs   Lab 09/05/24 0413 09/04/24 2232 09/04/24 2010 09/04/24 2008 09/04/24  1354 09/04/24  1130 09/04/24 0414 09/04/24  0411     136 138  --  137 140 139  --  139  139   POTASSIUM 3.8  3.8 4.0  --  4.1 4.3 4.3  --  4.2  4.2   CHLORIDE 102  102 102  --  103 102 102  --  101  101   CO2 21*  21* 20*  --  19* 18* 18*  --  18*  18*   ANIONGAP 13  13 16*  --  15 20* 19*  --  20*  20*   *  134* 121* 102* 105* 94 100*   < > 109*  109*   BUN 14.3  14.3 15.0  --  13.3 12.2 12.9  --  14.6  14.6   CR 0.95  0.95 0.97*  --  0.98* 0.97* 0.97*  --  1.10*  1.10*   GFRESTIMATED 59*  59* 57*  --  57* 57* 57*  --  49*  49*   KAELYN 8.1*  8.1* 8.0*  --  8.0* 7.9* 7.9*  --  7.9*  7.9*   MAG 2.6*  --   --  2.6*  --  2.4*  --  2.4*   PHOS 3.6  --   --  3.8  --  3.6  --  3.7   PROTTOTAL 6.1* 6.2*  --   --  6.1*  --   --  6.0*   ALBUMIN " 3.5  3.5 3.5  --  3.4* 3.4* 3.5  --  3.5  3.5   BILITOTAL 1.1 1.2  --   --  1.2  --   --  1.1   ALKPHOS 75 69  --   --  67  --   --  63   * 345*  --   --  382*  --   --  485*   * 203*  --   --  222*  --   --  235*    < > = values in this interval not displayed.     CBC  Recent Labs   Lab 09/05/24  0413 09/04/24 2008 09/04/24  1130 09/04/24 0411   WBC 16.3* 16.4* 16.3* 15.1*   RBC 2.62* 2.68* 2.86* 2.82*   HGB 8.3* 8.3* 8.7* 8.5*   HCT 25.5* 26.2* 27.5* 26.9*   MCV 97 98 96 95   MCH 31.7 31.0 30.4 30.1   MCHC 32.5 31.7 31.6 31.6   RDW 19.9* 19.5* 19.3* 18.7*   PLT 32* 33* 36* 37*     INR  Recent Labs   Lab 09/05/24  0626 09/04/24  0608 09/03/24  0411 09/02/24  0420   INR 1.49* 1.67* 1.60* 1.48*     Arterial Blood Gas  Recent Labs   Lab 09/05/24  0444 09/04/24  0433 09/02/24  0558 09/01/24  1557 08/31/24  0512 08/31/24  0018 08/30/24  1551 08/29/24  2231 08/29/24  1352   PH  --   --   --  7.37  --  7.41 7.46*  --  7.29*   PCO2  --   --   --  36  --  30* 31*  --  35   PO2  --   --   --  85  --  108* 68*  --  72*   HCO3  --   --   --  21  --  19* 22  --  17*   O2PER 21 21 30 30   < > 35 35   < > 70    < > = values in this interval not displayed.       Cultures: personally reviewed.   7-Day Micro Results    Collected Updated Procedure Result Status    08/30/2024 1020 09/04/2024 1207 Blood Culture Hand, Left [[id number]]   Blood from Hand, Left    Final result Component Value   Culture No Growth             08/30/2024 0117 08/30/2024 0357 C. difficile Toxin B PCR with reflex to C. difficile Antigen and Toxins A/B EIA [[id number]]    Stool from Per Rectum    Final result Component Value   C Difficile Toxin B by PCR Negative   A negative result does not exclude actual disease due to C. difficile and may be due to improper collection, handling and storage of the specimen or the number of organisms in the specimen is below the detection limit of the assay.              Imaging: personally reviewed.    Results for orders placed during the hospital encounter of 08/16/24    XR Chest Port 1 View    Narrative  EXAM: XR CHEST PORT 1 VIEW  LOCATION: [facility]  DATE: 8/29/2024    INDICATION: evaluate position of ETT  COMPARISON: Film earlier today.    Impression  IMPRESSION: Patient's been reintubated. Endotracheal tube is in the mid trachea, 6 cm from the anuj.    Poststernotomy changes with AVR and MVR with mediastinal and pleural chest tubes. Right IJ [place] catheter is in the main pulmonary outflow tract.    Trace bilateral pleural effusions, decreased on the right. No pneumothorax. Heart and pulmonary vascularity are normal.      XR Chest Port 1 View    Narrative  EXAM: XR CHEST PORT 1 VIEW  LOCATION: [facility]  DATE: 8/29/2024    INDICATION: Increased FiO2.  COMPARISON: Portable chest single view 8/28/2024 at 0527 hours.    Impression  IMPRESSION: The patient has been extubated. Sternotomy, CABG and aortic valve replacement. Mediastinal drain and bilateral chest tubes. Normal cardiac size. Mild venous congestion. Small right pleural effusion has developed with associated passive  atelectasis right base. Mild platelike atelectasis left base. No appreciable effusion on the left. Atherosclerotic thoracic aorta. Right PICC tip in the SVC. Monitoring leads overlying the chest. Degenerative changes both shoulders and the spine.    Patient Active Problem List   Diagnosis    Sepsis with acute renal failure, due to unspecified organism, unspecified acute renal failure type, unspecified whether septic shock present (H)    Bacteremia    Endocarditis    Sepsis (H)    Borderline hypertension    CKD stage 3b, GFR 30-44 ml/min (H)    Mixed hyperlipidemia    Spinal stenosis of cervicothoracic region    Nonrheumatic aortic valve stenosis    Postsurgical percutaneous transluminal coronary angioplasty status    Mitral valve disorder    Coronary artery disease involving native  coronary artery of native heart, unspecified whether angina present     Jenifer Nance, DO  Pulmonary and Critical Care Attending  [facility] [phone]    Securely message with the Vocera Web Console (learn more here)

## 2024-09-05 NOTE — PLAN OF CARE
Goal Outcome Evaluation:      Plan of Care Reviewed With: patient    Overall Patient Progress: improvingOverall Patient Progress: improving     [facility] - ICU    RN Progress Note:            Pertinent Assessments:      Please refer to flowsheet rows for full assessment     Remains afebrile.  Some complaints of intermittent nausea and abdominal discomfort but no vomiting.  Reports she thinks she may be hungry. Tube feeds advanced to goal rate of 35 at 0800.  Drowsy on and off today but oriented x4.  Moderate edema still noted in extremeties- continuing to pull fluid and actually increased to 200 ml/hr.  100% vpaced.           Key Events - This Shift:       Amio discontinued. [place] line removed but capped.  Internal jugular remains in place.  CRRT continues and patient tolerating increase pulling of fluids.  Patient and family discussed long term dialysis with Dr. Jung.  Patient not sure that she would want this however family is motivating her to try.  Will continue with discussions over the next few days.  Minimal to no urine.  Loose stools continue and dignicare in place.  Remains on dobutamine fixed rate.  Worked with speech therapy today and tolerated a little more oral intake of clears.  Remains NPO on tube feeds until speech can work more in depth with her.                 Barriers to Discharge / Downgrade:     CRRT        Point of Contact Update: YES-OR-NO: Yes  If No, reason:   Name:Ed  Phone Number:see chart  Summary of Conversation: at bedside and updated throughout the day

## 2024-09-05 NOTE — PROGRESS NOTES
[facility]  [facility]   Follow up    Felicitas Elliott   MRN: [id number]  : 1940   DOA: 2024   CC: ARF      Assessment and Plan:  84 year old female    ARF/CKD: has underlying CKD with baseline CR 1.3-1.8; now ARF in the setting of cardiac surgery;   tolerating CRRT and good control of lytes/acidosis; continues to have very poor urine output in the setting of volume overload; still feel that continued slow UF with CRRT will promote more hemodynamic stability than IHD  Volume status; elevated; UF with CRRT at 150 cchour--will increase rate to 200 ml/hour; diuretics challenge without response  MSSA bacteremia; on abx and ID following  MV and AV infective endocarditis , 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: s/p biprothetic AoV and MV replacement, Aortic root replacement and 2vCABG  HoTN with septic and cardiogenic shock; resolved HoTN; on dobutamine and off other pressors  Acidosis; lactate and ARF and starvation ketoacidosis; improved with high dose CRRT  Anemia; following hgb  Hyperlipid; statin on hold  CAD: s/p CAB 2 v as part of valve surgery  A fib; on amio gtt  Resp failure; extubated; no plan to reintubate if deteriorates  Hepatitis; s/p acetylcysteine  Dispo; DNR, DNI; pt expressing concern about continued aggressive cares; d/w pt and family at length today re; acute and chronic dialysis  Nausea; try scheduled anti-emetics for 24 hours      Subjective  Multiple family members present today; pt is feeling more down and SOB today; overall weak  She is not sure she wants to continue with aggressive cares; says she will keep going until Saturday and then make a decision  Objective    Vital signs in last 24 hours  Temp:  [98.4  F (36.9  C)-99.1  F (37.3  C)] 98.6  F (37  C)  Pulse:  [92] 92  Resp:  [13-27] 19  BP: (101-147)/(55-76) 109/55  SpO2:  [83 %-100 %] 97 %      Intake/Output last 3 shifts  I/O last 3 completed shifts:  In:  2457.6 [I.V.:1727.6; Other:185; NG/GT:323]  Out: 5464.7 [Urine:105; Other:4939.7; Stool:420]  Intake/Output this shift:  I/O this shift:  In: 651.3 [I.V.:400.3; NG/GT:140]  Out: 1083.9 [Urine:5; Other:1003.9; Stool:75]    Physical Exam  Alert/oriented x 3, awake, NAD; appears tired; on O2  CV: RRR without murmur or rub  Lung:  decreased at bases  Ab: soft and NT; not distended; normal bs  Ext: ++edema and well perfused  Skin; no rash    Pertinent Labs     Last Renal Panel:  Sodium   Date Value Ref Range Status   09/05/2024 136 135 - 145 mmol/L Final   09/05/2024 136 135 - 145 mmol/L Final     Potassium   Date Value Ref Range Status   09/05/2024 3.8 3.4 - 5.3 mmol/L Final   09/05/2024 3.8 3.4 - 5.3 mmol/L Final     Potassium POCT   Date Value Ref Range Status   08/27/2024 3.4 3.4 - 5.3 mmol/L Final     Chloride   Date Value Ref Range Status   09/05/2024 102 98 - 107 mmol/L Final   09/05/2024 102 98 - 107 mmol/L Final     Carbon Dioxide (CO2)   Date Value Ref Range Status   09/05/2024 21 (L) 22 - 29 mmol/L Final   09/05/2024 21 (L) 22 - 29 mmol/L Final     Anion Gap   Date Value Ref Range Status   09/05/2024 13 7 - 15 mmol/L Final   09/05/2024 13 7 - 15 mmol/L Final     Glucose   Date Value Ref Range Status   09/05/2024 134 (H) 70 - 99 mg/dL Final   09/05/2024 134 (H) 70 - 99 mg/dL Final     GLUCOSE BY METER POCT   Date Value Ref Range Status   09/04/2024 102 (H) 70 - 99 mg/dL Final     Urea Nitrogen   Date Value Ref Range Status   09/05/2024 14.3 8.0 - 23.0 mg/dL Final   09/05/2024 14.3 8.0 - 23.0 mg/dL Final     Creatinine   Date Value Ref Range Status   09/05/2024 0.95 0.51 - 0.95 mg/dL Final   09/05/2024 0.95 0.51 - 0.95 mg/dL Final     GFR Estimate   Date Value Ref Range Status   09/05/2024 59 (L) >60 mL/min/1.73m2 Final     Comment:     eGFR calculated using 2021 CKD-EPI equation.   09/05/2024 59 (L) >60 mL/min/1.73m2 Final     Comment:     eGFR calculated using 2021 CKD-EPI equation.  eGFR calculated using 2021  CKD-EPI equation.     Calcium   Date Value Ref Range Status   09/05/2024 8.1 (L) 8.8 - 10.4 mg/dL Final     Comment:     Reference intervals for this test were updated on 7/16/2024 to reflect our healthy population more accurately. There may be differences in the flagging of prior results with similar values performed with this method. Those prior results can be interpreted in the context of the updated reference intervals.   09/05/2024 8.1 (L) 8.8 - 10.4 mg/dL Final     Comment:     Reference intervals for this test were updated on 7/16/2024 to reflect our healthy population more accurately. There may be differences in the flagging of prior results with similar values performed with this method. Those prior results can be interpreted in the context of the updated reference intervals.  Reference intervals for this test were updated on 7/16/2024 to reflect our healthy population more accurately. There may be differences in the flagging of prior results with similar values performed with this method. Those prior results can be interpreted in the context of the updated reference intervals.     Phosphorus   Date Value Ref Range Status   09/05/2024 3.6 2.5 - 4.5 mg/dL Final     Albumin   Date Value Ref Range Status   09/05/2024 3.5 3.5 - 5.2 g/dL Final   09/05/2024 3.5 3.5 - 5.2 g/dL Final     Recent Labs   Lab 09/05/24  0413 09/04/24 2008 09/04/24  1130 09/04/24  0411 09/03/24  1948   HGB 8.3* 8.3* 8.7* 8.5* 7.3*          I reviewed all lab results  Cheryle Jung MD

## 2024-09-05 NOTE — PLAN OF CARE
Problem: Risk for Delirium  Goal: Improved Sleep  Outcome: Progressing     Problem: Sepsis/Septic Shock  Goal: Absence of Bleeding  Outcome: Progressing  Intervention: Monitor and Manage Bleeding    Problem: Cardiovascular Surgery  Goal: Fluid and Electrolyte Balance  Outcome: Progressing       Goal Outcome Evaluation:  [facility] - ICU    RN Progress Note:            Pertinent Assessments:      Please refer to flowsheet rows for full assessment     Stable VS. Low grade fever at times. 2L NC.            Key Events - This Shift:       -Pulling 150 ml/hr on CRRT, pt tolerating well.  -CRRT filter/circuit changed x1 this shift due to high filter pressures, blood was returned.  -Tolerating tube feeding well.   -Platelet level 32.              Barriers to Discharge / Downgrade:     CRRT

## 2024-09-05 NOTE — PHARMACY-ANTICOAGULATION SERVICE
Clinical Pharmacy - Warfarin Dosing Consult     Pharmacy has been consulted to manage this patient s warfarin therapy.  Indication: Bioprosthetic Heart Valve  Therapy Goal: INR 2-3  Provider/Team: cardiothoracic surgery  Significant drug interactions: tramadol and ceftazidime  Dose Comments: Goal INR range updated from 2.5-3.5 to 2-3 due to bioprosthetic valve    INR   Date Value Ref Range Status   09/05/2024 1.49 (H) 0.85 - 1.15 Final   09/04/2024 1.67 (H) 0.85 - 1.15 Final       Warfarin still on hold. INR goal range updated.  Pharmacy will monitor Felicitas SOM Elliott daily and order warfarin doses to achieve specified goal.      Please contact pharmacy as soon as possible if the warfarin needs to be held for a procedure or if the warfarin goals change.

## 2024-09-05 NOTE — PROGRESS NOTES
[facility] Inpatient follow up        09/05/2024    Chart reviewed  Patient seen in ICU             Assessment and Recommendations:   Assessment:  Felicitas Elliott is a 84 year old female with     Respiratory failure, shock liver  Decreased urine output, on dobutamine norepinephrine and vasopressin  Intubated  History of severe aortic stenosis  S/P aortic root abscess debridement and aortic annular reconstruction, aortic root replacement, bioprosthetic aortic valve on 8/27/2024  Acute respiratory failure, extubated 8/28/2024, reintubated 8/29/2024, acute kidney injury, currently on CRRT- Gram negative bacilli in resp culture-     2+ Enterobacter cloacae complex, S cefepime, R ceftriaxone     MRSA nares negative, respiratory PCR negative  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  MSSA bacteremia.  Positive blood culture on 8/14/2024 and 8/16.  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  With the size of the vegetation combined with brain infarct, status post CV surgery, see details below active issue.   Neck pain worrisome for cervical discitis.  Neck MRI showed some edema at C4-C5 on the left side.  No clear evidence of infection.  Abnormal brain MRI suggestive of subacute infarct. In a patient with MSSA bacteremia and aortic valve endocarditis, this is cerebral septic emboli. Active issue.     POSTOPERATIVE DIAGNOSES:  1.  Severe aortic stenosis and moderate aortic regurgitation.  2.  Severe mitral stenosis.  3.  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  4.  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  5.  Severe multivessel coronary artery disease.  6.  Aortic root abscess.      PROCEDURE PERFORMED: 8/27/24  Aortic root abscess debridement and  aortic annular reconstruction.  Aortic root replacement (Konect composite 25 mm Silva Inspiris Resilia bioprosthetic valve within 30 mm Gelweave Valsalva graft with reimplantation of the coronary arteries).  Mitral valve replacement (31 mm St. Tim Silva bioprosthetic valve).  Coronary artery bypass grafting x 2 (reversed saphenous vein graft to the obtuse marginal branch of the left circumflex coronary artery, and pedicled left internal mammary artery to left anterior descending coronary artery).  Endoscopic vein harvest of the greater saphenous vein from the left lower extremity.    Recommendations:  Cefepime for now, would likely plan this for enterobacter plus vancomycin for MSSA coverage 7 days (until 9/5), then continue vancomycin.   Off nafcillin. Per previous notes plan IV antibiotics duration 6 weeks--Until 10/8/2024  Currently extubated,   Stopped oral vanco  PICC placed 8/21  Monitor CBC, CMP--thrombocytopenia, seems to be coincident with cefepime usage.   Status post vascular surgery, chest tube, CV surgery following closely  Will need to check immunoglobin level in 4 to 6 weeks sister with history of hypogammaglobulinemia   Overall will require at least 6 weeks of IV antibiotics, holding nafcillin due to acute kidney injury    HEAVEN AGUIRRE MD   [facility]  Answering Service: [phone]  On-Call ID provider: [phone], option: 9              Interval History:     HPI: Extubated.  sister here.  The pt is totally lucid.  CRRT as in renal note. Feels sore.       Recent Inflammatory Biomarkers:   Recent Labs   Lab Test 09/05/24 0413 09/04/24 2008 09/04/24  1130 09/04/24 0411 09/03/24 1948 09/03/24  1130 08/30/24  0437 08/29/24  0359   PCAL  --   --   --   --   --   --   --  1.24*   WBC 16.3* 16.4* 16.3* 15.1* 14.1* 13.2*   < > 15.3*    < > = values in this interval not displayed.            Review of Systems:      Negative except for findings in the HPI.            Current Medications (antimicrobials listed in bold):     Current Facility-Administered Medications   Medication Dose Route Frequency Provider Last Rate Last Admin    [Held by provider] acetaminophen (TYLENOL) tablet 975 mg  975 mg Oral Q8H Maryam Anthony PA-C   975 mg at 08/30/24 1336    [Held by provider] atorvastatin (LIPITOR) tablet 80 mg  80 mg Oral QPM Jessica Tolbert PA-C   80 mg at 08/29/24 2038    [Held by provider] clopidogrel (PLAVIX) tablet 75 mg  75 mg Oral Daily Maryam Anthony PA-C        sennosides (SENOKOT) syrup 5 mL  5 mL Oral BID Aurelio Huang MD   5 mL at 09/04/24 2022    And    docusate (COLACE) 50 MG/5ML liquid 50 mg  50 mg Oral BID Aurelio Huang MD   50 mg at 09/04/24 2027    lactobacillus rhamnosus (GG) (CULTURELL) capsule 1 capsule  1 capsule Oral or Feeding Tube BID Aurelio Huang MD   1 capsule at 09/05/24 0836    Lidocaine (LIDOCARE) 4 % Patch 1-2 patch  1-2 patch Transdermal Q24H Maryam Anthony PA-C   1 patch at 09/04/24 1753    [Held by provider] metoprolol tartrate (LOPRESSOR) half-tab 12.5 mg  12.5 mg Oral BID Jessica Tolbert PA-C        ondansetron (ZOFRAN) injection 4 mg  4 mg Intravenous Q6H Cheryle Jung MD   4 mg at 09/05/24 0950    pantoprazole (PROTONIX) 2 mg/mL suspension 40 mg  40 mg Oral or NG Tube QAM AC Maryam Anthony PA-C        Or    pantoprazole (PROTONIX) EC tablet 40 mg  40 mg Oral QAM AC Maryam Anthony PA-C        Or    pantoprazole (PROTONIX) IV push injection 40 mg  40 mg Intravenous QAM AC Maryam Anthony PA-C   40 mg at 09/05/24 0630    polyethylene glycol (MIRALAX) Packet 17 g  17 g Oral or Feeding Tube Daily ZeAurelio Mendoza MD        Prosource TF20 ENfit Compatibl EN LIQD (PROSOURCE TF20) packet 60 mL  1 packet Per Feeding Tube BID Aurelio Huang MD   60 mL at 09/05/24 0844    sodium chloride (PF) 0.9% PF flush 3 mL  3 mL Intracatheter  Q8H Gondal, Saad J, MD   3 mL at 09/05/24 0836    vancomycin (FIRVANQ) oral solution 125 mg  125 mg Oral or Feeding Tube BID Aurelio Huang MD   125 mg at 09/05/24 0835    vancomycin (VANCOCIN) 750 mg in sodium chloride 0.9 % 250 mL intermittent infusion  750 mg Intravenous Q24H Zachary Gonzalez MD   750 mg at 09/05/24 0835    [Held by provider] Warfarin Dose Required Daily - Pharmacist Managed  1 each Oral See Admin Instructions Jessica Tolbert PA-C                  Allergies:     Allergies   Allergen Reactions    Aspirin Rash    Cats Rash    Nickel Rash            Physical Exam:   Vitals were reviewed  Patient Vitals for the past 24 hrs:   BP Temp Temp src Pulse Resp SpO2 Weight   09/05/24 1130 128/65 -- -- 92 (!) 32 97 % --   09/05/24 1100 118/60 -- -- 92 (!) 102 97 % --   09/05/24 1059 118/60 -- -- 92 (!) 118 97 % --   09/05/24 1000 109/55 98.6  F (37  C) Pulm Art 92 19 97 % --   09/05/24 0930 122/56 -- -- 92 22 96 % --   09/05/24 0900 138/65 98.4  F (36.9  C) Pulm Art 92 23 96 % --   09/05/24 0830 121/57 -- -- 92 20 96 % --   09/05/24 0800 131/65 98.6  F (37  C) Pulm Art 92 20 97 % --   09/05/24 0730 137/64 -- -- 92 19 96 % --   09/05/24 0700 105/55 98.8  F (37.1  C) -- 92 15 96 % --   09/05/24 0630 130/62 -- -- 92 18 96 % --   09/05/24 0600 117/59 99  F (37.2  C) Pulm Art 92 15 99 % --   09/05/24 0530 116/56 -- -- 92 15 97 % --   09/05/24 0500 125/65 98.4  F (36.9  C) -- 92 17 97 % --   09/05/24 0430 112/56 -- -- 92 15 98 % --   09/05/24 0400 (!) 140/70 98.4  F (36.9  C) Pulm Art 92 20 96 % 90.5 kg (199 lb 8.3 oz)   09/05/24 0330 101/57 -- -- 92 13 96 % --   09/05/24 0300 131/69 98.6  F (37  C) -- 92 16 96 % --   09/05/24 0230 104/57 -- -- 92 15 96 % --   09/05/24 0200 118/60 98.8  F (37.1  C) -- 92 15 100 % --   09/05/24 0145 115/56 -- -- 92 16 98 % --   09/05/24 0130 124/64 -- -- 92 20 95 % --   09/05/24 0115 128/63 -- -- 92 19 96 % --   09/05/24 0100 131/61 99  F (37.2  C) -- 92 20 95 %  --   09/05/24 0045 124/61 -- -- 92 17 95 % --   09/05/24 0030 125/64 -- -- 92 18 95 % --   09/05/24 0015 124/61 -- -- 92 20 95 % --   09/05/24 0000 121/58 99  F (37.2  C) Pulm Art 92 17 95 % --   09/04/24 2345 115/57 -- -- 92 17 98 % --   09/04/24 2330 125/61 -- -- 92 18 96 % --   09/04/24 2315 124/59 -- -- 92 18 96 % --   09/04/24 2300 130/63 99  F (37.2  C) -- 92 19 95 % --   09/04/24 2245 119/56 -- -- 92 16 94 % --   09/04/24 2230 116/59 -- -- 92 19 95 % --   09/04/24 2215 117/57 -- -- 92 24 93 % --   09/04/24 2200 132/67 99.1  F (37.3  C) -- 92 20 95 % --   09/04/24 2145 134/66 -- -- 92 20 94 % --   09/04/24 2130 134/68 -- -- 92 21 (!) 83 % --   09/04/24 2115 106/57 -- -- 92 18 97 % --   09/04/24 2100 110/55 98.8  F (37.1  C) -- 92 17 94 % --   09/04/24 2045 113/56 -- -- 92 23 94 % --   09/04/24 2030 125/59 -- -- 92 21 100 % --   09/04/24 2015 133/61 -- -- 92 17 96 % --   09/04/24 2000 124/59 98.6  F (37  C) Pulm Art 92 17 97 % --   09/04/24 1945 128/58 -- -- 92 21 98 % --   09/04/24 1930 120/57 -- -- 92 17 96 % --   09/04/24 1915 117/58 -- -- 92 20 99 % --   09/04/24 1900 119/59 98.6  F (37  C) Oral 92 18 96 % --   09/04/24 1845 109/56 -- -- 92 19 99 % --   09/04/24 1830 115/55 -- -- 92 20 100 % --   09/04/24 1815 120/59 -- -- 92 22 98 % --   09/04/24 1800 118/59 98.6  F (37  C) Pulm Art 92 21 98 % --   09/04/24 1745 113/56 -- -- 92 24 96 % --   09/04/24 1730 (!) 141/67 -- -- 92 21 97 % --   09/04/24 1715 120/58 -- -- 92 17 100 % --   09/04/24 1700 116/56 -- -- 92 19 98 % --   09/04/24 1645 131/63 -- -- 92 23 96 % --   09/04/24 1630 131/62 -- -- 92 16 98 % --   09/04/24 1615 128/62 -- -- 92 16 99 % --   09/04/24 1600 (!) 140/67 -- -- 92 16 97 % --   09/04/24 1545 (!) 147/66 -- -- 92 17 96 % --   09/04/24 1530 116/60 -- -- 92 15 100 % --   09/04/24 1515 127/64 -- -- 92 18 99 % --   09/04/24 1500 130/62 98.4  F (36.9  C) Pulm Art 92 16 100 % --   09/04/24 1445 139/69 -- -- 92 18 97 % --   09/04/24 1430 125/66 --  -- 92 19 97 % --   09/04/24 1415 128/63 -- -- 92 27 (!) 89 % --   09/04/24 1400 132/67 -- -- 92 24 95 % --   09/04/24 1345 131/68 -- -- 92 20 98 % --   09/04/24 1330 134/68 -- -- 92 18 97 % --   09/04/24 1315 127/66 -- -- 92 18 100 % --   09/04/24 1300 135/63 -- -- 92 19 98 % --   09/04/24 1245 (!) 145/71 -- -- 92 19 98 % --   09/04/24 1230 128/67 -- -- 92 17 100 % --       Physical Examination:    Resp: (!) 32    Gen: extubated, nasal canula  HEENT: opens eyes  PICC, Femoral catheter for dialysis, rectal tube  CV: Sternal incision,    Lungs: coarse, intubated, chest tubes.  Skin: Warm  Extr: edema. Neha L leg  Neuro: intubated, opens eyes  Art line           Laboratory Data:   ID Labs:  Microbiology labs:  7-Day Micro Results       Collected Updated Procedure Result Status      08/30/2024 1020 09/04/2024 1207 Blood Culture Hand, Left [[id number]]   Blood from Hand, Left    Final result Component Value   Culture No Growth               08/30/2024 0117 08/30/2024 0357 C. difficile Toxin B PCR with reflex to C. difficile Antigen and Toxins A/B EIA [[id number]]    Stool from Per Rectum    Final result Component Value   C Difficile Toxin B by PCR Negative   A negative result does not exclude actual disease due to C. difficile and may be due to improper collection, handling and storage of the specimen or the number of organisms in the specimen is below the detection limit of the assay.                  Susceptibility data from last 90 days.  Collected Specimen Info Organism Ampicillin Ampicillin/Sulbactam Cefepime Ceftazidime Ceftriaxone Ciprofloxacin Clindamycin Daptomycin Doxycycline Erythromycin Gentamicin Levofloxacin Meropenem Oxacillin   08/29/24 Sputum from Expectorate Enterobacter cloacae complex R R  S R R  S      S  S  S      Normal samara                 08/16/24 Peripheral Blood Staphylococcus aureus                 08/14/24 Peripheral Blood Staphylococcus aureus        S  S  S  S  S    S     Collected  Specimen Info Organism Piperacillin/Tazobactam Tetracycline Tobramycin Trimethoprim/Sulfamethoxazole  Vancomycin   08/29/24 Sputum from Expectorate Enterobacter cloacae complex R   S  S      Normal samara        08/16/24 Peripheral Blood Staphylococcus aureus        08/14/24 Peripheral Blood Staphylococcus aureus   S   S  S       Reviewed      No lab results found.  Recent Labs   Lab Test 09/05/24 0413 09/04/24 2008 09/04/24 1130 09/04/24 0411 09/03/24 1948 09/03/24  1130   WBC 16.3* 16.4* 16.3* 15.1* 14.1* 13.2*     Recent Labs   Lab Test 09/05/24 0413 09/04/24 2232 09/04/24 2008 09/04/24  1354   CR 0.95  0.95 0.97* 0.98* 0.97*   GFRESTIMATED 59*  59* 57* 57* 57*       Hematology Studies  Recent Labs   Lab Test 09/05/24 0413 09/04/24 2008 09/04/24 1130 09/04/24 0411 09/03/24 1948 09/03/24  1130   WBC 16.3* 16.4* 16.3* 15.1* 14.1* 13.2*   HGB 8.3* 8.3* 8.7* 8.5* 7.3* 7.6*   HCT 25.5* 26.2* 27.5* 26.9* 23.6* 24.8*   PLT 32* 33* 36* 37* 40* 42*       Metabolic  Recent Labs   Lab Test 09/05/24 0413 09/04/24 2232 09/04/24 2008     136 138 137   BUN 14.3  14.3 15.0 13.3   CO2 21*  21* 20* 19*   CR 0.95  0.95 0.97* 0.98*   GFRESTIMATED 59*  59* 57* 57*       Hepatic Studies  Recent Labs   Lab Test 09/05/24 0413 09/04/24 2232 09/04/24 2008 09/04/24  1354   BILITOTAL 1.1 1.2  --  1.2   ALKPHOS 75 69  --  67   ALBUMIN 3.5  3.5 3.5 3.4* 3.4*   * 345*  --  382*   * 203*  --  222*          Imaging Data:   Reviewed     IR CVC Non Tunnel Placement > 5 Yrs    Result Date: 9/1/2024  [facility] LOCATION: [facility] DATE: 9/1/2024 PROCEDURE: NON-TUNNELED DIALYSIS CATHETER PLACEMENT INTERVENTIONAL RADIOLOGIST: RU Reyes MD. INDICATION: HAMMAD requiring hemodialysis. CONSENT: The risks, benefits and alternatives of non-tunneled dialysis catheter placement were discussed with the patient in detail. All questions were answered. Informed consent was given  to proceed with the procedure. MODERATE SEDATION: None. CONTRAST: None. ANTIBIOTICS: None. ADDITIONAL MEDICATIONS: Heparin 3800 U IV FLUOROSCOPIC TIME: 2.0 minutes. RADIATION DOSE: Air Kerma: 36.04 mGy. COMPLICATIONS: No immediate complications. STERILE BARRIER TECHNIQUE: Maximum sterile barrier technique was used. Cutaneous antisepsis was performed at the operative site with application of 2% chlorhexidine and large sterile drape. Prior to the procedure, the  and assistant performed hand hygiene and wore hat, mask, sterile gown, and sterile gloves during the entire procedure. PROCEDURE: Limited left neck ultrasound was performed which demonstrated a patent internal jugular vein; images saved to the permanent patient medical record. The overlying skin and subcutaneous soft tissues were anesthetized using 1% lidocaine. Under ultrasound guidance, the left internal jugular vein was accessed using a micropuncture needle; images saved of the permanent patient medical record. Access was secured using a 4 Angolan transitional sheath. A Webdyn guidewire was advanced into the SVC. Under fluoroscopic guidance, the overlying skin and subcutaneous soft tissues were dilated and a 15.5 Angolan nontunneled dialysis catheter was placed with the tip within the cavoatrial junction. The catheter was tested and found to flush and aspirate appropriately. The catheter was heparinized and secured to the skin.     IMPRESSION:  1.  Successful non-tunneled dialysis catheter placement. PLAN: 1. Dialysis catheter is ready to be used. 2. Nontunneled right groin dialysis catheter can be removed in ICU.       Study Result    Narrative & Impression   EXAM: MR BRAIN W/O and W CONTRAST  LOCATION: [facility]  DATE: 8/17/2024     INDICATION: Headache in a patient with MSSA bacteremia secondary to aortic valve endocarditis.  Look for cerebral septic emboli; Headache; Acute HA (< 3 months), no complicating  features  COMPARISON: None.  CONTRAST: 9 ml gadavist  TECHNIQUE: Routine multiplanar multisequence head MRI without and with intravenous contrast.     FINDINGS: Assessment degraded due to motion.  INTRACRANIAL CONTENTS:   Apparent focus of diffusion restriction with T2/FLAIR hyperintensity within the left superior parietal lobule cortex is hyperintense on ADC map, representing T2 shine through.  Focus of signal abnormality measures 13 x 9 mm in the axial plane and does   not have internal enhancement.     Additional punctate foci of hyperintensity on diffusion weighted with similar signal characteristics (periventricular right corona radiata, left superior parietal lobule, and left cerebellar hemisphere).     Patchy and confluent nonspecific T2/FLAIR hyperintensities within the cerebral white matter most consistent with moderate chronic microvascular ischemic change. Moderate generalized cerebral atrophy. No hydrocephalus. Normal position of the cerebellar   tonsils. No discernible abnormal intracranial enhancement; however, assessment substantially degraded due to motion. 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     SELLA: No abnormality accounting for technique.     OSSEOUS STRUCTURES/SOFT TISSUES: Normal marrow signal. The major intracranial vascular flow voids are maintained.      ORBITS: No abnormality accounting for technique.      SINUSES/MASTOIDS: Mild mucosal thickening scattered about the paranasal sinuses. Scattered fluid/membrane thickening in the left mastoid air cells. No apparent mass in the posterior nasopharynx or skull base.                                                                       IMPRESSION: Assessment degraded due to motion.  1.  13 mm focus of cortical signal abnormality within the left superior parietal lobule which is favored to represent a subacute infarct; low-grade glial neoplasm could conceivably have a similar appearance. Hyperacute intraparenchymal hematoma could   also have a  similar appearance but is considered less likely. Recommend noncontrast head CT for further characterization. Also recommend follow-up MRI brain without and with contrast in 8-12 weeks to document expected evolution.  2.  Additional smaller foci of signal abnormality with similar characteristics favored to represent punctate subacute infarcts.

## 2024-09-05 NOTE — PROGRESS NOTES
HEART CARE NOTE          Assessment/Recommendations     1. Severe valvular disease c/b post-op cardiogenic shock  Assessment / Plan  Hemodynamics stable and tolerating RRT  - continue dobutamine fixed dosing without weaning for today as MVO2 remains marginal - no changes at this time; inotrope not only supports cardiac hemodynamics, but is also necessary for renal and hepatic perfusion  GDMT as detailed below; mainstay of treatment for HFpEF includes diuretics and adequate BP control (class I) and SGLT2-I (class 2a); additional medical therapy (ARNI, MRA, ARB) demonstrated less robust evidence for indication but may be considered per guideline recommendations (2b); no indication for BBlockers      Current Pharmacotherapy AHA Guideline-Directed Medical Therapy   Losartan  - not started 2/2 renal dysfunction ARNI/ARB   Spironolactone not started  MRA   SGLT2 inhibitor: not started SGLT2-I    Furosemide gtt - currently on CRRT Loop diuretic       2. 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 End organ dysfunction  Assessment / Plan  CRS; CRRT/volume removal per nephrology  Shock liver - resolving; hemodynamic support as above; continue to monitor UOP and renal fucntion closely     4. Anemia  Assessment / Plan  Management and supportive care per primary team     5. CAD s/p CABG  Assessment / Plan  Add and titrate GDMT as hemodynamics tolerate     6. Afib   Assessment / Plan  In sinus bradycardia with significant first degree AV delay on telemetry - temp pacer @ 90 with good hemodynamic response   Avoid AV node blocking agents  CHADSVA score ~4 or higher    Plan of care discussed on September 5, 2024 with patient at bedside, and primary team overseeing patient's care    Patient remains critically ill in the ICU requiring hemodynamic support via  "vasopressors s/p OHS c/b cardiogenic shock. 50 minutes spent on critical care time    History of Present Illness/Subjective    Ms. Felicitas Elliott is a 84 year old female with a PMHx significant for (per Epic notation) presented with fatigue, weakness, chills, poor appetite. Does not really have much for chronic medical conditions other than chronic back pain, furunculosis, uterine prolapse, tobacco use      Today, Mrs. Elliott denies acute cardiac events or complaints; Management plan as detailed above      ECHO (personnaly Reviewed on 8/19/24):   1. The left ventricle is normal in size. Left ventricular function is  normal.The ejection fraction is 55-60%.  2. Normal right ventricle size and systolic function.  3. Large vegetation on mitral valve (8 mm x 15) attached to posterior leaflet.  4. There is a small vegetation on the aortic valve (6 mm). No evidence of  aortic root abscess identified.  5. Severe valvular aortic stenosis (SHU:0.8 cm2, peak nomi: 4.6 m/sec, mean  gradient: 51 mmHg).    Telemetry: personally reviewed September 5, 2024; notable for paced rhythm     Medical history and pertinent documents reviewed in Care Everywhere please where applicable see details above        Physical Examination Review of Systems   /59   Pulse 92   Temp 99  F (37.2  C) (Pulmonary Artery)   Resp 15   Ht 1.702 m (5' 7\")   Wt 90.5 kg (199 lb 8.3 oz)   SpO2 99%   BMI 31.25 kg/m    Body mass index is 31.25 kg/m .  Wt Readings from Last 3 Encounters:   09/05/24 90.5 kg (199 lb 8.3 oz)   08/15/24 90.1 kg (198 lb 9.6 oz)     General Appearance:   no distress, normal body habitus   ENT/Mouth: membranes moist, no oral lesions or bleeding gums.      EYES:  no scleral icterus, normal conjunctivae   Neck: no carotid bruits or thyromegaly   Chest/Lungs:   lungs are clear to auscultation, no rales or wheezing, equal chest wall expansion    Cardiovascular:   Regular. Normal first and second heart sounds with no murmurs, rubs, " or gallops; the carotid, radial and posterior tibial pulses are intact,+ JVD and LE  edema bilaterally    Abdomen:  no organomegaly, masses, bruits, or tenderness; bowel sounds are present   Extremities: no cyanosis or clubbing   Skin: no xanthelasma, warm.    Neurologic: NAD     Psychiatric: alert and calm     A complete 10 systems ROS was reviewed  And is negative except what is listed in the HPI.          Medical History  Surgical History Family History Social History   History reviewed. No pertinent past medical history. Past Surgical History:   Procedure Laterality Date    CORONARY ARTERY BYPASS GRAFT, WITH AORTIC VALVE REPLACEMENT, WITH ENDOSCOPIC VESSEL PROCUREMENT N/A 8/27/2024    Procedure: CORONARY ARTERY BYPASS GRAFT TIMES TWO, WITH AORTIC ROOT REPLACEMENT, WITH LEFT INTERNAL MAMMARY ARTERY HARVEST, LEFT SAPHNENOUS ENDOSCOPIC VESSEL PROCUREMENT,;  Surgeon: Dylan Renae MD;  Location: [facility] [place]    CV CORONARY ANGIOGRAM N/A 8/20/2024    Procedure: CV CORONARY ANGIOGRAM;  Surgeon: Den Wylie MD;  Location: [facility] CATH LAB CV    IR CVC NON TUNNEL PLACEMENT > 5 YRS  9/1/2024    REPLACE VALVE MITRAL N/A 8/27/2024    Procedure: MITRAL VALVE REPLACEMENT,;  Surgeon: Dylan Renae MD;  Location: [facility] [place]    TRANSESOPHAGEAL ECHOCARDIOGRAM INTRAOPERATIVE  8/27/2024    Procedure: ANESTHESIA TRANSESOPHAGEAL ECHOCARDIOGRAM, EPI-AORTIC ULTRASOUND;  Surgeon: Dylan Renae MD;  Location: [facility] [place]    no family history of premature coronary artery disease Social History     Socioeconomic History    Marital status:      Spouse name: Not on file    Number of children: Not on file    Years of education: Not on file    Highest education level: Not on file   Occupational History    Not on file   Tobacco Use    Smoking status: Not on file    Smokeless tobacco: Not on file   Substance and Sexual Activity    Alcohol use: Not on file    Drug use: Not on  file    Sexual activity: Not on file   Other Topics Concern    Not on file   Social History Narrative    Not on file     Social Determinants of Health     Financial Resource Strain: Low Risk  (8/24/2024)    Financial Resource Strain     Within the past 12 months, have you or your family members you live with been unable to get utilities (heat, electricity) when it was really needed?: No   Food Insecurity: Low Risk  (8/24/2024)    Food Insecurity     Within the past 12 months, did you worry that your food would run out before you got money to buy more?: No     Within the past 12 months, did the food you bought just not last and you didn t have money to get more?: No   Transportation Needs: Low Risk  (8/24/2024)    Transportation Needs     Within the past 12 months, has lack of transportation kept you from medical appointments, getting your medicines, non-medical meetings or appointments, work, or from getting things that you need?: No   Physical Activity: Not on file   Stress: Not on file   Social Connections: Unknown (1/3/2024)    Received from [facility]    Social Connections     Frequency of Communication with Friends and Family: Not on file   Interpersonal Safety: High Risk (8/23/2024)    Interpersonal Safety     Do you feel physically and emotionally safe where you currently live?: No     Within the past 12 months, have you been hit, slapped, kicked or otherwise physically hurt by someone?: No     Within the past 12 months, have you been humiliated or emotionally abused in other ways by your partner or ex-partner?: No   Housing Stability: Low Risk  (8/24/2024)    Housing Stability     Do you have housing? : Yes     Are you worried about losing your housing?: No           Lab Results    Chemistry/lipid CBC Cardiac Enzymes/BNP/TSH/INR   Lab Results   Component Value Date    BUN 14.3 09/05/2024    BUN 14.3 09/05/2024     09/05/2024     09/05/2024    CO2 21 (L)  "09/05/2024    CO2 21 (L) 09/05/2024    Lab Results   Component Value Date    WBC 16.3 (H) 09/05/2024    HGB 8.3 (L) 09/05/2024    HCT 25.5 (L) 09/05/2024    MCV 97 09/05/2024    PLT 32 (LL) 09/05/2024    Lab Results   Component Value Date    INR 1.49 (H) 09/05/2024     No results found for: \"CKTOTAL\", \"CKMB\", \"TROPONINI\"       Weight:    Wt Readings from Last 3 Encounters:   09/05/24 90.5 kg (199 lb 8.3 oz)   08/15/24 90.1 kg (198 lb 9.6 oz)       Allergies  Allergies   Allergen Reactions    Aspirin Rash    Cats Rash    Nickel Rash         Surgical History  Past Surgical History:   Procedure Laterality Date    CORONARY ARTERY BYPASS GRAFT, WITH AORTIC VALVE REPLACEMENT, WITH ENDOSCOPIC VESSEL PROCUREMENT N/A 8/27/2024    Procedure: CORONARY ARTERY BYPASS GRAFT TIMES TWO, WITH AORTIC ROOT REPLACEMENT, WITH LEFT INTERNAL MAMMARY ARTERY HARVEST, LEFT SAPHNENOUS ENDOSCOPIC VESSEL PROCUREMENT,;  Surgeon: Dylan Renae MD;  [place] [facility] [place]    CV CORONARY ANGIOGRAM N/A 8/20/2024    Procedure: CV CORONARY ANGIOGRAM;  Surgeon: Den Wylie MD;  Location: [facility] CATH LAB CV    IR CVC NON TUNNEL PLACEMENT > 5 YRS  9/1/2024    REPLACE VALVE MITRAL N/A 8/27/2024    Procedure: MITRAL VALVE REPLACEMENT,;  Surgeon: Dylan Renae MD;  Location: [facility] [place]    TRANSESOPHAGEAL ECHOCARDIOGRAM INTRAOPERATIVE  8/27/2024    Procedure: ANESTHESIA TRANSESOPHAGEAL ECHOCARDIOGRAM, EPI-AORTIC ULTRASOUND;  Surgeon: Dylan Renae MD;  Location: [facility] [place]       Social History  Tobacco:   History   Smoking Status    Not on file   Smokeless Tobacco    Not on file    Alcohol:   Social History    Substance and Sexual Activity      Alcohol use: Not on file   Illicit Drugs:   History   Drug Use Not on file       Family History  History reviewed. No pertinent family history.       Navneet Branch MD on 9/5/2024      cc: [facility]    "

## 2024-09-05 NOTE — PLAN OF CARE
Problem: Adult Inpatient Plan of Care  Goal: Plan of Care Review  Description: The Plan of Care Review/Shift note should be completed every shift.  The Outcome Evaluation is a brief statement about your assessment that the patient is improving, declining, or no change.  This information will be displayed automatically on your shift  note.  Outcome: Progressing   Goal Outcome Evaluation:         [facility] - ICU    RN Progress Note:            Pertinent Assessments:      Please refer to flowsheet rows for full assessment     Alert and oriented. Denied pain.liquid stool rectal tube in place.            Key Events - This Shift:       Continued in CRRT tolerated well.   High pressure alarm unable to return blood filter/circuit changed blood not returned.                 Barriers to Discharge / Downgrade:     CRRT.         Point of Contact Update: YES-OR-NO: Yes  If No, reason:   Name: family   Phone Number:  Summary of Conversation: family updated plan of care by Dr. Jung at bed side.

## 2024-09-06 ENCOUNTER — APPOINTMENT (OUTPATIENT)
Dept: CARDIOLOGY | Facility: HOSPITAL | Age: 84
DRG: 853 | End: 2024-09-06
Attending: INTERNAL MEDICINE
Payer: COMMERCIAL

## 2024-09-06 ENCOUNTER — APPOINTMENT (OUTPATIENT)
Dept: SPEECH THERAPY | Facility: HOSPITAL | Age: 84
DRG: 853 | End: 2024-09-06
Attending: STUDENT IN AN ORGANIZED HEALTH CARE EDUCATION/TRAINING PROGRAM
Payer: COMMERCIAL

## 2024-09-06 ENCOUNTER — APPOINTMENT (OUTPATIENT)
Dept: RADIOLOGY | Facility: HOSPITAL | Age: 84
DRG: 853 | End: 2024-09-06
Attending: SURGERY
Payer: COMMERCIAL

## 2024-09-06 LAB
ALBUMIN SERPL BCG-MCNC: 3.6 G/DL (ref 3.5–5.2)
ALBUMIN SERPL BCG-MCNC: 3.7 G/DL (ref 3.5–5.2)
ALBUMIN SERPL BCG-MCNC: 3.7 G/DL (ref 3.5–5.2)
ALP SERPL-CCNC: 103 U/L (ref 40–150)
ALP SERPL-CCNC: 107 U/L (ref 40–150)
ALP SERPL-CCNC: 99 U/L (ref 40–150)
ALT SERPL W P-5'-P-CCNC: 135 U/L (ref 0–50)
ALT SERPL W P-5'-P-CCNC: 152 U/L (ref 0–50)
ALT SERPL W P-5'-P-CCNC: 161 U/L (ref 0–50)
ANION GAP SERPL CALCULATED.3IONS-SCNC: 10 MMOL/L (ref 7–15)
ANION GAP SERPL CALCULATED.3IONS-SCNC: 11 MMOL/L (ref 7–15)
ANION GAP SERPL CALCULATED.3IONS-SCNC: 11 MMOL/L (ref 7–15)
ANION GAP SERPL CALCULATED.3IONS-SCNC: 12 MMOL/L (ref 7–15)
AST SERPL W P-5'-P-CCNC: 156 U/L (ref 0–45)
AST SERPL W P-5'-P-CCNC: 189 U/L (ref 0–45)
AST SERPL W P-5'-P-CCNC: 219 U/L (ref 0–45)
BILIRUB DIRECT SERPL-MCNC: 0.58 MG/DL (ref 0–0.3)
BILIRUB SERPL-MCNC: 1.4 MG/DL
BUN SERPL-MCNC: 14.3 MG/DL (ref 8–23)
BUN SERPL-MCNC: 14.5 MG/DL (ref 8–23)
BUN SERPL-MCNC: 15.2 MG/DL (ref 8–23)
BUN SERPL-MCNC: 15.2 MG/DL (ref 8–23)
BUN SERPL-MCNC: 18.3 MG/DL (ref 8–23)
BUN SERPL-MCNC: 18.3 MG/DL (ref 8–23)
CA-I BLD-MCNC: 4.3 MG/DL (ref 4.4–5.2)
CA-I BLD-MCNC: 4.5 MG/DL (ref 4.4–5.2)
CA-I BLD-MCNC: 4.6 MG/DL (ref 4.4–5.2)
CALCIUM SERPL-MCNC: 7.5 MG/DL (ref 8.8–10.4)
CALCIUM SERPL-MCNC: 7.5 MG/DL (ref 8.8–10.4)
CALCIUM SERPL-MCNC: 8.1 MG/DL (ref 8.8–10.4)
CALCIUM SERPL-MCNC: 8.2 MG/DL (ref 8.8–10.4)
CHLORIDE SERPL-SCNC: 104 MMOL/L (ref 98–107)
CHLORIDE SERPL-SCNC: 104 MMOL/L (ref 98–107)
CHLORIDE SERPL-SCNC: 106 MMOL/L (ref 98–107)
CREAT SERPL-MCNC: 0.93 MG/DL (ref 0.51–0.95)
CREAT SERPL-MCNC: 0.95 MG/DL (ref 0.51–0.95)
CREAT SERPL-MCNC: 0.95 MG/DL (ref 0.51–0.95)
CREAT SERPL-MCNC: 0.97 MG/DL (ref 0.51–0.95)
CREAT SERPL-MCNC: 1.04 MG/DL (ref 0.51–0.95)
CREAT SERPL-MCNC: 1.04 MG/DL (ref 0.51–0.95)
EGFRCR SERPLBLD CKD-EPI 2021: 53 ML/MIN/1.73M2
EGFRCR SERPLBLD CKD-EPI 2021: 53 ML/MIN/1.73M2
EGFRCR SERPLBLD CKD-EPI 2021: 57 ML/MIN/1.73M2
EGFRCR SERPLBLD CKD-EPI 2021: 59 ML/MIN/1.73M2
EGFRCR SERPLBLD CKD-EPI 2021: 59 ML/MIN/1.73M2
EGFRCR SERPLBLD CKD-EPI 2021: 60 ML/MIN/1.73M2
ERYTHROCYTE [DISTWIDTH] IN BLOOD BY AUTOMATED COUNT: 19.9 % (ref 10–15)
ERYTHROCYTE [DISTWIDTH] IN BLOOD BY AUTOMATED COUNT: 20.6 % (ref 10–15)
ERYTHROCYTE [DISTWIDTH] IN BLOOD BY AUTOMATED COUNT: 20.8 % (ref 10–15)
GLUCOSE SERPL-MCNC: 132 MG/DL (ref 70–99)
GLUCOSE SERPL-MCNC: 136 MG/DL (ref 70–99)
GLUCOSE SERPL-MCNC: 143 MG/DL (ref 70–99)
HCO3 SERPL-SCNC: 22 MMOL/L (ref 22–29)
HCO3 SERPL-SCNC: 24 MMOL/L (ref 22–29)
HCT VFR BLD AUTO: 23.8 % (ref 35–47)
HCT VFR BLD AUTO: 24.8 % (ref 35–47)
HCT VFR BLD AUTO: 25.4 % (ref 35–47)
HGB BLD-MCNC: 7.6 G/DL (ref 11.7–15.7)
HGB BLD-MCNC: 7.9 G/DL (ref 11.7–15.7)
HGB BLD-MCNC: 8 G/DL (ref 11.7–15.7)
INR PPP: 1.41 (ref 0.85–1.15)
MAGNESIUM SERPL-MCNC: 2.3 MG/DL (ref 1.7–2.3)
MAGNESIUM SERPL-MCNC: 2.5 MG/DL (ref 1.7–2.3)
MAGNESIUM SERPL-MCNC: 2.5 MG/DL (ref 1.7–2.3)
MCH RBC QN AUTO: 31 PG (ref 26.5–33)
MCH RBC QN AUTO: 32.2 PG (ref 26.5–33)
MCH RBC QN AUTO: 32.3 PG (ref 26.5–33)
MCHC RBC AUTO-ENTMCNC: 31.5 G/DL (ref 31.5–36.5)
MCHC RBC AUTO-ENTMCNC: 31.9 G/DL (ref 31.5–36.5)
MCHC RBC AUTO-ENTMCNC: 31.9 G/DL (ref 31.5–36.5)
MCV RBC AUTO: 101 FL (ref 78–100)
MCV RBC AUTO: 101 FL (ref 78–100)
MCV RBC AUTO: 98 FL (ref 78–100)
PHOSPHATE SERPL-MCNC: 2.8 MG/DL (ref 2.5–4.5)
PHOSPHATE SERPL-MCNC: 2.9 MG/DL (ref 2.5–4.5)
PHOSPHATE SERPL-MCNC: 3.1 MG/DL (ref 2.5–4.5)
PLATELET # BLD AUTO: 25 10E3/UL (ref 150–450)
PLATELET # BLD AUTO: 25 10E3/UL (ref 150–450)
PLATELET # BLD AUTO: 27 10E3/UL (ref 150–450)
POTASSIUM SERPL-SCNC: 3.6 MMOL/L (ref 3.4–5.3)
POTASSIUM SERPL-SCNC: 3.6 MMOL/L (ref 3.4–5.3)
POTASSIUM SERPL-SCNC: 3.7 MMOL/L (ref 3.4–5.3)
POTASSIUM SERPL-SCNC: 3.8 MMOL/L (ref 3.4–5.3)
PROT SERPL-MCNC: 6.4 G/DL (ref 6.4–8.3)
PROT SERPL-MCNC: 6.5 G/DL (ref 6.4–8.3)
PROT SERPL-MCNC: 6.6 G/DL (ref 6.4–8.3)
RBC # BLD AUTO: 2.35 10E6/UL (ref 3.8–5.2)
RBC # BLD AUTO: 2.45 10E6/UL (ref 3.8–5.2)
RBC # BLD AUTO: 2.58 10E6/UL (ref 3.8–5.2)
SODIUM SERPL-SCNC: 138 MMOL/L (ref 135–145)
SODIUM SERPL-SCNC: 138 MMOL/L (ref 135–145)
SODIUM SERPL-SCNC: 140 MMOL/L (ref 135–145)
SODIUM SERPL-SCNC: 140 MMOL/L (ref 135–145)
SODIUM SERPL-SCNC: 141 MMOL/L (ref 135–145)
SODIUM SERPL-SCNC: 141 MMOL/L (ref 135–145)
TROPONIN T SERPL HS-MCNC: 483 NG/L
UFH PPP CHRO-ACNC: <0.1 IU/ML
VANCOMYCIN SERPL-MCNC: 13 UG/ML
WBC # BLD AUTO: 14.3 10E3/UL (ref 4–11)
WBC # BLD AUTO: 14.6 10E3/UL (ref 4–11)
WBC # BLD AUTO: 15.2 10E3/UL (ref 4–11)

## 2024-09-06 PROCEDURE — 999N000065 XR CHEST PORT 1 VIEW

## 2024-09-06 PROCEDURE — 99232 SBSQ HOSP IP/OBS MODERATE 35: CPT | Performed by: INTERNAL MEDICINE

## 2024-09-06 PROCEDURE — 82040 ASSAY OF SERUM ALBUMIN: CPT | Performed by: INTERNAL MEDICINE

## 2024-09-06 PROCEDURE — 82040 ASSAY OF SERUM ALBUMIN: CPT | Performed by: PHYSICIAN ASSISTANT

## 2024-09-06 PROCEDURE — 93306 TTE W/DOPPLER COMPLETE: CPT

## 2024-09-06 PROCEDURE — 250N000013 HC RX MED GY IP 250 OP 250 PS 637: Performed by: INTERNAL MEDICINE

## 2024-09-06 PROCEDURE — 82248 BILIRUBIN DIRECT: CPT | Performed by: INTERNAL MEDICINE

## 2024-09-06 PROCEDURE — 82330 ASSAY OF CALCIUM: CPT | Performed by: SURGERY

## 2024-09-06 PROCEDURE — 80202 ASSAY OF VANCOMYCIN: CPT

## 2024-09-06 PROCEDURE — 250N000011 HC RX IP 250 OP 636: Performed by: INTERNAL MEDICINE

## 2024-09-06 PROCEDURE — 85027 COMPLETE CBC AUTOMATED: CPT | Performed by: INTERNAL MEDICINE

## 2024-09-06 PROCEDURE — 84100 ASSAY OF PHOSPHORUS: CPT | Performed by: INTERNAL MEDICINE

## 2024-09-06 PROCEDURE — 250N000009 HC RX 250: Performed by: PHYSICIAN ASSISTANT

## 2024-09-06 PROCEDURE — 250N000011 HC RX IP 250 OP 636: Performed by: PHYSICIAN ASSISTANT

## 2024-09-06 PROCEDURE — 250N000009 HC RX 250: Performed by: INTERNAL MEDICINE

## 2024-09-06 PROCEDURE — 36569 INSJ PICC 5 YR+ W/O IMAGING: CPT

## 2024-09-06 PROCEDURE — 250N000013 HC RX MED GY IP 250 OP 250 PS 637: Performed by: PHYSICIAN ASSISTANT

## 2024-09-06 PROCEDURE — 92526 ORAL FUNCTION THERAPY: CPT | Mod: GN

## 2024-09-06 PROCEDURE — 99291 CRITICAL CARE FIRST HOUR: CPT | Performed by: INTERNAL MEDICINE

## 2024-09-06 PROCEDURE — 93306 TTE W/DOPPLER COMPLETE: CPT | Mod: 26 | Performed by: INTERNAL MEDICINE

## 2024-09-06 PROCEDURE — 82330 ASSAY OF CALCIUM: CPT | Performed by: INTERNAL MEDICINE

## 2024-09-06 PROCEDURE — 83735 ASSAY OF MAGNESIUM: CPT | Performed by: INTERNAL MEDICINE

## 2024-09-06 PROCEDURE — 85520 HEPARIN ASSAY: CPT | Performed by: INTERNAL MEDICINE

## 2024-09-06 PROCEDURE — 258N000003 HC RX IP 258 OP 636: Performed by: INTERNAL MEDICINE

## 2024-09-06 PROCEDURE — 90947 DIALYSIS REPEATED EVAL: CPT

## 2024-09-06 PROCEDURE — 85610 PROTHROMBIN TIME: CPT | Performed by: PHYSICIAN ASSISTANT

## 2024-09-06 PROCEDURE — 272N000452 HC KIT SHRLOCK 5FR POWER PICC TRIPLE LUMEN

## 2024-09-06 PROCEDURE — 84484 ASSAY OF TROPONIN QUANT: CPT | Performed by: PHYSICIAN ASSISTANT

## 2024-09-06 PROCEDURE — 200N000001 HC R&B ICU

## 2024-09-06 PROCEDURE — 99232 SBSQ HOSP IP/OBS MODERATE 35: CPT

## 2024-09-06 RX ORDER — LIDOCAINE 40 MG/G
CREAM TOPICAL
Status: ACTIVE | OUTPATIENT
Start: 2024-09-06 | End: 2024-09-09

## 2024-09-06 RX ORDER — FUROSEMIDE 10 MG/ML
100 INJECTION INTRAMUSCULAR; INTRAVENOUS ONCE
Status: COMPLETED | OUTPATIENT
Start: 2024-09-07 | End: 2024-09-07

## 2024-09-06 RX ORDER — VANCOMYCIN HYDROCHLORIDE 1 G/200ML
1000 INJECTION, SOLUTION INTRAVENOUS EVERY 24 HOURS
Status: DISCONTINUED | OUTPATIENT
Start: 2024-09-07 | End: 2024-09-07 | Stop reason: DRUGHIGH

## 2024-09-06 RX ADMIN — CALCIUM CHLORIDE, MAGNESIUM CHLORIDE, SODIUM CHLORIDE, SODIUM BICARBONATE, POTASSIUM CHLORIDE AND SODIUM PHOSPHATE DIBASIC DIHYDRATE 24.5 ML/KG/HR: 3.68; 3.05; 6.34; 3.09; .314; .187 INJECTION INTRAVENOUS at 22:13

## 2024-09-06 RX ADMIN — CALCIUM CHLORIDE, MAGNESIUM CHLORIDE, SODIUM CHLORIDE, SODIUM BICARBONATE, POTASSIUM CHLORIDE AND SODIUM PHOSPHATE DIBASIC DIHYDRATE 24.5 ML/KG/HR: 3.68; 3.05; 6.34; 3.09; .314; .187 INJECTION INTRAVENOUS at 02:09

## 2024-09-06 RX ADMIN — CALCIUM CHLORIDE, MAGNESIUM CHLORIDE, SODIUM CHLORIDE, SODIUM BICARBONATE, POTASSIUM CHLORIDE AND SODIUM PHOSPHATE DIBASIC DIHYDRATE 10 ML/KG/HR: 3.68; 3.05; 6.34; 3.09; .314; .187 INJECTION INTRAVENOUS at 20:32

## 2024-09-06 RX ADMIN — CALCIUM CHLORIDE, MAGNESIUM CHLORIDE, SODIUM CHLORIDE, SODIUM BICARBONATE, POTASSIUM CHLORIDE AND SODIUM PHOSPHATE DIBASIC DIHYDRATE 24.5 ML/KG/HR: 3.68; 3.05; 6.34; 3.09; .314; .187 INJECTION INTRAVENOUS at 12:37

## 2024-09-06 RX ADMIN — LIDOCAINE HYDROCHLORIDE 2 ML: 10 INJECTION, SOLUTION EPIDURAL; INFILTRATION; INTRACAUDAL; PERINEURAL at 11:21

## 2024-09-06 RX ADMIN — CALCIUM CHLORIDE, MAGNESIUM CHLORIDE, SODIUM CHLORIDE, SODIUM BICARBONATE, POTASSIUM CHLORIDE AND SODIUM PHOSPHATE DIBASIC DIHYDRATE 10 ML/KG/HR: 3.68; 3.05; 6.34; 3.09; .314; .187 INJECTION INTRAVENOUS at 02:48

## 2024-09-06 RX ADMIN — CALCIUM CHLORIDE, MAGNESIUM CHLORIDE, SODIUM CHLORIDE, SODIUM BICARBONATE, POTASSIUM CHLORIDE AND SODIUM PHOSPHATE DIBASIC DIHYDRATE 24.5 ML/KG/HR: 3.68; 3.05; 6.34; 3.09; .314; .187 INJECTION INTRAVENOUS at 18:50

## 2024-09-06 RX ADMIN — PANTOPRAZOLE SODIUM 40 MG: 40 INJECTION, POWDER, FOR SOLUTION INTRAVENOUS at 08:27

## 2024-09-06 RX ADMIN — CALCIUM GLUCONATE 2 G: 20 INJECTION, SOLUTION INTRAVENOUS at 21:36

## 2024-09-06 RX ADMIN — CALCIUM CHLORIDE, MAGNESIUM CHLORIDE, SODIUM CHLORIDE, SODIUM BICARBONATE, POTASSIUM CHLORIDE AND SODIUM PHOSPHATE DIBASIC DIHYDRATE 10 ML/KG/HR: 3.68; 3.05; 6.34; 3.09; .314; .187 INJECTION INTRAVENOUS at 16:37

## 2024-09-06 RX ADMIN — TRAMADOL HYDROCHLORIDE 50 MG: 50 TABLET, COATED ORAL at 22:20

## 2024-09-06 RX ADMIN — CALCIUM CHLORIDE, MAGNESIUM CHLORIDE, SODIUM CHLORIDE, SODIUM BICARBONATE, POTASSIUM CHLORIDE AND SODIUM PHOSPHATE DIBASIC DIHYDRATE 10 ML/KG/HR: 3.68; 3.05; 6.34; 3.09; .314; .187 INJECTION INTRAVENOUS at 12:00

## 2024-09-06 RX ADMIN — Medication 1 CAPSULE: at 21:37

## 2024-09-06 RX ADMIN — Medication 1 CAPSULE: at 08:31

## 2024-09-06 RX ADMIN — DOBUTAMINE HYDROCHLORIDE 1.5 MCG/KG/MIN: 200 INJECTION INTRAVENOUS at 09:56

## 2024-09-06 RX ADMIN — CALCIUM CHLORIDE, MAGNESIUM CHLORIDE, SODIUM CHLORIDE, SODIUM BICARBONATE, POTASSIUM CHLORIDE AND SODIUM PHOSPHATE DIBASIC DIHYDRATE 24.5 ML/KG/HR: 3.68; 3.05; 6.34; 3.09; .314; .187 INJECTION INTRAVENOUS at 08:17

## 2024-09-06 RX ADMIN — CALCIUM CHLORIDE, MAGNESIUM CHLORIDE, SODIUM CHLORIDE, SODIUM BICARBONATE, POTASSIUM CHLORIDE AND SODIUM PHOSPHATE DIBASIC DIHYDRATE 24.5 ML/KG/HR: 3.68; 3.05; 6.34; 3.09; .314; .187 INJECTION INTRAVENOUS at 16:44

## 2024-09-06 RX ADMIN — TRAMADOL HYDROCHLORIDE 50 MG: 50 TABLET, COATED ORAL at 16:08

## 2024-09-06 RX ADMIN — CALCIUM CHLORIDE, MAGNESIUM CHLORIDE, SODIUM CHLORIDE, SODIUM BICARBONATE, POTASSIUM CHLORIDE AND SODIUM PHOSPHATE DIBASIC DIHYDRATE 200 ML/HR: 3.68; 3.05; 6.34; 3.09; .314; .187 INJECTION INTRAVENOUS at 10:00

## 2024-09-06 RX ADMIN — Medication 60 ML: at 21:37

## 2024-09-06 RX ADMIN — CALCIUM CHLORIDE, MAGNESIUM CHLORIDE, SODIUM CHLORIDE, SODIUM BICARBONATE, POTASSIUM CHLORIDE AND SODIUM PHOSPHATE DIBASIC DIHYDRATE 24.5 ML/KG/HR: 3.68; 3.05; 6.34; 3.09; .314; .187 INJECTION INTRAVENOUS at 20:32

## 2024-09-06 RX ADMIN — Medication 60 ML: at 17:10

## 2024-09-06 RX ADMIN — ONDANSETRON 8 MG: 2 INJECTION INTRAMUSCULAR; INTRAVENOUS at 17:33

## 2024-09-06 RX ADMIN — CALCIUM CHLORIDE, MAGNESIUM CHLORIDE, SODIUM CHLORIDE, SODIUM BICARBONATE, POTASSIUM CHLORIDE AND SODIUM PHOSPHATE DIBASIC DIHYDRATE 24.5 ML/KG/HR: 3.68; 3.05; 6.34; 3.09; .314; .187 INJECTION INTRAVENOUS at 04:06

## 2024-09-06 RX ADMIN — LIDOCAINE 2 PATCH: 246 PATCH TOPICAL at 18:49

## 2024-09-06 RX ADMIN — CALCIUM CHLORIDE, MAGNESIUM CHLORIDE, SODIUM CHLORIDE, SODIUM BICARBONATE, POTASSIUM CHLORIDE AND SODIUM PHOSPHATE DIBASIC DIHYDRATE 24.5 ML/KG/HR: 3.68; 3.05; 6.34; 3.09; .314; .187 INJECTION INTRAVENOUS at 14:39

## 2024-09-06 RX ADMIN — CALCIUM CHLORIDE, MAGNESIUM CHLORIDE, SODIUM CHLORIDE, SODIUM BICARBONATE, POTASSIUM CHLORIDE AND SODIUM PHOSPHATE DIBASIC DIHYDRATE 24.5 ML/KG/HR: 3.68; 3.05; 6.34; 3.09; .314; .187 INJECTION INTRAVENOUS at 05:52

## 2024-09-06 RX ADMIN — SODIUM CHLORIDE 750 MG: 9 INJECTION, SOLUTION INTRAVENOUS at 08:27

## 2024-09-06 RX ADMIN — ONDANSETRON 8 MG: 2 INJECTION INTRAMUSCULAR; INTRAVENOUS at 21:59

## 2024-09-06 RX ADMIN — CALCIUM CHLORIDE, MAGNESIUM CHLORIDE, SODIUM CHLORIDE, SODIUM BICARBONATE, POTASSIUM CHLORIDE AND SODIUM PHOSPHATE DIBASIC DIHYDRATE 10 ML/KG/HR: 3.68; 3.05; 6.34; 3.09; .314; .187 INJECTION INTRAVENOUS at 05:48

## 2024-09-06 RX ADMIN — CALCIUM CHLORIDE, MAGNESIUM CHLORIDE, SODIUM CHLORIDE, SODIUM BICARBONATE, POTASSIUM CHLORIDE AND SODIUM PHOSPHATE DIBASIC DIHYDRATE 200 ML/HR: 3.68; 3.05; 6.34; 3.09; .314; .187 INJECTION INTRAVENOUS at 20:32

## 2024-09-06 ASSESSMENT — ACTIVITIES OF DAILY LIVING (ADL)
ADLS_ACUITY_SCORE: 38
ADLS_ACUITY_SCORE: 40
ADLS_ACUITY_SCORE: 38
ADLS_ACUITY_SCORE: 38
ADLS_ACUITY_SCORE: 40
ADLS_ACUITY_SCORE: 38
ADLS_ACUITY_SCORE: 40
ADLS_ACUITY_SCORE: 38
ADLS_ACUITY_SCORE: 40
ADLS_ACUITY_SCORE: 38
ADLS_ACUITY_SCORE: 40
ADLS_ACUITY_SCORE: 38
ADLS_ACUITY_SCORE: 40
ADLS_ACUITY_SCORE: 38

## 2024-09-06 NOTE — PROCEDURES
"PICC Line Insertion Procedure Note  Pt. Name: Felicitas Elliott  MRN:        [id number]    Procedure: Insertion of a  triple Lumen  5 fr  Bard SOLO (valved) Power PICC, Lot number [id number]    Indications: Vascular Access    Contraindications : none    Procedure Details     Patient identified with 2 identifiers and \"Time Out\" conducted.  .     Central line insertion bundle followed: hand hygiene performed prior to procedure, site cleansed with cholraprep, hat, mask, sterile gloves, sterile gown worn, patient draped with maximum barrier head to toe drape, sterile field maintained.    The vein was assessed and found to be compressible and of adequate size.     Lidocaine 1% 2 ml administered sq to the insertion site. A 5 Fr PICC was inserted into the basilic vein of the right arm with ultrasound guidance. 1 attempt(s) required to access vein.   Catheter threaded without difficulty. Good blood return noted.    Modified Seldinger Technique used for insertion.    The 8 sharps that are included in the PICC insertion kit were accounted for and disposed of in the sharps container prior to breakdown of the sterile field.    Catheter secured with Statlock, biopatch and Tegaderm dressing applied.    Findings:    Total catheter length  42 cm, with 0 cm exposed. Mid upper arm circumference is 30 cm. Catheter was flushed with 30 cc NS. Patient  tolerated procedure well.    Tip placement verified by xray. Xray read by Dr. Morataya . Tip placement in the SVC/RA junction.    CLABSI prevention brochure left at bedside.    Patient's primary RN notified PICC is ready for use.      Comments:        Dany Tapia PICC RN  Vascular Access - [facility]      "

## 2024-09-06 NOTE — PHARMACY-VANCOMYCIN DOSING SERVICE
Pharmacy Vancomycin Note  Date of Service 2024  Patient's  1940   84 year old, female    Indication: Aspiration Pneumonia  Day of Therapy: 8  Current vancomycin regimen:  750 mg IV q24h  Current vancomycin monitoring method: Renal Replacement Therapy  Current vancomycin therapeutic monitoring goal: 15-20 mg/L    Current estimated CrCl = Estimated Creatinine Clearance: 50.1 mL/min (based on SCr of 0.95 mg/dL).    Creatinine for last 3 days  9/3/2024: 11:30 AM Creatinine 1.00 mg/dL;  1:46 PM Creatinine 0.90 mg/dL;  7:48 PM Creatinine 1.23 mg/dL; 10:00 PM Creatinine 1.25 mg/dL  2024:  4:11 AM Creatinine 1.10 mg/dL;  4:11 AM Creatinine 1.10 mg/dL; 11:30 AM Creatinine 0.97 mg/dL;  1:54 PM Creatinine 0.97 mg/dL;  8:08 PM Creatinine 0.98 mg/dL; 10:32 PM Creatinine 0.97 mg/dL  2024:  4:13 AM Creatinine 0.95 mg/dL;  4:13 AM Creatinine 0.95 mg/dL; 11:56 AM Creatinine 0.93 mg/dL;  1:54 PM Creatinine 0.94 mg/dL;  8:16 PM Creatinine 0.97 mg/dL; 10:12 PM Creatinine 0.98 mg/dL  2024:  4:13 AM Creatinine 0.95 mg/dL;  4:13 AM Creatinine 0.95 mg/dL    Recent Vancomycin Levels (past 3 days)  2024:  4:13 AM Vancomycin 13.0 ug/mL    Vancomycin IV Administrations (past 72 hours)                     vancomycin (VANCOCIN) 750 mg in sodium chloride 0.9 % 250 mL intermittent infusion (mg) 750 mg New Bag 24 0827     750 mg New Bag 24 0835     750 mg New Bag 24 0733                    Nephrotoxins and other renal medications (From now, onward)      Start     Dose/Rate Route Frequency Ordered Stop    24 0800  vancomycin (VANCOCIN) 1,000 mg in 200 mL dextrose intermittent infusion         1,000 mg  200 mL/hr over 1 Hours Intravenous EVERY 24 HOURS 24 1049                 Contrast Orders - past 72 hours (72h ago, onward)      None            Interpretation of levels and current regimen:  Vancomycin level is reflective of subtherapeutic level    Has serum creatinine changed greater  than 50% in last 72 hours: n/a    Urine output:  anuric    Renal Function: ARF on CRRT    Plan:  Increase Dose to 1000 mg IV q24h  Vancomycin monitoring method: Renal Replacement Therapy  Vancomycin therapeutic monitoring goal: 15-20 mg/L  Pharmacy will check vancomycin levels as appropriate in 1-3 Days.  Serum creatinine levels will be ordered  per Nephrology .    Shabnam Curiel RP

## 2024-09-06 NOTE — PROGRESS NOTES
CVTS Daily Progress Note   POD#10 s/p aortic root abscess debridement and aortic annular reconstruction, aortic root replacement (Konect composite 25 mm Silva Inspiris Resilia bioprosthetic valve within 30 mm Gelweave Valsalva graft with reimplantation of the coronary arteries), mitral valve replacement (31 mm St. Tim Silva bioprosthetic valve) and CAB x 2.  Attending: Dr Renae  LOS: 21    SUBJECTIVE/INTERVAL EVENTS:    Np acute events overnight. Normotensive on dobutamine at 1.5. Paced at 90bpm; underlying quite bradycardic. Discouraged overall. Maintaining oxygenation on 2L NC. Seen in in bed. Pain well controlled. Rectal tube in place. NG in place; tolerated tube feeds with less nausea overall. Hgb 8.0. INR 1.41. Plt 25. LFT overall stable/downtrending. WBC 15.2; remains on empiric Vanc. ID following. Remains on CRRT. Patient denies new chest pain, shortness of breath, abdominal pain, calf pain, nausea. She has no questions today.    OBJECTIVE:  Temp:  [97.2  F (36.2  C)-98.3  F (36.8  C)] 98  F (36.7  C)  Pulse:  [92] 92  Resp:  [15-31] 20  BP: ()/(52-66) 116/62  SpO2:  [92 %-100 %] 97 %  Vitals:    09/01/24 0400 09/02/24 0400 09/04/24 0400 09/05/24 0400   Weight: 98.4 kg (216 lb 14.4 oz) 95.9 kg (211 lb 6.7 oz) 92.2 kg (203 lb 4.2 oz) 90.5 kg (199 lb 8.3 oz)    09/06/24 0253   Weight: 87.5 kg (192 lb 14.4 oz)       Clinically Significant Risk Factors          # Hypocalcemia: Lowest Ca = 7.9 mg/dL in last 2 days, will monitor and replace as appropriate    # Anion Gap Metabolic Acidosis: Highest Anion Gap = 20 mmol/L in last 2 days, will monitor and treat as appropriate  # Hypoalbuminemia: Lowest albumin = 3.1 g/dL at 9/1/2024  1:45 PM, will monitor as appropriate    # Coagulation Defect: INR = 1.41 (Ref range: 0.85 - 1.15) and/or PTT = 52 Seconds (Ref range: 22 - 38 Seconds), will monitor for bleeding  # Thrombocytopenia: Lowest platelets = 25 in last 2 days, will monitor for bleeding       "    #Acute blood loss anemia: Lowest Hgb this hospitalization: 6.9 g/dL. Will continue to monitor and treat/transfuse as appropriate.     # Obesity: Estimated body mass index is 30.21 kg/m  as calculated from the following:    Height as of this encounter: 1.702 m (5' 7\").    Weight as of this encounter: 87.5 kg (192 lb 14.4 oz).   # Moderate Malnutrition: based on nutrition assessment      # Financial/Environmental Concerns:     # History of CABG: noted on surgical history        Current Medications:    Scheduled Meds:  Current Facility-Administered Medications   Medication Dose Route Frequency Provider Last Rate Last Admin    [Held by provider] acetaminophen (TYLENOL) tablet 975 mg  975 mg Oral Q8H Maryam Anthony PA-C   975 mg at 08/30/24 1336    [Held by provider] atorvastatin (LIPITOR) tablet 80 mg  80 mg Oral QPM Jessica Tolbert PA-C   80 mg at 08/29/24 2038    [Held by provider] clopidogrel (PLAVIX) tablet 75 mg  75 mg Oral Daily Maryam Anthony PA-C        sennosides (SENOKOT) syrup 5 mL  5 mL Oral BID Aurelio Huang MD   5 mL at 09/04/24 2022    And    docusate (COLACE) 50 MG/5ML liquid 50 mg  50 mg Oral BID Aurelio Huang MD   50 mg at 09/04/24 2027    lactobacillus rhamnosus (GG) (CULTURELL) capsule 1 capsule  1 capsule Oral or Feeding Tube BID Aurelio Huang MD   1 capsule at 09/06/24 0831    Lidocaine (LIDOCARE) 4 % Patch 1-2 patch  1-2 patch Transdermal Q24H Maryam Anthony PA-C   1 patch at 09/05/24 1711    [Held by provider] metoprolol tartrate (LOPRESSOR) half-tab 12.5 mg  12.5 mg Oral BID Jessica Tolbert PA-C        pantoprazole (PROTONIX) 2 mg/mL suspension 40 mg  40 mg Oral or NG Tube QAM AC Maryam Anthony PA-C        Or    pantoprazole (PROTONIX) EC tablet 40 mg  40 mg Oral QAM AC Maryam Anthony PA-C        Or    pantoprazole (PROTONIX) IV push injection 40 mg  40 mg Intravenous QAM AC Maryam Anthony, " PA-C   40 mg at 09/06/24 0827    polyethylene glycol (MIRALAX) Packet 17 g  17 g Oral or Feeding Tube Daily Aurelio Huang MD        Prosource TF20 ENfit Compatibl EN LIQD (PROSOURCE TF20) packet 60 mL  1 packet Per Feeding Tube BID Aurelio Huang MD   60 mL at 09/05/24 2007    sodium chloride (PF) 0.9% PF flush 3 mL  3 mL Intracatheter Q8H Gondal, Saad J, MD   10 mL at 09/06/24 0834    [START ON 9/7/2024] vancomycin (VANCOCIN) 1,000 mg in 200 mL dextrose intermittent infusion  1,000 mg Intravenous Q24H Dylan Renae MD        [Held by provider] Warfarin Dose Required Daily - Pharmacist Managed  1 each Oral See Admin Instructions Jessica Tolbert PA-C         Continuous Infusions:  Current Facility-Administered Medications   Medication Dose Route Frequency Provider Last Rate Last Admin    CRRT Pre-Filter replacement solution for CVVHD & CVVHDF (Phoxillum BK4/2.5)  10 mL/kg/hr CRRT Continuous Moses Mcnair MD 1,000 mL/hr at 09/06/24 0548 10 mL/kg/hr at 09/06/24 0548    CRRT Replacement solution for CVVHD and CVVHDF premixed replacement solution (Phoxillum 4/2.5)  200 mL/hr CRRT Continuous Moses Mcnair  mL/hr at 09/05/24 1708 200 mL/hr at 09/05/24 1708    dextrose 10% infusion   Intravenous Continuous PRN Aurelio Huang MD        dextrose 10% infusion   Intravenous Continuous PRN Zachary Gonzalez MD        dialysate for CVVHD & CVVHDF premixed replacement solution (Phoxillum 4/2.5) - total potassium 4 mEq/L  24.5 mL/kg/hr CRRT Continuous Moses Mcnair MD 2,500 mL/hr at 09/06/24 0817 24.5 mL/kg/hr at 09/06/24 0817    DOBUTamine (DOBUTREX) 500 mg in D5W 250 mL infusion (adult std conc)  1.5 mcg/kg/min Intravenous Continuous Jenifer Nance DO 4.1 mL/hr at 09/06/24 0956 1.5 mcg/kg/min at 09/06/24 0956    heparin (porcine) 20,000 units in 20 mL ANTICOAGULANT infusion (syringe from pharmacy)  500-1,500 Units/hr CRRT Continuous Moses Mcnair MD   Held at  08/31/24 1928    No heparin required   Does not apply Continuous PRN Moses Mcnair MD         PRN Meds:.  Current Facility-Administered Medications   Medication Dose Route Frequency Provider Last Rate Last Admin    albumin human 25 % injection 50 g  50 g Intravenous Q8H PRN Moses Mcnair MD        sodium chloride (NEBUSAL) 3 % neb solution 3 mL  3 mL Nebulization Q6H PRN Zachary Gonzalez MD        And    albuterol (PROVENTIL) neb solution 2.5 mg  2.5 mg Nebulization Q6H PRN Zachary Gonzalez MD        bisacodyl (DULCOLAX) suppository 10 mg  10 mg Rectal Daily PRN Maryam Anthony PA-C        calcium gluconate 2 g in  mL intermittent infusion  2 g Intravenous Q8H PRN Moses Mcnair MD   2 g at 09/02/24 1514    calcium gluconate 4 g in sodium chloride 0.9 % 100 mL intermittent infusion  4 g Intravenous Q8H PRN Moses Mcnair MD        dextrose 10% infusion   Intravenous Continuous PRN Aurelio Huang MD        dextrose 10% infusion   Intravenous Continuous PRN Zachary Gonzalez MD        glucose gel 15-30 g  15-30 g Oral Q15 Min PRN Maryam Anthony PA-C        Or    dextrose 50 % injection 25-50 mL  25-50 mL Intravenous Q15 Min PRN Maryam Anthony PA-C        Or    glucagon injection 1 mg  1 mg Subcutaneous Q15 Min PRN Maryam Anthony PA-C        hydrALAZINE (APRESOLINE) injection 10 mg  10 mg Intravenous Q30 Min PRN Maryam Anthony PA-C        lactated ringers BOLUS 250 mL  250 mL Intravenous Q15 Min PRN Maryam Anthony PA-C   250 mL at 08/27/24 2151    lidocaine (LMX4) cream   Topical Q1H PRN Maryam Anthony PA-C        lidocaine (LMX4) cream   Topical Q1H PRN Gondal, Saad J, MD        lidocaine 1 % 0.1-1 mL  0.1-1 mL Other Q1H PRN Gondal, Saad J, MD        lidocaine 1 % 0.1-5 mL  0.1-5 mL Other Q1H PRN Maryam Anthony PA-C   2 mL at 09/06/24 1121    magnesium hydroxide (MILK OF MAGNESIA) suspension 30 mL  30 mL Oral  Daily PRN Maryam Anthony PA-C        magnesium sulfate 2 g in 50 mL sterile water intermittent infusion  2 g Intravenous Q8H PRN Moses Mcnair MD        miconazole (MICATIN) 2 % powder   Topical BID PRN Erick Patino A, DO        naloxone (NARCAN) injection 0.2 mg  0.2 mg Intravenous Q2 Min PRN Carey Erick A, DO        Or    naloxone (NARCAN) injection 0.4 mg  0.4 mg Intravenous Q2 Min PRN Carey Erick A, DO        Or    naloxone (NARCAN) injection 0.2 mg  0.2 mg Intramuscular Q2 Min PRN Carey Erick A, DO        Or    naloxone (NARCAN) injection 0.4 mg  0.4 mg Intramuscular Q2 Min PRN Erick Patino A, DO        nicotine (NICORETTE) gum 2 mg  2 mg Buccal Q1H PRN Gondal, Saad J, MD        No heparin required   Does not apply Continuous PRN Moses Mcnair MD        ondansetron (ZOFRAN ODT) ODT tab 8 mg  8 mg Oral Q6H PRN Maryam Anthony PA-C        Or    ondansetron (ZOFRAN) injection 8 mg  8 mg Intravenous Q6H PRN Maryam Anthony PA-C   8 mg at 09/04/24 1011    potassium chloride 20 mEq in 50 mL intermittent infusion  20 mEq Intravenous Q8H PRN Moses Mcnair MD 50 mL/hr at 08/31/24 1317 20 mEq at 08/31/24 1317    senna-docusate (SENOKOT-S/PERICOLACE) 8.6-50 MG per tablet 1 tablet  1 tablet Oral BID PRN Gondal, Saad J, MD        Or    senna-docusate (SENOKOT-S/PERICOLACE) 8.6-50 MG per tablet 2 tablet  2 tablet Oral BID PRN Gondal, Saad J, MD        sodium chloride (PF) 0.9% PF flush 10-40 mL  10-40 mL Intracatheter Once PRN Maryam Anthony PA-C        sodium chloride (PF) 0.9% PF flush 10-40 mL  10-40 mL Intracatheter Once PRN [address] MBBS        sodium chloride (PF) 0.9% PF flush 3 mL  3 mL Intracatheter q1 min prn Gondal, Saad J, MD        sodium chloride 0.9% BOLUS 1-250 mL  1-250 mL Intravenous Q1H PRN Jessica Tolbert PA-C        sodium phosphate 15 mmol in sodium chloride 0.9 % 250 mL intermittent infusion  15 mmol Intravenous Q8H PRN Moses Mcnair MD         traMADol (ULTRAM) tablet 50 mg  50 mg Oral Q6H PRN Erick Patino DO   50 mg at 09/05/24 2023       Cardiographics:    Telemetry monitoring demonstrates paced rhythm at 90bpm per my personal review.    Imaging:  Results for orders placed or performed during the hospital encounter of 08/16/24   MR Brain w/o & w Contrast    Impression    IMPRESSION: Assessment degraded due to motion.  1.  13 mm focus of cortical signal abnormality within the left superior parietal lobule which is favored to represent a subacute infarct; low-grade glial neoplasm could conceivably have a similar appearance. Hyperacute intraparenchymal hematoma could   also have a similar appearance but is considered less likely. Recommend noncontrast head CT for further characterization. Also recommend follow-up MRI brain without and with contrast in 8-12 weeks to document expected evolution.  2.  Additional smaller foci of signal abnormality with similar characteristics favored to represent punctate subacute infarcts.   CTA Head Neck with Contrast    Impression    IMPRESSION:   HEAD CT:  1.  Small focus of juxtacortical low attenuation within the left parietal lobe corresponds to signal abnormality seen on the prior MRI. As suggested previously this is favored to reflect evolving small vessel ischemic change; however, MRI follow-up to   exclude a low-grade neoplastic process is advised.  2.  No evidence for intracranial hemorrhage or significant mass effect.  3.  Generalized brain atrophy and presumed chronic microvascular ischemic change.    HEAD CTA:   1.  Intracranial atherosclerosis including moderate to marked stenosis of the right P2 posterior cerebral artery segment.  2.  No definite proximal branch occlusion, aneurysm, or high flow vascular malformation identified.    NECK CTA:  1.  Atherosclerotic plaque at the carotid bifurcations bilaterally without hemodynamically significant stenosis in the neck vessels.   2.  No evidence for  dissection.   Radiologist Consult For Cardiology    Impression    IMPRESSION:    1.  Partially visualized left adrenal nodule, indeterminate, could represent adrenal adenoma.  2.  Please refer to cardiologist's dictation for the cardiac CT report.   US Carotid Bilateral    Impression    IMPRESSION:  1.  Mild plaque formation, velocities consistent with less than 50% stenosis in the right internal carotid artery.  2.  Mild plaque formation, velocities consistent with less than 50% stenosis in the left internal carotid artery.  3.  Flow within the vertebral arteries is antegrade.   XR Chest Port 1 View    Impression    IMPRESSION: Endotracheal tube tip 4.8 cm proximal to the anuj. [place] catheter with tip in the pulmonary outflow tract. Mediastinal drain with aortic valve prosthesis. Sternotomy. Small left pleural effusion with left basilar infiltrate.   XR Chest Port 1 View    Impression    IMPRESSION: Endotracheal tube in the distal aspect of the trachea. Sternotomy. AVR. Mediastinal drain. Right IJ [place]ana catheter with tip in the right ventricle/MPA. Left-sided chest tube. No pneumothorax. Mild cardiac enlargement with normal pulmonary   vascularity. Minimal left basilar atelectasis and pleural fluid. Degenerative changes in the spine.   Echocardiogram Limited   Result Value Ref Range    LVEF  60-65%    CTA  ANGIOGRAM CORONARY ARTERY   Result Value Ref Range    BSA 0.00 m2       Labs, personally reviewed.  Hemoglobin   Date Value Ref Range Status   09/06/2024 8.0 (L) 11.7 - 15.7 g/dL Final   09/05/2024 8.1 (L) 11.7 - 15.7 g/dL Final   09/05/2024 8.2 (L) 11.7 - 15.7 g/dL Final     WBC Count   Date Value Ref Range Status   09/06/2024 15.2 (H) 4.0 - 11.0 10e3/uL Final   09/05/2024 15.8 (H) 4.0 - 11.0 10e3/uL Final   09/05/2024 15.9 (H) 4.0 - 11.0 10e3/uL Final     Platelet Count   Date Value Ref Range Status   09/06/2024 25 (LL) 150 - 450 10e3/uL Final   09/05/2024 28 (LL) 150 - 450 10e3/uL Final   09/05/2024  28 (LL) 150 - 450 10e3/uL Final     Creatinine   Date Value Ref Range Status   09/06/2024 0.95 0.51 - 0.95 mg/dL Final   09/06/2024 0.95 0.51 - 0.95 mg/dL Final   09/05/2024 0.98 (H) 0.51 - 0.95 mg/dL Final     Potassium   Date Value Ref Range Status   09/06/2024 3.7 3.4 - 5.3 mmol/L Final   09/06/2024 3.7 3.4 - 5.3 mmol/L Final   09/05/2024 3.5 3.4 - 5.3 mmol/L Final     Potassium POCT   Date Value Ref Range Status   08/27/2024 3.4 3.4 - 5.3 mmol/L Final   08/27/2024 3.9 3.4 - 5.3 mmol/L Final   08/27/2024 3.4 3.4 - 5.3 mmol/L Final     Magnesium   Date Value Ref Range Status   09/06/2024 2.5 (H) 1.7 - 2.3 mg/dL Final   09/05/2024 2.6 (H) 1.7 - 2.3 mg/dL Final   09/05/2024 2.5 (H) 1.7 - 2.3 mg/dL Final          I/O:  I/O last 3 completed shifts:  In: 2396.2 [I.V.:1032.2; NG/GT:540]  Out: 5658.3 [Urine:35; Other:5092.3; Stool:500; Blood:31]       Physical Exam:    General: Patient seen in bed. NAD.   HEENT: JULIO, no sclera icterus, moist mucosa.   CV: Paced rhythm on monitor. 2+ peripheral pulses in all extremities. Mild edema.   Pulm: Non-labored effort on 2L NC. Incision C/D/I.  Abd: Soft, NT, ND  : Le with pita urine  Ext: Mod pedal edema, SCDs in place, warm, distal pulses intact.  Neuro: CNs grossly intact      ASSESSMENT/PLAN:    Felicitas Elliott is a 84 year old female with a history of endocarditis, CAD, aortic stenosis and mitral valve disorder who is s/p Aortic root abscess debridement and aortic annular reconstruction, aortic root replacement (Konect composite 25 mm Silva Inspiris Resilia bioprosthetic valve within 30 mm Gelweave Valsalva graft with reimplantation of the coronary arteries), Mitral valve replacement (31 mm St. Tim Sliva bioprosthetic valve) and CABx2.    Active Problems:    Bacteremia    Endocarditis    Sepsis (H)    Nonrheumatic aortic valve stenosis    Postsurgical percutaneous transluminal coronary angioplasty status    Mitral valve disorder    Coronary artery disease  involving native coronary artery of native heart, unspecified whether angina present        NEURO:   - Tylenol held given LFT bump. PRN Tramadol  for pain  - PRN nicorette gum    CV:   - Pre-op EF 60-65%  - Normotensive  - Currently on Dobutamine 1.5mcg  - Beta blocker pending weaning from pressors/improvement in HR  - ASA allergy - Plavix 75mg PO daily-held for now, likely til plt >100  - Atorvastatin 80 mg daily (held in light of LFTs)  - Chest tubes removed 9/3; TPW remain due to plt/need for pacing  - Cards following--appreciate recs  - New baseline echo before discharge and after CT removed  - Coumadin x3 months per surgeon preference--currently held. INR goal 2-3 (clarified with pharmacy)    PULM:   - Extubated POD#1, reintubated POD #2, Extubated POD#5  - Maintaining oxygen saturations on 2L NC  - Encourage pulmonary toilet    FEN/GI:  - Continue electrolyte replacement protocol  - Diet: Cardiac, ADAT   - Bowel regimen  - Nausea, improving.     RENAL:  - Anuric  - Plan was for Le removal today, however, nephro would like to keep one more day. If still anuric 9/7, consider removal. Order updated.   - Nephrology consult and managing CRRT--appreciate.  - 9/4--no appreciable UOP with Lasix challenge    HEME:  - Acute blood loss anemia post-op.   - Hgb 8.0, hep subcutaneous currently held  - Occult blood positive POD#3. Rectal tube in place   - Plavix 75mg PO daily (held) - ASA allergy  - Coumadin pharmacy to dose-held.  - HIT negative. Plt 25 today  - Likely hold Plavix and coumadin until plt >100    ID:  - Lennie op ppx complete, afebrile.  - WBC 15.2  - ID following-appreciate recs  - Pertinent culture history/susceptibilties:   Susceptibility data from last 90 days.  Collected Specimen Info Organism Ampicillin Ampicillin/Sulbactam Cefepime Ceftazidime Ceftriaxone Ciprofloxacin Clindamycin Daptomycin Doxycycline Erythromycin Gentamicin Levofloxacin Meropenem Oxacillin   08/29/24 Sputum from Expectorate  Enterobacter cloacae complex R R  S R R  S      S  S  S      Normal samara                 08/16/24 Peripheral Blood Staphylococcus aureus                 08/14/24 Peripheral Blood Staphylococcus aureus        S  S  S  S  S    S     Collected Specimen Info Organism Piperacillin/Tazobactam Tetracycline Tobramycin Trimethoprim/Sulfamethoxazole  Vancomycin   08/29/24 Sputum from Expectorate Enterobacter cloacae complex R   S  S      Normal samara        08/16/24 Peripheral Blood Staphylococcus aureus        08/14/24 Peripheral Blood Staphylococcus aureus   S   S  S     - Antibiotic history:   - 08/17 - 08/28 Nafcillin   - 08/29 - 08/30 Ceftazidine   - 08/30 - 09/05 Cefepime   - 08/30 - 09/05 PO Vanco  - 08/30 - current IV Vanco    ENDO:   - A1c 6.4  - Euglycemic     PPx:   - DVT: SCDs, SQ heparin TID (held), ambulation when able  - GI: Protonix 40mg IV/PO daily    DISPO:   - Continue critical care in ICU  - PICC placement today  - Medically Ready for Discharge: Anticipated in 5+ Days         Patient discussed with Dr. Renae.        _______  Maryam Anthony PA-C  Cardiothoracic Surgery  [phone]

## 2024-09-06 NOTE — PROGRESS NOTES
HEART CARE NOTE          Assessment/Recommendations   1. Severe valvular disease c/b post-op cardiogenic shock  Assessment / Plan  Hemodynamics stable and tolerating RRT  - continue dobutamine fixed dosing without weaning for today as MVO2 remains marginal - no changes at this time; inotrope not only supports cardiac hemodynamics, but is also necessary for renal and hepatic perfusion  GDMT as detailed below; mainstay of treatment for HFpEF includes diuretics and adequate BP control (class I) and SGLT2-I (class 2a); additional medical therapy (ARNI, MRA, ARB) demonstrated less robust evidence for indication but may be considered per guideline recommendations (2b); no indication for BBlockers   Repeat echo ordered     Current Pharmacotherapy AHA Guideline-Directed Medical Therapy   Losartan  - not started 2/2 renal dysfunction ARNI/ARB   Spironolactone not started  MRA   SGLT2 inhibitor: not started SGLT2-I    Furosemide gtt - currently on CRRT Loop diuretic       2. 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 End organ dysfunction  Assessment / Plan  CRS; CRRT/volume removal per nephrology  Shock liver - resolving; hemodynamic support as above; continue to monitor UOP and renal fucntion closely     4. Anemia  Assessment / Plan  Management and supportive care per primary team     5. CAD s/p CABG  Assessment / Plan  Add and titrate GDMT as hemodynamics tolerate     6. Afib   Assessment / Plan  In sinus bradycardia with significant first degree AV delay on telemetry - temp pacer @ 90 with good hemodynamic response   Avoid AV node blocking agents  CHADSVA score ~4 or higher    Plan of care discussed on September 6, 2024 with patient at bedside, and primary team overseeing patient's care    Patient remains critically ill in the ICU requiring  "hemodynamic support via vasopressors s/p OHS c/b cardiogenic shock. 50 minutes spent on critical care time    History of Present Illness/Subjective    Ms. Felicitas Elliott is a 84 year old female with a PMHx significant for (per Epic notation) presented with fatigue, weakness, chills, poor appetite. Does not really have much for chronic medical conditions other than chronic back pain, furunculosis, uterine prolapse, tobacco use      Today, Mrs. Elliott denies acute cardiac events or complaints; reports that she is feeling more optimistic today after some rest; Management plan as detailed above      ECHO (personnaly Reviewed on 8/19/24):   1. The left ventricle is normal in size. Left ventricular function is  normal.The ejection fraction is 55-60%.  2. Normal right ventricle size and systolic function.  3. Large vegetation on mitral valve (8 mm x 15) attached to posterior leaflet.  4. There is a small vegetation on the aortic valve (6 mm). No evidence of  aortic root abscess identified.  5. Severe valvular aortic stenosis (SHU:0.8 cm2, peak nomi: 4.6 m/sec, mean  gradient: 51 mmHg).    Telemetry: personally reviewed September 6, 2024; notable for paced rhythm     Medical history and pertinent documents reviewed in Care Everywhere please where applicable see details above        Physical Examination Review of Systems   /60   Pulse 92   Temp 97.7  F (36.5  C) (Oral)   Resp 19   Ht 1.702 m (5' 7\")   Wt 87.5 kg (192 lb 14.4 oz)   SpO2 97%   BMI 30.21 kg/m    Body mass index is 30.21 kg/m .  Wt Readings from Last 3 Encounters:   09/06/24 87.5 kg (192 lb 14.4 oz)   08/15/24 90.1 kg (198 lb 9.6 oz)     General Appearance:   no distress, normal body habitus   ENT/Mouth: membranes moist, no oral lesions or bleeding gums.      EYES:  no scleral icterus, normal conjunctivae   Neck: no carotid bruits or thyromegaly   Chest/Lungs:   lungs are clear to auscultation, no rales or wheezing, equal chest wall expansion  "   Cardiovascular:   Regular. Normal first and second heart sounds with no murmurs, rubs, or gallops; the carotid, radial and posterior tibial pulses are intact, + JVD and LE edema bilaterally    Abdomen:  no organomegaly, masses, bruits, or tenderness; bowel sounds are present   Extremities: no cyanosis or clubbing   Skin: no xanthelasma, warm.    Neurologic: NAD     Psychiatric: alert and oriented x3, calm     A complete 10 systems ROS was reviewed  And is negative except what is listed in the HPI.          Medical History  Surgical History Family History Social History   History reviewed. No pertinent past medical history. Past Surgical History:   Procedure Laterality Date    CORONARY ARTERY BYPASS GRAFT, WITH AORTIC VALVE REPLACEMENT, WITH ENDOSCOPIC VESSEL PROCUREMENT N/A 8/27/2024    Procedure: CORONARY ARTERY BYPASS GRAFT TIMES TWO, WITH AORTIC ROOT REPLACEMENT, WITH LEFT INTERNAL MAMMARY ARTERY HARVEST, LEFT SAPHNENOUS ENDOSCOPIC VESSEL PROCUREMENT,;  Surgeon: Dylan Renae MD;  Location: [facility] OR    CV CORONARY ANGIOGRAM N/A 8/20/2024    Procedure: CV CORONARY ANGIOGRAM;  Surgeon: Den Wylie MD;  Location: [facility] CATH LAB CV    IR CVC NON TUNNEL PLACEMENT > 5 YRS  9/1/2024    REPLACE VALVE MITRAL N/A 8/27/2024    Procedure: MITRAL VALVE REPLACEMENT,;  Surgeon: Dylan Renae MD;  Location: [facility] OR    TRANSESOPHAGEAL ECHOCARDIOGRAM INTRAOPERATIVE  8/27/2024    Procedure: ANESTHESIA TRANSESOPHAGEAL ECHOCARDIOGRAM, EPI-AORTIC ULTRASOUND;  Surgeon: Dylan Renae MD;  Location: [facility] OR    no family history of premature coronary artery disease Social History     Socioeconomic History    Marital status:      Spouse name: Not on file    Number of children: Not on file    Years of education: Not on file    Highest education level: Not on file   Occupational History    Not on file   Tobacco Use    Smoking status: Not on file    Smokeless  tobacco: Not on file   Substance and Sexual Activity    Alcohol use: Not on file    Drug use: Not on file    Sexual activity: Not on file   Other Topics Concern    Not on file   Social History Narrative    Not on file     Social Determinants of Health     Financial Resource Strain: Low Risk  (8/24/2024)    Financial Resource Strain     Within the past 12 months, have you or your family members you live with been unable to get utilities (heat, electricity) when it was really needed?: No   Food Insecurity: Low Risk  (8/24/2024)    Food Insecurity     Within the past 12 months, did you worry that your food would run out before you got money to buy more?: No     Within the past 12 months, did the food you bought just not last and you didn t have money to get more?: No   Transportation Needs: Low Risk  (8/24/2024)    Transportation Needs     Within the past 12 months, has lack of transportation kept you from medical appointments, getting your medicines, non-medical meetings or appointments, work, or from getting things that you need?: No   Physical Activity: Not on file   Stress: Not on file   Social Connections: Unknown (1/3/2024)    Received from [facility]    Social Connections     Frequency of Communication with Friends and Family: Not on file   Interpersonal Safety: High Risk (8/23/2024)    Interpersonal Safety     Do you feel physically and emotionally safe where you currently live?: No     Within the past 12 months, have you been hit, slapped, kicked or otherwise physically hurt by someone?: No     Within the past 12 months, have you been humiliated or emotionally abused in other ways by your partner or ex-partner?: No   Housing Stability: Low Risk  (8/24/2024)    Housing Stability     Do you have housing? : Yes     Are you worried about losing your housing?: No           Lab Results    Chemistry/lipid CBC Cardiac Enzymes/BNP/TSH/INR   Lab Results   Component Value Date    BUN  "15.2 09/06/2024    BUN 15.2 09/06/2024     09/06/2024     09/06/2024    CO2 24 09/06/2024    CO2 24 09/06/2024    Lab Results   Component Value Date    WBC 15.2 (H) 09/06/2024    HGB 8.0 (L) 09/06/2024    HCT 25.4 (L) 09/06/2024    MCV 98 09/06/2024    PLT 25 (LL) 09/06/2024    Lab Results   Component Value Date    INR 1.41 (H) 09/06/2024     No results found for: \"CKTOTAL\", \"CKMB\", \"TROPONINI\"       Weight:    Wt Readings from Last 3 Encounters:   09/06/24 87.5 kg (192 lb 14.4 oz)   08/15/24 90.1 kg (198 lb 9.6 oz)       Allergies  Allergies   Allergen Reactions    Aspirin Rash    Cats Rash    Nickel Rash         Surgical History  Past Surgical History:   Procedure Laterality Date    CORONARY ARTERY BYPASS GRAFT, WITH AORTIC VALVE REPLACEMENT, WITH ENDOSCOPIC VESSEL PROCUREMENT N/A 8/27/2024    Procedure: CORONARY ARTERY BYPASS GRAFT TIMES TWO, WITH AORTIC ROOT REPLACEMENT, WITH LEFT INTERNAL MAMMARY ARTERY HARVEST, LEFT SAPHNENOUS ENDOSCOPIC VESSEL PROCUREMENT,;  Surgeon: Dylan Renae MD;  Location: [facility] OR    CV CORONARY ANGIOGRAM N/A 8/20/2024    Procedure: CV CORONARY ANGIOGRAM;  Surgeon: Den Wylie MD;  Location: [facility] CATH LAB CV    IR CVC NON TUNNEL PLACEMENT > 5 YRS  9/1/2024    REPLACE VALVE MITRAL N/A 8/27/2024    Procedure: MITRAL VALVE REPLACEMENT,;  Surgeon: Dylan Renae MD;  Location: [facility] OR    TRANSESOPHAGEAL ECHOCARDIOGRAM INTRAOPERATIVE  8/27/2024    Procedure: ANESTHESIA TRANSESOPHAGEAL ECHOCARDIOGRAM, EPI-AORTIC ULTRASOUND;  Surgeon: Dylan Renae MD;  Location: [facility] OR       Social History  Tobacco:   History   Smoking Status    Not on file   Smokeless Tobacco    Not on file    Alcohol:   Social History    Substance and Sexual Activity      Alcohol use: Not on file   Illicit Drugs:   History   Drug Use Not on file       Family History  History reviewed. No pertinent family history.       Navneet " MD Jose Carlos on 9/6/2024      cc: Clinic, Timbo Malik

## 2024-09-06 NOTE — PLAN OF CARE
[facility] - ICU    RN Progress Note:    Pt is alert, oriented x 4, and able to make needs known. Breathing pattern shallow. SaO2 maintained within ordered parameters with supplemental O2 via nasal cannula at 2 lpm. Surgical incisions are clean, dry, and well approximated with liquid bandage. Surgical site care, per Unit routine. Epicardial pacemaker wires remain in place. Cardiac rhythm is 100 % V-paced at 92 bpm. Pain managed with ordered analgesics. Tube feeding infusing at goal appears well tolerated. Pt oliguric, continues CRRT. Running Pt fluid removal at 100 mL/hr s/t hypotension. CRRT well tolerated, at current rate. Bellow the knee sequential compression devices in placed, bilaterally. Will continue to monitor. Luke Marks RN          Pertinent Assessments:      Please refer to flowsheet rows for full assessment     Vital signs.            Key Events - This Shift:       No overnight events.                 Barriers to Discharge / Downgrade:     Impaired renal function requiring CRRT.

## 2024-09-06 NOTE — PROGRESS NOTES
CVTS Daily Progress Note   POD#9 s/p aortic root abscess debridement and aortic annular reconstruction, aortic root replacement (Konect composite 25 mm Silva Inspiris Resilia bioprosthetic valve within 30 mm Gelweave Valsalva graft with reimplantation of the coronary arteries), mitral valve replacement (31 mm St. Tim Silva bioprosthetic valve) and CAB x 2.  Attending: Dr Renae  LOS: 21    SUBJECTIVE/INTERVAL EVENTS:    Np acute events overnight. Normotensive on dobutamine at 1.5. Paced at 90bpm; underlying quite bradycardic. Discouraged overall. Maintaining oxygenation on 2L NC. Seen in in bed. Pain well controlled. Rectal tube in place. NG placed yesterday and tube feeds begun. Nausea finally improving. Hgb 8.3. INR 1.49. Plt 32 (37). LFT overall stable/downtrending. WBC 16.3; remains on empiric Vanc. ID following. No UOP with Lasix challenge yesterday; remains on CRRT. Patient denies new chest pain, shortness of breath, abdominal pain, calf pain, nausea. She has no questions today.    OBJECTIVE:  Temp:  [97.2  F (36.2  C)-98.3  F (36.8  C)] 98  F (36.7  C)  Pulse:  [92] 92  Resp:  [15-31] 20  BP: ()/(52-66) 116/62  SpO2:  [92 %-100 %] 97 %  Vitals:    09/01/24 0400 09/02/24 0400 09/04/24 0400 09/05/24 0400   Weight: 98.4 kg (216 lb 14.4 oz) 95.9 kg (211 lb 6.7 oz) 92.2 kg (203 lb 4.2 oz) 90.5 kg (199 lb 8.3 oz)    09/06/24 0253   Weight: 87.5 kg (192 lb 14.4 oz)       Clinically Significant Risk Factors          # Hypocalcemia: Lowest Ca = 7.9 mg/dL in last 2 days, will monitor and replace as appropriate    # Anion Gap Metabolic Acidosis: Highest Anion Gap = 20 mmol/L in last 2 days, will monitor and treat as appropriate  # Hypoalbuminemia: Lowest albumin = 3.1 g/dL at 9/1/2024  1:45 PM, will monitor as appropriate    # Coagulation Defect: INR = 1.41 (Ref range: 0.85 - 1.15) and/or PTT = 52 Seconds (Ref range: 22 - 38 Seconds), will monitor for bleeding  # Thrombocytopenia: Lowest platelets = 25 in  "last 2 days, will monitor for bleeding          #Acute blood loss anemia: Lowest Hgb this hospitalization: 6.9 g/dL. Will continue to monitor and treat/transfuse as appropriate.     # Obesity: Estimated body mass index is 30.21 kg/m  as calculated from the following:    Height as of this encounter: 1.702 m (5' 7\").    Weight as of this encounter: 87.5 kg (192 lb 14.4 oz).   # Moderate Malnutrition: based on nutrition assessment      # Financial/Environmental Concerns:     # History of CABG: noted on surgical history        Current Medications:    Scheduled Meds:  Current Facility-Administered Medications   Medication Dose Route Frequency Provider Last Rate Last Admin    [Held by provider] acetaminophen (TYLENOL) tablet 975 mg  975 mg Oral Q8H Maryam Anthony PA-C   975 mg at 08/30/24 1336    [Held by provider] atorvastatin (LIPITOR) tablet 80 mg  80 mg Oral QPM Jessica Tolbert PA-C   80 mg at 08/29/24 2038    [Held by provider] clopidogrel (PLAVIX) tablet 75 mg  75 mg Oral Daily Maryam Anthony PA-C        sennosides (SENOKOT) syrup 5 mL  5 mL Oral BID Aurelio Huang MD   5 mL at 09/04/24 2022    And    docusate (COLACE) 50 MG/5ML liquid 50 mg  50 mg Oral BID Aurelio Huang MD   50 mg at 09/04/24 2027    lactobacillus rhamnosus (GG) (CULTURELL) capsule 1 capsule  1 capsule Oral or Feeding Tube BID Aurelio Huang MD   1 capsule at 09/06/24 0831    Lidocaine (LIDOCARE) 4 % Patch 1-2 patch  1-2 patch Transdermal Q24H Maryam Anthony PA-C   1 patch at 09/05/24 1711    [Held by provider] metoprolol tartrate (LOPRESSOR) half-tab 12.5 mg  12.5 mg Oral BID Jessica Tolbert PA-C        pantoprazole (PROTONIX) 2 mg/mL suspension 40 mg  40 mg Oral or NG Tube QAM AC Maryam Anthony PA-C        Or    pantoprazole (PROTONIX) EC tablet 40 mg  40 mg Oral QAM AC Gabrielle, Maryam Rema, PA-C        Or    pantoprazole (PROTONIX) IV push injection 40 mg  40 mg " Intravenous QAM AC Maryam Anthony PA-C   40 mg at 09/06/24 0827    polyethylene glycol (MIRALAX) Packet 17 g  17 g Oral or Feeding Tube Daily Aurelio Huang MD        Prosource TF20 ENfit Compatibl EN LIQD (PROSOURCE TF20) packet 60 mL  1 packet Per Feeding Tube BID Aurelio Huang MD   60 mL at 09/05/24 2007    sodium chloride (PF) 0.9% PF flush 3 mL  3 mL Intracatheter Q8H Gondal, Saad J, MD   10 mL at 09/06/24 0834    [START ON 9/7/2024] vancomycin (VANCOCIN) 1,000 mg in 200 mL dextrose intermittent infusion  1,000 mg Intravenous Q24H Dylan Renae MD        [Held by provider] Warfarin Dose Required Daily - Pharmacist Managed  1 each Oral See Admin Instructions Jessica Tolbert PA-C         Continuous Infusions:  Current Facility-Administered Medications   Medication Dose Route Frequency Provider Last Rate Last Admin    CRRT Pre-Filter replacement solution for CVVHD & CVVHDF (Phoxillum BK4/2.5)  10 mL/kg/hr CRRT Continuous Moses Mcnair MD 1,000 mL/hr at 09/06/24 0548 10 mL/kg/hr at 09/06/24 0548    CRRT Replacement solution for CVVHD and CVVHDF premixed replacement solution (Phoxillum 4/2.5)  200 mL/hr CRRT Continuous Moses Mcnair  mL/hr at 09/05/24 1708 200 mL/hr at 09/05/24 1708    dextrose 10% infusion   Intravenous Continuous PRN Aurelio Huang MD        dextrose 10% infusion   Intravenous Continuous PRN Zachary Gonzalez MD        dialysate for CVVHD & CVVHDF premixed replacement solution (Phoxillum 4/2.5) - total potassium 4 mEq/L  24.5 mL/kg/hr CRRT Continuous Moses Mcnair MD 2,500 mL/hr at 09/06/24 0817 24.5 mL/kg/hr at 09/06/24 0817    DOBUTamine (DOBUTREX) 500 mg in D5W 250 mL infusion (adult std conc)  1.5 mcg/kg/min Intravenous Continuous Jenifer Nance,  4.1 mL/hr at 09/06/24 0956 1.5 mcg/kg/min at 09/06/24 0956    heparin (porcine) 20,000 units in 20 mL ANTICOAGULANT infusion (syringe from pharmacy)  500-1,500 Units/hr  CRRT Continuous Moses Mcnair MD   Held at 08/31/24 1928    No heparin required   Does not apply Continuous PRN Moses Mcnair MD         PRN Meds:.  Current Facility-Administered Medications   Medication Dose Route Frequency Provider Last Rate Last Admin    albumin human 25 % injection 50 g  50 g Intravenous Q8H PRN Moses Mcnair MD        sodium chloride (NEBUSAL) 3 % neb solution 3 mL  3 mL Nebulization Q6H PRN Zachary Gonzalez MD        And    albuterol (PROVENTIL) neb solution 2.5 mg  2.5 mg Nebulization Q6H PRN Zachary Gonzalez MD        bisacodyl (DULCOLAX) suppository 10 mg  10 mg Rectal Daily PRN Maryam Anthony PA-C        calcium gluconate 2 g in  mL intermittent infusion  2 g Intravenous Q8H PRN Moses Mcnair MD   2 g at 09/02/24 1514    calcium gluconate 4 g in sodium chloride 0.9 % 100 mL intermittent infusion  4 g Intravenous Q8H PRN Moses Mcnair MD        dextrose 10% infusion   Intravenous Continuous PRN Aurelio Huang MD        dextrose 10% infusion   Intravenous Continuous PRN Zachary Gonzalez MD        glucose gel 15-30 g  15-30 g Oral Q15 Min PRN Maryam Anthony PA-C        Or    dextrose 50 % injection 25-50 mL  25-50 mL Intravenous Q15 Min PRN Maryam Anthony PA-C        Or    glucagon injection 1 mg  1 mg Subcutaneous Q15 Min PRN Maryam Anthony PA-C        hydrALAZINE (APRESOLINE) injection 10 mg  10 mg Intravenous Q30 Min PRN Maryam Anthony PA-C        lactated ringers BOLUS 250 mL  250 mL Intravenous Q15 Min PRN Maryam Anthony PA-C   250 mL at 08/27/24 2151    lidocaine (LMX4) cream   Topical Q1H PRN Maryam Anthony PA-C        lidocaine (LMX4) cream   Topical Q1H PRN Gondal, Saad J, MD        lidocaine 1 % 0.1-1 mL  0.1-1 mL Other Q1H PRN Gondal, Saad J, MD        lidocaine 1 % 0.1-5 mL  0.1-5 mL Other Q1H PRN Mrayam Anthony PA-C   2 mL at 09/06/24 1121    magnesium hydroxide (MILK OF  MAGNESIA) suspension 30 mL  30 mL Oral Daily PRN Maryam Anthony PA-C        magnesium sulfate 2 g in 50 mL sterile water intermittent infusion  2 g Intravenous Q8H PRN Moses Mcnair MD        miconazole (MICATIN) 2 % powder   Topical BID PRN Carey Erick A, DO        naloxone (NARCAN) injection 0.2 mg  0.2 mg Intravenous Q2 Min PRN Nicini Erick A, DO        Or    naloxone (NARCAN) injection 0.4 mg  0.4 mg Intravenous Q2 Min PRN Rossini Erick A, DO        Or    naloxone (NARCAN) injection 0.2 mg  0.2 mg Intramuscular Q2 Min PRN Rossini Erick A, DO        Or    naloxone (NARCAN) injection 0.4 mg  0.4 mg Intramuscular Q2 Min PRN Carey Erick A, DO        nicotine (NICORETTE) gum 2 mg  2 mg Buccal Q1H PRN Gondal, Saad J, MD        No heparin required   Does not apply Continuous PRN Moses Mcnair MD        ondansetron (ZOFRAN ODT) ODT tab 8 mg  8 mg Oral Q6H PRN Maryam Anthony PA-C        Or    ondansetron (ZOFRAN) injection 8 mg  8 mg Intravenous Q6H PRN Maryam Anthony PA-C   8 mg at 09/04/24 1011    potassium chloride 20 mEq in 50 mL intermittent infusion  20 mEq Intravenous Q8H PRN Moses Mcnair MD 50 mL/hr at 08/31/24 1317 20 mEq at 08/31/24 1317    senna-docusate (SENOKOT-S/PERICOLACE) 8.6-50 MG per tablet 1 tablet  1 tablet Oral BID PRN Gondal, Saad J, MD        Or    senna-docusate (SENOKOT-S/PERICOLACE) 8.6-50 MG per tablet 2 tablet  2 tablet Oral BID PRN Gondal, Saad J, MD        sodium chloride (PF) 0.9% PF flush 10-40 mL  10-40 mL Intracatheter Once PRN Maryam Anthony PA-C        sodium chloride (PF) 0.9% PF flush 10-40 mL  10-40 mL Intracatheter Once PRN Bairagi, Bryant B, MBBS        sodium chloride (PF) 0.9% PF flush 3 mL  3 mL Intracatheter q1 min prn Gondal, Saad J, MD        sodium chloride 0.9% BOLUS 1-250 mL  1-250 mL Intravenous Q1H PRN Jessica Tolbert PA-C        sodium phosphate 15 mmol in sodium chloride 0.9 % 250 mL intermittent infusion  15  mmol Intravenous Q8H PRN Moses Mcnair MD        traMADol (ULTRAM) tablet 50 mg  50 mg Oral Q6H PRN Erick Pation DO   50 mg at 09/05/24 2023       Cardiographics:    Telemetry monitoring demonstrates paced rhythm at 90bpm per my personal review.    Imaging:  Results for orders placed or performed during the hospital encounter of 08/16/24   MR Brain w/o & w Contrast    Impression    IMPRESSION: Assessment degraded due to motion.  1.  13 mm focus of cortical signal abnormality within the left superior parietal lobule which is favored to represent a subacute infarct; low-grade glial neoplasm could conceivably have a similar appearance. Hyperacute intraparenchymal hematoma could   also have a similar appearance but is considered less likely. Recommend noncontrast head CT for further characterization. Also recommend follow-up MRI brain without and with contrast in 8-12 weeks to document expected evolution.  2.  Additional smaller foci of signal abnormality with similar characteristics favored to represent punctate subacute infarcts.   CTA Head Neck with Contrast    Impression    IMPRESSION:   HEAD CT:  1.  Small focus of juxtacortical low attenuation within the left parietal lobe corresponds to signal abnormality seen on the prior MRI. As suggested previously this is favored to reflect evolving small vessel ischemic change; however, MRI follow-up to   exclude a low-grade neoplastic process is advised.  2.  No evidence for intracranial hemorrhage or significant mass effect.  3.  Generalized brain atrophy and presumed chronic microvascular ischemic change.    HEAD CTA:   1.  Intracranial atherosclerosis including moderate to marked stenosis of the right P2 posterior cerebral artery segment.  2.  No definite proximal branch occlusion, aneurysm, or high flow vascular malformation identified.    NECK CTA:  1.  Atherosclerotic plaque at the carotid bifurcations bilaterally without hemodynamically significant stenosis in  the neck vessels.   2.  No evidence for dissection.   Radiologist Consult For Cardiology    Impression    IMPRESSION:    1.  Partially visualized left adrenal nodule, indeterminate, could represent adrenal adenoma.  2.  Please refer to cardiologist's dictation for the cardiac CT report.   US Carotid Bilateral    Impression    IMPRESSION:  1.  Mild plaque formation, velocities consistent with less than 50% stenosis in the right internal carotid artery.  2.  Mild plaque formation, velocities consistent with less than 50% stenosis in the left internal carotid artery.  3.  Flow within the vertebral arteries is antegrade.   XR Chest Port 1 View    Impression    IMPRESSION: Endotracheal tube tip 4.8 cm proximal to the anuj. [place] catheter with tip in the pulmonary outflow tract. Mediastinal drain with aortic valve prosthesis. Sternotomy. Small left pleural effusion with left basilar infiltrate.   XR Chest Port 1 View    Impression    IMPRESSION: Endotracheal tube in the distal aspect of the trachea. Sternotomy. AVR. Mediastinal drain. Right IJ [place] catheter with tip in the right ventricle/MPA. Left-sided chest tube. No pneumothorax. Mild cardiac enlargement with normal pulmonary   vascularity. Minimal left basilar atelectasis and pleural fluid. Degenerative changes in the spine.   Echocardiogram Limited   Result Value Ref Range    LVEF  60-65%    CTA  ANGIOGRAM CORONARY ARTERY   Result Value Ref Range    BSA 0.00 m2       Labs, personally reviewed.  Hemoglobin   Date Value Ref Range Status   09/06/2024 8.0 (L) 11.7 - 15.7 g/dL Final   09/05/2024 8.1 (L) 11.7 - 15.7 g/dL Final   09/05/2024 8.2 (L) 11.7 - 15.7 g/dL Final     WBC Count   Date Value Ref Range Status   09/06/2024 15.2 (H) 4.0 - 11.0 10e3/uL Final   09/05/2024 15.8 (H) 4.0 - 11.0 10e3/uL Final   09/05/2024 15.9 (H) 4.0 - 11.0 10e3/uL Final     Platelet Count   Date Value Ref Range Status   09/06/2024 25 (LL) 150 - 450 10e3/uL Final   09/05/2024 28  (LL) 150 - 450 10e3/uL Final   09/05/2024 28 (LL) 150 - 450 10e3/uL Final     Creatinine   Date Value Ref Range Status   09/06/2024 0.95 0.51 - 0.95 mg/dL Final   09/06/2024 0.95 0.51 - 0.95 mg/dL Final   09/05/2024 0.98 (H) 0.51 - 0.95 mg/dL Final     Potassium   Date Value Ref Range Status   09/06/2024 3.7 3.4 - 5.3 mmol/L Final   09/06/2024 3.7 3.4 - 5.3 mmol/L Final   09/05/2024 3.5 3.4 - 5.3 mmol/L Final     Potassium POCT   Date Value Ref Range Status   08/27/2024 3.4 3.4 - 5.3 mmol/L Final   08/27/2024 3.9 3.4 - 5.3 mmol/L Final   08/27/2024 3.4 3.4 - 5.3 mmol/L Final     Magnesium   Date Value Ref Range Status   09/06/2024 2.5 (H) 1.7 - 2.3 mg/dL Final   09/05/2024 2.6 (H) 1.7 - 2.3 mg/dL Final   09/05/2024 2.5 (H) 1.7 - 2.3 mg/dL Final          I/O:  I/O last 3 completed shifts:  In: 2396.2 [I.V.:1032.2; NG/GT:540]  Out: 5658.3 [Urine:35; Other:5092.3; Stool:500; Blood:31]       Physical Exam:    General: Patient seen in bed. NAD.   HEENT: JULIO, no sclera icterus, moist mucosa.   CV: Paced rhythm on monitor. 2+ peripheral pulses in all extremities. Mild edema.   Pulm: Non-labored effort on 2L NC. Incision C/D/I.  Abd: Soft, NT, ND  : Le with pita urine  Ext: Mod pedal edema, SCDs in place, warm, distal pulses intact.  Neuro: CNs grossly intact      ASSESSMENT/PLAN:    Felicitas Elliott is a 84 year old female with a history of endocarditis, CAD, aortic stenosis and mitral valve disorder who is s/p Aortic root abscess debridement and aortic annular reconstruction, aortic root replacement (Konect composite 25 mm Silva Inspiris Resilia bioprosthetic valve within 30 mm Gelweave Valsalva graft with reimplantation of the coronary arteries), Mitral valve replacement (31 mm St. Tim Silva bioprosthetic valve) and CABx2.    Active Problems:    Bacteremia    Endocarditis    Sepsis (H)    Nonrheumatic aortic valve stenosis    Postsurgical percutaneous transluminal coronary angioplasty status    Mitral valve  disorder    Coronary artery disease involving native coronary artery of native heart, unspecified whether angina present        NEURO:   - Tylenol held given LFT bump. PRN Tramadol  for pain  - PRN nicorette gum    CV:   - Pre-op EF 60-65%  - Normotensive  - Currently on Dobutamine 1.5mcg  - Stop amio gtt  - Beta blocker pending weaning from pressors/improvement in HR  - ASA allergy - Plavix 75mg PO daily-held for now, likely til plt >100  - Atorvastatin 80 mg daily (held in light of LFTs)  - Chest tubes removed 9/3; TPW remain due to plt/need for pacing  - Cards following--appreciate recs  - New baseline echo before discharge and after CT removed  - Coumadin x3 months per surgeon preference--currently held. INR goal 2-3 (clarified with pharmacy)    PULM:   - Extubated POD#1, reintubated POD #2, Extubated POD#5  - Maintaining oxygen saturations on 2L NC  - Encourage pulmonary toilet    FEN/GI:  - Continue electrolyte replacement protocol  - Diet: Cardiac, ADAT   - Bowel regimen  - Nausea, improving. Amylase and lipase WNL. GI following; appreciate    RENAL:  - Becoming anuric  - Le to remain in for close monitoring of I/O and during period of diuresis/relative immobility.   - Nephrology consult and managing CRRT--appreciate.  - 9/4--no appreciable UOP with Lasix challenge    HEME:  - Acute blood loss anemia post-op.   - Hgb 8.3, hep subcutaneous currently held  - Occult blood positive POD#3. Rectal tube in place   - Plavix 75mg PO daily (held) - ASA allergy  - Coumadin pharmacy to dose-held.  - HIT negative. Plt 32 today  - Likely hold Plavix and coumadin until plt >100    ID:  - Lennie op ppx complete, afebrile.  - WBC 16.3  - ID following-appreciate recs  - Pertinent culture history/susceptibilties:   Susceptibility data from last 90 days.  Collected Specimen Info Organism Ampicillin Ampicillin/Sulbactam Cefepime Ceftazidime Ceftriaxone Ciprofloxacin Clindamycin Daptomycin Doxycycline Erythromycin Gentamicin  Levofloxacin Meropenem Oxacillin   08/29/24 Sputum from Expectorate Enterobacter cloacae complex R R  S R R  S      S  S  S      Normal samara                 08/16/24 Peripheral Blood Staphylococcus aureus                 08/14/24 Peripheral Blood Staphylococcus aureus        S  S  S  S  S    S     Collected Specimen Info Organism Piperacillin/Tazobactam Tetracycline Tobramycin Trimethoprim/Sulfamethoxazole  Vancomycin   08/29/24 Sputum from Expectorate Enterobacter cloacae complex R   S  S      Normal samara        08/16/24 Peripheral Blood Staphylococcus aureus        08/14/24 Peripheral Blood Staphylococcus aureus   S   S  S     - Antibiotic history:   - 08/17 - 08/28 Nafcillin   - 08/29 - 08/30 Ceftazidine   - 08/30 - 09/05 Cefepime   - 08/30 - current IV and PO Vanco    ENDO:   - A1c 6.4  - Euglycemic     PPx:   - DVT: SCDs, SQ heparin TID (held), ambulation when able  - GI: Protonix 40mg IV/PO daily    DISPO:   - Continue critical care in ICU  - Medically Ready for Discharge: Anticipated in 5+ Days         Patient discussed with Dr. Renae.        _______  Maryam Anthony PA-C  Cardiothoracic Surgery  [phone]

## 2024-09-06 NOTE — PROGRESS NOTES
[facility]  [facility]   Follow up    Felicitas Elliott   MRN: [id number]  : 1940   DOA: 2024   CC: ARF      Assessment and Plan:  84 year old female    ARF/CKD: has underlying CKD with baseline CR 1.3-1.8; now ARF in the setting of cardiac surgery;   tolerating CRRT and good control of lytes/acidosis; continues to have very poor urine output in the setting of volume overload; still feel that continued slow UF with CRRT will promote more hemodynamic stability than IHD until we can get her closer to an EDW  Volume status; elevated; UF with CRRT; increased UF rate to 200 ml/hour; diuretics challenge again tomorrow am; maintain chacko for now  MSSA bacteremia; on abx and ID following  MV and AV infective endocarditis , 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: s/p biprothetic AoV and MV replacement, Aortic root replacement and 2vCABG  HoTN with septic and cardiogenic shock; resolved HoTN; on dobutamine and off other pressors  Acidosis; lactate and ARF and starvation ketoacidosis; improved with high dose CRRT  Anemia; following hgb  Hyperlipid; statin on hold  CAD: s/p CAB 2 v as part of valve surgery  A fib; on amio gtt  Resp failure; extubated; no plan to reintubate if deteriorates  Hepatitis; s/p acetylcysteine  Dispo; DNR, DNI; pt expressing concern about continued aggressive cares; d/w pt and family again today  Nausea;improved      Subjective  Pt in brighter mood today; able to laugh a bit; ok with continuing current level of care; says no improvement in breathing or weakness  Objective    Vital signs in last 24 hours  Temp:  [97.2  F (36.2  C)-98.6  F (37  C)] 97.7  F (36.5  C)  Pulse:  [92] 92  Resp:  [] 18  BP: ()/(52-66) 112/62  SpO2:  [92 %-100 %] 97 %      Intake/Output last 3 shifts  I/O last 3 completed shifts:  In: 2396.2 [I.V.:1032.2; NG/GT:540]  Out: 5658.3 [Urine:35; Other:5092.3; Stool:500;  Blood:31]  Intake/Output this shift:  I/O this shift:  In: 594.8 [I.V.:348.8; NG/GT:213]  Out: 409.7 [Other:409.7]    Physical Exam  Alert/oriented x 3, awake, NAD; appears tired; on O2  CV: RRR without murmur or rub  Lung:  decreased at bases  Ab: soft and NT; not distended; normal bs  Ext: ++edema and well perfused  Skin; no rash    Pertinent Labs     Last Renal Panel:  Sodium   Date Value Ref Range Status   09/06/2024 141 135 - 145 mmol/L Final   09/06/2024 141 135 - 145 mmol/L Final     Potassium   Date Value Ref Range Status   09/06/2024 3.7 3.4 - 5.3 mmol/L Final   09/06/2024 3.7 3.4 - 5.3 mmol/L Final     Potassium POCT   Date Value Ref Range Status   08/27/2024 3.4 3.4 - 5.3 mmol/L Final     Chloride   Date Value Ref Range Status   09/06/2024 106 98 - 107 mmol/L Final   09/06/2024 106 98 - 107 mmol/L Final     Carbon Dioxide (CO2)   Date Value Ref Range Status   09/06/2024 24 22 - 29 mmol/L Final   09/06/2024 24 22 - 29 mmol/L Final     Anion Gap   Date Value Ref Range Status   09/06/2024 11 7 - 15 mmol/L Final   09/06/2024 11 7 - 15 mmol/L Final     Glucose   Date Value Ref Range Status   09/06/2024 143 (H) 70 - 99 mg/dL Final   09/06/2024 143 (H) 70 - 99 mg/dL Final     GLUCOSE BY METER POCT   Date Value Ref Range Status   09/04/2024 102 (H) 70 - 99 mg/dL Final     Urea Nitrogen   Date Value Ref Range Status   09/06/2024 15.2 8.0 - 23.0 mg/dL Final   09/06/2024 15.2 8.0 - 23.0 mg/dL Final     Creatinine   Date Value Ref Range Status   09/06/2024 0.95 0.51 - 0.95 mg/dL Final   09/06/2024 0.95 0.51 - 0.95 mg/dL Final     GFR Estimate   Date Value Ref Range Status   09/06/2024 59 (L) >60 mL/min/1.73m2 Final     Comment:     eGFR calculated using 2021 CKD-EPI equation.   09/06/2024 59 (L) >60 mL/min/1.73m2 Final     Comment:     eGFR calculated using 2021 CKD-EPI equation.  eGFR calculated using 2021 CKD-EPI equation.     Calcium   Date Value Ref Range Status   09/06/2024 8.2 (L) 8.8 - 10.4 mg/dL Final      Comment:     Reference intervals for this test were updated on 7/16/2024 to reflect our healthy population more accurately. There may be differences in the flagging of prior results with similar values performed with this method. Those prior results can be interpreted in the context of the updated reference intervals.   09/06/2024 8.2 (L) 8.8 - 10.4 mg/dL Final     Comment:     Reference intervals for this test were updated on 7/16/2024 to reflect our healthy population more accurately. There may be differences in the flagging of prior results with similar values performed with this method. Those prior results can be interpreted in the context of the updated reference intervals.  Reference intervals for this test were updated on 7/16/2024 to reflect our healthy population more accurately. There may be differences in the flagging of prior results with similar values performed with this method. Those prior results can be interpreted in the context of the updated reference intervals.     Phosphorus   Date Value Ref Range Status   09/06/2024 3.1 2.5 - 4.5 mg/dL Final     Albumin   Date Value Ref Range Status   09/06/2024 3.6 3.5 - 5.2 g/dL Final   09/06/2024 3.6 3.5 - 5.2 g/dL Final     Recent Labs   Lab 09/06/24  0413 09/05/24 2016 09/05/24  1156 09/05/24  0413 09/04/24 2008   HGB 8.0* 8.1* 8.2* 8.3* 8.3*          I reviewed all lab results  Cheryle Jung MD

## 2024-09-06 NOTE — PROGRESS NOTES
Patient stable from respiratory standpoint.  2 L nasal cannula.  DNR/DNI.    Critical care will sign off.  Please call if needed

## 2024-09-06 NOTE — PROGRESS NOTES
[facility] Inpatient follow up        09/06/2024    Chart reviewed  Patient seen in ICU             Assessment and Recommendations:   Assessment:  Felicitas Elliott is a 84 year old female with     Respiratory failure, shock liver  Decreased urine output, on dobutamine norepinephrine and vasopressin  Intubated  History of severe aortic stenosis  S/P aortic root abscess debridement and aortic annular reconstruction, aortic root replacement, bioprosthetic aortic valve on 8/27/2024  Acute respiratory failure, extubated 8/28/2024, reintubated 8/29/2024, acute kidney injury, currently on CRRT- Gram negative bacilli in resp culture-     2+ Enterobacter cloacae complex, S cefepime, R ceftriaxone     MRSA nares negative, respiratory PCR negative  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  MSSA bacteremia.  Positive blood culture on 8/14/2024 and 8/16.  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  With the size of the vegetation combined with brain infarct, status post CV surgery, see details below active issue.   Neck pain worrisome for cervical discitis.  Neck MRI showed some edema at C4-C5 on the left side.  No clear evidence of infection.  Abnormal brain MRI suggestive of subacute infarct. In a patient with MSSA bacteremia and aortic valve endocarditis, this is cerebral septic emboli. Active issue.     POSTOPERATIVE DIAGNOSES:  1.  Severe aortic stenosis and moderate aortic regurgitation.  2.  Severe mitral stenosis.  3.  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  4.  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  5.  Severe multivessel coronary artery disease.  6.  Aortic root abscess.      PROCEDURE PERFORMED: 8/27/24  Aortic root abscess debridement and  aortic annular reconstruction.  Aortic root replacement (Konect composite 25 mm Silva Inspiris Resilia bioprosthetic valve within 30 mm Gelweave Valsalva graft with reimplantation of the coronary arteries).  Mitral valve replacement (31 mm St. Tim Silva bioprosthetic valve).  Coronary artery bypass grafting x 2 (reversed saphenous vein graft to the obtuse marginal branch of the left circumflex coronary artery, and pedicled left internal mammary artery to left anterior descending coronary artery).  Endoscopic vein harvest of the greater saphenous vein from the left lower extremity.    Recommendations:  Completed cefepime for pneumonia--waiting to see if thrombocytopenia improves.  If not, can change vancomycin to daptomycin (thrombocytopenia more likely due to cephalosporin than glycopeptide)  Off nafcillin. Per previous notes plan IV antibiotics duration 6 weeks--Until 10/8/2024  Currently extubated,   Stopped oral vanco  PICC placed 8/21  Monitor CBC, CMP--thrombocytopenia, seems to be coincident with cefepime usage.   Status post vascular surgery, chest tube, CV surgery following closely  Will need to check immunoglobin level in 4 to 6 weeks sister with history of hypogammaglobulinemia       HEAVEN AGUIRRE MD   [facility]  Answering Service: [phone]  On-Call ID provider: [phone], option: 9              Interval History:     HPI: says feels better today.       Recent Inflammatory Biomarkers:   Recent Labs   Lab Test 09/06/24  1210 09/06/24  0413 09/05/24 2016 09/05/24  1156 09/05/24  0413 09/04/24 2008 08/30/24  0437 08/29/24  0359   PCAL  --   --   --   --   --   --   --  1.24*   WBC 14.6* 15.2* 15.8* 15.9* 16.3* 16.4*   < > 15.3*    < > = values in this interval not displayed.            Review of Systems:      Negative except for findings in the HPI.           Current Medications (antimicrobials listed in bold):     Current Facility-Administered Medications    Medication Dose Route Frequency Provider Last Rate Last Admin    [Held by provider] acetaminophen (TYLENOL) tablet 975 mg  975 mg Oral Q8H Maryam Anthony PA-C   975 mg at 08/30/24 1336    [Held by provider] atorvastatin (LIPITOR) tablet 80 mg  80 mg Oral QPM Jessica Tolbert PA-C   80 mg at 08/29/24 2038    [Held by provider] clopidogrel (PLAVIX) tablet 75 mg  75 mg Oral Daily Maryam Anthony PA-C        sennosides (SENOKOT) syrup 5 mL  5 mL Oral BID Aurelio Huang MD   5 mL at 09/04/24 2022    And    docusate (COLACE) 50 MG/5ML liquid 50 mg  50 mg Oral BID Aurelio Huang MD   50 mg at 09/04/24 2027    lactobacillus rhamnosus (GG) (CULTURELL) capsule 1 capsule  1 capsule Oral or Feeding Tube BID Aurelio Huang MD   1 capsule at 09/06/24 0831    Lidocaine (LIDOCARE) 4 % Patch 1-2 patch  1-2 patch Transdermal Q24H Maryam Anthony PA-C   1 patch at 09/05/24 1711    [Held by provider] metoprolol tartrate (LOPRESSOR) half-tab 12.5 mg  12.5 mg Oral BID eJssica Tolbert PA-C        pantoprazole (PROTONIX) 2 mg/mL suspension 40 mg  40 mg Oral or NG Tube QAM AC Maryam Anthony PA-C        Or    pantoprazole (PROTONIX) EC tablet 40 mg  40 mg Oral QAM AC Maryam Anthony PA-C        Or    pantoprazole (PROTONIX) IV push injection 40 mg  40 mg Intravenous QAM  Maryam Anthony PA-C   40 mg at 09/06/24 0827    polyethylene glycol (MIRALAX) Packet 17 g  17 g Oral or Feeding Tube Daily Aurelio Huang MD        Prosource TF20 ENfit Compatibl EN LIQD (PROSOURCE TF20) packet 60 mL  1 packet Per Feeding Tube BID Aurelio Huang MD   60 mL at 09/05/24 2007    sodium chloride (PF) 0.9% PF flush 3 mL  3 mL Intracatheter Q8H Gondal, Saad J, MD   10 mL at 09/06/24 0834    [START ON 9/7/2024] vancomycin (VANCOCIN) 1,000 mg in 200 mL dextrose intermittent infusion  1,000 mg Intravenous Q24H Dylan Renae MD         [Held by provider] Warfarin Dose Required Daily - Pharmacist Managed  1 each Oral See Admin Instructions Jessica Tolbert PA-C                  Allergies:     Allergies   Allergen Reactions    Aspirin Rash    Cats Rash    Nickel Rash            Physical Exam:   Vitals were reviewed  Patient Vitals for the past 24 hrs:   BP Temp Temp src Pulse Resp SpO2 Weight   09/06/24 1100 116/62 -- -- 92 20 97 % --   09/06/24 1045 112/58 -- -- 92 20 97 % --   09/06/24 1030 109/62 -- -- 92 22 96 % --   09/06/24 1015 -- -- -- 92 21 97 % --   09/06/24 1000 120/65 98  F (36.7  C) Oral 92 20 96 % --   09/06/24 0945 -- -- -- 92 20 97 % --   09/06/24 0930 113/60 -- -- 92 20 97 % --   09/06/24 0915 -- -- -- 92 20 97 % --   09/06/24 0900 112/62 -- -- 92 18 97 % --   09/06/24 0800 118/60 97.8  F (36.6  C) Oral 92 20 96 % --   09/06/24 0700 106/57 -- -- 92 18 98 % --   09/06/24 0645 -- -- -- 92 16 98 % --   09/06/24 0630 115/60 -- -- 92 19 97 % --   09/06/24 0615 113/60 -- -- 92 18 96 % --   09/06/24 0600 114/62 97.7  F (36.5  C) Oral 92 19 96 % --   09/06/24 0545 112/62 -- -- 92 17 96 % --   09/06/24 0530 118/65 -- -- 92 19 96 % --   09/06/24 0500 115/61 -- -- 92 16 98 % --   09/06/24 0445 113/60 -- -- 92 17 97 % --   09/06/24 0430 105/57 -- -- 92 16 96 % --   09/06/24 0415 106/59 -- -- 92 20 98 % --   09/06/24 0400 108/61 97.5  F (36.4  C) Oral 92 16 98 % --   09/06/24 0345 114/63 -- -- 92 16 98 % --   09/06/24 0330 118/64 -- -- 92 17 98 % --   09/06/24 0315 111/60 -- -- 92 19 97 % --   09/06/24 0300 116/60 -- -- 92 19 96 % --   09/06/24 0253 -- -- -- -- -- -- 87.5 kg (192 lb 14.4 oz)   09/06/24 0245 119/60 -- -- 92 20 95 % --   09/06/24 0230 124/64 -- -- 92 21 97 % --   09/06/24 0215 98/57 -- -- 92 22 97 % --   09/06/24 0200 116/60 97.5  F (36.4  C) Oral 92 (!) 31 97 % --   09/06/24 0145 106/56 -- -- 92 16 97 % --   09/06/24 0130 106/55 -- -- 92 17 97 % --   09/06/24 0115 104/56 -- -- 92 20 97 % --   09/06/24 0100 96/55 -- -- 92 18 96  % --   09/06/24 0045 106/57 -- -- 92 29 96 % --   09/06/24 0030 99/52 -- -- 92 17 98 % --   09/06/24 0015 109/58 -- -- 92 18 97 % --   09/06/24 0000 121/63 97.6  F (36.4  C) Oral 92 20 96 % --   09/05/24 2345 92/54 -- -- 92 15 99 % --   09/05/24 2330 (!) 89/56 -- -- 92 22 97 % --   09/05/24 2300 127/63 -- -- 92 18 96 % --   09/05/24 2230 120/59 -- -- 92 17 100 % --   09/05/24 2200 115/58 -- -- 92 17 97 % --   09/05/24 2130 112/56 -- -- 92 17 96 % --   09/05/24 2100 134/66 -- -- 92 23 96 % --   09/05/24 2030 126/66 -- -- 92 20 99 % --   09/05/24 2000 118/56 98.1  F (36.7  C) Oral 92 21 96 % --   09/05/24 1930 106/56 -- -- 92 20 98 % --   09/05/24 1900 112/58 98  F (36.7  C) Oral 92 21 97 % --   09/05/24 1830 98/52 -- -- 92 18 98 % --   09/05/24 1800 117/59 98.3  F (36.8  C) -- 92 20 97 % --   09/05/24 1730 118/61 -- -- 92 21 95 % --   09/05/24 1700 121/62 98  F (36.7  C) Oral 92 18 96 % --   09/05/24 1630 112/59 -- -- 92 18 97 % --   09/05/24 1600 119/59 98.3  F (36.8  C) Oral 92 19 96 % --   09/05/24 1530 114/59 -- -- 92 17 100 % --   09/05/24 1500 120/65 98.2  F (36.8  C) Oral 92 17 100 % --   09/05/24 1430 113/59 -- -- 92 21 96 % --   09/05/24 1400 -- 98.2  F (36.8  C) Oral 92 20 92 % --   09/05/24 1359 116/56 -- -- 92 20 97 % --   09/05/24 1330 121/57 97.2  F (36.2  C) Oral 92 19 97 % --   09/05/24 1300 131/60 97.8  F (36.6  C) Oral 92 23 95 % --       Physical Examination:    Resp: 20    Gen: extubated, nasal canula, still on low dose dobutamine  HEENT: opens eyes  PICC, Femoral catheter for dialysis, rectal tube  CV: Sternal incision,    Lungs: coarse, intubated, chest tubes.  Skin: Warm  Extr: edema. Neha L leg  Neuro: intubated, opens eyes  Art line           Laboratory Data:   ID Labs:  Microbiology labs:  7-Day Micro Results       No results found for the last 168 hours.          Susceptibility data from last 90 days.  Collected Specimen Info Organism Ampicillin Ampicillin/Sulbactam Cefepime Ceftazidime  Ceftriaxone Ciprofloxacin Clindamycin Daptomycin Doxycycline Erythromycin Gentamicin Levofloxacin Meropenem Oxacillin   08/29/24 Sputum from Expectorate Enterobacter cloacae complex R R  S R R  S      S  S  S      Normal samara                 08/16/24 Peripheral Blood Staphylococcus aureus                 08/14/24 Peripheral Blood Staphylococcus aureus        S  S  S  S  S    S     Collected Specimen Info Organism Piperacillin/Tazobactam Tetracycline Tobramycin Trimethoprim/Sulfamethoxazole  Vancomycin   08/29/24 Sputum from Expectorate Enterobacter cloacae complex R   S  S      Normal samara        08/16/24 Peripheral Blood Staphylococcus aureus        08/14/24 Peripheral Blood Staphylococcus aureus   S   S  S       Reviewed      No lab results found.  Recent Labs   Lab Test 09/06/24 1210 09/06/24 0413 09/05/24 2016 09/05/24 1156 09/05/24 0413 09/04/24 2008   WBC 14.6* 15.2* 15.8* 15.9* 16.3* 16.4*     Recent Labs   Lab Test 09/06/24 0413 09/05/24 2212 09/05/24 2016 09/05/24  1354   CR 0.95  0.95 0.98* 0.97* 0.94   GFRESTIMATED 59*  59* 57* 57* 60*       Hematology Studies  Recent Labs   Lab Test 09/06/24 1210 09/06/24 0413 09/05/24 2016 09/05/24 1156 09/05/24 0413 09/04/24 2008   WBC 14.6* 15.2* 15.8* 15.9* 16.3* 16.4*   HGB 7.9* 8.0* 8.1* 8.2* 8.3* 8.3*   HCT 24.8* 25.4* 25.9* 25.3* 25.5* 26.2*   PLT 27* 25* 28* 28* 32* 33*       Metabolic  Recent Labs   Lab Test 09/06/24 0413 09/05/24 2212 09/05/24 2016     141 140 139   BUN 15.2  15.2 16.7 15.2   CO2 24 24 23 24   CR 0.95  0.95 0.98* 0.97*   GFRESTIMATED 59*  59* 57* 57*       Hepatic Studies  Recent Labs   Lab Test 09/06/24 0413 09/05/24 2212 09/05/24 2016 09/05/24  1354   BILITOTAL 1.4* 1.3*  --  1.1   ALKPHOS 99 94  --  88   ALBUMIN 3.6  3.6 3.7 3.7 3.5   * 242*  --  267*   * 168*  --  185*          Imaging Data:   Reviewed     IR CVC Non Tunnel Placement > 5 Yrs    Result Date: 9/1/2024  [place] [facility]  LOCATION: [facility] DATE: 9/1/2024 PROCEDURE: NON-TUNNELED DIALYSIS CATHETER PLACEMENT INTERVENTIONAL RADIOLOGIST: RU Reyes MD. INDICATION: HAMMAD requiring hemodialysis. CONSENT: The risks, benefits and alternatives of non-tunneled dialysis catheter placement were discussed with the patient in detail. All questions were answered. Informed consent was given to proceed with the procedure. MODERATE SEDATION: None. CONTRAST: None. ANTIBIOTICS: None. ADDITIONAL MEDICATIONS: Heparin 3800 U IV FLUOROSCOPIC TIME: 2.0 minutes. RADIATION DOSE: Air Kerma: 36.04 mGy. COMPLICATIONS: No immediate complications. STERILE BARRIER TECHNIQUE: Maximum sterile barrier technique was used. Cutaneous antisepsis was performed at the operative site with application of 2% chlorhexidine and large sterile drape. Prior to the procedure, the  and assistant performed hand hygiene and wore hat, mask, sterile gown, and sterile gloves during the entire procedure. PROCEDURE: Limited left neck ultrasound was performed which demonstrated a patent internal jugular vein; images saved to the permanent patient medical record. The overlying skin and subcutaneous soft tissues were anesthetized using 1% lidocaine. Under ultrasound guidance, the left internal jugular vein was accessed using a micropuncture needle; images saved of the permanent patient medical record. Access was secured using a 4 Welsh transitional sheath. A Vello Appson guidewire was advanced into the SVC. Under fluoroscopic guidance, the overlying skin and subcutaneous soft tissues were dilated and a 15.5 Welsh nontunneled dialysis catheter was placed with the tip within the cavoatrial junction. The catheter was tested and found to flush and aspirate appropriately. The catheter was heparinized and secured to the skin.     IMPRESSION:  1.  Successful non-tunneled dialysis catheter placement. PLAN: 1. Dialysis catheter is ready to be used. 2. Nontunneled  right groin dialysis catheter can be removed in ICU.       Study Result    Narrative & Impression   EXAM: MR BRAIN W/O and W CONTRAST  LOCATION: [facility]  DATE: 8/17/2024     INDICATION: Headache in a patient with MSSA bacteremia secondary to aortic valve endocarditis.  Look for cerebral septic emboli; Headache; Acute HA (< 3 months), no complicating features  COMPARISON: None.  CONTRAST: 9 ml gadavist  TECHNIQUE: Routine multiplanar multisequence head MRI without and with intravenous contrast.     FINDINGS: Assessment degraded due to motion.  INTRACRANIAL CONTENTS:   Apparent focus of diffusion restriction with T2/FLAIR hyperintensity within the left superior parietal lobule cortex is hyperintense on ADC map, representing T2 shine through.  Focus of signal abnormality measures 13 x 9 mm in the axial plane and does   not have internal enhancement.     Additional punctate foci of hyperintensity on diffusion weighted with similar signal characteristics (periventricular right corona radiata, left superior parietal lobule, and left cerebellar hemisphere).     Patchy and confluent nonspecific T2/FLAIR hyperintensities within the cerebral white matter most consistent with moderate chronic microvascular ischemic change. Moderate generalized cerebral atrophy. No hydrocephalus. Normal position of the cerebellar   tonsils. No discernible abnormal intracranial enhancement; however, assessment substantially degraded due to motion. 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     SELLA: No abnormality accounting for technique.     OSSEOUS STRUCTURES/SOFT TISSUES: Normal marrow signal. The major intracranial vascular flow voids are maintained.      ORBITS: No abnormality accounting for technique.      SINUSES/MASTOIDS: Mild mucosal thickening scattered about the paranasal sinuses. Scattered fluid/membrane thickening in the left mastoid air cells. No apparent mass in the posterior nasopharynx or skull base.                                                                        IMPRESSION: Assessment degraded due to motion.  1.  13 mm focus of cortical signal abnormality within the left superior parietal lobule which is favored to represent a subacute infarct; low-grade glial neoplasm could conceivably have a similar appearance. Hyperacute intraparenchymal hematoma could   also have a similar appearance but is considered less likely. Recommend noncontrast head CT for further characterization. Also recommend follow-up MRI brain without and with contrast in 8-12 weeks to document expected evolution.  2.  Additional smaller foci of signal abnormality with similar characteristics favored to represent punctate subacute infarcts.

## 2024-09-06 NOTE — PROGRESS NOTES
"Care Management Follow Up    Length of Stay (days): 21    Expected Discharge Date: 09/10/2024    Anticipated Discharge Plan:  TBD    Transportation: TBD    PT Recommendations:    OT Recommendations:        Barriers to Discharge: medical stability  Prior Living Situation:    \"Patient resides in a house with her  and is reported as mostly independent when at baseline.  assists with transport and as needed in general. No DME use or current in-home/outpatient services identified. \"     Discussed  Partnership in Safe Discharge Planning  document with patient/family: No     Handoff Completed: No, handoff not indicated or clinically appropriate    Patient/Spokesperson Updated: No    Additional Information:  Discussed patient in ICU rounds. Remains on CRRT and TF.     Per ID note, will need 6 weeks of IV abx.     Referral to LTACH pending.     10:35 AM - Discussed with LTACH liaions, they are not able to service CRRT patients. Liaison will follow along as patient stabilizes to see if she shall qualify for LTACH. Questions of long term feeding plan for patient will need to be addressed.     Next Steps: follow for medical progression and recommendations    Sylvia Reyes RN     "

## 2024-09-07 ENCOUNTER — APPOINTMENT (OUTPATIENT)
Dept: SPEECH THERAPY | Facility: HOSPITAL | Age: 84
DRG: 853 | End: 2024-09-07
Attending: STUDENT IN AN ORGANIZED HEALTH CARE EDUCATION/TRAINING PROGRAM
Payer: COMMERCIAL

## 2024-09-07 LAB
ALBUMIN SERPL BCG-MCNC: 3.7 G/DL (ref 3.5–5.2)
ALBUMIN SERPL BCG-MCNC: 3.7 G/DL (ref 3.5–5.2)
ALBUMIN SERPL BCG-MCNC: 3.8 G/DL (ref 3.5–5.2)
ALBUMIN SERPL BCG-MCNC: 3.8 G/DL (ref 3.5–5.2)
ALP SERPL-CCNC: 101 U/L (ref 40–150)
ALP SERPL-CCNC: 101 U/L (ref 40–150)
ALP SERPL-CCNC: 102 U/L (ref 40–150)
ALT SERPL W P-5'-P-CCNC: 113 U/L (ref 0–50)
ALT SERPL W P-5'-P-CCNC: 119 U/L (ref 0–50)
ALT SERPL W P-5'-P-CCNC: 136 U/L (ref 0–50)
ANION GAP SERPL CALCULATED.3IONS-SCNC: 10 MMOL/L (ref 7–15)
ANION GAP SERPL CALCULATED.3IONS-SCNC: 11 MMOL/L (ref 7–15)
ANION GAP SERPL CALCULATED.3IONS-SCNC: 12 MMOL/L (ref 7–15)
ANION GAP SERPL CALCULATED.3IONS-SCNC: 12 MMOL/L (ref 7–15)
AST SERPL W P-5'-P-CCNC: 118 U/L (ref 0–45)
AST SERPL W P-5'-P-CCNC: 149 U/L (ref 0–45)
AST SERPL W P-5'-P-CCNC: 94 U/L (ref 0–45)
BILIRUB DIRECT SERPL-MCNC: 0.6 MG/DL (ref 0–0.3)
BILIRUB SERPL-MCNC: 1.2 MG/DL
BILIRUB SERPL-MCNC: 1.3 MG/DL
BILIRUB SERPL-MCNC: 1.5 MG/DL
BUN SERPL-MCNC: 16.3 MG/DL (ref 8–23)
BUN SERPL-MCNC: 16.3 MG/DL (ref 8–23)
BUN SERPL-MCNC: 25.5 MG/DL (ref 8–23)
BUN SERPL-MCNC: 35.4 MG/DL (ref 8–23)
CA-I BLD-MCNC: 4.7 MG/DL (ref 4.4–5.2)
CALCIUM SERPL-MCNC: 8.3 MG/DL (ref 8.8–10.4)
CALCIUM SERPL-MCNC: 8.4 MG/DL (ref 8.8–10.4)
CALCIUM SERPL-MCNC: 8.5 MG/DL (ref 8.8–10.4)
CALCIUM SERPL-MCNC: 8.5 MG/DL (ref 8.8–10.4)
CHLORIDE SERPL-SCNC: 103 MMOL/L (ref 98–107)
CHLORIDE SERPL-SCNC: 104 MMOL/L (ref 98–107)
CREAT SERPL-MCNC: 0.91 MG/DL (ref 0.51–0.95)
CREAT SERPL-MCNC: 0.91 MG/DL (ref 0.51–0.95)
CREAT SERPL-MCNC: 1.26 MG/DL (ref 0.51–0.95)
CREAT SERPL-MCNC: 1.97 MG/DL (ref 0.51–0.95)
EGFRCR SERPLBLD CKD-EPI 2021: 25 ML/MIN/1.73M2
EGFRCR SERPLBLD CKD-EPI 2021: 42 ML/MIN/1.73M2
EGFRCR SERPLBLD CKD-EPI 2021: 62 ML/MIN/1.73M2
EGFRCR SERPLBLD CKD-EPI 2021: 62 ML/MIN/1.73M2
ERYTHROCYTE [DISTWIDTH] IN BLOOD BY AUTOMATED COUNT: 21.2 % (ref 10–15)
GLUCOSE SERPL-MCNC: 134 MG/DL (ref 70–99)
GLUCOSE SERPL-MCNC: 134 MG/DL (ref 70–99)
GLUCOSE SERPL-MCNC: 143 MG/DL (ref 70–99)
GLUCOSE SERPL-MCNC: 155 MG/DL (ref 70–99)
HCO3 SERPL-SCNC: 23 MMOL/L (ref 22–29)
HCO3 SERPL-SCNC: 23 MMOL/L (ref 22–29)
HCO3 SERPL-SCNC: 24 MMOL/L (ref 22–29)
HCO3 SERPL-SCNC: 25 MMOL/L (ref 22–29)
HCT VFR BLD AUTO: 25.4 % (ref 35–47)
HGB BLD-MCNC: 8 G/DL (ref 11.7–15.7)
INR PPP: 1.26 (ref 0.85–1.15)
MAGNESIUM SERPL-MCNC: 2.5 MG/DL (ref 1.7–2.3)
MCH RBC QN AUTO: 32.1 PG (ref 26.5–33)
MCHC RBC AUTO-ENTMCNC: 31.5 G/DL (ref 31.5–36.5)
MCV RBC AUTO: 102 FL (ref 78–100)
PHOSPHATE SERPL-MCNC: 3.1 MG/DL (ref 2.5–4.5)
PLATELET # BLD AUTO: 32 10E3/UL (ref 150–450)
POTASSIUM SERPL-SCNC: 3.6 MMOL/L (ref 3.4–5.3)
POTASSIUM SERPL-SCNC: 3.8 MMOL/L (ref 3.4–5.3)
PROT SERPL-MCNC: 6.7 G/DL (ref 6.4–8.3)
PROT SERPL-MCNC: 6.9 G/DL (ref 6.4–8.3)
PROT SERPL-MCNC: 7 G/DL (ref 6.4–8.3)
RBC # BLD AUTO: 2.49 10E6/UL (ref 3.8–5.2)
SODIUM SERPL-SCNC: 138 MMOL/L (ref 135–145)
SODIUM SERPL-SCNC: 138 MMOL/L (ref 135–145)
SODIUM SERPL-SCNC: 139 MMOL/L (ref 135–145)
SODIUM SERPL-SCNC: 140 MMOL/L (ref 135–145)
WBC # BLD AUTO: 15.8 10E3/UL (ref 4–11)

## 2024-09-07 PROCEDURE — 250N000013 HC RX MED GY IP 250 OP 250 PS 637: Performed by: SURGERY

## 2024-09-07 PROCEDURE — 92526 ORAL FUNCTION THERAPY: CPT | Mod: GN

## 2024-09-07 PROCEDURE — 200N000001 HC R&B ICU

## 2024-09-07 PROCEDURE — 250N000009 HC RX 250: Performed by: PHYSICIAN ASSISTANT

## 2024-09-07 PROCEDURE — 80069 RENAL FUNCTION PANEL: CPT | Performed by: INTERNAL MEDICINE

## 2024-09-07 PROCEDURE — 250N000011 HC RX IP 250 OP 636: Performed by: SURGERY

## 2024-09-07 PROCEDURE — 250N000011 HC RX IP 250 OP 636: Performed by: INTERNAL MEDICINE

## 2024-09-07 PROCEDURE — 250N000013 HC RX MED GY IP 250 OP 250 PS 637: Performed by: INTERNAL MEDICINE

## 2024-09-07 PROCEDURE — 99232 SBSQ HOSP IP/OBS MODERATE 35: CPT | Performed by: INTERNAL MEDICINE

## 2024-09-07 PROCEDURE — 250N000011 HC RX IP 250 OP 636: Performed by: PHYSICIAN ASSISTANT

## 2024-09-07 PROCEDURE — 85610 PROTHROMBIN TIME: CPT | Performed by: PHYSICIAN ASSISTANT

## 2024-09-07 PROCEDURE — 250N000013 HC RX MED GY IP 250 OP 250 PS 637: Performed by: PHYSICIAN ASSISTANT

## 2024-09-07 PROCEDURE — 84100 ASSAY OF PHOSPHORUS: CPT | Performed by: INTERNAL MEDICINE

## 2024-09-07 PROCEDURE — 82330 ASSAY OF CALCIUM: CPT | Performed by: INTERNAL MEDICINE

## 2024-09-07 PROCEDURE — 82040 ASSAY OF SERUM ALBUMIN: CPT | Performed by: PHYSICIAN ASSISTANT

## 2024-09-07 PROCEDURE — 85027 COMPLETE CBC AUTOMATED: CPT | Performed by: INTERNAL MEDICINE

## 2024-09-07 PROCEDURE — 82248 BILIRUBIN DIRECT: CPT | Performed by: INTERNAL MEDICINE

## 2024-09-07 PROCEDURE — 99233 SBSQ HOSP IP/OBS HIGH 50: CPT | Performed by: INTERNAL MEDICINE

## 2024-09-07 PROCEDURE — 250N000009 HC RX 250: Performed by: INTERNAL MEDICINE

## 2024-09-07 PROCEDURE — 83735 ASSAY OF MAGNESIUM: CPT | Performed by: INTERNAL MEDICINE

## 2024-09-07 RX ORDER — TRAMADOL HYDROCHLORIDE 50 MG/1
50 TABLET ORAL EVERY 12 HOURS PRN
Status: DISCONTINUED | OUTPATIENT
Start: 2024-09-07 | End: 2024-09-10

## 2024-09-07 RX ORDER — LANOLIN ALCOHOL/MO/W.PET/CERES
3 CREAM (GRAM) TOPICAL
Status: DISCONTINUED | OUTPATIENT
Start: 2024-09-07 | End: 2024-09-16 | Stop reason: HOSPADM

## 2024-09-07 RX ORDER — MAGNESIUM HYDROXIDE/ALUMINUM HYDROXICE/SIMETHICONE 120; 1200; 1200 MG/30ML; MG/30ML; MG/30ML
15 SUSPENSION ORAL ONCE
Status: COMPLETED | OUTPATIENT
Start: 2024-09-07 | End: 2024-09-07

## 2024-09-07 RX ORDER — PROCHLORPERAZINE 25 MG
12.5 SUPPOSITORY, RECTAL RECTAL EVERY 12 HOURS PRN
Status: DISCONTINUED | OUTPATIENT
Start: 2024-09-07 | End: 2024-09-16 | Stop reason: HOSPADM

## 2024-09-07 RX ORDER — PROCHLORPERAZINE MALEATE 5 MG
5 TABLET ORAL EVERY 6 HOURS PRN
Status: DISCONTINUED | OUTPATIENT
Start: 2024-09-07 | End: 2024-09-16 | Stop reason: HOSPADM

## 2024-09-07 RX ADMIN — PANTOPRAZOLE SODIUM 40 MG: 40 INJECTION, POWDER, FOR SOLUTION INTRAVENOUS at 06:09

## 2024-09-07 RX ADMIN — HEPARIN SODIUM 3000 UNITS: 1000 INJECTION INTRAVENOUS; SUBCUTANEOUS at 14:12

## 2024-09-07 RX ADMIN — Medication 1 CAPSULE: at 22:03

## 2024-09-07 RX ADMIN — VANCOMYCIN HYDROCHLORIDE 1000 MG: 1 INJECTION, SOLUTION INTRAVENOUS at 07:31

## 2024-09-07 RX ADMIN — Medication 1 CAPSULE: at 09:28

## 2024-09-07 RX ADMIN — CALCIUM CHLORIDE, MAGNESIUM CHLORIDE, SODIUM CHLORIDE, SODIUM BICARBONATE, POTASSIUM CHLORIDE AND SODIUM PHOSPHATE DIBASIC DIHYDRATE 24.5 ML/KG/HR: 3.68; 3.05; 6.34; 3.09; .314; .187 INJECTION INTRAVENOUS at 00:04

## 2024-09-07 RX ADMIN — CALCIUM CHLORIDE, MAGNESIUM CHLORIDE, SODIUM CHLORIDE, SODIUM BICARBONATE, POTASSIUM CHLORIDE AND SODIUM PHOSPHATE DIBASIC DIHYDRATE 24.5 ML/KG/HR: 3.68; 3.05; 6.34; 3.09; .314; .187 INJECTION INTRAVENOUS at 05:52

## 2024-09-07 RX ADMIN — CALCIUM CHLORIDE, MAGNESIUM CHLORIDE, SODIUM CHLORIDE, SODIUM BICARBONATE, POTASSIUM CHLORIDE AND SODIUM PHOSPHATE DIBASIC DIHYDRATE 24.5 ML/KG/HR: 3.68; 3.05; 6.34; 3.09; .314; .187 INJECTION INTRAVENOUS at 04:04

## 2024-09-07 RX ADMIN — TRAMADOL HYDROCHLORIDE 50 MG: 50 TABLET, COATED ORAL at 09:39

## 2024-09-07 RX ADMIN — CALCIUM CHLORIDE, MAGNESIUM CHLORIDE, SODIUM CHLORIDE, SODIUM BICARBONATE, POTASSIUM CHLORIDE AND SODIUM PHOSPHATE DIBASIC DIHYDRATE 24.5 ML/KG/HR: 3.68; 3.05; 6.34; 3.09; .314; .187 INJECTION INTRAVENOUS at 02:09

## 2024-09-07 RX ADMIN — Medication 60 ML: at 09:28

## 2024-09-07 RX ADMIN — FUROSEMIDE 100 MG: 10 INJECTION, SOLUTION INTRAVENOUS at 07:28

## 2024-09-07 RX ADMIN — TRAMADOL HYDROCHLORIDE 50 MG: 50 TABLET, COATED ORAL at 03:21

## 2024-09-07 RX ADMIN — ONDANSETRON 8 MG: 2 INJECTION INTRAMUSCULAR; INTRAVENOUS at 06:08

## 2024-09-07 RX ADMIN — TRAMADOL HYDROCHLORIDE 50 MG: 50 TABLET, COATED ORAL at 22:03

## 2024-09-07 RX ADMIN — LIDOCAINE 2 PATCH: 246 PATCH TOPICAL at 18:25

## 2024-09-07 RX ADMIN — PROCHLORPERAZINE EDISYLATE 5 MG: 5 INJECTION INTRAMUSCULAR; INTRAVENOUS at 21:58

## 2024-09-07 RX ADMIN — ALUMINUM HYDROXIDE, MAGNESIUM HYDROXIDE, AND SIMETHICONE 15 ML: 200; 200; 20 SUSPENSION ORAL at 02:30

## 2024-09-07 RX ADMIN — CALCIUM CHLORIDE, MAGNESIUM CHLORIDE, SODIUM CHLORIDE, SODIUM BICARBONATE, POTASSIUM CHLORIDE AND SODIUM PHOSPHATE DIBASIC DIHYDRATE 10 ML/KG/HR: 3.68; 3.05; 6.34; 3.09; .314; .187 INJECTION INTRAVENOUS at 01:09

## 2024-09-07 RX ADMIN — CALCIUM CHLORIDE, MAGNESIUM CHLORIDE, SODIUM CHLORIDE, SODIUM BICARBONATE, POTASSIUM CHLORIDE AND SODIUM PHOSPHATE DIBASIC DIHYDRATE 24.5 ML/KG/HR: 3.68; 3.05; 6.34; 3.09; .314; .187 INJECTION INTRAVENOUS at 07:54

## 2024-09-07 RX ADMIN — CALCIUM CHLORIDE, MAGNESIUM CHLORIDE, SODIUM CHLORIDE, SODIUM BICARBONATE, POTASSIUM CHLORIDE AND SODIUM PHOSPHATE DIBASIC DIHYDRATE 24.5 ML/KG/HR: 3.68; 3.05; 6.34; 3.09; .314; .187 INJECTION INTRAVENOUS at 09:48

## 2024-09-07 RX ADMIN — CALCIUM CHLORIDE, MAGNESIUM CHLORIDE, SODIUM CHLORIDE, SODIUM BICARBONATE, POTASSIUM CHLORIDE AND SODIUM PHOSPHATE DIBASIC DIHYDRATE 10 ML/KG/HR: 3.68; 3.05; 6.34; 3.09; .314; .187 INJECTION INTRAVENOUS at 05:51

## 2024-09-07 RX ADMIN — PROCHLORPERAZINE EDISYLATE 5 MG: 5 INJECTION INTRAMUSCULAR; INTRAVENOUS at 07:43

## 2024-09-07 ASSESSMENT — ACTIVITIES OF DAILY LIVING (ADL)
ADLS_ACUITY_SCORE: 42
ADLS_ACUITY_SCORE: 42
ADLS_ACUITY_SCORE: 38
ADLS_ACUITY_SCORE: 42
ADLS_ACUITY_SCORE: 38
ADLS_ACUITY_SCORE: 42
ADLS_ACUITY_SCORE: 42
ADLS_ACUITY_SCORE: 38
ADLS_ACUITY_SCORE: 42
ADLS_ACUITY_SCORE: 38
ADLS_ACUITY_SCORE: 42
ADLS_ACUITY_SCORE: 42

## 2024-09-07 NOTE — PLAN OF CARE
Problem: Pain Acute  Goal: Optimal Pain Control and Function  Intervention: Develop Pain Management Plan  Recent Flowsheet Documentation  Taken 9/7/2024 0300 by Silva Khalil RN  Pain Management Interventions: medication (see MAR)  Taken 9/6/2024 2220 by Silva Khalil, RN  Pain Management Interventions: medication (see MAR)  Taken 9/6/2024 2000 by Silva Khalil, RN  Pain Management Interventions: distraction     Goal Outcome Evaluation:      Plan of Care Reviewed With: patient    Overall Patient Progress: improvingOverall Patient Progress: improving    Outcome Evaluation: Frequent c/o pain, upset stomach, and other aches. Wants hair washed today. CRRT continues with no issues overnight. Very little urine output.

## 2024-09-07 NOTE — PLAN OF CARE
"Goal Outcome Evaluation:  [facility] - ICU    RN Progress Note:            Pertinent Assessments:      Please refer to flowsheet rows for full assessment     Pt A/Ox4. Very alert today, no neuro deficits noted. LS diminished did try to wean O2 currently on 2L NC other VSS. Afebrile. Able to tolerate Sitting up in chair position in bed doing some leg and arm exercises. Therapy Consulted but with patient being on CRRT would not see patient per mobility protocol. Did void 20cc during my 12hr shift and tolerated pulling 150-200ml/hr for 10 hours.            Key Events - This Shift:       Speech reevaluated and patient can have sips and chips with dentures out and RN supervision. Had a episode of SOB, pain and anxiety, diaphoretic. Gave Tramadol for mid back pain and zofran for her \"uneasy\" feeling. Regulated room temp and pt seemed to calm with no SOB.                 Barriers to Discharge / Downgrade:     CRRT with close monitoring and strict I &Os      Family at bedside all encouraging her to stay the course with improved labs noted today.                          "

## 2024-09-07 NOTE — PROGRESS NOTES
"[facility]  [phone]          Assessment/Recommendations   Patient postop aortic root abscess debridement, Aortic Annular Reconstruction, Aortic Root Replacement bioprosthetic valve in aortic position.  She also had bypass to 2 vessels done by Dr. Jesus Renae.  She has had a dramatic arun postoperative course but seems to be stabilizing nicely.  She is still a bit pacemaker dependent although when turning her pacemaker down today she had atrial fibrillation associated with hypotension so it has been left at 92 bpm and she is 100% paced.    She does not have evidence of pulmonary vascular congestion and she continues on intravenous dobutamine at a low dose which I would recommend continuing.    Would not change any of her cardiac medications at this time.    We will continue to follow.       History of Present Illness/Subjective    Ms. Felicitas Elliott is a 84 year old female with recent large surgery to debride an abscess and replace her aortic valve as well as bypass surgery.  She feels better today she feels a little bit of discomfort of breathing but much better and is not on any oxygen.  She is resting in a chair.  She denies any pain.        ECG: Personally reviewed.  Rhythm strip shows paced rhythm.       Physical Examination Review of Systems   /59   Pulse 92   Temp 98  F (36.7  C) (Oral)   Resp 17   Ht 1.702 m (5' 7\")   Wt 85 kg (187 lb 6.3 oz)   SpO2 93%   BMI 29.35 kg/m    Body mass index is 29.35 kg/m .  Wt Readings from Last 3 Encounters:   09/07/24 85 kg (187 lb 6.3 oz)   08/15/24 90.1 kg (198 lb 9.6 oz)     General Appearance:   Alert, cooperative and in no acute distress.   ENT/Mouth: Pink/moist oral mucosa   EYES:  no scleral icterus, normal conjunctivae   Neck: JVP normal. No Hepatojugular reflux. Thyroid not visualized.   Chest/Lungs:   Lungs are clear to auscultation anteriorly and laterally, equal chest wall expansion.   Cardiovascular:   S1, S2 with " "1/6 systolic murmur , no clicks or rubs. Brachial, radial pulses are intact and symetric.   Abdomen:  Nontender.    Extremities: No cyanosis, clubbing or edema.  Bilateral feet are warm.   Skin: no xanthelasma, warm.    Neurologic: normal arm movement bilateral, no tremors     Psychiatric: Appropriate affect.  Even smiled for a second      Enc Vitals  BP: 107/59  Pulse: 92  Resp: 17  Temp: 98  F (36.7  C)  Temp src: Oral  SpO2: 93 %  Weight: 85 kg (187 lb 6.3 oz)  Height: 170.2 cm (5' 7\")                                           Medical History  Surgical History Family History Social History   History reviewed. No pertinent past medical history. Past Surgical History:   Procedure Laterality Date    CORONARY ARTERY BYPASS GRAFT, WITH AORTIC VALVE REPLACEMENT, WITH ENDOSCOPIC VESSEL PROCUREMENT N/A 8/27/2024    Procedure: CORONARY ARTERY BYPASS GRAFT TIMES TWO, WITH AORTIC ROOT REPLACEMENT, WITH LEFT INTERNAL MAMMARY ARTERY HARVEST, LEFT SAPHNENOUS ENDOSCOPIC VESSEL PROCUREMENT,;  Surgeon: Dylan Renae MD;  Location: [facility] OR    CV CORONARY ANGIOGRAM N/A 8/20/2024    Procedure: CV CORONARY ANGIOGRAM;  Surgeon: Den Wylie MD;  Location: [facility] CATH LAB CV    IR CVC NON TUNNEL PLACEMENT > 5 YRS  9/1/2024    PICC TRIPLE LUMEN PLACEMENT  9/6/2024    REPLACE VALVE MITRAL N/A 8/27/2024    Procedure: MITRAL VALVE REPLACEMENT,;  Surgeon: Dylan Renae MD;  Location: [facility] OR    TRANSESOPHAGEAL ECHOCARDIOGRAM INTRAOPERATIVE  8/27/2024    Procedure: ANESTHESIA TRANSESOPHAGEAL ECHOCARDIOGRAM, EPI-AORTIC ULTRASOUND;  Surgeon: Dylan Renae MD;  Location: [facility] OR    History reviewed. No pertinent family history. Social History     Socioeconomic History    Marital status:      Spouse name: Not on file    Number of children: Not on file    Years of education: Not on file    Highest education level: Not on file   Occupational History    Not on file "   Tobacco Use    Smoking status: Not on file    Smokeless tobacco: Not on file   Substance and Sexual Activity    Alcohol use: Not on file    Drug use: Not on file    Sexual activity: Not on file   Other Topics Concern    Not on file   Social History Narrative    Not on file     Social Determinants of Health     Financial Resource Strain: Low Risk  (8/24/2024)    Financial Resource Strain     Within the past 12 months, have you or your family members you live with been unable to get utilities (heat, electricity) when it was really needed?: No   Food Insecurity: Low Risk  (8/24/2024)    Food Insecurity     Within the past 12 months, did you worry that your food would run out before you got money to buy more?: No     Within the past 12 months, did the food you bought just not last and you didn t have money to get more?: No   Transportation Needs: Low Risk  (8/24/2024)    Transportation Needs     Within the past 12 months, has lack of transportation kept you from medical appointments, getting your medicines, non-medical meetings or appointments, work, or from getting things that you need?: No   Physical Activity: Not on file   Stress: Not on file   Social Connections: Unknown (1/3/2024)    Received from [facility]    Social Connections     Frequency of Communication with Friends and Family: Not on file   Interpersonal Safety: Low Risk  (9/6/2024)    Interpersonal Safety     Do you feel physically and emotionally safe where you currently live?: Yes     Within the past 12 months, have you been hit, slapped, kicked or otherwise physically hurt by someone?: No     Within the past 12 months, have you been humiliated or emotionally abused in other ways by your partner or ex-partner?: No   Recent Concern: Interpersonal Safety - High Risk (8/23/2024)    Interpersonal Safety     Do you feel physically and emotionally safe where you currently live?: No     Within the past 12 months, have you  been hit, slapped, kicked or otherwise physically hurt by someone?: No     Within the past 12 months, have you been humiliated or emotionally abused in other ways by your partner or ex-partner?: No   Housing Stability: Low Risk  (8/24/2024)    Housing Stability     Do you have housing? : Yes     Are you worried about losing your housing?: No          Medications  Allergies   No current outpatient medications on file.    Allergies   Allergen Reactions    Aspirin Rash    Cats Rash    Nickel Rash         Lab Results    Chemistry/lipid CBC Cardiac Enzymes/BNP/TSH/INR   Lab Results   Component Value Date    BUN 25.5 (H) 09/07/2024     09/07/2024    CO2 23 09/07/2024    Lab Results   Component Value Date    WBC 15.8 (H) 09/07/2024    HGB 8.0 (L) 09/07/2024    HCT 25.4 (L) 09/07/2024     (H) 09/07/2024    PLT 32 (LL) 09/07/2024    Lab Results   Component Value Date    INR 1.26 (H) 09/07/2024

## 2024-09-07 NOTE — PROGRESS NOTES
[facility]  [facility]   Follow up    Felicitas Elliott   MRN: [id number]  : 1940   DOA: 2024   CC: ARF      Assessment and Plan:  84 year old female    ARF/CKD: has underlying CKD with baseline CR 1.3-1.8; now ARF in the setting of cardiac surgery;   has been on CRRT all week and had slow volume removal; will try pt off CRRT so she can get therapy; eval tomorrow need for HD or UR; anticipate she will need HD/UF ongoing due to poor urine output but she is more hemodyamically stable and closer to a EDW so likely will tolerate conventional HD  Volume status; elevated; no response to diuretics; has been undergoing UF with CRRT and now getting closer to a good EDW; as above  MSSA bacteremia; on abx and ID following  MV and AV infective endocarditis , 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: s/p biprothetic AoV and MV replacement, Aortic root replacement and 2vCABG  HoTN with septic and cardiogenic shock; resolved HoTN; on dobutamine   Acidosis; lactate and ARF and starvation ketoacidosis; improved with high dose CRRT; follow off dialysis  Anemia; following hgb  Hyperlipid; statin on hold  CAD: s/p CAB 2 v as part of valve surgery  A fib; on amio gtt  Resp failure; extubated; no plan to reintubate if deteriorates  Hepatitis; s/p acetylcysteine  Dispo; DNR, DNI; pt expressing concern about continued aggressive cares but wants to continue for now    Subjective  Still feels very weak; breathing feels short; cannot do therapy or get in chair while hooked up to CRRT  Objective    Vital signs in last 24 hours  Temp:  [97.7  F (36.5  C)-98  F (36.7  C)] 98  F (36.7  C)  Pulse:  [91-92] 92  Resp:  [16-28] 22  BP: ()/(53-73) 110/57  SpO2:  [92 %-100 %] 98 %      Intake/Output last 3 shifts  I/O last 3 completed shifts:  In: 2782.3 [P.O.:80; I.V.:1151.3; Other:105; NG/GT:1077]  Out: 3723 [Urine:30; Other:3032.5; Stool:625;  Blood:35.5]  Intake/Output this shift:  I/O this shift:  In: 396.3 [P.O.:30; I.V.:296.3]  Out: 227 [Other:227]    Physical Exam  Alert/oriented x 3, awake, NAD; appears tired; on O2  CV: RRR without murmur or rub  Lung:  decreased at bases  Ab: soft and NT; not distended; normal bs  Ext: ++edema and well perfused  Skin; no rash    Pertinent Labs     Last Renal Panel:  Sodium   Date Value Ref Range Status   09/07/2024 139 135 - 145 mmol/L Final   09/07/2024 140 135 - 145 mmol/L Final     Potassium   Date Value Ref Range Status   09/07/2024 3.8 3.4 - 5.3 mmol/L Final   09/07/2024 3.8 3.4 - 5.3 mmol/L Final     Potassium POCT   Date Value Ref Range Status   08/27/2024 3.4 3.4 - 5.3 mmol/L Final     Chloride   Date Value Ref Range Status   09/07/2024 104 98 - 107 mmol/L Final   09/07/2024 104 98 - 107 mmol/L Final     Carbon Dioxide (CO2)   Date Value Ref Range Status   09/07/2024 25 22 - 29 mmol/L Final   09/07/2024 24 22 - 29 mmol/L Final     Anion Gap   Date Value Ref Range Status   09/07/2024 10 7 - 15 mmol/L Final   09/07/2024 12 7 - 15 mmol/L Final     Glucose   Date Value Ref Range Status   09/07/2024 134 (H) 70 - 99 mg/dL Final   09/07/2024 134 (H) 70 - 99 mg/dL Final     GLUCOSE BY METER POCT   Date Value Ref Range Status   09/04/2024 102 (H) 70 - 99 mg/dL Final     Urea Nitrogen   Date Value Ref Range Status   09/07/2024 16.3 8.0 - 23.0 mg/dL Final   09/07/2024 16.3 8.0 - 23.0 mg/dL Final     Creatinine   Date Value Ref Range Status   09/07/2024 0.91 0.51 - 0.95 mg/dL Final   09/07/2024 0.91 0.51 - 0.95 mg/dL Final     GFR Estimate   Date Value Ref Range Status   09/07/2024 62 >60 mL/min/1.73m2 Final     Comment:     eGFR calculated using 2021 CKD-EPI equation.   09/07/2024 62 >60 mL/min/1.73m2 Final     Comment:     eGFR calculated using 2021 CKD-EPI equation.     Calcium   Date Value Ref Range Status   09/07/2024 8.5 (L) 8.8 - 10.4 mg/dL Final     Comment:     Reference intervals for this test were updated  on 7/16/2024 to reflect our healthy population more accurately. There may be differences in the flagging of prior results with similar values performed with this method. Those prior results can be interpreted in the context of the updated reference intervals.   09/07/2024 8.4 (L) 8.8 - 10.4 mg/dL Final     Comment:     Reference intervals for this test were updated on 7/16/2024 to reflect our healthy population more accurately. There may be differences in the flagging of prior results with similar values performed with this method. Those prior results can be interpreted in the context of the updated reference intervals.     Phosphorus   Date Value Ref Range Status   09/07/2024 3.1 2.5 - 4.5 mg/dL Final     Albumin   Date Value Ref Range Status   09/07/2024 3.8 3.5 - 5.2 g/dL Final   09/07/2024 3.8 3.5 - 5.2 g/dL Final     Recent Labs   Lab 09/07/24  0340 09/06/24 2023 09/06/24  1210 09/06/24  0413 09/05/24 2016   HGB 8.0* 7.6* 7.9* 8.0* 8.1*          I reviewed all lab results  Cheryle Jung MD

## 2024-09-07 NOTE — PROGRESS NOTES
[facility] Inpatient follow up        09/07/2024    Chart reviewed  Patient seen in ICU             Assessment and Recommendations:   Assessment:  Felicitas Elliott is a 84 year old female with     Respiratory failure, shock liver-slow improvement.  Extubated  Decreased urine output, on dobutamine currently, had been on norepinephrine and vasopressin. 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  MSSA bacteremia.  Positive blood culture on 8/14/2024 and 8/16.  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  With the size of the vegetation combined with brain infarct, status post CV surgery, see details below active issue.   Neck pain worrisome for cervical discitis.  Neck MRI showed some edema at C4-C5 on the left side.  No clear evidence of infection.  Abnormal brain MRI suggestive of subacute infarct. In a patient with MSSA bacteremia and aortic valve endocarditis, this is cerebral septic emboli. Active issue.     POSTOPERATIVE DIAGNOSES:  1.  Severe aortic stenosis and moderate aortic regurgitation.  2.  Severe mitral stenosis.  3.  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  4.  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  5.  Severe multivessel coronary artery disease.  6.  Aortic root abscess.      PROCEDURE PERFORMED: 8/27/24  Aortic root abscess debridement and aortic annular reconstruction.  Aortic root replacement (Konect composite 25 mm Silva Inspiris Resilia bioprosthetic valve within 30 mm Gelweave Valsalva graft with reimplantation of the coronary arteries).  Mitral valve replacement (31 mm St. Tim Sivla bioprosthetic valve).  Coronary artery bypass grafting x 2 (reversed saphenous vein graft to the obtuse marginal branch of the left circumflex coronary artery, and pedicled left internal mammary artery to left anterior descending coronary artery).  Endoscopic vein harvest of the greater saphenous vein from the left lower extremity.    Recommendations:  Continue vancomycin-dosing per Pharm.D.  Completed cefepime for pneumonia on 9/5/24--waiting to see if thrombocytopenia improves.  If not, can change vancomycin to daptomycin (thrombocytopenia more likely due to cephalosporin than glycopeptide)  Off nafcillin. Per previous notes plan IV antibiotics duration 6 weeks--Until 10/8/2024  Currently extubated, CV surgery staff at bedside, appreciate input  Stopped oral vanco  PICC placed 8/21  Monitor CBC, CMP--thrombocytopenia, seems to be coincident with cefepime usage.   Status post vascular surgery, chest tube, CV surgery following closely  Will need to check immunoglobin level in 4 to 6 weeks sister with history of hypogammaglobulinemia   Patient seen in ICU.  Discussed with patient's nurse, CV surgery team at bedside      Nannette Lebron MD  [facility]  Answering Service: [phone]  On-Call ID provider: [phone], option: 9                Interval History:     HPI: Wakes up, fatigued.  ICU nurse at bedside, patient's nurse, unable to sleep last night  Getting off CRRT and plan for hemodialysis  Previous note: Says feels better today.       Recent Inflammatory  Biomarkers:   Recent Labs   Lab Test 09/07/24  0340 09/06/24 2023 09/06/24  1210 09/06/24  0413 09/05/24 2016 09/05/24  1156 08/30/24  0437 08/29/24  0359   PCAL  --   --   --   --   --   --   --  1.24*   WBC 15.8* 14.3* 14.6* 15.2* 15.8* 15.9*   < > 15.3*    < > = values in this interval not displayed.            Review of Systems:      Negative except for findings in the HPI.           Current Medications (antimicrobials listed in bold):     Current Facility-Administered Medications   Medication Dose Route Frequency Provider Last Rate Last Admin    [Held by provider] atorvastatin (LIPITOR) tablet 80 mg  80 mg Oral QPM Jessica Tolbert PA-C   80 mg at 08/29/24 2038    [Held by provider] clopidogrel (PLAVIX) tablet 75 mg  75 mg Oral Daily Maryam Anthony PA-C        sennosides (SENOKOT) syrup 5 mL  5 mL Oral BID Aurelio Huang MD   5 mL at 09/04/24 2022    And    docusate (COLACE) 50 MG/5ML liquid 50 mg  50 mg Oral BID Aurelio Huang MD   50 mg at 09/04/24 2027    lactobacillus rhamnosus (GG) (CULTURELL) capsule 1 capsule  1 capsule Oral or Feeding Tube BID Aurelio Huang MD   1 capsule at 09/07/24 0928    Lidocaine (LIDOCARE) 4 % Patch 1-2 patch  1-2 patch Transdermal Q24H Maryam Anthony PA-C   2 patch at 09/06/24 1849    [Held by provider] metoprolol tartrate (LOPRESSOR) half-tab 12.5 mg  12.5 mg Oral BID Jessica Tolbert PA-C        pantoprazole (PROTONIX) 2 mg/mL suspension 40 mg  40 mg Oral or NG Tube QAM AC Maryam Anthony PA-C        Or    pantoprazole (PROTONIX) EC tablet 40 mg  40 mg Oral QAM AC Maryam Anthony PA-C        Or    pantoprazole (PROTONIX) IV push injection 40 mg  40 mg Intravenous QAM Maryam Chicas PA-C   40 mg at 09/07/24 0609    polyethylene glycol (MIRALAX) Packet 17 g  17 g Oral or Feeding Tube Daily Aurelio Huang MD        Prosource TF20 ENfit Compatibl EN LIQD (PROSOURCE TF20) packet  60 mL  1 packet Per Feeding Tube BID Aurelio Huang MD   60 mL at 09/07/24 0928    sodium chloride (PF) 0.9% PF flush 3 mL  3 mL Intracatheter Q8H Gondal, Saad J, MD   10 mL at 09/07/24 0929    vancomycin (VANCOCIN) 1,000 mg in 200 mL dextrose intermittent infusion  1,000 mg Intravenous Q24H Dylan Renae  mL/hr at 09/07/24 0731 1,000 mg at 09/07/24 0731    [Held by provider] Warfarin Dose Required Daily - Pharmacist Managed  1 each Oral See Admin Instructions Jessica Tolbert PA-C                  Allergies:     Allergies   Allergen Reactions    Aspirin Rash    Cats Rash    Nickel Rash            Physical Exam:   Vitals were reviewed  Patient Vitals for the past 24 hrs:   BP Temp Temp src Pulse Resp SpO2 Weight   09/07/24 0900 110/57 -- -- 92 22 98 % --   09/07/24 0858 110/57 -- -- 92 17 98 % --   09/07/24 0800 121/62 98  F (36.7  C) Oral 92 19 95 % --   09/07/24 0700 116/55 -- -- 92 16 92 % --   09/07/24 0630 120/56 -- -- 92 18 96 % --   09/07/24 0600 127/61 -- -- 92 21 97 % --   09/07/24 0530 121/58 -- -- 92 17 98 % --   09/07/24 0500 122/58 -- -- 92 17 98 % --   09/07/24 0430 -- -- -- 92 20 98 % --   09/07/24 0400 115/55 -- -- 92 18 97 % 85 kg (187 lb 6.3 oz)   09/07/24 0330 119/62 -- -- 92 19 97 % --   09/07/24 0300 128/66 -- -- 92 18 97 % --   09/07/24 0230 121/61 -- -- 92 17 96 % --   09/07/24 0200 127/69 -- -- 92 18 100 % --   09/07/24 0130 124/66 -- -- 92 16 95 % --   09/07/24 0100 121/62 98  F (36.7  C) Oral 92 17 97 % --   09/07/24 0030 122/64 -- -- 92 17 96 % --   09/07/24 0000 120/68 -- -- 92 17 96 % --   09/06/24 2330 108/63 -- -- 92 17 98 % --   09/06/24 2300 119/56 -- -- 92 16 98 % --   09/06/24 2230 130/64 -- -- 92 19 97 % --   09/06/24 2200 128/66 -- -- 92 21 97 % --   09/06/24 2156 -- -- -- 92 22 97 % --   09/06/24 2130 115/59 -- -- 92 22 100 % --   09/06/24 2100 108/56 -- -- 92 17 97 % --   09/06/24 2030 117/59 -- -- 92 20 97 % --   09/06/24 2000 113/55 97.7  F  (36.5  C) Oral 92 28 96 % --   09/06/24 1930 113/57 -- -- 92 17 96 % --   09/06/24 1900 111/58 -- -- 92 18 96 % --   09/06/24 1830 99/53 -- -- 92 17 100 % --   09/06/24 1800 114/58 -- -- 92 23 94 % --   09/06/24 1757 114/58 -- -- 92 21 94 % --   09/06/24 1730 126/62 -- -- 92 24 94 % --   09/06/24 1700 113/59 98  F (36.7  C) -- 92 19 94 % --   09/06/24 1630 123/67 -- -- 92 21 96 % --   09/06/24 1600 115/57 98  F (36.7  C) -- 92 22 95 % --   09/06/24 1530 110/59 -- -- 92 19 94 % --   09/06/24 1500 114/55 -- -- 92 20 95 % --   09/06/24 1430 118/64 -- -- 92 19 95 % --   09/06/24 1400 127/67 -- -- 92 20 96 % --   09/06/24 1330 112/69 -- -- 92 18 100 % --   09/06/24 1300 113/59 98  F (36.7  C) -- 92 18 97 % --   09/06/24 1230 118/73 -- -- 91 21 95 % --   09/06/24 1200 117/62 98  F (36.7  C) Oral 91 21 96 % --   09/06/24 1130 115/61 -- -- 92 19 97 % --   09/06/24 1100 116/62 -- -- 92 20 97 % --   09/06/24 1045 112/58 -- -- 92 20 97 % --   09/06/24 1030 109/62 -- -- 92 22 96 % --   09/06/24 1015 -- -- -- 92 21 97 % --   09/06/24 1000 120/65 98  F (36.7  C) Oral 92 20 96 % --   09/06/24 0945 -- -- -- 92 20 97 % --       Physical Examination:    Resp: 22    Gen: Appears ill, extubated, nasal canula, still on low dose dobutamine  HEENT: opens eyes  PICC, chest wall catheter for hemodialysis, rectal tube  CV: Sternal incision, temporary pacer  Lungs: coarse, currently on nasal cannula.  Chest tubes have been removed  Skin: Warm  Extr: edema- especially L leg  Neuro: extubated, opens eyes  HD catheter  PICC- R basilic           Laboratory Data:   ID Labs:  Microbiology labs:  7-Day Micro Results       No results found for the last 168 hours.          Susceptibility data from last 90 days.  Collected Specimen Info Organism Ampicillin Ampicillin/Sulbactam Cefepime Ceftazidime Ceftriaxone Ciprofloxacin Clindamycin Daptomycin Doxycycline Erythromycin Gentamicin Levofloxacin Meropenem Oxacillin   08/29/24 Sputum from Expectorate  Enterobacter cloacae complex R R  S R R  S      S  S  S      Normal samara                 08/16/24 Peripheral Blood Staphylococcus aureus                 08/14/24 Peripheral Blood Staphylococcus aureus        S  S  S  S  S    S     Collected Specimen Info Organism Piperacillin/Tazobactam Tetracycline Tobramycin Trimethoprim/Sulfamethoxazole  Vancomycin   08/29/24 Sputum from Expectorate Enterobacter cloacae complex R   S  S      Normal samara        08/16/24 Peripheral Blood Staphylococcus aureus        08/14/24 Peripheral Blood Staphylococcus aureus   S   S  S       Reviewed      No lab results found.  Recent Labs   Lab Test 09/07/24 0340 09/06/24 2023 09/06/24 1210 09/06/24  0413 09/05/24 2016 09/05/24  1156   WBC 15.8* 14.3* 14.6* 15.2* 15.8* 15.9*     Recent Labs   Lab Test 09/07/24 0340 09/06/24 2023 09/06/24  1225 09/06/24  1210   CR 0.91  0.91 1.04*  1.04* 0.93 0.97*   GFRESTIMATED 62  62 53*  53* 60* 57*       Hematology Studies  Recent Labs   Lab Test 09/07/24 0340 09/06/24 2023 09/06/24 1210 09/06/24  0413 09/05/24 2016 09/05/24  1156   WBC 15.8* 14.3* 14.6* 15.2* 15.8* 15.9*   HGB 8.0* 7.6* 7.9* 8.0* 8.1* 8.2*   HCT 25.4* 23.8* 24.8* 25.4* 25.9* 25.3*   PLT 32* 25* 27* 25* 28* 28*       Metabolic  Recent Labs   Lab Test 09/07/24 0340 09/06/24 2023 09/06/24  1225     140 138  138 140   BUN 16.3  16.3 18.3  18.3 14.3   CO2 25 24 24 24 24   CR 0.91  0.91 1.04*  1.04* 0.93   GFRESTIMATED 62  62 53*  53* 60*       Hepatic Studies  Recent Labs   Lab Test 09/07/24 0340 09/06/24 2023 09/06/24  1225   BILITOTAL 1.5* 1.4* 1.4*   ALKPHOS 102 103 107   ALBUMIN 3.8  3.8 3.6  3.6 3.7   * 156* 189*   * 135* 152*          Imaging Data:   Reviewed     IR CVC Non Tunnel Placement > 5 Yrs    Result Date: 9/1/2024  [place] [facility] LOCATION: [facility] DATE: 9/1/2024 PROCEDURE: NON-TUNNELED DIALYSIS CATHETER PLACEMENT INTERVENTIONAL RADIOLOGIST: RU  Pool Reyes MD. INDICATION: HAMMAD requiring hemodialysis. CONSENT: The risks, benefits and alternatives of non-tunneled dialysis catheter placement were discussed with the patient in detail. All questions were answered. Informed consent was given to proceed with the procedure. MODERATE SEDATION: None. CONTRAST: None. ANTIBIOTICS: None. ADDITIONAL MEDICATIONS: Heparin 3800 U IV FLUOROSCOPIC TIME: 2.0 minutes. RADIATION DOSE: Air Kerma: 36.04 mGy. COMPLICATIONS: No immediate complications. STERILE BARRIER TECHNIQUE: Maximum sterile barrier technique was used. Cutaneous antisepsis was performed at the operative site with application of 2% chlorhexidine and large sterile drape. Prior to the procedure, the  and assistant performed hand hygiene and wore hat, mask, sterile gown, and sterile gloves during the entire procedure. PROCEDURE: Limited left neck ultrasound was performed which demonstrated a patent internal jugular vein; images saved to the permanent patient medical record. The overlying skin and subcutaneous soft tissues were anesthetized using 1% lidocaine. Under ultrasound guidance, the left internal jugular vein was accessed using a micropuncture needle; images saved of the permanent patient medical record. Access was secured using a 4 Uzbek transitional sheath. A [employer] guidewire was advanced into the SVC. Under fluoroscopic guidance, the overlying skin and subcutaneous soft tissues were dilated and a 15.5 Uzbek nontunneled dialysis catheter was placed with the tip within the cavoatrial junction. The catheter was tested and found to flush and aspirate appropriately. The catheter was heparinized and secured to the skin.     IMPRESSION:  1.  Successful non-tunneled dialysis catheter placement. PLAN: 1. Dialysis catheter is ready to be used. 2. Nontunneled right groin dialysis catheter can be removed in ICU.       Study Result    Narrative & Impression   EXAM: MR BRAIN W/O and W CONTRAST  LOCATION:  [facility]  DATE: 8/17/2024     INDICATION: Headache in a patient with MSSA bacteremia secondary to aortic valve endocarditis.  Look for cerebral septic emboli; Headache; Acute HA (< 3 months), no complicating features  COMPARISON: None.  CONTRAST: 9 ml gadavist  TECHNIQUE: Routine multiplanar multisequence head MRI without and with intravenous contrast.     FINDINGS: Assessment degraded due to motion.  INTRACRANIAL CONTENTS:   Apparent focus of diffusion restriction with T2/FLAIR hyperintensity within the left superior parietal lobule cortex is hyperintense on ADC map, representing T2 shine through.  Focus of signal abnormality measures 13 x 9 mm in the axial plane and does   not have internal enhancement.     Additional punctate foci of hyperintensity on diffusion weighted with similar signal characteristics (periventricular right corona radiata, left superior parietal lobule, and left cerebellar hemisphere).     Patchy and confluent nonspecific T2/FLAIR hyperintensities within the cerebral white matter most consistent with moderate chronic microvascular ischemic change. Moderate generalized cerebral atrophy. No hydrocephalus. Normal position of the cerebellar   tonsils. No discernible abnormal intracranial enhancement; however, assessment substantially degraded due to motion. 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     SELLA: No abnormality accounting for technique.     OSSEOUS STRUCTURES/SOFT TISSUES: Normal marrow signal. The major intracranial vascular flow voids are maintained.      ORBITS: No abnormality accounting for technique.      SINUSES/MASTOIDS: Mild mucosal thickening scattered about the paranasal sinuses. Scattered fluid/membrane thickening in the left mastoid air cells. No apparent mass in the posterior nasopharynx or skull base.                                                                       IMPRESSION: Assessment degraded due to motion.  1.  13 mm focus of cortical  signal abnormality within the left superior parietal lobule which is favored to represent a subacute infarct; low-grade glial neoplasm could conceivably have a similar appearance. Hyperacute intraparenchymal hematoma could   also have a similar appearance but is considered less likely. Recommend noncontrast head CT for further characterization. Also recommend follow-up MRI brain without and with contrast in 8-12 weeks to document expected evolution.  2.  Additional smaller foci of signal abnormality with similar characteristics favored to represent punctate subacute infarcts.       Medical Decision Making       MANAGEMENT DISCUSSED with the following over the past 24 hours: patient, ICU nurse, CV staff   NOTE(S)/MEDICAL RECORDS REVIEWED over the past 24 hours: reviewed  Tests ORDERED & REVIEWED in the past 24 hours:  - See lab/imaging results included in the data section of the note  Medical complexity over the past 24 hours:  - Intensive monitoring for MEDICATION TOXICITY

## 2024-09-08 ENCOUNTER — APPOINTMENT (OUTPATIENT)
Dept: RADIOLOGY | Facility: HOSPITAL | Age: 84
DRG: 853 | End: 2024-09-08
Attending: SURGERY
Payer: COMMERCIAL

## 2024-09-08 ENCOUNTER — APPOINTMENT (OUTPATIENT)
Dept: SPEECH THERAPY | Facility: HOSPITAL | Age: 84
DRG: 853 | End: 2024-09-08
Attending: STUDENT IN AN ORGANIZED HEALTH CARE EDUCATION/TRAINING PROGRAM
Payer: COMMERCIAL

## 2024-09-08 PROBLEM — N17.9 ACUTE KIDNEY FAILURE, UNSPECIFIED (H): Status: ACTIVE | Noted: 2024-09-08

## 2024-09-08 LAB
ALBUMIN SERPL BCG-MCNC: 3.7 G/DL (ref 3.5–5.2)
ALBUMIN SERPL BCG-MCNC: 3.8 G/DL (ref 3.5–5.2)
ALP SERPL-CCNC: 107 U/L (ref 40–150)
ALP SERPL-CCNC: 90 U/L (ref 40–150)
ALT SERPL W P-5'-P-CCNC: 100 U/L (ref 0–50)
ALT SERPL W P-5'-P-CCNC: 99 U/L (ref 0–50)
ANION GAP SERPL CALCULATED.3IONS-SCNC: 12 MMOL/L (ref 7–15)
ANION GAP SERPL CALCULATED.3IONS-SCNC: 13 MMOL/L (ref 7–15)
AST SERPL W P-5'-P-CCNC: 72 U/L (ref 0–45)
AST SERPL W P-5'-P-CCNC: 83 U/L (ref 0–45)
BILIRUB SERPL-MCNC: 1 MG/DL
BILIRUB SERPL-MCNC: 1.1 MG/DL
BUN SERPL-MCNC: 49 MG/DL (ref 8–23)
BUN SERPL-MCNC: 57.6 MG/DL (ref 8–23)
CALCIUM SERPL-MCNC: 8.6 MG/DL (ref 8.8–10.4)
CALCIUM SERPL-MCNC: 8.8 MG/DL (ref 8.8–10.4)
CHLORIDE SERPL-SCNC: 100 MMOL/L (ref 98–107)
CHLORIDE SERPL-SCNC: 103 MMOL/L (ref 98–107)
CREAT SERPL-MCNC: 2.55 MG/DL (ref 0.51–0.95)
CREAT SERPL-MCNC: 3.2 MG/DL (ref 0.51–0.95)
EGFRCR SERPLBLD CKD-EPI 2021: 14 ML/MIN/1.73M2
EGFRCR SERPLBLD CKD-EPI 2021: 18 ML/MIN/1.73M2
ERYTHROCYTE [DISTWIDTH] IN BLOOD BY AUTOMATED COUNT: 21.8 % (ref 10–15)
GLUCOSE SERPL-MCNC: 103 MG/DL (ref 70–99)
GLUCOSE SERPL-MCNC: 148 MG/DL (ref 70–99)
HCO3 SERPL-SCNC: 22 MMOL/L (ref 22–29)
HCO3 SERPL-SCNC: 23 MMOL/L (ref 22–29)
HCT VFR BLD AUTO: 23.3 % (ref 35–47)
HGB BLD-MCNC: 7.6 G/DL (ref 11.7–15.7)
HOLD SPECIMEN: NORMAL
HOLD SPECIMEN: NORMAL
INR PPP: 1.26 (ref 0.85–1.15)
MCH RBC QN AUTO: 33.9 PG (ref 26.5–33)
MCHC RBC AUTO-ENTMCNC: 32.6 G/DL (ref 31.5–36.5)
MCV RBC AUTO: 104 FL (ref 78–100)
PLATELET # BLD AUTO: 41 10E3/UL (ref 150–450)
POTASSIUM SERPL-SCNC: 3.8 MMOL/L (ref 3.4–5.3)
POTASSIUM SERPL-SCNC: 3.8 MMOL/L (ref 3.4–5.3)
PROT SERPL-MCNC: 6.7 G/DL (ref 6.4–8.3)
PROT SERPL-MCNC: 7 G/DL (ref 6.4–8.3)
RBC # BLD AUTO: 2.24 10E6/UL (ref 3.8–5.2)
SODIUM SERPL-SCNC: 135 MMOL/L (ref 135–145)
SODIUM SERPL-SCNC: 138 MMOL/L (ref 135–145)
VANCOMYCIN SERPL-MCNC: 22.3 UG/ML
WBC # BLD AUTO: 16.6 10E3/UL (ref 4–11)

## 2024-09-08 PROCEDURE — 92526 ORAL FUNCTION THERAPY: CPT | Mod: GN

## 2024-09-08 PROCEDURE — 250N000011 HC RX IP 250 OP 636: Performed by: PHYSICIAN ASSISTANT

## 2024-09-08 PROCEDURE — 99232 SBSQ HOSP IP/OBS MODERATE 35: CPT | Performed by: INTERNAL MEDICINE

## 2024-09-08 PROCEDURE — 74230 X-RAY XM SWLNG FUNCJ C+: CPT

## 2024-09-08 PROCEDURE — 80202 ASSAY OF VANCOMYCIN: CPT | Performed by: SURGERY

## 2024-09-08 PROCEDURE — 36415 COLL VENOUS BLD VENIPUNCTURE: CPT | Performed by: SURGERY

## 2024-09-08 PROCEDURE — 82040 ASSAY OF SERUM ALBUMIN: CPT | Performed by: SURGERY

## 2024-09-08 PROCEDURE — 250N000013 HC RX MED GY IP 250 OP 250 PS 637: Performed by: PHYSICIAN ASSISTANT

## 2024-09-08 PROCEDURE — 84450 TRANSFERASE (AST) (SGOT): CPT | Performed by: SURGERY

## 2024-09-08 PROCEDURE — 84155 ASSAY OF PROTEIN SERUM: CPT | Performed by: SURGERY

## 2024-09-08 PROCEDURE — 250N000013 HC RX MED GY IP 250 OP 250 PS 637: Performed by: SURGERY

## 2024-09-08 PROCEDURE — P9016 RBC LEUKOCYTES REDUCED: HCPCS

## 2024-09-08 PROCEDURE — 85027 COMPLETE CBC AUTOMATED: CPT | Performed by: PHYSICIAN ASSISTANT

## 2024-09-08 PROCEDURE — 85610 PROTHROMBIN TIME: CPT | Performed by: PHYSICIAN ASSISTANT

## 2024-09-08 PROCEDURE — 250N000011 HC RX IP 250 OP 636: Performed by: INTERNAL MEDICINE

## 2024-09-08 PROCEDURE — 92611 MOTION FLUOROSCOPY/SWALLOW: CPT | Mod: GN

## 2024-09-08 PROCEDURE — 99207 PR NO CHARGE LOS: CPT | Performed by: INTERNAL MEDICINE

## 2024-09-08 PROCEDURE — 250N000013 HC RX MED GY IP 250 OP 250 PS 637: Performed by: INTERNAL MEDICINE

## 2024-09-08 PROCEDURE — 250N000009 HC RX 250: Performed by: PHYSICIAN ASSISTANT

## 2024-09-08 PROCEDURE — 200N000001 HC R&B ICU

## 2024-09-08 PROCEDURE — 80053 COMPREHEN METABOLIC PANEL: CPT | Performed by: PHYSICIAN ASSISTANT

## 2024-09-08 RX ORDER — MIDODRINE HYDROCHLORIDE 5 MG/1
10 TABLET ORAL
Status: COMPLETED | OUTPATIENT
Start: 2024-09-08 | End: 2024-09-09

## 2024-09-08 RX ORDER — ACETAMINOPHEN 325 MG/10.15ML
650 LIQUID ORAL EVERY 6 HOURS PRN
Status: DISCONTINUED | OUTPATIENT
Start: 2024-09-08 | End: 2024-09-11

## 2024-09-08 RX ADMIN — ONDANSETRON 8 MG: 2 INJECTION INTRAMUSCULAR; INTRAVENOUS at 08:31

## 2024-09-08 RX ADMIN — Medication 60 ML: at 00:28

## 2024-09-08 RX ADMIN — ONDANSETRON 8 MG: 2 INJECTION INTRAMUSCULAR; INTRAVENOUS at 03:08

## 2024-09-08 RX ADMIN — ACETAMINOPHEN 650 MG: 325 SOLUTION ORAL at 16:48

## 2024-09-08 RX ADMIN — Medication 60 ML: at 08:21

## 2024-09-08 RX ADMIN — LIDOCAINE 2 PATCH: 246 PATCH TOPICAL at 19:14

## 2024-09-08 RX ADMIN — PROCHLORPERAZINE EDISYLATE 5 MG: 5 INJECTION INTRAMUSCULAR; INTRAVENOUS at 05:46

## 2024-09-08 RX ADMIN — PANTOPRAZOLE SODIUM 40 MG: 40 INJECTION, POWDER, FOR SOLUTION INTRAVENOUS at 08:16

## 2024-09-08 RX ADMIN — DOBUTAMINE HYDROCHLORIDE 1.5 MCG/KG/MIN: 200 INJECTION INTRAVENOUS at 16:38

## 2024-09-08 RX ADMIN — Medication 1 CAPSULE: at 08:16

## 2024-09-08 RX ADMIN — Medication 1 CAPSULE: at 20:27

## 2024-09-08 RX ADMIN — Medication 60 ML: at 20:28

## 2024-09-08 ASSESSMENT — ACTIVITIES OF DAILY LIVING (ADL)
ADLS_ACUITY_SCORE: 42

## 2024-09-08 NOTE — PLAN OF CARE
Goal Outcome Evaluation:  [facility] - ICU    RN Progress Note:            Pertinent Assessments:      Please refer to flowsheet rows for full assessment     Pt A/Ox4, Very sleepy today related to no sleep overnigh VSS.continues to be 100% v paced. Afebrile, LS diminished. On RA while awake and needs 2L NC when asleep. C/O nausea. Spitting up brown slough from mouth. Speech therapy saw patient and wants her to have a video swallow study on Monday.            Key Events - This Shift:       VSS during CRRT. Nephro agreed to stop CRRT and try HD on Monday. Was able to sit up in the chair for 4hours per her request. Used Dhaval to get her into chair. She is anxious to start Therapy and get walking again.                 Barriers to Discharge / Downgrade:     Still being paced at bedside with epicardial leads.      Family at bedside Boundaries given to family to allow patient to take a break from visitors and let her have a nap. They helped to keep her awake for most of day to get her back on track with sleeping during the night.

## 2024-09-08 NOTE — PROGRESS NOTES
CVTS Daily Progress Note   POD 11 s/p aortic root abscess debridement and aortic annular reconstruction, aortic root replacement (Konect composite 25 mm Silva Inspiris Resilia bioprosthetic valve within 30 mm Gelweave Valsalva graft with reimplantation of the coronary arteries), mitral valve replacement (31 mm St. Tim Silva bioprosthetic valve) and CAB x 2.  Attending: Dr Renae  LOS: 22    SUBJECTIVE/INTERVAL EVENTS:    Urine output down to a trickle, CRRT coming down. Minimal sleep overnight per nursing report and now extremely drowsy this morning.  Normotensive on dobutamine at 1.5. Paced at 90bpm; underlying rhythm is afib in the 70s but BP maintenance appears to require preservation of atrial kick. Maintaining oxygenation on 2L NC. Seen in in bed. Pain controlled. Rectal tube in place. NG in place; tolerated TF with some nausea that is controlled with prns. Plt slightly recovered. LFT & WBC overall stable. Remains on empiric Vanc; ID following. Patient too drowsy to meaningfully participate in ROS today.    OBJECTIVE:  Temp:  [97.7  F (36.5  C)-98  F (36.7  C)] 98  F (36.7  C)  Pulse:  [91-92] 92  Resp:  [15-28] 17  BP: (102-130)/(55-69) 107/59  SpO2:  [92 %-100 %] 93 %  Vitals:    09/02/24 0400 09/04/24 0400 09/05/24 0400 09/06/24 0253   Weight: 95.9 kg (211 lb 6.7 oz) 92.2 kg (203 lb 4.2 oz) 90.5 kg (199 lb 8.3 oz) 87.5 kg (192 lb 14.4 oz)    09/07/24 0400   Weight: 85 kg (187 lb 6.3 oz)       Clinically Significant Risk Factors          # Hypocalcemia: Lowest Ca = 7.5 mg/dL in last 2 days, will monitor and replace as appropriate     # Hypoalbuminemia: Lowest albumin = 3.1 g/dL at 9/1/2024  1:45 PM, will monitor as appropriate    # Coagulation Defect: INR = 1.26 (Ref range: 0.85 - 1.15) and/or PTT = 52 Seconds (Ref range: 22 - 38 Seconds), will monitor for bleeding  # Thrombocytopenia: Lowest platelets = 25 in last 2 days, will monitor for bleeding          #Acute blood loss anemia: Lowest Hgb this  "hospitalization: 6.9 g/dL. Will continue to monitor and treat/transfuse as appropriate.     # Overweight: Estimated body mass index is 29.35 kg/m  as calculated from the following:    Height as of this encounter: 1.702 m (5' 7\").    Weight as of this encounter: 85 kg (187 lb 6.3 oz).   # Moderate Malnutrition: based on nutrition assessment      # Financial/Environmental Concerns:     # History of CABG: noted on surgical history        Current Medications:    Scheduled Meds:  Current Facility-Administered Medications   Medication Dose Route Frequency Provider Last Rate Last Admin    [Held by provider] atorvastatin (LIPITOR) tablet 80 mg  80 mg Oral QPM Jessica Tolbert PA-C   80 mg at 08/29/24 2038    [Held by provider] clopidogrel (PLAVIX) tablet 75 mg  75 mg Oral Daily Maryam Anthony PA-C        sennosides (SENOKOT) syrup 5 mL  5 mL Oral BID Aurelio Huang MD   5 mL at 09/04/24 2022    And    docusate (COLACE) 50 MG/5ML liquid 50 mg  50 mg Oral BID Aurelio Huang MD   50 mg at 09/04/24 2027    lactobacillus rhamnosus (GG) (CULTURELL) capsule 1 capsule  1 capsule Oral or Feeding Tube BID Aurelio Huang MD   1 capsule at 09/07/24 0928    Lidocaine (LIDOCARE) 4 % Patch 1-2 patch  1-2 patch Transdermal Q24H Maryam Anthony PA-C   2 patch at 09/07/24 1825    [Held by provider] metoprolol tartrate (LOPRESSOR) half-tab 12.5 mg  12.5 mg Oral BID Jessica Tolbert PA-C        pantoprazole (PROTONIX) 2 mg/mL suspension 40 mg  40 mg Oral or NG Tube QAM AC Maryam Anthony PA-C        Or    pantoprazole (PROTONIX) EC tablet 40 mg  40 mg Oral QAM AC Maryam Anthony PA-C        Or    pantoprazole (PROTONIX) IV push injection 40 mg  40 mg Intravenous QAM AC Maryam Anthony PA-C   40 mg at 09/07/24 0609    polyethylene glycol (MIRALAX) Packet 17 g  17 g Oral or Feeding Tube Daily Aurelio Huang MD        Prosource TF20 ENfit Compatibl EN " LIQD (PROSOURCE TF20) packet 60 mL  1 packet Per Feeding Tube BID Aurelio Huang MD   60 mL at 09/07/24 0928    sodium chloride (PF) 0.9% PF flush 3 mL  3 mL Intracatheter Q8H Gondal, Saad J, MD   3 mL at 09/07/24 1825    sodium chloride 0.9% DIALYSIS Cath LOCK - BLUE Lumen  1.3-2.6 mL Intracatheter Once Cheryle Jung MD        sodium chloride 0.9% DIALYSIS Cath LOCK - RED Lumen  1.3-2.6 mL Intracatheter Once Cheryle Jung MD        vancomycin place horne - receiving intermittent dosing  1 each Intravenous See Admin Instructions Dylan Renae MD        [Held by provider] Warfarin Dose Required Daily - Pharmacist Managed  1 each Oral See Admin Instructions Jessica Tolbert PA-C         Continuous Infusions:  Current Facility-Administered Medications   Medication Dose Route Frequency Provider Last Rate Last Admin    dextrose 10% infusion   Intravenous Continuous PRN Aurelio Huang MD        DOBUTamine (DOBUTREX) 500 mg in D5W 250 mL infusion (adult std conc)  1.5 mcg/kg/min Intravenous Continuous GoyoJenifer medrano, DO 4.1 mL/hr at 09/07/24 1400 1.5 mcg/kg/min at 09/07/24 1400     PRN Meds:.  Current Facility-Administered Medications   Medication Dose Route Frequency Provider Last Rate Last Admin    sodium chloride (NEBUSAL) 3 % neb solution 3 mL  3 mL Nebulization Q6H PRN Zachary Gonzalez MD        And    albuterol (PROVENTIL) neb solution 2.5 mg  2.5 mg Nebulization Q6H PRN Zachary Gonzalez MD        bisacodyl (DULCOLAX) suppository 10 mg  10 mg Rectal Daily PRN Maryam Anthony PA-C        dextrose 10% infusion   Intravenous Continuous PRN Aurelio Huang MD        glucose gel 15-30 g  15-30 g Oral Q15 Min PRN Maryam Anthony PA-C        Or    dextrose 50 % injection 25-50 mL  25-50 mL Intravenous Q15 Min PRN Maryam Anthony PA-C        Or    glucagon injection 1 mg  1 mg Subcutaneous Q15 Min PRN Mel Anthonyie Rema, PA-C         hydrALAZINE (APRESOLINE) injection 10 mg  10 mg Intravenous Q30 Min PRN Maryam Anthony PA-C        lactated ringers BOLUS 250 mL  250 mL Intravenous Q15 Min PRN Maryam Anthony PA-C   250 mL at 08/27/24 2151    lidocaine (LMX4) cream   Topical Q1H PRN Maryam Anthony PA-C        lidocaine 1 % 0.1-5 mL  0.1-5 mL Other Q1H PRN Maryam Anthony PA-C   2 mL at 09/06/24 1121    melatonin tablet 3 mg  3 mg Oral At Bedtime PRN Eddie Way, BALDO        menthol (Topical Analgesic) 2.5% (BENGAY VANISHING SCENT) 2.5 % topical gel 1 g  1 g Topical Q8H PRN Eddie Way, NP        miconazole (MICATIN) 2 % powder   Topical BID PRN Erick Patino A, DO        naloxone (NARCAN) injection 0.2 mg  0.2 mg Intravenous Q2 Min PRN Shawn Patinoinic A, DO        Or    naloxone (NARCAN) injection 0.4 mg  0.4 mg Intravenous Q2 Min PRN Shawn Patinoinic A, DO        Or    naloxone (NARCAN) injection 0.2 mg  0.2 mg Intramuscular Q2 Min PRN Shawn Patinoinic A, DO        Or    naloxone (NARCAN) injection 0.4 mg  0.4 mg Intramuscular Q2 Min PRN Erick Patino A, DO        nicotine (NICORETTE) gum 2 mg  2 mg Buccal Q1H PRN Gondal, Saad J, MD        ondansetron (ZOFRAN ODT) ODT tab 8 mg  8 mg Oral Q6H PRN Maryam Anthony PA-C        Or    ondansetron (ZOFRAN) injection 8 mg  8 mg Intravenous Q6H PRN Maryam Anthony PA-C   8 mg at 09/07/24 0608    prochlorperazine (COMPAZINE) injection 5 mg  5 mg Intravenous Q6H PRN Aurelio Huang MD   5 mg at 09/07/24 0743    Or    prochlorperazine (COMPAZINE) tablet 5 mg  5 mg Oral Q6H PRN Aurelio Huang MD        Or    prochlorperazine (COMPAZINE) suppository 12.5 mg  12.5 mg Rectal Q12H PRN Aurelio Huang MD        senna-docusate (SENOKOT-S/PERICOLACE) 8.6-50 MG per tablet 1 tablet  1 tablet Oral BID PRN Gondal, Saad J, MD        Or    senna-docusate (SENOKOT-S/PERICOLACE) 8.6-50 MG per tablet 2 tablet  2  tablet Oral BID PRN Gondal, Saad J, MD        sodium chloride (PF) 0.9% PF flush 10-40 mL  10-40 mL Intracatheter Once PRN Maryam Anthony PA-C        sodium chloride (PF) 0.9% PF flush 10-40 mL  10-40 mL Intracatheter Once PRN Bryant Lane, MBBS        sodium chloride (PF) 0.9% PF flush 3 mL  3 mL Intracatheter q1 min prn Gondal, Saad J, MD        sodium chloride 0.9% BOLUS 1-250 mL  1-250 mL Intravenous Q1H PRN Jessica Tolbert PA-C        traMADol (ULTRAM) tablet 50 mg  50 mg Oral Q12H PRN Dylan Renae MD           Cardiographics:    Telemetry monitoring demonstrates paced rhythm at 90bpm per my personal review.    Imaging:  No results found for this or any previous visit (from the past 24 hour(s)).      Labs, personally reviewed.  Hemoglobin   Date Value Ref Range Status   09/07/2024 8.0 (L) 11.7 - 15.7 g/dL Final   09/06/2024 7.6 (L) 11.7 - 15.7 g/dL Final   09/06/2024 7.9 (L) 11.7 - 15.7 g/dL Final     WBC Count   Date Value Ref Range Status   09/07/2024 15.8 (H) 4.0 - 11.0 10e3/uL Final   09/06/2024 14.3 (H) 4.0 - 11.0 10e3/uL Final   09/06/2024 14.6 (H) 4.0 - 11.0 10e3/uL Final     Platelet Count   Date Value Ref Range Status   09/07/2024 32 (LL) 150 - 450 10e3/uL Final   09/06/2024 25 (LL) 150 - 450 10e3/uL Final   09/06/2024 27 (LL) 150 - 450 10e3/uL Final     Creatinine   Date Value Ref Range Status   09/07/2024 1.26 (H) 0.51 - 0.95 mg/dL Final   09/07/2024 0.91 0.51 - 0.95 mg/dL Final   09/07/2024 0.91 0.51 - 0.95 mg/dL Final     Potassium   Date Value Ref Range Status   09/07/2024 3.6 3.4 - 5.3 mmol/L Final   09/07/2024 3.8 3.4 - 5.3 mmol/L Final   09/07/2024 3.8 3.4 - 5.3 mmol/L Final     Potassium POCT   Date Value Ref Range Status   08/27/2024 3.4 3.4 - 5.3 mmol/L Final   08/27/2024 3.9 3.4 - 5.3 mmol/L Final   08/27/2024 3.4 3.4 - 5.3 mmol/L Final     Magnesium   Date Value Ref Range Status   09/07/2024 2.5 (H) 1.7 - 2.3 mg/dL Final   09/06/2024 2.3 1.7 - 2.3 mg/dL Final    09/06/2024 2.5 (H) 1.7 - 2.3 mg/dL Final          I/O:  I/O last 3 completed shifts:  In: 2674.6 [P.O.:110; I.V.:972.6; Other:165; NG/GT:846]  Out: 3145.3 [Urine:20; Other:2464.8; Stool:625; Blood:35.5]       Physical Exam:    General: Patient seen in bed. NAD, extremely drowsy   HEENT: dry mucosa, nasal SBFT in place  CV: Paced rhythm on monitor, mild edema.   Pulm: Unlabored breathing on 2L NC. Incision C/D/I.  Abd: Soft, NT, ND  : Le with scant pita urine  Ext: Mod pedal edema, SCDs in place, warm  Neuro: CNs grossly intact      ASSESSMENT/PLAN:    Felicitas Elliott is a 84 year old female with a history of endocarditis, CAD, aortic stenosis and mitral valve disorder who is s/p Aortic root abscess debridement and aortic annular reconstruction, aortic root replacement (Konect composite 25 mm Silva Inspiris Resilia bioprosthetic valve within 30 mm Gelweave Valsalva graft with reimplantation of the coronary arteries), Mitral valve replacement (31 mm St. Itm Silva bioprosthetic valve) and CABx2.    Active Problems:    Bacteremia    Endocarditis    Sepsis (H)    Nonrheumatic aortic valve stenosis    Postsurgical percutaneous transluminal coronary angioplasty status    Mitral valve disorder    Coronary artery disease involving native coronary artery of native heart, unspecified whether angina present    Thrombocytopenia    Acute renal failure in s/o CKD    Acute hypoxic respiratory failure, improving    Dysphagia    NEURO:   - Tylenol held given LFT bump. PRN Tramadol/lidocaine & Icy Hot patches for pain  - PRN nicorette gum  - Delirium precautions  - PRN melatonin available    CV:   - Pre-op EF 60-65%  - Normotensive  - Currently on Dobutamine 1.5mcg  - Beta blocker pending weaning from pressors  - ASA allergy - Plavix 75mg PO daily-held for now, likely until plt >100  - Atorvastatin 80 mg daily (held in light of LFTs)  - Chest tubes removed 9/3; TPW remain due to plt/need for pacing  - Cards  following--appreciate recs  - New baseline echo before discharge  - Coumadin x3 months per surgeon preference--currently held. INR goal 2-3 (clarified with pharmacy)    PULM:   - Extubated POD#1, reintubated POD #2, Extubated POD#5  - Maintaining oxygen saturations on 2L NC  - Encourage pulmonary toilet as able    FEN/GI:  - Continue electrolyte replacement protocol  - Diet: NPO except sips/chips with supervision, TF  - Bowel regimen  - Nausea, improving; prns available  - SLP following, appreciate assistance  - Dietitian following, appreciate assistance    RENAL:  - Oliguric > anuric  - Plan was for Chacko removal today, however, Nephro giving repeat Lasix challenge this AM so will defer to monitor result.  In absence of significant response, anticipate chacko removal in AM in favor or bladder scans and prn straight caths  - Nephrology consult and managing renal replacement therapy, appreciate assistance; now off CRRT and anticipating transition to iHD in coming days  - 9/4--no appreciable UOP with Lasix challenge; Lasix challenge again today per Nephrology  - If TDL needed for longer term renal replacement, will need to coordinate with recovery from platelet coagulopathy/AC initiation    HEME:  - Acute blood loss anemia post-op.   - Hgb 8.0, hep subcutaneous currently held  - Occult blood positive POD#3. Rectal tube in place   - Plavix 75mg PO daily (held) - ASA allergy  - Coumadin pharmacy to dose-held.  - HIT negative. Plt 32 (25)  - Likely hold Plavix and coumadin until plt >100    ID:  - Lennie op ppx complete, afebrile.  - WBC grossly stable  - ID following-appreciate recs  - Pertinent culture history/susceptibilties:   Susceptibility data from last 90 days.  Collected Specimen Info Organism Ampicillin Ampicillin/Sulbactam Cefepime Ceftazidime Ceftriaxone Ciprofloxacin Clindamycin Daptomycin Doxycycline Erythromycin Gentamicin Levofloxacin Meropenem Oxacillin   08/29/24 Sputum from Expectorate Enterobacter  cloacae complex R R  S R R  S      S  S  S      Normal samara                 08/16/24 Peripheral Blood Staphylococcus aureus                 08/14/24 Peripheral Blood Staphylococcus aureus        S  S  S  S  S    S     Collected Specimen Info Organism Piperacillin/Tazobactam Tetracycline Tobramycin Trimethoprim/Sulfamethoxazole  Vancomycin   08/29/24 Sputum from Expectorate Enterobacter cloacae complex R   S  S      Normal samara        08/16/24 Peripheral Blood Staphylococcus aureus        08/14/24 Peripheral Blood Staphylococcus aureus   S   S  S     - Antibiotic history:   - 08/17 - 08/28 Nafcillin   - 08/29 - 08/30 Ceftazidine   - 08/30 - 09/05 Cefepime   - 08/30 - 09/05 PO Vanco  - 08/30 - current IV Vanco    ENDO:  - A1c 6.4  - Euglycemic     PPx:  - DVT: SCDs, SQ heparin TID (held), OOB/ambulation when able  - GI: Protonix 40mg IV/PO daily    DISPO:  - Continue critical care in ICU  - PICC placed 9/6  - Medically Ready for Discharge: Anticipated in 5+ Days         Patient seen and discussed with Dr. Renae.      Eddie Way, CNP, [id number]-AG  Cardiothoracic Surgery  [employer] Pager [phone]

## 2024-09-08 NOTE — PROGRESS NOTES
[facility]  [facility]   Follow up    Felicitas Elliott   MRN: [id number]  : 1940   DOA: 2024   CC: ARF      Assessment and Plan:  84 year old female    ARF/CKD: has underlying CKD with baseline CR 1.3-1.8; now ARF in the setting of cardiac surgery;   has been on CRRT all week and had slow volume removal; trial off CRRT since yesterday; hemodynamically and otherwise stable without continuous dialysis but still no recovery of renal function; will plan for conventional dialysis tomorrow--will dose with midodrine prior to HD to help with any intradialytic HoTN  Volume status; elevated; no response to diuretics; has been undergoing UF with CRRT and now getting closer to a good EDW; now off CRRT; plan UF with IHD; hope she will tolerate now that more stable; can do extra UF runs as needed  MSSA bacteremia; on abx and ID following  MV and AV infective endocarditis , 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: s/p biprothetic AoV and MV replacement, Aortic root replacement and 2vCABG  HoTN with septic and cardiogenic shock; resolved HoTN; on dobutamine   Acidosis; lactate and ARF and starvation ketoacidosis; improved and stable today  Anemia; following hgb  Low platelets; have been avoiding heparin; HD ordered without heparin; ok for heparin lock for cath  Hyperlipid; statin on hold  CAD: s/p CAB 2 v as part of valve surgery  A fib; on amio gtt  Resp failure; extubated; no plan to reintubate if deteriorates  Hepatitis; s/p acetylcysteine  Nutrition; on TF  Dispo; DNR, DNI; pt expressing concern about continued aggressive cares but wants to continue for now    Subjective  Up in chair; feels improved today; in brighter mood  No urine output  Objective    Vital signs in last 24 hours  Temp:  [98  F (36.7  C)-98.4  F (36.9  C)] 98.4  F (36.9  C)  Pulse:  [91-92] 92  Resp:  [14-30] 14  BP: ()/(52-62) 106/58  SpO2:  [93 %-100 %] 98  %      Intake/Output last 3 shifts  I/O last 3 completed shifts:  In: 2157.88 [P.O.:30; I.V.:917.88; Other:60; NG/GT:360]  Out: 547 [Urine:20; Other:227; Stool:300]  Intake/Output this shift:  No intake/output data recorded.    Physical Exam  Alert/oriented x 3, awake, NAD; appears tired; on O2  CV: RRR without murmur or rub  Lung:  decreased at bases  Ext: +edema and well perfused  Skin; no rash    Pertinent Labs     Last Renal Panel:  Sodium   Date Value Ref Range Status   09/08/2024 138 135 - 145 mmol/L Final     Potassium   Date Value Ref Range Status   09/08/2024 3.8 3.4 - 5.3 mmol/L Final     Potassium POCT   Date Value Ref Range Status   08/27/2024 3.4 3.4 - 5.3 mmol/L Final     Chloride   Date Value Ref Range Status   09/08/2024 103 98 - 107 mmol/L Final     Carbon Dioxide (CO2)   Date Value Ref Range Status   09/08/2024 23 22 - 29 mmol/L Final     Anion Gap   Date Value Ref Range Status   09/08/2024 12 7 - 15 mmol/L Final     Glucose   Date Value Ref Range Status   09/08/2024 148 (H) 70 - 99 mg/dL Final     GLUCOSE BY METER POCT   Date Value Ref Range Status   09/04/2024 102 (H) 70 - 99 mg/dL Final     Urea Nitrogen   Date Value Ref Range Status   09/08/2024 49.0 (H) 8.0 - 23.0 mg/dL Final     Creatinine   Date Value Ref Range Status   09/08/2024 2.55 (H) 0.51 - 0.95 mg/dL Final     GFR Estimate   Date Value Ref Range Status   09/08/2024 18 (L) >60 mL/min/1.73m2 Final     Comment:     eGFR calculated using 2021 CKD-EPI equation.     Calcium   Date Value Ref Range Status   09/08/2024 8.6 (L) 8.8 - 10.4 mg/dL Final     Comment:     Reference intervals for this test were updated on 7/16/2024 to reflect our healthy population more accurately. There may be differences in the flagging of prior results with similar values performed with this method. Those prior results can be interpreted in the context of the updated reference intervals.     Phosphorus   Date Value Ref Range Status   09/07/2024 3.1 2.5 - 4.5 mg/dL  Final     Albumin   Date Value Ref Range Status   09/08/2024 3.7 3.5 - 5.2 g/dL Final     Recent Labs   Lab 09/08/24  0540 09/07/24  0340 09/06/24 2023 09/06/24  1210 09/06/24  0413   HGB 7.6* 8.0* 7.6* 7.9* 8.0*          I reviewed all lab results  Cheryle Jung MD

## 2024-09-08 NOTE — PROGRESS NOTES
Speech-Language Pathology: Video Swallow Study     09/08/24 0900   Appointment Info   Signing Clinician's Name / Credentials (SLP) Ashley Mckay MA CCC-SLP   General Information   Onset of Illness/Injury or Date of Surgery 08/16/24   Referring Physician Dr. Renae   Pertinent History of Current Problem Intubated 8/29/24-9/1/24; difficulty with liquid medication per RN; Per EMR: 8/14/24 for chills & generalized weakness. 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 8/27/24 s/p bioprosthetic aortic & mitral valve replacement, aortic root replacement, & two vessel coronary artery bypass graft. Course complicated by post operative hypoxemic respiratory failure due to cardiogenic pulmonary edema, atelectasis, & possible Enterobacter hospital acquired pneumonia in conjunction with cardiogenic-distributive shock, oliguric HAMMAD requiring CRRT, euglycemic ketoacidosis, & suspected ischemic hepatitis   General Observations Alert and cooperative   Type of Evaluation   Type of Evaluation Swallow Evaluation   Oral Motor   Oral Musculature generally intact   Mucosal Quality adequate   Dentition (Oral Motor)   Dentition (Oral Motor) adequate dentition   General Swallowing Observations   Respiratory Support room air   Current Diet/Method of Nutritional Intake (General Swallowing Observations, NIS) thin liquids (level 0);regular diet   Swallowing Evaluation Videofluoroscopic swallow study (VFSS)   VFSS Evaluation   Radiologist Dr. Morataya   Views Taken left lateral   Physical Location of Procedure [facility]   VFSS Textures Trialed thin liquids;pureed;solid foods   VFSS Eval: Thin Liquid Texture Trial   Mode of Presentation, Thin Liquid cup;straw;self-fed   Preparatory Phase WFL   Oral Phase, Thin Liquid WFL   Bolus Location When Swallow Triggered valleculae    Pharyngeal Phase, Thin Liquid WFL;impaired pharyngoesophageal segment opening   Rosenbek's Penetration Aspiration Scale: Thin Liquid Trial Results 1 - no aspiration, contrast does not enter airway   VFSS Evaluation: Puree Solid Texture Trial   Mode of Presentation, Puree spoon;self-fed   Preparatory Phase WFL   Oral Phase, Puree WFL   Bolus Location When Swallow Triggered valleculae   Pharyngeal Phase, Puree WFL;impaired pharyngoesophageal segment opening   Rosenbek's Penetration Aspiration Scale: Puree Food Trial Results 1 - no aspiration, contrast does not enter airway   VFSS Evaluation: Solid Food Texture Trial   Mode of Presentation, Solid self-fed   Preparatory Phase WFL   Oral Phase, Solid WFL   Bolus Location When Swallow Triggered valleculae   Pharyngeal Phase, Solid WFL;impaired pharyngoesophageal segment opening   Rosenbek's Penetration Aspiration Scale: Solid Food Trial Results 1 - no aspiration, contrast does not enter airway   Swallowing Recommendations   Diet Consistency Recommendations thin liquids (level 0);full liquid diet   Supervision Level for Intake distant supervision needed   Mode of Delivery Recommendations bolus size, small;slow rate of intake   Swallowing Maneuver Recommendations alternate food and liquid intake   Medication Administration Recommendations, Swallowing (SLP) as tolerated, patient preference   Comment, Swallowing Recommendations No aspiration or penetration. Prominent cricopharyngeus. Some concern for esophageal dysphagia given h/o n/v and gagging with intake but tolerated VFSS well. SLP to follow for diet advancement and tolerance.   General Therapy Interventions   Planned Therapy Interventions Dysphagia Treatment   Clinical Impression   Criteria for Skilled Therapeutic Interventions Met (SLP Eval) Yes, treatment indicated   SLP Diagnosis Dysphagia   Risks & Benefits of therapy have been explained evaluation/treatment results reviewed;care plan/treatment goals  reviewed;participants voiced agreement with care plan;patient;participants included   Clinical Impression Comments Video Swallow Study completed. Patient had no aspiration or penetration. Oral phase swallow is mildly impaired, pharyngeal swallow function is near WNL. Tongue base retraction is WFL. Pharyngeal constriction, hyolaryngeal elevation and excursion were all WNL. Epiglottic inversion is complete. Swallow response is timely. Mastication is slow and effortful but complete. Cricopharyngeus if highly prominent with luminal narrowing, bolus readily passes without residuals or retrograde flow. Questionable esophageal stasis noted but improved with liquid wash; limited view along with NG tube in place; recommend monitoring for concerns.   Recommend initiate full liquid diet and advance as strength improves and if gagging is resolved.   SLP Total Evaluation Time   Evaluation, videofluoroscopic eval of swallow function Minutes (64011) 15   SLP Goals   Therapy Frequency (SLP Eval) 5 times/week   SLP Predicted Duration/Target Date for Goal Attainment 09/09/24   SLP Goals Swallow   SLP: Safely tolerate diet without signs/symptoms of aspiration Easy to chew diet;Thin liquids;With use of compensatory swallow strategies;With use of swallow precautions   Interventions   Interventions Quick Adds Swallowing Dysfunction   Swallowing Dysfunction &/or Oral Function for Feeding   Treatment of Swallowing Dysfunction &/or Oral Function for Feeding Minutes (90003) 20   Treatment Detail/Skilled Intervention VFSS results and recommendations reviewed in depth. Introduced swallow strategies and propose plan for diet advancement. Patient verbalized understanding. Family present and agreed.   SLP Discharge Planning   SLP Plan Tue 0/5; diet f/u and advance textures as appropriate   SLP Rationale for DC Rec Initiating oral intake. Ongoing ST to be determined.   SLP Brief overview of current status  Full liquid, thin liquid. VFSS showed no  aspiration or penetration, prominent cricopharyngeus and question of esophgeal component. SLP to follow for dysphagia management and establishing safest yet LRD.   Total Session Time   Total Session Time (sum of timed and untimed services) 35

## 2024-09-08 NOTE — PLAN OF CARE
Goal Outcome Evaluation:       [facility] - ICU    RN Progress Note:Patient alert and oriented, VSS, up in chair.  Patient passed her swallow study this am.  Diet is full liquid.              Pertinent Assessments:      Please refer to flowsheet rows for full assessment     ***           Key Events - This Shift:       ***                Barriers to Discharge / Downgrade:     ***         Point of Contact Update: YES-OR-NO: Yes  If No, reason: ***  Name:***  Phone Number:***  Summary of Conversation: ***

## 2024-09-08 NOTE — PROGRESS NOTES
[facility]   [address]  [place] [address]   [email or web address]   OFFICE: [phone]     CARDIOLOGY FOLLOW-UP NOTE      Impression and Plan     Impression:   Severe valvular disease with aortic root abscess s/p debridement, aortic annular reconstruction, and aortic root replacement with placement of a bioprosthetic valve in aortic position.  Coronary artery disease s/p CABG x2  Acute renal failure - now off of CRRT with plans to transition to conventional HD tomorrow. Followed by nephrology  Cardiogenic shock - post-operative. Currently treated with fixed dose dobutamine.  Persistent atrial fibrillation - currently paced at 90 bpm. Has underlying rhythm around 70 bpm    Plan:  Continue dobutamine today. Will likely need the support for initiation of IHD tomorrow.   Continue pacing today. Could consider trial of reducing rate.        Subjective     No acute complaints today. tired    Cardiac Diagnostics   Telemetry (personally reviewed): paced rhythm    Echocardiogram (results reviewed):   TTE 9/6/24  1. Normal left ventricular size and systolic performance with a visually estimated ejection fraction of 60%.  2. There is abnormal septal motion likely related to altered electrical activation due to bundle branch block.  3. There is a bio-prosthetic aortic valve (documented 25 mm [employer] Konect [employer]ilia aortic valved conduit).       Normal aortic valve prosthesis metrics with a mean systolic gradient of 4 mmHg and a peak anterograde velocity of 1.5 m/sec.       No aortic insufficiency is detected.  4. There is a bio-prosthetic mitral valve (documented 31 mm [employer] porcine pericardial tissue valve).       Normal mitral valve prosthesis function; mean diastolic gradient is 5 mm Hg.       No significant mitral insufficiency is detected.  5. Probable normal right ventricular size and systolic performance though right-sided structures are  "not clearly visualized on all views on this study.    Physical Examination       /59   Pulse 92   Temp 97.7  F (36.5  C) (Axillary)   Resp 14   Ht 1.702 m (5' 7\")   Wt 85 kg (187 lb 6.3 oz)   SpO2 100%   BMI 29.35 kg/m        [unfilled]        Intake/Output Summary (Last 24 hours) at 9/8/2024 1131  Last data filed at 9/8/2024 1000  Gross per 24 hour   Intake 1794.99 ml   Output 470 ml   Net 1324.99 ml       General: pleasant female. No acute distress.   HENT: external ears normal. Nares patent. Mucous membranes moist.  Eyes: perrla, extraocular muscles intact. No scleral icterus.   Neck: No JVD  Lungs: clear to auscultation  COR: regular rate and rhythm. 1/6 systolic murmur  Extrem: No edema         Imaging      Video swallow study 9/8  1.  No laryngeal penetration or aspiration.   2.  Delayed passage of contrast through the upper esophagus which may reflect dysmotility.   3.  Prominent cricopharyngeal impression.     Lab Results   Lab Results   Component Value Date    BUN 49.0 (H) 09/08/2024     09/08/2024    CO2 23 09/08/2024       Lab Results   Component Value Date    WBC 16.6 (H) 09/08/2024    HGB 7.6 (L) 09/08/2024    HCT 23.3 (L) 09/08/2024     (H) 09/08/2024    PLT 41 (LL) 09/08/2024       Lab Results   Component Value Date    INR 1.26 (H) 09/08/2024               Current Inpatient Scheduled Medications   Scheduled Meds:  Current Facility-Administered Medications   Medication Dose Route Frequency Provider Last Rate Last Admin    [Held by provider] atorvastatin (LIPITOR) tablet 80 mg  80 mg Oral QPM Jessica Tolbert PA-C   80 mg at 08/29/24 2038    [Held by provider] clopidogrel (PLAVIX) tablet 75 mg  75 mg Oral Daily Maryam Anthony PA-C        sennosides (SENOKOT) syrup 5 mL  5 mL Oral BID Aurelio Huang MD   5 mL at 09/04/24 2022    And    docusate (COLACE) 50 MG/5ML liquid 50 mg  50 mg Oral BID Aurelio Huang MD   50 mg at 09/04/24 2027    " lactobacillus rhamnosus (GG) (CULTURELL) capsule 1 capsule  1 capsule Oral or Feeding Tube BID Aurelio Huang MD   1 capsule at 09/08/24 0816    Lidocaine (LIDOCARE) 4 % Patch 1-2 patch  1-2 patch Transdermal Q24H Maryam Anthony PA-C   2 patch at 09/07/24 1825    [Held by provider] metoprolol tartrate (LOPRESSOR) half-tab 12.5 mg  12.5 mg Oral BID Jessica Tolbert PA-C        midodrine (PROAMATINE) tablet 10 mg  10 mg Oral During Dialysis/CRRT (from stock) Cheryle Jung MD        pantoprazole (PROTONIX) 2 mg/mL suspension 40 mg  40 mg Oral or NG Tube QAM AC Maryam Anthony PA-C        Or    pantoprazole (PROTONIX) EC tablet 40 mg  40 mg Oral QAM AC Maryam Anthony PA-C        Or    pantoprazole (PROTONIX) IV push injection 40 mg  40 mg Intravenous QAM AC Maryam Anthony PA-C   40 mg at 09/08/24 0816    polyethylene glycol (MIRALAX) Packet 17 g  17 g Oral or Feeding Tube Daily Aurelio Huang MD        [employer] TF20 [employer] Compatibl EN LIQD (PROSOURCE TF20) packet 60 mL  1 packet Per Feeding Tube BID Aurelio Huang MD   60 mL at 09/08/24 0821    sodium chloride (PF) 0.9% PF flush 3 mL  3 mL Intracatheter Q8H Gondal, Saad J, MD   3 mL at 09/08/24 0816    sodium chloride 0.9% DIALYSIS Cath LOCK - BLUE Lumen  1.3-2.6 mL Intracatheter Once Cheryle Jung MD        sodium chloride 0.9% DIALYSIS Cath LOCK - RED Lumen  1.3-2.6 mL Intracatheter Once Cheryle Jung MD        vancomycin place horne - receiving intermittent dosing  1 each Intravenous See Admin Instructions Dylan Renae MD        [Held by provider] Warfarin Dose Required Daily - Pharmacist Managed  1 each Oral See Admin Instructions Jessica Tolbert PA-C         Continuous Infusions:  Current Facility-Administered Medications   Medication Dose Route Frequency Provider Last Rate Last Admin    dextrose 10% infusion   Intravenous Continuous PRN Aurelio Huang MD         DOBUTamine (DOBUTREX) 500 mg in D5W 250 mL infusion (adult std conc)  1.5 mcg/kg/min Intravenous Continuous Jenifer Nance DO 4.1 mL/hr at 09/08/24 0815 1.5 mcg/kg/min at 09/08/24 0815            Medications Prior to Admission   Prior to Admission medications    Medication Sig Start Date End Date Taking? Authorizing Provider   acetaminophen (TYLENOL) 500 MG tablet Take 500-1,000 mg by mouth 2 times daily as needed for other (arthritis)   Yes Unknown, Entered By History

## 2024-09-08 NOTE — PROGRESS NOTES
[facility] Inpatient follow up        09/08/2024    Chart reviewed  Swallow eval on 9/8/2024              Assessment and Recommendations:   Assessment:  Felicitas Elliott is a 84 year old female with     Respiratory failure, shock liver-slow improvement.  Extubated  Decreased urine output, on dobutamine currently, had been on norepinephrine and vasopressin. 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  MSSA bacteremia.  Positive blood culture on 8/14/2024 and 8/16.  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  With the size of the vegetation combined with brain infarct, status post CV surgery, see details below active issue.   Neck pain worrisome for cervical discitis.  Neck MRI showed some edema at C4-C5 on the left side.  No clear evidence of infection.  Abnormal brain MRI suggestive of subacute infarct. In a patient with MSSA bacteremia and aortic valve endocarditis, this is cerebral septic emboli. Active issue.     POSTOPERATIVE DIAGNOSES:  1.  Severe aortic stenosis and moderate aortic regurgitation.  2.  Severe mitral stenosis.  3.  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  4.  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  5.  Severe multivessel coronary artery disease.  6.  Aortic root abscess.      PROCEDURE PERFORMED: 8/27/24  Aortic root abscess debridement and aortic annular reconstruction.  Aortic root replacement (Konect composite 25 mm Silva Inspiris Resilia bioprosthetic valve within 30 mm Gelweave Valsalva graft with reimplantation of the coronary arteries).  Mitral valve replacement (31 mm St. Tim Silva bioprosthetic valve).  Coronary artery bypass grafting x 2 (reversed saphenous vein graft to the obtuse marginal branch of the left circumflex coronary artery, and pedicled left internal mammary artery to left anterior descending coronary artery).  Endoscopic vein harvest of the greater saphenous vein from the left lower extremity.    Recommendations:  Continue vancomycin-dosing per Pharm.D.  Completed cefepime for pneumonia on 9/5/24--waiting to see if thrombocytopenia improves.  If not, can change vancomycin to daptomycin (thrombocytopenia more likely due to cephalosporin than glycopeptide)  Off nafcillin. Per previous notes plan IV antibiotics duration 6 weeks--Until 10/8/2024  Currently extubated, CV surgery staff at bedside, appreciate input  Stopped oral vanco  PICC placed 8/21  Monitor CBC, CMP--thrombocytopenia, seems to be coincident with cefepime usage.   Status post vascular surgery, chest tube, CV surgery following closely  Will need to check immunoglobin level in 4 to 6 weeks sister with history of hypogammaglobulinemia         Nannette Lebron MD  [facility]  Answering Service: [phone]  On-Call ID provider: [phone], option: 9                Interval History:     HPI: bridger reviewed on 9/8/2024      Previous note Wakes up, fatigued.  ICU nurse at bedside, patient's nurse, unable to sleep last night  Getting off CRRT and plan for hemodialysis  Previous note: Says feels better today.       Recent Inflammatory Biomarkers:   Recent Labs   Lab Test  09/08/24  0540 09/07/24  0340 09/06/24 2023 09/06/24  1210 09/06/24  0413 09/05/24 2016 08/30/24  0437 08/29/24  0359   PCAL  --   --   --   --   --   --   --  1.24*   WBC 16.6* 15.8* 14.3* 14.6* 15.2* 15.8*   < > 15.3*    < > = values in this interval not displayed.            Review of Systems:      Negative except for findings in the HPI.           Current Medications (antimicrobials listed in bold):     Current Facility-Administered Medications   Medication Dose Route Frequency Provider Last Rate Last Admin    [Held by provider] atorvastatin (LIPITOR) tablet 80 mg  80 mg Oral QPM Jessica Tolbert PA-C   80 mg at 08/29/24 2038    [Held by provider] clopidogrel (PLAVIX) tablet 75 mg  75 mg Oral Daily Maryam Anthony PA-C        sennosides (SENOKOT) syrup 5 mL  5 mL Oral BID Aurelio Huang MD   5 mL at 09/04/24 2022    And    docusate (COLACE) 50 MG/5ML liquid 50 mg  50 mg Oral BID Aurelio Huang MD   50 mg at 09/04/24 2027    lactobacillus rhamnosus (GG) (CULTURELL) capsule 1 capsule  1 capsule Oral or Feeding Tube BID Aurelio Huang MD   1 capsule at 09/08/24 0816    Lidocaine (LIDOCARE) 4 % Patch 1-2 patch  1-2 patch Transdermal Q24H Maryam Anthony PA-C   2 patch at 09/07/24 1825    [Held by provider] metoprolol tartrate (LOPRESSOR) half-tab 12.5 mg  12.5 mg Oral BID Jessica Tolbert PA-C        midodrine (PROAMATINE) tablet 10 mg  10 mg Oral During Dialysis/CRRT (from stock) Cheryle Jung MD        pantoprazole (PROTONIX) 2 mg/mL suspension 40 mg  40 mg Oral or NG Tube QAM AC Maryma Anthony PA-C        Or    pantoprazole (PROTONIX) EC tablet 40 mg  40 mg Oral QAM AC Gabrielle, Maryam Rema, PA-C        Or    pantoprazole (PROTONIX) IV push injection 40 mg  40 mg Intravenous QAM Maryam Chicas PA-C   40 mg at 09/08/24 0816    polyethylene glycol (MIRALAX) Packet 17 g  17 g Oral or Feeding Tube Daily Aurelio Huang MD         Prosource TF20 ENfit Compatibl EN LIQD (PROSOURCE TF20) packet 60 mL  1 packet Per Feeding Tube BID Aurelio Huang MD   60 mL at 09/08/24 0821    sodium chloride (PF) 0.9% PF flush 3 mL  3 mL Intracatheter Q8H Gondal, Saad J, MD   3 mL at 09/08/24 0816    sodium chloride 0.9% DIALYSIS Cath LOCK - BLUE Lumen  1.3-2.6 mL Intracatheter Once Cheryle Jung MD        sodium chloride 0.9% DIALYSIS Cath LOCK - RED Lumen  1.3-2.6 mL Intracatheter Once Cheryle Jung MD        vancomycin place horne - receiving intermittent dosing  1 each Intravenous See Admin Instructions Dylan Renae MD        [Held by provider] Warfarin Dose Required Daily - Pharmacist Managed  1 each Oral See Admin Instructions Jessica Tolbert PA-C                  Allergies:     Allergies   Allergen Reactions    Aspirin Rash    Cats Rash    Nickel Rash            Physical Exam:   Vitals were reviewed  Patient Vitals for the past 24 hrs:   BP Temp Temp src Pulse Resp SpO2   09/08/24 1004 111/59 -- -- 92 14 100 %   09/08/24 1000 -- -- -- 92 10 94 %   09/08/24 0900 -- -- -- 92 -- 100 %   09/08/24 0815 118/65 97.7  F (36.5  C) Axillary 92 17 99 %   09/08/24 0700 106/58 -- -- 92 14 98 %   09/08/24 0600 111/58 -- -- 92 17 97 %   09/08/24 0500 104/56 -- -- 92 17 99 %   09/08/24 0400 105/55 -- -- 92 16 100 %   09/08/24 0300 117/60 -- -- 92 16 98 %   09/08/24 0200 98/56 -- -- 92 14 99 %   09/08/24 0100 103/55 -- -- 92 15 100 %   09/08/24 0000 92/55 -- -- 92 16 99 %   09/07/24 2300 110/60 -- -- 92 17 98 %   09/07/24 2200 110/62 -- -- 92 17 100 %   09/07/24 2130 -- -- -- 92 14 100 %   09/07/24 2100 102/54 -- -- 92 14 100 %   09/07/24 2030 -- -- -- 92 18 96 %   09/07/24 2000 113/55 -- -- 92 14 100 %   09/07/24 1900 110/59 -- -- 92 17 99 %   09/07/24 1800 104/52 -- -- 92 30 95 %   09/07/24 1700 97/54 -- -- 92 19 95 %   09/07/24 1600 100/56 98.4  F (36.9  C) Oral 92 21 94 %   09/07/24 1500 110/59 -- -- 92 20 93 %   09/07/24 1400 107/59  -- -- 92 17 93 %   09/07/24 1300 116/57 -- -- 92 17 97 %   09/07/24 1200 106/57 98  F (36.7  C) Oral 92 15 97 %       Physical Examination:    Resp: 14    Gen: Appears ill, extubated, nasal canula, still on low dose dobutamine  HEENT: opens eyes  PICC, chest wall catheter for hemodialysis, rectal tube  CV: Sternal incision, temporary pacer  Lungs: coarse, currently on nasal cannula.  Chest tubes have been removed  Skin: Warm  Extr: edema- especially L leg  Neuro: extubated, opens eyes  HD catheter  PICC- R basilic           Laboratory Data:   ID Labs:  Microbiology labs:  7-Day Micro Results       No results found for the last 168 hours.          Susceptibility data from last 90 days.  Collected Specimen Info Organism Ampicillin Ampicillin/Sulbactam Cefepime Ceftazidime Ceftriaxone Ciprofloxacin Clindamycin Daptomycin Doxycycline Erythromycin Gentamicin Levofloxacin Meropenem Oxacillin   08/29/24 Sputum from Expectorate Enterobacter cloacae complex R R  S R R  S      S  S  S      Normal samara                 08/16/24 Peripheral Blood Staphylococcus aureus                 08/14/24 Peripheral Blood Staphylococcus aureus        S  S  S  S  S    S     Collected Specimen Info Organism Piperacillin/Tazobactam Tetracycline Tobramycin Trimethoprim/Sulfamethoxazole  Vancomycin   08/29/24 Sputum from Expectorate Enterobacter cloacae complex R   S  S      Normal samara        08/16/24 Peripheral Blood Staphylococcus aureus        08/14/24 Peripheral Blood Staphylococcus aureus   S   S  S       Reviewed      No lab results found.  Recent Labs   Lab Test 09/08/24  0540 09/07/24  0340 09/06/24 2023 09/06/24  1210 09/06/24  0413 09/05/24 2016   WBC 16.6* 15.8* 14.3* 14.6* 15.2* 15.8*     Recent Labs   Lab Test 09/08/24  0540 09/07/24  2214 09/07/24  1436 09/07/24 0340   CR 2.55* 1.97* 1.26* 0.91  0.91   GFRESTIMATED 18* 25* 42* 62  62       Hematology Studies  Recent Labs   Lab Test 09/08/24  0540 09/07/24  0340 09/06/24 2023  09/06/24  1210 09/06/24  0413 09/05/24 2016   WBC 16.6* 15.8* 14.3* 14.6* 15.2* 15.8*   HGB 7.6* 8.0* 7.6* 7.9* 8.0* 8.1*   HCT 23.3* 25.4* 23.8* 24.8* 25.4* 25.9*   PLT 41* 32* 25* 27* 25* 28*       Metabolic  Recent Labs   Lab Test 09/08/24  0540 09/07/24 2214 09/07/24  1436    138 138   BUN 49.0* 35.4* 25.5*   CO2 23 23 23   CR 2.55* 1.97* 1.26*   GFRESTIMATED 18* 25* 42*       Hepatic Studies  Recent Labs   Lab Test 09/08/24 0540 09/07/24 2214 09/07/24  1436   BILITOTAL 1.0 1.2 1.3*   ALKPHOS 107 101 101   ALBUMIN 3.7 3.7 3.7   AST 83* 94* 118*   * 113* 119*          Imaging Data:   Reviewed     IR CVC Non Tunnel Placement > 5 Yrs    Result Date: 9/1/2024  [place] [facility] LOCATION: [facility] DATE: 9/1/2024 PROCEDURE: NON-TUNNELED DIALYSIS CATHETER PLACEMENT INTERVENTIONAL RADIOLOGIST: RU Reyes MD. INDICATION: HAMMAD requiring hemodialysis. CONSENT: The risks, benefits and alternatives of non-tunneled dialysis catheter placement were discussed with the patient in detail. All questions were answered. Informed consent was given to proceed with the procedure. MODERATE SEDATION: None. CONTRAST: None. ANTIBIOTICS: None. ADDITIONAL MEDICATIONS: Heparin 3800 U IV FLUOROSCOPIC TIME: 2.0 minutes. RADIATION DOSE: Air Kerma: 36.04 mGy. COMPLICATIONS: No immediate complications. STERILE BARRIER TECHNIQUE: Maximum sterile barrier technique was used. Cutaneous antisepsis was performed at the operative site with application of 2% chlorhexidine and large sterile drape. Prior to the procedure, the  and assistant performed hand hygiene and wore hat, mask, sterile gown, and sterile gloves during the entire procedure. PROCEDURE: Limited left neck ultrasound was performed which demonstrated a patent internal jugular vein; images saved to the permanent patient medical record. The overlying skin and subcutaneous soft tissues were anesthetized using 1% lidocaine. Under  ultrasound guidance, the left internal jugular vein was accessed using a micropuncture needle; images saved of the permanent patient medical record. Access was secured using a 4 Nigerien transitional sheath. A [place] guidewire was advanced into the SVC. Under fluoroscopic guidance, the overlying skin and subcutaneous soft tissues were dilated and a 15.5 Nigerien nontunneled dialysis catheter was placed with the tip within the cavoatrial junction. The catheter was tested and found to flush and aspirate appropriately. The catheter was heparinized and secured to the skin.     IMPRESSION:  1.  Successful non-tunneled dialysis catheter placement. PLAN: 1. Dialysis catheter is ready to be used. 2. Nontunneled right groin dialysis catheter can be removed in ICU.       Study Result    Narrative & Impression   EXAM: MR BRAIN W/O and W CONTRAST  LOCATION: [facility]  DATE: 8/17/2024     INDICATION: Headache in a patient with MSSA bacteremia secondary to aortic valve endocarditis.  Look for cerebral septic emboli; Headache; Acute HA (< 3 months), no complicating features  COMPARISON: None.  CONTRAST: 9 ml gadavist  TECHNIQUE: Routine multiplanar multisequence head MRI without and with intravenous contrast.     FINDINGS: Assessment degraded due to motion.  INTRACRANIAL CONTENTS:   Apparent focus of diffusion restriction with T2/FLAIR hyperintensity within the left superior parietal lobule cortex is hyperintense on ADC map, representing T2 shine through.  Focus of signal abnormality measures 13 x 9 mm in the axial plane and does   not have internal enhancement.     Additional punctate foci of hyperintensity on diffusion weighted with similar signal characteristics (periventricular right corona radiata, left superior parietal lobule, and left cerebellar hemisphere).     Patchy and confluent nonspecific T2/FLAIR hyperintensities within the cerebral white matter most consistent with moderate chronic  microvascular ischemic change. Moderate generalized cerebral atrophy. No hydrocephalus. Normal position of the cerebellar   tonsils. No discernible abnormal intracranial enhancement; however, assessment substantially degraded due to motion. 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     SELLA: No abnormality accounting for technique.     OSSEOUS STRUCTURES/SOFT TISSUES: Normal marrow signal. The major intracranial vascular flow voids are maintained.      ORBITS: No abnormality accounting for technique.      SINUSES/MASTOIDS: Mild mucosal thickening scattered about the paranasal sinuses. Scattered fluid/membrane thickening in the left mastoid air cells. No apparent mass in the posterior nasopharynx or skull base.                                                                       IMPRESSION: Assessment degraded due to motion.  1.  13 mm focus of cortical signal abnormality within the left superior parietal lobule which is favored to represent a subacute infarct; low-grade glial neoplasm could conceivably have a similar appearance. Hyperacute intraparenchymal hematoma could   also have a similar appearance but is considered less likely. Recommend noncontrast head CT for further characterization. Also recommend follow-up MRI brain without and with contrast in 8-12 weeks to document expected evolution.  2.  Additional smaller foci of signal abnormality with similar characteristics favored to represent punctate subacute infarcts.

## 2024-09-08 NOTE — PHARMACY-VANCOMYCIN DOSING SERVICE
Pharmacy Vancomycin Note  Date of Service 2024  Patient's  1940   84 year old, female    Indication: Aspiration Pneumonia  Day of Therapy: 10  Current vancomycin regimen:  1000 mg IV q24h  Current vancomycin monitoring method: Renal Replacement Therapy  Current vancomycin therapeutic monitoring goal: 15-20 mg/L    Current estimated CrCl = Estimated Creatinine Clearance: 18.4 mL/min (A) (based on SCr of 2.55 mg/dL (H)).    Creatinine for last 3 days  2024: 11:56 AM Creatinine 0.93 mg/dL;  1:54 PM Creatinine 0.94 mg/dL;  8:16 PM Creatinine 0.97 mg/dL; 10:12 PM Creatinine 0.98 mg/dL  2024:  4:13 AM Creatinine 0.95 mg/dL;  4:13 AM Creatinine 0.95 mg/dL; 12:10 PM Creatinine 0.97 mg/dL; 12:25 PM Creatinine 0.93 mg/dL;  8:23 PM Creatinine 1.04 mg/dL;  8:23 PM Creatinine 1.04 mg/dL  2024:  3:40 AM Creatinine 0.91 mg/dL;  3:40 AM Creatinine 0.91 mg/dL;  2:36 PM Creatinine 1.26 mg/dL; 10:14 PM Creatinine 1.97 mg/dL  2024:  5:40 AM Creatinine 2.55 mg/dL    Recent Vancomycin Levels (past 3 days)  2024:  4:13 AM Vancomycin 13.0 ug/mL  2024:  5:40 AM Vancomycin 22.3 ug/mL    Vancomycin IV Administrations (past 72 hours)                     vancomycin (VANCOCIN) 1,000 mg in 200 mL dextrose intermittent infusion (mg) 1,000 mg New Bag 24 0731    vancomycin (VANCOCIN) 750 mg in sodium chloride 0.9 % 250 mL intermittent infusion (mg) 750 mg New Bag 24 0827                    Nephrotoxins and other renal medications (From now, onward)      Start     Dose/Rate Route Frequency Ordered Stop    24 1329  vancomycin place horne - receiving intermittent dosing         1 each Intravenous SEE ADMIN INSTRUCTIONS 24 132                 Contrast Orders - past 72 hours (72h ago, onward)      Start     Dose/Rate Route Frequency Stop    24 0930  barium sulfate (VARIBAR) 40 % pudding/paste 20 mL         20 mL Oral ONCE 24 0924    24 0930  barium sulfate (VARIBAR  THIN Liquid) 40 % oral suspension 24 g         60 mL Oral ONCE 09/08/24 0923            Interpretation of levels and current regimen:  Vancomycin level is reflective of supratherapeutic level    Has serum creatinine changed greater than 50% in last 72 hours: Yes - sCr was down to a abel of 0.91 on CRRT.   There has not been any recovery of native renal function, and sCr has been rising since CRRT was stopped, up to 2.55 this AM.     Urine output:  anuric    Renal Function: ESRD on Dialysis  CRRT was discontinued 9/7, and plan to start patient on conventional dialysis 9/9.     Plan:  Changed to intermittent dosing when CRRT was stopped. Will not re-dose today.  Vancomycin monitoring method: Renal Replacement Therapy  Vancomycin therapeutic monitoring goal: 15-20 mg/L  Pharmacy will check vancomycin levels as appropriate in before HD tomorrow AM.  Serum creatinine levels will be ordered  per MD .    Shabnam Curiel, [facility]

## 2024-09-09 ENCOUNTER — APPOINTMENT (OUTPATIENT)
Dept: SPEECH THERAPY | Facility: HOSPITAL | Age: 84
DRG: 853 | End: 2024-09-09
Attending: STUDENT IN AN ORGANIZED HEALTH CARE EDUCATION/TRAINING PROGRAM
Payer: COMMERCIAL

## 2024-09-09 ENCOUNTER — APPOINTMENT (OUTPATIENT)
Dept: OCCUPATIONAL THERAPY | Facility: HOSPITAL | Age: 84
DRG: 853 | End: 2024-09-09
Attending: STUDENT IN AN ORGANIZED HEALTH CARE EDUCATION/TRAINING PROGRAM
Payer: COMMERCIAL

## 2024-09-09 LAB
ABO/RH(D): NORMAL
ANION GAP SERPL CALCULATED.3IONS-SCNC: 13 MMOL/L (ref 7–15)
ANTIBODY SCREEN: NEGATIVE
BLD PROD TYP BPU: NORMAL
BLOOD COMPONENT TYPE: NORMAL
BUN SERPL-MCNC: 70.7 MG/DL (ref 8–23)
CALCIUM SERPL-MCNC: 8.7 MG/DL (ref 8.8–10.4)
CHLORIDE SERPL-SCNC: 102 MMOL/L (ref 98–107)
CODING SYSTEM: NORMAL
CREAT SERPL-MCNC: 4.02 MG/DL (ref 0.51–0.95)
CROSSMATCH: NORMAL
EGFRCR SERPLBLD CKD-EPI 2021: 10 ML/MIN/1.73M2
ERYTHROCYTE [DISTWIDTH] IN BLOOD BY AUTOMATED COUNT: 22.1 % (ref 10–15)
GLUCOSE SERPL-MCNC: 104 MG/DL (ref 70–99)
HBV SURFACE AG SERPL QL IA: NONREACTIVE
HCO3 SERPL-SCNC: 21 MMOL/L (ref 22–29)
HCT VFR BLD AUTO: 21.1 % (ref 35–47)
HGB BLD-MCNC: 6.9 G/DL (ref 11.7–15.7)
HGB BLD-MCNC: 7.8 G/DL (ref 11.7–15.7)
INR PPP: 1.23 (ref 0.85–1.15)
ISSUE DATE AND TIME: NORMAL
MCH RBC QN AUTO: 34 PG (ref 26.5–33)
MCHC RBC AUTO-ENTMCNC: 32.7 G/DL (ref 31.5–36.5)
MCV RBC AUTO: 104 FL (ref 78–100)
PLATELET # BLD AUTO: 51 10E3/UL (ref 150–450)
POTASSIUM SERPL-SCNC: 4.2 MMOL/L (ref 3.4–5.3)
RBC # BLD AUTO: 2.03 10E6/UL (ref 3.8–5.2)
SODIUM SERPL-SCNC: 136 MMOL/L (ref 135–145)
SPECIMEN EXPIRATION DATE: NORMAL
UNIT ABO/RH: NORMAL
UNIT NUMBER: NORMAL
UNIT STATUS: NORMAL
UNIT TYPE ISBT: 9500
VANCOMYCIN SERPL-MCNC: 18.9 UG/ML
WBC # BLD AUTO: 14.7 10E3/UL (ref 4–11)

## 2024-09-09 PROCEDURE — 250N000011 HC RX IP 250 OP 636: Performed by: PHYSICIAN ASSISTANT

## 2024-09-09 PROCEDURE — 86900 BLOOD TYPING SEROLOGIC ABO: CPT | Performed by: SURGERY

## 2024-09-09 PROCEDURE — 87340 HEPATITIS B SURFACE AG IA: CPT | Performed by: INTERNAL MEDICINE

## 2024-09-09 PROCEDURE — 85027 COMPLETE CBC AUTOMATED: CPT | Performed by: PHYSICIAN ASSISTANT

## 2024-09-09 PROCEDURE — 80048 BASIC METABOLIC PNL TOTAL CA: CPT | Performed by: SURGERY

## 2024-09-09 PROCEDURE — 250N000011 HC RX IP 250 OP 636: Performed by: INTERNAL MEDICINE

## 2024-09-09 PROCEDURE — 250N000013 HC RX MED GY IP 250 OP 250 PS 637: Performed by: PHYSICIAN ASSISTANT

## 2024-09-09 PROCEDURE — 97535 SELF CARE MNGMENT TRAINING: CPT | Mod: GO

## 2024-09-09 PROCEDURE — 250N000013 HC RX MED GY IP 250 OP 250 PS 637: Performed by: INTERNAL MEDICINE

## 2024-09-09 PROCEDURE — 92526 ORAL FUNCTION THERAPY: CPT | Mod: GN

## 2024-09-09 PROCEDURE — 99232 SBSQ HOSP IP/OBS MODERATE 35: CPT | Performed by: INTERNAL MEDICINE

## 2024-09-09 PROCEDURE — 86923 COMPATIBILITY TEST ELECTRIC: CPT | Performed by: SURGERY

## 2024-09-09 PROCEDURE — 80202 ASSAY OF VANCOMYCIN: CPT | Performed by: SURGERY

## 2024-09-09 PROCEDURE — P9040 RBC LEUKOREDUCED IRRADIATED: HCPCS | Performed by: SURGERY

## 2024-09-09 PROCEDURE — 85018 HEMOGLOBIN: CPT | Performed by: PHYSICIAN ASSISTANT

## 2024-09-09 PROCEDURE — 90935 HEMODIALYSIS ONE EVALUATION: CPT

## 2024-09-09 PROCEDURE — P9047 ALBUMIN (HUMAN), 25%, 50ML: HCPCS | Performed by: INTERNAL MEDICINE

## 2024-09-09 PROCEDURE — 85610 PROTHROMBIN TIME: CPT | Performed by: PHYSICIAN ASSISTANT

## 2024-09-09 PROCEDURE — 90935 HEMODIALYSIS ONE EVALUATION: CPT | Performed by: INTERNAL MEDICINE

## 2024-09-09 PROCEDURE — 250N000009 HC RX 250: Performed by: PHYSICIAN ASSISTANT

## 2024-09-09 PROCEDURE — 97166 OT EVAL MOD COMPLEX 45 MIN: CPT | Mod: GO

## 2024-09-09 PROCEDURE — 200N000001 HC R&B ICU

## 2024-09-09 PROCEDURE — 250N000013 HC RX MED GY IP 250 OP 250 PS 637: Performed by: SURGERY

## 2024-09-09 PROCEDURE — 99291 CRITICAL CARE FIRST HOUR: CPT | Performed by: INTERNAL MEDICINE

## 2024-09-09 PROCEDURE — 250N000011 HC RX IP 250 OP 636: Performed by: SURGERY

## 2024-09-09 PROCEDURE — 97110 THERAPEUTIC EXERCISES: CPT | Mod: GO

## 2024-09-09 RX ORDER — VANCOMYCIN HYDROCHLORIDE 1 G/200ML
1000 INJECTION, SOLUTION INTRAVENOUS ONCE
Status: COMPLETED | OUTPATIENT
Start: 2024-09-09 | End: 2024-09-09

## 2024-09-09 RX ORDER — ALBUMIN (HUMAN) 12.5 G/50ML
25 SOLUTION INTRAVENOUS
Status: DISCONTINUED | OUTPATIENT
Start: 2024-09-09 | End: 2024-09-16 | Stop reason: HOSPADM

## 2024-09-09 RX ORDER — MIDODRINE HYDROCHLORIDE 5 MG/1
10 TABLET ORAL
Status: DISCONTINUED | OUTPATIENT
Start: 2024-09-09 | End: 2024-09-16 | Stop reason: HOSPADM

## 2024-09-09 RX ADMIN — ALBUMIN HUMAN 25 G: 0.25 SOLUTION INTRAVENOUS at 09:27

## 2024-09-09 RX ADMIN — PANTOPRAZOLE SODIUM 40 MG: 40 INJECTION, POWDER, FOR SOLUTION INTRAVENOUS at 06:30

## 2024-09-09 RX ADMIN — LIDOCAINE 1 PATCH: 246 PATCH TOPICAL at 19:35

## 2024-09-09 RX ADMIN — ACETAMINOPHEN 650 MG: 325 SOLUTION ORAL at 00:19

## 2024-09-09 RX ADMIN — ONDANSETRON 8 MG: 2 INJECTION INTRAMUSCULAR; INTRAVENOUS at 02:37

## 2024-09-09 RX ADMIN — MIDODRINE HYDROCHLORIDE 10 MG: 5 TABLET ORAL at 08:25

## 2024-09-09 RX ADMIN — Medication 1 CAPSULE: at 20:37

## 2024-09-09 RX ADMIN — VANCOMYCIN HYDROCHLORIDE 1000 MG: 1 INJECTION, SOLUTION INTRAVENOUS at 13:42

## 2024-09-09 RX ADMIN — Medication 1 CAPSULE: at 08:00

## 2024-09-09 ASSESSMENT — ACTIVITIES OF DAILY LIVING (ADL)
ADLS_ACUITY_SCORE: 38
ADLS_ACUITY_SCORE: 37
ADLS_ACUITY_SCORE: 38
ADLS_ACUITY_SCORE: 37
ADLS_ACUITY_SCORE: 38
ADLS_ACUITY_SCORE: 37
ADLS_ACUITY_SCORE: 38

## 2024-09-09 NOTE — PROGRESS NOTES
09/09/24 1330   Appointment Info   Signing Clinician's Name / Credentials (OT) Ashley Burciaga CHANO OTR/L CLT   Living Environment   People in Home spouse   Current Living Arrangements house   Home Accessibility stairs to enter home   Number of Stairs, Main Entrance 4   Stair Railings, Main Entrance railings safe and in good condition   Transportation Anticipated family or friend will provide   Self-Care   Equipment Currently Used at Home grab bar, tub/shower   Activity/Exercise/Self-Care Comment I at baseline with ADLs, I with walking   General Information   Onset of Illness/Injury or Date of Surgery 08/16/24   Referring Physician    Additional Occupational Profile Info/Pertinent History of Current Problem POD 11 s/p aortic root abscess debridement and aortic annular reconstruction, aortic root replacement (Konect composite 25 mm Silva Inspiris Resilia bioprosthetic valve within 30 mm Gelweave Valsalva graft with reimplantation of the coronary arteries), mitral valve replacement (31 mm St. Tim Silva bioprosthetic valve) and CAB x 2.  Attending: Dr Renae  LOS: 22   Existing Precautions/Restrictions cardiac;sternal   Cognitive Status Examination   Affect/Mental Status (Cognitive) WNL   Follows Commands WNL   Bed Mobility   Bed Mobility supine-sit   Supine-Sit [place] (Bed Mobility) moderate assist (50% patient effort);2 person assist;maximum assist (25% patient effort)   Transfers   Transfers sit-stand transfer   Sit-Stand Transfer   Sit-Stand [place] (Transfers) maximum assist (25% patient effort);2 person assist   Balance   Balance Comments EOB sitting- min/mod A   Activities of Daily Living   BADL Assessment/Intervention lower body dressing   Lower Body Dressing Assessment/Training   [place] Level (Lower Body Dressing) socks;maximum assist (25% patient effort)   Clinical Impression   Criteria for Skilled Therapeutic Interventions Met (OT) Yes, treatment indicated   OT  Diagnosis decreased strength for functional mobility and ADLs   OT Problem List-Impairments impacting ADL problems related to;activity tolerance impaired;strength;post-surgical precautions   Assessment of Occupational Performance 1-3 Performance Deficits   Identified Performance Deficits trsfs, bed mob, functional mob   Planned Therapy Interventions (OT) ADL retraining;transfer training;home program guidelines;progressive activity/exercise   Clinical Decision Making Complexity (OT) detailed assessment/moderate complexity   Risk & Benefits of therapy have been explained care plan/treatment goals reviewed   OT Total Evaluation Time   OT Eval, Moderate Complexity Minutes (91685) 8   OT Goals   Therapy Frequency (OT) 2 times/day   OT Predicted Duration/Target Date for Goal Attainment 09/17/24   OT Goals Cardiac Phase 1   OT: Understanding of cardiac education to maximize quality of life, condition management, and health outcomes Patient;Caregiver   OT: Perform aerobic activity with stable cardiovascular response intermittent;5 minutes;ambulation   OT: Functional/aerobic ambulation tolerance with stable cardiovascular response in order to return to home and community environment 50 feet;Minimal assist;Rolling walker   OT: Navigation of stairs simulating home set up with stable cardiovascular response in order to return to home and community environment 4 stairs;Minimal assist   Interventions   Interventions Quick Adds Self-Care/Home Management;Therapeutic Procedures/Exercise;Cardiac Rehab   Self-Care/Home Management   Self-Care/Home Mgmt/ADL, Compensatory, Meal Prep Minutes (30236) 8   Treatment Detail/Skilled Intervention Additional STS after seated rest. Max of 2 to stand. Cues for posture. Max of 2 for arm/arm pivot trsf to chair   Therapeutic Procedures/Exercise   Therapeutic Procedure: strength, endurance, ROM, flexibillity minutes (60037) 8   Treatment Detail/Skilled Intervention see exercise   Other Exercise  Modality   Exercise Type LE exercise   Symptoms Fatigue   Cardiovascular Response Normal   Exercise Details seated LE exercise 4 sets 5 reps each. fatigues easily   Vital Signs Details WNL   Cardiac Education   Education Provided Precautions   OT Discharge Planning   OT Plan to chair A of 2, seated ex, static standing. Will likely get PT involved soon   OT Discharge Recommendation (DC Rec) Acute Rehab Center-Motivated patient will benefit from intensive, interdisciplinary therapy.  Anticipate will be able to tolerate 3 hours of therapy per day   OT Rationale for DC Rec Patient currently A of 2 after extended period of time in bed (intubated). Would benefit from ARU to increase strength for return to ADLs post surgery   Total Session Time   Timed Code Treatment Minutes 16   Total Session Time (sum of timed and untimed services) 24

## 2024-09-09 NOTE — PLAN OF CARE
Goal Outcome Evaluation:      Plan of Care Reviewed With: patient          Outcome Evaluation: Hgb dropped to 6.9. Dr. Renae ordered one unit PRBC in dialysis this morning.      Problem: Cardiovascular Surgery  Goal: Improved Activity Tolerance  Outcome: Progressing

## 2024-09-09 NOTE — PROGRESS NOTES
Potassium   Date Value Ref Range Status   09/09/2024 4.2 3.4 - 5.3 mmol/L Final     Potassium POCT   Date Value Ref Range Status   08/27/2024 3.4 3.4 - 5.3 mmol/L Final     Hemoglobin   Date Value Ref Range Status   09/09/2024 6.9 (LL) 11.7 - 15.7 g/dL Final     Creatinine   Date Value Ref Range Status   09/09/2024 4.02 (H) 0.51 - 0.95 mg/dL Final     Urea Nitrogen   Date Value Ref Range Status   09/09/2024 70.7 (H) 8.0 - 23.0 mg/dL Final     Sodium   Date Value Ref Range Status   09/09/2024 136 135 - 145 mmol/L Final     INR   Date Value Ref Range Status   09/09/2024 1.23 (H) 0.85 - 1.15 Final       DIALYSIS PROCEDURE NOTE  Hepatitis status of previous patient on machine log was checked and verified ok to use with this patients hepatitis status.  Patient dialyzed for 3 hrs. on a K3 bath with a net fluid removal of  1.8L.  A BFR of 350-400 ml/min was obtained via a L CVC.      The treatment plan was discussed with Dr. Galvan during the treatment.    Total heparin received during the treatment: 0 units.     Meds  given: 1 unit RBC, albumin, see MAR     Complications: PT's BP dropped on the run, albumin was given and UF goal was lowered to maintain a SBP of 90.  MD Galvan notified and VSS throughout run, total UF 1.8L.      Person educated: Patient and . Knowledge base minimal. Barriers to learning: none. Educated on procedure via oral mode. Patient/family verbalized understanding. Pt prefers oral education style.     ICEBOAT? Timeout performed pre-treatment  I: Patient was identified using 2 identifiers  C:  Consent Signed Yes  E: Equipment preventative maintenance is current and dialysis delivery system OK to use  B: Hepatitis B Surface Antigen: Unknown; Draw Date: 9/9/24  O: Dialysis orders present and complete prior to treatment  A: Vascular access verified and assessed prior to treatment  T: Treatment was performed at a clinically appropriate time  ?: Patient was allowed to ask questions and address  concerns prior to treatment  See Adult Hemodialysis flowsheet in [facility] for further details and post assessment.  Machine water alarm in place and functioning. Transducer pods intact and checked every 15min.   Chlorine/Chloramine water system checked every 4 hours.      Patient repositioned every 2 hours during the treatment.  Post treatment report given to ANDREW Le RN regarding 1.8L of fluid removed and last BP of 112/68 .

## 2024-09-09 NOTE — PLAN OF CARE
Goal Outcome Evaluation:  [facility] - ICU    RN Progress Note:Patient alert and oriented, VSS.  Continues on dobutamine at 1.5mcg, remains anuric.  Had first run of HD today, tolerated well.  They took 1.8L off, albumin needed x1.  Advancing diet as tolerates with supplements.  NG remains in place, team will re evaluate in morning. Patient did stand and pivot into chair with assist of 2.  Patient was very fatigue after, VS remained stable.  Patient received 1 unit of PRBC for hgb of 6.9, will be rechecking hemoglobin.  Will continue to monitor and provide supportive care to patient and family.            Pertinent Assessments:      Please refer to flowsheet rows for full assessment     VSS, HD today, Stood and pivot today with cardiac rehab.  Remains anuric.           Key Events - This Shift:       HD, cardiac rehab                Barriers to Discharge / Downgrade:     Dobutamine          Point of Contact Update: YES-OR-NO: Yes  If No, reason:   Name:Ed  Phone Number:at bedside  Summary of Conversation: updated with progress and plan of care.

## 2024-09-09 NOTE — PROGRESS NOTES
[facility] Inpatient follow up        09/09/2024    Chart reviewed  Swallow eval on 9/8/2024              Assessment and Recommendations:   Assessment:  Felicitas Elliott is a 84 year old female with     Respiratory failure, shock liver-slow improvement.  Extubated  Decreased urine output, on dobutamine currently, had been on norepinephrine and vasopressin. 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  MSSA bacteremia.  Positive blood culture on 8/14/2024 and 8/16.  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  With the size of the vegetation combined with brain infarct, status post CV surgery, see details below active issue.   Neck pain worrisome for cervical discitis.  Neck MRI showed some edema at C4-C5 on the left side.  No clear evidence of infection.  Abnormal brain MRI suggestive of subacute infarct. In a patient with MSSA bacteremia and aortic valve endocarditis, this is cerebral septic emboli. Active issue.     POSTOPERATIVE DIAGNOSES:  1.  Severe aortic stenosis and moderate aortic regurgitation.  2.  Severe mitral stenosis.  3.  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  4.  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  5.  Severe multivessel coronary artery disease.  6.  Aortic root abscess.      PROCEDURE PERFORMED: 8/27/24  Aortic root abscess debridement and aortic annular reconstruction.  Aortic root replacement (Konect composite 25 mm Silva Inspiris Resilia bioprosthetic valve within 30 mm Gelweave Valsalva graft with reimplantation of the coronary arteries).  Mitral valve replacement (31 mm St. Tim Silva bioprosthetic valve).  Coronary artery bypass grafting x 2 (reversed saphenous vein graft to the obtuse marginal branch of the left circumflex coronary artery, and pedicled left internal mammary artery to left anterior descending coronary artery).  Endoscopic vein harvest of the greater saphenous vein from the left lower extremity.    Recommendations:  Continue vancomycin-dosing per Pharm.D.  End date oct 8th.    Completed cefepime for pneumonia on 9/5/24--  Monitor CBC, CMP--thrombocytopenia, seems to be coincident with cefepime usage. And is slowly better.   Status post vascular surgery, chest tube, CV surgery following closely  Will need to check immunoglobin level in 4 to 6 weeks sister with history of hypogammaglobulinemia         RU Portillo MD  [facility]  Answering Service: [phone]  On-Call ID provider: [phone], option: 9                Interval History:     HPI: Pt known to me.   here.  Dialyzing.  Informed pt needs 30 more days IV abx for her endocarditis.              Recent Inflammatory Biomarkers:   Recent Labs   Lab Test 09/09/24  0433 09/08/24  0540 09/07/24  0340 09/06/24  2023 09/06/24  1210 09/06/24  0413 08/30/24  0437 08/29/24  0359   PCAL  --   --   --   --   --   --   --  1.24*   WBC 14.7* 16.6* 15.8* 14.3* 14.6* 15.2*   < > 15.3*    < > = values in this interval not displayed.            Review of Systems:      Negative except for findings in the HPI.           Current Medications (antimicrobials listed in bold):     Current  Facility-Administered Medications   Medication Dose Route Frequency Provider Last Rate Last Admin    [Held by provider] atorvastatin (LIPITOR) tablet 80 mg  80 mg Oral QPM Jessica Tolbert PA-C   80 mg at 08/29/24 2038    [Held by provider] clopidogrel (PLAVIX) tablet 75 mg  75 mg Oral Daily Maryam Anthony PA-C        sennosides (SENOKOT) syrup 5 mL  5 mL Oral BID Aurelio Huang MD   5 mL at 09/04/24 2022    And    docusate (COLACE) 50 MG/5ML liquid 50 mg  50 mg Oral BID Aurelio Huang MD   50 mg at 09/04/24 2027    sodium chloride 0.9% DIALYSIS Cath LOCK - RED Lumen  10 mL Intracatheter Once in dialysis/CRRT Cheryle Jung MD        Followed by    heparin 1000 unit/mL DIALYSIS Cath LOCK - RED Lumen  1.3-2.6 mL Intracatheter Once in dialysis/CRRT Cheryle Jung MD        sodium chloride 0.9% DIALYSIS Cath LOCK - BLUE Lumen  10 mL Intracatheter Once in dialysis/CRRT Cheryle Jung MD        Followed by    heparin 1000 unit/mL DIALYSIS Cath LOCK -BLUE Lumen  1.3-2.6 mL Intracatheter Once in dialysis/CRRT Cheryle Jung MD        lactobacillus rhamnosus (GG) (CULTURELL) capsule 1 capsule  1 capsule Oral or Feeding Tube BID Aurelio Huang MD   1 capsule at 09/09/24 0800    Lidocaine (LIDOCARE) 4 % Patch 1-2 patch  1-2 patch Transdermal Q24H Maryam Anthony PA-C   2 patch at 09/08/24 1914    [Held by provider] metoprolol tartrate (LOPRESSOR) half-tab 12.5 mg  12.5 mg Oral BID Jessica Tolbert PA-C        midodrine (PROAMATINE) tablet 10 mg  10 mg Oral During Dialysis/CRRT (from stock) Jair Galvan MD        No heparin via hemodialysis machine   Does not apply Once Cheryle Jung MD        pantoprazole (PROTONIX) 2 mg/mL suspension 40 mg  40 mg Oral or NG Tube QAM AC Gabrielle, Maryam Rema, PA-C        Or    pantoprazole (PROTONIX) EC tablet 40 mg  40 mg Oral Maryam Tucker PA-C        Or    pantoprazole (PROTONIX) IV push injection 40 mg  40 mg  Intravenous QAM AC Maryam Anthony PA-C   40 mg at 09/09/24 0630    polyethylene glycol (MIRALAX) Packet 17 g  17 g Oral or Feeding Tube Daily Aurelio Huang MD        Prosource TF20 ENfit Compatibl EN LIQD (PROSOURCE TF20) packet 60 mL  1 packet Per Feeding Tube BID Aurelio Huang MD   60 mL at 09/08/24 2028    sodium chloride (PF) 0.9% PF flush 3 mL  3 mL Intracatheter Q8H Gondal, Saad J, MD   3 mL at 09/09/24 0033    sodium chloride (PF) 0.9% PF flush 9 mL  9 mL Intracatheter During Dialysis/CRRT (from stock) Cheryle Jung MD        sodium chloride (PF) 0.9% PF flush 9 mL  9 mL Intracatheter During Dialysis/CRRT (from stock) Cheryle Jung MD        sodium chloride 0.9% BOLUS 250 mL  250 mL Intravenous Once in dialysis/CRRT Cheryle Jung MD        sodium chloride 0.9% BOLUS 300 mL  300 mL Hemodialysis Machine Once Cheryle Jung MD        sodium chloride 0.9% DIALYSIS Cath LOCK - BLUE Lumen  1.3-2.6 mL Intracatheter Once Cheryle Jung MD        sodium chloride 0.9% DIALYSIS Cath LOCK - RED Lumen  1.3-2.6 mL Intracatheter Once Cheryle Jung MD        vancomycin place horne - receiving intermittent dosing  1 each Intravenous See Admin Instructions Dylan Renae MD        [Held by provider] Warfarin Dose Required Daily - Pharmacist Managed  1 each Oral See Admin Instructions Jessica Tolbert PA-C                  Allergies:     Allergies   Allergen Reactions    Aspirin Rash    Cats Rash    Nickel Rash            Physical Exam:   Vitals were reviewed  Patient Vitals for the past 24 hrs:   BP Temp Temp src Pulse Resp SpO2 Weight   09/09/24 0915 90/54 -- -- 92 19 98 % --   09/09/24 0900 104/59 -- -- 92 18 100 % --   09/09/24 0845 109/57 -- -- 92 19 97 % --   09/09/24 0830 121/72 -- -- 92 18 100 % --   09/09/24 0815 123/64 97.4  F (36.3  C) Oral 92 17 96 % --   09/09/24 0700 129/74 -- -- 92 12 96 % --   09/09/24 0600 126/71 -- -- 92 16 94 % 83.9 kg (184 lb 15.5 oz)    09/09/24 0500 138/67 -- -- 92 14 97 % --   09/09/24 0400 104/59 97.5  F (36.4  C) Oral 92 15 98 % --   09/09/24 0300 111/58 -- -- 92 15 95 % --   09/09/24 0200 122/60 -- -- 92 15 95 % --   09/09/24 0100 129/60 -- -- 92 15 99 % --   09/09/24 0000 122/62 97.9  F (36.6  C) Oral 92 17 100 % --   09/08/24 2300 130/58 -- -- 92 15 100 % --   09/08/24 2200 126/67 -- -- 92 18 98 % --   09/08/24 2100 123/65 -- -- 92 18 97 % --   09/08/24 2000 137/74 97.2  F (36.2  C) Oral 92 14 98 % --   09/08/24 1900 130/71 -- -- 92 14 99 % --   09/08/24 1800 131/69 -- -- 92 14 98 % --   09/08/24 1700 138/63 -- -- 92 22 100 % --   09/08/24 1600 139/70 -- -- 92 17 100 % --   09/08/24 1500 125/67 -- -- 92 18 99 % --   09/08/24 1400 119/57 -- -- 92 14 99 % --   09/08/24 1300 112/57 -- -- 92 18 98 % --   09/08/24 1200 119/60 97.9  F (36.6  C) Axillary 91 18 99 % --   09/08/24 1100 114/58 -- -- 92 17 100 % --   09/08/24 1004 111/59 -- -- 92 14 100 % --   09/08/24 1000 -- -- -- 92 10 94 % --       Physical Examination:    Resp: 19    Gen: Appears ill, extubated, nasal canula, still on low dose dobutamine  HEENT: opens eyes  PICC, chest wall catheter for hemodialysis, rectal tube  CV: Sternal incision, temporary pacer  Lungs: coarse, currently on nasal cannula.  Chest tubes have been removed  Skin: Warm  Extr: edema- especially L leg  Neuro: extubated, opens eyes  HD catheter  PICC- R basilic           Laboratory Data:   ID Labs:  Microbiology labs:  7-Day Micro Results       No results found for the last 168 hours.          Susceptibility data from last 90 days.  Collected Specimen Info Organism Ampicillin Ampicillin/Sulbactam Cefepime Ceftazidime Ceftriaxone Ciprofloxacin Clindamycin Daptomycin Doxycycline Erythromycin Gentamicin Levofloxacin Meropenem Oxacillin   08/29/24 Sputum from Expectorate Enterobacter cloacae complex R R  S R R  S      S  S  S      Normal samara                 08/16/24 Peripheral Blood Staphylococcus aureus                  08/14/24 Peripheral Blood Staphylococcus aureus        S  S  S  S  S    S     Collected Specimen Info Organism Piperacillin/Tazobactam Tetracycline Tobramycin Trimethoprim/Sulfamethoxazole  Vancomycin   08/29/24 Sputum from Expectorate Enterobacter cloacae complex R   S  S      Normal samara        08/16/24 Peripheral Blood Staphylococcus aureus        08/14/24 Peripheral Blood Staphylococcus aureus   S   S  S       Reviewed      No lab results found.  Recent Labs   Lab Test 09/09/24  0433 09/08/24  0540 09/07/24  0340 09/06/24 2023 09/06/24  1210 09/06/24  0413   WBC 14.7* 16.6* 15.8* 14.3* 14.6* 15.2*     Recent Labs   Lab Test 09/09/24 0433 09/08/24 1454 09/08/24  0540 09/07/24  2214   CR 4.02* 3.20* 2.55* 1.97*   GFRESTIMATED 10* 14* 18* 25*       Hematology Studies  Recent Labs   Lab Test 09/09/24 0433 09/08/24  0540 09/07/24  0340 09/06/24 2023 09/06/24  1210 09/06/24  0413   WBC 14.7* 16.6* 15.8* 14.3* 14.6* 15.2*   HGB 6.9* 7.6* 8.0* 7.6* 7.9* 8.0*   HCT 21.1* 23.3* 25.4* 23.8* 24.8* 25.4*   PLT 51* 41* 32* 25* 27* 25*       Metabolic  Recent Labs   Lab Test 09/09/24  0433 09/08/24  1454 09/08/24  0540    135 138   BUN 70.7* 57.6* 49.0*   CO2 21* 22 23   CR 4.02* 3.20* 2.55*   GFRESTIMATED 10* 14* 18*       Hepatic Studies  Recent Labs   Lab Test 09/08/24 1454 09/08/24  0540 09/07/24  2214   BILITOTAL 1.1 1.0 1.2   ALKPHOS 90 107 101   ALBUMIN 3.8 3.7 3.7   AST 72* 83* 94*   ALT 99* 100* 113*          Imaging Data:   Reviewed     IR CVC Non Tunnel Placement > 5 Yrs    Result Date: 9/1/2024  [facility] LOCATION: [facility] DATE: 9/1/2024 PROCEDURE: NON-TUNNELED DIALYSIS CATHETER PLACEMENT INTERVENTIONAL RADIOLOGIST: RU Reyes MD. INDICATION: HAMMAD requiring hemodialysis. CONSENT: The risks, benefits and alternatives of non-tunneled dialysis catheter placement were discussed with the patient in detail. All questions were answered. Informed consent was given  to proceed with the procedure. MODERATE SEDATION: None. CONTRAST: None. ANTIBIOTICS: None. ADDITIONAL MEDICATIONS: Heparin 3800 U IV FLUOROSCOPIC TIME: 2.0 minutes. RADIATION DOSE: Air Kerma: 36.04 mGy. COMPLICATIONS: No immediate complications. STERILE BARRIER TECHNIQUE: Maximum sterile barrier technique was used. Cutaneous antisepsis was performed at the operative site with application of 2% chlorhexidine and large sterile drape. Prior to the procedure, the  and assistant performed hand hygiene and wore hat, mask, sterile gown, and sterile gloves during the entire procedure. PROCEDURE: Limited left neck ultrasound was performed which demonstrated a patent internal jugular vein; images saved to the permanent patient medical record. The overlying skin and subcutaneous soft tissues were anesthetized using 1% lidocaine. Under ultrasound guidance, the left internal jugular vein was accessed using a micropuncture needle; images saved of the permanent patient medical record. Access was secured using a 4 [place] transitional sheath. A [email or web address] guidewire was advanced into the SVC. Under fluoroscopic guidance, the overlying skin and subcutaneous soft tissues were dilated and a 15.5 [place] nontunneled dialysis catheter was placed with the tip within the cavoatrial junction. The catheter was tested and found to flush and aspirate appropriately. The catheter was heparinized and secured to the skin.     IMPRESSION:  1.  Successful non-tunneled dialysis catheter placement. PLAN: 1. Dialysis catheter is ready to be used. 2. Nontunneled right groin dialysis catheter can be removed in ICU.       Study Result    Narrative & Impression   EXAM: MR BRAIN W/O and W CONTRAST  LOCATION: [facility]  DATE: 8/17/2024     INDICATION: Headache in a patient with MSSA bacteremia secondary to aortic valve endocarditis.  Look for cerebral septic emboli; Headache; Acute HA (< 3 months), no complicating  features  COMPARISON: None.  CONTRAST: 9 ml gadavist  TECHNIQUE: Routine multiplanar multisequence head MRI without and with intravenous contrast.     FINDINGS: Assessment degraded due to motion.  INTRACRANIAL CONTENTS:   Apparent focus of diffusion restriction with T2/FLAIR hyperintensity within the left superior parietal lobule cortex is hyperintense on ADC map, representing T2 shine through.  Focus of signal abnormality measures 13 x 9 mm in the axial plane and does   not have internal enhancement.     Additional punctate foci of hyperintensity on diffusion weighted with similar signal characteristics (periventricular right corona radiata, left superior parietal lobule, and left cerebellar hemisphere).     Patchy and confluent nonspecific T2/FLAIR hyperintensities within the cerebral white matter most consistent with moderate chronic microvascular ischemic change. Moderate generalized cerebral atrophy. No hydrocephalus. Normal position of the cerebellar   tonsils. No discernible abnormal intracranial enhancement; however, assessment substantially degraded due to motion. 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     SELLA: No abnormality accounting for technique.     OSSEOUS STRUCTURES/SOFT TISSUES: Normal marrow signal. The major intracranial vascular flow voids are maintained.      ORBITS: No abnormality accounting for technique.      SINUSES/MASTOIDS: Mild mucosal thickening scattered about the paranasal sinuses. Scattered fluid/membrane thickening in the left mastoid air cells. No apparent mass in the posterior nasopharynx or skull base.                                                                       IMPRESSION: Assessment degraded due to motion.  1.  13 mm focus of cortical signal abnormality within the left superior parietal lobule which is favored to represent a subacute infarct; low-grade glial neoplasm could conceivably have a similar appearance. Hyperacute intraparenchymal hematoma could   also have a  similar appearance but is considered less likely. Recommend noncontrast head CT for further characterization. Also recommend follow-up MRI brain without and with contrast in 8-12 weeks to document expected evolution.  2.  Additional smaller foci of signal abnormality with similar characteristics favored to represent punctate subacute infarcts.

## 2024-09-09 NOTE — PROGRESS NOTES
CVTS Daily Progress Note   POD 12 s/p aortic root abscess debridement and aortic annular reconstruction, aortic root replacement (Konect composite 25 mm Silva Inspiris Resilia bioprosthetic valve within 30 mm Gelweave Valsalva graft with reimplantation of the coronary arteries), mitral valve replacement (31 mm St. Tim Silva bioprosthetic valve) and CAB x 2.  Attending: Dr Renae  LOS: 23    SUBJECTIVE/INTERVAL EVENTS:    Negligible UOP following Lasix challenge yesterday. Slept better and brighter today.  Normotensive on dobutamine at 1.5. Paced at 90bpm; underlying rhythm is afib in the 70s. Up in chair, tolerating small amounts of clears after swallow study this AM and clearance for PO intake per SLP.  Rectal tube in place. NG in place; tolerated TF with some nausea that is controlled with prns. Plt slightly recovered. LFT & WBC overall stable. Remains on empiric Vanc; ID following. Suboptimal control of chronic back pain.    OBJECTIVE:  Temp:  [97.7  F (36.5  C)-97.9  F (36.6  C)] 97.9  F (36.6  C)  Pulse:  [91-92] 92  Resp:  [10-22] 14  BP: ()/(54-74) 137/74  SpO2:  [94 %-100 %] 98 %  Vitals:    09/02/24 0400 09/04/24 0400 09/05/24 0400 09/06/24 0253   Weight: 95.9 kg (211 lb 6.7 oz) 92.2 kg (203 lb 4.2 oz) 90.5 kg (199 lb 8.3 oz) 87.5 kg (192 lb 14.4 oz)    09/07/24 0400   Weight: 85 kg (187 lb 6.3 oz)       Clinically Significant Risk Factors          # Hypocalcemia: Lowest Ca = 8.3 mg/dL in last 2 days, will monitor and replace as appropriate     # Hypoalbuminemia: Lowest albumin = 3.1 g/dL at 9/1/2024  1:45 PM, will monitor as appropriate    # Coagulation Defect: INR = 1.26 (Ref range: 0.85 - 1.15) and/or PTT = 52 Seconds (Ref range: 22 - 38 Seconds), will monitor for bleeding  # Thrombocytopenia: Lowest platelets = 32 in last 2 days, will monitor for bleeding  # Acute Kidney Injury, unspecified: based on a >150% or 0.3 mg/dL increase in last creatinine compared to past 90 day average, will  "monitor renal function         #Acute blood loss anemia: Lowest Hgb this hospitalization: 6.9 g/dL. Will continue to monitor and treat/transfuse as appropriate.     # Overweight: Estimated body mass index is 29.35 kg/m  as calculated from the following:    Height as of this encounter: 1.702 m (5' 7\").    Weight as of this encounter: 85 kg (187 lb 6.3 oz).   # Moderate Malnutrition: based on nutrition assessment      # Financial/Environmental Concerns:     # History of CABG: noted on surgical history        Current Medications:    Scheduled Meds:  Current Facility-Administered Medications   Medication Dose Route Frequency Provider Last Rate Last Admin    [Held by provider] atorvastatin (LIPITOR) tablet 80 mg  80 mg Oral QPM Jessica Tolbert PA-C   80 mg at 08/29/24 2038    [Held by provider] clopidogrel (PLAVIX) tablet 75 mg  75 mg Oral Daily Maryam Anthony PA-C        sennosides (SENOKOT) syrup 5 mL  5 mL Oral BID Aurelio Huang MD   5 mL at 09/04/24 2022    And    docusate (COLACE) 50 MG/5ML liquid 50 mg  50 mg Oral BID Aurelio Huang MD   50 mg at 09/04/24 2027    lactobacillus rhamnosus (GG) (CULTURELL) capsule 1 capsule  1 capsule Oral or Feeding Tube BID Aurelio Huang MD   1 capsule at 09/08/24 2027    Lidocaine (LIDOCARE) 4 % Patch 1-2 patch  1-2 patch Transdermal Q24H Maryam Anthony PA-C   2 patch at 09/08/24 1914    [Held by provider] metoprolol tartrate (LOPRESSOR) half-tab 12.5 mg  12.5 mg Oral BID Jessica Tolbert PA-C        midodrine (PROAMATINE) tablet 10 mg  10 mg Oral During Dialysis/CRRT (from stock) Cheryle Jung MD        pantoprazole (PROTONIX) 2 mg/mL suspension 40 mg  40 mg Oral or NG Tube QAM AC Maryam Anthony PA-C        Or    pantoprazole (PROTONIX) EC tablet 40 mg  40 mg Oral QAM AC Maryam Anthony PA-C        Or    pantoprazole (PROTONIX) IV push injection 40 mg  40 mg Intravenous QAM Maryam Chicas, " MARION   40 mg at 09/08/24 0816    polyethylene glycol (MIRALAX) Packet 17 g  17 g Oral or Feeding Tube Daily Aurelio Huang MD        Prosource TF20 ENfit Compatibl EN LIQD (PROSOURCE TF20) packet 60 mL  1 packet Per Feeding Tube BID Aurelio Huang MD   60 mL at 09/08/24 2028    sodium chloride (PF) 0.9% PF flush 3 mL  3 mL Intracatheter Q8H Gondal, Saad J, MD   3 mL at 09/08/24 1914    sodium chloride 0.9% DIALYSIS Cath LOCK - BLUE Lumen  1.3-2.6 mL Intracatheter Once Cheryle Jung MD        sodium chloride 0.9% DIALYSIS Cath LOCK - RED Lumen  1.3-2.6 mL Intracatheter Once Cheryle Jung MD        vancomycin place horne - receiving intermittent dosing  1 each Intravenous See Admin Instructions Dylan Renae MD        [Held by provider] Warfarin Dose Required Daily - Pharmacist Managed  1 each Oral See Admin Instructions Jessica Tolbert PA-C         Continuous Infusions:  Current Facility-Administered Medications   Medication Dose Route Frequency Provider Last Rate Last Admin    dextrose 10% infusion   Intravenous Continuous PRN Aurelio Huang MD        DOBUTamine (DOBUTREX) 500 mg in D5W 250 mL infusion (adult std conc)  1.5 mcg/kg/min Intravenous Continuous Jenifer Nance DO 4.1 mL/hr at 09/08/24 1915 1.5 mcg/kg/min at 09/08/24 1915     PRN Meds:.  Current Facility-Administered Medications   Medication Dose Route Frequency Provider Last Rate Last Admin    acetaminophen (TYLENOL) oral liquid 650 mg  650 mg Oral Q6H PRN Eddie Way NP   650 mg at 09/08/24 1648    sodium chloride (NEBUSAL) 3 % neb solution 3 mL  3 mL Nebulization Q6H PRN Zachary Gonzalez MD        And    albuterol (PROVENTIL) neb solution 2.5 mg  2.5 mg Nebulization Q6H PRN Zachary Gonzalez MD        bisacodyl (DULCOLAX) suppository 10 mg  10 mg Rectal Daily PRN Maryam Anthony PA-C        dextrose 10% infusion   Intravenous Continuous PRN Aurelio Huang  MD Mamadou        glucose gel 15-30 g  15-30 g Oral Q15 Min PRN Maryam Anthony PA-C        Or    dextrose 50 % injection 25-50 mL  25-50 mL Intravenous Q15 Min PRN Maryam Anthony PA-C        Or    glucagon injection 1 mg  1 mg Subcutaneous Q15 Min PRN Maryam Anthony PA-C        hydrALAZINE (APRESOLINE) injection 10 mg  10 mg Intravenous Q30 Min PRN Maryam Anthony PA-C        lidocaine (LMX4) cream   Topical Q1H PRN Maryam Anthony PA-C        lidocaine 1 % 0.1-5 mL  0.1-5 mL Other Q1H PRN Maryam Anthony PA-C   2 mL at 09/06/24 1121    melatonin tablet 3 mg  3 mg Oral At Bedtime PRN Eddie Way, BALDO        menthol (Topical Analgesic) 2.5% (BENGAY VANISHING SCENT) 2.5 % topical gel 1 g  1 g Topical Q8H PRN Eddie Way, NP        miconazole (MICATIN) 2 % powder   Topical BID PRN Erick Patino DO        naloxone (NARCAN) injection 0.2 mg  0.2 mg Intravenous Q2 Min PRN Erick Patino DO        Or    naloxone (NARCAN) injection 0.4 mg  0.4 mg Intravenous Q2 Min PRN Erick Patino A DO        Or    naloxone (NARCAN) injection 0.2 mg  0.2 mg Intramuscular Q2 Min PRN Erick Patino DO        Or    naloxone (NARCAN) injection 0.4 mg  0.4 mg Intramuscular Q2 Min PRN Erick Patino DO        nicotine (NICORETTE) gum 2 mg  2 mg Buccal Q1H PRN Gondal, Saad J, MD        ondansetron (ZOFRAN ODT) ODT tab 8 mg  8 mg Oral Q6H PRN Maryam Anthony PA-C        Or    ondansetron (ZOFRAN) injection 8 mg  8 mg Intravenous Q6H PRN Maryam Anthony PA-C   8 mg at 09/08/24 0831    prochlorperazine (COMPAZINE) injection 5 mg  5 mg Intravenous Q6H PRN Zejazzos Lim, Galen, MD   5 mg at 09/08/24 0546    Or    prochlorperazine (COMPAZINE) tablet 5 mg  5 mg Oral Q6H PRN Aurelio Huang MD        Or    prochlorperazine (COMPAZINE) suppository 12.5 mg  12.5 mg Rectal Q12H PRN Aurelio Huang MD        senna-docusate  (SENOKOT-S/PERICOLACE) 8.6-50 MG per tablet 1 tablet  1 tablet Oral BID PRN Gondal, Saad J, MD        Or    senna-docusate (SENOKOT-S/PERICOLACE) 8.6-50 MG per tablet 2 tablet  2 tablet Oral BID PRN Gondal, Saad J, MD        sodium chloride (PF) 0.9% PF flush 10-40 mL  10-40 mL Intracatheter Once PRN Maryam Anthony PA-C        sodium chloride (PF) 0.9% PF flush 10-40 mL  10-40 mL Intracatheter Once PRN Bryant Lane MBBS        sodium chloride (PF) 0.9% PF flush 3 mL  3 mL Intracatheter q1 min prn Gondal, Saad J, MD        sodium chloride 0.9% BOLUS 1-250 mL  1-250 mL Intravenous Q1H PRN Jessica Tolbert PA-C        traMADol (ULTRAM) tablet 50 mg  50 mg Oral Q12H PRN Dylan Renae MD   50 mg at 09/07/24 2203       Cardiographics:    Telemetry monitoring demonstrates paced rhythm at 90bpm per my personal review.    Imaging:  Recent Results (from the past 24 hour(s))   XR Video Swallow with SLP or OT    Narrative    EXAM: XR VIDEO SWALLOW WITH SLP OR OT  LOCATION: [facility]  DATE: 9/8/2024    INDICATION: Difficulty swallowing.  COMPARISON: None.    TECHNIQUE: Routine swallow study with speech pathology using multiple barium thicknesses.    RADIATION DOSE: Total Air Kerma 7.7 mGy    FINDINGS:   Swallow study with Speech Pathology using multiple barium thicknesses.     Prominent cricopharyngeal impression. Stasis is noted within the upper esophagus. No reflux was observed.    No laryngeal penetration or aspiration with thin, pudding or solid consistencies. There was a significantly delayed oral phase.      Impression    IMPRESSION:  1.  No laryngeal penetration or aspiration.  2.  Delayed passage of contrast through the upper esophagus which may reflect dysmotility.  3.  Prominent cricopharyngeal impression.         Labs, personally reviewed  Hemoglobin   Date Value Ref Range Status   09/08/2024 7.6 (L) 11.7 - 15.7 g/dL Final   09/07/2024 8.0 (L) 11.7 - 15.7 g/dL Final    09/06/2024 7.6 (L) 11.7 - 15.7 g/dL Final     WBC Count   Date Value Ref Range Status   09/08/2024 16.6 (H) 4.0 - 11.0 10e3/uL Final   09/07/2024 15.8 (H) 4.0 - 11.0 10e3/uL Final   09/06/2024 14.3 (H) 4.0 - 11.0 10e3/uL Final     Platelet Count   Date Value Ref Range Status   09/08/2024 41 (LL) 150 - 450 10e3/uL Final   09/07/2024 32 (LL) 150 - 450 10e3/uL Final   09/06/2024 25 (LL) 150 - 450 10e3/uL Final     Creatinine   Date Value Ref Range Status   09/08/2024 3.20 (H) 0.51 - 0.95 mg/dL Final   09/08/2024 2.55 (H) 0.51 - 0.95 mg/dL Final   09/07/2024 1.97 (H) 0.51 - 0.95 mg/dL Final     Potassium   Date Value Ref Range Status   09/08/2024 3.8 3.4 - 5.3 mmol/L Final   09/08/2024 3.8 3.4 - 5.3 mmol/L Final   09/07/2024 3.8 3.4 - 5.3 mmol/L Final     Potassium POCT   Date Value Ref Range Status   08/27/2024 3.4 3.4 - 5.3 mmol/L Final   08/27/2024 3.9 3.4 - 5.3 mmol/L Final   08/27/2024 3.4 3.4 - 5.3 mmol/L Final     Magnesium   Date Value Ref Range Status   09/07/2024 2.5 (H) 1.7 - 2.3 mg/dL Final   09/06/2024 2.3 1.7 - 2.3 mg/dL Final   09/06/2024 2.5 (H) 1.7 - 2.3 mg/dL Final          I/O:  I/O last 3 completed shifts:  In: 2011.39 [P.O.:400; I.V.:669.39; NG/GT:310]  Out: 520 [Urine:20; Stool:500]       Physical Exam:    General: Patient seen up in chair. NAD, calm, cooperative on exam  HEENT: moist mucosa, nasal SBFT in place, edentulous  CV: Paced rhythm on monitor, mild edema  Pulm: Unlabored breathing on supplemental oxygen via NC. Incision C/D/I.  Abd: Soft, NT, ND  Ext: Mod pedal edema, SCDs in place, warm  Neuro: CNs grossly intact, face symmetric, speech clear, HENDERSON      ASSESSMENT/PLAN:    Felicitas Elliott is a 84 year old female with a history of endocarditis, CAD, aortic stenosis and mitral valve disorder who is s/p Aortic root abscess debridement and aortic annular reconstruction, aortic root replacement (Konect composite 25 mm Silva Inspiris Resilia bioprosthetic valve within 30 mm Gelweave  Valsalva graft with reimplantation of the coronary arteries), Mitral valve replacement (31 mm St. Tim Silva bioprosthetic valve) and CABx2.  Hospital course c/b ARF requiring renal replacement therapy.    Active Problems:    Bacteremia    Endocarditis    Sepsis (H)    Nonrheumatic aortic valve stenosis    Postsurgical percutaneous transluminal coronary angioplasty status    Mitral valve disorder    Coronary artery disease involving native coronary artery of native heart, unspecified whether angina present    Acute kidney failure, unspecified (H)    Thrombocytopenia    Acute renal failure in s/o CKD    Acute hypoxic respiratory failure, improving    Dysphagia    NEURO:  - Low dose PRN Tylenol resumed. PRN Tramadol/lidocaine & Icy Hot patches for pain  - PRN nicorette gum  - Delirium precautions  - PRN melatonin available    CV:  - Pre-op EF 60-65%  - Normotensive  - Currently on Dobutamine 1.5mcg, management per Cards - appreciate assistance  - Beta blocker pending weaning from inotrope  - ASA allergy - Plavix 75mg PO daily-held for now, likely until plt >100  - Atorvastatin 80 mg daily (held in light of LFTs)  - Chest tubes removed 9/3; TPW remain due to plt/need for pacing  - Cards following--appreciate recs  - New baseline echo before discharge  - Coumadin x3 months per surgeon preference--currently held. INR goal 2-3 (clarified with pharmacy)    PULM:  - Extubated POD#1, reintubated POD #2, Extubated POD#5  - Maintaining oxygen saturations on 2L NC  - Encourage pulmonary toilet as able    FEN/GI:  - Continue electrolyte replacement protocol  - Diet:  Soft and bite sized diet with thin liquids, TF; diet advancement per SLP recs  - Start kendall counts 9/9  - Bowel regimen  - Nausea, improving; prns available  - SLP following, appreciate assistance  - Dietitian following, appreciate assistance    RENAL:  - Anuric  - Le removed today; daily bladder scans and prn straight caths if unable to void  - Nephrology  consulted and managing renal replacement therapy, appreciate assistance; off CRRT since 9/7, anticipating transition to iHD in coming days  - No appreciable UOP with Lasix challenges 9/4 and 9/7  - If TDL needed for longer term renal replacement, will need to coordinate with recovery from platelet coagulopathy/AC initiation    HEME:  - Acute blood loss anemia post-op  - Hgb stable, hep subcutaneous currently held  - Occult blood positive POD#3. Rectal tube in place   - Plavix 75mg PO daily (held) - ASA allergy  - Coumadin pharmacy to dose-held  - HIT negative. Plt 41 (32)  - Likely hold Plavix and coumadin until plt >100    ID:  - Lennie op ppx complete, afebrile  - WBC trending up but within historic range over past several days  - ID following-appreciate recs  - Pertinent culture history/susceptibilties:   Susceptibility data from last 90 days.  Collected Specimen Info Organism Ampicillin Ampicillin/Sulbactam Cefepime Ceftazidime Ceftriaxone Ciprofloxacin Clindamycin Daptomycin Doxycycline Erythromycin Gentamicin Levofloxacin Meropenem Oxacillin   08/29/24 Sputum from Expectorate Enterobacter cloacae complex R R  S R R  S      S  S  S      Normal samara                 08/16/24 Peripheral Blood Staphylococcus aureus                 08/14/24 Peripheral Blood Staphylococcus aureus        S  S  S  S  S    S     Collected Specimen Info Organism Piperacillin/Tazobactam Tetracycline Tobramycin Trimethoprim/Sulfamethoxazole  Vancomycin   08/29/24 Sputum from Expectorate Enterobacter cloacae complex R   S  S      Normal samara        08/16/24 Peripheral Blood Staphylococcus aureus        08/14/24 Peripheral Blood Staphylococcus aureus   S   S  S     - Antibiotic history:   - 08/17 - 08/28 Nafcillin   - 08/29 - 08/30 Ceftazidine   - 08/30 - 09/05 Cefepime   - 08/30 - 09/05 PO Vanco  - 08/30 - current IV Vanco    ENDO:  - A1c 6.4  - Euglycemic     PPx:  - DVT: SCDs, SQ heparin TID (held), OOB/ambulation as able  - GI: Protonix  40mg IV/PO daily    DISPO:  - Continue critical care in ICU  - PICC placed 9/6  - Medically Ready for Discharge: Anticipated in 5+ Days         Patient seen and discussed with Dr. Renae.      Eddie Way CNP, ACN-AG  Cardiothoracic Surgery  [employer] Pager [phone]

## 2024-09-09 NOTE — PHARMACY-VANCOMYCIN DOSING SERVICE
Pharmacy Vancomycin Note  Date of Service 2024  Patient's  1940   84 year old, female    Indication: Endocarditis  Day of Therapy: 11  Current vancomycin regimen:  intermittent dosing  Current vancomycin monitoring method: Renal Replacement Therapy  Current vancomycin therapeutic monitoring goal: 15-20 mg/L    Current estimated CrCl = Estimated Creatinine Clearance: 11.6 mL/min (A) (based on SCr of 4.02 mg/dL (H)).    Creatinine for last 3 days  2024: 12:10 PM Creatinine 0.97 mg/dL; 12:25 PM Creatinine 0.93 mg/dL;  8:23 PM Creatinine 1.04 mg/dL;  8:23 PM Creatinine 1.04 mg/dL  2024:  3:40 AM Creatinine 0.91 mg/dL;  3:40 AM Creatinine 0.91 mg/dL;  2:36 PM Creatinine 1.26 mg/dL; 10:14 PM Creatinine 1.97 mg/dL  2024:  5:40 AM Creatinine 2.55 mg/dL;  2:54 PM Creatinine 3.20 mg/dL  2024:  4:33 AM Creatinine 4.02 mg/dL    Recent Vancomycin Levels (past 3 days)  2024:  5:40 AM Vancomycin 22.3 ug/mL  2024:  4:33 AM Vancomycin 18.9 ug/mL    Vancomycin IV Administrations (past 72 hours)                     vancomycin (VANCOCIN) 1,000 mg in 200 mL dextrose intermittent infusion (mg) 1,000 mg New Bag 24 0731                    Nephrotoxins and other renal medications (From now, onward)      Start     Dose/Rate Route Frequency Ordered Stop    24 1400  vancomycin (VANCOCIN) 1,000 mg in 200 mL dextrose intermittent infusion         1,000 mg  200 mL/hr over 1 Hours Intravenous ONCE 24 1021      24 1329  vancomycin place horne - receiving intermittent dosing         1 each Intravenous SEE ADMIN INSTRUCTIONS 24 1329                 Contrast Orders - past 72 hours (72h ago, onward)      Start     Dose/Rate Route Frequency Stop    24 0930  barium sulfate (VARIBAR) 40 % pudding/paste 20 mL         20 mL Oral ONCE 24 0924    24 0930  barium sulfate (VARIBAR THIN Liquid) 40 % oral suspension 24 g         60 mL Oral ONCE 24 0959             Interpretation of levels and current regimen:  Vancomycin level is reflective of therapeutic level    Has serum creatinine changed greater than 50% in last 72 hours: N/A    Urine output:  anuric    Renal Function: ESRD on Dialysis    Plan:  Give vancomycin 1000 mg IV Once today, after dialysis. Continue intermittent dosing.   Vancomycin monitoring method: Renal Replacement Therapy  Vancomycin therapeutic monitoring goal: 15-20 mg/L  Pharmacy will check vancomycin levels as appropriate in 1-3 Days.  Serum creatinine levels will be ordered  per MD .    Shabnam Curiel, [facility]

## 2024-09-09 NOTE — PROGRESS NOTES
CLINICAL NUTRITION SERVICES - BRIEF NOTE     Nutrition Prescription    RECOMMENDATIONS FOR MDs/PROVIDERS TO ORDER:  Recommend re-start and continue Nutrition Support tube feeds until pt meeting at least >/= 75% nutrition needs po     Malnutrition Status:    Moderate malnutrition In Context of:  Acute illness or injury    Recommendations already ordered by Registered Dietitian (RD):  Ensure enlive Chocolate daily, Magic Cup Hernandez daily     Future/Additional Recommendations:  Monitor TF re-start vs diet adv, weight, labs, I/Os.      EVALUATION OF THE PROGRESS TOWARD GOALS   Diet: Level 6: Soft & Bite-Sized Dysphagia Diet, Thin Liquids   Nutrition Support: EN- TF Novasource Renal at goal 35 ml/hr x 24 hrs   + 2 pkt Prosource TF20   FWF: 60 ml q 4 hrs   TF + Modulars Provide: 840 ml/day, 1840 kcal, 116 g protein, 154 g cho, 84 g fat, 0 g fiber, 602 ml free water + 360 ml free water from flushes = 962 ml/day  Tube Feeds held per RN    Intake/Tolerance:     9/4 TF start   9/5 -7 TF at goal rate 35 ml/hr   9/8 TF held, diet adv to full liquids   9/9 Diet adv to Soft and Bite sized     Pt on FLD 9/8 with po intakes of x2 liquid meals ordered, 50% po at breakfast  Pt consuming up to 560 kcal and 23 g protein meeting <35% estimated nutrition needs 9/8.      NEW FINDINGS   Met with pt, RN, pt family at bedside this afternoon. Per RN, EN tube feeds have been held since yesterday 9/8/24. Pt off CRRT over the weekend, HD today.     Pt reports no nausea or vomiting. Pt weight continues trending down, difficult to fully assess r/t fluid changes and limited weight hx. SLP completed VFSS 9/8, recommending begin with FLD and advancing to easy to chew. Pt interested in nutrition supplements    NJ, clamped since 9 AM 9/8  I/Os: -8.57 L since 8/26   BM, rectal tube OP: 325 ml 9/8, 50 ml 9/9   Edema: Trace/Mild hands, BLE  Surgical sites     Labs: BUN 70.7(H), Creat 4.02(H),  this AM  Meds: Continuous dobutamine   Scheduled colace,  culturell, proamatine, protonix, miralax, senokot, vancomycin    Dosing Weight: 67.2 kg/ adjusted weight     ASSESSED NUTRITION NEEDS  Estimated Energy Needs: 8970-8519+ kcals/day (25 - 30+ kcals/kg)  Justification: Increased Needs  Estimated Protein Needs: 67-81 +/- grams protein/day (1-1.2 +/- grams of pro/kg)  Justification: Increased Needs, Dialysis, [facility] on CKD  Estimated Fluid Needs: UOP + 500 mL/day  Justification: Maintenance     MALNUTRITION  Malnutrition Diagnosis: Moderate malnutrition  In Context of:  Acute illness or injury    INTERVENTIONS  Implementation  Recommend re-start and continue Nutrition Support tube feeds until pt meeting minimum >75% nutrition needs po     Medical food supplement therapy- Ensure enlive Chocolate daily, Magic Cup Berry daily   Soft and Bite sized handout  Discontinued Modulars- Prosource TF20 BID r/t decreased nutrition needs on HD compared to CRRT + increasing Creat/BUN     Goals  TF Tolerance - Unclear, TF off   Pt to meet nutritional needs - Not Met  Electrolytes WNL - Met  BG >70 - Met    Monitoring/Evaluation  Progress toward goals will be monitored and evaluated per protocol.

## 2024-09-09 NOTE — PROGRESS NOTES
HEART CARE NOTE          Assessment/Recommendations     1. Severe valvular disease c/b post-op cardiogenic shock  Assessment / Plan  Hemodynamics stable and tolerating IHD with volume status significantly improved- no plans to wean dobutamine today - continue to monitor hemodynamics and renal function closely  GDMT as detailed below; mainstay of treatment for HFpEF includes diuretics and adequate BP control (class I) and SGLT2-I (class 2a); additional medical therapy (ARNI, MRA, ARB) demonstrated less robust evidence for indication but may be considered per guideline recommendations (2b); no indication for BBlockers      Current Pharmacotherapy AHA Guideline-Directed Medical Therapy   Losartan  - not started 2/2 renal dysfunction ARNI/ARB   Spironolactone not started  MRA   SGLT2 inhibitor: not started SGLT2-I    Furosemide gtt - currently on IHD Loop diuretic       2. 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 End organ dysfunction  Assessment / Plan  CRS; CRRT/volume removal per nephrology  Shock liver - resolving; hemodynamic support as above; continue to monitor UOP and renal fucntion closely     4. Anemia  Assessment / Plan  Management and supportive care per primary team     5. CAD s/p CABG  Assessment / Plan  Add and titrate GDMT as hemodynamics tolerate     6. Afib   Assessment / Plan  In sinus bradycardia with significant first degree AV delay on telemetry - temp pacer @ 90 with good hemodynamic response   Avoid AV node blocking agents  CHADSVA score ~4 or higher  Will get EP consult to determine the need for PPM as patient appears to require higher rates for adequate hemodynamics     Plan of care discussed on September 9, 2024 with patient and  at bedside, and primary team overseeing patient's care    Patient remains  "critically ill in the ICU requiring hemodynamic support via vasopressors s/p OHS c/b cardiogenic shock. 50 minutes spent on critical care time     History of Present Illness/Subjective    Ms. Felicitas Elliott is a 84 year old female with a PMHx significant for (per Epic notation) presented with fatigue, weakness, chills, poor appetite. Does not really have much for chronic medical conditions other than chronic back pain, furunculosis, uterine prolapse, tobacco use      Today, Mrs. Elliott denies acute cardiac events or complaints; looking forward to going for a walk today and spending more time in the chair; Management plan as detailed above      ECHO (personnaly Reviewed on 8/19/24):   1. The left ventricle is normal in size. Left ventricular function is  normal.The ejection fraction is 55-60%.  2. Normal right ventricle size and systolic function.  3. Large vegetation on mitral valve (8 mm x 15) attached to posterior leaflet.  4. There is a small vegetation on the aortic valve (6 mm). No evidence of  aortic root abscess identified.  5. Severe valvular aortic stenosis (SHU:0.8 cm2, peak nomi: 4.6 m/sec, mean  gradient: 51 mmHg).    Telemetry: personally reviewed September 9, 2024; notable for paced rhythm   Medical history and pertinent documents reviewed in Care Everywhere please where applicable see details above        Physical Examination Review of Systems   /71   Pulse 92   Temp 97.5  F (36.4  C) (Oral)   Resp 16   Ht 1.702 m (5' 7\")   Wt 83.9 kg (184 lb 15.5 oz)   SpO2 94%   BMI 28.97 kg/m    Body mass index is 28.97 kg/m .  Wt Readings from Last 3 Encounters:   09/09/24 83.9 kg (184 lb 15.5 oz)   08/15/24 90.1 kg (198 lb 9.6 oz)     General Appearance:   no distress, normal body habitus   ENT/Mouth: membranes moist, no oral lesions or bleeding gums.      EYES:  no scleral icterus, normal conjunctivae   Neck: no carotid bruits or thyromegaly   Chest/Lungs:   lungs are clear to auscultation, no rales " or wheezing, equal chest wall expansion    Cardiovascular:   Regular. Normal first and second heart sounds with no murmurs, rubs, or gallops; the carotid, radial and posterior tibial pulses are intact, no JVD and mild LE edema bilaterally    Abdomen:  no organomegaly, masses, bruits, or tenderness; bowel sounds are present   Extremities: no cyanosis or clubbing   Skin: no xanthelasma, warm.    Neurologic: NAD     Psychiatric: alert and oriented x3, calm     A complete 10 systems ROS was reviewed  And is negative except what is listed in the HPI.          Medical History  Surgical History Family History Social History   History reviewed. No pertinent past medical history. Past Surgical History:   Procedure Laterality Date    CORONARY ARTERY BYPASS GRAFT, WITH AORTIC VALVE REPLACEMENT, WITH ENDOSCOPIC VESSEL PROCUREMENT N/A 8/27/2024    Procedure: CORONARY ARTERY BYPASS GRAFT TIMES TWO, WITH AORTIC ROOT REPLACEMENT, WITH LEFT INTERNAL MAMMARY ARTERY HARVEST, LEFT SAPHNENOUS ENDOSCOPIC VESSEL PROCUREMENT,;  Surgeon: Dylan Renae MD;  [place] [facility] [place]    CV CORONARY ANGIOGRAM N/A 8/20/2024    Procedure: CV CORONARY ANGIOGRAM;  Surgeon: Den Wylie MD;  Location: [facility] CATH LAB CV    IR CVC NON TUNNEL PLACEMENT > 5 YRS  9/1/2024    PICC TRIPLE LUMEN PLACEMENT  9/6/2024    REPLACE VALVE MITRAL N/A 8/27/2024    Procedure: MITRAL VALVE REPLACEMENT,;  Surgeon: Dylan Renae MD;  [place] [facility] [place]    TRANSESOPHAGEAL ECHOCARDIOGRAM INTRAOPERATIVE  8/27/2024    Procedure: ANESTHESIA TRANSESOPHAGEAL ECHOCARDIOGRAM, EPI-AORTIC ULTRASOUND;  Surgeon: Dylan Renae MD;  Location: [facility] [place]    no family history of premature coronary artery disease Social History     Socioeconomic History    Marital status:      Spouse name: Not on file    Number of children: Not on file    Years of education: Not on file    Highest education level: Not on file    Occupational History    Not on file   Tobacco Use    Smoking status: Not on file    Smokeless tobacco: Not on file   Substance and Sexual Activity    Alcohol use: Not on file    Drug use: Not on file    Sexual activity: Not on file   Other Topics Concern    Not on file   Social History Narrative    Not on file     Social Determinants of Health     Financial Resource Strain: Low Risk  (8/24/2024)    Financial Resource Strain     Within the past 12 months, have you or your family members you live with been unable to get utilities (heat, electricity) when it was really needed?: No   Food Insecurity: Low Risk  (8/24/2024)    Food Insecurity     Within the past 12 months, did you worry that your food would run out before you got money to buy more?: No     Within the past 12 months, did the food you bought just not last and you didn t have money to get more?: No   Transportation Needs: Low Risk  (8/24/2024)    Transportation Needs     Within the past 12 months, has lack of transportation kept you from medical appointments, getting your medicines, non-medical meetings or appointments, work, or from getting things that you need?: No   Physical Activity: Not on file   Stress: Not on file   Social Connections: Unknown (1/3/2024)    Received from [facility]    Social Connections     Frequency of Communication with Friends and Family: Not on file   Interpersonal Safety: Low Risk  (9/6/2024)    Interpersonal Safety     Do you feel physically and emotionally safe where you currently live?: Yes     Within the past 12 months, have you been hit, slapped, kicked or otherwise physically hurt by someone?: No     Within the past 12 months, have you been humiliated or emotionally abused in other ways by your partner or ex-partner?: No   Recent Concern: Interpersonal Safety - High Risk (8/23/2024)    Interpersonal Safety     Do you feel physically and emotionally safe where you currently live?: No      "Within the past 12 months, have you been hit, slapped, kicked or otherwise physically hurt by someone?: No     Within the past 12 months, have you been humiliated or emotionally abused in other ways by your partner or ex-partner?: No   Housing Stability: Low Risk  (8/24/2024)    Housing Stability     Do you have housing? : Yes     Are you worried about losing your housing?: No           Lab Results    Chemistry/lipid CBC Cardiac Enzymes/BNP/TSH/INR   Lab Results   Component Value Date    BUN 70.7 (H) 09/09/2024     09/09/2024    CO2 21 (L) 09/09/2024    Lab Results   Component Value Date    WBC 14.7 (H) 09/09/2024    HGB 6.9 (LL) 09/09/2024    HCT 21.1 (L) 09/09/2024     (H) 09/09/2024    PLT 51 (L) 09/09/2024    Lab Results   Component Value Date    INR 1.23 (H) 09/09/2024     No results found for: \"CKTOTAL\", \"CKMB\", \"TROPONINI\"       Weight:    Wt Readings from Last 3 Encounters:   09/09/24 83.9 kg (184 lb 15.5 oz)   08/15/24 90.1 kg (198 lb 9.6 oz)       Allergies  Allergies   Allergen Reactions    Aspirin Rash    Cats Rash    Nickel Rash         Surgical History  Past Surgical History:   Procedure Laterality Date    CORONARY ARTERY BYPASS GRAFT, WITH AORTIC VALVE REPLACEMENT, WITH ENDOSCOPIC VESSEL PROCUREMENT N/A 8/27/2024    Procedure: CORONARY ARTERY BYPASS GRAFT TIMES TWO, WITH AORTIC ROOT REPLACEMENT, WITH LEFT INTERNAL MAMMARY ARTERY HARVEST, LEFT SAPHNENOUS ENDOSCOPIC VESSEL PROCUREMENT,;  Surgeon: Dylan Renae MD;  Location: [facility] OR    CV CORONARY ANGIOGRAM N/A 8/20/2024    Procedure: CV CORONARY ANGIOGRAM;  Surgeon: Den Wylie MD;  Location: [facility] CATH LAB CV    IR CVC NON TUNNEL PLACEMENT > 5 YRS  9/1/2024    PICC TRIPLE LUMEN PLACEMENT  9/6/2024    REPLACE VALVE MITRAL N/A 8/27/2024    Procedure: MITRAL VALVE REPLACEMENT,;  Surgeon: Dylan Renae MD;  Location: [facility] OR    TRANSESOPHAGEAL ECHOCARDIOGRAM INTRAOPERATIVE  8/27/2024    " Procedure: ANESTHESIA TRANSESOPHAGEAL ECHOCARDIOGRAM, EPI-AORTIC ULTRASOUND;  Surgeon: Dylan Renae MD;  Location: [facility] Main OR       Social History  Tobacco:   History   Smoking Status    Not on file   Smokeless Tobacco    Not on file    Alcohol:   Social History    Substance and Sexual Activity      Alcohol use: Not on file   Illicit Drugs:   History   Drug Use Not on file       Family History  History reviewed. No pertinent family history.       Navneet Branch MD on 9/9/2024      cc: Clinic, [facility]

## 2024-09-09 NOTE — PROGRESS NOTES
CVTS Daily Progress Note   POD#13 s/p aortic root abscess debridement and aortic annular reconstruction, aortic root replacement (Konect composite 25 mm Silva Inspiris Resilia bioprosthetic valve within 30 mm Gelweave Valsalva graft with reimplantation of the coronary arteries), mitral valve replacement (31 mm St. Tim Silva bioprosthetic valve) and CAB x 2.  Attending: Dr Renae  LOS: 24    SUBJECTIVE/INTERVAL EVENTS:    No acute events overnight. Normotensive on dobutamine at 1.5. Paced at 90bpm. Maintaining O2 sats on 2L NC. Up in chair and working with therapy. SLP following; tolerating some PO intake. Rectal tube in place. NG in place. LFT, plt, & WBC overall stable. Remains on empiric Vanc; ID following. First HD session planned for this AM. Hgb 6.9. Patient denies new chest pain, SOB, nausea, calf pain. She has no questions today.     OBJECTIVE:  Temp:  [97.2  F (36.2  C)-97.9  F (36.6  C)] 97.4  F (36.3  C)  Pulse:  [91-92] 92  Resp:  [10-22] 19  BP: (104-139)/(57-74) 109/57  SpO2:  [94 %-100 %] 97 %  Vitals:    09/04/24 0400 09/05/24 0400 09/06/24 0253 09/07/24 0400   Weight: 92.2 kg (203 lb 4.2 oz) 90.5 kg (199 lb 8.3 oz) 87.5 kg (192 lb 14.4 oz) 85 kg (187 lb 6.3 oz)    09/09/24 0600   Weight: 83.9 kg (184 lb 15.5 oz)       Clinically Significant Risk Factors          # Hypocalcemia: Lowest Ca = 8.3 mg/dL in last 2 days, will monitor and replace as appropriate     # Hypoalbuminemia: Lowest albumin = 3.1 g/dL at 9/1/2024  1:45 PM, will monitor as appropriate    # Coagulation Defect: INR = 1.23 (Ref range: 0.85 - 1.15) and/or PTT = 52 Seconds (Ref range: 22 - 38 Seconds), will monitor for bleeding  # Thrombocytopenia: Lowest platelets = 41 in last 2 days, will monitor for bleeding  # Acute Kidney Injury, unspecified: based on a >150% or 0.3 mg/dL increase in last creatinine compared to past 90 day average, will monitor renal function         #Acute blood loss anemia: Lowest Hgb this hospitalization:  "6.9 g/dL. Will continue to monitor and treat/transfuse as appropriate.     # Overweight: Estimated body mass index is 28.97 kg/m  as calculated from the following:    Height as of this encounter: 1.702 m (5' 7\").    Weight as of this encounter: 83.9 kg (184 lb 15.5 oz).   # Moderate Malnutrition: based on nutrition assessment      # Financial/Environmental Concerns:     # History of CABG: noted on surgical history        Current Medications:    Scheduled Meds:  Current Facility-Administered Medications   Medication Dose Route Frequency Provider Last Rate Last Admin    [Held by provider] atorvastatin (LIPITOR) tablet 80 mg  80 mg Oral QPM Jessica Tolbert PA-C   80 mg at 08/29/24 2038    [Held by provider] clopidogrel (PLAVIX) tablet 75 mg  75 mg Oral Daily Maryam Anthony PA-C        sennosides (SENOKOT) syrup 5 mL  5 mL Oral BID Aurelio Huang MD   5 mL at 09/04/24 2022    And    docusate (COLACE) 50 MG/5ML liquid 50 mg  50 mg Oral BID Aurelio Huang MD   50 mg at 09/04/24 2027    sodium chloride 0.9% DIALYSIS Cath LOCK - RED Lumen  10 mL Intracatheter Once in dialysis/CRRT Cheryle Jung MD        Followed by    heparin 1000 unit/mL DIALYSIS Cath LOCK - RED Lumen  1.3-2.6 mL Intracatheter Once in dialysis/CRRT Cheryle Jung MD        sodium chloride 0.9% DIALYSIS Cath LOCK - BLUE Lumen  10 mL Intracatheter Once in dialysis/CRRT Cheryle Jung MD        Followed by    heparin 1000 unit/mL DIALYSIS Cath LOCK -BLUE Lumen  1.3-2.6 mL Intracatheter Once in dialysis/CRRT Cheryle Jung MD        lactobacillus rhamnosus (GG) (CULTURELL) capsule 1 capsule  1 capsule Oral or Feeding Tube BID Aurelio Huang MD   1 capsule at 09/09/24 0800    Lidocaine (LIDOCARE) 4 % Patch 1-2 patch  1-2 patch Transdermal Q24H Maryam Anthony PA-C   2 patch at 09/08/24 1914    [Held by provider] metoprolol tartrate (LOPRESSOR) half-tab 12.5 mg  12.5 mg Oral BID Jessica Tolbert PA-C     "    midodrine (PROAMATINE) tablet 10 mg  10 mg Oral During Dialysis/CRRT (from stock) Jair Galvan MD        No heparin via hemodialysis machine   Does not apply Once Cheryle Jung MD        pantoprazole (PROTONIX) 2 mg/mL suspension 40 mg  40 mg Oral or NG Tube QAM AC Maryam Anthony PA-C        Or    pantoprazole (PROTONIX) EC tablet 40 mg  40 mg Oral QAM AC Maryam Anthony PA-C        Or    pantoprazole (PROTONIX) IV push injection 40 mg  40 mg Intravenous QAM AC Maryam Anthony PA-C   40 mg at 09/09/24 0630    polyethylene glycol (MIRALAX) Packet 17 g  17 g Oral or Feeding Tube Daily Aurelio Huang MD        Prosource TF20 ENfit Compatibl EN LIQD (PROSOURCE TF20) packet 60 mL  1 packet Per Feeding Tube BID Aurelio Huang MD   60 mL at 09/08/24 2028    sodium chloride (PF) 0.9% PF flush 3 mL  3 mL Intracatheter Q8H Gondal, Saad J, MD   3 mL at 09/09/24 0033    sodium chloride (PF) 0.9% PF flush 9 mL  9 mL Intracatheter During Dialysis/CRRT (from stock) Cheryle Jung MD        sodium chloride (PF) 0.9% PF flush 9 mL  9 mL Intracatheter During Dialysis/CRRT (from stock) Cheryle Jung MD        sodium chloride 0.9% BOLUS 250 mL  250 mL Intravenous Once in dialysis/CRRT Cheryle Jung MD        sodium chloride 0.9% BOLUS 300 mL  300 mL Hemodialysis Machine Once Cheryle Jung MD        sodium chloride 0.9% DIALYSIS Cath LOCK - BLUE Lumen  1.3-2.6 mL Intracatheter Once Cheryle Jung MD        sodium chloride 0.9% DIALYSIS Cath LOCK - RED Lumen  1.3-2.6 mL Intracatheter Once Cheryle Jung MD        vancomycin place horne - receiving intermittent dosing  1 each Intravenous See Admin Instructions Dylan Renae MD        [Held by provider] Warfarin Dose Required Daily - Pharmacist Managed  1 each Oral See Admin Instructions Jessica Tolbert PA-C         Continuous Infusions:  Current Facility-Administered Medications   Medication Dose Route Frequency  Provider Last Rate Last Admin    dextrose 10% infusion   Intravenous Continuous PRN Aurelio Huang MD        DOBUTamine (DOBUTREX) 500 mg in D5W 250 mL infusion (adult std conc)  1.5 mcg/kg/min Intravenous Continuous Jenifer Nance DO 4.1 mL/hr at 09/09/24 0400 1.5 mcg/kg/min at 09/09/24 0400     PRN Meds:.  Current Facility-Administered Medications   Medication Dose Route Frequency Provider Last Rate Last Admin    acetaminophen (TYLENOL) oral liquid 650 mg  650 mg Oral Q6H PRN Eddie Way NP   650 mg at 09/09/24 0019    albumin human 25 % injection 25 g  25 g Intravenous Q1H PRN Cheryle Jung MD        sodium chloride (NEBUSAL) 3 % neb solution 3 mL  3 mL Nebulization Q6H PRN Zachary Gonzalez MD        And    albuterol (PROVENTIL) neb solution 2.5 mg  2.5 mg Nebulization Q6H PRN Zachary Gonzalez MD        alteplase (CATHFLO ACTIVASE) injection 2 mg  2 mg Intracatheter Q1H PRN Cheryle Jung MD        alteplase (CATHFLO ACTIVASE) injection 2 mg  2 mg Intracatheter Q1H PRN Cheryle Jung MD        bisacodyl (DULCOLAX) suppository 10 mg  10 mg Rectal Daily PRN Maryam Anthony PA-C        dextrose 10% infusion   Intravenous Continuous PRN Aurelio Huang MD        glucose gel 15-30 g  15-30 g Oral Q15 Min PRN Maryam Anthony PA-C        Or    dextrose 50 % injection 25-50 mL  25-50 mL Intravenous Q15 Min PRN Maryam Anthony PA-C        Or    glucagon injection 1 mg  1 mg Subcutaneous Q15 Min PRN Maryam Anthony PA-C        hydrALAZINE (APRESOLINE) injection 10 mg  10 mg Intravenous Q30 Min PRN Maryam Anthony PA-C        lidocaine (LMX4) cream   Topical Q1H PRN Maryam Anthony PA-C        lidocaine 1 % 0.1-5 mL  0.1-5 mL Other Q1H PRN Maryam Anthony PA-C   2 mL at 09/06/24 1121    melatonin tablet 3 mg  3 mg Oral At Bedtime PRN Eddie Way, NP        menthol (Topical Analgesic) 2.5% (BENGAY VANISHING SCENT) 2.5  % topical gel 1 g  1 g Topical Q8H PRN Eddie Way NP        miconazole (MICATIN) 2 % powder   Topical BID PRN Carey Erick A, DO        naloxone (NARCAN) injection 0.2 mg  0.2 mg Intravenous Q2 Min PRN Rossini Erick A, DO        Or    naloxone (NARCAN) injection 0.4 mg  0.4 mg Intravenous Q2 Min PRN Rossini, Erick A, DO        Or    naloxone (NARCAN) injection 0.2 mg  0.2 mg Intramuscular Q2 Min PRN Rossini, Erick A, DO        Or    naloxone (NARCAN) injection 0.4 mg  0.4 mg Intramuscular Q2 Min PRN Nicini, Erick A, DO        nicotine (NICORETTE) gum 2 mg  2 mg Buccal Q1H PRN Gondal, Saad J, MD        ondansetron (ZOFRAN ODT) ODT tab 8 mg  8 mg Oral Q6H PRN Maryam Anthony PA-C        Or    ondansetron (ZOFRAN) injection 8 mg  8 mg Intravenous Q6H PRN Maryam Anthony PA-C   8 mg at 09/09/24 0237    prochlorperazine (COMPAZINE) injection 5 mg  5 mg Intravenous Q6H PRN Aurelio Huang MD   5 mg at 09/08/24 0546    Or    prochlorperazine (COMPAZINE) tablet 5 mg  5 mg Oral Q6H PRN Aurelio Huang MD        Or    prochlorperazine (COMPAZINE) suppository 12.5 mg  12.5 mg Rectal Q12H PRN Aurelio Huang MD        senna-docusate (SENOKOT-S/PERICOLACE) 8.6-50 MG per tablet 1 tablet  1 tablet Oral BID PRN Gondal, Saad J, MD        Or    senna-docusate (SENOKOT-S/PERICOLACE) 8.6-50 MG per tablet 2 tablet  2 tablet Oral BID PRN Gondal, Saad J, MD        sodium chloride (PF) 0.9% PF flush 10 mL  10 mL Intracatheter Q15 Min PRN Cheryle Jung MD        sodium chloride (PF) 0.9% PF flush 10 mL  10 mL Intracatheter Q15 Min PRN Cheryle Jung MD        sodium chloride (PF) 0.9% PF flush 10-40 mL  10-40 mL Intracatheter Once PRN Maryam Anthony PA-C        sodium chloride (PF) 0.9% PF flush 10-40 mL  10-40 mL Intracatheter Once PRN Bryant Lane B, MBBS        sodium chloride (PF) 0.9% PF flush 3 mL  3 mL Intracatheter q1 min prn Gondal, Saad J, MD         sodium chloride 0.9% BOLUS 1-250 mL  1-250 mL Intravenous Q1H PRN Dylan Renae MD        sodium chloride 0.9% BOLUS 1-250 mL  1-250 mL Intravenous Q1H PRN Jessica Tolbert PA-C        sodium chloride 0.9% BOLUS 100-150 mL  100-150 mL Intravenous Q15 Min PRN Cheryle Jung MD        traMADol (ULTRAM) tablet 50 mg  50 mg Oral Q12H PRN Dylan Renae MD   50 mg at 09/07/24 2203       Cardiographics:    Telemetry monitoring demonstrates paced rhythm at 90bpm per my personal review.    Imaging:  Recent Results (from the past 24 hour(s))   XR Video Swallow with SLP or OT    Narrative    EXAM: XR VIDEO SWALLOW WITH SLP OR OT  LOCATION: [facility]  DATE: 9/8/2024    INDICATION: Difficulty swallowing.  COMPARISON: None.    TECHNIQUE: Routine swallow study with speech pathology using multiple barium thicknesses.    RADIATION DOSE: Total Air Kerma 7.7 mGy    FINDINGS:   Swallow study with Speech Pathology using multiple barium thicknesses.     Prominent cricopharyngeal impression. Stasis is noted within the upper esophagus. No reflux was observed.    No laryngeal penetration or aspiration with thin, pudding or solid consistencies. There was a significantly delayed oral phase.      Impression    IMPRESSION:  1.  No laryngeal penetration or aspiration.  2.  Delayed passage of contrast through the upper esophagus which may reflect dysmotility.  3.  Prominent cricopharyngeal impression.         Labs, personally reviewed  Hemoglobin   Date Value Ref Range Status   09/09/2024 6.9 (LL) 11.7 - 15.7 g/dL Final   09/08/2024 7.6 (L) 11.7 - 15.7 g/dL Final   09/07/2024 8.0 (L) 11.7 - 15.7 g/dL Final     WBC Count   Date Value Ref Range Status   09/09/2024 14.7 (H) 4.0 - 11.0 10e3/uL Final   09/08/2024 16.6 (H) 4.0 - 11.0 10e3/uL Final   09/07/2024 15.8 (H) 4.0 - 11.0 10e3/uL Final     Platelet Count   Date Value Ref Range Status   09/09/2024 51 (L) 150 - 450 10e3/uL Final   09/08/2024  41 (LL) 150 - 450 10e3/uL Final   09/07/2024 32 (LL) 150 - 450 10e3/uL Final     Creatinine   Date Value Ref Range Status   09/09/2024 4.02 (H) 0.51 - 0.95 mg/dL Final   09/08/2024 3.20 (H) 0.51 - 0.95 mg/dL Final   09/08/2024 2.55 (H) 0.51 - 0.95 mg/dL Final     Potassium   Date Value Ref Range Status   09/09/2024 4.2 3.4 - 5.3 mmol/L Final   09/08/2024 3.8 3.4 - 5.3 mmol/L Final   09/08/2024 3.8 3.4 - 5.3 mmol/L Final     Potassium POCT   Date Value Ref Range Status   08/27/2024 3.4 3.4 - 5.3 mmol/L Final   08/27/2024 3.9 3.4 - 5.3 mmol/L Final   08/27/2024 3.4 3.4 - 5.3 mmol/L Final     Magnesium   Date Value Ref Range Status   09/07/2024 2.5 (H) 1.7 - 2.3 mg/dL Final   09/06/2024 2.3 1.7 - 2.3 mg/dL Final   09/06/2024 2.5 (H) 1.7 - 2.3 mg/dL Final          I/O:  I/O last 3 completed shifts:  In: 1514.31 [P.O.:840; I.V.:397.31; NG/GT:190]  Out: 375 [Stool:375]       Physical Exam:    General: Patient seen up in chair. NAD, calm, cooperative on exam  HEENT: moist mucosa, nasal SBFT in place, edentulous  CV: Paced rhythm on monitor, mild edema  Pulm: Unlabored breathing on supplemental oxygen via NC. Incision C/D/I.  Abd: Soft, NT, ND  Ext: Mod pedal edema, SCDs in place, warm  Neuro: CNs grossly intact, face symmetric, speech clear, HENDERSON      ASSESSMENT/PLAN:    Felicitas Elliott is a 84 year old female with a history of endocarditis, CAD, aortic stenosis and mitral valve disorder who is s/p Aortic root abscess debridement and aortic annular reconstruction, aortic root replacement (Konect composite 25 mm Silva Inspiris Resilia bioprosthetic valve within 30 mm Gelweave Valsalva graft with reimplantation of the coronary arteries), Mitral valve replacement (31 mm St. Tim Silva bioprosthetic valve) and CABx2.  Hospital course c/b ARF requiring renal replacement therapy.    Active Problems:    Bacteremia    Endocarditis    Sepsis (H)    Nonrheumatic aortic valve stenosis    Postsurgical percutaneous transluminal  coronary angioplasty status    Mitral valve disorder    Coronary artery disease involving native coronary artery of native heart, unspecified whether angina present    Acute kidney failure, unspecified (H)    Thrombocytopenia    Acute renal failure in s/o CKD    Acute hypoxic respiratory failure, improving    Dysphagia    NEURO:  - PRN Tylenol resumed. PRN Tramadol/lidocaine & Icy Hot patches for pain  - PRN nicorette gum  - Delirium precautions  - PRN melatonin available    CV:  - Pre-op EF 60-65%  - Normotensive  - Currently on dobutamine 1.5mcg, management per Cards - appreciate assistance  - Beta blocker pending weaning from inotrope  - ASA allergy - Plavix 75mg PO daily-held for now, likely until plt >100  - Atorvastatin 80 mg daily (held in light of LFTs)  - Chest tubes removed 9/3; TPW remain due to plt/need for pacing  - Cards following--appreciate recs  - New baseline echo before discharge  - Coumadin x3 months per surgeon preference--currently held. INR goal 2-3 (clarified with pharmacy).    PULM:  - Extubated POD#1, reintubated POD #2, Extubated POD#5  - Maintaining oxygen saturations on 2L NC  - Encourage pulmonary toilet as able    FEN/GI:  - Continue electrolyte replacement protocol  - Diet: Soft and bite sized diet with thin liquids, TF; diet advancement per SLP recs  - Start kendall counts 9/9  - Bowel regimen  - Nausea, improving; prns available  - SLP following, appreciate assistance  - Dietitian following, appreciate assistance    RENAL:  - Anuric  - Daily bladder scans and prn straight caths if unable to void  - Nephrology consulted and managing renal replacement therapy, appreciate assistance; off CRRT since 9/7. Transitioning to iHD today.  - No appreciable UOP with Lasix challenges 9/4 and 9/7  - If TDL needed for longer term renal replacement, will need to coordinate with recovery from platelet coagulopathy/AC initiation    HEME:  - Acute blood loss anemia post-op  - Hgb 6.9 today with plans for  1u PRBC and recheck later. Hep subcutaneous currently held  - Occult blood positive POD#3.   - Plavix 75mg PO daily (held) - ASA allergy  - Coumadin pharmacy to dose-held  - HIT negative. Plt 51  - Likely hold Plavix and coumadin until plt >100    ID:  - Lennie op ppx complete, afebrile  - WBC trending up but within historic range over past several days  - ID following-appreciate recs  - Pertinent culture history/susceptibilties:   Susceptibility data from last 90 days.  Collected Specimen Info Organism Ampicillin Ampicillin/Sulbactam Cefepime Ceftazidime Ceftriaxone Ciprofloxacin Clindamycin Daptomycin Doxycycline Erythromycin Gentamicin Levofloxacin Meropenem Oxacillin   08/29/24 Sputum from Expectorate Enterobacter cloacae complex R R  S R R  S      S  S  S      Normal samara                 08/16/24 Peripheral Blood Staphylococcus aureus                 08/14/24 Peripheral Blood Staphylococcus aureus        S  S  S  S  S    S     Collected Specimen Info Organism Piperacillin/Tazobactam Tetracycline Tobramycin Trimethoprim/Sulfamethoxazole  Vancomycin   08/29/24 Sputum from Expectorate Enterobacter cloacae complex R   S  S      Normal samara        08/16/24 Peripheral Blood Staphylococcus aureus        08/14/24 Peripheral Blood Staphylococcus aureus   S   S  S     - Antibiotic history:   - 08/17 - 08/28 Nafcillin   - 08/29 - 08/30 Ceftazidine   - 08/30 - 09/05 Cefepime   - 08/30 - 09/05 PO Vanco  - 08/30 - current IV Vanco    ENDO:  - A1c 6.4  - Euglycemic     PPx:  - DVT: SCDs, SQ heparin TID (held), OOB/ambulation as able  - GI: Protonix 40mg IV/PO daily    DISPO:  - Continue critical care in ICU  - PICC placed 9/6  - Medically Ready for Discharge: Anticipated in 5+ Days         Patient discussed with Dr. Renae.      _______  Maryam Anthony PA-C  Cardiothoracic Surgery  [phone]

## 2024-09-09 NOTE — PROGRESS NOTES
Care Management Follow Up    Length of Stay (days): 24    Expected Discharge Date:  pending    Anticipated Discharge Plan:  TBD    Transportation: Confirmed medical transport    PT Recommendations:    OT Recommendations:  [facility]-Motivated patient will benefit from intensive, interdisciplinary therapy.  Anticipate will be able to tolerate 3 hours of therapy per day     Barriers to Discharge: medical stability    Prior Living Situation:  Patient resides in a house with her  and is reported as mostly I/ADL independent when at baseline.  assists with transport and as needed in general. No DME use or current in-home/outpatient services identified.   Spouse is primary family contact.    Discussed  Partnership in Safe Discharge Planning  document with patient/family: No     Handoff Completed: No, handoff not indicated or clinically appropriate    Patient/Spokesperson Updated: Yes. Who? Patient, spouse, sister    Additional Information:  Medical:  Patient with history of HTN, HFpEF, moderate aortic stenosis, CKD 3, chronic intertriginous skin wounds, chronic neck pain.     Presented 8/14/24 for chills & generalized weakness. 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 8/27/24 s/p bioprosthetic aortic & mitral valve replacement, aortic root replacement, & two vessel coronary artery bypass graft. Course complicated by post operative hypoxemic respiratory failure due to cardiogenic pulmonary edema, atelectasis, & Enterobacter hospital acquired pneumonia. Treated for both cardiogenic-distributive shock, oliguric HAMMAD requiring CRRT, euglycemic ketoacidosis, & ischemic hepatitis.   Patient was extubated on 9/1.    CT Surgery, ID, Nephrology    Now on inpatient HD.       9/9/24:  Met with patient, spouse and patient's sister to introduce CM role.   Provided brief overview of discharge options LTACH, AR, TCU and home with home care. Questions and concerns addressed. At this point, LTACH is reviewing, however, final discharge plan pending progression and recommendations.        Lindsey Mena RN

## 2024-09-09 NOTE — PROGRESS NOTES
[facility]  [facility]   Follow up    Felicitas Elliott   MRN: [id number]  : 1940   DOA: 2024   CC: HAMMAD on CKD      Assessment and Plan:  84 year old female    HAMMAD on CKD: has underlying CKD with baseline CR 1.3-1.8; now HAMMAD in the setting of cardiac surgery on CRRT since  and stopped over the weekend.  Now with plan for IHD today.  UOP remains quite low  Recs:  - MWF dialysis this week with potential need for UF treatments  - midodrine with dialysis for hemodynamic support  - no longer bacteremic, likely need to switch to tunneled line in the relative near future    Volume status; down from peak.  Minimal response to diuretics, s/p UF with CRRT, will see how she tolerates volume removal currently on IHD    MSSA bacteremia; on abx and ID following    MV and AV infective endocarditis , 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: s/p biprothetic AoV and MV replacement, Aortic root replacement and 2vCABG    HoTN with septic and cardiogenic shock; resolved HoTN; on dobutamine still    Anemia; following hgb, being transfused today    Thrombocytopenia - trending up a bit, possibly related to cefepime.  Have been avoiding heparin; HD ordered without heparin; ok for heparin lock for cath    Hyperlipidemia; statin on hold    CAD: s/p 2v CABG as part of valve surgery    A fib; previously on amio, paced.      Resp failure; extubated; no plan to reintubate if deteriorates per notes    Hepatitis; s/p acetylcysteine    Nutrition; on TF    Dispo; DNR, DNI; pt expressing concern about continued aggressive cares but wants to continue for now          Subjective  Patient new to me.  Chart reviewed at length.  Seen on dialysis.  Patient awake and anxious.   at bedside. They remain hopeful for some renal recovery, discussed that there are not signs of this yet.  Discussed with ICU RN today.     Objective    Vital signs in last 24 hours  Temp:  [97.2   F (36.2  C)-97.9  F (36.6  C)] 97.5  F (36.4  C)  Pulse:  [91-92] 92  Resp:  [10-22] 12  BP: (104-139)/(57-74) 129/74  SpO2:  [94 %-100 %] 96 %      Intake/Output last 3 shifts  I/O last 3 completed shifts:  In: 1514.31 [P.O.:840; I.V.:397.31; NG/GT:190]  Out: 375 [Stool:375]  Intake/Output this shift:  No intake/output data recorded.    Physical Exam  Alert/oriented x 3, awake, NAD; appears tired; on O2  CV: RRR without murmur or rub  Lung:  decreased at bases, no crackles  Ext: trace edema in sacral area  Skin; no rash  Access: LIJ temporary line    Pertinent Labs     Last Renal Panel:  Sodium   Date Value Ref Range Status   09/09/2024 136 135 - 145 mmol/L Final     Potassium   Date Value Ref Range Status   09/09/2024 4.2 3.4 - 5.3 mmol/L Final     Potassium POCT   Date Value Ref Range Status   08/27/2024 3.4 3.4 - 5.3 mmol/L Final     Chloride   Date Value Ref Range Status   09/09/2024 102 98 - 107 mmol/L Final     Carbon Dioxide (CO2)   Date Value Ref Range Status   09/09/2024 21 (L) 22 - 29 mmol/L Final     Anion Gap   Date Value Ref Range Status   09/09/2024 13 7 - 15 mmol/L Final     Glucose   Date Value Ref Range Status   09/09/2024 104 (H) 70 - 99 mg/dL Final     GLUCOSE BY METER POCT   Date Value Ref Range Status   09/04/2024 102 (H) 70 - 99 mg/dL Final     Urea Nitrogen   Date Value Ref Range Status   09/09/2024 70.7 (H) 8.0 - 23.0 mg/dL Final     Creatinine   Date Value Ref Range Status   09/09/2024 4.02 (H) 0.51 - 0.95 mg/dL Final     GFR Estimate   Date Value Ref Range Status   09/09/2024 10 (L) >60 mL/min/1.73m2 Final     Comment:     eGFR calculated using 2021 CKD-EPI equation.     Calcium   Date Value Ref Range Status   09/09/2024 8.7 (L) 8.8 - 10.4 mg/dL Final     Comment:     Reference intervals for this test were updated on 7/16/2024 to reflect our healthy population more accurately. There may be differences in the flagging of prior results with similar values performed with this method. Those  prior results can be interpreted in the context of the updated reference intervals.     Phosphorus   Date Value Ref Range Status   09/07/2024 3.1 2.5 - 4.5 mg/dL Final     Albumin   Date Value Ref Range Status   09/08/2024 3.8 3.5 - 5.2 g/dL Final     Recent Labs   Lab 09/09/24  0433 09/08/24  0540 09/07/24  0340 09/06/24 2023 09/06/24  1210   HGB 6.9* 7.6* 8.0* 7.6* 7.9*          I reviewed all lab results    [facility]  [facility]  [phone]

## 2024-09-10 ENCOUNTER — APPOINTMENT (OUTPATIENT)
Dept: SPEECH THERAPY | Facility: HOSPITAL | Age: 84
DRG: 853 | End: 2024-09-10
Attending: STUDENT IN AN ORGANIZED HEALTH CARE EDUCATION/TRAINING PROGRAM
Payer: COMMERCIAL

## 2024-09-10 ENCOUNTER — APPOINTMENT (OUTPATIENT)
Dept: OCCUPATIONAL THERAPY | Facility: HOSPITAL | Age: 84
DRG: 853 | End: 2024-09-10
Attending: STUDENT IN AN ORGANIZED HEALTH CARE EDUCATION/TRAINING PROGRAM
Payer: COMMERCIAL

## 2024-09-10 LAB
ALBUMIN SERPL BCG-MCNC: 3.7 G/DL (ref 3.5–5.2)
ALP SERPL-CCNC: 77 U/L (ref 40–150)
ALT SERPL W P-5'-P-CCNC: 79 U/L (ref 0–50)
ANION GAP SERPL CALCULATED.3IONS-SCNC: 14 MMOL/L (ref 7–15)
AST SERPL W P-5'-P-CCNC: 60 U/L (ref 0–45)
BILIRUB DIRECT SERPL-MCNC: 0.65 MG/DL (ref 0–0.3)
BILIRUB SERPL-MCNC: 1.5 MG/DL
BUN SERPL-MCNC: 45.2 MG/DL (ref 8–23)
CALCIUM SERPL-MCNC: 8.6 MG/DL (ref 8.8–10.4)
CHLORIDE SERPL-SCNC: 98 MMOL/L (ref 98–107)
CREAT SERPL-MCNC: 3.33 MG/DL (ref 0.51–0.95)
EGFRCR SERPLBLD CKD-EPI 2021: 13 ML/MIN/1.73M2
ERYTHROCYTE [DISTWIDTH] IN BLOOD BY AUTOMATED COUNT: 23.6 % (ref 10–15)
GLUCOSE BLDC GLUCOMTR-MCNC: 112 MG/DL (ref 70–99)
GLUCOSE BLDC GLUCOMTR-MCNC: 150 MG/DL (ref 70–99)
GLUCOSE SERPL-MCNC: 107 MG/DL (ref 70–99)
HCO3 SERPL-SCNC: 24 MMOL/L (ref 22–29)
HCT VFR BLD AUTO: 24.6 % (ref 35–47)
HGB BLD-MCNC: 8 G/DL (ref 11.7–15.7)
INR PPP: 1.22 (ref 0.85–1.15)
MCH RBC QN AUTO: 32.1 PG (ref 26.5–33)
MCHC RBC AUTO-ENTMCNC: 32.5 G/DL (ref 31.5–36.5)
MCV RBC AUTO: 99 FL (ref 78–100)
PLATELET # BLD AUTO: 61 10E3/UL (ref 150–450)
POTASSIUM SERPL-SCNC: 3.9 MMOL/L (ref 3.4–5.3)
PROT SERPL-MCNC: 6.9 G/DL (ref 6.4–8.3)
RBC # BLD AUTO: 2.49 10E6/UL (ref 3.8–5.2)
SODIUM SERPL-SCNC: 136 MMOL/L (ref 135–145)
WBC # BLD AUTO: 12.8 10E3/UL (ref 4–11)

## 2024-09-10 PROCEDURE — 85610 PROTHROMBIN TIME: CPT | Performed by: PHYSICIAN ASSISTANT

## 2024-09-10 PROCEDURE — 97535 SELF CARE MNGMENT TRAINING: CPT | Mod: GO

## 2024-09-10 PROCEDURE — 82248 BILIRUBIN DIRECT: CPT | Performed by: PHYSICIAN ASSISTANT

## 2024-09-10 PROCEDURE — 250N000013 HC RX MED GY IP 250 OP 250 PS 637: Performed by: INTERNAL MEDICINE

## 2024-09-10 PROCEDURE — 250N000013 HC RX MED GY IP 250 OP 250 PS 637: Performed by: PHYSICIAN ASSISTANT

## 2024-09-10 PROCEDURE — 99232 SBSQ HOSP IP/OBS MODERATE 35: CPT | Performed by: INTERNAL MEDICINE

## 2024-09-10 PROCEDURE — 99291 CRITICAL CARE FIRST HOUR: CPT | Performed by: INTERNAL MEDICINE

## 2024-09-10 PROCEDURE — 99223 1ST HOSP IP/OBS HIGH 75: CPT | Mod: FS | Performed by: INTERNAL MEDICINE

## 2024-09-10 PROCEDURE — 82374 ASSAY BLOOD CARBON DIOXIDE: CPT | Performed by: SURGERY

## 2024-09-10 PROCEDURE — 120N000013 HC R&B IMCU

## 2024-09-10 PROCEDURE — 85014 HEMATOCRIT: CPT | Performed by: PHYSICIAN ASSISTANT

## 2024-09-10 PROCEDURE — 92526 ORAL FUNCTION THERAPY: CPT | Mod: GN

## 2024-09-10 PROCEDURE — 99207 PR APP CREDIT; MD BILLING SHARED VISIT: CPT | Performed by: NURSE PRACTITIONER

## 2024-09-10 PROCEDURE — 97110 THERAPEUTIC EXERCISES: CPT | Mod: GO

## 2024-09-10 PROCEDURE — 250N000009 HC RX 250: Performed by: PHYSICIAN ASSISTANT

## 2024-09-10 RX ORDER — TORSEMIDE 100 MG/1
100 TABLET ORAL DAILY
Status: DISCONTINUED | OUTPATIENT
Start: 2024-09-10 | End: 2024-09-16 | Stop reason: HOSPADM

## 2024-09-10 RX ADMIN — SENNOSIDES 5 ML: 8.8 LIQUID ORAL at 21:23

## 2024-09-10 RX ADMIN — DOCUSATE SODIUM 50 MG: 50 LIQUID ORAL at 11:17

## 2024-09-10 RX ADMIN — ACETAMINOPHEN 650 MG: 325 SOLUTION ORAL at 11:15

## 2024-09-10 RX ADMIN — LIDOCAINE 1 PATCH: 246 PATCH TOPICAL at 18:28

## 2024-09-10 RX ADMIN — SENNOSIDES 5 ML: 8.8 LIQUID ORAL at 11:15

## 2024-09-10 RX ADMIN — Medication 1 CAPSULE: at 21:23

## 2024-09-10 RX ADMIN — Medication 1 CAPSULE: at 15:42

## 2024-09-10 RX ADMIN — DOCUSATE SODIUM 50 MG: 50 LIQUID ORAL at 21:23

## 2024-09-10 RX ADMIN — POLYETHYLENE GLYCOL 3350 17 G: 17 POWDER, FOR SOLUTION ORAL at 11:18

## 2024-09-10 RX ADMIN — PANTOPRAZOLE SODIUM 40 MG: 40 INJECTION, POWDER, FOR SOLUTION INTRAVENOUS at 06:36

## 2024-09-10 RX ADMIN — TORSEMIDE 100 MG: 100 TABLET ORAL at 11:18

## 2024-09-10 ASSESSMENT — ACTIVITIES OF DAILY LIVING (ADL)
ADLS_ACUITY_SCORE: 38
ADLS_ACUITY_SCORE: 42
ADLS_ACUITY_SCORE: 37
ADLS_ACUITY_SCORE: 42
ADLS_ACUITY_SCORE: 38
ADLS_ACUITY_SCORE: 42
ADLS_ACUITY_SCORE: 42

## 2024-09-10 NOTE — PLAN OF CARE
Goal Outcome Evaluation:      Plan of Care Reviewed With: patient    Overall Patient Progress: improvingOverall Patient Progress: improving    [facility] - ICU    RN Progress Note:            Pertinent Assessments:      Please refer to flowsheet rows for full assessment     Pt was up in chair at start of shift.  AO.  Did well on RA until she was back in bed and fell asleep. Now on 1.5LNC again.  Rectal tube remains but has had no output my shift.   MAPs have been WNL with the pacemaker at same settings of VVI rate 92, 10 MA and 2 sensitivity.  Dobutamine continues at the fixed rate of 1.5mcg/kg/min.             Key Events - This Shift:       No major events                Barriers to Discharge / Downgrade:     Dobutamine drip         Point of Contact Update: YES-OR-NO: Yes  If No, reason:   Name:Ed  Phone Number:in chart  Summary of Conversation: Updated at bedside prior to leaving.

## 2024-09-10 NOTE — PROGRESS NOTES
Care Management Follow Up    Length of Stay (days): 25      Expected Discharge Date:  pending     Anticipated Discharge Plan:  [facility]     Transportation: Confirmed medical transport     PT Recommendations:    OT Recommendations:  [facility] patient will benefit from intensive, interdisciplinary therapy.  Anticipate will be able to tolerate 3 hours of therapy per day     Barriers to Discharge: medical stability, cardio status-EP consult and recs, off Dobutamine drip, stable hemoglobin, renal stable on HD     Prior Living Situation:  Patient resides in a house with her  and is reported as mostly I/ADL independent when at baseline.  assists with transport and as needed in general. No DME use or current in-home/outpatient services identified.   Spouse is primary family contact.     Discussed  Partnership in Safe Discharge Planning  document with patient/family: No      Handoff Completed: No, handoff not indicated or clinically appropriate     Patient/Spokesperson Updated: Yes.  Patient, spouse, sister 9/9/24     Additional Information:  Medical:  Patient with history of HTN, HFpEF, moderate aortic stenosis, CKD 3, chronic intertriginous skin wounds, chronic neck pain.     Presented 8/14/24 for chills & generalized weakness. 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 8/27/24 s/p bioprosthetic aortic & mitral valve replacement, aortic root replacement, & two vessel coronary artery bypass graft. Course complicated by post operative hypoxemic respiratory failure due to cardiogenic pulmonary edema, atelectasis, & Enterobacter hospital acquired pneumonia. Treated for both cardiogenic-distributive shock, oliguric HAMMAD requiring CRRT, euglycemic ketoacidosis, & ischemic hepatitis.   Patient was extubated on 9/1.     CT Surgery, ID,  Nephrology following. IP consulted.     Now on inpatient HD.         9/10/24:  Spoke with Alicia with [facility]. Patient accepted pending medical stability.         Lindsey Mena RN

## 2024-09-10 NOTE — CONSULTS
[facility]  Cardiac Electrophysiology  [address]  [place] [address]   Office: [phone]  Fax: [phone]     Cardiac Electrophysiology Consultation    Patient: Felicitas Elliott  Referring Provider: Navneet Branch  Date of admission: 8/16/2024       CHIEF COMPLAINT/REASON FOR CONSULTATION: Eval for PPM  EP attending: Dr. Pina          Assessment and Plan     # Atrioventricular conduction disease, bifascicular block: continuous pacing need following complex cardiac surgery. LVEF 60%. We discussed dual chamber permanent pacemaker implantation indications, procedure and risks, recovery and expected prognosis and she is agreeable to proceed. Will follow-up with nephrology re considerations for long term hemodialysis; BG NGTD since 8/17. Epicardial pacemaker functioning well. Permanent implant to be scheduled in the coming days pending cath lab availability    Physician Attestation    I, personally discussed Felicitas Elliott on 9/10/2024 as part of a shared ABRAHAN/PA visit.  I have reviewed and discussed with the advanced practice provider the patient's history, physical, and plan.    I personally reviewed the patient's vital signs, medications, and pertinent tests/laboratories.    I personally provided a substantial portion of the care for this patient.  I personally performed the entire medical decision making and I agree with the assessment and plan as written by Brandi Rao CNP.    Key management decisions made by me:  Patient with both sick sinus syndrome and AV conduction disease (intermittent complete heart block) with ongoing need for temporary epicardial pacing.  The patient feels criteria for placement of a permanent pacing system.  Patient will be scheduled for dual-chamber pacemaker insertion in the next 1-2 days.    Minh Pina MD  Date of service: 9/10/2024          History of Present Illness     84 year old YO female with postoperative atrial  fibrillation, RBBB, HTN, HFpEF, moderate/severe aortic stenosis admitted with MSSA bacteremia complicated by mitral and aortic valve endocarditis and subacute embolic infarct.  She ultimately underwent aortic root abscess status postdebridement, aortic annular reconstruction, bioprosthetic aortic and mitral valve replacement and two-vessel CABG 8/27/2024.  Recovery has been complicated by paroxysmal atrial fibrillation briefly treated with amiodarone drip, cardiogenic shock and HAMMAD now receiving HD. She is weaning off dobutamine. EP consulted for possible permanent pacemaker. She is cautiously optimistic about her recovery. She has no palpitations, chest discomfort, dyspnea on exertion or rest, pedal edema or lightheadedness/dizziness.           Data Review     EKG/Telemetry  Telemetry reviewed. Ventricular paced 90s. Rare sinus with 1:1 conduction, bigeminal PVC  9/2/2024: SR 66 bpm, AV delay  ms,  ms, LAFB, QT/QTc 458/480 ms. AF 75 bpm, QRS 94 ms, QT/QTc 414/462 ms  8/29/2024: Junctional bradycardia 55 bpm, RBBB  ms  8/16/2024: SR 88 bpm, RBBB  ms, QT/QTc 418/505 ms  Personally reviewed    TTE 9/6/2024  1. Normal left ventricular size and systolic performance with a visually  estimated ejection fraction of 60%.  2. There is abnormal septal motion likely related to altered electrical  activation due to bundle branch block.  3. There is a bio-prosthetic aortic valve (documented 25 mm Silva  [employer] Konect Resilia aortic valved conduit).  Â  Normal aortic valve prosthesis metrics with a mean systolic gradient of 4  mmHg and a peak anterograde velocity of 1.5 m/sec.  Â  No aortic insufficiency is detected.  4. There is a bio-prosthetic mitral valve (documented 31 mm [employer] porcine pericardial tissue valve).  Â  Normal mitral valve prosthesis function; mean diastolic gradient is 5 mm Hg.   Â  No significant mitral insufficiency is detected.  5. Probable normal right  ventricular size and systolic performance though  right-sided structures are not clearly visualized on all views on this study.            Medications     Current Facility-Administered Medications   Medication Dose Route Frequency Provider Last Rate Last Admin    [Held by provider] atorvastatin (LIPITOR) tablet 80 mg  80 mg Oral QPM Jessica Tolbert PA-C   80 mg at 08/29/24 2038    [Held by provider] clopidogrel (PLAVIX) tablet 75 mg  75 mg Oral Daily Maryam Anthony PA-C        sennosides (SENOKOT) syrup 5 mL  5 mL Oral BID Maryam Anthony PA-C   5 mL at 09/10/24 1115    And    docusate (COLACE) 50 MG/5ML liquid 50 mg  50 mg Oral BID Maryam Anthony PA-C   50 mg at 09/10/24 1117    sodium chloride 0.9% DIALYSIS Cath LOCK - RED Lumen  10 mL Intracatheter Once in dialysis/CRRT Maryam Anthony PA-C        Followed by    heparin 1000 unit/mL DIALYSIS Cath LOCK - RED Lumen  1.3-2.6 mL Intracatheter Once in dialysis/CRRT Maryam Anthony PA-C        sodium chloride 0.9% DIALYSIS Cath LOCK - BLUE Lumen  10 mL Intracatheter Once in dialysis/CRRT Maryam Anthony PA-C        Followed by    heparin 1000 unit/mL DIALYSIS Cath LOCK -BLUE Lumen  1.3-2.6 mL Intracatheter Once in dialysis/CRRT Maryam Anthony PA-C        lactobacillus rhamnosus (GG) (CULTURELL) capsule 1 capsule  1 capsule Oral or Feeding Tube BID Maryam Anthony PA-C   1 capsule at 09/09/24 2037    Lidocaine (LIDOCARE) 4 % Patch 1-2 patch  1-2 patch Transdermal Q24H Maryam Anthony PA-C   1 patch at 09/09/24 1935    [Held by provider] metoprolol tartrate (LOPRESSOR) half-tab 12.5 mg  12.5 mg Oral BID Jessica Tolbert PA-C        midodrine (PROAMATINE) tablet 10 mg  10 mg Oral During Dialysis/CRRT (from stock) Maryam Anthony PA-C        pantoprazole (PROTONIX) 2 mg/mL suspension 40 mg  40 mg Oral or NG Tube Maryam Tucker PA-C        Or    pantoprazole (PROTONIX) EC tablet  "40 mg  40 mg Oral QAM AC Maryam Anthony PA-C        Or    pantoprazole (PROTONIX) IV push injection 40 mg  40 mg Intravenous QAM AC Maryam Anthony PA-C   40 mg at 09/10/24 0636    polyethylene glycol (MIRALAX) Packet 17 g  17 g Oral or Feeding Tube Daily Maryam Anthony PA-C   17 g at 09/10/24 1118    sodium chloride (PF) 0.9% PF flush 3 mL  3 mL Intracatheter Q8H Maryam Anthony PA-C   3 mL at 09/10/24 1118    sodium chloride (PF) 0.9% PF flush 9 mL  9 mL Intracatheter During Dialysis/CRRT (from stock) Maryam Anthony PA-C        sodium chloride (PF) 0.9% PF flush 9 mL  9 mL Intracatheter During Dialysis/CRRT (from stock) Maryam Anthony PA-C        sodium chloride 0.9% BOLUS 250 mL  250 mL Intravenous Once in dialysis/CRRT Maryam Anthony PA-C        sodium chloride 0.9% BOLUS 300 mL  300 mL Hemodialysis Machine Once Maryam Anthony PA-C        sodium chloride 0.9% DIALYSIS Cath LOCK - BLUE Lumen  1.3-2.6 mL Intracatheter Once Maryam Anthony PA-C        sodium chloride 0.9% DIALYSIS Cath LOCK - RED Lumen  1.3-2.6 mL Intracatheter Once Maryam Anthony PA-C        torsemide (DEMADEX) tablet 100 mg  100 mg Oral Daily Jair Galvan MD   100 mg at 09/10/24 1118    vancomycin place horne - receiving intermittent dosing  1 each Intravenous See Admin Instructions Maryam Anthony PA-C        [Held by provider] Warfarin Dose Required Daily - Pharmacist Managed  1 each Oral See Admin Instructions Jessica Tolbert PA-C                 Physical Exam     VITALS: /76   Pulse 91   Temp 98  F (36.7  C) (Oral)   Resp (!) 33   Ht 1.702 m (5' 7\")   Wt 83.5 kg (184 lb 1.4 oz)   SpO2 95%   BMI 28.83 kg/m    Wt Readings from Last 3 Encounters:   09/10/24 83.5 kg (184 lb 1.4 oz)   08/15/24 90.1 kg (198 lb 9.6 oz)       Intake/Output Summary (Last 24 hours) at 9/10/2024 1347  Last data filed at 9/10/2024 0600  Gross per 24 hour " "  Intake 553.8 ml   Output 0 ml   Net 553.8 ml       CONSTITUTIONAL: no distress  EYES:  Conjunctivae pink, sclerae clear  E/N/T:  Moist mucous membranes  RESPIRATORY:  Respiratory effort is normal  CARDIOVASCULAR:  regular, normal S1 and S2  EXTREMITIES:  No clubbing or cyanosis   SKIN:  Warm and dry  NEURO/PSYCH:  Oriented x 3, normal affect    Lab Results   Component Value Date    WBC 12.8 09/10/2024     Lab Results   Component Value Date    RBC 2.49 09/10/2024     Lab Results   Component Value Date    HGB 8.0 09/10/2024     Lab Results   Component Value Date    HCT 24.6 09/10/2024     No components found for: \"MCT\"  Lab Results   Component Value Date    MCV 99 09/10/2024     Lab Results   Component Value Date    MCH 32.1 09/10/2024     Lab Results   Component Value Date    MCHC 32.5 09/10/2024     Lab Results   Component Value Date    RDW 23.6 09/10/2024     Lab Results   Component Value Date    PLT 61 09/10/2024        Last Comprehensive Metabolic Panel:  Lab Results   Component Value Date     09/10/2024    POTASSIUM 3.9 09/10/2024    CHLORIDE 98 09/10/2024    CO2 24 09/10/2024    ANIONGAP 14 09/10/2024     (H) 09/10/2024    BUN 45.2 (H) 09/10/2024    CR 3.33 (H) 09/10/2024    GFRESTIMATED 13 (L) 09/10/2024    KAELYN 8.6 (L) 09/10/2024              Brandi Rao CNP  Clinical Cardiac Electrophysiology  [facility]  Clinic and [phone]  Fax: [phone]  Electrophysiology Nurses: [phone]              "

## 2024-09-10 NOTE — PROGRESS NOTES
[facility] Inpatient follow up        09/10/2024             Assessment and Recommendations:   Assessment:  Felicitas Elliott is a 84 year old female with     Respiratory failure, shock liver-slow improvement.  Extubated  Decreased urine output, on dobutamine currently, had been on norepinephrine and vasopressin. 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  MSSA bacteremia.  Positive blood culture on 8/14/2024 and 8/16.  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  With the size of the vegetation combined with brain infarct, status post CV surgery, see details below active issue.   Neck pain worrisome for cervical discitis.  Neck MRI showed some edema at C4-C5 on the left side.  No clear evidence of infection.  Abnormal brain MRI suggestive of subacute infarct. In a patient with MSSA bacteremia and aortic valve endocarditis, this is cerebral septic emboli. Active issue.     POSTOPERATIVE DIAGNOSES:  1.  Severe aortic stenosis and moderate aortic regurgitation.  2.  Severe mitral stenosis.  3.  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  4.  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  5.  Severe  multivessel coronary artery disease.  6.  Aortic root abscess.      PROCEDURE PERFORMED: 8/27/24  Aortic root abscess debridement and aortic annular reconstruction.  Aortic root replacement (Konect composite 25 mm Silva Inspiris Resilia bioprosthetic valve within 30 mm Gelweave Valsalva graft with reimplantation of the coronary arteries).  Mitral valve replacement (31 mm St. Tim Silva bioprosthetic valve).  Coronary artery bypass grafting x 2 (reversed saphenous vein graft to the obtuse marginal branch of the left circumflex coronary artery, and pedicled left internal mammary artery to left anterior descending coronary artery).  Endoscopic vein harvest of the greater saphenous vein from the left lower extremity.    Recommendations:  Continue vancomycin-dosing per Pharm.D.  End date oct 8th.    Completed cefepime for pneumonia on 9/5/24--  Monitor CBC, CMP--thrombocytopenia, seems to be coincident with cefepime usage. And is slowly better.   Status post vascular surgery, chest tube, CV surgery following closely  Will need to check immunoglobin level in 4 to 6 weeks sister with history of hypogammaglobulinemia         RU Portillo MD  [facility]  Answering Service: [phone]  On-Call ID provider: [phone], option: 9                Interval History:     HPI: stable.  Plts up another 10K today.  No complaint.   here.  She reports better eating and making urine.             Recent Inflammatory Biomarkers:   Recent Labs   Lab Test 09/10/24  0415 09/09/24  0433 09/08/24  0540 09/07/24  0340 09/06/24  2023 09/06/24  1210 08/30/24  0437 08/29/24  0359   PCAL  --   --   --   --   --   --   --  1.24*   WBC 12.8* 14.7* 16.6* 15.8* 14.3* 14.6*   < > 15.3*    < > = values in this interval not displayed.            Review of Systems:      Negative except for findings in the HPI.           Current Medications (antimicrobials listed in bold):     Current Facility-Administered Medications    Medication Dose Route Frequency Provider Last Rate Last Admin    [Held by provider] atorvastatin (LIPITOR) tablet 80 mg  80 mg Oral QPM Jessica Tolbert PA-C   80 mg at 08/29/24 2038    [Held by provider] clopidogrel (PLAVIX) tablet 75 mg  75 mg Oral Daily Maryam Anthony PA-C        sennosides (SENOKOT) syrup 5 mL  5 mL Oral BID Aurelio Huang MD   5 mL at 09/04/24 2022    And    docusate (COLACE) 50 MG/5ML liquid 50 mg  50 mg Oral BID Aurelio Huang MD   50 mg at 09/04/24 2027    sodium chloride 0.9% DIALYSIS Cath LOCK - RED Lumen  10 mL Intracatheter Once in dialysis/CRRT Cheryle Jung MD        Followed by    heparin 1000 unit/mL DIALYSIS Cath LOCK - RED Lumen  1.3-2.6 mL Intracatheter Once in dialysis/CRRT Cheryle Jung MD        sodium chloride 0.9% DIALYSIS Cath LOCK - BLUE Lumen  10 mL Intracatheter Once in dialysis/CRRT Cheryle Jung MD        Followed by    heparin 1000 unit/mL DIALYSIS Cath LOCK -BLUE Lumen  1.3-2.6 mL Intracatheter Once in dialysis/CRRT Cheryle Jung MD        lactobacillus rhamnosus (GG) (CULTURELL) capsule 1 capsule  1 capsule Oral or Feeding Tube BID Aurelio Huang MD   1 capsule at 09/09/24 2037    Lidocaine (LIDOCARE) 4 % Patch 1-2 patch  1-2 patch Transdermal Q24H Maryam Anthony PA-C   1 patch at 09/09/24 1935    [Held by provider] metoprolol tartrate (LOPRESSOR) half-tab 12.5 mg  12.5 mg Oral BID Jessica Tolbert PA-C        midodrine (PROAMATINE) tablet 10 mg  10 mg Oral During Dialysis/CRRT (from stock) Jair Galvan MD        pantoprazole (PROTONIX) 2 mg/mL suspension 40 mg  40 mg Oral or NG Tube QAM AC Maryam Anthony PA-C        Or    pantoprazole (PROTONIX) EC tablet 40 mg  40 mg Oral QAM AC Maryam Anthony PA-C        Or    pantoprazole (PROTONIX) IV push injection 40 mg  40 mg Intravenous QAM AC Maryam Anthony PA-C   40 mg at 09/10/24 0636    polyethylene glycol (MIRALAX) Packet 17 g   17 g Oral or Feeding Tube Daily Aurelio Huang MD        sodium chloride (PF) 0.9% PF flush 3 mL  3 mL Intracatheter Q8H Gondal, Saad J, MD   20 mL at 09/10/24 0000    sodium chloride (PF) 0.9% PF flush 9 mL  9 mL Intracatheter During Dialysis/CRRT (from stock) Cheryle Jung MD        sodium chloride (PF) 0.9% PF flush 9 mL  9 mL Intracatheter During Dialysis/CRRT (from stock) Cheryle Jung MD        sodium chloride 0.9% BOLUS 250 mL  250 mL Intravenous Once in dialysis/CRRT Cheryle Jung MD        sodium chloride 0.9% BOLUS 300 mL  300 mL Hemodialysis Machine Once Cheryle Jung MD        sodium chloride 0.9% DIALYSIS Cath LOCK - BLUE Lumen  1.3-2.6 mL Intracatheter Once Cheryle Jung MD        sodium chloride 0.9% DIALYSIS Cath LOCK - RED Lumen  1.3-2.6 mL Intracatheter Once Cheryle Jung MD        torsemide (DEMADEX) tablet 100 mg  100 mg Oral Daily Jair Galvan MD        vancomycin place horne - receiving intermittent dosing  1 each Intravenous See Admin Instructions Dylan Renae MD        [Held by provider] Warfarin Dose Required Daily - Pharmacist Managed  1 each Oral See Admin Instructions Jessica Tolbert PA-C                  Allergies:     Allergies   Allergen Reactions    Aspirin Rash    Cats Rash    Nickel Rash            Physical Exam:   Vitals were reviewed  Patient Vitals for the past 24 hrs:   BP Temp Temp src Pulse Resp SpO2 Weight   09/10/24 0825 -- -- -- 91 (!) 33 95 % --   09/10/24 0800 137/76 98  F (36.7  C) Oral 91 (!) 38 99 % --   09/10/24 0745 122/88 -- -- 91 (!) 33 99 % --   09/10/24 0700 109/72 -- -- 92 (!) 39 97 % --   09/10/24 0600 127/75 -- -- 92 23 94 % --   09/10/24 0500 119/74 -- -- 92 20 100 % --   09/10/24 0400 118/69 97.4  F (36.3  C) Oral 91 21 90 % 83.5 kg (184 lb 1.4 oz)   09/10/24 0300 113/69 -- -- 92 17 99 % --   09/10/24 0200 108/61 -- -- 92 13 99 % --   09/10/24 0100 122/64 -- -- 92 17 98 % --   09/10/24 0000 112/67 97.5  F (36.4  C) Oral 92 19  98 % --   09/09/24 2300 111/58 -- -- 92 15 99 % --   09/09/24 2000 119/65 97.3  F (36.3  C) Oral 92 15 100 % --   09/09/24 1900 126/64 -- -- 92 16 95 % --   09/09/24 1800 115/59 -- -- 92 16 95 % --   09/09/24 1700 96/66 -- -- 92 19 96 % --   09/09/24 1600 96/55 97.4  F (36.3  C) Oral 92 15 99 % --   09/09/24 1500 104/61 -- -- 92 30 97 % --   09/09/24 1418 -- -- -- 92 20 97 % --   09/09/24 1400 113/61 -- -- 92 25 96 % --   09/09/24 1339 106/61 -- -- 92 (!) 43 99 % --   09/09/24 1300 114/61 -- -- 92 18 97 % --   09/09/24 1200 116/58 97.5  F (36.4  C) Axillary 92 18 98 % --   09/09/24 1135 112/68 97.2  F (36.2  C) Axillary 92 15 100 % --   09/09/24 1130 109/66 -- -- 92 18 98 % --   09/09/24 1115 115/68 -- -- 92 17 100 % --   09/09/24 1100 112/66 97.2  F (36.2  C) Axillary 92 19 100 % --   09/09/24 1045 109/61 -- -- 92 26 96 % --   09/09/24 1030 103/57 97  F (36.1  C) Axillary 92 16 100 % --   09/09/24 1015 109/63 97.3  F (36.3  C) Axillary 92 14 99 % --   09/09/24 1000 113/65 97.1  F (36.2  C) Axillary 92 16 98 % --   09/09/24 0945 107/63 -- -- 92 18 100 % --   09/09/24 0942 107/63 97.6  F (36.4  C) Axillary 91 17 100 % --   09/09/24 0930 102/62 -- -- 91 16 99 % --       Physical Examination:    Resp: (!) 33    Gen: Appears ill, extubated, nasal canula, still on low dose dobutamine  HEENT: opens eyes  PICC, chest wall catheter for hemodialysis, rectal tube  CV: Sternal incision, temporary pacer  Lungs: coarse, currently on nasal cannula.  Chest tubes have been removed  Skin: Warm  Extr: edema- especially L leg  Neuro: extubated, opens eyes  HD catheter  PICC- R basilic           Laboratory Data:   ID Labs:  Microbiology labs:  7-Day Micro Results       No results found for the last 168 hours.          Susceptibility data from last 90 days.  Collected Specimen Info Organism Ampicillin Ampicillin/Sulbactam Cefepime Ceftazidime Ceftriaxone Ciprofloxacin Clindamycin Daptomycin Doxycycline Erythromycin Gentamicin  Levofloxacin Meropenem Oxacillin   08/29/24 Sputum from Expectorate Enterobacter cloacae complex R R  S R R  S      S  S  S      Normal samara                 08/16/24 Peripheral Blood Staphylococcus aureus                 08/14/24 Peripheral Blood Staphylococcus aureus        S  S  S  S  S    S     Collected Specimen Info Organism Piperacillin/Tazobactam Tetracycline Tobramycin Trimethoprim/Sulfamethoxazole  Vancomycin   08/29/24 Sputum from Expectorate Enterobacter cloacae complex R   S  S      Normal samara        08/16/24 Peripheral Blood Staphylococcus aureus        08/14/24 Peripheral Blood Staphylococcus aureus   S   S  S       Reviewed      No lab results found.  Recent Labs   Lab Test 09/10/24  0415 09/09/24  0433 09/08/24  0540 09/07/24  0340 09/06/24 2023 09/06/24  1210   WBC 12.8* 14.7* 16.6* 15.8* 14.3* 14.6*     Recent Labs   Lab Test 09/10/24  0415 09/09/24  0433 09/08/24  1454 09/08/24  0540   CR 3.33* 4.02* 3.20* 2.55*   GFRESTIMATED 13* 10* 14* 18*       Hematology Studies  Recent Labs   Lab Test 09/10/24  0415 09/09/24  1448 09/09/24  0433 09/08/24  0540 09/07/24  0340 09/06/24 2023 09/06/24  1210   WBC 12.8*  --  14.7* 16.6* 15.8* 14.3* 14.6*   HGB 8.0* 7.8* 6.9* 7.6* 8.0* 7.6* 7.9*   HCT 24.6*  --  21.1* 23.3* 25.4* 23.8* 24.8*   PLT 61*  --  51* 41* 32* 25* 27*       Metabolic  Recent Labs   Lab Test 09/10/24  0415 09/09/24  0433 09/08/24  1454    136 135   BUN 45.2* 70.7* 57.6*   CO2 24 21* 22   CR 3.33* 4.02* 3.20*   GFRESTIMATED 13* 10* 14*       Hepatic Studies  Recent Labs   Lab Test 09/10/24  0415 09/08/24  1454 09/08/24  0540   BILITOTAL 1.5* 1.1 1.0   ALKPHOS 77 90 107   ALBUMIN 3.7 3.8 3.7   AST 60* 72* 83*   ALT 79* 99* 100*          Imaging Data:   Reviewed     IR CVC Non Tunnel Placement > 5 Yrs    Result Date: 9/1/2024  [facility] LOCATION: [facility] DATE: 9/1/2024 PROCEDURE: NON-TUNNELED DIALYSIS CATHETER PLACEMENT INTERVENTIONAL RADIOLOGIST:  RU Reyes MD. INDICATION: HAMMAD requiring hemodialysis. CONSENT: The risks, benefits and alternatives of non-tunneled dialysis catheter placement were discussed with the patient in detail. All questions were answered. Informed consent was given to proceed with the procedure. MODERATE SEDATION: None. CONTRAST: None. ANTIBIOTICS: None. ADDITIONAL MEDICATIONS: Heparin 3800 U IV FLUOROSCOPIC TIME: 2.0 minutes. RADIATION DOSE: Air Kerma: 36.04 mGy. COMPLICATIONS: No immediate complications. STERILE BARRIER TECHNIQUE: Maximum sterile barrier technique was used. Cutaneous antisepsis was performed at the operative site with application of 2% chlorhexidine and large sterile drape. Prior to the procedure, the  and assistant performed hand hygiene and wore hat, mask, sterile gown, and sterile gloves during the entire procedure. PROCEDURE: Limited left neck ultrasound was performed which demonstrated a patent internal jugular vein; images saved to the permanent patient medical record. The overlying skin and subcutaneous soft tissues were anesthetized using 1% lidocaine. Under ultrasound guidance, the left internal jugular vein was accessed using a micropuncture needle; images saved of the permanent patient medical record. Access was secured using a 4 Tristanian transitional sheath. A [employer] guidewire was advanced into the SVC. Under fluoroscopic guidance, the overlying skin and subcutaneous soft tissues were dilated and a 15.5 Tristanian nontunneled dialysis catheter was placed with the tip within the cavoatrial junction. The catheter was tested and found to flush and aspirate appropriately. The catheter was heparinized and secured to the skin.     IMPRESSION:  1.  Successful non-tunneled dialysis catheter placement. PLAN: 1. Dialysis catheter is ready to be used. 2. Nontunneled right groin dialysis catheter can be removed in ICU.       Study Result    Narrative & Impression   EXAM: MR BRAIN W/O and W  CONTRAST  LOCATION: [facility]  DATE: 8/17/2024     INDICATION: Headache in a patient with MSSA bacteremia secondary to aortic valve endocarditis.  Look for cerebral septic emboli; Headache; Acute HA (< 3 months), no complicating features  COMPARISON: None.  CONTRAST: 9 ml gadavist  TECHNIQUE: Routine multiplanar multisequence head MRI without and with intravenous contrast.     FINDINGS: Assessment degraded due to motion.  INTRACRANIAL CONTENTS:   Apparent focus of diffusion restriction with T2/FLAIR hyperintensity within the left superior parietal lobule cortex is hyperintense on ADC map, representing T2 shine through.  Focus of signal abnormality measures 13 x 9 mm in the axial plane and does   not have internal enhancement.     Additional punctate foci of hyperintensity on diffusion weighted with similar signal characteristics (periventricular right corona radiata, left superior parietal lobule, and left cerebellar hemisphere).     Patchy and confluent nonspecific T2/FLAIR hyperintensities within the cerebral white matter most consistent with moderate chronic microvascular ischemic change. Moderate generalized cerebral atrophy. No hydrocephalus. Normal position of the cerebellar   tonsils. No discernible abnormal intracranial enhancement; however, assessment substantially degraded due to motion. 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     SELLA: No abnormality accounting for technique.     OSSEOUS STRUCTURES/SOFT TISSUES: Normal marrow signal. The major intracranial vascular flow voids are maintained.      ORBITS: No abnormality accounting for technique.      SINUSES/MASTOIDS: Mild mucosal thickening scattered about the paranasal sinuses. Scattered fluid/membrane thickening in the left mastoid air cells. No apparent mass in the posterior nasopharynx or skull base.                                                                       IMPRESSION: Assessment degraded due to motion.  1.  13 mm  focus of cortical signal abnormality within the left superior parietal lobule which is favored to represent a subacute infarct; low-grade glial neoplasm could conceivably have a similar appearance. Hyperacute intraparenchymal hematoma could   also have a similar appearance but is considered less likely. Recommend noncontrast head CT for further characterization. Also recommend follow-up MRI brain without and with contrast in 8-12 weeks to document expected evolution.  2.  Additional smaller foci of signal abnormality with similar characteristics favored to represent punctate subacute infarcts.

## 2024-09-10 NOTE — PROGRESS NOTES
CALORIE COUNT      Met patient and patient's spouse.    Patient eating small meals.      We discussed increased protein needs for healing at this time.      Diet RX:  Regular  Supplements:  Ensure Enlive daily and Magic cup daily.  Tube feeding:  Novasource Renal per [place] at 35 ml/hr - this has been on hold x 2 days.    Approximate Oral Intake for:    9/9/24  Per Patient and patient's spouse, patient took about 3/4 of an Ensure Enlive mixed with a magic cup yesterday.    Estimate 480 kcals and 21 g protein for above supplement intake.    No other data found for calorie counts 9/9/24.    Approximate Oral Intake for:    9/10/24  Breakfast and Lunch:  990 kcals, 20 g protein        Intake from TF/PN:   Held x 2 days.        Estimated Needs:    Calories:6258-2711/day  Protein: 67+/day      Summary:  Patient not yet meeting nutrition needs but improving every day.      Plan:  Patient does not like the Ensure.  She prefers the chocolate Magic cup and is willing to take these with meals.  Patient also agreeable to try Gelatein plus.  Ordered Magic cup 3 times/day and Gelatein plus 1 time/day  Stop the Ensure Enlive.    Suggest discontinue the feeding tube as patient making gains with po intake.      Encourage po intake.

## 2024-09-10 NOTE — PROGRESS NOTES
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  Now with plan for IHD today.  UOP remains quite low although she passed some urine this morning per patient.  Recs:  - MWF dialysis, no metabolic indication for dialysis at this point in time  - midodrine with dialysis for hemodynamic support  - schedule torsemide to see if she responds to diuretics  - no longer bacteremic, likely need to switch to tunneled line in the relative near future, unclear that she is going to recover in the near term    CKD - has underlying CKD with baseline CR 1.3-1.8, some proteinuria on prior UA but minimal on UPC.  Saw Dr. 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  Extensive serologic testing at that time was negative.    Volume status - down from peak.  Minimal response to diuretics, s/p UF with CRRT, will see how she tolerates volume removal currently on IHD    MSSA bacteremia - on abx and ID following    MV and AV infective endocarditis , 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: s/p biprothetic AoV and MV replacement, Aortic root replacement and 2vCABG    HoTN with septic and cardiogenic shock - resolved HoTN; on dobutamine still, being tapered    Anemia - following hgb, better today after transfusion    Thrombocytopenia - trending up a bit, possibly related to cefepime.  Have been avoiding heparin; HD ordered without heparin; ok for heparin lock for cath    Hyperlipidemia; statin on hold    CAD: s/p 2v CABG as part of valve surgery    A fib; previously on amio, paced.      Resp failure; extubated; on O2 mostly for comfort    Nutrition; on TF    Dispo; DNR, DNI; currently patient looking towards restorative  cares          Subjective  Needed to reduce UF goal while on dialysis 2/2 hypotension yesterday.  Dobutamine reduced to 0.75 this morning.  BP stable.  EP consulted by Cards for potential PPM.    Seen in room.  Slightly more upbeat.  She mentions she passed some urine spontaneously.  Discussed no need for dialysis today, will follow on a MWF basis.    Objective    Vital signs in last 24 hours  Temp:  [97  F (36.1  C)-98  F (36.7  C)] 98  F (36.7  C)  Pulse:  [91-92] 92  Resp:  [13-43] 23  BP: ()/(54-75) 127/75  SpO2:  [90 %-100 %] 94 %      Intake/Output last 3 shifts  I/O last 3 completed shifts:  In: 1686.6 [P.O.:820; I.V.:376.6; NG/GT:40]  Out: 1800 [Other:1800]  Intake/Output this shift:  No intake/output data recorded.    Physical Exam  Alert/oriented x 3, awake, NAD; appears tired; on O2  CV: RRR without murmur or rub  Lung:  decreased at bases, no crackles  Ext: minimal edema in sacral area  Skin; no rash  Access: LIJ temporary line    Pertinent Labs     Last Renal Panel:  Sodium   Date Value Ref Range Status   09/10/2024 136 135 - 145 mmol/L Final     Potassium   Date Value Ref Range Status   09/10/2024 3.9 3.4 - 5.3 mmol/L Final     Potassium POCT   Date Value Ref Range Status   08/27/2024 3.4 3.4 - 5.3 mmol/L Final     Chloride   Date Value Ref Range Status   09/10/2024 98 98 - 107 mmol/L Final     Carbon Dioxide (CO2)   Date Value Ref Range Status   09/10/2024 24 22 - 29 mmol/L Final     Anion Gap   Date Value Ref Range Status   09/10/2024 14 7 - 15 mmol/L Final     Glucose   Date Value Ref Range Status   09/10/2024 107 (H) 70 - 99 mg/dL Final     GLUCOSE BY METER POCT   Date Value Ref Range Status   09/04/2024 102 (H) 70 - 99 mg/dL Final     Urea Nitrogen   Date Value Ref Range Status   09/10/2024 45.2 (H) 8.0 - 23.0 mg/dL Final     Creatinine   Date Value Ref Range Status   09/10/2024 3.33 (H) 0.51 - 0.95 mg/dL Final     GFR Estimate   Date Value Ref Range Status   09/10/2024 13 (L) >60  mL/min/1.73m2 Final     Comment:     eGFR calculated using 2021 CKD-EPI equation.     Calcium   Date Value Ref Range Status   09/10/2024 8.6 (L) 8.8 - 10.4 mg/dL Final     Comment:     Reference intervals for this test were updated on 7/16/2024 to reflect our healthy population more accurately. There may be differences in the flagging of prior results with similar values performed with this method. Those prior results can be interpreted in the context of the updated reference intervals.     Phosphorus   Date Value Ref Range Status   09/07/2024 3.1 2.5 - 4.5 mg/dL Final     Albumin   Date Value Ref Range Status   09/10/2024 3.7 3.5 - 5.2 g/dL Final     Recent Labs   Lab 09/10/24  0415 09/09/24  1448 09/09/24  0433 09/08/24  0540 09/07/24  0340   HGB 8.0* 7.8* 6.9* 7.6* 8.0*          I reviewed all lab results    Jair Galvan  [facility]  [phone]

## 2024-09-10 NOTE — PLAN OF CARE
Goal Outcome Evaluation:      Plan of Care Reviewed With: patient    Overall Patient Progress: improvingOverall Patient Progress: improving    Outcome Evaluation: Creatinine improved to 3.33.  Dobutamine gtt decreased to 0.75mcg/kg/min per Dr. Branch. EP consult today for PPM.      Problem: Cardiovascular Surgery  Goal: Improved Activity Tolerance  Outcome: Progressing

## 2024-09-10 NOTE — PROGRESS NOTES
HEART CARE NOTE          Assessment/Recommendations   1. Severe valvular disease c/b post-op cardiogenic shock  Assessment / Plan  Hemodynamics stable and tolerating IHD with volume status significantly improved- wean dobutamine - continue to monitor hemodynamics and renal function closely  GDMT as detailed below; mainstay of treatment for HFpEF includes diuretics and adequate BP control (class I) and SGLT2-I (class 2a); additional medical therapy (ARNI, MRA, ARB) demonstrated less robust evidence for indication but may be considered per guideline recommendations (2b); no indication for BBlockers      Current Pharmacotherapy AHA Guideline-Directed Medical Therapy   Losartan  - not started 2/2 renal dysfunction ARNI/ARB   Spironolactone not started  MRA   SGLT2 inhibitor: not started SGLT2-I    Furosemide gtt - currently on IHD Loop diuretic       2. 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 End organ dysfunction  Assessment / Plan  CRS; CRRT/volume removal per nephrology  Shock liver - resolving; hemodynamic support as above; continue to monitor UOP and renal fucntion closely     4. Anemia  Assessment / Plan  Management and supportive care per primary team     5. CAD s/p CABG  Assessment / Plan  Add and titrate GDMT as hemodynamics tolerate     6. Afib   Assessment / Plan  In sinus bradycardia with significant first degree AV delay on telemetry - temp pacer @ 90 with good hemodynamic response   Avoid AV node blocking agents  CHADSVA score ~4 or higher  EP consult placced to determine the need for PPM as patient appears to require higher rates for adequate hemodynamics - awaiting recs    Plan of care discussed on September 10, 2024 with patient and  at bedside, and primary team overseeing patient's care    Patient remains critically  "ill in the ICU requiring hemodynamic support via vasopressors s/p OHS c/b cardiogenic shock. 50 minutes spent on critical care time    History of Present Illness/Subjective    Ms. Felicitas Elliott is a 84 year old female with a PMHx significant for (per Epic notation) presented with fatigue, weakness, chills, poor appetite. Does not really have much for chronic medical conditions other than chronic back pain, furunculosis, uterine prolapse, tobacco use      Today, Mrs. Elliott denies acute cardiac events or complaints; looking forward to going for a walk today and spending more time in the chair; Management plan as detailed above      ECHO (personnaly Reviewed on 8/19/24):   1. The left ventricle is normal in size. Left ventricular function is  normal.The ejection fraction is 55-60%.  2. Normal right ventricle size and systolic function.  3. Large vegetation on mitral valve (8 mm x 15) attached to posterior leaflet.  4. There is a small vegetation on the aortic valve (6 mm). No evidence of  aortic root abscess identified.  5. Severe valvular aortic stenosis (SHU:0.8 cm2, peak nomi: 4.6 m/sec, mean  gradient: 51 mmHg).    Telemetry: personally reviewed September 10, 2024; notable for paced rhythm     Medical history and pertinent documents reviewed in Care Everywhere please where applicable see details above        Physical Examination Review of Systems   /75   Pulse 92   Temp 97.4  F (36.3  C) (Oral)   Resp 23   Ht 1.702 m (5' 7\")   Wt 83.5 kg (184 lb 1.4 oz)   SpO2 94%   BMI 28.83 kg/m    Body mass index is 28.83 kg/m .  Wt Readings from Last 3 Encounters:   09/10/24 83.5 kg (184 lb 1.4 oz)   08/15/24 90.1 kg (198 lb 9.6 oz)     General Appearance:   no distress, normal body habitus   ENT/Mouth: membranes moist, no oral lesions or bleeding gums.      EYES:  no scleral icterus, normal conjunctivae   Neck: no carotid bruits or thyromegaly   Chest/Lungs:   lungs are clear to auscultation, no rales or wheezing, " equal chest wall expansion    Cardiovascular:   Regular. Normal first and second heart sounds with no murmurs, rubs, or gallops; the carotid, radial and posterior tibial pulses are intact, no JVD or LE edema bilaterally    Abdomen:  no organomegaly, masses, bruits, or tenderness; bowel sounds are present   Extremities: no cyanosis or clubbing   Skin: no xanthelasma, warm.    Neurologic: NAD     Psychiatric: alert and oriented x3, calm     A complete 10 systems ROS was reviewed  And is negative except what is listed in the HPI.          Medical History  Surgical History Family History Social History   History reviewed. No pertinent past medical history. Past Surgical History:   Procedure Laterality Date    CORONARY ARTERY BYPASS GRAFT, WITH AORTIC VALVE REPLACEMENT, WITH ENDOSCOPIC VESSEL PROCUREMENT N/A 8/27/2024    Procedure: CORONARY ARTERY BYPASS GRAFT TIMES TWO, WITH AORTIC ROOT REPLACEMENT, WITH LEFT INTERNAL MAMMARY ARTERY HARVEST, LEFT SAPHNENOUS ENDOSCOPIC VESSEL PROCUREMENT,;  Surgeon: Dylan Renae MD;  Location: [facility] OR    CV CORONARY ANGIOGRAM N/A 8/20/2024    Procedure: CV CORONARY ANGIOGRAM;  Surgeon: Den Wylie MD;  Location: [facility] CATH LAB CV    IR CVC NON TUNNEL PLACEMENT > 5 YRS  9/1/2024    PICC TRIPLE LUMEN PLACEMENT  9/6/2024    REPLACE VALVE MITRAL N/A 8/27/2024    Procedure: MITRAL VALVE REPLACEMENT,;  Surgeon: Dylan Renae MD;  Location: [facility] OR    TRANSESOPHAGEAL ECHOCARDIOGRAM INTRAOPERATIVE  8/27/2024    Procedure: ANESTHESIA TRANSESOPHAGEAL ECHOCARDIOGRAM, EPI-AORTIC ULTRASOUND;  Surgeon: Dylan Renae MD;  Location: [facility] OR    no family history of premature coronary artery disease Social History     Socioeconomic History    Marital status:      Spouse name: Not on file    Number of children: Not on file    Years of education: Not on file    Highest education level: Not on file   Occupational History     Not on file   Tobacco Use    Smoking status: Not on file    Smokeless tobacco: Not on file   Substance and Sexual Activity    Alcohol use: Not on file    Drug use: Not on file    Sexual activity: Not on file   Other Topics Concern    Not on file   Social History Narrative    Not on file     Social Determinants of Health     Financial Resource Strain: Low Risk  (8/24/2024)    Financial Resource Strain     Within the past 12 months, have you or your family members you live with been unable to get utilities (heat, electricity) when it was really needed?: No   Food Insecurity: Low Risk  (8/24/2024)    Food Insecurity     Within the past 12 months, did you worry that your food would run out before you got money to buy more?: No     Within the past 12 months, did the food you bought just not last and you didn t have money to get more?: No   Transportation Needs: Low Risk  (8/24/2024)    Transportation Needs     Within the past 12 months, has lack of transportation kept you from medical appointments, getting your medicines, non-medical meetings or appointments, work, or from getting things that you need?: No   Physical Activity: Not on file   Stress: Not on file   Social Connections: Unknown (1/3/2024)    [facility]    Social Connections     Frequency of Communication with Friends and Family: Not on file   Interpersonal Safety: Low Risk  (9/6/2024)    Interpersonal Safety     Do you feel physically and emotionally safe where you currently live?: Yes     Within the past 12 months, have you been hit, slapped, kicked or otherwise physically hurt by someone?: No     Within the past 12 months, have you been humiliated or emotionally abused in other ways by your partner or ex-partner?: No   Recent Concern: Interpersonal Safety - High Risk (8/23/2024)    Interpersonal Safety     Do you feel physically and emotionally safe where you currently live?: No     Within the past 12 months,  "have you been hit, slapped, kicked or otherwise physically hurt by someone?: No     Within the past 12 months, have you been humiliated or emotionally abused in other ways by your partner or ex-partner?: No   Housing Stability: Low Risk  (8/24/2024)    Housing Stability     Do you have housing? : Yes     Are you worried about losing your housing?: No           Lab Results    Chemistry/lipid CBC Cardiac Enzymes/BNP/TSH/INR   Lab Results   Component Value Date    BUN 45.2 (H) 09/10/2024     09/10/2024    CO2 24 09/10/2024    Lab Results   Component Value Date    WBC 12.8 (H) 09/10/2024    HGB 8.0 (L) 09/10/2024    HCT 24.6 (L) 09/10/2024    MCV 99 09/10/2024    PLT 61 (L) 09/10/2024    Lab Results   Component Value Date    INR 1.22 (H) 09/10/2024     No results found for: \"CKTOTAL\", \"CKMB\", \"TROPONINI\"       Weight:    Wt Readings from Last 3 Encounters:   09/10/24 83.5 kg (184 lb 1.4 oz)   08/15/24 90.1 kg (198 lb 9.6 oz)       Allergies  Allergies   Allergen Reactions    Aspirin Rash    Cats Rash    Nickel Rash         Surgical History  Past Surgical History:   Procedure Laterality Date    CORONARY ARTERY BYPASS GRAFT, WITH AORTIC VALVE REPLACEMENT, WITH ENDOSCOPIC VESSEL PROCUREMENT N/A 8/27/2024    Procedure: CORONARY ARTERY BYPASS GRAFT TIMES TWO, WITH AORTIC ROOT REPLACEMENT, WITH LEFT INTERNAL MAMMARY ARTERY HARVEST, LEFT SAPHNENOUS ENDOSCOPIC VESSEL PROCUREMENT,;  Surgeon: Dylan Renae MD;  [place] [facility] [place]    CV CORONARY ANGIOGRAM N/A 8/20/2024    Procedure: CV CORONARY ANGIOGRAM;  Surgeon: Den Wylie MD;  Location: [facility] CATH LAB CV    IR CVC NON TUNNEL PLACEMENT > 5 YRS  9/1/2024    PICC TRIPLE LUMEN PLACEMENT  9/6/2024    REPLACE VALVE MITRAL N/A 8/27/2024    Procedure: MITRAL VALVE REPLACEMENT,;  Surgeon: Dylan Renae MD;  Location: [facility] [place]    TRANSESOPHAGEAL ECHOCARDIOGRAM INTRAOPERATIVE  8/27/2024    Procedure: ANESTHESIA TRANSESOPHAGEAL " ECHOCARDIOGRAM, EPI-AORTIC ULTRASOUND;  Surgeon: Dylan Renae MD;  Location: [facility] OR       Social History  Tobacco:   History   Smoking Status    Not on file   Smokeless Tobacco    Not on file    Alcohol:   Social History    Substance and Sexual Activity      Alcohol use: Not on file   Illicit Drugs:   History   Drug Use Not on file       Family History  History reviewed. No pertinent family history.       Navneet Branch MD on 9/10/2024      cc: Clinic, Timbo Malik

## 2024-09-10 NOTE — PLAN OF CARE
[facility] - ICU    RN Progress Note:            Pertinent Assessments:      Please refer to flowsheet rows for full assessment     A/O x4. LS clear/dim. On Room air. % v paced. Eating small amounts. No BM today. 2 incontinent urine. Up in chair with PT for 3 hours. Dhaval back to bed.            Key Events - This Shift:       Dobutamine off per provider.                Barriers to Discharge / Downgrade:     She is Cardiac Tele.         Point of Contact Update: YES-OR-NO: Yes  If No, reason:   Name:family at bedside  Phone Number:see chart  Summary of Conversation: update and plan of care       Steffanie Coto RN on 9/10/2024 at 6:33 PM

## 2024-09-10 NOTE — PLAN OF CARE
Speech Language Therapy Discharge Summary    Reason for therapy discharge:    All goals and outcomes met, no further needs identified.    Progress towards therapy goal(s). See goals on Care Plan in [facility] electronic health record for goal details.  Goals met    Therapy recommendation(s):    No further therapy is recommended. Discharge diet: Regular textures and Thin liquids    No reported difficulty since upgrade ot regular textures. Intake continues to be reduced and patient does report some ongoing gagging as intake increases. Reviewed safe swallow strategies of alternating solids and liquids and slow rate of intake. There is ongoing concern for esophageal dysphagia given delayed gagging and possible esophageal residuals noted during VFSS (though sticky texture and NG tube present). Consider esophageal dysphagia and appropriate follow up. No ongoing ST, goals have been met.

## 2024-09-10 NOTE — PROGRESS NOTES
CVTS Daily Progress Note   POD#14 s/p aortic root abscess debridement and aortic annular reconstruction, aortic root replacement (Konect composite 25 mm Silva Inspiris Resilia bioprosthetic valve within 30 mm Gelweave Valsalva graft with reimplantation of the coronary arteries), mitral valve replacement (31 mm St. Tim Silva bioprosthetic valve) and CAB x 2.  Attending: Dr Renae  LOS: 25    SUBJECTIVE/INTERVAL EVENTS:    No acute events overnight. Normotensive on dobutamine at 0.75. Tolerated HD yesterday with 1.8L removed. Paced at 90bpm. EP to see for possible permanent device. Maintaining O2 sats on 1L NC. Up in chair and working with therapy. Tolerating some PO intake although NG remains in place. LFT, plt, & WBC overall stable. Remains on Vanc; ID following. Hgb 8.0 after 1u PRBC yesterday. Patient denies new chest pain, SOB, nausea, calf pain. She has no questions today.     OBJECTIVE:  Temp:  [97  F (36.1  C)-98  F (36.7  C)] 98  F (36.7  C)  Pulse:  [91-92] 91  Resp:  [13-43] 33  BP: ()/(54-88) 137/76  SpO2:  [90 %-100 %] 95 %  Vitals:    09/05/24 0400 09/06/24 0253 09/07/24 0400 09/09/24 0600   Weight: 90.5 kg (199 lb 8.3 oz) 87.5 kg (192 lb 14.4 oz) 85 kg (187 lb 6.3 oz) 83.9 kg (184 lb 15.5 oz)    09/10/24 0400   Weight: 83.5 kg (184 lb 1.4 oz)       Clinically Significant Risk Factors          # Hypocalcemia: Lowest Ca = 8.6 mg/dL in last 2 days, will monitor and replace as appropriate     # Hypoalbuminemia: Lowest albumin = 3.1 g/dL at 9/1/2024  1:45 PM, will monitor as appropriate    # Coagulation Defect: INR = 1.22 (Ref range: 0.85 - 1.15) and/or PTT = 52 Seconds (Ref range: 22 - 38 Seconds), will monitor for bleeding  # Thrombocytopenia: Lowest platelets = 51 in last 2 days, will monitor for bleeding          #Acute blood loss anemia: Lowest Hgb this hospitalization: 6.9 g/dL. Will continue to monitor and treat/transfuse as appropriate.     # Overweight: Estimated body mass index is  "28.83 kg/m  as calculated from the following:    Height as of this encounter: 1.702 m (5' 7\").    Weight as of this encounter: 83.5 kg (184 lb 1.4 oz).   # Moderate Malnutrition: based on nutrition assessment      # Financial/Environmental Concerns:     # History of CABG: noted on surgical history        Current Medications:    Scheduled Meds:  Current Facility-Administered Medications   Medication Dose Route Frequency Provider Last Rate Last Admin    [Held by provider] atorvastatin (LIPITOR) tablet 80 mg  80 mg Oral QPM Jessica Tolbert PA-C   80 mg at 08/29/24 2038    [Held by provider] clopidogrel (PLAVIX) tablet 75 mg  75 mg Oral Daily Maryam Anthony PA-C        sennosides (SENOKOT) syrup 5 mL  5 mL Oral BID Aurelio Huang MD   5 mL at 09/04/24 2022    And    docusate (COLACE) 50 MG/5ML liquid 50 mg  50 mg Oral BID Aurelio Huang MD   50 mg at 09/04/24 2027    sodium chloride 0.9% DIALYSIS Cath LOCK - RED Lumen  10 mL Intracatheter Once in dialysis/CRRT Cheryle Jung MD        Followed by    heparin 1000 unit/mL DIALYSIS Cath LOCK - RED Lumen  1.3-2.6 mL Intracatheter Once in dialysis/CRRT Cheryle Jung MD        sodium chloride 0.9% DIALYSIS Cath LOCK - BLUE Lumen  10 mL Intracatheter Once in dialysis/CRRT Cheryle Jung MD        Followed by    heparin 1000 unit/mL DIALYSIS Cath LOCK -BLUE Lumen  1.3-2.6 mL Intracatheter Once in dialysis/CRRT Cheryle Jung MD        lactobacillus rhamnosus (GG) (CULTURELL) capsule 1 capsule  1 capsule Oral or Feeding Tube BID Aurelio Huang MD   1 capsule at 09/09/24 2037    Lidocaine (LIDOCARE) 4 % Patch 1-2 patch  1-2 patch Transdermal Q24H Maryam Anthony PA-C   1 patch at 09/09/24 1935    [Held by provider] metoprolol tartrate (LOPRESSOR) half-tab 12.5 mg  12.5 mg Oral BID McCrone, Jessica, PA-C        midodrine (PROAMATINE) tablet 10 mg  10 mg Oral During Dialysis/CRRT (from stock) Jair Galvan MD        " pantoprazole (PROTONIX) 2 mg/mL suspension 40 mg  40 mg Oral or NG Tube QAM AC Maryam Anthony PA-C        Or    pantoprazole (PROTONIX) EC tablet 40 mg  40 mg Oral QAM AC Maryam Anthony PA-C        Or    pantoprazole (PROTONIX) IV push injection 40 mg  40 mg Intravenous QAM AC Maryam Anthony PA-C   40 mg at 09/10/24 0636    polyethylene glycol (MIRALAX) Packet 17 g  17 g Oral or Feeding Tube Daily Aurelio Huang MD        sodium chloride (PF) 0.9% PF flush 3 mL  3 mL Intracatheter Q8H Gondal, Saad J, MD   20 mL at 09/10/24 0000    sodium chloride (PF) 0.9% PF flush 9 mL  9 mL Intracatheter During Dialysis/CRRT (from stock) Cheryle Jung MD        sodium chloride (PF) 0.9% PF flush 9 mL  9 mL Intracatheter During Dialysis/CRRT (from stock) Cheryle Jung MD        sodium chloride 0.9% BOLUS 250 mL  250 mL Intravenous Once in dialysis/CRRT Cheryle Jung MD        sodium chloride 0.9% BOLUS 300 mL  300 mL Hemodialysis Machine Once Cheryle Jung MD        sodium chloride 0.9% DIALYSIS Cath LOCK - BLUE Lumen  1.3-2.6 mL Intracatheter Once Chreyle Jung MD        sodium chloride 0.9% DIALYSIS Cath LOCK - RED Lumen  1.3-2.6 mL Intracatheter Once Cheryle Jung MD        vancomycin place horne - receiving intermittent dosing  1 each Intravenous See Admin Instructions Dylan Renae MD        [Held by provider] Warfarin Dose Required Daily - Pharmacist Managed  1 each Oral See Admin Instructions Jessica Tolbert PA-C         Continuous Infusions:  Current Facility-Administered Medications   Medication Dose Route Frequency Provider Last Rate Last Admin    dextrose 10% infusion   Intravenous Continuous PRN Aurelio Huang MD        DOBUTamine (DOBUTREX) 500 mg in D5W 250 mL infusion (adult std conc)  0.75 mcg/kg/min Intravenous Continuous Navneet Branch MD 2 mL/hr at 09/10/24 0654 0.75 mcg/kg/min at 09/10/24 0654     PRN Meds:.  Current  Facility-Administered Medications   Medication Dose Route Frequency Provider Last Rate Last Admin    acetaminophen (TYLENOL) oral liquid 650 mg  650 mg Oral Q6H PRN Eddie Way NP   650 mg at 09/09/24 0019    albumin human 25 % injection 25 g  25 g Intravenous Q1H PRN Cheryle Jung MD   Stopped at 09/09/24 1020    sodium chloride (NEBUSAL) 3 % neb solution 3 mL  3 mL Nebulization Q6H PRN Zachary Gonzalez MD        And    albuterol (PROVENTIL) neb solution 2.5 mg  2.5 mg Nebulization Q6H PRN Zachary Gonzalez MD        alteplase (CATHFLO ACTIVASE) injection 2 mg  2 mg Intracatheter Q1H PRN Cheryle Jung MD        alteplase (CATHFLO ACTIVASE) injection 2 mg  2 mg Intracatheter Q1H PRN Cheryle Jung MD        bisacodyl (DULCOLAX) suppository 10 mg  10 mg Rectal Daily PRN Maryam Anthony PA-C        dextrose 10% infusion   Intravenous Continuous PRN Aurelio Huang MD        glucose gel 15-30 g  15-30 g Oral Q15 Min PRN Maryam Anthony PA-C        Or    dextrose 50 % injection 25-50 mL  25-50 mL Intravenous Q15 Min PRN Maryam Anthony PA-C        Or    glucagon injection 1 mg  1 mg Subcutaneous Q15 Min PRN Maryam Anthony PA-C        hydrALAZINE (APRESOLINE) injection 10 mg  10 mg Intravenous Q30 Min PRN Maryam Anthony PA-C        melatonin tablet 3 mg  3 mg Oral At Bedtime PRN Eddie Way NP        menthol (Topical Analgesic) 2.5% (BENGAY VANISHING SCENT) 2.5 % topical gel 1 g  1 g Topical Q8H PRN Eddie Way NP        miconazole (MICATIN) 2 % powder   Topical BID PRN Erick Patino DO        naloxone (NARCAN) injection 0.2 mg  0.2 mg Intravenous Q2 Min PRN Erick Patino DO        Or    naloxone (NARCAN) injection 0.4 mg  0.4 mg Intravenous Q2 Min PRN Carey Erick A, DO        Or    naloxone (NARCAN) injection 0.2 mg  0.2 mg Intramuscular Q2 Min PRN Carey, Erick A, DO        Or    naloxone (NARCAN) injection 0.4 mg   0.4 mg Intramuscular Q2 Min PRN Erick Patino DO        nicotine (NICORETTE) gum 2 mg  2 mg Buccal Q1H PRN Gondal, Saad J, MD        ondansetron (ZOFRAN ODT) ODT tab 8 mg  8 mg Oral Q6H PRN Maryam Anthony PA-C        Or    ondansetron (ZOFRAN) injection 8 mg  8 mg Intravenous Q6H PRN Maryam Anthony PA-C   8 mg at 09/09/24 0237    prochlorperazine (COMPAZINE) injection 5 mg  5 mg Intravenous Q6H PRN Aurelio Huang MD   5 mg at 09/08/24 0546    Or    prochlorperazine (COMPAZINE) tablet 5 mg  5 mg Oral Q6H PRN Aurelio Huang MD        Or    prochlorperazine (COMPAZINE) suppository 12.5 mg  12.5 mg Rectal Q12H PRN Aurelio Huang MD        senna-docusate (SENOKOT-S/PERICOLACE) 8.6-50 MG per tablet 1 tablet  1 tablet Oral BID PRN Gondal, Saad J, MD        Or    senna-docusate (SENOKOT-S/PERICOLACE) 8.6-50 MG per tablet 2 tablet  2 tablet Oral BID PRN Gondal, Saad J, MD        sodium chloride (PF) 0.9% PF flush 10 mL  10 mL Intracatheter Q15 Min PRN Cheryle Jung MD        sodium chloride (PF) 0.9% PF flush 10 mL  10 mL Intracatheter Q15 Min PRN Cheryle Jung MD        sodium chloride (PF) 0.9% PF flush 10-40 mL  10-40 mL Intracatheter Once PRN Maryam Anthony PA-C        sodium chloride (PF) 0.9% PF flush 10-40 mL  10-40 mL Intracatheter Once PRN Bryant Lane MBBS        sodium chloride (PF) 0.9% PF flush 3 mL  3 mL Intracatheter q1 min prn Gondal, Saad J, MD        sodium chloride 0.9% BOLUS 1-250 mL  1-250 mL Intravenous Q1H PRN Dylan Renae MD        sodium chloride 0.9% BOLUS 1-250 mL  1-250 mL Intravenous Q1H PRN Jessica Tolbert PA-C        sodium chloride 0.9% BOLUS 100-150 mL  100-150 mL Intravenous Q15 Min PRN Cheryle Jung MD           Cardiographics:    Telemetry monitoring demonstrates paced rhythm at 90bpm per my personal review.    Imaging:  No results found for this or any previous visit (from the past 24  hour(s)).        Labs, personally reviewed  Hemoglobin   Date Value Ref Range Status   09/10/2024 8.0 (L) 11.7 - 15.7 g/dL Final   09/09/2024 7.8 (L) 11.7 - 15.7 g/dL Final   09/09/2024 6.9 (LL) 11.7 - 15.7 g/dL Final     WBC Count   Date Value Ref Range Status   09/10/2024 12.8 (H) 4.0 - 11.0 10e3/uL Final   09/09/2024 14.7 (H) 4.0 - 11.0 10e3/uL Final   09/08/2024 16.6 (H) 4.0 - 11.0 10e3/uL Final     Platelet Count   Date Value Ref Range Status   09/10/2024 61 (L) 150 - 450 10e3/uL Final   09/09/2024 51 (L) 150 - 450 10e3/uL Final   09/08/2024 41 (LL) 150 - 450 10e3/uL Final     Creatinine   Date Value Ref Range Status   09/10/2024 3.33 (H) 0.51 - 0.95 mg/dL Final   09/09/2024 4.02 (H) 0.51 - 0.95 mg/dL Final   09/08/2024 3.20 (H) 0.51 - 0.95 mg/dL Final     Potassium   Date Value Ref Range Status   09/10/2024 3.9 3.4 - 5.3 mmol/L Final   09/09/2024 4.2 3.4 - 5.3 mmol/L Final   09/08/2024 3.8 3.4 - 5.3 mmol/L Final     Potassium POCT   Date Value Ref Range Status   08/27/2024 3.4 3.4 - 5.3 mmol/L Final   08/27/2024 3.9 3.4 - 5.3 mmol/L Final   08/27/2024 3.4 3.4 - 5.3 mmol/L Final     Magnesium   Date Value Ref Range Status   09/07/2024 2.5 (H) 1.7 - 2.3 mg/dL Final   09/06/2024 2.3 1.7 - 2.3 mg/dL Final   09/06/2024 2.5 (H) 1.7 - 2.3 mg/dL Final          I/O:  I/O last 3 completed shifts:  In: 1686.6 [P.O.:820; I.V.:376.6; NG/GT:40]  Out: 1800 [Other:1800]       Physical Exam:    General: Patient seen in bed. NAD, calm, cooperative on exam  HEENT: moist mucosa, nasal SBFT in place, edentulous  CV: Paced rhythm on monitor, mild edema  Pulm: Unlabored breathing on supplemental oxygen via NC. Incision C/D/I.  Abd: Soft, NT, ND  Ext: Mod pedal edema, SCDs in place, warm  Neuro: CNs grossly intact, face symmetric, speech clear, HENDERSON      ASSESSMENT/PLAN:    Felicitas Elliott is a 84 year old female with a history of endocarditis, CAD, aortic stenosis and mitral valve disorder who is s/p Aortic root abscess debridement  and aortic annular reconstruction, aortic root replacement (Konect composite 25 mm Silva Inspiris Resilia bioprosthetic valve within 30 mm Gelweave Valsalva graft with reimplantation of the coronary arteries), Mitral valve replacement (31 mm St. Tim Silva bioprosthetic valve) and CABx2.  Hospital course c/b ARF requiring renal replacement therapy.    Active Problems:    Bacteremia    Endocarditis    Sepsis (H)    Nonrheumatic aortic valve stenosis    Postsurgical percutaneous transluminal coronary angioplasty status    Mitral valve disorder    Coronary artery disease involving native coronary artery of native heart, unspecified whether angina present    Acute kidney failure, unspecified (H)    Thrombocytopenia    Acute renal failure in s/o CKD    Acute hypoxic respiratory failure, improving    Dysphagia    NEURO:  - PRN Tylenol for pain  - PRN nicorette gum  - Delirium precautions  - PRN melatonin available    CV:  - Pre-op EF 60-65%  - Normotensive  - Currently on dobutamine 0.75mcg, management per Cards - appreciate assistance  - Beta blocker pending weaning from inotrope  - ASA allergy - Plavix 75mg PO daily-held for now, likely until plt >100  - Atorvastatin 80 mg daily (held in light of LFTs)  - Chest tubes removed 9/3; TPW remain due to plt/need for pacing  - Cards following--appreciate recs  - New baseline echo before discharge  - Coumadin x3 months per surgeon preference--currently held. INR goal 2-3 (clarified with pharmacy).    PULM:  - Extubated POD#1, reintubated POD #2, Extubated POD#5  - Maintaining oxygen saturations on 1L NC  - Encourage pulmonary toilet as able    FEN/GI:  - Continue electrolyte replacement protocol  - Diet: per speech; appreciate  - Start kendall counts 9/9  - Bowel regimen    RENAL:  - Anuric  - Daily bladder scans and prn straight caths if unable to void  - Nephrology consulted and managing renal replacement therapy, appreciate assistance; off CRRT since 9/7. - First HD 9/9;  tolerated well  - No appreciable UOP with Lasix challenges 9/4 and 9/7  - If TDL needed for longer term renal replacement, will need to coordinate with recovery from platelet coagulopathy/AC initiation    HEME:  - Acute blood loss anemia post-op  - Hgb 8.0 after 1u PRBC yesterday.   - Hep subcutaneous currently held  - Occult blood positive POD#3.   - Plavix 75mg PO daily (held) - ASA allergy  - Coumadin pharmacy to dose-held  - HIT negative. Plt 61  - Likely hold Plavix and coumadin until plt >100    ID:  - Lennie op ppx complete, afebrile  - ID following-appreciate recs  - Pertinent culture history/susceptibilties:   Susceptibility data from last 90 days.  Collected Specimen Info Organism Ampicillin Ampicillin/Sulbactam Cefepime Ceftazidime Ceftriaxone Ciprofloxacin Clindamycin Daptomycin Doxycycline Erythromycin Gentamicin Levofloxacin Meropenem Oxacillin   08/29/24 Sputum from Expectorate Enterobacter cloacae complex R R  S R R  S      S  S  S      Normal samara                 08/16/24 Peripheral Blood Staphylococcus aureus                 08/14/24 Peripheral Blood Staphylococcus aureus        S  S  S  S  S    S     Collected Specimen Info Organism Piperacillin/Tazobactam Tetracycline Tobramycin Trimethoprim/Sulfamethoxazole  Vancomycin   08/29/24 Sputum from Expectorate Enterobacter cloacae complex R   S  S      Normal samara        08/16/24 Peripheral Blood Staphylococcus aureus        08/14/24 Peripheral Blood Staphylococcus aureus   S   S  S     - Antibiotic history:   - 08/17 - 08/28 Nafcillin   - 08/29 - 08/30 Ceftazidine   - 08/30 - 09/05 Cefepime   - 08/30 - 09/05 PO Vanco  - 08/30 - current IV Vanco    ENDO:  - A1c 6.4  - Euglycemic     PPx:  - DVT: SCDs, SQ heparin TID (held), OOB/ambulation as able  - GI: Protonix 40mg IV/PO daily    DISPO:  - Transfer to C status  - PICC placed 9/6  - Medically Ready for Discharge: Anticipated in 5+ Days         Patient discussed with   Oc.      _______  Maryam Anthony PA-C  Cardiothoracic Surgery  [phone]

## 2024-09-11 ENCOUNTER — APPOINTMENT (OUTPATIENT)
Dept: OCCUPATIONAL THERAPY | Facility: HOSPITAL | Age: 84
DRG: 853 | End: 2024-09-11
Attending: STUDENT IN AN ORGANIZED HEALTH CARE EDUCATION/TRAINING PROGRAM
Payer: COMMERCIAL

## 2024-09-11 ENCOUNTER — APPOINTMENT (OUTPATIENT)
Dept: INTERVENTIONAL RADIOLOGY/VASCULAR | Facility: HOSPITAL | Age: 84
DRG: 853 | End: 2024-09-11
Attending: NURSE PRACTITIONER
Payer: COMMERCIAL

## 2024-09-11 ENCOUNTER — TRANSCRIBE ORDERS (OUTPATIENT)
Dept: CARDIOLOGY | Facility: CLINIC | Age: 84
End: 2024-09-11
Payer: COMMERCIAL

## 2024-09-11 DIAGNOSIS — Z95.0 CARDIAC PACEMAKER IN SITU: Primary | ICD-10-CM

## 2024-09-11 LAB
ANION GAP SERPL CALCULATED.3IONS-SCNC: 16 MMOL/L (ref 7–15)
BUN SERPL-MCNC: 63.9 MG/DL (ref 8–23)
CALCIUM SERPL-MCNC: 9 MG/DL (ref 8.8–10.4)
CHLORIDE SERPL-SCNC: 95 MMOL/L (ref 98–107)
CREAT SERPL-MCNC: 4.83 MG/DL (ref 0.51–0.95)
EGFRCR SERPLBLD CKD-EPI 2021: 8 ML/MIN/1.73M2
ERYTHROCYTE [DISTWIDTH] IN BLOOD BY AUTOMATED COUNT: 22.8 % (ref 10–15)
GLUCOSE SERPL-MCNC: 103 MG/DL (ref 70–99)
HCO3 SERPL-SCNC: 22 MMOL/L (ref 22–29)
HCT VFR BLD AUTO: 23.9 % (ref 35–47)
HGB BLD-MCNC: 7.9 G/DL (ref 11.7–15.7)
INR PPP: 1.22 (ref 0.85–1.15)
MCH RBC QN AUTO: 33.2 PG (ref 26.5–33)
MCHC RBC AUTO-ENTMCNC: 33.1 G/DL (ref 31.5–36.5)
MCV RBC AUTO: 100 FL (ref 78–100)
PLATELET # BLD AUTO: 75 10E3/UL (ref 150–450)
POTASSIUM SERPL-SCNC: 4 MMOL/L (ref 3.4–5.3)
RBC # BLD AUTO: 2.38 10E6/UL (ref 3.8–5.2)
SODIUM SERPL-SCNC: 133 MMOL/L (ref 135–145)
VANCOMYCIN SERPL-MCNC: 24.3 UG/ML
WBC # BLD AUTO: 12.4 10E3/UL (ref 4–11)

## 2024-09-11 PROCEDURE — 85610 PROTHROMBIN TIME: CPT | Performed by: PHYSICIAN ASSISTANT

## 2024-09-11 PROCEDURE — 250N000011 HC RX IP 250 OP 636: Performed by: NURSE PRACTITIONER

## 2024-09-11 PROCEDURE — 250N000013 HC RX MED GY IP 250 OP 250 PS 637: Performed by: SURGERY

## 2024-09-11 PROCEDURE — C1750 CATH, HEMODIALYSIS,LONG-TERM: HCPCS

## 2024-09-11 PROCEDURE — 90935 HEMODIALYSIS ONE EVALUATION: CPT | Performed by: INTERNAL MEDICINE

## 2024-09-11 PROCEDURE — 99232 SBSQ HOSP IP/OBS MODERATE 35: CPT | Mod: 57 | Performed by: NURSE PRACTITIONER

## 2024-09-11 PROCEDURE — 250N000011 HC RX IP 250 OP 636: Performed by: STUDENT IN AN ORGANIZED HEALTH CARE EDUCATION/TRAINING PROGRAM

## 2024-09-11 PROCEDURE — 272N000500 HC NEEDLE CR2

## 2024-09-11 PROCEDURE — 99232 SBSQ HOSP IP/OBS MODERATE 35: CPT | Performed by: INTERNAL MEDICINE

## 2024-09-11 PROCEDURE — 97535 SELF CARE MNGMENT TRAINING: CPT | Mod: GO

## 2024-09-11 PROCEDURE — 250N000013 HC RX MED GY IP 250 OP 250 PS 637: Performed by: PHYSICIAN ASSISTANT

## 2024-09-11 PROCEDURE — 5A1D70Z PERFORMANCE OF URINARY FILTRATION, INTERMITTENT, LESS THAN 6 HOURS PER DAY: ICD-10-PCS | Performed by: INTERNAL MEDICINE

## 2024-09-11 PROCEDURE — 250N000009 HC RX 250: Performed by: STUDENT IN AN ORGANIZED HEALTH CARE EDUCATION/TRAINING PROGRAM

## 2024-09-11 PROCEDURE — 250N000011 HC RX IP 250 OP 636: Performed by: INTERNAL MEDICINE

## 2024-09-11 PROCEDURE — 99152 MOD SED SAME PHYS/QHP 5/>YRS: CPT

## 2024-09-11 PROCEDURE — 99291 CRITICAL CARE FIRST HOUR: CPT | Performed by: INTERNAL MEDICINE

## 2024-09-11 PROCEDURE — 85027 COMPLETE CBC AUTOMATED: CPT | Performed by: PHYSICIAN ASSISTANT

## 2024-09-11 PROCEDURE — 120N000013 HC R&B IMCU

## 2024-09-11 PROCEDURE — 258N000003 HC RX IP 258 OP 636: Performed by: INTERNAL MEDICINE

## 2024-09-11 PROCEDURE — 80202 ASSAY OF VANCOMYCIN: CPT | Performed by: PHYSICIAN ASSISTANT

## 2024-09-11 PROCEDURE — 250N000013 HC RX MED GY IP 250 OP 250 PS 637: Performed by: INTERNAL MEDICINE

## 2024-09-11 PROCEDURE — 02H633Z INSERTION OF INFUSION DEVICE INTO RIGHT ATRIUM, PERCUTANEOUS APPROACH: ICD-10-PCS | Performed by: STUDENT IN AN ORGANIZED HEALTH CARE EDUCATION/TRAINING PROGRAM

## 2024-09-11 PROCEDURE — 97110 THERAPEUTIC EXERCISES: CPT | Mod: GO

## 2024-09-11 PROCEDURE — 0JH63XZ INSERTION OF TUNNELED VASCULAR ACCESS DEVICE INTO CHEST SUBCUTANEOUS TISSUE AND FASCIA, PERCUTANEOUS APPROACH: ICD-10-PCS | Performed by: STUDENT IN AN ORGANIZED HEALTH CARE EDUCATION/TRAINING PROGRAM

## 2024-09-11 PROCEDURE — 90935 HEMODIALYSIS ONE EVALUATION: CPT

## 2024-09-11 PROCEDURE — 80048 BASIC METABOLIC PNL TOTAL CA: CPT | Performed by: PHYSICIAN ASSISTANT

## 2024-09-11 RX ORDER — NALOXONE HYDROCHLORIDE 0.4 MG/ML
0.4 INJECTION, SOLUTION INTRAMUSCULAR; INTRAVENOUS; SUBCUTANEOUS
Status: DISCONTINUED | OUTPATIENT
Start: 2024-09-11 | End: 2024-09-11

## 2024-09-11 RX ORDER — FLUMAZENIL 0.1 MG/ML
0.2 INJECTION, SOLUTION INTRAVENOUS
Status: DISCONTINUED | OUTPATIENT
Start: 2024-09-11 | End: 2024-09-11

## 2024-09-11 RX ORDER — NALOXONE HYDROCHLORIDE 0.4 MG/ML
0.2 INJECTION, SOLUTION INTRAMUSCULAR; INTRAVENOUS; SUBCUTANEOUS
Status: DISCONTINUED | OUTPATIENT
Start: 2024-09-11 | End: 2024-09-11

## 2024-09-11 RX ORDER — FENTANYL CITRATE 50 UG/ML
25-50 INJECTION, SOLUTION INTRAMUSCULAR; INTRAVENOUS EVERY 5 MIN PRN
Status: DISCONTINUED | OUTPATIENT
Start: 2024-09-11 | End: 2024-09-11

## 2024-09-11 RX ORDER — CEFAZOLIN SODIUM 2 G/100ML
2 INJECTION, SOLUTION INTRAVENOUS
Status: CANCELLED | OUTPATIENT
Start: 2024-09-11

## 2024-09-11 RX ORDER — HEPARIN SODIUM 1000 [USP'U]/ML
4100 INJECTION, SOLUTION INTRAVENOUS; SUBCUTANEOUS ONCE
Status: COMPLETED | OUTPATIENT
Start: 2024-09-11 | End: 2024-09-11

## 2024-09-11 RX ORDER — ONDANSETRON 2 MG/ML
4 INJECTION INTRAMUSCULAR; INTRAVENOUS
Status: DISCONTINUED | OUTPATIENT
Start: 2024-09-11 | End: 2024-09-11

## 2024-09-11 RX ORDER — DEXMEDETOMIDINE HYDROCHLORIDE 4 UG/ML
.1-1.5 INJECTION, SOLUTION INTRAVENOUS CONTINUOUS
Status: CANCELLED | OUTPATIENT
Start: 2024-09-11

## 2024-09-11 RX ORDER — CEFAZOLIN SODIUM 1 G/50ML
750 SOLUTION INTRAVENOUS ONCE
Status: COMPLETED | OUTPATIENT
Start: 2024-09-11 | End: 2024-09-11

## 2024-09-11 RX ORDER — ACETAMINOPHEN 325 MG/1
650 TABLET ORAL EVERY 6 HOURS PRN
Status: DISCONTINUED | OUTPATIENT
Start: 2024-09-11 | End: 2024-09-13

## 2024-09-11 RX ORDER — FENTANYL CITRATE 50 UG/ML
25 INJECTION, SOLUTION INTRAMUSCULAR; INTRAVENOUS
Status: CANCELLED | OUTPATIENT
Start: 2024-09-11

## 2024-09-11 RX ADMIN — FENTANYL CITRATE 50 MCG: 50 INJECTION, SOLUTION INTRAMUSCULAR; INTRAVENOUS at 14:49

## 2024-09-11 RX ADMIN — TORSEMIDE 100 MG: 100 TABLET ORAL at 12:19

## 2024-09-11 RX ADMIN — Medication 1 CAPSULE: at 20:29

## 2024-09-11 RX ADMIN — HEPARIN SODIUM 4100 UNITS: 1000 INJECTION INTRAVENOUS; SUBCUTANEOUS at 16:16

## 2024-09-11 RX ADMIN — ACETAMINOPHEN 650 MG: 325 TABLET ORAL at 12:19

## 2024-09-11 RX ADMIN — MIDAZOLAM HYDROCHLORIDE 0.5 MG: 1 INJECTION, SOLUTION INTRAMUSCULAR; INTRAVENOUS at 14:47

## 2024-09-11 RX ADMIN — ATORVASTATIN CALCIUM 80 MG: 40 TABLET, FILM COATED ORAL at 20:29

## 2024-09-11 RX ADMIN — LIDOCAINE HYDROCHLORIDE 10 ML: 10 INJECTION, SOLUTION EPIDURAL; INFILTRATION; INTRACAUDAL; PERINEURAL at 15:02

## 2024-09-11 RX ADMIN — SODIUM CHLORIDE 750 MG: 9 INJECTION, SOLUTION INTRAVENOUS at 12:15

## 2024-09-11 RX ADMIN — Medication 40 MG: at 06:40

## 2024-09-11 ASSESSMENT — ACTIVITIES OF DAILY LIVING (ADL)
ADLS_ACUITY_SCORE: 42
ADLS_ACUITY_SCORE: 38
ADLS_ACUITY_SCORE: 42
ADLS_ACUITY_SCORE: 38
ADLS_ACUITY_SCORE: 42
ADLS_ACUITY_SCORE: 38

## 2024-09-11 NOTE — PROGRESS NOTES
HEART CARE NOTE          Assessment/Recommendations   1. Severe valvular disease c/b post-op cardiogenic shock  Assessment / Plan  Hemodynamics stable and tolerating IHD with volume status significantly improved; tolerated dobutamine wean - continue to monitor hemodynamics and renal function closely  GDMT as detailed below; mainstay of treatment for HFpEF includes diuretics and adequate BP control (class I) and SGLT2-I (class 2a); additional medical therapy (ARNI, MRA, ARB) demonstrated less robust evidence for indication but may be considered per guideline recommendations (2b); no indication for BBlockers      Current Pharmacotherapy AHA Guideline-Directed Medical Therapy   Losartan  - not started 2/2 renal dysfunction ARNI/ARB   Spironolactone not started  MRA   SGLT2 inhibitor: not started SGLT2-I    Furosemide gtt - currently on IHD Loop diuretic       2. 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 End organ dysfunction  Assessment / Plan  CRS; CRRT/volume removal per nephrology  Shock liver - resolving; hemodynamic support as above; continue to monitor UOP and renal fucntion closely     4. Anemia  Assessment / Plan  Management and supportive care per primary team     5. CAD s/p CABG  Assessment / Plan  Add and titrate GDMT as hemodynamics tolerate     6. Afib   Assessment / Plan  In sinus bradycardia with significant first degree AV delay on telemetry - temp pacer @ 90 with good hemodynamic response   Avoid AV node blocking agents  CHADSVA score ~4 or higher  EP consult placced to determine the need for PPM -  Please contact their team directly with any questions or concerns regarding rate, rhythm, device, AAT and EP procedures       Plan of care discussed on September 11, 2024 with patient and daughter at bedside, and primary team  "overseeing patient's care    Patient remains critically ill in the ICU requiring hemodynamic support via vasopressors s/p OHS c/b cardiogenic shock. 50 minutes soent on critical care time     History of Present Illness/Subjective    Ms. Felicitas Elliott is a 84 year old female with a PMHx significant for (per Epic notation) presented with fatigue, weakness, chills, poor appetite. Does not really have much for chronic medical conditions other than chronic back pain, furunculosis, uterine prolapse, tobacco use      Today, Mrs. Elliott denies acute cardiac events and complaints; Management plan as detailed above      ECHO (personnaly Reviewed on 8/19/24):   1. The left ventricle is normal in size. Left ventricular function is  normal.The ejection fraction is 55-60%.  2. Normal right ventricle size and systolic function.  3. Large vegetation on mitral valve (8 mm x 15) attached to posterior leaflet.  4. There is a small vegetation on the aortic valve (6 mm). No evidence of  aortic root abscess identified.  5. Severe valvular aortic stenosis (SHU:0.8 cm2, peak nomi: 4.6 m/sec, mean  gradient: 51 mmHg).    Telemetry: personally reviewed September 11, 2024; notable for paced     Medical history and pertinent documents reviewed in Care Everywhere please where applicable see details above        Physical Examination Review of Systems   /73   Pulse 92   Temp 98.1  F (36.7  C) (Oral)   Resp 20   Ht 1.702 m (5' 7\")   Wt 82.7 kg (182 lb 5.1 oz)   SpO2 98%   BMI 28.56 kg/m    Body mass index is 28.56 kg/m .  Wt Readings from Last 3 Encounters:   09/11/24 82.7 kg (182 lb 5.1 oz)   08/15/24 90.1 kg (198 lb 9.6 oz)     General Appearance:   no distress, normal body habitus   ENT/Mouth: membranes moist, no oral lesions or bleeding gums.      EYES:  no scleral icterus, normal conjunctivae   Neck: no carotid bruits or thyromegaly   Chest/Lungs:   lungs are clear to auscultation, no rales or wheezing, equal chest wall expansion  "   Cardiovascular:   Regular. Normal first and second heart sounds with no murmurs, rubs, or gallops; the carotid, radial and posterior tibial pulses are intact, no JVD and mils LE edema bilaterally    Abdomen:  no organomegaly, masses, bruits, or tenderness; bowel sounds are present   Extremities: no cyanosis or clubbing   Skin: no xanthelasma, warm.    Neurologic: NAD     Psychiatric: alert and oriented x3, calm     A complete 10 systems ROS was reviewed  And is negative except what is listed in the HPI.          Medical History  Surgical History Family History Social History   History reviewed. No pertinent past medical history. Past Surgical History:   Procedure Laterality Date    CORONARY ARTERY BYPASS GRAFT, WITH AORTIC VALVE REPLACEMENT, WITH ENDOSCOPIC VESSEL PROCUREMENT N/A 8/27/2024    Procedure: CORONARY ARTERY BYPASS GRAFT TIMES TWO, WITH AORTIC ROOT REPLACEMENT, WITH LEFT INTERNAL MAMMARY ARTERY HARVEST, LEFT SAPHNENOUS ENDOSCOPIC VESSEL PROCUREMENT,;  Surgeon: Dylan Renae MD;  Location: [facility] OR    CV CORONARY ANGIOGRAM N/A 8/20/2024    Procedure: CV CORONARY ANGIOGRAM;  Surgeon: Den Wylie MD;  Location: [facility] CATH LAB CV    IR CVC NON TUNNEL PLACEMENT > 5 YRS  9/1/2024    PICC TRIPLE LUMEN PLACEMENT  9/6/2024    REPLACE VALVE MITRAL N/A 8/27/2024    Procedure: MITRAL VALVE REPLACEMENT,;  Surgeon: Dylan Renae MD;  Location: [facility] OR    TRANSESOPHAGEAL ECHOCARDIOGRAM INTRAOPERATIVE  8/27/2024    Procedure: ANESTHESIA TRANSESOPHAGEAL ECHOCARDIOGRAM, EPI-AORTIC ULTRASOUND;  Surgeon: Dylan Renae MD;  Location: [facility] OR    no family history of premature coronary artery disease Social History     Socioeconomic History    Marital status:      Spouse name: Not on file    Number of children: Not on file    Years of education: Not on file    Highest education level: Not on file   Occupational History    Not on file   Tobacco  Use    Smoking status: Not on file    Smokeless tobacco: Not on file   Substance and Sexual Activity    Alcohol use: Not on file    Drug use: Not on file    Sexual activity: Not on file   Other Topics Concern    Not on file   Social History Narrative    Not on file     Social Determinants of Health     Financial Resource Strain: Low Risk  (8/24/2024)    Financial Resource Strain     Within the past 12 months, have you or your family members you live with been unable to get utilities (heat, electricity) when it was really needed?: No   Food Insecurity: Low Risk  (8/24/2024)    Food Insecurity     Within the past 12 months, did you worry that your food would run out before you got money to buy more?: No     Within the past 12 months, did the food you bought just not last and you didn t have money to get more?: No   Transportation Needs: Low Risk  (8/24/2024)    Transportation Needs     Within the past 12 months, has lack of transportation kept you from medical appointments, getting your medicines, non-medical meetings or appointments, work, or from getting things that you need?: No   Physical Activity: Not on file   Stress: Not on file   Social Connections: Unknown (1/3/2024)    Received from [facility]    Social Connections     Frequency of Communication with Friends and Family: Not on file   Interpersonal Safety: Low Risk  (9/6/2024)    Interpersonal Safety     Do you feel physically and emotionally safe where you currently live?: Yes     Within the past 12 months, have you been hit, slapped, kicked or otherwise physically hurt by someone?: No     Within the past 12 months, have you been humiliated or emotionally abused in other ways by your partner or ex-partner?: No   Recent Concern: Interpersonal Safety - High Risk (8/23/2024)    Interpersonal Safety     Do you feel physically and emotionally safe where you currently live?: No     Within the past 12 months, have you been hit,  "slapped, kicked or otherwise physically hurt by someone?: No     Within the past 12 months, have you been humiliated or emotionally abused in other ways by your partner or ex-partner?: No   Housing Stability: Low Risk  (8/24/2024)    Housing Stability     Do you have housing? : Yes     Are you worried about losing your housing?: No           Lab Results    Chemistry/lipid CBC Cardiac Enzymes/BNP/TSH/INR   Lab Results   Component Value Date    BUN 63.9 (H) 09/11/2024     (L) 09/11/2024    CO2 22 09/11/2024    Lab Results   Component Value Date    WBC 12.4 (H) 09/11/2024    HGB 7.9 (L) 09/11/2024    HCT 23.9 (L) 09/11/2024     09/11/2024    PLT 75 (L) 09/11/2024    Lab Results   Component Value Date    INR 1.22 (H) 09/11/2024     No results found for: \"CKTOTAL\", \"CKMB\", \"TROPONINI\"       Weight:    Wt Readings from Last 3 Encounters:   09/11/24 82.7 kg (182 lb 5.1 oz)   08/15/24 90.1 kg (198 lb 9.6 oz)       Allergies  Allergies   Allergen Reactions    Aspirin Rash    Cats Rash    Nickel Rash         Surgical History  Past Surgical History:   Procedure Laterality Date    CORONARY ARTERY BYPASS GRAFT, WITH AORTIC VALVE REPLACEMENT, WITH ENDOSCOPIC VESSEL PROCUREMENT N/A 8/27/2024    Procedure: CORONARY ARTERY BYPASS GRAFT TIMES TWO, WITH AORTIC ROOT REPLACEMENT, WITH LEFT INTERNAL MAMMARY ARTERY HARVEST, LEFT SAPHNENOUS ENDOSCOPIC VESSEL PROCUREMENT,;  Surgeon: Dylan Renae MD;  Location: [facility] OR    CV CORONARY ANGIOGRAM N/A 8/20/2024    Procedure: CV CORONARY ANGIOGRAM;  Surgeon: Den Wylie MD;  Location: [facility] CATH LAB CV    IR CVC NON TUNNEL PLACEMENT > 5 YRS  9/1/2024    PICC TRIPLE LUMEN PLACEMENT  9/6/2024    REPLACE VALVE MITRAL N/A 8/27/2024    Procedure: MITRAL VALVE REPLACEMENT,;  Surgeon: Dylan Renae MD;  Location: [facility] OR    TRANSESOPHAGEAL ECHOCARDIOGRAM INTRAOPERATIVE  8/27/2024    Procedure: ANESTHESIA TRANSESOPHAGEAL ECHOCARDIOGRAM, " EPI-AORTIC ULTRASOUND;  Surgeon: Dylan Renae MD;  Location: [facility] OR       Social History  Tobacco:   History   Smoking Status    Not on file   Smokeless Tobacco    Not on file    Alcohol:   Social History    Substance and Sexual Activity      Alcohol use: Not on file   Illicit Drugs:   History   Drug Use Not on file       Family History  History reviewed. No pertinent family history.       Navneet Branch MD on 9/11/2024      cc: Clinic, [facility]ik

## 2024-09-11 NOTE — PLAN OF CARE
Goal Outcome Evaluation:      Plan of Care Reviewed With: patient, family    Overall Patient Progress: improvingOverall Patient Progress: improving    Outcome Evaluation: Plan for permanent pacemaker tomorrow    [facility] - ICU    RN Progress Note:            Pertinent Assessments:      Please refer to flowsheet rows for full assessment     Pt's non-tunneled LEFT dialysis access removed after run this am. Pt to IR this afternoon for tunneled right sided access. No complications noted.     Pt NPO until after IR. Ate large dinner at 1420 and ordered a snack for later tonight if she would like (in refrigerator). NJ removed this morning.     Scheduled torsamide dose given after dialysis late in am. Changed brief with scant urine this afternoon.     Pt with loose stools previously. Stool softners held today and last bm was this am.     Pt c/o thigh pain after getting OOB to chair at end of noc shift. Tylenol given with relief.            Key Events - This Shift:       NJ removed, non-tunneled removed, tunneled placed.     Plan for permanent pacemaker tomorrow.                 Barriers to Discharge / Downgrade:     Needs permanent pacemeker         Point of Contact Update: YES-OR-NO: multiple family members here throughout day, including , updated at bedside

## 2024-09-11 NOTE — PROGRESS NOTES
[facility] Inpatient follow up        09/11/2024             Assessment and Recommendations:   Assessment:  Felicitas Elliott is a 84 year old female with     Respiratory failure, shock liver-slow improvement.  Extubated  Decreased urine output, on dobutamine currently, had been on norepinephrine and vasopressin. 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  MSSA bacteremia.  Positive blood culture on 8/14/2024 and 8/16.  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  With the size of the vegetation combined with brain infarct, status post CV surgery, see details below active issue.   Neck pain worrisome for cervical discitis.  Neck MRI showed some edema at C4-C5 on the left side.  No clear evidence of infection.  Abnormal brain MRI suggestive of subacute infarct. In a patient with MSSA bacteremia and aortic valve endocarditis, this is cerebral septic emboli. Active issue.     POSTOPERATIVE DIAGNOSES:  1.  Severe aortic stenosis and moderate aortic regurgitation.  2.  Severe mitral stenosis.  3.  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  4.  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  5.  Severe  multivessel coronary artery disease.  6.  Aortic root abscess.      PROCEDURE PERFORMED: 8/27/24  Aortic root abscess debridement and aortic annular reconstruction.  Aortic root replacement (Konect composite 25 mm Silva Inspiris Resilia bioprosthetic valve within 30 mm Gelweave Valsalva graft with reimplantation of the coronary arteries).  Mitral valve replacement (31 mm [employer]. Tim Silva bioprosthetic valve).  Coronary artery bypass grafting x 2 (reversed saphenous vein graft to the obtuse marginal branch of the left circumflex coronary artery, and pedicled left internal mammary artery to left anterior descending coronary artery).  Endoscopic vein harvest of the greater saphenous vein from the left lower extremity.    Recommendations:  Continue vancomycin-dosing per Pharm.D.  End date oct 8th.    Completed cefepime for pneumonia on 9/5/24--  Monitor CBC, CMP--thrombocytopenia, seems to be coincident with cefepime usage. And is slowly better.   Status post vascular surgery, chest tube, CV surgery following closely  Will need to check immunoglobin level in 4 to 6 weeks sister with history of hypogammaglobulinemia         RU Portillo MD  [facility]  Answering Service: [phone]  On-Call ID provider: [phone], option: 9                Interval History:     HPI: Long d/w 3 family members and pt about rationale of present abx.  Also told them about vanco levels and dosing.  At this stage of process going back to ancef to me would not optimize her care or outcome very likely.              Recent Inflammatory Biomarkers:   Recent Labs   Lab Test 09/11/24  0448 09/10/24  0415 09/09/24  0433 09/08/24  0540 09/07/24  0340 09/06/24  2023 08/30/24  0437 08/29/24  0359   PCAL  --   --   --   --   --   --   --  1.24*   WBC 12.4* 12.8* 14.7* 16.6* 15.8* 14.3*   < > 15.3*    < > = values in this interval not displayed.            Review of Systems:      Negative except for findings in the HPI.            Current Medications (antimicrobials listed in bold):     Current Facility-Administered Medications   Medication Dose Route Frequency Provider Last Rate Last Admin    atorvastatin (LIPITOR) tablet 80 mg  80 mg Oral QPM Maryam Anthony PA-C   80 mg at 08/29/24 2038    [Held by provider] clopidogrel (PLAVIX) tablet 75 mg  75 mg Oral Daily Maryam Anthony PA-C        sennosides (SENOKOT) syrup 5 mL  5 mL Oral BID Maryam Anthony PA-C   5 mL at 09/10/24 2123    And    docusate (COLACE) 50 MG/5ML liquid 50 mg  50 mg Oral BID Maryam Anthony PA-C   50 mg at 09/10/24 2123    sodium chloride 0.9% DIALYSIS Cath LOCK - RED Lumen  10 mL Intracatheter Once in dialysis/CRRT Maryam Anthony PA-C        Followed by    heparin 1000 unit/mL DIALYSIS Cath LOCK - RED Lumen  1.3-2.6 mL Intracatheter Once in dialysis/CRRT Maryma Anthony PA-C        sodium chloride 0.9% DIALYSIS Cath LOCK - BLUE Lumen  10 mL Intracatheter Once in dialysis/CRRT Maryam Anthony PA-C        Followed by    heparin 1000 unit/mL DIALYSIS Cath LOCK -BLUE Lumen  1.3-2.6 mL Intracatheter Once in dialysis/CRRT Maryam Anthony PA-C        lactobacillus rhamnosus (GG) (CULTURELL) capsule 1 capsule  1 capsule Oral or Feeding Tube BID Maryam Anthony PA-C   1 capsule at 09/10/24 2123    Lidocaine (LIDOCARE) 4 % Patch 1-2 patch  1-2 patch Transdermal Q24H Maryam Anthony PA-C   1 patch at 09/10/24 1828    [Held by provider] metoprolol tartrate (LOPRESSOR) half-tab 12.5 mg  12.5 mg Oral BID Jessica Tolbert PA-C        midodrine (PROAMATINE) tablet 10 mg  10 mg Oral During Dialysis/CRRT (from stock) Maryam Anthony PA-C        pantoprazole (PROTONIX) 2 mg/mL suspension 40 mg  40 mg Oral or NG Tube QAM AC Maryam Anthony PA-C   40 mg at 09/11/24 0640    Or    pantoprazole (PROTONIX) EC tablet 40 mg  40 mg Oral [facility] Maryam Chicas PA-C        Or    pantoprazole  (PROTONIX) IV push injection 40 mg  40 mg Intravenous QAM AC Maryam Anthony PA-C   40 mg at 09/10/24 0636    polyethylene glycol (MIRALAX) Packet 17 g  17 g Oral or Feeding Tube Daily Maryam Anthony PA-C   17 g at 09/10/24 1118    sodium chloride (PF) 0.9% PF flush 3 mL  3 mL Intracatheter Q8H Maryam Anthony PA-C   3 mL at 09/11/24 0139    sodium chloride (PF) 0.9% PF flush 9 mL  9 mL Intracatheter During Dialysis/CRRT (from stock) Maryam Anthony PA-C        sodium chloride (PF) 0.9% PF flush 9 mL  9 mL Intracatheter During Dialysis/CRRT (from stock) Maryam Anthony PA-C        sodium chloride 0.9% BOLUS 250 mL  250 mL Intravenous Once in dialysis/CRRT Maryam Anthony PA-C        sodium chloride 0.9% BOLUS 300 mL  300 mL Hemodialysis Machine Once Maryam Anthony PA-C        sodium chloride 0.9% DIALYSIS Cath LOCK - BLUE Lumen  1.3-2.6 mL Intracatheter Once Maryam Anthony PA-C        sodium chloride 0.9% DIALYSIS Cath LOCK - RED Lumen  1.3-2.6 mL Intracatheter Once Maryam Anthony PA-C        torsemide (DEMADEX) tablet 100 mg  100 mg Oral Daily Jair Galvan MD   100 mg at 09/10/24 1118    vancomycin (VANCOCIN) 750 mg in sodium chloride 0.9 % 250 mL intermittent infusion  750 mg Intravenous Once Faheem Portillo MD        vancomycin place horne - receiving intermittent dosing  1 each Intravenous See Admin Instructions Maryam Anthony PA-C        [Held by provider] Warfarin Dose Required Daily - Pharmacist Managed  1 each Oral See Admin Instructions Jessica Tolbert PA-C                  Allergies:     Allergies   Allergen Reactions    Aspirin Rash    Cats Rash    Nickel Rash            Physical Exam:   Vitals were reviewed  Patient Vitals for the past 24 hrs:   BP Temp Temp src Pulse Resp SpO2 Weight   09/11/24 1100 -- -- -- 92 -- 96 % --   09/11/24 1045 -- -- -- 92 -- 95 % --   09/11/24 1030 -- -- -- 92 -- 96 % --   09/11/24 1015  -- -- -- 92 -- 95 % --   09/11/24 1000 105/64 -- -- 92 -- 95 % --   09/11/24 0945 105/67 -- -- 92 -- 94 % --   09/11/24 0930 120/67 -- -- 92 -- 95 % --   09/11/24 0915 132/71 -- -- 92 -- 98 % --   09/11/24 0910 -- -- -- 92 -- 97 % --   09/11/24 0900 125/70 -- -- 92 -- 95 % --   09/11/24 0845 -- -- -- 92 -- 96 % --   09/11/24 0830 131/67 -- -- 92 -- 94 % --   09/11/24 0815 -- -- -- 92 -- (!) 89 % --   09/11/24 0800 127/69 97.3  F (36.3  C) Oral 92 18 95 % --   09/11/24 0745 129/75 -- -- 92 -- 95 % --   09/11/24 0740 -- -- -- 92 -- 97 % --   09/11/24 0735 -- 98.1  F (36.7  C) Oral 91 -- 93 % --   09/11/24 0730 -- -- -- 92 -- 93 % --   09/11/24 0600 131/73 -- -- 92 -- 98 % --   09/11/24 0535 -- -- -- 94 -- 95 % --   09/11/24 0530 -- -- -- 93 -- (!) 89 % --   09/11/24 0500 -- -- -- -- -- -- 82.7 kg (182 lb 5.1 oz)   09/11/24 0400 (!) 142/81 98.1  F (36.7  C) Oral 92 20 98 % --   09/11/24 0000 (!) 142/77 98.1  F (36.7  C) Oral 92 20 98 % --   09/10/24 2201 -- -- -- 92 -- (!) 89 % --   09/10/24 2200 -- -- -- 92 -- (!) 87 % --   09/10/24 2000 (!) 147/75 97.6  F (36.4  C) Oral 92 24 93 % --   09/10/24 1500 (!) 143/74 -- -- 92 (!) 55 95 % --   09/10/24 1200 118/65 -- -- 92 (!) 37 95 % --       Physical Examination:    Resp: 18    Gen: Appears ill, extubated, nasal canula, still on low dose dobutamine  HEENT: opens eyes  PICC, chest wall catheter for hemodialysis, rectal tube  CV: Sternal incision, temporary pacer  Lungs: coarse, currently on nasal cannula.  Chest tubes have been removed  Skin: Warm  Extr: edema- especially L leg  Neuro: extubated, opens eyes  HD catheter  PICC- R basilic           Laboratory Data:   ID Labs:  Microbiology labs:  7-Day Micro Results       No results found for the last 168 hours.          Susceptibility data from last 90 days.  Collected Specimen Info Organism Ampicillin Ampicillin/Sulbactam Cefepime Ceftazidime Ceftriaxone Ciprofloxacin Clindamycin Daptomycin Doxycycline Erythromycin  Gentamicin Levofloxacin Meropenem Oxacillin   08/29/24 Sputum from Expectorate Enterobacter cloacae complex R R  S R R  S      S  S  S      Normal samara                 08/16/24 Peripheral Blood Staphylococcus aureus                 08/14/24 Peripheral Blood Staphylococcus aureus        S  S  S  S  S    S     Collected Specimen Info Organism Piperacillin/Tazobactam Tetracycline Tobramycin Trimethoprim/Sulfamethoxazole  Vancomycin   08/29/24 Sputum from Expectorate Enterobacter cloacae complex R   S  S      Normal samara        08/16/24 Peripheral Blood Staphylococcus aureus        08/14/24 Peripheral Blood Staphylococcus aureus   S   S  S       Reviewed      No lab results found.  Recent Labs   Lab Test 09/11/24  0448 09/10/24  0415 09/09/24  0433 09/08/24  0540 09/07/24  0340 09/06/24 2023   WBC 12.4* 12.8* 14.7* 16.6* 15.8* 14.3*     Recent Labs   Lab Test 09/11/24  0448 09/10/24  0415 09/09/24  0433 09/08/24  1454   CR 4.83* 3.33* 4.02* 3.20*   GFRESTIMATED 8* 13* 10* 14*       Hematology Studies  Recent Labs   Lab Test 09/11/24  0448 09/10/24  0415 09/09/24  1448 09/09/24  0433 09/08/24  0540 09/07/24  0340 09/06/24 2023   WBC 12.4* 12.8*  --  14.7* 16.6* 15.8* 14.3*   HGB 7.9* 8.0* 7.8* 6.9* 7.6* 8.0* 7.6*   HCT 23.9* 24.6*  --  21.1* 23.3* 25.4* 23.8*   PLT 75* 61*  --  51* 41* 32* 25*       Metabolic  Recent Labs   Lab Test 09/11/24  0448 09/10/24  0415 09/09/24  0433   * 136 136   BUN 63.9* 45.2* 70.7*   CO2 22 24 21*   CR 4.83* 3.33* 4.02*   GFRESTIMATED 8* 13* 10*       Hepatic Studies  Recent Labs   Lab Test 09/10/24  0415 09/08/24  1454 09/08/24  0540   BILITOTAL 1.5* 1.1 1.0   ALKPHOS 77 90 107   ALBUMIN 3.7 3.8 3.7   AST 60* 72* 83*   ALT 79* 99* 100*          Imaging Data:   Reviewed     IR CVC Non Tunnel Placement > 5 Yrs    Result Date: 9/1/2024  [facility] [facility] LOCATION: [facility] DATE: 9/1/2024 PROCEDURE: NON-TUNNELED DIALYSIS CATHETER PLACEMENT INTERVENTIONAL  RADIOLOGIST: RU Reyes MD. INDICATION: HAMMAD requiring hemodialysis. CONSENT: The risks, benefits and alternatives of non-tunneled dialysis catheter placement were discussed with the patient in detail. All questions were answered. Informed consent was given to proceed with the procedure. MODERATE SEDATION: None. CONTRAST: None. ANTIBIOTICS: None. ADDITIONAL MEDICATIONS: Heparin 3800 U IV FLUOROSCOPIC TIME: 2.0 minutes. RADIATION DOSE: Air Kerma: 36.04 mGy. COMPLICATIONS: No immediate complications. STERILE BARRIER TECHNIQUE: Maximum sterile barrier technique was used. Cutaneous antisepsis was performed at the operative site with application of 2% chlorhexidine and large sterile drape. Prior to the procedure, the  and assistant performed hand hygiene and wore hat, mask, sterile gown, and sterile gloves during the entire procedure. PROCEDURE: Limited left neck ultrasound was performed which demonstrated a patent internal jugular vein; images saved to the permanent patient medical record. The overlying skin and subcutaneous soft tissues were anesthetized using 1% lidocaine. Under ultrasound guidance, the left internal jugular vein was accessed using a micropuncture needle; images saved of the permanent patient medical record. Access was secured using a 4 Swazi transitional sheath. A [employer] guidewire was advanced into the SVC. Under fluoroscopic guidance, the overlying skin and subcutaneous soft tissues were dilated and a 15.5 Swazi nontunneled dialysis catheter was placed with the tip within the cavoatrial junction. The catheter was tested and found to flush and aspirate appropriately. The catheter was heparinized and secured to the skin.     IMPRESSION:  1.  Successful non-tunneled dialysis catheter placement. PLAN: 1. Dialysis catheter is ready to be used. 2. Nontunneled right groin dialysis catheter can be removed in ICU.       Study Result    Narrative & Impression   EXAM: MR BRAIN W/O and W  CONTRAST  LOCATION: [facility]  DATE: 8/17/2024     INDICATION: Headache in a patient with MSSA bacteremia secondary to aortic valve endocarditis.  Look for cerebral septic emboli; Headache; Acute HA (< 3 months), no complicating features  COMPARISON: None.  CONTRAST: 9 ml gadavist  TECHNIQUE: Routine multiplanar multisequence head MRI without and with intravenous contrast.     FINDINGS: Assessment degraded due to motion.  INTRACRANIAL CONTENTS:   Apparent focus of diffusion restriction with T2/FLAIR hyperintensity within the left superior parietal lobule cortex is hyperintense on ADC map, representing T2 shine through.  Focus of signal abnormality measures 13 x 9 mm in the axial plane and does   not have internal enhancement.     Additional punctate foci of hyperintensity on diffusion weighted with similar signal characteristics (periventricular right corona radiata, left superior parietal lobule, and left cerebellar hemisphere).     Patchy and confluent nonspecific T2/FLAIR hyperintensities within the cerebral white matter most consistent with moderate chronic microvascular ischemic change. Moderate generalized cerebral atrophy. No hydrocephalus. Normal position of the cerebellar   tonsils. No discernible abnormal intracranial enhancement; however, assessment substantially degraded due to motion. 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     SELLA: No abnormality accounting for technique.     OSSEOUS STRUCTURES/SOFT TISSUES: Normal marrow signal. The major intracranial vascular flow voids are maintained.      ORBITS: No abnormality accounting for technique.      SINUSES/MASTOIDS: Mild mucosal thickening scattered about the paranasal sinuses. Scattered fluid/membrane thickening in the left mastoid air cells. No apparent mass in the posterior nasopharynx or skull base.                                                                       IMPRESSION: Assessment degraded due to motion.  1.  13 mm  focus of cortical signal abnormality within the left superior parietal lobule which is favored to represent a subacute infarct; low-grade glial neoplasm could conceivably have a similar appearance. Hyperacute intraparenchymal hematoma could   also have a similar appearance but is considered less likely. Recommend noncontrast head CT for further characterization. Also recommend follow-up MRI brain without and with contrast in 8-12 weeks to document expected evolution.  2.  Additional smaller foci of signal abnormality with similar characteristics favored to represent punctate subacute infarcts.

## 2024-09-11 NOTE — PROCEDURES
[facility]    Procedure: Tunneled Central Venous Catheter Placement    Date/Time: 9/11/2024 2:59 PM    Performed by: Justyn Bowman MD  Authorized by: Justyn Bowman MD  IR Fellow Physician:    Pre Procedure Diagnosis: Long-term HD access needed  Post Procedure Diagnosis: Same    UNIVERSAL PROTOCOL   Site Marked: NA  Prior Images Obtained and Reviewed:  Yes  Required items: Required blood products, implants, devices and special equipment available    Patient identity confirmed:  Verbally with patient, arm band, provided demographic data and hospital-assigned identification number  Patient was reevaluated immediately before administering moderate or deep sedation or anesthesia  Confirmation Checklist:  Patient's identity using two indicators, relevant allergies and procedure was appropriate and matched the consent or emergent situation  Time out: Immediately prior to the procedure a time out was called    Universal Protocol: the Joint Commission Universal Protocol was followed    Preparation: Patient was prepped and draped in usual sterile fashion      SEDATION  Patient Sedated: Yes    Vital signs: Vital signs monitored during sedation    Findings: Successful placement of a right internal jugular vein tunneled central venous catheter (Permcath)    Condition: Stable    Plan: 1. Return to floor  2. Catheter ready for use      PROCEDURE    Length of time physician/provider present for 1:1 monitoring during sedation:  0-22 min

## 2024-09-11 NOTE — PHARMACY-VANCOMYCIN DOSING SERVICE
Pharmacy Vancomycin Note  Date of Service 2024  Patient's  1940   84 year old, female    Indication: Endocarditis  Day of Therapy: long course until 10/8/24  Current vancomycin regimen:  preHD levels  Current vancomycin monitoring method: Trough (Method 2 = manual dose calculation)  Current vancomycin therapeutic monitoring goal: 15-20 mg/L    Current estimated CrCl = Estimated Creatinine Clearance: 9.6 mL/min (A) (based on SCr of 4.83 mg/dL (H)).    Creatinine for last 3 days  2024:  2:54 PM Creatinine 3.20 mg/dL  2024:  4:33 AM Creatinine 4.02 mg/dL  9/10/2024:  4:15 AM Creatinine 3.33 mg/dL  2024:  4:48 AM Creatinine 4.83 mg/dL    Recent Vancomycin Levels (past 3 days)  2024:  4:33 AM Vancomycin 18.9 ug/mL  2024:  4:48 AM Vancomycin 24.3 ug/mL    Vancomycin IV Administrations (past 72 hours)                     vancomycin (VANCOCIN) 1,000 mg in 200 mL dextrose intermittent infusion (mg) 1,000 mg New Bag 24 1342                    Nephrotoxins and other renal medications (From now, onward)      Start     Dose/Rate Route Frequency Ordered Stop    24 1200  vancomycin (VANCOCIN) 750 mg in sodium chloride 0.9 % 250 mL intermittent infusion         750 mg  over 60 Minutes Intravenous ONCE 24 1132      09/10/24 0930  torsemide (DEMADEX) tablet 100 mg         100 mg Oral DAILY 09/10/24 0918      24 1329  vancomycin place horne - receiving intermittent dosing         1 each Intravenous SEE ADMIN INSTRUCTIONS 24 1329                 Contrast Orders - past 72 hours (72h ago, onward)      None            Interpretation of levels and current regimen:  Renal Function: ARF on Dialysis      Plan:  750 mg IV once   Vancomycin monitoring method: Trough (Method 2 = manual dose calculation)  Vancomycin therapeutic monitoring goal: 15-20 mg/L  Pharmacy will check vancomycin levels as appropriate in 1-3 Days.    Denisha Martinez [facility]

## 2024-09-11 NOTE — PROGRESS NOTES
[facility]  [facility]   Follow up    Felicitas Elliott   MRN: [id number]  : 1940   DOA: 2024   CC: HAMMAD on CKD      Assessment and Plan:  84 year old female    HAMMAD on  now HAMMAD in the setting of cardiac surgery on CRRT since  and stopped over the weekend.  Now with plan for IHD today.  UOP remains quite low although she passed some urine this morning per patient.  Recs:  - MWF dialysis  - midodrine with dialysis for hemodynamic support as needed  - schedule torsemide and follow UOP  - okay to remove temp line after dialysis today  - IR consult for tunneled right internal jugular line today, okay to place tomorrow or Friday  - will continue to follow closely    CKD 3a - has underlying CKD with baseline CR 1.3-1.8, some proteinuria on prior UA but minimal on UPC.  Saw Dr. 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  Extensive serologic testing at that time was negative.    Volume status - down from peak.  Aggressively Uf'd over the last week or so with CRRT and UF and much improved    MSSA bacteremia - on abx and ID following.  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  Have been avoiding heparin; HD ordered without heparin; ok for heparin lock for cath    Hyperlipidemia; statin on hold    CAD: s/p 2v CABG as part of valve surgery    A fib; previously on amio, paced.      Resp failure; extubated; on O2 mostly for comfort per RN    Nutrition; on TF and slowly advancing diet    Dispo; DNR, DNI; currently patient looking towards  restorative cares          Subjective  Seen on dialysis.  Hemodynamically stable.  Didn't need midodrine initially but prn for dropping BP.  Appears fairly euvolemic, weight down substantially.  Patient notes more urine that has been unmeasured.  Cr rise sharply, not ready to stop dialysis.    Discussed with CV surgery and EP, they would like to place a ppm, we can remove temp CVC and place tunneled on right, CV surgery okay with this as well.      Reviewed all of this with patient and .    Objective    Vital signs in last 24 hours  Temp:  [97.3  F (36.3  C)-98.1  F (36.7  C)] 97.3  F (36.3  C)  Pulse:  [91-94] 92  Resp:  [18-55] 18  BP: (105-147)/(64-81) 105/64  SpO2:  [87 %-98 %] 96 %      Intake/Output last 3 shifts  I/O last 3 completed shifts:  In: 15 [NG/GT:15]  Out: -   Intake/Output this shift:  No intake/output data recorded.    Physical Exam  Alert/oriented x 3, awake, NAD; appears tired; on O2  CV: RRR without murmur or rub  Lung:  decreased at bases, no crackles  Ext: minimal edema in sacral area  Skin; no rash  Access: LIJ temporary line    Pertinent Labs     Last Renal Panel:  Sodium   Date Value Ref Range Status   09/11/2024 133 (L) 135 - 145 mmol/L Final     Potassium   Date Value Ref Range Status   09/11/2024 4.0 3.4 - 5.3 mmol/L Final     Potassium POCT   Date Value Ref Range Status   08/27/2024 3.4 3.4 - 5.3 mmol/L Final     Chloride   Date Value Ref Range Status   09/11/2024 95 (L) 98 - 107 mmol/L Final     Carbon Dioxide (CO2)   Date Value Ref Range Status   09/11/2024 22 22 - 29 mmol/L Final     Anion Gap   Date Value Ref Range Status   09/11/2024 16 (H) 7 - 15 mmol/L Final     Glucose   Date Value Ref Range Status   09/11/2024 103 (H) 70 - 99 mg/dL Final     GLUCOSE BY METER POCT   Date Value Ref Range Status   09/10/2024 112 (H) 70 - 99 mg/dL Final     Urea Nitrogen   Date Value Ref Range Status   09/11/2024 63.9 (H) 8.0 - 23.0 mg/dL Final     Creatinine   Date Value Ref Range Status    09/11/2024 4.83 (H) 0.51 - 0.95 mg/dL Final     GFR Estimate   Date Value Ref Range Status   09/11/2024 8 (L) >60 mL/min/1.73m2 Final     Comment:     eGFR calculated using 2021 CKD-EPI equation.     Calcium   Date Value Ref Range Status   09/11/2024 9.0 8.8 - 10.4 mg/dL Final     Comment:     Reference intervals for this test were updated on 7/16/2024 to reflect our healthy population more accurately. There may be differences in the flagging of prior results with similar values performed with this method. Those prior results can be interpreted in the context of the updated reference intervals.     Phosphorus   Date Value Ref Range Status   09/07/2024 3.1 2.5 - 4.5 mg/dL Final     Albumin   Date Value Ref Range Status   09/10/2024 3.7 3.5 - 5.2 g/dL Final     Recent Labs   Lab 09/11/24  0448 09/10/24  0415 09/09/24  1448 09/09/24  0433 09/08/24  0540   HGB 7.9* 8.0* 7.8* 6.9* 7.6*          I reviewed all lab results    Jair Galvan  [facility]  [phone]

## 2024-09-11 NOTE — CONSULTS
Interventional Radiology - Pre-Procedure Note:  Inpatient - [facility]  09/11/2024     Procedure Requested: Tunneled HD catheter, right side  Requested by: Dr Galvan    History and Physical Reviewed: H&P documented within 30 days (by Gondal, Saad J, MD  on 8/16/24).  I have personally reviewed the patient's medical history and have updated the medical record as necessary.    Brief HPI: Felicitas Elliott is a 84 year old female admitted 8/16/24 to [facility] with sepsis MSSA bacteremia, Severe aortic and mitral stenosis s/p aortic root abscess debridement and aortic annular reconstruction, aortic root replacement, bioprosthetic aortic valve on 8/27/2024 Past medical hx notable for chronic back pain, furunculosis, uterine prolapse, tobacco use. Nephrology following HAMMAD and currently on CRRT.  S/p non tunneled CVC HD placement on 9/1. BC 8/17/24 negative. ID following. IR consulted for placement of tunneled CVC HD catheter    Per referral:  ongoing dialysis needs, blood cultures negative, needs left side for PPM, please place RIJ tunneled line     IMAGING:  [facility]  LOCATION: [facility]  DATE: 9/1/2024     PROCEDURE: NON-TUNNELED DIALYSIS CATHETER PLACEMENT     INTERVENTIONAL RADIOLOGIST: RU Reyes MD.     INDICATION: HAMMAD requiring hemodialysis.     CONSENT: The risks, benefits and alternatives of non-tunneled dialysis catheter placement were discussed with the patient in detail. All questions were answered. Informed consent was given to proceed with the procedure.     MODERATE SEDATION: None.     CONTRAST: None.  ANTIBIOTICS: None.  ADDITIONAL MEDICATIONS: Heparin 3800 U IV     FLUOROSCOPIC TIME: 2.0 minutes.  RADIATION DOSE: Air Kerma: 36.04 mGy.     COMPLICATIONS: No immediate complications.     STERILE BARRIER TECHNIQUE: Maximum sterile barrier technique was used. Cutaneous antisepsis was performed at the operative site with application of 2%  chlorhexidine and large sterile drape. Prior to the procedure, the  and assistant performed   hand hygiene and wore hat, mask, sterile gown, and sterile gloves during the entire procedure.     PROCEDURE: Limited left neck ultrasound was performed which demonstrated a patent internal jugular vein; images saved to the permanent patient medical record. The overlying skin and subcutaneous soft tissues were anesthetized using 1% lidocaine.     Under ultrasound guidance, the left internal jugular vein was accessed using a micropuncture needle; images saved of the permanent patient medical record. Access was secured using a 4 Swazi transitional sheath. A Chikka guidewire was advanced into the   SVC.     Under fluoroscopic guidance, the overlying skin and subcutaneous soft tissues were dilated and a 15.5 Swazi nontunneled dialysis catheter was placed with the tip within the cavoatrial junction. The catheter was tested and found to flush and aspirate   appropriately. The catheter was heparinized and secured to the skin.                                                                      IMPRESSION:    1.  Successful non-tunneled dialysis catheter placement.     PLAN:  1. Dialysis catheter is ready to be used.  2. Nontunneled right groin dialysis catheter can be removed in ICU.    NPO: since [place]  ANTICOAGULANTS: coumadin- INR 1.22  ANTIBIOTICS: Ancef 2gm IV pre procedure    ALLERGIES  Allergies   Allergen Reactions    Aspirin Rash    Cats Rash    Nickel Rash         LABS:  INR   Date Value Ref Range Status   09/11/2024 1.22 (H) 0.85 - 1.15 Final      Hemoglobin   Date Value Ref Range Status   09/11/2024 7.9 (L) 11.7 - 15.7 g/dL Final     Platelet Count   Date Value Ref Range Status   09/11/2024 75 (L) 150 - 450 10e3/uL Final     Creatinine   Date Value Ref Range Status   09/11/2024 4.83 (H) 0.51 - 0.95 mg/dL Final     Potassium   Date Value Ref Range Status   09/11/2024 4.0 3.4 - 5.3 mmol/L Final     Potassium POCT  "  Date Value Ref Range Status   08/27/2024 3.4 3.4 - 5.3 mmol/L Final         EXAM:  /64   Pulse 92   Temp 97.3  F (36.3  C) (Oral)   Resp 18   Ht 1.702 m (5' 7\")   Wt 82.7 kg (182 lb 5.1 oz)   SpO2 95%   BMI 28.56 kg/m    General: Stable. In no acute distress.    Neuro: Alert and oriented x 3. No focal deficits.  Psych: Appropriate mood and affect. Linear/coherent thought process.   Resp: Normal respirations. Lungs clear to auscultation bilaterally. RA  Cardio: S1S2, regular rate and rhythm-paced,   Abdomen: Soft, non-distended, non-tender.  Vascular:left non tunnled CVC HD catheter in place.  Skin: Warm and dry. Without excoriations, ecchymosis, erythema, lesions or open sores on right chest. Well healing midline chest incision.     Pre-Sedation Assessment:  Mallampati Airway Classification:  II - Faucial pillars and soft palate may be seen, but uvula is masked by the base of the tongue  Previous reaction to anesthesia/sedation:  No  Sedation plan based on assessment: Moderate (conscious) sedation  ASA Classification: Class 3 - SEVERE SYSTEMIC DISEASE, DEFINITE FUNCTIONAL LIMITATIONS.   Code Status: full code intra procedure, per discussion with patient.         ASSESSMENT/PLAN:   Plan for placement of tunneled CVC HD catheter, right side with sedation    Procedural education reviewed with patient/family in detail including, but not limited to risks, benefits and alternatives with understanding verbalized by patient/family.    Total time spent on the date of the encounter: 50 minutes.      CRUZITO IGLESIAS CNP  Interventional Radiology   "

## 2024-09-11 NOTE — PROGRESS NOTES
[facility]  Cardiac Electrophysiology  [address]  [place] [address]   Office: [phone]  Fax: [phone]     Cardiac Electrophysiology Progress Note    Patient: Felicitas Elliott  Date of admission: 8/16/2024   EP attending: Dr. Pina          Assessment and Plan     # Complete atrioventricular block, bifascicular block: continuous pacing need following complex cardiac surgery. LVEF 60%. Left internal jugular catheter removed today; BG NGTD since 8/17. Plan for His dual chamber permanent pacemaker implant tomorrow 9/12. NPO after midnight           Interval History     Feeling ok today, somewhat frustrated she hasn't been eating or sleeping well. Stable off dobutamine           Data Review     EKG/Telemetry  Telemetry reviewed. Ventricular paced 90s. Rare sinus with 1:1 conduction, bigeminal PVC  9/2/2024: SR 66 bpm, AV delay  ms,  ms, LAFB, QT/QTc 458/480 ms. AF 75 bpm, QRS 94 ms, QT/QTc 414/462 ms  8/29/2024: Junctional bradycardia 55 bpm, RBBB  ms  8/16/2024: SR 88 bpm, RBBB  ms, QT/QTc 418/505 ms  Personally reviewed         TTE 9/6/2024  1. Normal left ventricular size and systolic performance with a visually  estimated ejection fraction of 60%.  2. There is abnormal septal motion likely related to altered electrical  activation due to bundle branch block.  3. There is a bio-prosthetic aortic valve (documented 25 mm Silva  [employer] Konect Resilia aortic valved conduit).  Â  Normal aortic valve prosthesis metrics with a mean systolic gradient of 4  mmHg and a peak anterograde velocity of 1.5 m/sec.  Â  No aortic insufficiency is detected.  4. There is a bio-prosthetic mitral valve (documented 31 mm [employer]  Epic porcine pericardial tissue valve).  Â  Normal mitral valve prosthesis function; mean diastolic gradient is 5 mm Hg.   Â  No significant mitral insufficiency is detected.  5. Probable normal right ventricular  size and systolic performance though  right-sided structures are not clearly visualized on all views on this study.        Medications     Current Facility-Administered Medications   Medication Dose Route Frequency Provider Last Rate Last Admin    atorvastatin (LIPITOR) tablet 80 mg  80 mg Oral QPM Maryam Anthony PA-C   80 mg at 08/29/24 2038    [Held by provider] clopidogrel (PLAVIX) tablet 75 mg  75 mg Oral Daily Maryam Anthony PA-C        sennosides (SENOKOT) syrup 5 mL  5 mL Oral BID Maryam Anthony PA-C   5 mL at 09/10/24 2123    And    docusate (COLACE) 50 MG/5ML liquid 50 mg  50 mg Oral BID Maryam Anthony PA-C   50 mg at 09/10/24 2123    sodium chloride 0.9% DIALYSIS Cath LOCK - RED Lumen  10 mL Intracatheter Once in dialysis/CRRT Maryam Anthony PA-C        Followed by    heparin 1000 unit/mL DIALYSIS Cath LOCK - RED Lumen  1.3-2.6 mL Intracatheter Once in dialysis/CRRT Maryam Anthony PA-C        sodium chloride 0.9% DIALYSIS Cath LOCK - BLUE Lumen  10 mL Intracatheter Once in dialysis/CRRT Maryam Anthony PA-C        Followed by    heparin 1000 unit/mL DIALYSIS Cath LOCK -BLUE Lumen  1.3-2.6 mL Intracatheter Once in dialysis/CRRT Maryam Anthony PA-C        lactobacillus rhamnosus (GG) (CULTURELL) capsule 1 capsule  1 capsule Oral or Feeding Tube BID Maryam Anthony PA-C   1 capsule at 09/10/24 2123    Lidocaine (LIDOCARE) 4 % Patch 1-2 patch  1-2 patch Transdermal Q24H Maryam Anthony PA-C   1 patch at 09/10/24 1828    [Held by provider] metoprolol tartrate (LOPRESSOR) half-tab 12.5 mg  12.5 mg Oral BID Jessica Tolbert PA-C        midodrine (PROAMATINE) tablet 10 mg  10 mg Oral During Dialysis/CRRT (from stock) Maryam Anthony PA-C        pantoprazole (PROTONIX) 2 mg/mL suspension 40 mg  40 mg Oral or NG Tube QAM AC Gabrielle, Maryam Rema, PA-C   40 mg at 09/11/24 0640    Or    pantoprazole (PROTONIX) EC tablet 40 mg  " 40 mg Oral QAM AC Maryam Anthony PA-C        Or    pantoprazole (PROTONIX) IV push injection 40 mg  40 mg Intravenous QAM AC Maryam Anthony PA-C   40 mg at 09/10/24 0636    polyethylene glycol (MIRALAX) Packet 17 g  17 g Oral or Feeding Tube Daily Maryam Anthony PA-C   17 g at 09/10/24 1118    sodium chloride (PF) 0.9% PF flush 3 mL  3 mL Intracatheter Q8H Maryam Anthony PA-C   3 mL at 09/11/24 1221    sodium chloride (PF) 0.9% PF flush 9 mL  9 mL Intracatheter During Dialysis/CRRT (from stock) Maryam Anthony PA-C        sodium chloride (PF) 0.9% PF flush 9 mL  9 mL Intracatheter During Dialysis/CRRT (from stock) Maryam Anthony PA-C        sodium chloride 0.9% BOLUS 250 mL  250 mL Intravenous Once in dialysis/CRRT Maryam Anthony PA-C        sodium chloride 0.9% BOLUS 300 mL  300 mL Hemodialysis Machine Once aMryam Anthony PA-C        sodium chloride 0.9% DIALYSIS Cath LOCK - BLUE Lumen  1.3-2.6 mL Intracatheter Once Maryam Anthony PA-C        sodium chloride 0.9% DIALYSIS Cath LOCK - RED Lumen  1.3-2.6 mL Intracatheter Once Maryam Anthony PA-C        torsemide (DEMADEX) tablet 100 mg  100 mg Oral Daily Jair Galvan MD   100 mg at 09/11/24 1219    vancomycin (VANCOCIN) 750 mg in sodium chloride 0.9 % 250 mL intermittent infusion  750 mg Intravenous Once Faheem Portillo MD   750 mg at 09/11/24 1215    vancomycin place horne - receiving intermittent dosing  1 each Intravenous See Admin Instructions Maryam Anthony PA-C        [Held by provider] Warfarin Dose Required Daily - Pharmacist Managed  1 each Oral See Admin Instructions Jessica Tolbert PA-C                 Physical Exam     VITALS: /57   Pulse 92   Temp 97.3  F (36.3  C) (Oral)   Resp 18   Ht 1.702 m (5' 7\")   Wt 82.7 kg (182 lb 5.1 oz)   SpO2 96%   BMI 28.56 kg/m    Wt Readings from Last 3 Encounters:   09/11/24 82.7 kg (182 lb 5.1 oz) "   08/15/24 90.1 kg (198 lb 9.6 oz)       Intake/Output Summary (Last 24 hours) at 9/11/2024 1259  Last data filed at 9/11/2024 1200  Gross per 24 hour   Intake 75 ml   Output 2200 ml   Net -2125 ml       CONSTITUTIONAL: no distress  EYES:  Conjunctivae pink, sclerae clear  E/N/T:  Moist mucous membranes  RESPIRATORY:  Respiratory effort is normal  CARDIOVASCULAR:  regular/paced, normal S1 and S2  EXTREMITIES:  No clubbing or cyanosis   SKIN:  Warm and dry  NEURO/PSYCH:  Oriented x 3, normal affect             Brandi Rao, CNP  Clinical Cardiac Electrophysiology  [facility]

## 2024-09-11 NOTE — PROGRESS NOTES
Care Management Follow Up    Length of Stay (days): 26    Expected Discharge Date: 09/16/2024    Anticipated Discharge Plan:  [facility]     Transportation: Confirmed medical transport    PT Recommendations:    OT Recommendations:  [facility]-Motivated patient will benefit from intensive, interdisciplinary therapy.  Anticipate will be able to tolerate 3 hours of therapy per day (or TCU pending progress)       Barriers to Discharge: medical stability, cardio status-pacemaker placed, off Dobutamine drip, stable hemoglobin, renal stable on HD     Prior Living Situation:  Patient resides in a house with her  and is reported as mostly I/ADL independent when at baseline.  assists with transport and as needed in general. No DME use or current in-home/outpatient services identified.   Spouse is primary family contact.     Discussed  Partnership in Safe Discharge Planning  document with patient/family: No      Handoff Completed: No, handoff not indicated or clinically appropriate     Patient/Spokesperson Updated: Yes.  Patient, spouse, sister 9/11/24     Additional Information:  Medical:  Patient with history of HTN, HFpEF, moderate aortic stenosis, CKD 3, chronic intertriginous skin wounds, chronic neck pain.     Presented 8/14/24 for chills & generalized weakness. 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 8/27/24 s/p bioprosthetic aortic & mitral valve replacement, aortic root replacement, & two vessel coronary artery bypass graft. Course complicated by post operative hypoxemic respiratory failure due to cardiogenic pulmonary edema, atelectasis, & Enterobacter hospital acquired pneumonia. Treated for both cardiogenic-distributive shock, oliguric HAMMAD requiring CRRT, euglycemic ketoacidosis, & ischemic hepatitis.   Patient was extubated on  9/1.     CT Surgery, ID, Nephrology following. IP consulted.            9/11/24:  Planning tunneled CVC HD catheter. Planning pacemaker placement.  [facility] pending medical stability. Updated [facility] at [facility].  Met with patient, spouse and her sister. Reviewed [facility] for discharge plan. Spouse requesting information on discharge planning to be discussed with his daughters. They will be in hospital tomorrow at 1100. CM will plan to meet with patient and family tomorrow.       Lindsey Mena RN

## 2024-09-11 NOTE — IR NOTE
Patient Name: Felicitas Elliott  Medical Record Number: [id number]  Today's Date: 9/11/2024    Procedure: CVC Tunneled  Proceduralist: Dr. Bowman      Sedation medications administered: 0.5 mg midazolam and 50 mcg fentanyl   Sedation time: 10 minutes    Report given to: ICU RN

## 2024-09-11 NOTE — PROGRESS NOTES
CALORIE COUNT    New Findings:  NJ was removed this am.      Diet RX:  NPO for procedure.  Supplements: Magic cup 3x/day, gelatein plus daily.    Tube feeding:  Novasource Renal per [place] at 35 ml/hr - this has been on hold x 2 days.    Approximate Oral Intake for:  9/10/24--breakfast and lunch.  No data found for supper.  Patient did not order a meal for supper.  She states that she ate some of a burrito bowl for supper.      Calories: 990  Protein: 20 g      Intake from TF/PN:     held      Estimated Needs:    Calories:0607-4268/day    Protein: 67+/day      Summary:   Patient has not eaten today d/t NPO.  Continue supplements.  Encourage po intake.    Discontinue tube feeding orders.

## 2024-09-11 NOTE — PROGRESS NOTES
CVTS Daily Progress Note   POD#15 s/p aortic root abscess debridement and aortic annular reconstruction, aortic root replacement (Konect composite 25 mm Silva Inspiris Resilia bioprosthetic valve within 30 mm Gelweave Valsalva graft with reimplantation of the coronary arteries), mitral valve replacement (31 mm St. Tmi Silva bioprosthetic valve) and CAB x 2.  Attending: Dr Renae  LOS: 26    SUBJECTIVE/INTERVAL EVENTS:    No acute events overnight. Normotensive off dobutamine. Paced at 90bpm; EP planning for permanent device in coming days. Maintaining O2 sats on room air this AM. Up in chair and working with therapy. Tolerating PO intake. Planning for HD today. Remains on Vanc; ID following. Hgb 7.9 and grossly stable. Patient denies new chest pain, SOB, nausea, calf pain. She has no questions today.     OBJECTIVE:  Temp:  [97.6  F (36.4  C)-98.1  F (36.7  C)] 98.1  F (36.7  C)  Pulse:  [91-94] 92  Resp:  [20-55] 20  BP: (107-147)/(59-81) 131/67  SpO2:  [87 %-98 %] 96 %  Vitals:    09/06/24 0253 09/07/24 0400 09/09/24 0600 09/10/24 0400   Weight: 87.5 kg (192 lb 14.4 oz) 85 kg (187 lb 6.3 oz) 83.9 kg (184 lb 15.5 oz) 83.5 kg (184 lb 1.4 oz)    09/11/24 0500   Weight: 82.7 kg (182 lb 5.1 oz)       Clinically Significant Risk Factors          # Hypocalcemia: Lowest Ca = 8.6 mg/dL in last 2 days, will monitor and replace as appropriate     # Hypoalbuminemia: Lowest albumin = 3.1 g/dL at 9/1/2024  1:45 PM, will monitor as appropriate    # Coagulation Defect: INR = 1.22 (Ref range: 0.85 - 1.15) and/or PTT = 52 Seconds (Ref range: 22 - 38 Seconds), will monitor for bleeding  # Thrombocytopenia: Lowest platelets = 61 in last 2 days, will monitor for bleeding          #Acute blood loss anemia: Lowest Hgb this hospitalization: 6.9 g/dL. Will continue to monitor and treat/transfuse as appropriate.     # Overweight: Estimated body mass index is 28.56 kg/m  as calculated from the following:    Height as of this  "encounter: 1.702 m (5' 7\").    Weight as of this encounter: 82.7 kg (182 lb 5.1 oz).   # Moderate Malnutrition: based on nutrition assessment      # Financial/Environmental Concerns:     # History of CABG: noted on surgical history        Current Medications:    Scheduled Meds:  Current Facility-Administered Medications   Medication Dose Route Frequency Provider Last Rate Last Admin    atorvastatin (LIPITOR) tablet 80 mg  80 mg Oral QPM Maryam Anthony PA-C   80 mg at 08/29/24 2038    [Held by provider] clopidogrel (PLAVIX) tablet 75 mg  75 mg Oral Daily Maryam Anthony PA-C        sennosides (SENOKOT) syrup 5 mL  5 mL Oral BID Maryam Anthony PA-C   5 mL at 09/10/24 2123    And    docusate (COLACE) 50 MG/5ML liquid 50 mg  50 mg Oral BID Maryam Anthony PA-C   50 mg at 09/10/24 2123    sodium chloride 0.9% DIALYSIS Cath LOCK - RED Lumen  10 mL Intracatheter Once in dialysis/CRRT Maryam Anthony PA-C        Followed by    heparin 1000 unit/mL DIALYSIS Cath LOCK - RED Lumen  1.3-2.6 mL Intracatheter Once in dialysis/CRRT Maryam Anthony PA-C        sodium chloride 0.9% DIALYSIS Cath LOCK - BLUE Lumen  10 mL Intracatheter Once in dialysis/CRRT Maryam Anthony PA-C        Followed by    heparin 1000 unit/mL DIALYSIS Cath LOCK -BLUE Lumen  1.3-2.6 mL Intracatheter Once in dialysis/CRRT Maryam Anthony PA-C        lactobacillus rhamnosus (GG) (CULTURELL) capsule 1 capsule  1 capsule Oral or Feeding Tube BID Maryam Anthony PA-C   1 capsule at 09/10/24 2123    Lidocaine (LIDOCARE) 4 % Patch 1-2 patch  1-2 patch Transdermal Q24H Maryam Anthony PA-C   1 patch at 09/10/24 1828    [Held by provider] metoprolol tartrate (LOPRESSOR) half-tab 12.5 mg  12.5 mg Oral BID Jessica Tolbert PA-C        midodrine (PROAMATINE) tablet 10 mg  10 mg Oral During Dialysis/CRRT (from stock) Maryam Anthony PA-C        pantoprazole (PROTONIX) 2 mg/mL " suspension 40 mg  40 mg Oral or NG Tube QAM AC Maryam Anthony PA-C   40 mg at 09/11/24 0640    Or    pantoprazole (PROTONIX) EC tablet 40 mg  40 mg Oral QAM AC Maryam Anthony PA-C        Or    pantoprazole (PROTONIX) IV push injection 40 mg  40 mg Intravenous QAM AC Maryam Anthony PA-C   40 mg at 09/10/24 0636    polyethylene glycol (MIRALAX) Packet 17 g  17 g Oral or Feeding Tube Daily Maryam Anthony PA-C   17 g at 09/10/24 1118    sodium chloride (PF) 0.9% PF flush 3 mL  3 mL Intracatheter Q8H Maryam Anthony PA-C   3 mL at 09/11/24 0139    sodium chloride (PF) 0.9% PF flush 9 mL  9 mL Intracatheter During Dialysis/CRRT (from stock) Maryam Anthony PA-C        sodium chloride (PF) 0.9% PF flush 9 mL  9 mL Intracatheter During Dialysis/CRRT (from stock) Maryam Anthony PA-C        sodium chloride 0.9% BOLUS 250 mL  250 mL Intravenous Once in dialysis/CRRT Maryam Anthony PA-C        sodium chloride 0.9% BOLUS 300 mL  300 mL Hemodialysis Machine Once Maryam Anthony PA-C        sodium chloride 0.9% DIALYSIS Cath LOCK - BLUE Lumen  1.3-2.6 mL Intracatheter Once Maryam Anthony PA-C        sodium chloride 0.9% DIALYSIS Cath LOCK - RED Lumen  1.3-2.6 mL Intracatheter Once Maryam Anthony PA-C        torsemide (DEMADEX) tablet 100 mg  100 mg Oral Daily Jair Galvan MD   100 mg at 09/10/24 1118    vancomycin place horne - receiving intermittent dosing  1 each Intravenous See Admin Instructions Maryam Anthony PA-C        [Held by provider] Warfarin Dose Required Daily - Pharmacist Managed  1 each Oral See Admin Instructions Jessica Tolbert PA-C         Continuous Infusions:  Current Facility-Administered Medications   Medication Dose Route Frequency Provider Last Rate Last Admin    dextrose 10% infusion   Intravenous Continuous PRN Maryam Anthony PA-C         PRN Meds:.  Current Facility-Administered  Medications   Medication Dose Route Frequency Provider Last Rate Last Admin    acetaminophen (TYLENOL) oral liquid 650 mg  650 mg Oral Q6H PRN Maryam Anthony PA-C   650 mg at 09/10/24 1115    albumin human 25 % injection 25 g  25 g Intravenous Q1H PRN Maryam Anthony PA-C   Stopped at 09/09/24 1020    sodium chloride (NEBUSAL) 3 % neb solution 3 mL  3 mL Nebulization Q6H PRN Maryam Anthony PA-C        And    albuterol (PROVENTIL) neb solution 2.5 mg  2.5 mg Nebulization Q6H PRN Maryam Anthony PA-C        alteplase (CATHFLO ACTIVASE) injection 2 mg  2 mg Intracatheter Q1H PRN Maryam Anthony PA-C        alteplase (CATHFLO ACTIVASE) injection 2 mg  2 mg Intracatheter Q1H PRN Maryam Anthony PA-C        bisacodyl (DULCOLAX) suppository 10 mg  10 mg Rectal Daily PRN Maryam Anthony PA-C        dextrose 10% infusion   Intravenous Continuous PRN Maryam Anthony PA-C        glucose gel 15-30 g  15-30 g Oral Q15 Min PRN Maryam Anthony PA-C        Or    dextrose 50 % injection 25-50 mL  25-50 mL Intravenous Q15 Min PRN Maryam Anthony PA-C        Or    glucagon injection 1 mg  1 mg Subcutaneous Q15 Min PRN Maryam Anthony PA-C        hydrALAZINE (APRESOLINE) injection 10 mg  10 mg Intravenous Q30 Min PRN Maryam Anthony PA-C        melatonin tablet 3 mg  3 mg Oral At Bedtime PRN Maryam Anthony PA-C        menthol (Topical Analgesic) 2.5% (BENGAY VANISHING SCENT) 2.5 % topical gel 1 g  1 g Topical Q8H PRN Maryam Anthony PA-C        miconazole (MICATIN) 2 % powder   Topical BID PRN Maryam Anthony PA-C        naloxone (NARCAN) injection 0.2 mg  0.2 mg Intravenous Q2 Min PRN Maryam Anthony PA-C        Or    naloxone (NARCAN) injection 0.4 mg  0.4 mg Intravenous Q2 Min PRN Maryam Anthony PA-C        Or    naloxone (NARCAN) injection 0.2 mg  0.2 mg Intramuscular Q2 Min PRN Maryam Anthony  MARION Hodgson        Or    naloxone (NARCAN) injection 0.4 mg  0.4 mg Intramuscular Q2 Min PRN Maryam Anthony PA-C        nicotine (NICORETTE) gum 2 mg  2 mg Buccal Q1H PRN Maryam Anthony PA-C        ondansetron (ZOFRAN ODT) ODT tab 8 mg  8 mg Oral Q6H PRN Maryam Anthony PA-C        Or    ondansetron (ZOFRAN) injection 8 mg  8 mg Intravenous Q6H PRN Maryam Anthony PA-C   8 mg at 09/09/24 0237    prochlorperazine (COMPAZINE) injection 5 mg  5 mg Intravenous Q6H PRN Maryam Anthony PA-C   5 mg at 09/08/24 0546    Or    prochlorperazine (COMPAZINE) tablet 5 mg  5 mg Oral Q6H PRN Maryam Anthony PA-C        Or    prochlorperazine (COMPAZINE) suppository 12.5 mg  12.5 mg Rectal Q12H PRN Maryam Anthony PA-C        senna-docusate (SENOKOT-S/PERICOLACE) 8.6-50 MG per tablet 1 tablet  1 tablet Oral BID PRN Maryam Anthony PA-C        Or    senna-docusate (SENOKOT-S/PERICOLACE) 8.6-50 MG per tablet 2 tablet  2 tablet Oral BID PRN Maryam Anthony PA-C        sodium chloride (PF) 0.9% PF flush 10 mL  10 mL Intracatheter Q15 Min PRN Maryam Anthony PA-C        sodium chloride (PF) 0.9% PF flush 10 mL  10 mL Intracatheter Q15 Min PRN Maryam Anthony PA-C        sodium chloride (PF) 0.9% PF flush 10-40 mL  10-40 mL Intracatheter Once PRN Maryam Anthony PA-C        sodium chloride (PF) 0.9% PF flush 10-40 mL  10-40 mL Intracatheter Once PRN Maryam Anthony PA-C        sodium chloride (PF) 0.9% PF flush 3 mL  3 mL Intracatheter q1 min prn Maryam Anthony PA-SOM        sodium chloride 0.9% BOLUS 1-250 mL  1-250 mL Intravenous Q1H PRN Maryam Anthony PA-SOM        sodium chloride 0.9% BOLUS 1-250 mL  1-250 mL Intravenous Q1H PRN Maryam Anthony PA-SOM        sodium chloride 0.9% BOLUS 100-150 mL  100-150 mL Intravenous Q15 Min PRN Maryam Anthony PA-C           Cardiographics:    Telemetry monitoring  demonstrates paced rhythm at 90bpm per my personal review.    Imaging:  No results found for this or any previous visit (from the past 24 hour(s)).        Labs, personally reviewed  Hemoglobin   Date Value Ref Range Status   09/11/2024 7.9 (L) 11.7 - 15.7 g/dL Final   09/10/2024 8.0 (L) 11.7 - 15.7 g/dL Final   09/09/2024 7.8 (L) 11.7 - 15.7 g/dL Final     WBC Count   Date Value Ref Range Status   09/11/2024 12.4 (H) 4.0 - 11.0 10e3/uL Final   09/10/2024 12.8 (H) 4.0 - 11.0 10e3/uL Final   09/09/2024 14.7 (H) 4.0 - 11.0 10e3/uL Final     Platelet Count   Date Value Ref Range Status   09/11/2024 75 (L) 150 - 450 10e3/uL Final   09/10/2024 61 (L) 150 - 450 10e3/uL Final   09/09/2024 51 (L) 150 - 450 10e3/uL Final     Creatinine   Date Value Ref Range Status   09/11/2024 4.83 (H) 0.51 - 0.95 mg/dL Final   09/10/2024 3.33 (H) 0.51 - 0.95 mg/dL Final   09/09/2024 4.02 (H) 0.51 - 0.95 mg/dL Final     Potassium   Date Value Ref Range Status   09/11/2024 4.0 3.4 - 5.3 mmol/L Final   09/10/2024 3.9 3.4 - 5.3 mmol/L Final   09/09/2024 4.2 3.4 - 5.3 mmol/L Final     Potassium POCT   Date Value Ref Range Status   08/27/2024 3.4 3.4 - 5.3 mmol/L Final   08/27/2024 3.9 3.4 - 5.3 mmol/L Final   08/27/2024 3.4 3.4 - 5.3 mmol/L Final     Magnesium   Date Value Ref Range Status   09/07/2024 2.5 (H) 1.7 - 2.3 mg/dL Final   09/06/2024 2.3 1.7 - 2.3 mg/dL Final   09/06/2024 2.5 (H) 1.7 - 2.3 mg/dL Final          I/O:  I/O last 3 completed shifts:  In: 15 [NG/GT:15]  Out: -        Physical Exam:    General: Patient seen in bed. NAD, calm, cooperative on exam  HEENT: moist mucosa,  CV: Paced rhythm on monitor, mild edema  Pulm: Unlabored breathing on room air. Incision C/D/I.  Abd: Soft, NT, ND  Ext: Mod pedal edema, SCDs in place, warm  Neuro: CNs grossly intact, face symmetric, speech clear, HEND[place]      ASSESSMENT/PLAN:    Felicitas Elliott is a 84 year old female with a history of endocarditis, CAD, aortic stenosis and mitral valve disorder  who is s/p Aortic root abscess debridement and aortic annular reconstruction, aortic root replacement (Konect composite 25 mm Silva Inspiris Resilia bioprosthetic valve within 30 mm Gelweave Valsalva graft with reimplantation of the coronary arteries), Mitral valve replacement (31 mm [employer]. [employer] Silva bioprosthetic valve) and CABx2.  Hospital course c/b ARF requiring renal replacement therapy.    Active Problems:    Bacteremia    Endocarditis    Sepsis (H)    Nonrheumatic aortic valve stenosis    Postsurgical percutaneous transluminal coronary angioplasty status    Mitral valve disorder    Coronary artery disease involving native coronary artery of native heart, unspecified whether angina present    Acute kidney failure, unspecified (H)    Thrombocytopenia    Acute renal failure in s/o CKD    Acute hypoxic respiratory failure, improving    Dysphagia    NEURO:  - PRN Tylenol for pain  - PRN nicorette gum  - Delirium precautions  - PRN melatonin available    CV:  - Pre-op EF 60-65%  - Normotensive off dobutamine  - Beta blocker pending tolerating HD  - ASA allergy - Plavix 75mg PO daily-held for now, likely until plt >100  - Atorvastatin 80 mg daily (held in light of LFTs)  - Chest tubes removed 9/3; TPW remain due to plt/need for pacing  - Cards following--appreciate recs  - New baseline echo before discharge  - Coumadin x3 months per surgeon preference--currently held. INR goal 2-3.    PULM:  - Extubated POD#1, reintubated POD #2, Extubated POD#5  - Maintaining oxygen saturations on room air  - Encourage pulmonary toilet as able    FEN/GI:  - Continue electrolyte replacement protocol  - Diet: Regular--speech signed off  - Bowel regimen    RENAL:  - Anuric  - Daily bladder scans and PRN straight caths if unable to void  - Nephrology consulted and managing renal replacement therapy, appreciate assistance; off CRRT since 9/7. - First HD 9/9; tolerated well  - No appreciable UOP with Lasix challenges 9/4 and 9/7  - If  TDL needed for longer term renal replacement, will need to coordinate with recovery from platelet coagulopathy/AC initiation    HEME:  - Acute blood loss anemia post-op  - Hgb 7.9.   - Hep subcutaneous currently held  - Occult blood positive POD#3.   - Plavix 75mg PO daily (held) - ASA allergy  - Coumadin pharmacy to dose-held  - HIT negative. Plt 75  - Likely hold Plavix and coumadin until plt >100    ID:  - Lennie op ppx complete, afebrile  - ID following-appreciate recs  - Pertinent culture history/susceptibilties:   Susceptibility data from last 90 days.  Collected Specimen Info Organism Ampicillin Ampicillin/Sulbactam Cefepime Ceftazidime Ceftriaxone Ciprofloxacin Clindamycin Daptomycin Doxycycline Erythromycin Gentamicin Levofloxacin Meropenem Oxacillin   08/29/24 Sputum from Expectorate Enterobacter cloacae complex R R  S R R  S      S  S  S      Normal samara                 08/16/24 Peripheral Blood Staphylococcus aureus                 08/14/24 Peripheral Blood Staphylococcus aureus        S  S  S  S  S    S     Collected Specimen Info Organism Piperacillin/Tazobactam Tetracycline Tobramycin Trimethoprim/Sulfamethoxazole  Vancomycin   08/29/24 Sputum from Expectorate Enterobacter cloacae complex R   S  S      Normal samara        08/16/24 Peripheral Blood Staphylococcus aureus        08/14/24 Peripheral Blood Staphylococcus aureus   S   S  S     - Antibiotic history:   - 08/17 - 08/28 Nafcillin   - 08/29 - 08/30 Ceftazidine   - 08/30 - 09/05 Cefepime   - 08/30 - 09/05 PO Vanco  - 08/30 - current IV Vanco    ENDO:  - A1c 6.4  - Euglycemic     PPx:  - DVT: SCDs, SQ heparin TID (held), OOB/ambulation as able  - GI: Protonix 40mg IV/PO daily    DISPO:  - IMC status  - PICC placed 9/6  - Medically Ready for Discharge: Anticipated in 5+ Days         Patient discussed with Dr. Renae.      _______  Maryam Anthony PA-C  Cardiothoracic Surgery  [phone]

## 2024-09-11 NOTE — PROCEDURES
Potassium   Date Value Ref Range Status   09/11/2024 4.0 3.4 - 5.3 mmol/L Final     Potassium POCT   Date Value Ref Range Status   08/27/2024 3.4 3.4 - 5.3 mmol/L Final     Hemoglobin   Date Value Ref Range Status   09/11/2024 7.9 (L) 11.7 - 15.7 g/dL Final     Creatinine   Date Value Ref Range Status   09/11/2024 4.83 (H) 0.51 - 0.95 mg/dL Final     Urea Nitrogen   Date Value Ref Range Status   09/11/2024 63.9 (H) 8.0 - 23.0 mg/dL Final     Sodium   Date Value Ref Range Status   09/11/2024 133 (L) 135 - 145 mmol/L Final     INR   Date Value Ref Range Status   09/11/2024 1.22 (H) 0.85 - 1.15 Final       DIALYSIS PROCEDURE NOTE  Hepatitis status of previous patient on machine log was checked and verified ok to use with this patients hepatitis status.  Patient dialyzed for 3 hrs. on a K3 bath with a net fluid removal of  2.2L.  A BFR of 350 ml/min was obtained via a CVC     The treatment plan was discussed with Dr. Galvan during the treatment.      Line flushed, clamped and capped with Normal Saline  Meds  given: none   Complications: UF off towards end of tx for BP support      Person educated: patient. Knowledge base minimal. Barriers to learning: none. Educated on procedure via oral mode.      ICEBOAT? Timeout performed pre-treatment  I: Patient was identified using 2 identifiers  C:  Consent Signed Yes  E: Equipment preventative maintenance is current and dialysis delivery system OK to use  B: Hepatitis B Surface Antigen: negative; Draw Date: 9/9/24      Hepatitis B Surface Antibody: UNK; Draw Date: UNK  O: Dialysis orders present and complete prior to treatment  A: Vascular access verified and assessed prior to treatment  T: Treatment was performed at a clinically appropriate time  ?: Patient was allowed to ask questions and address concerns prior to treatment  See Adult Hemodialysis flowsheet in [facility] for further details and post assessment.  Machine water alarm in place and functioning. Transducer pods intact  and checked every 15min.     Chlorine/Chloramine water system checked every 4 hours.      Patient repositioned every 2 hours during the treatment.  Post treatment report given to RAMO Miller RN regarding 2.2L of fluid removed, last BP of 119/62, and patient pain rating of 0/10.

## 2024-09-11 NOTE — PLAN OF CARE
Problem: Risk for Delirium  Goal: Improved Sleep  Outcome: Progressing       Goal Outcome Evaluation:  [facility] - ICU    RN Progress Note:            Pertinent Assessments:      Please refer to flowsheet rows for full assessment     VSS/Afebrile. Loose BM x1.            Key Events - This Shift:       Uneventful. Up in recliner chair this am with assist of 2, unsteady gait and c/o fatigue while getting up.              Barriers to Discharge / Downgrade:     IMC status

## 2024-09-11 NOTE — PRE-PROCEDURE
GENERAL PRE-PROCEDURE:   Procedure:  Tunneled CVC HD catheter placement  Date/Time:  9/11/2024 10:22 AM    Written consent obtained?: Yes    Risks and benefits: Risks, benefits and alternatives were discussed    Consent given by:  Patient  Patient states understanding of procedure being performed: Yes    Patient's understanding of procedure matches consent: Yes    Procedure consent matches procedure scheduled: Yes    Expected level of sedation:  Moderate  Appropriately NPO:  Yes  ASA Class:  3  Mallampati  :  Grade 2- soft palate, base of uvula, tonsillar pillars, and portion of posterior pharyngeal wall visible  Lungs:  Lungs clear with good breath sounds bilaterally  Heart:  Normal heart sounds and rate  History & Physical reviewed:  History and physical reviewed and no updates needed  Statement of review:  I have reviewed the lab findings, diagnostic data, medications, and the plan for sedation

## 2024-09-12 ENCOUNTER — APPOINTMENT (OUTPATIENT)
Dept: PHYSICAL THERAPY | Facility: HOSPITAL | Age: 84
DRG: 853 | End: 2024-09-12
Attending: PHYSICIAN ASSISTANT
Payer: COMMERCIAL

## 2024-09-12 ENCOUNTER — ANESTHESIA EVENT (OUTPATIENT)
Dept: CARDIOLOGY | Facility: HOSPITAL | Age: 84
DRG: 853 | End: 2024-09-12
Payer: COMMERCIAL

## 2024-09-12 ENCOUNTER — APPOINTMENT (OUTPATIENT)
Dept: OCCUPATIONAL THERAPY | Facility: HOSPITAL | Age: 84
DRG: 853 | End: 2024-09-12
Attending: STUDENT IN AN ORGANIZED HEALTH CARE EDUCATION/TRAINING PROGRAM
Payer: COMMERCIAL

## 2024-09-12 ENCOUNTER — ANESTHESIA (OUTPATIENT)
Dept: CARDIOLOGY | Facility: HOSPITAL | Age: 84
DRG: 853 | End: 2024-09-12
Payer: COMMERCIAL

## 2024-09-12 PROBLEM — Z95.1 S/P CABG (CORONARY ARTERY BYPASS GRAFT): Status: ACTIVE | Noted: 2024-09-12

## 2024-09-12 LAB
ANION GAP SERPL CALCULATED.3IONS-SCNC: 13 MMOL/L (ref 7–15)
BUN SERPL-MCNC: 51.3 MG/DL (ref 8–23)
CALCIUM SERPL-MCNC: 8.7 MG/DL (ref 8.8–10.4)
CHLORIDE SERPL-SCNC: 96 MMOL/L (ref 98–107)
CREAT SERPL-MCNC: 4.39 MG/DL (ref 0.51–0.95)
EGFRCR SERPLBLD CKD-EPI 2021: 9 ML/MIN/1.73M2
ERYTHROCYTE [DISTWIDTH] IN BLOOD BY AUTOMATED COUNT: 20.7 % (ref 10–15)
GLUCOSE SERPL-MCNC: 100 MG/DL (ref 70–99)
HCO3 SERPL-SCNC: 24 MMOL/L (ref 22–29)
HCT VFR BLD AUTO: 25 % (ref 35–47)
HGB BLD-MCNC: 8 G/DL (ref 11.7–15.7)
INR PPP: 1.26 (ref 0.85–1.15)
MCH RBC QN AUTO: 31.5 PG (ref 26.5–33)
MCHC RBC AUTO-ENTMCNC: 32 G/DL (ref 31.5–36.5)
MCV RBC AUTO: 98 FL (ref 78–100)
PLATELET # BLD AUTO: 89 10E3/UL (ref 150–450)
POTASSIUM SERPL-SCNC: 3.9 MMOL/L (ref 3.4–5.3)
RBC # BLD AUTO: 2.54 10E6/UL (ref 3.8–5.2)
SODIUM SERPL-SCNC: 133 MMOL/L (ref 135–145)
WBC # BLD AUTO: 11.5 10E3/UL (ref 4–11)

## 2024-09-12 PROCEDURE — 80048 BASIC METABOLIC PNL TOTAL CA: CPT | Performed by: PHYSICIAN ASSISTANT

## 2024-09-12 PROCEDURE — 97110 THERAPEUTIC EXERCISES: CPT | Mod: GP

## 2024-09-12 PROCEDURE — 250N000009 HC RX 250: Performed by: PHYSICIAN ASSISTANT

## 2024-09-12 PROCEDURE — 02HK3JZ INSERTION OF PACEMAKER LEAD INTO RIGHT VENTRICLE, PERCUTANEOUS APPROACH: ICD-10-PCS | Performed by: INTERNAL MEDICINE

## 2024-09-12 PROCEDURE — 272N000001 HC OR GENERAL SUPPLY STERILE: Performed by: INTERNAL MEDICINE

## 2024-09-12 PROCEDURE — C1887 CATHETER, GUIDING: HCPCS | Performed by: INTERNAL MEDICINE

## 2024-09-12 PROCEDURE — C1898 LEAD, PMKR, OTHER THAN TRANS: HCPCS | Performed by: INTERNAL MEDICINE

## 2024-09-12 PROCEDURE — 33208 INSRT HEART PM ATRIAL & VENT: CPT | Performed by: NURSE ANESTHETIST, CERTIFIED REGISTERED

## 2024-09-12 PROCEDURE — 250N000013 HC RX MED GY IP 250 OP 250 PS 637: Performed by: PHYSICIAN ASSISTANT

## 2024-09-12 PROCEDURE — 33208 INSRT HEART PM ATRIAL & VENT: CPT | Performed by: INTERNAL MEDICINE

## 2024-09-12 PROCEDURE — 99232 SBSQ HOSP IP/OBS MODERATE 35: CPT | Performed by: INTERNAL MEDICINE

## 2024-09-12 PROCEDURE — 0JH606Z INSERTION OF PACEMAKER, DUAL CHAMBER INTO CHEST SUBCUTANEOUS TISSUE AND FASCIA, OPEN APPROACH: ICD-10-PCS | Performed by: INTERNAL MEDICINE

## 2024-09-12 PROCEDURE — 258N000003 HC RX IP 258 OP 636: Performed by: ANESTHESIOLOGY

## 2024-09-12 PROCEDURE — 85610 PROTHROMBIN TIME: CPT | Performed by: PHYSICIAN ASSISTANT

## 2024-09-12 PROCEDURE — 02H63JZ INSERTION OF PACEMAKER LEAD INTO RIGHT ATRIUM, PERCUTANEOUS APPROACH: ICD-10-PCS | Performed by: INTERNAL MEDICINE

## 2024-09-12 PROCEDURE — 250N000011 HC RX IP 250 OP 636: Performed by: ANESTHESIOLOGY

## 2024-09-12 PROCEDURE — 85027 COMPLETE CBC AUTOMATED: CPT | Performed by: PHYSICIAN ASSISTANT

## 2024-09-12 PROCEDURE — 255N000002 HC RX 255 OP 636: Performed by: INTERNAL MEDICINE

## 2024-09-12 PROCEDURE — C1894 INTRO/SHEATH, NON-LASER: HCPCS | Performed by: INTERNAL MEDICINE

## 2024-09-12 PROCEDURE — 33208 INSRT HEART PM ATRIAL & VENT: CPT | Mod: KX | Performed by: INTERNAL MEDICINE

## 2024-09-12 PROCEDURE — 120N000013 HC R&B IMCU

## 2024-09-12 PROCEDURE — 370N000017 HC ANESTHESIA TECHNICAL FEE, PER MIN: Performed by: INTERNAL MEDICINE

## 2024-09-12 PROCEDURE — 97162 PT EVAL MOD COMPLEX 30 MIN: CPT | Mod: GP

## 2024-09-12 PROCEDURE — 250N000009 HC RX 250: Performed by: INTERNAL MEDICINE

## 2024-09-12 PROCEDURE — 250N000013 HC RX MED GY IP 250 OP 250 PS 637: Performed by: INTERNAL MEDICINE

## 2024-09-12 PROCEDURE — C1785 PMKR, DUAL, RATE-RESP: HCPCS | Performed by: INTERNAL MEDICINE

## 2024-09-12 PROCEDURE — 97110 THERAPEUTIC EXERCISES: CPT | Mod: GO

## 2024-09-12 PROCEDURE — C1883 ADAPT/EXT, PACING/NEURO LEAD: HCPCS | Performed by: INTERNAL MEDICINE

## 2024-09-12 PROCEDURE — 97530 THERAPEUTIC ACTIVITIES: CPT | Mod: GP

## 2024-09-12 PROCEDURE — 99100 ANES PT EXTEME AGE<1 YR&>70: CPT | Performed by: NURSE ANESTHETIST, CERTIFIED REGISTERED

## 2024-09-12 PROCEDURE — 99291 CRITICAL CARE FIRST HOUR: CPT | Performed by: INTERNAL MEDICINE

## 2024-09-12 PROCEDURE — 97535 SELF CARE MNGMENT TRAINING: CPT | Mod: GO

## 2024-09-12 PROCEDURE — 33208 INSRT HEART PM ATRIAL & VENT: CPT | Performed by: ANESTHESIOLOGY

## 2024-09-12 DEVICE — IMP LEAD PACING BIPOLAR CAPSUREFIX NOVUS 52CM 5076-52: Type: IMPLANTABLE DEVICE | Site: HEART | Status: FUNCTIONAL

## 2024-09-12 DEVICE — LEAD PACEMAKER SELECTSECURE 69CM MD  383069: Type: IMPLANTABLE DEVICE | Site: HEART | Status: FUNCTIONAL

## 2024-09-12 DEVICE — PACEMAKER AZURE MRI XT DR: Type: IMPLANTABLE DEVICE | Site: CHEST  WALL | Status: FUNCTIONAL

## 2024-09-12 RX ORDER — FENTANYL CITRATE 50 UG/ML
INJECTION, SOLUTION INTRAMUSCULAR; INTRAVENOUS PRN
Status: DISCONTINUED | OUTPATIENT
Start: 2024-09-12 | End: 2024-09-12

## 2024-09-12 RX ORDER — ONDANSETRON 2 MG/ML
INJECTION INTRAMUSCULAR; INTRAVENOUS PRN
Status: DISCONTINUED | OUTPATIENT
Start: 2024-09-12 | End: 2024-09-12

## 2024-09-12 RX ORDER — SODIUM CHLORIDE 9 MG/ML
100 INJECTION, SOLUTION INTRAVENOUS CONTINUOUS
Status: DISCONTINUED | OUTPATIENT
Start: 2024-09-12 | End: 2024-09-12 | Stop reason: HOSPADM

## 2024-09-12 RX ORDER — PROPOFOL 10 MG/ML
INJECTION, EMULSION INTRAVENOUS CONTINUOUS PRN
Status: DISCONTINUED | OUTPATIENT
Start: 2024-09-12 | End: 2024-09-12

## 2024-09-12 RX ORDER — IODIXANOL 320 MG/ML
INJECTION, SOLUTION INTRAVASCULAR
Status: DISCONTINUED | OUTPATIENT
Start: 2024-09-12 | End: 2024-09-12 | Stop reason: HOSPADM

## 2024-09-12 RX ORDER — PROPOFOL 10 MG/ML
INJECTION, EMULSION INTRAVENOUS PRN
Status: DISCONTINUED | OUTPATIENT
Start: 2024-09-12 | End: 2024-09-12

## 2024-09-12 RX ORDER — LIDOCAINE 40 MG/G
CREAM TOPICAL
Status: DISCONTINUED | OUTPATIENT
Start: 2024-09-12 | End: 2024-09-12 | Stop reason: HOSPADM

## 2024-09-12 RX ORDER — SODIUM CHLORIDE 9 MG/ML
INJECTION, SOLUTION INTRAVENOUS CONTINUOUS PRN
Status: DISCONTINUED | OUTPATIENT
Start: 2024-09-12 | End: 2024-09-12

## 2024-09-12 RX ADMIN — PROPOFOL 50 MCG/KG/MIN: 10 INJECTION, EMULSION INTRAVENOUS at 14:47

## 2024-09-12 RX ADMIN — ONDANSETRON 4 MG: 2 INJECTION INTRAMUSCULAR; INTRAVENOUS at 18:04

## 2024-09-12 RX ADMIN — TORSEMIDE 100 MG: 100 TABLET ORAL at 08:20

## 2024-09-12 RX ADMIN — ATORVASTATIN CALCIUM 80 MG: 40 TABLET, FILM COATED ORAL at 20:24

## 2024-09-12 RX ADMIN — FENTANYL CITRATE 25 MCG: 50 INJECTION INTRAMUSCULAR; INTRAVENOUS at 15:25

## 2024-09-12 RX ADMIN — SODIUM CHLORIDE: 9 INJECTION, SOLUTION INTRAVENOUS at 14:45

## 2024-09-12 RX ADMIN — PANTOPRAZOLE SODIUM 40 MG: 40 INJECTION, POWDER, FOR SOLUTION INTRAVENOUS at 06:51

## 2024-09-12 RX ADMIN — PROPOFOL 20 MG: 10 INJECTION, EMULSION INTRAVENOUS at 14:47

## 2024-09-12 RX ADMIN — PHENYLEPHRINE HYDROCHLORIDE 0.4 MCG/KG/MIN: 10 INJECTION INTRAVENOUS at 15:04

## 2024-09-12 RX ADMIN — Medication 1 CAPSULE: at 20:24

## 2024-09-12 RX ADMIN — Medication 1 CAPSULE: at 08:20

## 2024-09-12 ASSESSMENT — ACTIVITIES OF DAILY LIVING (ADL)
ADLS_ACUITY_SCORE: 44
ADLS_ACUITY_SCORE: 42
ADLS_ACUITY_SCORE: 44
ADLS_ACUITY_SCORE: 38
ADLS_ACUITY_SCORE: 44
ADLS_ACUITY_SCORE: 38
ADLS_ACUITY_SCORE: 44
ADLS_ACUITY_SCORE: 38
ADLS_ACUITY_SCORE: 44

## 2024-09-12 ASSESSMENT — COPD QUESTIONNAIRES
COPD: 1
CAT_SEVERITY: MODERATE

## 2024-09-12 ASSESSMENT — LIFESTYLE VARIABLES: TOBACCO_USE: 1

## 2024-09-12 NOTE — PROGRESS NOTES
HEART CARE NOTE          Assessment/Recommendations     1. Severe valvular disease c/b post-op cardiogenic shock  Assessment / Plan  Hemodynamics stable and tolerating IHD with volume status significantly improved; tolerated dobutamine wean - continue to monitor hemodynamics and renal function closely  GDMT as detailed below; mainstay of treatment for HFpEF includes diuretics and adequate BP control (class I) and SGLT2-I (class 2a); additional medical therapy (ARNI, MRA, ARB) demonstrated less robust evidence for indication but may be considered per guideline recommendations (2b); no indication for BBlockers      Current Pharmacotherapy AHA Guideline-Directed Medical Therapy   Losartan  - not started 2/2 renal dysfunction ARNI/ARB   Spironolactone not started  MRA   SGLT2 inhibitor: not started SGLT2-I    Furosemide gtt - currently on IHD Loop diuretic       2. 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 End organ dysfunction  Assessment / Plan  CRS; CRRT/volume removal per nephrology  Shock liver - resolving; hemodynamic support as above; continue to monitor UOP and renal fucntion closely     4. Anemia  Assessment / Plan  Management and supportive care per primary team     5. CAD s/p CABG  Assessment / Plan  Add and titrate GDMT as hemodynamics tolerate     6. Afib   Assessment / Plan  In sinus bradycardia with significant first degree AV delay on telemetry - temp pacer @ 90 with good hemodynamic response   Avoid AV node blocking agents  CHADSVA score ~4 or higher  EP consulted -  Please contact their team directly with any questions or concerns regarding rate, rhythm, device, AAT and EP procedures; plan for PPM this admission    Plan of care discussed on September 12, 2024 with patient at bedside, and primary team overseeing patient's  "care    Patient remains critically ill in the ICU requiring pacing for heart block. 50 minutes spent on critical care.    Non-Invasive Cardiology team will sign-off for now. Please do not hesitate to consult us again if new questions or concerns arise. Follow-up appointment will be arranged by CORE/HF clinic.     EP Team following along. Please contact their team directly with any questions or concerns regarding rate, rhythm, device, AAT and EP procedures       History of Present Illness/Subjective    Ms. Felicitas Elliott is a 84 year old female with a PMHx significant for (per Epic notation) presented with fatigue, weakness, chills, poor appetite. Does not really have much for chronic medical conditions other than chronic back pain, furunculosis, uterine prolapse, tobacco use      Today, Mrs. Elliott denies acute cardiac events and complaints; Management plan as detailed above      ECHO (personnaly Reviewed on 8/19/24):   1. The left ventricle is normal in size. Left ventricular function is  normal.The ejection fraction is 55-60%.  2. Normal right ventricle size and systolic function.  3. Large vegetation on mitral valve (8 mm x 15) attached to posterior leaflet.  4. There is a small vegetation on the aortic valve (6 mm). No evidence of  aortic root abscess identified.  5. Severe valvular aortic stenosis (SHU:0.8 cm2, peak nmoi: 4.6 m/sec, mean  gradient: 51 mmHg).    Telemetry: personally reviewed September 12, 2024; notable for paced rhythm     Medical history and pertinent documents reviewed in Care Everywhere please where applicable see details above        Physical Examination Review of Systems   /56   Pulse 92   Temp 98.2  F (36.8  C) (Oral)   Resp 18   Ht 1.702 m (5' 7\")   Wt 84.9 kg (187 lb 2.7 oz)   SpO2 98%   BMI 29.32 kg/m    Body mass index is 29.32 kg/m .  Wt Readings from Last 3 Encounters:   09/12/24 84.9 kg (187 lb 2.7 oz)   08/15/24 90.1 kg (198 lb 9.6 oz)     General Appearance:   no " distress, normal body habitus   ENT/Mouth: membranes moist, no oral lesions or bleeding gums.      EYES:  no scleral icterus, normal conjunctivae   Neck: no carotid bruits or thyromegaly   Chest/Lungs:   lungs are clear to auscultation, no rales or wheezing, equal chest wall expansion    Cardiovascular:   Regular. Normal first and second heart sounds with no murmurs, rubs, or gallops; the carotid, radial and posterior tibial pulses are intact, + JVD and mild LE edema bilaterally    Abdomen:  no organomegaly, masses, bruits, or tenderness; bowel sounds are present   Extremities: no cyanosis or clubbing   Skin: no xanthelasma, warm.    Neurologic: NAD     Psychiatric: alert and oriented x3, calm     A complete 10 systems ROS was reviewed  And is negative except what is listed in the HPI.          Medical History  Surgical History Family History Social History   History reviewed. No pertinent past medical history. Past Surgical History:   Procedure Laterality Date    CORONARY ARTERY BYPASS GRAFT, WITH AORTIC VALVE REPLACEMENT, WITH ENDOSCOPIC VESSEL PROCUREMENT N/A 8/27/2024    Procedure: CORONARY ARTERY BYPASS GRAFT TIMES TWO, WITH AORTIC ROOT REPLACEMENT, WITH LEFT INTERNAL MAMMARY ARTERY HARVEST, LEFT SAPHNENOUS ENDOSCOPIC VESSEL PROCUREMENT,;  Surgeon: Dylan Renae MD;  Location: [facility] OR    CV CORONARY ANGIOGRAM N/A 8/20/2024    Procedure: CV CORONARY ANGIOGRAM;  Surgeon: Den Wylie MD;  Location: [facility] CATH LAB CV    IR CVC NON TUNNEL PLACEMENT > 5 YRS  9/1/2024    IR CVC TUNNEL PLACEMENT > 5 YRS OF AGE  9/11/2024    PICC TRIPLE LUMEN PLACEMENT  9/6/2024    REPLACE VALVE MITRAL N/A 8/27/2024    Procedure: MITRAL VALVE REPLACEMENT,;  Surgeon: Dylan Renae MD;  Location: [facility] OR    TRANSESOPHAGEAL ECHOCARDIOGRAM INTRAOPERATIVE  8/27/2024    Procedure: ANESTHESIA TRANSESOPHAGEAL ECHOCARDIOGRAM, EPI-AORTIC ULTRASOUND;  Surgeon: Dylan Renae MD;   Location: [facility] Main OR    no family history of premature coronary artery disease Social History     Socioeconomic History    Marital status:      Spouse name: Not on file    Number of children: Not on file    Years of education: Not on file    Highest education level: Not on file   Occupational History    Not on file   Tobacco Use    Smoking status: Not on file    Smokeless tobacco: Not on file   Substance and Sexual Activity    Alcohol use: Not on file    Drug use: Not on file    Sexual activity: Not on file   Other Topics Concern    Not on file   Social History Narrative    Not on file     Social Determinants of Health     Financial Resource Strain: Low Risk  (8/24/2024)    Financial Resource Strain     Within the past 12 months, have you or your family members you live with been unable to get utilities (heat, electricity) when it was really needed?: No   Food Insecurity: Low Risk  (8/24/2024)    Food Insecurity     Within the past 12 months, did you worry that your food would run out before you got money to buy more?: No     Within the past 12 months, did the food you bought just not last and you didn t have money to get more?: No   Transportation Needs: Low Risk  (8/24/2024)    Transportation Needs     Within the past 12 months, has lack of transportation kept you from medical appointments, getting your medicines, non-medical meetings or appointments, work, or from getting things that you need?: No   Physical Activity: Not on file   Stress: Not on file   Social Connections: Unknown (1/3/2024)    [facility]    Social Connections     Frequency of Communication with Friends and Family: Not on file   Interpersonal Safety: Low Risk  (9/6/2024)    Interpersonal Safety     Do you feel physically and emotionally safe where you currently live?: Yes     Within the past 12 months, have you been hit, slapped, kicked or otherwise physically hurt by someone?: No      "Within the past 12 months, have you been humiliated or emotionally abused in other ways by your partner or ex-partner?: No   Recent Concern: Interpersonal Safety - High Risk (8/23/2024)    Interpersonal Safety     Do you feel physically and emotionally safe where you currently live?: No     Within the past 12 months, have you been hit, slapped, kicked or otherwise physically hurt by someone?: No     Within the past 12 months, have you been humiliated or emotionally abused in other ways by your partner or ex-partner?: No   Housing Stability: Low Risk  (8/24/2024)    Housing Stability     Do you have housing? : Yes     Are you worried about losing your housing?: No           Lab Results    Chemistry/lipid CBC Cardiac Enzymes/BNP/TSH/INR   Lab Results   Component Value Date    BUN 51.3 (H) 09/12/2024     (L) 09/12/2024    CO2 24 09/12/2024    Lab Results   Component Value Date    WBC 12.4 (H) 09/11/2024    HGB 7.9 (L) 09/11/2024    HCT 23.9 (L) 09/11/2024     09/11/2024    PLT 75 (L) 09/11/2024    Lab Results   Component Value Date    INR 1.26 (H) 09/12/2024     No results found for: \"CKTOTAL\", \"CKMB\", \"TROPONINI\"       Weight:    Wt Readings from Last 3 Encounters:   09/12/24 84.9 kg (187 lb 2.7 oz)   08/15/24 90.1 kg (198 lb 9.6 oz)       Allergies  Allergies   Allergen Reactions    Aspirin Rash    Cats Rash    Nickel Rash         Surgical History  Past Surgical History:   Procedure Laterality Date    CORONARY ARTERY BYPASS GRAFT, WITH AORTIC VALVE REPLACEMENT, WITH ENDOSCOPIC VESSEL PROCUREMENT N/A 8/27/2024    Procedure: CORONARY ARTERY BYPASS GRAFT TIMES TWO, WITH AORTIC ROOT REPLACEMENT, WITH LEFT INTERNAL MAMMARY ARTERY HARVEST, LEFT SAPHNENOUS ENDOSCOPIC VESSEL PROCUREMENT,;  Surgeon: Dylan Renae MD;  Location: [facility] Main OR    CV CORONARY ANGIOGRAM N/A 8/20/2024    Procedure: CV CORONARY ANGIOGRAM;  Surgeon: Den Wylie MD;  Location: [facility] CATH LAB CV    IR CVC NON " TUNNEL PLACEMENT > 5 YRS  9/1/2024    IR CVC TUNNEL PLACEMENT > 5 YRS OF AGE  9/11/2024    PICC TRIPLE LUMEN PLACEMENT  9/6/2024    REPLACE VALVE MITRAL N/A 8/27/2024    Procedure: MITRAL VALVE REPLACEMENT,;  Surgeon: Dylan Renae MD;  Location: [facility] OR    TRANSESOPHAGEAL ECHOCARDIOGRAM INTRAOPERATIVE  8/27/2024    Procedure: ANESTHESIA TRANSESOPHAGEAL ECHOCARDIOGRAM, EPI-AORTIC ULTRASOUND;  Surgeon: Dylan Renae MD;  Location: [facility] OR       Social History  Tobacco:   History   Smoking Status    Not on file   Smokeless Tobacco    Not on file    Alcohol:   Social History    Substance and Sexual Activity      Alcohol use: Not on file   Illicit Drugs:   History   Drug Use Not on file       Family History  History reviewed. No pertinent family history.       Navneet Branch MD on 9/12/2024      cc: Carol, Timbo Malik

## 2024-09-12 NOTE — PROGRESS NOTES
"CVTS Daily Progress Note   POD#16 s/p aortic root abscess debridement and aortic annular reconstruction, aortic root replacement (Konect composite 25 mm Silva Inspiris Resilia bioprosthetic valve within 30 mm Gelweave Valsalva graft with reimplantation of the coronary arteries), mitral valve replacement (31 mm St. Tim Silva bioprosthetic valve) and CAB x 2.  Attending: Dr Renae  LOS: 27    SUBJECTIVE/INTERVAL EVENTS:    No acute events overnight. Paced at 90bpm; EP planning for permanent device today. Maintaining O2 sats on room air this AM. Up in chair and working with therapy. Tolerating PO intake. Tolerated HD yesterday. Remains on Vanc; ID following. Hgb 8.0 and grossly stable. Patient denies new chest pain, SOB, nausea, calf pain. She has no questions today.     OBJECTIVE:  Temp:  [97.3  F (36.3  C)-98.2  F (36.8  C)] 98.2  F (36.8  C)  Pulse:  [91-92] 92  Resp:  [16-18] 16  BP: ()/(50-75) 124/72  SpO2:  [88 %-99 %] 93 %  Vitals:    09/07/24 0400 09/09/24 0600 09/10/24 0400 09/11/24 0500   Weight: 85 kg (187 lb 6.3 oz) 83.9 kg (184 lb 15.5 oz) 83.5 kg (184 lb 1.4 oz) 82.7 kg (182 lb 5.1 oz)    09/12/24 0505   Weight: 84.9 kg (187 lb 2.7 oz)       Clinically Significant Risk Factors              # Hypoalbuminemia: Lowest albumin = 3.1 g/dL at 9/1/2024  1:45 PM, will monitor as appropriate    # Coagulation Defect: INR = 1.26 (Ref range: 0.85 - 1.15) and/or PTT = 52 Seconds (Ref range: 22 - 38 Seconds), will monitor for bleeding  # Thrombocytopenia: Lowest platelets = 75 in last 2 days, will monitor for bleeding          #Acute blood loss anemia: Lowest Hgb this hospitalization: 6.9 g/dL. Will continue to monitor and treat/transfuse as appropriate.     # Overweight: Estimated body mass index is 29.32 kg/m  as calculated from the following:    Height as of this encounter: 1.702 m (5' 7\").    Weight as of this encounter: 84.9 kg (187 lb 2.7 oz).   # Moderate Malnutrition: based on nutrition " assessment      # Financial/Environmental Concerns:     # History of CABG: noted on surgical history        Current Medications:    Scheduled Meds:  Current Facility-Administered Medications   Medication Dose Route Frequency Provider Last Rate Last Admin    atorvastatin (LIPITOR) tablet 80 mg  80 mg Oral QPM Maryam Anthony PA-C   80 mg at 09/11/24 2029    [Held by provider] clopidogrel (PLAVIX) tablet 75 mg  75 mg Oral Daily Maryam Anthony PA-C        sennosides (SENOKOT) syrup 5 mL  5 mL Oral BID Maryam Anthony PA-C   5 mL at 09/10/24 2123    And    docusate (COLACE) 50 MG/5ML liquid 50 mg  50 mg Oral BID Maryam Anthony PA-C   50 mg at 09/10/24 2123    sodium chloride 0.9% DIALYSIS Cath LOCK - RED Lumen  10 mL Intracatheter Once in dialysis/CRRT Maryam Anthony PA-C        Followed by    heparin 1000 unit/mL DIALYSIS Cath LOCK - RED Lumen  1.3-2.6 mL Intracatheter Once in dialysis/CRRT Maryam Anthony PA-C        sodium chloride 0.9% DIALYSIS Cath LOCK - BLUE Lumen  10 mL Intracatheter Once in dialysis/CRRT Maryam Anthony PA-C        Followed by    heparin 1000 unit/mL DIALYSIS Cath LOCK -BLUE Lumen  1.3-2.6 mL Intracatheter Once in dialysis/CRRT Maryam Anthony PA-C        lactobacillus rhamnosus (GG) (CULTURELL) capsule 1 capsule  1 capsule Oral or Feeding Tube BID Maryam Anthony PA-C   1 capsule at 09/12/24 0820    Lidocaine (LIDOCARE) 4 % Patch 1-2 patch  1-2 patch Transdermal Q24H Maryam Anthony PA-C   1 patch at 09/10/24 1828    [Held by provider] metoprolol tartrate (LOPRESSOR) half-tab 12.5 mg  12.5 mg Oral BID Jessica Tolbert PA-C        midodrine (PROAMATINE) tablet 10 mg  10 mg Oral During Dialysis/CRRT (from stock) Maryam Anthony PA-C        pantoprazole (PROTONIX) 2 mg/mL suspension 40 mg  40 mg Oral or NG Tube QAM AC Gabrielle, Maryam Rema, PA-C   40 mg at 09/11/24 0640    Or    pantoprazole (PROTONIX) EC  tablet 40 mg  40 mg Oral QAM AC Maryam Anthony PA-C        Or    pantoprazole (PROTONIX) IV push injection 40 mg  40 mg Intravenous QAM AC Maryam Anthony PA-C   40 mg at 09/12/24 0651    polyethylene glycol (MIRALAX) Packet 17 g  17 g Oral or Feeding Tube Daily Maryam Anthony PA-C   17 g at 09/10/24 1118    sodium chloride (PF) 0.9% PF flush 9 mL  9 mL Intracatheter During Dialysis/CRRT (from stock) Maryam Anthony PA-C        sodium chloride (PF) 0.9% PF flush 9 mL  9 mL Intracatheter During Dialysis/CRRT (from stock) Maryam Anthony PA-C        sodium chloride 0.9% BOLUS 250 mL  250 mL Intravenous Once in dialysis/CRRT Maryam Anthony PA-C        sodium chloride 0.9% BOLUS 300 mL  300 mL Hemodialysis Machine Once Maryam Anthony PA-C        sodium chloride 0.9% DIALYSIS Cath LOCK - BLUE Lumen  1.3-2.6 mL Intracatheter Once Maryam Anthony PA-C        sodium chloride 0.9% DIALYSIS Cath LOCK - RED Lumen  1.3-2.6 mL Intracatheter Once Maryam Anthony PA-C        torsemide (DEMADEX) tablet 100 mg  100 mg Oral Daily Jair Galvan MD   100 mg at 09/12/24 0820    vancomycin place horne - receiving intermittent dosing  1 each Intravenous See Admin Instructions Maryam Anthony PA-C        [Held by provider] Warfarin Dose Required Daily - Pharmacist Managed  1 each Oral See Admin Instructions Jessica Tolbert PA-C         Continuous Infusions:  Current Facility-Administered Medications   Medication Dose Route Frequency Provider Last Rate Last Admin    dextrose 10% infusion   Intravenous Continuous PRN Maryam Anthony PA-C         PRN Meds:.  Current Facility-Administered Medications   Medication Dose Route Frequency Provider Last Rate Last Admin    acetaminophen (TYLENOL) tablet 650 mg  650 mg Oral Q6H PRN Dylan Renae MD   650 mg at 09/11/24 1219    albumin human 25 % injection 25 g  25 g Intravenous Q1H PRN Gabrielle  Maryam Hodgson PA-C   Stopped at 09/09/24 1020    sodium chloride (NEBUSAL) 3 % neb solution 3 mL  3 mL Nebulization Q6H PRN Maryam Anthony PA-C        And    albuterol (PROVENTIL) neb solution 2.5 mg  2.5 mg Nebulization Q6H PRN Maryam Anthony PA-C        alteplase (CATHFLO ACTIVASE) injection 2 mg  2 mg Intracatheter Q1H PRN Maryam Anthony PA-C        alteplase (CATHFLO ACTIVASE) injection 2 mg  2 mg Intracatheter Q1H PRN Maryam Anthony PA-C        bisacodyl (DULCOLAX) suppository 10 mg  10 mg Rectal Daily PRN Maryam Anthony PA-C        dextrose 10% infusion   Intravenous Continuous PRN Maryam Anthony PA-C        glucose gel 15-30 g  15-30 g Oral Q15 Min PRN Maryam Anthony PA-C        Or    dextrose 50 % injection 25-50 mL  25-50 mL Intravenous Q15 Min PRN Maryam Anthony PA-C        Or    glucagon injection 1 mg  1 mg Subcutaneous Q15 Min PRN Maryam Anthony PA-C        hydrALAZINE (APRESOLINE) injection 10 mg  10 mg Intravenous Q30 Min PRN Maryam Anthony PA-C        melatonin tablet 3 mg  3 mg Oral At Bedtime PRN Maryam Anthony PA-C        menthol (Topical Analgesic) 2.5% (BENGAY VANISHING SCENT) 2.5 % topical gel 1 g  1 g Topical Q8H PRN Maryam Anthony PA-C        miconazole (MICATIN) 2 % powder   Topical BID PRN Maryam Anthony PA-C        naloxone (NARCAN) injection 0.2 mg  0.2 mg Intravenous Q2 Min PRN Maryam Anthony PA-C        Or    naloxone (NARCAN) injection 0.4 mg  0.4 mg Intravenous Q2 Min PRN Maryam Anthony PA-C        Or    naloxone (NARCAN) injection 0.2 mg  0.2 mg Intramuscular Q2 Min PRN Maryam Anthony PA-C        Or    naloxone (NARCAN) injection 0.4 mg  0.4 mg Intramuscular Q2 Min PRN Maryam Anthony PA-C        nicotine (NICORETTE) gum 2 mg  2 mg Buccal Q1H PRN Maryam Anthony PA-C        ondansetron (ZOFRAN ODT) ODT tab 8 mg  8 mg Oral  Q6H PRN Maryam Anthony PA-C        Or    ondansetron (ZOFRAN) injection 8 mg  8 mg Intravenous Q6H PRN Maryam Anthony PA-C   8 mg at 09/09/24 0237    prochlorperazine (COMPAZINE) injection 5 mg  5 mg Intravenous Q6H PRN Maryam Anthony PA-C   5 mg at 09/08/24 0546    Or    prochlorperazine (COMPAZINE) tablet 5 mg  5 mg Oral Q6H PRN Maryam Anthony PA-C        Or    prochlorperazine (COMPAZINE) suppository 12.5 mg  12.5 mg Rectal Q12H PRN Maryam Anthony PA-C        senna-docusate (SENOKOT-S/PERICOLACE) 8.6-50 MG per tablet 1 tablet  1 tablet Oral BID PRN Maryam Anthony PA-C        Or    senna-docusate (SENOKOT-S/PERICOLACE) 8.6-50 MG per tablet 2 tablet  2 tablet Oral BID PRN Maryam Anthony PA-SOM        sodium chloride (PF) 0.9% PF flush 10 mL  10 mL Intracatheter Q15 Min PRN Maryam Anthony PA-SOM        sodium chloride (PF) 0.9% PF flush 10 mL  10 mL Intracatheter Q15 Min PRN Maryam Anthony PA-C        sodium chloride (PF) 0.9% PF flush 10-40 mL  10-40 mL Intracatheter Once PRN Maryam Anthony PA-SOM        sodium chloride (PF) 0.9% PF flush 10-40 mL  10-40 mL Intracatheter Once PRN Maryam Anthony PA-C        sodium chloride (PF) 0.9% PF flush 3 mL  3 mL Intracatheter q1 min prn Maryam Anthony PA-SOM        sodium chloride 0.9% BOLUS 1-250 mL  1-250 mL Intravenous Q1H PRN Maryam Anthony PA-C        sodium chloride 0.9% BOLUS 1-250 mL  1-250 mL Intravenous Q1H PRN Maryam Anthony PA-C        sodium chloride 0.9% BOLUS 100-150 mL  100-150 mL Intravenous Q15 Min PRN Maryam Anthony PA-C           Cardiographics:    Telemetry monitoring demonstrates paced rhythm at 90bpm per my personal review.    Imaging:  Recent Results (from the past 24 hour(s))   IR CVC Tunnel Placement > 5 Yrs of Age    Narrative    [facility]  LOCATION: [facility]  DATE:  9/11/2024    PROCEDURE:TUNNELED DIALYSIS CATHETER PLACEMENT    INTERVENTIONAL RADIOLOGIST: Justyn Bowman MD.    INDICATION: 84 old female, Long-term dialysis access needed.    CONSENT: The risks, benefits and alternatives of tunneled dialysis catheter placement were discussed with the patient  in detail. All questions were answered. Informed consent was given to proceed with the procedure.    MODERATE SEDATION: Versed 0.5 mg IV; Fentanyl 50 mcg IV.  During the timeout, immediately prior to the administration of medications, the patient was reassessed for adequacy to receive conscious sedation. Under physician supervision, Versed and fentanyl   were administered for moderate sedation. Pulse oximetry, heart rate and blood pressure were continuously monitored by an independent trained observer. The physician spent 10 minutes of face-to-face sedation time with the patient.    CONTRAST: None  ANTIBIOTICS: Already on antibiotics  ADDITIONAL MEDICATIONS: None.    FLUOROSCOPIC TIME: 0.5 minutes.  RADIATION DOSE: Air Kerma: 5 mGy.    COMPLICATIONS: No immediate complications.    STERILE BARRIER TECHNIQUE: Maximum sterile barrier technique was used. Cutaneous antisepsis was performed at the operative site with application of 2% chlorhexidine and large sterile drape. Prior to the procedure, the  and assistant performed   hand hygiene and wore hat, mask, sterile gown, and sterile gloves during the entire procedure.    PROCEDURE:     Using real-time ultrasound guidance, the right internal jugular vein was punctured. A subcutaneous tunnel was created requiring a second incision. Using this access, a 15.5 Greek, 19 cm tip to cuff Duraflow dialysis catheter was advanced until the tip   was in the proximal right atrium.  The catheter was tested and found to flush and aspirate appropriately.  The catheter was heparinized and secured to the skin.    FINDINGS:  Ultrasound shows an anechoic and compressible jugular vein.  A permanent image was stored.      At the completion of the study, the new catheter tip lies in the proximal right atrium.      Impression    IMPRESSION:    1.  Tunneled dialysis catheter placement, as discussed above.  2.  The catheter position was verified fluoroscopically and this is ready for use.             Labs, personally reviewed  Hemoglobin   Date Value Ref Range Status   09/12/2024 8.0 (L) 11.7 - 15.7 g/dL Final   09/11/2024 7.9 (L) 11.7 - 15.7 g/dL Final   09/10/2024 8.0 (L) 11.7 - 15.7 g/dL Final     WBC Count   Date Value Ref Range Status   09/12/2024 11.5 (H) 4.0 - 11.0 10e3/uL Final   09/11/2024 12.4 (H) 4.0 - 11.0 10e3/uL Final   09/10/2024 12.8 (H) 4.0 - 11.0 10e3/uL Final     Platelet Count   Date Value Ref Range Status   09/12/2024 89 (L) 150 - 450 10e3/uL Final   09/11/2024 75 (L) 150 - 450 10e3/uL Final   09/10/2024 61 (L) 150 - 450 10e3/uL Final     Creatinine   Date Value Ref Range Status   09/12/2024 4.39 (H) 0.51 - 0.95 mg/dL Final   09/11/2024 4.83 (H) 0.51 - 0.95 mg/dL Final   09/10/2024 3.33 (H) 0.51 - 0.95 mg/dL Final     Potassium   Date Value Ref Range Status   09/12/2024 3.9 3.4 - 5.3 mmol/L Final   09/11/2024 4.0 3.4 - 5.3 mmol/L Final   09/10/2024 3.9 3.4 - 5.3 mmol/L Final     Potassium POCT   Date Value Ref Range Status   08/27/2024 3.4 3.4 - 5.3 mmol/L Final   08/27/2024 3.9 3.4 - 5.3 mmol/L Final   08/27/2024 3.4 3.4 - 5.3 mmol/L Final     Magnesium   Date Value Ref Range Status   09/07/2024 2.5 (H) 1.7 - 2.3 mg/dL Final   09/06/2024 2.3 1.7 - 2.3 mg/dL Final   09/06/2024 2.5 (H) 1.7 - 2.3 mg/dL Final          I/O:  I/O last 3 completed shifts:  In: 599 [P.O.:315; I.V.:284]  Out: 2200 [Other:2200]       Physical Exam:    General: Patient seen in bed. NAD, calm, cooperative on exam  HEENT: moist mucosa,  CV: Paced rhythm on monitor, mild edema  Pulm: Unlabored breathing on room air. Incision C/D/I.  Abd: Soft, NT, ND  Ext: Mod pedal edema, SCDs in place, warm  Neuro: CNs grossly  intact, face symmetric, speech clear, HENDERSON      ASSESSMENT/PLAN:    Felicitas Elliott is a 84 year old female with a history of endocarditis, CAD, aortic stenosis and mitral valve disorder who is s/p Aortic root abscess debridement and aortic annular reconstruction, aortic root replacement (Konect composite 25 mm Silva Inspiris Resilia bioprosthetic valve within 30 mm Gelweave Valsalva graft with reimplantation of the coronary arteries), Mitral valve replacement (31 mm St. Tim Silva bioprosthetic valve) and CABx2.  Hospital course c/b ARF requiring renal replacement therapy.    Active Problems:    Bacteremia    Endocarditis    Sepsis (H)    Nonrheumatic aortic valve stenosis    Postsurgical percutaneous transluminal coronary angioplasty status    Mitral valve disorder    Coronary artery disease involving native coronary artery of native heart, unspecified whether angina present    Acute kidney failure, unspecified (H)    Thrombocytopenia    Acute renal failure in s/o CKD    Acute hypoxic respiratory failure, improving    Dysphagia    NEURO:  - PRN Tylenol for pain  - PRN nicorette gum  - Delirium precautions  - PRN melatonin available    CV:  - Pre-op EF 60-65%  - Normotensive   - Beta blocker pending continuing to tolerate HD  - ASA allergy - Plavix 75mg PO daily-held for now, likely until plt >100  - Atorvastatin 80 mg daily   - Chest tubes removed 9/3; TPW remain due to plt/need for pacing  - Cards following--appreciate recs  - New baseline echo completed 9/6  - Plan for PPM today  - Coumadin x3 months per surgeon preference--currently held. INR goal 2-3.    PULM:  - Extubated POD#1, reintubated POD #2, Extubated POD#5  - Maintaining oxygen saturations on room air  - Encourage pulmonary toilet as able    FEN/GI:  - Continue electrolyte replacement protocol  - Diet: Regular--speech signed off  - Bowel regimen    RENAL:  - Anuric  - Daily bladder scans and PRN straight caths if unable to void  - Nephrology  consulted and managing renal replacement therapy, appreciate assistance; off CRRT since 9/7.  - Tunneled dialysis line placed 9/11    HEME:  - Acute blood loss anemia post-op  - Hgb 8.0   - Hep subcutaneous currently held  - Occult blood positive POD#3.   - Plavix 75mg PO daily (held) - ASA allergy  - Coumadin. Pharmacy to dose-held  - HIT negative. Plt 89  - Likely hold Plavix and coumadin until plt >100    ID:  - Lennie op ppx complete, afebrile  - ID following-appreciate recs  - Pertinent culture history/susceptibilties:   Susceptibility data from last 90 days.  Collected Specimen Info Organism Ampicillin Ampicillin/Sulbactam Cefepime Ceftazidime Ceftriaxone Ciprofloxacin Clindamycin Daptomycin Doxycycline Erythromycin Gentamicin Levofloxacin Meropenem Oxacillin   08/29/24 Sputum from Expectorate Enterobacter cloacae complex R R  S R R  S      S  S  S      Normal samara                 08/16/24 Peripheral Blood Staphylococcus aureus                 08/14/24 Peripheral Blood Staphylococcus aureus        S  S  S  S  S    S     Collected Specimen Info Organism Piperacillin/Tazobactam Tetracycline Tobramycin Trimethoprim/Sulfamethoxazole  Vancomycin   08/29/24 Sputum from Expectorate Enterobacter cloacae complex R   S  S      Normal samara        08/16/24 Peripheral Blood Staphylococcus aureus        08/14/24 Peripheral Blood Staphylococcus aureus   S   S  S     - Antibiotic history:   - 08/17 - 08/28 Nafcillin   - 08/29 - 08/30 Ceftazidine   - 08/30 - 09/05 Cefepime   - 08/30 - 09/05 PO Vanco  - 08/30 - current IV Vanco    ENDO:  - A1c 6.4  - Euglycemic     PPx:  - DVT: SCDs, SQ heparin TID (held), OOB/ambulation as able  - GI: Protonix 40mg IV/PO daily    DISPO:  - IMC status  - PICC placed 9/6  - Medically Ready for Discharge: Anticipated in 2-4 Days           Patient discussed with Dr. Renae.      _______  Maryam Anthony PA-C  Cardiothoracic Surgery  [phone]

## 2024-09-12 NOTE — PROGRESS NOTES
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  Positive blood culture on 8/14/2024 and 8/16.  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  With the size of the vegetation combined with brain infarct, status post CV surgery, see details below active issue.   Abnormal brain MRI suggestive of subacute infarct. In a patient with MSSA bacteremia and aortic valve endocarditis, this is cerebral septic emboli. Active issue.       PROCEDURE PERFORMED: 8/27/24  Aortic root abscess debridement and aortic annular reconstruction.  Aortic root replacement (Konect composite 25 mm Silva Inspiris Resilia bioprosthetic valve within 30 mm Gelweave Valsalva graft with reimplantation of the coronary arteries).  Mitral valve replacement (31 mm St. Tim Silva bioprosthetic valve).  Coronary artery bypass grafting x 2 (reversed saphenous vein graft to the obtuse marginal branch of the left circumflex coronary artery, and pedicled left internal mammary artery to left anterior descending coronary artery).  Endoscopic vein harvest of the greater saphenous vein from the left lower extremity.    Recommendations:  Continue vancomycin-dosing per Pharm.D.   End date oct 8th.    Completed cefepime for pneumonia on 9/5/24--  Monitor CBC, CMP--thrombocytopenia, seems to be coincident with cefepime usage. And is slowly better.   Status post vascular surgery, chest tube, CV surgery following closely  Will need to check immunoglobin level in 4 to 6 weeks sister with history of hypogammaglobulinemia     OK for pacer today      RU Portillo MD  [facility]  Answering Service: [phone]  On-Call ID provider: [phone], option: 9                Interval History:     HPI: pt stable.  Pacer today.            Recent Inflammatory Biomarkers:   Recent Labs   Lab Test 09/12/24  0504 09/11/24  0448 09/10/24  0415 09/09/24  0433 09/08/24  0540 09/07/24  0340 08/30/24  0437 08/29/24  0359   PCAL  --   --   --   --   --   --   --  1.24*   WBC 11.5* 12.4* 12.8* 14.7* 16.6* 15.8*   < > 15.3*    < > = values in this interval not displayed.            Review of Systems:      Negative except for findings in the HPI.           Current Medications (antimicrobials listed in bold):     Current Facility-Administered Medications   Medication Dose Route Frequency Provider Last Rate Last Admin    atorvastatin (LIPITOR) tablet 80 mg  80 mg Oral QPM Maryam Anthony PA-C   80 mg at 09/11/24 2029    [Held by provider] clopidogrel (PLAVIX) tablet 75 mg  75 mg Oral Daily Maryam Anthony PA-C        sennosides (SENOKOT) syrup 5 mL  5 mL Oral BID Mayram Anthony PA-C   5 mL at 09/10/24 2123    And    docusate (COLACE) 50 MG/5ML liquid 50 mg  50 mg Oral BID Maryam Anthony PA-C   50 mg at 09/10/24 2123    sodium chloride 0.9% DIALYSIS Cath LOCK - RED Lumen  10 mL Intracatheter Once in dialysis/CRRT Maryam Anthony PA-C        Followed by    heparin 1000 unit/mL DIALYSIS Cath LOCK - RED Lumen  1.3-2.6 mL Intracatheter Once in dialysis/CRRT Maryam Anthony MARION        sodium chloride 0.9% DIALYSIS Cath LOCK - BLUE Lumen  10 mL  Intracatheter Once in dialysis/CRRT Maryam Anthony PA-C        Followed by    heparin 1000 unit/mL DIALYSIS Cath LOCK -BLUE Lumen  1.3-2.6 mL Intracatheter Once in dialysis/CRRT Maryam Anthony PA-C        lactobacillus rhamnosus (GG) (CULTURELL) capsule 1 capsule  1 capsule Oral or Feeding Tube BID Maryam Anthony PA-C   1 capsule at 09/12/24 0820    Lidocaine (LIDOCARE) 4 % Patch 1-2 patch  1-2 patch Transdermal Q24H Maryam Anthony PA-C   1 patch at 09/10/24 1828    [Held by provider] metoprolol tartrate (LOPRESSOR) half-tab 12.5 mg  12.5 mg Oral BID Jessica Tolbert PA-C        midodrine (PROAMATINE) tablet 10 mg  10 mg Oral During Dialysis/CRRT (from stock) Maryam Anthony PA-C        pantoprazole (PROTONIX) 2 mg/mL suspension 40 mg  40 mg Oral or NG Tube QAM AC Maryam Anthony PA-C   40 mg at 09/11/24 0640    Or    pantoprazole (PROTONIX) EC tablet 40 mg  40 mg Oral QAM AC Maryam Anthony PA-C        Or    pantoprazole (PROTONIX) IV push injection 40 mg  40 mg Intravenous QAM AC Maryam Anthony PA-C   40 mg at 09/12/24 0651    polyethylene glycol (MIRALAX) Packet 17 g  17 g Oral or Feeding Tube Daily Maryam Anthony PA-C   17 g at 09/10/24 1118    sodium chloride (PF) 0.9% PF flush 9 mL  9 mL Intracatheter During Dialysis/CRRT (from stock) Maryam Anthony PA-C        sodium chloride (PF) 0.9% PF flush 9 mL  9 mL Intracatheter During Dialysis/CRRT (from stock) Maryam Anthony PA-C        sodium chloride 0.9% BOLUS 250 mL  250 mL Intravenous Once in dialysis/CRRT Maryam Anthony PA-C        sodium chloride 0.9% BOLUS 300 mL  300 mL Hemodialysis Machine Once Maryam Anthony PA-C        sodium chloride 0.9% DIALYSIS Cath LOCK - BLUE Lumen  1.3-2.6 mL Intracatheter Once Maryam Anthony PA-C        sodium chloride 0.9% DIALYSIS Cath LOCK - RED Lumen  1.3-2.6 mL Intracatheter Once Maryam Anthony,  MARION        torsemide (DEMADEX) tablet 100 mg  100 mg Oral Daily Jair Galvan MD   100 mg at 09/12/24 0820    vancomycin place horne - receiving intermittent dosing  1 each Intravenous See Admin Instructions Maryam Anthony PA-C        [Held by provider] Warfarin Dose Required Daily - Pharmacist Managed  1 each Oral See Admin Instructions Jessica Tolbert PA-C                  Allergies:     Allergies   Allergen Reactions    Aspirin Rash    Cats Rash    Nickel Rash            Physical Exam:   Vitals were reviewed  Patient Vitals for the past 24 hrs:   BP Temp Temp src Pulse Resp SpO2 Weight   09/12/24 1015 128/71 -- -- 92 -- 96 % --   09/12/24 0800 124/72 98.2  F (36.8  C) Oral 92 16 93 % --   09/12/24 0700 122/68 -- -- 92 -- 98 % --   09/12/24 0600 117/60 -- -- 92 -- 97 % --   09/12/24 0520 110/56 -- -- 92 -- 98 % --   09/12/24 0505 -- -- -- -- -- -- 84.9 kg (187 lb 2.7 oz)   09/12/24 0400 -- -- -- 92 -- 98 % --   09/12/24 0300 121/67 -- -- 92 -- 98 % --   09/12/24 0200 118/65 -- -- 92 -- 99 % --   09/12/24 0100 117/64 -- -- 92 -- 94 % --   09/12/24 0000 122/65 98.2  F (36.8  C) Oral 92 -- (!) 88 % --   09/11/24 2300 124/64 -- -- 92 -- 94 % --   09/11/24 2200 129/63 -- -- 92 -- 92 % --   09/11/24 2100 90/50 -- -- 92 -- 94 % --   09/11/24 2000 108/57 97.8  F (36.6  C) Oral 92 18 93 % --   09/11/24 1900 100/52 -- -- 92 -- 93 % --   09/11/24 1830 100/55 -- -- 92 -- 94 % --   09/11/24 1800 91/55 -- -- 92 -- 94 % --   09/11/24 1730 110/55 -- -- 92 -- 93 % --   09/11/24 1715 107/55 -- -- 92 -- 96 % --   09/11/24 1700 98/54 -- -- 92 -- 91 % --   09/11/24 1645 122/56 -- -- 92 -- 98 % --   09/11/24 1630 133/68 -- -- 92 -- 95 % --   09/11/24 1615 116/73 -- -- 92 -- 94 % --   09/11/24 1600 134/75 -- -- 92 -- 94 % --   09/11/24 1545 117/66 -- -- 92 -- 94 % --   09/11/24 1530 128/70 97.4  F (36.3  C) Oral 92 16 -- --   09/11/24 1502 106/62 -- -- 91 -- 99 % --   09/11/24 1457 116/65 -- -- 91 -- 98 % --   09/11/24 1452  114/63 -- -- 91 -- 93 % --   09/11/24 1447 -- -- -- 92 -- 91 % --   09/11/24 1200 106/57 -- -- 92 -- -- --   09/11/24 1145 115/63 97.3  F (36.3  C) Oral 92 18 96 % --   09/11/24 1100 119/62 97.3  F (36.3  C) Oral 92 -- 96 % --   09/11/24 1045 -- -- -- 92 -- 95 % --       Physical Examination:    Resp: 16    Gen: Appears ill, extubated, nasal canula, still on low dose dobutamine  HEENT: opens eyes  PICC, chest wall catheter for hemodialysis, rectal tube  CV: Sternal incision, temporary pacer  Lungs: coarse, currently on nasal cannula.  Chest tubes have been removed  Skin: Warm  Extr: edema- especially L leg  Neuro: extubated, opens eyes  HD catheter  PICC- R basilic           Laboratory Data:   ID Labs:  Microbiology labs:  7-Day Micro Results       No results found for the last 168 hours.          Susceptibility data from last 90 days.  Collected Specimen Info Organism Ampicillin Ampicillin/Sulbactam Cefepime Ceftazidime Ceftriaxone Ciprofloxacin Clindamycin Daptomycin Doxycycline Erythromycin Gentamicin Levofloxacin Meropenem Oxacillin   08/29/24 Sputum from Expectorate Enterobacter cloacae complex R R  S R R  S      S  S  S      Normal samara                 08/16/24 Peripheral Blood Staphylococcus aureus                 08/14/24 Peripheral Blood Staphylococcus aureus        S  S  S  S  S    S     Collected Specimen Info Organism Piperacillin/Tazobactam Tetracycline Tobramycin Trimethoprim/Sulfamethoxazole  Vancomycin   08/29/24 Sputum from Expectorate Enterobacter cloacae complex R   S  S      Normal samara        08/16/24 Peripheral Blood Staphylococcus aureus        08/14/24 Peripheral Blood Staphylococcus aureus   S   S  S       Reviewed      No lab results found.  Recent Labs   Lab Test 09/12/24  0504 09/11/24  0448 09/10/24  0415 09/09/24  0433 09/08/24  0540 09/07/24  0340   WBC 11.5* 12.4* 12.8* 14.7* 16.6* 15.8*     Recent Labs   Lab Test 09/12/24  0504 09/11/24  0448 09/10/24  0415 09/09/24  0433   CR  4.39* 4.83* 3.33* 4.02*   GFRESTIMATED 9* 8* 13* 10*       Hematology Studies  Recent Labs   Lab Test 09/12/24  0504 09/11/24  0448 09/10/24  0415 09/09/24  1448 09/09/24  0433 09/08/24  0540 09/07/24  0340   WBC 11.5* 12.4* 12.8*  --  14.7* 16.6* 15.8*   HGB 8.0* 7.9* 8.0* 7.8* 6.9* 7.6* 8.0*   HCT 25.0* 23.9* 24.6*  --  21.1* 23.3* 25.4*   PLT 89* 75* 61*  --  51* 41* 32*       Metabolic  Recent Labs   Lab Test 09/12/24  0504 09/11/24  0448 09/10/24  0415   * 133* 136   BUN 51.3* 63.9* 45.2*   CO2 24 22 24   CR 4.39* 4.83* 3.33*   GFRESTIMATED 9* 8* 13*       Hepatic Studies  Recent Labs   Lab Test 09/10/24  0415 09/08/24  1454 09/08/24  0540   BILITOTAL 1.5* 1.1 1.0   ALKPHOS 77 90 107   ALBUMIN 3.7 3.8 3.7   AST 60* 72* 83*   ALT 79* 99* 100*          Imaging Data:   Reviewed     IR CVC Non Tunnel Placement > 5 Yrs    Result Date: 9/1/2024  [facility] LOCATION: [facility] DATE: 9/1/2024 PROCEDURE: NON-TUNNELED DIALYSIS CATHETER PLACEMENT INTERVENTIONAL RADIOLOGIST: RU Reyes MD. INDICATION: HAMMAD requiring hemodialysis. CONSENT: The risks, benefits and alternatives of non-tunneled dialysis catheter placement were discussed with the patient in detail. All questions were answered. Informed consent was given to proceed with the procedure. MODERATE SEDATION: None. CONTRAST: None. ANTIBIOTICS: None. ADDITIONAL MEDICATIONS: Heparin 3800 U IV FLUOROSCOPIC TIME: 2.0 minutes. RADIATION DOSE: Air Kerma: 36.04 mGy. COMPLICATIONS: No immediate complications. STERILE BARRIER TECHNIQUE: Maximum sterile barrier technique was used. Cutaneous antisepsis was performed at the operative site with application of 2% chlorhexidine and large sterile drape. Prior to the procedure, the  and assistant performed hand hygiene and wore hat, mask, sterile gown, and sterile gloves during the entire procedure. PROCEDURE: Limited left neck ultrasound was performed which demonstrated a patent  internal jugular vein; images saved to the permanent patient medical record. The overlying skin and subcutaneous soft tissues were anesthetized using 1% lidocaine. Under ultrasound guidance, the left internal jugular vein was accessed using a micropuncture needle; images saved of the permanent patient medical record. Access was secured using a 4 Guatemalan transitional sheath. A NXT-ID guidewire was advanced into the SVC. Under fluoroscopic guidance, the overlying skin and subcutaneous soft tissues were dilated and a 15.5 [place] nontunneled dialysis catheter was placed with the tip within the cavoatrial junction. The catheter was tested and found to flush and aspirate appropriately. The catheter was heparinized and secured to the skin.     IMPRESSION:  1.  Successful non-tunneled dialysis catheter placement. PLAN: 1. Dialysis catheter is ready to be used. 2. Nontunneled right groin dialysis catheter can be removed in ICU.       Study Result    Narrative & Impression   EXAM: MR BRAIN W/O and W CONTRAST  LOCATION: [facility]  DATE: 8/17/2024     INDICATION: Headache in a patient with MSSA bacteremia secondary to aortic valve endocarditis.  Look for cerebral septic emboli; Headache; Acute HA (< 3 months), no complicating features  COMPARISON: None.  CONTRAST: 9 ml gadavist  TECHNIQUE: Routine multiplanar multisequence head MRI without and with intravenous contrast.     FINDINGS: Assessment degraded due to motion.  INTRACRANIAL CONTENTS:   Apparent focus of diffusion restriction with T2/FLAIR hyperintensity within the left superior parietal lobule cortex is hyperintense on ADC map, representing T2 shine through.  Focus of signal abnormality measures 13 x 9 mm in the axial plane and does   not have internal enhancement.     Additional punctate foci of hyperintensity on diffusion weighted with similar signal characteristics (periventricular right corona radiata, left superior parietal lobule, and  left cerebellar hemisphere).     Patchy and confluent nonspecific T2/FLAIR hyperintensities within the cerebral white matter most consistent with moderate chronic microvascular ischemic change. Moderate generalized cerebral atrophy. No hydrocephalus. Normal position of the cerebellar   tonsils. No discernible abnormal intracranial enhancement; however, assessment substantially degraded due to motion. 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     SELLA: No abnormality accounting for technique.     OSSEOUS STRUCTURES/SOFT TISSUES: Normal marrow signal. The major intracranial vascular flow voids are maintained.      ORBITS: No abnormality accounting for technique.      SINUSES/MASTOIDS: Mild mucosal thickening scattered about the paranasal sinuses. Scattered fluid/membrane thickening in the left mastoid air cells. No apparent mass in the posterior nasopharynx or skull base.                                                                       IMPRESSION: Assessment degraded due to motion.  1.  13 mm focus of cortical signal abnormality within the left superior parietal lobule which is favored to represent a subacute infarct; low-grade glial neoplasm could conceivably have a similar appearance. Hyperacute intraparenchymal hematoma could   also have a similar appearance but is considered less likely. Recommend noncontrast head CT for further characterization. Also recommend follow-up MRI brain without and with contrast in 8-12 weeks to document expected evolution.  2.  Additional smaller foci of signal abnormality with similar characteristics favored to represent punctate subacute infarcts.

## 2024-09-12 NOTE — PROGRESS NOTES
09/12/24 0855   Appointment Info   Signing Clinician's Name / Credentials (PT) Brandi Ferrara DPARTEM   Living Environment   People in Home spouse   Current Living Arrangements house   Home Accessibility stairs to enter home   Number of Stairs, Main Entrance 4   Stair Railings, Main Entrance railings safe and in good condition   Transportation Anticipated family or friend will provide   Self-Care   Current Activity Tolerance poor   Equipment Currently Used at Home none   Fall history within last six months yes   Number of times patient has fallen within last six months 1   General Information   Onset of Illness/Injury or Date of Surgery 08/16/24   Referring Physician Maryam Anthony PA-C   Patient/Family Therapy Goals Statement (PT) none   Pertinent History of Current Problem (include personal factors and/or comorbidities that impact the POC) POD#16 s/p aortic root abscess debridement and aortic annular reconstruction, aortic root replacement (Konect composite 25 mm Silva Inspiris Resilia bioprosthetic valve within 30 mm Gelweave Valsalva graft with reimplantation of the coronary arteries), mitral valve replacement (31 mm St. Tim Silva bioprosthetic valve) and CAB x 2.   Existing Precautions/Restrictions (S)  sternal;other (see comments);fall  (external pacer, getting permanent pacer placed today)   Strength (Manual Muscle Testing)   Strength (Manual Muscle Testing) Deficits observed during functional mobility   Bed Mobility   Bed Mobility supine-sit   Supine-Sit Brantley (Bed Mobility) moderate assist (50% patient effort);2 person assist   Bed Mobility Limitations decreased ability to use arms for pushing/pulling;decreased ability to use legs for bridging/pushing;impaired ability to control trunk for mobility   Impairments Contributing to Impaired Bed Mobility decreased strength   Transfers   Transfers sit-stand transfer   Sit-Stand Transfer   Sit-Stand Brantley (Transfers) moderate assist (50%  patient effort);2 person assist   Assistive Device (Sit-Stand Transfers)   (none)   Gait/Stairs (Locomotion)   Comment, (Gait/Stairs) n/a- pt unable to ambulate functional distance at this time.   Clinical Impression   Criteria for Skilled Therapeutic Intervention Yes, treatment indicated   PT Diagnosis (PT) Impaired functional mobility   Influenced by the following impairments Post-op, weakness, pain, dizziness   Functional limitations due to impairments Impaired strength, transfers, gait   Clinical Presentation (PT Evaluation Complexity) evolving   Clinical Presentation Rationale Presents as diagnosed   Clinical Decision Making (Complexity) moderate complexity   Planned Therapy Interventions (PT) balance training;bed mobility training;gait training;home exercise program;strengthening;transfer training;stair training   Risk & Benefits of therapy have been explained patient   PT Total Evaluation Time   PT Eval, Moderate Complexity Minutes (63534) 10   Physical Therapy Goals   PT Frequency Daily   PT Predicted Duration/Target Date for Goal Attainment 09/19/24   PT Goals Bed Mobility;Transfers;Gait;PT Goal 1   PT: Bed Mobility Minimal assist;Supine to/from sit;Within precautions   PT: Transfers Minimal assist;Sit to/from stand;Bed to/from chair;Assistive device;Within precautions   PT: Gait Moderate assist;Assistive device;50 feet;Within precautions   PT: Goal 1 Participate in LE/ exercise to improve overall strength needed for functional mobility.   PT Discharge Planning   PT Plan progress standing tolerance, transfers as able, LE ex   PT Discharge Recommendation (DC Rec) LTACH, pending meets medical criteria;Acute Rehab Center-Motivated patient will benefit from intensive, interdisciplinary therapy.  Anticipate will be able to tolerate 3 hours of therapy per day   PT Rationale for DC Rec LTACH vs ARU pending progress.   PT Brief overview of current status Sit <> stand x2 with mod Ax2. Pivot to recliner, mod Ax2.    PT Equipment Needed at Discharge walker, rolling;gait belt   Total Session Time   Total Session Time (sum of timed and untimed services) 10       Brandi Ferrara, PT, DPT  9/12/2024

## 2024-09-12 NOTE — PROGRESS NOTES
[facility]  [facility]   Follow up    Felicitas Elliott   MRN: [id number]  : 1940   DOA: 2024   CC: HAMAMD on CKD      Assessment and Plan:  84 year old female    HAMMAD -   now HAMMAD in the setting of cardiac surgery on CRRT since  and stopped over the weekend.  Now with plan for IHD today.  UOP remains quite low although she's sporadically producing a littler urine  Recs:  - MWF dialysis  - midodrine with dialysis for hemodynamic support as needed  - appreciate IR placing tunneled line  - sounds as if likely going to LTACH, as soon as next week?    CKD 3a - has underlying CKD with baseline CR 1.3-1.8, some proteinuria on prior UA but minimal on UPC.  Saw Dr. 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  Extensive serologic testing at that time was negative.    Volume status - looks reasonably euvolemic currently, adjusted UF aggressiveness down for tomorrow    MSSA bacteremia - on abx and ID following.  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  Improving daily, avoiding heparin on dialysis for now    Hyperlipidemia; statin on hold    CAD: s/p 2v CABG as part of valve surgery    A fib; previously on amio, paced.      Nutrition; off TF and eating better    Dispo; DNR, DNI; currently patient looking towards restorative cares          Subjective  Seen in room with family.  Reviewed course so far.  Anticipate PPM later today, dialysis tomorrow.  They are aware that LTACH being looked at for post hospital recovery    Objective    Vital  signs in last 24 hours  Temp:  [97.4  F (36.3  C)-98.2  F (36.8  C)] 98.2  F (36.8  C)  Pulse:  [91-92] 92  Resp:  [16-18] 16  BP: ()/(50-76) 134/76  SpO2:  [88 %-99 %] 95 %      Intake/Output last 3 shifts  I/O last 3 completed shifts:  In: 599 [P.O.:315; I.V.:284]  Out: 2200 [Other:2200]  Intake/Output this shift:  I/O this shift:  In: 13.72 [I.V.:13.72]  Out: -     Physical Exam  Alert/oriented x 3, awake, NAD; family at bedside  CV: RRR without murmur or rub  Lung:  decreased at bases, no crackles  Ext: no significant edema  Skin; no rash  Access: RIJ tunneled line    Pertinent Labs     Last Renal Panel:  Sodium   Date Value Ref Range Status   09/12/2024 133 (L) 135 - 145 mmol/L Final     Potassium   Date Value Ref Range Status   09/12/2024 3.9 3.4 - 5.3 mmol/L Final     Potassium POCT   Date Value Ref Range Status   08/27/2024 3.4 3.4 - 5.3 mmol/L Final     Chloride   Date Value Ref Range Status   09/12/2024 96 (L) 98 - 107 mmol/L Final     Carbon Dioxide (CO2)   Date Value Ref Range Status   09/12/2024 24 22 - 29 mmol/L Final     Anion Gap   Date Value Ref Range Status   09/12/2024 13 7 - 15 mmol/L Final     Glucose   Date Value Ref Range Status   09/12/2024 100 (H) 70 - 99 mg/dL Final     GLUCOSE BY METER POCT   Date Value Ref Range Status   09/10/2024 112 (H) 70 - 99 mg/dL Final     Urea Nitrogen   Date Value Ref Range Status   09/12/2024 51.3 (H) 8.0 - 23.0 mg/dL Final     Creatinine   Date Value Ref Range Status   09/12/2024 4.39 (H) 0.51 - 0.95 mg/dL Final     GFR Estimate   Date Value Ref Range Status   09/12/2024 9 (L) >60 mL/min/1.73m2 Final     Comment:     eGFR calculated using 2021 CKD-EPI equation.     Calcium   Date Value Ref Range Status   09/12/2024 8.7 (L) 8.8 - 10.4 mg/dL Final     Comment:     Reference intervals for this test were updated on 7/16/2024 to reflect our healthy population more accurately. There may be differences in the flagging of prior results with similar values  performed with this method. Those prior results can be interpreted in the context of the updated reference intervals.     Phosphorus   Date Value Ref Range Status   09/07/2024 3.1 2.5 - 4.5 mg/dL Final     Albumin   Date Value Ref Range Status   09/10/2024 3.7 3.5 - 5.2 g/dL Final     Recent Labs   Lab 09/12/24  0504 09/11/24  0448 09/10/24  0415 09/09/24  1448 09/09/24  0433   HGB 8.0* 7.9* 8.0* 7.8* 6.9*          I reviewed all lab results    [facility]  [facility]  [phone]

## 2024-09-12 NOTE — PROGRESS NOTES
Care Management Follow Up    Length of Stay (days): 27    Expected Discharge Date: 09/16/2024      Anticipated Discharge Plan:  [facility]      Transportation: Confirmed medical transport     PT Recommendations:    OT Recommendations:  [facility] patient will benefit from intensive, interdisciplinary therapy.  Anticipate will be able to tolerate 3 hours of therapy per day (or TCU pending progress)       Barriers to Discharge: medical stability, cardio status-pacemaker placed 9/12, off Dobutamine drip, stable hemoglobin, renal stable on HD     Prior Living Situation:  Patient resides in a house with her  and is reported as mostly I/ADL independent when at baseline.  assists with transport and as needed in general. No DME use or current in-home/outpatient services identified.   Spouse is primary family contact.     Discussed  Partnership in Safe Discharge Planning  document with patient/family: No      Handoff Completed: No, handoff not indicated or clinically appropriate     Patient/Spokesperson Updated: Yes.  Patient, spouse, sister, daughters on 9/12/24     Additional Information:  Medical:  Patient with history of HTN, HFpEF, moderate aortic stenosis, CKD 3, chronic intertriginous skin wounds, chronic neck pain.     Presented 8/14/24 for chills & generalized weakness. 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 8/27/24 s/p bioprosthetic aortic & mitral valve replacement, aortic root replacement, & two vessel coronary artery bypass graft. Course complicated by post operative hypoxemic respiratory failure due to cardiogenic pulmonary edema, atelectasis, & Enterobacter hospital acquired pneumonia. Treated for both cardiogenic-distributive shock, oliguric HAMMAD requiring CRRT, euglycemic ketoacidosis, & ischemic hepatitis.    Patient was extubated on 9/1.     CT Surgery, ID, Nephrology following. IP consulted.              9/12/24:  Met with patient, spouse and extended family. Information provided on LTACH. Questions answered and all state agreement for discharge to LTACH.  [place] updated on CT estimated discharge date for early next week.       Lindsey Mena RN

## 2024-09-12 NOTE — PLAN OF CARE
[facility] - ICU    RN Progress Note:            Pertinent Assessments:      Please refer to flowsheet rows for full assessment     Lung sounds clear.  Placed on 1L oxygen via nasal cannula while sleeping.  Denies pain or discomfort.           Key Events - This Shift:       Sleeping between cares and rounding.                 Barriers to Discharge / Downgrade:     Implanted pacer scheduled for this afternoon.       Evelina Vides, RN

## 2024-09-12 NOTE — ANESTHESIA PREPROCEDURE EVALUATION
Anesthesia Pre-Procedure Evaluation    Patient: Felicitas Elliott   MRN: [id number] : 1940        Procedure : Procedure(s):  PPM     History reviewed. No pertinent past medical history.   Past Surgical History:   Procedure Laterality Date    CORONARY ARTERY BYPASS GRAFT, WITH AORTIC VALVE REPLACEMENT, WITH ENDOSCOPIC VESSEL PROCUREMENT N/A 2024    Procedure: CORONARY ARTERY BYPASS GRAFT TIMES TWO, WITH AORTIC ROOT REPLACEMENT, WITH LEFT INTERNAL MAMMARY ARTERY HARVEST, LEFT SAPHNENOUS ENDOSCOPIC VESSEL PROCUREMENT,;  Surgeon: Dylan Renae MD;  Location: [facility] [place] OR    CV CORONARY ANGIOGRAM N/A 2024    Procedure: CV CORONARY ANGIOGRAM;  Surgeon: Den Wylie MD;  Location: [facility] CATH LAB CV    IR CVC NON TUNNEL PLACEMENT > 5 YRS  2024    IR CVC TUNNEL PLACEMENT > 5 YRS OF AGE  2024    PICC TRIPLE LUMEN PLACEMENT  2024    REPLACE VALVE MITRAL N/A 2024    Procedure: MITRAL VALVE REPLACEMENT,;  Surgeon: Dylan Renae MD;  Location: [facility] [place] OR    TRANSESOPHAGEAL ECHOCARDIOGRAM INTRAOPERATIVE  2024    Procedure: ANESTHESIA TRANSESOPHAGEAL ECHOCARDIOGRAM, EPI-AORTIC ULTRASOUND;  Surgeon: Dylan Renae MD;  Location: [facility] [place] OR      Allergies   Allergen Reactions    Aspirin Rash    Cats Rash    Nickel Rash      Social History     Tobacco Use    Smoking status: Not on file    Smokeless tobacco: Not on file   Substance Use Topics    Alcohol use: Not on file      Wt Readings from Last 1 Encounters:   24 84.9 kg (187 lb 2.7 oz)        Anesthesia Evaluation   Pt has had prior anesthetic.     No history of anesthetic complications       ROS/MED HX  ENT/Pulmonary:     (+)                tobacco use, Current use,        moderate,  COPD,              Neurologic:  - neg neurologic ROS     Cardiovascular: Comment: MIGDALIA Aug 2024  1. The left ventricle is normal in size. Left ventricular function is  normal.The ejection fraction  is 55-60%.  2. Normal right ventricle size and systolic function.  3. Large vegetation on mitral valve (8 mm x 15) attached to posterior leaflet.  4. There is a small vegetation on the aortic valve (6 mm). No evidence of  aortic root abscess identified.  5. Severe valvular aortic stenosis (SHU:0.8 cm2, peak nomi: 4.6 m/sec, mean  gradient: 51 mmHg).      TTE Aug 2024  Left ventricular size, wall motion and function are normal. The ejection  fraction is 60-65%.  Normal right ventricle size and systolic function.  Severe mitral valve annulus calcification and thickened leaflets.  Severe mitral valve stenosis with the mean gradient of mitral valve 10 mmHg.  Severe calcified aortic valve with reduced aortic valve excursion. The mean  gradient of aortic valve is 41mmHg.  There is a small size of mobile masss (3 x 4 mm) on aortic valve.  There is a small size of mobile mass (3 x 3 mm) on the posterior leaflet.     Vegetations on aortic valve and mitral valve are not well defined and diffult  to compare to MIGDALIA findings on 8-.    ECG: normal sinus rhythm, nonspecific ST and T waves changes, RBBB, left axis deviation.      (+)  - -  CAD (severe prox LAD and ost-prox Lcx lesions) -  - -                           valvular problems/murmurs type: AS MS.         METS/Exercise Tolerance:     Hematologic:     (+)      anemia,          Musculoskeletal: Comment: Per H&P 8/16/2024:  Neurosurgery following, as per neurosurgery no current neurosurgical intervention recommended at the finding of left C4/C5 facet arthropathy/possible septic arthritis in the setting of MSSA bacteremia, if neck pain worsens beyond her chronic baseline despite antibiotic treatment and treatment of cardiac vegetation recommended repeating MRI with and without contrast for evaluation to see if any further development of infectious finding are noted and neurosurgery signed off.  (+)  arthritis (cervical spine),             GI/Hepatic:  - neg GI/hepatic  "ROS     Renal/Genitourinary:     (+) renal disease, type: ARF,            Endo:     (+)               Obesity,       Psychiatric/Substance Use:       Infectious Disease:       Malignancy:       Other:            Physical Exam    Airway        Mallampati: II    Neck ROM: limited   Mouth opening: > 3 cm    Respiratory Devices and Support         Dental  no notable dental history   Comment: Upper and lower dentures    (+) Edentulous      Cardiovascular          Rhythm and rate: normal   (+) murmur       Pulmonary           (+) decreased breath sounds       Other findings: PICC on the right, PIV on the left    OUTSIDE LABS:  CBC:   Lab Results   Component Value Date    WBC 11.5 (H) 09/12/2024    WBC 12.4 (H) 09/11/2024    HGB 8.0 (L) 09/12/2024    HGB 7.9 (L) 09/11/2024    HCT 25.0 (L) 09/12/2024    HCT 23.9 (L) 09/11/2024    PLT 89 (L) 09/12/2024    PLT 75 (L) 09/11/2024     BMP:   Lab Results   Component Value Date     (L) 09/12/2024     (L) 09/11/2024    POTASSIUM 3.9 09/12/2024    POTASSIUM 4.0 09/11/2024    CHLORIDE 96 (L) 09/12/2024    CHLORIDE 95 (L) 09/11/2024    CO2 24 09/12/2024    CO2 22 09/11/2024    BUN 51.3 (H) 09/12/2024    BUN 63.9 (H) 09/11/2024    CR 4.39 (H) 09/12/2024    CR 4.83 (H) 09/11/2024     (H) 09/12/2024     (H) 09/11/2024     COAGS:   Lab Results   Component Value Date    PTT 52 (H) 08/30/2024    INR 1.26 (H) 09/12/2024    FIBR 416 08/30/2024     POC: No results found for: \"BGM\", \"HCG\", \"HCGS\"  HEPATIC:   Lab Results   Component Value Date    ALBUMIN 3.7 09/10/2024    PROTTOTAL 6.9 09/10/2024    ALT 79 (H) 09/10/2024    AST 60 (H) 09/10/2024    ALKPHOS 77 09/10/2024    BILITOTAL 1.5 (H) 09/10/2024     OTHER:   Lab Results   Component Value Date    PH 7.37 09/01/2024    LACT 1.2 09/03/2024    A1C 6.4 (H) 08/16/2024    KAELYN 8.7 (L) 09/12/2024    PHOS 3.1 09/07/2024    MAG 2.5 (H) 09/07/2024    LIPASE 44 09/04/2024    AMYLASE 61 09/04/2024       Anesthesia Plan    ASA " Status:  4    NPO Status:  NPO Appropriate    Anesthesia Type: MAC.     - Reason for MAC: immobility needed, straight local not clinically adequate              Consents    Anesthesia Plan(s) and associated risks, benefits, and realistic alternatives discussed. Questions answered and patient/representative(s) expressed understanding.     - Discussed:     - Discussed with:  Patient      - Extended Intubation/Ventilatory Support Discussed: No.      - Patient is DNR/DNI Status: No     Use of blood products discussed: No .     Postoperative Care    Pain management: IV analgesics, Oral pain medications.   PONV prophylaxis: Ondansetron (or other 5HT-3), Dexamethasone or Solumedrol     Comments:               Candelario Barba MD    I have reviewed the pertinent notes and labs in the chart from the past 30 days and (re)examined the patient.  Any updates or changes from those notes are reflected in this note.

## 2024-09-12 NOTE — PROGRESS NOTES
CALORIE COUNT    Diet: NPO for procedure this afternoon  Supplements:  Magic cup with meals  Gelatein plus daily  Has been on Regular diet.    Approximate Oral Intake for:      Calorie count for 9/11/24:  patient was NPO for breakfast and lunch.    Supper plus evening snack:     1019 calories  19 g protein       Estimated Needs:    Calories: 3788-5063+   Protein:  67-81 +/-       Summary:   Patient's intake improving daily.  Will continue calorie counts through 9/13/24.    Plan to see patient for heart healthy diet education before discharge.

## 2024-09-12 NOTE — PLAN OF CARE
Goal Outcome Evaluation:      Plan of Care Reviewed With: patient, spouse    Overall Patient Progress: improvingOverall Patient Progress: improving         Pt denies pain, nausea, shortness of breath. Pleasant and cooperative with care team. Stood twice after being in recliner but needed mechanical lift to get to bed.  Currently having pacemaker implanted, left unit about 1400.

## 2024-09-13 ENCOUNTER — APPOINTMENT (OUTPATIENT)
Dept: OCCUPATIONAL THERAPY | Facility: HOSPITAL | Age: 84
DRG: 853 | End: 2024-09-13
Attending: STUDENT IN AN ORGANIZED HEALTH CARE EDUCATION/TRAINING PROGRAM
Payer: COMMERCIAL

## 2024-09-13 ENCOUNTER — APPOINTMENT (OUTPATIENT)
Dept: RADIOLOGY | Facility: HOSPITAL | Age: 84
DRG: 853 | End: 2024-09-13
Attending: INTERNAL MEDICINE
Payer: COMMERCIAL

## 2024-09-13 LAB
ANION GAP SERPL CALCULATED.3IONS-SCNC: 14 MMOL/L (ref 7–15)
ATRIAL RATE - MUSE: 70 BPM
BUN SERPL-MCNC: 63.7 MG/DL (ref 8–23)
CALCIUM SERPL-MCNC: 8.6 MG/DL (ref 8.8–10.4)
CHLORIDE SERPL-SCNC: 97 MMOL/L (ref 98–107)
CREAT SERPL-MCNC: 5.51 MG/DL (ref 0.51–0.95)
DIASTOLIC BLOOD PRESSURE - MUSE: NORMAL MMHG
EGFRCR SERPLBLD CKD-EPI 2021: 7 ML/MIN/1.73M2
ERYTHROCYTE [DISTWIDTH] IN BLOOD BY AUTOMATED COUNT: 21.3 % (ref 10–15)
GLUCOSE SERPL-MCNC: 115 MG/DL (ref 70–99)
HCO3 SERPL-SCNC: 24 MMOL/L (ref 22–29)
HCT VFR BLD AUTO: 20.5 % (ref 35–47)
HGB BLD-MCNC: 7.5 G/DL (ref 11.7–15.7)
INR PPP: 1.27 (ref 0.85–1.15)
INTERPRETATION ECG - MUSE: NORMAL
LACTATE SERPL-SCNC: 1 MMOL/L (ref 0.7–2)
MCH RBC QN AUTO: 38.3 PG (ref 26.5–33)
MCHC RBC AUTO-ENTMCNC: 36.6 G/DL (ref 31.5–36.5)
MCV RBC AUTO: 105 FL (ref 78–100)
P AXIS - MUSE: 264 DEGREES
PLATELET # BLD AUTO: 102 10E3/UL (ref 150–450)
POTASSIUM SERPL-SCNC: 4.5 MMOL/L (ref 3.4–5.3)
PR INTERVAL - MUSE: 140 MS
QRS DURATION - MUSE: 144 MS
QT - MUSE: 454 MS
QTC - MUSE: 490 MS
R AXIS - MUSE: 84 DEGREES
RBC # BLD AUTO: 1.96 10E6/UL (ref 3.8–5.2)
SODIUM SERPL-SCNC: 135 MMOL/L (ref 135–145)
SYSTOLIC BLOOD PRESSURE - MUSE: NORMAL MMHG
T AXIS - MUSE: -89 DEGREES
VANCOMYCIN SERPL-MCNC: 29.3 UG/ML
VENTRICULAR RATE- MUSE: 70 BPM
WBC # BLD AUTO: 9.4 10E3/UL (ref 4–11)

## 2024-09-13 PROCEDURE — 80202 ASSAY OF VANCOMYCIN: CPT | Performed by: INTERNAL MEDICINE

## 2024-09-13 PROCEDURE — 250N000013 HC RX MED GY IP 250 OP 250 PS 637: Performed by: SURGERY

## 2024-09-13 PROCEDURE — 83605 ASSAY OF LACTIC ACID: CPT | Performed by: SURGERY

## 2024-09-13 PROCEDURE — 250N000013 HC RX MED GY IP 250 OP 250 PS 637: Performed by: NURSE PRACTITIONER

## 2024-09-13 PROCEDURE — 250N000013 HC RX MED GY IP 250 OP 250 PS 637: Performed by: PHYSICIAN ASSISTANT

## 2024-09-13 PROCEDURE — 97110 THERAPEUTIC EXERCISES: CPT | Mod: GO

## 2024-09-13 PROCEDURE — 250N000013 HC RX MED GY IP 250 OP 250 PS 637: Performed by: INTERNAL MEDICINE

## 2024-09-13 PROCEDURE — 90935 HEMODIALYSIS ONE EVALUATION: CPT | Performed by: INTERNAL MEDICINE

## 2024-09-13 PROCEDURE — 99024 POSTOP FOLLOW-UP VISIT: CPT | Performed by: NURSE PRACTITIONER

## 2024-09-13 PROCEDURE — 90935 HEMODIALYSIS ONE EVALUATION: CPT

## 2024-09-13 PROCEDURE — 71046 X-RAY EXAM CHEST 2 VIEWS: CPT

## 2024-09-13 PROCEDURE — 97535 SELF CARE MNGMENT TRAINING: CPT | Mod: GO

## 2024-09-13 PROCEDURE — 85610 PROTHROMBIN TIME: CPT | Performed by: PHYSICIAN ASSISTANT

## 2024-09-13 PROCEDURE — 250N000009 HC RX 250: Performed by: PHYSICIAN ASSISTANT

## 2024-09-13 PROCEDURE — 93010 ELECTROCARDIOGRAM REPORT: CPT | Performed by: INTERNAL MEDICINE

## 2024-09-13 PROCEDURE — 250N000011 HC RX IP 250 OP 636: Performed by: INTERNAL MEDICINE

## 2024-09-13 PROCEDURE — 93005 ELECTROCARDIOGRAM TRACING: CPT

## 2024-09-13 PROCEDURE — 85027 COMPLETE CBC AUTOMATED: CPT | Performed by: PHYSICIAN ASSISTANT

## 2024-09-13 PROCEDURE — 210N000001 HC R&B IMCU HEART CARE

## 2024-09-13 PROCEDURE — 80048 BASIC METABOLIC PNL TOTAL CA: CPT | Performed by: PHYSICIAN ASSISTANT

## 2024-09-13 RX ORDER — WARFARIN SODIUM 2 MG/1
2 TABLET ORAL
Status: COMPLETED | OUTPATIENT
Start: 2024-09-13 | End: 2024-09-13

## 2024-09-13 RX ORDER — OXYCODONE HYDROCHLORIDE 5 MG/1
10 TABLET ORAL EVERY 4 HOURS PRN
Status: DISCONTINUED | OUTPATIENT
Start: 2024-09-13 | End: 2024-09-13

## 2024-09-13 RX ORDER — ACETAMINOPHEN 325 MG/1
650 TABLET ORAL EVERY 4 HOURS PRN
Status: DISCONTINUED | OUTPATIENT
Start: 2024-09-13 | End: 2024-09-16 | Stop reason: HOSPADM

## 2024-09-13 RX ORDER — ONDANSETRON 2 MG/ML
4 INJECTION INTRAMUSCULAR; INTRAVENOUS EVERY 6 HOURS PRN
Status: DISCONTINUED | OUTPATIENT
Start: 2024-09-13 | End: 2024-09-16 | Stop reason: HOSPADM

## 2024-09-13 RX ORDER — AMOXICILLIN 250 MG
1 CAPSULE ORAL 2 TIMES DAILY
Status: DISCONTINUED | OUTPATIENT
Start: 2024-09-13 | End: 2024-09-16 | Stop reason: HOSPADM

## 2024-09-13 RX ORDER — ONDANSETRON 4 MG/1
8 TABLET, FILM COATED ORAL EVERY 8 HOURS PRN
Status: DISCONTINUED | OUTPATIENT
Start: 2024-09-13 | End: 2024-09-16 | Stop reason: HOSPADM

## 2024-09-13 RX ORDER — HEPARIN SODIUM 5000 [USP'U]/.5ML
5000 INJECTION, SOLUTION INTRAVENOUS; SUBCUTANEOUS EVERY 8 HOURS
Status: DISCONTINUED | OUTPATIENT
Start: 2024-09-13 | End: 2024-09-16 | Stop reason: HOSPADM

## 2024-09-13 RX ORDER — OXYCODONE HYDROCHLORIDE 5 MG/1
5 TABLET ORAL EVERY 4 HOURS PRN
Status: DISCONTINUED | OUTPATIENT
Start: 2024-09-13 | End: 2024-09-13

## 2024-09-13 RX ORDER — LACTOBACILLUS RHAMNOSUS GG 10B CELL
1 CAPSULE ORAL 2 TIMES DAILY
Status: DISCONTINUED | OUTPATIENT
Start: 2024-09-13 | End: 2024-09-16 | Stop reason: HOSPADM

## 2024-09-13 RX ORDER — POLYETHYLENE GLYCOL 3350 17 G/17G
17 POWDER, FOR SOLUTION ORAL DAILY
Status: DISCONTINUED | OUTPATIENT
Start: 2024-09-14 | End: 2024-09-16 | Stop reason: HOSPADM

## 2024-09-13 RX ADMIN — METOPROLOL TARTRATE 12.5 MG: 25 TABLET, FILM COATED ORAL at 09:45

## 2024-09-13 RX ADMIN — LIDOCAINE 1 PATCH: 246 PATCH TOPICAL at 18:51

## 2024-09-13 RX ADMIN — POLYETHYLENE GLYCOL 3350 17 G: 17 POWDER, FOR SOLUTION ORAL at 09:44

## 2024-09-13 RX ADMIN — PANTOPRAZOLE SODIUM 40 MG: 40 INJECTION, POWDER, FOR SOLUTION INTRAVENOUS at 09:45

## 2024-09-13 RX ADMIN — Medication 1 CAPSULE: at 21:59

## 2024-09-13 RX ADMIN — CLOPIDOGREL BISULFATE 75 MG: 75 TABLET ORAL at 09:45

## 2024-09-13 RX ADMIN — MIDODRINE HYDROCHLORIDE 10 MG: 5 TABLET ORAL at 13:10

## 2024-09-13 RX ADMIN — EPOETIN ALFA-EPBX 20000 UNITS: 20000 INJECTION, SOLUTION INTRAVENOUS; SUBCUTANEOUS at 18:52

## 2024-09-13 RX ADMIN — ACETAMINOPHEN 650 MG: 325 TABLET ORAL at 09:45

## 2024-09-13 RX ADMIN — Medication 1 CAPSULE: at 09:45

## 2024-09-13 RX ADMIN — TORSEMIDE 100 MG: 100 TABLET ORAL at 09:45

## 2024-09-13 RX ADMIN — ATORVASTATIN CALCIUM 80 MG: 40 TABLET, FILM COATED ORAL at 21:59

## 2024-09-13 RX ADMIN — WARFARIN SODIUM 2 MG: 2 TABLET ORAL at 18:52

## 2024-09-13 ASSESSMENT — ACTIVITIES OF DAILY LIVING (ADL)
ADLS_ACUITY_SCORE: 44
ADLS_ACUITY_SCORE: 45
ADLS_ACUITY_SCORE: 44
ADLS_ACUITY_SCORE: 44
ADLS_ACUITY_SCORE: 45
ADLS_ACUITY_SCORE: 45
ADLS_ACUITY_SCORE: 41
ADLS_ACUITY_SCORE: 44
ADLS_ACUITY_SCORE: 41
ADLS_ACUITY_SCORE: 44
ADLS_ACUITY_SCORE: 44
ADLS_ACUITY_SCORE: 45
ADLS_ACUITY_SCORE: 41
ADLS_ACUITY_SCORE: 44
ADLS_ACUITY_SCORE: 44
ADLS_ACUITY_SCORE: 43
ADLS_ACUITY_SCORE: 41
ADLS_ACUITY_SCORE: 44
ADLS_ACUITY_SCORE: 41
ADLS_ACUITY_SCORE: 44
ADLS_ACUITY_SCORE: 43

## 2024-09-13 NOTE — PROGRESS NOTES
"CLINICAL NUTRITION SERVICES - REASSESSMENT NOTE     Nutrition Prescription    RECOMMENDATIONS FOR MDs/PROVIDERS TO ORDER:      Malnutrition Status:    Moderate in acute.    Recommendations already ordered by Registered Dietitian (RD):      Future/Additional Recommendations:       EVALUATION OF THE PROGRESS TOWARD GOALS   Diet: Regular  Supplements:  Magic cup with meals, Gelatein daily  Patient was NPO yesterday.  She was able to eat supper after the kitchen closed.   Bites of sandwich, 3 cups OJ, pudding, canteloupe.    Estimate from above meal:  650 calories, 13 g protein.      NEW FINDINGS   Patient reports eating a late supper yesterday.  Patient to have HD run today.     ANTHROPOMETRICS  Height: 170.2 cm (5' 7\")  Most Recent Weight: 83.7 kg (184 lb 8.4 oz)    Admit wt 99.8 kg (220 lb) 8/30/24.  -12.75 L since admit with CRRT/HD    Dosing Weight: 67.2 kg/ adjusted weight     ASSESSED NUTRITION NEEDS  Estimated Energy Needs: 5607-6592+ kcals/day (25 - 30+ kcals/kg)  Justification: Increased Needs  Estimated Protein Needs: 67-81 +/- grams protein/day (1-1.2 +/- grams of pro/kg)  Justification: Increased Needs, Dialysis, HAMMAD on CKD  Estimated Fluid Needs: UOP + 500 mL/day  Justification: Maintenance     PHYSICAL FINDINGS  See malnutrition section below.  Surgical incisions  CRRT 8/29-9/8/24.  HD start 9/12/24.     GI CONCERNS  Last BM 9/11/24.  Oliguric    LABS  CMP  Recent Labs   Lab 09/13/24  0523 09/12/24  0504 09/11/24  0448 09/10/24  2352 09/10/24  1946 09/10/24  0415 09/09/24  0433 09/08/24  1454 09/08/24  0540 09/07/24  2214 09/07/24  1436 09/07/24  0340 09/06/24 2023    133* 133*  --   --  136   < > 135 138 138   < > 139  140 138  138   POTASSIUM 4.5 3.9 4.0  --   --  3.9   < > 3.8 3.8 3.8   < > 3.8  3.8 3.6  3.6   * 100* 103* 112*   < > 107*   < > 103* 148* 143*   < > 134*  134* 143*  143*   BUN 63.7* 51.3* 63.9*  --   --  45.2*   < > 57.6* 49.0* 35.4*   < > 16.3  16.3 18.3  18.3   CR " 5.51* 4.39* 4.83*  --   --  3.33*   < > 3.20* 2.55* 1.97*   < > 0.91  0.91 1.04*  1.04*   KAELYN 8.6* 8.7* 9.0  --   --  8.6*   < > 8.8 8.6* 8.5*   < > 8.5*  8.4* 7.5*  7.5*   MAG  --   --   --   --   --   --   --   --   --   --   --  2.5* 2.3   PHOS  --   --   --   --   --   --   --   --   --   --   --  3.1 2.9   ALBUMIN  --   --   --   --   --  3.7  --  3.8 3.7 3.7   < > 3.8  3.8 3.6  3.6   BILITOTAL  --   --   --   --   --  1.5*  --  1.1 1.0 1.2   < > 1.5* 1.4*   ALKPHOS  --   --   --   --   --  77  --  90 107 101   < > 102 103   AST  --   --   --   --   --  60*  --  72* 83* 94*   < > 149* 156*   ALT  --   --   --   --   --  79*  --  99* 100* 113*   < > 136* 135*    < > = values in this interval not displayed.     Reviewed    MEDICATIONS  Current Facility-Administered Medications   Medication Dose Route Frequency Provider Last Rate Last Admin    atorvastatin (LIPITOR) tablet 80 mg  80 mg Oral QPM Maryam Anthony PA-C   80 mg at 09/12/24 2024    clopidogrel (PLAVIX) tablet 75 mg  75 mg Oral Daily Maryam Anthony PA-C   75 mg at 09/13/24 0945    epoetin jose-epbx (RETACRIT) injection 20,000 Units  20,000 Units Subcutaneous Weekly Jair Galvan MD        [Held by provider] heparin ANTICOAGULANT injection 5,000 Units  5,000 Units Subcutaneous Q8H Maryam Anthony PA-C        lactobacillus rhamnosus (GG) (CULTURELL) capsule 1 capsule  1 capsule Oral BID Dylan Renae MD        Lidocaine (LIDOCARE) 4 % Patch 1-2 patch  1-2 patch Transdermal Q24H Maryam Anthony PA-C   1 patch at 09/10/24 1828    metoprolol tartrate (LOPRESSOR) half-tab 12.5 mg  12.5 mg Oral BID Maryam Anthony PA-C   12.5 mg at 09/13/24 0945    midodrine (PROAMATINE) tablet 10 mg  10 mg Oral During Dialysis/CRRT (from stock) Maryam Anthony PA-C   10 mg at 09/13/24 1310    pantoprazole (PROTONIX) EC tablet 40 mg  40 mg Oral QAM AC Maryam Anthony PA-C        Or    pantoprazole  (PROTONIX) IV push injection 40 mg  40 mg Intravenous QAM AC Maryam Anthony PA-C   40 mg at 09/13/24 0945    [START ON 9/14/2024] polyethylene glycol (MIRALAX) Packet 17 g  17 g Oral Daily Dylan Renae MD        senna-docusate (SENOKOT-S/PERICOLACE) 8.6-50 MG per tablet 1 tablet  1 tablet Oral BID Dylan Renae MD        sodium chloride (PF) 0.9% PF flush 9 mL  9 mL Intracatheter During Dialysis/CRRT (from stock) Maryam Anthony PA-C        sodium chloride (PF) 0.9% PF flush 9 mL  9 mL Intracatheter During Dialysis/CRRT (from stock) Maryam Anthony PA-C        torsemide (DEMADEX) tablet 100 mg  100 mg Oral Daily Jair Galvan MD   100 mg at 09/13/24 0945    vancomycin place horne - receiving intermittent dosing  1 each Intravenous See Admin Instructions Maryam Anthony PA-C        warfarin ANTICOAGULANT (COUMADIN) tablet 2 mg  2 mg Oral ONCE at 18:00 Dylan Renae MD        Warfarin Dose Required Daily - Pharmacist Managed  1 each Oral See Admin Instructions Maryam Anthony PA-C          Current Facility-Administered Medications   Medication Dose Route Frequency Provider Last Rate Last Admin    dextrose 10% infusion   Intravenous Continuous PRN Maryam Anthony PA-C        No heparin products for 72 hours after device implantation unless approved by the implanting physician   Does not apply Continuous PRN Brandi Rao APRN CNP          Current Facility-Administered Medications   Medication Dose Route Frequency Provider Last Rate Last Admin    acetaminophen (TYLENOL) tablet 650 mg  650 mg Oral Q4H PRN Brandi Rao APRN CNP   650 mg at 09/13/24 0945    albumin human 25 % injection 25 g  25 g Intravenous Q1H PRN Maryam Anthony PA-C   Stopped at 09/09/24 1020    sodium chloride (NEBUSAL) 3 % neb solution 3 mL  3 mL Nebulization Q6H PRN Maryam Anthony PA-C        And    albuterol (PROVENTIL) neb  solution 2.5 mg  2.5 mg Nebulization Q6H PRN Maryam Anthony PA-C        alteplase (CATHFLO ACTIVASE) injection 2 mg  2 mg Intracatheter Q1H PRN Maryam Anthony PA-C        alteplase (CATHFLO ACTIVASE) injection 2 mg  2 mg Intracatheter Q1H PRN Maryam Anthony PA-C        bisacodyl (DULCOLAX) suppository 10 mg  10 mg Rectal Daily PRN Maryam Anthony PA-C        dextrose 10% infusion   Intravenous Continuous PRN Maryam Anthony PA-C        glucose gel 15-30 g  15-30 g Oral Q15 Min PRN Maryam Anthony PA-C        Or    dextrose 50 % injection 25-50 mL  25-50 mL Intravenous Q15 Min PRN Maryam Anthony PA-C        Or    glucagon injection 1 mg  1 mg Subcutaneous Q15 Min PRN Maryam Anthony PA-C        HOLD: Heparin (IV or Subcutaneous) Post Procedure   Does not apply HOLD Brandi Rao APRN CNP        hydrALAZINE (APRESOLINE) injection 10 mg  10 mg Intravenous Q30 Min PRN Maryam Anthony PA-C        melatonin tablet 3 mg  3 mg Oral At Bedtime PRN Maryam Anthony PA-C        menthol (Topical Analgesic) 2.5% (BENGAY VANISHING SCENT) 2.5 % topical gel 1 g  1 g Topical Q8H PRN Maryam Anthony PA-C        miconazole (MICATIN) 2 % powder   Topical BID PRN Maryam Anthony PA-C        naloxone (NARCAN) injection 0.2 mg  0.2 mg Intravenous Q2 Min PRN Maryam Anthony PA-C        Or    naloxone (NARCAN) injection 0.4 mg  0.4 mg Intravenous Q2 Min PRN Maryam Anthony PA-C        Or    naloxone (NARCAN) injection 0.2 mg  0.2 mg Intramuscular Q2 Min PRN Maryam Anthony PA-C        Or    naloxone (NARCAN) injection 0.4 mg  0.4 mg Intramuscular Q2 Min PRN Maryam Anthony PA-C        nicotine (NICORETTE) gum 2 mg  2 mg Buccal Q1H PRN Maryam Anthony PA-C        No heparin products for 72 hours after device implantation unless approved by the implanting physician   Does not apply Continuous PRN  Brandi Rao APRN CNP        ondansetron (ZOFRAN) injection 4 mg  4 mg Intravenous Q6H PRN Brandi Rao APRN CNP        Or    ondansetron (ZOFRAN) tablet 8 mg  8 mg Oral Q8H PRN Brandi Rao APRN CNP        prochlorperazine (COMPAZINE) injection 5 mg  5 mg Intravenous Q6H PRN Maryam Anthony PA-C   5 mg at 09/08/24 0546    Or    prochlorperazine (COMPAZINE) tablet 5 mg  5 mg Oral Q6H PRN Maryam Anthony PA-C        Or    prochlorperazine (COMPAZINE) suppository 12.5 mg  12.5 mg Rectal Q12H PRN Maryam Anthony PA-C        senna-docusate (SENOKOT-S/PERICOLACE) 8.6-50 MG per tablet 1 tablet  1 tablet Oral BID PRN Maryam Anthony PA-C        Or    senna-docusate (SENOKOT-S/PERICOLACE) 8.6-50 MG per tablet 2 tablet  2 tablet Oral BID PRN Maryam Anthony PA-C        sodium chloride (PF) 0.9% PF flush 10 mL  10 mL Intracatheter Q15 Min PRN Maryam Anthony PA-SOM        sodium chloride (PF) 0.9% PF flush 10 mL  10 mL Intracatheter Q15 Min PRN Maryam Anthony PA-SOM        sodium chloride (PF) 0.9% PF flush 10-40 mL  10-40 mL Intracatheter Once PRN Maryam Anthony PA-SOM        sodium chloride (PF) 0.9% PF flush 10-40 mL  10-40 mL Intracatheter Once PRN Maryam Anthony PA-C        sodium chloride (PF) 0.9% PF flush 2.5 mL  2.5 mL Intravenous q1 min prn Brandi Rao APRN CNP        sodium chloride (PF) 0.9% PF flush 3 mL  3 mL Intracatheter q1 min prn Maryam Anthony PA-C        sodium chloride 0.9% BOLUS 1-250 mL  1-250 mL Intravenous Q1H PRN Maryam Anthony PA-C        sodium chloride 0.9% BOLUS 1-250 mL  1-250 mL Intravenous Q1H PRN Maryam Anthony PA-C        sodium chloride 0.9% BOLUS 100-150 mL  100-150 mL Intravenous Q15 Min PRN Maryam Anthony PA-C          Reviewed    Malnutrition Diagnosis: Moderate malnutrition  In Context of:  Acute illness or injury  Noted 9/5/24.    Previous Goals   TF  Tolerance - no longer applicable  Pt to meet nutritional needs - progressing  Electrolytes WNL - Met  BG >70 - Met    Previous Nutrition Diagnosis  Inadequate oral intake related to acute on chronic illness as evidenced by NPO   Evaluation: adjust-patient now eating    CURRENT NUTRITION DIAGNOSIS  Malnutrition related to acute illness as evidenced by decreased intake, loss of lean body mass, fluid accumulation.      INTERVENTIONS  Implementation  -Stop calorie counts at this time as pt working on eating better.   -Continue supplements.   -Continue diet as ordered to encourage po intake.  Patient's potassium and phosphorus WNL.  -Provided patient with Heart Healthy diet materials.  For now patient working on good po intake.    Goals  -Patient to consume % of nutritionally adequate meals three times per day, or the equivalent with supplements/snacks.    -Maintain weight (weight varies 184-187 lbs since 9/7/24. Weight is down since admit with previous CRRT, now on HD)    Monitoring/Evaluation  Progress toward goals will be monitored and evaluated per protocol.

## 2024-09-13 NOTE — PROCEDURES
Felicitas Elliott dialyzed for 3 hours on a Nipro dialyzer with a net fluid removal of 0.15L. A BFR of 400ml/min was obtained via a tunneled R CVC.  Pt on K3 bath. Complications: pt developed hypotension and was unable to tolerate pulling fluid.Pt was given 10 mg midodrine which had minimal effect. VSS post run. Pt had heparin instilled in both ports post run. Verbal report given to EVE Black RN, RN. Pt education provided concerning dialysis procedure and all questions answered. Consent on file. See flowsheet in epic for further details. Water alarm in place during treatment. Hepatitis B Antigen negative. Date drawn 9/9/24.       Tami Elias RN [facility]

## 2024-09-13 NOTE — PROGRESS NOTES
"CVTS Daily Progress Note   POD#17 s/p aortic root abscess debridement and aortic annular reconstruction, aortic root replacement (Konect composite 25 mm Silva Inspiris Resilia bioprosthetic valve within 30 mm Gelweave Valsalva graft with reimplantation of the coronary arteries), mitral valve replacement (31 mm St. Tim Silva bioprosthetic valve) and CAB x 2.  Attending: Dr Renae  LOS: 28    SUBJECTIVE/INTERVAL EVENTS:    No acute events overnight. Paced at 70; received permanent device with EP yesterday. Tolerated well. Maintaining O2 sats on room air. Up in chair and working with therapy. Tolerating PO intake. Plan for HD today. Remains on Vanc; ID following. Hgb 7.5 and grossly stable. Patient denies new chest pain, SOB, nausea, calf pain. She has no questions today.     OBJECTIVE:  Temp:  [98  F (36.7  C)-98.4  F (36.9  C)] 98.4  F (36.9  C)  Pulse:  [69-92] 69  Resp:  [10-38] 19  BP: (105-143)/(57-76) 132/71  SpO2:  [93 %-100 %] 100 %  Vitals:    09/09/24 0600 09/10/24 0400 09/11/24 0500 09/12/24 0505   Weight: 83.9 kg (184 lb 15.5 oz) 83.5 kg (184 lb 1.4 oz) 82.7 kg (182 lb 5.1 oz) 84.9 kg (187 lb 2.7 oz)    09/13/24 0530   Weight: 83.7 kg (184 lb 8.4 oz)       Clinically Significant Risk Factors              # Hypoalbuminemia: Lowest albumin = 3.1 g/dL at 9/1/2024  1:45 PM, will monitor as appropriate    # Coagulation Defect: INR = 1.27 (Ref range: 0.85 - 1.15) and/or PTT = 52 Seconds (Ref range: 22 - 38 Seconds), will monitor for bleeding  # Thrombocytopenia: Lowest platelets = 89 in last 2 days, will monitor for bleeding          #Acute blood loss anemia: Lowest Hgb this hospitalization: 6.9 g/dL. Will continue to monitor and treat/transfuse as appropriate.     # Overweight: Estimated body mass index is 28.9 kg/m  as calculated from the following:    Height as of this encounter: 1.702 m (5' 7\").    Weight as of this encounter: 83.7 kg (184 lb 8.4 oz).   # Moderate Malnutrition: based on nutrition " assessment      # Financial/Environmental Concerns:     # Pacemaker present  # History of CABG: noted on surgical history        Current Medications:    Scheduled Meds:  Current Facility-Administered Medications   Medication Dose Route Frequency Provider Last Rate Last Admin    atorvastatin (LIPITOR) tablet 80 mg  80 mg Oral QPM Maryam Anthony PA-C   80 mg at 09/12/24 2024    clopidogrel (PLAVIX) tablet 75 mg  75 mg Oral Daily Maryam Anthony PA-C        sennosides (SENOKOT) syrup 5 mL  5 mL Oral BID Maryam Anthony PA-C   5 mL at 09/10/24 2123    And    docusate (COLACE) 50 MG/5ML liquid 50 mg  50 mg Oral BID Maryam Anthony PA-C   50 mg at 09/10/24 2123    epoetin jose-epbx (RETACRIT) injection 20,000 Units  20,000 Units Subcutaneous Weekly Jair Galvan MD        sodium chloride 0.9% DIALYSIS Cath LOCK - RED Lumen  10 mL Intracatheter Once in dialysis/CRRT Maryam Anthony PA-C        Followed by    heparin 1000 unit/mL DIALYSIS Cath LOCK - RED Lumen  1.3-2.6 mL Intracatheter Once in dialysis/CRRT Maryam Anthony PA-C        sodium chloride 0.9% DIALYSIS Cath LOCK - BLUE Lumen  10 mL Intracatheter Once in dialysis/CRRT Maryam Anthony PA-C        Followed by    heparin 1000 unit/mL DIALYSIS Cath LOCK -BLUE Lumen  1.3-2.6 mL Intracatheter Once in dialysis/CRRT Maryam Anthony PA-C        lactobacillus rhamnosus (GG) (CULTURELL) capsule 1 capsule  1 capsule Oral or Feeding Tube BID Maryam Anthony PA-C   1 capsule at 09/12/24 2024    Lidocaine (LIDOCARE) 4 % Patch 1-2 patch  1-2 patch Transdermal Q24H Maryam Anthony PA-C   1 patch at 09/10/24 1828    metoprolol tartrate (LOPRESSOR) half-tab 12.5 mg  12.5 mg Oral BID Maryam Anthony PA-C        midodrine (PROAMATINE) tablet 10 mg  10 mg Oral During Dialysis/CRRT (from stock) Maryam Anthony PA-C        pantoprazole (PROTONIX) 2 mg/mL suspension 40 mg  40 mg Oral or NG  Tube QAM AC Maryam Anthony PA-C   40 mg at 09/11/24 0640    Or    pantoprazole (PROTONIX) EC tablet 40 mg  40 mg Oral QAM AC Maryam Anthony PA-C        Or    pantoprazole (PROTONIX) IV push injection 40 mg  40 mg Intravenous QAM AC Maryam Anthony PA-C   40 mg at 09/12/24 0651    polyethylene glycol (MIRALAX) Packet 17 g  17 g Oral or Feeding Tube Daily Maryam Anthony PA-C   17 g at 09/10/24 1118    sodium chloride (PF) 0.9% PF flush 9 mL  9 mL Intracatheter During Dialysis/CRRT (from stock) Maryam Anthony PA-C        sodium chloride (PF) 0.9% PF flush 9 mL  9 mL Intracatheter During Dialysis/CRRT (from stock) Maryam Anthony PA-C        sodium chloride 0.9% BOLUS 250 mL  250 mL Intravenous Once in dialysis/CRRT Maryam Anthony PA-C        sodium chloride 0.9% DIALYSIS Cath LOCK - BLUE Lumen  1.3-2.6 mL Intracatheter Once Maryam Anthony PA-C        sodium chloride 0.9% DIALYSIS Cath LOCK - RED Lumen  1.3-2.6 mL Intracatheter Once Maryam Anthony PA-C        torsemide (DEMADEX) tablet 100 mg  100 mg Oral Daily Jair Galvan MD   100 mg at 09/12/24 0820    vancomycin place horne - receiving intermittent dosing  1 each Intravenous See Admin Instructions Maryam Anthony PA-C        Warfarin Dose Required Daily - Pharmacist Managed  1 each Oral See Admin Instructions Maryam Anthony PA-C         Continuous Infusions:  Current Facility-Administered Medications   Medication Dose Route Frequency Provider Last Rate Last Admin    dextrose 10% infusion   Intravenous Continuous PRN Maryam Anthony PA-C        No heparin products for 72 hours after device implantation unless approved by the implanting physician   Does not apply Continuous PRN Brandi Rao APRN CNP         PRN Meds:.  Current Facility-Administered Medications   Medication Dose Route Frequency Provider Last Rate Last Admin    acetaminophen (TYLENOL)  tablet 650 mg  650 mg Oral Q4H PRN Brandi Rao APRN CNP        albumin human 25 % injection 25 g  25 g Intravenous Q1H PRN Maryam Anthony PA-C   Stopped at 09/09/24 1020    sodium chloride (NEBUSAL) 3 % neb solution 3 mL  3 mL Nebulization Q6H PRN Maryam Anthony PA-C        And    albuterol (PROVENTIL) neb solution 2.5 mg  2.5 mg Nebulization Q6H PRN Maryam Anthony PA-C        alteplase (CATHFLO ACTIVASE) injection 2 mg  2 mg Intracatheter Q1H PRN Maryam Anthony PA-C        alteplase (CATHFLO ACTIVASE) injection 2 mg  2 mg Intracatheter Q1H PRN Maryam Anthony PA-C        bisacodyl (DULCOLAX) suppository 10 mg  10 mg Rectal Daily PRN Maryam Anthony PA-C        dextrose 10% infusion   Intravenous Continuous PRN Maryam Anthony PA-C        glucose gel 15-30 g  15-30 g Oral Q15 Min PRN Maryam Anthony PA-C        Or    dextrose 50 % injection 25-50 mL  25-50 mL Intravenous Q15 Min PRN Maryam Anthony PA-C        Or    glucagon injection 1 mg  1 mg Subcutaneous Q15 Min PRN Maryam Anthony PA-C        HOLD: Heparin (IV or Subcutaneous) Post Procedure   Does not apply HOLD Brandi Rao APRN CNP        hydrALAZINE (APRESOLINE) injection 10 mg  10 mg Intravenous Q30 Min PRN Maryam Anthony PA-C        melatonin tablet 3 mg  3 mg Oral At Bedtime PRN Maryam Anthoyn PA-C        menthol (Topical Analgesic) 2.5% (BENGAY VANISHING SCENT) 2.5 % topical gel 1 g  1 g Topical Q8H PRN Maryam Anthony PA-C        miconazole (MICATIN) 2 % powder   Topical BID PRN Maryam Anthony PA-C        naloxone (NARCAN) injection 0.2 mg  0.2 mg Intravenous Q2 Min PRN Maryam Anthony PA-C        Or    naloxone (NARCAN) injection 0.4 mg  0.4 mg Intravenous Q2 Min PRN Maryam Anthony PA-C        Or    naloxone (NARCAN) injection 0.2 mg  0.2 mg Intramuscular Q2 Min PRN Maryam Anthony PA-C        Or     naloxone (NARCAN) injection 0.4 mg  0.4 mg Intramuscular Q2 Min PRN Maryam Anthony PA-C        nicotine (NICORETTE) gum 2 mg  2 mg Buccal Q1H PRN Maryam Anthony PA-C        No heparin products for 72 hours after device implantation unless approved by the implanting physician   Does not apply Continuous PRN ScottytBrandi A, CRUZITO CNP        ondansetron (ZOFRAN) injection 4 mg  4 mg Intravenous Q6H PRN Merchant Brandi ACRUZITO CNP        Or    ondansetron (ZOFRAN) tablet 8 mg  8 mg Oral Q8H PRN Brandi Rao APRN CNP        prochlorperazine (COMPAZINE) injection 5 mg  5 mg Intravenous Q6H PRN Maryam Anthony PA-C   5 mg at 09/08/24 0546    Or    prochlorperazine (COMPAZINE) tablet 5 mg  5 mg Oral Q6H PRN Maryam Anthony PA-C        Or    prochlorperazine (COMPAZINE) suppository 12.5 mg  12.5 mg Rectal Q12H PRN Maryam Anthony PA-C        senna-docusate (SENOKOT-S/PERICOLACE) 8.6-50 MG per tablet 1 tablet  1 tablet Oral BID PRN Maryam Anthony PA-C        Or    senna-docusate (SENOKOT-S/PERICOLACE) 8.6-50 MG per tablet 2 tablet  2 tablet Oral BID PRN Maryam Anthony PA-C        sodium chloride (PF) 0.9% PF flush 10 mL  10 mL Intracatheter Q15 Min PRN Maryam Anthony PA-C        sodium chloride (PF) 0.9% PF flush 10 mL  10 mL Intracatheter Q15 Min PRN Maryam Anthony PA-C        sodium chloride (PF) 0.9% PF flush 10-40 mL  10-40 mL Intracatheter Once PRN Maryam Anthony PA-C        sodium chloride (PF) 0.9% PF flush 10-40 mL  10-40 mL Intracatheter Once PRN Maryam Anthony PA-C        sodium chloride (PF) 0.9% PF flush 2.5 mL  2.5 mL Intravenous q1 min prn Brandi Rao APRN CNP        sodium chloride (PF) 0.9% PF flush 3 mL  3 mL Intracatheter q1 min prn Maryam Anthony PA-C        sodium chloride 0.9% BOLUS 1-250 mL  1-250 mL Intravenous Q1H PRN Maryam Anthony PA-C        sodium chloride 0.9% BOLUS  1-250 mL  1-250 mL Intravenous Q1H PRN Maryam Anthony PA-C        sodium chloride 0.9% BOLUS 100-150 mL  100-150 mL Intravenous Q15 Min PRN Maryam Anthony PA-C           Cardiographics:    Telemetry monitoring demonstrates paced rhythm at 70bpm per my personal review.    Imaging:  Recent Results (from the past 24 hour(s))   EP Device    Narrative    Images from the original result were not included.    [facility]  Cardiac Electrophysiology     PROCEDURE NOTE: New Dual Chamber Pacemaker Insertion (Ventricular Lead   Positioned in the Left Bundle Position).      PROCEDURALIST: MD Felicitas Burroughs    : 1940    Procedure date: 2024    PCP: Timbo Medina    Impression:  Indication for pacemaker: Sick sinus syndrome, intermittent complete heart   block, temporary epicardial pacing leads in place.  Device insertion: Successful new implant of a dual-chamber pacemaker.    Ventricular lead was implanted with a tip position adjacent to the left   bundle (His-Purkinje conduction system).    Normal acute device function.   Note significant difficulty with placement of both right ventricular and   right atrial leads both requiring multiple repositionings.  Right atrium   demonstrates marked fibrosis and poor capture particularly along the   septum but also along the anterior surface.  Lateral right atrium could be   paced but lead dislodged.  Right atrial lead intentionally positioned   along the inferior right atrium directed towards the interatrial septum   just posterior to the coronary sinus ostium.  .  Patient will return to the cardiac ICU for ongoing care and management.    Chest x-ray and device interrogation will be performed tomorrow.      Recommendations:  The patient will be returned to the cardiac ICU for postprocedure care.    Device programming: DDD-R, lower rate 70, upper rate 130, and RV pacing   bipolar  Chest x-ray and device  interrogation tomorrow morning.   No heavy lifting or strenuous activity for 28 days or as advised by the CV   surgical team.    Follow-up: 7-10 days in device clinic and primary cardiologist in 6-8   weeks.    Preprocedure diagnosis:  Sick sinus syndrome  Intermittent complete heart block  Epicardial temporary pacing in place.  Postprocedure diagnosis:  Sick sinus syndrome  Intermittent complete heart block  Epicardial temporary pacing in place.    History: Felicitas Elliott is a 84 year old female  with a history of   symptomatic bradycardia secondary to sick sinus syndrome and intermittent   complete heart block following complex valvular heart surgery.  Pacemaker   implantation has been recommended in order to correct the patient's   underlying sinus node dysfunction and conduction system disease.  The   risks benefits and expected outcomes have been explained in detail, and   the patient agrees to proceed.    Procedure initiation:  The patient was taken to the cardiac electrophysiology laboratory in the   fasting nonsedated state.  Timeout was called and appropriateness of   procedure was documented. The patient received intravenous MAC sedation   per the anesthesiology service.   Please refer to the separate procedural   log for documentation of medication dosing and vital signs. The patient   had the left chest area prepped and draped in the usual sterile fashion.   1% lidocaine was infiltrated pacemaker insertion site.      Pacemaker implantation:  A 5 cm skin incision was made 3 cm below the left clavicle.  The incision   was carried down to the pectoralis muscle where a subcutaneous pocket was   created.  Using the modified Seldinger needle technique the left   subclavian vein was punctured on 2 occasions.  A 9 Fr long and 8 Fr long   sheaths were inserted into the left subclavian vein.  The 3D delivery   sheath system was introduced through the 9 Fr sheath and the J wire   directed across the tricuspid  valve and up to the RV outflow tract.  The   sheath was curved to follow the contour of the RV septal wall and used as   the fluoroscopic marker for His bundle location.  The 3D sheath was then   directed to the ventricular septum just distal and inferior to the region   of the anatomic His bundle location.  The lead was then actively screwed   into the ventricular septal musculature with frequent monitoring of   impedance, paced QRS morphology, and fluoroscopic imaging.  On 2 occasions   the patient's lead migrated through the septal wall into the left   ventricular cavity and was withdrawn and ultimately repositioned.  Final   position demonstrated mechanical stability for greater than 15 minutes.    Once the lead was confirmed to be at the target pacing site and pacing   parameters were appropriate the 3D sheath was withdrawn into the right   atrium and lead stability was confirmed prior to securing the lead with   two 2-0 Tevdek tiedown sutures.    The 8 Fr long sheath was used to introduce the right atrial lead.  The   right atrial lead was positioned at multiple sites in the right atrium.    Initially along the intra-atrial septum along the superior aspects but   this resulted in poor P wave amplitude and high capture thresholds.    Anterior lead position was not mechanically stable and had poor   pacing/sensing characteristics as well.  Lateral right atrium demonstrated   reasonable P waves and capture threshold however the lead was not   mechanically stable at this site.  Ultimately a extremely inferior septal   target position was chosen and the lead was directed to the inferior   interatrial septum just posterior to the coronary sinus ostium.  After   prolonged monitoring the lead was secured with two 2-0 Tevdek tiedown   sutures at the pectoralis muscle.  The pocket was flushed with copious   amounts of antibiotic irrigation.  D-Stat flowable was instilled into the   pocket.  The leads and generator were  placed within the pocket.  The   pocket was closed in 3 layers, with the 2 deep layers being running 2-0   Vicryl suture and the skin approximation with a running 4-0 V-Loc suture.    The skin was cleaned and sterile Steri-Strips placed along with a sterile   dressing. A bulky pressure dressing was placed.  The femoral venous sheath   and ablation catheter were removed under fluoroscopic imaging.  Pressure   was held and hemostasis was achieved.  The patient was transported to the   cardiac telemetry unit in stable condition.    Device Implanted:   See the implant sheet attached below for device model numbers, serial   numbers, and implant characteristics.    Ventricular paced QRS characteristics:  Stim > QRS onset = 33 ms  QRS onset > R wave peak (V5) = 63 ms  QRS duration = 112 ms    Estimated blood loss: <20 mL.      Anatomic and technical issues:  Anatomic variants: As noted above the patient is postop double valve   surgery in the right atrium demonstrated significant voltage abnormality   as well as inability to capture multiple wall segments tested.    Technical issues: Septal pacing was achieved with excellent QRS morphology   and duration but on 2 occasions the lead migrated into the LV cavity and   had to be repositioned.                     Labs, personally reviewed  Hemoglobin   Date Value Ref Range Status   09/13/2024 7.5 (L) 11.7 - 15.7 g/dL Final   09/12/2024 8.0 (L) 11.7 - 15.7 g/dL Final   09/11/2024 7.9 (L) 11.7 - 15.7 g/dL Final     WBC Count   Date Value Ref Range Status   09/13/2024 9.4 4.0 - 11.0 10e3/uL Final   09/12/2024 11.5 (H) 4.0 - 11.0 10e3/uL Final   09/11/2024 12.4 (H) 4.0 - 11.0 10e3/uL Final     Platelet Count   Date Value Ref Range Status   09/13/2024 102 (L) 150 - 450 10e3/uL Final   09/12/2024 89 (L) 150 - 450 10e3/uL Final   09/11/2024 75 (L) 150 - 450 10e3/uL Final     Creatinine   Date Value Ref Range Status   09/13/2024 5.51 (H) 0.51 - 0.95 mg/dL Final   09/12/2024 4.39 (H)  0.51 - 0.95 mg/dL Final   09/11/2024 4.83 (H) 0.51 - 0.95 mg/dL Final     Potassium   Date Value Ref Range Status   09/13/2024 4.5 3.4 - 5.3 mmol/L Final   09/12/2024 3.9 3.4 - 5.3 mmol/L Final   09/11/2024 4.0 3.4 - 5.3 mmol/L Final     Potassium POCT   Date Value Ref Range Status   08/27/2024 3.4 3.4 - 5.3 mmol/L Final   08/27/2024 3.9 3.4 - 5.3 mmol/L Final   08/27/2024 3.4 3.4 - 5.3 mmol/L Final     Magnesium   Date Value Ref Range Status   09/07/2024 2.5 (H) 1.7 - 2.3 mg/dL Final   09/06/2024 2.3 1.7 - 2.3 mg/dL Final   09/06/2024 2.5 (H) 1.7 - 2.3 mg/dL Final          I/O:  I/O last 3 completed shifts:  In: 1373.72 [P.O.:960; I.V.:413.72]  Out: 20 [Blood:20]       Physical Exam:    General: Patient seen in bed. NAD, calm, cooperative on exam  HEENT: moist mucosa,  CV: Paced rhythm on monitor, mild edema  Pulm: Unlabored breathing on room air. Incision C/D/I.  Abd: Soft, NT, ND  Ext: Mod pedal edema, SCDs in place, warm  Neuro: CNs grossly intact, face symmetric, speech clear, HENDERSON      ASSESSMENT/PLAN:    Felicitas Elliott is a 84 year old female with a history of endocarditis, CAD, aortic stenosis and mitral valve disorder who is s/p Aortic root abscess debridement and aortic annular reconstruction, aortic root replacement (Konect composite 25 mm Silva Inspiris Resilia bioprosthetic valve within 30 mm Gelweave Valsalva graft with reimplantation of the coronary arteries), Mitral valve replacement (31 mm St. Tim Silva bioprosthetic valve) and CABx2.  Hospital course c/b ARF requiring renal replacement therapy.    Active Problems:    Bacteremia    Endocarditis    Sepsis (H)    Nonrheumatic aortic valve stenosis    Postsurgical percutaneous transluminal coronary angioplasty status    Mitral valve disorder    Coronary artery disease involving native coronary artery of native heart, unspecified whether angina present    Acute kidney failure, unspecified (H)    S/P CABG (coronary artery bypass graft)     Thrombocytopenia    Acute renal failure in s/o CKD    Acute hypoxic respiratory failure, improving    Dysphagia    NEURO:  - PRN Tylenol for pain  - PRN nicorette gum  - Delirium precautions  - PRN melatonin available    CV:  - Pre-op EF 60-65%  - Normotensive   - Starting metoprolol 12.5mg BID today  - ASA allergy - Plavix 75mg PO daily  - Atorvastatin 80 mg daily   - Chest tubes removed 9/3; TPW to be removed today  - Cards following--appreciate recs  - New baseline echo completed 9/6  -   - Coumadin x3 months per surgeon preference to begin today--INR goal 2-3    PULM:  - Extubated POD#1, reintubated POD #2, Extubated POD#5  - Maintaining oxygen saturations on room air  - Encourage pulmonary toilet as able    FEN/GI:  - Continue electrolyte replacement protocol  - Diet: Regular--speech signed off  - Bowel regimen    RENAL:  - Anuric  - Daily bladder scans and PRN straight caths if unable to void  - Nephrology consulted and managing renal replacement therapy, appreciate assistance; off CRRT since 9/7.  - Tunneled dialysis line placed 9/11  - Dialysis M/W/F    HEME:  - Acute blood loss anemia post-op  - Hgb 7.5   - Hold subcutaneous heparin until morning of 9/16 s/p PPM placement (no heparin products for 72 hours post-procedure)  - Plavix 75mg PO daily (ASA allergy)  - Coumadin. Pharmacy to dose  - HIT negative. Plt recovering--102 today    ID:  - Lennie op ppx complete, afebrile  - ID following-appreciate recs  - Pertinent culture history/susceptibilties:   Susceptibility data from last 90 days.  Collected Specimen Info Organism Ampicillin Ampicillin/Sulbactam Cefepime Ceftazidime Ceftriaxone Ciprofloxacin Clindamycin Daptomycin Doxycycline Erythromycin Gentamicin Levofloxacin Meropenem Oxacillin   08/29/24 Sputum from Expectorate Enterobacter cloacae complex R R  S R R  S      S  S  S      Normal samara                 08/16/24 Peripheral Blood Staphylococcus aureus                 08/14/24 Peripheral Blood  Staphylococcus aureus        S  S  S  S  S    S     Collected Specimen Info Organism Piperacillin/Tazobactam Tetracycline Tobramycin Trimethoprim/Sulfamethoxazole  Vancomycin   08/29/24 Sputum from Expectorate Enterobacter cloacae complex R   S  S      Normal samara        08/16/24 Peripheral Blood Staphylococcus aureus        08/14/24 Peripheral Blood Staphylococcus aureus   S   S  S     - Antibiotic history:   - 08/17 - 08/28 Nafcillin   - 08/29 - 08/30 Ceftazidine   - 08/30 - 09/05 Cefepime   - 08/30 - 09/05 PO Vanco  - 08/30 - current IV Vanco (end date Oct 8)    ENDO:  - A1c 6.4  - Euglycemic     PPx:  - DVT: SCDs, SQ heparin TID (held as above)/warfarin, OOB/ambulation as able  - GI: Protonix 40mg IV/PO daily    DISPO:  - IMC status  - PICC placed 9/6  - Medically Ready for Discharge: Anticipated in 2-4 Days; tentatively planning for LTACH         Patient discussed with Dr. Renae.      _______  Maryam Anthony PA-C  Cardiothoracic Surgery  [phone]

## 2024-09-13 NOTE — ANESTHESIA POSTPROCEDURE EVALUATION
Patient: Felicitas Elliott    Procedure: Procedure(s):  Pacemaker Device & Lead Implant - HIS Lead Dual       Anesthesia Type:  No value filed.    Note:     Postop Pain Control: Uneventful            Sign Out: Well controlled pain   PONV: No   Neuro/Psych: Uneventful            Sign Out: Acceptable/Baseline neuro status   Airway/Respiratory: Uneventful            Sign Out: Acceptable/Baseline resp. status   CV/Hemodynamics: Uneventful            Sign Out: Acceptable CV status; No obvious hypovolemia; No obvious fluid overload   Other NRE: NONE   DID A NON-ROUTINE EVENT OCCUR? No           Last vitals:  Vitals:    09/13/24 0800 09/13/24 0830 09/13/24 0945   BP: 128/73 132/71    Pulse: 69 69 72   Resp: 16 19    Temp:  36.9  C (98.4  F)    SpO2: 100% 100% 92%       Electronically Signed By: Marina Rome MD  September 13, 2024  12:07 PM

## 2024-09-13 NOTE — PLAN OF CARE
Goal Outcome Evaluation:      Plan of Care Reviewed With: patient    Overall Patient Progress: improvingOverall Patient Progress: improving    Outcome Evaluation: Permanent pacemaker in place. Pain manageable. Dialysis today. Slept well.

## 2024-09-13 NOTE — PROGRESS NOTES
"Care Management Follow Up    Length of Stay (days): 28    Expected Discharge Date: 09/16/2024    Anticipated Discharge Plan:  LTACH    Transportation: Anticipate medical transport    PT Recommendations: LTACH, pending meets medical criteria, [facility] patient will benefit from intensive, interdisciplinary therapy.  Anticipate will be able to tolerate 3 hours of therapy per day  OT Recommendations:  LTACH-pending meets medical criteria (LTACH vs ARU pending progress.)     Barriers to Discharge: medical stability, per CT Surgery pt will likely be ready to go to LTACH Mon/Tues next week.     Prior Living Situation:   \"Patient resides in a house with her  and is reported as mostly independent when at baseline.  assists with transport and as needed in general. No DME use or current in-home/outpatient services identified. \"     Discussed  Partnership in Safe Discharge Planning  document with patient/family: No     Handoff Completed: No, handoff not indicated or clinically appropriate    Patient/Spokesperson Updated: No    Additional Information:  Previous CM met with pt, spouse and extended family and discussed the recommendation for LTACH at discharge. Pt and family are in agreement with this discharge plan. This AM LTACH admissions reached out to writer to see if pt would be ready today as they had an admit cancel. Writer then reached out to CT surgery to see if they felt pt would be ready to go today and per CT Surgery pt is not ready today likely ready to go to LTACH Mon/Tues next week. CM then updated LTACH admissions. LTACH understanding and will cont to follow for admission Mon/Tues next week.    Next Steps: medical progression, cont to plan discharge with LTACH, set up transport.    Hien Carney RN     "

## 2024-09-13 NOTE — PROGRESS NOTES
[facility]  [facility]   Follow up    Felicitas Elliott   MRN: [id number]  : 1940   DOA: 2024   CC: HAMMAD on CKD      Assessment and Plan:  84 year old female    HAMMAD - HAMMAD in the setting of cardiac surgery on CRRT since -.  Transitioned to IHD.  UOP remains quite low although she's sporadically producing a little urine.  Decent possibility of recovering but UOP is low and Cr rise between dialysis is brisk  Recs:  - MWF dialysis  - daily torsemide  - midodrine with dialysis for hemodynamic support, needed it today  - sounds as if likely going to LTACH, as soon as next week?    CKD 3a - has underlying CKD with baseline CR 1.3-1.8, some proteinuria on prior UA but minimal on UPC.  Saw Dr. 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  Extensive serologic testing at that time was negative.    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  Received midodrine.  Asymptomatic.  Discussed plan for no dialysis over the weekend.  Sounds as if planning for discharge to LTACH next week      Objective    Vital signs in last 24 hours  Temp:  [98  F (36.7  C)-98.3  F (36.8  C)] 98  F  (36.7  C)  Pulse:  [69-92] 69  Resp:  [10-38] 17  BP: (105-143)/(57-76) 128/68  SpO2:  [93 %-100 %] 100 %      Intake/Output last 3 shifts  I/O last 3 completed shifts:  In: 1373.72 [P.O.:960; I.V.:413.72]  Out: 20 [Blood:20]  Intake/Output this shift:  No intake/output data recorded.    Physical Exam  Alert/oriented x 3, awake, NAD; family at bedside  CV: RRR without murmur or rub  Lung:  decreased at bases, no crackles  Ext: no significant edema  Skin; no rash  Access: RIJ tunneled line    Pertinent Labs     Last Renal Panel:  Sodium   Date Value Ref Range Status   09/13/2024 135 135 - 145 mmol/L Final     Potassium   Date Value Ref Range Status   09/13/2024 4.5 3.4 - 5.3 mmol/L Final     Potassium POCT   Date Value Ref Range Status   08/27/2024 3.4 3.4 - 5.3 mmol/L Final     Chloride   Date Value Ref Range Status   09/13/2024 97 (L) 98 - 107 mmol/L Final     Carbon Dioxide (CO2)   Date Value Ref Range Status   09/13/2024 24 22 - 29 mmol/L Final     Anion Gap   Date Value Ref Range Status   09/13/2024 14 7 - 15 mmol/L Final     Glucose   Date Value Ref Range Status   09/13/2024 115 (H) 70 - 99 mg/dL Final     GLUCOSE BY METER POCT   Date Value Ref Range Status   09/10/2024 112 (H) 70 - 99 mg/dL Final     Urea Nitrogen   Date Value Ref Range Status   09/13/2024 63.7 (H) 8.0 - 23.0 mg/dL Final     Creatinine   Date Value Ref Range Status   09/13/2024 5.51 (H) 0.51 - 0.95 mg/dL Final     GFR Estimate   Date Value Ref Range Status   09/13/2024 7 (L) >60 mL/min/1.73m2 Final     Comment:     eGFR calculated using 2021 CKD-EPI equation.     Calcium   Date Value Ref Range Status   09/13/2024 8.6 (L) 8.8 - 10.4 mg/dL Final     Comment:     Reference intervals for this test were updated on 7/16/2024 to reflect our healthy population more accurately. There may be differences in the flagging of prior results with similar values performed with this method. Those prior results can be interpreted in the context of the updated  reference intervals.     Phosphorus   Date Value Ref Range Status   09/07/2024 3.1 2.5 - 4.5 mg/dL Final     Albumin   Date Value Ref Range Status   09/10/2024 3.7 3.5 - 5.2 g/dL Final     Recent Labs   Lab 09/13/24  0523 09/12/24  0504 09/11/24  0448 09/10/24  0415 09/09/24  1448   HGB 7.5* 8.0* 7.9* 8.0* 7.8*          I reviewed all lab results    Jair Galvan  [facility]  [phone]

## 2024-09-13 NOTE — ANESTHESIA CARE TRANSFER NOTE
Patient: Felicitas Elliott    Procedure: Procedure(s):  Pacemaker Device & Lead Implant - HIS Lead Dual       Diagnosis: * No pre-op diagnosis entered *  Diagnosis Additional Information: No value filed.    Anesthesia Type:   No value filed.     Note:    Oropharynx: oropharynx clear of all foreign objects  Level of Consciousness: awake  Oxygen Supplementation: face mask  Level of Supplemental Oxygen (L/min / FiO2): 6  Independent Airway: airway patency satisfactory and stable  Dentition: dentition unchanged  Vital Signs Stable: post-procedure vital signs reviewed and stable  Report to RN Given: handoff report given  Patient transferred to: ICU    ICU Handoff: Call for PAUSE to initiate/utilize ICU HANDOFF, Identified Patient, Identified Responsible Provider, Reviewed the Pertinent Medical History, Discussed Surgical Course, Reviewed Intra-OP Anesthesia Management and Issues during Anesthesia, Set Expectations for Post Procedure Period and Allowed Opportunity for Questions and Acknowledgement of Understanding      Vitals:  Vitals Value Taken Time   /56    Temp     Pulse 70    Resp 14    SpO2 100        Electronically Signed By: CRUZITO Huff CRNA  September 12, 2024  7:41 PM

## 2024-09-13 NOTE — PLAN OF CARE
Problem: Adult Inpatient Plan of Care  Goal: Optimal Comfort and Wellbeing  Intervention: Provide Person-Centered Care  Recent Flowsheet Documentation  Taken 9/13/2024 1230 by Haim Kwong RN  Trust Relationship/Rapport:   care explained   choices provided   questions answered   reassurance provided   thoughts/feelings acknowledged   Goal Outcome Evaluation:    Patient transferred from ICU to P3. Calm and pleasant. Upon arrival to P3, dialysis arrived to start her on her treatment, dialysis being preformed now and should be concluded around 1630.

## 2024-09-13 NOTE — PHARMACY-VANCOMYCIN DOSING SERVICE
Pharmacy Vancomycin Note  Date of Service 2024  Patient's  1940   84 year old, female    Indication: Endocarditis  Day of Therapy: on extended course through 10/8/24  Current vancomycin regimen: intermittent dosing  Current vancomycin monitoring method:  dosing based on pre-HD levels  Current vancomycin therapeutic monitoring goal: 15-20 mg/L    Current estimated CrCl = Estimated Creatinine Clearance: 8.4 mL/min (A) (based on SCr of 5.51 mg/dL (H)).    Creatinine for last 3 days  2024:  4:48 AM Creatinine 4.83 mg/dL  2024:  5:04 AM Creatinine 4.39 mg/dL  2024:  5:23 AM Creatinine 5.51 mg/dL    Recent Vancomycin Levels (past 3 days)  2024:  4:48 AM Vancomycin 24.3 ug/mL  2024:  5:23 AM Vancomycin 29.3 ug/mL    Vancomycin IV Administrations (past 72 hours)                     vancomycin (VANCOCIN) 750 mg in sodium chloride 0.9 % 250 mL intermittent infusion (mg) 750 mg New Bag 24 1215                    Nephrotoxins and other renal medications (From now, onward)      Start     Dose/Rate Route Frequency Ordered Stop    09/10/24 0930  torsemide (DEMADEX) tablet 100 mg         100 mg Oral DAILY 09/10/24 0918      24 1329  vancomycin place horne - receiving intermittent dosing         1 each Intravenous SEE ADMIN INSTRUCTIONS 24 1329                 Contrast Orders - past 72 hours (72h ago, onward)      Start     Dose/Rate Route Frequency Stop    24 1539  iodixanol (VISIPAQUE 320) injection  Status:  Discontinued           ONCE PRN 24 1937            Interpretation of levels and current regimen:  Vancomycin level is reflective of supratherapeutic level    Renal Function: ARF on Dialysis    Plan:  Vancomycin level is likely to remain supratherapeutic, even accounting for HD clearance -- no dose today. Repeat level prior to next HD run.  Vancomycin monitoring method: Renal Replacement Therapy  Vancomycin therapeutic monitoring goal: 15-20  mg/L  Pharmacy will check vancomycin levels as appropriate in 1-3 Days.  Serum creatinine levels will be ordered  as indicated on dialysis .    Tami Barton RPH

## 2024-09-13 NOTE — PHARMACY-ANTICOAGULATION SERVICE
Clinical Pharmacy - Warfarin Dosing Consult     Pharmacy has been consulted to manage this patient s warfarin therapy.  Indication: Bioprosthetic Heart Valve  Therapy Goal: INR 2-3  Provider/Team: CVTS  Warfarin Prior to Admission: No  Significant drug interactions: SQH will remain on hold until at least 72 hours s/p pacemaker placement.  Dose Comments: Warfarin has been on hold for quite some time due to thrombocytopenia and pacemaker placement. Platelets have now risen back above 100, resume warfarin cautiously.    INR   Date Value Ref Range Status   09/13/2024 1.27 (H) 0.85 - 1.15 Final   09/12/2024 1.26 (H) 0.85 - 1.15 Final       Recommend warfarin 2 mg today.  Pharmacy will monitor Felicitas Elliott daily and order warfarin doses to achieve specified goal.      Please contact pharmacy as soon as possible if the warfarin needs to be held for a procedure or if the warfarin goals change.

## 2024-09-13 NOTE — PROGRESS NOTES
[facility]  Cardiac Electrophysiology  [address]  [place] [address]   Office: [phone]  Fax: [phone]     Cardiac Electrophysiology Progress Note    Patient: Felicitas Elliott  Referring Provider: No ref. provider found  Date of admission: 8/16/2024           Assessment and Plan       # Intermittent complete heart block, bifascicular block:  -Procedural complications: Multiple attempts for atrial and ventricular lead placement (fibrosis, poor capture)   -Pre-pectoral device site is WNL. Dressing is C/D/I. No surrounding ecchymosis or hematoma  -CXR reviewed, adequate lead placement and no evidence of pneumothorax  -The device was interrogated and is functioning appropriately. Atrial and ventricular lead sensing, impedance, and pacing thresholds are stable and within normal limits. See device report below.   -Teaching was provided to the patient which included activity restrictions and reportable signs and symptoms. Patient was receptive to teaching and denies needs prior to discharge. She was provided with a temporary device card and owner's manual    -NO heparin for 72 hours post implant  -Resume warfarin per CV surgery  -NO reaching or lifting with the left upper extremity above the shoulder for 4 weeks post implant  -OK to remove surgical dressing on POD#3. Do not remove Steri-strips - will be removed by device RN at clinic follow up.   -Continue to keep incision clean and dry for a total of 7 days. Avoid traditional showers, tub baths, swimming pools or hot tubs. Do not apply lotions, ointments or new bandages to the site. Observe for s/s of infection; warmth, redness, or drainage.   -NO dental work for 6 weeks following device implant  -Follow up appointment with device clinic RN for incision and device check in 7-10 days at [facility].     # Postoperative AF: Briefly treated with amiodarone drip, no evidence of recurrence.   Anticoagulation per CV surgery (warfarin)      Thank you for the opportunity to participate in the care of this patient. Please call if further EP issues arise.            Interval History     Status post dual-chamber His pacemaker by Dr. Pina 9/12/2024. Feeling ok. She denies incision pain or drainage, dyspnea on exertion or at rest, palpitations, lightheadedness/dizziness or syncope. Hoping to discharge to TCU next week          Data Review     DEVICE REPORT:     RA:  Sensing 0.3 mV   Impedance 513 ohms  Threshold 1.25 V @ 0.4 ms - 2.50 V @ 0.4 ms    RV:  Sensing 0.9 mV  Impedance 703 ohms  Threshold 0.75 V @ 0.4 ms - 2.25 V @ 0.4 ms    Normal device function, stable lead measurements    9/13/2024: Atrial/ventricular paced 70 bpm,  ms, QT/QTc 454/490 ms  9/2/2024: SR 66 bpm, AV delay  ms,  ms, LAFB, QT/QTc 458/480 ms. AF 75 bpm, QRS 94 ms, QT/QTc 414/462 ms  8/29/2024: Junctional bradycardia 55 bpm, RBBB  ms  8/16/2024: SR 88 bpm, RBBB  ms, QT/QTc 418/505 ms  Personally reviewed          TTE 9/6/2024  1. Normal left ventricular size and systolic performance with a visually  estimated ejection fraction of 60%.  2. There is abnormal septal motion likely related to altered electrical  activation due to bundle branch block.  3. There is a bio-prosthetic aortic valve (documented 25 mm Silva  [employer] Konect Resilia aortic valved conduit).  Â  Normal aortic valve prosthesis metrics with a mean systolic gradient of 4  mmHg and a peak anterograde velocity of 1.5 m/sec.  Â  No aortic insufficiency is detected.  4. There is a bio-prosthetic mitral valve (documented 31 mm [employer] porcine pericardial tissue valve).  Â  Normal mitral valve prosthesis function; mean diastolic gradient is 5 mm Hg.   Â  No significant mitral insufficiency is detected.  5. Probable normal right ventricular size and systolic performance though  right-sided structures are not clearly visualized  on all views on this study.          Medications     Current Facility-Administered Medications   Medication Dose Route Frequency Provider Last Rate Last Admin    atorvastatin (LIPITOR) tablet 80 mg  80 mg Oral QPM Maryam Anthony PA-C   80 mg at 09/12/24 2024    clopidogrel (PLAVIX) tablet 75 mg  75 mg Oral Daily Maryam Anthony PA-C        sennosides (SENOKOT) syrup 5 mL  5 mL Oral BID Maryam Anthony PA-C   5 mL at 09/10/24 2123    And    docusate (COLACE) 50 MG/5ML liquid 50 mg  50 mg Oral BID Maryam Anthony PA-C   50 mg at 09/10/24 2123    sodium chloride 0.9% DIALYSIS Cath LOCK - RED Lumen  10 mL Intracatheter Once in dialysis/CRRT Maryam Anthony PA-C        Followed by    heparin 1000 unit/mL DIALYSIS Cath LOCK - RED Lumen  1.3-2.6 mL Intracatheter Once in dialysis/CRRT Maryam Anthony PA-C        sodium chloride 0.9% DIALYSIS Cath LOCK - BLUE Lumen  10 mL Intracatheter Once in dialysis/CRRT Maryam Anthony PA-C        Followed by    heparin 1000 unit/mL DIALYSIS Cath LOCK -BLUE Lumen  1.3-2.6 mL Intracatheter Once in dialysis/CRRT Maryam Anthony PA-C        lactobacillus rhamnosus (GG) (CULTURELL) capsule 1 capsule  1 capsule Oral or Feeding Tube BID Maryam Anthony PA-C   1 capsule at 09/12/24 2024    Lidocaine (LIDOCARE) 4 % Patch 1-2 patch  1-2 patch Transdermal Q24H Maryam Anthony PA-C   1 patch at 09/10/24 1828    metoprolol tartrate (LOPRESSOR) half-tab 12.5 mg  12.5 mg Oral BID Maryam Anthony PA-C        midodrine (PROAMATINE) tablet 10 mg  10 mg Oral During Dialysis/CRRT (from stock) Maryam Anthony PA-C        pantoprazole (PROTONIX) 2 mg/mL suspension 40 mg  40 mg Oral or NG Tube QAM AC Maryam Anthony PA-C   40 mg at 09/11/24 0640    Or    pantoprazole (PROTONIX) EC tablet 40 mg  40 mg Oral Maryam Tucker PA-C        Or    pantoprazole (PROTONIX) IV push injection 40 mg  40  "mg Intravenous QAM AC Maryam Anthony PA-C   40 mg at 09/12/24 0651    polyethylene glycol (MIRALAX) Packet 17 g  17 g Oral or Feeding Tube Daily Maryam Anthony PA-C   17 g at 09/10/24 1118    sodium chloride (PF) 0.9% PF flush 9 mL  9 mL Intracatheter During Dialysis/CRRT (from stock) Maryam Anthony PA-C        sodium chloride (PF) 0.9% PF flush 9 mL  9 mL Intracatheter During Dialysis/CRRT (from stock) Maryam Anthony PA-C        sodium chloride 0.9% BOLUS 250 mL  250 mL Intravenous Once in dialysis/CRRT Maryam Anthony PA-C        sodium chloride 0.9% DIALYSIS Cath LOCK - BLUE Lumen  1.3-2.6 mL Intracatheter Once Maryam Anthony PA-C        sodium chloride 0.9% DIALYSIS Cath LOCK - RED Lumen  1.3-2.6 mL Intracatheter Once Maryam Anthony PA-C        torsemide (DEMADEX) tablet 100 mg  100 mg Oral Daily Jair Galvan MD   100 mg at 09/12/24 0820    vancomycin place horne - receiving intermittent dosing  1 each Intravenous See Admin Instructions Maryam Anthony PA-C        Warfarin Dose Required Daily - Pharmacist Managed  1 each Oral See Admin Instructions Maryam Anthony PA-C                 Physical Exam     VITALS: /68   Pulse 69   Temp 98  F (36.7  C) (Oral)   Resp 17   Ht 1.702 m (5' 7\")   Wt 83.7 kg (184 lb 8.4 oz)   SpO2 100%   BMI 28.90 kg/m    Wt Readings from Last 3 Encounters:   09/13/24 83.7 kg (184 lb 8.4 oz)   08/15/24 90.1 kg (198 lb 9.6 oz)       Intake/Output Summary (Last 24 hours) at 9/13/2024 0839  Last data filed at 9/13/2024 0600  Gross per 24 hour   Intake 1360 ml   Output 20 ml   Net 1340 ml       CONSTITUTIONAL: no distress  EYES:  Conjunctivae pink, sclerae clear  E/N/T:  Moist mucous membranes  RESPIRATORY:  Respiratory effort is normal  CARDIOVASCULAR:  regular, normal S1 and S2  EXTREMITIES:  No clubbing or cyanosis   SKIN:  Warm and dry. Incision CDI without localized erythema or " ecchymosis  NEURO/PSYCH:  Oriented x 3, normal affect           Brandi Rao, ABIOLA  Cardiac Electrophysiology  [facility]  Clinic and [phone]  Fax: [phone]  Electrophysiology Nurses: [phone]

## 2024-09-14 ENCOUNTER — APPOINTMENT (OUTPATIENT)
Dept: PHYSICAL THERAPY | Facility: HOSPITAL | Age: 84
DRG: 853 | End: 2024-09-14
Attending: STUDENT IN AN ORGANIZED HEALTH CARE EDUCATION/TRAINING PROGRAM
Payer: COMMERCIAL

## 2024-09-14 LAB
ANION GAP SERPL CALCULATED.3IONS-SCNC: 11 MMOL/L (ref 7–15)
BUN SERPL-MCNC: 30.3 MG/DL (ref 8–23)
CALCIUM SERPL-MCNC: 8.4 MG/DL (ref 8.8–10.4)
CHLORIDE SERPL-SCNC: 96 MMOL/L (ref 98–107)
CREAT SERPL-MCNC: 3.3 MG/DL (ref 0.51–0.95)
EGFRCR SERPLBLD CKD-EPI 2021: 13 ML/MIN/1.73M2
ERYTHROCYTE [DISTWIDTH] IN BLOOD BY AUTOMATED COUNT: 22.1 % (ref 10–15)
GLUCOSE BLDC GLUCOMTR-MCNC: 96 MG/DL (ref 70–99)
GLUCOSE SERPL-MCNC: 104 MG/DL (ref 70–99)
HCO3 SERPL-SCNC: 28 MMOL/L (ref 22–29)
HCT VFR BLD AUTO: 21 % (ref 35–47)
HGB BLD-MCNC: 7.2 G/DL (ref 11.7–15.7)
INR PPP: 1.24 (ref 0.85–1.15)
MCH RBC QN AUTO: 35.6 PG (ref 26.5–33)
MCHC RBC AUTO-ENTMCNC: 34.3 G/DL (ref 31.5–36.5)
MCV RBC AUTO: 104 FL (ref 78–100)
PLATELET # BLD AUTO: 106 10E3/UL (ref 150–450)
POTASSIUM SERPL-SCNC: 4 MMOL/L (ref 3.4–5.3)
RBC # BLD AUTO: 2.02 10E6/UL (ref 3.8–5.2)
SODIUM SERPL-SCNC: 135 MMOL/L (ref 135–145)
WBC # BLD AUTO: 7.8 10E3/UL (ref 4–11)

## 2024-09-14 PROCEDURE — 99232 SBSQ HOSP IP/OBS MODERATE 35: CPT | Performed by: INTERNAL MEDICINE

## 2024-09-14 PROCEDURE — 210N000001 HC R&B IMCU HEART CARE

## 2024-09-14 PROCEDURE — 250N000009 HC RX 250: Performed by: PHYSICIAN ASSISTANT

## 2024-09-14 PROCEDURE — 85027 COMPLETE CBC AUTOMATED: CPT | Performed by: PHYSICIAN ASSISTANT

## 2024-09-14 PROCEDURE — 80048 BASIC METABOLIC PNL TOTAL CA: CPT | Performed by: PHYSICIAN ASSISTANT

## 2024-09-14 PROCEDURE — 250N000013 HC RX MED GY IP 250 OP 250 PS 637: Performed by: PHYSICIAN ASSISTANT

## 2024-09-14 PROCEDURE — 97110 THERAPEUTIC EXERCISES: CPT | Mod: GP

## 2024-09-14 PROCEDURE — 85610 PROTHROMBIN TIME: CPT | Performed by: PHYSICIAN ASSISTANT

## 2024-09-14 PROCEDURE — 250N000013 HC RX MED GY IP 250 OP 250 PS 637: Performed by: SURGERY

## 2024-09-14 PROCEDURE — 250N000013 HC RX MED GY IP 250 OP 250 PS 637: Performed by: INTERNAL MEDICINE

## 2024-09-14 PROCEDURE — 97530 THERAPEUTIC ACTIVITIES: CPT | Mod: GP

## 2024-09-14 RX ORDER — WARFARIN SODIUM 2 MG/1
2 TABLET ORAL
Status: COMPLETED | OUTPATIENT
Start: 2024-09-14 | End: 2024-09-14

## 2024-09-14 RX ADMIN — LIDOCAINE 1 PATCH: 246 PATCH TOPICAL at 17:34

## 2024-09-14 RX ADMIN — Medication 3 MG: at 20:38

## 2024-09-14 RX ADMIN — Medication 1 CAPSULE: at 08:28

## 2024-09-14 RX ADMIN — Medication 1 CAPSULE: at 20:38

## 2024-09-14 RX ADMIN — METOPROLOL TARTRATE 12.5 MG: 25 TABLET, FILM COATED ORAL at 08:27

## 2024-09-14 RX ADMIN — PANTOPRAZOLE SODIUM 40 MG: 40 INJECTION, POWDER, FOR SOLUTION INTRAVENOUS at 06:36

## 2024-09-14 RX ADMIN — WARFARIN SODIUM 2 MG: 2 TABLET ORAL at 17:34

## 2024-09-14 RX ADMIN — TORSEMIDE 100 MG: 100 TABLET ORAL at 08:29

## 2024-09-14 RX ADMIN — CLOPIDOGREL BISULFATE 75 MG: 75 TABLET ORAL at 08:28

## 2024-09-14 RX ADMIN — METOPROLOL TARTRATE 12.5 MG: 25 TABLET, FILM COATED ORAL at 20:41

## 2024-09-14 RX ADMIN — SENNOSIDES AND DOCUSATE SODIUM 1 TABLET: 8.6; 5 TABLET ORAL at 20:38

## 2024-09-14 RX ADMIN — ATORVASTATIN CALCIUM 80 MG: 40 TABLET, FILM COATED ORAL at 20:38

## 2024-09-14 ASSESSMENT — ACTIVITIES OF DAILY LIVING (ADL)
ADLS_ACUITY_SCORE: 43
ADLS_ACUITY_SCORE: 39
ADLS_ACUITY_SCORE: 43
ADLS_ACUITY_SCORE: 39

## 2024-09-14 NOTE — PLAN OF CARE
[id number]    Pt denies pain unless she is using arm.  Declined tylenol.  Pt hoyered to  chair for meals. PT/OT seeing pt also.  Took pressure dressing off of pacemaker incision site.  Problem: Pain Acute  Goal: Optimal Pain Control and Function  Intervention: Prevent or Manage Pain  Recent Flowsheet Documentation  Taken 9/14/2024 1081 by Sis Anaya RN  Medication Review/Management: medications reviewed   Goal Outcome Evaluation:

## 2024-09-14 NOTE — PLAN OF CARE
Problem: Adult Inpatient Plan of Care  Goal: Plan of Care Review  Description: The Plan of Care Review/Shift note should be completed every shift.  The Outcome Evaluation is a brief statement about your assessment that the patient is improving, declining, or no change.  This information will be displayed automatically on your shift  note.  Outcome: Progressing     Problem: Risk for Delirium  Goal: Optimal Coping  Outcome: Progressing  Intervention: Optimize Psychosocial Adjustment to Delirium  Recent Flowsheet Documentation  Taken 9/13/2024 2200 by Yulisa Black RN  Supportive Measures:   active listening utilized   verbalization of feelings encouraged  Taken 9/13/2024 1630 by Yulisa Black RN  Supportive Measures:   active listening utilized   verbalization of feelings encouraged  Family/Support System Care: support provided     Problem: Risk for Delirium  Goal: Improved Behavioral Control  Intervention: Minimize Safety Risk  Recent Flowsheet Documentation  Taken 9/13/2024 2200 by Yulisa Black RN  Trust Relationship/Rapport:   care explained   reassurance provided  Taken 9/13/2024 1631 by Yulisa Black RN  Enhanced Safety Measures: review medications for side effects with activity  Taken 9/13/2024 1630 by Yulisa Black RN  Trust Relationship/Rapport:   care explained   reassurance provided     Problem: Skin Injury Risk Increased  Goal: Skin Health and Integrity  Intervention: Plan: Nurse Driven Intervention: Moisture Management  Recent Flowsheet Documentation  Taken 9/13/2024 1631 by Yulisa Black RN  Moisture Interventions:   Incontinence pad   Barrier ointment (CriticAid, Triad paste)   Perineal cleanser  Intervention: Plan: Nurse Driven Intervention: Friction and Shear  Recent Flowsheet Documentation  Taken 9/13/2024 1631 by Yulisa Black RN  Friction/Shear Interventions: HOB 30 degrees or less  Intervention: Optimize Skin Protection  Recent Flowsheet Documentation  Taken 9/13/2024 2200 by Yulisa Black  RN  Activity Management: activity adjusted per tolerance  Head of Bed (HOB) Positioning: HOB at 30 degrees  Taken 9/13/2024 1630 by Yulisa Black RN  Activity Management: activity adjusted per tolerance  Head of Bed (HOB) Positioning: HOB at 30 degrees     Problem: Sepsis/Septic Shock  Goal: Absence of Infection Signs and Symptoms  Intervention: Initiate Sepsis Management  Recent Flowsheet Documentation  Taken 9/13/2024 1631 by Yulisa Black RN  Infection Prevention:   hand hygiene promoted   rest/sleep promoted  Intervention: Promote Recovery  Recent Flowsheet Documentation  Taken 9/13/2024 2200 by Yulisa Black RN  Activity Management: activity adjusted per tolerance  Taken 9/13/2024 1630 by Yulisa Black RN  Activity Management: activity adjusted per tolerance     Problem: Pain Acute  Goal: Optimal Pain Control and Function  Outcome: Progressing  Intervention: Prevent or Manage Pain  Recent Flowsheet Documentation  Taken 9/13/2024 1631 by Yulisa Black RN  Medication Review/Management: medications reviewed  Intervention: Optimize Psychosocial Wellbeing  Recent Flowsheet Documentation  Taken 9/13/2024 2200 by Yulisa Black RN  Supportive Measures:   active listening utilized   verbalization of feelings encouraged  Taken 9/13/2024 1630 by Yulisa Black RN  Supportive Measures:   active listening utilized   verbalization of feelings encouraged     Problem: Gas Exchange Impaired  Goal: Optimal Gas Exchange  Intervention: Optimize Oxygenation and Ventilation  Recent Flowsheet Documentation  Taken 9/13/2024 2200 by Yulisa Black RN  Head of Bed (HOB) Positioning: HOB at 30 degrees  Taken 9/13/2024 1630 by Yulisa Black RN  Head of Bed (HOB) Positioning: HOB at 30 degrees     Problem: Cardiac Catheterization (Diagnostic/Interventional)  Goal: Anesthesia/Sedation Recovery  Intervention: Optimize Anesthesia Recovery  Recent Flowsheet Documentation  Taken 9/13/2024 2200 by Yulisa Black RN  Reorientation Measures:    calendar in view   clock in view  Taken 9/13/2024 1631 by Yulisa Black RN  Safety Promotion/Fall Prevention: activity supervised  Taken 9/13/2024 1630 by Yulisa Black RN  Reorientation Measures:   calendar in view   clock in view     Problem: Cardiac Catheterization (Diagnostic/Interventional)  Goal: Absence of Vascular Access Complication  Intervention: Prevent and Manage Access Complications  Recent Flowsheet Documentation  Taken 9/13/2024 2200 by Yulisa Black RN  Activity Management: activity adjusted per tolerance  Head of Bed (HOB) Positioning: HOB at 30 degrees  Taken 9/13/2024 1630 by Yulisa Black RN  Activity Management: activity adjusted per tolerance  Head of Bed (HOB) Positioning: HOB at 30 degrees     Problem: Cardiovascular Surgery  Goal: Improved Activity Tolerance  Outcome: Progressing  Intervention: Optimize Tolerance for Activity  Recent Flowsheet Documentation  Taken 9/13/2024 2200 by Yulisa Black RN  Environmental Support: calm environment promoted  Taken 9/13/2024 1630 by Yulisa Black RN  Environmental Support: calm environment promoted     Problem: Cardiovascular Surgery  Goal: Absence of Bleeding  Outcome: Progressing   Goal Outcome Evaluation:                    Alert. Oriented x 4.    Mid-sternal incision, clean, dry and intact. Healing well.     Pacer site, ANATOLIY, covered with dressing. Surrounding tissue, no complications noted.     Lidocaine patch placed left upper back per patient request for back pain.     Incontinent of stool.     Weak. Assist of 2 with turning and repositioning.     Had hemodialysis this afternoon. BP 80s -90s, prn Midrodrine given by HD nurse. BP improved to 100s after.

## 2024-09-14 NOTE — PROGRESS NOTES
[facility]  [facility]   Follow up    Felicitas Elliott   MRN: [id number]  : 1940   DOA: 2024   CC: HAMMAD on CKD      Assessment and Plan:  84 year old female    HAMMAD - HAMMAD in the setting of cardiac surgery on CRRT since -.  Transitioned to IHD.  UOP remains quite low although she's sporadically producing a little urine.  Decent possibility of recovering but UOP is low and Cr rise between dialysis is brisk  Recs:  - MWF dialysis  - daily torsemide  - midodrine with dialysis for hemodynamic support, needed it today  - sounds as if likely going to LTACH, as soon as next week?    CKD 3a - has underlying CKD with baseline CR 1.3-1.8, some proteinuria on prior UA but minimal on UPC.  Saw Dr. 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  Extensive serologic testing at that time was negative.    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  Still feels weak and tired.  Hard to get her appetite going.  Encouraged getting up into a chair, eating, PT/OT.  No plan for dialysis this weekend.    Objective    Vital signs in last 24 hours  Temp:  [97.6  F (36.4  C)-99  F (37.2  C)] 98  F (36.7  C)  Pulse:  [68-75] 74  Resp:   [16-20] 16  BP: ()/(51-73) 137/62  SpO2:  [91 %-100 %] 91 %      Intake/Output last 3 shifts  I/O last 3 completed shifts:  In: 540 [P.O.:340; Other:200]  Out: 0.15 [Other:0.15]  Intake/Output this shift:  No intake/output data recorded.    Physical Exam  Alert/oriented x 3, awake, NAD;  at bedside  CV: RRR without murmur or rub  Lung:  decreased at bases, no crackles  Ext: no significant edema  Skin; no rash  Access: RIJ tunneled line    Pertinent Labs     Last Renal Panel:  Sodium   Date Value Ref Range Status   09/14/2024 135 135 - 145 mmol/L Final     Potassium   Date Value Ref Range Status   09/14/2024 4.0 3.4 - 5.3 mmol/L Final     Potassium POCT   Date Value Ref Range Status   08/27/2024 3.4 3.4 - 5.3 mmol/L Final     Chloride   Date Value Ref Range Status   09/14/2024 96 (L) 98 - 107 mmol/L Final     Carbon Dioxide (CO2)   Date Value Ref Range Status   09/14/2024 28 22 - 29 mmol/L Final     Anion Gap   Date Value Ref Range Status   09/14/2024 11 7 - 15 mmol/L Final     Glucose   Date Value Ref Range Status   09/14/2024 104 (H) 70 - 99 mg/dL Final     GLUCOSE BY METER POCT   Date Value Ref Range Status   09/14/2024 96 70 - 99 mg/dL Final     Urea Nitrogen   Date Value Ref Range Status   09/14/2024 30.3 (H) 8.0 - 23.0 mg/dL Final     Creatinine   Date Value Ref Range Status   09/14/2024 3.30 (H) 0.51 - 0.95 mg/dL Final     GFR Estimate   Date Value Ref Range Status   09/14/2024 13 (L) >60 mL/min/1.73m2 Final     Comment:     eGFR calculated using 2021 CKD-EPI equation.     Calcium   Date Value Ref Range Status   09/14/2024 8.4 (L) 8.8 - 10.4 mg/dL Final     Comment:     Reference intervals for this test were updated on 7/16/2024 to reflect our healthy population more accurately. There may be differences in the flagging of prior results with similar values performed with this method. Those prior results can be interpreted in the context of the updated reference intervals.     Phosphorus   Date  Value Ref Range Status   09/07/2024 3.1 2.5 - 4.5 mg/dL Final     Albumin   Date Value Ref Range Status   09/10/2024 3.7 3.5 - 5.2 g/dL Final     Recent Labs   Lab 09/14/24  0438 09/13/24  0523 09/12/24  0504 09/11/24  0448 09/10/24  0415   HGB 7.2* 7.5* 8.0* 7.9* 8.0*          I reviewed all lab results    Jair Galvan  [facility]  [phone]

## 2024-09-14 NOTE — PROGRESS NOTES
"CVTS Daily Progress Note   POD#18 s/p aortic root abscess debridement and aortic annular reconstruction, aortic root replacement (Konect composite 25 mm Silva Inspiris Resilia bioprosthetic valve within 30 mm Gelweave Valsalva graft with reimplantation of the coronary arteries), mitral valve replacement (31 mm St. Tim Silva bioprosthetic valve) and CAB x 2.  Attending: Dr Renae  LOS: 29    SUBJECTIVE/INTERVAL EVENTS:    No acute events overnight. Paced at 70; received permanent device with EP 9/11. Tolerated well. Maintaining O2 sats on room air. Up in chair and working with therapy. Tolerating PO intake. No plan for HD until monday. Remains on Vanc; ID following. Hgb 7.2 and grossly stable. Patient denies new chest pain, SOB, nausea, calf pain. She has no questions today.     OBJECTIVE:  Temp:  [97.6  F (36.4  C)-99  F (37.2  C)] 97.9  F (36.6  C)  Pulse:  [68-75] 69  Resp:  [16-20] 16  BP: ()/(51-65) 107/55  SpO2:  [91 %-99 %] 94 %  Vitals:    09/10/24 0400 09/11/24 0500 09/12/24 0505 09/13/24 0530   Weight: 83.5 kg (184 lb 1.4 oz) 82.7 kg (182 lb 5.1 oz) 84.9 kg (187 lb 2.7 oz) 83.7 kg (184 lb 8.4 oz)    09/14/24 0409   Weight: 82.9 kg (182 lb 12.2 oz)       Clinically Significant Risk Factors              # Hypoalbuminemia: Lowest albumin = 3.1 g/dL at 9/1/2024  1:45 PM, will monitor as appropriate     # Thrombocytopenia: Lowest platelets = 102 in last 2 days, will monitor for bleeding          #Acute blood loss anemia: Lowest Hgb this hospitalization: 6.9 g/dL. Will continue to monitor and treat/transfuse as appropriate.     # Overweight: Estimated body mass index is 28.62 kg/m  as calculated from the following:    Height as of this encounter: 1.702 m (5' 7\").    Weight as of this encounter: 82.9 kg (182 lb 12.2 oz).   # Moderate Malnutrition: based on nutrition assessment      # Financial/Environmental Concerns:     # Pacemaker present  # History of CABG: noted on surgical " history        Current Medications:    Scheduled Meds:  Current Facility-Administered Medications   Medication Dose Route Frequency Provider Last Rate Last Admin    atorvastatin (LIPITOR) tablet 80 mg  80 mg Oral QPM Maryam Anthony PA-C   80 mg at 09/13/24 2159    clopidogrel (PLAVIX) tablet 75 mg  75 mg Oral Daily Maryam Anthony PA-C   75 mg at 09/14/24 0828    epoetin jose-epbx (RETACRIT) injection 20,000 Units  20,000 Units Subcutaneous Weekly Jair Galvan MD   20,000 Units at 09/13/24 1852    [Held by provider] heparin ANTICOAGULANT injection 5,000 Units  5,000 Units Subcutaneous Q8H Maryam Anthony PA-C        lactobacillus rhamnosus (GG) (CULTURELL) capsule 1 capsule  1 capsule Oral BID Dylan Renae MD   1 capsule at 09/14/24 0828    Lidocaine (LIDOCARE) 4 % Patch 1-2 patch  1-2 patch Transdermal Q24H Maryam Anthony PA-C   1 patch at 09/13/24 1851    metoprolol tartrate (LOPRESSOR) half-tab 12.5 mg  12.5 mg Oral BID Maryam Anthony PA-C   12.5 mg at 09/14/24 0827    midodrine (PROAMATINE) tablet 10 mg  10 mg Oral During Dialysis/CRRT (from stock) Maryam Anthony PA-C   10 mg at 09/13/24 1310    pantoprazole (PROTONIX) EC tablet 40 mg  40 mg Oral QAM AC Maryam Anthony PA-C        Or    pantoprazole (PROTONIX) IV push injection 40 mg  40 mg Intravenous QAM AC Maryam Anthony PA-C   40 mg at 09/14/24 0636    polyethylene glycol (MIRALAX) Packet 17 g  17 g Oral Daily Dylan Renae MD        senna-docusate (SENOKOT-S/PERICOLACE) 8.6-50 MG per tablet 1 tablet  1 tablet Oral BID Dylan Renae MD        sodium chloride (PF) 0.9% PF flush 9 mL  9 mL Intracatheter During Dialysis/CRRT (from stock) Maryam Anthony PA-C        sodium chloride (PF) 0.9% PF flush 9 mL  9 mL Intracatheter During Dialysis/CRRT (from stock) Maryam Anthony PA-C        torsemide (DEMADEX) tablet 100 mg  100 mg Oral  Daily Jair Galvan MD   100 mg at 09/14/24 0829    vancomycin place horne - receiving intermittent dosing  1 each Intravenous See Admin Instructions Maryam Anthony PA-C        Warfarin Dose Required Daily - Pharmacist Managed  1 each Oral See Admin Instructions Maryam Anthony PA-C         Continuous Infusions:  Current Facility-Administered Medications   Medication Dose Route Frequency Provider Last Rate Last Admin    dextrose 10% infusion   Intravenous Continuous PRN Maryam Anthony PA-C        No heparin products for 72 hours after device implantation unless approved by the implanting physician   Does not apply Continuous PRN Brandi Rao APRN CNP         PRN Meds:.  Current Facility-Administered Medications   Medication Dose Route Frequency Provider Last Rate Last Admin    acetaminophen (TYLENOL) tablet 650 mg  650 mg Oral Q4H PRN Brandi Rao APRPETER CNP   650 mg at 09/13/24 0945    albumin human 25 % injection 25 g  25 g Intravenous Q1H PRN Maryam Anthony PA-C   Stopped at 09/09/24 1020    sodium chloride (NEBUSAL) 3 % neb solution 3 mL  3 mL Nebulization Q6H PRN Maryam Anthony PA-C        And    albuterol (PROVENTIL) neb solution 2.5 mg  2.5 mg Nebulization Q6H PRN Maryam Anthony PA-C        alteplase (CATHFLO ACTIVASE) injection 2 mg  2 mg Intracatheter Q1H PRN Maryam Anthony PA-C        alteplase (CATHFLO ACTIVASE) injection 2 mg  2 mg Intracatheter Q1H PRN Maryam Anthony PA-C        bisacodyl (DULCOLAX) suppository 10 mg  10 mg Rectal Daily PRN Maryam Anthony PA-C        dextrose 10% infusion   Intravenous Continuous PRN Maryam Anthony PA-C        glucose gel 15-30 g  15-30 g Oral Q15 Min PRN Maryam Anthony PA-C        Or    dextrose 50 % injection 25-50 mL  25-50 mL Intravenous Q15 Min PRN Maryam Anthony PA-C        Or    glucagon injection 1 mg  1 mg Subcutaneous Q15 Min PRN Gabrielle  Maryam Hodgson PA-C        HOLD: Heparin (IV or Subcutaneous) Post Procedure   Does not apply HOLD Brandi Rao APRN CNP        hydrALAZINE (APRESOLINE) injection 10 mg  10 mg Intravenous Q30 Min PRN Maryam Anthony PA-C        melatonin tablet 3 mg  3 mg Oral At Bedtime PRN Maryam Anthony PA-C        menthol (Topical Analgesic) 2.5% (BENGAY VANISHING SCENT) 2.5 % topical gel 1 g  1 g Topical Q8H PRN Maryam Anthony PA-C        miconazole (MICATIN) 2 % powder   Topical BID PRN Maryam Anthony PA-C        naloxone (NARCAN) injection 0.2 mg  0.2 mg Intravenous Q2 Min PRN Maryam Anthony PA-C        Or    naloxone (NARCAN) injection 0.4 mg  0.4 mg Intravenous Q2 Min PRN Maryam Anthony PA-C        Or    naloxone (NARCAN) injection 0.2 mg  0.2 mg Intramuscular Q2 Min PRN Maryam Anthony PA-C        Or    naloxone (NARCAN) injection 0.4 mg  0.4 mg Intramuscular Q2 Min PRN Maryam Anthony PA-C        nicotine (NICORETTE) gum 2 mg  2 mg Buccal Q1H PRN Maryam Anthony PA-C        No heparin products for 72 hours after device implantation unless approved by the implanting physician   Does not apply Continuous PRN Brandi Rao APRN CNP        ondansetron (ZOFRAN) injection 4 mg  4 mg Intravenous Q6H PRN Brandi Rao APRN CNP        Or    ondansetron (ZOFRAN) tablet 8 mg  8 mg Oral Q8H PRN Brandi Rao APRN CNP        prochlorperazine (COMPAZINE) injection 5 mg  5 mg Intravenous Q6H PRN Maryam Anthony PA-C   5 mg at 09/08/24 0546    Or    prochlorperazine (COMPAZINE) tablet 5 mg  5 mg Oral Q6H PRN Maryam Anthony PA-C        Or    prochlorperazine (COMPAZINE) suppository 12.5 mg  12.5 mg Rectal Q12H PRN Maryam Anthony PA-C        senna-docusate (SENOKOT-S/PERICOLACE) 8.6-50 MG per tablet 1 tablet  1 tablet Oral BID PRN Maryam Anthony PA-C        Or    senna-docusate (SENOKOT-S/PERICOLACE) 8.6-50  MG per tablet 2 tablet  2 tablet Oral BID PRN Gabrielle, Maryam Mazariegosen, PA-C        sodium chloride (PF) 0.9% PF flush 10 mL  10 mL Intracatheter Q15 Min PRN Gabrielle, Maryam Rema, PA-C        sodium chloride (PF) 0.9% PF flush 10 mL  10 mL Intracatheter Q15 Min PRN Gabrielle, Maryam Rema, PA-C        sodium chloride (PF) 0.9% PF flush 10-40 mL  10-40 mL Intracatheter Once PRN Gabrielle, Maryam Rema, PA-C        sodium chloride (PF) 0.9% PF flush 10-40 mL  10-40 mL Intracatheter Once PRN Gabrielle, Maryam Rema, PA-C        sodium chloride (PF) 0.9% PF flush 2.5 mL  2.5 mL Intravenous q1 min prn Brandi Rao A, APRN CNP        sodium chloride (PF) 0.9% PF flush 3 mL  3 mL Intracatheter q1 min prn Gabrielle, Maryam Rema, PA-C        sodium chloride 0.9% BOLUS 1-250 mL  1-250 mL Intravenous Q1H PRN Gabrielle, Maryam Rema, PA-C        sodium chloride 0.9% BOLUS 1-250 mL  1-250 mL Intravenous Q1H PRN Gabrielle, Maryam Rema, PA-C        sodium chloride 0.9% BOLUS 100-150 mL  100-150 mL Intravenous Q15 Min PRN Gabrielle, Maryam Rema, PA-C           Cardiographics:    Telemetry monitoring demonstrates paced rhythm at 70bpm per my personal review.    Imaging:  No results found for this or any previous visit (from the past 24 hour(s)).          Labs, personally reviewed  Hemoglobin   Date Value Ref Range Status   09/14/2024 7.2 (L) 11.7 - 15.7 g/dL Final   09/13/2024 7.5 (L) 11.7 - 15.7 g/dL Final   09/12/2024 8.0 (L) 11.7 - 15.7 g/dL Final     WBC Count   Date Value Ref Range Status   09/14/2024 7.8 4.0 - 11.0 10e3/uL Final   09/13/2024 9.4 4.0 - 11.0 10e3/uL Final   09/12/2024 11.5 (H) 4.0 - 11.0 10e3/uL Final     Platelet Count   Date Value Ref Range Status   09/14/2024 106 (L) 150 - 450 10e3/uL Final   09/13/2024 102 (L) 150 - 450 10e3/uL Final   09/12/2024 89 (L) 150 - 450 10e3/uL Final     Creatinine   Date Value Ref Range Status   09/14/2024 3.30 (H) 0.51 - 0.95 mg/dL Final   09/13/2024 5.51 (H) 0.51 - 0.95 mg/dL  Final   09/12/2024 4.39 (H) 0.51 - 0.95 mg/dL Final     Potassium   Date Value Ref Range Status   09/14/2024 4.0 3.4 - 5.3 mmol/L Final   09/13/2024 4.5 3.4 - 5.3 mmol/L Final   09/12/2024 3.9 3.4 - 5.3 mmol/L Final     Potassium POCT   Date Value Ref Range Status   08/27/2024 3.4 3.4 - 5.3 mmol/L Final   08/27/2024 3.9 3.4 - 5.3 mmol/L Final   08/27/2024 3.4 3.4 - 5.3 mmol/L Final     Magnesium   Date Value Ref Range Status   09/07/2024 2.5 (H) 1.7 - 2.3 mg/dL Final   09/06/2024 2.3 1.7 - 2.3 mg/dL Final   09/06/2024 2.5 (H) 1.7 - 2.3 mg/dL Final          I/O:  I/O last 3 completed shifts:  In: 540 [P.O.:340; Other:200]  Out: 0.15 [Other:0.15]       Physical Exam:    General: Patient seen in bed. NAD, calm, cooperative on exam  HEENT: moist mucosa,  CV: Paced rhythm on monitor, mild edema  Pulm: Unlabored breathing on room air. Incision C/D/I.  Abd: Soft, NT, ND  Ext: Mod pedal edema, SCDs in place, warm  Neuro: CNs grossly intact, face symmetric, speech clear, HENDERSON      ASSESSMENT/PLAN:    Felicitas Elliott is a 84 year old female with a history of endocarditis, CAD, aortic stenosis and mitral valve disorder who is s/p Aortic root abscess debridement and aortic annular reconstruction, aortic root replacement (Konect composite 25 mm Silva Inspiris Resilia bioprosthetic valve within 30 mm Gelweave Valsalva graft with reimplantation of the coronary arteries), Mitral valve replacement (31 mm St. Tim Silva bioprosthetic valve) and CABx2.  Hospital course c/b ARF requiring renal replacement therapy.    Active Problems:    Bacteremia    Endocarditis    Sepsis (H)    Nonrheumatic aortic valve stenosis    Postsurgical percutaneous transluminal coronary angioplasty status    Mitral valve disorder    Coronary artery disease involving native coronary artery of native heart, unspecified whether angina present    Acute kidney failure, unspecified (H)    S/P CABG (coronary artery bypass graft)    Thrombocytopenia    Acute  renal failure in s/o CKD    Acute hypoxic respiratory failure, improving    Dysphagia    NEURO:  - PRN Tylenol for pain  - PRN nicorette gum  - Delirium precautions  - PRN melatonin available    CV:  - Pre-op EF 60-65%  - Normotensive   - Starting metoprolol 12.5mg BID   - ASA allergy - Plavix 75mg PO daily  - Atorvastatin 80 mg daily   - Chest tubes removed 9/3; TPW to be removed today  - Cards following--appreciate recs  - New baseline echo completed 9/6  -   - Coumadin x3 months per surgeon preference to begin today--INR goal 2-3. INR 1.24    PULM:  - Extubated POD#1, reintubated POD #2, Extubated POD#5  - Maintaining oxygen saturations on room air  - Encourage pulmonary toilet as able    FEN/GI:  - Continue electrolyte replacement protocol  - Diet: Regular--speech signed off  - Bowel regimen    RENAL:  - Anuric  - Daily bladder scans and PRN straight caths if unable to void  - Nephrology consulted and managing renal replacement therapy, appreciate assistance; off CRRT since 9/7.  - Tunneled dialysis line placed 9/11  - Dialysis M/W/F    HEME:  - Acute blood loss anemia post-op  - Hgb 7.2   - Hold subcutaneous heparin until morning of 9/16 s/p PPM placement (no heparin products for 72 hours post-procedure)  - Plavix 75mg PO daily (ASA allergy)  - Coumadin. Pharmacy to dose  - HIT negative. Plt recovering--102 today    ID:  - Lennie op ppx complete, afebrile  - ID following-appreciate recs  - Pertinent culture history/susceptibilties:   Susceptibility data from last 90 days.  Collected Specimen Info Organism Ampicillin Ampicillin/Sulbactam Cefepime Ceftazidime Ceftriaxone Ciprofloxacin Clindamycin Daptomycin Doxycycline Erythromycin Gentamicin Levofloxacin Meropenem Oxacillin   08/29/24 Sputum from Expectorate Enterobacter cloacae complex R R  S R R  S      S  S  S      Normal samara                 08/16/24 Peripheral Blood Staphylococcus aureus                 08/14/24 Peripheral Blood Staphylococcus aureus         S  S  S  S  S    S     Collected Specimen Info Organism Piperacillin/Tazobactam Tetracycline Tobramycin Trimethoprim/Sulfamethoxazole  Vancomycin   08/29/24 Sputum from Expectorate Enterobacter cloacae complex R   S  S      Normal samara        08/16/24 Peripheral Blood Staphylococcus aureus        08/14/24 Peripheral Blood Staphylococcus aureus   S   S  S     - Antibiotic history:   - 08/17 - 08/28 Nafcillin   - 08/29 - 08/30 Ceftazidine   - 08/30 - 09/05 Cefepime   - 08/30 - 09/05 PO Vanco  - 08/30 - current IV Vanco (end date Oct 8)    ENDO:  - A1c 6.4  - Euglycemic     PPx:  - DVT: SCDs, SQ heparin TID (held as above)/warfarin, OOB/ambulation as able  - GI: Protonix 40mg IV/PO daily    DISPO:  - IMC status  - PICC placed 9/6  - Medically Ready for Discharge: Anticipated in 2-4 Days; tentatively planning for LTACH       Patient discussed with Dr. Elliott.    eJssica Tolbert PA-C  [facility]oth[facility]  Vocera or pager [phone]

## 2024-09-14 NOTE — PLAN OF CARE
sc  Patient Name: Felicitas Elliott   MRN: [id number]   Date of Admission: 2024    Procedure: Procedure(s):  Pacemaker Device & Lead Implant - HIS Lead Dual    Post Op day #: 2    Subjective (Patient focus/Primary Problem for shift): Sleep.          Pain Goal 0 Pain Ratin          Pain Medication/ Regime effective to reduce patient pain. No pain meds given.    Objective (Physical assessment):           Rhythm: NSR w/ BBB            Bowel Activity: yes if Yes indicate when: 2024          Bowel Medications: yes            Incision: healing well          Incentive Spirometry Q 1-2 hour when awake:  not applicable Volume: 750 ml.          Epicardial Pacing Wires:  no            Patient Activity:           Up to chair for meals: yes          Ambulation with RN x2 (Not including CR): not applicable            Is patient in home clothes:no                      Urinary Catheter: no           Preventative WOC consult (need MD order): no       Assessment (Nursing primary shift focus): Denied any pain. Incontinent of urine and stool. Turned and repositioned. Vitals stable. Afebrile. On IV antibiotic.    Plan (Patient Care Plan/focus): Pressure dressing removal?      Cielo Winchester RN   2024   6:17 AM

## 2024-09-15 ENCOUNTER — APPOINTMENT (OUTPATIENT)
Dept: OCCUPATIONAL THERAPY | Facility: HOSPITAL | Age: 84
DRG: 853 | End: 2024-09-15
Attending: STUDENT IN AN ORGANIZED HEALTH CARE EDUCATION/TRAINING PROGRAM
Payer: COMMERCIAL

## 2024-09-15 LAB
ANION GAP SERPL CALCULATED.3IONS-SCNC: 12 MMOL/L (ref 7–15)
BUN SERPL-MCNC: 39.5 MG/DL (ref 8–23)
CALCIUM SERPL-MCNC: 8.6 MG/DL (ref 8.8–10.4)
CHLORIDE SERPL-SCNC: 94 MMOL/L (ref 98–107)
CREAT SERPL-MCNC: 4.43 MG/DL (ref 0.51–0.95)
EGFRCR SERPLBLD CKD-EPI 2021: 9 ML/MIN/1.73M2
ERYTHROCYTE [DISTWIDTH] IN BLOOD BY AUTOMATED COUNT: 21.9 % (ref 10–15)
GLUCOSE SERPL-MCNC: 107 MG/DL (ref 70–99)
HCO3 SERPL-SCNC: 26 MMOL/L (ref 22–29)
HCT VFR BLD AUTO: 19.6 % (ref 35–47)
HGB BLD-MCNC: 7.1 G/DL (ref 11.7–15.7)
INR PPP: 1.2 (ref 0.85–1.15)
MCH RBC QN AUTO: 38 PG (ref 26.5–33)
MCHC RBC AUTO-ENTMCNC: 36.2 G/DL (ref 31.5–36.5)
MCV RBC AUTO: 105 FL (ref 78–100)
PLATELET # BLD AUTO: 106 10E3/UL (ref 150–450)
POTASSIUM SERPL-SCNC: 3.9 MMOL/L (ref 3.4–5.3)
RBC # BLD AUTO: 1.87 10E6/UL (ref 3.8–5.2)
SODIUM SERPL-SCNC: 132 MMOL/L (ref 135–145)
WBC # BLD AUTO: 7.2 10E3/UL (ref 4–11)

## 2024-09-15 PROCEDURE — 85027 COMPLETE CBC AUTOMATED: CPT | Performed by: PHYSICIAN ASSISTANT

## 2024-09-15 PROCEDURE — 85610 PROTHROMBIN TIME: CPT | Performed by: PHYSICIAN ASSISTANT

## 2024-09-15 PROCEDURE — 250N000013 HC RX MED GY IP 250 OP 250 PS 637: Performed by: SURGERY

## 2024-09-15 PROCEDURE — 210N000001 HC R&B IMCU HEART CARE

## 2024-09-15 PROCEDURE — 99232 SBSQ HOSP IP/OBS MODERATE 35: CPT | Performed by: INTERNAL MEDICINE

## 2024-09-15 PROCEDURE — 250N000013 HC RX MED GY IP 250 OP 250 PS 637: Performed by: PHYSICIAN ASSISTANT

## 2024-09-15 PROCEDURE — 80048 BASIC METABOLIC PNL TOTAL CA: CPT | Performed by: PHYSICIAN ASSISTANT

## 2024-09-15 PROCEDURE — 250N000009 HC RX 250: Performed by: PHYSICIAN ASSISTANT

## 2024-09-15 PROCEDURE — 250N000013 HC RX MED GY IP 250 OP 250 PS 637: Performed by: INTERNAL MEDICINE

## 2024-09-15 PROCEDURE — 97535 SELF CARE MNGMENT TRAINING: CPT | Mod: GO

## 2024-09-15 RX ORDER — WARFARIN SODIUM 3 MG/1
3 TABLET ORAL
Status: COMPLETED | OUTPATIENT
Start: 2024-09-15 | End: 2024-09-15

## 2024-09-15 RX ORDER — METOPROLOL TARTRATE 25 MG/1
25 TABLET, FILM COATED ORAL 2 TIMES DAILY
Status: DISCONTINUED | OUTPATIENT
Start: 2024-09-15 | End: 2024-09-16 | Stop reason: HOSPADM

## 2024-09-15 RX ADMIN — METOPROLOL TARTRATE 12.5 MG: 25 TABLET, FILM COATED ORAL at 09:16

## 2024-09-15 RX ADMIN — TORSEMIDE 100 MG: 100 TABLET ORAL at 09:15

## 2024-09-15 RX ADMIN — METOPROLOL TARTRATE 25 MG: 25 TABLET, FILM COATED ORAL at 20:16

## 2024-09-15 RX ADMIN — SENNOSIDES AND DOCUSATE SODIUM 1 TABLET: 8.6; 5 TABLET ORAL at 20:17

## 2024-09-15 RX ADMIN — CLOPIDOGREL BISULFATE 75 MG: 75 TABLET ORAL at 09:14

## 2024-09-15 RX ADMIN — LIDOCAINE 1 PATCH: 246 PATCH TOPICAL at 18:57

## 2024-09-15 RX ADMIN — ATORVASTATIN CALCIUM 80 MG: 40 TABLET, FILM COATED ORAL at 20:16

## 2024-09-15 RX ADMIN — PANTOPRAZOLE SODIUM 40 MG: 40 INJECTION, POWDER, FOR SOLUTION INTRAVENOUS at 06:30

## 2024-09-15 RX ADMIN — WARFARIN SODIUM 3 MG: 3 TABLET ORAL at 18:57

## 2024-09-15 RX ADMIN — Medication 1 CAPSULE: at 20:15

## 2024-09-15 RX ADMIN — Medication 1 CAPSULE: at 09:14

## 2024-09-15 ASSESSMENT — ACTIVITIES OF DAILY LIVING (ADL)
ADLS_ACUITY_SCORE: 43
ADLS_ACUITY_SCORE: 39
ADLS_ACUITY_SCORE: 43
ADLS_ACUITY_SCORE: 39
ADLS_ACUITY_SCORE: 47
ADLS_ACUITY_SCORE: 43
ADLS_ACUITY_SCORE: 43
ADLS_ACUITY_SCORE: 47
ADLS_ACUITY_SCORE: 42
ADLS_ACUITY_SCORE: 42
ADLS_ACUITY_SCORE: 43
ADLS_ACUITY_SCORE: 42
ADLS_ACUITY_SCORE: 43
ADLS_ACUITY_SCORE: 47
ADLS_ACUITY_SCORE: 42
ADLS_ACUITY_SCORE: 43
ADLS_ACUITY_SCORE: 42
ADLS_ACUITY_SCORE: 43

## 2024-09-15 NOTE — PROGRESS NOTES
[facility]  [facility]   Follow up    Felicitas Elliott   MRN: [id number]  : 1940   DOA: 2024   CC: HAMMAD on CKD      Assessment and Plan:  84 year old female    HAMMAD - HAMMAD in the setting of cardiac surgery on CRRT since -.  Transitioned to IHD.  UOP remains quite low although she's sporadically producing a little urine.  Decent possibility of recovering but UOP is low and Cr rise between dialysis is brisk  Recs:  - MWF dialysis  - daily torsemide  - midodrine with dialysis for hemodynamic support, needed it Friday  - sounds as if likely going to LTACH, in the next few days    CKD 3a - has underlying CKD with baseline CR 1.3-1.8, some proteinuria on prior UA but minimal on UPC.  Saw Dr. 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  Extensive serologic testing at that time was negative.    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 No new symptoms.  Lifted into the chair with a lift and did okay with this.  Trying to move more, eat more.      Objective    Vital signs in last 24 hours  Temp:  [97.9  F (36.6  C)-99  F (37.2  C)] 98.6  F (37  C)  Pulse:  [69-75] 73  Resp:  [16-18] 18  BP: (107-131)/(55-66) 131/66  SpO2:  [86 %-98 %] 96 %      Intake/Output last 3 shifts  I/O last 3 completed shifts:  In: 600  [P.O.:600]  Out: -   Intake/Output this shift:  No intake/output data recorded.    Physical Exam  Alert/oriented x 3, awake, NAD;  at bedside  CV: RRR without murmur or rub  Lung:  decreased at bases, no crackles  Ext: no significant edema  Skin; no rash  Access: RIJ tunneled line    Pertinent Labs     Last Renal Panel:  Sodium   Date Value Ref Range Status   09/15/2024 132 (L) 135 - 145 mmol/L Final     Potassium   Date Value Ref Range Status   09/15/2024 3.9 3.4 - 5.3 mmol/L Final     Potassium POCT   Date Value Ref Range Status   08/27/2024 3.4 3.4 - 5.3 mmol/L Final     Chloride   Date Value Ref Range Status   09/15/2024 94 (L) 98 - 107 mmol/L Final     Carbon Dioxide (CO2)   Date Value Ref Range Status   09/15/2024 26 22 - 29 mmol/L Final     Anion Gap   Date Value Ref Range Status   09/15/2024 12 7 - 15 mmol/L Final     Glucose   Date Value Ref Range Status   09/15/2024 107 (H) 70 - 99 mg/dL Final     GLUCOSE BY METER POCT   Date Value Ref Range Status   09/14/2024 96 70 - 99 mg/dL Final     Urea Nitrogen   Date Value Ref Range Status   09/15/2024 39.5 (H) 8.0 - 23.0 mg/dL Final     Creatinine   Date Value Ref Range Status   09/15/2024 4.43 (H) 0.51 - 0.95 mg/dL Final     GFR Estimate   Date Value Ref Range Status   09/15/2024 9 (L) >60 mL/min/1.73m2 Final     Comment:     eGFR calculated using 2021 CKD-EPI equation.     Calcium   Date Value Ref Range Status   09/15/2024 8.6 (L) 8.8 - 10.4 mg/dL Final     Comment:     Reference intervals for this test were updated on 7/16/2024 to reflect our healthy population more accurately. There may be differences in the flagging of prior results with similar values performed with this method. Those prior results can be interpreted in the context of the updated reference intervals.     Phosphorus   Date Value Ref Range Status   09/07/2024 3.1 2.5 - 4.5 mg/dL Final     Albumin   Date Value Ref Range Status   09/10/2024 3.7 3.5 - 5.2 g/dL Final     Recent Labs   Lab  09/15/24  0423 09/14/24  0438 09/13/24  0523 09/12/24  0504 09/11/24  0448   HGB 7.1* 7.2* 7.5* 8.0* 7.9*          I reviewed all lab results    Jair Galvan  [facility]  [phone]

## 2024-09-15 NOTE — PLAN OF CARE
[id number]     Pt denies pain at this time.  CHG bath done.  Dhaval to chair and back.  PT will evaluated tomorrow. OT/cardiac rehab worked with pt.  Tried to stand pt with 2 person assist. Pt became dizzy.  Sat pt back down and Hoyered back to bed.  Gel seat cushion ordered for chair.  Getting pt up for all meals. Pt sitting 1-2.5 hours at a time.  -750.  Encouraging pt to eat at least half her meals.    Problem: Adult Inpatient Plan of Care  Goal: Plan of Care Review  Description: The Plan of Care Review/Shift note should be completed every shift.  The Outcome Evaluation is a brief statement about your assessment that the patient is improving, declining, or no change.  This information will be displayed automatically on your shift  note.  Outcome: Progressing   Goal Outcome Evaluation:

## 2024-09-15 NOTE — PROGRESS NOTES
"CVTS Daily Progress Note   POD#19 s/p aortic root abscess debridement and aortic annular reconstruction, aortic root replacement (Konect composite 25 mm Silva Inspiris Resilia bioprosthetic valve within 30 mm Gelweave Valsalva graft with reimplantation of the coronary arteries), mitral valve replacement (31 mm St. Tim Silva bioprosthetic valve) and CAB x 2.  Attending: Dr Renae  LOS: 30    SUBJECTIVE/INTERVAL EVENTS:    No acute events overnight. Paced at 70; received permanent device with EP 9/11. Tolerated well. Maintaining O2 sats on room air. Up in chair and working with therapy. Tolerating PO intake. No plan for HD until monday. Remains on Vanc; ID following. Hgb 7.1 and grossly stable. Patient denies new chest pain, SOB, nausea, calf pain. She has no questions today.     OBJECTIVE:  Temp:  [98.4  F (36.9  C)-99  F (37.2  C)] 98.6  F (37  C)  Pulse:  [71-75] 73  Resp:  [18] 18  BP: (120-131)/(56-66) 131/66  SpO2:  [86 %-98 %] 96 %  Vitals:    09/11/24 0500 09/12/24 0505 09/13/24 0530 09/14/24 0409   Weight: 82.7 kg (182 lb 5.1 oz) 84.9 kg (187 lb 2.7 oz) 83.7 kg (184 lb 8.4 oz) 82.9 kg (182 lb 12.2 oz)    09/15/24 0406   Weight: 80.8 kg (178 lb 2.1 oz)       Clinically Significant Risk Factors              # Hypoalbuminemia: Lowest albumin = 3.1 g/dL at 9/1/2024  1:45 PM, will monitor as appropriate     # Thrombocytopenia: Lowest platelets = 106 in last 2 days, will monitor for bleeding          #Acute blood loss anemia: Lowest Hgb this hospitalization: 6.9 g/dL. Will continue to monitor and treat/transfuse as appropriate.     # Overweight: Estimated body mass index is 27.9 kg/m  as calculated from the following:    Height as of this encounter: 1.702 m (5' 7\").    Weight as of this encounter: 80.8 kg (178 lb 2.1 oz).   # Moderate Malnutrition: based on nutrition assessment      # Financial/Environmental Concerns:     # Pacemaker present  # History of CABG: noted on surgical history        Current " Medications:    Scheduled Meds:  Current Facility-Administered Medications   Medication Dose Route Frequency Provider Last Rate Last Admin    atorvastatin (LIPITOR) tablet 80 mg  80 mg Oral QPM Maryam Anthony PA-C   80 mg at 09/14/24 2038    clopidogrel (PLAVIX) tablet 75 mg  75 mg Oral Daily Maryam Anthony PA-C   75 mg at 09/15/24 0914    epoetin jose-epbx (RETACRIT) injection 20,000 Units  20,000 Units Subcutaneous Weekly Jair Galvan MD   20,000 Units at 09/13/24 1852    [Held by provider] heparin ANTICOAGULANT injection 5,000 Units  5,000 Units Subcutaneous Q8H Maryam Anthony PA-C        lactobacillus rhamnosus (GG) (CULTURELL) capsule 1 capsule  1 capsule Oral BID Dylan Renae MD   1 capsule at 09/15/24 0914    Lidocaine (LIDOCARE) 4 % Patch 1-2 patch  1-2 patch Transdermal Q24H Maryam Anthony PA-C   1 patch at 09/14/24 1734    metoprolol tartrate (LOPRESSOR) half-tab 12.5 mg  12.5 mg Oral BID Maryam Anthony PA-C   12.5 mg at 09/15/24 0916    midodrine (PROAMATINE) tablet 10 mg  10 mg Oral During Dialysis/CRRT (from stock) Maryam Anthony PA-C   10 mg at 09/13/24 1310    pantoprazole (PROTONIX) EC tablet 40 mg  40 mg Oral QAM AC Maryam Anthony PA-C        Or    pantoprazole (PROTONIX) IV push injection 40 mg  40 mg Intravenous QAM AC Maryam Anthony PA-C   40 mg at 09/15/24 0630    polyethylene glycol (MIRALAX) Packet 17 g  17 g Oral Daily Dylan Renae MD        senna-docusate (SENOKOT-S/PERICOLACE) 8.6-50 MG per tablet 1 tablet  1 tablet Oral BID Dylan Renae MD   1 tablet at 09/14/24 2038    sodium chloride (PF) 0.9% PF flush 9 mL  9 mL Intracatheter During Dialysis/CRRT (from stock) Maryam Anthony PA-C        sodium chloride (PF) 0.9% PF flush 9 mL  9 mL Intracatheter During Dialysis/CRRT (from stock) Maryam Anthony PA-C        torsemide (DEMADEX) tablet 100 mg  100 mg Oral  Daily Jair Galvan MD   100 mg at 09/15/24 0915    vancomycin place horne - receiving intermittent dosing  1 each Intravenous See Admin Instructions Maryam Anthony PA-C        warfarin ANTICOAGULANT (COUMADIN) tablet 3 mg  3 mg Oral ONCE at 18:00 Dylan Renae MD        Warfarin Dose Required Daily - Pharmacist Managed  1 each Oral See Admin Instructions Maryam Anthony PA-C         Continuous Infusions:  Current Facility-Administered Medications   Medication Dose Route Frequency Provider Last Rate Last Admin    dextrose 10% infusion   Intravenous Continuous PRN Maryam Anthony PA-C        No heparin products for 72 hours after device implantation unless approved by the implanting physician   Does not apply Continuous PRN Brandi Rao APRN CNP         PRN Meds:.  Current Facility-Administered Medications   Medication Dose Route Frequency Provider Last Rate Last Admin    acetaminophen (TYLENOL) tablet 650 mg  650 mg Oral Q4H PRN Brandi Rao APRPETER CNP   650 mg at 09/13/24 0945    albumin human 25 % injection 25 g  25 g Intravenous Q1H PRN Maryam Anthony PA-C   Stopped at 09/09/24 1020    sodium chloride (NEBUSAL) 3 % neb solution 3 mL  3 mL Nebulization Q6H PRN Maryam Anthony PA-C        And    albuterol (PROVENTIL) neb solution 2.5 mg  2.5 mg Nebulization Q6H PRN Maryam Anthony PA-C        alteplase (CATHFLO ACTIVASE) injection 2 mg  2 mg Intracatheter Q1H PRN Maryam Anthony PA-C        alteplase (CATHFLO ACTIVASE) injection 2 mg  2 mg Intracatheter Q1H PRN Maryam Anthony PA-C        bisacodyl (DULCOLAX) suppository 10 mg  10 mg Rectal Daily PRN Maryam Anthony PA-C        dextrose 10% infusion   Intravenous Continuous PRN Maryam Anthony PA-C        glucose gel 15-30 g  15-30 g Oral Q15 Min PRN Maryam Anthony PA-C        Or    dextrose 50 % injection 25-50 mL  25-50 mL Intravenous Q15 Min PRN  Maryam Anthony PA-C        Or    glucagon injection 1 mg  1 mg Subcutaneous Q15 Min PRN Maryam Anthony PA-C        HOLD: Heparin (IV or Subcutaneous) Post Procedure   Does not apply HOLD Brandi Rao APRN CNP        hydrALAZINE (APRESOLINE) injection 10 mg  10 mg Intravenous Q30 Min PRN Maryam Anthony PA-C        melatonin tablet 3 mg  3 mg Oral At Bedtime PRN Maryam Anthony PA-C   3 mg at 09/14/24 2038    menthol (Topical Analgesic) 2.5% (BENGAY VANISHING SCENT) 2.5 % topical gel 1 g  1 g Topical Q8H PRN Maryam Anthony PA-C        miconazole (MICATIN) 2 % powder   Topical BID PRN Maryam Anthony PA-C        naloxone (NARCAN) injection 0.2 mg  0.2 mg Intravenous Q2 Min PRN Maryam Anthony PA-C        Or    naloxone (NARCAN) injection 0.4 mg  0.4 mg Intravenous Q2 Min PRN Maryam Anthony PA-C        Or    naloxone (NARCAN) injection 0.2 mg  0.2 mg Intramuscular Q2 Min PRN Maryam Anthony PA-C        Or    naloxone (NARCAN) injection 0.4 mg  0.4 mg Intramuscular Q2 Min PRN Maryam Anthony PA-C        nicotine (NICORETTE) gum 2 mg  2 mg Buccal Q1H PRN Maryam Anthony PA-C        No heparin products for 72 hours after device implantation unless approved by the implanting physician   Does not apply Continuous PRN Brandi Rao APRN CNP        ondansetron (ZOFRAN) injection 4 mg  4 mg Intravenous Q6H PRN Brandi Rao APRN CNP        Or    ondansetron (ZOFRAN) tablet 8 mg  8 mg Oral Q8H PRN Brandi Rao APRN CNP        prochlorperazine (COMPAZINE) injection 5 mg  5 mg Intravenous Q6H PRN Maryam Anthony PA-C   5 mg at 09/08/24 0546    Or    prochlorperazine (COMPAZINE) tablet 5 mg  5 mg Oral Q6H PRN Maryam Anthony PA-C        Or    prochlorperazine (COMPAZINE) suppository 12.5 mg  12.5 mg Rectal Q12H PRN Maryam Anthony PA-C        senna-docusate (SENOKOT-S/PERICOLACE) 8.6-50 MG per  tablet 1 tablet  1 tablet Oral BID PRN Gabrielle, Maryam Mazariegosen, PA-C        Or    senna-docusate (SENOKOT-S/PERICOLACE) 8.6-50 MG per tablet 2 tablet  2 tablet Oral BID PRN Gabrielle, Maryam Mazariegosen, PA-C        sodium chloride (PF) 0.9% PF flush 10 mL  10 mL Intracatheter Q15 Min PRN Gabrielle, Maryam Mazariegosen, PA-C        sodium chloride (PF) 0.9% PF flush 10 mL  10 mL Intracatheter Q15 Min PRN Gabrielle, Maryam Mazariegosen, PA-C        sodium chloride (PF) 0.9% PF flush 10-40 mL  10-40 mL Intracatheter Once PRN Gabrielle, Maryam Mazariegosen, PA-C        sodium chloride (PF) 0.9% PF flush 10-40 mL  10-40 mL Intracatheter Once PRN Gabrielle, Maryam Mazariegosen, PA-C        sodium chloride (PF) 0.9% PF flush 2.5 mL  2.5 mL Intravenous q1 min prn Brandi Rao, APRN CNP        sodium chloride (PF) 0.9% PF flush 3 mL  3 mL Intracatheter q1 min prn Gabrielle, Maryam Mazariegosen, PA-C        sodium chloride 0.9% BOLUS 1-250 mL  1-250 mL Intravenous Q1H PRN Gabrielle, Maryam Rema, PA-C        sodium chloride 0.9% BOLUS 1-250 mL  1-250 mL Intravenous Q1H PRN Gabrielle, Maryam Rema, PA-C        sodium chloride 0.9% BOLUS 100-150 mL  100-150 mL Intravenous Q15 Min PRN Gabrielle, Maryam Mazariegosen, PA-C           Cardiographics:    Telemetry monitoring demonstrates paced rhythm at 70bpm per my personal review.    Imaging:  No results found for this or any previous visit (from the past 24 hour(s)).          Labs, personally reviewed  Hemoglobin   Date Value Ref Range Status   09/15/2024 7.1 (L) 11.7 - 15.7 g/dL Final   09/14/2024 7.2 (L) 11.7 - 15.7 g/dL Final   09/13/2024 7.5 (L) 11.7 - 15.7 g/dL Final     WBC Count   Date Value Ref Range Status   09/15/2024 7.2 4.0 - 11.0 10e3/uL Final   09/14/2024 7.8 4.0 - 11.0 10e3/uL Final   09/13/2024 9.4 4.0 - 11.0 10e3/uL Final     Platelet Count   Date Value Ref Range Status   09/15/2024 106 (L) 150 - 450 10e3/uL Final   09/14/2024 106 (L) 150 - 450 10e3/uL Final   09/13/2024 102 (L) 150 - 450 10e3/uL Final      Creatinine   Date Value Ref Range Status   09/15/2024 4.43 (H) 0.51 - 0.95 mg/dL Final   09/14/2024 3.30 (H) 0.51 - 0.95 mg/dL Final   09/13/2024 5.51 (H) 0.51 - 0.95 mg/dL Final     Potassium   Date Value Ref Range Status   09/15/2024 3.9 3.4 - 5.3 mmol/L Final   09/14/2024 4.0 3.4 - 5.3 mmol/L Final   09/13/2024 4.5 3.4 - 5.3 mmol/L Final     Potassium POCT   Date Value Ref Range Status   08/27/2024 3.4 3.4 - 5.3 mmol/L Final   08/27/2024 3.9 3.4 - 5.3 mmol/L Final   08/27/2024 3.4 3.4 - 5.3 mmol/L Final     Magnesium   Date Value Ref Range Status   09/07/2024 2.5 (H) 1.7 - 2.3 mg/dL Final   09/06/2024 2.3 1.7 - 2.3 mg/dL Final   09/06/2024 2.5 (H) 1.7 - 2.3 mg/dL Final          I/O:  I/O last 3 completed shifts:  In: 600 [P.O.:600]  Out: -        Physical Exam:    General: Patient seen in bed. NAD, calm, cooperative on exam  HEENT: moist mucosa,  CV: Paced rhythm on monitor, mild edema  Pulm: Unlabored breathing on room air. Incision C/D/I.  Abd: Soft, NT, ND  Ext: Mod pedal edema, SCDs in place, warm  Neuro: CNs grossly intact, face symmetric, speech clear, HENDERSON      ASSESSMENT/PLAN:    Felicitas Elliott is a 84 year old female with a history of endocarditis, CAD, aortic stenosis and mitral valve disorder who is s/p Aortic root abscess debridement and aortic annular reconstruction, aortic root replacement (Konect composite 25 mm Silva Inspiris Resilia bioprosthetic valve within 30 mm Gelweave Valsalva graft with reimplantation of the coronary arteries), Mitral valve replacement (31 mm St. Tim Silva bioprosthetic valve) and CABx2.  Hospital course c/b ARF requiring renal replacement therapy.    Active Problems:    Bacteremia    Endocarditis    Sepsis (H)    Nonrheumatic aortic valve stenosis    Postsurgical percutaneous transluminal coronary angioplasty status    Mitral valve disorder    Coronary artery disease involving native coronary artery of native heart, unspecified whether angina present    Acute  kidney failure, unspecified (H)    S/P CABG (coronary artery bypass graft)    Thrombocytopenia    Acute renal failure in s/o CKD    Acute hypoxic respiratory failure, improving    Dysphagia    NEURO:  - PRN Tylenol for pain  - PRN nicorette gum  - Delirium precautions  - PRN melatonin available    CV:  - Pre-op EF 60-65%  - Normotensive   - Starting metoprolol 12.5mg BID- increased to 25 mg PO BID   - ASA allergy - Plavix 75mg PO daily  - Atorvastatin 80 mg daily   - Chest tubes removed 9/3; TPW to be removed today  - Cards following--appreciate recs  - New baseline echo completed 9/6  -   - Coumadin x3 months per surgeon preference to begin today--INR goal 2-3. INR 1.20    PULM:  - Extubated POD#1, reintubated POD #2, Extubated POD#5  - Maintaining oxygen saturations on room air  - Encourage pulmonary toilet as able    FEN/GI:  - Continue electrolyte replacement protocol  - Diet: Regular--speech signed off  - Bowel regimen    RENAL:  - Anuric  - Daily bladder scans and PRN straight caths if unable to void  - Nephrology consulted and managing renal replacement therapy, appreciate assistance; off CRRT since 9/7.  - Tunneled dialysis line placed 9/11  - Dialysis M/W/F    HEME:  - Acute blood loss anemia post-op  - Hgb 7.1   - Hold subcutaneous heparin until morning of 9/16 s/p PPM placement (no heparin products for 72 hours post-procedure)  - Plavix 75mg PO daily (ASA allergy)  - Coumadin. Pharmacy to dose  - HIT negative. Plt recovering--102 today    ID:  - Lennie op ppx complete, afebrile  - ID following-appreciate recs  - Pertinent culture history/susceptibilties:   Susceptibility data from last 90 days.  Collected Specimen Info Organism Ampicillin Ampicillin/Sulbactam Cefepime Ceftazidime Ceftriaxone Ciprofloxacin Clindamycin Daptomycin Doxycycline Erythromycin Gentamicin Levofloxacin Meropenem Oxacillin   08/29/24 Sputum from Expectorate Enterobacter cloacae complex R R  S R R  S      S  S  S      Normal  samara                 08/16/24 Peripheral Blood Staphylococcus aureus                 08/14/24 Peripheral Blood Staphylococcus aureus        S  S  S  S  S    S     Collected Specimen Info Organism Piperacillin/Tazobactam Tetracycline Tobramycin Trimethoprim/Sulfamethoxazole  Vancomycin   08/29/24 Sputum from Expectorate Enterobacter cloacae complex R   S  S      Normal samara        08/16/24 Peripheral Blood Staphylococcus aureus        08/14/24 Peripheral Blood Staphylococcus aureus   S   S  S     - Antibiotic history:   - 08/17 - 08/28 Nafcillin   - 08/29 - 08/30 Ceftazidine   - 08/30 - 09/05 Cefepime   - 08/30 - 09/05 PO Vanco  - 08/30 - current IV Vanco (end date Oct 8)    ENDO:  - A1c 6.4  - Euglycemic     PPx:  - DVT: SCDs, SQ heparin TID (held as above)/warfarin, OOB/ambulation as able  - GI: Protonix 40mg IV/PO daily    DISPO:  - IMC status  - PICC placed 9/6  - Medically Ready for Discharge: Anticipate 1-2 days; tentatively planning for LTACH       Patient seen and discussed with Dr. Elliott.    Jessica Tolbert PA-C  [facility] Cardiothoracic Surgery  Vocera or pager [phone]

## 2024-09-16 ENCOUNTER — HOSPITAL ENCOUNTER (INPATIENT)
Facility: CLINIC | Age: 84
End: 2024-09-16
Attending: HOSPITALIST | Admitting: HOSPITALIST
Payer: COMMERCIAL

## 2024-09-16 VITALS
DIASTOLIC BLOOD PRESSURE: 71 MMHG | SYSTOLIC BLOOD PRESSURE: 109 MMHG | RESPIRATION RATE: 18 BRPM | HEIGHT: 67 IN | TEMPERATURE: 98.7 F | WEIGHT: 174.2 LBS | BODY MASS INDEX: 27.34 KG/M2 | OXYGEN SATURATION: 94 % | HEART RATE: 73 BPM

## 2024-09-16 DIAGNOSIS — I25.10 CORONARY ARTERY DISEASE INVOLVING NATIVE CORONARY ARTERY OF NATIVE HEART, UNSPECIFIED WHETHER ANGINA PRESENT: ICD-10-CM

## 2024-09-16 DIAGNOSIS — I05.9 MITRAL VALVE DISORDER: ICD-10-CM

## 2024-09-16 DIAGNOSIS — Z91.89 AT RISK FOR IMPAIRED SKIN INTEGRITY: Primary | ICD-10-CM

## 2024-09-16 DIAGNOSIS — N17.0 ACUTE RENAL FAILURE WITH TUBULAR NECROSIS (H): ICD-10-CM

## 2024-09-16 DIAGNOSIS — Z95.1 S/P CABG (CORONARY ARTERY BYPASS GRAFT): ICD-10-CM

## 2024-09-16 LAB
ANION GAP SERPL CALCULATED.3IONS-SCNC: 13 MMOL/L (ref 7–15)
BASE EXCESS BLDV CALC-SCNC: 4.9 MMOL/L (ref -3–3)
BUN SERPL-MCNC: 49.1 MG/DL (ref 8–23)
CA-I BLD-MCNC: 4.5 MG/DL (ref 4.4–5.2)
CALCIUM SERPL-MCNC: 8.6 MG/DL (ref 8.8–10.4)
CHLORIDE SERPL-SCNC: 93 MMOL/L (ref 98–107)
CREAT SERPL-MCNC: 5.05 MG/DL (ref 0.51–0.95)
EGFRCR SERPLBLD CKD-EPI 2021: 8 ML/MIN/1.73M2
ERYTHROCYTE [DISTWIDTH] IN BLOOD BY AUTOMATED COUNT: 19.9 % (ref 10–15)
ERYTHROCYTE [DISTWIDTH] IN BLOOD BY AUTOMATED COUNT: 21 % (ref 10–15)
GLUCOSE BLD-MCNC: 143 MG/DL (ref 70–99)
GLUCOSE SERPL-MCNC: 104 MG/DL (ref 70–99)
HCO3 BLDV-SCNC: 28 MMOL/L (ref 21–28)
HCO3 SERPL-SCNC: 26 MMOL/L (ref 22–29)
HCT VFR BLD AUTO: 20.8 % (ref 35–47)
HCT VFR BLD AUTO: 24 % (ref 35–47)
HGB BLD-MCNC: 7.5 G/DL (ref 11.7–15.7)
HGB BLD-MCNC: 7.5 G/DL (ref 11.7–15.7)
HGB BLD-MCNC: 8.2 G/DL (ref 11.7–15.7)
INR PPP: 1.35 (ref 0.85–1.15)
LACTATE BLD-SCNC: 0.7 MMOL/L (ref 0.7–2)
MCH RBC QN AUTO: 31.1 PG (ref 26.5–33)
MCH RBC QN AUTO: 37.3 PG (ref 26.5–33)
MCHC RBC AUTO-ENTMCNC: 31.3 G/DL (ref 31.5–36.5)
MCHC RBC AUTO-ENTMCNC: 36.1 G/DL (ref 31.5–36.5)
MCV RBC AUTO: 100 FL (ref 78–100)
MCV RBC AUTO: 104 FL (ref 78–100)
OXYHGB MFR BLDV: 48 % (ref 70–75)
PCO2 BLDV: 50 MM HG (ref 40–50)
PH BLDV: 7.39 [PH] (ref 7.32–7.43)
PLATELET # BLD AUTO: 108 10E3/UL (ref 150–450)
PLATELET # BLD AUTO: 112 10E3/UL (ref 150–450)
PO2 BLDV: 28 MM HG (ref 25–47)
POTASSIUM BLD-SCNC: 3.2 MMOL/L (ref 3.4–5.3)
POTASSIUM SERPL-SCNC: 3.7 MMOL/L (ref 3.4–5.3)
RBC # BLD AUTO: 2.01 10E6/UL (ref 3.8–5.2)
RBC # BLD AUTO: 2.41 10E6/UL (ref 3.8–5.2)
SAO2 % BLDV: 50 % (ref 70–75)
SODIUM BLD-SCNC: 139 MMOL/L (ref 135–145)
SODIUM SERPL-SCNC: 132 MMOL/L (ref 135–145)
UFH PPP CHRO-ACNC: 0.65 IU/ML
VANCOMYCIN SERPL-MCNC: 20.7 UG/ML
WBC # BLD AUTO: 6.6 10E3/UL (ref 4–11)
WBC # BLD AUTO: 7.2 10E3/UL (ref 4–11)

## 2024-09-16 PROCEDURE — 250N000013 HC RX MED GY IP 250 OP 250 PS 637: Performed by: NURSE PRACTITIONER

## 2024-09-16 PROCEDURE — 93005 ELECTROCARDIOGRAM TRACING: CPT | Performed by: HOSPITALIST

## 2024-09-16 PROCEDURE — 250N000013 HC RX MED GY IP 250 OP 250 PS 637: Performed by: PHYSICIAN ASSISTANT

## 2024-09-16 PROCEDURE — 250N000013 HC RX MED GY IP 250 OP 250 PS 637: Performed by: SURGERY

## 2024-09-16 PROCEDURE — 258N000003 HC RX IP 258 OP 636: Performed by: HOSPITALIST

## 2024-09-16 PROCEDURE — 250N000011 HC RX IP 250 OP 636: Performed by: HOSPITALIST

## 2024-09-16 PROCEDURE — 80048 BASIC METABOLIC PNL TOTAL CA: CPT | Performed by: PHYSICIAN ASSISTANT

## 2024-09-16 PROCEDURE — 93005 ELECTROCARDIOGRAM TRACING: CPT

## 2024-09-16 PROCEDURE — 87081 CULTURE SCREEN ONLY: CPT | Performed by: HOSPITALIST

## 2024-09-16 PROCEDURE — 85520 HEPARIN ASSAY: CPT | Performed by: HOSPITALIST

## 2024-09-16 PROCEDURE — 5A1D70Z PERFORMANCE OF URINARY FILTRATION, INTERMITTENT, LESS THAN 6 HOURS PER DAY: ICD-10-PCS | Performed by: PHYSICIAN ASSISTANT

## 2024-09-16 PROCEDURE — 80202 ASSAY OF VANCOMYCIN: CPT | Performed by: SURGERY

## 2024-09-16 PROCEDURE — 99223 1ST HOSP IP/OBS HIGH 75: CPT | Performed by: HOSPITALIST

## 2024-09-16 PROCEDURE — 250N000009 HC RX 250: Performed by: PHYSICIAN ASSISTANT

## 2024-09-16 PROCEDURE — 90935 HEMODIALYSIS ONE EVALUATION: CPT

## 2024-09-16 PROCEDURE — 250N000013 HC RX MED GY IP 250 OP 250 PS 637: Performed by: INTERNAL MEDICINE

## 2024-09-16 PROCEDURE — 120N000017 HC R&B RESPIRATORY CARE

## 2024-09-16 PROCEDURE — 82330 ASSAY OF CALCIUM: CPT

## 2024-09-16 PROCEDURE — 250N000011 HC RX IP 250 OP 636: Performed by: PHYSICIAN ASSISTANT

## 2024-09-16 PROCEDURE — 85027 COMPLETE CBC AUTOMATED: CPT | Performed by: PHYSICIAN ASSISTANT

## 2024-09-16 PROCEDURE — 250N000013 HC RX MED GY IP 250 OP 250 PS 637: Performed by: HOSPITALIST

## 2024-09-16 PROCEDURE — 99232 SBSQ HOSP IP/OBS MODERATE 35: CPT | Performed by: INTERNAL MEDICINE

## 2024-09-16 PROCEDURE — 85610 PROTHROMBIN TIME: CPT | Performed by: PHYSICIAN ASSISTANT

## 2024-09-16 RX ORDER — NICOTINE POLACRILEX 4 MG
15-30 LOZENGE BUCCAL
OUTPATIENT
Start: 2024-09-16

## 2024-09-16 RX ORDER — LACTOBACILLUS RHAMNOSUS GG 10B CELL
1 CAPSULE ORAL 2 TIMES DAILY
Status: CANCELLED | OUTPATIENT
Start: 2024-09-16

## 2024-09-16 RX ORDER — PANTOPRAZOLE SODIUM 40 MG/1
40 TABLET, DELAYED RELEASE ORAL
Status: CANCELLED | OUTPATIENT
Start: 2024-09-17

## 2024-09-16 RX ORDER — AMOXICILLIN 250 MG
1 CAPSULE ORAL 2 TIMES DAILY PRN
OUTPATIENT
Start: 2024-09-16

## 2024-09-16 RX ORDER — NALOXONE HYDROCHLORIDE 0.4 MG/ML
0.4 INJECTION, SOLUTION INTRAMUSCULAR; INTRAVENOUS; SUBCUTANEOUS
Status: CANCELLED | OUTPATIENT
Start: 2024-09-16

## 2024-09-16 RX ORDER — SODIUM CHLORIDE FOR INHALATION 3 %
3 VIAL, NEBULIZER (ML) INHALATION EVERY 6 HOURS PRN
Status: CANCELLED | OUTPATIENT
Start: 2024-09-16

## 2024-09-16 RX ORDER — POLYETHYLENE GLYCOL 3350 17 G/17G
17 POWDER, FOR SOLUTION ORAL DAILY
Status: DISCONTINUED | OUTPATIENT
Start: 2024-09-17 | End: 2024-09-29

## 2024-09-16 RX ORDER — ALBUTEROL SULFATE 0.83 MG/ML
2.5 SOLUTION RESPIRATORY (INHALATION) EVERY 6 HOURS PRN
Status: CANCELLED | OUTPATIENT
Start: 2024-09-16

## 2024-09-16 RX ORDER — WARFARIN SODIUM 3 MG/1
3 TABLET ORAL
Status: DISCONTINUED | OUTPATIENT
Start: 2024-09-16 | End: 2024-09-16

## 2024-09-16 RX ORDER — LANOLIN ALCOHOL/MO/W.PET/CERES
3 CREAM (GRAM) TOPICAL
Status: CANCELLED | OUTPATIENT
Start: 2024-09-16

## 2024-09-16 RX ORDER — HEPARIN SODIUM 5000 [USP'U]/.5ML
5000 INJECTION, SOLUTION INTRAVENOUS; SUBCUTANEOUS EVERY 8 HOURS
Status: CANCELLED | OUTPATIENT
Start: 2024-09-16

## 2024-09-16 RX ORDER — SODIUM CHLORIDE FOR INHALATION 3 %
3 VIAL, NEBULIZER (ML) INHALATION EVERY 6 HOURS PRN
Status: ACTIVE | OUTPATIENT
Start: 2024-09-16

## 2024-09-16 RX ORDER — MIDODRINE HYDROCHLORIDE 5 MG/1
10 TABLET ORAL
Status: CANCELLED | OUTPATIENT
Start: 2024-09-16

## 2024-09-16 RX ORDER — HEPARIN SODIUM 1000 [USP'U]/ML
500 INJECTION, SOLUTION INTRAVENOUS; SUBCUTANEOUS CONTINUOUS
Status: DISCONTINUED | OUTPATIENT
Start: 2024-09-16 | End: 2024-09-19

## 2024-09-16 RX ORDER — METOPROLOL TARTRATE 25 MG/1
25 TABLET, FILM COATED ORAL 2 TIMES DAILY
Status: CANCELLED | OUTPATIENT
Start: 2024-09-16

## 2024-09-16 RX ORDER — NALOXONE HYDROCHLORIDE 0.4 MG/ML
0.2 INJECTION, SOLUTION INTRAMUSCULAR; INTRAVENOUS; SUBCUTANEOUS
Status: CANCELLED | OUTPATIENT
Start: 2024-09-16

## 2024-09-16 RX ORDER — NALOXONE HYDROCHLORIDE 0.4 MG/ML
0.4 INJECTION, SOLUTION INTRAMUSCULAR; INTRAVENOUS; SUBCUTANEOUS
Status: ACTIVE | OUTPATIENT
Start: 2024-09-16

## 2024-09-16 RX ORDER — HYDRALAZINE HYDROCHLORIDE 20 MG/ML
10 INJECTION INTRAMUSCULAR; INTRAVENOUS EVERY 30 MIN PRN
OUTPATIENT
Start: 2024-09-16

## 2024-09-16 RX ORDER — ACETAMINOPHEN 325 MG/1
650 TABLET ORAL EVERY 4 HOURS PRN
Status: CANCELLED | OUTPATIENT
Start: 2024-09-16

## 2024-09-16 RX ORDER — ACETAMINOPHEN 325 MG/1
650 TABLET ORAL EVERY 4 HOURS PRN
Status: DISPENSED | OUTPATIENT
Start: 2024-09-16

## 2024-09-16 RX ORDER — PROCHLORPERAZINE 25 MG
12.5 SUPPOSITORY, RECTAL RECTAL EVERY 12 HOURS PRN
Status: CANCELLED | OUTPATIENT
Start: 2024-09-16

## 2024-09-16 RX ORDER — ONDANSETRON 4 MG/1
8 TABLET, FILM COATED ORAL EVERY 8 HOURS PRN
Status: CANCELLED | OUTPATIENT
Start: 2024-09-16

## 2024-09-16 RX ORDER — TORSEMIDE 100 MG/1
100 TABLET ORAL DAILY
Status: CANCELLED | OUTPATIENT
Start: 2024-09-17

## 2024-09-16 RX ORDER — LIDOCAINE 4 G/G
1-2 PATCH TOPICAL EVERY 24 HOURS
OUTPATIENT
Start: 2024-09-16

## 2024-09-16 RX ORDER — ONDANSETRON 2 MG/ML
4 INJECTION INTRAMUSCULAR; INTRAVENOUS EVERY 6 HOURS PRN
Status: CANCELLED | OUTPATIENT
Start: 2024-09-16

## 2024-09-16 RX ORDER — ALBUMIN (HUMAN) 12.5 G/50ML
25 SOLUTION INTRAVENOUS
OUTPATIENT
Start: 2024-09-16

## 2024-09-16 RX ORDER — METOPROLOL TARTRATE 25 MG/1
25 TABLET, FILM COATED ORAL 2 TIMES DAILY
Status: DISCONTINUED | OUTPATIENT
Start: 2024-09-16 | End: 2024-09-24

## 2024-09-16 RX ORDER — PROCHLORPERAZINE MALEATE 5 MG
5 TABLET ORAL EVERY 6 HOURS PRN
Status: CANCELLED | OUTPATIENT
Start: 2024-09-16

## 2024-09-16 RX ORDER — HEPARIN SODIUM 10000 [USP'U]/100ML
0-5000 INJECTION, SOLUTION INTRAVENOUS CONTINUOUS
Status: DISCONTINUED | OUTPATIENT
Start: 2024-09-16 | End: 2024-09-19

## 2024-09-16 RX ORDER — NALOXONE HYDROCHLORIDE 0.4 MG/ML
0.2 INJECTION, SOLUTION INTRAMUSCULAR; INTRAVENOUS; SUBCUTANEOUS
Status: ACTIVE | OUTPATIENT
Start: 2024-09-16

## 2024-09-16 RX ORDER — PROCHLORPERAZINE MALEATE 5 MG
5 TABLET ORAL EVERY 6 HOURS PRN
Status: ACTIVE | OUTPATIENT
Start: 2024-09-16

## 2024-09-16 RX ORDER — DEXTROSE MONOHYDRATE 25 G/50ML
25-50 INJECTION, SOLUTION INTRAVENOUS
OUTPATIENT
Start: 2024-09-16

## 2024-09-16 RX ORDER — POLYETHYLENE GLYCOL 3350 17 G/17G
17 POWDER, FOR SOLUTION ORAL DAILY
Status: CANCELLED | OUTPATIENT
Start: 2024-09-17

## 2024-09-16 RX ORDER — BISACODYL 10 MG
10 SUPPOSITORY, RECTAL RECTAL DAILY PRN
Status: CANCELLED | OUTPATIENT
Start: 2024-09-16

## 2024-09-16 RX ORDER — ALBUTEROL SULFATE 0.83 MG/ML
2.5 SOLUTION RESPIRATORY (INHALATION) EVERY 6 HOURS PRN
Status: ACTIVE | OUTPATIENT
Start: 2024-09-16

## 2024-09-16 RX ORDER — DEXTROSE MONOHYDRATE 100 MG/ML
INJECTION, SOLUTION INTRAVENOUS CONTINUOUS PRN
Status: CANCELLED | OUTPATIENT
Start: 2024-09-16

## 2024-09-16 RX ORDER — MIDODRINE HYDROCHLORIDE 5 MG/1
10 TABLET ORAL
Status: DISPENSED | OUTPATIENT
Start: 2024-09-16

## 2024-09-16 RX ORDER — DEXTROSE MONOHYDRATE 100 MG/ML
INJECTION, SOLUTION INTRAVENOUS CONTINUOUS PRN
Status: ACTIVE | OUTPATIENT
Start: 2024-09-16

## 2024-09-16 RX ORDER — ATORVASTATIN CALCIUM 40 MG/1
80 TABLET, FILM COATED ORAL EVERY EVENING
Status: DISPENSED | OUTPATIENT
Start: 2024-09-16

## 2024-09-16 RX ORDER — LANOLIN ALCOHOL/MO/W.PET/CERES
3 CREAM (GRAM) TOPICAL
Status: DISCONTINUED | OUTPATIENT
Start: 2024-09-16 | End: 2024-09-20

## 2024-09-16 RX ORDER — BISACODYL 10 MG
10 SUPPOSITORY, RECTAL RECTAL DAILY PRN
Status: DISPENSED | OUTPATIENT
Start: 2024-09-16

## 2024-09-16 RX ORDER — AMOXICILLIN 250 MG
2 CAPSULE ORAL 2 TIMES DAILY PRN
OUTPATIENT
Start: 2024-09-16

## 2024-09-16 RX ORDER — ONDANSETRON 4 MG/1
8 TABLET, FILM COATED ORAL EVERY 8 HOURS PRN
Status: DISPENSED | OUTPATIENT
Start: 2024-09-16

## 2024-09-16 RX ORDER — NICOTINE POLACRILEX 4 MG
15-30 LOZENGE BUCCAL
Status: ACTIVE | OUTPATIENT
Start: 2024-09-16

## 2024-09-16 RX ORDER — DEXTROSE MONOHYDRATE 25 G/50ML
25-50 INJECTION, SOLUTION INTRAVENOUS
Status: ACTIVE | OUTPATIENT
Start: 2024-09-16

## 2024-09-16 RX ORDER — PANTOPRAZOLE SODIUM 40 MG/1
40 TABLET, DELAYED RELEASE ORAL
Status: DISPENSED | OUTPATIENT
Start: 2024-09-17

## 2024-09-16 RX ORDER — TORSEMIDE 100 MG/1
100 TABLET ORAL DAILY
Status: DISCONTINUED | OUTPATIENT
Start: 2024-09-17 | End: 2024-09-29

## 2024-09-16 RX ORDER — AMOXICILLIN 250 MG
1 CAPSULE ORAL 2 TIMES DAILY
Status: CANCELLED | OUTPATIENT
Start: 2024-09-16

## 2024-09-16 RX ORDER — ATORVASTATIN CALCIUM 40 MG/1
80 TABLET, FILM COATED ORAL EVERY EVENING
Status: CANCELLED | OUTPATIENT
Start: 2024-09-16

## 2024-09-16 RX ORDER — VANCOMYCIN HYDROCHLORIDE 500 MG/10ML
500 INJECTION, POWDER, LYOPHILIZED, FOR SOLUTION INTRAVENOUS ONCE
Status: COMPLETED | OUTPATIENT
Start: 2024-09-16 | End: 2024-09-16

## 2024-09-16 RX ORDER — PROCHLORPERAZINE 25 MG
12.5 SUPPOSITORY, RECTAL RECTAL EVERY 12 HOURS PRN
Status: ACTIVE | OUTPATIENT
Start: 2024-09-16

## 2024-09-16 RX ORDER — WARFARIN SODIUM 5 MG/1
5 TABLET ORAL
Status: DISCONTINUED | OUTPATIENT
Start: 2024-09-16 | End: 2024-09-16 | Stop reason: HOSPADM

## 2024-09-16 RX ORDER — AMOXICILLIN 250 MG
1 CAPSULE ORAL 2 TIMES DAILY
Status: DISCONTINUED | OUTPATIENT
Start: 2024-09-16 | End: 2024-10-07

## 2024-09-16 RX ORDER — WARFARIN SODIUM 4 MG/1
4 TABLET ORAL
Status: COMPLETED | OUTPATIENT
Start: 2024-09-16 | End: 2024-09-16

## 2024-09-16 RX ORDER — CLOPIDOGREL BISULFATE 75 MG/1
75 TABLET ORAL DAILY
Status: DISPENSED | OUTPATIENT
Start: 2024-09-17

## 2024-09-16 RX ORDER — CLOPIDOGREL BISULFATE 75 MG/1
75 TABLET ORAL DAILY
Status: CANCELLED | OUTPATIENT
Start: 2024-09-17

## 2024-09-16 RX ORDER — ONDANSETRON 2 MG/ML
4 INJECTION INTRAMUSCULAR; INTRAVENOUS EVERY 6 HOURS PRN
Status: ACTIVE | OUTPATIENT
Start: 2024-09-16

## 2024-09-16 RX ORDER — HEPARIN SODIUM 5000 [USP'U]/.5ML
5000 INJECTION, SOLUTION INTRAVENOUS; SUBCUTANEOUS EVERY 8 HOURS
Status: DISCONTINUED | OUTPATIENT
Start: 2024-09-16 | End: 2024-09-16

## 2024-09-16 RX ORDER — LACTOBACILLUS RHAMNOSUS GG 10B CELL
1 CAPSULE ORAL 2 TIMES DAILY
Status: DISPENSED | OUTPATIENT
Start: 2024-09-16

## 2024-09-16 RX ADMIN — WARFARIN SODIUM 4 MG: 4 TABLET ORAL at 18:54

## 2024-09-16 RX ADMIN — HEPARIN SODIUM 950 UNITS/HR: 10000 INJECTION, SOLUTION INTRAVENOUS at 16:07

## 2024-09-16 RX ADMIN — SENNOSIDES AND DOCUSATE SODIUM 1 TABLET: 8.6; 5 TABLET ORAL at 08:38

## 2024-09-16 RX ADMIN — HEPARIN SODIUM 5000 UNITS: 10000 INJECTION, SOLUTION INTRAVENOUS; SUBCUTANEOUS at 08:39

## 2024-09-16 RX ADMIN — CLOPIDOGREL BISULFATE 75 MG: 75 TABLET ORAL at 08:38

## 2024-09-16 RX ADMIN — SODIUM CHLORIDE 300 ML: 9 INJECTION, SOLUTION INTRAVENOUS at 15:43

## 2024-09-16 RX ADMIN — DOCUSATE SODIUM AND SENNOSIDES 1 TABLET: 8.6; 5 TABLET, FILM COATED ORAL at 20:22

## 2024-09-16 RX ADMIN — VANCOMYCIN HYDROCHLORIDE 500 MG: 500 INJECTION, POWDER, LYOPHILIZED, FOR SOLUTION INTRAVENOUS at 18:54

## 2024-09-16 RX ADMIN — Medication 1 CAPSULE: at 20:22

## 2024-09-16 RX ADMIN — PANTOPRAZOLE SODIUM 40 MG: 40 INJECTION, POWDER, FOR SOLUTION INTRAVENOUS at 06:30

## 2024-09-16 RX ADMIN — MIDODRINE HYDROCHLORIDE 10 MG: 5 TABLET ORAL at 15:25

## 2024-09-16 RX ADMIN — SODIUM CHLORIDE 250 ML: 9 INJECTION, SOLUTION INTRAVENOUS at 15:41

## 2024-09-16 RX ADMIN — HEPARIN SODIUM 500 UNITS/HR: 1000 INJECTION INTRAVENOUS; SUBCUTANEOUS at 15:44

## 2024-09-16 RX ADMIN — ACETAMINOPHEN 650 MG: 325 TABLET ORAL at 11:31

## 2024-09-16 RX ADMIN — HEPARIN SODIUM 500 UNITS/HR: 1000 INJECTION INTRAVENOUS; SUBCUTANEOUS at 15:45

## 2024-09-16 RX ADMIN — METOPROLOL TARTRATE 25 MG: 25 TABLET, FILM COATED ORAL at 08:38

## 2024-09-16 RX ADMIN — Medication 1 CAPSULE: at 08:38

## 2024-09-16 RX ADMIN — ACETAMINOPHEN 650 MG: 325 TABLET, FILM COATED ORAL at 20:26

## 2024-09-16 RX ADMIN — ATORVASTATIN CALCIUM 80 MG: 40 TABLET, FILM COATED ORAL at 20:22

## 2024-09-16 RX ADMIN — TORSEMIDE 100 MG: 100 TABLET ORAL at 08:38

## 2024-09-16 RX ADMIN — HEPARIN SODIUM 500 UNITS: 1000 INJECTION INTRAVENOUS; SUBCUTANEOUS at 15:44

## 2024-09-16 RX ADMIN — METOPROLOL TARTRATE 25 MG: 25 TABLET, FILM COATED ORAL at 20:22

## 2024-09-16 ASSESSMENT — ACTIVITIES OF DAILY LIVING (ADL)
ADLS_ACUITY_SCORE: 47
ADLS_ACUITY_SCORE: 38
ADLS_ACUITY_SCORE: 47
ADLS_ACUITY_SCORE: 24
ADLS_ACUITY_SCORE: 39
ADLS_ACUITY_SCORE: 47
ADLS_ACUITY_SCORE: 39
ADLS_ACUITY_SCORE: 47
ADLS_ACUITY_SCORE: 38
ADLS_ACUITY_SCORE: 31
ADLS_ACUITY_SCORE: 47
ADLS_ACUITY_SCORE: 47
ADLS_ACUITY_SCORE: 39
ADLS_ACUITY_SCORE: 47
ADLS_ACUITY_SCORE: 38
ADLS_ACUITY_SCORE: 47
ADLS_ACUITY_SCORE: 39
ADLS_ACUITY_SCORE: 39
ADLS_ACUITY_SCORE: 47
ADLS_ACUITY_SCORE: 31

## 2024-09-16 ASSESSMENT — SOCIAL DETERMINANTS OF HEALTH (SDOH)
HOW OFTEN DO YOU GET TOGETHER WITH FRIENDS OR RELATIVES?: PATIENT UNABLE TO ANSWER
IN A TYPICAL WEEK, HOW MANY TIMES DO YOU TALK ON THE PHONE WITH FAMILY, FRIENDS, OR NEIGHBORS?: MORE THAN THREE TIMES A WEEK
DO YOU BELONG TO ANY CLUBS OR ORGANIZATIONS SUCH AS CHURCH GROUPS UNIONS, FRATERNAL OR ATHLETIC GROUPS, OR SCHOOL GROUPS?: PATIENT DECLINED
HOW OFTEN DO YOU ATTENT MEETINGS OF THE CLUB OR ORGANIZATION YOU BELONG TO?: NEVER
HOW OFTEN DO YOU ATTEND CHURCH OR RELIGIOUS SERVICES?: MORE THAN 4 TIMES PER YEAR

## 2024-09-16 NOTE — PROGRESS NOTES
09/16/24 1500   Name of Certified Therapeutic Rec Specialist   Name of Certified Therapeutic Rec Specialist NANCY Danielle   Appointment Type   Type of Therapeutic Rec Session Therapeutic Rec Assessment   General Information   Patient Profile Review See Profile for full history and prior level of function   Daily Contact with Relatives or Friends Phone call;Visit   Community Involvement   Community Involvement Retired   Spiritual Practice Advent   Outings Other (comments)  (lots of activities with family and friends)   Hobbies/Interests   Cards 500;Cribbage;Other (see comments)  (Wizard)   Games Scrabble   Music   Music Preferences Country;Classical;Rock;Jazz   Listens to Recorded Music Yes   Brought Own Equipment No   Media   TV / Movies Movie list   Sports / Physical Activities   Sports/Exercise Other (comments);Walks  (dancing)   Sports Fan Football;Hockey   Impression   Open to Socializing with Others Independent   Barriers to Leisure Endurance;Mobility   Treatment Plan   Type of Intervention Independent with activity   Equipment and Supplies While on Unit Radio;Other (comments)  (deck of cards)   Assessment Assessment completed, pt was oriented to role of rec therapy and provided with leisure resource list. pt expressed interest in radio and deck of cards, which were provided. Will monitor for social opportunities

## 2024-09-16 NOTE — PLAN OF CARE
Problem: Adult Inpatient Plan of Care  Goal: Readiness for Transition of Care  Outcome: Met   Goal Outcome Evaluation:    Patient is discharging to LTAC @ [id number].   Transport to .  Family at bedside.  Patient discharging with CVC dialysis cath and PICC.   Will call to Alfredo for RN-RN report.

## 2024-09-16 NOTE — PROGRESS NOTES
[facility]  [facility]   Follow up    Felicitas Elliott   MRN: [id number]  : 1940   DOA: 2024   CC: HAMMAD on CKD      Assessment and Plan:  84 year old female    HAMMAD - HAMMAD in the setting of cardiac surgery on CRRT since -.  Transitioned to IHD.  UOP remains quite low although she's sporadically producing a little urine. Hopeful for renal recovery in the future.   Recs:  - MWF dialysis - will plan to do at [place] today  - daily torsemide  - midodrine with dialysis for hemodynamic support, needed it Friday  - Monitor UOP to assess renal recovery  - ANC will continue to follow at [facility]    CKD 3a - has underlying CKD with baseline CR 1.3-1.8, some proteinuria on prior UA but minimal on UPC.  Saw Dr. 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  Extensive serologic testing at that time was negative.    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 Transferring to [facility].    Objective    Vital signs in last 24 hours  Temp:  [98.4  F (36.9  C)-98.9  F (37.2  C)] 98.7  F (37.1  C)  Pulse:  [71-76] 73  Resp:  [18-20] 18  BP: (109-132)/(57-71) 109/71  SpO2:  [93 %-96 %] 94 %      Intake/Output last 3 shifts  I/O last 3 completed shifts:  In: 760 [P.O.:760]  Out: -   Intake/Output this shift:  No  intake/output data recorded.    Physical Exam  Alert/oriented x 3, awake, NAD;  at bedside  CV: RRR without murmur or rub  Lung:  decreased at bases, no crackles  Ext: no significant edema  Skin; no rash  Access: RIJ tunneled line    Pertinent Labs     Last Renal Panel:  Sodium   Date Value Ref Range Status   09/16/2024 132 (L) 135 - 145 mmol/L Final     Potassium   Date Value Ref Range Status   09/16/2024 3.7 3.4 - 5.3 mmol/L Final     Potassium POCT   Date Value Ref Range Status   08/27/2024 3.4 3.4 - 5.3 mmol/L Final     Chloride   Date Value Ref Range Status   09/16/2024 93 (L) 98 - 107 mmol/L Final     Carbon Dioxide (CO2)   Date Value Ref Range Status   09/16/2024 26 22 - 29 mmol/L Final     Anion Gap   Date Value Ref Range Status   09/16/2024 13 7 - 15 mmol/L Final     Glucose   Date Value Ref Range Status   09/16/2024 104 (H) 70 - 99 mg/dL Final     GLUCOSE BY METER POCT   Date Value Ref Range Status   09/14/2024 96 70 - 99 mg/dL Final     Urea Nitrogen   Date Value Ref Range Status   09/16/2024 49.1 (H) 8.0 - 23.0 mg/dL Final     Creatinine   Date Value Ref Range Status   09/16/2024 5.05 (H) 0.51 - 0.95 mg/dL Final     GFR Estimate   Date Value Ref Range Status   09/16/2024 8 (L) >60 mL/min/1.73m2 Final     Comment:     eGFR calculated using 2021 CKD-EPI equation.     Calcium   Date Value Ref Range Status   09/16/2024 8.6 (L) 8.8 - 10.4 mg/dL Final     Comment:     Reference intervals for this test were updated on 7/16/2024 to reflect our healthy population more accurately. There may be differences in the flagging of prior results with similar values performed with this method. Those prior results can be interpreted in the context of the updated reference intervals.     Phosphorus   Date Value Ref Range Status   09/07/2024 3.1 2.5 - 4.5 mg/dL Final     Albumin   Date Value Ref Range Status   09/10/2024 3.7 3.5 - 5.2 g/dL Final     Recent Labs   Lab 09/16/24  0506 09/15/24  0423 09/14/24  0435  09/13/24  0523 09/12/24  0504   HGB 7.5* 7.1* 7.2* 7.5* 8.0*          I reviewed all lab results    Jair Galvan  [facility]  [phone]

## 2024-09-16 NOTE — PROGRESS NOTES
HEMODIALYSIS TREATMENT NOTE      Date: 9/16/2024  Time: 1845     Data:  Pre Wt:   81.5 kg   Post Wt:  80.5 kg   Weight change: - 1 kg  Ultrafiltration - Post Run Net Total Removed (mL):  1000 ml  Vascular Access Status: CVC patent  Dialyzer Rinse:  Light  Total Blood Volume Processed: 63.3 L   Total Dialysis (Treatment) Time:   3 Hrs  Dialysate Bath: K 3, Ca 2.25  Heparin: Heparin 500 units loading + 500 units/hr     Lab:   yes    Interventions:  Dialysis done through right tunneled dialysis catheter. ,   UF set to 1.3 Liters of fluid removal, accommodating priming and rinse back volumes  Meds administered per MAR  See Flowsheet for Crit Profile throughout   Treatment has ended safely and blood is rinsed back completely, pt tolerated well with treatment, pt reported little bit shortness of breathing during tx, 2L oxygen given with result of 91-99 %. Pt reported feeling tired, rested throughout dialysis treatment.   Catheter lumens flushed with saline and locked with saline, catheter caps changed post HD. Post Tx assessment done. Patient remains he room in stable condition  Report given to BOBBY, RN.     Assessment:  A/O x 4, calm & cooperative, denies pain  Pt had a  sometimes becomes bradycardic. pt reported feeling fine.  CVC intact, previous dressing clean and dry                Plan:    Per Renal team

## 2024-09-16 NOTE — PLAN OF CARE
Physical Therapy Discharge Summary    Reason for therapy discharge:    Discharged to LTACH.    Progress towards therapy goal(s). See goals on Care Plan in [facility] electronic health record for goal details.  Goals not met.  Barriers to achieving goals:   discharge from facility.    Therapy recommendation(s):    Continued therapy is recommended.  Rationale/Recommendations:  Continue PT to improve functional mobility.

## 2024-09-16 NOTE — PLAN OF CARE
Goal Outcome Evaluation:  Patient admitted from [facility] through [facility] transportation by EMS team.  Patient is calm, alert and oriented x 4. Patient denies  pains and discomfort.  Admission vital signs, spot checked blood glucose, weight completed, see flow sheet for details. Skin assessment with two nurses completed.

## 2024-09-16 NOTE — PLAN OF CARE
Occupational Therapy Discharge Summary    Reason for therapy discharge:    Discharged to LTACH    Progress towards therapy goal(s). See goals on Care Plan in [facility] electronic health record for goal details.  Goals partially met.  Barriers to achieving goals:   discharge from facility.    Therapy recommendation(s):    Continued therapy is recommended.  Rationale/Recommendations:  To progress ADL's with sternal prec..

## 2024-09-16 NOTE — PHARMACY-ADMISSION MEDICATION HISTORY
Pharmacy Note: LTACH Admission Drug Regimen Review    Initial admission medication history was completed at [facility]. Please see Pharmacy - Admission Medication History note from 08/14/2024.    Medication orders were signed & held for discharge from transferring facility? Yes    Changes made to PTA medication list:  Added: PRN APAP  Deleted: None  Changed: None    No outpatient medications have been marked as taking for the 9/16/24 encounter (Hospital Encounter).       Admission drug regimen review has been completed. Pertinent information or medication issues identified include:    Need Pharmacy to Dose Vanco Consult. Patient is on intermittent Vanco dosing to be given after HD and levels to be drawn prior to HD. Vanco End date Oct 8 per ID.   Need pharmacist to Dose Warfarin consult. Patient is new start warfarin post bioprosthetic aortic valve replacement on 8/27/2024. Started on heparin drip on admission to [facility] to bridge patient until INR is 2. Warfarin was initiated on 8/28 when 5mg was given, two days later the patient had an INR of 10 and vitamin K was given. Pharmacy to dose with caution.   The patient was on scheduled lidocaine patches but [facility]  Consider Discontinuing IV protonix and just using the suspension or tablet.   Will need to check immunoglobin level in 4 to 6 weeks, sister with history of hypogammaglobulinemia       The following PTA medications were not continued during hospitalization at [facility]: None Please assess whether a future restart would be appropriate.       Thank you for the opportunity to participate in the care of this patient.    Fernando Agrawal RPH  9/16/2024 3:33 PM

## 2024-09-16 NOTE — PROGRESS NOTES
Care Management Discharge Note    Discharge Date: 09/16/2024       Discharge Disposition: LTACH    Discharge Services:  none    Discharge DME:  none    Discharge Transportation: family or friend will provide    Private pay costs discussed: transportation costs- previously discussed    Does the patient's insurance plan have a 3 day qualifying hospital stay waiver?  No    PAS Confirmation Code:  NA- [id number]   Patient/family educated on Medicare website which has current facility and service quality ratings:  NA     Education Provided on the Discharge Plan:  yes  Persons Notified of Discharge Plans: patient - per team   Patient/Family in Agreement with the Plan:  yes    Handoff Referral Completed: No, handoff not indicated or clinically appropriate    Additional Information:  Patient discharging to  [facility]  via [facility] at [phone] . Transportation costs and options previously discussed, all parties agreeable. Discharge discussed and confirmed with patient, nursing, hospitalist, and accepting facility, [facility]     LTACH will complete patient's necessary dialysis today.       PEDRO Cummings

## 2024-09-16 NOTE — DISCHARGE SUMMARY
Cardiovascular Surgery Discharge Summary    Primary Care Physician:  Clinic, [facility]  Discharge Provider: Maryam Anthony PA-C  Admission Date: 8/16/2024  Admission Diagnoses: Valve Vegetation  Endocarditis  Bacteremia  Sepsis (H)  Postsurgical percutaneous transluminal coronary angioplasty status  Nonrheumatic aortic valve stenosis  Coronary artery disease involving native coronary artery of native heart, unspecified whether angina present  Mitral valve disorder  Discharge Date: 9/16/2024  Disposition: LTACH   Condition at Discharge: Improving  Code Status: No CPR- Do NOT Intubate     Principal Diagnosis:   Endocarditis s/p mitral and aortic valve replacement, coronary artery disease s/p CABG    Discharge Diagnoses:    Active Problems:      Patient Active Problem List   Diagnosis    Sepsis with acute renal failure, due to unspecified organism, unspecified acute renal failure type, unspecified whether septic shock present (H)    Bacteremia    Endocarditis    Sepsis (H)    Borderline hypertension    CKD stage 3b, GFR 30-44 ml/min (H)    Mixed hyperlipidemia    Spinal stenosis of cervicothoracic region    Nonrheumatic aortic valve stenosis    Postsurgical percutaneous transluminal coronary angioplasty status    Mitral valve disorder    Coronary artery disease involving native coronary artery of native heart, unspecified whether angina present    Acute kidney failure, unspecified (H)    S/P CABG (coronary artery bypass graft)         Consult/s: Dietary, critical care medicine, cardiology, neurology, infectious disease, neurosurgery, nephrology, GI, interventional radiology    Surgery:   08/27/2024 with Dr. Renae  Aortic root abscess debridement and aortic annular reconstruction.  Aortic root replacement (Konect composite 25 mm Silva Inspiris Resilia bioprosthetic valve within 30 mm Gelweave Valsalva graft with reimplantation of the coronary arteries).  Mitral valve replacement (31 mm St. Tim  Silva bioprosthetic valve).  Coronary artery bypass grafting x 2 (reversed saphenous vein graft to the obtuse marginal branch of the left circumflex coronary artery, and pedicled left internal mammary artery to left anterior descending coronary artery).  Endoscopic vein harvest of the greater saphenous vein from the left lower extremity.    OPERATIVE FINDINGS:  The ascending aorta was free of calcified plaque. The distal ascending aorta was less than 4 cm diameter. The aortic root was dilated to 5 cm. The aortic valve was trileaflet and there were multiple fenestrations at the commissures. The left main coronary ostium was slightly distal to the sinotubular junction, and right coronary ostium was in the usual anatomic position. After reperfusion and defibrillation, sinus rhythm resumed.  Left ventricular function was low normal preoperatively and unchanged after bypass with low dose inotropic support.     Discharge Medications:     Current Facility-Administered Medications:     acetaminophen (TYLENOL) tablet 650 mg, 650 mg, Oral, Q4H PRN, Brandi Rao APRN CNP, 650 mg at 09/13/24 0945    albumin human 25 % injection 25 g, 25 g, Intravenous, Q1H PRN, Maryam Anthony PA-C, Stopped at 09/09/24 1020    sodium chloride (NEBUSAL) 3 % neb solution 3 mL, 3 mL, Nebulization, Q6H PRN **AND** albuterol (PROVENTIL) neb solution 2.5 mg, 2.5 mg, Nebulization, Q6H PRN, Maryam Anthony PA-C    alteplase (CATHFLO ACTIVASE) injection 2 mg, 2 mg, Intracatheter, Q1H PRN, Maryam Anthony PA-C    alteplase (CATHFLO ACTIVASE) injection 2 mg, 2 mg, Intracatheter, Q1H PRN, Maryam Anthony PA-C    atorvastatin (LIPITOR) tablet 80 mg, 80 mg, Oral, QPM, Maryam Anthony PA-C, 80 mg at 09/15/24 2016    bisacodyl (DULCOLAX) suppository 10 mg, 10 mg, Rectal, Daily PRN, Maryam Anthony PA-C    clopidogrel (PLAVIX) tablet 75 mg, 75 mg, Oral, Daily, Maryam Anthony PA-C, 75 mg at  09/16/24 0838    dextrose 10% infusion, , Intravenous, Continuous PRN, Maryam Anthony PA-C    glucose gel 15-30 g, 15-30 g, Oral, Q15 Min PRN **OR** dextrose 50 % injection 25-50 mL, 25-50 mL, Intravenous, Q15 Min PRN **OR** glucagon injection 1 mg, 1 mg, Subcutaneous, Q15 Min PRN, Maryam Anthony PA-C    epoetin jose-epbx (RETACRIT) injection 20,000 Units, 20,000 Units, Subcutaneous, Weekly, Jair Galvan MD, 20,000 Units at 09/13/24 1852    heparin ANTICOAGULANT injection 5,000 Units, 5,000 Units, Subcutaneous, Q8H, Maryam Anthony PA-C, 5,000 Units at 09/16/24 0839    HOLD: Heparin (IV or Subcutaneous) Post Procedure, , Does not apply, HOLD, Brandi Rao APRN CNP    hydrALAZINE (APRESOLINE) injection 10 mg, 10 mg, Intravenous, Q30 Min PRN, Maryam Anthony PA-C    lactobacillus rhamnosus (GG) (CULTURELL) capsule 1 capsule, 1 capsule, Oral, BID, Dylan Renae MD, 1 capsule at 09/16/24 0838    Lidocaine (LIDOCARE) 4 % Patch 1-2 patch, 1-2 patch, Transdermal, Q24H, Maryam Anthony PA-C, 1 patch at 09/15/24 1857    melatonin tablet 3 mg, 3 mg, Oral, At Bedtime PRN, Maryam Anthony PA-C, 3 mg at 09/14/24 2038    menthol (Topical Analgesic) 2.5% (BENGAY VANISHING SCENT) 2.5 % topical gel 1 g, 1 g, Topical, Q8H PRN, Maryam Anthony PA-C    metoprolol tartrate (LOPRESSOR) tablet 25 mg, 25 mg, Oral, BID, Jessica Tolbert PA-SOM, 25 mg at 09/16/24 0838    miconazole (MICATIN) 2 % powder, , Topical, BID PRN, Maryam Anthony PA-C    midodrine (PROAMATINE) tablet 10 mg, 10 mg, Oral, During Dialysis/CRRT (from stock), Maryam Anthony PA-C, 10 mg at 09/13/24 1310    naloxone (NARCAN) injection 0.2 mg, 0.2 mg, Intravenous, Q2 Min PRN **OR** naloxone (NARCAN) injection 0.4 mg, 0.4 mg, Intravenous, Q2 Min PRN **OR** naloxone (NARCAN) injection 0.2 mg, 0.2 mg, Intramuscular, Q2 Min PRN **OR** naloxone (NARCAN) injection 0.4 mg, 0.4 mg,  Intramuscular, Q2 Min PRN, Maryam Anthony PA-C    nicotine (NICORETTE) gum 2 mg, 2 mg, Buccal, Q1H PRN, Maryam Anthony PA-C    No heparin products for 72 hours after device implantation unless approved by the implanting physician, , Does not apply, Continuous PRN, Brandi Rao, APRN CNP    ondansetron (ZOFRAN) injection 4 mg, 4 mg, Intravenous, Q6H PRN **OR** ondansetron (ZOFRAN) tablet 8 mg, 8 mg, Oral, Q8H PRN, Brandi Rao APRN CNP    [DISCONTINUED] pantoprazole (PROTONIX) 2 mg/mL suspension 40 mg, 40 mg, Oral or NG Tube, QAM AC, 40 mg at 09/11/24 0640 **OR** pantoprazole (PROTONIX) EC tablet 40 mg, 40 mg, Oral, QAM AC **OR** pantoprazole (PROTONIX) IV push injection 40 mg, 40 mg, Intravenous, QAM AC, Maryam Anthony PA-C, 40 mg at 09/16/24 0630    polyethylene glycol (MIRALAX) Packet 17 g, 17 g, Oral, Daily, Dylan Renae MD    prochlorperazine (COMPAZINE) injection 5 mg, 5 mg, Intravenous, Q6H PRN, 5 mg at 09/08/24 0546 **OR** prochlorperazine (COMPAZINE) tablet 5 mg, 5 mg, Oral, Q6H PRN **OR** prochlorperazine (COMPAZINE) suppository 12.5 mg, 12.5 mg, Rectal, Q12H PRN, Maryam Anthony PA-C    senna-docusate (SENOKOT-S/PERICOLACE) 8.6-50 MG per tablet 1 tablet, 1 tablet, Oral, BID, Dylan Renae MD, 1 tablet at 09/16/24 0838    senna-docusate (SENOKOT-S/PERICOLACE) 8.6-50 MG per tablet 1 tablet, 1 tablet, Oral, BID PRN **OR** senna-docusate (SENOKOT-S/PERICOLACE) 8.6-50 MG per tablet 2 tablet, 2 tablet, Oral, BID PRN, Maryam Anthony PA-C    sodium chloride (PF) 0.9% PF flush 10 mL, 10 mL, Intracatheter, Q15 Min PRN, Maryam Anthony PA-C    sodium chloride (PF) 0.9% PF flush 10 mL, 10 mL, Intracatheter, Q15 Min PRN, Maryam Anthony PA-C    sodium chloride (PF) 0.9% PF flush 10-40 mL, 10-40 mL, Intracatheter, Once PRN, Maryam Anthony PA-C    sodium chloride (PF) 0.9% PF flush 10-40 mL, 10-40 mL, Intracatheter,  Once PRN, Maryam Anthony PA-C    sodium chloride (PF) 0.9% PF flush 2.5 mL, 2.5 mL, Intravenous, q1 min prn, Brandi Rao APRN CNP    sodium chloride (PF) 0.9% PF flush 3 mL, 3 mL, Intracatheter, q1 min prn, Maryam Anthony PA-SOM    sodium chloride (PF) 0.9% PF flush 9 mL, 9 mL, Intracatheter, During Dialysis/CRRT (from stock), Maryam Anthony PA-SOM    sodium chloride (PF) 0.9% PF flush 9 mL, 9 mL, Intracatheter, During Dialysis/CRRT (from stock), Maryam Anthony PA-SOM    sodium chloride 0.9% BOLUS 1-250 mL, 1-250 mL, Intravenous, Q1H PRN, Maryam Anthony PA-SOM    sodium chloride 0.9% BOLUS 1-250 mL, 1-250 mL, Intravenous, Q1H PRN, Maryam Anthony PA-SOM    sodium chloride 0.9% BOLUS 100-150 mL, 100-150 mL, Intravenous, Q15 Min PRN, Mrayam Anthony PA-C    torsemide (DEMADEX) tablet 100 mg, 100 mg, Oral, Daily, Jair Galvan MD, 100 mg at 09/16/24 0838    vancomycin place horne - receiving intermittent dosing, 1 each, Intravenous, See Admin Instructions, Maryam Anthony PA-C    warfarin ANTICOAGULANT (COUMADIN) tablet 5 mg, 5 mg, Oral, ONCE at 18:00, Dylan Renae MD    Warfarin Dose Required Daily - Pharmacist Managed, 1 each, Oral, See Admin Instructions, Maryam Anthony PA-C      Discharge Instructions:    Follow up appointment with Primary Care Physician: [facility] within 7 days of discharge to home  Follow up appointment with Specialist:    Follow with CV Surgery as scheduled.   Follow-up with cardiology as scheduled    Diet: Cardiac    Activity/Restrictions: As tolerated with sternal precautions in mind (see below). No driving for 4 weeks or while on pain medication.     - Shower and wash your incisions daily with soap and water. No tub baths/hot tubs for 4 weeks. An antibacterial soap such as Dial or Safeguard is recommended.    - Check your incisions every day. If you notice any redness, drainage, or  "anything unusual, please call the surgeons office.    - No driving for 4 weeks after surgery or while on pain medication     - If you have had a valve repair or replacement, check with your cardiologist regarding the need for antibiotics before dental visits or other medical procedures.    - Do not lift anything more than 10 pounds for 8 weeks after surgery. After 8 weeks, advance lifting gradually as tolerated.    - You may have watery drainage from your chest tube site for 2-3 weeks after surgery. Your may cover with a Band-Aid to protect your clothing. Remove the Band-Aid every day and wash the site.    - If you have a leg lesion, you may have swelling for 2-3 months. Elevate your leg any time you are not walking.    - If you feel any \"popping\" or \"clicking\" sensations in your chest, your arms are out too far or you are putting too much weight into arm movements. Do not reach over your head or out to the side to pull something. Do not do any arm exercises or use any exercise equipment that involves arm movement. If you feel your sternum moving, call the surgeon's office.    - Increase your daily activity as explained by Cardiac Rehab. You are encouraged to enroll in an Outpatient Cardiac Rehab Program.    - No active sports using your upper arms for 3 months. This includes fishing, hunting, bowling, swimming, tennis or golf.    - No physical activity such as cutting the grass, raking, vacuuming, changing sheets on your bed, snow shoveling, or using a  for 3 months.    - Use incentive spirometer 6-8 times per day for 2 weeks.       Hospital Summary:   Felicitas Elliott is a 84 year old female who was admitted to [facility] on 8/16/2024 following ED presentation for fatigue, weakness, chills, decreased appetite. Patient was ultimately diagnosed with sepsis and HAMMAD. Empiric antibiotics were begun and ID was consulted. Blood cultures eventually resulted with MSSA. Neurology was consulted for " possible discitis which they felt was not significant on imaging. Subsequent echo demonstrated large posterior leaflet mitral valve vegetation and small aortic valve vegetation with severe aortic stenosis. Brain MRI with scattered embolic infarcts. Etiology likely skin-related. She was referred to CV surgery for evaluation for possible surgical valve replacement. In the interim, angiogram demonstrated coronary artery disease.     Patient was deemed a candidate for surgery, and was taken to the operating room on 08/27/2024 where patient underwent the above procedures. Surgery was uneventful and patient was brought to the ICU post-operatively. She was extubated on POD#1 but unfortunately was re-intubated on POD#2 for hypoxia and increased work of breathing. CRRT was initiated also on POD#2 for minimal UOP and post-op HAMMAD. She was maintained on pressor support for several days.    She was again extubated on POD#5. She was weaned from pressors. Patient was awake and alert, afebrile, and with stable vitals. Insulin drip was discontinued and she was transitioned to a sliding scale. She was transferred to general telemetry status on POD#14 where patient has had return of bowel function, is maintaining oxygen saturations on room air, had her chest tubes removed, and has no complaints of chest pain or shortness of breath. On 09/16/24, patient was stable enough to be discharged to LTACH.    She has not had renal recovery and has transitioned to HD M/W/F on 09/09. Has tolerated several sessions well.    Of note, she had some arrhythmias/bradycardia post-op that caused hypotension. Permanent device was placed with cardiology 9/12. She has appropriate follow-up in place with them.    Patient had some dysphagia post-op that necessitated NG and tube feeds. She worked with speech therapy and was eventually cleared for a regular diet and thin liquids.    Platelets drifted down to 25 but have since recovered (?cefepime-related); HIT  "resulted negative.    She will be on warfarin for 3 months per surgeon preference for bioprosthetic valves. INR goal 2-3. End date of warfarin ~12/10/2024, at which point she will be on Plavix alone (ASA allergy) indefinitely.    Of note, plan for IV vancomycin until Oct 8th per ID.    New baseline echo was completed 9/6.    Patient is DNR/DNI.      Vital signs:  Temp: 98.7  F (37.1  C) Temp src: Oral BP: 109/71 Pulse: 73   Resp: 18 SpO2: 94 % O2 Device: None (Room air) Oxygen Delivery: 1 LPM Height: 170.2 cm (5' 7\") Weight: 79 kg (174 lb 3.2 oz)  Estimated body mass index is 27.28 kg/m  as calculated from the following:    Height as of this encounter: 1.702 m (5' 7\").    Weight as of this encounter: 79 kg (174 lb 3.2 oz).          Physical Exam:    Pertinent exam findings on day of discharge include:  Gen: Seen up in chair. NAD. Pleasant and conversant.   CV: RRR on monitor. No appreciable edema.  Pulm: Non-labored breathing on room air.  Abd: Soft, non-tender, non-distended  Neuro: CNs grossly intact  Inc: C/D/I      _______  Maryam Anthony PA-C  Cardiothoracic Surgery  [phone]      "

## 2024-09-16 NOTE — PROGRESS NOTES
[facility]  ([address])    Felicitas Elliott has been accepted for admission to [facility] today.       Ride: [id number]    RN to RN report: Please call [address] at [phone] for nurse to nurse report.before the pt discharges.     Accepting MD: Dr. Veloz. Connected physicians for a provider to provider report before the pt discharges.    Documentation needed prior to discharge: A visible discharge summary and discharge/readmission orders       Patria Sage MS, OTR/L  ACH Referral Specialist    [facility]      [address] [address]  Admissions Office: [phone]  Fax: [phone]     CONFIDENTIAL Protected under [place] Statute  145.61 et seq

## 2024-09-16 NOTE — PROGRESS NOTES
NEPHROLOGY    Pt consulted for [facility]  Seen earlier today by colleague Dr Junior at [facility].   Note reviewed, labs reviewed.   Hemodialysis orders are in place.   Is MWF and running today at [facility].   Will plan to see formally Wednesday, or sooner if concern arise.     Miroslava Casey PA-C  [facility]  [address], [address]  [place] [address]  Phone: [phone]  Fax: [phone]

## 2024-09-16 NOTE — H&P
LTACH    History and Physical - Hospitalist Service       Date of Admission:  9/16/2024  Felicitas Elliott is a 84 year old female admitted on 9/16/2024 to the LTAC for long-term antibiotics following MSSA bacteremia and endocarditis. 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  She was initially admitted to [facility] on 8/16/2024 for sepsis and HAMMAD.  She received empiric antibiotics on admission. Blood cultures grew MSSA.  Neurology was consulted for possible discitis.  ID was also consulted. Echo revealed large posterior leaflet mitral valve vegetation and small aortic valve vegetation with severe aortic stenosis.  Brain MRI revealed scattered embolic infarcts.  Coronary angiogram demonstrated coronary artery disease.  CT surgery was consulted. She underwent CABG alongside the procedures mentioned above.  She however has not had renal recovery.  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  She had dysphagia and momentarily required NG tube with tube feeds. She worked with speech therapy and is now cleared for regular diet and thin liquids.  CT surgery recommends warfarin for 3 months for bioprosthetic valves.  INR goal 2-3.  End date of warfarin approximately 12/10/2024, at which point she will be on Plavix alone (ASA allergy) indefinitely.  ID recommends IV vancomycin until 10/8/2024.    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  Repeat blood cultures - 8/17/2024  -Mitral and aortic valve endocarditis as evidenced with large vegetation on mitral valve 8 mm x 15 at x 2 posterior leaflet of  small vegetation on aortic valve.  With signs of vegetation combined with brain infarct.  S/p CV surgery  -Continue with IV vancomycin.  Dosing per pharmacy  -Per ID will need to check immunoglobulin level in 4 to 6 weeks, sister with history of hypogammaglobinemia  -Will consult ID for antibiotic management while in LTAC  -Patient previously on heparin 5000 subQ every 8 hours and Coumadin.  INR today 1.35.  Recommended INR goal is 2-3 per CT surgery.  Will discontinue heparin 5000 units subcu every 8 hours and start patient on heparin gtt. alongside Coumadin.  I also notified pharmacy and there are agreeable with this plan  -Pharm.D. to dose Coumadin    Abnormal brain MRI suggestive of subacute infarct.  Patient with a history of MSSA bacteremia and aortic valve endocarditis.  Most likely septic emboli.  -Continue with antibiotics per ID.  End date 10/8/2024    Acute respiratory with hypoxia  -Patient previously intubated.  Now extubated.    -Continue with oxygen support to maintain oxygenation above 92%.  Wean as tolerated  -RT while in LTAC    HAMMAD  -Previously on CCRT now transferred to HD  -Torsemide per nephrology  -Consult nephrology while in LTAC  -HD per nephrology-MWF  -Monitor kidney function with BMP    CAD  A-fib  History of aortic stenosis  -Fairly stable at this time.  -S/p CABG per CT surgery  -Continue with Plavix and statin  -Metoprolol  -Previously on amiodarone for rate control, now s/p PPM  -Monitor on telemetry for 24 hours.    Physical deconditioning/critical illness myopathy  -Fall precautions.  -PT/OT  -WOC    Diet: Snacks/Supplements Adult: Magic Cup; With Meals  Snacks/Supplements Adult: Gelatein Plus; With Meals  Advance Diet as Tolerated: Regular Diet Adult    DVT Prophylaxis: Warfarin  Le Catheter: Not present  Lines: PRESENT      Cardiac Monitoring: ACTIVE order. Indication: ENDOCARDITIS  Code Status: No CPR- Do NOT Intubate      Clinically Significant Risk Factors Present on  "Admission               # Coagulation Defect: INR = 1.35 (Ref range: 0.85 - 1.15) and/or PTT = 52 Seconds (Ref range: 22 - 38 Seconds), will monitor for bleeding  # Thrombocytopenia: Lowest platelets = 106 in last 2 days, will monitor for bleeding      # Anemia: based on hgb <11       # Overweight: Estimated body mass index is 27.28 kg/m  as calculated from the following:    Height as of 8/19/24: 1.702 m (5' 7\").    Weight as of an earlier encounter on 9/16/24: 79 kg (174 lb 3.2 oz).       # Financial/Environmental Concerns:     # Pacemaker present  # History of CABG: noted on surgical history      Disposition Plan     Medically Ready for Discharge: Anticipated in 5+ Days    Adan Veloz MD  Hospitalist Service  LTACH  Securely message with [employer] (more info)  Text page via [facility] Paging/Directory     ______________________________________________________________________    Chief Complaint   Endocarditis    History is obtained from the patient and electronic health record    History of Present Illness   Felicitas Elliott is a 84 year old female admitted on 9/16/2024 to the LTAC for long-term antibiotics following MSSA bacteremia and endocarditis. 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  She was initially admitted to [facility] on 8/16/2024 for sepsis and HAMMAD.  She received empiric antibiotics on admission. Blood cultures grew MSSA.  Neurology was consulted for possible discitis.  ID was also consulted. Echo revealed large posterior leaflet mitral valve vegetation and small aortic valve vegetation with severe aortic stenosis.  Brain MRI revealed scattered embolic infarcts.  Coronary angiogram demonstrated coronary artery disease.  CT surgery was consulted. She underwent CABG alongside the procedures mentioned above.  She however has not had renal recovery.  Initially she was on CCRT for HAMMAD and now has " transitioned to HD per nephrology.  She had dysphagia and momentarily required NG tube with tube feeds. She worked with speech therapy and is now cleared for regular diet and thin liquids.  CT surgery recommends warfarin for 3 months for bioprosthetic valves.  INR goal 2-3.  End date of warfarin approximately 12/10/2024, at which point she will be on Plavix alone (ASA allergy) indefinitely.  ID recommends IV vancomycin until 10/8/2024.      Past Medical History    No past medical history on file.    Past Surgical History   Past Surgical History:   Procedure Laterality Date    CORONARY ARTERY BYPASS GRAFT, WITH AORTIC VALVE REPLACEMENT, WITH ENDOSCOPIC VESSEL PROCUREMENT N/A 8/27/2024    Procedure: CORONARY ARTERY BYPASS GRAFT TIMES TWO, WITH AORTIC ROOT REPLACEMENT, WITH LEFT INTERNAL MAMMARY ARTERY HARVEST, LEFT SAPHNENOUS ENDOSCOPIC VESSEL PROCUREMENT,;  Surgeon: Dylan Renae MD;  [place] [facility] [place]    CV CORONARY ANGIOGRAM N/A 8/20/2024    Procedure: CV CORONARY ANGIOGRAM;  Surgeon: Den Wylie MD;  Location: [facility] CATH LAB CV    EP PACEMAKER DEVICE & IMPLANT- HIS LEAD DUAL N/A 9/12/2024    Procedure: Pacemaker Device & Lead Implant - HIS Lead Dual;  Surgeon: Minh Pina MD;  Location: [facility] LAB CV    IR CVC NON TUNNEL PLACEMENT > 5 YRS  9/1/2024    IR CVC TUNNEL PLACEMENT > 5 YRS OF AGE  9/11/2024    PICC TRIPLE LUMEN PLACEMENT  9/6/2024    REPLACE VALVE MITRAL N/A 8/27/2024    Procedure: MITRAL VALVE REPLACEMENT,;  Surgeon: Dylan Renae MD;  [place] [facility] [place]    TRANSESOPHAGEAL ECHOCARDIOGRAM INTRAOPERATIVE  8/27/2024    Procedure: ANESTHESIA TRANSESOPHAGEAL ECHOCARDIOGRAM, EPI-AORTIC ULTRASOUND;  Surgeon: Dylan Renae MD;  [place] [facility] [place]       Prior to Admission Medications   Prior to Admission Medications   Prescriptions Last Dose Informant Patient Reported? Taking?   acetaminophen (TYLENOL) 500 MG tablet   Yes No    Sig: Take 500-1,000 mg by mouth 2 times daily as needed for other (arthritis)      Facility-Administered Medications: None        Review of Systems    The 10 point Review of Systems is negative other than noted in the HPI or here.     Social History   I have reviewed this patient's social history and updated it with pertinent information if needed.  Social History     Tobacco Use    Smoking status: Former     Current packs/day: 8.00     Average packs/day: 8.0 packs/day for 0.7 years (5.7 ttl pk-yrs)     Types: Cigarettes     Start date: 2024    Smokeless tobacco: Former   Vaping Use    Vaping status: Never Used   Substance Use Topics    Alcohol use: Not Currently       Family History         Allergies   Allergies   Allergen Reactions    Aspirin Rash    Cats Rash    Nickel Rash        Physical Exam   Vital Signs: Temp: 98.1  F (36.7  C) Temp src: Oral BP: 109/59 Pulse: 52   Resp: 20 SpO2: 94 % O2 Device: None (Room air)    Weight: 0 lbs 0 oz  Constitutional: Patient is resting in bed comfortably appears in no distress.  Eyes: Pupils are equal round and reactive to light  Respiratory: Good respiratory effort, on auscultation good air entry is noted  Cardiovascular: Good S1 and S2 no obvious murmurs peripheral pulses are palpable  GI: Abdomen is soft nontender nondistended bowel sounds are noted  Skin: No obvious rashes noted on exposed areas, warm to touch please refer to nursing/wound care note for complete skin exam  Musculoskeletal: Good muscle tone nails and digits appear acyanotic  Neuropsychiatric: Awake alert oriented to person normal affect    Medical Decision Making       75 MINUTES SPENT BY ME on the date of service doing chart review, history, exam, documentation & further activities per the note.  ------------------ MEDICAL DECISION MAKING ------------------------------------------------------------------------------------------------------  MANAGEMENT DISCUSSED with the following over the past 24 hours:  Patient, transferring physician, staff RN and infectious disease consultant   NOTE(S)/MEDICAL RECORDS REVIEWED over the past 24 hours: RN, CT surgery PA and ID       Data   ------------------------- ENCOUNTER LABS ----------------------------------------------------------------  Recent Labs   Lab 09/16/24  0506 09/15/24  0423 09/14/24  0740 09/14/24  0438 09/10/24  1946 09/10/24  0415   WBC 7.2 7.2  --  7.8   < > 12.8*   HGB 7.5* 7.1*  --  7.2*   < > 8.0*   * 105*  --  104*   < > 99   * 106*  --  106*   < > 61*   INR 1.35* 1.20*  --  1.24*   < > 1.22*   * 132*  --  135   < > 136   POTASSIUM 3.7 3.9  --  4.0   < > 3.9   CHLORIDE 93* 94*  --  96*   < > 98   CO2 26 26  --  28   < > 24   BUN 49.1* 39.5*  --  30.3*   < > 45.2*   CR 5.05* 4.43*  --  3.30*   < > 3.33*   ANIONGAP 13 12  --  11   < > 14   KAELYN 8.6* 8.6*  --  8.4*   < > 8.6*   * 107* 96 104*   < > 107*   ALBUMIN  --   --   --   --   --  3.7   PROTTOTAL  --   --   --   --   --  6.9   BILITOTAL  --   --   --   --   --  1.5*   ALKPHOS  --   --   --   --   --  77   ALT  --   --   --   --   --  79*   AST  --   --   --   --   --  60*    < > = values in this interval not displayed.       Most Recent 3 CBC's:  Recent Labs   Lab Test 09/16/24  0506 09/15/24  0423 09/14/24  0438   WBC 7.2 7.2 7.8   HGB 7.5* 7.1* 7.2*   * 105* 104*   * 106* 106*     Most Recent 3 BMP's:  Recent Labs   Lab Test 09/16/24  0506 09/15/24  0423 09/14/24  0740 09/14/24  0438   * 132*  --  135   POTASSIUM 3.7 3.9  --  4.0   CHLORIDE 93* 94*  --  96*   CO2 26 26  --  28   BUN 49.1* 39.5*  --  30.3*   CR 5.05* 4.43*  --  3.30*   ANIONGAP 13 12  --  11   KAELYN 8.6* 8.6*  --  8.4*   * 107* 96 104*     Most Recent 2 LFT's:  Recent Labs   Lab Test 09/10/24  0415 09/08/24  1454   AST 60* 72*   ALT 79* 99*   ALKPHOS 77 90   BILITOTAL 1.5* 1.1     Most Recent 3 INR's:  Recent Labs   Lab Test 09/16/24  0506 09/15/24  0423 09/14/24  0438   INR 1.35*  1.20* 1.24*

## 2024-09-16 NOTE — PROGRESS NOTES
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  Positive blood culture on 8/14/2024 and 8/16.  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  With the size of the vegetation combined with brain infarct, status post CV surgery, see details below active issue.   Abnormal brain MRI suggestive of subacute infarct. In a patient with MSSA bacteremia and aortic valve endocarditis, this is cerebral septic emboli. Active issue.       PROCEDURE PERFORMED: 8/27/24  Aortic root abscess debridement and aortic annular reconstruction.  Aortic root replacement (Konect composite 25 mm Silva Inspiris Resilia bioprosthetic valve within 30 mm Gelweave Valsalva graft with reimplantation of the coronary arteries).  Mitral valve replacement (31 mm St. Tim Silva bioprosthetic valve).  Coronary artery bypass grafting x 2 (reversed saphenous vein graft to the obtuse marginal branch of the left circumflex coronary artery, and pedicled left internal mammary artery to left anterior descending coronary artery).  Endoscopic vein harvest of the greater saphenous vein from the left lower extremity.    Recommendations:  Continue vancomycin-dosing per Pharm.D.   End date oct 8th.  Going to LTACH today    Will need to check immunoglobin level in 4 to 6 weeks sister with history of hypogammaglobulinemia     OK for Rosey today      RU Portillo MD  [facility]  Answering Service: [phone]  On-Call ID provider: [phone], option: 9                Interval History:     HPI: daughter here.  Stable.  LTACH today.           Recent Inflammatory Biomarkers:   Recent Labs   Lab Test 09/16/24  0506 09/15/24  0423 09/14/24  0438 09/13/24  0523 09/12/24  0504 09/11/24  0448 08/30/24  0437 08/29/24  0359   PCAL  --   --   --   --   --   --   --  1.24*   WBC 7.2 7.2 7.8 9.4 11.5* 12.4*   < > 15.3*    < > = values in this interval not displayed.            Review of Systems:      Negative except for findings in the HPI.           Current Medications (antimicrobials listed in bold):     Current Facility-Administered Medications   Medication Dose Route Frequency Provider Last Rate Last Admin    atorvastatin (LIPITOR) tablet 80 mg  80 mg Oral QPM Maryam Anthony PA-C   80 mg at 09/15/24 2016    clopidogrel (PLAVIX) tablet 75 mg  75 mg Oral Daily Maryam Anthony PA-C   75 mg at 09/16/24 0838    epoetin jose-epbx (RETACRIT) injection 20,000 Units  20,000 Units Subcutaneous Weekly Jair Galvan MD   20,000 Units at 09/13/24 1852    heparin ANTICOAGULANT injection 5,000 Units  5,000 Units Subcutaneous Q8H Maryam Anthony PA-C   5,000 Units at 09/16/24 0839    lactobacillus rhamnosus (GG) (CULTURELL) capsule 1 capsule  1 capsule Oral BID Dylan Renae MD   1 capsule at 09/16/24 0838    Lidocaine (LIDOCARE) 4 % Patch 1-2 patch  1-2 patch Transdermal Q24H Maryam Anthony PA-C   1 patch at 09/15/24 1857    metoprolol tartrate (LOPRESSOR) tablet 25 mg  25 mg Oral BID Jessica Tolbert PA-C   25 mg at 09/16/24 0838    midodrine (PROAMATINE) tablet 10 mg  10 mg Oral During Dialysis/CRRT (from stock) Maryam Anthony PA-C    10 mg at 09/13/24 1310    pantoprazole (PROTONIX) EC tablet 40 mg  40 mg Oral QAM AC Maryam Anthony PA-C        Or    pantoprazole (PROTONIX) IV push injection 40 mg  40 mg Intravenous QAM AC Mrayam Anthony PA-C   40 mg at 09/16/24 0630    polyethylene glycol (MIRALAX) Packet 17 g  17 g Oral Daily Dylan Renae MD        senna-docusate (SENOKOT-S/PERICOLACE) 8.6-50 MG per tablet 1 tablet  1 tablet Oral BID Dylan Renae MD   1 tablet at 09/16/24 0838    sodium chloride (PF) 0.9% PF flush 9 mL  9 mL Intracatheter During Dialysis/CRRT (from stock) Maryam Anthony PA-C        sodium chloride (PF) 0.9% PF flush 9 mL  9 mL Intracatheter During Dialysis/CRRT (from stock) Maryam Anthony PA-C        torsemide (DEMADEX) tablet 100 mg  100 mg Oral Daily Jair Galvan MD   100 mg at 09/16/24 0838    vancomycin place horne - receiving intermittent dosing  1 each Intravenous See Admin Instructions Maryam Anthony PA-C        warfarin ANTICOAGULANT (COUMADIN) tablet 5 mg  5 mg Oral ONCE at 18:00 Dylan Renae MD        Warfarin Dose Required Daily - Pharmacist Managed  1 each Oral See Admin Instructions Maryam Anthony PA-C                  Allergies:     Allergies   Allergen Reactions    Aspirin Rash    Cats Rash    Nickel Rash            Physical Exam:   Vitals were reviewed  Patient Vitals for the past 24 hrs:   BP Temp Temp src Pulse Resp SpO2 Weight   09/16/24 0714 109/71 98.7  F (37.1  C) Oral 73 18 94 % --   09/16/24 0425 120/66 98.7  F (37.1  C) Oral 76 20 93 % 79 kg (174 lb 3.2 oz)   09/15/24 2358 132/66 98.9  F (37.2  C) Oral 76 20 96 % --   09/15/24 2003 120/68 98.9  F (37.2  C) Oral 71 20 94 % --   09/15/24 1500 116/57 98.4  F (36.9  C) Oral 74 18 94 % --   09/15/24 1129 121/56 98.2  F (36.8  C) Oral 79 18 92 % --       Physical Examination:    Resp: 18    Gen: Appears ill, extubated, nasal canula, still on low dose  dobutamine  HEENT: opens eyes  PICC, chest wall catheter for hemodialysis, rectal tube  CV: Sternal incision, temporary pacer  Lungs: coarse, currently on nasal cannula.  Chest tubes have been removed  Skin: Warm  Extr: edema- especially L leg  Neuro: extubated, opens eyes  HD catheter  PICC- R basilic           Laboratory Data:   ID Labs:  Microbiology labs:  7-Day Micro Results       No results found for the last 168 hours.          Susceptibility data from last 90 days.  Collected Specimen Info Organism Ampicillin Ampicillin/Sulbactam Cefepime Ceftazidime Ceftriaxone Ciprofloxacin Clindamycin Daptomycin Doxycycline Erythromycin Gentamicin Levofloxacin Meropenem Oxacillin   08/29/24 Sputum from Expectorate Enterobacter cloacae complex R R  S R R  S      S  S  S      Normal samara                 08/16/24 Peripheral Blood Staphylococcus aureus                 08/14/24 Peripheral Blood Staphylococcus aureus        S  S  S  S  S    S     Collected Specimen Info Organism Piperacillin/Tazobactam Tetracycline Tobramycin Trimethoprim/Sulfamethoxazole  Vancomycin   08/29/24 Sputum from Expectorate Enterobacter cloacae complex R   S  S      Normal samara        08/16/24 Peripheral Blood Staphylococcus aureus        08/14/24 Peripheral Blood Staphylococcus aureus   S   S  S       Reviewed      No lab results found.  Recent Labs   Lab Test 09/16/24  0506 09/15/24  0423 09/14/24  0438 09/13/24  0523 09/12/24  0504 09/11/24  0448   WBC 7.2 7.2 7.8 9.4 11.5* 12.4*     Recent Labs   Lab Test 09/16/24  0506 09/15/24  0423 09/14/24  0438 09/13/24  0523   CR 5.05* 4.43* 3.30* 5.51*   GFRESTIMATED 8* 9* 13* 7*       Hematology Studies  Recent Labs   Lab Test 09/16/24  0506 09/15/24  0423 09/14/24  0438 09/13/24  0523 09/12/24  0504 09/11/24  0448   WBC 7.2 7.2 7.8 9.4 11.5* 12.4*   HGB 7.5* 7.1* 7.2* 7.5* 8.0* 7.9*   HCT 20.8* 19.6* 21.0* 20.5* 25.0* 23.9*   * 106* 106* 102* 89* 75*       Metabolic  Recent Labs   Lab Test  09/16/24  0506 09/15/24  0423 09/14/24  0438   * 132* 135   BUN 49.1* 39.5* 30.3*   CO2 26 26 28   CR 5.05* 4.43* 3.30*   GFRESTIMATED 8* 9* 13*       Hepatic Studies  Recent Labs   Lab Test 09/10/24  0415 09/08/24  1454 09/08/24  0540   BILITOTAL 1.5* 1.1 1.0   ALKPHOS 77 90 107   ALBUMIN 3.7 3.8 3.7   AST 60* 72* 83*   ALT 79* 99* 100*          Imaging Data:   Reviewed     IR CVC Non Tunnel Placement > 5 Yrs    Result Date: 9/1/2024  [facility] LOCATION: [facility] DATE: 9/1/2024 PROCEDURE: NON-TUNNELED DIALYSIS CATHETER PLACEMENT INTERVENTIONAL RADIOLOGIST: RU Reyes MD. INDICATION: HAMMAD requiring hemodialysis. CONSENT: The risks, benefits and alternatives of non-tunneled dialysis catheter placement were discussed with the patient in detail. All questions were answered. Informed consent was given to proceed with the procedure. MODERATE SEDATION: None. CONTRAST: None. ANTIBIOTICS: None. ADDITIONAL MEDICATIONS: Heparin 3800 U IV FLUOROSCOPIC TIME: 2.0 minutes. RADIATION DOSE: Air Kerma: 36.04 mGy. COMPLICATIONS: No immediate complications. STERILE BARRIER TECHNIQUE: Maximum sterile barrier technique was used. Cutaneous antisepsis was performed at the operative site with application of 2% chlorhexidine and large sterile drape. Prior to the procedure, the  and assistant performed hand hygiene and wore hat, mask, sterile gown, and sterile gloves during the entire procedure. PROCEDURE: Limited left neck ultrasound was performed which demonstrated a patent internal jugular vein; images saved to the permanent patient medical record. The overlying skin and subcutaneous soft tissues were anesthetized using 1% lidocaine. Under ultrasound guidance, the left internal jugular vein was accessed using a micropuncture needle; images saved of the permanent patient medical record. Access was secured using a 4 Pashto transitional sheath. A [employer] guidewire was advanced into the  SVC. Under fluoroscopic guidance, the overlying skin and subcutaneous soft tissues were dilated and a 15.5 Divehi nontunneled dialysis catheter was placed with the tip within the cavoatrial junction. The catheter was tested and found to flush and aspirate appropriately. The catheter was heparinized and secured to the skin.     IMPRESSION:  1.  Successful non-tunneled dialysis catheter placement. PLAN: 1. Dialysis catheter is ready to be used. 2. Nontunneled right groin dialysis catheter can be removed in ICU.       Study Result    Narrative & Impression   EXAM: MR BRAIN W/O and W CONTRAST  LOCATION: [facility]  DATE: 8/17/2024     INDICATION: Headache in a patient with MSSA bacteremia secondary to aortic valve endocarditis.  Look for cerebral septic emboli; Headache; Acute HA (< 3 months), no complicating features  COMPARISON: None.  CONTRAST: 9 ml gadavist  TECHNIQUE: Routine multiplanar multisequence head MRI without and with intravenous contrast.     FINDINGS: Assessment degraded due to motion.  INTRACRANIAL CONTENTS:   Apparent focus of diffusion restriction with T2/FLAIR hyperintensity within the left superior parietal lobule cortex is hyperintense on ADC map, representing T2 shine through.  Focus of signal abnormality measures 13 x 9 mm in the axial plane and does   not have internal enhancement.     Additional punctate foci of hyperintensity on diffusion weighted with similar signal characteristics (periventricular right corona radiata, left superior parietal lobule, and left cerebellar hemisphere).     Patchy and confluent nonspecific T2/FLAIR hyperintensities within the cerebral white matter most consistent with moderate chronic microvascular ischemic change. Moderate generalized cerebral atrophy. No hydrocephalus. Normal position of the cerebellar   tonsils. No discernible abnormal intracranial enhancement; however, assessment substantially degraded due to motion. Old right  cerebellar lacunar infarct.     SELLA: No abnormality accounting for technique.     OSSEOUS STRUCTURES/SOFT TISSUES: Normal marrow signal. The major intracranial vascular flow voids are maintained.      ORBITS: No abnormality accounting for technique.      SINUSES/MASTOIDS: Mild mucosal thickening scattered about the paranasal sinuses. Scattered fluid/membrane thickening in the left mastoid air cells. No apparent mass in the posterior nasopharynx or skull base.                                                                       IMPRESSION: Assessment degraded due to motion.  1.  13 mm focus of cortical signal abnormality within the left superior parietal lobule which is favored to represent a subacute infarct; low-grade glial neoplasm could conceivably have a similar appearance. Hyperacute intraparenchymal hematoma could   also have a similar appearance but is considered less likely. Recommend noncontrast head CT for further characterization. Also recommend follow-up MRI brain without and with contrast in 8-12 weeks to document expected evolution.  2.  Additional smaller foci of signal abnormality with similar characteristics favored to represent punctate subacute infarcts.

## 2024-09-16 NOTE — PHARMACY-ANTICOAGULATION SERVICE
Clinical Pharmacy - Warfarin Dosing Consult     Pharmacy has been consulted to manage this patient s warfarin therapy.       INR   Date Value Ref Range Status   09/16/2024 1.35 (H) 0.85 - 1.15 Final   09/15/2024 1.20 (H) 0.85 - 1.15 Final       Recommend warfarin 4 mg today.  Pharmacy will monitor Felicitas Elliott daily and order warfarin doses to achieve specified goal.      Of note, patient received initial 5mg dose of warfarin on 8/28, the next day the INR jumped 0.8 to 2.28 and 1mg of warfarin was given, the next day INR was 10. Pharmacy will dose with caution.     Heparin drip is running to bridge therapy.     Please contact pharmacy as soon as possible if the warfarin needs to be held for a procedure or if the warfarin goals change.

## 2024-09-17 ENCOUNTER — APPOINTMENT (OUTPATIENT)
Dept: OCCUPATIONAL THERAPY | Facility: CLINIC | Age: 84
End: 2024-09-17
Attending: HOSPITALIST
Payer: COMMERCIAL

## 2024-09-17 ENCOUNTER — APPOINTMENT (OUTPATIENT)
Dept: PHYSICAL THERAPY | Facility: CLINIC | Age: 84
DRG: 288 | End: 2024-09-17
Attending: HOSPITALIST
Payer: COMMERCIAL

## 2024-09-17 LAB
ALBUMIN SERPL BCG-MCNC: 3.1 G/DL (ref 3.5–5.2)
ALBUMIN SERPL BCG-MCNC: 3.1 G/DL (ref 3.5–5.2)
ALP SERPL-CCNC: 75 U/L (ref 40–150)
ALP SERPL-CCNC: 75 U/L (ref 40–150)
ALT SERPL W P-5'-P-CCNC: 32 U/L (ref 0–50)
ALT SERPL W P-5'-P-CCNC: 34 U/L (ref 0–50)
ANION GAP SERPL CALCULATED.3IONS-SCNC: 10 MMOL/L (ref 7–15)
ANION GAP SERPL CALCULATED.3IONS-SCNC: 11 MMOL/L (ref 7–15)
AST SERPL W P-5'-P-CCNC: 32 U/L (ref 0–45)
AST SERPL W P-5'-P-CCNC: 33 U/L (ref 0–45)
ATRIAL RATE - MUSE: 68 BPM
BASOPHILS # BLD AUTO: 0.1 10E3/UL (ref 0–0.2)
BASOPHILS # BLD AUTO: 0.1 10E3/UL (ref 0–0.2)
BASOPHILS NFR BLD AUTO: 1 %
BASOPHILS NFR BLD AUTO: 1 %
BILIRUB SERPL-MCNC: 0.7 MG/DL
BILIRUB SERPL-MCNC: 0.8 MG/DL
BUN SERPL-MCNC: 25.5 MG/DL (ref 8–23)
BUN SERPL-MCNC: 26.3 MG/DL (ref 8–23)
CALCIUM SERPL-MCNC: 8.1 MG/DL (ref 8.8–10.4)
CALCIUM SERPL-MCNC: 8.1 MG/DL (ref 8.8–10.4)
CHLORIDE SERPL-SCNC: 97 MMOL/L (ref 98–107)
CHLORIDE SERPL-SCNC: 99 MMOL/L (ref 98–107)
CREAT SERPL-MCNC: 3.16 MG/DL (ref 0.51–0.95)
CREAT SERPL-MCNC: 3.34 MG/DL (ref 0.51–0.95)
DIASTOLIC BLOOD PRESSURE - MUSE: NORMAL MMHG
EGFRCR SERPLBLD CKD-EPI 2021: 13 ML/MIN/1.73M2
EGFRCR SERPLBLD CKD-EPI 2021: 14 ML/MIN/1.73M2
EOSINOPHIL # BLD AUTO: 0.2 10E3/UL (ref 0–0.7)
EOSINOPHIL # BLD AUTO: 0.3 10E3/UL (ref 0–0.7)
EOSINOPHIL NFR BLD AUTO: 4 %
EOSINOPHIL NFR BLD AUTO: 5 %
ERYTHROCYTE [DISTWIDTH] IN BLOOD BY AUTOMATED COUNT: 20.5 % (ref 10–15)
ERYTHROCYTE [DISTWIDTH] IN BLOOD BY AUTOMATED COUNT: 20.8 % (ref 10–15)
GLUCOSE BLDC GLUCOMTR-MCNC: 102 MG/DL (ref 70–99)
GLUCOSE SERPL-MCNC: 191 MG/DL (ref 70–99)
GLUCOSE SERPL-MCNC: 95 MG/DL (ref 70–99)
HCO3 SERPL-SCNC: 26 MMOL/L (ref 22–29)
HCO3 SERPL-SCNC: 27 MMOL/L (ref 22–29)
HCT VFR BLD AUTO: 24.5 % (ref 35–47)
HCT VFR BLD AUTO: 24.7 % (ref 35–47)
HGB BLD-MCNC: 7.5 G/DL (ref 11.7–15.7)
HGB BLD-MCNC: 7.6 G/DL (ref 11.7–15.7)
IMM GRANULOCYTES # BLD: 0 10E3/UL
IMM GRANULOCYTES # BLD: 0 10E3/UL
IMM GRANULOCYTES NFR BLD: 0 %
IMM GRANULOCYTES NFR BLD: 0 %
INR PPP: 1.49 (ref 0.85–1.15)
INTERPRETATION ECG - MUSE: NORMAL
LYMPHOCYTES # BLD AUTO: 0.8 10E3/UL (ref 0.8–5.3)
LYMPHOCYTES # BLD AUTO: 1 10E3/UL (ref 0.8–5.3)
LYMPHOCYTES NFR BLD AUTO: 14 %
LYMPHOCYTES NFR BLD AUTO: 15 %
MCH RBC QN AUTO: 31 PG (ref 26.5–33)
MCH RBC QN AUTO: 31.4 PG (ref 26.5–33)
MCHC RBC AUTO-ENTMCNC: 30.6 G/DL (ref 31.5–36.5)
MCHC RBC AUTO-ENTMCNC: 30.8 G/DL (ref 31.5–36.5)
MCV RBC AUTO: 101 FL (ref 78–100)
MCV RBC AUTO: 102 FL (ref 78–100)
MONOCYTES # BLD AUTO: 0.4 10E3/UL (ref 0–1.3)
MONOCYTES # BLD AUTO: 0.4 10E3/UL (ref 0–1.3)
MONOCYTES NFR BLD AUTO: 6 %
MONOCYTES NFR BLD AUTO: 7 %
NEUTROPHILS # BLD AUTO: 4.7 10E3/UL (ref 1.6–8.3)
NEUTROPHILS # BLD AUTO: 4.7 10E3/UL (ref 1.6–8.3)
NEUTROPHILS NFR BLD AUTO: 73 %
NEUTROPHILS NFR BLD AUTO: 75 %
P AXIS - MUSE: NORMAL DEGREES
PLATELET # BLD AUTO: 114 10E3/UL (ref 150–450)
PLATELET # BLD AUTO: 115 10E3/UL (ref 150–450)
POTASSIUM SERPL-SCNC: 3.6 MMOL/L (ref 3.4–5.3)
POTASSIUM SERPL-SCNC: 3.6 MMOL/L (ref 3.4–5.3)
PR INTERVAL - MUSE: 140 MS
PROT SERPL-MCNC: 6.1 G/DL (ref 6.4–8.3)
PROT SERPL-MCNC: 6.2 G/DL (ref 6.4–8.3)
QRS DURATION - MUSE: 150 MS
QT - MUSE: 448 MS
QTC - MUSE: 510 MS
R AXIS - MUSE: 97 DEGREES
RBC # BLD AUTO: 2.42 10E6/UL (ref 3.8–5.2)
RBC # BLD AUTO: 2.42 10E6/UL (ref 3.8–5.2)
SODIUM SERPL-SCNC: 134 MMOL/L (ref 135–145)
SODIUM SERPL-SCNC: 136 MMOL/L (ref 135–145)
SYSTOLIC BLOOD PRESSURE - MUSE: NORMAL MMHG
T AXIS - MUSE: -74 DEGREES
UFH PPP CHRO-ACNC: 0.25 IU/ML
UFH PPP CHRO-ACNC: 0.54 IU/ML
VENTRICULAR RATE- MUSE: 78 BPM
WBC # BLD AUTO: 6.2 10E3/UL (ref 4–11)
WBC # BLD AUTO: 6.5 10E3/UL (ref 4–11)

## 2024-09-17 PROCEDURE — 99222 1ST HOSP IP/OBS MODERATE 55: CPT

## 2024-09-17 PROCEDURE — 120N000017 HC R&B RESPIRATORY CARE

## 2024-09-17 PROCEDURE — G0463 HOSPITAL OUTPT CLINIC VISIT: HCPCS

## 2024-09-17 PROCEDURE — 97530 THERAPEUTIC ACTIVITIES: CPT | Mod: GO | Performed by: OCCUPATIONAL THERAPIST

## 2024-09-17 PROCEDURE — 93010 ELECTROCARDIOGRAM REPORT: CPT | Performed by: STUDENT IN AN ORGANIZED HEALTH CARE EDUCATION/TRAINING PROGRAM

## 2024-09-17 PROCEDURE — 97110 THERAPEUTIC EXERCISES: CPT | Mod: GP | Performed by: PHYSICAL THERAPIST

## 2024-09-17 PROCEDURE — 85610 PROTHROMBIN TIME: CPT | Performed by: PHYSICIAN ASSISTANT

## 2024-09-17 PROCEDURE — 999N000157 HC STATISTIC RCP TIME EA 10 MIN

## 2024-09-17 PROCEDURE — 85520 HEPARIN ASSAY: CPT | Performed by: HOSPITALIST

## 2024-09-17 PROCEDURE — 97161 PT EVAL LOW COMPLEX 20 MIN: CPT | Mod: GP | Performed by: PHYSICAL THERAPIST

## 2024-09-17 PROCEDURE — 84155 ASSAY OF PROTEIN SERUM: CPT | Performed by: HOSPITALIST

## 2024-09-17 PROCEDURE — 999N000123 HC STATISTIC OXYGEN O2DAILY TECH TIME

## 2024-09-17 PROCEDURE — 97165 OT EVAL LOW COMPLEX 30 MIN: CPT | Mod: GO | Performed by: OCCUPATIONAL THERAPIST

## 2024-09-17 PROCEDURE — 250N000013 HC RX MED GY IP 250 OP 250 PS 637: Performed by: PHYSICIAN ASSISTANT

## 2024-09-17 PROCEDURE — 250N000013 HC RX MED GY IP 250 OP 250 PS 637: Performed by: HOSPITALIST

## 2024-09-17 PROCEDURE — 99233 SBSQ HOSP IP/OBS HIGH 50: CPT | Performed by: HOSPITALIST

## 2024-09-17 PROCEDURE — 97530 THERAPEUTIC ACTIVITIES: CPT | Mod: GP | Performed by: PHYSICAL THERAPIST

## 2024-09-17 RX ORDER — WARFARIN SODIUM 4 MG/1
4 TABLET ORAL
Status: COMPLETED | OUTPATIENT
Start: 2024-09-17 | End: 2024-09-17

## 2024-09-17 RX ADMIN — CLOPIDOGREL 75 MG: 75 TABLET ORAL at 08:55

## 2024-09-17 RX ADMIN — ATORVASTATIN CALCIUM 80 MG: 40 TABLET, FILM COATED ORAL at 21:27

## 2024-09-17 RX ADMIN — TORSEMIDE 100 MG: 100 TABLET ORAL at 08:54

## 2024-09-17 RX ADMIN — PANTOPRAZOLE SODIUM 40 MG: 40 TABLET, DELAYED RELEASE ORAL at 06:48

## 2024-09-17 RX ADMIN — WARFARIN SODIUM 4 MG: 4 TABLET ORAL at 17:57

## 2024-09-17 RX ADMIN — ACETAMINOPHEN 650 MG: 325 TABLET, FILM COATED ORAL at 06:48

## 2024-09-17 RX ADMIN — Medication 1 CAPSULE: at 08:55

## 2024-09-17 RX ADMIN — METOPROLOL TARTRATE 25 MG: 25 TABLET, FILM COATED ORAL at 08:55

## 2024-09-17 RX ADMIN — Medication 1 CAPSULE: at 21:27

## 2024-09-17 ASSESSMENT — ACTIVITIES OF DAILY LIVING (ADL)
ADLS_ACUITY_SCORE: 41
ADLS_ACUITY_SCORE: 40
ADLS_ACUITY_SCORE: 40
ADLS_ACUITY_SCORE: 41
ADLS_ACUITY_SCORE: 42
ADLS_ACUITY_SCORE: 39
ADLS_ACUITY_SCORE: 40
ADLS_ACUITY_SCORE: 42
ADLS_ACUITY_SCORE: 43
ADLS_ACUITY_SCORE: 42
ADLS_ACUITY_SCORE: 41
ADLS_ACUITY_SCORE: 43
ADLS_ACUITY_SCORE: 40
ADLS_ACUITY_SCORE: 42
ADLS_ACUITY_SCORE: 42
ADLS_ACUITY_SCORE: 43
ADLS_ACUITY_SCORE: 40
ADLS_ACUITY_SCORE: 40
ADLS_ACUITY_SCORE: 42

## 2024-09-17 NOTE — CONSULTS
"SPIRITUAL HEALTH SERVICES ([facility])  SPIRITUAL ASSESSMENT Progress Note  [facility]. [facility]     REFERRAL SOURCE: Consult (Routine0      Felicitas is a deeply spiritual Shinto though unaffiliated with  a particular [employer]. She feels the presence of God through [place] with her daily and especially during this hospitalization. She is well supported by her amanda and her family. Her amanda has taught her about being humble, grateful and thankful during this illness. She is please with her \"slow and sure\" progress, \"one step after another\" on her journey towards discharge. She was  23 years to her first  and 41 years to this current  and has a large family - 7 children, 19 grand children and 7 great grand children all of whom are supportive.      PLAN: [facility] will follow during Felicitas's stay at [facility].      Dary Velazquez, Ph.D., [facility]      SHS available 24/7 for emergency requests/referrals, either by having the on-call  paged or by entering an ASAP/STAT consult in Epic (this will also page the on-call ).  "

## 2024-09-17 NOTE — PROGRESS NOTES
09/17/24 1205   Appointment Info   Signing Clinician's Name / Credentials (PT) Tami Cedeno, PT   Rehab Comments (PT) Sternal Precautions (surgery 8/27/24) , 2L via NC, Telemetry x 24 hours   Living Environment   People in Home spouse   Current Living Arrangements house  (Split level (enter in at Kitchen))   Home Accessibility stairs to enter home;stairs within home   Number of Stairs, Main Entrance 4   Number of Stairs, Within Home, Primary eight   Living Environment Comments Bedroom upstairs, living room downstairs. Pt reported she was able to Kati navigate her home prior to hospitalization   Self-Care   Usual Activity Tolerance moderate   Current Activity Tolerance poor   Regular Exercise No   Activity/Exercise/Self-Care Comment Pt is IND at baseline without AD. Reports she walked limited distances due to overall deconditioning. Pt reports she has neuropathy and stenosis.  did all the grocery shopping   General Information   Onset of Illness/Injury or Date of Surgery 08/16/24   Referring Physician Adan Veloz MD   Pertinent History of Current Problem (include personal factors and/or comorbidities that impact the POC) Per MD H7P: 84 year old female admitted on 9/16/2024 to the LTAC for long-term antibiotics following MSSA bacteremia and endocarditis. 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  She was initially admitted to [facility] on 8/16/2024 for sepsis and HAMMAD.  She received empiric antibiotics on admission. Blood cultures grew MSSA.  Neurology was consulted for possible discitis.  ID was also consulted. Echo revealed large posterior leaflet mitral valve vegetation and small aortic valve vegetation with severe aortic stenosis.  Brain MRI revealed scattered embolic infarcts.  Coronary angiogram demonstrated coronary artery disease.  CT surgery was consulted. She underwent CABG  "alongside the procedures mentioned above.  She however has not had renal recovery.  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  She had dysphagia and momentarily required NG tube with tube feeds. She worked with speech therapy and is now cleared for regular diet and thin liquids.  CT surgery recommends warfarin for 3 months for bioprosthetic valves.  INR goal 2-3.  End date of warfarin approximately 12/10/2024, at which point she will be on Plavix alone (ASA allergy) indefinitely.  ID recommends IV vancomycin until 10/8/2024.   Existing Precautions/Restrictions (S)  sternal   Weight-Bearing Status - LUE other (see comments)  (STERNAL PRECAUTIONS)   Weight-Bearing Status - RUE other (see comments)  (STERNAL PRECAUTIONS)   Weight-Bearing Status - LLE full weight-bearing   Weight-Bearing Status - RLE full weight-bearing   Cognition   Affect/Mental Status (Cognition) WNL   Cognitive Status Comments Pt cautious, but pleasant, following all commands   Pain Assessment   Patient Currently in Pain Yes, see Vital Sign flowsheet  (Neck pain with sitting)   Posture    Posture Forward head position;Protracted shoulders   Posture Comments Pt reports she has cervical stenosis and has h/o neck pain and weakness \"Sometimes I have to push my head up with my hand\"   Range of Motion (ROM)   Range of Motion ROM is WFL   ROM Comment B LE   Strength (Manual Muscle Testing)   Strength Comments B Hip flex: 3+/5, B Hip ext: 4/5, B Knee ext/flex: 4+/5, B Ankle Df: 3+/5, B PF 3+/5   Bed Mobility   Bed Mobility rolling right;sit-supine   Rolling Right [place] (Bed Mobility) minimum assist (75% patient effort)   Supine-Sit [place] (Bed Mobility) moderate assist (50% patient effort);1 person assist   Sit-Supine [place] (Bed Mobility) moderate assist (50% patient effort)   Transfers   Comment, (Transfers) Sit to stand: Min A from elevated bed   Balance   Balance Comments Sitting Balance: Static: " SBA Dynamic: CGA - SBA  Static Standing: Min A x 1   Sensory Examination   Sensory Perception Comments B LE Light Touch:  WFL   Muscle Tone   Muscle Tone no deficits were identified   Clinical Impression   Criteria for Skilled Therapeutic Intervention Yes, treatment indicated   PT Diagnosis (PT) Impaired Functional Mobility   Influenced by the following impairments Strength, balance,  Aerobic Conditioning   Functional limitations due to impairments Bed mobility, transfers, Gait, Stairs   Clinical Presentation (PT Evaluation Complexity) evolving   Clinical Presentation Rationale Per clinical judgement following evaluation and chart review   Planned Therapy Interventions (PT) balance training;bed mobility training;gait training;neuromuscular re-education;patient/family education;ROM (range of motion);stair training;strengthening;stretching;transfer training;progressive activity/exercise   Risk & Benefits of therapy have been explained care plan/treatment goals reviewed;evaluation/treatment results reviewed;patient   PT Total Evaluation Time   PT Eval, Low Complexity Minutes (30020) 10   Physical Therapy Goals   PT Frequency 6x/week   PT Predicted Duration/Target Date for Goal Attainment 10/25/24   PT Goals PT Goal 2;PT Goal 3   PT: Bed Mobility Supine to/from sit;Rolling   PT: Transfers Sit to/from stand;Bed to/from chair;Supervision/stand-by assist   PT: Gait Rolling walker;Supervision/stand-by assist;150 feet   PT: Goal 1 Pt will demonstrate Mod I with sitting balance in order to improve ease and efficiency with Transfers.   PT: Goal 2 Pt will perform all functional mobility with O2 sats and HR WFL.   Interventions   Interventions Quick Adds Therapeutic Activity;Therapeutic Procedure;Neuromuscular Re-ed   Therapeutic Procedure/Exercise   Ther. Procedure: strength, endurance, ROM, flexibillity Minutes (05421) 8   Symptoms Noted During/After Treatment fatigue   Treatment Detail/Skilled Intervention Supine B LE  "strengthening exs: 10 - 15 reps: ankle pumps, quad sets, glute sets, hip flex/ext, knee ext. Pt most challenged with hip flex  due to weakness. No SOB observed with exercise.   Therapeutic Activity   Therapeutic Activities: dynamic activities to improve functional performance Minutes (74751) 10   Symptoms Noted During/After Treatment Fatigue   Treatment Detail/Skilled Intervention Rolling to R: Min A x 1, Supine to sit: Min A x 1 x 2 reps.  VCs for LE placement. Pt did well maintaining sternal precautions.  Sit supine: Min A for LEs x 2 reps.  Sit to stand from EOB: 1st attempt, stood with B UE support from Bed rails: Mi n A x 1.  Next 2 attempts pt stood without rails and UE support from PT: Min A x1 - pt needed encouragement to maintain stance and pt able to for approx 15 sec. VCs for full hip and knee ext. Pt anxious.  Progressed to side stepping x 3 to R. Pt fearful \"I can't\" however pt was able to minimally advance feet with Mod A x 1.   PT Discharge Planning   PT Plan LE and core and cervical strengthening, transfer training, Bed mobility, Amb, Motomed, NuStep   PT Discharge Recommendation (DC Rec) Transitional Care Facility   PT Rationale for DC Rec Pt was IND without AD prior to hospitalization. Pt would benefit from continued PT in order to increase IND with all functional mobility. Currently requires A x 1 for mobility and is decondtioned.   PT Brief overview of current status PT eval completed bedside. Pt anxious but with encouragement is able to perform transfers with Min to Mod A x 1.   Total Session Time   Timed Code Treatment Minutes 18   Total Session Time (sum of timed and untimed services) 28   Roll Left and Right   Patient Performance Partial/moderate assist   Sit to Lying   Patient Performance Partial/moderate assist   Lying to Sitting on Side of Bed   Patient Performance Partial/moderate assist   Sit to Stand   Patient Performance Partial/moderate assist   Discharge Goal (Admit only) SBA "   Chair/Bed to Chair Transfer   Patient Performance Dependent   Walk 10 feet   Reason if not Attempted Safety concerns   Walk 50 feet with Two Turns   Reason if not Attempted Safety concerns   Walk 150 feet   Reason if not Attempted Safety concerns   Walk 10 Feet on Uneven Surfaces   Reason if not Attempted Safety concerns   Wheel 50 Feet With Two Turns   Reason if not Attempted Activity not applicable   Wheel 150 feet   Reason if not Attempted Activity not applicable   1 Step   Reason if not Attempted Safety concerns   4 Steps   Reason if not Attempted Safety concerns   12 Steps   Reason if not Attempted Safety concerns   Car Transfer   Reason if not Attempted Environmental limitations (e.g., lack of equipment,weather constraints)   Picking up objects   Reason if not Attempted Safety concerns

## 2024-09-17 NOTE — PLAN OF CARE
Problem: Adult Inpatient Plan of Care  Goal: Plan of Care Review  Description: The Plan of Care Review/Shift note should be completed every shift.  The Outcome Evaluation is a brief statement about your assessment that the patient is improving, declining, or no change.  This information will be displayed automatically on your shift  note.  Outcome: Progressing  Goal: Absence of Hospital-Acquired Illness or Injury  Intervention: Identify and Manage Fall Risk  Recent Flowsheet Documentation  Taken 9/17/2024 0000 by Demetrice Mayes RN  Safety Promotion/Fall Prevention:   lighting adjusted   room door open   room near nurse's station  Intervention: Prevent Infection  Recent Flowsheet Documentation  Taken 9/17/2024 0000 by Demetrice Mayes RN  Infection Prevention: rest/sleep promoted  Goal: Optimal Comfort and Wellbeing  Intervention: Provide Person-Centered Care  Recent Flowsheet Documentation  Taken 9/17/2024 0000 by Demetrice Mayes RN  Trust Relationship/Rapport:   care explained   choices provided   Goal Outcome Evaluation:         Patient  was A/O x 4 complained of pain and received prn Tylenol 650 mg patient slept for 3-4 hours,  was on heparin drip, anti- Xa result came back and protocol  observed,  Patient continued on cardiac monitor, pace maker was sensing and capturing with PVC, all other patient needs were attended to.

## 2024-09-17 NOTE — PROGRESS NOTES
[facility]    Medicine Progress Note - Hospitalist Service    Date of Admission:  9/16/2024    Felicitas Elliott is a 84 year old female admitted on 9/16/2024 to the LTAC for long-term antibiotics following MSSA bacteremia and endocarditis. 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  She was initially admitted to [facility] on 8/16/2024 for sepsis and HAMMAD.  She received empiric antibiotics on admission. Blood cultures grew MSSA.  Neurology was consulted for possible discitis.  ID was also consulted. Echo revealed large posterior leaflet mitral valve vegetation and small aortic valve vegetation with severe aortic stenosis.  Brain MRI revealed scattered embolic infarcts.  Coronary angiogram demonstrated coronary artery disease.  CT surgery was consulted. She underwent CABG alongside the procedures mentioned above.  She however has not had renal recovery.  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  She had dysphagia and momentarily required NG tube with tube feeds. She worked with speech therapy and is now cleared for regular diet and thin liquids.  CT surgery recommends warfarin for 3 months for bioprosthetic valves.  INR goal 2-3.  End date of warfarin approximately 12/10/2024, at which point she will be on Plavix alone (ASA allergy) indefinitely.  ID recommends IV vancomycin until 10/8/2024.    LTAC Course:  - 9/17: Progressing well. On heparin gtt with coumadin for bioprosthetic valve. INR  1.49 today.  Remains on long-term antibiotics.  No new changes at this time.    BARRIERS TO DISCHARGE (why care is unable to be provided at a lower level of care):    -Long-term antibiotics.  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  Repeat blood cultures - 8/17/2024  -Mitral and aortic valve endocarditis as evidenced with large vegetation on mitral valve 8 mm x 15 at x 2 posterior leaflet of small vegetation on aortic valve.  With signs of vegetation combined with brain infarct.  S/p CV surgery  -Continue with IV vancomycin.  Dosing per pharmacy  -Per ID will need to check immunoglobulin level in 4 to 6 weeks, sister with history of hypogammaglobinemia  -ID consulted and following  -Patient previously on heparin 5000 subQ every 8 hours and Coumadin.  INR today 1.49.  Recommended INR goal is 2-3 per CT surgery.  Discontinued heparin 5000 units subcu every 8 hours on admission and started patient on heparin gtt. alongside Coumadin.  I also notified pharmacy and there are agreeable with this plan  -Pharm.D. to dose Coumadin    Abnormal brain MRI suggestive of subacute infarct.  Patient with a history of MSSA bacteremia and aortic valve endocarditis.  Most likely septic emboli.  -Continue with antibiotics per ID.  End date 10/8/2024    Acute respiratory with hypoxia  -Patient previously intubated.  Now extubated.    -Continue with oxygen support to maintain oxygenation above 92%.  Wean as tolerated  -RT while in LTAC    HAMMAD  -Previously on CCRT now transferred to HD  -Torsemide per nephrology  -Consult nephrology while in LTAC  -HD per nephrology-MWF  -Monitor kidney function with BMP    CAD  A-fib  History of aortic stenosis  -Fairly stable at this time.  -S/p CABG per CT surgery  -Continue with Plavix and statin  -Metoprolol  -Previously on amiodarone for rate control, now s/p PPM  -Monitor on telemetry for 24 hours.    Physical deconditioning/critical illness myopathy  -Fall precautions.  -PT/OT  -WOC    Diet: Snacks/Supplements Adult: Magic Cup; With Meals  Snacks/Supplements Adult: Gelatein Plus; With  "Meals  Advance Diet as Tolerated: Regular Diet Adult    DVT Prophylaxis: Warfarin  Le Catheter: Not present  Lines: PRESENT      PICC 09/06/24 Triple Lumen Right Basilic Vascular access-Site Assessment: WDL  CVC Double Lumen Right Subclavian Tunneled-Site Assessment: WDL      Cardiac Monitoring: ACTIVE order. Indication: ENDOCARDITIS  Code Status: No CPR- Do NOT Intubate      Clinically Significant Risk Factors        # Hypokalemia: Lowest K = 3.2 mmol/L in last 2 days, will replace as needed  # Hyponatremia: Lowest Na = 132 mmol/L in last 2 days, will monitor as appropriate      # Hypoalbuminemia: Lowest albumin = 3.1 g/dL at 9/17/2024 10:45 AM, will monitor as appropriate   # Thrombocytopenia: Lowest platelets = 108 in last 2 days, will monitor for bleeding             # Overweight: Estimated body mass index is 27.8 kg/m  as calculated from the following:    Height as of 8/19/24: 1.702 m (5' 7\").    Weight as of this encounter: 80.5 kg (177 lb 7.5 oz)., PRESENT ON ADMISSION     # Financial/Environmental Concerns:     # Pacemaker present  # History of CABG: noted on surgical history    Disposition Plan     Medically Ready for Discharge: Anticipated in 5+ Days    Adan Veloz MD  Hospitalist Service  [facility]  Securely message with [employer] (more info)  Text page via [employer] Paging/Directory   ______________________________________________________________________    Interval History   No new events overnight per RN. Patient reports she is progressing well. Dialyzed last evening and remains on IV antibiotics. No new complaints at this time    Physical Exam   Vital Signs: Temp: 98  F (36.7  C) Temp src: Oral BP: 95/54 Pulse: 77   Resp: 20 SpO2: 98 % O2 Device: Nasal cannula Oxygen Delivery: 1 LPM  Weight: 177 lbs 7.52 oz  Constitutional: Patient is resting in bed comfortably appears in no distress.  Eyes: Pupils are equal round and reactive to light  Respiratory: Good respiratory effort, on auscultation good air entry " is noted  Cardiovascular: Good S1 and S2 no obvious murmurs peripheral pulses are palpable  GI: Abdomen is soft nontender nondistended bowel sounds are noted  Skin: No obvious rashes noted on exposed areas, warm to touch please refer to nursing/wound care note for complete skin exam  Musculoskeletal: Good muscle tone nails and digits appear acyanotic  Neuropsychiatric: Awake alert oriented to person normal affect    Medical Decision Making       50 MINUTES SPENT BY ME on the date of service doing chart review, history, exam, documentation & further activities per the note.  ------------------ MEDICAL DECISION MAKING ------------------------------------------------------------------------------------------------------  MANAGEMENT DISCUSSED with the following over the past 24 hours: Patient, family members in the room, staff RN and pharmacist and ID   NOTE(S)/MEDICAL RECORDS REVIEWED over the past 24 hours: ID       Data   Recent Labs   Lab 09/17/24  1050 09/17/24  1045 09/17/24  0745 09/16/24  1726 09/16/24  1603 09/16/24  1242 09/16/24  0506 09/15/24  0423   WBC 6.2  --  6.5  --  6.6  --  7.2 7.2   HGB 7.6*  --  7.5* 8.2* 7.5*  --  7.5* 7.1*   *  --  101*  --  100  --  104* 105*   *  --  115*  --  108*  --  112* 106*   INR  --   --  1.49*  --   --   --  1.35* 1.20*   NA  --  134* 136 139  --   --  132* 132*   POTASSIUM  --  3.6 3.6 3.2*  --   --  3.7 3.9   CHLORIDE  --  97* 99  --   --   --  93* 94*   CO2  --  27 26  --   --   --  26 26   BUN  --  26.3* 25.5*  --   --   --  49.1* 39.5*   CR  --  3.34* 3.16*  --   --   --  5.05* 4.43*   ANIONGAP  --  10 11  --   --   --  13 12   KAELYN  --  8.1* 8.1*  --   --   --  8.6* 8.6*   GLC  --  191* 95 143*  --    < > 104* 107*   ALBUMIN  --  3.1* 3.1*  --   --   --   --   --    PROTTOTAL  --  6.1* 6.2*  --   --   --   --   --    BILITOTAL  --  0.7 0.8  --   --   --   --   --    ALKPHOS  --  75 75  --   --   --   --   --    ALT  --  32 34  --   --   --   --   --     AST  --  32 33  --   --   --   --   --     < > = values in this interval not displayed.       Most Recent 3 CBC's:  Recent Labs   Lab Test 09/17/24  1050 09/17/24  0745 09/16/24  1726 09/16/24  1603   WBC 6.2 6.5  --  6.6   HGB 7.6* 7.5* 8.2* 7.5*   * 101*  --  100   * 115*  --  108*     Most Recent 3 BMP's:  Recent Labs   Lab Test 09/17/24  1045 09/17/24  0745 09/16/24  1726 09/16/24  1242 09/16/24  0506   * 136 139  --  132*   POTASSIUM 3.6 3.6 3.2*  --  3.7   CHLORIDE 97* 99  --   --  93*   CO2 27 26  --   --  26   BUN 26.3* 25.5*  --   --  49.1*   CR 3.34* 3.16*  --   --  5.05*   ANIONGAP 10 11  --   --  13   KAELYN 8.1* 8.1*  --   --  8.6*   * 95 143*   < > 104*    < > = values in this interval not displayed.     Most Recent 2 LFT's:  Recent Labs   Lab Test 09/17/24  1045 09/17/24  0745   AST 32 33   ALT 32 34   ALKPHOS 75 75   BILITOTAL 0.7 0.8     Most Recent 3 INR's:  Recent Labs   Lab Test 09/17/24  0745 09/16/24  0506 09/15/24  0423   INR 1.49* 1.35* 1.20*

## 2024-09-17 NOTE — PLAN OF CARE
"  Problem: Adult Inpatient Plan of Care  Goal: Patient-Specific Goal (Individualized)  Description: You can add care plan individualizations to a care plan. Examples of Individualization might be:  \"Parent requests to be called daily at 9am for status\", \"I have a hard time hearing out of my right ear\", or \"Do not touch me to wake me up as it startles  me\".  Outcome: Progressing  Goal: Absence of Hospital-Acquired Illness or Injury  Outcome: Progressing  Intervention: Prevent Infection  Recent Flowsheet Documentation  Taken 9/16/2024 1738 by Nicole James RN  Infection Prevention: rest/sleep promoted  Goal: Optimal Comfort and Wellbeing  Intervention: Provide Person-Centered Care  Recent Flowsheet Documentation  Taken 9/16/2024 1738 by Nicole James RN  Trust Relationship/Rapport: care explained   Goal Outcome Evaluation:         Problem: Adult Inpatient Plan of Care  Goal: Absence of Hospital-Acquired Illness or Injury  Intervention: Prevent Infection  Recent Flowsheet Documentation  Taken 9/16/2024 1738 by Nicole James RN  Infection Prevention: rest/sleep promoted     Pt calm and cooperative,admitted in am shift.denies pain.had dialysis see note,tolerated well.heparin infusion started at 1600,at 2200 anti x-a level drawn.will monitor for result.BPA fired protocol done see result.MRSA sent to lab.tylenol given for general comfort,med helpful pt sleeping now.tele continue rhythm is AFIB with BBB.EKG done.VSS. cares met.                "

## 2024-09-17 NOTE — PROGRESS NOTES
09/17/24 1600   Appointment Info   Signing Clinician's Name / Credentials (OT) Kirti Sepulveda, OTR/L   Living Environment   People in Home spouse   Current Living Arrangements house   Self-Care   Usual Activity Tolerance moderate   Current Activity Tolerance poor   Regular Exercise No   Activity/Exercise/Self-Care Comment Pt stated she is IND at baseline, sleeps in a recliner at night.   Instrumental Activities of Daily Living (IADL)   IADL Comments Pt states she is IND at baseline but spouse has been managing shopping and other household tasks.   General Information   Onset of Illness/Injury or Date of Surgery 08/16/24   Patient/Family Therapy Goal Statement (OT) To get stronger and go home   Existing Precautions/Restrictions sternal  (until 10/22, which is 8 weeks post-op per surgeon note)   Left Upper Extremity (Weight-bearing Status) partial weight-bearing (PWB)   Right Upper Extremity (Weight-bearing Status) partial weight-bearing (PWB)   Left Lower Extremity (Weight-bearing Status) full weight-bearing (FWB)   Right Lower Extremity (Weight-bearing Status) full weight-bearing (FWB)   General Observations and Info 84 year old female admitted on 9/16/2024 to the LTAC for long-term antibiotics following MSSA bacteremia and endocarditis. 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  She was initially admitted to [facility] on 8/16/2024 for sepsis and HAMMAD.  She received empiric antibiotics on admission. Blood cultures grew MSSA.  Neurology was consulted for possible discitis.  ID was also consulted. Echo revealed large posterior leaflet mitral valve vegetation and small aortic valve vegetation with severe aortic stenosis.  Brain MRI revealed scattered embolic infarcts.  Coronary angiogram demonstrated coronary artery disease.  CT surgery was consulted. She underwent CABG alongside the procedures mentioned  above.  She however has not had renal recovery.  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  She had dysphagia and momentarily required NG tube with tube feeds. She worked with speech therapy and is now cleared for regular diet and thin liquids.  CT surgery recommends warfarin for 3 months for bioprosthetic valves.  INR goal 2-3.  End date of warfarin approximately 12/10/2024, at which point she will be on Plavix alone (ASA allergy) indefinitely.  ID recommends IV vancomycin until 10/8/2024.   Cognitive Status Examination   Orientation Status orientation to person, place and time   Affect/Mental Status (Cognitive) WNL   Follows Commands WNL   Cognitive Status Comments Pt reports no change in cogntion since hospitalization   Visual Perception   Visual Impairment/Limitations WNL   Sensory   Sensory Quick Adds sensation intact   Pain Assessment   Patient Currently in Pain No   Posture   Posture not impaired   Range of Motion Comprehensive   Comment, General Range of Motion RUE ROM WFL, distal ROM on LUE WFL- proximal ROM not tested due to precautions   Strength Comprehensive (MMT)   Comment, General Manual Muscle Testing (MMT) Assessment RUE 4/5, LUE not tested due to precautions but appears equal to RUE and pt concurs   Coordination   Upper Extremity Coordination No deficits were identified   Bed Mobility   Bed Mobility rolling left;rolling right   Rolling Left Bouse (Bed Mobility) contact guard   Rolling Right Bouse (Bed Mobility) contact guard   Transfers   Transfer Comments Pt declined STS at OT eval, per PT eval earlier in day pt completed STS x3 with min A   Activities of Daily Living   BADL Assessment/Intervention bathing;upper body dressing;lower body dressing;grooming;toileting   Bathing Assessment/Intervention   [place] Level (Bathing) contact guard assist;upper body   Upper Body Dressing Assessment/Training   [place] Level (Upper Body Dressing)  minimum assist (75% patient effort)   Lower Body Dressing Assessment/Training   [place] Level (Lower Body Dressing) dependent (less than 25% patient effort)   Grooming Assessment/Training   Position (Grooming) sitting up in bed   [place] Level (Grooming) set up   Toileting   Assistive Devices (Toileting) bedpan   [place] Level (Toileting) dependent (less than 25% patient effort)   Clinical Impression   Criteria for Skilled Therapeutic Interventions Met (OT) Yes, treatment indicated   OT Diagnosis decreased ADLs, decreased activity tolerance, weakness   OT Problem List-Impairments impacting ADL activity tolerance impaired;problems related to;strength;post-surgical precautions   Assessment of Occupational Performance 3-5 Performance Deficits   Identified Performance Deficits dressing, g/h, toileting, transfers, bed mobility   Planned Therapy Interventions (OT) ADL retraining;bed mobility training;strengthening;transfer training;progressive activity/exercise   Risk & Benefits of therapy have been explained care plan/treatment goals reviewed   OT Total Evaluation Time   OT Kristan Low Complexity Minutes (12793) 8   OT Goals   Therapy Frequency (OT) 5 times/week   OT Predicted Duration/Target Date for Goal Attainment 10/29/24   OT Goals Hygiene/Grooming;Upper Body Dressing;Lower Body Dressing;Upper Body Bathing;Bed Mobility;Toilet Transfer/Toileting;OT Goal 1   OT: Hygiene/Grooming modified independent;while standing   OT: Upper Body Dressing Modified independent;including set-up/clothing retrieval   OT: Lower Body Dressing Modified independent   OT: Upper Body Bathing Modified independent   OT: Bed Mobility Modified independent   OT: Toilet Transfer/Toileting Modified independent   OT: Understanding of cardiac education to maximize quality of life, condition management, and health outcomes Completed   OT: Perform aerobic activity with stable cardiovascular response Completed   OT: Functional/aerobic  ambulation tolerance with stable cardiovascular response in order to return to home and community environment Completed   OT: Navigation of stairs simulating home set up with stable cardiovascular response in order to return to home and community environment Completed   OT: Goal 1 Pt will participate in 15 mins of ex within precautions to improved MMT to 5/5.   Interventions   Interventions Quick Adds Therapeutic Activity   Therapeutic Activities   Therapeutic Activity Minutes (14427) 8   Symptoms noted during/after treatment fatigue   Treatment Detail/Skilled Intervention Bed mobility sup to sit with min A, pt requiring verbal cues ot initiate and prepare herself due to some anxiety. Pt moves slowly requiring extra time. Once seated at EOB, pt sat independently for 1 min before requesting return to bed due to fatigue. Total A with overhead lift for supine scoot and set up of bed for safe and appropriate positioing.   OT Discharge Planning   OT Plan 9/17: Sternal Precautions until 10/22; ADLs at EOB and progress g/h to sink, STS and progress to commode transfers, UB strength within precautions, activity teagan ex   OT Discharge Recommendation (DC Rec) home with assist;Transitional Care Facility   OT Rationale for DC Rec Pt IND at baseline, may be able to return home directly from LTACH but depending on LOS, may benefit from short rehab stay prior to returning home   OT Brief overview of current status Eval completed, OT appropriate. See POC   Post Acute Settings Only   What unit is patient on? LTACH   Hearing, Vision, and Speech: Expression    Expression of Ideas and Wants Without difficulty   Hearing, Vision, and Speech: Understanding   Understanding Verbal/Non-Verbal Content Understands   Eating   Patient Performance Independent   Oral Hygiene   Patient Performance Set up or Clean up assist   Wash Upper Body   Patient Performance Supervision or touching assistance   Toileting Hygiene   Patient Performance Dependent    Toilet Transfer   Patient Performance Dependent   Discharge Goal (Admit only) SBA

## 2024-09-17 NOTE — CONSULTS
Consultation - [facility]  Felicitas Mattson,  1940, MRN [id number]    [facility]  Endocarditis [I38]  Acute on chronic respiratory failure (H) [J96.20]    PCP: Timbo Medina, [phone]   Code status:  No CPR- Do NOT Intubate       Extended Emergency Contact Information  Primary Emergency Contact: FRANCO MATTSON  Mobile Phone: [phone]  Relation: Spouse       Assessment and Plan   Active Problems:    * No active hospital problems. 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  Positive blood culture on 2024 and .  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   Blood cultures have been ngtd from 24   Mitral and aortic valve endocarditis as evidenced with large vegetation on mitral valve (8 mm x 15) attached to posterior leaflet and a small vegetation on the aortic valve.  With the size of the vegetation combined with brain infarct, status post CV surgery, see details below active issue.   Abnormal brain MRI suggestive of subacute infarct. In a patient with MSSA bacteremia and aortic valve endocarditis, this is cerebral septic emboli. Active issue.       PROCEDURE PERFORMED: 24  Aortic root abscess debridement and aortic annular reconstruction.  Aortic root replacement (Konect composite 25 mm Silva Inspiris Resilia bioprosthetic valve within 30 mm Gelweave Valsalva graft with reimplantation of the coronary arteries).  Mitral valve replacement  (31 mm St. Tim Silva bioprosthetic valve).  Coronary artery bypass grafting x 2 (reversed saphenous vein graft to the obtuse marginal branch of the left circumflex coronary artery, and pedicled left internal mammary artery to left anterior descending coronary artery).  Endoscopic vein harvest of the greater saphenous vein from the left lower extremity.     Recommendations:  Continue vancomycin-dosing per Pharm.D.  End date oct 8th.    Will need to check immunoglobin level in 4 to 6 weeks sister with history of hypogammaglobulinemia      Will see intermittently while at [facility].    Okay to discharge to home anytime from ID standpoint.    Thank you for consulting [facility]    Fede Kaufman MD  [phone]     Chief Complaint <principal problem not specified>       HPI   We have been requested by Adan Veloz MD to evaluate Felicitas Elliott for MSSA bacteremia and aortic root abscess who is a 84 year old year old female.    Patient is a 84-year-old woman who was transferred to [facility] for ongoing cares related to MSSA bacteremia, endocarditis, aortic root abscess.    She is recovering from aortic root abscess debridement and aortic annular reconstruction and mitral valve replacement and coronary artery bypass grafting all done on August 27 at [facility] by Dr. Renae.    Postoperative course was complicated by respiratory failure, gram-negative pneumonia and acute kidney injury, previously on CRRT, now on 3 times a week dialysis.    At this time her main complaint is some weakness when she gets out of bed.  She is currently receiving vancomycin intermittently dosed by pharmacy.    She denies any rash or diarrhea.  He is on very low-dose supplemental oxygen.     Medical History  Patient Active Problem List   Diagnosis    Sepsis with acute renal failure, due to unspecified organism, unspecified acute renal failure type,  unspecified whether septic shock present (H)    Bacteremia    Endocarditis    Sepsis (H)    Borderline hypertension    CKD stage 3b, GFR 30-44 ml/min (H)    Mixed hyperlipidemia    Spinal stenosis of cervicothoracic region    Nonrheumatic aortic valve stenosis    Postsurgical percutaneous transluminal coronary angioplasty status    Mitral valve disorder    Coronary artery disease involving native coronary artery of native heart, unspecified whether angina present    Acute kidney failure, unspecified (H)    S/P CABG (coronary artery bypass graft)     No past medical history on file. Surgical History  She  has a past surgical history that includes Coronary Angiogram (N/A, 8/20/2024); Coronary Artery Bypass Graft, With Aortic Valve Replacement, With Endoscopic Vessel Procurement (N/A, 8/27/2024); Replace valve mitral (N/A, 8/27/2024); Transesophageal echocardiogram intraoperative (8/27/2024); IR CVC Non Tunnel Placement > 5 Yrs (9/1/2024); PICC/Midline Placement (9/6/2024); IR CVC Tunnel Placement > 5 Yrs of Age (9/11/2024); and Pacemaker Device & Lead Implant - HIS Lead Dual (N/A, 9/12/2024).   Social History  Reviewed, and she  reports that she has quit smoking. Her smoking use included cigarettes. She started smoking about 8 months ago. She has a 5.7 pack-year smoking history. She has quit using smokeless tobacco. She reports that she does not currently use alcohol.   Allergies  Allergies   Allergen Reactions    Aspirin Rash    Cats Rash    Nickel Rash    Family History  Noncontributory to current problem except as mentioned above    Psychosocial Needs  Social History     Social History Narrative    Not on file     Additional psychosocial needs reviewed per nursing assessment.     Prior to Admission Medications   Medications Prior to Admission   Medication Sig Dispense Refill Last Dose    acetaminophen (TYLENOL) 500 MG tablet Take 500-1,000 mg by mouth 2 times daily as needed for other (arthritis)             Review  of Systems:  Pertinent items are noted in HPI., otherwise all others negative. Physical Exam:  Temp:  [97.4  F (36.3  C)-98.6  F (37  C)] 97.4  F (36.3  C)  Pulse:  [42-87] 77  Resp:  [16-20] 20  BP: ()/(52-60) 112/54  SpO2:  [94 %-100 %] 96 %    /54 (BP Location: Left arm)   Pulse 77   Temp 97.4  F (36.3  C) (Oral)   Resp 20   Wt 80.5 kg (177 lb 7.5 oz)   SpO2 96%   BMI 27.80 kg/m    General appearance: alert, appears stated age, and cooperative  Head: Normocephalic, without obvious abnormality, atraumatic  Eyes: Extraocular muscles intact, no icterus  Throat: lips, mucosa, and tongue normal; teeth and gums normal  Neck: no adenopathy and supple, symmetrical, trachea midline  Lungs:  Somewhat diminished breath sounds at bases, no rhonchi rales or wheezes  Heart: Rate and rhythm, no murmur  Abdomen: soft, non-tender; bowel sounds normal; no masses,  no organomegaly  Extremities: PICC in right upper extremity  Skin: Skin color, texture, turgor normal. No rashes or lesions  Neurologic: Grossly normal       Pertinent Labs  Lab Results: personally reviewed.   WBC Count   Date/Time Value Ref Range Status   09/17/2024 07:45 AM 6.5 4.0 - 11.0 10e3/uL Final   09/16/2024 04:03 PM 6.6 4.0 - 11.0 10e3/uL Final   09/16/2024 05:06 AM 7.2 4.0 - 11.0 10e3/uL Final     Hemoglobin   Date/Time Value Ref Range Status   09/17/2024 07:45 AM 7.5 (L) 11.7 - 15.7 g/dL Final   09/16/2024 04:03 PM 7.5 (L) 11.7 - 15.7 g/dL Final   09/16/2024 05:06 AM 7.5 (L) 11.7 - 15.7 g/dL Final     Hemoglobin POCT   Date/Time Value Ref Range Status   09/16/2024 05:26 PM 8.2 (L) 11.7 - 15.7 g/dL Final     Hematocrit   Date/Time Value Ref Range Status   09/17/2024 07:45 AM 24.5 (L) 35.0 - 47.0 % Final   09/16/2024 04:03 PM 24.0 (L) 35.0 - 47.0 % Final   09/16/2024 05:06 AM 20.8 (L) 35.0 - 47.0 % Final     Platelet Count   Date/Time Value Ref Range Status   09/17/2024 07:45  (L) 150 - 450 10e3/uL Final   09/16/2024 04:03  (L) 150  - 450 10e3/uL Final   09/16/2024 05:06  (L) 150 - 450 10e3/uL Final        Sodium   Date/Time Value Ref Range Status   09/17/2024 07:45  135 - 145 mmol/L Final   09/16/2024 05:06  (L) 135 - 145 mmol/L Final   09/15/2024 04:23  (L) 135 - 145 mmol/L Final     Sodium POCT   Date/Time Value Ref Range Status   09/16/2024 05:26  135 - 145 mmol/L Final     Carbon Dioxide (CO2)   Date/Time Value Ref Range Status   09/17/2024 07:45 AM 26 22 - 29 mmol/L Final   09/16/2024 05:06 AM 26 22 - 29 mmol/L Final   09/15/2024 04:23 AM 26 22 - 29 mmol/L Final     Urea Nitrogen   Date/Time Value Ref Range Status   09/17/2024 07:45 AM 25.5 (H) 8.0 - 23.0 mg/dL Final   09/16/2024 05:06 AM 49.1 (H) 8.0 - 23.0 mg/dL Final   09/15/2024 04:23 AM 39.5 (H) 8.0 - 23.0 mg/dL Final     Creatinine   Date Value Ref Range Status   09/17/2024 3.16 (H) 0.51 - 0.95 mg/dL Final     7-Day Micro Results       Collected Updated Procedure Result Status      09/16/2024 1616 09/16/2024 1625 MRSA Culture [[id number]]   Swab from Nose    In process Component Value   No component results                     Susceptibility data from last 90 days.  Collected Specimen Info Organism Ampicillin Ampicillin/Sulbactam Cefepime Ceftazidime Ceftriaxone Ciprofloxacin Clindamycin Daptomycin Doxycycline Erythromycin Gentamicin Levofloxacin Meropenem Oxacillin   08/29/24 Sputum from Expectorate Enterobacter cloacae complex R R  S R R  S      S  S  S      Normal samara                 08/16/24 Peripheral Blood Staphylococcus aureus                 08/14/24 Peripheral Blood Staphylococcus aureus        S  S  S  S  S    S     Collected Specimen Info Organism Piperacillin/Tazobactam Tetracycline Tobramycin Trimethoprim/Sulfamethoxazole  Vancomycin   08/29/24 Sputum from Expectorate Enterobacter cloacae complex R   S  S      Normal samara        08/16/24 Peripheral Blood Staphylococcus aureus        08/14/24 Peripheral Blood Staphylococcus aureus   S    S  S          Pertinent Radiology  Radiology Results:     Most recent chest xray reviewed.    XR Chest 2 Views    Result Date: 2024  EXAM: XR CHEST 2 VIEWS LOCATION: [facility] DATE: 2024 INDICATION: New dual chamber pacemaker implant (note the right atrial lead is intentionally positioned in the inferior septal right atrium). COMPARISON: Chest radiograph 2024     IMPRESSION: Right IJ central venous catheter tip terminates near the superior cavoatrial junction. Right upper extremity PICC tip terminates in the lower SVC. Interval placement of left chest cardiac generator and 2 leads, which are intact and appear well-positioned. Stable heart size and mediastinal contours with postoperative changes from aortic and mitral valve replacements and CABG. Similar small bilateral pleural effusions with compressive atelectasis. No pneumothorax.    EP Device    Result Date: 2024  Images from the original result were not included. [facility] Cardiac Electrophysiology PROCEDURE NOTE: New Dual Chamber Pacemaker Insertion (Ventricular Lead Positioned in the Left Bundle Position).  PROCEDURALIST: MD Felicitas Burroughs : 1940 Procedure date: 2024 PCP: Timbo Med[place] Impression: Indication for pacemaker: Sick sinus syndrome, intermittent complete heart block, temporary epicardial pacing leads in place. Device insertion: Successful new implant of a dual-chamber pacemaker.  Ventricular lead was implanted with a tip position adjacent to the left bundle (His-Purkinje conduction system).  Normal acute device function. Note significant difficulty with placement of both right ventricular and right atrial leads both requiring multiple repositionings.  Right atrium demonstrates marked fibrosis and poor capture particularly along the septum but also along the anterior surface.  Lateral right atrium could be paced but lead dislodged.  Right atrial lead  intentionally positioned along the inferior right atrium directed towards the interatrial septum just posterior to the coronary sinus ostium.  . Patient will return to the cardiac ICU for ongoing care and management.  Chest x-ray and device interrogation will be performed tomorrow.  Recommendations: The patient will be returned to the cardiac ICU for postprocedure care.  Device programming: DDD-R, lower rate 70, upper rate 130, and RV pacing bipolar Chest x-ray and device interrogation tomorrow morning. No heavy lifting or strenuous activity for 28 days or as advised by the CV surgical team.  Follow-up: 7-10 days in device clinic and primary cardiologist in 6-8 weeks. Preprocedure diagnosis: Sick sinus syndrome Intermittent complete heart block Epicardial temporary pacing in place. Postprocedure diagnosis: Sick sinus syndrome Intermittent complete heart block Epicardial temporary pacing in place. History: Felicitas Elliott is a 84 year old female  with a history of symptomatic bradycardia secondary to sick sinus syndrome and intermittent complete heart block following complex valvular heart surgery.  Pacemaker implantation has been recommended in order to correct the patient's underlying sinus node dysfunction and conduction system disease.  The risks benefits and expected outcomes have been explained in detail, and the patient agrees to proceed. Procedure initiation: The patient was taken to the cardiac electrophysiology laboratory in the fasting nonsedated state.  Timeout was called and appropriateness of procedure was documented. The patient received intravenous MAC sedation per the anesthesiology service.   Please refer to the separate procedural log for documentation of medication dosing and vital signs. The patient had the left chest area prepped and draped in the usual sterile fashion. 1% lidocaine was infiltrated pacemaker insertion site.  Pacemaker implantation: A 5 cm skin incision was made 3 cm below the  left clavicle.  The incision was carried down to the pectoralis muscle where a subcutaneous pocket was created.  Using the modified Seldinger needle technique the left subclavian vein was punctured on 2 occasions.  A 9 Fr long and 8 Fr long sheaths were inserted into the left subclavian vein.  The 3D delivery sheath system was introduced through the 9 Fr sheath and the J wire directed across the tricuspid valve and up to the RV outflow tract.  The sheath was curved to follow the contour of the RV septal wall and used as the fluoroscopic marker for His bundle location.  The 3D sheath was then directed to the ventricular septum just distal and inferior to the region of the anatomic His bundle location.  The lead was then actively screwed into the ventricular septal musculature with frequent monitoring of impedance, paced QRS morphology, and fluoroscopic imaging.  On 2 occasions the patient's lead migrated through the septal wall into the left ventricular cavity and was withdrawn and ultimately repositioned.  Final position demonstrated mechanical stability for greater than 15 minutes.  Once the lead was confirmed to be at the target pacing site and pacing parameters were appropriate the 3D sheath was withdrawn into the right atrium and lead stability was confirmed prior to securing the lead with two 2-0 Tevdek tiedown sutures.  The 8 Fr long sheath was used to introduce the right atrial lead.  The right atrial lead was positioned at multiple sites in the right atrium.  Initially along the intra-atrial septum along the superior aspects but this resulted in poor P wave amplitude and high capture thresholds.  Anterior lead position was not mechanically stable and had poor pacing/sensing characteristics as well.  Lateral right atrium demonstrated reasonable P waves and capture threshold however the lead was not mechanically stable at this site.  Ultimately a extremely inferior septal target position was chosen and the  lead was directed to the inferior interatrial septum just posterior to the coronary sinus ostium.  After prolonged monitoring the lead was secured with two 2-0 Tevdek tiedown sutures at the pectoralis muscle.  The pocket was flushed with copious amounts of antibiotic irrigation.  D-Stat flowable was instilled into the pocket.  The leads and generator were placed within the pocket.  The pocket was closed in 3 layers, with the 2 deep layers being running 2-0 Vicryl suture and the skin approximation with a running 4-0 V-Loc suture.  The skin was cleaned and sterile Steri-Strips placed along with a sterile dressing. A bulky pressure dressing was placed.  The femoral venous sheath and ablation catheter were removed under fluoroscopic imaging.  Pressure was held and hemostasis was achieved.  The patient was transported to the cardiac telemetry unit in stable condition. Device Implanted:  See the implant sheet attached below for device model numbers, serial numbers, and implant characteristics. Ventricular paced QRS characteristics: Stim > QRS onset = 33 ms QRS onset > R wave peak (V5) = 63 ms QRS duration = 112 ms Estimated blood loss: <20 mL.  Anatomic and technical issues: Anatomic variants: As noted above the patient is postop double valve surgery in the right atrium demonstrated significant voltage abnormality as well as inability to capture multiple wall segments tested.  Technical issues: Septal pacing was achieved with excellent QRS morphology and duration but on 2 occasions the lead migrated into the LV cavity and had to be repositioned.      IR CVC Tunnel Placement > 5 Yrs of Age    Result Date: 9/11/2024  [place] [facility] LOCATION: [facility] DATE: 9/11/2024 PROCEDURE:TUNNELED DIALYSIS CATHETER PLACEMENT INTERVENTIONAL RADIOLOGIST: Justyn Bowman MD. INDICATION: 84 old female, Long-term dialysis access needed. CONSENT: The risks, benefits and alternatives of tunneled dialysis  catheter placement were discussed with the patient  in detail. All questions were answered. Informed consent was given to proceed with the procedure. MODERATE SEDATION: Versed 0.5 mg IV; Fentanyl 50 mcg IV.  During the timeout, immediately prior to the administration of medications, the patient was reassessed for adequacy to receive conscious sedation. Under physician supervision, Versed and fentanyl were administered for moderate sedation. Pulse oximetry, heart rate and blood pressure were continuously monitored by an independent trained observer. The physician spent 10 minutes of face-to-face sedation time with the patient. CONTRAST: None ANTIBIOTICS: Already on antibiotics ADDITIONAL MEDICATIONS: None. FLUOROSCOPIC TIME: 0.5 minutes. RADIATION DOSE: Air Kerma: 5 mGy. COMPLICATIONS: No immediate complications. STERILE BARRIER TECHNIQUE: Maximum sterile barrier technique was used. Cutaneous antisepsis was performed at the operative site with application of 2% chlorhexidine and large sterile drape. Prior to the procedure, the  and assistant performed hand hygiene and wore hat, mask, sterile gown, and sterile gloves during the entire procedure. PROCEDURE:   Using real-time ultrasound guidance, the right internal jugular vein was punctured. A subcutaneous tunnel was created requiring a second incision. Using this access, a 15.5 Faroese, 19 cm tip to cuff Duraflow dialysis catheter was advanced until the tip was in the proximal right atrium.  The catheter was tested and found to flush and aspirate appropriately.  The catheter was heparinized and secured to the skin. FINDINGS: Ultrasound shows an anechoic and compressible jugular vein. A permanent image was stored.  At the completion of the study, the new catheter tip lies in the proximal right atrium.     IMPRESSION:  1.  Tunneled dialysis catheter placement, as discussed above. 2.  The catheter position was verified fluoroscopically and this is ready for use.      XR Video Swallow with SLP or OT    Result Date: 2024  EXAM: XR VIDEO SWALLOW WITH SLP OR OT LOCATION: [facility] DATE: 2024 INDICATION: Difficulty swallowing. COMPARISON: None. TECHNIQUE: Routine swallow study with speech pathology using multiple barium thicknesses. RADIATION DOSE: Total Air Kerma 7.7 mGy FINDINGS: Swallow study with Speech Pathology using multiple barium thicknesses. Prominent cricopharyngeal impression. Stasis is noted within the upper esophagus. No reflux was observed. No laryngeal penetration or aspiration with thin, pudding or solid consistencies. There was a significantly delayed oral phase.     IMPRESSION: 1.  No laryngeal penetration or aspiration. 2.  Delayed passage of contrast through the upper esophagus which may reflect dysmotility. 3.  Prominent cricopharyngeal impression.    XR Chest Port 1 View    Result Date: 2024  EXAM: XR CHEST PORT 1 VIEW LOCATION: [facility] DATE: 2024 INDICATION: RN placed PICC   verify tip placement COMPARISON: X-ray 2024     IMPRESSION: Right PICC line with the tip located in satisfactory position at the cavoatrial junction. Sternotomy with AVR and MVR. Newly visualized enteric feeding tube with the distal visualized portion in the epigastric region. Interval removal of the previously seen endotracheal tube, drains and enteric suction tubes. Right IJ Nichols-Dana catheter sheath with the tip in the SVC. No pneumothorax. Enlarging small right-sided and stable trace left-sided pleural effusions with adjacent atelectasis. Mild cardiomegaly with stable central vascular prominence.    Echocardiogram Complete    Result Date: 2024  [id number]^KARYN^[place] [address]  Name: RONNIE MATTSON MRN: [id number] : 1940 Study Date: 2024 07:57 AM Age: 84 yrs Gender: Female Patient Location: [facility] Reason  For Study: CHF Ordering Physician: CELINA BOSS Referring Physician: CELINA BOSS Performed By: HL  BSA: 2.0 m2 Height: 67 in Weight: 192 lb HR: 92 BP: 116/64 mmHg ______________________________________________________________________________ Procedure Complete Portable Echo Adult. ______________________________________________________________________________ Interpretation Summary  1. Normal left ventricular size and systolic performance with a visually estimated ejection fraction of 60%. 2. There is abnormal septal motion likely related to altered electrical activation due to bundle branch block. 3. There is a bio-prosthetic aortic valve (documented 25 mm [employer] Konect Resilia aortic valved conduit). Â  Normal aortic valve prosthesis metrics with a mean systolic gradient of 4 mmHg and a peak anterograde velocity of 1.5 m/sec. Â  No aortic insufficiency is detected. 4. There is a bio-prosthetic mitral valve (documented 31 mm St. Tim [employer] Epic porcine pericardial tissue valve). Â  Normal mitral valve prosthesis function; mean diastolic gradient is 5 mm Hg.  Â  No significant mitral insufficiency is detected. 5. Probable normal right ventricular size and systolic performance though right-sided structures are not clearly visualized on all views on this study.  ______________________________________________________________________________ Left ventricle: Normal left ventricular size and systolic performance with a visually estimated ejection fraction of 60%. There is abnormal septal motion likely related to altered electrical activation due to bundle branch block. Left ventricular wall thickness is normal.  Assessment of LV Diastolic Function: The evaluation of diastolic filling is hampered by the presence of a mitral valve prosthesis.  Right ventricle: Probable normal right ventricular size and systolic performance though right- sided structures are not clearly visualized on all views  on this study.  Left atrium: The left atrium is of normal size.  Right atrium: The right atrium is not well-visualized.  IVC: The IVC is moderately dilated.  Aortic valve: There is a bio-prosthetic aortic valve (documented 25 mm Silva [employer] Konect Resilia aortic valved conduit). Normal aortic valve prosthesis metrics with a mean systolic gradient of 4 mmHg and a peak anterograde velocity of 1.5 m/sec. The Ao Acceleration Time is 0.07 sec. No aortic insufficiency is detected.  Mitral valve: There is a bio-prosthetic mitral valve (documented 31 mm St. Tim [employer] Epic porcine pericardial tissue valve). Normal mitral valve prosthesis function; mean diastolic gradient is 5 mm Hg. No significant mitral insufficiency is detected.  Tricuspid valve: The tricuspid valve is grossly morphologically normal. There is trace tricuspid insufficiency.  Pulmonic valve: The pulmonic valve is grossly morphologically normal. There is mild pulmonic insufficiency.  Thoracic aorta: The aortic root and proximal ascending aorta are of normal dimension.  Pericardium: There is no significant pericardial effusion. ______________________________________________________________________________ ______________________________________________________________________________ MMode/2D Measurements & Calculations IVSd: 1.3 cm LVIDd: 3.5 cm LVIDs: 2.4 cm LVPWd: 0.98 cm FS: 31.8 % LV mass(C)d: 126.1 grams LV mass(C)dI: 63.5 grams/m2 Ao root diam: 3.4 cm asc Aorta Diam: 3.7 cm LVOT diam: 2.2 cm LVOT area: 3.8 cm2 Ao root diam index Ht(cm/m): 2.0 Ao root diam index BSA (cm/m2): 1.7 Asc Ao diam index BSA (cm/m2): 1.9 Asc Ao diam index Ht(cm/m): 2.2 EF Biplane: 54.4 %  LA Volume Indexed (AL/bp): 13.3 ml/m2 RWT: 0.57  Time Measurements MM HR: 92.0 BPM  Doppler Measurements & Calculations MV E max nomi: 188.0 cm/sec MV A max nomi: 78.7 cm/sec MV E/A: 2.4 MV max P.9 mmHg MV mean P.0 mmHg MV V2 VTI: 30.7 cm MVA(VTI): 1.4 cm2 MV dec slope: 1790  cm/sec2 MV dec time: 0.10 sec Ao V2 max: 148.0 cm/sec Ao max P.0 mmHg Ao V2 mean: 90.5 cm/sec Ao mean P.0 mmHg Ao V2 VTI: 17.4 cm SHU(I,D): 2.4 cm2 SHU(V,D): 2.1 cm2 Ao acc time: 0.07 sec LV V1 max P.7 mmHg LV V1 max: 82.3 cm/sec LV V1 VTI: 11.2 cm  SV(LVOT): 42.6 ml SI(LVOT): 21.4 ml/m2 PA V2 max: 111.8 cm/sec PA max P.1 mmHg PA acc time: 0.13 sec PI end-d chandan: 144.0 cm/sec AV Chandan Ratio (DI): 0.56 SHU Index (cm2/m2): 1.2 E/E': 27.2 E/E' av.9 Lateral E/e': 18.4 Medial E/e': 27.4 Peak E' Chandan: 6.9 cm/sec RV S Chandan: 7.5 cm/sec  ______________________________________________________________________________ Report approved by: Juan Antonio Morocho 2024 09:36 AM       XR Abdomen Port 1 View    Result Date: 2024  EXAM: XR ABDOMEN PORT 1 VIEW LOCATION: [facility] DATE: 2024 INDICATION: confirmation of NJ tube COMPARISON: None.     IMPRESSION: Feeding tube passes through the stomach tip in the proximal duodenum.    IR CVC Non Tunnel Placement > 5 Yrs    Result Date: 2024  [facility] RADIOLOGY LOCATION: [facility] DATE: 2024 PROCEDURE: NON-TUNNELED DIALYSIS CATHETER PLACEMENT INTERVENTIONAL RADIOLOGIST: RU Reyes MD. INDICATION: HAMMAD requiring hemodialysis. CONSENT: The risks, benefits and alternatives of non-tunneled dialysis catheter placement were discussed with the patient in detail. All questions were answered. Informed consent was given to proceed with the procedure. MODERATE SEDATION: None. CONTRAST: None. ANTIBIOTICS: None. ADDITIONAL MEDICATIONS: Heparin 3800 U IV FLUOROSCOPIC TIME: 2.0 minutes. RADIATION DOSE: Air Kerma: 36.04 mGy. COMPLICATIONS: No immediate complications. STERILE BARRIER TECHNIQUE: Maximum sterile barrier technique was used. Cutaneous antisepsis was performed at the operative site with application of 2% chlorhexidine and large sterile drape. Prior to the procedure, the  and assistant  performed hand hygiene and wore hat, mask, sterile gown, and sterile gloves during the entire procedure. PROCEDURE: Limited left neck ultrasound was performed which demonstrated a patent internal jugular vein; images saved to the permanent patient medical record. The overlying skin and subcutaneous soft tissues were anesthetized using 1% lidocaine. Under ultrasound guidance, the left internal jugular vein was accessed using a micropuncture needle; images saved of the permanent patient medical record. Access was secured using a 4 Hebrew transitional sheath. A Sweetie High guidewire was advanced into the SVC. Under fluoroscopic guidance, the overlying skin and subcutaneous soft tissues were dilated and a 15.5 Hebrew nontunneled dialysis catheter was placed with the tip within the cavoatrial junction. The catheter was tested and found to flush and aspirate appropriately. The catheter was heparinized and secured to the skin.     IMPRESSION:  1.  Successful non-tunneled dialysis catheter placement. PLAN: 1. Dialysis catheter is ready to be used. 2. Nontunneled right groin dialysis catheter can be removed in ICU.    US Abdomen Limited w Abdomen Doppler Limited    Result Date: 8/30/2024  EXAM: US ABDOMEN LIMITED W ABDOMEN DOPPLER LIMITED LOCATION: [facility] DATE: 8/30/2024 INDICATION: Evaluate for portal venous or hepatic arterial thrombus. COMPARISON: None. TECHNIQUE: Limited abdominal ultrasound. Color flow with spectral Doppler and waveform analysis performed.  FINDINGS: Examination is technically compromised due to patient body habitus and large bandage across the mid abdomen. GALLBLADDER: No cholelithiasis or sludge is identified. The gallbladder wall is thickened with adjacent pericholecystic fluid. BILE DUCTS: The common bile duct measures 8 mm in diameter. This is within normal limits given patient's age. LIVER: Normal parenchyma with smooth contour. No focal mass. RIGHT KIDNEY: No  hydronephrosis. There is a 1.6 cm benign-appearing cyst involving the lateral aspect of the right kidney. This does not require any further follow-up. PANCREAS: Obscured by bowel gas. No ascites. ABDOMINAL DUPLEX: The hepatic artery, hepatic veins, IVC, portal veins, and splenic vein are patent with antegrade flow and without evidence of thrombus.     IMPRESSION: 1.  Gallbladder wall thickening with pericholecystic fluid. There is no obvious cholelithiasis or gallbladder sludge. These findings could reflect a calculus cholecystitis or be reactive from adjacent hepatic parenchymal disease. 2.  No evidence of portal venous or hepatic arterial thrombus.     XR Abdomen Port 1 View    Result Date: 8/29/2024  EXAM: XR ABDOMEN PORT 1 VIEW LOCATION: [facility] DATE: 8/29/2024 INDICATION: og tube placement COMPARISON: CT 8/14/2024     IMPRESSION: Enteric decompression tube and side hole in the stomach.    XR Chest Port 1 View    Result Date: 8/29/2024  EXAM: XR CHEST PORT 1 VIEW LOCATION: [facility] DATE: 8/29/2024 INDICATION: evaluate position of ETT COMPARISON: Film earlier today.     IMPRESSION: Patient's been reintubated. Endotracheal tube is in the mid trachea, 6 cm from the anuj. Poststernotomy changes with AVR and MVR with mediastinal and pleural chest tubes. Right IJ [place]-Dana catheter is in the main pulmonary outflow tract. Trace bilateral pleural effusions, decreased on the right. No pneumothorax. Heart and pulmonary vascularity are normal.    XR Chest Port 1 View    Result Date: 8/29/2024  EXAM: XR CHEST PORT 1 VIEW LOCATION: [facility] DATE: 8/29/2024 INDICATION: Increased FiO2. COMPARISON: Portable chest single view 8/28/2024 at 0527 hours.     IMPRESSION: The patient has been extubated. Sternotomy, CABG and aortic valve replacement. Mediastinal drain and bilateral chest tubes. Normal cardiac size. Mild venous congestion. Small right  pleural effusion has developed with associated passive atelectasis right base. Mild platelike atelectasis left base. No appreciable effusion on the left. Atherosclerotic thoracic aorta. Right PICC tip in the SVC. Monitoring leads overlying the chest. Degenerative changes both shoulders and the spine.    XR Chest Port 1 View    Result Date: 8/28/2024  EXAM: XR CHEST PORT 1 VIEW LOCATION: [facility] DATE: 8/28/2024 INDICATION: Post Op CVTS Surgery COMPARISON: 8/27/2024     IMPRESSION: Endotracheal tube in the distal aspect of the trachea. Sternotomy. AVR. Mediastinal drain. Right IJ [place]-Dana catheter with tip in the right ventricle/MPA. Left-sided chest tube. No pneumothorax. Mild cardiac enlargement with normal pulmonary  vascularity. Minimal left basilar atelectasis and pleural fluid. Degenerative changes in the spine.    XR Chest Port 1 View    Result Date: 8/27/2024  EXAM: XR CHEST PORT 1 VIEW LOCATION: [facility] DATE: 8/27/2024 INDICATION: Postop CVTS Surgery COMPARISON: None.     IMPRESSION: Endotracheal tube tip 4.8 cm proximal to the anuj. [place]-Dana catheter with tip in the pulmonary outflow tract. Mediastinal drain with aortic valve prosthesis. Sternotomy. Small left pleural effusion with left basilar infiltrate.    MIGDALIA with Report    Result Date: 8/27/2024  Montrell Baker MD     8/27/2024  4:48 PM Perioperative MIGDALIA Procedure Note Staff -      Anesthesiologist:  Montrell Baker MD      Performed By: anesthesiologist Preanesthesia Checklist:  Patient identified, IV assessed, risks and benefits discussed, monitors and equipment assessed, procedure being performed at surgeon's request and anesthesia consent obtained. MIGDALIA Probe Insertion Probe Status PRE Insertion: NO obvious damage Probe type:  Adult 3D Bite block used:   Soft Insertion Technique: Easy, no oropharyngeal manipulation Insertion complications: None obvious Billing Report:A MIGDALIA report is NOT  "being generated. Probe Status POST Removal: NO obvious damage     POC US Guidance Needle Placement    Result Date: 8/27/2024  Ultrasound was performed as guidance to an anesthesia procedure.  Click \"PACS images\" hyperlink below to view any stored images.  For specific procedure details, view procedure note authored by anesthesia.    POC US Guided Vascular Access    Result Date: 8/27/2024  Ultrasound was performed as guidance to an anesthesia procedure.  Click \"PACS images\" hyperlink below to view any stored images.  For specific procedure details, view procedure note authored by anesthesia.    US Carotid Bilateral    Result Date: 8/22/2024  EXAM: US CAROTID BILATERAL LOCATION: [facility] DATE: 8/22/2024 INDICATION: PRE CABG COMPARISON: None. TECHNIQUE: Duplex exam performed utilizing 2D gray-scale imaging, Doppler interrogation with color-flow and spectral waveform analysis. The percent diameter stenosis is determined using Updated Recommendations for Carotid Stenosis Interpretation Criteria  from [employer] Vascular Testing. FINDINGS: RIGHT: Mild plaque at the bifurcation. The peak systolic velocity in the ICA is less than 180 cm/sec, consistent with less than 50% stenosis. Normal velocities in the ECA. Antegrade flow within the vertebral artery. LEFT: Mild plaque at the bifurcation. The peak systolic velocity in the ICA is less than 180 cm/sec, consistent with less than 50% stenosis. Normal velocities in the ECA. Antegrade flow within the vertebral artery. VELOCITY CHART: CCA   Right: 96 cm/s   Left: 63 cm/s ICA   Right: 119 cm/s   Left: 102 cm/s ECA   Right: 86 cm/s   Left: 72 cm/s ICA/CCA PSV Ratio   Right: 1.2   Left: 1.6     IMPRESSION: 1.  Mild plaque formation, velocities consistent with less than 50% stenosis in the right internal carotid artery. 2.  Mild plaque formation, velocities consistent with less than 50% stenosis in the left internal carotid artery. 3.  Flow within the vertebral " arteries is antegrade.    Cardiac Catheterization    Result Date: 2024    Prox LAD lesion is 70% stenosed.   Ost Cx to Prox Cx lesion is 90% stenosed.   2nd Diag lesion is 50% stenosed.   Prox RCA lesion is 20% stenosed. 1.  Two-vessel coronary disease as listed above    Echocardiogram Limited    Result Date: 2024  [id number]^TALIA^[place]ARAH^[place] [address]  Name: RONNIE MATTSON MRN: [id number] : 1940 Study Date: 2024 02:15 PM Age: 84 yrs Gender: Female Patient Location: [facility] Reason For Study: Endocarditis Ordering Physician: SARAH MELGAR Referring Physician: CELINA BOSS Performed By: JENI  BSA: 2.0 m2 Height: 67 in Weight: 189 lb HR: 85 ______________________________________________________________________________ Procedure Limited Portable Echo Adult. Poor quality two-dimensional was performed and interpreted. ______________________________________________________________________________ Interpretation Summary  Left ventricular size, wall motion and function are normal. The ejection fraction is 60-65%. Normal right ventricle size and systolic function. Severe mitral valve annulus calcification and thickened leaflets. Severe mitral valve stenosis with the mean gradient of mitral valve 10 mmHg. Severe calcified aortic valve with reduced aortic valve excursion. The mean gradient of aortic valve is 41mmHg. There is a small size of mobile masss (3 x 4 mm) on aortic valve. There is a small size of mobile mass (3 x 3 mm) on the posterior leaflet.  Vegetations on aortic valve and mitral valve are not well defined and diffult to compare to MIGDALIA findings on 2024.  ______________________________________________________________________________ I      WMSI = 1.00     % Normal = 100  X - Cannot   0 -                      (2) - Mildly 2 -          Segments  Size Interpret    Hyperkinetic 1 - Normal  Hypokinetic   Hypokinetic  1-2     small                                                    7 -          3-5    moderate 3 - Akinetic 4 -          5 -         6 - Akinetic Dyskinetic   6-14    large              Dyskinetic   Aneurysmal  w/scar       w/scar       15-16   diffuse  Left Ventricle Left ventricular size, wall motion and function are normal. The ejection fraction is 60-65%. There is normal left ventricular wall thickness. No regional wall motion abnormalities noted.  Right Ventricle Normal right ventricle size and systolic function.  Atria Normal left atrial size. Right atrial size is normal.  Mitral Valve There is moderate to severe mitral annular calcification. The mitral valve leaflets appear thickened, but open well. A very small size of mobile mass on posterior mitral leaflets. There is no mitral regurgitation noted. There is severe mitral stenosis. The mean mitral valve gradient is 10mmHg.  Tricuspid Valve Tricuspid valve leaflets appear normal. There is no evidence of tricuspid stenosis or clinically significant tricuspid regurgitation. Right ventricle systolic pressure estimate normal.  Aortic Valve Severe aortic valve calcification is present. No aortic regurgitation is present. Severe valvular aortic stenosis. The mean AoV pressure gradient is 41mmHg. A very small mobile on aortic valve, but not well defined.  Pulmonic Valve The pulmonic valve is not well seen, but is grossly normal.  Vessels The aorta root is normal. Inferior vena cava not well visualized for estimation of right atrial pressure.  Pericardium There is no pericardial effusion.  Rhythm Sinus rhythm was noted.  ______________________________________________________________________________ Report approved by: Juan Antonio Comer 08/20/2024 03:09 PM  ______________________________________________________________________________      CTA  ANGIOGRAM CORONARY ARTERY    Result Date: 8/19/2024    Right dominant coronary artery system   Left main: Mild  calcified stenosis in the ostial segment.   Left anterior descending artery: Moderate to severe mixed calcified stenosis in the proximal segment and mild stenosis in the first diagonal branch.   Left circumflex artery: Mild calcified stenosis in the proximal segment.   Right coronary artery: Minimal calcified stenosis in the proximal segment.   Mitral ValveThere is moderate valve leaflet calcification.   Aortic ValveThere is calcification of the aortic valve present.     Radiologist Consult For Cardiology    Result Date: 8/19/2024  OVERREAD: DETAILED [place] RADIOLOGY EXTRACARDIAC OVERREAD OF CARDIAC CT LOCATION: [facility] DATE: 8/19/2024 INDICATION: Cardiology consultation after coronary CT angiogram. TECHNIQUE: Dose reduction techniques were used. COMPARISON: CT chest, abdomen and pelvis on 8/14/2024. CONTRAST: Isovue 370 75mL. FINDINGS:  LIMITED CHEST: No pleural effusion or pneumothorax in the visualized portion of the chest. Mild basilar pulmonary opacities, likely atelectasis. LIMITED MEDIASTINUM: Partially visualized few mildly prominent but not significantly enlarged mediastinal and hilar lymph nodes, could be reactive. LIMITED UPPER ABDOMEN: Limited evaluation of the upper abdomen due to lack of coverage. Partially visualized left breast nodule, could represent adrenal adenoma.     IMPRESSION:  1.  Partially visualized left adrenal nodule, indeterminate, could represent adrenal adenoma. 2.  Please refer to cardiologist's dictation for the cardiac CT report.    CTA Head Neck with Contrast    Result Date: 8/18/2024  EXAM: CTA HEAD NECK W CONTRAST LOCATION: [facility] DATE: 8/18/2024 INDICATION: Follow-up abnormality on previous MRI. Endocarditis. 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 COMPARISON: MRI 8/17/2024. CONTRAST: isovue 370 75ml TECHNIQUE: Head and neck CT angiogram with IV contrast. Noncontrast head CT followed by axial helical CT images of the head and neck  vessels obtained during the arterial phase of intravenous contrast administration. Axial 2D reconstructed images and multiplanar 3D MIP reconstructed images of the head and neck vessels were performed by the technologist. Dose reduction techniques were used. All stenosis measurements made according to NASCET criteria unless otherwise specified. FINDINGS: NONCONTRAST HEAD CT: INTRACRANIAL CONTENTS: No intracranial hemorrhage, extraaxial collection, or mass effect.  Juxtacortical low attenuation focus involving the left parietal lobe laterally consistent with findings on the previous MRI. A small focus of evolving subacute infarct in this location is possible. Small focus of chronic lacunar change in the right cerebellum. No large territorial infarct identified. Mild presumed chronic small vessel ischemic changes. Mild generalized volume loss. No hydrocephalus. VISUALIZED ORBITS/SINUSES/MASTOIDS: Prior bilateral cataract surgery. Visualized portions of the orbits are otherwise unremarkable. No paranasal sinus mucosal disease. No middle ear or mastoid effusion. BONES/SOFT TISSUES: No acute abnormality. HEAD CTA: ANTERIOR CIRCULATION: Calcified atherosclerotic plaque involving the carotid siphons bilaterally with minimal associated luminal stenosis. No evidence for proximal large vessel occlusion or high-grade stenosis elsewhere. No definite aneurysm or evidence for high flow vascular malformation. Specifically, no definite vascular abnormality associated with the area of abnormal left parietal lobe low-attenuation. Standard [place] of [place] anatomy. POSTERIOR CIRCULATION: Atherosclerotic changes involving the posterior cerebral arteries including moderate to marked stenosis of the right P2 posterior cerebral artery segment. No definite proximal large vessel occlusion. No additional stenosis. No aneurysm or high flow vascular malformation identified. Dominant left and smaller right vertebral artery contribute to a  normal basilar artery. DURAL VENOUS SINUSES: Expected enhancement of the major dural venous sinuses. NECK CTA: RIGHT CAROTID: Mixed calcified and noncalcified atherosclerotic plaque involving the right carotid bulb results in 30-40% stenosis of the proximal right ICA segment. No evidence for dissection. LEFT CAROTID: Atherosclerotic plaque results in approximately 30% stenosis in the left ICA. No dissection. VERTEBRAL ARTERIES: No focal stenosis or dissection. Dominant left and smaller right vertebral arteries. AORTIC ARCH: Vascular variant aortic arch origin left vertebral artery.  No significant stenosis at the origin of the great vessels. NONVASCULAR STRUCTURES: Multilevel cervical spondylosis greatest at C4-5 and C5-6. In evaluating fusion at C6-7. Included lung apices are clear.     IMPRESSION: HEAD CT: 1.  Small focus of juxtacortical low attenuation within the left parietal lobe corresponds to signal abnormality seen on the prior MRI. As suggested previously this is favored to reflect evolving small vessel ischemic change; however, MRI follow-up to exclude a low-grade neoplastic process is advised. 2.  No evidence for intracranial hemorrhage or significant mass effect. 3.  Generalized brain atrophy and presumed chronic microvascular ischemic change. HEAD CTA: 1.  Intracranial atherosclerosis including moderate to marked stenosis of the right P2 posterior cerebral artery segment. 2.  No definite proximal branch occlusion, aneurysm, or high flow vascular malformation identified. NECK CTA: 1.  Atherosclerotic plaque at the carotid bifurcations bilaterally without hemodynamically significant stenosis in the neck vessels. 2.  No evidence for dissection.    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  Look for cerebral  septic emboli; Headache; Acute HA (< 3 months), no complicating features COMPARISON: None. CONTRAST: 9 ml gadavist TECHNIQUE: Routine multiplanar multisequence head MRI without and with intravenous contrast. FINDINGS: Assessment degraded due to motion. INTRACRANIAL CONTENTS: Apparent focus of diffusion restriction with T2/FLAIR hyperintensity within the left superior parietal lobule cortex is hyperintense on ADC map, representing T2 shine through.  Focus of signal abnormality measures 13 x 9 mm in the axial plane and does not have internal enhancement. Additional punctate foci of hyperintensity on diffusion weighted with similar signal characteristics (periventricular right corona radiata, left superior parietal lobule, and left cerebellar hemisphere). Patchy and confluent nonspecific T2/FLAIR hyperintensities within the cerebral white matter most consistent with moderate chronic microvascular ischemic change. Moderate generalized cerebral atrophy. No hydrocephalus. Normal position of the cerebellar tonsils. No discernible abnormal intracranial enhancement; however, assessment substantially degraded due to motion. 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 SELLA: No abnormality accounting for technique. OSSEOUS STRUCTURES/SOFT TISSUES: Normal marrow signal. The major intracranial vascular flow voids are maintained. ORBITS: No abnormality accounting for technique. SINUSES/MASTOIDS: Mild mucosal thickening scattered about the paranasal sinuses. Scattered fluid/membrane thickening in the left mastoid air cells. No apparent mass in the posterior nasopharynx or skull base.     IMPRESSION: Assessment degraded due to motion. 1.  13 mm focus of cortical signal abnormality within the left superior parietal lobule which is favored to represent a subacute infarct; low-grade glial neoplasm could conceivably have a similar appearance. Hyperacute intraparenchymal hematoma could also have a similar appearance but is considered less  likely. Recommend noncontrast head CT for further characterization. Also recommend follow-up MRI brain without and with contrast in 8-12 weeks to document expected evolution. 2.  Additional smaller foci of signal abnormality with similar characteristics favored to represent punctate subacute infarcts.    Echocardiogram MIGDALIA    Result Date: 2024  [id number] [facility] [id number]^JACOB^BRIAN^[place] [address]  Name: RONNIE MATTSON MRN: [id number] : 1940 Study Date: 2024 11:59 AM Age: 84 yrs Gender: Female Patient Location: [facility] Reason For Study: Aortic Valve Disorder Ordering Physician: BRIAN JAMES Performed By: MT/OSCAR  BSA: 2.0 m2 Height: 67 in Weight: 198 lb HR: 69 ______________________________________________________________________________ Procedure Complete MIGDALIA Adult. ______________________________________________________________________________ Interpretation Summary  1. The left ventricle is normal in size. Left ventricular function is normal.The ejection fraction is 55-60%. 2. Normal right ventricle size and systolic function. 3. Large vegetation on mitral valve (8 mm x 15) attached to posterior leaflet. 4. There is a small vegetation on the aortic valve (6 mm). No evidence of aortic root abscess identified. 5. Severe valvular aortic stenosis (SHU:0.8 cm2, peak nomi: 4.6 m/sec, mean gradient: 51 mmHg). ______________________________________________________________________________ MIGDALIA Consent to the procedure was obtained prior to sedation. There were no complications associated with this procedure. I determined this patient to be an appropriate candidate for the planned sedation and procedure and have reassessed the patient immediately prior to sedation and procedure. Total sedation time: 22 minutes of continuous bedside 1:1 monitoring. Patient was sedated using Versed 2 mg. The heart rate, respiratory rate, oxygen  saturations, blood pressure, and response to care were monitored throughout the procedure with the assistance of the nurse. Patient was sedated using Fentanyl 75 mcg. 15mL viscous lidocaine. 0.5mL benzocaine spray. The procedure was performed in the Echo Lab.  Left Ventricle The left ventricle is normal in size. Left ventricular function is normal.The ejection fraction is 55-60%. There is mild concentric left ventricular hypertrophy. Normal left ventricular wall motion.  Right Ventricle Normal right ventricle size and systolic function.  Atria Normal left atrial size. Right atrial size is normal. There is no atrial shunt seen.  Mitral Valve There is mild to moderate mitral annular calcification. Vegetation on mitral valve. There is mild (1+) mitral regurgitation. There is no mitral valve stenosis.  Tricuspid Valve Normal tricuspid valve. There is no vegetation on the tricuspid valve. There is trace tricuspid regurgitation. There is no tricuspid stenosis.  Aortic Valve There is a small vegetation or mass on the aortic valve. There is mild (1+) aortic regurgitation. Severe valvular aortic stenosis.  Pulmonic Valve The pulmonic valve is not well visualized. There is mild to moderate (1-2+) pulmonic valvular regurgitation.  Vessels Normal size aorta. An abscess cavity is not identified.  Pericardial/Pleural The pericardium appears normal. ______________________________________________________________________________ MMode/2D Measurements & Calculations LVOT diam: 2.1 cm LVOT area: 3.6 cm2  Doppler Measurements & Calculations Ao V2 max: 461.1 cm/sec Ao max P.0 mmHg Ao V2 mean: 339.6 cm/sec Ao mean P.0 mmHg Ao V2 VTI: 89.7 cm SHU(I,D): 0.75 cm2 SHU(V,D): 0.80 cm2 LV V1 max P.2 mmHg LV V1 max: 103.0 cm/sec LV V1 VTI: 18.8 cm SV(LVOT): 66.8 ml SI(LVOT): 33.2 ml/m2 AV Chandan Ratio (DI): 0.22 SHU Index (cm2/m2): 0.37  ______________________________________________________________________________ Report approved by:  Juan Antonio Gómez 08/16/2024 12:57 PM       MR Cervical Spine w/o & w Contrast    Result Date: 8/15/2024  EXAM: MR CERVICAL SPINE W/O and W CONTRAST LOCATION: [facility] DATE: 8/15/2024 INDICATION: MSSA bacteremia, neck pain,;?abscess or osteomyelitis COMPARISON: None. CONTRAST: 9 mL gadavist TECHNIQUE: MRI Cervical Spine without and with IV contrast. FINDINGS: Reversal the cervical lordosis centered at C4-5. 2 mm retrolisthesis at C5-6 and 3 mm anterolisthesis at C7-T1. Solid interbody fusion at C6-7. Preserved vertebral body heights. Mild facet complex edema on the left at C4-C5 with minimal reactive enhancement. No definite cord signal abnormality. No extraspinal abnormality. Craniovertebral junction and C1-C2: Normal. C2-C3: Preserved disc height. No herniation. Facet complex ankylosis and mild to moderate facet hypertrophy. No spinal canal stenosis. Mild bilateral neural foraminal stenosis. C3-C4: Mild loss of disc height. Tiny central disc protrusion. Facet ankylosis and mild to moderate facet hypertrophy. No spinal canal stenosis. Moderate bilateral neural foraminal stenosis. C4-C5: Advanced loss of disc height. Circumferential disc osteophyte complex and bilateral uncovertebral spurring. Mild bilateral facet arthropathy. Moderate to severe spinal canal stenosis with mild asymmetric left ventral cord flattening. Severe right neural foraminal stenosis. Moderate to severe left neural foraminal stenosis. C5-C6: Advanced loss of disc height and signal. Circumferential disc osteophyte complex with asymmetric left central component and left greater than right uncovertebral spurring. Minimal facet arthropathy. Moderate spinal canal stenosis and asymmetric left ventral cord flattening. Severe bilateral neural foraminal stenosis. C6-C7: Solid interbody fusion. No significant facet arthropathy. No spinal canal stenosis. Mild bilateral neural foraminal stenosis. C7-T1: Moderate to  advanced loss of disc height. Shallow broad-based posterior disc bulge. Mild bilateral uncovertebral spurring. Moderate to advanced facet arthropathy, greater on the right. Mild spinal canal stenosis. Severe right neural foraminal stenosis. Mild to moderate left neural foraminal stenosis.     IMPRESSION: 1.  Multilevel cervical spondylosis. Facet complex edema at C4-5 on the left is favored to be reactive/degenerative in nature. In the setting of bacteremia early changes of septic facet arthropathy are difficult to entirely exclude. Consider follow-up MRI if the patient's symptoms are persistent or worsening. Otherwise, no evidence for spinal infection. 2.  At C4-5, moderate to severe spinal canal stenosis with asymmetric left ventral cord flattening. Severe right and moderate to severe left neural foraminal stenosis. Correlate with C5 radicular symptoms. 3.  At C5-6, moderate spinal canal stenosis and asymmetric left ventral cord flattening. Severe bilateral neural foraminal stenosis. Correlate with C6 radicular symptoms. 4.  At C7-T1, mild spinal canal stenosis with severe right and mild to moderate left neural foraminal stenosis. Correlate with right C8 radicular symptoms. 5.  At C3-4, moderate bilateral neural foraminal stenosis.     Echocardiogram Complete    Result Date: 8/15/2024  [id number]^[address] [address]  Name: RONNIE MATTSON MRN: [id number] : 1940 Study Date: 08/15/2024 09:04 AM Age: 84 yrs Gender: Female Patient Location: [facility] Reason For Study: Endocarditis Ordering Physician: TOSHIA KHANNA Performed By: LAURA  BSA: 2.0 m2 Height: 67 in Weight: 198 lb HR: 93 BP: 92/51 mmHg ______________________________________________________________________________ Procedure Complete Portable Echo Adult. ______________________________________________________________________________ Interpretation Summary  Left ventricular size,  wall motion and function are normal. The ejection fraction is 60-65%. Normal right ventricle size and systolic function. Severe valvular aortic stenosis. There is no evidence of a mass or vegetation. This does not rule out endocarditis. The study was technically difficult. ______________________________________________________________________________ Left Ventricle Left ventricular size, wall motion and function are normal. The ejection fraction is 60-65%. There is mild concentric left ventricular hypertrophy. Left ventricular diastolic function is abnormal. No regional wall motion abnormalities noted.  Right Ventricle Normal right ventricle size and systolic function.  Atria The left atrium is mildly dilated. Right atrial size is normal. There is no color Doppler evidence of an atrial shunt.  Mitral Valve There is moderate mitral annular calcification. There is mild (1+) mitral regurgitation. There is mild mitral stenosis.  Tricuspid Valve The tricuspid valve is not well visualized.  Aortic Valve Moderate aortic valve calcification is present. Severe valvular aortic stenosis.  Pulmonic Valve The pulmonic valve is not well seen, but is grossly normal. The pulmonic valve is not well visualized.  Vessels The aorta root is normal. Normal size ascending aorta. IVC diameter <2.1 cm collapsing >50% with sniff suggests a normal RA pressure of 3 mmHg.  Pericardium Trivial pericardial effusion.  ______________________________________________________________________________ MMode/2D Measurements & Calculations IVSd: 1.2 cm LVIDd: 4.0 cm LVIDs: 2.5 cm LVPWd: 1.2 cm FS: 36.2 %  LV mass(C)d: 163.5 grams LV mass(C)dI: 81.2 grams/m2 Ao root diam: 3.4 cm LVOT diam: 2.4 cm LVOT area: 4.5 cm2 Ao root diam index Ht(cm/m): 2.0 Ao root diam index BSA (cm/m2): 1.7 EF Biplane: 67.8 % LA Volume (BP): 55.0 ml LA Volume Index (BP): 27.4 ml/m2  LA Volume Indexed (AL/bp): 28.6 ml/m2 RWT: 0.59 TAPSE: 2.3 cm  Time Measurements MM HR: 92.0 BPM   Doppler Measurements & Calculations MV E max chandan: 129.0 cm/sec MV A max chandan: 173.8 cm/sec MV E/A: 0.74 MV max P.5 mmHg MV mean P.4 mmHg MV V2 VTI: 33.7 cm MVA(VTI): 2.8 cm2 MV dec slope: 628.7 cm/sec2 MV dec time: 0.21 sec  Ao V2 max: 437.4 cm/sec Ao max P.0 mmHg Ao V2 mean: 324.6 cm/sec Ao mean P.9 mmHg Ao V2 VTI: 88.3 cm SHU(I,D): 1.1 cm2 SHU(V,D): 1.0 cm2 LV V1 max P.1 mmHg LV V1 max: 100.7 cm/sec LV V1 VTI: 21.0 cm SV(LVOT): 95.6 ml SI(LVOT): 47.5 ml/m2 AV Chandan Ratio (DI): 0.23 SHU Index (cm2/m2): 0.54 E/E': 20.8 E/E' av.4 Lateral E/e': 16.0 Medial E/e': 20.8 Peak E' Chandan: 6.2 cm/sec RV S Chandan: 13.3 cm/sec  ______________________________________________________________________________ Report approved by: Juan Antonio Sharp 08/15/2024 12:56 PM       CT Chest Abdomen Pelvis w/o Contrast    Result Date: 2024  EXAM: CT CHEST ABDOMEN PELVIS W/O CONTRAST LOCATION: [facility] DATE: 2024 INDICATION: undifferentiated sepsis, eval for source of infection COMPARISON: Radiograph 2024 0340 hours TECHNIQUE: CT scan of the chest, abdomen, and pelvis was performed without IV contrast. Multiplanar reformats were obtained. Dose reduction techniques were used. CONTRAST: None. FINDINGS: LUNGS AND PLEURA: Diffuse bronchial wall thickening. Mild emphysema. Dependent opacities represent atelectasis. There are a few tiny groundglass opacities in the upper lungs which measure up to 13 mm x 9 mm (series 4 image 65). A small noncalcified left pleural plaque is noted. MEDIASTINUM/AXILLAE: Mitral annular calcification. Aortic leaflet calcification. CORONARY ARTERY CALCIFICATION: Moderate. HEPATOBILIARY: Normal. PANCREAS: Normal. SPLEEN: Normal. ADRENAL GLANDS: A benign left adrenal adenoma does not require follow-up. KIDNEYS/BLADDER: Mildly atrophic left kidney. There is mild left greater than right perinephric stranding. No hydronephrosis or hydroureter. No renal or  ureteral calculi. A 13 mm x 11 mm low-attenuation right renal lesion is incompletely assessed (series  3 image 205). BOWEL: Air-fluid levels in the colon. Mild colonic diverticulosis without diverticulitis. The appendix is 9 mm in maximum diameter. LYMPH NODES: Normal. VASCULATURE: Atherosclerotic disease. PELVIC ORGANS: Normal. MUSCULOSKELETAL: Normal.     IMPRESSION: 1.  Mild perinephric stranding suggests edema. Inflammation/infection is not excluded. No ureteral obstruction. A right renal lesion is likely to be benign; ultrasound may be helpful. 2.  Air-fluid levels in the colon can be seen with liquid stool. The prominent appendix is likely within normal limits. 3.  Small subtle ground glass pulmonary opacities may be a nonspecific infectious or inflammatory process. These are not a source of sepsis. Bronchial wall thickening can be seen with bronchitis.    Chest XR,  PA & LAT    Result Date: 8/14/2024  EXAM: XR CHEST 2 VIEWS LOCATION: [facility] DATE: 8/14/2024 INDICATION: Cough. Fever. COMPARISON: Chest x-ray 2 views 3/22/2024 at 1138 hours.     IMPRESSION: Both lungs remain clear. No adenopathy or effusion. Normal cardiac size and pulmonary vascularity. Atherosclerotic thoracic aorta. Degenerative changes both shoulders and the spine. No significant interval change.

## 2024-09-17 NOTE — CONSULTS
[facility]  WO Nurse Inpatient Assessment     Consulted for: chest    Addendum: Surgeon requested dressing removed today from pacemaker site and photo taken.    9/17/24      Patient History (according to provider note(s):      Per H+P:  84 year old female admitted on 9/16/2024 to the LTAC for long-term antibiotics following MSSA bacteremia and endocarditis. 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  She was initially admitted to [facility] on 8/16/2024 for sepsis and HAMMAD.  She received empiric antibiotics on admission. Blood cultures grew MSSA.  Neurology was consulted for possible discitis.  ID was also consulted. Echo revealed large posterior leaflet mitral valve vegetation and small aortic valve vegetation with severe aortic stenosis.  Brain MRI revealed scattered embolic infarcts.  Coronary angiogram demonstrated coronary artery disease.  CT surgery was consulted. She underwent CABG alongside the procedures mentioned above.  She however has not had renal recovery.  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  She had dysphagia and momentarily required NG tube with tube feeds. She worked with speech therapy and is now cleared for regular diet and thin liquids.  CT surgery recommends warfarin for 3 months for bioprosthetic valves.  INR goal 2-3.  End date of warfarin approximately 12/10/2024, at which point she will be on Plavix alone (ASA allergy) indefinitely.  ID recommends IV vancomycin until 10/8/2024.      Assessment:      Areas visualized during today's visit: Complete head to toe     Patient has midline sternal incision and mid upper quadrant lap sites under surgical glue  Left chest pacemaker implant site intact    No other skin concerns noted.    WO will sign off.  Evelina Herrera, JAMN, RN, PHN, HNB-BC, CWOCN  Pager no longer is  use, please contact through [employer] group: [facility] [employer]

## 2024-09-17 NOTE — PLAN OF CARE
Problem: Fall Injury Risk  Goal: Absence of Fall and Fall-Related Injury  9/17/2024 1452 by Eduardo Lee RN  Outcome: Progressing  9/17/2024 1443 by Eduardo Lee RN  Outcome: Progressing  Intervention: Identify and Manage Contributors  Recent Flowsheet Documentation  Taken 9/17/2024 1227 by Eduardo Lee RN  Medication Review/Management: medications reviewed  Intervention: Promote Injury-Free Environment  Recent Flowsheet Documentation  Taken 9/17/2024 1227 by Eduardo Lee RN  Safety Promotion/Fall Prevention:   activity supervised   lighting adjusted   patient and family education   nonskid shoes/slippers when out of bed   clutter free environment maintained   assistive device/personal items within reach     Problem: Pain Acute  Goal: Optimal Pain Control and Function  Outcome: Progressing  Intervention: Prevent or Manage Pain  Recent Flowsheet Documentation  Taken 9/17/2024 1227 by Eduardo Lee RN  Sensory Stimulation Regulation:   television on   care clustered  Medication Review/Management: medications reviewed  Intervention: Optimize Psychosocial Wellbeing  Recent Flowsheet Documentation  Taken 9/17/2024 1227 by Eduardo Lee RN  Supportive Measures:   active listening utilized   positive reinforcement provided  Pt on heparin drip as ordered Anti Xa, 0.54, @ 1040, heparin adjusted as ordered . Anti Xa re check @ 1643. Pt tolerated well. Pt's spouse and daughter visited at bed side. Seen by therapy. Pt used bed pan X 3 with loose BM. Senna and miralex held d/t loose BM. Poor food and fluid intake. Appeared comfortable, pt needs reassurance. On tele monitor, sinus rhythm with PVC. Will continue monitoring.

## 2024-09-18 ENCOUNTER — APPOINTMENT (OUTPATIENT)
Dept: PHYSICAL THERAPY | Facility: CLINIC | Age: 84
DRG: 288 | End: 2024-09-18
Attending: HOSPITALIST
Payer: COMMERCIAL

## 2024-09-18 LAB
ALBUMIN SERPL BCG-MCNC: 3.1 G/DL (ref 3.5–5.2)
ALP SERPL-CCNC: 75 U/L (ref 40–150)
ALT SERPL W P-5'-P-CCNC: 29 U/L (ref 0–50)
ANION GAP SERPL CALCULATED.3IONS-SCNC: 11 MMOL/L (ref 7–15)
AST SERPL W P-5'-P-CCNC: 34 U/L (ref 0–45)
BACTERIA SPEC CULT: NORMAL
BILIRUB SERPL-MCNC: 0.7 MG/DL
BUN SERPL-MCNC: 33 MG/DL (ref 8–23)
CALCIUM SERPL-MCNC: 8.4 MG/DL (ref 8.8–10.4)
CHLORIDE SERPL-SCNC: 97 MMOL/L (ref 98–107)
CREAT SERPL-MCNC: 4.01 MG/DL (ref 0.51–0.95)
CYSTATIN C (ROCHE): 4.3 MG/L (ref 0.6–1)
EGFRCR SERPLBLD CKD-EPI 2021: 10 ML/MIN/1.73M2
ERYTHROCYTE [DISTWIDTH] IN BLOOD BY AUTOMATED COUNT: 21 % (ref 10–15)
GFR/BSA.PRED SERPLBLD CYS-BASED-ARV: 9 ML/MIN/1.73M2
GLUCOSE SERPL-MCNC: 87 MG/DL (ref 70–99)
HCO3 SERPL-SCNC: 28 MMOL/L (ref 22–29)
HCT VFR BLD AUTO: 23.8 % (ref 35–47)
HGB BLD-MCNC: 7.3 G/DL (ref 11.7–15.7)
INR PPP: 1.88 (ref 0.85–1.15)
MCH RBC QN AUTO: 32 PG (ref 26.5–33)
MCHC RBC AUTO-ENTMCNC: 30.7 G/DL (ref 31.5–36.5)
MCV RBC AUTO: 104 FL (ref 78–100)
PLATELET # BLD AUTO: 121 10E3/UL (ref 150–450)
POTASSIUM SERPL-SCNC: 3.8 MMOL/L (ref 3.4–5.3)
PROT SERPL-MCNC: 6.1 G/DL (ref 6.4–8.3)
RBC # BLD AUTO: 2.28 10E6/UL (ref 3.8–5.2)
SODIUM SERPL-SCNC: 136 MMOL/L (ref 135–145)
UFH PPP CHRO-ACNC: 0.1 IU/ML
UFH PPP CHRO-ACNC: 0.42 IU/ML
UFH PPP CHRO-ACNC: 0.64 IU/ML
VANCOMYCIN SERPL-MCNC: 16.2 UG/ML
VANCOMYCIN SERPL-MCNC: 22.9 UG/ML
WBC # BLD AUTO: 6.2 10E3/UL (ref 4–11)

## 2024-09-18 PROCEDURE — 85520 HEPARIN ASSAY: CPT | Performed by: HOSPITALIST

## 2024-09-18 PROCEDURE — 80053 COMPREHEN METABOLIC PANEL: CPT | Performed by: HOSPITALIST

## 2024-09-18 PROCEDURE — 90935 HEMODIALYSIS ONE EVALUATION: CPT

## 2024-09-18 PROCEDURE — 97530 THERAPEUTIC ACTIVITIES: CPT | Mod: GP | Performed by: PHYSICAL THERAPIST

## 2024-09-18 PROCEDURE — 250N000013 HC RX MED GY IP 250 OP 250 PS 637: Performed by: HOSPITALIST

## 2024-09-18 PROCEDURE — 99222 1ST HOSP IP/OBS MODERATE 55: CPT | Performed by: PHYSICIAN ASSISTANT

## 2024-09-18 PROCEDURE — 99233 SBSQ HOSP IP/OBS HIGH 50: CPT | Performed by: HOSPITALIST

## 2024-09-18 PROCEDURE — 250N000011 HC RX IP 250 OP 636: Performed by: HOSPITALIST

## 2024-09-18 PROCEDURE — 82610 CYSTATIN C: CPT | Performed by: PHYSICIAN ASSISTANT

## 2024-09-18 PROCEDURE — 120N000017 HC R&B RESPIRATORY CARE

## 2024-09-18 PROCEDURE — 250N000013 HC RX MED GY IP 250 OP 250 PS 637: Performed by: PHYSICIAN ASSISTANT

## 2024-09-18 PROCEDURE — 97110 THERAPEUTIC EXERCISES: CPT | Mod: GP | Performed by: PHYSICAL THERAPIST

## 2024-09-18 PROCEDURE — 80202 ASSAY OF VANCOMYCIN: CPT | Performed by: HOSPITALIST

## 2024-09-18 PROCEDURE — 85610 PROTHROMBIN TIME: CPT | Performed by: PHYSICIAN ASSISTANT

## 2024-09-18 RX ORDER — WARFARIN SODIUM 3 MG/1
3 TABLET ORAL
Status: COMPLETED | OUTPATIENT
Start: 2024-09-18 | End: 2024-09-18

## 2024-09-18 RX ADMIN — MIDODRINE HYDROCHLORIDE 10 MG: 5 TABLET ORAL at 08:47

## 2024-09-18 RX ADMIN — PANTOPRAZOLE SODIUM 40 MG: 40 TABLET, DELAYED RELEASE ORAL at 07:00

## 2024-09-18 RX ADMIN — WARFARIN SODIUM 3 MG: 3 TABLET ORAL at 17:38

## 2024-09-18 RX ADMIN — METOPROLOL TARTRATE 25 MG: 25 TABLET, FILM COATED ORAL at 15:07

## 2024-09-18 RX ADMIN — Medication 1 CAPSULE: at 09:33

## 2024-09-18 RX ADMIN — HEPARIN SODIUM 700 UNITS/HR: 10000 INJECTION, SOLUTION INTRAVENOUS at 04:25

## 2024-09-18 RX ADMIN — ACETAMINOPHEN 650 MG: 325 TABLET, FILM COATED ORAL at 15:00

## 2024-09-18 RX ADMIN — MELATONIN 3 MG: 3 TAB ORAL at 01:55

## 2024-09-18 RX ADMIN — ATORVASTATIN CALCIUM 80 MG: 40 TABLET, FILM COATED ORAL at 20:47

## 2024-09-18 RX ADMIN — CLOPIDOGREL 75 MG: 75 TABLET ORAL at 09:33

## 2024-09-18 RX ADMIN — Medication 1 CAPSULE: at 20:47

## 2024-09-18 RX ADMIN — ACETAMINOPHEN 650 MG: 325 TABLET, FILM COATED ORAL at 20:46

## 2024-09-18 RX ADMIN — HEPARIN SODIUM 500 UNITS/HR: 1000 INJECTION INTRAVENOUS; SUBCUTANEOUS at 08:36

## 2024-09-18 RX ADMIN — MELATONIN 3 MG: 3 TAB ORAL at 20:47

## 2024-09-18 RX ADMIN — DOCUSATE SODIUM AND SENNOSIDES 1 TABLET: 8.6; 5 TABLET, FILM COATED ORAL at 20:47

## 2024-09-18 RX ADMIN — TORSEMIDE 100 MG: 100 TABLET ORAL at 09:33

## 2024-09-18 ASSESSMENT — ACTIVITIES OF DAILY LIVING (ADL)
ADLS_ACUITY_SCORE: 39
ADLS_ACUITY_SCORE: 40
ADLS_ACUITY_SCORE: 40
ADLS_ACUITY_SCORE: 39
ADLS_ACUITY_SCORE: 40
ADLS_ACUITY_SCORE: 39
ADLS_ACUITY_SCORE: 39
ADLS_ACUITY_SCORE: 40
ADLS_ACUITY_SCORE: 40
DEPENDENT_IADLS:: CLEANING;COOKING;LAUNDRY;SHOPPING;MEAL PREPARATION;MEDICATION MANAGEMENT;MONEY MANAGEMENT;TRANSPORTATION;INCONTINENCE
ADLS_ACUITY_SCORE: 40
ADLS_ACUITY_SCORE: 39
ADLS_ACUITY_SCORE: 40
ADLS_ACUITY_SCORE: 40
ADLS_ACUITY_SCORE: 39
ADLS_ACUITY_SCORE: 40

## 2024-09-18 NOTE — CONSULTS
"CLINICAL NUTRITION SERVICES  -  ASSESSMENT NOTE      Recommendations Ordered by Registered Dietitian (RD):   Calorie counts x3 days  Berry Magic Cup w/ dinner  Cherry Gelatein Plus w/ lunch  MVI/M - flagged for nickel topical allergy (jewelry)   Future/Additional Recommendations:   Monitor PO intakes, weight trends, ability to increase PO intakes   Malnutrition:   % Weight Loss:  > 5% in 1 month (severe malnutrition)  % Intake:  </= 75% for >/= 1 month (severe malnutrition)  Subcutaneous Fat Loss:  Orbital region mild depletion  Muscle Loss:  Temporal region mild depletion, Clavicle bone region moderate depletion, and Acromion bone region moderate depletion  Fluid Retention:  None noted    Malnutrition Diagnosis: Severe malnutrition  In Context of:  Acute illness or injury     REASON FOR ASSESSMENT  Felicitas Elliott is a 84 year old female seen by Registered Dietitian for new LTACH admission - RD screened patient chart, appears to be at substantial nutrition risk, despite MST score of 0 - likely inaccurate    PMH:  Patient initially admitted to [facility] on 8/16/2024 for sepsis and HAMMAD, s/p CABG. Initially  on CCRT for HAMMAD and now has transitioned to HD. She had dysphagia and momentarily required NG tube with tube feeds, now cleared for regular diet and thin liquids.     Patient admitted on 9/16/2024 to [facility] for IV abx following MSSA bacteremia and endocarditis.     NUTRITION HISTORY  - Information obtained from patient and chart  \"Per pt's , Ed, pt ate ~ 2 meals per day. She was recently following a lower sodium diet and Ed states she lost some weight but nothing drastic.\" - per 8/31 RD note    Patient reported poor appetite for weeks PTA, per 8/23 RD note  8/15-8/26: Minimal PO intakes per RD notes  8/26-8/31: NPO/CL  8/31: TF initiated via OGT w/ goal Vital HP at 65 mL/hr  9/1: extubated, NPO  9/4: TF re-started - Novasource Renal at 35 mL/hr, enteral access not specified, though " "nasal FT per MD note  9/8: full liquid diet  9/9: SB6 w/ thin liquids, TF held    CURRENT NUTRITION ORDERS  Diet Order:     Orders Placed This Encounter      Advance Diet as Tolerated: Regular Diet Adult    Current Intake/Tolerance:  Patient reports poor appetite, early satiety, unsettled stomach d/t meds, and fatigue with chewing (for example, tired after a few bites of sandwich or meats). Did not have difficulty chewing PTA, and currently denies any difficulty swallowing.   - Patient's  bringing in Boost with ice cream    9/9: 480 kcal, 21g protein  9/10: 990 kcal, 20g protein  9/11: 1019 kcal, 19g protein   9/12: NPO for B and L, ate 650 kcal, 13g protein for dinner once PO diet re-started    NUTRITION FOCUSED PHYSICAL ASSESSMENT FOR DIAGNOSING MALNUTRITION)  Yes         Observed:    Muscle wasting (refer to documentation in Malnutrition section) and Subcutaneous fat loss (refer to documentation in Malnutrition section)    Obtained from Chart/Interdisciplinary Team:  Midline sternal incision and mid upper quadrant lap sites under surgical glue - WOC RN assessed and signed off yesterday 9/17    ANTHROPOMETRICS  Height: 5'7\"  Weight: 174 lbs 13.2 oz  Body mass index is 27.38 kg/m .  Weight Status:  Overweight BMI 25-29.9  UBW: ~200#  Weight History: 13.9% weight loss in 1 month - severe  Wt Readings from Last 10 Encounters:   09/18/24 79.3 kg (174 lb 13.2 oz)   09/16/24 79 kg (174 lb 3.2 oz)   08/15/24 90.1 kg (198 lb 9.6 oz)   05/06/24 : 91.2 kg (201 lb)   09/16/24 0425 79 kg (174 lb 3.2 oz) Bed scale   09/15/24 0406 80.8 kg (178 lb 2.1 oz) Bed scale   09/14/24 0409 82.9 kg (182 lb 12.2 oz) Bed scale   09/13/24 0530 83.7 kg (184 lb 8.4 oz) Bed scale   09/12/24 0505 84.9 kg (187 lb 2.7 oz) Bed scale   09/11/24 0500 82.7 kg (182 lb 5.1 oz) Bed scale   09/10/24 0400 83.5 kg (184 lb 1.4 oz) Bed scale   09/09/24 0600 83.9 kg (184 lb 15.5 oz) Bed scale   09/07/24 0400 85 kg (187 lb 6.3 oz) Bed scale   09/06/24 " 0253 87.5 kg (192 lb 14.4 oz) Bed scale   09/05/24 0400 90.5 kg (199 lb 8.3 oz) Bed scale   09/04/24 0400 92.2 kg (203 lb 4.2 oz) Bed scale   09/02/24 0400 95.9 kg (211 lb 6.7 oz) Bed scale   09/01/24 0400 98.4 kg (216 lb 14.4 oz) Bed scale   08/31/24 0400 99.4 kg (219 lb 1.6 oz) Bed scale   08/30/24 0400 99.8 kg (220 lb) Bed scale   08/29/24 0400 101 kg (222 lb 10.6 oz) Bed scale   08/28/24 0600 95.3 kg (210 lb 1.6 oz) Bed scale   08/27/24 0500 86.4 kg (190 lb 8 oz) Standing scale   08/26/24 0330 86.2 kg (190 lb 1.6 oz) Standing scale   08/25/24 0458 86.3 kg (190 lb 3.2 oz) Standing scale   08/24/24 0406 85.4 kg (188 lb 4.8 oz) Standing scale   08/23/24 0403 84.4 kg (186 lb 1.6 oz) Standing scale   08/22/24 0642 87 kg (191 lb 11.2 oz) --   08/21/24 0646 87 kg (191 lb 11.2 oz) Standing scale   08/20/24 1053 86.1 kg (189 lb 14.4 oz) --   08/20/24 0632 86 kg (189 lb 9.6 oz) Standing scale   08/19/24 1409 86.2 kg (190 lb) --   08/19/24 0521 86.3 kg (190 lb 3.2 oz) Standing scale   08/18/24 0537 88.9 kg (196 lb) Standing scale   08/17/24 0028 89.1 kg (196 lb 8 oz) Standing scale   08/16/24 1622 91.9 kg (202 lb 9.6 oz) Standing scale     LABS  Labs reviewed  Labs:  Electrolytes  Potassium (mmol/L)   Date Value   09/18/2024 3.8   09/17/2024 3.6   09/17/2024 3.6     Potassium POCT (mmol/L)   Date Value   09/16/2024 3.2 (L)   08/27/2024 3.4   08/27/2024 3.9     Phosphorus (mg/dL)   Date Value   09/07/2024 3.1   09/06/2024 2.9   09/06/2024 2.8   09/06/2024 3.1   09/05/2024 2.8    Blood Glucose  Glucose (mg/dL)   Date Value   09/18/2024 87   09/17/2024 191 (H)   09/17/2024 95   09/16/2024 104 (H)   09/15/2024 107 (H)     GLUCOSE BY METER POCT (mg/dL)   Date Value   09/16/2024 102 (H)   09/14/2024 96   09/10/2024 112 (H)   09/10/2024 150 (H)   09/04/2024 102 (H)     Glucose Whole Blood POCT (mg/dL)   Date Value   09/16/2024 143 (H)     Hemoglobin A1C (%)   Date Value   08/16/2024 6.4 (H)    Inflammatory Markers  WBC Count  (10e3/uL)   Date Value   09/18/2024 6.2   09/17/2024 6.2   09/17/2024 6.5     Albumin (g/dL)   Date Value   09/18/2024 3.1 (L)   09/17/2024 3.1 (L)   09/17/2024 3.1 (L)      Magnesium (mg/dL)   Date Value   09/07/2024 2.5 (H)   09/06/2024 2.3   09/06/2024 2.5 (H)     Sodium (mmol/L)   Date Value   09/18/2024 136   09/17/2024 134 (L)   09/17/2024 136    Renal  Urea Nitrogen (mg/dL)   Date Value   09/18/2024 33.0 (H)   09/17/2024 26.3 (H)   09/17/2024 25.5 (H)     Creatinine (mg/dL)   Date Value   09/18/2024 4.01 (H)   09/17/2024 3.34 (H)   09/17/2024 3.16 (H)     Additional  Ketones Urine (mg/dL)   Date Value   08/30/2024 Negative        MEDICATIONS  Medications reviewed  Current Facility-Administered Medications   Medication Dose Route Frequency Provider Last Rate Last Admin    atorvastatin (LIPITOR) tablet 80 mg  80 mg Oral QPM Maryam Anthony PA-C   80 mg at 09/17/24 2127    clopidogrel (PLAVIX) tablet 75 mg  75 mg Oral Daily Maryam Anthony PA-C   75 mg at 09/18/24 0933    [START ON 9/20/2024] epoetin jose-epbx (RETACRIT) injection 20,000 Units  20,000 Units Subcutaneous Weekly Maryam Anthony PA-C        lactobacillus rhamnosus (GG) (CULTURELL) capsule 1 capsule  1 capsule Oral BID Maryam Anthony PA-C   1 capsule at 09/18/24 0933    metoprolol tartrate (LOPRESSOR) tablet 25 mg  25 mg Oral BID Maryam Anthony PA-C   25 mg at 09/17/24 0855    midodrine (PROAMATINE) tablet 10 mg  10 mg Oral During Dialysis/CRRT (from stock) Maryam Anthony PA-C   10 mg at 09/18/24 0847    pantoprazole (PROTONIX) EC tablet 40 mg  40 mg Oral QAM AC Maryam Anthony PA-C   40 mg at 09/18/24 0700    polyethylene glycol (MIRALAX) Packet 17 g  17 g Oral Daily Maryam Anthony PA-C        senna-docusate (SENOKOT-S/PERICOLACE) 8.6-50 MG per tablet 1 tablet  1 tablet Oral BID Maryam Anthony PA-C   1 tablet at 09/16/24 2022    sodium chloride (PF) 0.9% PF flush 9 mL  9  mL Intracatheter During Dialysis/CRRT (from stock) Adan Veloz MD        sodium chloride (PF) 0.9% PF flush 9 mL  9 mL Intracatheter During Dialysis/CRRT (from stock) Adan Veloz MD        torsemide (DEMADEX) tablet 100 mg  100 mg Oral Daily Maryam Anthony PA-C   100 mg at 09/18/24 0933    vancomycin place horne - receiving intermittent dosing  1 each Intravenous See Admin Instructions Maryam Anthony PA-C        warfarin ANTICOAGULANT (COUMADIN) tablet 3 mg  3 mg Per Feeding Tube ONCE at 18:00 Adan Veloz MD        Warfarin Dose Required Daily - Pharmacist Managed  1 each Oral See Admin Instructions Maryam Anthony PA-C          Current Facility-Administered Medications   Medication Dose Route Frequency Provider Last Rate Last Admin    dextrose 10% infusion   Intravenous Continuous PRN Maryam Anthony PA-C        heparin 10,000 units/10 mL infusion (DIALYSIS USE)  500 Units/hr Hemodialysis Machine Continuous Adan Veloz MD 0.5 mL/hr at 09/18/24 0836 500 Units/hr at 09/18/24 0836    heparin 25,000 units in 0.45% NaCl 250 mL ANTICOAGULANT infusion  0-5,000 Units/hr Intravenous Continuous Adan Veloz MD 5 mL/hr at 09/18/24 1316 500 Units/hr at 09/18/24 1316    Patient is already receiving anticoagulation with heparin, enoxaparin (LOVENOX), warfarin (COUMADIN)  or other anticoagulant medication   Does not apply Continuous PRN Adan Veloz MD          Current Facility-Administered Medications   Medication Dose Route Frequency Provider Last Rate Last Admin    acetaminophen (TYLENOL) tablet 650 mg  650 mg Oral Q4H PRN Maryam Anthony PA-C   650 mg at 09/17/24 0648    sodium chloride (NEBUSAL) 3 % neb solution 3 mL  3 mL Nebulization Q6H PRN Maryam Anthony PA-C        And    albuterol (PROVENTIL) neb solution 2.5 mg  2.5 mg Nebulization Q6H PRN Maryam Anthony PA-C        alteplase (CATHFLO ACTIVASE) injection 2 mg  2 mg  Intracatheter Q1H PRN Adan Veloz MD        alteplase (CATHFLO ACTIVASE) injection 2 mg  2 mg Intracatheter Q1H PRN Adan Veloz MD        bisacodyl (DULCOLAX) suppository 10 mg  10 mg Rectal Daily PRN Maryam Anthony PA-C        dextrose 10% infusion   Intravenous Continuous PRN Maryam Anthony PA-C        glucose gel 15-30 g  15-30 g Oral Q15 Min PRN Adan Veloz MD        Or    dextrose 50 % injection 25-50 mL  25-50 mL Intravenous Q15 Min PRN Adan Veloz MD        Or    glucagon injection 1 mg  1 mg Subcutaneous Q15 Min PRN Adan Veloz MD        melatonin tablet 3 mg  3 mg Oral At Bedtime PRN Maryam Anthony PA-C   3 mg at 09/18/24 0155    menthol (Topical Analgesic) 2.5% (BENGAY VANISHING SCENT) 2.5 % topical gel 1 g  1 g Topical Q8H PRN Maryam Anthony PA-C        miconazole (MICATIN) 2 % powder   Topical TID PRN Adan Veloz MD        naloxone (NARCAN) injection 0.2 mg  0.2 mg Intravenous Q2 Min PRN Maryam Anthony PA-C        Or    naloxone (NARCAN) injection 0.4 mg  0.4 mg Intravenous Q2 Min PRN Maryam Anthony PA-C        Or    naloxone (NARCAN) injection 0.2 mg  0.2 mg Intramuscular Q2 Min PRN Maryam Anthony PA-C        Or    naloxone (NARCAN) injection 0.4 mg  0.4 mg Intramuscular Q2 Min PRN Maryam Anthony PA-C        nicotine (NICORETTE) gum 2 mg  2 mg Buccal Q1H PRN Maryam Anthony PA-C        ondansetron (ZOFRAN) injection 4 mg  4 mg Intravenous Q6H PRN Maryam Anthony PA-C        Or    ondansetron (ZOFRAN) tablet 8 mg  8 mg Oral Q8H PRN Maryam Anthony PA-C        Patient is already receiving anticoagulation with heparin, enoxaparin (LOVENOX), warfarin (COUMADIN)  or other anticoagulant medication   Does not apply Continuous PRN Adan Veloz MD        prochlorperazine (COMPAZINE) injection 5 mg  5 mg Intravenous Q6H PRN Maryam Anthony PA-C        Or     prochlorperazine (COMPAZINE) tablet 5 mg  5 mg Oral Q6H PRN Maryam Anthony PA-C        Or    prochlorperazine (COMPAZINE) suppository 12.5 mg  12.5 mg Rectal Q12H PRN Maryam Anthony PA-C        sodium chloride (PF) 0.9% PF flush 10 mL  10 mL Intracatheter Q15 Min PRN Adan Veloz MD        sodium chloride (PF) 0.9% PF flush 10 mL  10 mL Intracatheter Q15 Min PRN Adan Veloz MD        sodium chloride (PF) 0.9% PF flush 10-40 mL  10-40 mL Intracatheter Once PRN Maryam Anthony PA-C        sodium chloride (PF) 0.9% PF flush 10-40 mL  10-40 mL Intracatheter Once PRN Maryam Anthony PA-C        sodium chloride (PF) 0.9% PF flush 2.5 mL  2.5 mL Intravenous q1 min prn Maryam Anthony PA-C        sodium chloride 0.9% BOLUS 100-150 mL  100-150 mL Intravenous Q15 Min PRN Adan Veloz MD            ASSESSED NUTRITION NEEDS PER APPROVED PRACTICE GUIDELINES:  Dosing Weight 79 kg (current weight)  Estimated Energy Needs: 7446-1399 kcals (25-30 Kcal/Kg)  Justification: maintenance and increased needs w/ acute illness  Estimated Protein Needs: 119-142 grams protein (1.5-1.8 g pro/Kg)  Justification: Repletion, hypercatabolism with critical illness, and dialysis  Estimated Fluid Needs: per provider pending fluid status    NUTRITION DIAGNOSIS:  Inadequate oral intake related to poor appetite, early satiety, fatigue w/ chewing, intermittent NPO as evidenced by PO intakes meeting <75% estimated nutrition needs x1 month    NUTRITION INTERVENTIONS  Recommendations / Nutrition Prescription  See top of note.    Implementation  Nutrition education: Provided education on softer snack food options, encouraged snacking, use of oral nutrition supplements   Composition of Meals and Snacks and Medical Food Supplement  Encouraged movement with therapies and sitting up after meals to promote gastric emptying    Nutrition Goals  PO intakes to meet >75% estimated nutrition needs  No further  weight loss <78 kg    MONITORING AND EVALUATION:  Progress towards goals will be monitored and evaluated per protocol and Practice Guidelines      RK Finney RDN  [facility]  Office: [phone] or [place]

## 2024-09-18 NOTE — CONSULTS
Care Management Initial Consult    General Information    Per H+P:  84 year old female admitted on 9/16/2024 to the LTAC for long-term antibiotics following MSSA bacteremia and endocarditis. 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  She was initially admitted to [facility] on 8/16/2024 for sepsis and HAMMAD.  She received empiric antibiotics on admission. Blood cultures grew MSSA.  Neurology was consulted for possible discitis.  ID was also consulted. Echo revealed large posterior leaflet mitral valve vegetation and small aortic valve vegetation with severe aortic stenosis.  Brain MRI revealed scattered embolic infarcts.  Coronary angiogram demonstrated coronary artery disease.  CT surgery was consulted. She underwent CABG alongside the procedures mentioned above.  She however has not had renal recovery.  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  She had dysphagia and momentarily required NG tube with tube feeds. She worked with speech therapy and is now cleared for regular diet and thin liquids.  CT surgery recommends warfarin for 3 months for bioprosthetic valves.  INR goal 2-3.  End date of warfarin approximately 12/10/2024, at which point she will be on Plavix alone (ASA allergy) indefinitely.  ID recommends IV vancomycin until 10/8/2024.       Assessment completed with: Patient, Spouse or significant other, Children, Darren Alvarez  Type of CM/SW Visit: CM Role Introduction    Primary Care Provider verified and updated as needed: Yes   Readmission within the last 30 days: no previous admission in last 30 days      Reason for Consult: discharge planning  Advance Care Planning: Advance Care Planning Reviewed: no concerns identified        Communication Assessment  Patient's communication style: spoken language (English or Bilingual)    Hearing Difficulty  or Deaf: no   Wear Glasses or Blind: yes    Cognitive  Cognitive/Neuro/Behavioral: WDL  Level of Consciousness: alert  Arousal Level: opens eyes spontaneously  Orientation: oriented x 4  Mood/Behavior: calm, cooperative  Best Language: 0 - No aphasia  Speech: clear, spontaneous    Living Environment:   People in home: spouse     Current living Arrangements: house      Able to return to prior arrangements: yes     Family/Social Support:  Care provided by: self, spouse/significant other  Provides care for: no one  Marital Status:   Support system: , Children  Ed       Description of Support System: Supportive, Involved       Current Resources:   Patient receiving home care services: No     Community Resources: None  Equipment currently used at home: none  Supplies currently used at home:      Employment/Financial:  Employment Status: retired        Financial Concerns: none         Does the patient's insurance plan have a 3 day qualifying hospital stay waiver?  No    Lifestyle & Psychosocial Needs:  Social Determinants of Health     Food Insecurity: Low Risk  (9/16/2024)    Food Insecurity     Within the past 12 months, did you worry that your food would run out before you got money to buy more?: No     Within the past 12 months, did the food you bought just not last and you didn t have money to get more?: No   Depression: Not at risk (9/16/2024)    PHQ-2     PHQ-2 Score: 0   Housing Stability: Low Risk  (9/16/2024)    Housing Stability     Do you have housing? : Yes     Are you worried about losing your housing?: No   Recent Concern: Housing Stability - High Risk (9/16/2024)    Housing Stability     Do you have housing? : No     Are you worried about losing your housing?: No   Tobacco Use: Medium Risk (9/16/2024)    Patient History     Smoking Tobacco Use: Former     Smokeless Tobacco Use: Former     Passive Exposure: Not on file   Financial Resource Strain: Low Risk  (9/16/2024)    Financial Resource  Strain     Within the past 12 months, have you or your family members you live with been unable to get utilities (heat, electricity) when it was really needed?: No   Alcohol Use: Not on file   Transportation Needs: Low Risk  (9/16/2024)    Transportation Needs     Within the past 12 months, has lack of transportation kept you from medical appointments, getting your medicines, non-medical meetings or appointments, work, or from getting things that you need?: No   Physical Activity: Not on file   Interpersonal Safety: Low Risk  (9/16/2024)    Interpersonal Safety     Do you feel physically and emotionally safe where you currently live?: Yes     Within the past 12 months, have you been hit, slapped, kicked or otherwise physically hurt by someone?: No     Within the past 12 months, have you been humiliated or emotionally abused in other ways by your partner or ex-partner?: No   Recent Concern: Interpersonal Safety - High Risk (8/23/2024)    Interpersonal Safety     Do you feel physically and emotionally safe where you currently live?: No     Within the past 12 months, have you been hit, slapped, kicked or otherwise physically hurt by someone?: No     Within the past 12 months, have you been humiliated or emotionally abused in other ways by your partner or ex-partner?: No   Stress: Not on file   Social Connections: Moderately Integrated (9/16/2024)    Social Connection and Isolation Panel [NHANES]     Frequency of Communication with Friends and Family: More than three times a week     Frequency of Social Gatherings with Friends and Family: Patient unable to answer     Attends Yazidism Services: More than 4 times per year     Active Member of Clubs or Organizations: Patient declined     Attends Club or Organization Meetings: Never     Marital Status:    Health Literacy: Adequate Health Literacy (9/16/2024)     Health Literacy     Frequency of need for help with medical instructions: Never       Functional  Status:  Prior to admission patient needed assistance:   Dependent ADLs:: Ambulation-walker, Bathing, Dressing, Grooming, Incontinence, Positioning, Transfers  Dependent IADLs:: Cleaning, Cooking, Laundry, Shopping, Meal Preparation, Medication Management, Money Management, Transportation, Incontinence  Assesssment of Functional Status: Not at baseline with ADL Functioning    Mental Health Status:      Chemical Dependency Status:        Values/Beliefs:  Spiritual, Cultural Beliefs, Rastafarian Practices, Values that affect care: no       Patient had a visit yesterday from our . She is looking forward to the next visits.        Discussed  Partnership in Safe Discharge Planning  document with patient/family: No    Additional Information:  This writer met with Kim, her , Ed, and their daughter, Nayeli this morning.  Gave family the contact information for the SW, RN-CC, and the nurses' line.  Discussed the role of CM and discharge planning with patient and family.  Did explain that we usually do a care conference about 2 weeks into patient's stay at [facility].  Will schedule this to occur in about 2 weeks time.Kim hopes that she will be able to return home after discharge.  It is not clear if patient will require OP dialysis, but this is possible.  She may also need home care for the first few weeks at home.  Patient no longer drives. Her  does the driving now and he has been in good health. Her primary care clinic is [facility] in [place]; she sees the NP, Kye.  Patient will need to be on warfarin for 3 months and then plavix after that, as she is allergic to aspirin. Patient will be on IV antibiotics until 10/8/24.  Her current dialysis days are M, W, and F.    Next Steps:  schedule CPM when appropriate, continue to monitor if patient will need OP dialysis center    Leisa Hughes, RN, BSN  Care Coordination  [facility]  [phone]

## 2024-09-18 NOTE — PHARMACY-VANCOMYCIN DOSING SERVICE
Pharmacy Vancomycin Note  Date of Service 2024  Patient's  1940   84 year old, female    Indication: Endocarditis  Day of Therapy: Started , planning therapy through at least 10/8/24  Current vancomycin regimen:   Intermittent dosing   Current vancomycin monitoring method:  dosing based on pre dialysis level   Current vancomycin therapeutic monitoring goal: 15-20 mg/L        Current estimated CrCl = Estimated Creatinine Clearance: 11.3 mL/min (A) (based on SCr of 4.01 mg/dL (H)).    Creatinine for last 3 days  2024:  5:06 AM Creatinine 5.05 mg/dL  2024:  7:45 AM Creatinine 3.16 mg/dL; 10:45 AM Creatinine 3.34 mg/dL  2024:  7:04 AM Creatinine 4.01 mg/dL    Recent Vancomycin Levels (past 3 days)  2024:  5:06 AM Vancomycin 20.7 ug/mL  2024:  7:04 AM Vancomycin 22.9 ug/mL    Vancomycin IV Administrations (past 72 hours)                     vancomycin (VANCOCIN) 500 mg vial to attach to  mL bag (mg) 500 mg New Bag 24 1854                    Nephrotoxins and other renal medications (From now, onward)      Start     Dose/Rate Route Frequency Ordered Stop    24 0900  torsemide (DEMADEX) tablet 100 mg         100 mg Oral DAILY 24 1254      24 1254  vancomycin place horne - receiving intermittent dosing         1 each Intravenous SEE ADMIN INSTRUCTIONS 24 1254                 Contrast Orders - past 72 hours (72h ago, onward)      None            Interpretation of levels and current regimen:  Vancomycin level is reflective of supratherapeutic level, level is slightly above goal of 15-20, will recheck level after dialysis. Plan to hold dose today.     Has serum creatinine changed greater than 50% in last 72 hours: ARF on dialysis MWF    Urine output:  minimal urine output, amounts are not recorded     Renal Function: ARF on Dialysis        Plan:  Level is slightly above goal prior to dialysis, will hold dose for today and check level after  dialysis today and again prior to dialysis on Friday   Vancomycin monitoring method: Renal Replacement Therapy  Vancomycin therapeutic monitoring goal: 15-20 mg/L  Pharmacy will check vancomycin levels as appropriate in 1-3 Days.  Serum creatinine levels will be ordered  will monitor with dialysis  .    Kisha Rodriguez, RPh,  BCCP, BPS

## 2024-09-18 NOTE — PLAN OF CARE
Problem: Pain Acute  Goal: Optimal Pain Control and Function  Outcome: Progressing   Goal Outcome Evaluation: Pt denied pain at beginning of shift. Later complained of 6/10 back pain. PRN Tylenol given, to good effect.  Pt alert and oriented x 4; vital signs stable; received hemodialysis treatment today; 1 liter of fluid was removed over 3 hours.  Pt is on Heparin protocol. At 0942, Anti Xa was 0.64. Per protocol, infusion was paused and resumed after 60 minutes, at a rate of 500units/hour. Next timed blood draw is due at 1912.  Pt's family visited.  All care needs met.      Hannah Gibson RN

## 2024-09-18 NOTE — PROGRESS NOTES
[facility]    Medicine Progress Note - Hospitalist Service    Date of Admission:  9/16/2024    Felicitas Elliott is a 84 year old female admitted on 9/16/2024 to the LTAC for long-term antibiotics following MSSA bacteremia and endocarditis. 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  She was initially admitted to [facility] on 8/16/2024 for sepsis and HAMMAD.  She received empiric antibiotics on admission. Blood cultures grew MSSA.  Neurology was consulted for possible discitis.  ID was also consulted. Echo revealed large posterior leaflet mitral valve vegetation and small aortic valve vegetation with severe aortic stenosis.  Brain MRI revealed scattered embolic infarcts.  Coronary angiogram demonstrated coronary artery disease.  CT surgery was consulted. She underwent CABG alongside the procedures mentioned above.  She however has not had renal recovery.  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  She had dysphagia and momentarily required NG tube with tube feeds. She worked with speech therapy and is now cleared for regular diet and thin liquids.  CT surgery recommends warfarin for 3 months for bioprosthetic valves.  INR goal 2-3.  End date of warfarin approximately 12/10/2024, at which point she will be on Plavix alone (ASA allergy) indefinitely.  ID recommends IV vancomycin until 10/8/2024.    LT[facility] Course:  - 9/17: Progressing well. On heparin gtt with coumadin for bioprosthetic valve. INR  1.49 today.  Remains on long-term antibiotics.  No new changes at this time.  - 9/18.  Dialyzing today.  Remains on heparin gtt. follow-up bioprosthetic valve.  INR 1.88 today.  Plan to continue current medical management    BARRIERS TO DISCHARGE (why care is unable to be provided at a lower level of care):    -Long-term antibiotics.  -Dialysis    Assessment & Plan     MSSA  bacteremia  Aortic root abscess s/p aortic root abscess debridement and aortic annular reconstruction, aortic root replacement, bioprosthetic aortic valve 8/27/2024  Gram-negative bacilli and respiratory culture s/p cefepime and ceftriaxone  -Positive blood cultures 8/14/2024 and 8/16 with MSSA.  Repeat blood cultures - 8/17/2024  -Mitral and aortic valve endocarditis as evidenced with large vegetation on mitral valve 8 mm x 15 at x 2 posterior leaflet of small vegetation on aortic valve.  With signs of vegetation combined with brain infarct.  S/p CV surgery  -Continue with IV vancomycin.  Dosing per pharmacy  -Per ID will need to check immunoglobulin level in 4 to 6 weeks, sister with history of hypogammaglobinemia  -ID consulted and following  -Patient previously on heparin 5000 subQ every 8 hours and Coumadin.  INR today 1.49.  Recommended INR goal is 2-3 per CT surgery.  Discontinued heparin 5000 units subcu every 8 hours on admission and started patient on heparin gtt. alongside Coumadin.  I also notified pharmacy and there are agreeable with this plan  -Pharm.D. to dose Coumadin    Abnormal brain MRI suggestive of subacute infarct.  Patient with a history of MSSA bacteremia and aortic valve endocarditis.  Most likely septic emboli.  -Continue with antibiotics per ID.  End date 10/8/2024    Acute respiratory with hypoxia  -Patient previously intubated.  Now extubated.    -Continue with oxygen support to maintain oxygenation above 92%.  Wean as tolerated  -RT while in LTAC    HAMMAD  -Previously on CCRT now transferred to HD  -Torsemide per nephrology  -Consult nephrology while in LTAC  -HD per nephrology-MWF  -Monitor kidney function with BMP    CAD  A-fib  History of aortic stenosis  -Fairly stable at this time.  -S/p CABG per CT surgery  -Continue with Plavix and statin  -Metoprolol  -Previously on amiodarone for rate control, now s/p PPM  -Monitor on telemetry for 24 hours.    Physical deconditioning/critical  "illness myopathy  -Fall precautions.  -PT/OT  -WOC    Diet: Snacks/Supplements Adult: Magic Cup; With Meals  Snacks/Supplements Adult: Gelatein Plus; With Meals  Advance Diet as Tolerated: Regular Diet Adult    DVT Prophylaxis: Warfarin  Le Catheter: Not present  Lines: PRESENT      PICC 09/06/24 Triple Lumen Right Basilic Vascular access-Site Assessment: WDL  CVC Double Lumen Right Subclavian Tunneled-Site Assessment: WDL      Cardiac Monitoring: None  Code Status: No CPR- Do NOT Intubate      Clinically Significant Risk Factors        # Hypokalemia: Lowest K = 3.2 mmol/L in last 2 days, will replace as needed  # Hyponatremia: Lowest Na = 134 mmol/L in last 2 days, will monitor as appropriate      # Hypoalbuminemia: Lowest albumin = 3.1 g/dL at 9/18/2024  7:04 AM, will monitor as appropriate   # Thrombocytopenia: Lowest platelets = 108 in last 2 days, will monitor for bleeding             # Overweight: Estimated body mass index is 27.38 kg/m  as calculated from the following:    Height as of 8/19/24: 1.702 m (5' 7\").    Weight as of this encounter: 79.3 kg (174 lb 13.2 oz)., PRESENT ON ADMISSION       # Financial/Environmental Concerns: none   # Pacemaker present  # History of CABG: noted on surgical history    Disposition Plan     Medically Ready for Discharge: Anticipated in 5+ Days    Adan Veloz MD  Hospitalist Service  [facility]  Securely message with [employer] (more info)  Text page via [facility] Paging/Directory   ______________________________________________________________________    Interval History   Patient in bed comfortably. Dialyzing. She reports she is progressing well. Remains on IV antibiotics . No new complaints at this time    Physical Exam   Vital Signs: Temp: 97.6  F (36.4  C) Temp src: Oral BP: 111/73 Pulse: 89   Resp: 18 SpO2: 97 % O2 Device: Nasal cannula   Weight: 174 lbs 13.2 oz  Constitutional: Patient is resting in bed comfortably appears in no distress.  Eyes: Pupils are equal round and " reactive to light  Respiratory: Good respiratory effort, on auscultation good air entry is noted  Cardiovascular: Good S1 and S2 no obvious murmurs peripheral pulses are palpable  GI: Abdomen is soft nontender nondistended bowel sounds are noted  Skin: No obvious rashes noted on exposed areas, warm to touch please refer to nursing/wound care note for complete skin exam  Musculoskeletal: Good muscle tone nails and digits appear acyanotic  Neuropsychiatric: Awake alert oriented to person normal affect    Medical Decision Making       52 MINUTES SPENT BY ME on the date of service doing chart review, history, exam, documentation & further activities per the note.  ------------------ MEDICAL DECISION MAKING ------------------------------------------------------------------------------------------------------  MANAGEMENT DISCUSSED with the following over the past 24 hours: Patient, family members in the room, staff RN and pharmacist and ID   NOTE(S)/MEDICAL RECORDS REVIEWED over the past 24 hours: ID       Data   Recent Labs   Lab 09/18/24  0942 09/18/24  0704 09/17/24  1050 09/17/24  1045 09/17/24  0745 09/16/24  1242 09/16/24  0506   WBC  --  6.2 6.2  --  6.5   < > 7.2   HGB  --  7.3* 7.6*  --  7.5*   < > 7.5*   MCV  --  104* 102*  --  101*   < > 104*   PLT  --  121* 114*  --  115*   < > 112*   INR 1.88*  --   --   --  1.49*  --  1.35*   NA  --  136  --  134* 136   < > 132*   POTASSIUM  --  3.8  --  3.6 3.6   < > 3.7   CHLORIDE  --  97*  --  97* 99  --  93*   CO2  --  28  --  27 26  --  26   BUN  --  33.0*  --  26.3* 25.5*  --  49.1*   CR  --  4.01*  --  3.34* 3.16*  --  5.05*   ANIONGAP  --  11  --  10 11  --  13   KAELYN  --  8.4*  --  8.1* 8.1*  --  8.6*   GLC  --  87  --  191* 95   < > 104*   ALBUMIN  --  3.1*  --  3.1* 3.1*   < >  --    PROTTOTAL  --  6.1*  --  6.1* 6.2*   < >  --    BILITOTAL  --  0.7  --  0.7 0.8   < >  --    ALKPHOS  --  75  --  75 75   < >  --    ALT  --  29  --  32 34   < >  --    AST  --  34  --   32 33   < >  --     < > = values in this interval not displayed.       Most Recent 3 CBC's:  Recent Labs   Lab Test 09/18/24  0704 09/17/24  1050 09/17/24  0745   WBC 6.2 6.2 6.5   HGB 7.3* 7.6* 7.5*   * 102* 101*   * 114* 115*     Most Recent 3 BMP's:  Recent Labs   Lab Test 09/18/24  0704 09/17/24  1045 09/17/24  0745    134* 136   POTASSIUM 3.8 3.6 3.6   CHLORIDE 97* 97* 99   CO2 28 27 26   BUN 33.0* 26.3* 25.5*   CR 4.01* 3.34* 3.16*   ANIONGAP 11 10 11   KAELYN 8.4* 8.1* 8.1*   GLC 87 191* 95     Most Recent 2 LFT's:  Recent Labs   Lab Test 09/18/24  0704 09/17/24  1045   AST 34 32   ALT 29 32   ALKPHOS 75 75   BILITOTAL 0.7 0.7     Most Recent 3 INR's:  Recent Labs   Lab Test 09/18/24  0942 09/17/24  0745 09/16/24  0506   INR 1.88* 1.49* 1.35*

## 2024-09-18 NOTE — CONSULTS
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 With some proteinuria. Previously seen by Dr Anaya with [facility]. When seen, extensive serologic testing was negative.   Imaging with left renal cortical thinning ~8cm kidney , rt 9.7cm kideny with simple cysts (4/2024)     HAMMAD in setting of cardiac surgery  On CRRT 8/29/24-9/8/24  CVC tunneled placed 9/11/24  Transition to iHD 9/9/24, maintained on MWF schedule    3 hr TT, Low dose heparin  UF 1-3kg  Midodrine as needed for BP support     Cr in 3s  BUN 20s  Lytes ON, lower K, no supplementation  Will check Cys C  Pt reporting uremic symptoms (tiredness, weak, low appetite, nausea)  Continue HD presently, will check cystatin C and see how pt adjusts to [facility]. Would be nice to have urine outpt quantification, but pt had been intolerant of purewic at [facility]. Will revisit purewick / bladder scan.     Monitor labs   Continue I/O  Daily Wts  HD on MWF    Volume  On Daily weights and I/O, pt states she is urinating, but previous notes indicate very little outpt.  Pt and  share that purewic was not well tolerated when at [facility]. Could consider to retry in future for quantification.   1-3kg UF with dialysis runs     Blood Pressures   Soft BP recorded, on no antihypertensive medications aside from BB for Afib.    Midodrine as needed for BP support on dialysis    Anemia  Macrocytic anemia  Weekly HILARY, 20K units    MSSA bacteremia  Prostetic valve  IV vanco  Warfarin, INR goal 2-3    Respiratory Failure  Previously intubated, now extubated.   On oxygen with goals to wean.     CAD, Afib  S/P CABG, valve surgery  Metoprolol    HLD - statin    HPI:   Felicitas is an 85yo F  Admit to [facility] 9/16 for long-term abx following MSSA bacteremia and endocarditis.  Admit to [facility] 8/16 for sepsis and HAMMAD.  ID and neuro  "consulted for possible discitis.   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 Also with CABG. To be on warfarin 3 mo for valves. IV vanco until 10/8/24  Was on CRRT for HAMMAD, now on HD.     Met pt and  for first time.   Pt goes by JOSE LUIS  On HD and HD nurse Lalo  present     Pt with reported poor appetite  Persistent nausea \"from pills\"  Says she is urinating, but per reports from [facility] and recording nursing notes, few voids  Had been intolerant of purewick at [facility]  Reports feeling weak, tired  \"Need to work 3x as hard to breathe\"    We discuss HAMMAD and dialysis   Asking if need to be on forever  Review would be ideal to quantify urine outpt  Has typical uremic symptoms (nausea, poor appetite, weakness, fatigue) we discussed, although they are not exclusive to renal failure  Will update cystain c  Continue dialysis presently  Pt and  on board.     REVIEW OF SYSTEMS:  ROS was completely reviewed and otherwise negative and non-contributory       No past medical history on file.  Social History     Socioeconomic History    Marital status:      Spouse name: Ed    Number of children: 7    Years of education: [employer]    Highest education level: 12th grade   Occupational History    Occupation:    Tobacco Use    Smoking status: Former     Current packs/day: 8.00     Average packs/day: 8.0 packs/day for 0.7 years (5.7 ttl pk-yrs)     Types: Cigarettes     Start date: 2024    Smokeless tobacco: Former   Vaping Use    Vaping status: Never Used   Substance and Sexual Activity    Alcohol use: Not Currently    Drug use: Not on file    Sexual activity: Not Currently     Partners: Male     Birth control/protection: None   Other Topics Concern    Not on file   Social History Narrative    Not on file     Social Determinants of Health     Financial Resource Strain: Low Risk  (9/16/2024)    " Financial Resource Strain     Within the past 12 months, have you or your family members you live with been unable to get utilities (heat, electricity) when it was really needed?: No   Food Insecurity: Low Risk  (9/16/2024)    Food Insecurity     Within the past 12 months, did you worry that your food would run out before you got money to buy more?: No     Within the past 12 months, did the food you bought just not last and you didn t have money to get more?: No   Transportation Needs: Low Risk  (9/16/2024)    Transportation Needs     Within the past 12 months, has lack of transportation kept you from medical appointments, getting your medicines, non-medical meetings or appointments, work, or from getting things that you need?: No   Physical Activity: Not on file   Stress: Not on file   Social Connections: Moderately Integrated (9/16/2024)    Social Connection and Isolation Panel [NHANES]     Frequency of Communication with Friends and Family: More than three times a week     Frequency of Social Gatherings with Friends and Family: Patient unable to answer     Attends Restoration Services: More than 4 times per year     Active Member of Clubs or Organizations: Patient declined     Attends Club or Organization Meetings: Never     Marital Status:    Interpersonal Safety: Low Risk  (9/16/2024)    Interpersonal Safety     Do you feel physically and emotionally safe where you currently live?: Yes     Within the past 12 months, have you been hit, slapped, kicked or otherwise physically hurt by someone?: No     Within the past 12 months, have you been humiliated or emotionally abused in other ways by your partner or ex-partner?: No   Recent Concern: Interpersonal Safety - High Risk (8/23/2024)    Interpersonal Safety     Do you feel physically and emotionally safe where you currently live?: No     Within the past 12 months, have you been hit, slapped, kicked or otherwise physically hurt by someone?: No     Within the  past 12 months, have you been humiliated or emotionally abused in other ways by your partner or ex-partner?: No   Housing Stability: Low Risk  (2024)    Housing Stability     Do you have housing? : Yes     Are you worried about losing your housing?: No   Recent Concern: Housing Stability - High Risk (2024)    Housing Stability     Do you have housing? : No     Are you worried about losing your housing?: No          ALLERGIES/SENSITIVITIES:  Allergies   Allergen Reactions    Aspirin Rash    Cats Rash    Nickel Rash         PHYSICAL EXAM:  Physical Exam   Temp: 97.8  F (36.6  C) Temp src: Oral BP: 118/59 Pulse: 69   Resp: 16 SpO2: 97 % O2 Device: Nasal cannula    Vitals:    24 0400 24 00024 08   Weight: 80.5 kg (177 lb 7.5 oz) 78.7 kg (173 lb 8 oz) 79.3 kg (174 lb 13.2 oz)     Vital Signs with Ranges  Temp:  [97.8  F (36.6  C)-98.3  F (36.8  C)] 97.8  F (36.6  C)  Pulse:  [60-77] 69  Resp:  [16-20] 16  BP: ()/(51-67) 118/59  SpO2:  [95 %-98 %] 97 %  I/O last 3 completed shifts:  In: 459.3 [P.O.:320; I.V.:139.3]  Out: -     Temp (24hrs), Av  F (36.7  C), Min:97.8  F (36.6  C), Max:98.3  F (36.8  C)      Patient Vitals for the past 72 hrs:   Weight   24 0805 79.3 kg (174 lb 13.2 oz)   24 000 78.7 kg (173 lb 8 oz)   24 040 80.5 kg (177 lb 7.5 oz)   24 1547 81.5 kg (179 lb 10.8 oz)       General: oriented, tired and fatigued, falling asleep at end of visit   HEENT:  no scleral icterus  NECK:  no carotid bruits, no JVD  RESPIRATORY:  Lungs clear on anterior ausculation, normal effort O2 vannula  CARDIOVASCULAR:  RRR no heard murmur,, sternal scar  VOLUME mild edema in upper thigh  ABDOMEN:  soft, BS present, non-tender, non-distended   GENITOURINARY:  No chacko   INTEGUMENTARY: Warm, dry, no rash  NEUROLOGIC: grossly intact   Psych: calm, cooperative  ACCESS: cvc    Laboratory:     Recent Labs   Lab 24  1050 24  0745 24  1726  09/16/24  1603 09/16/24  0506 09/15/24  0423 09/14/24  0438   WBC 6.2 6.5  --  6.6 7.2 7.2 7.8   RBC 2.42* 2.42*  --  2.41* 2.01* 1.87* 2.02*   HGB 7.6* 7.5* 8.2* 7.5* 7.5* 7.1* 7.2*   HCT 24.7* 24.5*  --  24.0* 20.8* 19.6* 21.0*   * 115*  --  108* 112* 106* 106*       Basic Metabolic Panel:  Recent Labs   Lab 09/17/24  1045 09/17/24  0745 09/16/24  1726 09/16/24  1242 09/16/24  0506 09/15/24  0423 09/14/24  0740 09/14/24  0438 09/13/24  0523   * 136 139  --  132* 132*  --  135 135   POTASSIUM 3.6 3.6 3.2*  --  3.7 3.9  --  4.0 4.5   CHLORIDE 97* 99  --   --  93* 94*  --  96* 97*   CO2 27 26  --   --  26 26  --  28 24   BUN 26.3* 25.5*  --   --  49.1* 39.5*  --  30.3* 63.7*   CR 3.34* 3.16*  --   --  5.05* 4.43*  --  3.30* 5.51*   * 95 143* 102* 104* 107*   < > 104* 115*   KAELYN 8.1* 8.1*  --   --  8.6* 8.6*  --  8.4* 8.6*    < > = values in this interval not displayed.       INR  Recent Labs   Lab 09/17/24  0745 09/16/24  0506 09/15/24  0423 09/14/24  0438   INR 1.49* 1.35* 1.20* 1.24*       Recent Labs   Lab Test 09/17/24  1045 09/17/24  0745   POTASSIUM 3.6 3.6   CHLORIDE 97* 99   BUN 26.3* 25.5*      Recent Labs   Lab Test 09/17/24  1045 09/17/24  0745 08/30/24  1322 08/30/24  0952 08/27/24  1644 08/24/24  2303   ALBUMIN 3.1* 3.1*   < >  --    < >  --    BILITOTAL 0.7 0.8   < >  --    < >  --    ALT 32 34   < >  --    < >  --    AST 32 33   < >  --    < >  --    PROTEIN  --   --   --  20*  --  10*    < > = values in this interval not displayed.       Personally reviewed today's laboratory studies      Thank you for involving us in the care of this patient. We will continue to follow along with you.      Miroslava Casey PA-C   [facility]  [phone]

## 2024-09-18 NOTE — PROGRESS NOTES
HEMODIALYSIS TREATMENT NOTE      Date: 9/18/2024  Time: 1200     Data:  Pre Wt:   79.3 kg (bed scale)  Desired Wt:   To be established  Post Wt:  78.3 kg (bed scale)  Weight change: - 1 kg  Ultrafiltration - Post Run Net Total Removed (mL):  1000 ml  Vascular Access Status: CVC patent  Dialyzer Rinse:  Light  Total Blood Volume Processed: 61.5 L   Total Dialysis (Treatment) Time:   3 Hrs  Dialysate Bath: K 3, Ca 3  Heparin:  500 units hourly.     Lab:   No    Interventions:  Dialysis done through right chest tunneled dialysis catheter. ,   UF set to 1.3 Liters of fluid removal, accommodating priming and rinse back volumes  Meds administered per MAR  CVC dressing changed aseptically  See Flowsheet for Crit Profile throughout the run.Treatment has ended safely and blood is rinsed back completely. Pt tolerated well with treatment. Catheter lumens flushed with saline and locked with saline, catheter caps changed post HD. Post Tx assessment done. Patient remains her room in stable condition  Report given to BOBBY RN.     Assessment:  A/O x 4, calm & cooperative, denies pain  CVC intact, previous dressing clean and dry                Plan:    Per Renal team

## 2024-09-18 NOTE — PROCEDURES
[facility] Vascular access    Vascular access consultation for PICC insertion was received yesterday. The patient now has a triple lumen picc in his right arm, which was inserted on 8/21/24 and is in WDL according to flowsheet documents. Today, I clarified an order with Charge RN. Fozia was asked if the PICC needed to be updated or inserted, and she said it could be a carryover order from admission and that she would double-check with the MD. Waiting for a callback.    Dany Tapia  PICC JULES

## 2024-09-18 NOTE — PLAN OF CARE
Problem: Adult Inpatient Plan of Care  Goal: Plan of Care Review  Description: The Plan of Care Review/Shift note should be completed every shift.  The Outcome Evaluation is a brief statement about your assessment that the patient is improving, declining, or no change.  This information will be displayed automatically on your shift  note.  Outcome: Progressing  Goal: Absence of Hospital-Acquired Illness or Injury  Intervention: Prevent Infection  Recent Flowsheet Documentation  Taken 9/18/2024 0000 by Demetrice Mayes RN  Infection Prevention: rest/sleep promoted  Goal: Optimal Comfort and Wellbeing  Intervention: Provide Person-Centered Care  Recent Flowsheet Documentation  Taken 9/18/2024 0000 by Demetrice Mayes RN  Trust Relationship/Rapport:   care explained   choices provided   Goal Outcome Evaluation:         Patient was A/O X 4, denied pain but was unable to sleep, around 0155 prn melatonin 3 mg was given and that was effective,patient slept the rest of the shift, patient was on continuous   heparin drip, anti -Xa result came back 0.10and patient received 2350 ml bolus per protocol and infusion adjusted to 700 ml/hr. Next lab will be @ 0718.

## 2024-09-18 NOTE — PLAN OF CARE
Goal Outcome Evaluation:      Plan of Care Reviewed With: patient, spouse    Overall Patient Progress: improvingOverall Patient Progress: improving    Outcome Evaluation: Pt AOx4, calm and cooperative. anti xa drawn and within limits at 1446, next draw at 2246   Results pending

## 2024-09-19 ENCOUNTER — APPOINTMENT (OUTPATIENT)
Dept: PHYSICAL THERAPY | Facility: CLINIC | Age: 84
End: 2024-09-19
Attending: HOSPITALIST
Payer: COMMERCIAL

## 2024-09-19 ENCOUNTER — APPOINTMENT (OUTPATIENT)
Dept: OCCUPATIONAL THERAPY | Facility: CLINIC | Age: 84
DRG: 288 | End: 2024-09-19
Attending: HOSPITALIST
Payer: COMMERCIAL

## 2024-09-19 ENCOUNTER — TELEPHONE (OUTPATIENT)
Dept: CARDIOLOGY | Facility: CLINIC | Age: 84
End: 2024-09-19
Payer: COMMERCIAL

## 2024-09-19 DIAGNOSIS — Z95.0 CARDIAC PACEMAKER IN SITU: Primary | ICD-10-CM

## 2024-09-19 DIAGNOSIS — I44.2 CHB (COMPLETE HEART BLOCK) (H): ICD-10-CM

## 2024-09-19 DIAGNOSIS — I49.5 SICK SINUS SYNDROME (H): ICD-10-CM

## 2024-09-19 LAB
ALBUMIN SERPL BCG-MCNC: 3.1 G/DL (ref 3.5–5.2)
ALP SERPL-CCNC: 78 U/L (ref 40–150)
ALT SERPL W P-5'-P-CCNC: 31 U/L (ref 0–50)
ANION GAP SERPL CALCULATED.3IONS-SCNC: 7 MMOL/L (ref 7–15)
AST SERPL W P-5'-P-CCNC: 36 U/L (ref 0–45)
BASOPHILS # BLD AUTO: 0.1 10E3/UL (ref 0–0.2)
BASOPHILS NFR BLD AUTO: 1 %
BILIRUB SERPL-MCNC: 0.7 MG/DL
BUN SERPL-MCNC: 18.7 MG/DL (ref 8–23)
CALCIUM SERPL-MCNC: 8.6 MG/DL (ref 8.8–10.4)
CHLORIDE SERPL-SCNC: 103 MMOL/L (ref 98–107)
CREAT SERPL-MCNC: 2.89 MG/DL (ref 0.51–0.95)
EGFRCR SERPLBLD CKD-EPI 2021: 15 ML/MIN/1.73M2
EOSINOPHIL # BLD AUTO: 0.5 10E3/UL (ref 0–0.7)
EOSINOPHIL NFR BLD AUTO: 8 %
ERYTHROCYTE [DISTWIDTH] IN BLOOD BY AUTOMATED COUNT: 21.1 % (ref 10–15)
GLUCOSE SERPL-MCNC: 92 MG/DL (ref 70–99)
HCO3 SERPL-SCNC: 28 MMOL/L (ref 22–29)
HCT VFR BLD AUTO: 24.5 % (ref 35–47)
HGB BLD-MCNC: 7.4 G/DL (ref 11.7–15.7)
IMM GRANULOCYTES # BLD: 0 10E3/UL
IMM GRANULOCYTES NFR BLD: 0 %
INR PPP: 2.19 (ref 0.85–1.15)
LYMPHOCYTES # BLD AUTO: 1.4 10E3/UL (ref 0.8–5.3)
LYMPHOCYTES NFR BLD AUTO: 24 %
MCH RBC QN AUTO: 32.2 PG (ref 26.5–33)
MCHC RBC AUTO-ENTMCNC: 30.2 G/DL (ref 31.5–36.5)
MCV RBC AUTO: 107 FL (ref 78–100)
MONOCYTES # BLD AUTO: 0.4 10E3/UL (ref 0–1.3)
MONOCYTES NFR BLD AUTO: 8 %
NEUTROPHILS # BLD AUTO: 3.4 10E3/UL (ref 1.6–8.3)
NEUTROPHILS NFR BLD AUTO: 59 %
PLATELET # BLD AUTO: 116 10E3/UL (ref 150–450)
POTASSIUM SERPL-SCNC: 4.2 MMOL/L (ref 3.4–5.3)
PROT SERPL-MCNC: 6.2 G/DL (ref 6.4–8.3)
RBC # BLD AUTO: 2.3 10E6/UL (ref 3.8–5.2)
SODIUM SERPL-SCNC: 138 MMOL/L (ref 135–145)
UFH PPP CHRO-ACNC: 0.29 IU/ML
WBC # BLD AUTO: 5.8 10E3/UL (ref 4–11)

## 2024-09-19 PROCEDURE — 999N000123 HC STATISTIC OXYGEN O2DAILY TECH TIME

## 2024-09-19 PROCEDURE — 97530 THERAPEUTIC ACTIVITIES: CPT | Mod: GP

## 2024-09-19 PROCEDURE — 250N000013 HC RX MED GY IP 250 OP 250 PS 637: Performed by: HOSPITALIST

## 2024-09-19 PROCEDURE — 250N000013 HC RX MED GY IP 250 OP 250 PS 637: Performed by: PHYSICIAN ASSISTANT

## 2024-09-19 PROCEDURE — 97110 THERAPEUTIC EXERCISES: CPT | Mod: GO | Performed by: OCCUPATIONAL THERAPIST

## 2024-09-19 PROCEDURE — 85520 HEPARIN ASSAY: CPT | Performed by: HOSPITALIST

## 2024-09-19 PROCEDURE — 120N000017 HC R&B RESPIRATORY CARE

## 2024-09-19 PROCEDURE — 80053 COMPREHEN METABOLIC PANEL: CPT | Performed by: HOSPITALIST

## 2024-09-19 PROCEDURE — 85610 PROTHROMBIN TIME: CPT | Performed by: PHYSICIAN ASSISTANT

## 2024-09-19 RX ORDER — VANCOMYCIN HYDROCHLORIDE 500 MG/10ML
500 INJECTION, POWDER, LYOPHILIZED, FOR SOLUTION INTRAVENOUS ONCE
Status: DISCONTINUED | OUTPATIENT
Start: 2024-09-19 | End: 2024-09-19

## 2024-09-19 RX ORDER — MULTIVITAMIN,THERAPEUTIC
1 TABLET ORAL DAILY
Status: DISCONTINUED | OUTPATIENT
Start: 2024-09-19 | End: 2024-09-19

## 2024-09-19 RX ORDER — MULTIPLE VITAMINS W/ MINERALS TAB 9MG-400MCG
1 TAB ORAL DAILY
Status: DISPENSED | OUTPATIENT
Start: 2024-09-19

## 2024-09-19 RX ORDER — WARFARIN SODIUM 4 MG/1
4 TABLET ORAL
Status: COMPLETED | OUTPATIENT
Start: 2024-09-19 | End: 2024-09-19

## 2024-09-19 RX ADMIN — CLOPIDOGREL 75 MG: 75 TABLET ORAL at 08:01

## 2024-09-19 RX ADMIN — ATORVASTATIN CALCIUM 80 MG: 40 TABLET, FILM COATED ORAL at 20:52

## 2024-09-19 RX ADMIN — DOCUSATE SODIUM AND SENNOSIDES 1 TABLET: 8.6; 5 TABLET, FILM COATED ORAL at 20:54

## 2024-09-19 RX ADMIN — ACETAMINOPHEN 650 MG: 325 TABLET, FILM COATED ORAL at 11:32

## 2024-09-19 RX ADMIN — MELATONIN 3 MG: 3 TAB ORAL at 20:53

## 2024-09-19 RX ADMIN — ACETAMINOPHEN 650 MG: 325 TABLET, FILM COATED ORAL at 20:52

## 2024-09-19 RX ADMIN — PANTOPRAZOLE SODIUM 40 MG: 40 TABLET, DELAYED RELEASE ORAL at 06:34

## 2024-09-19 RX ADMIN — TORSEMIDE 100 MG: 100 TABLET ORAL at 08:02

## 2024-09-19 RX ADMIN — METOPROLOL TARTRATE 25 MG: 25 TABLET, FILM COATED ORAL at 08:02

## 2024-09-19 RX ADMIN — ACETAMINOPHEN 650 MG: 325 TABLET, FILM COATED ORAL at 01:46

## 2024-09-19 RX ADMIN — Medication 1 CAPSULE: at 08:02

## 2024-09-19 RX ADMIN — Medication 1 CAPSULE: at 20:53

## 2024-09-19 RX ADMIN — Medication 1 TABLET: at 10:18

## 2024-09-19 RX ADMIN — WARFARIN SODIUM 4 MG: 4 TABLET ORAL at 17:27

## 2024-09-19 ASSESSMENT — ACTIVITIES OF DAILY LIVING (ADL)
ADLS_ACUITY_SCORE: 40
ADLS_ACUITY_SCORE: 44
ADLS_ACUITY_SCORE: 40
ADLS_ACUITY_SCORE: 40
ADLS_ACUITY_SCORE: 39
ADLS_ACUITY_SCORE: 40
ADLS_ACUITY_SCORE: 40
ADLS_ACUITY_SCORE: 44
ADLS_ACUITY_SCORE: 40
ADLS_ACUITY_SCORE: 39
ADLS_ACUITY_SCORE: 44
ADLS_ACUITY_SCORE: 44
ADLS_ACUITY_SCORE: 40

## 2024-09-19 NOTE — PLAN OF CARE
Problem: Adult Inpatient Plan of Care  Goal: Plan of Care Review  Description: The Plan of Care Review/Shift note should be completed every shift.  The Outcome Evaluation is a brief statement about your assessment that the patient is improving, declining, or no change.  This information will be displayed automatically on your shift  note.  Outcome: Progressing  Goal: Absence of Hospital-Acquired Illness or Injury  Intervention: Identify and Manage Fall Risk  Recent Flowsheet Documentation  Taken 9/19/2024 0041 by Demetrice Mayes RN  Safety Promotion/Fall Prevention:   activity supervised   lighting adjusted   room door open   room near nurse's station  Intervention: Prevent Infection  Recent Flowsheet Documentation  Taken 9/19/2024 0041 by Demetrice Mayes RN  Infection Prevention: rest/sleep promoted  Goal: Optimal Comfort and Wellbeing  Intervention: Provide Person-Centered Care  Recent Flowsheet Documentation  Taken 9/19/2024 0041 by Demetrice Mayes RN  Trust Relationship/Rapport:   care explained   choices provided   Goal Outcome Evaluation:       Patient was A/O X 4, denied pain but requested for Tylenol for comfort and that was helpful, patient slept for 3- 5 hours, continued on heparin drip at 500 ml/hr, Anti Xa came back second time  within therapeutic range, protocol done, next lab will be @ 0600 tomorrow AM. All other patient needs were attended to.

## 2024-09-19 NOTE — PLAN OF CARE
Problem: Adult Inpatient Plan of Care  Goal: Absence of Hospital-Acquired Illness or Injury  Outcome: Progressing  Goal: Optimal Comfort and Wellbeing  Intervention: Monitor Pain and Promote Comfort  Recent Flowsheet Documentation  Taken 9/19/2024 1132 by Hannah Gibson RN  Pain Management Interventions: medication (see MAR)   Goal Outcome Evaluation: Pt has no hospital-acquired injuries.  Pt alert and oriented x 4; vital signs stable. Pt complained of chronic arthritic pain in the neck. PRN Tylenol given, to good effect.  Pt's  and daughter visited, and pt spent time up in chair.  Pt had one small. Incontinent BM.  Continuous IV Heparin transfusion discontinued at 1230, per physician order.  Gauze dressing and SteriStrips removed from pacemaker implant site, per direction from nurse at [facility]. Monitor brought in by spouse was plugged in. Remote transmission is to be done tonight.  All care needs met.      Hannah Gibson, RN

## 2024-09-19 NOTE — PLAN OF CARE
Goal Outcome Evaluation:       Patient alert and oriented.pt on contours heparin infusion. Anti Xa level result back 0.42. it is with in therapeutic range. No rate change at this time. Next lab draw will be 0131. PRN tylenol given for arteritis  pain on the left side of the neck with effect. Incisions and dressings are clean and intact.Heart rate was 42 blood pressure cuff. Checked with oximeter and it was 64. was here.  Pt resting in bed comfortably at this moment. Will continue monitoring.  Problem: Adult Inpatient Plan of Care  Goal: Absence of Hospital-Acquired Illness or Injury  Intervention: Prevent Infection  Recent Flowsheet Documentation  Taken 9/18/2024 2100 by Walker Lilly RN  Infection Prevention: rest/sleep promoted  Goal: Optimal Comfort and Wellbeing  Intervention: Monitor Pain and Promote Comfort  Recent Flowsheet Documentation  Taken 9/18/2024 2046 by Walker Lilly RN  Pain Management Interventions: medication (see MAR)     Problem: Pain Acute  Goal: Optimal Pain Control and Function  Intervention: Develop Pain Management Plan  Recent Flowsheet Documentation  Taken 9/18/2024 2046 by Walker Lilly RN  Pain Management Interventions: medication (see MAR)     Problem: Hemodialysis  Goal: Absence of Infection Signs and Symptoms  Intervention: Prevent or Manage Infection  Recent Flowsheet Documentation  Taken 9/18/2024 2100 by Walker Lilly RN  Infection Prevention: rest/sleep promoted

## 2024-09-19 NOTE — PROGRESS NOTES
[facility]    [facility] Note - Hospitalist Service    Date of Admission:  9/16/2024    Felicitas Elliott is a 84 year old female admitted on 9/16/2024 to the LTAC for long-term antibiotics following MSSA bacteremia and endocarditis. 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  She was initially admitted to [facility] on 8/16/2024 for sepsis and HAMMAD.  She received empiric antibiotics on admission. Blood cultures grew MSSA.  Neurology was consulted for possible discitis.  ID was also consulted. Echo revealed large posterior leaflet mitral valve vegetation and small aortic valve vegetation with severe aortic stenosis.  Brain MRI revealed scattered embolic infarcts.  Coronary angiogram demonstrated coronary artery disease.  CT surgery was consulted. She underwent CABG alongside the procedures mentioned above.  She however has not had renal recovery.  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  She had dysphagia and momentarily required NG tube with tube feeds. She worked with speech therapy and is now cleared for regular diet and thin liquids.  CT surgery recommends warfarin for 3 months for bioprosthetic valves.  INR goal 2-3.  End date of warfarin approximately 12/10/2024, at which point she will be on Plavix alone (ASA allergy) indefinitely.  ID recommends IV vancomycin until 10/8/2024.    LTAC Course:  - 9/17: Progressing well. On heparin gtt with coumadin for bioprosthetic valve. INR  1.49 today.  Remains on long-term antibiotics.  No new changes at this time.  - 9/18.  Dialyzing today.  Remains on heparin gtt. follow-up bioprosthetic valve.  INR 1.88 today.  Plan to continue current medical management  - 9/19. INR 2.19.  Will discontinue heparin gtt. Per RD patient has trouble chewing and this is impeding her oral intake,now on calorie counts. Will consult  SLP    BARRIERS TO DISCHARGE (why care is unable to be provided at a lower level of care):    -Long-term antibiotics.  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  Repeat blood cultures - 8/17/2024  -Mitral and aortic valve endocarditis as evidenced with large vegetation on mitral valve 8 mm x 15 at x 2 posterior leaflet of small vegetation on aortic valve.  With signs of vegetation combined with brain infarct.  S/p CV surgery  -Continue with IV vancomycin.  Dosing per pharmacy  -Per ID will need to check immunoglobulin level in 4 to 6 weeks, sister with history of hypogammaglobinemia  -ID consulted and following  -Patient previously on heparin 5000 subQ every 8 hours and Coumadin.  Recommended INR goal is 2-3 per CT surgery.  Discontinued heparin 5000 units subcu every 8 hours on admission and started patient on heparin gtt. alongside Coumadin.  I also notified pharmacy and were agreeable with this plan.  -9/19, INR 2.19.  Will discontinue heparin gtt. Patient on goal  -Pharm.D. to continue dosing Coumadin    Abnormal brain MRI suggestive of subacute infarct.  Patient with a history of MSSA bacteremia and aortic valve endocarditis.  Most likely septic emboli.  -Continue with antibiotics per ID.  End date 10/8/2024    Acute respiratory with hypoxia  -Patient previously intubated.  Now extubated.    -Continue with oxygen support to maintain oxygenation above 92%.  Wean as tolerated  -RT while in LTAC    HAMMAD  -Previously on CCRT now transferred to HD  -Torsemide per nephrology  -HD per nephrology-MWF  -Monitor kidney function with BMP    CAD  A-fib  History of aortic stenosis  -Fairly stable at this time.  -S/p CABG per CT  surgery  -Continue with Plavix and statin  -Metoprolol  -Previously on amiodarone for rate control, now s/p PPM  -Monitor on telemetry for 24 hours.    Physical deconditioning/critical illness myopathy  -Fall precautions.  -PT/OT  -WOC    Diet: Advance Diet as Tolerated: Regular Diet Adult  Snacks/Supplements Adult: Gelatein Plus; With Meals  Snacks/Supplements Adult: Magic Cup; With Meals  Calorie Counts    DVT Prophylaxis: Warfarin  Le Catheter: Not present  Lines: PRESENT      PICC 09/06/24 Triple Lumen Right Basilic Vascular access-Site Assessment: WDL  CVC Double Lumen Right Subclavian Tunneled-Site Assessment: WDL      Cardiac Monitoring: None  Code Status: No CPR- Do NOT Intubate      Clinically Significant Risk Factors              # Hypoalbuminemia: Lowest albumin = 3.1 g/dL at 9/19/2024  6:35 AM, will monitor as appropriate   # Thrombocytopenia: Lowest platelets = 116 in last 2 days, will monitor for bleeding              # Severe Malnutrition: based on nutrition assessment, PRESENT ON ADMISSION     # Financial/Environmental Concerns: none   # Pacemaker present  # History of CABG: noted on surgical history    Disposition Plan     Medically Ready for Discharge: Anticipated in 5+ Days    Adan Veloz MD  Hospitalist Service  [facility]  Securely message with [employer] (more info)  Text page via [employer] Paging/Directory   ______________________________________________________________________    Interval History   No new events overnight per RN.  Patient states she feels okay.  Has an INR 2.19 today.  Remains on IV antibiotics for bacteremia no new complaints at this time    Physical Exam   Vital Signs: Temp: 97.7  F (36.5  C) Temp src: Axillary BP: 111/58 Pulse: 69   Resp: 16 SpO2: 99 % O2 Device: Nasal cannula   Weight: 173 lbs .98 oz  Constitutional: Patient is resting in bed comfortably appears in no distress.  Eyes: Pupils are equal round and reactive to light  Respiratory: Good respiratory effort, on  auscultation good air entry is noted  Cardiovascular: Good S1 and S2 no obvious murmurs peripheral pulses are palpable  GI: Abdomen is soft nontender nondistended bowel sounds are noted  Skin: No obvious rashes noted on exposed areas, warm to touch please refer to nursing/wound care note for complete skin exam  Musculoskeletal: Good muscle tone nails and digits appear acyanotic  Neuropsychiatric: Awake alert oriented to person normal affect    Medical Decision Making       50 MINUTES SPENT BY ME on the date of service doing chart review, history, exam, documentation & further activities per the note.  ------------------ MEDICAL DECISION MAKING ------------------------------------------------------------------------------------------------------  MANAGEMENT DISCUSSED with the following over the past 24 hours: Patient, family members in the room, staff RN and pharmacist and ID   NOTE(S)/MEDICAL RECORDS REVIEWED over the past 24 hours: ID       Data   Recent Labs   Lab 09/19/24  0635 09/18/24  0942 09/18/24  0704 09/17/24  1050 09/17/24  1045 09/17/24  0745   WBC 5.8  --  6.2 6.2  --  6.5   HGB 7.4*  --  7.3* 7.6*  --  7.5*   *  --  104* 102*  --  101*   *  --  121* 114*  --  115*   INR 2.19* 1.88*  --   --   --  1.49*     --  136  --  134* 136   POTASSIUM 4.2  --  3.8  --  3.6 3.6   CHLORIDE 103  --  97*  --  97* 99   CO2 28  --  28  --  27 26   BUN 18.7  --  33.0*  --  26.3* 25.5*   CR 2.89*  --  4.01*  --  3.34* 3.16*   ANIONGAP 7  --  11  --  10 11   KAELYN 8.6*  --  8.4*  --  8.1* 8.1*   GLC 92  --  87  --  191* 95   ALBUMIN 3.1*  --  3.1*  --  3.1* 3.1*   PROTTOTAL 6.2*  --  6.1*  --  6.1* 6.2*   BILITOTAL 0.7  --  0.7  --  0.7 0.8   ALKPHOS 78  --  75  --  75 75   ALT 31  --  29  --  32 34   AST 36  --  34  --  32 33       Most Recent 3 CBC's:  Recent Labs   Lab Test 09/19/24  0635 09/18/24  0704 09/17/24  1050   WBC 5.8 6.2 6.2   HGB 7.4* 7.3* 7.6*   * 104* 102*   * 121* 114*      Most Recent 3 BMP's:  Recent Labs   Lab Test 09/19/24  0635 09/18/24  0704 09/17/24  1045    136 134*   POTASSIUM 4.2 3.8 3.6   CHLORIDE 103 97* 97*   CO2 28 28 27   BUN 18.7 33.0* 26.3*   CR 2.89* 4.01* 3.34*   ANIONGAP 7 11 10   KAELYN 8.6* 8.4* 8.1*   GLC 92 87 191*     Most Recent 2 LFT's:  Recent Labs   Lab Test 09/19/24  0635 09/18/24  0704   AST 36 34   ALT 31 29   ALKPHOS 78 75   BILITOTAL 0.7 0.7     Most Recent 3 INR's:  Recent Labs   Lab Test 09/19/24  0635 09/18/24  0942 09/17/24  0745   INR 2.19* 1.88* 1.49*

## 2024-09-19 NOTE — TELEPHONE ENCOUNTER
9/19/24: Spoke with Kim's nurse at [facility]. Nurse will have wound nurse remove outer bandage, remove steri strips, keep open to air, and put photo into patients chart. Nurse will make sure remote monitor is plugged in next to her bed so remote transmission is able to occur tonight. Reviewed incision care and monitoring, activity restrictions, remote monitoring, and contact information.    Debbi Dempsey RN

## 2024-09-20 ENCOUNTER — ANCILLARY PROCEDURE (OUTPATIENT)
Dept: CARDIOLOGY | Facility: CLINIC | Age: 84
End: 2024-09-20
Attending: INTERNAL MEDICINE
Payer: COMMERCIAL

## 2024-09-20 ENCOUNTER — APPOINTMENT (OUTPATIENT)
Dept: SPEECH THERAPY | Facility: CLINIC | Age: 84
DRG: 288 | End: 2024-09-20
Attending: HOSPITALIST
Payer: COMMERCIAL

## 2024-09-20 ENCOUNTER — APPOINTMENT (OUTPATIENT)
Dept: PHYSICAL THERAPY | Facility: CLINIC | Age: 84
DRG: 288 | End: 2024-09-20
Attending: HOSPITALIST
Payer: COMMERCIAL

## 2024-09-20 DIAGNOSIS — I44.2 CHB (COMPLETE HEART BLOCK) (H): ICD-10-CM

## 2024-09-20 DIAGNOSIS — Z95.0 CARDIAC PACEMAKER IN SITU: ICD-10-CM

## 2024-09-20 DIAGNOSIS — I49.5 SICK SINUS SYNDROME (H): ICD-10-CM

## 2024-09-20 LAB
ANION GAP SERPL CALCULATED.3IONS-SCNC: 8 MMOL/L (ref 7–15)
BUN SERPL-MCNC: 26.2 MG/DL (ref 8–23)
CALCIUM SERPL-MCNC: 8.8 MG/DL (ref 8.8–10.4)
CHLORIDE SERPL-SCNC: 99 MMOL/L (ref 98–107)
CREAT SERPL-MCNC: 3.65 MG/DL (ref 0.51–0.95)
EGFRCR SERPLBLD CKD-EPI 2021: 12 ML/MIN/1.73M2
ERYTHROCYTE [DISTWIDTH] IN BLOOD BY AUTOMATED COUNT: 20.5 % (ref 10–15)
GLUCOSE SERPL-MCNC: 89 MG/DL (ref 70–99)
HBV SURFACE AB SERPL IA-ACNC: 36.1 M[IU]/ML
HBV SURFACE AB SERPL IA-ACNC: REACTIVE M[IU]/ML
HBV SURFACE AG SERPL QL IA: NONREACTIVE
HCO3 SERPL-SCNC: 28 MMOL/L (ref 22–29)
HCT VFR BLD AUTO: 24.4 % (ref 35–47)
HGB BLD-MCNC: 7.6 G/DL (ref 11.7–15.7)
INR PPP: 2.4 (ref 0.85–1.15)
MCH RBC QN AUTO: 33.5 PG (ref 26.5–33)
MCHC RBC AUTO-ENTMCNC: 31.1 G/DL (ref 31.5–36.5)
MCV RBC AUTO: 108 FL (ref 78–100)
MDC_IDC_EPISODE_DTM: NORMAL
MDC_IDC_EPISODE_DURATION: 111 S
MDC_IDC_EPISODE_DURATION: 111 S
MDC_IDC_EPISODE_DURATION: 117 S
MDC_IDC_EPISODE_DURATION: 124 S
MDC_IDC_EPISODE_DURATION: 146 S
MDC_IDC_EPISODE_DURATION: 1580 S
MDC_IDC_EPISODE_DURATION: 169 S
MDC_IDC_EPISODE_DURATION: 209 S
MDC_IDC_EPISODE_DURATION: 282 S
MDC_IDC_EPISODE_DURATION: 3041 S
MDC_IDC_EPISODE_DURATION: 324 S
MDC_IDC_EPISODE_DURATION: 3337 S
MDC_IDC_EPISODE_DURATION: 40 S
MDC_IDC_EPISODE_DURATION: 474 S
MDC_IDC_EPISODE_DURATION: 644 S
MDC_IDC_EPISODE_DURATION: 65 S
MDC_IDC_EPISODE_DURATION: 67 S
MDC_IDC_EPISODE_DURATION: 686 S
MDC_IDC_EPISODE_DURATION: 92 S
MDC_IDC_EPISODE_DURATION: 967 S
MDC_IDC_EPISODE_DURATION: 98 S
MDC_IDC_EPISODE_ID: 10
MDC_IDC_EPISODE_ID: 11
MDC_IDC_EPISODE_ID: 12
MDC_IDC_EPISODE_ID: 13
MDC_IDC_EPISODE_ID: 14
MDC_IDC_EPISODE_ID: 15
MDC_IDC_EPISODE_ID: 16
MDC_IDC_EPISODE_ID: 17
MDC_IDC_EPISODE_ID: 18
MDC_IDC_EPISODE_ID: 19
MDC_IDC_EPISODE_ID: 20
MDC_IDC_EPISODE_ID: 21
MDC_IDC_EPISODE_ID: 22
MDC_IDC_EPISODE_ID: 23
MDC_IDC_EPISODE_ID: 24
MDC_IDC_EPISODE_ID: 25
MDC_IDC_EPISODE_ID: 26
MDC_IDC_EPISODE_ID: 27
MDC_IDC_EPISODE_ID: 28
MDC_IDC_EPISODE_ID: 29
MDC_IDC_EPISODE_ID: 9
MDC_IDC_EPISODE_TYPE: NORMAL
MDC_IDC_LEAD_CONNECTION_STATUS: NORMAL
MDC_IDC_LEAD_CONNECTION_STATUS: NORMAL
MDC_IDC_LEAD_IMPLANT_DT: NORMAL
MDC_IDC_LEAD_IMPLANT_DT: NORMAL
MDC_IDC_LEAD_LOCATION: NORMAL
MDC_IDC_LEAD_LOCATION: NORMAL
MDC_IDC_LEAD_LOCATION_DETAIL_1: NORMAL
MDC_IDC_LEAD_LOCATION_DETAIL_1: NORMAL
MDC_IDC_LEAD_MFG: NORMAL
MDC_IDC_LEAD_MFG: NORMAL
MDC_IDC_LEAD_MODEL: NORMAL
MDC_IDC_LEAD_MODEL: NORMAL
MDC_IDC_LEAD_POLARITY_TYPE: NORMAL
MDC_IDC_LEAD_POLARITY_TYPE: NORMAL
MDC_IDC_LEAD_SERIAL: NORMAL
MDC_IDC_LEAD_SERIAL: NORMAL
MDC_IDC_LEAD_SPECIAL_FUNCTION: NORMAL
MDC_IDC_LEAD_SPECIAL_FUNCTION: NORMAL
MDC_IDC_MSMT_BATTERY_DTM: NORMAL
MDC_IDC_MSMT_BATTERY_REMAINING_LONGEVITY: 128 MO
MDC_IDC_MSMT_BATTERY_RRT_TRIGGER: 2.62
MDC_IDC_MSMT_BATTERY_STATUS: NORMAL
MDC_IDC_MSMT_BATTERY_VOLTAGE: 3.22 V
MDC_IDC_MSMT_LEADCHNL_RA_IMPEDANCE_VALUE: 437 OHM
MDC_IDC_MSMT_LEADCHNL_RA_IMPEDANCE_VALUE: 513 OHM
MDC_IDC_MSMT_LEADCHNL_RA_PACING_THRESHOLD_AMPLITUDE: 1.5 V
MDC_IDC_MSMT_LEADCHNL_RA_PACING_THRESHOLD_PULSEWIDTH: 0.4 MS
MDC_IDC_MSMT_LEADCHNL_RA_SENSING_INTR_AMPL: 1.5 MV
MDC_IDC_MSMT_LEADCHNL_RA_SENSING_INTR_AMPL: 1.5 MV
MDC_IDC_MSMT_LEADCHNL_RV_IMPEDANCE_VALUE: 418 OHM
MDC_IDC_MSMT_LEADCHNL_RV_IMPEDANCE_VALUE: 589 OHM
MDC_IDC_MSMT_LEADCHNL_RV_PACING_THRESHOLD_AMPLITUDE: 0.88 V
MDC_IDC_MSMT_LEADCHNL_RV_PACING_THRESHOLD_PULSEWIDTH: 0.4 MS
MDC_IDC_MSMT_LEADCHNL_RV_SENSING_INTR_AMPL: 12.38 MV
MDC_IDC_PG_IMPLANT_DTM: NORMAL
MDC_IDC_PG_MFG: NORMAL
MDC_IDC_PG_MODEL: NORMAL
MDC_IDC_PG_SERIAL: NORMAL
MDC_IDC_PG_TYPE: NORMAL
MDC_IDC_SESS_CLINIC_NAME: NORMAL
MDC_IDC_SESS_DTM: NORMAL
MDC_IDC_SESS_TYPE: NORMAL
MDC_IDC_SET_BRADY_AT_MODE_SWITCH_RATE: 171 {BEATS}/MIN
MDC_IDC_SET_BRADY_HYSTRATE: NORMAL
MDC_IDC_SET_BRADY_LOWRATE: 70 {BEATS}/MIN
MDC_IDC_SET_BRADY_MAX_SENSOR_RATE: 120 {BEATS}/MIN
MDC_IDC_SET_BRADY_MAX_TRACKING_RATE: 120 {BEATS}/MIN
MDC_IDC_SET_BRADY_MODE: NORMAL
MDC_IDC_SET_BRADY_PAV_DELAY_LOW: 180 MS
MDC_IDC_SET_BRADY_SAV_DELAY_LOW: 150 MS
MDC_IDC_SET_LEADCHNL_RA_PACING_AMPLITUDE: 3 V
MDC_IDC_SET_LEADCHNL_RA_PACING_ANODE_ELECTRODE_1: NORMAL
MDC_IDC_SET_LEADCHNL_RA_PACING_ANODE_LOCATION_1: NORMAL
MDC_IDC_SET_LEADCHNL_RA_PACING_CAPTURE_MODE: NORMAL
MDC_IDC_SET_LEADCHNL_RA_PACING_CATHODE_ELECTRODE_1: NORMAL
MDC_IDC_SET_LEADCHNL_RA_PACING_CATHODE_LOCATION_1: NORMAL
MDC_IDC_SET_LEADCHNL_RA_PACING_POLARITY: NORMAL
MDC_IDC_SET_LEADCHNL_RA_PACING_PULSEWIDTH: 0.4 MS
MDC_IDC_SET_LEADCHNL_RA_SENSING_ANODE_ELECTRODE_1: NORMAL
MDC_IDC_SET_LEADCHNL_RA_SENSING_ANODE_LOCATION_1: NORMAL
MDC_IDC_SET_LEADCHNL_RA_SENSING_CATHODE_ELECTRODE_1: NORMAL
MDC_IDC_SET_LEADCHNL_RA_SENSING_CATHODE_LOCATION_1: NORMAL
MDC_IDC_SET_LEADCHNL_RA_SENSING_POLARITY: NORMAL
MDC_IDC_SET_LEADCHNL_RA_SENSING_SENSITIVITY: 0.15 MV
MDC_IDC_SET_LEADCHNL_RV_PACING_AMPLITUDE: 2 V
MDC_IDC_SET_LEADCHNL_RV_PACING_ANODE_ELECTRODE_1: NORMAL
MDC_IDC_SET_LEADCHNL_RV_PACING_ANODE_LOCATION_1: NORMAL
MDC_IDC_SET_LEADCHNL_RV_PACING_CAPTURE_MODE: NORMAL
MDC_IDC_SET_LEADCHNL_RV_PACING_CATHODE_ELECTRODE_1: NORMAL
MDC_IDC_SET_LEADCHNL_RV_PACING_CATHODE_LOCATION_1: NORMAL
MDC_IDC_SET_LEADCHNL_RV_PACING_POLARITY: NORMAL
MDC_IDC_SET_LEADCHNL_RV_PACING_PULSEWIDTH: 0.4 MS
MDC_IDC_SET_LEADCHNL_RV_SENSING_ANODE_ELECTRODE_1: NORMAL
MDC_IDC_SET_LEADCHNL_RV_SENSING_ANODE_LOCATION_1: NORMAL
MDC_IDC_SET_LEADCHNL_RV_SENSING_CATHODE_ELECTRODE_1: NORMAL
MDC_IDC_SET_LEADCHNL_RV_SENSING_CATHODE_LOCATION_1: NORMAL
MDC_IDC_SET_LEADCHNL_RV_SENSING_POLARITY: NORMAL
MDC_IDC_SET_LEADCHNL_RV_SENSING_SENSITIVITY: 0.9 MV
MDC_IDC_SET_ZONE_DETECTION_INTERVAL: 350 MS
MDC_IDC_SET_ZONE_DETECTION_INTERVAL: 400 MS
MDC_IDC_SET_ZONE_STATUS: NORMAL
MDC_IDC_SET_ZONE_STATUS: NORMAL
MDC_IDC_SET_ZONE_TYPE: NORMAL
MDC_IDC_SET_ZONE_VENDOR_TYPE: NORMAL
MDC_IDC_STAT_AT_BURDEN_PERCENT: 2.3 %
MDC_IDC_STAT_AT_DTM_END: NORMAL
MDC_IDC_STAT_AT_DTM_START: NORMAL
MDC_IDC_STAT_BRADY_AP_VP_PERCENT: 76.06 %
MDC_IDC_STAT_BRADY_AP_VS_PERCENT: 0.08 %
MDC_IDC_STAT_BRADY_AS_VP_PERCENT: 3.48 %
MDC_IDC_STAT_BRADY_AS_VS_PERCENT: 20.38 %
MDC_IDC_STAT_BRADY_DTM_END: NORMAL
MDC_IDC_STAT_BRADY_DTM_START: NORMAL
MDC_IDC_STAT_BRADY_RA_PERCENT_PACED: 93.71 %
MDC_IDC_STAT_BRADY_RV_PERCENT_PACED: 79.62 %
MDC_IDC_STAT_EPISODE_RECENT_COUNT: 0
MDC_IDC_STAT_EPISODE_RECENT_COUNT: 21
MDC_IDC_STAT_EPISODE_RECENT_COUNT_DTM_END: NORMAL
MDC_IDC_STAT_EPISODE_RECENT_COUNT_DTM_START: NORMAL
MDC_IDC_STAT_EPISODE_TOTAL_COUNT: 0
MDC_IDC_STAT_EPISODE_TOTAL_COUNT: 29
MDC_IDC_STAT_EPISODE_TOTAL_COUNT_DTM_END: NORMAL
MDC_IDC_STAT_EPISODE_TOTAL_COUNT_DTM_START: NORMAL
MDC_IDC_STAT_EPISODE_TYPE: NORMAL
MDC_IDC_STAT_TACHYTHERAPY_RECENT_DTM_END: NORMAL
MDC_IDC_STAT_TACHYTHERAPY_RECENT_DTM_START: NORMAL
MDC_IDC_STAT_TACHYTHERAPY_TOTAL_DTM_END: NORMAL
MDC_IDC_STAT_TACHYTHERAPY_TOTAL_DTM_START: NORMAL
PLATELET # BLD AUTO: 104 10E3/UL (ref 150–450)
POTASSIUM SERPL-SCNC: 4 MMOL/L (ref 3.4–5.3)
RBC # BLD AUTO: 2.27 10E6/UL (ref 3.8–5.2)
SODIUM SERPL-SCNC: 135 MMOL/L (ref 135–145)
UFH PPP CHRO-ACNC: <0.1 IU/ML
VANCOMYCIN SERPL-MCNC: 18.3 UG/ML
WBC # BLD AUTO: 6.1 10E3/UL (ref 4–11)

## 2024-09-20 PROCEDURE — 80048 BASIC METABOLIC PNL TOTAL CA: CPT | Performed by: PHYSICIAN ASSISTANT

## 2024-09-20 PROCEDURE — 92526 ORAL FUNCTION THERAPY: CPT | Mod: GN | Performed by: SPEECH-LANGUAGE PATHOLOGIST

## 2024-09-20 PROCEDURE — 250N000011 HC RX IP 250 OP 636: Performed by: PHYSICIAN ASSISTANT

## 2024-09-20 PROCEDURE — 36415 COLL VENOUS BLD VENIPUNCTURE: CPT | Performed by: HOSPITALIST

## 2024-09-20 PROCEDURE — 97530 THERAPEUTIC ACTIVITIES: CPT | Mod: GP

## 2024-09-20 PROCEDURE — 97110 THERAPEUTIC EXERCISES: CPT | Mod: GP

## 2024-09-20 PROCEDURE — 250N000013 HC RX MED GY IP 250 OP 250 PS 637: Performed by: PHYSICIAN ASSISTANT

## 2024-09-20 PROCEDURE — 99233 SBSQ HOSP IP/OBS HIGH 50: CPT | Performed by: HOSPITALIST

## 2024-09-20 PROCEDURE — 250N000011 HC RX IP 250 OP 636: Performed by: HOSPITALIST

## 2024-09-20 PROCEDURE — 120N000017 HC R&B RESPIRATORY CARE

## 2024-09-20 PROCEDURE — 99232 SBSQ HOSP IP/OBS MODERATE 35: CPT | Performed by: PHYSICIAN ASSISTANT

## 2024-09-20 PROCEDURE — 85520 HEPARIN ASSAY: CPT | Performed by: HOSPITALIST

## 2024-09-20 PROCEDURE — 258N000003 HC RX IP 258 OP 636: Performed by: PHYSICIAN ASSISTANT

## 2024-09-20 PROCEDURE — 90935 HEMODIALYSIS ONE EVALUATION: CPT

## 2024-09-20 PROCEDURE — 250N000013 HC RX MED GY IP 250 OP 250 PS 637: Performed by: HOSPITALIST

## 2024-09-20 PROCEDURE — 85610 PROTHROMBIN TIME: CPT | Performed by: PHYSICIAN ASSISTANT

## 2024-09-20 PROCEDURE — 92610 EVALUATE SWALLOWING FUNCTION: CPT | Mod: GN | Performed by: SPEECH-LANGUAGE PATHOLOGIST

## 2024-09-20 PROCEDURE — 258N000003 HC RX IP 258 OP 636: Performed by: HOSPITALIST

## 2024-09-20 PROCEDURE — 86706 HEP B SURFACE ANTIBODY: CPT | Performed by: HOSPITALIST

## 2024-09-20 PROCEDURE — 82310 ASSAY OF CALCIUM: CPT | Performed by: PHYSICIAN ASSISTANT

## 2024-09-20 PROCEDURE — 87340 HEPATITIS B SURFACE AG IA: CPT | Performed by: HOSPITALIST

## 2024-09-20 PROCEDURE — 80202 ASSAY OF VANCOMYCIN: CPT | Performed by: HOSPITALIST

## 2024-09-20 RX ORDER — HYDROXYZINE HYDROCHLORIDE 10 MG/1
10 TABLET, FILM COATED ORAL EVERY 6 HOURS PRN
Status: DISPENSED | OUTPATIENT
Start: 2024-09-20

## 2024-09-20 RX ORDER — HEPARIN SODIUM 1000 [USP'U]/ML
500 INJECTION, SOLUTION INTRAVENOUS; SUBCUTANEOUS CONTINUOUS
Status: DISCONTINUED | OUTPATIENT
Start: 2024-09-20 | End: 2024-09-27

## 2024-09-20 RX ORDER — WARFARIN SODIUM 3 MG/1
3 TABLET ORAL
Status: COMPLETED | OUTPATIENT
Start: 2024-09-20 | End: 2024-09-20

## 2024-09-20 RX ORDER — HYDROXYZINE HYDROCHLORIDE 25 MG/1
50 TABLET, FILM COATED ORAL EVERY 6 HOURS PRN
Status: DISCONTINUED | OUTPATIENT
Start: 2024-09-20 | End: 2024-09-20

## 2024-09-20 RX ORDER — HYDROXYZINE HYDROCHLORIDE 25 MG/1
25 TABLET, FILM COATED ORAL EVERY 6 HOURS PRN
Status: DISCONTINUED | OUTPATIENT
Start: 2024-09-20 | End: 2024-09-20

## 2024-09-20 RX ADMIN — HEPARIN SODIUM 2000 UNITS: 1000 INJECTION INTRAVENOUS; SUBCUTANEOUS at 13:56

## 2024-09-20 RX ADMIN — WARFARIN SODIUM 3 MG: 3 TABLET ORAL at 17:28

## 2024-09-20 RX ADMIN — HEPARIN SODIUM 500 UNITS: 1000 INJECTION INTRAVENOUS; SUBCUTANEOUS at 13:45

## 2024-09-20 RX ADMIN — TORSEMIDE 100 MG: 100 TABLET ORAL at 08:44

## 2024-09-20 RX ADMIN — SODIUM CHLORIDE 250 ML: 9 INJECTION, SOLUTION INTRAVENOUS at 13:57

## 2024-09-20 RX ADMIN — ATORVASTATIN CALCIUM 80 MG: 40 TABLET, FILM COATED ORAL at 20:15

## 2024-09-20 RX ADMIN — Medication 1 CAPSULE: at 20:16

## 2024-09-20 RX ADMIN — HEPARIN SODIUM 2100 UNITS: 1000 INJECTION INTRAVENOUS; SUBCUTANEOUS at 13:56

## 2024-09-20 RX ADMIN — HEPARIN SODIUM 500 UNITS/HR: 1000 INJECTION INTRAVENOUS; SUBCUTANEOUS at 13:54

## 2024-09-20 RX ADMIN — ACETAMINOPHEN 650 MG: 325 TABLET, FILM COATED ORAL at 10:47

## 2024-09-20 RX ADMIN — CLOPIDOGREL 75 MG: 75 TABLET ORAL at 08:44

## 2024-09-20 RX ADMIN — Medication 1 TABLET: at 08:44

## 2024-09-20 RX ADMIN — SODIUM CHLORIDE 300 ML: 9 INJECTION, SOLUTION INTRAVENOUS at 13:57

## 2024-09-20 RX ADMIN — DOCUSATE SODIUM AND SENNOSIDES 1 TABLET: 8.6; 5 TABLET, FILM COATED ORAL at 20:15

## 2024-09-20 RX ADMIN — MELATONIN 3 MG: 3 TAB ORAL at 20:16

## 2024-09-20 RX ADMIN — VANCOMYCIN HYDROCHLORIDE 300 MG: 500 INJECTION, POWDER, LYOPHILIZED, FOR SOLUTION INTRAVENOUS at 17:29

## 2024-09-20 RX ADMIN — ACETAMINOPHEN 650 MG: 325 TABLET, FILM COATED ORAL at 20:16

## 2024-09-20 RX ADMIN — Medication 1 CAPSULE: at 08:44

## 2024-09-20 RX ADMIN — HYDROXYZINE HYDROCHLORIDE 10 MG: 10 TABLET ORAL at 01:56

## 2024-09-20 RX ADMIN — PANTOPRAZOLE SODIUM 40 MG: 40 TABLET, DELAYED RELEASE ORAL at 06:46

## 2024-09-20 RX ADMIN — METOPROLOL TARTRATE 25 MG: 25 TABLET, FILM COATED ORAL at 20:15

## 2024-09-20 RX ADMIN — EPOETIN ALFA-EPBX 20000 UNITS: 20000 INJECTION, SOLUTION INTRAVENOUS; SUBCUTANEOUS at 17:28

## 2024-09-20 RX ADMIN — MIDODRINE HYDROCHLORIDE 10 MG: 5 TABLET ORAL at 13:41

## 2024-09-20 ASSESSMENT — ACTIVITIES OF DAILY LIVING (ADL)
ADLS_ACUITY_SCORE: 38
ADLS_ACUITY_SCORE: 44
ADLS_ACUITY_SCORE: 42
ADLS_ACUITY_SCORE: 44
ADLS_ACUITY_SCORE: 44
ADLS_ACUITY_SCORE: 38
ADLS_ACUITY_SCORE: 44
ADLS_ACUITY_SCORE: 42
ADLS_ACUITY_SCORE: 42
ADLS_ACUITY_SCORE: 44
ADLS_ACUITY_SCORE: 44
ADLS_ACUITY_SCORE: 38
ADLS_ACUITY_SCORE: 44
ADLS_ACUITY_SCORE: 38
ADLS_ACUITY_SCORE: 38
ADLS_ACUITY_SCORE: 44
ADLS_ACUITY_SCORE: 44
ADLS_ACUITY_SCORE: 38
ADLS_ACUITY_SCORE: 35
ADLS_ACUITY_SCORE: 35
ADLS_ACUITY_SCORE: 39

## 2024-09-20 NOTE — PHARMACY-VANCOMYCIN DOSING SERVICE
Pharmacy Vancomycin Note  Date of Service 2024  Patient's  1940   84 year old, female    Indication: Endocarditis  Day of Therapy: : Started , planning therapy through at least 10/8/24    Current vancomycin regimen:  Intermittent  dosing  Current vancomycin monitoring method: Renal Replacement Therapy  Current vancomycin therapeutic monitoring goal: 15-20 mg/L      Current estimated CrCl = Estimated Creatinine Clearance: 12.4 mL/min (A) (based on SCr of 3.65 mg/dL (H)).    Creatinine for last 3 days  2024: 10:45 AM Creatinine 3.34 mg/dL  2024:  7:04 AM Creatinine 4.01 mg/dL  2024:  6:35 AM Creatinine 2.89 mg/dL  2024:  5:31 AM Creatinine 3.65 mg/dL    Recent Vancomycin Levels (past 3 days)  2024:  7:04 AM Vancomycin 22.9 ug/mL;  7:31 PM Vancomycin 16.2 ug/mL  2024:  5:31 AM Vancomycin 18.3 ug/mL    Vancomycin IV Administrations (past 72 hours)        No vancomycin orders with administrations in past 72 hours.                    Nephrotoxins and other renal medications (From now, onward)      Start     Dose/Rate Route Frequency Ordered Stop    24 0900  torsemide (DEMADEX) tablet 100 mg         100 mg Oral DAILY 24 1254      24 1254  vancomycin place horne - receiving intermittent dosing         1 each Intravenous SEE ADMIN INSTRUCTIONS 24 1254                 Contrast Orders - past 72 hours (72h ago, onward)      None            Interpretation of levels and current regimen:  Vancomycin level is reflective of therapeutic level    Has serum creatinine changed greater than 50% in last 72 hours: Dialysis patient - MWF       Urine output:  Minimal urine output, amts not recorded     Renal Function: ARF on Dialysis         Plan:  Vanco 300mg IV x1  Vancomycin monitoring method: Renal Replacement Therapy  Vancomycin therapeutic monitoring goal: 15-20 mg/L  Pharmacy will check vancomycin levels as appropriate in  Monday before HD .  Serum  creatinine levels will monitor with HD    Kisha Crusing, RPh,  BCCP, BPS

## 2024-09-20 NOTE — PLAN OF CARE
"  Problem: Adult Inpatient Plan of Care  Goal: Optimal Comfort and Wellbeing  9/20/2024 0513 by Erick Evangelista RN  Outcome: Progressing  9/20/2024 0356 by Erick Evangelista RN  Outcome: Progressing  Intervention: Provide Person-Centered Care  Recent Flowsheet Documentation  Taken 9/20/2024 0201 by Erick Evangelista RN  Trust Relationship/Rapport:   care explained   choices provided   emotional support provided   empathic listening provided   questions answered   reassurance provided   thoughts/feelings acknowledged     Problem: Pain Acute  Goal: Optimal Pain Control and Function  9/20/2024 0513 by Erick Evangelista RN  Outcome: Progressing  9/20/2024 0356 by Erick Evangelista RN  Outcome: Progressing  Intervention: Prevent or Manage Pain  Recent Flowsheet Documentation  Taken 9/20/2024 0201 by Erick Evangelista RN  Sensory Stimulation Regulation:   care clustered   lighting decreased   quiet environment promoted  Medication Review/Management: medications reviewed  Intervention: Optimize Psychosocial Wellbeing  Recent Flowsheet Documentation  Taken 9/20/2024 0201 by Erick Evangelista RN  Supportive Measures:   positive reinforcement provided   active listening utilized   Goal Outcome Evaluation:             Patient is alert and oriented x 4, vital signs are stable. /52 (BP Location: Left arm)   Pulse 70   Temp 97.6  F (36.4  C) (Oral)   Resp 16   Wt 78.7 kg (173 lb 8 oz)   SpO2 95%   BMI 27.17 kg/m   Pt is polite and pleasant, effusive in her praise.    The patient is able to verbalize their needs, and remained calm and cooperative throughout the shift. Pt had one large urinary incontinence episode. She c/o of \"feeling itchy\"  all over, atarax PRN was added and fairly effective.Also had melatonin and acetaminophen (For arthritic neck pain 5/10) PRN HS. Pt had a midnight remote device    Check on her pacemaker. Continue to monitor Erick Evangelista RN on 9/20/2024 at 7:40 AM           "

## 2024-09-20 NOTE — PROGRESS NOTES
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 With some proteinuria. Previously seen by Dr Anaya with [facility]. When seen, extensive serologic testing was negative.   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 Would be nice to quantify. (By purewick or measured outpt)    Monitor labs   Continue I/O  Daily Wts  HD on MWF     Volume  On Daily weights and I/O, pt states she is urinating, but previous notes indicate very little outpt.  Pt and  share that purewic was not well tolerated when at [facility]. Could consider to retry in future for quantification.   1-3kg UF with dialysis runs      Blood Pressures   Soft BP recorded, on no antihypertensive medications aside from BB for Afib.    Midodrine as needed for BP support on dialysis     Anemia  Macrocytic anemia  Weekly HILARY, 20K units     MSSA bacteremia  Prostetic valve  IV vanco  Warfarin, INR goal 2-3     Respiratory Failure  Previously intubated, now extubated.   On oxygen with goals to wean.      CAD, Afib  S/P CABG, valve surgery  Metoprolol     HLD - statin    SUBJECTIVE:    Seen in room with  and daughter  Pt looking bit more alert compared to Wed    Nausea improved  Breathing is OK  Appetite poor, but aims for good breakfast  Weak, unable to move legs well   Tolerating dialysis    Reviewed  role of dialysis and signs of renal recovery  Plan to maintain HD at this time, pt and family on board.     OBJECTIVE:  Physical Exam   Temp: 98.1  F (36.7  C) Temp src: Oral BP: 110/56 Pulse: 67   Resp: 18 SpO2: 98 % O2 Device: Nasal cannula    Vitals:    09/18/24 0805 09/19/24 0630 09/20/24 0058   Weight: 79.3 kg (174 lb 13.2 oz) 78.5 kg (173 lb 1 oz) 78.7 kg (173 lb 8 oz)     Vital Signs with Ranges  Temp:  [97.6  F (36.4  C)-98.1  F (36.7  C)] 98.1  F (36.7  C)  Pulse:  [61-70] 67  Resp:  [16-18] 18  BP: (107-110)/(52-56) 110/56  SpO2:  [95 %-98 %] 98 %  I/O last 3 completed shifts:  In: 770 [P.O.:540; I.V.:230]  Out: -         Patient Vitals for the past 72 hrs:   Weight   09/20/24 0058 78.7 kg (173 lb 8 oz)   09/19/24 0630 78.5 kg (173 lb 1 oz)   09/18/24 0805 79.3 kg (174 lb 13.2 oz)   09/18/24 0009 78.7 kg (173 lb 8 oz)   [unfilled]    PHYSICAL EXAM:  General: A&Ox3, NAD, laying in bed  HEENT:  Pupils equal, round, no scleral icterus  NECK:  no carotid bruits, no JVD  RESPIRATORY:  Lungs quit lower base, clear upper, normal effort , oxygen  CARDIOVASCULAR:  RRR, no heard murmur, stenrla scar  VOLUME: 1+ pitting leg/thigh  ABDOMEN:  soft, BS present, non-tender, non-distended   GENITOURINARY:  No chacko   INTEGUMENTARY: Warm, dry, no rash  NEUROLOGIC: grossly intact   Psych: calm, cooperative  ACCESS: cvc    LABORATORY STUDIES:     Recent Labs   Lab 09/20/24  0531 09/19/24  0635 09/18/24  0704 09/17/24  1050 09/17/24  0745 09/16/24  1726 09/16/24  1603   WBC 6.1 5.8 6.2 6.2 6.5  --  6.6   RBC 2.27* 2.30* 2.28* 2.42* 2.42*  --  2.41*   HGB 7.6* 7.4* 7.3* 7.6* 7.5* 8.2* 7.5*   HCT 24.4* 24.5* 23.8* 24.7* 24.5*  --  24.0*   * 116* 121* 114* 115*  --  108*       Basic Metabolic Panel:  Recent Labs   Lab 09/20/24  0531 09/19/24  0635 09/18/24  0704 09/17/24  1045 09/17/24  0745 09/16/24  1726 09/16/24  1242 09/16/24  0506    138 136 134* 136 139  --  132*   POTASSIUM 4.0 4.2 3.8 3.6 3.6 3.2*  --  3.7    CHLORIDE 99 103 97* 97* 99  --   --  93*   CO2 28 28 28 27 26  --   --  26   BUN 26.2* 18.7 33.0* 26.3* 25.5*  --   --  49.1*   CR 3.65* 2.89* 4.01* 3.34* 3.16*  --   --  5.05*   GLC 89 92 87 191* 95 143*   < > 104*   KAELYN 8.8 8.6* 8.4* 8.1* 8.1*  --   --  8.6*    < > = values in this interval not displayed.       INR  Recent Labs   Lab 09/20/24  0531 09/19/24  0635 09/18/24  0942 09/17/24  0745   INR 2.40* 2.19* 1.88* 1.49*        Recent Labs   Lab Test 09/20/24  0531 09/19/24  0635   INR 2.40* 2.19*   WBC 6.1 5.8   HGB 7.6* 7.4*   * 116*       Personally reviewed current labs       Miroslava Casey PA-C  [facility]  [phone]

## 2024-09-20 NOTE — PROGRESS NOTES
[facility]    Medicine Progress Note - Hospitalist Service    Date of Admission:  9/16/2024    Felicitas Elliott is a 84 year old female admitted on 9/16/2024 to the LTAC for long-term antibiotics following MSSA bacteremia and endocarditis. 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  She was initially admitted to [facility] on 8/16/2024 for sepsis and HAMMAD.  She received empiric antibiotics on admission. Blood cultures grew MSSA.  Neurology was consulted for possible discitis.  ID was also consulted. Echo revealed large posterior leaflet mitral valve vegetation and small aortic valve vegetation with severe aortic stenosis.  Brain MRI revealed scattered embolic infarcts.  Coronary angiogram demonstrated coronary artery disease.  CT surgery was consulted. She underwent CABG alongside the procedures mentioned above.  She however has not had renal recovery.  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  She had dysphagia and momentarily required NG tube with tube feeds. She worked with speech therapy and is now cleared for regular diet and thin liquids.  CT surgery recommends warfarin for 3 months for bioprosthetic valves.  INR goal 2-3.  End date of warfarin approximately 12/10/2024, at which point she will be on Plavix alone (ASA allergy) indefinitely.  ID recommends IV vancomycin until 10/8/2024.    LTAC Course:  - 9/17: Progressing well. On heparin gtt with coumadin for bioprosthetic valve. INR  1.49 today.  Remains on long-term antibiotics.  No new changes at this time.  - 9/18.  Dialyzing today.  Remains on heparin gtt. follow-up bioprosthetic valve.  INR 1.88 today.  Plan to continue current medical management  - 9/19. INR 2.19.  Will discontinue heparin gtt. Per RD patient has trouble chewing and this is impeding her oral intake,now on calorie counts. Will consult SLP  -  9/20.  INR 2.40.  Overnight patient was offered hydroxyzine as needed for itching. This morning she was seen with speech therapy recommended regular textures with thin liquids.  Dialysis today    BARRIERS TO DISCHARGE (why care is unable to be provided at a lower level of care):    -Long-term antibiotics.  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  Repeat blood cultures - 8/17/2024  -Mitral and aortic valve endocarditis as evidenced with large vegetation on mitral valve 8 mm x 15 at x 2 posterior leaflet of small vegetation on aortic valve.  With signs of vegetation combined with brain infarct.  S/p CV surgery  -Continue with IV vancomycin.  Dosing per pharmacy  -Per ID will need to check immunoglobulin level in 4 to 6 weeks, sister with history of hypogammaglobinemia  -ID consulted and following  -Patient previously on heparin 5000 subQ every 8 hours and Coumadin.  Recommended INR goal is 2-3 per CT surgery.  Discontinued heparin 5000 units subcu every 8 hours on admission and started patient on heparin gtt. alongside Coumadin.  I also notified pharmacy and were agreeable with this plan.  -9/19, INR 2.19.  Discontinued heparin gtt. Patient on goal  -Pharm.D. to continue dosing Coumadin    Abnormal brain MRI suggestive of subacute infarct.  Patient with a history of MSSA bacteremia and aortic valve endocarditis.  Most likely septic emboli.  -Continue with antibiotics per ID.  End date 10/8/2024    Acute respiratory with hypoxia  -Patient previously intubated.  Now extubated.    -Continue with oxygen support to maintain oxygenation above 92%.  Wean as tolerated  -RT while in  LTAC    HAMMAD  -Previously on CCRT now transferred to HD  -Torsemide per nephrology  -HD per nephrology-MWF  -Monitor kidney function with BMP    CAD  A-fib  History of aortic stenosis  -Fairly stable at this time.  -S/p CABG per CT surgery  -Continue with Plavix and statin  -Metoprolol  -Previously on amiodarone for rate control, now s/p PPM  -Monitor on telemetry for 24 hours.    Physical deconditioning/critical illness myopathy  -Fall precautions.  -PT/OT  -WOC    Diet: Advance Diet as Tolerated: Regular Diet Adult  Snacks/Supplements Adult: Gelatein Plus; With Meals  Snacks/Supplements Adult: Magic Cup; With Meals  Calorie Counts    DVT Prophylaxis: Warfarin  Le Catheter: Not present  Lines: PRESENT      PICC 09/06/24 Triple Lumen Right Basilic Vascular access-Site Assessment: WDL  CVC Double Lumen Right Subclavian Tunneled-Site Assessment: WDL      Cardiac Monitoring: None  Code Status: No CPR- Do NOT Intubate      Clinically Significant Risk Factors              # Hypoalbuminemia: Lowest albumin = 3.1 g/dL at 9/19/2024  6:35 AM, will monitor as appropriate   # Thrombocytopenia: Lowest platelets = 104 in last 2 days, will monitor for bleeding              # Severe Malnutrition: based on nutrition assessment      # Financial/Environmental Concerns: none   # Pacemaker present  # History of CABG: noted on surgical history    Disposition Plan     Medically Ready for Discharge: Anticipated in 5+ Days    Adan Veloz MD  Hospitalist Service  [facility]  Securely message with [employer] (more info)  Text page via [employer] Paging/Directory   ______________________________________________________________________    Interval History   Patient is in bed comfortably. No new events overnight per RN. She states she is progressing well. Plan for dialysis today. No new complaints at this time    Physical Exam   Vital Signs: Temp: 98.1  F (36.7  C) Temp src: Oral BP: 106/53 Pulse: 67   Resp: 18 SpO2: 97 % O2 Device: Nasal cannula    Weight: 173 lbs 8.03 oz  Constitutional: Patient is resting in bed comfortably appears in no distress.  Eyes: Pupils are equal round and reactive to light  Respiratory: Good respiratory effort, on auscultation good air entry is noted  Cardiovascular: Good S1 and S2 no obvious murmurs peripheral pulses are palpable  GI: Abdomen is soft nontender nondistended bowel sounds are noted  Skin: No obvious rashes noted on exposed areas, warm to touch please refer to nursing/wound care note for complete skin exam  Musculoskeletal: Good muscle tone nails and digits appear acyanotic  Neuropsychiatric: Awake alert oriented to person normal affect    Medical Decision Making       50 MINUTES SPENT BY ME on the date of service doing chart review, history, exam, documentation & further activities per the note.  ------------------ MEDICAL DECISION MAKING ------------------------------------------------------------------------------------------------------  MANAGEMENT DISCUSSED with the following over the past 24 hours: Patient, family members in the room, staff RN and pharmacist    NOTE(S)/MEDICAL RECORDS REVIEWED over the past 24 hours: ID       Data   Recent Labs   Lab 09/20/24  0531 09/19/24  0635 09/18/24  0942 09/18/24  0704   WBC 6.1 5.8  --  6.2   HGB 7.6* 7.4*  --  7.3*   * 107*  --  104*   * 116*  --  121*   INR 2.40* 2.19* 1.88*  --     138  --  136   POTASSIUM 4.0 4.2  --  3.8   CHLORIDE 99 103  --  97*   CO2 28 28  --  28   BUN 26.2* 18.7  --  33.0*   CR 3.65* 2.89*  --  4.01*   ANIONGAP 8 7  --  11   KAELYN 8.8 8.6*  --  8.4*   GLC 89 92  --  87   ALBUMIN  --  3.1*  --  3.1*   PROTTOTAL  --  6.2*  --  6.1*   BILITOTAL  --  0.7  --  0.7   ALKPHOS  --  78  --  75   ALT  --  31  --  29   AST  --  36  --  34       Most Recent 3 CBC's:  Recent Labs   Lab Test 09/20/24  0531 09/19/24  0635 09/18/24  0704   WBC 6.1 5.8 6.2   HGB 7.6* 7.4* 7.3*   * 107* 104*   * 116* 121*     Most Recent 3  BMP's:  Recent Labs   Lab Test 09/20/24  0531 09/19/24  0635 09/18/24  0704    138 136   POTASSIUM 4.0 4.2 3.8   CHLORIDE 99 103 97*   CO2 28 28 28   BUN 26.2* 18.7 33.0*   CR 3.65* 2.89* 4.01*   ANIONGAP 8 7 11   KAELYN 8.8 8.6* 8.4*   GLC 89 92 87     Most Recent 2 LFT's:  Recent Labs   Lab Test 09/19/24  0635 09/18/24  0704   AST 36 34   ALT 31 29   ALKPHOS 78 75   BILITOTAL 0.7 0.7     Most Recent 3 INR's:  Recent Labs   Lab Test 09/20/24  0531 09/19/24  0635 09/18/24  0942   INR 2.40* 2.19* 1.88*

## 2024-09-20 NOTE — PROGRESS NOTES
HEMODIALYSIS TREATMENT NOTE      Date: 9/20/2024  Time: 5:20 PM     Data:  Pre Wt:   82.8 kg (bed scale)  Desired Wt:   To be established  Post Wt:  81.2 kg (bed scale)  Weight change: - 1.6 kg  Ultrafiltration - Post Run Net Total Removed (mL):  1500 ml  Vascular Access Status: CVC patent  Dialyzer Rinse:  Light  Total Blood Volume Processed: 70.4 L   Total Dialysis (Treatment) Time:   3.0 Hrs  Dialysate Bath: K 3, Ca 2.25  Heparin: Heparin 500 units loading + 500 units/hr     Lab:   No  HbsAg - 9/20/2024 (Non Reactive)  HbsAb - 9/20/2024 36.10 (Immune)     Interventions:  Dialysis done through Right subclavian tunneled dialysis catheter. ,   UF set to 1.8 Liters of fluid removal, accommodating priming and rinse back volumes  Pre-HD Midodrine 10 mg PO administered per MAR  See Flowsheet for Crit Profile throughout the run  Treatment has ended safely and blood is rinsed back completely  Catheter lumens flushed with saline and locked with Heparin, catheter caps changed post HD  Post Tx assessment done. Patient's left in her room in stable condition  Report given to Oxana BARKER RN.     Assessment:  A/O x 4, calm & cooperative  CVC intact, previous dressing clean and dry                Plan:    Per Renal team

## 2024-09-20 NOTE — PLAN OF CARE
Problem: Pain Acute  Goal: Optimal Pain Control and Function  Intervention: Prevent or Manage Pain  Recent Flowsheet Documentation  Taken 9/20/2024 1019 by Dayana Brambila RN  Sensory Stimulation Regulation: television on  Bowel Elimination Promotion: adequate fluid intake promoted     Problem: Pain Acute  Goal: Optimal Pain Control and Function  Intervention: Optimize Psychosocial Wellbeing  Recent Flowsheet Documentation  Taken 9/20/2024 1019 by Dayana Brambila, JULES  Spiritual Activities Assistance: personal rituals encouraged  Supportive Measures: active listening utilized   Goal Outcome Evaluation:     Patient complains of lower neck pains, rated pains level 6/10. Gave her tylenol 650mg with good effects. Dialysis procedure in progress, see dialysis nurse notes for details on procedure.  Spent shift with no new complains or concerns.

## 2024-09-20 NOTE — PROGRESS NOTES
"SPEECH PATHOLOGY: CLINICAL SWALLOW EVALUATION     09/20/24 1213   Appointment Info   Signing Clinician's Name / Credentials (SLP) Gagan Davis MA CCC SLP   General Information   Onset of Illness/Injury or Date of Surgery 08/16/24   Referring Physician Roselia   Pertinent History of Current Problem Per H&P: \"Felicitas Elliott is a 84 year old female admitted on 9/16/2024 to the [facility] for long-term antibiotics following MSSA bacteremia and endocarditis. 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  She was initially admitted to [facility] on 8/16/2024 for sepsis and HAMMAD.  She received empiric antibiotics on admission. Blood cultures grew MSSA.  Neurology was consulted for possible discitis.  ID was also consulted. Echo revealed large posterior leaflet mitral valve vegetation and small aortic valve vegetation with severe aortic stenosis.  Brain MRI revealed scattered embolic infarcts.  Coronary angiogram demonstrated coronary artery disease.  CT surgery was consulted. She underwent CABG alongside the procedures mentioned above.  She however has not had renal recovery.  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  She had dysphagia and momentarily required NG tube with tube feeds. She worked with speech therapy and is now cleared for regular diet and thin liquids.\"   General Observations Alert and cooperative   Type of Evaluation   Type of Evaluation Swallow Evaluation   Oral Motor   Oral Musculature generally intact   Structural Abnormalities none present   Mucosal Quality adequate   Dentition (Oral Motor)   Dentition (Oral Motor) adequate dentition;dental appliance/dentures   Dental Appliance/Denture (Oral Motor) dentures, full;adequate fit   Facial Symmetry (Oral Motor)   Facial Symmetry (Oral Motor) WNL   Lip Function (Oral Motor)   Comment, Lip Function (Oral " "Motor) WFL   Tongue Function (Oral Motor)   Comment, Tongue Function (Oral Motor) WFL   Vocal Quality/Secretion Management (Oral Motor)   Vocal Quality (Oral Motor) WFL   General Swallowing Observations   Current Diet/Method of Nutritional Intake (General Swallowing Observations, NIS) thin liquids (level 0);regular diet   Respiratory Support room air   Past History of Dysphagia None per patient or records prior to acute admission. Gagging periodically during acute admission, decreased now. Patient however reports persisting difficulty chewing certain food, specifically noting \"meat\". Requires increased time, leading to fatigue, and even with signifcant chewing , swallowing it is hard. She also cites newly reduced appetite and desire for food. Patient completed video swallow study at admitting facility, with no evidence of aspiration or penetration, but did have some observed cricopharyngeal dysfunction and some evidence of residue observed incidentally in the upper esophagus. Recommendations at the time for consideration for GI consult. Unclear if this was addressed. Patient did not recall strategies provided at the time of the video swallow study.   Swallowing Evaluation Clinical swallow evaluation   Clinical Swallow Evaluation   Clinical Swallow Evaluation Textures Trialed thin liquids;solid foods   Clinical Swallow Eval: Thin Liquid Texture Trial   Mode of Presentation, Thin Liquids cup   Volume of Liquid or Food Presented 2 oz   Oral Phase of Swallow WFL   Pharyngeal Phase of Swallow intact   Clinical Swallow Evaluation: Solid Food Texture Trial   Mode of Presentation self-fed   Volume Presented 8 bites   Oral Phase delayed AP movement   Pharyngeal Phase intact   Diagnostic Statement Patient independently took small bite size   Swallowing Recommendations   Diet Consistency Recommendations thin liquids (level 0);regular diet   Supervision Level for Intake distant supervision needed   Mode of Delivery " Recommendations bolus size, small;slow rate of intake   Swallowing Maneuver Recommendations alternate food and liquid intake   Monitoring/Assistance Required (Eating/Swallowing) optimize oral intake to minimize need for tube feeding   Recommended Feeding/Eating Techniques (Swallow Eval) maintain upright sitting position for eating;maintain upright posture during/after eating for 30 minutes;moisten oral mucosa prior to intake   General Therapy Interventions   Planned Therapy Interventions Dysphagia Treatment   Dysphagia treatment Instruction of safe swallow strategies;Compensatory strategies for swallowing   Clinical Impression   Criteria for Skilled Therapeutic Interventions Met (SLP Eval) Yes, treatment indicated   SLP Diagnosis   (Dysphagia)   Risks & Benefits of therapy have been explained evaluation/treatment results reviewed;care plan/treatment goals reviewed;risks/benefits reviewed;participants voiced agreement with care plan;participants included;patient   Clinical Impression Comments Patient presents with no overt s/s aspiration, but does present with symptoms reflective of inadequate bolus prep and AP bolus transit, suspect compromised by inadequate mastication. Given incidental noting of possible esophageal dysfunction, this may also be playing a role. Patient has continued to have reduced intake over the past several weeks. Modifying food textures may further reduce patient's appetite and therefore intake, so continuing with regular food textures alternatives seem to be appropriate. Working with patient and dietician to identify appetizing and manageable food options is warranted for safe and efficient intake. GI consult and/or esophagram may be appropriate   SLP Total Evaluation Time   Eval: oral/pharyngeal swallow function, clinical swallow Minutes (35587) 25   SLP Goals   Therapy Frequency (SLP Eval) 4 times/week   SLP Predicted Duration/Target Date for Goal Attainment 11/01/24   SLP Goals Swallow  "  SLP: Safely tolerate diet without signs/symptoms of aspiration Regular diet;Thin liquids;With use of compensatory swallow strategies;With use of swallow precautions;Independently   Swallowing Dysfunction &/or Oral Function for Feeding   Treatment of Swallowing Dysfunction &/or Oral Function for Feeding Minutes (22970) 8   Treatment Detail/Skilled Intervention To reduce oropharyngeal dysphagia, patient was reminded of swallow strategies provided by SLP at previous facility including small bites and sips (which patient does independently) but also moistening oral cavity with liquid prior to food, alternating bites with sips. Patient demonstrated understanding of instruction as evidenced by proper execution. She asked, \"so is this something I should try with other things like meat?\". Patient was encouraged to try.   SLP Discharge Planning   SLP Plan f/u diet, reinforce strategies, explore food options that are easy enough to chew that are still appetizing.   SLP Discharge Recommendation home with assist   SLP Rationale for DC Rec swallow below baseline; defer to treatment team if different.   SLP Brief overview of current status  Regular textures and thin liquids        "

## 2024-09-20 NOTE — SIGNIFICANT EVENT
Significant Event Note - overnight coverage MD    Time of event: 1:22 AM September 20, 2024    Description of event:  C/o itching    Plan:  -Ordered prn hydroxizine    Discussed with: bedside nurse    Lori Raza MD

## 2024-09-20 NOTE — PROGRESS NOTES
Calorie Count  Intake recorded for: 9/19  Total Kcals: ~1200 Total Protein: ~50g  # Meals Ordered from Kitchen: 3  # Meals Recorded: 3   B: 100% cream of wheat, toast, 4 oz. Orange juice, 8 oz. 2% milk   L: 100% cookie, 8 oz. 2% milk, bites of cheese and grapes    - Patient also ate 1/3 of a chicken vazquez wrap from the cafe   D: 100% dinner roll w/ butter, 4 oz. Orange juice, 50%  salad  # Supplements Recorded: 0 - patient did not receive Gelatein Plus or Magic Cup  - Discussed missing supplements w/ kitchen staff, patient to receive on her lunch and dinner trays today as ordered  - Patient continues to endorse fatigue w/ eating, states that she had to take some food out of her mouth that was too difficult to chew

## 2024-09-21 ENCOUNTER — APPOINTMENT (OUTPATIENT)
Dept: PHYSICAL THERAPY | Facility: CLINIC | Age: 84
End: 2024-09-21
Attending: HOSPITALIST
Payer: COMMERCIAL

## 2024-09-21 LAB
ANION GAP SERPL CALCULATED.3IONS-SCNC: 9 MMOL/L (ref 7–15)
BUN SERPL-MCNC: 14.4 MG/DL (ref 8–23)
CALCIUM SERPL-MCNC: 8.3 MG/DL (ref 8.8–10.4)
CHLORIDE SERPL-SCNC: 100 MMOL/L (ref 98–107)
CREAT SERPL-MCNC: 2.45 MG/DL (ref 0.51–0.95)
EGFRCR SERPLBLD CKD-EPI 2021: 19 ML/MIN/1.73M2
ERYTHROCYTE [DISTWIDTH] IN BLOOD BY AUTOMATED COUNT: 20.5 % (ref 10–15)
GLUCOSE SERPL-MCNC: 94 MG/DL (ref 70–99)
HCO3 SERPL-SCNC: 28 MMOL/L (ref 22–29)
HCT VFR BLD AUTO: 23.4 % (ref 35–47)
HGB BLD-MCNC: 7.5 G/DL (ref 11.7–15.7)
INR PPP: 2.84 (ref 0.85–1.15)
MCH RBC QN AUTO: 34.6 PG (ref 26.5–33)
MCHC RBC AUTO-ENTMCNC: 32.1 G/DL (ref 31.5–36.5)
MCV RBC AUTO: 108 FL (ref 78–100)
PLATELET # BLD AUTO: 105 10E3/UL (ref 150–450)
POTASSIUM SERPL-SCNC: 3.7 MMOL/L (ref 3.4–5.3)
RBC # BLD AUTO: 2.17 10E6/UL (ref 3.8–5.2)
SODIUM SERPL-SCNC: 137 MMOL/L (ref 135–145)
WBC # BLD AUTO: 5.7 10E3/UL (ref 4–11)

## 2024-09-21 PROCEDURE — 120N000017 HC R&B RESPIRATORY CARE

## 2024-09-21 PROCEDURE — 250N000013 HC RX MED GY IP 250 OP 250 PS 637: Performed by: HOSPITALIST

## 2024-09-21 PROCEDURE — 97110 THERAPEUTIC EXERCISES: CPT | Mod: GP

## 2024-09-21 PROCEDURE — 80048 BASIC METABOLIC PNL TOTAL CA: CPT | Performed by: PHYSICIAN ASSISTANT

## 2024-09-21 PROCEDURE — 99233 SBSQ HOSP IP/OBS HIGH 50: CPT | Performed by: HOSPITALIST

## 2024-09-21 PROCEDURE — 85610 PROTHROMBIN TIME: CPT | Performed by: PHYSICIAN ASSISTANT

## 2024-09-21 PROCEDURE — 250N000013 HC RX MED GY IP 250 OP 250 PS 637: Performed by: PHYSICIAN ASSISTANT

## 2024-09-21 PROCEDURE — 97530 THERAPEUTIC ACTIVITIES: CPT | Mod: GP

## 2024-09-21 RX ORDER — WARFARIN SODIUM 2 MG/1
2 TABLET ORAL
Status: DISCONTINUED | OUTPATIENT
Start: 2024-09-21 | End: 2024-09-21

## 2024-09-21 RX ADMIN — Medication 1 CAPSULE: at 08:30

## 2024-09-21 RX ADMIN — Medication 1 CAPSULE: at 21:26

## 2024-09-21 RX ADMIN — TORSEMIDE 100 MG: 100 TABLET ORAL at 08:30

## 2024-09-21 RX ADMIN — DOCUSATE SODIUM AND SENNOSIDES 1 TABLET: 8.6; 5 TABLET, FILM COATED ORAL at 21:26

## 2024-09-21 RX ADMIN — CLOPIDOGREL 75 MG: 75 TABLET ORAL at 08:30

## 2024-09-21 RX ADMIN — DOCUSATE SODIUM AND SENNOSIDES 1 TABLET: 8.6; 5 TABLET, FILM COATED ORAL at 08:30

## 2024-09-21 RX ADMIN — PANTOPRAZOLE SODIUM 40 MG: 40 TABLET, DELAYED RELEASE ORAL at 07:04

## 2024-09-21 RX ADMIN — Medication 1 TABLET: at 08:30

## 2024-09-21 RX ADMIN — WARFARIN SODIUM 1.5 MG: 3 TABLET ORAL at 18:32

## 2024-09-21 RX ADMIN — ATORVASTATIN CALCIUM 80 MG: 40 TABLET, FILM COATED ORAL at 22:05

## 2024-09-21 RX ADMIN — Medication 5 MG: at 21:26

## 2024-09-21 RX ADMIN — TRAZODONE HYDROCHLORIDE 25 MG: 50 TABLET ORAL at 21:26

## 2024-09-21 ASSESSMENT — ACTIVITIES OF DAILY LIVING (ADL)
ADLS_ACUITY_SCORE: 40
ADLS_ACUITY_SCORE: 40
ADLS_ACUITY_SCORE: 39
ADLS_ACUITY_SCORE: 40
ADLS_ACUITY_SCORE: 40
ADLS_ACUITY_SCORE: 39
ADLS_ACUITY_SCORE: 39
ADLS_ACUITY_SCORE: 40
ADLS_ACUITY_SCORE: 40
ADLS_ACUITY_SCORE: 39
ADLS_ACUITY_SCORE: 39
ADLS_ACUITY_SCORE: 40
ADLS_ACUITY_SCORE: 40
ADLS_ACUITY_SCORE: 39
ADLS_ACUITY_SCORE: 40
ADLS_ACUITY_SCORE: 40
ADLS_ACUITY_SCORE: 39
ADLS_ACUITY_SCORE: 40
ADLS_ACUITY_SCORE: 39
ADLS_ACUITY_SCORE: 39
ADLS_ACUITY_SCORE: 40
ADLS_ACUITY_SCORE: 40
ADLS_ACUITY_SCORE: 39

## 2024-09-21 NOTE — SIGNIFICANT EVENT
Significant Event Note - remote coverage md    Time of event: 10:33 PM September 20, 2024    Description of event:  Paged: difficulty with sleep     Plan:  -Ordered melatonin 5mg at bedtime  -Ordered trazodone 25mg at bedtime prn sleep    Discussed with: bedside nurse    Lori Raza MD

## 2024-09-21 NOTE — PLAN OF CARE
Problem: Fall Injury Risk  Goal: Absence of Fall and Fall-Related Injury  Outcome: Progressing  Intervention: Identify and Manage Contributors  Recent Flowsheet Documentation  Taken 9/21/2024 0120 by Erick Evangelista RN  Medication Review/Management: medications reviewed  Intervention: Promote Injury-Free Environment  Recent Flowsheet Documentation  Taken 9/21/2024 0120 by Erick Evangelista RN  Safety Promotion/Fall Prevention:   activity supervised   lighting adjusted   room door open   room near nurse's station     Problem: Pain Acute  Goal: Optimal Pain Control and Function  Outcome: Progressing  Intervention: Prevent or Manage Pain  Recent Flowsheet Documentation  Taken 9/21/2024 0120 by Erick Evangelista RN  Sensory Stimulation Regulation:   care clustered   lighting decreased   quiet environment promoted  Medication Review/Management: medications reviewed  Intervention: Optimize Psychosocial Wellbeing  Recent Flowsheet Documentation  Taken 9/21/2024 0120 by Erick Evangelista RN  Supportive Measures:   positive reinforcement provided   active listening utilized   Goal Outcome Evaluation:  Patient is alert and oriented x 4, vital signs are stable. /53 (BP Location: Left arm)   Pulse 64   Temp 98.2  F (36.8  C) (Oral)   Resp 14   Wt 79 kg (174 lb 2.6 oz)   SpO2 96%   BMI 27.28 kg/m      The patient is able to verbalize their needs, denies pain or anxiety, and remained calm and cooperative throughout the shift.  Slept between cares, refused the melatonin (new order scheduled at 230) as she had just received a PRN dose. Pt had c/o of itchiness bilaterally on her hips previous 2 days. Problem was solved by loosening her diaper. Pt again was effusive in her praise of staff, and was feeing relieved that she finally slept so well after struggling with sleep disturbances recent few nights. Cntinue to monitor. Erick Evangelista RN on 9/21/2024 at 7:36 AM

## 2024-09-21 NOTE — PROGRESS NOTES
[facility]    [facility] - Hospitalist Service    Date of Admission:  9/16/2024    Felicitas Elliott is a 84 year old female admitted on 9/16/2024 to the LTAC for long-term antibiotics following MSSA bacteremia and endocarditis. 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  She was initially admitted to [facility] on 8/16/2024 for sepsis and HAMMAD.  She received empiric antibiotics on admission. Blood cultures grew MSSA.  Neurology was consulted for possible discitis.  ID was also consulted. Echo revealed large posterior leaflet mitral valve vegetation and small aortic valve vegetation with severe aortic stenosis.  Brain MRI revealed scattered embolic infarcts.  Coronary angiogram demonstrated coronary artery disease.  CT surgery was consulted. She underwent CABG alongside the procedures mentioned above.  She however has not had renal recovery.  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  She had dysphagia and momentarily required NG tube with tube feeds. She worked with speech therapy and is now cleared for regular diet and thin liquids.  CT surgery recommends warfarin for 3 months for bioprosthetic valves.  INR goal 2-3.  End date of warfarin approximately 12/10/2024, at which point she will be on Plavix alone (ASA allergy) indefinitely.  ID recommends IV vancomycin until 10/8/2024.    LTAC Course:  9/17 - 9/21.  Progressing well.  On Coumadin, initially bridging with heparin gtt. for bioprosthetic valve.  On arrival initial INR 1.49. Goal 2-3. Has been dialyzing for HAMMAD. 9/19, INR 2.19, heparin gtt discontinued. RD noted patient having trouble chewing and impedes her overall oral intake.  SLP consulted.    BARRIERS TO DISCHARGE (why care is unable to be provided at a lower level of care):    -Long-term antibiotics.  -Dialysis    Assessment & Plan   - No new  changes at this time. Continue with current medical management    MSSA bacteremia  Aortic root abscess s/p aortic root abscess debridement and aortic annular reconstruction, aortic root replacement, bioprosthetic aortic valve 8/27/2024  Gram-negative bacilli and respiratory culture s/p cefepime and ceftriaxone  -Positive blood cultures 8/14/2024 and 8/16 with MSSA.  Repeat blood cultures - 8/17/2024  -Mitral and aortic valve endocarditis as evidenced with large vegetation on mitral valve 8 mm x 15 at x 2 posterior leaflet of small vegetation on aortic valve.  With signs of vegetation combined with brain infarct.  S/p CV surgery  -Continue with IV vancomycin.  Dosing per pharmacy  -Per ID will need to check immunoglobulin level in 4 to 6 weeks, sister with history of hypogammaglobinemia  -ID consulted and following  -Patient previously on heparin 5000 subQ every 8 hours and Coumadin.  Recommended INR goal is 2-3 per CT surgery.  Discontinued heparin 5000 units subcu every 8 hours on admission and started patient on heparin gtt. alongside Coumadin.  I also notified pharmacy and were agreeable with this plan.  -9/19, INR 2.19.  Discontinued heparin gtt. Patient on goal  -Pharm.D. to continue dosing Coumadin    Abnormal brain MRI suggestive of subacute infarct.  Patient with a history of MSSA bacteremia and aortic valve endocarditis.  Most likely septic emboli.  -Continue with antibiotics per ID.  End date 10/8/2024    Acute respiratory with hypoxia  -Patient previously intubated.  Now extubated.    -Continue with oxygen support to maintain oxygenation above 92%.  Wean as tolerated  -RT while in LTAC    HAMMAD  -Previously on CCRT now transferred to HD  -Torsemide per nephrology  -HD per nephrology-MWF  -Monitor kidney function with BMP    CAD  A-fib  History of aortic stenosis  -Fairly stable at this time.  -S/p CABG per CT surgery  -Continue with Plavix and statin  -Metoprolol  -Previously on amiodarone for rate control,  now s/p PPM  -Monitor on telemetry for 24 hours.    Physical deconditioning/critical illness myopathy  -Fall precautions.  -PT/OT  -WOC    Diet: Advance Diet as Tolerated: Regular Diet Adult  Snacks/Supplements Adult: Gelatein Plus; With Meals  Snacks/Supplements Adult: Magic Cup; With Meals  Calorie Counts    DVT Prophylaxis: Warfarin  Le Catheter: Not present  Lines: PRESENT      PICC 09/06/24 Triple Lumen Right Basilic Vascular access-Site Assessment: WDL  CVC Double Lumen Right Subclavian Tunneled-Site Assessment: WDL      Cardiac Monitoring: None  Code Status: No CPR- Do NOT Intubate      Clinically Significant Risk Factors              # Hypoalbuminemia: Lowest albumin = 3.1 g/dL at 9/19/2024  6:35 AM, will monitor as appropriate   # Thrombocytopenia: Lowest platelets = 104 in last 2 days, will monitor for bleeding              # Severe Malnutrition: based on nutrition assessment      # Financial/Environmental Concerns: none   # Pacemaker present  # History of CABG: noted on surgical history    Disposition Plan     Medically Ready for Discharge: Anticipated in 5+ Days    Adan Veloz MD  Hospitalist Service  [facility]  Securely message with [employer] (more info)  Text page via [employer] Paging/Directory   ______________________________________________________________________    Interval History   No new events over night per RN.  Patient reports she had a good night.  Progressing well.  Remains on IV antibiotics and on dialysis.INR today 2.84    Physical Exam   Vital Signs: Temp: 98.2  F (36.8  C) Temp src: Oral BP: 104/53 Pulse: 64   Resp: 14 SpO2: 96 % O2 Device: Nasal cannula   Weight: 174 lbs 2.61 oz  Constitutional: Patient is resting in bed comfortably appears in no distress.  Eyes: Pupils are equal round and reactive to light  Respiratory: Good respiratory effort, on auscultation good air entry is noted  Cardiovascular: Good S1 and S2 no obvious murmurs peripheral pulses are palpable  GI: Abdomen is soft  nontender nondistended bowel sounds are noted  Skin: No obvious rashes noted on exposed areas, warm to touch please refer to nursing/wound care note for complete skin exam  Musculoskeletal: Good muscle tone nails and digits appear acyanotic  Neuropsychiatric: Awake alert oriented to person normal affect    Medical Decision Making       52 MINUTES SPENT BY ME on the date of service doing chart review, history, exam, documentation & further activities per the note.  ------------------ MEDICAL DECISION MAKING ------------------------------------------------------------------------------------------------------  MANAGEMENT DISCUSSED with the following over the past 24 hours: Patient, family members in the room, staff RN and pharmacist    NOTE(S)/MEDICAL RECORDS REVIEWED over the past 24 hours: ID       Data   Recent Labs   Lab 09/20/24  0531 09/19/24  0635 09/18/24  0942 09/18/24  0704   WBC 6.1 5.8  --  6.2   HGB 7.6* 7.4*  --  7.3*   * 107*  --  104*   * 116*  --  121*   INR 2.40* 2.19* 1.88*  --     138  --  136   POTASSIUM 4.0 4.2  --  3.8   CHLORIDE 99 103  --  97*   CO2 28 28  --  28   BUN 26.2* 18.7  --  33.0*   CR 3.65* 2.89*  --  4.01*   ANIONGAP 8 7  --  11   KAELYN 8.8 8.6*  --  8.4*   GLC 89 92  --  87   ALBUMIN  --  3.1*  --  3.1*   PROTTOTAL  --  6.2*  --  6.1*   BILITOTAL  --  0.7  --  0.7   ALKPHOS  --  78  --  75   ALT  --  31  --  29   AST  --  36  --  34       Most Recent 3 CBC's:  Recent Labs   Lab Test 09/20/24  0531 09/19/24  0635 09/18/24  0704   WBC 6.1 5.8 6.2   HGB 7.6* 7.4* 7.3*   * 107* 104*   * 116* 121*     Most Recent 3 BMP's:  Recent Labs   Lab Test 09/20/24  0531 09/19/24  0635 09/18/24  0704    138 136   POTASSIUM 4.0 4.2 3.8   CHLORIDE 99 103 97*   CO2 28 28 28   BUN 26.2* 18.7 33.0*   CR 3.65* 2.89* 4.01*   ANIONGAP 8 7 11   KAELYN 8.8 8.6* 8.4*   GLC 89 92 87     Most Recent 2 LFT's:  Recent Labs   Lab Test 09/19/24  0635 09/18/24  0704   AST 36 34    ALT 31 29   ALKPHOS 78 75   BILITOTAL 0.7 0.7     Most Recent 3 INR's:  Recent Labs   Lab Test 09/20/24  0531 09/19/24  0635 09/18/24  0942   INR 2.40* 2.19* 1.88*

## 2024-09-21 NOTE — PLAN OF CARE
"  Problem: Adult Inpatient Plan of Care  Goal: Optimal Comfort and Wellbeing  Outcome: Progressing  Intervention: Provide Person-Centered Care  Recent Flowsheet Documentation  Taken 9/20/2024 1800 by Oxana Loza, RN  Trust Relationship/Rapport:   care explained   choices provided   questions answered   thoughts/feelings acknowledged    Alert and oriented x 4. Complains of mild pain, states it is \"manageable\" at this time (1700).  Denies nausea, dizziness, shortness of breath and lightheadedness. On calorie count with poor appetite at dinner - intake <25%. On reg diet. Last BM: today. Incontinent of bowel and bladder.  PRNS: tylenol given at 2016 for arthritic pain with some relief. PRN melatonin also given with little relief - pt asked for something stronger. Trazodone ordered by provider, however pt was asleep when this writer checked on her to see if she would like it (2230). Uses call light appropriately.  Mood calm, handling hospitalization well. Continue to monitor.     Oxana Loza,   RN                          "

## 2024-09-21 NOTE — PLAN OF CARE
Goal Outcome Evaluation:  Problem: Adult Inpatient Plan of Care  Goal: Optimal Comfort and Wellbeing  Outcome: Progressing  Intervention: Provide Person-Centered Care  Recent Flowsheet Documentation  Taken 9/21/2024 1011 by Dayana Brambila, RN  Trust Relationship/Rapport:   care explained   questions encouraged     Problem: Adult Inpatient Plan of Care  Goal: Optimal Comfort and Wellbeing  Intervention: Provide Person-Centered Care  Recent Flowsheet Documentation  Taken 9/21/2024 1011 by Dayana Brambila RN  Trust Relationship/Rapport:   care explained   questions encouraged   Patient had an uneventful shift.

## 2024-09-22 LAB
ANION GAP SERPL CALCULATED.3IONS-SCNC: 9 MMOL/L (ref 7–15)
BUN SERPL-MCNC: 23.1 MG/DL (ref 8–23)
CALCIUM SERPL-MCNC: 8.5 MG/DL (ref 8.8–10.4)
CHLORIDE SERPL-SCNC: 99 MMOL/L (ref 98–107)
CREAT SERPL-MCNC: 3.45 MG/DL (ref 0.51–0.95)
EGFRCR SERPLBLD CKD-EPI 2021: 13 ML/MIN/1.73M2
ERYTHROCYTE [DISTWIDTH] IN BLOOD BY AUTOMATED COUNT: 20 % (ref 10–15)
GLUCOSE SERPL-MCNC: 92 MG/DL (ref 70–99)
HCO3 SERPL-SCNC: 28 MMOL/L (ref 22–29)
HCT VFR BLD AUTO: 23.1 % (ref 35–47)
HGB BLD-MCNC: 7.4 G/DL (ref 11.7–15.7)
INR PPP: 2.61 (ref 0.85–1.15)
MCH RBC QN AUTO: 34.1 PG (ref 26.5–33)
MCHC RBC AUTO-ENTMCNC: 32 G/DL (ref 31.5–36.5)
MCV RBC AUTO: 107 FL (ref 78–100)
PLATELET # BLD AUTO: 105 10E3/UL (ref 150–450)
POTASSIUM SERPL-SCNC: 3.9 MMOL/L (ref 3.4–5.3)
RBC # BLD AUTO: 2.17 10E6/UL (ref 3.8–5.2)
SODIUM SERPL-SCNC: 136 MMOL/L (ref 135–145)
WBC # BLD AUTO: 5.2 10E3/UL (ref 4–11)

## 2024-09-22 PROCEDURE — 250N000013 HC RX MED GY IP 250 OP 250 PS 637: Performed by: STUDENT IN AN ORGANIZED HEALTH CARE EDUCATION/TRAINING PROGRAM

## 2024-09-22 PROCEDURE — 85610 PROTHROMBIN TIME: CPT | Performed by: PHYSICIAN ASSISTANT

## 2024-09-22 PROCEDURE — 250N000013 HC RX MED GY IP 250 OP 250 PS 637: Performed by: PHYSICIAN ASSISTANT

## 2024-09-22 PROCEDURE — 250N000013 HC RX MED GY IP 250 OP 250 PS 637: Performed by: HOSPITALIST

## 2024-09-22 PROCEDURE — 99232 SBSQ HOSP IP/OBS MODERATE 35: CPT | Performed by: STUDENT IN AN ORGANIZED HEALTH CARE EDUCATION/TRAINING PROGRAM

## 2024-09-22 PROCEDURE — 120N000017 HC R&B RESPIRATORY CARE

## 2024-09-22 PROCEDURE — 80048 BASIC METABOLIC PNL TOTAL CA: CPT | Performed by: PHYSICIAN ASSISTANT

## 2024-09-22 RX ORDER — DIPHENHYDRAMINE HYDROCHLORIDE, ZINC ACETATE 2; .1 G/100G; G/100G
CREAM TOPICAL 3 TIMES DAILY PRN
Status: DISPENSED | OUTPATIENT
Start: 2024-09-22

## 2024-09-22 RX ORDER — WARFARIN SODIUM 3 MG/1
3 TABLET ORAL
Status: COMPLETED | OUTPATIENT
Start: 2024-09-22 | End: 2024-09-22

## 2024-09-22 RX ADMIN — CLOPIDOGREL 75 MG: 75 TABLET ORAL at 08:59

## 2024-09-22 RX ADMIN — Medication 1 TABLET: at 08:53

## 2024-09-22 RX ADMIN — DIPHENHYDRAMINE HYDROCHLORIDE, ZINC ACETATE: 2; .1 CREAM TOPICAL at 22:40

## 2024-09-22 RX ADMIN — TORSEMIDE 100 MG: 100 TABLET ORAL at 08:53

## 2024-09-22 RX ADMIN — METOPROLOL TARTRATE 25 MG: 25 TABLET, FILM COATED ORAL at 08:50

## 2024-09-22 RX ADMIN — HYDROXYZINE HYDROCHLORIDE 10 MG: 10 TABLET ORAL at 18:34

## 2024-09-22 RX ADMIN — ACETAMINOPHEN 650 MG: 325 TABLET, FILM COATED ORAL at 18:33

## 2024-09-22 RX ADMIN — DOCUSATE SODIUM AND SENNOSIDES 1 TABLET: 8.6; 5 TABLET, FILM COATED ORAL at 20:54

## 2024-09-22 RX ADMIN — ACETAMINOPHEN 650 MG: 325 TABLET, FILM COATED ORAL at 00:32

## 2024-09-22 RX ADMIN — DOCUSATE SODIUM AND SENNOSIDES 1 TABLET: 8.6; 5 TABLET, FILM COATED ORAL at 08:51

## 2024-09-22 RX ADMIN — Medication 1 CAPSULE: at 08:51

## 2024-09-22 RX ADMIN — ATORVASTATIN CALCIUM 80 MG: 40 TABLET, FILM COATED ORAL at 20:54

## 2024-09-22 RX ADMIN — WARFARIN SODIUM 3 MG: 3 TABLET ORAL at 18:34

## 2024-09-22 RX ADMIN — TRAZODONE HYDROCHLORIDE 25 MG: 50 TABLET ORAL at 20:58

## 2024-09-22 RX ADMIN — PANTOPRAZOLE SODIUM 40 MG: 40 TABLET, DELAYED RELEASE ORAL at 06:41

## 2024-09-22 RX ADMIN — Medication 1 CAPSULE: at 20:54

## 2024-09-22 RX ADMIN — POLYETHYLENE GLYCOL 3350 17 G: 17 POWDER, FOR SOLUTION ORAL at 08:52

## 2024-09-22 RX ADMIN — Medication 5 MG: at 20:54

## 2024-09-22 RX ADMIN — HYDROXYZINE HYDROCHLORIDE 10 MG: 10 TABLET ORAL at 00:31

## 2024-09-22 ASSESSMENT — ACTIVITIES OF DAILY LIVING (ADL)
ADLS_ACUITY_SCORE: 40
ADLS_ACUITY_SCORE: 42
ADLS_ACUITY_SCORE: 40

## 2024-09-22 NOTE — PLAN OF CARE
"Goal Outcome Evaluation:  Problem: Pain Acute  Goal: Optimal Pain Control and Function  Outcome: Progressing  Intervention: Prevent or Manage Pain  Recent Flowsheet Documentation  Taken 9/22/2024 0154 by Erick Evangelista RN  Sensory Stimulation Regulation:   care clustered   lighting decreased   quiet environment promoted  Medication Review/Management: medications reviewed  Intervention: Optimize Psychosocial Wellbeing  Recent Flowsheet Documentation  Taken 9/22/2024 0154 by Erick Evangelista RN  Supportive Measures:   positive reinforcement provided   active listening utilized     Problem: Hemodialysis  Goal: Absence of Infection Signs and Symptoms  Outcome: Progressing  Intervention: Prevent or Manage Infection  Recent Flowsheet Documentation  Taken 9/22/2024 0154 by Erick Evangelista RN  Infection Prevention: rest/sleep promoted    Patient is alert and oriented x 4, vital signs are stable. /55 (BP Location: Left arm, Patient Position: Left side, Cuff Size: Adult Regular)   Pulse 70   Temp 98.3  F (36.8  C) (Oral)   Resp 18   Wt 79 kg (174 lb 2.6 oz)   SpO2 96%   BMI 27.28 kg/m      The patient is able to verbalize their needs, c/o 10/10 \"burning, itching....feels like left foot is on fire\" pain in feet. Tylenol and hydroxyzine were only mildly effective, slept 5 hours or so. Expressed pain relief but still \"feeling sleepy\"  when she was woken up for AM lab draws. Continue to monitor. Erick Evangelista RN on 9/22/2024 at 6:55 AM                          "

## 2024-09-22 NOTE — PLAN OF CARE
Problem: Adult Inpatient Plan of Care  Goal: Optimal Comfort and Wellbeing  Outcome: Progressing  Intervention: Provide Person-Centered Care  Recent Flowsheet Documentation  Taken 9/21/2024 1637 by Oxana Loza, RN  Trust Relationship/Rapport:   care explained   questions answered   thoughts/feelings acknowledged    Pt alert and oriented x 4. Pt complained of abdominal pain that was relieved after passing gas. Scheduled metoprolol (2100) was NOT given, due to SBP = 104. PRN trazodone given at bedtime, with positive outcome; pt sound asleep at this time (2346). Continue to monitor.    /55 (BP Location: Left arm, Patient Position: Left side, Cuff Size: Adult Regular)   Pulse 70   Temp 98.3  F (36.8  C) (Oral)   Resp 18   Wt 79 kg (174 lb 2.6 oz)   SpO2 96%   BMI 27.28 kg/m

## 2024-09-22 NOTE — PROGRESS NOTES
[facility] - [facility]ist Service    Date of Admission:  9/16/2024    Felicitas Elliott is a 84 year old female admitted on 9/16/2024 to the LTAC for long-term antibiotics following MSSA bacteremia and endocarditis. 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  She was initially admitted to [facility] on 8/16/2024 for sepsis and HAMMAD.  She received empiric antibiotics on admission. Blood cultures grew MSSA.  Neurology was consulted for possible discitis.  ID was also consulted. Echo revealed large posterior leaflet mitral valve vegetation and small aortic valve vegetation with severe aortic stenosis.  Brain MRI revealed scattered embolic infarcts.  Coronary angiogram demonstrated coronary artery disease.  CT surgery was consulted. She underwent CABG alongside the procedures mentioned above.  She however has not had renal recovery.  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  She had dysphagia and momentarily required NG tube with tube feeds. She worked with speech therapy and is now cleared for regular diet and thin liquids.  CT surgery recommends warfarin for 3 months for bioprosthetic valves.  INR goal 2-3.  End date of warfarin approximately 12/10/2024, at which point she will be on Plavix alone (ASA allergy) indefinitely.  ID recommends IV vancomycin until 10/8/2024.    LT[facility] Course:  9/17 - 9/21.  Progressing well.  On Coumadin, initially bridging with heparin gtt. for bioprosthetic valve.  On arrival initial INR 1.49. Goal 2-3. Has been dialyzing for HAMMAD. 9/19, INR 2.19, heparin gtt discontinued. RD noted patient having trouble chewing and impedes her overall oral intake.  SLP consulted.    9/22: Stable.     BARRIERS TO DISCHARGE (why care is unable to be provided at a lower level of care):    -Long-term antibiotics.  -Dialysis    Assessment &  Plan     MSSA bacteremia  Aortic root abscess s/p aortic root abscess debridement and aortic annular reconstruction, aortic root replacement, bioprosthetic aortic valve 8/27/2024  Gram-negative bacilli and respiratory culture s/p cefepime and ceftriaxone  -Positive blood cultures 8/14/2024 and 8/16 with MSSA.  Repeat blood cultures - 8/17/2024  -Mitral and aortic valve endocarditis as evidenced with large vegetation on mitral valve 8 mm x 15 at x 2 posterior leaflet of small vegetation on aortic valve.  With signs of vegetation combined with brain infarct.  S/p CV surgery  -Continue with IV vancomycin.  Dosing per pharmacy  -Per ID will need to check immunoglobulin level in 4 to 6 weeks, sister with history of hypogammaglobinemia  -ID consulted and following  -Patient previously on heparin 5000 subQ every 8 hours and Coumadin.  Recommended INR goal is 2-3 per CT surgery.  Discontinued heparin 5000 units subcu every 8 hours on admission and started patient on heparin gtt. alongside Coumadin.  I also notified pharmacy and were agreeable with this plan.  -9/19, INR 2.19.  Discontinued heparin gtt. Patient on goal  -Pharm.D. to continue dosing Coumadin    Abnormal brain MRI suggestive of subacute infarct  Patient with a history of MSSA bacteremia and aortic valve endocarditis.  Most likely septic emboli.  -Continue with antibiotics per ID.  End date 10/8/2024    Acute respiratory with hypoxia  -Patient previously intubated.  Now extubated.    -Continue with oxygen support to maintain oxygenation above 92%.  Wean as tolerated  -RT while in LTAC    HAMMAD  -Previously on CCRT now transferred to HD  -Torsemide per nephrology  -HD per nephrology-MWF  -Monitor kidney function with BMP    CAD  A-fib  History of aortic stenosis  -Fairly stable at this time.  -S/p CABG per CT surgery  -Continue with Plavix and statin  -Metoprolol  -Previously on amiodarone for rate control, now s/p PPM  -Monitor on telemetry for 24  hours.    Physical deconditioning/critical illness myopathy  -Fall precautions.  -PT/OT  -WOC    Diet: Advance Diet as Tolerated: Regular Diet Adult  Snacks/Supplements Adult: Gelatein Plus; With Meals  Snacks/Supplements Adult: Magic Cup; With Meals    DVT Prophylaxis: Warfarin  Le Catheter: Not present  Lines: PRESENT      CVC Double Lumen Right Subclavian Tunneled-Site Assessment: WDL      Cardiac Monitoring: None  Code Status: No CPR- Do NOT Intubate      Clinically Significant Risk Factors              # Hypoalbuminemia: Lowest albumin = 3.1 g/dL at 9/19/2024  6:35 AM, will monitor as appropriate   # Thrombocytopenia: Lowest platelets = 105 in last 2 days, will monitor for bleeding              # Severe Malnutrition: based on nutrition assessment      # Financial/Environmental Concerns: none   # Pacemaker present  # History of CABG: noted on surgical history    Disposition Plan     Medically Ready for Discharge: Anticipated in 5+ Days    Heri Vasquez MD  Hospitalist Service  [facility]  Securely message with [employer] (more info)  Text page via [employer] Paging/Directory   ______________________________________________________________________    Interval History   - no acute events ovn  - pt seen sitting up in her chair,  at her side  - doing well today  - planning on watching [employer] game  - nothing she wanted to discuss today  - no other acute concerns     Physical Exam   Vital Signs: Temp: 98.3  F (36.8  C) Temp src: Oral BP: 101/55 Pulse: 70   Resp: 18 SpO2: 96 % O2 Device: Nasal cannula   Weight: 174 lbs 2.61 oz  Constitutional: Patient is resting in bed comfortably appears in no distress.  Eyes: Pupils are equal round and reactive to light  Respiratory: Good respiratory effort, on auscultation good air entry is noted  Cardiovascular: Good S1 and S2 no obvious murmurs peripheral pulses are palpable  GI: Abdomen is soft nontender nondistended bowel sounds are noted  Skin: No obvious rashes noted on exposed  areas, warm to touch please refer to nursing/wound care note for complete skin exam  Musculoskeletal: Good muscle tone nails and digits appear acyanotic  Neuropsychiatric: Awake alert oriented to person normal affect    Medical Decision Making       35 MINUTES SPENT BY ME on the date of service doing chart review, history, exam, documentation & further activities per the note.  ------------------ MEDICAL DECISION MAKING ------------------------------------------------------------------------------------------------------  MANAGEMENT DISCUSSED with the following over the past 24 hours:     NOTE(S)/MEDICAL RECORDS REVIEWED over the past 24 hours:         Data   Recent Labs   Lab 09/22/24  0545 09/21/24  0559 09/20/24  0531 09/19/24  0635 09/18/24  0942 09/18/24  0704   WBC 5.2 5.7 6.1 5.8  --  6.2   HGB 7.4* 7.5* 7.6* 7.4*  --  7.3*   * 108* 108* 107*  --  104*   * 105* 104* 116*  --  121*   INR  --  2.84* 2.40* 2.19*   < >  --    NA  --  137 135 138  --  136   POTASSIUM  --  3.7 4.0 4.2  --  3.8   CHLORIDE  --  100 99 103  --  97*   CO2  --  28 28 28  --  28   BUN  --  14.4 26.2* 18.7  --  33.0*   CR  --  2.45* 3.65* 2.89*  --  4.01*   ANIONGAP  --  9 8 7  --  11   KAELYN  --  8.3* 8.8 8.6*  --  8.4*   GLC  --  94 89 92  --  87   ALBUMIN  --   --   --  3.1*  --  3.1*   PROTTOTAL  --   --   --  6.2*  --  6.1*   BILITOTAL  --   --   --  0.7  --  0.7   ALKPHOS  --   --   --  78  --  75   ALT  --   --   --  31  --  29   AST  --   --   --  36  --  34    < > = values in this interval not displayed.       Most Recent 3 CBC's:  Recent Labs   Lab Test 09/22/24  0545 09/21/24  0559 09/20/24  0531   WBC 5.2 5.7 6.1   HGB 7.4* 7.5* 7.6*   * 108* 108*   * 105* 104*     Most Recent 3 BMP's:  Recent Labs   Lab Test 09/21/24  0559 09/20/24  0531 09/19/24  0635    135 138   POTASSIUM 3.7 4.0 4.2   CHLORIDE 100 99 103   CO2 28 28 28   BUN 14.4 26.2* 18.7   CR 2.45* 3.65* 2.89*   ANIONGAP 9 8 7   KAELYN 8.3*  8.8 8.6*   GLC 94 89 92     Most Recent 2 LFT's:  Recent Labs   Lab Test 09/19/24  0635 09/18/24  0704   AST 36 34   ALT 31 29   ALKPHOS 78 75   BILITOTAL 0.7 0.7     Most Recent 3 INR's:  Recent Labs   Lab Test 09/21/24  0559 09/20/24  0531 09/19/24  0635   INR 2.84* 2.40* 2.19*

## 2024-09-22 NOTE — PLAN OF CARE
Problem: Adult Inpatient Plan of Care  Goal: Optimal Comfort and Wellbeing  Outcome: Progressing  Intervention: Provide Person-Centered Care  Recent Flowsheet Documentation  Taken 9/22/2024 1000 by Radha Amin RN  Trust Relationship/Rapport:   care explained   choices provided   questions encouraged   thoughts/feelings acknowledged     Problem: Hemodialysis  Goal: Absence of Infection Signs and Symptoms  Outcome: Progressing  Intervention: Prevent or Manage Infection  Recent Flowsheet Documentation  Taken 9/22/2024 1000 by Radha Amin, RN  Infection Prevention: hand hygiene promoted   Goal Outcome Evaluation:       Patient denied pain or discomfort, assisted with transfer up to chair, encouraged oral intake.  at beside was very supportive.

## 2024-09-23 ENCOUNTER — TELEPHONE (OUTPATIENT)
Dept: CARDIOLOGY | Facility: CLINIC | Age: 84
End: 2024-09-23
Payer: COMMERCIAL

## 2024-09-23 ENCOUNTER — APPOINTMENT (OUTPATIENT)
Dept: PHYSICAL THERAPY | Facility: CLINIC | Age: 84
DRG: 288 | End: 2024-09-23
Attending: HOSPITALIST
Payer: COMMERCIAL

## 2024-09-23 LAB
ANION GAP SERPL CALCULATED.3IONS-SCNC: 10 MMOL/L (ref 7–15)
BUN SERPL-MCNC: 31.6 MG/DL (ref 8–23)
CALCIUM SERPL-MCNC: 8.4 MG/DL (ref 8.8–10.4)
CHLORIDE SERPL-SCNC: 98 MMOL/L (ref 98–107)
CREAT SERPL-MCNC: 3.95 MG/DL (ref 0.51–0.95)
EGFRCR SERPLBLD CKD-EPI 2021: 11 ML/MIN/1.73M2
ERYTHROCYTE [DISTWIDTH] IN BLOOD BY AUTOMATED COUNT: 20.3 % (ref 10–15)
GLUCOSE SERPL-MCNC: 94 MG/DL (ref 70–99)
HCO3 SERPL-SCNC: 28 MMOL/L (ref 22–29)
HCT VFR BLD AUTO: 23.2 % (ref 35–47)
HGB BLD-MCNC: 7.5 G/DL (ref 11.7–15.7)
INR PPP: 3 (ref 0.85–1.15)
INR PPP: 5.08 (ref 0.85–1.15)
MCH RBC QN AUTO: 34.4 PG (ref 26.5–33)
MCHC RBC AUTO-ENTMCNC: 32.3 G/DL (ref 31.5–36.5)
MCV RBC AUTO: 106 FL (ref 78–100)
PLATELET # BLD AUTO: 118 10E3/UL (ref 150–450)
POTASSIUM SERPL-SCNC: 3.6 MMOL/L (ref 3.4–5.3)
RBC # BLD AUTO: 2.18 10E6/UL (ref 3.8–5.2)
SODIUM SERPL-SCNC: 136 MMOL/L (ref 135–145)
VANCOMYCIN SERPL-MCNC: 13.5 UG/ML
WBC # BLD AUTO: 5.6 10E3/UL (ref 4–11)

## 2024-09-23 PROCEDURE — 85610 PROTHROMBIN TIME: CPT | Performed by: PHYSICIAN ASSISTANT

## 2024-09-23 PROCEDURE — 80202 ASSAY OF VANCOMYCIN: CPT | Performed by: HOSPITALIST

## 2024-09-23 PROCEDURE — 85610 PROTHROMBIN TIME: CPT | Performed by: STUDENT IN AN ORGANIZED HEALTH CARE EDUCATION/TRAINING PROGRAM

## 2024-09-23 PROCEDURE — 90935 HEMODIALYSIS ONE EVALUATION: CPT

## 2024-09-23 PROCEDURE — 82310 ASSAY OF CALCIUM: CPT | Performed by: PHYSICIAN ASSISTANT

## 2024-09-23 PROCEDURE — 80048 BASIC METABOLIC PNL TOTAL CA: CPT | Performed by: PHYSICIAN ASSISTANT

## 2024-09-23 PROCEDURE — 97116 GAIT TRAINING THERAPY: CPT | Mod: GP | Performed by: PHYSICAL THERAPIST

## 2024-09-23 PROCEDURE — 97530 THERAPEUTIC ACTIVITIES: CPT | Mod: GP | Performed by: PHYSICAL THERAPIST

## 2024-09-23 PROCEDURE — 99233 SBSQ HOSP IP/OBS HIGH 50: CPT | Performed by: HOSPITALIST

## 2024-09-23 PROCEDURE — 250N000011 HC RX IP 250 OP 636: Performed by: STUDENT IN AN ORGANIZED HEALTH CARE EDUCATION/TRAINING PROGRAM

## 2024-09-23 PROCEDURE — 250N000013 HC RX MED GY IP 250 OP 250 PS 637: Performed by: HOSPITALIST

## 2024-09-23 PROCEDURE — 120N000017 HC R&B RESPIRATORY CARE

## 2024-09-23 PROCEDURE — 99232 SBSQ HOSP IP/OBS MODERATE 35: CPT | Performed by: NURSE PRACTITIONER

## 2024-09-23 PROCEDURE — 250N000013 HC RX MED GY IP 250 OP 250 PS 637: Performed by: PHYSICIAN ASSISTANT

## 2024-09-23 RX ORDER — VANCOMYCIN HYDROCHLORIDE 500 MG/10ML
500 INJECTION, POWDER, LYOPHILIZED, FOR SOLUTION INTRAVENOUS ONCE
Status: COMPLETED | OUTPATIENT
Start: 2024-09-23 | End: 2024-09-23

## 2024-09-23 RX ORDER — ALBUMIN (HUMAN) 12.5 G/50ML
25 SOLUTION INTRAVENOUS
Status: DISPENSED | OUTPATIENT
Start: 2024-09-23 | End: 2024-10-23

## 2024-09-23 RX ADMIN — PANTOPRAZOLE SODIUM 40 MG: 40 TABLET, DELAYED RELEASE ORAL at 06:30

## 2024-09-23 RX ADMIN — METOPROLOL TARTRATE 25 MG: 25 TABLET, FILM COATED ORAL at 21:08

## 2024-09-23 RX ADMIN — ACETAMINOPHEN 650 MG: 325 TABLET, FILM COATED ORAL at 09:09

## 2024-09-23 RX ADMIN — DOCUSATE SODIUM AND SENNOSIDES 1 TABLET: 8.6; 5 TABLET, FILM COATED ORAL at 09:07

## 2024-09-23 RX ADMIN — Medication 5 MG: at 21:08

## 2024-09-23 RX ADMIN — Medication 1 CAPSULE: at 09:08

## 2024-09-23 RX ADMIN — POLYETHYLENE GLYCOL 3350 17 G: 17 POWDER, FOR SOLUTION ORAL at 09:08

## 2024-09-23 RX ADMIN — Medication 1 CAPSULE: at 21:08

## 2024-09-23 RX ADMIN — DOCUSATE SODIUM AND SENNOSIDES 1 TABLET: 8.6; 5 TABLET, FILM COATED ORAL at 21:10

## 2024-09-23 RX ADMIN — ATORVASTATIN CALCIUM 80 MG: 40 TABLET, FILM COATED ORAL at 21:08

## 2024-09-23 RX ADMIN — Medication 0.5 G: at 18:22

## 2024-09-23 RX ADMIN — Medication 0.5 G: at 21:16

## 2024-09-23 RX ADMIN — WARFARIN SODIUM 1.5 MG: 3 TABLET ORAL at 18:04

## 2024-09-23 RX ADMIN — CLOPIDOGREL 75 MG: 75 TABLET ORAL at 09:07

## 2024-09-23 RX ADMIN — VANCOMYCIN HYDROCHLORIDE 500 MG: 500 INJECTION, POWDER, LYOPHILIZED, FOR SOLUTION INTRAVENOUS at 17:53

## 2024-09-23 RX ADMIN — TORSEMIDE 100 MG: 100 TABLET ORAL at 09:14

## 2024-09-23 RX ADMIN — Medication 1 TABLET: at 09:08

## 2024-09-23 RX ADMIN — MIDODRINE HYDROCHLORIDE 10 MG: 5 TABLET ORAL at 09:08

## 2024-09-23 ASSESSMENT — ACTIVITIES OF DAILY LIVING (ADL)
ADLS_ACUITY_SCORE: 44
ADLS_ACUITY_SCORE: 42
ADLS_ACUITY_SCORE: 44
ADLS_ACUITY_SCORE: 41
ADLS_ACUITY_SCORE: 42
ADLS_ACUITY_SCORE: 44
ADLS_ACUITY_SCORE: 42
ADLS_ACUITY_SCORE: 41
ADLS_ACUITY_SCORE: 44
ADLS_ACUITY_SCORE: 42
ADLS_ACUITY_SCORE: 41
ADLS_ACUITY_SCORE: 44
ADLS_ACUITY_SCORE: 41
ADLS_ACUITY_SCORE: 42

## 2024-09-23 NOTE — PHARMACY-VANCOMYCIN DOSING SERVICE
Pharmacy Vancomycin Note  Date of Service 2024  Patient's  1940   84 year old, female    Indication: Endocarditis  Day of Therapy Started , planning therapy through at least 10/8/24    Current vancomycin regimen:  Intermittent  dosing  Current vancomycin monitoring method: Renal Replacement Therapy  Current vancomycin therapeutic monitoring goal: 15-20 mg/L     Current estimated CrCl = Estimated Creatinine Clearance: 11.4 mL/min (A) (based on SCr of 3.95 mg/dL (H)).    Creatinine for last 3 days  2024:  5:59 AM Creatinine 2.45 mg/dL  2024:  5:45 AM Creatinine 3.45 mg/dL  2024:  5:41 AM Creatinine 3.95 mg/dL    Recent Vancomycin Levels (past 3 days)  2024:  5:41 AM Vancomycin 13.5 ug/mL    Vancomycin IV Administrations (past 72 hours)                     vancomycin (VANCOCIN) 300 mg in sodium chloride 0.9 % 100 mL intermittent infusion (mg) 300 mg New Bag 24 1729                    Nephrotoxins and other renal medications (From now, onward)      Start     Dose/Rate Route Frequency Ordered Stop    24 0900  torsemide (DEMADEX) tablet 100 mg         100 mg Oral DAILY 24 1254      24 1254  vancomycin place horne - receiving intermittent dosing         1 each Intravenous SEE ADMIN INSTRUCTIONS 24 1254                 Contrast Orders - past 72 hours (72h ago, onward)      None            Interpretation of levels and current regimen:  Vancomycin level is reflective of subtherapeutic level    Has serum creatinine changed greater than 50% in last 72 hours: No    Urine output:  unable to determine    Renal Function: ARF on Dialysis      Plan:  Vanco 500mg  x 1 today   Vancomycin monitoring method: Renal Replacement Therapy  Vancomycin therapeutic monitoring goal: 15-20 mg/L  Pharmacy will check vancomycin levels as appropriate in  before dialysis on Wed .  Serum creatinine levels will monitor with LILIBETH Rodriguez RP

## 2024-09-23 NOTE — SIGNIFICANT EVENT
Significant Event Note - Overnight Coverage MD     Time of event: 7:47 PM September 22, 2024    Description of event:  Paged: Pt c/o bilateral foot pain (tingling/burning/cold). PRN tylenol and atarax marginally effective. Gabapentin a possibility?    Plan:  -Trial Ketamine- gabapentin- lidocaine cream TID to bilateral feet  -Day hospitalist to review.   -Oral gabapentin is an option that can be  considered    Discussed with: bedside nurse    Lori Raza MD      -----------------------------  Addendum 1025pm   -Tops of feet and ankles itchy  - Atarax not helpful   - Ordered benadryl cream

## 2024-09-23 NOTE — TELEPHONE ENCOUNTER
Pt not expected to discharge from [facility] until 10/8. Will follow up with CV ABRAHAN's for further recs. Confirmed pt may do a virtual appt 9/24. Appt changed.

## 2024-09-23 NOTE — PROGRESS NOTES
Date: 9/23/2024  Time:0930     Data:  Pre Wt:   79.1 kg (bed weight)  Desired Wt:   To be established  Post Wt: off (bed weight)  Ultrafiltration - Post Run Net Total Removed (mL):  1500 ml  Vascular Access Status: CVC patent  Dialyzer Rinse:  Light  Total Blood Volume Processed: 68.7 L   Total Dialysis (Treatment) Time:   3 Hrs  Dialysate Bath: K 3, Ca 2.25  Heparin: Heparin: None: INR is 5.08, BALDO Sheikh notified     Lab:   Yes: repeat INR     Interventions:  Pt.wants to done her HD first  Midodrine PRN given prior HD by PCN  Pt.dialyzed for 3 hours via right CVC  UF set to 1.8L Liters, accommodating  priming and rinse back volumes  ,   INR lab drawn  See flowsheet for notes and crit-line  CVC lumen flushed with saline and locked with saline  CVC clear guard changed post HD  Report given to PCN, remain to [facility] 116 room in stable condition     Assessment:  A/O x 4, calm and cooperative,c/o pain rated 5/10, pain meds given by PCN  CVC dressing is clean,dry and intact                Plan:    Per Renal team      Steve Meyer, JAMN, RN  Acute Dialysis RN  [facility]

## 2024-09-23 NOTE — PROGRESS NOTES
Calorie Count  Intake recorded for: 9/20  Total Kcals: 810 Total Protein: 29g  # Meals Ordered from Kitchen: 3  # Meals Recorded: 3  # Supplements Recorded: 50% Gelatein Plus, 15% Magic Cup    Intake recorded for: 9/21  Total Kcals: 596+? Total Protein: 28g+?  # Meals Ordered from Kitchen: 3  # Meals Recorded: 2  - No food intake recorded for breakfast, unsure if patient ate this meal.  # Supplements Recorded: 10% Gelatein Plus    Intake recorded for: 9/22  Total Kcals: 1270 Total Protein: 51g  # Meals Ordered from Kitchen: 3  # Meals Recorded: 2  - [employer] meal ticket not save, patient ate 50% per flowsheets  - Patient ate well this day, included 75% of a cheeseburger which historically has been difficult for her to chew   # Supplements Recorded: 10% Gelatein Plus    Summary:  PO intakes remain inadequate, though improved yesterday. Nephrologist increased Gelatein Plus to BID. Will continue to monitor PO intakes and re-ordered calorie counts 3 more days per MD.      ASSESSED NUTRITION NEEDS PER APPROVED PRACTICE GUIDELINES:  Dosing Weight 79 kg (current weight)  Estimated Energy Needs: 7930-9589 kcals (25-30 Kcal/Kg)  Justification: maintenance and increased needs w/ acute illness  Estimated Protein Needs: 119-142 grams protein (1.5-1.8 g pro/Kg)  Justification: Repletion, hypercatabolism with critical illness, and dialysis  Estimated Fluid Needs: per provider pending fluid status      Graciela Aldrich RDN, LD  Clinical Dietitian

## 2024-09-23 NOTE — PROGRESS NOTES
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 78 kg range  On CRRT 8/29/24-9/8/24, Transition to iHD 9/9/24, maintained on MWF schedule  CKD attributed to longstanding NSAID use, historical baseline Cr 1.3-1.8; proteinuria. Previously seen by Dr Anaya with [facility].   Prior extensive serologic testing was negative.   Recent cystatin C in line with standard sCR of 4, eGFR 10  Imaging with left renal cortical thinning ~8cm kidney , rt 9.7cm kideny with simple cysts (4/2024)  Midodrine as needed for BP support    Volume  pt states she is urinating,   Still needing some UF 1-2 kg with dialysis      Blood Pressures   Soft BP, on BB for Afib.    Midodrine and Albumin as needed for BP support on dialysis     Anemia  ACD, infection/inflammation, hgb 7's  Weekly HILARY, 20K units  Avoiding iron with active infection      MSSA bacteremia  Prostetic valve  IV vanco  Warfarin, INR goal 2-3     Respiratory Failure  Previously intubated, now extubated.   On oxygen with goals to wean.      CAD, Afib  S/P CABG, valve surgery  Metoprolol     HLD -   statin    SUBJECTIVE:  pt is new to me, no complaints, says HD is going well, urinating some, but not a lot, feels very cold and tired after dialysis, discussed likely HoTN and vascular contraction contributing.  Nutrition poor says she is only taking in gelatein once daily, will inc frequency, adding Alb to HD for now.     OBJECTIVE:  Physical Exam   Temp: 97.9  F (36.6  C) Temp src: Oral BP: 106/51 Pulse: 71   Resp: 18 SpO2: 91 % O2 Device: None (Room air)    Vitals:    09/20/24 1700 09/21/24 0354 09/23/24 0623   Weight: 81.2 kg (179 lb 0.2 oz) 79 kg (174 lb 2.6 oz) 78.7 kg (173 lb 8 oz)     Vital Signs with Ranges  Temp:  [97.9  F (36.6  C)-98.1  F (36.7  C)] 97.9  F (36.6  C)  Pulse:  [42-77]  71  Resp:  [18-20] 18  BP: ()/(51-58) 106/51  SpO2:  [91 %-92 %] 91 %  I/O last 3 completed shifts:  In: 660 [P.O.:660]  Out: -     Temp (24hrs), Av  F (36.7  C), Min:97.9  F (36.6  C), Max:98.1  F (36.7  C)      Patient Vitals for the past 72 hrs:   Weight   24 0623 78.7 kg (173 lb 8 oz)   24 0354 79 kg (174 lb 2.6 oz)   24 1700 81.2 kg (179 lb 0.2 oz)   24 1345 82.8 kg (182 lb 8.7 oz)     Intake/Output Summary (Last 24 hours) at 2024 0842  Last data filed at 2024 1900  Gross per 24 hour   Intake 660 ml   Output --   Net 660 ml       PHYSICAL EXAM:  General - Alert and oriented x3, appears comfortable, NAD, seeen while on HD and discussed with HD RN   Cardiovascular - Rate controlled, soft SBP   Respiratory - on RA   Abd: soft  Extremities - +lower extremity edema bilaterally  Skin: dry, intact, no rash, good turgor  Neuro:  Grossly intact, no focal deficits  MSK:  Grossly intact  Psych:  Normal affect  :  UO not all doc    LABORATORY STUDIES:     Recent Labs   Lab 24  0541 24  0545 24  0559 24  0531 24  0635 24  0704   WBC 5.6 5.2 5.7 6.1 5.8 6.2   RBC 2.18* 2.17* 2.17* 2.27* 2.30* 2.28*   HGB 7.5* 7.4* 7.5* 7.6* 7.4* 7.3*   HCT 23.2* 23.1* 23.4* 24.4* 24.5* 23.8*   * 105* 105* 104* 116* 121*       Basic Metabolic Panel:  Recent Labs   Lab 24  0541 24  0545 24  0559 24  0531 24  0635 24  0704    136 137 135 138 136   POTASSIUM 3.6 3.9 3.7 4.0 4.2 3.8   CHLORIDE 98 99 100 99 103 97*   CO2 28 28 28 28 28 28   BUN 31.6* 23.1* 14.4 26.2* 18.7 33.0*   CR 3.95* 3.45* 2.45* 3.65* 2.89* 4.01*   GLC 94 92 94 89 92 87   KAELYN 8.4* 8.5* 8.3* 8.8 8.6* 8.4*       INR  Recent Labs   Lab 24  0541 24  0545 24  0559 24  0531   INR 5.08* 2.61* 2.84* 2.40*        Recent Labs   Lab Test 24  0541 24  0545   INR 5.08* 2.61*   WBC 5.6 5.2   HGB 7.5* 7.4*   *  105*       Personally reviewed current labs      Tami Mcbride, [facility]  [facility]  [phone]

## 2024-09-23 NOTE — PROGRESS NOTES
Spoke with the Device nurse. A remote check was done on Friday. There will remote in for another check this evening. Pacer set at  Top pacer capturing 94% and Bottom pacer capturing at 80%.  Tomorrows appointment to be remote with  on ipad.   Upcoming appointments are going to be try and be clustered together. The clinic will let us know.  Viki Owusu RN

## 2024-09-23 NOTE — PROGRESS NOTES
[facility]    [facility] - Hospitalist Service    Date of Admission:  9/16/2024    Felicitas Elliott is a 84 year old female admitted on 9/16/2024 to the LTAC for long-term antibiotics following MSSA bacteremia and endocarditis. 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  She was initially admitted to [facility] on 8/16/2024 for sepsis and HAMMAD.  She received empiric antibiotics on admission. Blood cultures grew MSSA.  Neurology was consulted for possible discitis.  ID was also consulted. Echo revealed large posterior leaflet mitral valve vegetation and small aortic valve vegetation with severe aortic stenosis.  Brain MRI revealed scattered embolic infarcts.  Coronary angiogram demonstrated coronary artery disease.  CT surgery was consulted. She underwent CABG alongside the procedures mentioned above.  She however has not had renal recovery.  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  She had dysphagia and momentarily required NG tube with tube feeds. She worked with speech therapy and is now cleared for regular diet and thin liquids.  CT surgery recommends warfarin for 3 months for bioprosthetic valves.  INR goal 2-3.  End date of warfarin approximately 12/10/2024, at which point she will be on Plavix alone (ASA allergy) indefinitely.  ID recommends IV vancomycin until 10/8/2024.    LTAC Course:  9/17 - 9/21.  Progressing well.  On Coumadin, initially bridging with heparin gtt. for bioprosthetic valve.  On arrival initial INR 1.49. Goal 2-3. Has been dialyzing for HAMMAD. 9/19, INR 2.19, heparin gtt discontinued. RD noted patient having trouble chewing and impedes her overall oral intake.  SLP consulted.    9/22: Stable.   9/23:  Dialysis today, supratherapeutic INR at 5.08, will get repeat INR -3.0, had BM x3 today, likes the creams for feet ordered by  regina.      BARRIERS TO DISCHARGE (why care is unable to be provided at a lower level of care):    -Long-term antibiotics.  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  Repeat blood cultures - 8/17/2024  -Mitral and aortic valve endocarditis as evidenced with large vegetation on mitral valve 8 mm x 15 at x 2 posterior leaflet of small vegetation on aortic valve.  With signs of vegetation combined with brain infarct.  S/p CV surgery  -Continue with IV vancomycin.  Dosing per pharmacy  -Per ID will need to check immunoglobulin level in 4 to 6 weeks, sister with history of hypogammaglobinemia  -ID consulted and following  -Patient previously on heparin 5000 subQ every 8 hours and Coumadin.  Recommended INR goal is 2-3 per CT surgery.  Discontinued heparin 5000 units subcu every 8 hours on admission and started patient on heparin gtt. alongside Coumadin.  I also notified pharmacy and were agreeable with this plan.  -9/19, INR 2.19.  Discontinued heparin gtt. Patient on goal  -Pharm.D. to continue dosing Coumadin   -INR 3.0 on 9/23    Peripheral neuropathy of b/l feet  -fredy-lidocaine-ketamine cream prn   -benadryl cream prn     Abnormal brain MRI suggestive of subacute infarct  Patient with a history of MSSA bacteremia and aortic valve endocarditis.  Most likely septic emboli.  -Continue with antibiotics per ID.  End date 10/8/2024    Acute respiratory failure with hypoxia  -Patient previously intubated.  Now extubated.    -Continue with oxygen support to maintain oxygenation above 92%.  Wean as tolerated  -RT while in LTAC    HAMMAD  -Previously on CCRT now transferred to HD  -Torsemide per nephrology  -HD per  nephrology-MWF  -Monitor kidney function with BMP    CAD  A-fib  History of aortic stenosis  +pacemaker   -Fairly stable at this time.  -S/p CABG per CT surgery  -Continue with Plavix and statin  -Metoprolol  -Previously on amiodarone for rate control, now s/p PPM    Anemia of chronic disease  Thrombocytopenia       Physical deconditioning/critical illness myopathy  -Fall precautions.  -PT/OT  -WOC    Diet: Advance Diet as Tolerated: Regular Diet Adult  Snacks/Supplements Adult: Magic Cup; With Meals  Snacks/Supplements Adult: Gelatein Plus; With Meals    DVT Prophylaxis: Warfarin  Le Catheter: Not present  Lines: PRESENT      PICC 09/06/24 Triple Lumen Right Basilic Vascular access-Site Assessment: WDL  CVC Double Lumen Right Subclavian Tunneled-Site Assessment: WDL      Cardiac Monitoring: None  Code Status: No CPR- Do NOT Intubate      Clinically Significant Risk Factors              # Hypoalbuminemia: Lowest albumin = 3.1 g/dL at 9/19/2024  6:35 AM, will monitor as appropriate   # Thrombocytopenia: Lowest platelets = 105 in last 2 days, will monitor for bleeding              # Severe Malnutrition: based on nutrition assessment      # Financial/Environmental Concerns: none   # Pacemaker present  # History of CABG: noted on surgical history    Disposition Plan     Medically Ready for Discharge: Anticipated in 5+ Days    Josephine Thomson MD  Hospitalist Service  [facility]  Securely message with Spool (more info)  Text page via [employer] Paging/Directory   ______________________________________________________________________    Interval History   She reports pain, itching, and burning in b/l feet  She thinks that the creams have helped  She has no chest pain   She has no dyspnea  +BM x3 today    Physical Exam   Vital Signs: Temp: 97.9  F (36.6  C) Temp src: Oral BP: 95/51 Pulse: 70   Resp: 19 SpO2: 94 % O2 Device: None (Room air)   Weight: 173 lbs 8.03 oz  Constitutional: Patient is resting in bed comfortably  appears in no distress.  Eyes: Pupils are equal round and reactive to light  Respiratory: Good respiratory effort, on auscultation good air entry is noted  Cardiovascular: Good S1 and S2 no obvious murmurs peripheral pulses are palpable  GI: Abdomen is soft nontender nondistended bowel sounds are noted  Skin: No obvious rashes noted on exposed areas, warm to touch please refer to nursing/wound care note for complete skin exam  Musculoskeletal: Good muscle tone nails and digits appear acyanotic  Neuropsychiatric: Awake alert oriented to person normal affect    Medical Decision Making       52 MINUTES SPENT BY ME on the date of service doing chart review, history, exam, documentation & further activities per the note.  ------------------ MEDICAL DECISION MAKING ------------------------------------------------------------------------------------------------------  MANAGEMENT DISCUSSED with the following over the past 24 hours:     NOTE(S)/MEDICAL RECORDS REVIEWED over the past 24 hours:         Data   Recent Labs   Lab 09/23/24  0541 09/22/24  0545 09/21/24  0559 09/20/24  0531 09/19/24  0635 09/18/24  0942 09/18/24  0704   WBC 5.6 5.2 5.7   < > 5.8  --  6.2   HGB 7.5* 7.4* 7.5*   < > 7.4*  --  7.3*   * 107* 108*   < > 107*  --  104*   * 105* 105*   < > 116*  --  121*   INR 5.08* 2.61* 2.84*   < > 2.19*   < >  --     136 137   < > 138  --  136   POTASSIUM 3.6 3.9 3.7   < > 4.2  --  3.8   CHLORIDE 98 99 100   < > 103  --  97*   CO2 28 28 28   < > 28  --  28   BUN 31.6* 23.1* 14.4   < > 18.7  --  33.0*   CR 3.95* 3.45* 2.45*   < > 2.89*  --  4.01*   ANIONGAP 10 9 9   < > 7  --  11   KAELYN 8.4* 8.5* 8.3*   < > 8.6*  --  8.4*   GLC 94 92 94   < > 92  --  87   ALBUMIN  --   --   --   --  3.1*  --  3.1*   PROTTOTAL  --   --   --   --  6.2*  --  6.1*   BILITOTAL  --   --   --   --  0.7  --  0.7   ALKPHOS  --   --   --   --  78  --  75   ALT  --   --   --   --  31  --  29   AST  --   --   --   --  36  --  34     < > = values in this interval not displayed.       Most Recent 3 CBC's:  Recent Labs   Lab Test 09/23/24  0541 09/22/24  0545 09/21/24  0559   WBC 5.6 5.2 5.7   HGB 7.5* 7.4* 7.5*   * 107* 108*   * 105* 105*     Most Recent 3 BMP's:  Recent Labs   Lab Test 09/23/24  0541 09/22/24  0545 09/21/24  0559    136 137   POTASSIUM 3.6 3.9 3.7   CHLORIDE 98 99 100   CO2 28 28 28   BUN 31.6* 23.1* 14.4   CR 3.95* 3.45* 2.45*   ANIONGAP 10 9 9   KAELYN 8.4* 8.5* 8.3*   GLC 94 92 94     Most Recent 2 LFT's:  Recent Labs   Lab Test 09/19/24  0635 09/18/24  0704   AST 36 34   ALT 31 29   ALKPHOS 78 75   BILITOTAL 0.7 0.7     Most Recent 3 INR's:  Recent Labs   Lab Test 09/23/24  0541 09/22/24  0545 09/21/24  0559   INR 5.08* 2.61* 2.84*

## 2024-09-23 NOTE — PLAN OF CARE
Problem: Adult Inpatient Plan of Care  Goal: Optimal Comfort and Wellbeing  Outcome: Progressing  Intervention: Provide Person-Centered Care  Recent Flowsheet Documentation  Taken 9/22/2024 1900 by Oxana Loza, RN  Trust Relationship/Rapport:   care explained   choices provided   emotional support provided   empathic listening provided   reassurance provided   thoughts/feelings acknowledged    Pt alert and oriented x 4, able to communicate needs. Hypotensive this shift; asymptomatic bradycardia present as well. Provider notified. Vitals monitored throughout shift. Scheduled metoprolol held at 2100 d/t SPB <110. Pt c/o neuropathic type pain/itching in feet, PRN atarax and tylenol given with marginal relief. Provider notified and benadryl cream ordered and applied. Provider also ordered ketamine-gabapentin-lidocaine cream r/t foot and ankle discomfort to begin tomorrow TID. PRN trazodone given to help pt sleep with positive results. Continue to monitor.    /58 (BP Location: Left arm)   Pulse 71   Temp 98.1  F (36.7  C) (Oral)   Resp 18   Wt 79 kg (174 lb 2.6 oz)   SpO2 92%   BMI 27.28 kg/m

## 2024-09-23 NOTE — TELEPHONE ENCOUNTER
Health Call Center    Phone Message    May a detailed message be left on voicemail: yes     Reason for Call: Other: Patient is still in patient at [place], Roxanne is wondering if the appt for 9/24 can be virtual. Please call her back to discuss.      Action Taken: Other: cardiology    Travel Screening: Not Applicable  Thank you!  [facility]       Date of Service:

## 2024-09-23 NOTE — PLAN OF CARE
Goal Outcome Evaluation:      Plan of Care Reviewed With: patient    Overall Patient Progress: improvingOverall Patient Progress: improving    Outcome Evaluation: pt aox4, calm and cooperative. Slept through the night. reports discomfort in feet and expressed relief knowing that a new cream was ordered for her later today.

## 2024-09-24 ENCOUNTER — VIRTUAL VISIT (OUTPATIENT)
Dept: CARDIOLOGY | Facility: CLINIC | Age: 84
End: 2024-09-24
Payer: COMMERCIAL

## 2024-09-24 ENCOUNTER — APPOINTMENT (OUTPATIENT)
Dept: PHYSICAL THERAPY | Facility: CLINIC | Age: 84
DRG: 288 | End: 2024-09-24
Attending: HOSPITALIST
Payer: COMMERCIAL

## 2024-09-24 ENCOUNTER — APPOINTMENT (OUTPATIENT)
Dept: OCCUPATIONAL THERAPY | Facility: CLINIC | Age: 84
DRG: 288 | End: 2024-09-24
Attending: HOSPITALIST
Payer: COMMERCIAL

## 2024-09-24 ENCOUNTER — TELEPHONE (OUTPATIENT)
Dept: CARDIOLOGY | Facility: CLINIC | Age: 84
End: 2024-09-24

## 2024-09-24 ENCOUNTER — ANCILLARY PROCEDURE (OUTPATIENT)
Dept: CARDIOLOGY | Facility: CLINIC | Age: 84
End: 2024-09-24
Attending: INTERNAL MEDICINE
Payer: COMMERCIAL

## 2024-09-24 ENCOUNTER — APPOINTMENT (OUTPATIENT)
Dept: SPEECH THERAPY | Facility: CLINIC | Age: 84
DRG: 288 | End: 2024-09-24
Attending: HOSPITALIST
Payer: COMMERCIAL

## 2024-09-24 DIAGNOSIS — I49.5 SICK SINUS SYNDROME (H): ICD-10-CM

## 2024-09-24 DIAGNOSIS — I44.2 CHB (COMPLETE HEART BLOCK) (H): ICD-10-CM

## 2024-09-24 DIAGNOSIS — Z95.1 S/P CABG (CORONARY ARTERY BYPASS GRAFT): Primary | ICD-10-CM

## 2024-09-24 DIAGNOSIS — Z95.0 CARDIAC PACEMAKER IN SITU: ICD-10-CM

## 2024-09-24 DIAGNOSIS — Z95.2 S/P AVR: ICD-10-CM

## 2024-09-24 LAB
ANION GAP SERPL CALCULATED.3IONS-SCNC: 9 MMOL/L (ref 7–15)
BACTERIA TISS BX CULT: NO GROWTH
BUN SERPL-MCNC: 17.4 MG/DL (ref 8–23)
CALCIUM SERPL-MCNC: 8 MG/DL (ref 8.8–10.4)
CHLORIDE SERPL-SCNC: 100 MMOL/L (ref 98–107)
CREAT SERPL-MCNC: 2.61 MG/DL (ref 0.51–0.95)
CRP SERPL-MCNC: 28.6 MG/L
EGFRCR SERPLBLD CKD-EPI 2021: 17 ML/MIN/1.73M2
ERYTHROCYTE [DISTWIDTH] IN BLOOD BY AUTOMATED COUNT: 20.5 % (ref 10–15)
GLUCOSE SERPL-MCNC: 95 MG/DL (ref 70–99)
HCO3 SERPL-SCNC: 28 MMOL/L (ref 22–29)
HCT VFR BLD AUTO: 24.5 % (ref 35–47)
HGB BLD-MCNC: 7.5 G/DL (ref 11.7–15.7)
INR PPP: 2.93 (ref 0.85–1.15)
MCH RBC QN AUTO: 32.3 PG (ref 26.5–33)
MCHC RBC AUTO-ENTMCNC: 30.6 G/DL (ref 31.5–36.5)
MCV RBC AUTO: 106 FL (ref 78–100)
PLATELET # BLD AUTO: 131 10E3/UL (ref 150–450)
POTASSIUM SERPL-SCNC: 3.8 MMOL/L (ref 3.4–5.3)
RBC # BLD AUTO: 2.32 10E6/UL (ref 3.8–5.2)
SODIUM SERPL-SCNC: 137 MMOL/L (ref 135–145)
WBC # BLD AUTO: 5.9 10E3/UL (ref 4–11)

## 2024-09-24 PROCEDURE — 80048 BASIC METABOLIC PNL TOTAL CA: CPT | Performed by: PHYSICIAN ASSISTANT

## 2024-09-24 PROCEDURE — 250N000013 HC RX MED GY IP 250 OP 250 PS 637: Performed by: HOSPITALIST

## 2024-09-24 PROCEDURE — 99024 POSTOP FOLLOW-UP VISIT: CPT | Mod: 95

## 2024-09-24 PROCEDURE — 250N000013 HC RX MED GY IP 250 OP 250 PS 637: Performed by: PHYSICIAN ASSISTANT

## 2024-09-24 PROCEDURE — 92526 ORAL FUNCTION THERAPY: CPT | Mod: GN | Performed by: SPEECH-LANGUAGE PATHOLOGIST

## 2024-09-24 PROCEDURE — 86140 C-REACTIVE PROTEIN: CPT | Performed by: INTERNAL MEDICINE

## 2024-09-24 PROCEDURE — 99233 SBSQ HOSP IP/OBS HIGH 50: CPT | Performed by: HOSPITALIST

## 2024-09-24 PROCEDURE — 99232 SBSQ HOSP IP/OBS MODERATE 35: CPT | Performed by: INTERNAL MEDICINE

## 2024-09-24 PROCEDURE — 97530 THERAPEUTIC ACTIVITIES: CPT | Mod: GP | Performed by: PHYSICAL THERAPIST

## 2024-09-24 PROCEDURE — 250N000011 HC RX IP 250 OP 636: Performed by: INTERNAL MEDICINE

## 2024-09-24 PROCEDURE — 97116 GAIT TRAINING THERAPY: CPT | Mod: GP | Performed by: PHYSICAL THERAPIST

## 2024-09-24 PROCEDURE — 97535 SELF CARE MNGMENT TRAINING: CPT | Mod: GO | Performed by: OCCUPATIONAL THERAPIST

## 2024-09-24 PROCEDURE — 85610 PROTHROMBIN TIME: CPT | Performed by: PHYSICIAN ASSISTANT

## 2024-09-24 PROCEDURE — 120N000017 HC R&B RESPIRATORY CARE

## 2024-09-24 RX ADMIN — CLOPIDOGREL 75 MG: 75 TABLET ORAL at 09:21

## 2024-09-24 RX ADMIN — ATORVASTATIN CALCIUM 80 MG: 40 TABLET, FILM COATED ORAL at 20:47

## 2024-09-24 RX ADMIN — Medication 1 TABLET: at 09:20

## 2024-09-24 RX ADMIN — Medication 1 CAPSULE: at 20:47

## 2024-09-24 RX ADMIN — WARFARIN SODIUM 1.5 MG: 3 TABLET ORAL at 18:11

## 2024-09-24 RX ADMIN — PANTOPRAZOLE SODIUM 40 MG: 40 TABLET, DELAYED RELEASE ORAL at 09:20

## 2024-09-24 RX ADMIN — ALTEPLASE 2 MG: 2.2 INJECTION, POWDER, LYOPHILIZED, FOR SOLUTION INTRAVENOUS at 23:27

## 2024-09-24 RX ADMIN — Medication 0.5 G: at 14:24

## 2024-09-24 RX ADMIN — ACETAMINOPHEN 650 MG: 325 TABLET, FILM COATED ORAL at 09:29

## 2024-09-24 RX ADMIN — TORSEMIDE 100 MG: 100 TABLET ORAL at 09:21

## 2024-09-24 RX ADMIN — Medication 5 MG: at 20:47

## 2024-09-24 RX ADMIN — DOCUSATE SODIUM AND SENNOSIDES 1 TABLET: 8.6; 5 TABLET, FILM COATED ORAL at 09:21

## 2024-09-24 RX ADMIN — Medication 1 CAPSULE: at 09:21

## 2024-09-24 RX ADMIN — Medication 0.5 G: at 20:57

## 2024-09-24 RX ADMIN — POLYETHYLENE GLYCOL 3350 17 G: 17 POWDER, FOR SOLUTION ORAL at 09:26

## 2024-09-24 RX ADMIN — DOCUSATE SODIUM AND SENNOSIDES 1 TABLET: 8.6; 5 TABLET, FILM COATED ORAL at 20:47

## 2024-09-24 RX ADMIN — Medication 0.5 G: at 10:56

## 2024-09-24 ASSESSMENT — ACTIVITIES OF DAILY LIVING (ADL)
ADLS_ACUITY_SCORE: 41
ADLS_ACUITY_SCORE: 44
ADLS_ACUITY_SCORE: 40
ADLS_ACUITY_SCORE: 41
ADLS_ACUITY_SCORE: 40
ADLS_ACUITY_SCORE: 41
ADLS_ACUITY_SCORE: 36
ADLS_ACUITY_SCORE: 41
ADLS_ACUITY_SCORE: 36
ADLS_ACUITY_SCORE: 36
ADLS_ACUITY_SCORE: 40
ADLS_ACUITY_SCORE: 40
ADLS_ACUITY_SCORE: 44
ADLS_ACUITY_SCORE: 36
ADLS_ACUITY_SCORE: 41
ADLS_ACUITY_SCORE: 40
ADLS_ACUITY_SCORE: 44

## 2024-09-24 NOTE — PLAN OF CARE
Problem: Adult Inpatient Plan of Care  Goal: Plan of Care Review  Description: The Plan of Care Review/Shift note should be completed every shift.  The Outcome Evaluation is a brief statement about your assessment that the patient is improving, declining, or no change.  This information will be displayed automatically on your shift  note.  Outcome: Progressing  Goal: Absence of Hospital-Acquired Illness or Injury  Intervention: Identify and Manage Fall Risk  Recent Flowsheet Documentation  Taken 9/23/2024 2330 by Martha Bowman RN  Safety Promotion/Fall Prevention:   room door open   activity supervised   room near nurse's station  Intervention: Prevent Skin Injury  Recent Flowsheet Documentation  Taken 9/23/2024 2330 by Martha Bowman RN  Device Skin Pressure Protection: absorbent pad utilized/changed  Goal: Optimal Comfort and Wellbeing  Intervention: Provide Person-Centered Care  Recent Flowsheet Documentation  Taken 9/23/2024 2330 by Martha Bowamn RN  Trust Relationship/Rapport:   care explained   choices provided   Goal Outcome Evaluation:       Patient was alert and oriented x4. She denies pain or discomfort. She was in a good spirit. Pt had lab draw this morning.   Regular diet, calorie count x3 days starting 9/24/24 at 0000 to 09/26/24 for 72 hours.

## 2024-09-24 NOTE — PROGRESS NOTES
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  Positive blood culture on 8/14/2024 and 8/16.  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  With the size of the vegetation combined with brain infarct, status post CV surgery, see details below active issue.   Abnormal brain MRI suggestive of subacute infarct. In a patient with MSSA bacteremia and aortic valve endocarditis, this is cerebral septic emboli. Active issue.       PROCEDURE PERFORMED: 8/27/24  Aortic root abscess debridement and aortic annular reconstruction.  Aortic root replacement (Konect composite 25 mm Silva Inspiris Resilia bioprosthetic valve within 30 mm Gelweave Valsalva graft with reimplantation of the coronary arteries).  Mitral valve replacement (31 mm St. Tim Silva bioprosthetic valve).  Coronary artery bypass grafting x 2 (reversed saphenous vein graft to the obtuse marginal branch of the left circumflex coronary artery, and pedicled left internal mammary artery to left anterior descending coronary artery).  Endoscopic vein harvest of the greater saphenous vein from the left lower extremity.    Recommendations:  Continue vancomycin-dosing per Pharm.D.  End date oct 8th.    Will get CRP given not done in a month  Will need to check immunoglobin level in 4 to 6 weeks sister with history of hypogammaglobulinemia       RU Portillo MD  [facility]  [facility]  Answering Service: [phone]  On-Call ID provider: [phone], option: 9                Interval History:     HPI: Pt well known to me.  Chart reviewed.  Stable progress here thus far.          Recent Inflammatory Biomarkers:   Recent Labs   Lab Test 09/24/24  0614 09/23/24  0541 09/22/24  0545 09/21/24  0559 09/20/24  0531 09/19/24  0635 08/30/24  0437 08/29/24  0359   PCAL  --   --   --   --   --   --   --  1.24*   WBC 5.9 5.6 5.2 5.7 6.1 5.8   < > 15.3*    < > = values in this interval not displayed.            Review of Systems:      Negative except for findings in the HPI.           Current Medications (antimicrobials listed in bold):     Current Facility-Administered Medications   Medication Dose Route Frequency Provider Last Rate Last Admin    albumin human 25 % injection 25 g  25 g Intravenous During Dialysis/CRRT (from stock) Tami Mcbride NP        atorvastatin (LIPITOR) tablet 80 mg  80 mg Oral QPM Maryam Anthony PA-C   80 mg at 09/23/24 2108    clopidogrel (PLAVIX) tablet 75 mg  75 mg Oral Daily Maryam Anthony PA-C   75 mg at 09/24/24 0921    epoetin jose-epbx (RETACRIT) injection 20,000 Units  20,000 Units Subcutaneous Weekly Maryam Anthony PA-C   20,000 Units at 09/20/24 1728    ketamine 5%-gabapentin 8%-lidocaine 2.5% in PLO cream 0.5 g  0.5 g Topical TID Lori Raza MD   0.5 g at 09/23/24 2116    lactobacillus rhamnosus (GG) (CULTURELL) capsule 1 capsule  1 capsule Oral BID Maryam Anthony PA-C   1 capsule at 09/24/24 0921    melatonin tablet 5 mg  5 mg Oral At Bedtime Lori Raza MD   5 mg at 09/23/24 2108    metoprolol tartrate (LOPRESSOR) tablet 25 mg  25 mg Oral BID Maryam Anthony PA-C   25 mg at 09/23/24 2108    midodrine (PROAMATINE) tablet 10 mg  10 mg Oral During Dialysis/CRRT (from stock) Maryam Anthony PA-C   10 mg at 09/23/24 0908    multivitamin w/minerals (THERA-VIT-M) tablet 1  tablet  1 tablet Oral Daily Adan Veloz MD   1 tablet at 09/24/24 0920    pantoprazole (PROTONIX) EC tablet 40 mg  40 mg Oral QAM AC Maryam Anthony PA-C   40 mg at 09/24/24 0920    polyethylene glycol (MIRALAX) Packet 17 g  17 g Oral Daily Maryam Anthony PA-C   17 g at 09/24/24 0926    senna-docusate (SENOKOT-S/PERICOLACE) 8.6-50 MG per tablet 1 tablet  1 tablet Oral BID Maryam Anthony PA-C   1 tablet at 09/24/24 0921    torsemide (DEMADEX) tablet 100 mg  100 mg Oral Daily Maryam Anthony PA-C   100 mg at 09/24/24 0921    vancomycin place horne - receiving intermittent dosing  1 each Intravenous See Admin Instructions Maryam Anthony PA-C        warfarin ANTICOAGULANT (COUMADIN) half-tab 1.5 mg  1.5 mg Oral ONCE at 18:00 Josephine Thomson MD        Warfarin Dose Required Daily - Pharmacist Managed  1 each Oral See Admin Instructions Maryam Anthony PA-C                  Allergies:     Allergies   Allergen Reactions    Aspirin Rash    Cats Rash    Nickel Rash            Physical Exam:   Vitals were reviewed  Patient Vitals for the past 24 hrs:   BP Temp Temp src Pulse Resp SpO2 Weight   09/24/24 0921 (!) 87/49 -- -- 69 -- -- --   09/24/24 0733 94/51 99  F (37.2  C) Oral 68 18 90 % --   09/24/24 0602 -- -- -- -- -- -- 77.6 kg (171 lb 1.2 oz)   09/23/24 2300 (!) 89/53 98.3  F (36.8  C) Oral 68 19 -- --   09/23/24 2108 120/54 -- -- 68 -- -- --   09/23/24 1610 102/55 97.4  F (36.3  C) Oral 69 18 91 % --   09/23/24 1215 106/56 -- -- 70 18 91 % --   09/23/24 1209 107/54 97.9  F (36.6  C) Oral 70 18 96 % --   09/23/24 1200 133/55 -- -- 66 18 -- --   09/23/24 1145 107/62 -- -- 69 17 -- --   09/23/24 1130 97/54 -- -- 71 18 -- --   09/23/24 1115 97/54 -- -- 70 18 -- --   09/23/24 1100 95/51 -- -- 70 19 -- --   09/23/24 1045 92/53 -- -- 69 18 -- --   09/23/24 1030 94/52 -- -- 69 19 -- --   09/23/24 1015 97/53 -- -- 69 18 -- --   09/23/24 1000 95/49 -- -- 71 17 -- --    09/23/24 0945 101/51 -- -- -- 18 -- --       Physical Examination:    Resp: 18    Gen: Appears ill, extubated, nasal canula, still on low dose dobutamine  HEENT: opens eyes  PICC, chest wall catheter for hemodialysis, rectal tube  CV: Sternal incision, temporary pacer  Lungs: coarse, currently on nasal cannula.  Chest tubes have been removed  Skin: Warm  Extr: edema- especially L leg  Neuro: extubated, opens eyes  HD catheter  PICC- R basilic           Laboratory Data:   ID Labs:  Microbiology labs:  7-Day Micro Results       No results found for the last 168 hours.          Susceptibility data from last 90 days.  Collected Specimen Info Organism Ampicillin Ampicillin/Sulbactam Cefepime Ceftazidime Ceftriaxone Ciprofloxacin Clindamycin Daptomycin Doxycycline Erythromycin Gentamicin Levofloxacin Meropenem Oxacillin   08/29/24 Sputum from Expectorate Enterobacter cloacae complex R R  S R R  S      S  S  S      Normal samara                 08/16/24 Peripheral Blood Staphylococcus aureus                 08/14/24 Peripheral Blood Staphylococcus aureus        S  S  S  S  S    S     Collected Specimen Info Organism Piperacillin/Tazobactam Tetracycline Tobramycin Trimethoprim/Sulfamethoxazole  Vancomycin   08/29/24 Sputum from Expectorate Enterobacter cloacae complex R   S  S      Normal samara        08/16/24 Peripheral Blood Staphylococcus aureus        08/14/24 Peripheral Blood Staphylococcus aureus   S   S  S       Reviewed      No lab results found.  Recent Labs   Lab Test 09/24/24  0614 09/23/24  0541 09/22/24  0545 09/21/24  0559 09/20/24  0531 09/19/24  0635   WBC 5.9 5.6 5.2 5.7 6.1 5.8     Recent Labs   Lab Test 09/24/24  0614 09/23/24  0541 09/22/24  0545 09/21/24  0559   CR 2.61* 3.95* 3.45* 2.45*   GFRESTIMATED 17* 11* 13* 19*       Hematology Studies  Recent Labs   Lab Test 09/24/24  0614 09/23/24  0541 09/22/24  0545 09/21/24  0559 09/20/24  0531 09/19/24  0635   WBC 5.9 5.6 5.2 5.7 6.1 5.8   HGB 7.5* 7.5*  7.4* 7.5* 7.6* 7.4*   HCT 24.5* 23.2* 23.1* 23.4* 24.4* 24.5*   * 118* 105* 105* 104* 116*       Metabolic  Recent Labs   Lab Test 09/24/24  0614 09/23/24  0541 09/22/24  0545    136 136   BUN 17.4 31.6* 23.1*   CO2 28 28 28   CR 2.61* 3.95* 3.45*   GFRESTIMATED 17* 11* 13*       Hepatic Studies  Recent Labs   Lab Test 09/19/24  0635 09/18/24  0704 09/17/24  1045   BILITOTAL 0.7 0.7 0.7   ALKPHOS 78 75 75   ALBUMIN 3.1* 3.1* 3.1*   AST 36 34 32   ALT 31 29 32          Imaging Data:   Reviewed     IR CVC Non Tunnel Placement > 5 Yrs    Result Date: 9/1/2024  [place] RAD[facility] LOCATION: [facility] DATE: 9/1/2024 PROCEDURE: NON-TUNNELED DIALYSIS CATHETER PLACEMENT INTERVENTIONAL RADIOLOGIST: RU Reyes MD. INDICATION: HAMMAD requiring hemodialysis. CONSENT: The risks, benefits and alternatives of non-tunneled dialysis catheter placement were discussed with the patient in detail. All questions were answered. Informed consent was given to proceed with the procedure. MODERATE SEDATION: None. CONTRAST: None. ANTIBIOTICS: None. ADDITIONAL MEDICATIONS: Heparin 3800 U IV FLUOROSCOPIC TIME: 2.0 minutes. RADIATION DOSE: Air Kerma: 36.04 mGy. COMPLICATIONS: No immediate complications. STERILE BARRIER TECHNIQUE: Maximum sterile barrier technique was used. Cutaneous antisepsis was performed at the operative site with application of 2% chlorhexidine and large sterile drape. Prior to the procedure, the  and assistant performed hand hygiene and wore hat, mask, sterile gown, and sterile gloves during the entire procedure. PROCEDURE: Limited left neck ultrasound was performed which demonstrated a patent internal jugular vein; images saved to the permanent patient medical record. The overlying skin and subcutaneous soft tissues were anesthetized using 1% lidocaine. Under ultrasound guidance, the left internal jugular vein was accessed using a micropuncture needle; images saved of the  permanent patient medical record. Access was secured using a 4 Lao transitional sheath. A [place]son guidewire was advanced into the SVC. Under fluoroscopic guidance, the overlying skin and subcutaneous soft tissues were dilated and a 15.5 Lao nontunneled dialysis catheter was placed with the tip within the cavoatrial junction. The catheter was tested and found to flush and aspirate appropriately. The catheter was heparinized and secured to the skin.     IMPRESSION:  1.  Successful non-tunneled dialysis catheter placement. PLAN: 1. Dialysis catheter is ready to be used. 2. Nontunneled right groin dialysis catheter can be removed in ICU.       Study Result    Narrative & Impression   EXAM: MR BRAIN W/O and W CONTRAST  LOCATION: [facility]  DATE: 8/17/2024     INDICATION: Headache in a patient with MSSA bacteremia secondary to aortic valve endocarditis.  Look for cerebral septic emboli; Headache; Acute HA (< 3 months), no complicating features  COMPARISON: None.  CONTRAST: 9 ml gadavist  TECHNIQUE: Routine multiplanar multisequence head MRI without and with intravenous contrast.     FINDINGS: Assessment degraded due to motion.  INTRACRANIAL CONTENTS:   Apparent focus of diffusion restriction with T2/FLAIR hyperintensity within the left superior parietal lobule cortex is hyperintense on ADC map, representing T2 shine through.  Focus of signal abnormality measures 13 x 9 mm in the axial plane and does   not have internal enhancement.     Additional punctate foci of hyperintensity on diffusion weighted with similar signal characteristics (periventricular right corona radiata, left superior parietal lobule, and left cerebellar hemisphere).     Patchy and confluent nonspecific T2/FLAIR hyperintensities within the cerebral white matter most consistent with moderate chronic microvascular ischemic change. Moderate generalized cerebral atrophy. No hydrocephalus. Normal position of the cerebellar    tonsils. No discernible abnormal intracranial enhancement; however, assessment substantially degraded due to motion. 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     SELLA: No abnormality accounting for technique.     OSSEOUS STRUCTURES/SOFT TISSUES: Normal marrow signal. The major intracranial vascular flow voids are maintained.      ORBITS: No abnormality accounting for technique.      SINUSES/MASTOIDS: Mild mucosal thickening scattered about the paranasal sinuses. Scattered fluid/membrane thickening in the left mastoid air cells. No apparent mass in the posterior nasopharynx or skull base.                                                                       IMPRESSION: Assessment degraded due to motion.  1.  13 mm focus of cortical signal abnormality within the left superior parietal lobule which is favored to represent a subacute infarct; low-grade glial neoplasm could conceivably have a similar appearance. Hyperacute intraparenchymal hematoma could   also have a similar appearance but is considered less likely. Recommend noncontrast head CT for further characterization. Also recommend follow-up MRI brain without and with contrast in 8-12 weeks to document expected evolution.  2.  Additional smaller foci of signal abnormality with similar characteristics favored to represent punctate subacute infarcts.

## 2024-09-24 NOTE — PROGRESS NOTES
Call was received from the Pacemaker team informing us that patient's pacemaker was interrogated today following reported bradycardia a few days back and that it is functioning as it should.  Lupillo Rowland RN

## 2024-09-24 NOTE — PROGRESS NOTES
CARDIOTHORACIC SURGERY FOLLOW-UP VISIT     Felicitas Elliott   1940   [id number]      Reason for visit: Post operative clinic visit via telephone due to patient still being at LTACH. Patient underwent CABGx2, aortic root replacement, aortic valve replacement, and mitral valve replacement with Dr. Renae on 08/27/2024.     HPI: Felicitas Elliott is a 84 year old year old female spoken to over the phone as above. Patient has past medical history as below. Hospital course was remarkable for reintubation, need for permanent pacemaker, thrombocytopenia (resolved), dysphagia (resolved) and initiation of HD.     Patient has been doing overall well since discharge. Reports that incision is healing well. Denies fevers, peripheral edema. Appetite is improving and patient is voiding without difficulty. Weight has been stable. Still on dialysis M/W/F. Pain management at this point with Tyleol. Patient feels that she is getting stronger each day at rehab. Patient is on warfarin for 3 months for bioprosthetic valve.      PAST MEDICAL HISTORY:  No past medical history on file.    PAST SURGICAL HISTORY:  Past Surgical History:   Procedure Laterality Date    CORONARY ARTERY BYPASS GRAFT, WITH AORTIC VALVE REPLACEMENT, WITH ENDOSCOPIC VESSEL PROCUREMENT N/A 8/27/2024    Procedure: CORONARY ARTERY BYPASS GRAFT TIMES TWO, WITH AORTIC ROOT REPLACEMENT, WITH LEFT INTERNAL MAMMARY ARTERY HARVEST, LEFT SAPHNENOUS ENDOSCOPIC VESSEL PROCUREMENT,;  Surgeon: Dylan Renae MD;  Location: [facility] OR    CV CORONARY ANGIOGRAM N/A 8/20/2024    Procedure: CV CORONARY ANGIOGRAM;  Surgeon: Den Wylie MD;  Location: [facility] CATH LAB CV    EP PACEMAKER DEVICE & IMPLANT- HIS LEAD DUAL N/A 9/12/2024    Procedure: Pacemaker Device & Lead Implant - HIS Lead Dual;  Surgeon: Minh Pina MD;  Location: [facility] CATH LAB CV    IR CVC NON TUNNEL PLACEMENT > 5 YRS  9/1/2024    IR CVC TUNNEL PLACEMENT > 5 YRS OF AGE  9/11/2024     PICC TRIPLE LUMEN PLACEMENT  9/6/2024    REPLACE VALVE MITRAL N/A 8/27/2024    Procedure: MITRAL VALVE REPLACEMENT,;  Surgeon: Dylan Reane MD;  [facility]    TRANSESOPHAGEAL ECHOCARDIOGRAM INTRAOPERATIVE  8/27/2024    Procedure: ANESTHESIA TRANSESOPHAGEAL ECHOCARDIOGRAM, EPI-AORTIC ULTRASOUND;  Surgeon: Dylan Renae MD;  Location[facility] OR       CURRENT MEDICATIONS:   No current facility-administered medications for this visit.  No current outpatient medications on file.    Facility-Administered Medications Ordered in Other Visits:     acetaminophen (TYLENOL) tablet 650 mg, 650 mg, Oral, Q4H PRN, Maryam Anthony PA-C, 650 mg at 09/24/24 0929    albumin human 25 % injection 25 g, 25 g, Intravenous, During Dialysis/CRRT (from stock), Tami Mcbride NP    sodium chloride (NEBUSAL) 3 % neb solution 3 mL, 3 mL, Nebulization, Q6H PRN **AND** albuterol (PROVENTIL) neb solution 2.5 mg, 2.5 mg, Nebulization, Q6H PRN, Maryam Anthony PA-C    alteplase (CATHFLO ACTIVASE) injection 2 mg, 2 mg, Intracatheter, Q1H PRN, Miroslava Casey PA-C    alteplase (CATHFLO ACTIVASE) injection 2 mg, 2 mg, Intracatheter, Q1H PRN, Miroslava Casey PA-C    atorvastatin (LIPITOR) tablet 80 mg, 80 mg, Oral, QPM, Maryam Anthony PA-C, 80 mg at 09/23/24 2108    bisacodyl (DULCOLAX) suppository 10 mg, 10 mg, Rectal, Daily PRN, Maryam Anthony PA-C    clopidogrel (PLAVIX) tablet 75 mg, 75 mg, Oral, Daily, Maryam Anthony PA-C, 75 mg at 09/24/24 0921    dextrose 10% infusion, , Intravenous, Continuous PRN, Maryam Anthony PA-C    glucose gel 15-30 g, 15-30 g, Oral, Q15 Min PRN **OR** dextrose 50 % injection 25-50 mL, 25-50 mL, Intravenous, Q15 Min PRN **OR** glucagon injection 1 mg, 1 mg, Subcutaneous, Q15 Min PRN, Adan Veloz MD    diphenhydrAMINE-zinc acetate (BENADRYL) 2-0.1 % cream, , Topical, TID PRN, Lori Raza MD, Given at  09/22/24 2240    epoetin jose-epbx (RETACRIT) injection 20,000 Units, 20,000 Units, Subcutaneous, Weekly, Maryam Anthony PA-C, 20,000 Units at 09/20/24 1728    heparin 10,000 units/10 mL infusion (DIALYSIS USE), 500 Units/hr, Hemodialysis Machine, Continuous, Miroslava Casey PA-C, Last Rate: 0.5 mL/hr at 09/20/24 1354, 500 Units/hr at 09/20/24 1354    hydrOXYzine HCl (ATARAX) tablet 10 mg, 10 mg, Oral, Q6H PRN, 10 mg at 09/22/24 1834 **OR** [DISCONTINUED] hydrOXYzine HCl (ATARAX) tablet 50 mg, 50 mg, Oral, Q6H PRN, Lori Raza MD    ketamine 5%-gabapentin 8%-lidocaine 2.5% in PLO cream 0.5 g, 0.5 g, Topical, TID, Lori Raza MD, 0.5 g at 09/23/24 2116    lactobacillus rhamnosus (GG) (CULTURELL) capsule 1 capsule, 1 capsule, Oral, BID, Maryam Anthony PA-C, 1 capsule at 09/24/24 0921    melatonin tablet 5 mg, 5 mg, Oral, At Bedtime, Lori Raza MD, 5 mg at 09/23/24 2108    menthol (Topical Analgesic) 2.5% (BENGAY VANISHING SCENT) 2.5 % topical gel 1 g, 1 g, Topical, Q8H PRN, Maryam Anthony PA-C    metoprolol tartrate (LOPRESSOR) tablet 25 mg, 25 mg, Oral, BID, Maryam Anthony PA-C, 25 mg at 09/23/24 2108    miconazole (MICATIN) 2 % powder, , Topical, TID PRN, Adan Veloz MD    midodrine (PROAMATINE) tablet 10 mg, 10 mg, Oral, During Dialysis/CRRT (from stock), Maryam Anthony PA-C, 10 mg at 09/23/24 0908    multivitamin w/minerals (THERA-VIT-M) tablet 1 tablet, 1 tablet, Oral, Daily, Adan Veloz MD, 1 tablet at 09/24/24 0920    naloxone (NARCAN) injection 0.2 mg, 0.2 mg, Intravenous, Q2 Min PRN **OR** naloxone (NARCAN) injection 0.4 mg, 0.4 mg, Intravenous, Q2 Min PRN **OR** naloxone (NARCAN) injection 0.2 mg, 0.2 mg, Intramuscular, Q2 Min PRN **OR** naloxone (NARCAN) injection 0.4 mg, 0.4 mg, Intramuscular, Q2 Min PRN, Maryam Anthony PA-C    nicotine (NICORETTE) gum 2 mg, 2 mg, Buccal, Q1H PRN, Maryam Anthony PA-C     ondansetron (ZOFRAN) injection 4 mg, 4 mg, Intravenous, Q6H PRN **OR** ondansetron (ZOFRAN) tablet 8 mg, 8 mg, Oral, Q8H PRN, Maryam Anthony PA-C    pantoprazole (PROTONIX) EC tablet 40 mg, 40 mg, Oral, QAM AC, 40 mg at 09/24/24 0920 **OR** [DISCONTINUED] pantoprazole (PROTONIX) IV push injection 40 mg, 40 mg, Intravenous, QAM AC, Maryam Anthony PA-C    Patient is already receiving anticoagulation with heparin, enoxaparin (LOVENOX), warfarin (COUMADIN)  or other anticoagulant medication, , Does not apply, Continuous PRN, Adan Veloz MD    polyethylene glycol (MIRALAX) Packet 17 g, 17 g, Oral, Daily, Maryam Anthony PA-C, 17 g at 09/24/24 0926    prochlorperazine (COMPAZINE) injection 5 mg, 5 mg, Intravenous, Q6H PRN **OR** prochlorperazine (COMPAZINE) tablet 5 mg, 5 mg, Oral, Q6H PRN **OR** prochlorperazine (COMPAZINE) suppository 12.5 mg, 12.5 mg, Rectal, Q12H PRN, Maryam Anthony PA-C    senna-docusate (SENOKOT-S/PERICOLACE) 8.6-50 MG per tablet 1 tablet, 1 tablet, Oral, BID, Maryam Anthony PA-C, 1 tablet at 09/24/24 0921    sodium chloride (PF) 0.9% PF flush 10 mL, 10 mL, Intracatheter, Q15 Min PRN, Miroslava Casey PA-C    sodium chloride (PF) 0.9% PF flush 10 mL, 10 mL, Intracatheter, Q15 Min PRN, Miroslava Casey PA-C    sodium chloride (PF) 0.9% PF flush 10-40 mL, 10-40 mL, Intracatheter, Once PRN, Maryam Anthony PA-C    sodium chloride (PF) 0.9% PF flush 2.5 mL, 2.5 mL, Intravenous, q1 min prn, Maryam Anthony PA-C    sodium chloride 0.9% BOLUS 100-150 mL, 100-150 mL, Intravenous, Q15 Min PRN, Miroslava Casey PA-C    torsemide (DEMADEX) tablet 100 mg, 100 mg, Oral, Daily, Maryam Anthony PA-C, 100 mg at 09/24/24 0921    traZODone (DESYREL) half-tab 25 mg, 25 mg, Oral, At Bedtime PRN, Lori Raza MD, 25 mg at 09/22/24 2058    vancomycin place horne - receiving intermittent dosing, 1 each, Intravenous, See Admin Instructions,  Maryam Anthony PA-C    warfarin ANTICOAGULANT (COUMADIN) half-tab 1.5 mg, 1.5 mg, Oral, ONCE at 18:00, Josephine Thomson MD    Warfarin Dose Required Daily - Pharmacist Managed, 1 each, Oral, See Admin Instructions, Maryam Anthony PA-C    ALLERGIES:      Allergies   Allergen Reactions    Aspirin Rash    Cats Rash    Nickel Rash       ROS:  Gen: No fevers, weight loss/gain  CV: Denies chest pain, peripheral edema  Pulm: Denies SOB  GI/: Voiding without problems, appetite improving.     LABS:  None    IMAGING:  None    PHYSICAL EXAM:   There were no vitals taken for this visit.  No physical exam due to phone visit      ASSESSMENT/PLAN:  Felicitas Elliott is a 84 year old year old female status post CABGx2, aortic root replacement, AVR, and MVR who has telephone post-op clinic visit.    - Surgically doing well per patient and providers.   - BP per chart review appears a bit soft. Also not getting beta blocker consistently. Will make note to providers over there that maybe dose should be halved or discontinued in the near-term.   - Will get cardiology appointments changed for after LTACH  - Start and continue Cardiac Rehab until completed after discharged home.   - May start driving after discharge (4 weeks post-op) if not using narcotic pain medications.  - Continue strict sternal precautions until 10/22/2024. No lifting >10bs; may gradually increase at this point (8 weeks post-op).   - No need for further follow-up with CV surgery unless concerns. Feel free to call our office with questions. [phone]         _______  Maryam Anthony PA-C  Cardiothoracic Surgery  [phone]

## 2024-09-24 NOTE — PLAN OF CARE
Problem: Adult Inpatient Plan of Care  Goal: Absence of Hospital-Acquired Illness or Injury  Intervention: Identify and Manage Fall Risk  Recent Flowsheet Documentation  Taken 9/23/2024 2134 by Lilian Zapata RN  Safety Promotion/Fall Prevention: activity supervised     Problem: Adult Inpatient Plan of Care  Goal: Optimal Comfort and Wellbeing  Intervention: Provide Person-Centered Care  Recent Flowsheet Documentation  Taken 9/23/2024 2134 by Lilian Zapata RN  Trust Relationship/Rapport:   care explained   choices provided     Problem: Fall Injury Risk  Goal: Absence of Fall and Fall-Related Injury  Intervention: Promote Injury-Free Environment  Recent Flowsheet Documentation  Taken 9/23/2024 2134 by Lilian Zapata RN  Safety Promotion/Fall Prevention: activity supervised     Problem: Pain Acute  Goal: Optimal Pain Control and Function  Intervention: Prevent or Manage Pain  Recent Flowsheet Documentation  Taken 9/23/2024 2134 by Lilian Zapata RN  Sensory Stimulation Regulation: care clustered  Medication Review/Management: medications reviewed   Goal Outcome Evaluation:  Patient was calm and cooperative with cares and repositions. Patient ate poorly at supper did not like the food. Sternal precaution continued. No complain of pain.

## 2024-09-24 NOTE — TELEPHONE ENCOUNTER
9/24/24: Talked to [facility] yesterday about possible low HR and BP. Extra remote check done on PPM today- all looks good. Called and updated [facility] where Felicitas is living. Cindy Pham, RN on 9/24/2024 at 11:51 AM  ([phone])      Encounter Type: Add on remote pacemaker transmission done to check HR per [facility]. Courtesy check. [facility] had called and reported low BP and HR after dialysis on 9/23/24.  Device: [employer] Nesco.   Pacing % /Programmed: AP 90%, CSP 84% at DDDR 70/120 ppm.   Lead(s): Stable.   Battery longevity: 10yrs, 10mo estimated.  Presenting: AP - CSP 65-70 bpm. Frequent PVCs noted.   Atrial high rates: since 9/20/24; None detected.   Anticoagulant: warfarin.  Ventricular High rates: since 9/20/24; None detected.  Comments: Normal device function.   Plan: Routed to device RN for review. SHAY Ramirez, Device Specialist. Add: remote reviewed, agree with above. HR Histograms show HR , ~10-15% at 100-110 bpm. Will update [facility] charge nurse. Cindy Pham, Device RN

## 2024-09-24 NOTE — PROGRESS NOTES
[facility]    Medicine Progress Note - Hospitalist Service    Date of Admission:  9/16/2024    Felicitas Elliott is a 84 year old female admitted on 9/16/2024 to the LTAC for long-term antibiotics following MSSA bacteremia and endocarditis. 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  She was initially admitted to [facility] on 8/16/2024 for sepsis and HAMMAD.  She received empiric antibiotics on admission. Blood cultures grew MSSA.  Neurology was consulted for possible discitis.  ID was also consulted. Echo revealed large posterior leaflet mitral valve vegetation and small aortic valve vegetation with severe aortic stenosis.  Brain MRI revealed scattered embolic infarcts.  Coronary angiogram demonstrated coronary artery disease.  CT surgery was consulted. She underwent CABG alongside the procedures mentioned above.  She however has not had renal recovery.  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  She had dysphagia and momentarily required NG tube with tube feeds. She worked with speech therapy and is now cleared for regular diet and thin liquids.  CT surgery recommends warfarin for 3 months for bioprosthetic valves.  INR goal 2-3.  End date of warfarin approximately 12/10/2024, at which point she will be on Plavix alone (ASA allergy) indefinitely.  ID recommends IV vancomycin until 10/8/2024.    LTAC Course:  9/17 - 9/21.  Progressing well.  On Coumadin, initially bridging with heparin gtt. for bioprosthetic valve.  On arrival initial INR 1.49. Goal 2-3. Has been dialyzing for HAMMAD. 9/19, INR 2.19, heparin gtt discontinued. RD noted patient having trouble chewing and impedes her overall oral intake.  SLP consulted.    9/22: Stable.   9/23:  Dialysis today, supratherapeutic INR at 5.08, will get repeat INR -3.0, had BM x3 today, likes the creams for feet ordered by  nocturnist  9/24:  pacer checked, set at , top pacer is capturing 94%, bottom pacer is capturing 80%. F/up cardio tele visit - recommend to decrease metoprolol by 1/2- changed from 25 to 12.5 bid.     BARRIERS TO DISCHARGE (why care is unable to be provided at a lower level of care):    -Long-term antibiotics.  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  Repeat blood cultures - 8/17/2024  -Mitral and aortic valve endocarditis as evidenced with large vegetation on mitral valve 8 mm x 15 at x 2 posterior leaflet of small vegetation on aortic valve.  With signs of vegetation combined with brain infarct.  S/p CV surgery  -Continue with IV vancomycin.  Dosing per pharmacy  -Per ID will need to check immunoglobulin level in 4 to 6 weeks, sister with history of hypogammaglobinemia  -ID consulted and following  -Patient previously on heparin 5000 subQ every 8 hours and Coumadin.  Recommended INR goal is 2-3 per CT surgery.  Discontinued heparin 5000 units subcu every 8 hours on admission and started patient on heparin gtt. alongside Coumadin.  I also notified pharmacy and were agreeable with this plan.  -9/19, INR 2.19.  Discontinued heparin gtt. Patient on goal  -Pharm.D. to continue dosing Coumadin   -INR 3.0 on 9/23    Peripheral neuropathy of b/l feet  -fredy-lidocaine-ketamine cream prn   -benadryl cream prn     Abnormal brain MRI suggestive of subacute infarct  Patient with a history of MSSA bacteremia and aortic valve endocarditis.  Most likely septic emboli.  -Continue with antibiotics per ID.  End date 10/8/2024    Acute respiratory failure with hypoxia  -Patient previously intubated.  Now extubated.    -Continue with  oxygen support to maintain oxygenation above 92%.  Wean as tolerated  -RT while in LTAC    HAMMAD  -Previously on CCRT now transferred to HD  -Torsemide per nephrology  -HD per nephrology-MWF  -Monitor kidney function with BMP    CAD  A-fib  History of aortic stenosis  +pacemaker   Hypotension   -Fairly stable at this time.  -S/p CABG per CT surgery  -Continue with Plavix and statin  -Metoprolol- will decrease dose from 25 bid to 12.5 bid due to low BP on 9/24  -Previously on amiodarone for rate control, now s/p PPM  -pacer checked 9/23, set at , top pacer is capturing 94%, bottom pacer is capturing 80%.  -cardiothoracic surg tele appt today- appreciate recs      Anemia of chronic disease  Thrombocytopenia   -monitor    Severe protein calorie malnutrition  -? esophogram  -RD following   -calorie counts    Physical deconditioning/critical illness myopathy  -Fall precautions.  -PT/OT  -WOC    Diet: Advance Diet as Tolerated: Regular Diet Adult  Snacks/Supplements Adult: Magic Cup; With Meals  Snacks/Supplements Adult: Gelatein Plus; With Meals  Calorie Counts    DVT Prophylaxis: Warfarin  Le Catheter: Not present  Lines: PRESENT      PICC 09/06/24 Triple Lumen Right Basilic Vascular access-Site Assessment: WDL  CVC Double Lumen Right Subclavian Tunneled-Site Assessment: WDL      Cardiac Monitoring: None  Code Status: No CPR- Do NOT Intubate      Clinically Significant Risk Factors              # Hypoalbuminemia: Lowest albumin = 3.1 g/dL at 9/19/2024  6:35 AM, will monitor as appropriate   # Thrombocytopenia: Lowest platelets = 118 in last 2 days, will monitor for bleeding              # Severe Malnutrition: based on nutrition assessment      # Financial/Environmental Concerns: none   # Pacemaker present  # History of CABG: noted on surgical history    Disposition Plan     Medically Ready for Discharge: Anticipated in 5+ Days    Josephine Thomson MD  Hospitalist Service  [facility]  Securely message with [employer]  (more info)  Text page via [facility] Paging/Directory   ______________________________________________________________________    Interval History   She is waiting to speak with cardiologist via tele visit.  She is upset that she is woken every 2 hours, explained that once she can reposition herself we can cut back on night checks and repositioning, patient understands.    She has no chest pain   She has no dyspnea      Physical Exam   Vital Signs: Temp: 99  F (37.2  C) Temp src: Oral BP: 94/51 Pulse: 68   Resp: 18 SpO2: 90 % O2 Device: None (Room air)   Weight: 171 lbs 1.23 oz  Constitutional: Patient is resting in bed comfortably appears in no distress.  Eyes: Pupils are equal round and reactive to light  Respiratory: Good respiratory effort, on auscultation good air entry is noted  Cardiovascular: Good S1 and S2 no obvious murmurs peripheral pulses are palpable  GI: Abdomen is soft nontender nondistended bowel sounds are noted  Skin: No obvious rashes noted on exposed areas, warm to touch please refer to nursing/wound care note for complete skin exam  Musculoskeletal: Good muscle tone nails and digits appear acyanotic  Neuropsychiatric: Awake alert oriented to person normal affect    Medical Decision Making       57 MINUTES SPENT BY ME on the date of service doing chart review, history, exam, documentation & further activities per the note.  ------------------ MEDICAL DECISION MAKING ------------------------------------------------------------------------------------------------------  MANAGEMENT DISCUSSED with the following over the past 24 hours:     NOTE(S)/MEDICAL RECORDS REVIEWED over the past 24 hours:         Data   Recent Labs   Lab 09/24/24 0614 09/23/24  0918 09/23/24  0541 09/22/24  0545 09/21/24  0559 09/20/24  0531 09/19/24  0635 09/18/24  0942 09/18/24  0704   WBC 5.9  --  5.6 5.2 5.7   < > 5.8  --  6.2   HGB 7.5*  --  7.5* 7.4* 7.5*   < > 7.4*  --  7.3*   *  --  106* 107* 108*   < > 107*  --   104*   *  --  118* 105* 105*   < > 116*  --  121*   INR  --  3.00* 5.08* 2.61* 2.84*   < > 2.19*   < >  --    NA  --   --  136 136 137   < > 138  --  136   POTASSIUM  --   --  3.6 3.9 3.7   < > 4.2  --  3.8   CHLORIDE  --   --  98 99 100   < > 103  --  97*   CO2  --   --  28 28 28   < > 28  --  28   BUN  --   --  31.6* 23.1* 14.4   < > 18.7  --  33.0*   CR  --   --  3.95* 3.45* 2.45*   < > 2.89*  --  4.01*   ANIONGAP  --   --  10 9 9   < > 7  --  11   KAELYN  --   --  8.4* 8.5* 8.3*   < > 8.6*  --  8.4*   GLC  --   --  94 92 94   < > 92  --  87   ALBUMIN  --   --   --   --   --   --  3.1*  --  3.1*   PROTTOTAL  --   --   --   --   --   --  6.2*  --  6.1*   BILITOTAL  --   --   --   --   --   --  0.7  --  0.7   ALKPHOS  --   --   --   --   --   --  78  --  75   ALT  --   --   --   --   --   --  31  --  29   AST  --   --   --   --   --   --  36  --  34    < > = values in this interval not displayed.       Most Recent 3 CBC's:  Recent Labs   Lab Test 09/24/24  0614 09/23/24  0541 09/22/24  0545   WBC 5.9 5.6 5.2   HGB 7.5* 7.5* 7.4*   * 106* 107*   * 118* 105*     Most Recent 3 BMP's:  Recent Labs   Lab Test 09/23/24  0541 09/22/24  0545 09/21/24  0559    136 137   POTASSIUM 3.6 3.9 3.7   CHLORIDE 98 99 100   CO2 28 28 28   BUN 31.6* 23.1* 14.4   CR 3.95* 3.45* 2.45*   ANIONGAP 10 9 9   KAELYN 8.4* 8.5* 8.3*   GLC 94 92 94     Most Recent 2 LFT's:  Recent Labs   Lab Test 09/19/24  0635 09/18/24  0704   AST 36 34   ALT 31 29   ALKPHOS 78 75   BILITOTAL 0.7 0.7     Most Recent 3 INR's:  Recent Labs   Lab Test 09/23/24  0918 09/23/24  0541 09/22/24  0545   INR 3.00* 5.08* 2.61*

## 2024-09-24 NOTE — PLAN OF CARE
Problem: Adult Inpatient Plan of Care  Goal: Optimal Comfort and Wellbeing  Intervention: Provide Person-Centered Care  Recent Flowsheet Documentation  Taken 9/24/2024 1102 by Dayana Brambila RN  Trust Relationship/Rapport:   care explained   questions encouraged   Goal Outcome Evaluation:  Patient complains of headache, rated headache pains 5/10. Gave her dilaudid 1mg with good effects. Got patient out of bed, sitting in recliner comfortably with no new complains/concerns.

## 2024-09-25 ENCOUNTER — APPOINTMENT (OUTPATIENT)
Dept: OCCUPATIONAL THERAPY | Facility: CLINIC | Age: 84
DRG: 288 | End: 2024-09-25
Attending: HOSPITALIST
Payer: COMMERCIAL

## 2024-09-25 ENCOUNTER — APPOINTMENT (OUTPATIENT)
Dept: PHYSICAL THERAPY | Facility: CLINIC | Age: 84
DRG: 288 | End: 2024-09-25
Attending: HOSPITALIST
Payer: COMMERCIAL

## 2024-09-25 LAB
ANION GAP SERPL CALCULATED.3IONS-SCNC: 10 MMOL/L (ref 7–15)
BUN SERPL-MCNC: 24.9 MG/DL (ref 8–23)
CALCIUM SERPL-MCNC: 8.3 MG/DL (ref 8.8–10.4)
CHLORIDE SERPL-SCNC: 99 MMOL/L (ref 98–107)
CREAT SERPL-MCNC: 3.39 MG/DL (ref 0.51–0.95)
EGFRCR SERPLBLD CKD-EPI 2021: 13 ML/MIN/1.73M2
ERYTHROCYTE [DISTWIDTH] IN BLOOD BY AUTOMATED COUNT: 20.3 % (ref 10–15)
GLUCOSE SERPL-MCNC: 94 MG/DL (ref 70–99)
HCO3 SERPL-SCNC: 28 MMOL/L (ref 22–29)
HCT VFR BLD AUTO: 24.9 % (ref 35–47)
HGB BLD-MCNC: 7.5 G/DL (ref 11.7–15.7)
INR PPP: 2.64 (ref 0.85–1.15)
MCH RBC QN AUTO: 31.5 PG (ref 26.5–33)
MCHC RBC AUTO-ENTMCNC: 30.1 G/DL (ref 31.5–36.5)
MCV RBC AUTO: 105 FL (ref 78–100)
PLATELET # BLD AUTO: 130 10E3/UL (ref 150–450)
POTASSIUM SERPL-SCNC: 3.5 MMOL/L (ref 3.4–5.3)
RBC # BLD AUTO: 2.38 10E6/UL (ref 3.8–5.2)
SODIUM SERPL-SCNC: 137 MMOL/L (ref 135–145)
VANCOMYCIN SERPL-MCNC: 15 UG/ML
WBC # BLD AUTO: 5.4 10E3/UL (ref 4–11)

## 2024-09-25 PROCEDURE — 250N000013 HC RX MED GY IP 250 OP 250 PS 637: Performed by: HOSPITALIST

## 2024-09-25 PROCEDURE — 80202 ASSAY OF VANCOMYCIN: CPT | Performed by: HOSPITALIST

## 2024-09-25 PROCEDURE — 250N000013 HC RX MED GY IP 250 OP 250 PS 637: Performed by: PHYSICIAN ASSISTANT

## 2024-09-25 PROCEDURE — 85610 PROTHROMBIN TIME: CPT | Performed by: PHYSICIAN ASSISTANT

## 2024-09-25 PROCEDURE — 120N000017 HC R&B RESPIRATORY CARE

## 2024-09-25 PROCEDURE — 99232 SBSQ HOSP IP/OBS MODERATE 35: CPT | Performed by: NURSE PRACTITIONER

## 2024-09-25 PROCEDURE — 97116 GAIT TRAINING THERAPY: CPT | Mod: GP | Performed by: PHYSICAL THERAPIST

## 2024-09-25 PROCEDURE — 250N000011 HC RX IP 250 OP 636: Performed by: HOSPITALIST

## 2024-09-25 PROCEDURE — 250N000011 HC RX IP 250 OP 636: Performed by: PHYSICIAN ASSISTANT

## 2024-09-25 PROCEDURE — 97530 THERAPEUTIC ACTIVITIES: CPT | Mod: GP | Performed by: PHYSICAL THERAPIST

## 2024-09-25 PROCEDURE — 97535 SELF CARE MNGMENT TRAINING: CPT | Mod: GO | Performed by: OCCUPATIONAL THERAPIST

## 2024-09-25 PROCEDURE — 99233 SBSQ HOSP IP/OBS HIGH 50: CPT | Performed by: HOSPITALIST

## 2024-09-25 PROCEDURE — 90935 HEMODIALYSIS ONE EVALUATION: CPT

## 2024-09-25 PROCEDURE — 80048 BASIC METABOLIC PNL TOTAL CA: CPT | Performed by: PHYSICIAN ASSISTANT

## 2024-09-25 RX ORDER — VANCOMYCIN HYDROCHLORIDE 500 MG/10ML
500 INJECTION, POWDER, LYOPHILIZED, FOR SOLUTION INTRAVENOUS ONCE
Status: COMPLETED | OUTPATIENT
Start: 2024-09-25 | End: 2024-09-25

## 2024-09-25 RX ADMIN — ATORVASTATIN CALCIUM 80 MG: 40 TABLET, FILM COATED ORAL at 21:16

## 2024-09-25 RX ADMIN — Medication 0.5 G: at 08:50

## 2024-09-25 RX ADMIN — Medication 0.5 G: at 21:18

## 2024-09-25 RX ADMIN — POLYETHYLENE GLYCOL 3350 17 G: 17 POWDER, FOR SOLUTION ORAL at 08:41

## 2024-09-25 RX ADMIN — MIDODRINE HYDROCHLORIDE 10 MG: 5 TABLET ORAL at 16:56

## 2024-09-25 RX ADMIN — Medication 1 CAPSULE: at 08:41

## 2024-09-25 RX ADMIN — Medication 0.5 G: at 14:23

## 2024-09-25 RX ADMIN — VANCOMYCIN HYDROCHLORIDE 500 MG: 500 INJECTION, POWDER, LYOPHILIZED, FOR SOLUTION INTRAVENOUS at 19:42

## 2024-09-25 RX ADMIN — CLOPIDOGREL 75 MG: 75 TABLET ORAL at 08:41

## 2024-09-25 RX ADMIN — Medication 5 MG: at 21:18

## 2024-09-25 RX ADMIN — PANTOPRAZOLE SODIUM 40 MG: 40 TABLET, DELAYED RELEASE ORAL at 06:54

## 2024-09-25 RX ADMIN — DOCUSATE SODIUM AND SENNOSIDES 1 TABLET: 8.6; 5 TABLET, FILM COATED ORAL at 08:42

## 2024-09-25 RX ADMIN — Medication 1 TABLET: at 08:41

## 2024-09-25 RX ADMIN — Medication 1 CAPSULE: at 21:18

## 2024-09-25 RX ADMIN — WARFARIN SODIUM 1.5 MG: 3 TABLET ORAL at 18:59

## 2024-09-25 RX ADMIN — TORSEMIDE 100 MG: 100 TABLET ORAL at 08:41

## 2024-09-25 RX ADMIN — DOCUSATE SODIUM AND SENNOSIDES 1 TABLET: 8.6; 5 TABLET, FILM COATED ORAL at 21:18

## 2024-09-25 RX ADMIN — HEPARIN SODIUM 500 UNITS/HR: 1000 INJECTION INTRAVENOUS; SUBCUTANEOUS at 15:49

## 2024-09-25 ASSESSMENT — ACTIVITIES OF DAILY LIVING (ADL)
ADLS_ACUITY_SCORE: 36
ADLS_ACUITY_SCORE: 35
ADLS_ACUITY_SCORE: 36
ADLS_ACUITY_SCORE: 35
ADLS_ACUITY_SCORE: 36
ADLS_ACUITY_SCORE: 35
ADLS_ACUITY_SCORE: 36

## 2024-09-25 NOTE — PROGRESS NOTES
[facility]    Medicine Progress Note - Hospitalist Service    Date of Admission:  9/16/2024    Felicitas Elliott is a 84 year old female admitted on 9/16/2024 to the LTAC for long-term antibiotics following MSSA bacteremia and endocarditis. 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  She was initially admitted to [facility] on 8/16/2024 for sepsis and HAMMAD.  She received empiric antibiotics on admission. Blood cultures grew MSSA.  Neurology was consulted for possible discitis.  ID was also consulted. Echo revealed large posterior leaflet mitral valve vegetation and small aortic valve vegetation with severe aortic stenosis.  Brain MRI revealed scattered embolic infarcts.  Coronary angiogram demonstrated coronary artery disease.  CT surgery was consulted. She underwent CABG alongside the procedures mentioned above.  She however has not had renal recovery.  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  She had dysphagia and momentarily required NG tube with tube feeds. She worked with speech therapy and is now cleared for regular diet and thin liquids.  CT surgery recommends warfarin for 3 months for bioprosthetic valves.  INR goal 2-3.  End date of warfarin approximately 12/10/2024, at which point she will be on Plavix alone (ASA allergy) indefinitely.  ID recommends IV vancomycin until 10/8/2024.    LTAC Course:  9/17 - 9/21.  Progressing well.  On Coumadin, initially bridging with heparin gtt. for bioprosthetic valve.  On arrival initial INR 1.49. Goal 2-3. Has been dialyzing for HAMMAD. 9/19, INR 2.19, heparin gtt discontinued. RD noted patient having trouble chewing and impedes her overall oral intake.  SLP consulted.    9/22: Stable.   9/23:  Dialysis today, supratherapeutic INR at 5.08, will get repeat INR -3.0, had BM x3 today, likes the creams for feet ordered by  nocturnist  9/24:  pacer checked, set at , top pacer is capturing 94%, bottom pacer is capturing 80%. F/up cardio tele visit - recommend to decrease metoprolol by 1/2- changed from 25 to 12.5 bid.   9/25:      BARRIERS TO DISCHARGE (why care is unable to be provided at a lower level of care):    -Long-term antibiotics.  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  Repeat blood cultures - 8/17/2024  -Mitral and aortic valve endocarditis as evidenced with large vegetation on mitral valve 8 mm x 15 at x 2 posterior leaflet of small vegetation on aortic valve.  With signs of vegetation combined with brain infarct.  S/p CV surgery  -Continue with IV vancomycin.  Dosing per pharmacy  -Per ID will need to check immunoglobulin level in 4 to 6 weeks, sister with history of hypogammaglobinemia  -ID consulted and following  -Patient previously on heparin 5000 subQ every 8 hours and Coumadin.  Recommended INR goal is 2-3 per CT surgery.  Discontinued heparin 5000 units subcu every 8 hours on admission and started patient on heparin gtt. alongside Coumadin.  I also notified pharmacy and were agreeable with this plan.  -9/19, INR 2.19.  Discontinued heparin gtt. Patient on goal  -Pharm.D. to continue dosing Coumadin   -INR 3.0 on 9/23    Peripheral neuropathy of b/l feet  -fredy-lidocaine-ketamine cream prn   -benadryl cream prn     Abnormal brain MRI suggestive of subacute infarct  Patient with a history of MSSA bacteremia and aortic valve endocarditis.  Most likely septic emboli.  -Continue with antibiotics per ID.  End date 10/8/2024    Acute respiratory failure with hypoxia  -Patient previously intubated.  Now extubated.    -Continue  with oxygen support to maintain oxygenation above 92%.  Wean as tolerated  -RT while in LTAC    HAMMAD  -Previously on CCRT now transferred to HD  -Torsemide per nephrology  -HD per nephrology-MWF  -Monitor kidney function with BMP    CAD  A-fib  History of aortic stenosis  +pacemaker   Hypotension   -Fairly stable at this time.  -S/p CABG per CT surgery  -Continue with Plavix and statin  -Metoprolol- will decrease dose from 25 bid to 12.5 bid due to low BP on 9/24  -Previously on amiodarone for rate control, now s/p PPM  -pacer checked 9/23, set at , top pacer is capturing 94%, bottom pacer is capturing 80%.  -cardiothoracic surg tele appt today- appreciate recs      Anemia of chronic disease  Thrombocytopenia   -monitor    Severe protein calorie malnutrition  -? esophogram  -RD following   -calorie counts    Physical deconditioning/critical illness myopathy  -Fall precautions.  -PT/OT  -WOC    Diet: Advance Diet as Tolerated: Regular Diet Adult  Snacks/Supplements Adult: Magic Cup; With Meals  Snacks/Supplements Adult: Gelatein Plus; With Meals  Calorie Counts    DVT Prophylaxis: Warfarin  Le Catheter: Not present  Lines: PRESENT      PICC 09/06/24 Triple Lumen Right Basilic Vascular access-Site Assessment: WDL  CVC Double Lumen Right Subclavian Tunneled-Site Assessment: WDL      Cardiac Monitoring: None  Code Status: No CPR- Do NOT Intubate      Clinically Significant Risk Factors              # Hypoalbuminemia: Lowest albumin = 3.1 g/dL at 9/19/2024  6:35 AM, will monitor as appropriate                # Severe Malnutrition: based on nutrition assessment      # Financial/Environmental Concerns: none   # Pacemaker present  # History of CABG: noted on surgical history    Disposition Plan     Medically Ready for Discharge: Anticipated in 5+ Days    Josephine Thomson MD  Hospitalist Service  [facility]  Securely message with [employer] (more info)  Text page via [employer] Paging/Directory    ______________________________________________________________________    Interval History   She slept well overnight   She has no chest pain   She has no dyspnea      Physical Exam   Vital Signs: Temp: 98.1  F (36.7  C) Temp src: Oral BP: 101/59 Pulse: 69   Resp: 18 SpO2: 93 % O2 Device: None (Room air)   Weight: 172 lbs 2.87 oz  Constitutional: Patient is resting in bed comfortably appears in no distress.  Eyes: Pupils are equal round and reactive to light  Respiratory: Good respiratory effort, on auscultation good air entry is noted  Cardiovascular: Good S1 and S2 no obvious murmurs peripheral pulses are palpable  GI: Abdomen is soft nontender nondistended bowel sounds are noted  Skin: No obvious rashes noted on exposed areas, warm to touch please refer to nursing/wound care note for complete skin exam  Musculoskeletal: Good muscle tone nails and digits appear acyanotic  Neuropsychiatric: Awake alert oriented to person normal affect    Medical Decision Making       55 MINUTES SPENT BY ME on the date of service doing chart review, history, exam, documentation & further activities per the note.  ------------------ MEDICAL DECISION MAKING ------------------------------------------------------------------------------------------------------  MANAGEMENT DISCUSSED with the following over the past 24 hours:     NOTE(S)/MEDICAL RECORDS REVIEWED over the past 24 hours:         Data   Recent Labs   Lab 09/25/24  0614 09/24/24  0614 09/23/24  0918 09/23/24  0541 09/20/24  0531 09/19/24  0635   WBC 5.4 5.9  --  5.6   < > 5.8   HGB 7.5* 7.5*  --  7.5*   < > 7.4*   * 106*  --  106*   < > 107*   * 131*  --  118*   < > 116*   INR 2.64* 2.93* 3.00* 5.08*   < > 2.19*    137  --  136   < > 138   POTASSIUM 3.5 3.8  --  3.6   < > 4.2   CHLORIDE 99 100  --  98   < > 103   CO2 28 28  --  28   < > 28   BUN 24.9* 17.4  --  31.6*   < > 18.7   CR 3.39* 2.61*  --  3.95*   < > 2.89*   ANIONGAP 10 9  --  10   < > 7   KAELYN  8.3* 8.0*  --  8.4*   < > 8.6*   GLC 94 95  --  94   < > 92   ALBUMIN  --   --   --   --   --  3.1*   PROTTOTAL  --   --   --   --   --  6.2*   BILITOTAL  --   --   --   --   --  0.7   ALKPHOS  --   --   --   --   --  78   ALT  --   --   --   --   --  31   AST  --   --   --   --   --  36    < > = values in this interval not displayed.       Most Recent 3 CBC's:  Recent Labs   Lab Test 09/25/24  0614 09/24/24  0614 09/23/24  0541   WBC 5.4 5.9 5.6   HGB 7.5* 7.5* 7.5*   * 106* 106*   * 131* 118*     Most Recent 3 BMP's:  Recent Labs   Lab Test 09/25/24  0614 09/24/24  0614 09/23/24  0541    137 136   POTASSIUM 3.5 3.8 3.6   CHLORIDE 99 100 98   CO2 28 28 28   BUN 24.9* 17.4 31.6*   CR 3.39* 2.61* 3.95*   ANIONGAP 10 9 10   KAELYN 8.3* 8.0* 8.4*   GLC 94 95 94     Most Recent 2 LFT's:  Recent Labs   Lab Test 09/19/24  0635 09/18/24  0704   AST 36 34   ALT 31 29   ALKPHOS 78 75   BILITOTAL 0.7 0.7     Most Recent 3 INR's:  Recent Labs   Lab Test 09/25/24  0614 09/24/24  0614 09/23/24  0918   INR 2.64* 2.93* 3.00*

## 2024-09-25 NOTE — PLAN OF CARE
Problem: Pain Acute  Goal: Optimal Pain Control and Function  Outcome: Progressing   Goal Outcome Evaluation:    Patient denied pain, ketamine topical cream was applied to feet x 1 already. Blood pressure was 101/59 at 0755 (metoprolol was held this morning for dialysis), ambulated in hallways by the physical therapist, vanco level 15.0 (13.5 on 9/23)- Vancomycin 500 mg IVPB X 1 ordered to be given after dialysis (to be rechecked on 9/27),  (137 on 9/24), potassium 3.5 (3.8 on 9/24), urea 24.9 (17.4 on 9/24, creatinine 3.39 (2.61 on 9/24), GFR 13 (17 on 9/24), calcium 8.3, WBC 5.4, HGB 7.5 as of 9/24, platelet count 130 (131 on 9/24), INR 2.64 (2.93 on 9/24)-coumadin 1.5 mg to be given this evening, affect noted to be flat at times, ate 50 % of meals (calorie count through 9/26), spouse visits patient on and off

## 2024-09-25 NOTE — PROGRESS NOTES
RENAL PROGRESS NOTE    PLAN:  Dialysis today on MWF schedule  Hold hep on HD with recent supratherapeutic INR   UO still in question, cont Torsemide for now    ASSESSMENT:  Acute Renal Failure on Dialysis   HAMMAD on CKD   HAMMAD Lennie-op cardiac surgery,   On MWF IDH, via  R CVC placed 9/11  Interdialytic rise in sCR persists, making some urine on Torsemidevolume unclear as she is incont  , avg 1-1.5 UF with HD,   EDW ? 78 kg range  On CRRT 8/29/24-9/8/24, Transition to iHD 9/9/24, maintained on MWF schedule  CKD attributed to longstanding NSAID use, historical baseline Cr 1.3-1.8; proteinuria. Previously seen by Dr Anaya with [facility].   Prior extensive serologic testing was negative.   Recent cystatin C in line with standard sCR of 4, eGFR 10  Imaging with left renal cortical thinning ~8cm kidney , rt 9.7cm kideny with simple cysts (4/2024)  Midodrine as needed for BP support    Volume  pt states she is urinating, wt stable   Still needing some UF 1-2 kg with dialysis      Blood Pressures   Soft BP, on BB for Afib.    Midodrine and Albumin as needed for BP support on dialysis     Anemia  ACD, infection/inflammation, hgb 7's  Weekly HILARY, 20K units  Avoiding iron with active infection      MSSA bacteremia  Prostetic valve  IV vanco  Warfarin, INR goal 2-3     Respiratory Failure  Previously intubated, now extubated.   On oxygen with goals to wean.      CAD, Afib  S/P CABG, valve surgery  Metoprolol     HLD -   statin    SUBJECTIVE:  no complaints, says HD is going well, urinating some, but not a lot incont, still feels very cold and tired after dialysis, discussed likely HoTN and vascular contraction contributing.  Nutrition poor says she is only taking in gelatein once daily,  inc frequency Monday, adding Alb to HD for now for BP support.  Tx planned for this afternoon     OBJECTIVE:  Physical Exam   Temp: 98.1  F (36.7  C) Temp src: Oral BP: 101/59 Pulse: 69   Resp: 18 SpO2: 93 % O2 Device: None (Room air)     Vitals:    24 0623 24 0602 24 0546   Weight: 78.7 kg (173 lb 8 oz) 77.6 kg (171 lb 1.2 oz) 78.1 kg (172 lb 2.9 oz)     Vital Signs with Ranges  Temp:  [98  F (36.7  C)-98.1  F (36.7  C)] 98.1  F (36.7  C)  Pulse:  [66-69] 69  Resp:  [18-19] 18  BP: ()/(49-59) 101/59  SpO2:  [91 %-93 %] 93 %  I/O last 3 completed shifts:  In: 860 [P.O.:860]  Out: -     Temp (24hrs), Av  F (36.7  C), Min:97.9  F (36.6  C), Max:98.1  F (36.7  C)      Patient Vitals for the past 72 hrs:   Weight   24 0546 78.1 kg (172 lb 2.9 oz)   24 0602 77.6 kg (171 lb 1.2 oz)   24 0623 78.7 kg (173 lb 8 oz)     Intake/Output Summary (Last 24 hours) at 2024 0842  Last data filed at 2024 1900  Gross per 24 hour   Intake 660 ml   Output --   Net 660 ml       PHYSICAL EXAM:  General - Alert and oriented x3, appears comfortable, NAD, eating breakfast   Cardiovascular - Rate controlled, soft SBP   Respiratory - on RA   Abd: soft  Extremities - +lower extremity edema bilaterally  Skin: dry, intact, no rash, good turgor  Neuro:  Grossly intact, no focal deficits  MSK:  Grossly intact  Psych:  Normal affect  :  UO not all doc    LABORATORY STUDIES:     Recent Labs   Lab 24  0614 24  0614 24  0541 24  0545 24  0559 24  0531   WBC 5.4 5.9 5.6 5.2 5.7 6.1   RBC 2.38* 2.32* 2.18* 2.17* 2.17* 2.27*   HGB 7.5* 7.5* 7.5* 7.4* 7.5* 7.6*   HCT 24.9* 24.5* 23.2* 23.1* 23.4* 24.4*   * 131* 118* 105* 105* 104*       Basic Metabolic Panel:  Recent Labs   Lab 24  0614 24  0614 24  0541 24  0545 24  0559 24  0531    137 136 136 137 135   POTASSIUM 3.5 3.8 3.6 3.9 3.7 4.0   CHLORIDE 99 100 98 99 100 99   CO2 28 28 28 28 28 28   BUN 24.9* 17.4 31.6* 23.1* 14.4 26.2*   CR 3.39* 2.61* 3.95* 3.45* 2.45* 3.65*   GLC 94 95 94 92 94 89   KAELYN 8.3* 8.0* 8.4* 8.5* 8.3* 8.8       INR  Recent Labs   Lab 2414 24  0918  09/23/24  0541 09/22/24  0545   INR 2.93* 3.00* 5.08* 2.61*        Recent Labs   Lab Test 09/25/24  0614 09/24/24  0614 09/23/24  0918   INR  --  2.93* 3.00*   WBC 5.4 5.9  --    HGB 7.5* 7.5*  --    * 131*  --        Personally reviewed current labs      Tami Mcbride [facility]  [facility]  [phone]

## 2024-09-25 NOTE — PLAN OF CARE
Problem: Adult Inpatient Plan of Care  Goal: Plan of Care Review  Description: The Plan of Care Review/Shift note should be completed every shift.  The Outcome Evaluation is a brief statement about your assessment that the patient is improving, declining, or no change.  This information will be displayed automatically on your shift  note.  Outcome: Progressing  Goal: Absence of Hospital-Acquired Illness or Injury  Intervention: Identify and Manage Fall Risk  Recent Flowsheet Documentation  Taken 9/25/2024 0011 by Demetrice Mayes RN  Safety Promotion/Fall Prevention:   activity supervised   lighting adjusted   room door open   room near nurse's station  Goal: Optimal Comfort and Wellbeing  Intervention: Provide Person-Centered Care  Recent Flowsheet Documentation  Taken 9/25/2024 0011 by Demetrice Mayes RN  Trust Relationship/Rapport:   care explained   choices provided   Goal Outcome Evaluation:       Patient was Alert and oriented x 4, denied pain and slept most of the shift, prn alt apse was given to unclogged Gray PICC  line and that was effective.

## 2024-09-25 NOTE — PHARMACY-VANCOMYCIN DOSING SERVICE
Pharmacy Vancomycin Note  Date of Service 2024  Patient's  1940   84 year old, female    Indication: Endocarditis  Day of Therapy: Started , planning therapy through at least 10/8/24   Current vancomycin regimen:  Intermittent Dosing   Current vancomycin monitoring method: Renal Replacement Therapy  Current vancomycin therapeutic monitoring goal: 15-20 mg/L      Current estimated CrCl = Estimated Creatinine Clearance: 13.3 mL/min (A) (based on SCr of 3.39 mg/dL (H)).    Creatinine for last 3 days  2024:  5:41 AM Creatinine 3.95 mg/dL  2024:  6:14 AM Creatinine 2.61 mg/dL  2024:  6:14 AM Creatinine 3.39 mg/dL    Recent Vancomycin Levels (past 3 days)  2024:  5:41 AM Vancomycin 13.5 ug/mL  2024:  6:14 AM Vancomycin 15.0 ug/mL    Vancomycin IV Administrations (past 72 hours)                     vancomycin (VANCOCIN) 500 mg vial to attach to  mL bag (mg) 500 mg New Bag 24 1753                    Nephrotoxins and other renal medications (From now, onward)      Start     Dose/Rate Route Frequency Ordered Stop    24 0900  torsemide (DEMADEX) tablet 100 mg         100 mg Oral DAILY 24 1254      24 1254  vancomycin place horne - receiving intermittent dosing         1 each Intravenous SEE ADMIN INSTRUCTIONS 24 1254                 Contrast Orders - past 72 hours (72h ago, onward)      None            Interpretation of levels and current regimen:  Vancomycin level is reflective of therapeutic level    Has serum creatinine changed greater than 50% in last 72 hours: No    Urine output:  unable to determine    Renal Function: ARF on Dialysis    Plan:  Vanco 500mg x 1 today  Vancomycin monitoring method: Renal Replacement Therapy  Vancomycin therapeutic monitoring goal: 15-20 mg/L  Pharmacy will check vancomycin levels as appropriate, before dialysis on   Serum creatinine levels will be ordered. Monitoring with HD.    Fernando  Nghia, [facility]

## 2024-09-25 NOTE — PLAN OF CARE
Goal Outcome Evaluation:      Plan of Care Reviewed With: patient, spouse    Overall Patient Progress: improvingOverall Patient Progress: improving    Outcome Evaluation: Pt aox4 calm and coopeative.  visited. Says the ketamine cream is working well for her foot discomfort. Will continue to monitor.

## 2024-09-25 NOTE — PROGRESS NOTES
Care Management Follow Up    Length of Stay (days): 9    Expected Discharge Date: 10/08/2024     Concerns to be Addressed: discharge planning     Patient plan of care discussed at interdisciplinary rounds: Yes    Anticipated Discharge Disposition:  to be determined     Anticipated Discharge Services:    Anticipated Discharge DME:      Patient/family educated on Medicare website which has current facility and service quality ratings:    Education Provided on the Discharge Plan:    Patient/Family in Agreement with the Plan:      Referrals Placed by CM/SW:  too soon  Private pay costs discussed: Not applicable    Discussed  Partnership in Safe Discharge Planning  document with patient/family: Yes:      Handoff Completed: No, handoff not indicated or clinically appropriate    Additional Information:  Patient was discussed with the interdisciplinary team this week.  Reason patient is not able to discharge to a lower level of care:  Patient is on IV vanco until 10/8/24.  She has dialysis on M, W, F. She is not eating a lot - ST  is planning on teaching strategies to prevent aspiration. There was discussion about the possibility of an esophogram.  Patient will be on warfarin until December.  Per PT, patient participates with min assist. And she is able to walk about 20 ft.        Next Steps: CPM to be set up for next week    Leisa Hughes RN, BSN  Care Coordination  [facility]  [phone]

## 2024-09-25 NOTE — PROGRESS NOTES
Calorie Count  Intake recorded for: 9/24  Total Kcals: 825 Total Protein: 35g  # Meals Ordered from Kitchen: 3  # Meals Recorded: 3   B: 8 oz. 2% milk, 4 oz. Orange juice, hot cocoa, 50% cream of wheat w/ brown sugar   L: 3 bites of cheeseburger, 2 oz. Cran juice, 4 oz. 2% milk   D: 8 oz. 2% milk, hot cocoa, 75% pudding, 10% chicken stew and mixed vegetables  # Supplements Recorded: 25% Gelatein Plus, 0% Magic Cup

## 2024-09-26 ENCOUNTER — APPOINTMENT (OUTPATIENT)
Dept: OCCUPATIONAL THERAPY | Facility: CLINIC | Age: 84
DRG: 288 | End: 2024-09-26
Attending: HOSPITALIST
Payer: COMMERCIAL

## 2024-09-26 ENCOUNTER — APPOINTMENT (OUTPATIENT)
Dept: SPEECH THERAPY | Facility: CLINIC | Age: 84
DRG: 288 | End: 2024-09-26
Attending: HOSPITALIST
Payer: COMMERCIAL

## 2024-09-26 ENCOUNTER — APPOINTMENT (OUTPATIENT)
Dept: PHYSICAL THERAPY | Facility: CLINIC | Age: 84
DRG: 288 | End: 2024-09-26
Attending: HOSPITALIST
Payer: COMMERCIAL

## 2024-09-26 LAB
ANION GAP SERPL CALCULATED.3IONS-SCNC: 8 MMOL/L (ref 7–15)
BUN SERPL-MCNC: 11 MG/DL (ref 8–23)
CALCIUM SERPL-MCNC: 8.5 MG/DL (ref 8.8–10.4)
CHLORIDE SERPL-SCNC: 101 MMOL/L (ref 98–107)
CREAT SERPL-MCNC: 2.1 MG/DL (ref 0.51–0.95)
EGFRCR SERPLBLD CKD-EPI 2021: 23 ML/MIN/1.73M2
ERYTHROCYTE [DISTWIDTH] IN BLOOD BY AUTOMATED COUNT: 19.8 % (ref 10–15)
GLUCOSE SERPL-MCNC: 89 MG/DL (ref 70–99)
HCO3 SERPL-SCNC: 29 MMOL/L (ref 22–29)
HCT VFR BLD AUTO: 25.4 % (ref 35–47)
HGB BLD-MCNC: 7.8 G/DL (ref 11.7–15.7)
INR PPP: 2.34 (ref 0.85–1.15)
MCH RBC QN AUTO: 32 PG (ref 26.5–33)
MCHC RBC AUTO-ENTMCNC: 30.7 G/DL (ref 31.5–36.5)
MCV RBC AUTO: 104 FL (ref 78–100)
PLATELET # BLD AUTO: 132 10E3/UL (ref 150–450)
POTASSIUM SERPL-SCNC: 3.6 MMOL/L (ref 3.4–5.3)
RBC # BLD AUTO: 2.44 10E6/UL (ref 3.8–5.2)
SODIUM SERPL-SCNC: 138 MMOL/L (ref 135–145)
WBC # BLD AUTO: 5.4 10E3/UL (ref 4–11)

## 2024-09-26 PROCEDURE — 97116 GAIT TRAINING THERAPY: CPT | Mod: GP

## 2024-09-26 PROCEDURE — 99233 SBSQ HOSP IP/OBS HIGH 50: CPT | Performed by: HOSPITALIST

## 2024-09-26 PROCEDURE — 85610 PROTHROMBIN TIME: CPT | Performed by: PHYSICIAN ASSISTANT

## 2024-09-26 PROCEDURE — 92526 ORAL FUNCTION THERAPY: CPT | Mod: GN | Performed by: SPEECH-LANGUAGE PATHOLOGIST

## 2024-09-26 PROCEDURE — 250N000013 HC RX MED GY IP 250 OP 250 PS 637: Performed by: HOSPITALIST

## 2024-09-26 PROCEDURE — 97530 THERAPEUTIC ACTIVITIES: CPT | Mod: GP

## 2024-09-26 PROCEDURE — 80048 BASIC METABOLIC PNL TOTAL CA: CPT | Performed by: PHYSICIAN ASSISTANT

## 2024-09-26 PROCEDURE — 97535 SELF CARE MNGMENT TRAINING: CPT | Mod: GO | Performed by: OCCUPATIONAL THERAPIST

## 2024-09-26 PROCEDURE — 97110 THERAPEUTIC EXERCISES: CPT | Mod: GO | Performed by: OCCUPATIONAL THERAPIST

## 2024-09-26 PROCEDURE — 250N000013 HC RX MED GY IP 250 OP 250 PS 637: Performed by: PHYSICIAN ASSISTANT

## 2024-09-26 PROCEDURE — 120N000017 HC R&B RESPIRATORY CARE

## 2024-09-26 RX ORDER — SIMETHICONE 80 MG
80 TABLET,CHEWABLE ORAL EVERY 6 HOURS PRN
Status: DISCONTINUED | OUTPATIENT
Start: 2024-09-26 | End: 2024-09-29

## 2024-09-26 RX ORDER — WARFARIN SODIUM 2 MG/1
2 TABLET ORAL
Status: COMPLETED | OUTPATIENT
Start: 2024-09-26 | End: 2024-09-26

## 2024-09-26 RX ADMIN — POLYETHYLENE GLYCOL 3350 17 G: 17 POWDER, FOR SOLUTION ORAL at 09:19

## 2024-09-26 RX ADMIN — PANTOPRAZOLE SODIUM 40 MG: 40 TABLET, DELAYED RELEASE ORAL at 06:41

## 2024-09-26 RX ADMIN — ATORVASTATIN CALCIUM 80 MG: 40 TABLET, FILM COATED ORAL at 21:47

## 2024-09-26 RX ADMIN — DOCUSATE SODIUM AND SENNOSIDES 1 TABLET: 8.6; 5 TABLET, FILM COATED ORAL at 21:47

## 2024-09-26 RX ADMIN — Medication 1 CAPSULE: at 21:47

## 2024-09-26 RX ADMIN — METOPROLOL TARTRATE 12.5 MG: 25 TABLET, FILM COATED ORAL at 21:48

## 2024-09-26 RX ADMIN — TORSEMIDE 100 MG: 100 TABLET ORAL at 09:19

## 2024-09-26 RX ADMIN — Medication 0.5 G: at 14:20

## 2024-09-26 RX ADMIN — Medication 1 TABLET: at 09:19

## 2024-09-26 RX ADMIN — DOCUSATE SODIUM AND SENNOSIDES 1 TABLET: 8.6; 5 TABLET, FILM COATED ORAL at 09:19

## 2024-09-26 RX ADMIN — WARFARIN SODIUM 2 MG: 2 TABLET ORAL at 17:58

## 2024-09-26 RX ADMIN — Medication 1 CAPSULE: at 09:19

## 2024-09-26 RX ADMIN — Medication 0.5 G: at 21:51

## 2024-09-26 RX ADMIN — CLOPIDOGREL 75 MG: 75 TABLET ORAL at 09:19

## 2024-09-26 RX ADMIN — Medication 0.5 G: at 08:20

## 2024-09-26 RX ADMIN — Medication 5 MG: at 21:47

## 2024-09-26 ASSESSMENT — ACTIVITIES OF DAILY LIVING (ADL)
ADLS_ACUITY_SCORE: 35
ADLS_ACUITY_SCORE: 36
ADLS_ACUITY_SCORE: 36
ADLS_ACUITY_SCORE: 35
ADLS_ACUITY_SCORE: 36
ADLS_ACUITY_SCORE: 35
ADLS_ACUITY_SCORE: 35
ADLS_ACUITY_SCORE: 36
ADLS_ACUITY_SCORE: 35
ADLS_ACUITY_SCORE: 36

## 2024-09-26 NOTE — PROGRESS NOTES
SPIRITUAL HEALTH SERVICES ([facility]) Progress Note  [facility].  Unit LTACH      Saw pt Felicitas Elliott and her  Ed. .     Patient/Family Understanding of Illness and Goals of Care - Felicitas and her  are hopeful that Felicitas will be discharged within the month.      Distress and Loss - Both Felicitas and her  have days when they are discouraged about the complications in the aftermath of her heart surgery. Both admit to feeling frustrated at times about what they perceive as slow progress.      Strengths, Coping, and Resources - Prayer is important to Felicitas and she prays daily. Communion is also important to her. They have a large family which is very supportive-7 children, 10 grandchildren and 7 great grandchildren all of whom live locally, except for 2      Meaning, Beliefs, and Spirituality - Felicitas is [employer] but does not have a Holiness to which she belongs      Plan of Care - [facility] will follow during this hospitalization.        Dary Velazquez, Ph.D.,[facility]  , Spiritual Health Services      [facility] available 24/7 for emergency requests/referrals, either by having the on-call  paged or by entering an ASAP/STAT consult in Epic (this will also page the on-call ).

## 2024-09-26 NOTE — PLAN OF CARE
Problem: Pain Acute  Goal: Optimal Pain Control and Function  9/26/2024 1851 by Dania Elizabeth, RN  Outcome: Progressing  9/26/2024 1509 by Dania Elizabeth, RN  Outcome: Progressing   Goal Outcome Evaluation:    Patient denied pain so far this shift, in bed, blood pressure was 113/58, family visiting with patient at this time too, will have dialysis in am

## 2024-09-26 NOTE — PROGRESS NOTES
Calorie Count  Intake recorded for: 9/25  Total Kcals: 1045 Total Protein: 44g  # Meals Ordered from Kitchen: 3  # Meals Recorded: 3   B: 3 oz. Orange juice, 6 oz. 2% milk, 100% hot cocoa, 2 slices of vazquez, 50% oatmeal   L: 6 oz. 2% milk, 100% hot cocoa, 50% penne w/ meat sauce   D: 4 oz. 2% milk, 2 oz. Orange juice, 50% pudding, 25% beef pot roast w/ gravy  # Supplements Recorded: 10% Gelatein Plus, 0% Magic Cup

## 2024-09-26 NOTE — PLAN OF CARE
Goal Outcome Evaluation:                        Problem: Fall Injury Risk  Goal: Absence of Fall and Fall-Related Injury  Outcome: Progressing  Intervention: Promote Injury-Free Environment  Recent Flowsheet Documentation  Taken 9/26/2024 1024 by Dania Elizabeth RN  Safety Promotion/Fall Prevention:   activity supervised   clutter free environment maintained   mobility aid in reach   nonskid shoes/slippers when out of bed   room door open   room near nurse's station   supervised activity     Problem: Pain Acute  Goal: Optimal Pain Control and Function  Outcome: Progressing    Patient denied pain-ketamine topical cream applied to feet per order, calm and co-operative with cares-affect noted to be flat at times in am shift-sarcastic at times with staff, continued on calorie count in am shift-ending today per current order (ate 100 % and 75 % of meals), blood pressure was 99/50 at 0749, 106/57 at 0919 (metoprolol 12.5 mg) was held this morning for this, , potassium 3.6, urea 11.0, creatinine 2.10, calcium 8.5, WBC 5.4, HGB 7.8, platelet count 132, INR 2.34 -warfarin 2 mg ordered for 1800-will have daily INR and CBC with platelet in am simethicone chewable tablet every 6 hours prn ordered for cramping, flatulence. Patient will have dialysis in am, spouse was with patient in am shift

## 2024-09-26 NOTE — PROGRESS NOTES
"CLINICAL NUTRITION SERVICES - REASSESSMENT NOTE      Recommendations Ordered by Registered Dietitian (RD):   Discontinue Magic Cup and decrease Gelatein Plus to once daily per patient preference   Future/Additional Recommendations:   Continue to monitor PO intakes and weight trends, encourage high protein foods and movement w/ therapies   Malnutrition:   % Weight Loss:  > 5% in 1 month (severe malnutrition)  % Intake:  </= 75% for >/= 1 month (severe malnutrition)  Subcutaneous Fat Loss:  Orbital region mild depletion  Muscle Loss:  Temporal region mild depletion, Clavicle bone region moderate depletion, and Acromion bone region moderate depletion  Fluid Retention:  None noted     Malnutrition Diagnosis: Severe malnutrition  In Context of:  Acute illness or injury     EVALUATION OF PROGRESS TOWARD GOALS   Diet:  Orders Placed This Encounter      Advance Diet as Tolerated: Regular Diet Adult  Magic Cup daily  Gelatein Plus BID    Intake/Tolerance:    Patient states that she is now having difficulty eating d/t SOB with chewing. She has been using techniques taught to her by SLP and PO intakes are slowly increasing. She states that she did not eat vazquez yesterday as recorded, but her  did. She has been eating home food that has not been recorded. Ex: Boost shakes daily, soft taco yesterday, chow mein today. Encouraged them to write down what she is eating and leave with calorie counts, since this has not been recorded by staff.  - Patient c/o gas pain and constipation d/t using bed pan without result, she has switched to the commode and had a large BM today which relieved abdominal pain  - Does not like Magic Cup as this \"coats\" her mouth, only eating only 1/2 a Gelatein Plus cup per day    ASSESSED NUTRITION NEEDS:  Dosing Weight 79 kg (current weight)  Estimated Energy Needs: 6802-0396 kcals (25-30 Kcal/Kg)  Justification: maintenance and increased needs w/ acute illness  Estimated Protein Needs: 119-142 grams " protein (1.5-1.8 g pro/Kg)  Justification: Repletion, hypercatabolism with critical illness, and dialysis  Estimated Fluid Needs: per provider pending fluid status    NEW FINDINGS:   - Patient progressing well with therapies    I/O: +1.6L since admission per chart  BM: Patient stooling regularly, 1-2x/day  Meds: culturelle BID, MVI/M, protonix, miralax daily, senna-docusate BID, torsemide, warfarin  Electrolytes: hypermagnesemia  BG: remains WNL  Weight: Fluctuating since admission d/t fluid shifts on HD, overall appears stable  09/27/24 0656 78.5 kg (173 lb 1 oz) Bed scale   09/26/24 0324 78.4 kg (172 lb 13.5 oz) Bed scale   09/25/24 1848 77 kg (169 lb 12.1 oz) Bed scale   09/25/24 1520 78.1 kg (172 lb 2.9 oz) Bed scale   09/25/24 0546 78.1 kg (172 lb 2.9 oz) Bed scale   09/24/24 0602 77.6 kg (171 lb 1.2 oz) Bed scale   09/23/24 0623 78.7 kg (173 lb 8 oz) Bed scale   09/21/24 0354 79 kg (174 lb 2.6 oz) Bed scale   09/20/24 1700 81.2 kg (179 lb 0.2 oz) Bed scale   09/20/24 1345 82.8 kg (182 lb 8.7 oz) Bed scale   09/20/24 0058 78.7 kg (173 lb 8 oz) Bed scale   09/19/24 0630 78.5 kg (173 lb 1 oz) Bed scale   09/18/24 0805 79.3 kg (174 lb 13.2 oz) --   09/18/24 0009 78.7 kg (173 lb 8 oz) Bed scale   09/17/24 0400 80.5 kg (177 lb 7.5 oz) Bed scale   09/16/24 1547 81.5 kg (179 lb 10.8 oz) Bed scale     Labs:  Electrolytes  Potassium (mmol/L)   Date Value   09/26/2024 3.6   09/25/2024 3.5   09/24/2024 3.8     Potassium POCT (mmol/L)   Date Value   09/16/2024 3.2 (L)   08/27/2024 3.4   08/27/2024 3.9     Phosphorus (mg/dL)   Date Value   09/07/2024 3.1   09/06/2024 2.9   09/06/2024 2.8   09/06/2024 3.1   09/05/2024 2.8    Blood Glucose  Glucose (mg/dL)   Date Value   09/26/2024 89   09/25/2024 94   09/24/2024 95   09/23/2024 94   09/22/2024 92     GLUCOSE BY METER POCT (mg/dL)   Date Value   09/16/2024 102 (H)   09/14/2024 96   09/10/2024 112 (H)   09/10/2024 150 (H)   09/04/2024 102 (H)     Glucose Whole Blood POCT  (mg/dL)   Date Value   09/16/2024 143 (H)     Hemoglobin A1C (%)   Date Value   08/16/2024 6.4 (H)    Inflammatory Markers  WBC Count (10e3/uL)   Date Value   09/26/2024 5.4   09/25/2024 5.4   09/24/2024 5.9     Albumin (g/dL)   Date Value   09/19/2024 3.1 (L)   09/18/2024 3.1 (L)   09/17/2024 3.1 (L)      Magnesium (mg/dL)   Date Value   09/07/2024 2.5 (H)   09/06/2024 2.3   09/06/2024 2.5 (H)     Sodium (mmol/L)   Date Value   09/26/2024 138   09/25/2024 137   09/24/2024 137    Renal  Urea Nitrogen (mg/dL)   Date Value   09/26/2024 11.0   09/25/2024 24.9 (H)   09/24/2024 17.4     Creatinine (mg/dL)   Date Value   09/26/2024 2.10 (H)   09/25/2024 3.39 (H)   09/24/2024 2.61 (H)     Additional  Ketones Urine (mg/dL)   Date Value   08/30/2024 Negative        Previous Goals:   PO intakes to meet >75% estimated nutrition needs - progressing  No further weight loss <78 kg - met    Previous Nutrition Diagnosis:   Inadequate oral intake related to poor appetite, early satiety, fatigue w/ chewing, intermittent NPO as evidenced by PO intakes meeting <75% estimated nutrition needs x1 month.  Evaluation: Improving    CURRENT NUTRITION DIAGNOSIS  Inadequate oral intake related to poor appetite, early satiety, fatigue/SOB w/ chewing, intermittent NPO as evidenced by PO intakes meeting <75% estimated nutrition needs x1 month.    INTERVENTIONS  Recommendations / Nutrition Prescription  See top of note.    Implementation  Composition of Meals and Snacks and Medical Food Supplement    Goals  PO intakes to meet >75% estimated nutrition needs  No further weight loss <78 kg    MONITORING AND EVALUATION:  Progress towards goals will be monitored and evaluated per protocol and Practice Guidelines    Graciela Aldrich RDN, RK  [facility]  Office: [phone] sg [place]

## 2024-09-26 NOTE — PLAN OF CARE
Problem: Adult Inpatient Plan of Care  Goal: Plan of Care Review  Description: The Plan of Care Review/Shift note should be completed every shift.  The Outcome Evaluation is a brief statement about your assessment that the patient is improving, declining, or no change.  This information will be displayed automatically on your shift  note.  Outcome: Progressing  Goal: Absence of Hospital-Acquired Illness or Injury  Intervention: Identify and Manage Fall Risk  Recent Flowsheet Documentation  Taken 9/26/2024 0002 by Demetrice Mayes RN  Safety Promotion/Fall Prevention:   activity supervised   lighting adjusted   room door open   room near nurse's station  Intervention: Prevent Skin Injury  Recent Flowsheet Documentation  Taken 9/26/2024 0000 by Demetrice Mayes RN  Body Position:   turned   left   heels elevated  Goal: Optimal Comfort and Wellbeing  Intervention: Provide Person-Centered Care  Recent Flowsheet Documentation  Taken 9/26/2024 0002 by Demetrice Mayes RN  Trust Relationship/Rapport:   care explained   choices provided   Goal Outcome Evaluation:      Patient was Alert and oriented x 4, denied pain and slept most of the shift, patient vital signs on this shift was Temp 98.2,  pulse 74, RR 16, /50 and O 2 sat 93% on RA.

## 2024-09-26 NOTE — PROGRESS NOTES
[facility]    Medicine Progress Note - Hospitalist Service    Date of Admission:  9/16/2024    Felicitas Elliott is a 84 year old female admitted on 9/16/2024 to the LTAC for long-term antibiotics following MSSA bacteremia and endocarditis. 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  She was initially admitted to [facility] on 8/16/2024 for sepsis and HAMMAD.  She received empiric antibiotics on admission. Blood cultures grew MSSA.  Neurology was consulted for possible discitis.  ID was also consulted. Echo revealed large posterior leaflet mitral valve vegetation and small aortic valve vegetation with severe aortic stenosis.  Brain MRI revealed scattered embolic infarcts.  Coronary angiogram demonstrated coronary artery disease.  CT surgery was consulted. She underwent CABG alongside the procedures mentioned above.  She however has not had renal recovery.  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  She had dysphagia and momentarily required NG tube with tube feeds. She worked with speech therapy and is now cleared for regular diet and thin liquids.  CT surgery recommends warfarin for 3 months for bioprosthetic valves.  INR goal 2-3.  End date of warfarin approximately 12/10/2024, at which point she will be on Plavix alone (ASA allergy) indefinitely.  ID recommends IV vancomycin until 10/8/2024.    LTAC Course:  9/17 - 9/21.  Progressing well.  On Coumadin, initially bridging with heparin gtt. for bioprosthetic valve.  On arrival initial INR 1.49. Goal 2-3. Has been dialyzing for HAMMAD. 9/19, INR 2.19, heparin gtt discontinued. RD noted patient having trouble chewing and impedes her overall oral intake.  SLP consulted.    9/22: Stable.   9/23:  Dialysis today, supratherapeutic INR at 5.08, will get repeat INR -3.0, had BM x3 today, likes the creams for feet ordered by  nocturnist  9/24:  pacer checked, set at , top pacer is capturing 94%, bottom pacer is capturing 80%. F/up cardio tele visit - recommend to decrease metoprolol by 1/2- changed from 25 to 12.5 bid.   9/25:  no overnight events   9/26:  added simethacone as patient complaining of gas pain,     BARRIERS TO DISCHARGE (why care is unable to be provided at a lower level of care):    -Long-term antibiotics.  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  Repeat blood cultures - 8/17/2024  -Mitral and aortic valve endocarditis as evidenced with large vegetation on mitral valve 8 mm x 15 at x 2 posterior leaflet of small vegetation on aortic valve.  With signs of vegetation combined with brain infarct.  S/p CV surgery  -Continue with IV vancomycin.  Dosing per pharmacy  -Per ID will need to check immunoglobulin level in 4 to 6 weeks, sister with history of hypogammaglobinemia  -ID consulted and following  -Patient previously on heparin 5000 subQ every 8 hours and Coumadin.  Recommended INR goal is 2-3 per CT surgery.  Discontinued heparin 5000 units subcu every 8 hours on admission and started patient on heparin gtt. alongside Coumadin.  I also notified pharmacy and were agreeable with this plan.  -9/19, INR 2.19.  Discontinued heparin gtt. Patient on goal  -Pharm.D. to continue dosing Coumadin   -INR 3.0 on 9/23    Peripheral neuropathy of b/l feet  -fredy-lidocaine-ketamine cream prn   -benadryl cream prn     Abnormal brain MRI suggestive of subacute infarct  Patient with a history of MSSA bacteremia and aortic valve endocarditis.  Most likely septic emboli.  -Continue with antibiotics per ID.  End date 10/8/2024    Acute  respiratory failure with hypoxia  -Patient previously intubated.  Now extubated.    -Continue with oxygen support to maintain oxygenation above 92%.  Wean as tolerated  -RT while in LTAC    HAMMAD  -Previously on CCRT now transferred to HD  -Torsemide per nephrology  -HD per nephrology-MWF  -Monitor kidney function with BMP    CAD  A-fib  History of aortic stenosis  +pacemaker   Hypotension   -Fairly stable at this time.  -S/p CABG per CT surgery  -Continue with Plavix and statin  -Metoprolol- will decrease dose from 25 bid to 12.5 bid due to low BP on 9/24  -Previously on amiodarone for rate control, now s/p PPM  -pacer checked 9/23, set at , top pacer is capturing 94%, bottom pacer is capturing 80%.  -cardiothoracic surg tele appt today- appreciate recs      Anemia of chronic disease  Thrombocytopenia   -monitor    Severe protein calorie malnutrition  -? esophogram  -RD following   -calorie counts    Physical deconditioning/critical illness myopathy  -Fall precautions.  -PT/OT  -WOC    Diet: Advance Diet as Tolerated: Regular Diet Adult  Snacks/Supplements Adult: Magic Cup; With Meals  Snacks/Supplements Adult: Gelatein Plus; With Meals  Calorie Counts    DVT Prophylaxis: Warfarin  Le Catheter: Not present  Lines: PRESENT      PICC 09/06/24 Triple Lumen Right Basilic Vascular access-Site Assessment: WDL  CVC Double Lumen Right Subclavian Tunneled-Site Assessment: WDL      Cardiac Monitoring: None  Code Status: No CPR- Do NOT Intubate      Clinically Significant Risk Factors              # Hypoalbuminemia: Lowest albumin = 3.1 g/dL at 9/19/2024  6:35 AM, will monitor as appropriate                # Severe Malnutrition: based on nutrition assessment      # Financial/Environmental Concerns: none   # Pacemaker present  # History of CABG: noted on surgical history    Disposition Plan     Medically Ready for Discharge: Anticipated in 5+ Days    Josephine Thomson MD  Hospitalist Service  LTACH  Securely message  with Vocera (more info)  Text page via [facility] Paging/Directory   ______________________________________________________________________    Interval History   She slept well overnight, walking further with PT   in room with her  +abd pain - diffuse  She has no chest pain   She has no dyspnea      Physical Exam   Vital Signs: Temp: 98.1  F (36.7  C) Temp src: Oral BP: 106/57 Pulse: 83   Resp: 16 SpO2: 91 % O2 Device: None (Room air)   Weight: 172 lbs 13.45 oz  Constitutional: Patient is resting in bed comfortably appears in no distress.  Eyes: Pupils are equal round and reactive to light  Respiratory: Good respiratory effort, on auscultation good air entry is noted  Cardiovascular: Good S1 and S2 no obvious murmurs peripheral pulses are palpable  GI: Abdomen is soft nontender nondistended bowel sounds are noted  Skin: No obvious rashes noted on exposed areas, warm to touch please refer to nursing/wound care note for complete skin exam  Musculoskeletal: Good muscle tone nails and digits appear acyanotic  Neuropsychiatric: Awake alert oriented to person normal affect    Medical Decision Making       54 MINUTES SPENT BY ME on the date of service doing chart review, history, exam, documentation & further activities per the note.  ------------------ MEDICAL DECISION MAKING ------------------------------------------------------------------------------------------------------  MANAGEMENT DISCUSSED with the following over the past 24 hours:     NOTE(S)/MEDICAL RECORDS REVIEWED over the past 24 hours:         Data   Recent Labs   Lab 09/26/24  0614 09/25/24  0614 09/24/24  0614   WBC 5.4 5.4 5.9   HGB 7.8* 7.5* 7.5*   * 105* 106*   * 130* 131*   INR 2.34* 2.64* 2.93*    137 137   POTASSIUM 3.6 3.5 3.8   CHLORIDE 101 99 100   CO2 29 28 28   BUN 11.0 24.9* 17.4   CR 2.10* 3.39* 2.61*   ANIONGAP 8 10 9   KAELYN 8.5* 8.3* 8.0*   GLC 89 94 95       Most Recent 3 CBC's:  Recent Labs   Lab Test 09/26/24  0614  09/25/24  0614 09/24/24  0614   WBC 5.4 5.4 5.9   HGB 7.8* 7.5* 7.5*   * 105* 106*   * 130* 131*     Most Recent 3 BMP's:  Recent Labs   Lab Test 09/26/24  0614 09/25/24  0614 09/24/24  0614    137 137   POTASSIUM 3.6 3.5 3.8   CHLORIDE 101 99 100   CO2 29 28 28   BUN 11.0 24.9* 17.4   CR 2.10* 3.39* 2.61*   ANIONGAP 8 10 9   KAELYN 8.5* 8.3* 8.0*   GLC 89 94 95     Most Recent 2 LFT's:  Recent Labs   Lab Test 09/19/24  0635 09/18/24  0704   AST 36 34   ALT 31 29   ALKPHOS 78 75   BILITOTAL 0.7 0.7     Most Recent 3 INR's:  Recent Labs   Lab Test 09/26/24  0614 09/25/24  0614 09/24/24  0614   INR 2.34* 2.64* 2.93*

## 2024-09-26 NOTE — PLAN OF CARE
Goal Outcome Evaluation:       Patient alert and oriented. Neuropathic pain managed by scheduled topical pain medication. Had dialysis this evening. Dialysis nurse reported 1 litter out. Vancomycin given. PICC patent. Spouse was here.No other issues noted. Blood pressure 105/55, pulse 69, temperature 98.1  F (36.7  C), temperature source Axillary, resp. rate 16, weight 77 kg (169 lb 12.1 oz), SpO2 97%.   Problem: Adult Inpatient Plan of Care  Goal: Absence of Hospital-Acquired Illness or Injury  Intervention: Identify and Manage Fall Risk  Recent Flowsheet Documentation  Taken 9/25/2024 1800 by Walker Lilly RN  Safety Promotion/Fall Prevention: activity supervised  Intervention: Prevent Skin Injury  Recent Flowsheet Documentation  Taken 9/25/2024 1800 by Walker Lilly RN  Skin Protection:   adhesive use limited   incontinence pads utilized  Device Skin Pressure Protection: absorbent pad utilized/changed  Intervention: Prevent and Manage VTE (Venous Thromboembolism) Risk  Recent Flowsheet Documentation  Taken 9/25/2024 1800 by Walker Lilly RN  VTE Prevention/Management: other (see comments)  Intervention: Prevent Infection  Recent Flowsheet Documentation  Taken 9/25/2024 1800 by Walker Lilly RN  Infection Prevention: hand hygiene promoted  Goal: Optimal Comfort and Wellbeing  Intervention: Provide Person-Centered Care  Recent Flowsheet Documentation  Taken 9/25/2024 1800 by Walker Lilly RN  Trust Relationship/Rapport:   care explained   choices provided     Problem: Fall Injury Risk  Goal: Absence of Fall and Fall-Related Injury  Intervention: Identify and Manage Contributors  Recent Flowsheet Documentation  Taken 9/25/2024 1800 by Walker Lilly RN  Medication Review/Management: medications reviewed  Intervention: Promote Injury-Free Environment  Recent Flowsheet Documentation  Taken 9/25/2024 1800 by Walker Lilly RN  Safety Promotion/Fall Prevention: activity supervised     Problem:  Pain Acute  Goal: Optimal Pain Control and Function  Intervention: Prevent or Manage Pain  Recent Flowsheet Documentation  Taken 9/25/2024 1800 by Walker Lilly RN  Medication Review/Management: medications reviewed  Intervention: Optimize Psychosocial Wellbeing  Recent Flowsheet Documentation  Taken 9/25/2024 1800 by Walker Lilly RN  Spiritual Activities Assistance: personal rituals encouraged  Supportive Measures: positive reinforcement provided     Problem: Hemodialysis  Goal: Safe, Effective Therapy Delivery  Intervention: Optimize Device Care and Function  Recent Flowsheet Documentation  Taken 9/25/2024 1800 by Walker Lilly RN  Medication Review/Management: medications reviewed  Goal: Absence of Infection Signs and Symptoms  Intervention: Prevent or Manage Infection  Recent Flowsheet Documentation  Taken 9/25/2024 1800 by Walker Lilly RN  Infection Prevention: hand hygiene promoted  Infection Management: aseptic technique maintained

## 2024-09-26 NOTE — PROGRESS NOTES
HEMODIALYSIS TREATMENT NOTE    Date: 9/25/2024  Time: 7:30 PM    Data:  Pre Wt: 78.1 kg (172 lb 2.9 oz)   Desired Wt:1-3   kg   Post Wt: 77 kg (169 lb 12.1 oz)  Weight change: 1.1 kg  Ultrafiltration - Post Run Net Total Removed (mL): 1000 mL  Vascular Access Status: CVC  patent  Dialyzer Rinse: Streaked, Light  Total Blood Volume Processed: 66.2 L   Total Dialysis (Treatment) Time: 3.0   Dialysate Bath: K 3, Ca 3  Heparin 500 units loading + 500 units/hr    Lab:   No    Interventions:  3.0 hours HD done through [facility]  with 600 DFR.  Seen by Nephrology this morning no change.  SBP soft but stable. SBP > 90 throughout the treatment.  10 mg Midodrine was given during run to support blood pressure.  Pt UF adjusted according to pt hemodynamic status, 1.0kg UF pulled, Crit-line steady with A profile at the end of HD run.  Post HD, blood rinsed back, Catheter dressing changed per policy, CVC saline locked, handoff report given & pt left in a stable condition     Assessment:  Pt alert & oriented x4, denies pain & no sign of SOB.  CVC dressing CDI.  Monitoring every 15 minutes & PRN     Plan:    Per Renal Team

## 2024-09-27 ENCOUNTER — APPOINTMENT (OUTPATIENT)
Dept: OCCUPATIONAL THERAPY | Facility: CLINIC | Age: 84
DRG: 288 | End: 2024-09-27
Attending: HOSPITALIST
Payer: COMMERCIAL

## 2024-09-27 ENCOUNTER — APPOINTMENT (OUTPATIENT)
Dept: SPEECH THERAPY | Facility: CLINIC | Age: 84
DRG: 288 | End: 2024-09-27
Attending: HOSPITALIST
Payer: COMMERCIAL

## 2024-09-27 ENCOUNTER — APPOINTMENT (OUTPATIENT)
Dept: PHYSICAL THERAPY | Facility: CLINIC | Age: 84
DRG: 288 | End: 2024-09-27
Attending: HOSPITALIST
Payer: COMMERCIAL

## 2024-09-27 LAB
ANION GAP SERPL CALCULATED.3IONS-SCNC: 8 MMOL/L (ref 7–15)
BUN SERPL-MCNC: 17.5 MG/DL (ref 8–23)
CALCIUM SERPL-MCNC: 8.4 MG/DL (ref 8.8–10.4)
CHLORIDE SERPL-SCNC: 99 MMOL/L (ref 98–107)
CREAT SERPL-MCNC: 2.92 MG/DL (ref 0.51–0.95)
EGFRCR SERPLBLD CKD-EPI 2021: 15 ML/MIN/1.73M2
ERYTHROCYTE [DISTWIDTH] IN BLOOD BY AUTOMATED COUNT: 19.7 % (ref 10–15)
GLUCOSE SERPL-MCNC: 90 MG/DL (ref 70–99)
HCO3 SERPL-SCNC: 30 MMOL/L (ref 22–29)
HCT VFR BLD AUTO: 25 % (ref 35–47)
HGB BLD-MCNC: 7.7 G/DL (ref 11.7–15.7)
INR PPP: 2.35 (ref 0.85–1.15)
MCH RBC QN AUTO: 32.5 PG (ref 26.5–33)
MCHC RBC AUTO-ENTMCNC: 30.8 G/DL (ref 31.5–36.5)
MCV RBC AUTO: 106 FL (ref 78–100)
PLATELET # BLD AUTO: 125 10E3/UL (ref 150–450)
POTASSIUM SERPL-SCNC: 3.6 MMOL/L (ref 3.4–5.3)
RBC # BLD AUTO: 2.37 10E6/UL (ref 3.8–5.2)
SODIUM SERPL-SCNC: 137 MMOL/L (ref 135–145)
VANCOMYCIN SERPL-MCNC: 14.2 UG/ML
WBC # BLD AUTO: 5.7 10E3/UL (ref 4–11)

## 2024-09-27 PROCEDURE — 92526 ORAL FUNCTION THERAPY: CPT | Mod: GN | Performed by: SPEECH-LANGUAGE PATHOLOGIST

## 2024-09-27 PROCEDURE — 250N000013 HC RX MED GY IP 250 OP 250 PS 637: Performed by: HOSPITALIST

## 2024-09-27 PROCEDURE — 80048 BASIC METABOLIC PNL TOTAL CA: CPT | Performed by: PHYSICIAN ASSISTANT

## 2024-09-27 PROCEDURE — 90935 HEMODIALYSIS ONE EVALUATION: CPT

## 2024-09-27 PROCEDURE — 250N000011 HC RX IP 250 OP 636: Performed by: NURSE PRACTITIONER

## 2024-09-27 PROCEDURE — 85610 PROTHROMBIN TIME: CPT | Performed by: PHYSICIAN ASSISTANT

## 2024-09-27 PROCEDURE — 97530 THERAPEUTIC ACTIVITIES: CPT | Mod: GP | Performed by: PHYSICAL THERAPIST

## 2024-09-27 PROCEDURE — 99232 SBSQ HOSP IP/OBS MODERATE 35: CPT | Performed by: NURSE PRACTITIONER

## 2024-09-27 PROCEDURE — 120N000017 HC R&B RESPIRATORY CARE

## 2024-09-27 PROCEDURE — P9047 ALBUMIN (HUMAN), 25%, 50ML: HCPCS | Performed by: NURSE PRACTITIONER

## 2024-09-27 PROCEDURE — 97535 SELF CARE MNGMENT TRAINING: CPT | Mod: GO | Performed by: OCCUPATIONAL THERAPIST

## 2024-09-27 PROCEDURE — 250N000013 HC RX MED GY IP 250 OP 250 PS 637: Performed by: PHYSICIAN ASSISTANT

## 2024-09-27 PROCEDURE — 97116 GAIT TRAINING THERAPY: CPT | Mod: GP | Performed by: PHYSICAL THERAPIST

## 2024-09-27 PROCEDURE — 80202 ASSAY OF VANCOMYCIN: CPT | Performed by: HOSPITALIST

## 2024-09-27 PROCEDURE — 258N000003 HC RX IP 258 OP 636: Performed by: HOSPITALIST

## 2024-09-27 PROCEDURE — 99233 SBSQ HOSP IP/OBS HIGH 50: CPT | Performed by: HOSPITALIST

## 2024-09-27 PROCEDURE — 250N000011 HC RX IP 250 OP 636: Performed by: PHYSICIAN ASSISTANT

## 2024-09-27 PROCEDURE — 250N000011 HC RX IP 250 OP 636: Performed by: HOSPITALIST

## 2024-09-27 PROCEDURE — 99232 SBSQ HOSP IP/OBS MODERATE 35: CPT | Performed by: INTERNAL MEDICINE

## 2024-09-27 RX ORDER — LIDOCAINE 4 G/G
1 PATCH TOPICAL
Status: DISPENSED | OUTPATIENT
Start: 2024-09-28

## 2024-09-27 RX ORDER — WARFARIN SODIUM 2 MG/1
2 TABLET ORAL
Status: COMPLETED | OUTPATIENT
Start: 2024-09-27 | End: 2024-09-27

## 2024-09-27 RX ORDER — LIDOCAINE 4 G/G
1 PATCH TOPICAL
Status: DISCONTINUED | OUTPATIENT
Start: 2024-09-27 | End: 2024-09-27

## 2024-09-27 RX ADMIN — METOPROLOL TARTRATE 12.5 MG: 25 TABLET, FILM COATED ORAL at 21:03

## 2024-09-27 RX ADMIN — EPOETIN ALFA-EPBX 20000 UNITS: 20000 INJECTION, SOLUTION INTRAVENOUS; SUBCUTANEOUS at 17:51

## 2024-09-27 RX ADMIN — WARFARIN SODIUM 2 MG: 2 TABLET ORAL at 17:53

## 2024-09-27 RX ADMIN — ATORVASTATIN CALCIUM 80 MG: 40 TABLET, FILM COATED ORAL at 21:03

## 2024-09-27 RX ADMIN — Medication 0.5 G: at 08:58

## 2024-09-27 RX ADMIN — ACETAMINOPHEN 650 MG: 325 TABLET, FILM COATED ORAL at 10:15

## 2024-09-27 RX ADMIN — TORSEMIDE 100 MG: 100 TABLET ORAL at 10:11

## 2024-09-27 RX ADMIN — POLYETHYLENE GLYCOL 3350 17 G: 17 POWDER, FOR SOLUTION ORAL at 08:53

## 2024-09-27 RX ADMIN — Medication 1 CAPSULE: at 21:03

## 2024-09-27 RX ADMIN — PANTOPRAZOLE SODIUM 40 MG: 40 TABLET, DELAYED RELEASE ORAL at 06:31

## 2024-09-27 RX ADMIN — ALBUMIN (HUMAN) 25 G: 0.25 INJECTION, SOLUTION INTRAVENOUS at 11:58

## 2024-09-27 RX ADMIN — MIDODRINE HYDROCHLORIDE 10 MG: 5 TABLET ORAL at 12:04

## 2024-09-27 RX ADMIN — Medication 5 MG: at 21:03

## 2024-09-27 RX ADMIN — Medication 1 TABLET: at 08:53

## 2024-09-27 RX ADMIN — Medication 1 CAPSULE: at 08:53

## 2024-09-27 RX ADMIN — Medication 0.5 G: at 14:21

## 2024-09-27 RX ADMIN — CLOPIDOGREL 75 MG: 75 TABLET ORAL at 08:53

## 2024-09-27 RX ADMIN — DOCUSATE SODIUM AND SENNOSIDES 1 TABLET: 8.6; 5 TABLET, FILM COATED ORAL at 08:53

## 2024-09-27 RX ADMIN — DOCUSATE SODIUM AND SENNOSIDES 1 TABLET: 8.6; 5 TABLET, FILM COATED ORAL at 21:03

## 2024-09-27 RX ADMIN — Medication 0.5 G: at 21:23

## 2024-09-27 RX ADMIN — VANCOMYCIN HYDROCHLORIDE 1000 MG: 5 INJECTION, POWDER, LYOPHILIZED, FOR SOLUTION INTRAVENOUS at 17:53

## 2024-09-27 ASSESSMENT — ACTIVITIES OF DAILY LIVING (ADL)
ADLS_ACUITY_SCORE: 33
ADLS_ACUITY_SCORE: 35
ADLS_ACUITY_SCORE: 35
ADLS_ACUITY_SCORE: 37
ADLS_ACUITY_SCORE: 35
ADLS_ACUITY_SCORE: 37
ADLS_ACUITY_SCORE: 35
ADLS_ACUITY_SCORE: 37
ADLS_ACUITY_SCORE: 35
ADLS_ACUITY_SCORE: 35
ADLS_ACUITY_SCORE: 33
ADLS_ACUITY_SCORE: 35
ADLS_ACUITY_SCORE: 37

## 2024-09-27 NOTE — PROGRESS NOTES
[facility]    Medicine [facility] - Hospitalist Service    Date of Admission:  9/16/2024    Felicitas Elliott is a 84 year old female admitted on 9/16/2024 to the LTAC for long-term antibiotics following MSSA bacteremia and endocarditis. 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  She was initially admitted to [facility] on 8/16/2024 for sepsis and HAMMAD.  She received empiric antibiotics on admission. Blood cultures grew MSSA.  Neurology was consulted for possible discitis.  ID was also consulted. Echo revealed large posterior leaflet mitral valve vegetation and small aortic valve vegetation with severe aortic stenosis.  Brain MRI revealed scattered embolic infarcts.  Coronary angiogram demonstrated coronary artery disease.  CT surgery was consulted. She underwent CABG alongside the procedures mentioned above.  She however has not had renal recovery.  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  She had dysphagia and momentarily required NG tube with tube feeds. She worked with speech therapy and is now cleared for regular diet and thin liquids.  CT surgery recommends warfarin for 3 months for bioprosthetic valves.  INR goal 2-3.  End date of warfarin approximately 12/10/2024, at which point she will be on Plavix alone (ASA allergy) indefinitely.  ID recommends IV vancomycin until 10/8/2024.    LTAC Course:  9/17 - 9/21.  Progressing well.  On Coumadin, initially bridging with heparin gtt. for bioprosthetic valve.  On arrival initial INR 1.49. Goal 2-3. Has been dialyzing for HAMMAD. 9/19, INR 2.19, heparin gtt discontinued. RD noted patient having trouble chewing and impedes her overall oral intake.  SLP consulted.    9/22: Stable.   9/23:  Dialysis today, supratherapeutic INR at 5.08, will get repeat INR -3.0, had BM x3 today, likes the creams for feet ordered by  nocturnist  9/24:  pacer checked, set at , top pacer is capturing 94%, bottom pacer is capturing 80%. F/up cardio tele visit - recommend to decrease metoprolol by 1/2- changed from 25 to 12.5 bid.   9/25:  no overnight events   9/26:  added simethacone as patient complaining of gas pain  9/27:  dialysis today, +neck pain -will add lidocaine and voltaren    BARRIERS TO DISCHARGE (why care is unable to be provided at a lower level of care):    -Long-term antibiotics.  -Dialysis    Assessment & Plan     Neck pain due to arthritis  -lidocaine  -voltaren prn    MSSA bacteremia  Aortic root abscess s/p aortic root abscess debridement and aortic annular reconstruction, aortic root replacement, bioprosthetic aortic valve 8/27/2024  Gram-negative bacilli and respiratory culture s/p cefepime and ceftriaxone  -Positive blood cultures 8/14/2024 and 8/16 with MSSA.  Repeat blood cultures - 8/17/2024  -Mitral and aortic valve endocarditis as evidenced with large vegetation on mitral valve 8 mm x 15 at x 2 posterior leaflet of small vegetation on aortic valve.  With signs of vegetation combined with brain infarct.  S/p CV surgery  -Continue with IV vancomycin.  Dosing per pharmacy  -Per ID will need to check immunoglobulin level in 4 to 6 weeks, sister with history of hypogammaglobinemia  -ID consulted and following  -Patient previously on heparin 5000 subQ every 8 hours and Coumadin.  Recommended INR goal is 2-3 per CT surgery.  Discontinued heparin 5000 units subcu every 8 hours on admission and started patient on heparin gtt. alongside Coumadin.  I also notified pharmacy and were agreeable with this plan.  -9/19, INR 2.19.  Discontinued heparin gtt. Patient on goal  -Pharm.D. to continue dosing Coumadin   -INR 3.0 on 9/23    Peripheral neuropathy of b/l feet  -fredy-lidocaine-ketamine cream prn   -benadryl cream prn     Abnormal brain MRI suggestive of subacute infarct  Patient with a history of MSSA bacteremia and aortic  valve endocarditis.  Most likely septic emboli.  -Continue with antibiotics per ID.  End date 10/8/2024    Acute respiratory failure with hypoxia  -Patient previously intubated.  Now extubated.    -Continue with oxygen support to maintain oxygenation above 92%.  Wean as tolerated  -RT while in LTAC    HAMMAD  -Previously on CCRT now transferred to HD  -Torsemide per nephrology  -HD per nephrology-MWF  -Monitor kidney function with BMP    CAD  A-fib  History of aortic stenosis  +pacemaker   Hypotension   -Fairly stable at this time.  -S/p CABG per CT surgery  -Continue with Plavix and statin  -Metoprolol- will decrease dose from 25 bid to 12.5 bid due to low BP on 9/24  -Previously on amiodarone for rate control, now s/p PPM  -pacer checked 9/23, set at , top pacer is capturing 94%, bottom pacer is capturing 80%.  -cardiothoracic surg tele appt today- appreciate recs      Anemia of chronic disease  Thrombocytopenia   -monitor    Severe protein calorie malnutrition  -? esophogram  -RD following   -calorie counts    Physical deconditioning/critical illness myopathy  -Fall precautions.  -PT/OT  -WOC    Diet: Advance Diet as Tolerated: Regular Diet Adult  Snacks/Supplements Adult: Gelatein Plus; With Meals    DVT Prophylaxis: Warfarin  Le Catheter: Not present  Lines: PRESENT      PICC 09/06/24 Triple Lumen Right Basilic Vascular access-Site Assessment: WDL  CVC Double Lumen Right Subclavian Tunneled-Site Assessment: WDL      Cardiac Monitoring: None  Code Status: No CPR- Do NOT Intubate      Clinically Significant Risk Factors              # Hypoalbuminemia: Lowest albumin = 3.1 g/dL at 9/19/2024  6:35 AM, will monitor as appropriate   # Thrombocytopenia: Lowest platelets = 125 in last 2 days, will monitor for bleeding              # Severe Malnutrition: based on nutrition assessment      # Financial/Environmental Concerns: none   # Pacemaker present  # History of CABG: noted on surgical history    Disposition  Plan     Medically Ready for Discharge: Anticipated in 5+ Days    Josephine Thomson MD  Hospitalist Service  LTACH  Securely message with [employer] (more info)  Text page via [facility]"Mobile Location, IP" Paging/Directory   ______________________________________________________________________    Interval History   She is currently undergoing dialysis   in room with her  Abdominal pain has improved  +neck pain  She has no chest pain   She has no dyspnea      Physical Exam   Vital Signs: Temp: 97.9  F (36.6  C) Temp src: Oral BP: 97/54 Pulse: 67   Resp: 24 SpO2: 93 % O2 Device: None (Room air)   Weight: 173 lbs .98 oz  Constitutional: Patient is resting in bed comfortably appears in no distress.  Eyes: Pupils are equal round and reactive to light  Respiratory: Good respiratory effort, on auscultation good air entry is noted  Cardiovascular: Good S1 and S2 no obvious murmurs peripheral pulses are palpable  GI: Abdomen is soft nontender nondistended bowel sounds are noted  Skin: No obvious rashes noted on exposed areas, warm to touch please refer to nursing/wound care note for complete skin exam  Musculoskeletal: Good muscle tone nails and digits appear acyanotic  Neuropsychiatric: Awake alert oriented to person normal affect    Medical Decision Making       51 MINUTES SPENT BY ME on the date of service doing chart review, history, exam, documentation & further activities per the note.  ------------------ MEDICAL DECISION MAKING ------------------------------------------------------------------------------------------------------  MANAGEMENT DISCUSSED with the following over the past 24 hours:     NOTE(S)/MEDICAL RECORDS REVIEWED over the past 24 hours:         Data   Recent Labs   Lab 09/27/24  0616 09/26/24  0614 09/25/24  0614   WBC 5.7 5.4 5.4   HGB 7.7* 7.8* 7.5*   * 104* 105*   * 132* 130*   INR 2.35* 2.34* 2.64*    138 137   POTASSIUM 3.6 3.6 3.5   CHLORIDE 99 101 99   CO2 30* 29 28   BUN 17.5 11.0 24.9*   CR  2.92* 2.10* 3.39*   ANIONGAP 8 8 10   KAELYN 8.4* 8.5* 8.3*   GLC 90 89 94       Most Recent 3 CBC's:  Recent Labs   Lab Test 09/27/24  0616 09/26/24  0614 09/25/24  0614   WBC 5.7 5.4 5.4   HGB 7.7* 7.8* 7.5*   * 104* 105*   * 132* 130*     Most Recent 3 BMP's:  Recent Labs   Lab Test 09/27/24  0616 09/26/24  0614 09/25/24  0614    138 137   POTASSIUM 3.6 3.6 3.5   CHLORIDE 99 101 99   CO2 30* 29 28   BUN 17.5 11.0 24.9*   CR 2.92* 2.10* 3.39*   ANIONGAP 8 8 10   KAELYN 8.4* 8.5* 8.3*   GLC 90 89 94     Most Recent 2 LFT's:  Recent Labs   Lab Test 09/19/24  0635 09/18/24  0704   AST 36 34   ALT 31 29   ALKPHOS 78 75   BILITOTAL 0.7 0.7     Most Recent 3 INR's:  Recent Labs   Lab Test 09/27/24  0616 09/26/24  0614 09/25/24  0614   INR 2.35* 2.34* 2.64*

## 2024-09-27 NOTE — PROGRESS NOTES
Calorie Count  Intake recorded for: 9/26  Total Kcals: 1240+ Total Protein: 37g+  Kcals from Hospital Food: 1240   Protein: 37g  Kcals/protein from Outside Food (average): unknown   # Meals Ordered from Kitchen: 3  # Meals Recorded: 3   B: 100% Cheerios, 8 oz. 2% milk, 4 oz.orange juice, hot cocoa   L: 100% sugar cookie and 4 oz. Orange juice    - Patient and her  report that he brought in chow mein that she ate, no food intake recorded    D: 4 oz. Cran juice, 75% cheese pizza  # Supplements Recorded: 0% Gelatein Plus and Magic Cup    ASSESSED NUTRITION NEEDS:  Dosing Weight 79 kg (current weight)  Estimated Energy Needs: 5749-7675 kcals (25-30 Kcal/Kg)  Justification: maintenance and increased needs w/ acute illness  Estimated Protein Needs: 119-142 grams protein (1.5-1.8 g pro/Kg)  Justification: Repletion, hypercatabolism with critical illness, and dialysis  Estimated Fluid Needs: per provider pending fluid status    Summary:  PO intakes remain inadequate, though slowly improving. Difficult to determine true intakes d/t lack of recording of food brought in by patient's . Will continue to encourage high protein/kcal and softer choices as tolerated by the patient.    Graciela Aldrich RDN, LD  Clinical Dietitian   Induction

## 2024-09-27 NOTE — PLAN OF CARE
Problem: Adult Inpatient Plan of Care  Goal: Optimal Comfort and Wellbeing  Intervention: Provide Person-Centered Care  Recent Flowsheet Documentation  Taken 9/27/2024 0200 by Vish Garvey RN  Trust Relationship/Rapport:   care explained   choices provided     Problem: Fall Injury Risk  Goal: Absence of Fall and Fall-Related Injury  Intervention: Identify and Manage Contributors  Recent Flowsheet Documentation  Taken 9/27/2024 0200 by Vish Garvey, RN  Medication Review/Management: medications reviewed  Intervention: Promote Injury-Free Environment  Recent Flowsheet Documentation  Taken 9/27/2024 0200 by Vish Garvey, RN  Safety Promotion/Fall Prevention:   activity supervised   lighting adjusted   room door open   room near nurse's station   Goal Outcome Evaluation:         Pt slept most of the shift, denies pain or discomfort. VSS. Continue to monitor. /54 (BP Location: Left arm)   Pulse 67   Temp 98.1  F (36.7  C) (Oral)   Resp 18   Wt 78.5 kg (173 lb 1 oz)   SpO2 91%   BMI 27.11 kg/m

## 2024-09-27 NOTE — PROGRESS NOTES
HEMODIALYSIS TREATMENT NOTE      Date: 9/27/2024  Time: 1456     Data:  Pre Wt:   78.5 kg (bed scale)  Desired Wt: To be established  Post Wt:  76.8 kg (BED scale)  Weight change: - 1.7 kg  Ultrafiltration - Post Run Net Total Removed (mL):  1700 ml  Vascular Access Status: CVC patent  Dialyzer Rinse:  Light  Total Blood Volume Processed: 71.5 L   Total Dialysis (Treatment) Time:   3 Hrs  Dialysate Bath: K 3, Ca 3  Heparin: Heparin: None     Lab:   No    Interventions:  Dialysis done through right tunneled dialysis catheter. , . UF set to 1.3 Liters of fluid removal, accommodating priming and rinse back volumes. All meds administered per MAR. Albumin 100ml given for Hypotension,Midodrine 10mg given for Hypotension. Treatment has ended safely and blood is rinsed back completely. Catheter lumens flushed and locked with saline, catheter caps changed post HD. Post Tx assessment done. Patient's sent back to [place] [address] room in stable condition  Report given to JULES Najera.     Assessment:  A/O x 4, calm & cooperative, denies pain  Lung sounds clear anterior and lateral BUL, Diminished BLL  CVC intact, previous dressing clean and dry                Plan:    Per Renal team  Joe Dwyer RN, BSN

## 2024-09-27 NOTE — PROGRESS NOTES
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  Positive blood culture on 8/14/2024 and 8/16.  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  With the size of the vegetation combined with brain infarct, status post CV surgery, see details below active issue.   Abnormal brain MRI suggestive of subacute infarct. In a patient with MSSA bacteremia and aortic valve endocarditis, this is cerebral septic emboli. Active issue.       PROCEDURE PERFORMED: 8/27/24  Aortic root abscess debridement and aortic annular reconstruction.  Aortic root replacement (Konect composite 25 mm Silva Inspiris Resilia bioprosthetic valve within 30 mm Gelweave Valsalva graft with reimplantation of the coronary arteries).  Mitral valve replacement (31 mm St. Tim Silva bioprosthetic valve).  Coronary artery bypass grafting x 2 (reversed saphenous vein graft to the obtuse marginal branch of the left circumflex coronary artery, and pedicled left internal mammary artery to left anterior descending coronary artery).  Endoscopic vein harvest of the greater saphenous vein from the left lower extremity.    Recommendations:  Continue vancomycin-dosing per Pharm.D.  End date oct 8th.    Will get CRP (see below)  Will need to check immunoglobin level in 4 to 6 weeks sister with history of hypogammaglobulinemia       UR Portillo MD  [facility]  Answering Service: [phone]  On-Call ID provider: [phone], option: 9                Interval History:     HPI:Looking good.  Wants pain patch for neck pain.     CRP is much improved:  Component      Latest Ref Rng 8/15/2024  5:32 AM 8/17/2024  4:28 AM 9/24/2024  6:14 AM   CRP Inflammation      <5.00 mg/L 310.00 (H)  242.30 (H)  28.60 (H)       Legend:  (H) High        Recent Inflammatory Biomarkers:   Recent Labs   Lab Test 09/27/24  0616 09/26/24  0614 09/25/24  0614 09/24/24  0614 09/23/24  0541 09/22/24  0545 08/30/24  0437 08/29/24  0359   PCAL  --   --   --   --   --   --   --  1.24*   WBC 5.7 5.4 5.4 5.9 5.6 5.2   < > 15.3*    < > = values in this interval not displayed.            Review of Systems:      Negative except for findings in the HPI.           Current Medications (antimicrobials listed in bold):     Current Facility-Administered Medications   Medication Dose Route Frequency Provider Last Rate Last Admin    albumin human 25 % injection 25 g  25 g Intravenous During Dialysis/CRRT (from stock) Tami Mcbride NP   25 g at 09/27/24 1158    atorvastatin (LIPITOR) tablet 80 mg  80 mg Oral QPM Maryam Anthony PA-C   80 mg at 09/26/24 2147    clopidogrel (PLAVIX) tablet 75 mg  75 mg Oral Daily Maryam Anthony PA-C   75 mg at 09/27/24 0853    epoetin jose-epbx (RETACRIT) injection 20,000 Units  20,000 Units Subcutaneous Weekly Maryam Anthony PA-C   20,000 Units at 09/20/24 1728    ketamine 5%-gabapentin 8%-lidocaine 2.5% in PLO cream 0.5 g  0.5 g Topical TID Lori Raza MD   0.5 g at 09/27/24 0858    lactobacillus rhamnosus (GG) (CULTURELL) capsule 1 capsule  1 capsule Oral BID Maryam Anthony PA-C   1 capsule at 09/27/24 0853    melatonin tablet 5 mg  5 mg Oral At Bedtime Lori Raza MD   5 mg at 09/26/24 2147    metoprolol tartrate (LOPRESSOR) half-tab 12.5 mg  12.5 mg Oral BID Josephine Thomson MD   12.5 mg at 09/26/24 2147     midodrine (PROAMATINE) tablet 10 mg  10 mg Oral During Dialysis/CRRT (from stock) Maryam Anthony PA-C   10 mg at 09/27/24 1204    multivitamin w/minerals (THERA-VIT-M) tablet 1 tablet  1 tablet Oral Daily Adan Veloz MD   1 tablet at 09/27/24 0853    pantoprazole (PROTONIX) EC tablet 40 mg  40 mg Oral QAM AC Maryam Anthony PA-C   40 mg at 09/27/24 0631    polyethylene glycol (MIRALAX) Packet 17 g  17 g Oral Daily Maryam Anthony PA-C   17 g at 09/27/24 0853    senna-docusate (SENOKOT-S/PERICOLACE) 8.6-50 MG per tablet 1 tablet  1 tablet Oral BID Maryam Anthony PA-C   1 tablet at 09/27/24 0853    sodium chloride (PF) 0.9% PF flush 10-40 mL  10-40 mL Intracatheter Q8H Josephine Thomson MD   30 mL at 09/27/24 0618    torsemide (DEMADEX) tablet 100 mg  100 mg Oral Daily Maryam Anthony PA-C   100 mg at 09/27/24 1011    vancomycin (VANCOCIN) 1,000 mg in sodium chloride 0.9 % 250 mL intermittent infusion  1,000 mg Intravenous Once Josephine Thomson MD        vancomycin place horne - receiving intermittent dosing  1 each Intravenous See Admin Instructions Maryam Anthony PA-C        warfarin ANTICOAGULANT (COUMADIN) tablet 2 mg  2 mg Oral ONCE at 18:00 Josephine Thomson MD        Warfarin Dose Required Daily - Pharmacist Managed  1 each Oral See Admin Instructions Maryam Anthony PA-C                  Allergies:     Allergies   Allergen Reactions    Aspirin Rash    Cats Rash    Nickel Rash            Physical Exam:   Vitals were reviewed  Patient Vitals for the past 24 hrs:   BP Temp Temp src Pulse Resp SpO2 Weight   09/27/24 1230 91/60 -- -- 93 -- -- --   09/27/24 1215 91/58 -- -- 79 -- 94 % --   09/27/24 1211 93/55 -- -- 80 -- -- --   09/27/24 1200 (!) 88/51 -- -- 69 -- -- --   09/27/24 1145 90/53 -- -- 69 -- -- --   09/27/24 1130 (!) 86/48 -- -- 61 -- -- --   09/27/24 1126 (!) 85/52 -- -- 75 -- -- --   09/27/24 1115 92/51 98.2  F (36.8  C) Oral 78  -- 94 % --   09/27/24 0804 97/54 97.9  F (36.6  C) Oral -- 24 93 % --   09/27/24 0656 -- -- -- -- -- -- 78.5 kg (173 lb 1 oz)   09/27/24 0031 100/54 98.1  F (36.7  C) Oral 67 18 91 % --   09/26/24 2148 115/55 -- -- 70 -- -- --   09/26/24 1528 113/58 98  F (36.7  C) Oral 81 16 97 % --       Physical Examination:    Resp: 24    Gen: Appears ill, extubated, nasal canula, still on low dose dobutamine  HEENT: opens eyes  PICC, chest wall catheter for hemodialysis, rectal tube  CV: Sternal incision, temporary pacer  Lungs: coarse, currently on nasal cannula.  Chest tubes have been removed  Skin: Warm  Extr: edema- especially L leg  Neuro: extubated, opens eyes  HD catheter  PICC- R basilic           Laboratory Data:   ID Labs:  Microbiology labs:  7-Day Micro Results       No results found for the last 168 hours.          Susceptibility data from last 90 days.  Collected Specimen Info Organism Ampicillin Ampicillin/Sulbactam Cefepime Ceftazidime Ceftriaxone Ciprofloxacin Clindamycin Daptomycin Doxycycline Erythromycin Gentamicin Levofloxacin Meropenem Oxacillin   08/29/24 Sputum from Expectorate Enterobacter cloacae complex R R  S R R  S      S  S  S      Normal samara                 08/16/24 Peripheral Blood Staphylococcus aureus                 08/14/24 Peripheral Blood Staphylococcus aureus        S  S  S  S  S    S     Collected Specimen Info Organism Piperacillin/Tazobactam Tetracycline Tobramycin Trimethoprim/Sulfamethoxazole  Vancomycin   08/29/24 Sputum from Expectorate Enterobacter cloacae complex R   S  S      Normal samara        08/16/24 Peripheral Blood Staphylococcus aureus        08/14/24 Peripheral Blood Staphylococcus aureus   S   S  S       Reviewed      No lab results found.  Recent Labs   Lab Test 09/27/24  0616 09/26/24  0614 09/25/24  0614 09/24/24  0614 09/23/24  0541 09/22/24  0545   WBC 5.7 5.4 5.4 5.9 5.6 5.2     Recent Labs   Lab Test 09/27/24  0616 09/26/24  0614 09/25/24  0614 09/24/24  0614    CR 2.92* 2.10* 3.39* 2.61*   GFRESTIMATED 15* 23* 13* 17*       Hematology Studies  Recent Labs   Lab Test 09/27/24  0616 09/26/24  0614 09/25/24  0614 09/24/24  0614 09/23/24  0541 09/22/24  0545   WBC 5.7 5.4 5.4 5.9 5.6 5.2   HGB 7.7* 7.8* 7.5* 7.5* 7.5* 7.4*   HCT 25.0* 25.4* 24.9* 24.5* 23.2* 23.1*   * 132* 130* 131* 118* 105*       Metabolic  Recent Labs   Lab Test 09/27/24  0616 09/26/24  0614 09/25/24  0614    138 137   BUN 17.5 11.0 24.9*   CO2 30* 29 28   CR 2.92* 2.10* 3.39*   GFRESTIMATED 15* 23* 13*       Hepatic Studies  Recent Labs   Lab Test 09/19/24  0635 09/18/24  0704 09/17/24  1045   BILITOTAL 0.7 0.7 0.7   ALKPHOS 78 75 75   ALBUMIN 3.1* 3.1* 3.1*   AST 36 34 32   ALT 31 29 32          Imaging Data:   Reviewed     IR CVC Non Tunnel Placement > 5 Yrs    Result Date: 9/1/2024  [facility] LOCATION: [facility] DATE: 9/1/2024 PROCEDURE: NON-TUNNELED DIALYSIS CATHETER PLACEMENT INTERVENTIONAL RADIOLOGIST: RU Reyes MD. INDICATION: HAMMAD requiring hemodialysis. CONSENT: The risks, benefits and alternatives of non-tunneled dialysis catheter placement were discussed with the patient in detail. All questions were answered. Informed consent was given to proceed with the procedure. MODERATE SEDATION: None. CONTRAST: None. ANTIBIOTICS: None. ADDITIONAL MEDICATIONS: Heparin 3800 U IV FLUOROSCOPIC TIME: 2.0 minutes. RADIATION DOSE: Air Kerma: 36.04 mGy. COMPLICATIONS: No immediate complications. STERILE BARRIER TECHNIQUE: Maximum sterile barrier technique was used. Cutaneous antisepsis was performed at the operative site with application of 2% chlorhexidine and large sterile drape. Prior to the procedure, the  and assistant performed hand hygiene and wore hat, mask, sterile gown, and sterile gloves during the entire procedure. PROCEDURE: Limited left neck ultrasound was performed which demonstrated a patent internal jugular vein; images saved to  the permanent patient medical record. The overlying skin and subcutaneous soft tissues were anesthetized using 1% lidocaine. Under ultrasound guidance, the left internal jugular vein was accessed using a micropuncture needle; images saved of the permanent patient medical record. Access was secured using a 4 Turkish transitional sheath. A Cupple guidewire was advanced into the SVC. Under fluoroscopic guidance, the overlying skin and subcutaneous soft tissues were dilated and a 15.5 Turkish nontunneled dialysis catheter was placed with the tip within the cavoatrial junction. The catheter was tested and found to flush and aspirate appropriately. The catheter was heparinized and secured to the skin.     IMPRESSION:  1.  Successful non-tunneled dialysis catheter placement. PLAN: 1. Dialysis catheter is ready to be used. 2. Nontunneled right groin dialysis catheter can be removed in ICU.       Study Result    Narrative & Impression   EXAM: MR BRAIN W/O and W CONTRAST  LOCATION: [facility]  DATE: 8/17/2024     INDICATION: Headache in a patient with MSSA bacteremia secondary to aortic valve endocarditis.  Look for cerebral septic emboli; Headache; Acute HA (< 3 months), no complicating features  COMPARISON: None.  CONTRAST: 9 ml gadavist  TECHNIQUE: Routine multiplanar multisequence head MRI without and with intravenous contrast.     FINDINGS: Assessment degraded due to motion.  INTRACRANIAL CONTENTS:   Apparent focus of diffusion restriction with T2/FLAIR hyperintensity within the left superior parietal lobule cortex is hyperintense on ADC map, representing T2 shine through.  Focus of signal abnormality measures 13 x 9 mm in the axial plane and does   not have internal enhancement.     Additional punctate foci of hyperintensity on diffusion weighted with similar signal characteristics (periventricular right corona radiata, left superior parietal lobule, and left cerebellar hemisphere).     Patchy  and confluent nonspecific T2/FLAIR hyperintensities within the cerebral white matter most consistent with moderate chronic microvascular ischemic change. Moderate generalized cerebral atrophy. No hydrocephalus. Normal position of the cerebellar   tonsils. No discernible abnormal intracranial enhancement; however, assessment substantially degraded due to motion. 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     SELLA: No abnormality accounting for technique.     OSSEOUS STRUCTURES/SOFT TISSUES: Normal marrow signal. The major intracranial vascular flow voids are maintained.      ORBITS: No abnormality accounting for technique.      SINUSES/MASTOIDS: Mild mucosal thickening scattered about the paranasal sinuses. Scattered fluid/membrane thickening in the left mastoid air cells. No apparent mass in the posterior nasopharynx or skull base.                                                                       IMPRESSION: Assessment degraded due to motion.  1.  13 mm focus of cortical signal abnormality within the left superior parietal lobule which is favored to represent a subacute infarct; low-grade glial neoplasm could conceivably have a similar appearance. Hyperacute intraparenchymal hematoma could   also have a similar appearance but is considered less likely. Recommend noncontrast head CT for further characterization. Also recommend follow-up MRI brain without and with contrast in 8-12 weeks to document expected evolution.  2.  Additional smaller foci of signal abnormality with similar characteristics favored to represent punctate subacute infarcts.

## 2024-09-27 NOTE — PROGRESS NOTES
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 78 kg range  On CRRT 8/29/24-9/8/24, Transition to iHD 9/9/24, maintained on MWF schedule  CKD attributed to longstanding NSAID use, historical baseline Cr 1.3-1.8; proteinuria. Previously seen by Dr Anaya with [facility].   Prior extensive serologic testing was negative.   Recent cystatin C in line with standard sCR of 4, eGFR 10  Imaging with left renal cortical thinning ~8cm kidney , rt 9.7cm kideny with simple cysts (4/2024)  Midodrine as needed for BP support    Volume  pt states she is urinating, wt stable   Still needing some UF 1-2 kg with dialysis      Blood Pressures   Soft BP, on BB for Afib.    Midodrine and Albumin as needed for BP support on dialysis     Anemia  ACD, infection/inflammation, hgb 7's  Weekly HILARY, 20K units  Avoiding iron with active infection      MSSA bacteremia  Prostetic valve  IV vanco  Warfarin, INR goal 2-3     Respiratory Failure  Previously intubated, now extubated.   On oxygen with goals to wean.      CAD, Afib  S/P CABG, valve surgery  Metoprolol     HLD -   statin    SUBJECTIVE:  no complaints, says HD is going well, urinating more, incont, says she has tried using purewick in the past at [facility] and \"didn't work\" but willing to try again.  Getting up more with therapy     OBJECTIVE:  Physical Exam   Temp: 97.9  F (36.6  C) Temp src: Oral BP: 97/54 Pulse: 67   Resp: 24 SpO2: " 93 % O2 Device: None (Room air)    Vitals:    24 1848 24 0324 24 0656   Weight: 77 kg (169 lb 12.1 oz) 78.4 kg (172 lb 13.5 oz) 78.5 kg (173 lb 1 oz)     Vital Signs with Ranges  Temp:  [97.9  F (36.6  C)-98.1  F (36.7  C)] 97.9  F (36.6  C)  Pulse:  [67-81] 67  Resp:  [16-24] 24  BP: ()/(54-58) 97/54  SpO2:  [91 %-97 %] 93 %  I/O last 3 completed shifts:  In: 250 [P.O.:250]  Out: -     Temp (24hrs), Av  F (36.7  C), Min:97.9  F (36.6  C), Max:98.1  F (36.7  C)      Patient Vitals for the past 72 hrs:   Weight   24 0656 78.5 kg (173 lb 1 oz)   24 0324 78.4 kg (172 lb 13.5 oz)   24 1848 77 kg (169 lb 12.1 oz)   24 1520 78.1 kg (172 lb 2.9 oz)   24 0546 78.1 kg (172 lb 2.9 oz)     Intake/Output Summary (Last 24 hours) at 2024 0842  Last data filed at 2024 1900  Gross per 24 hour   Intake 660 ml   Output --   Net 660 ml       PHYSICAL EXAM:  General - Alert and oriented x3, appears comfortable, NAD,husbnad and daughter present   Cardiovascular - Rate controlled, soft SBP   Respiratory - on RA   Abd: soft  Extremities - +lower extremity edema bilaterally  Skin: dry, intact, no rash, good turgor  Neuro:  Grossly intact, no focal deficits  MSK:  Grossly intact  Psych:  Normal affect  :  UO not all doc  Wt stable 78 range    LABORATORY STUDIES:     Recent Labs   Lab 24  0616 24  0614 24  0614 24  0614 24  0541 24  0545   WBC 5.7 5.4 5.4 5.9 5.6 5.2   RBC 2.37* 2.44* 2.38* 2.32* 2.18* 2.17*   HGB 7.7* 7.8* 7.5* 7.5* 7.5* 7.4*   HCT 25.0* 25.4* 24.9* 24.5* 23.2* 23.1*   * 132* 130* 131* 118* 105*       Basic Metabolic Panel:  Recent Labs   Lab 24  0616 24  0614 24  0614 24  0614 24  0541 24  0545    138 137 137 136 136   POTASSIUM 3.6 3.6 3.5 3.8 3.6 3.9   CHLORIDE 99 101 99 100 98 99   CO2 30* 29 28 28 28 28   BUN 17.5 11.0 24.9* 17.4 31.6* 23.1*   CR 2.92* 2.10*  3.39* 2.61* 3.95* 3.45*   GLC 90 89 94 95 94 92   KAELYN 8.4* 8.5* 8.3* 8.0* 8.4* 8.5*       INR  Recent Labs   Lab 09/27/24  0616 09/26/24  0614 09/25/24  0614 09/24/24 0614   INR 2.35* 2.34* 2.64* 2.93*        Recent Labs   Lab Test 09/27/24  0616 09/26/24 0614   INR 2.35* 2.34*   WBC 5.7 5.4   HGB 7.7* 7.8*   * 132*       Personally reviewed current labs      Tami Mcbride, BEBA-BC  [facility]  [phone]

## 2024-09-27 NOTE — PLAN OF CARE
"  Problem: Fall Injury Risk  Goal: Absence of Fall and Fall-Related Injury  Outcome: Progressing  Intervention: Promote Injury-Free Environment  Recent Flowsheet Documentation  Taken 9/27/2024 1204 by Dania Elizabeth RN  Safety Promotion/Fall Prevention:   activity supervised   clutter free environment maintained   mobility aid in reach   nonskid shoes/slippers when out of bed   room door open   room near nurse's station   supervised activity     Problem: Pain Acute  Goal: Optimal Pain Control and Function  Outcome: Progressing   Goal Outcome Evaluation:    Patient was calm in am shift, co-operative with cares-impatient at times though. Patient reported a 6/10 neck pain (requested for lidocaine patch for this-had no order for lidocaine patch as of then-tylenol 650 mg was given at 1015 with a minimal effect (pain was down to 4/100-patient said \"I only need lidocaine patch prior to therapy for about 4 hours\"-provider (Berto ) was informed (see lidocaine order)-patient was offered Voltaren topical gel in place of lidocaine patch (declined this offer)-voltaren topical gel was later ordered 4 times per day to neck as needed, Blood pressure was 97/54 at 0804 (metoprolol was held for dialysis-blood pressure was 92/51 at 1115 (start of dialysis)-85/52 at 1126 during dialysis (midodrine was given x 1 at 1204, albumin x 1 at 1158) during dialysis-blood pressure was 121/59 at 1436 at the end of dialysis-1.7 kg was taken out, 3 hours treatment per report), Vanco level is 14.2 (vancomycin intermittent infusion ordered for 1800 today (vanco level to be rechecked on 9/30 (Monday), (138 on 9/26), potassium 3.6 as of 9/26 (Thursday), creatinine 2.92 (2.10 on 9/26), GFR 15 (23 on 9/26), calcium 8.4 (8.5 on 9/26), WBC 5.7 (5.4 on 9/26), HGB 7.7 (7.8 on 9/26), platelet count 125 (132 on 9/26), INR 2.35 (2.34 on 9/26-coumadin 2 mg ordered for 1800 today-will have INR, CBC with platelet rechecked in am, ate 25 and 50 % of meals " in am shift

## 2024-09-28 ENCOUNTER — APPOINTMENT (OUTPATIENT)
Dept: PHYSICAL THERAPY | Facility: CLINIC | Age: 84
DRG: 288 | End: 2024-09-28
Attending: HOSPITALIST
Payer: COMMERCIAL

## 2024-09-28 LAB
ANION GAP SERPL CALCULATED.3IONS-SCNC: 7 MMOL/L (ref 7–15)
BUN SERPL-MCNC: 8 MG/DL (ref 8–23)
CALCIUM SERPL-MCNC: 8.7 MG/DL (ref 8.8–10.4)
CHLORIDE SERPL-SCNC: 102 MMOL/L (ref 98–107)
CREAT SERPL-MCNC: 2.09 MG/DL (ref 0.51–0.95)
EGFRCR SERPLBLD CKD-EPI 2021: 23 ML/MIN/1.73M2
ERYTHROCYTE [DISTWIDTH] IN BLOOD BY AUTOMATED COUNT: 19.6 % (ref 10–15)
GLUCOSE SERPL-MCNC: 94 MG/DL (ref 70–99)
HCO3 SERPL-SCNC: 29 MMOL/L (ref 22–29)
HCT VFR BLD AUTO: 23.8 % (ref 35–47)
HGB BLD-MCNC: 7.7 G/DL (ref 11.7–15.7)
INR PPP: 2.41 (ref 0.85–1.15)
MCH RBC QN AUTO: 34.5 PG (ref 26.5–33)
MCHC RBC AUTO-ENTMCNC: 32.4 G/DL (ref 31.5–36.5)
MCV RBC AUTO: 107 FL (ref 78–100)
PLATELET # BLD AUTO: 122 10E3/UL (ref 150–450)
POTASSIUM SERPL-SCNC: 3.5 MMOL/L (ref 3.4–5.3)
RBC # BLD AUTO: 2.23 10E6/UL (ref 3.8–5.2)
SODIUM SERPL-SCNC: 138 MMOL/L (ref 135–145)
WBC # BLD AUTO: 5.1 10E3/UL (ref 4–11)

## 2024-09-28 PROCEDURE — 250N000013 HC RX MED GY IP 250 OP 250 PS 637: Performed by: HOSPITALIST

## 2024-09-28 PROCEDURE — 120N000017 HC R&B RESPIRATORY CARE

## 2024-09-28 PROCEDURE — 85610 PROTHROMBIN TIME: CPT | Performed by: PHYSICIAN ASSISTANT

## 2024-09-28 PROCEDURE — 250N000013 HC RX MED GY IP 250 OP 250 PS 637: Performed by: PHYSICIAN ASSISTANT

## 2024-09-28 PROCEDURE — 97530 THERAPEUTIC ACTIVITIES: CPT | Mod: GP | Performed by: PHYSICAL THERAPIST

## 2024-09-28 PROCEDURE — 97116 GAIT TRAINING THERAPY: CPT | Mod: GP | Performed by: PHYSICAL THERAPIST

## 2024-09-28 PROCEDURE — 80048 BASIC METABOLIC PNL TOTAL CA: CPT | Performed by: PHYSICIAN ASSISTANT

## 2024-09-28 PROCEDURE — 250N000011 HC RX IP 250 OP 636: Performed by: PHYSICIAN ASSISTANT

## 2024-09-28 PROCEDURE — 97110 THERAPEUTIC EXERCISES: CPT | Mod: GP | Performed by: PHYSICAL THERAPIST

## 2024-09-28 PROCEDURE — 99233 SBSQ HOSP IP/OBS HIGH 50: CPT | Performed by: HOSPITALIST

## 2024-09-28 RX ORDER — WARFARIN SODIUM 2 MG/1
2 TABLET ORAL
Status: COMPLETED | OUTPATIENT
Start: 2024-09-28 | End: 2024-09-28

## 2024-09-28 RX ADMIN — Medication 5 MG: at 21:13

## 2024-09-28 RX ADMIN — Medication 1 TABLET: at 09:46

## 2024-09-28 RX ADMIN — Medication 0.5 G: at 13:07

## 2024-09-28 RX ADMIN — WARFARIN SODIUM 2 MG: 2 TABLET ORAL at 18:22

## 2024-09-28 RX ADMIN — CLOPIDOGREL 75 MG: 75 TABLET ORAL at 09:46

## 2024-09-28 RX ADMIN — ONDANSETRON HYDROCHLORIDE 8 MG: 4 TABLET, FILM COATED ORAL at 08:54

## 2024-09-28 RX ADMIN — Medication 1 CAPSULE: at 21:12

## 2024-09-28 RX ADMIN — DOCUSATE SODIUM AND SENNOSIDES 1 TABLET: 8.6; 5 TABLET, FILM COATED ORAL at 21:13

## 2024-09-28 RX ADMIN — Medication 0.5 G: at 08:55

## 2024-09-28 RX ADMIN — METOPROLOL TARTRATE 12.5 MG: 25 TABLET, FILM COATED ORAL at 21:13

## 2024-09-28 RX ADMIN — SIMETHICONE 80 MG: 80 TABLET, CHEWABLE ORAL at 09:51

## 2024-09-28 RX ADMIN — Medication 1 CAPSULE: at 09:46

## 2024-09-28 RX ADMIN — ONDANSETRON HYDROCHLORIDE 8 MG: 4 TABLET, FILM COATED ORAL at 20:10

## 2024-09-28 RX ADMIN — Medication 0.5 G: at 21:20

## 2024-09-28 RX ADMIN — DOCUSATE SODIUM AND SENNOSIDES 1 TABLET: 8.6; 5 TABLET, FILM COATED ORAL at 09:45

## 2024-09-28 RX ADMIN — PANTOPRAZOLE SODIUM 40 MG: 40 TABLET, DELAYED RELEASE ORAL at 07:00

## 2024-09-28 RX ADMIN — ATORVASTATIN CALCIUM 80 MG: 40 TABLET, FILM COATED ORAL at 21:13

## 2024-09-28 RX ADMIN — LIDOCAINE 1 PATCH: 4 PATCH TOPICAL at 08:57

## 2024-09-28 RX ADMIN — TORSEMIDE 100 MG: 100 TABLET ORAL at 09:46

## 2024-09-28 RX ADMIN — POLYETHYLENE GLYCOL 3350 17 G: 17 POWDER, FOR SOLUTION ORAL at 09:45

## 2024-09-28 RX ADMIN — SIMETHICONE 80 MG: 80 TABLET, CHEWABLE ORAL at 17:14

## 2024-09-28 ASSESSMENT — ACTIVITIES OF DAILY LIVING (ADL)
ADLS_ACUITY_SCORE: 34
ADLS_ACUITY_SCORE: 37
ADLS_ACUITY_SCORE: 37
ADLS_ACUITY_SCORE: 38
ADLS_ACUITY_SCORE: 37
ADLS_ACUITY_SCORE: 34
ADLS_ACUITY_SCORE: 34
ADLS_ACUITY_SCORE: 38
ADLS_ACUITY_SCORE: 37
ADLS_ACUITY_SCORE: 38
ADLS_ACUITY_SCORE: 38
ADLS_ACUITY_SCORE: 37
ADLS_ACUITY_SCORE: 34
ADLS_ACUITY_SCORE: 33
ADLS_ACUITY_SCORE: 34
ADLS_ACUITY_SCORE: 38
ADLS_ACUITY_SCORE: 34

## 2024-09-28 NOTE — PLAN OF CARE
Problem: Fall Injury Risk  Goal: Absence of Fall and Fall-Related Injury  Outcome: Progressing  Intervention: Identify and Manage Contributors  Recent Flowsheet Documentation  Taken 9/28/2024 0216 by Lidia Rm RN  Medication Review/Management: medications reviewed  Intervention: Promote Injury-Free Environment  Recent Flowsheet Documentation  Taken 9/28/2024 0216 by Lidia Rm RN  Safety Promotion/Fall Prevention:   activity supervised   lighting adjusted   room door open   room near nurse's station     Problem: Pain Acute  Goal: Optimal Pain Control and Function  Outcome: Progressing  Intervention: Prevent or Manage Pain  Recent Flowsheet Documentation  Taken 9/28/2024 0216 by Lidia Rm RN  Bowel Elimination Promotion: adequate fluid intake promoted  Medication Review/Management: medications reviewed  Intervention: Optimize Psychosocial Wellbeing  Recent Flowsheet Documentation  Taken 9/28/2024 0216 by Lidia Rm RN  Supportive Measures:   active listening utilized   positive reinforcement provided     Problem: Hemodialysis  Goal: Absence of Infection Signs and Symptoms  Outcome: Progressing  Intervention: Prevent or Manage Infection  Recent Flowsheet Documentation  Taken 9/28/2024 0216 by Lidia Rm RN  Infection Prevention: hand hygiene promoted   Goal Outcome Evaluation:    Patient appeared to rest well during noc shift, vss, denied pain or discomfort, alert x 4, no change in alertness or LOC, no prn's adm or requested, labs collected this morning, AM nurse updated about status and will continue to monitor.

## 2024-09-28 NOTE — PLAN OF CARE
"  Problem: Fall Injury Risk  Goal: Absence of Fall and Fall-Related Injury  9/27/2024 2134 by Dania Elizabeth RN  Outcome: Progressing  9/27/2024 1658 by Dania Elizabeth RN  Outcome: Progressing  Intervention: Promote Injury-Free Environment  Recent Flowsheet Documentation  Taken 9/27/2024 1910 by Dania Elizabeth RN  Safety Promotion/Fall Prevention:   activity supervised   clutter free environment maintained   mobility aid in reach   nonskid shoes/slippers when out of bed   room door open   room near nurse's station   supervised activity  Taken 9/27/2024 1204 by Dania Elizabeth RN  Safety Promotion/Fall Prevention:   activity supervised   clutter free environment maintained   mobility aid in reach   nonskid shoes/slippers when out of bed   room door open   room near nurse's station   supervised activity     Problem: Pain Acute  Goal: Optimal Pain Control and Function  9/27/2024 2134 by Dania Elizabeth RN  Outcome: Progressing  9/27/2024 1658 by Dania Elizabeth RN  Outcome: Progressing   Goal Outcome Evaluation:    Patient was irritable minimally at the start of shift, was slightly resistive to take medications while eaten, this writer wanted to come back after done with food-said \"I will just take them now\"-per report, patient took bedtime medications too fast, almost threw up.Blood pressure was 114/59, respiration 22 at 1534, 120/55 at 2103-used commode x 1 so far this shift, incontinent of urine though, had a small formed stool continently per report                            "

## 2024-09-28 NOTE — PROGRESS NOTES
[facility]    [facility] - Hospitalist Service    Date of Admission:  9/16/2024    Felicitas Elliott is a 84 year old female admitted on 9/16/2024 to the LTAC for long-term antibiotics following MSSA bacteremia and endocarditis. 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  She was initially admitted to [facility] on 8/16/2024 for sepsis and HAMMAD.  She received empiric antibiotics on admission. Blood cultures grew MSSA.  Neurology was consulted for possible discitis.  ID was also consulted. Echo revealed large posterior leaflet mitral valve vegetation and small aortic valve vegetation with severe aortic stenosis.  Brain MRI revealed scattered embolic infarcts.  Coronary angiogram demonstrated coronary artery disease.  CT surgery was consulted. She underwent CABG alongside the procedures mentioned above.  She however has not had renal recovery.  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  She had dysphagia and momentarily required NG tube with tube feeds. She worked with speech therapy and is now cleared for regular diet and thin liquids.  CT surgery recommends warfarin for 3 months for bioprosthetic valves.  INR goal 2-3.  End date of warfarin approximately 12/10/2024, at which point she will be on Plavix alone (ASA allergy) indefinitely.  ID recommends IV vancomycin until 10/8/2024.    LTAC Course:  9/17 - 9/21.  Progressing well.  On Coumadin, initially bridging with heparin gtt. for bioprosthetic valve.  On arrival initial INR 1.49. Goal 2-3. Has been dialyzing for HAMMAD. 9/19, INR 2.19, heparin gtt discontinued. RD noted patient having trouble chewing and impedes her overall oral intake.  SLP consulted.    9/22: Stable.   9/23:  Dialysis today, supratherapeutic INR at 5.08, will get repeat INR -3.0, had BM x3 today, likes the creams for feet ordered by  nocturnist  9/24:  pacer checked, set at , top pacer is capturing 94%, bottom pacer is capturing 80%. F/up cardio tele visit - recommend to decrease metoprolol by 1/2- changed from 25 to 12.5 bid.   9/25:  no overnight events   9/26:  added simethacone as patient complaining of gas pain  9/27:  dialysis today, +neck pain -will add lidocaine and voltaren  9/28:   no overnight events     BARRIERS TO DISCHARGE (why care is unable to be provided at a lower level of care):    -Long-term antibiotics.  -Dialysis    Assessment & Plan     Neck pain due to arthritis  -lidocaine  -voltaren prn    MSSA bacteremia  Aortic root abscess s/p aortic root abscess debridement and aortic annular reconstruction, aortic root replacement, bioprosthetic aortic valve 8/27/2024  Gram-negative bacilli and respiratory culture s/p cefepime and ceftriaxone  -Positive blood cultures 8/14/2024 and 8/16 with MSSA.  Repeat blood cultures - 8/17/2024  -Mitral and aortic valve endocarditis as evidenced with large vegetation on mitral valve 8 mm x 15 at x 2 posterior leaflet of small vegetation on aortic valve.  With signs of vegetation combined with brain infarct.  S/p CV surgery  -Continue with IV vancomycin.  Dosing per pharmacy  -Per ID will need to check immunoglobulin level in 4 to 6 weeks, sister with history of hypogammaglobinemia  -ID consulted and following  -Patient previously on heparin 5000 subQ every 8 hours and Coumadin.  Recommended INR goal is 2-3 per CT surgery.  Discontinued heparin 5000 units subcu every 8 hours on admission and started patient on heparin gtt. alongside Coumadin.  I also notified pharmacy and were agreeable with this plan.  -9/19, INR 2.19.  Discontinued heparin gtt. Patient on goal  -Pharm.D. to continue dosing Coumadin   -INR 3.0 on 9/23    Peripheral neuropathy of b/l feet  -fredy-lidocaine-ketamine cream prn   -benadryl cream prn     Abnormal brain MRI suggestive of subacute infarct  Patient with a history  of MSSA bacteremia and aortic valve endocarditis.  Most likely septic emboli.  -Continue with antibiotics per ID.  End date 10/8/2024    Acute respiratory failure with hypoxia  -Patient previously intubated.  Now extubated.    -Continue with oxygen support to maintain oxygenation above 92%.  Wean as tolerated  -RT while in LTAC    HAMMAD  -Previously on CCRT now transferred to HD  -Torsemide per nephrology  -HD per nephrology-MWF  -Monitor kidney function with BMP    CAD  A-fib  History of aortic stenosis  +pacemaker   Hypotension   -Fairly stable at this time.  -S/p CABG per CT surgery  -Continue with Plavix and statin  -Metoprolol- will decrease dose from 25 bid to 12.5 bid due to low BP on 9/24  -Previously on amiodarone for rate control, now s/p PPM  -pacer checked 9/23, set at , top pacer is capturing 94%, bottom pacer is capturing 80%.  -cardiothoracic surg tele appt today- appreciate recs      Anemia of chronic disease  Thrombocytopenia   -monitor    Severe protein calorie malnutrition  -? esophogram  -RD following   -calorie counts    Physical deconditioning/critical illness myopathy  -Fall precautions.  -PT/OT  -WOC    Diet: Advance Diet as Tolerated: Regular Diet Adult  Snacks/Supplements Adult: Gelatein Plus; With Meals    DVT Prophylaxis: Warfarin  Le Catheter: Not present  Lines: PRESENT      PICC 09/06/24 Triple Lumen Right Basilic Vascular access-Site Assessment: WDL  CVC Double Lumen Right Subclavian Tunneled-Site Assessment: WDL      Cardiac Monitoring: None  Code Status: No CPR- Do NOT Intubate      Clinically Significant Risk Factors              # Hypoalbuminemia: Lowest albumin = 3.1 g/dL at 9/19/2024  6:35 AM, will monitor as appropriate   # Thrombocytopenia: Lowest platelets = 122 in last 2 days, will monitor for bleeding              # Severe Malnutrition: based on nutrition assessment      # Financial/Environmental Concerns: none   # Pacemaker present  # History of CABG: noted on  surgical history    Disposition Plan     Medically Ready for Discharge: Anticipated in 5+ Days    Josephine Thomson MD  Hospitalist Service  [facility]  Securely message with [employer] (more info)  Text page via [facility] Paging/Directory   ______________________________________________________________________    Interval History   She is currently undergoing dialysis   in room with her  Abdominal pain has improved  +neck pain  She has no chest pain   She has no dyspnea      Physical Exam   Vital Signs: Temp: 98.3  F (36.8  C) Temp src: Oral BP: 99/50 Pulse: 69   Resp: 20 SpO2: 92 % O2 Device: None (Room air)   Weight: 167 lbs 15.85 oz  Constitutional: Patient is resting in bed comfortably appears in no distress.  Eyes: Pupils are equal round and reactive to light  Respiratory: Good respiratory effort, on auscultation good air entry is noted  Cardiovascular: Good S1 and S2 no obvious murmurs peripheral pulses are palpable  GI: Abdomen is soft nontender nondistended bowel sounds are noted  Skin: No obvious rashes noted on exposed areas, warm to touch please refer to nursing/wound care note for complete skin exam  Musculoskeletal: Good muscle tone nails and digits appear acyanotic  Neuropsychiatric: Awake alert oriented to person normal affect    Medical Decision Making       51 MINUTES SPENT BY ME on the date of service doing chart review, history, exam, documentation & further activities per the note.  ------------------ MEDICAL DECISION MAKING ------------------------------------------------------------------------------------------------------  MANAGEMENT DISCUSSED with the following over the past 24 hours:     NOTE(S)/MEDICAL RECORDS REVIEWED over the past 24 hours:         Data   Recent Labs   Lab 09/28/24  0707 09/28/24  0557 09/27/24  0616 09/26/24  0614   WBC  --  5.1 5.7 5.4   HGB  --  7.7* 7.7* 7.8*   MCV  --  107* 106* 104*   PLT  --  122* 125* 132*   INR 2.41*  --  2.35* 2.34*   NA  --  138 137 138    POTASSIUM  --  3.5 3.6 3.6   CHLORIDE  --  102 99 101   CO2  --  29 30* 29   BUN  --  8.0 17.5 11.0   CR  --  2.09* 2.92* 2.10*   ANIONGAP  --  7 8 8   KAELYN  --  8.7* 8.4* 8.5*   GLC  --  94 90 89       Most Recent 3 CBC's:  Recent Labs   Lab Test 09/28/24  0557 09/27/24  0616 09/26/24  0614   WBC 5.1 5.7 5.4   HGB 7.7* 7.7* 7.8*   * 106* 104*   * 125* 132*     Most Recent 3 BMP's:  Recent Labs   Lab Test 09/28/24  0557 09/27/24  0616 09/26/24  0614    137 138   POTASSIUM 3.5 3.6 3.6   CHLORIDE 102 99 101   CO2 29 30* 29   BUN 8.0 17.5 11.0   CR 2.09* 2.92* 2.10*   ANIONGAP 7 8 8   KAELYN 8.7* 8.4* 8.5*   GLC 94 90 89     Most Recent 2 LFT's:  Recent Labs   Lab Test 09/19/24  0635 09/18/24  0704   AST 36 34   ALT 31 29   ALKPHOS 78 75   BILITOTAL 0.7 0.7     Most Recent 3 INR's:  Recent Labs   Lab Test 09/28/24  0707 09/27/24  0616 09/26/24  0614   INR 2.41* 2.35* 2.34*

## 2024-09-28 NOTE — PLAN OF CARE
Problem: Adult Inpatient Plan of Care  Goal: Absence of Hospital-Acquired Illness or Injury  Intervention: Identify and Manage Fall Risk  Recent Flowsheet Documentation  Taken 9/28/2024 0822 by Wilian Jones RN  Safety Promotion/Fall Prevention:   activity supervised   assistive device/personal items within reach   lighting adjusted   clutter free environment maintained   mobility aid in reach   nonskid shoes/slippers when out of bed   room door open   room near nurse's station   room organization consistent   safety round/check completed   supervised activity  Intervention: Prevent Skin Injury  Recent Flowsheet Documentation  Taken 9/28/2024 0720 by Wilian Jones RN  Body Position:   turned   supine   weight shifting  Intervention: Prevent Infection  Recent Flowsheet Documentation  Taken 9/28/2024 0720 by Wilian Jones RN  Infection Prevention:   hand hygiene promoted   equipment surfaces disinfected   personal protective equipment utilized  Goal: Optimal Comfort and Wellbeing  Intervention: Monitor Pain and Promote Comfort  Recent Flowsheet Documentation  Taken 9/28/2024 0900 by Wilian Jones RN  Pain Management Interventions: medication (see MAR)     Problem: Fall Injury Risk  Goal: Absence of Fall and Fall-Related Injury  Intervention: Identify and Manage Contributors  Recent Flowsheet Documentation  Taken 9/28/2024 0822 by Wilian Jones RN  Medication Review/Management: medications reviewed  Intervention: Promote Injury-Free Environment  Recent Flowsheet Documentation  Taken 9/28/2024 0822 by Wilian Jones RN  Safety Promotion/Fall Prevention:   activity supervised   assistive device/personal items within reach   lighting adjusted   clutter free environment maintained   mobility aid in reach   nonskid shoes/slippers when out of bed   room door open   room near nurse's station   room organization consistent   safety round/check completed   supervised activity     Problem: Pain Acute  Goal: Optimal  Pain Control and Function  Intervention: Develop Pain Management Plan  Recent Flowsheet Documentation  Taken 9/28/2024 0900 by Wilian Jones RN  Pain Management Interventions: medication (see MAR)  Intervention: Prevent or Manage Pain  Recent Flowsheet Documentation  Taken 9/28/2024 0822 by Wilian Jones RN  Medication Review/Management: medications reviewed     Problem: Hemodialysis  Goal: Safe, Effective Therapy Delivery  Intervention: Optimize Device Care and Function  Recent Flowsheet Documentation  Taken 9/28/2024 0822 by Wilian Jones RN  Medication Review/Management: medications reviewed  Goal: Absence of Infection Signs and Symptoms  Intervention: Prevent or Manage Infection  Recent Flowsheet Documentation  Taken 9/28/2024 0720 by Wilian Jones RN  Infection Prevention:   hand hygiene promoted   equipment surfaces disinfected   personal protective equipment utilized   Goal Outcome Evaluation:    Pt noted awake, alert, and oriented. Not in respiratory distress. Noted with fair appetite. Nausea reported, PRN Zofran tablet was given and noted effective. Pt  complaint of abdominal discomfort - PRN Simethicone was given - reported to be effective. No unusualities noted/reported. Bedside care done. Needs attended. 1500 Pt noted awake, sitting in the recliner. Not in respiratory distress. Reported no concerns/issues. Call light within reach. Plan of care ongoing.     Wilian Iqbal RN

## 2024-09-29 LAB
ANION GAP SERPL CALCULATED.3IONS-SCNC: 10 MMOL/L (ref 7–15)
BUN SERPL-MCNC: 14.5 MG/DL (ref 8–23)
CALCIUM SERPL-MCNC: 8.3 MG/DL (ref 8.8–10.4)
CHLORIDE SERPL-SCNC: 100 MMOL/L (ref 98–107)
CREAT SERPL-MCNC: 3.08 MG/DL (ref 0.51–0.95)
EGFRCR SERPLBLD CKD-EPI 2021: 14 ML/MIN/1.73M2
ERYTHROCYTE [DISTWIDTH] IN BLOOD BY AUTOMATED COUNT: 19.3 % (ref 10–15)
GLUCOSE SERPL-MCNC: 97 MG/DL (ref 70–99)
HCO3 SERPL-SCNC: 28 MMOL/L (ref 22–29)
HCT VFR BLD AUTO: 24.4 % (ref 35–47)
HGB BLD-MCNC: 7.7 G/DL (ref 11.7–15.7)
INR PPP: 2.5 (ref 0.85–1.15)
MCH RBC QN AUTO: 33.6 PG (ref 26.5–33)
MCHC RBC AUTO-ENTMCNC: 31.6 G/DL (ref 31.5–36.5)
MCV RBC AUTO: 107 FL (ref 78–100)
MDC_IDC_LEAD_CONNECTION_STATUS: NORMAL
MDC_IDC_LEAD_CONNECTION_STATUS: NORMAL
MDC_IDC_LEAD_IMPLANT_DT: NORMAL
MDC_IDC_LEAD_IMPLANT_DT: NORMAL
MDC_IDC_LEAD_LOCATION: NORMAL
MDC_IDC_LEAD_LOCATION: NORMAL
MDC_IDC_LEAD_LOCATION_DETAIL_1: NORMAL
MDC_IDC_LEAD_LOCATION_DETAIL_1: NORMAL
MDC_IDC_LEAD_MFG: NORMAL
MDC_IDC_LEAD_MFG: NORMAL
MDC_IDC_LEAD_MODEL: NORMAL
MDC_IDC_LEAD_MODEL: NORMAL
MDC_IDC_LEAD_POLARITY_TYPE: NORMAL
MDC_IDC_LEAD_POLARITY_TYPE: NORMAL
MDC_IDC_LEAD_SERIAL: NORMAL
MDC_IDC_LEAD_SERIAL: NORMAL
MDC_IDC_LEAD_SPECIAL_FUNCTION: NORMAL
MDC_IDC_LEAD_SPECIAL_FUNCTION: NORMAL
MDC_IDC_MSMT_BATTERY_DTM: NORMAL
MDC_IDC_MSMT_BATTERY_REMAINING_LONGEVITY: 130 MO
MDC_IDC_MSMT_BATTERY_RRT_TRIGGER: 2.62
MDC_IDC_MSMT_BATTERY_STATUS: NORMAL
MDC_IDC_MSMT_BATTERY_VOLTAGE: 3.22 V
MDC_IDC_MSMT_LEADCHNL_RA_IMPEDANCE_VALUE: 418 OHM
MDC_IDC_MSMT_LEADCHNL_RA_IMPEDANCE_VALUE: 475 OHM
MDC_IDC_MSMT_LEADCHNL_RA_PACING_THRESHOLD_AMPLITUDE: 1.12 V
MDC_IDC_MSMT_LEADCHNL_RA_PACING_THRESHOLD_PULSEWIDTH: 0.4 MS
MDC_IDC_MSMT_LEADCHNL_RA_SENSING_INTR_AMPL: 1.38 MV
MDC_IDC_MSMT_LEADCHNL_RA_SENSING_INTR_AMPL: 1.38 MV
MDC_IDC_MSMT_LEADCHNL_RV_IMPEDANCE_VALUE: 437 OHM
MDC_IDC_MSMT_LEADCHNL_RV_IMPEDANCE_VALUE: 646 OHM
MDC_IDC_MSMT_LEADCHNL_RV_PACING_THRESHOLD_AMPLITUDE: 1 V
MDC_IDC_MSMT_LEADCHNL_RV_PACING_THRESHOLD_PULSEWIDTH: 0.4 MS
MDC_IDC_MSMT_LEADCHNL_RV_SENSING_INTR_AMPL: 12.38 MV
MDC_IDC_PG_IMPLANT_DTM: NORMAL
MDC_IDC_PG_MFG: NORMAL
MDC_IDC_PG_MODEL: NORMAL
MDC_IDC_PG_SERIAL: NORMAL
MDC_IDC_PG_TYPE: NORMAL
MDC_IDC_SESS_CLINIC_NAME: NORMAL
MDC_IDC_SESS_DTM: NORMAL
MDC_IDC_SESS_TYPE: NORMAL
MDC_IDC_SET_BRADY_AT_MODE_SWITCH_RATE: 171 {BEATS}/MIN
MDC_IDC_SET_BRADY_HYSTRATE: NORMAL
MDC_IDC_SET_BRADY_LOWRATE: 70 {BEATS}/MIN
MDC_IDC_SET_BRADY_MAX_SENSOR_RATE: 120 {BEATS}/MIN
MDC_IDC_SET_BRADY_MAX_TRACKING_RATE: 120 {BEATS}/MIN
MDC_IDC_SET_BRADY_MODE: NORMAL
MDC_IDC_SET_BRADY_PAV_DELAY_LOW: 180 MS
MDC_IDC_SET_BRADY_SAV_DELAY_LOW: 150 MS
MDC_IDC_SET_LEADCHNL_RA_PACING_AMPLITUDE: 2.75 V
MDC_IDC_SET_LEADCHNL_RA_PACING_ANODE_ELECTRODE_1: NORMAL
MDC_IDC_SET_LEADCHNL_RA_PACING_ANODE_LOCATION_1: NORMAL
MDC_IDC_SET_LEADCHNL_RA_PACING_CAPTURE_MODE: NORMAL
MDC_IDC_SET_LEADCHNL_RA_PACING_CATHODE_ELECTRODE_1: NORMAL
MDC_IDC_SET_LEADCHNL_RA_PACING_CATHODE_LOCATION_1: NORMAL
MDC_IDC_SET_LEADCHNL_RA_PACING_POLARITY: NORMAL
MDC_IDC_SET_LEADCHNL_RA_PACING_PULSEWIDTH: 0.4 MS
MDC_IDC_SET_LEADCHNL_RA_SENSING_ANODE_ELECTRODE_1: NORMAL
MDC_IDC_SET_LEADCHNL_RA_SENSING_ANODE_LOCATION_1: NORMAL
MDC_IDC_SET_LEADCHNL_RA_SENSING_CATHODE_ELECTRODE_1: NORMAL
MDC_IDC_SET_LEADCHNL_RA_SENSING_CATHODE_LOCATION_1: NORMAL
MDC_IDC_SET_LEADCHNL_RA_SENSING_POLARITY: NORMAL
MDC_IDC_SET_LEADCHNL_RA_SENSING_SENSITIVITY: 0.15 MV
MDC_IDC_SET_LEADCHNL_RV_PACING_AMPLITUDE: 2 V
MDC_IDC_SET_LEADCHNL_RV_PACING_ANODE_ELECTRODE_1: NORMAL
MDC_IDC_SET_LEADCHNL_RV_PACING_ANODE_LOCATION_1: NORMAL
MDC_IDC_SET_LEADCHNL_RV_PACING_CAPTURE_MODE: NORMAL
MDC_IDC_SET_LEADCHNL_RV_PACING_CATHODE_ELECTRODE_1: NORMAL
MDC_IDC_SET_LEADCHNL_RV_PACING_CATHODE_LOCATION_1: NORMAL
MDC_IDC_SET_LEADCHNL_RV_PACING_POLARITY: NORMAL
MDC_IDC_SET_LEADCHNL_RV_PACING_PULSEWIDTH: 0.4 MS
MDC_IDC_SET_LEADCHNL_RV_SENSING_ANODE_ELECTRODE_1: NORMAL
MDC_IDC_SET_LEADCHNL_RV_SENSING_ANODE_LOCATION_1: NORMAL
MDC_IDC_SET_LEADCHNL_RV_SENSING_CATHODE_ELECTRODE_1: NORMAL
MDC_IDC_SET_LEADCHNL_RV_SENSING_CATHODE_LOCATION_1: NORMAL
MDC_IDC_SET_LEADCHNL_RV_SENSING_POLARITY: NORMAL
MDC_IDC_SET_LEADCHNL_RV_SENSING_SENSITIVITY: 0.9 MV
MDC_IDC_SET_ZONE_DETECTION_INTERVAL: 350 MS
MDC_IDC_SET_ZONE_DETECTION_INTERVAL: 400 MS
MDC_IDC_SET_ZONE_STATUS: NORMAL
MDC_IDC_SET_ZONE_STATUS: NORMAL
MDC_IDC_SET_ZONE_TYPE: NORMAL
MDC_IDC_SET_ZONE_VENDOR_TYPE: NORMAL
MDC_IDC_STAT_AT_BURDEN_PERCENT: 0 %
MDC_IDC_STAT_AT_DTM_END: NORMAL
MDC_IDC_STAT_AT_DTM_START: NORMAL
MDC_IDC_STAT_BRADY_AP_VP_PERCENT: 75.49 %
MDC_IDC_STAT_BRADY_AP_VS_PERCENT: 0.01 %
MDC_IDC_STAT_BRADY_AS_VP_PERCENT: 8.85 %
MDC_IDC_STAT_BRADY_AS_VS_PERCENT: 15.65 %
MDC_IDC_STAT_BRADY_DTM_END: NORMAL
MDC_IDC_STAT_BRADY_DTM_START: NORMAL
MDC_IDC_STAT_BRADY_RA_PERCENT_PACED: 90.63 %
MDC_IDC_STAT_BRADY_RV_PERCENT_PACED: 84.34 %
MDC_IDC_STAT_EPISODE_RECENT_COUNT: 0
MDC_IDC_STAT_EPISODE_RECENT_COUNT_DTM_END: NORMAL
MDC_IDC_STAT_EPISODE_RECENT_COUNT_DTM_START: NORMAL
MDC_IDC_STAT_EPISODE_TOTAL_COUNT: 0
MDC_IDC_STAT_EPISODE_TOTAL_COUNT: 29
MDC_IDC_STAT_EPISODE_TOTAL_COUNT_DTM_END: NORMAL
MDC_IDC_STAT_EPISODE_TOTAL_COUNT_DTM_START: NORMAL
MDC_IDC_STAT_EPISODE_TYPE: NORMAL
MDC_IDC_STAT_TACHYTHERAPY_RECENT_DTM_END: NORMAL
MDC_IDC_STAT_TACHYTHERAPY_RECENT_DTM_START: NORMAL
MDC_IDC_STAT_TACHYTHERAPY_TOTAL_DTM_END: NORMAL
MDC_IDC_STAT_TACHYTHERAPY_TOTAL_DTM_START: NORMAL
PLATELET # BLD AUTO: 129 10E3/UL (ref 150–450)
POTASSIUM SERPL-SCNC: 3.6 MMOL/L (ref 3.4–5.3)
RBC # BLD AUTO: 2.29 10E6/UL (ref 3.8–5.2)
SODIUM SERPL-SCNC: 138 MMOL/L (ref 135–145)
WBC # BLD AUTO: 5.4 10E3/UL (ref 4–11)

## 2024-09-29 PROCEDURE — 250N000013 HC RX MED GY IP 250 OP 250 PS 637: Performed by: HOSPITALIST

## 2024-09-29 PROCEDURE — 250N000011 HC RX IP 250 OP 636: Performed by: PHYSICIAN ASSISTANT

## 2024-09-29 PROCEDURE — 80048 BASIC METABOLIC PNL TOTAL CA: CPT | Performed by: PHYSICIAN ASSISTANT

## 2024-09-29 PROCEDURE — 250N000013 HC RX MED GY IP 250 OP 250 PS 637: Performed by: PHYSICIAN ASSISTANT

## 2024-09-29 PROCEDURE — 85610 PROTHROMBIN TIME: CPT | Performed by: PHYSICIAN ASSISTANT

## 2024-09-29 PROCEDURE — 99233 SBSQ HOSP IP/OBS HIGH 50: CPT | Performed by: HOSPITALIST

## 2024-09-29 PROCEDURE — 120N000017 HC R&B RESPIRATORY CARE

## 2024-09-29 RX ORDER — MIDODRINE HYDROCHLORIDE 5 MG/1
10 TABLET ORAL 3 TIMES DAILY PRN
Status: DISPENSED | OUTPATIENT
Start: 2024-09-29

## 2024-09-29 RX ORDER — SIMETHICONE 80 MG
80 TABLET,CHEWABLE ORAL 3 TIMES DAILY
Status: DISPENSED | OUTPATIENT
Start: 2024-09-29

## 2024-09-29 RX ORDER — POLYETHYLENE GLYCOL 3350 17 G/17G
17 POWDER, FOR SOLUTION ORAL DAILY PRN
Status: ACTIVE | OUTPATIENT
Start: 2024-09-29

## 2024-09-29 RX ORDER — TORSEMIDE 100 MG
50 TABLET ORAL DAILY
Status: DISCONTINUED | OUTPATIENT
Start: 2024-09-30 | End: 2024-10-04

## 2024-09-29 RX ORDER — WARFARIN SODIUM 2 MG/1
2 TABLET ORAL
Status: COMPLETED | OUTPATIENT
Start: 2024-09-29 | End: 2024-09-29

## 2024-09-29 RX ADMIN — Medication 1 CAPSULE: at 20:34

## 2024-09-29 RX ADMIN — Medication 0.5 G: at 20:53

## 2024-09-29 RX ADMIN — Medication 1 CAPSULE: at 09:35

## 2024-09-29 RX ADMIN — LIDOCAINE 1 PATCH: 4 PATCH TOPICAL at 09:40

## 2024-09-29 RX ADMIN — DOCUSATE SODIUM AND SENNOSIDES 1 TABLET: 8.6; 5 TABLET, FILM COATED ORAL at 20:31

## 2024-09-29 RX ADMIN — Medication 5 MG: at 20:33

## 2024-09-29 RX ADMIN — DOCUSATE SODIUM AND SENNOSIDES 1 TABLET: 8.6; 5 TABLET, FILM COATED ORAL at 09:40

## 2024-09-29 RX ADMIN — Medication 1 TABLET: at 12:33

## 2024-09-29 RX ADMIN — ONDANSETRON HYDROCHLORIDE 8 MG: 4 TABLET, FILM COATED ORAL at 08:19

## 2024-09-29 RX ADMIN — Medication 0.5 G: at 09:41

## 2024-09-29 RX ADMIN — PANTOPRAZOLE SODIUM 40 MG: 40 TABLET, DELAYED RELEASE ORAL at 07:02

## 2024-09-29 RX ADMIN — SIMETHICONE 80 MG: 80 TABLET, CHEWABLE ORAL at 20:30

## 2024-09-29 RX ADMIN — CLOPIDOGREL 75 MG: 75 TABLET ORAL at 09:35

## 2024-09-29 RX ADMIN — SIMETHICONE 80 MG: 80 TABLET, CHEWABLE ORAL at 14:31

## 2024-09-29 RX ADMIN — WARFARIN SODIUM 2 MG: 2 TABLET ORAL at 17:04

## 2024-09-29 RX ADMIN — ATORVASTATIN CALCIUM 80 MG: 40 TABLET, FILM COATED ORAL at 20:34

## 2024-09-29 RX ADMIN — Medication 0.5 G: at 14:31

## 2024-09-29 ASSESSMENT — ACTIVITIES OF DAILY LIVING (ADL)
ADLS_ACUITY_SCORE: 34
ADLS_ACUITY_SCORE: 39
ADLS_ACUITY_SCORE: 34
ADLS_ACUITY_SCORE: 34
ADLS_ACUITY_SCORE: 38
ADLS_ACUITY_SCORE: 39
ADLS_ACUITY_SCORE: 34
ADLS_ACUITY_SCORE: 34
ADLS_ACUITY_SCORE: 38
ADLS_ACUITY_SCORE: 39
ADLS_ACUITY_SCORE: 38
ADLS_ACUITY_SCORE: 34
ADLS_ACUITY_SCORE: 38
ADLS_ACUITY_SCORE: 39
ADLS_ACUITY_SCORE: 38
ADLS_ACUITY_SCORE: 34
ADLS_ACUITY_SCORE: 38
ADLS_ACUITY_SCORE: 34
ADLS_ACUITY_SCORE: 38
ADLS_ACUITY_SCORE: 38

## 2024-09-29 NOTE — PLAN OF CARE
Goal Outcome Evaluation:  Patient claiming that she gets nauseated with her medications, and per day RN report-she had delayed am medications due to nausea and abdominal discomfort. Message left for md to suggest changing miralax to prn, and msg to pharmacy to change multivitamin to the noon hour. Patient will then get 5 am and pm po medications. Patient agreed to taking zofran 1 hour before am and pm medications in an effort to avoid nausea-reported off to oncoming rn for continuation of cares and monitoring

## 2024-09-29 NOTE — PLAN OF CARE
Problem: Adult Inpatient Plan of Care  Goal: Optimal Comfort and Wellbeing  Outcome: Progressing  Intervention: Provide Person-Centered Care  Recent Flowsheet Documentation  Taken 9/29/2024 0159 by Fernanda Ray RN  Trust Relationship/Rapport:   care explained   choices provided   Goal Outcome Evaluation:       Patient is A&O x4. Able to communicate needs. Vital signs stable. Zofran given at 2010 1 hr before taking medication. Ketamine cream applied to bilateral feet and ankle for pain. Medication effective. Slept most of the night. All needs attended.

## 2024-09-29 NOTE — PROGRESS NOTES
[facility]    [facility] - [facility]ist Service    Date of Admission:  9/16/2024    Felicitas Elliott is a 84 year old female admitted on 9/16/2024 to the LTAC for long-term antibiotics following MSSA bacteremia and endocarditis. 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  She was initially admitted to [facility] on 8/16/2024 for sepsis and HAMMAD.  She received empiric antibiotics on admission. Blood cultures grew MSSA.  Neurology was consulted for possible discitis.  ID was also consulted. Echo revealed large posterior leaflet mitral valve vegetation and small aortic valve vegetation with severe aortic stenosis.  Brain MRI revealed scattered embolic infarcts.  Coronary angiogram demonstrated coronary artery disease.  CT surgery was consulted. She underwent CABG alongside the procedures mentioned above.  She however has not had renal recovery.  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  She had dysphagia and momentarily required NG tube with tube feeds. She worked with speech therapy and is now cleared for regular diet and thin liquids.  CT surgery recommends warfarin for 3 months for bioprosthetic valves.  INR goal 2-3.  End date of warfarin approximately 12/10/2024, at which point she will be on Plavix alone (ASA allergy) indefinitely.  ID recommends IV vancomycin until 10/8/2024.    LTAC Course:  9/17 - 9/21.  Progressing well.  On Coumadin, initially bridging with heparin gtt. for bioprosthetic valve.  On arrival initial INR 1.49. Goal 2-3. Has been dialyzing for HAMMAD. 9/19, INR 2.19, heparin gtt discontinued. RD noted patient having trouble chewing and impedes her overall oral intake.  SLP consulted.    9/22-9/29: tolerating dialysis well. Pacer checked on 9/24, set at , top pacer is capturing 94%, bottom pacer is capturing 80%. Cardio did a televisit  with patient and recommend to decrease metoprolol by 1/2- changed from 25 to 12.5 bid. Added simethicone as patient has abdominal pain due to gas.    She is having neck pain -lidocaine and voltaren added.  Pharmacy to change medications around to have get getting less pills at the same time.  She complains that this contributes to a nausea.  On 9/29, BP is low, decreased toresimide dose from 100 to 50, but holding for now.  Also added prn midodrine.        BARRIERS TO DISCHARGE (why care is unable to be provided at a lower level of care):    -Long-term antibiotics.  -Dialysis    Assessment & Plan     Neck pain due to arthritis  -lidocaine  -voltaren prn    MSSA bacteremia  Aortic root abscess s/p aortic root abscess debridement and aortic annular reconstruction, aortic root replacement, bioprosthetic aortic valve 8/27/2024  Gram-negative bacilli and respiratory culture s/p cefepime and ceftriaxone  -Positive blood cultures 8/14/2024 and 8/16 with MSSA.  Repeat blood cultures - 8/17/2024  -Mitral and aortic valve endocarditis as evidenced with large vegetation on mitral valve 8 mm x 15 at x 2 posterior leaflet of small vegetation on aortic valve.  With signs of vegetation combined with brain infarct.  S/p CV surgery  -Continue with IV vancomycin.  Dosing per pharmacy  -Per ID will need to check immunoglobulin level in 4 to 6 weeks, sister with history of hypogammaglobinemia  -ID consulted and following  -Patient previously on heparin 5000 subQ every 8 hours and Coumadin.  Recommended INR goal is 2-3 per CT surgery.  Discontinued heparin 5000 units subcu every 8 hours on admission and started patient on heparin gtt. alongside Coumadin.  I also notified pharmacy and were agreeable with this plan.  -9/19, INR 2.19.  Discontinued heparin gtt. Patient on goal  -Pharm.D. to continue dosing Coumadin   -INR 3.0 on 9/23    Peripheral neuropathy of b/l feet  -fredy-lidocaine-ketamine cream prn   -benadryl cream prn     Abnormal  brain MRI suggestive of subacute infarct  Patient with a history of MSSA bacteremia and aortic valve endocarditis.  Most likely septic emboli.  -Continue with antibiotics per ID.  End date 10/8/2024    Acute respiratory failure with hypoxia  -Patient previously intubated.  Now extubated.    -Continue with oxygen support to maintain oxygenation above 92%.  Wean as tolerated  -RT while in LTAC    HAMMAD  -Previously on CCRT now transferred to HD  -Torsemide per nephrology  -HD per nephrology-MWF  -Monitor kidney function with BMP    CAD  A-fib  History of aortic stenosis  +pacemaker   Hypotension   -Fairly stable at this time.  -S/p CABG per CT surgery  -Continue with Plavix and statin  -Metoprolol- will decrease dose from 25 bid to 12.5 bid due to low BP on 9/24  -Previously on amiodarone for rate control, now s/p PPM  -pacer checked 9/23, set at , top pacer is capturing 94%, bottom pacer is capturing 80%.  -cardiothoracic surg tele appt- appreciate recs  -torsemide on hold on 9/29 due to hypotension  -prn midodrine       Anemia of chronic disease  Thrombocytopenia   -monitor    Severe protein calorie malnutrition  -? esophogram  -RD following   -calorie counts    Physical deconditioning/critical illness myopathy  -Fall precautions.  -PT/OT  -WOC    Diet: Advance Diet as Tolerated: Regular Diet Adult  Snacks/Supplements Adult: Gelatein Plus; With Meals    DVT Prophylaxis: Warfarin  Le Catheter: Not present  Lines: PRESENT      PICC 09/06/24 Triple Lumen Right Basilic Vascular access-Site Assessment: WDL  CVC Double Lumen Right Subclavian Tunneled-Site Assessment: WDL      Cardiac Monitoring: None  Code Status: No CPR- Do NOT Intubate      Clinically Significant Risk Factors              # Hypoalbuminemia: Lowest albumin = 3.1 g/dL at 9/19/2024  6:35 AM, will monitor as appropriate   # Thrombocytopenia: Lowest platelets = 122 in last 2 days, will monitor for bleeding              # Severe Malnutrition: based on  nutrition assessment      # Financial/Environmental Concerns: none   # Pacemaker present  # History of CABG: noted on surgical history    Disposition Plan     Medically Ready for Discharge: Anticipated in 5+ Days    Josephine Thomson MD  Hospitalist Service  [facility]  Securely message with [employer] (more info)  Text page via [employer] Paging/Directory   ______________________________________________________________________    Interval History    in room with her  Abdominal pain has improved  Neck pain has improved from yesterday  +flatulence   She has no chest pain   She has no dyspnea      Physical Exam   Vital Signs: Temp: 97.6  F (36.4  C) Temp src: Oral BP: 100/50 Pulse: 74   Resp: 18 SpO2: 90 % O2 Device: None (Room air)   Weight: 169 lbs 12.07 oz  Constitutional: Patient is resting in bed comfortably appears in no distress.  Eyes: Pupils are equal round and reactive to light  Respiratory: Good respiratory effort, on auscultation good air entry is noted  Cardiovascular: Good S1 and S2 no obvious murmurs peripheral pulses are palpable  GI: Abdomen is soft nontender nondistended bowel sounds are noted  Skin: No obvious rashes noted on exposed areas, warm to touch please refer to nursing/wound care note for complete skin exam  Musculoskeletal: Good muscle tone nails and digits appear acyanotic  Neuropsychiatric: Awake alert oriented to person normal affect    Medical Decision Making       52 MINUTES SPENT BY ME on the date of service doing chart review, history, exam, documentation & further activities per the note.  ------------------ MEDICAL DECISION MAKING ------------------------------------------------------------------------------------------------------  MANAGEMENT DISCUSSED with the following over the past 24 hours:     NOTE(S)/MEDICAL RECORDS REVIEWED over the past 24 hours:         Data   Recent Labs   Lab 09/29/24  0614 09/28/24  0707 09/28/24  0557 09/27/24  0616   WBC 5.4  --  5.1 5.7   HGB 7.7*  --   7.7* 7.7*   *  --  107* 106*   *  --  122* 125*   INR 2.50* 2.41*  --  2.35*     --  138 137   POTASSIUM 3.6  --  3.5 3.6   CHLORIDE 100  --  102 99   CO2 28  --  29 30*   BUN 14.5  --  8.0 17.5   CR 3.08*  --  2.09* 2.92*   ANIONGAP 10  --  7 8   KAELYN 8.3*  --  8.7* 8.4*   GLC 97  --  94 90       Most Recent 3 CBC's:  Recent Labs   Lab Test 09/29/24  0614 09/28/24  0557 09/27/24  0616   WBC 5.4 5.1 5.7   HGB 7.7* 7.7* 7.7*   * 107* 106*   * 122* 125*     Most Recent 3 BMP's:  Recent Labs   Lab Test 09/29/24  0614 09/28/24  0557 09/27/24  0616    138 137   POTASSIUM 3.6 3.5 3.6   CHLORIDE 100 102 99   CO2 28 29 30*   BUN 14.5 8.0 17.5   CR 3.08* 2.09* 2.92*   ANIONGAP 10 7 8   KAELYN 8.3* 8.7* 8.4*   GLC 97 94 90     Most Recent 2 LFT's:  Recent Labs   Lab Test 09/19/24  0635 09/18/24  0704   AST 36 34   ALT 31 29   ALKPHOS 78 75   BILITOTAL 0.7 0.7     Most Recent 3 INR's:  Recent Labs   Lab Test 09/29/24  0614 09/28/24  0707 09/27/24  0616   INR 2.50* 2.41* 2.35*

## 2024-09-29 NOTE — PLAN OF CARE
Problem: Adult Inpatient Plan of Care  Goal: Plan of Care Review  Description: The Plan of Care Review/Shift note should be completed every shift.  The Outcome Evaluation is a brief statement about your assessment that the patient is improving, declining, or no change.  This information will be displayed automatically on your shift  note.  Outcome: Progressing     Problem: Fall Injury Risk  Goal: Absence of Fall and Fall-Related Injury  Outcome: Progressing  Intervention: Identify and Manage Contributors  Recent Flowsheet Documentation  Taken 9/29/2024 1000 by Ede Parnell RN  Medication Review/Management: medications reviewed  Intervention: Promote Injury-Free Environment  Recent Flowsheet Documentation  Taken 9/29/2024 1000 by Ede Parnell RN  Safety Promotion/Fall Prevention: activity supervised     Problem: Pain Acute  Goal: Optimal Pain Control and Function  Outcome: Progressing  Intervention: Prevent or Manage Pain  Recent Flowsheet Documentation  Taken 9/29/2024 1000 by Ede Parnell RN  Sensory Stimulation Regulation: lighting decreased  Bowel Elimination Promotion: adequate fluid intake promoted  Medication Review/Management: medications reviewed  Intervention: Optimize Psychosocial Wellbeing  Recent Flowsheet Documentation  Taken 9/29/2024 1000 by Ede Parnell RN  Spiritual Activities Assistance: personal rituals encouraged  Supportive Measures: active listening utilized     Problem: Nausea and Vomiting  Goal: Nausea and Vomiting Relief  Outcome: Progressing  Intervention: Prevent and Manage Nausea and Vomiting  Recent Flowsheet Documentation  Taken 9/29/2024 1000 by Ede Parnell RN  Fluid/Electrolyte Management: fluids restricted  Nausea/Vomiting Interventions: antiemetic  Environmental Support: calm environment promoted   Goal Outcome Evaluation:Patient denied of pain this am. She reported nausea so zofran was given before breakfast and other am meds with effectiveness. Patient's bp at 0948 was  84/48, p74. Metoprolol and torsemide held. Encouraged patient to drink fluid. Notified MD-see new order. Rechecked bp at 1018 for 100/50. She used the commode x1 this am, had a bm. Family and friend visited. Explained meds and care plan to patient.

## 2024-09-30 ENCOUNTER — APPOINTMENT (OUTPATIENT)
Dept: PHYSICAL THERAPY | Facility: CLINIC | Age: 84
DRG: 288 | End: 2024-09-30
Attending: HOSPITALIST
Payer: COMMERCIAL

## 2024-09-30 ENCOUNTER — APPOINTMENT (OUTPATIENT)
Dept: OCCUPATIONAL THERAPY | Facility: CLINIC | Age: 84
DRG: 288 | End: 2024-09-30
Attending: HOSPITALIST
Payer: COMMERCIAL

## 2024-09-30 ENCOUNTER — ANCILLARY PROCEDURE (OUTPATIENT)
Dept: GENERAL RADIOLOGY | Facility: CLINIC | Age: 84
DRG: 288 | End: 2024-09-30
Attending: HOSPITALIST
Payer: COMMERCIAL

## 2024-09-30 LAB
ANION GAP SERPL CALCULATED.3IONS-SCNC: 12 MMOL/L (ref 7–15)
BUN SERPL-MCNC: 20.8 MG/DL (ref 8–23)
CALCIUM SERPL-MCNC: 8.3 MG/DL (ref 8.8–10.4)
CHLORIDE SERPL-SCNC: 99 MMOL/L (ref 98–107)
CREAT SERPL-MCNC: 3.55 MG/DL (ref 0.51–0.95)
EGFRCR SERPLBLD CKD-EPI 2021: 12 ML/MIN/1.73M2
ERYTHROCYTE [DISTWIDTH] IN BLOOD BY AUTOMATED COUNT: 18.5 % (ref 10–15)
FERRITIN SERPL-MCNC: 529 NG/ML (ref 11–328)
FOLATE SERPL-MCNC: 12.3 NG/ML (ref 4.6–34.8)
GLUCOSE SERPL-MCNC: 93 MG/DL (ref 70–99)
HCO3 SERPL-SCNC: 27 MMOL/L (ref 22–29)
HCT VFR BLD AUTO: 25.5 % (ref 35–47)
HGB BLD-MCNC: 7.9 G/DL (ref 11.7–15.7)
INR PPP: 2.28 (ref 0.85–1.15)
IRON BINDING CAPACITY (ROCHE): 166 UG/DL (ref 240–430)
IRON SATN MFR SERPL: 21 % (ref 15–46)
IRON SERPL-MCNC: 35 UG/DL (ref 37–145)
MCH RBC QN AUTO: 32.1 PG (ref 26.5–33)
MCHC RBC AUTO-ENTMCNC: 31 G/DL (ref 31.5–36.5)
MCV RBC AUTO: 104 FL (ref 78–100)
PLATELET # BLD AUTO: 125 10E3/UL (ref 150–450)
POTASSIUM SERPL-SCNC: 3.5 MMOL/L (ref 3.4–5.3)
RBC # BLD AUTO: 2.46 10E6/UL (ref 3.8–5.2)
SODIUM SERPL-SCNC: 138 MMOL/L (ref 135–145)
VANCOMYCIN SERPL-MCNC: 14.9 UG/ML
VIT B12 SERPL-MCNC: 873 PG/ML (ref 232–1245)
WBC # BLD AUTO: 5.8 10E3/UL (ref 4–11)

## 2024-09-30 PROCEDURE — 250N000013 HC RX MED GY IP 250 OP 250 PS 637: Performed by: HOSPITALIST

## 2024-09-30 PROCEDURE — 97535 SELF CARE MNGMENT TRAINING: CPT | Mod: GO | Performed by: OCCUPATIONAL THERAPIST

## 2024-09-30 PROCEDURE — 99233 SBSQ HOSP IP/OBS HIGH 50: CPT | Performed by: HOSPITALIST

## 2024-09-30 PROCEDURE — 82607 VITAMIN B-12: CPT

## 2024-09-30 PROCEDURE — 71045 X-RAY EXAM CHEST 1 VIEW: CPT

## 2024-09-30 PROCEDURE — 85610 PROTHROMBIN TIME: CPT | Performed by: PHYSICIAN ASSISTANT

## 2024-09-30 PROCEDURE — 90935 HEMODIALYSIS ONE EVALUATION: CPT

## 2024-09-30 PROCEDURE — 258N000003 HC RX IP 258 OP 636: Performed by: HOSPITALIST

## 2024-09-30 PROCEDURE — 99232 SBSQ HOSP IP/OBS MODERATE 35: CPT | Performed by: INTERNAL MEDICINE

## 2024-09-30 PROCEDURE — 82728 ASSAY OF FERRITIN: CPT

## 2024-09-30 PROCEDURE — 80202 ASSAY OF VANCOMYCIN: CPT | Performed by: HOSPITALIST

## 2024-09-30 PROCEDURE — 250N000013 HC RX MED GY IP 250 OP 250 PS 637: Performed by: PHYSICIAN ASSISTANT

## 2024-09-30 PROCEDURE — 82746 ASSAY OF FOLIC ACID SERUM: CPT

## 2024-09-30 PROCEDURE — 99232 SBSQ HOSP IP/OBS MODERATE 35: CPT

## 2024-09-30 PROCEDURE — 120N000017 HC R&B RESPIRATORY CARE

## 2024-09-30 PROCEDURE — 80048 BASIC METABOLIC PNL TOTAL CA: CPT | Performed by: PHYSICIAN ASSISTANT

## 2024-09-30 PROCEDURE — 97530 THERAPEUTIC ACTIVITIES: CPT | Mod: GP

## 2024-09-30 PROCEDURE — 250N000011 HC RX IP 250 OP 636: Performed by: HOSPITALIST

## 2024-09-30 PROCEDURE — 83550 IRON BINDING TEST: CPT

## 2024-09-30 PROCEDURE — 97116 GAIT TRAINING THERAPY: CPT | Mod: GP

## 2024-09-30 RX ORDER — CEFAZOLIN SODIUM 1 G/50ML
1250 SOLUTION INTRAVENOUS ONCE
Status: COMPLETED | OUTPATIENT
Start: 2024-09-30 | End: 2024-09-30

## 2024-09-30 RX ORDER — WARFARIN SODIUM 2 MG/1
2 TABLET ORAL
Status: COMPLETED | OUTPATIENT
Start: 2024-09-30 | End: 2024-09-30

## 2024-09-30 RX ADMIN — Medication 0.5 G: at 13:02

## 2024-09-30 RX ADMIN — SIMETHICONE 80 MG: 80 TABLET, CHEWABLE ORAL at 13:01

## 2024-09-30 RX ADMIN — PANTOPRAZOLE SODIUM 40 MG: 40 TABLET, DELAYED RELEASE ORAL at 06:40

## 2024-09-30 RX ADMIN — SIMETHICONE 80 MG: 80 TABLET, CHEWABLE ORAL at 21:16

## 2024-09-30 RX ADMIN — WARFARIN SODIUM 2 MG: 2 TABLET ORAL at 17:38

## 2024-09-30 RX ADMIN — DOCUSATE SODIUM AND SENNOSIDES 1 TABLET: 8.6; 5 TABLET, FILM COATED ORAL at 09:07

## 2024-09-30 RX ADMIN — CLOPIDOGREL 75 MG: 75 TABLET ORAL at 09:08

## 2024-09-30 RX ADMIN — Medication 5 MG: at 21:16

## 2024-09-30 RX ADMIN — VANCOMYCIN HYDROCHLORIDE 1250 MG: 500 INJECTION, POWDER, LYOPHILIZED, FOR SOLUTION INTRAVENOUS at 16:10

## 2024-09-30 RX ADMIN — ATORVASTATIN CALCIUM 80 MG: 40 TABLET, FILM COATED ORAL at 21:16

## 2024-09-30 RX ADMIN — Medication 1 TABLET: at 12:57

## 2024-09-30 RX ADMIN — Medication 0.5 G: at 09:08

## 2024-09-30 RX ADMIN — LIDOCAINE 1 PATCH: 4 PATCH TOPICAL at 09:06

## 2024-09-30 RX ADMIN — MIDODRINE HYDROCHLORIDE 10 MG: 5 TABLET ORAL at 12:57

## 2024-09-30 RX ADMIN — SIMETHICONE 80 MG: 80 TABLET, CHEWABLE ORAL at 09:07

## 2024-09-30 RX ADMIN — Medication 1 CAPSULE: at 09:07

## 2024-09-30 RX ADMIN — Medication 1 CAPSULE: at 21:16

## 2024-09-30 RX ADMIN — DOCUSATE SODIUM AND SENNOSIDES 1 TABLET: 8.6; 5 TABLET, FILM COATED ORAL at 21:16

## 2024-09-30 RX ADMIN — Medication 0.5 G: at 21:37

## 2024-09-30 ASSESSMENT — ACTIVITIES OF DAILY LIVING (ADL)
ADLS_ACUITY_SCORE: 45
ADLS_ACUITY_SCORE: 45
ADLS_ACUITY_SCORE: 39
ADLS_ACUITY_SCORE: 44
ADLS_ACUITY_SCORE: 45
ADLS_ACUITY_SCORE: 44
ADLS_ACUITY_SCORE: 44
ADLS_ACUITY_SCORE: 45
ADLS_ACUITY_SCORE: 44
ADLS_ACUITY_SCORE: 44
ADLS_ACUITY_SCORE: 45
ADLS_ACUITY_SCORE: 44
ADLS_ACUITY_SCORE: 44
ADLS_ACUITY_SCORE: 45
ADLS_ACUITY_SCORE: 44
ADLS_ACUITY_SCORE: 39
ADLS_ACUITY_SCORE: 45
ADLS_ACUITY_SCORE: 44

## 2024-09-30 NOTE — PROGRESS NOTES
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 HAMMAD Lennie-op cardiac surgery. On MWF IDH, via  R CVC placed 9/11  Interdialytic rise in sCR persists, making some urine on Torsemidevolume unclear as she is incont,  avg 1-1.5 UF with HD. EDW ? 78 kg range On CRRT 8/29/24-9/8/24, Transition to iHD 9/9/24. Baseline CKD attributed to longstanding NSAID use, historical baseline Cr 1.3-1.8; proteinuria. Previously seen by Dr Anaya with [facility]. Prior extensive serologic testing was negative. Recent cystatin C in line with standard sCR of 4, eGFR 10. Imaging with left renal cortical thinning ~8cm kidney , rt 9.7cm kideny with simple cysts (4/2024). Inable to collect 24 hours Urine with incontinence. Midodrine as needed for BP support. Continue MWF HD schedule while here, follow for recovery. Continues to have IDWGs, and Rise in Cr.      Volume:Pt reports good urination, wt stable. ~UF 1-2 kg with dialysis      BP:on BB for Afib. + soft BPs. Has Midodrine and Albumin PRN BP support on dialysis     Macrocytic Anemia:hg in 7s  suspect 2/2 ACD, +/- infection/inflammation. On QW HILARY, 20K. Avoiding iron with active infection. Check b12/folic acid.      MSSA bacteremia/Prosthetic valve: IV vanco. Warfarin, INR goal 2-3     Respiratory Failure: Previously intubated, now extubated. On oxygen with goals to wean.      CAD, Afib: S/P CABG, valve surgery. On BB     HLD: statin    SUBJECTIVE:    PT new to me today  Sitting up in chair,  at bedside  Discussed labs/plan and answered all questions  VSS Stable, BP soft, has PRNs with dialysis  Wt stable to down trending  Reports UO picking up, however unable to collect accurately with incontinence. We discussed possible 24 hours CR clearance, but Cr sontinues to rise between therapies, not suggestive of good recovery. Could consider Cr cl test if seeing more stable Serum Cr levels  Hg low but uptrending from 7.7>7.9  Continue HILARY, check IRon /ferr, b12/folic acid  OBJECTIVE:  Physical Exam   Temp: 98.2  F (36.8  C) Temp src: Oral BP: 97/50 Pulse: 76   Resp: 20 SpO2: 93 % O2 Device: Nasal cannula Oxygen Delivery: 2 LPM  Vitals:    09/28/24 0530 09/29/24 0513 09/30/24 0100   Weight: 76.2 kg (167 lb 15.9 oz) 77 kg (169 lb 12.1 oz) 76.6 kg (168 lb 14 oz)     Vital Signs with Ranges  Temp:  [98.2  F (36.8  C)-98.8  F (37.1  C)] 98.2  F (36.8  C)  Pulse:  [70-76] 76  Resp:  [20] 20  BP: ()/(48-53) 97/50  SpO2:  [88 %-94 %] 93 %  I/O last 3 completed shifts:  In: 970 [P.O.:940; I.V.:30]  Out: -         Patient Vitals for the past 72 hrs:   Weight   09/30/24 0100 76.6 kg (168 lb 14 oz)   09/29/24 0513 77 kg (169 lb 12.1 oz)   09/28/24 0530 76.2 kg (167 lb 15.9 oz)     Intake/Output Summary (Last 24 hours) at 9/30/2024 0848  Last data filed at 9/29/2024 2035  Gross per 24 hour   Intake 970 ml   Output --   Net 970 ml       PHYSICAL EXAM:  GEN: NAD, aox3  CV: RRR   Lung: clear and equa, on 2L NC  Ab: soft and NT; not distended; normal bs  Ext: + LLE BL and well perfused  Skin: no rash  : poor documentation of UO, incontinent  Wt 76-78, most recently.       LABORATORY STUDIES:     Recent Labs   Lab 09/30/24  0610 09/29/24  0614 09/28/24  0557 09/27/24  0616 09/26/24  0614 09/25/24  0614   WBC 5.8 5.4 5.1 5.7 5.4 5.4   RBC 2.46* 2.29* 2.23* 2.37* 2.44* 2.38*   HGB 7.9* 7.7* 7.7* 7.7* 7.8* 7.5*   HCT 25.5* 24.4* 23.8* 25.0* 25.4* 24.9*   * 129* 122* 125* 132* 130*       Basic Metabolic Panel:  Recent Labs   Lab 09/29/24  0614 09/28/24  0557 09/27/24  0616 09/26/24  0614 09/25/24  0614 09/24/24  0614    138 137 138 137 137   POTASSIUM 3.6 3.5 3.6 3.6 3.5 3.8   CHLORIDE 100 102 99 101 99 100   CO2 28 29 30* 29 28 28   BUN 14.5 8.0 17.5 11.0 24.9* 17.4   CR 3.08* 2.09* 2.92* 2.10* 3.39* 2.61*   GLC 97 94 90 89 94 95   KAELYN 8.3* 8.7* 8.4* 8.5* 8.3* 8.0*       INR  Recent Labs   Lab 09/30/24  0610 09/29/24  0614 09/28/24  0707  09/27/24  0616   INR 2.28* 2.50* 2.41* 2.35*        Recent Labs   Lab Test 09/30/24  0610 09/29/24  0614   INR 2.28* 2.50*   WBC 5.8 5.4   HGB 7.9* 7.7*   * 129*       Personally reviewed current labs    Maisha Stout [facility]  [facility]  [phone]

## 2024-09-30 NOTE — PROGRESS NOTES
09/30/24 1110   Appointment Info   Signing Clinician's Name / Credentials (PT) Terence Shaw, PT   Rehab Comments (PT) Sternal Percautions   Therapeutic Activity   Therapeutic Activities: dynamic activities to improve functional performance Minutes (64331) 9   Symptoms Noted During/After Treatment Dizziness   Treatment Detail/Skilled Intervention Pt. up in chair with 2L 02 on no c/o dizziness.  After initial bout pt. with c/o dizziness.  Pt. brought back to room 02 sats 99% and seated /51.  After seatd BP writer took standing /56, no c/o dizziness for remainder of session.  Pt. kept off 02 for remainder of session with 02 sats 95-97%, writer informed MD who stated he will writer order to be off 02 during days.  Multiple sit to stands with min A   Gait Training   Gait Training Minutes (37346) 25   Symptoms Noted During/After Treatment (Gait Training) fatigue   Treatment Detail/Skilled Intervention Pt. amb with FWW CGA to SBA and w/c follow.  Pt. with flexed posture and decreased step length/pace   Distance in Feet 35, 80, 110, 100, 50   Vera Level (Gait Training) contact guard   Physical Assistance Level (Gait Training) 1 person + 1 person to manage equipment   Weight Bearing (Gait Training) other (see comments)  (sternal precautions)   Assistive Device (Gait Training) rolling walker   PT Discharge Planning   PT Plan LE and core and cervical strengthening, transfer training, Bed mobility, Amb, Motomed, NuStep   PT Discharge Recommendation (DC Rec) Transitional Care Facility;Acute Rehab Center-Motivated patient will benefit from intensive, interdisciplinary therapy.  Anticipate will be able to tolerate 3 hours of therapy per day   PT Rationale for DC Rec Pt was IND without AD prior to hospitalization. Pt would benefit from continued PT in order to increase IND with all functional mobility. Currently requires A x 1 for mobility and is decondtioned.   PT Brief overview of current status Pt.  continues to make steady improvements and no longer needs encouragement   Total Session Time   Timed Code Treatment Minutes 34   Total Session Time (sum of timed and untimed services) 34     Note inserted for insurance documentation. Writer did not treat pt.

## 2024-09-30 NOTE — PROGRESS NOTES
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  Positive blood culture on 8/14/2024 and 8/16.  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  With the size of the vegetation combined with brain infarct, status post CV surgery, see details below active issue.   Abnormal brain MRI suggestive of subacute infarct. In a patient with MSSA bacteremia and aortic valve endocarditis, this is cerebral septic emboli. Active issue.       PROCEDURE PERFORMED: 8/27/24  Aortic root abscess debridement and aortic annular reconstruction.  Aortic root replacement (Konect composite 25 mm Silva Inspiris Resilia bioprosthetic valve within 30 mm Gelweave Valsalva graft with reimplantation of the coronary arteries).  Mitral valve replacement (31 mm St. Tim Silva bioprosthetic valve).  Coronary artery bypass grafting x 2 (reversed saphenous vein graft to the obtuse marginal branch of the left circumflex coronary artery, and pedicled left internal mammary artery to left anterior descending coronary artery).  Endoscopic vein harvest of the greater saphenous vein from the left lower extremity.    Recommendations:  Continue vancomycin-dosing per Pharm.D.  End date oct 8th - last dose would be after HD on 10/7.  Will need to check immunoglobin level in 4 to 6 weeks sister with history of hypogammaglobulinemia       Uday Aguirre,  MD [facility]  Direct messaging: [employer]   On-Call ID provider: [phone], option: 9                   Interval History:     HPI:  First visit by me.  at bedside. Feeling well. Slowly feeling stronger    CRP is much improved:  Component      Latest Ref Rng 8/15/2024  5:32 AM 8/17/2024  4:28 AM 9/24/2024  6:14 AM   CRP Inflammation      <5.00 mg/L 310.00 (H)  242.30 (H)  28.60 (H)       Legend:  (H) High        Recent Inflammatory Biomarkers:   Recent Labs   Lab Test 09/30/24  0610 09/29/24  0614 09/28/24  0557 09/27/24  0616 09/26/24  0614 09/25/24  0614 08/30/24  0437 08/29/24  0359   PCAL  --   --   --   --   --   --   --  1.24*   WBC 5.8 5.4 5.1 5.7 5.4 5.4   < > 15.3*    < > = values in this interval not displayed.            Review of Systems:      Negative except for findings in the HPI.           Allergies:     Allergies   Allergen Reactions    Aspirin Rash    Cats Rash    Nickel Rash            Physical Exam:   Vitals were reviewed  Patient Vitals for the past 24 hrs:   BP Temp Temp src Pulse Resp SpO2 Weight   09/30/24 0908 99/55 -- -- 78 -- -- --   09/30/24 0740 -- -- -- -- -- 93 % --   09/30/24 0734 97/50 98.2  F (36.8  C) Oral 76 20 (!) 88 % --   09/30/24 0100 -- -- -- -- -- -- 76.6 kg (168 lb 14 oz)   09/29/24 2330 95/51 98.2  F (36.8  C) Oral 72 20 90 % --   09/29/24 2032 99/53 -- -- 76 -- -- --   09/29/24 1526 105/52 98.8  F (37.1  C) Oral 70 20 94 % --       Physical Examination:    Resp: 20    Gen: no distress, sitting in chair   HEENT: opens eyes  PICC, chest wall catheter for hemodialysis  CV: Sternal incision  Lungs: normal resp pattern  Skin: Warm  Neuro: awake and alert   HD catheter  PICC- R basilic           Laboratory Data:   ID Labs:  Microbiology labs:  7-Day Micro Results       No results found for the last 168 hours.          Susceptibility data from last 90 days.  Collected Specimen Info Organism Ampicillin Ampicillin/Sulbactam Cefepime  Ceftazidime Ceftriaxone Ciprofloxacin Clindamycin Daptomycin Doxycycline Erythromycin Gentamicin Levofloxacin Meropenem Oxacillin   08/29/24 Sputum from Expectorate Enterobacter cloacae complex R R  S R R  S      S  S  S      Normal samara                 08/16/24 Peripheral Blood Staphylococcus aureus                 08/14/24 Peripheral Blood Staphylococcus aureus        S  S  S  S  S    S     Collected Specimen Info Organism Piperacillin/Tazobactam Tetracycline Tobramycin Trimethoprim/Sulfamethoxazole  Vancomycin   08/29/24 Sputum from Expectorate Enterobacter cloacae complex R   S  S      Normal samara        08/16/24 Peripheral Blood Staphylococcus aureus        08/14/24 Peripheral Blood Staphylococcus aureus   S   S  S       Reviewed      No lab results found.  Recent Labs   Lab Test 09/30/24  0610 09/29/24  0614 09/28/24  0557 09/27/24  0616 09/26/24  0614 09/25/24  0614   WBC 5.8 5.4 5.1 5.7 5.4 5.4     Recent Labs   Lab Test 09/30/24  0610 09/29/24  0614 09/28/24  0557 09/27/24  0616   CR 3.55* 3.08* 2.09* 2.92*   GFRESTIMATED 12* 14* 23* 15*       Hematology Studies  Recent Labs   Lab Test 09/30/24  0610 09/29/24  0614 09/28/24  0557 09/27/24  0616 09/26/24  0614 09/25/24  0614   WBC 5.8 5.4 5.1 5.7 5.4 5.4   HGB 7.9* 7.7* 7.7* 7.7* 7.8* 7.5*   HCT 25.5* 24.4* 23.8* 25.0* 25.4* 24.9*   * 129* 122* 125* 132* 130*       Metabolic  Recent Labs   Lab Test 09/30/24  0610 09/29/24  0614 09/28/24  0557    138 138   BUN 20.8 14.5 8.0   CO2 27 28 29   CR 3.55* 3.08* 2.09*   GFRESTIMATED 12* 14* 23*       Hepatic Studies  Recent Labs   Lab Test 09/19/24  0635 09/18/24  0704 09/17/24  1045   BILITOTAL 0.7 0.7 0.7   ALKPHOS 78 75 75   ALBUMIN 3.1* 3.1* 3.1*   AST 36 34 32   ALT 31 29 32          Imaging Data:   Reviewed     IR CVC Non Tunnel Placement > 5 Yrs    Result Date: 9/1/2024  [facility] LOCATION: [facility] DATE: 9/1/2024 PROCEDURE: NON-TUNNELED DIALYSIS CATHETER  PLACEMENT INTERVENTIONAL RADIOLOGIST: RU Reyes MD. INDICATION: HAMMAD requiring hemodialysis. CONSENT: The risks, benefits and alternatives of non-tunneled dialysis catheter placement were discussed with the patient in detail. All questions were answered. Informed consent was given to proceed with the procedure. MODERATE SEDATION: None. CONTRAST: None. ANTIBIOTICS: None. ADDITIONAL MEDICATIONS: Heparin 3800 U IV FLUOROSCOPIC TIME: 2.0 minutes. RADIATION DOSE: Air Kerma: 36.04 mGy. COMPLICATIONS: No immediate complications. STERILE BARRIER TECHNIQUE: Maximum sterile barrier technique was used. Cutaneous antisepsis was performed at the operative site with application of 2% chlorhexidine and large sterile drape. Prior to the procedure, the  and assistant performed hand hygiene and wore hat, mask, sterile gown, and sterile gloves during the entire procedure. PROCEDURE: Limited left neck ultrasound was performed which demonstrated a patent internal jugular vein; images saved to the permanent patient medical record. The overlying skin and subcutaneous soft tissues were anesthetized using 1% lidocaine. Under ultrasound guidance, the left internal jugular vein was accessed using a micropuncture needle; images saved of the permanent patient medical record. Access was secured using a 4 Latvian transitional sheath. A [employer] guidewire was advanced into the SVC. Under fluoroscopic guidance, the overlying skin and subcutaneous soft tissues were dilated and a 15.5 Latvian nontunneled dialysis catheter was placed with the tip within the cavoatrial junction. The catheter was tested and found to flush and aspirate appropriately. The catheter was heparinized and secured to the skin.     IMPRESSION:  1.  Successful non-tunneled dialysis catheter placement. PLAN: 1. Dialysis catheter is ready to be used. 2. Nontunneled right groin dialysis catheter can be removed in ICU.       Study Result    Narrative & Impression    EXAM: MR BRAIN W/O and W CONTRAST  LOCATION: [facility]  DATE: 8/17/2024     INDICATION: Headache in a patient with MSSA bacteremia secondary to aortic valve endocarditis.  Look for cerebral septic emboli; Headache; Acute HA (< 3 months), no complicating features  COMPARISON: None.  CONTRAST: 9 ml gadavist  TECHNIQUE: Routine multiplanar multisequence head MRI without and with intravenous contrast.     FINDINGS: Assessment degraded due to motion.  INTRACRANIAL CONTENTS:   Apparent focus of diffusion restriction with T2/FLAIR hyperintensity within the left superior parietal lobule cortex is hyperintense on ADC map, representing T2 shine through.  Focus of signal abnormality measures 13 x 9 mm in the axial plane and does   not have internal enhancement.     Additional punctate foci of hyperintensity on diffusion weighted with similar signal characteristics (periventricular right corona radiata, left superior parietal lobule, and left cerebellar hemisphere).     Patchy and confluent nonspecific T2/FLAIR hyperintensities within the cerebral white matter most consistent with moderate chronic microvascular ischemic change. Moderate generalized cerebral atrophy. No hydrocephalus. Normal position of the cerebellar   tonsils. No discernible abnormal intracranial enhancement; however, assessment substantially degraded due to motion. 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     SELLA: No abnormality accounting for technique.     OSSEOUS STRUCTURES/SOFT TISSUES: Normal marrow signal. The major intracranial vascular flow voids are maintained.      ORBITS: No abnormality accounting for technique.      SINUSES/MASTOIDS: Mild mucosal thickening scattered about the paranasal sinuses. Scattered fluid/membrane thickening in the left mastoid air cells. No apparent mass in the posterior nasopharynx or skull base.                                                                       IMPRESSION: Assessment degraded  due to motion.  1.  13 mm focus of cortical signal abnormality within the left superior parietal lobule which is favored to represent a subacute infarct; low-grade glial neoplasm could conceivably have a similar appearance. Hyperacute intraparenchymal hematoma could   also have a similar appearance but is considered less likely. Recommend noncontrast head CT for further characterization. Also recommend follow-up MRI brain without and with contrast in 8-12 weeks to document expected evolution.  2.  Additional smaller foci of signal abnormality with similar characteristics favored to represent punctate subacute infarcts.

## 2024-09-30 NOTE — PROGRESS NOTES
HEMODIALYSIS TREATMENT NOTE      Date: 9/30/2024  Time: 1615     Data:  Pre Wt:   76.6 kg (bed scale)  Post Wt:  75.6 kg (bed scale)  Weight change: - 1 kg  Ultrafiltration - Post Run Net Total Removed (mL):  1000 ml  Vascular Access Status: CVC patent  Dialyzer Rinse:  Light  Total Blood Volume Processed: 67.6 L   Total Dialysis (Treatment) Time:   3 Hrs  Dialysate Bath: K 3, Ca 3  Heparin: Heparin: None     Lab:   No    Interventions:  Dialysis done through right chest tunneled tunneled dialysis catheter. ,   UF set to 1.3 Liters of fluid removal, accommodating priming and rinse back volumes  Meds administered per MAR  See Flowsheet for Crit Profile throughout the run. Treatment has ended safely and blood is rinsed back completely. Pt tolerated well with dialysis tx. Catheter lumens flushed with saline and locked with saline, catheter caps changed post HD. Post Tx assessment done. Patient remains her  room in stable condition  Report given to BOBBY RN.     Assessment:  A/O x 4, calm & cooperative, denies pain  CVC intact, previous dressing clean and dry                Plan:    Per Renal team

## 2024-09-30 NOTE — PROGRESS NOTES
[facility]    Medicine Progress Note - Hospitalist Service    Date of Admission:  9/16/2024    Felicitas Elliott is a 84 year old female admitted on 9/16/2024 to the LTAC for long-term antibiotics following MSSA bacteremia and endocarditis. 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  She was initially admitted to [facility] on 8/16/2024 for sepsis and HAMMAD.  She received empiric antibiotics on admission. Blood cultures grew MSSA.  Neurology was consulted for possible discitis.  ID was also consulted. Echo revealed large posterior leaflet mitral valve vegetation and small aortic valve vegetation with severe aortic stenosis.  Brain MRI revealed scattered embolic infarcts.  Coronary angiogram demonstrated coronary artery disease.  CT surgery was consulted. She underwent CABG alongside the procedures mentioned above.  She however has not had renal recovery.  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  She had dysphagia and momentarily required NG tube with tube feeds. She worked with speech therapy and is now cleared for regular diet and thin liquids.  CT surgery recommends warfarin for 3 months for bioprosthetic valves.  INR goal 2-3.  End date of warfarin approximately 12/10/2024, at which point she will be on Plavix alone (ASA allergy) indefinitely.  ID recommends IV vancomycin until 10/8/2024.    LTAC Course:  9/17 - 9/21.  Progressing well.  On Coumadin, initially bridging with heparin gtt. for bioprosthetic valve.  On arrival initial INR 1.49. Goal 2-3. Has been dialyzing for HAMMAD. 9/19, INR 2.19, heparin gtt discontinued. RD noted patient having trouble chewing and impedes her overall oral intake.  SLP consulted.    9/22-9/29: tolerating dialysis well. Pacer checked on 9/24, set at , top pacer is capturing 94%, bottom pacer is capturing 80%. Cardio did a televisit  with patient and recommend to decrease metoprolol by 1/2- changed from 25 to 12.5 bid. Added simethicone as patient has abdominal pain due to gas.    She is having neck pain -lidocaine and voltaren added.  Pharmacy to change medications around to have get getting less pills at the same time.  She complains that this contributes to a nausea.  On 9/29, BP is low, decreased toresimide dose from 100 to 50, but holding for now.  Also added prn midodrine.    9/30: Dialysis today. Patient BP has been running low but stable. Required midodrine prn. Will change metoprolol 12.5 mg po bid to metoprolol 12.5 mg XL daily. She remains on vancomycin IV for bacteremia      BARRIERS TO DISCHARGE (why care is unable to be provided at a lower level of care):    -Long-term antibiotics.  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  Repeat blood cultures - 8/17/2024  -Mitral and aortic valve endocarditis as evidenced with large vegetation on mitral valve 8 mm x 15 at x 2 posterior leaflet of small vegetation on aortic valve.  With signs of vegetation combined with brain infarct.  S/p CV surgery  -Continue with IV vancomycin.  Dosing per pharmacy  -Per ID will need to check immunoglobulin level in 4 to 6 weeks, sister with history of hypogammaglobinemia  -ID consulted and following  -Patient previously on heparin 5000 subQ every 8 hours and Coumadin.  Recommended INR goal is 2-3 per CT surgery.  Discontinued heparin 5000 units subcu every 8 hours on admission and started patient on heparin gtt. alongside Coumadin.  I also notified pharmacy and were agreeable with this plan.  -9/19, INR 2.19.  Discontinued heparin gtt. Patient on goal  -Pharm.D. to  continue dosing Coumadin   -INR 2.28 on 9/30    Peripheral neuropathy of b/l feet  -fredy-lidocaine-ketamine cream prn   -benadryl cream prn     Abnormal brain MRI suggestive of subacute infarct  Patient with a history of MSSA bacteremia and aortic valve endocarditis.  Most likely septic emboli.  -Continue with antibiotics per ID.  End date 10/8/2024    Acute respiratory failure with hypoxia  -Patient previously intubated.  Now extubated.    -Continue with oxygen support to maintain oxygenation above 92%.  Wean as tolerated  -RT while in LTAC    HAMMAD  -Previously on CCRT now transferred to HD  -Torsemide per nephrology  -HD per nephrology-MWF  -Monitor kidney function with BMP    CAD  A-fib  History of aortic stenosis  +pacemaker   Hypotension   -Fairly stable at this time.  -S/p CABG per CT surgery  -Continue with Plavix and statin  -Metoprolol- will decrease dose from 25 bid to 12.5 bid due to low BP on 9/24  -Previously on amiodarone for rate control, now s/p PPM  -pacer checked 9/23, set at , top pacer is capturing 94%, bottom pacer is capturing 80%.  -cardiothoracic surg tele appt- appreciate recs  -torsemide on hold on 9/29 due to hypotension  -prn midodrine     Neck pain due to arthritis  -lidocaine  -voltaren prn    Anemia of chronic disease  Thrombocytopenia   -monitor    Severe protein calorie malnutrition  -? esophogram  -RD following   -calorie counts    Physical deconditioning/critical illness myopathy  -Fall precautions.  -PT/OT  -WOC    Diet: Advance Diet as Tolerated: Regular Diet Adult  Snacks/Supplements Adult: Gelatein Plus; With Meals    DVT Prophylaxis: Warfarin  Le Catheter: Not present  Lines: PRESENT      PICC 09/06/24 Triple Lumen Right Basilic Vascular access-Site Assessment: WDL  CVC Double Lumen Right Subclavian Tunneled-Site Assessment: WDL      Cardiac Monitoring: None  Code Status: No CPR- Do NOT Intubate      Clinically Significant Risk Factors              # Hypoalbuminemia:  Lowest albumin = 3.1 g/dL at 9/19/2024  6:35 AM, will monitor as appropriate   # Thrombocytopenia: Lowest platelets = 125 in last 2 days, will monitor for bleeding              # Severe Malnutrition: based on nutrition assessment      # Financial/Environmental Concerns: none   # Pacemaker present  # History of CABG: noted on surgical history    Disposition Plan     Medically Ready for Discharge: Anticipated in 5+ Days    Adan Veloz MD  Hospitalist Service  [facility]  Securely message with [employer] (more info)  Text page via [employer] Paging/Directory   ______________________________________________________________________    Interval History   Patient has been ambulating in the hallway per OT.  She seems to be progressing well. OT reports she  does not require oxygen during the day and with activity but continues to require oxygen at night and with naps during the day.  She states she feels okay and had a good night.   at bedside.  She reports no new complaints at this time.    Physical Exam   Vital Signs: Temp: 98.2  F (36.8  C) Temp src: Oral BP: 99/55 Pulse: 78   Resp: 20 SpO2: 93 % O2 Device: Nasal cannula   Weight: 168 lbs 13.96 oz  Constitutional: Patient is resting in bed comfortably appears in no distress.  Eyes: Pupils are equal round and reactive to light  Respiratory: Good respiratory effort, on auscultation good air entry is noted  Cardiovascular: Good S1 and S2 no obvious murmurs peripheral pulses are palpable  GI: Abdomen is soft nontender nondistended bowel sounds are noted  Skin: No obvious rashes noted on exposed areas, warm to touch please refer to nursing/wound care note for complete skin exam  Musculoskeletal: Good muscle tone nails and digits appear acyanotic  Neuropsychiatric: Awake alert oriented to person normal affect    Medical Decision Making       50 MINUTES SPENT BY ME on the date of service doing chart review, history, exam, documentation & further activities per the  note.  ------------------ MEDICAL DECISION MAKING ------------------------------------------------------------------------------------------------------  MANAGEMENT DISCUSSED with the following over the past 24 hours:     NOTE(S)/MEDICAL RECORDS REVIEWED over the past 24 hours:         Data   Recent Labs   Lab 09/30/24  0610 09/29/24  0614 09/28/24  0707 09/28/24  0557   WBC 5.8 5.4  --  5.1   HGB 7.9* 7.7*  --  7.7*   * 107*  --  107*   * 129*  --  122*   INR 2.28* 2.50* 2.41*  --     138  --  138   POTASSIUM 3.5 3.6  --  3.5   CHLORIDE 99 100  --  102   CO2 27 28  --  29   BUN 20.8 14.5  --  8.0   CR 3.55* 3.08*  --  2.09*   ANIONGAP 12 10  --  7   KAELYN 8.3* 8.3*  --  8.7*   GLC 93 97  --  94       Most Recent 3 CBC's:  Recent Labs   Lab Test 09/30/24  0610 09/29/24  0614 09/28/24  0557   WBC 5.8 5.4 5.1   HGB 7.9* 7.7* 7.7*   * 107* 107*   * 129* 122*     Most Recent 3 BMP's:  Recent Labs   Lab Test 09/30/24  0610 09/29/24  0614 09/28/24  0557    138 138   POTASSIUM 3.5 3.6 3.5   CHLORIDE 99 100 102   CO2 27 28 29   BUN 20.8 14.5 8.0   CR 3.55* 3.08* 2.09*   ANIONGAP 12 10 7   KAELYN 8.3* 8.3* 8.7*   GLC 93 97 94     Most Recent 2 LFT's:  Recent Labs   Lab Test 09/19/24  0635 09/18/24  0704   AST 36 34   ALT 31 29   ALKPHOS 78 75   BILITOTAL 0.7 0.7     Most Recent 3 INR's:  Recent Labs   Lab Test 09/30/24  0610 09/29/24  0614 09/28/24  0707   INR 2.28* 2.50* 2.41*

## 2024-09-30 NOTE — PLAN OF CARE
Problem: Adult Inpatient Plan of Care  Goal: Optimal Comfort and Wellbeing  Outcome: Progressing  Intervention: Provide Person-Centered Care  Recent Flowsheet Documentation  Taken 9/30/2024 0400 by Barbara Green RN  Trust Relationship/Rapport:   care explained   choices provided   questions answered   questions encouraged   Goal Outcome Evaluation:       Vitals stable. Denied pain or discomfort. Was incontinent of urine x1 this shift.  Pt slept all night. Will continue plan of cares.

## 2024-09-30 NOTE — PHARMACY-VANCOMYCIN DOSING SERVICE
Pharmacy Vancomycin Note  Date of Service 2024  Patient's  1940   84 year old, female    Indication: Endocarditis  Day of Therapy: Started , planning therapy through 10/8  Current vancomycin regimen:  Intermittent dosing   Current vancomycin monitoring method: Renal Replacement Therapy  Current vancomycin therapeutic monitoring goal: 15-20 mg/L       Current estimated CrCl = Estimated Creatinine Clearance: 12.6 mL/min (A) (based on SCr of 3.55 mg/dL (H)).    Creatinine for last 3 days  2024:  5:57 AM Creatinine 2.09 mg/dL  2024:  6:14 AM Creatinine 3.08 mg/dL  2024:  6:10 AM Creatinine 3.55 mg/dL    Recent Vancomycin Levels (past 3 days)  2024:  6:10 AM Vancomycin 14.9 ug/mL    Vancomycin IV Administrations (past 72 hours)                     vancomycin (VANCOCIN) 1,000 mg in sodium chloride 0.9 % 250 mL intermittent infusion (mg) 1,000 mg New Bag 24 1753                    Nephrotoxins and other renal medications (From now, onward)      Start     Dose/Rate Route Frequency Ordered Stop    24 0900  [Held by provider]  torsemide (DEMADEX) half-tab 50 mg        (On hold since yesterday at 1001 until manually unheld; held by Josephine Thomson MDHold Reason: Change in Vitals)    50 mg Oral DAILY 24 1000      24 1254  vancomycin place horne - receiving intermittent dosing         1 each Intravenous SEE ADMIN INSTRUCTIONS 24 1254                 Contrast Orders - past 72 hours (72h ago, onward)      None            Interpretation of levels and current regimen:  Vancomycin level is reflective of subtherapeutic level    Has serum creatinine changed greater than 50% in last 72 hours: on MWF dialysis     Urine output:  unable to determine    Renal Function: ESRD on Dialysis        Plan:  Vanco 1250mg x1 (~16.3mg/kg)   Vancomycin monitoring method: Renal Replacement Therapy  Vancomycin therapeutic monitoring goal: 15-20 mg/L  Pharmacy will check  vancomycin level before HD on 10/2  Serum creatinine levels will be ordered with HD    Kisha Rodriguez, RPh,  BCCP, BPS

## 2024-09-30 NOTE — PLAN OF CARE
Goal Outcome Evaluation:       Pt was calm and cooperative this shift. Alert and oriented *4. Uses bed pan for both B&B. Denies pain.  was at bed side. Regular diet, appetite is good. BP was low, held bed time metoprolol.     Hillary Arshad RN

## 2024-09-30 NOTE — PROGRESS NOTES
09/30/24 0915   Self-Care/Home Management   Self-Care/Home Mgmt/ADL, Compensatory, Meal Prep Minutes ([id number]) 35   Symptoms Noted During/After Treatment (Meal Preparation/Planning Training) fatigue   Treatment Detail/Skilled Intervention Facilitated ADL routine to further activity tolerance and IND. Demo sock donning from bed w/sock aide. Per pt, will likely not be wearing socks at home or will have  don. Willing to practice tech a few times then be done. Donned LB clothing from commode chair in bathroom- min A. Required assisted to initiate threading over feet d/t diff bending forward to reach. Completed light UB HYG/groomin gin front of mirror while in sitting. Setup of tasks and incidental cuing for seq. Pullover shirt donned w/ SBA/setup. Ambulated to bathroom w/ FWW CGA. Min A initially for STS from bed. Demo good effort throughout session. On 2L nc during activity, SATs WNL   [place] Level (Grooming Training) independent   Assistance (Grooming Training) set-up required   [place] Level (Bathing Training) stand-by assist   Assistance (Bathing Training) set-up required   [place] Level (Upper Body Dressing Training) stand-by assist   Assistance (Upper Body Dressing Training) set-up required   Lower Body Dressing Training Assistance minimum assist (75% patient effort)   Lower Body Dressing Training Assistance set-up required;verbal cues;1 person assist   [place] Level (Eating/Self-Feeding Training) independent   Assistance (Eating/Self-Feeding Training) set-up required   [place] Level (Toilet Training) contact guard   Assistance (Toilet Training) 1 person assist;set-up required;verbal cues   OT Discharge Planning   OT Plan 9/30: Sternal Precautions until 10/22; ADLs at EOB and progress g/h to sink, transfers to chair and toilet transfers, UB strength within precautions, activity teagan ex   OT Discharge Recommendation (DC Rec) home with assist;Transitional Care Facility   OT Rationale  for DC Rec Pt IND at baseline, may be able to return home directly from LTACH but depending on LOS, may benefit from short rehab stay prior to returning home   OT Brief overview of current status Pt making gains towards ADL goals. Demo good effort and activity tolerance this date. Cont to be limited by endurance and decreased strength. No report of light headedness this date. Cont OT/POC   Total Session Time   Timed Code Treatment Minutes 35   Total Session Time (sum of timed and untimed services) 35     Note inserted for insurance documentation. Writer did not treat pt.

## 2024-10-01 ENCOUNTER — APPOINTMENT (OUTPATIENT)
Dept: PHYSICAL THERAPY | Facility: CLINIC | Age: 84
DRG: 288 | End: 2024-10-01
Attending: HOSPITALIST
Payer: COMMERCIAL

## 2024-10-01 ENCOUNTER — APPOINTMENT (OUTPATIENT)
Dept: OCCUPATIONAL THERAPY | Facility: CLINIC | Age: 84
DRG: 288 | End: 2024-10-01
Attending: HOSPITALIST
Payer: COMMERCIAL

## 2024-10-01 LAB
ANION GAP SERPL CALCULATED.3IONS-SCNC: 8 MMOL/L (ref 7–15)
BUN SERPL-MCNC: 11.1 MG/DL (ref 8–23)
CALCIUM SERPL-MCNC: 8.7 MG/DL (ref 8.8–10.4)
CHLORIDE SERPL-SCNC: 103 MMOL/L (ref 98–107)
CREAT SERPL-MCNC: 2.43 MG/DL (ref 0.51–0.95)
EGFRCR SERPLBLD CKD-EPI 2021: 19 ML/MIN/1.73M2
ERYTHROCYTE [DISTWIDTH] IN BLOOD BY AUTOMATED COUNT: 18.6 % (ref 10–15)
GLUCOSE SERPL-MCNC: 99 MG/DL (ref 70–99)
HCO3 SERPL-SCNC: 27 MMOL/L (ref 22–29)
HCT VFR BLD AUTO: 26.8 % (ref 35–47)
HGB BLD-MCNC: 7.9 G/DL (ref 11.7–15.7)
INR PPP: 2.32 (ref 0.85–1.15)
MCH RBC QN AUTO: 30.7 PG (ref 26.5–33)
MCHC RBC AUTO-ENTMCNC: 29.5 G/DL (ref 31.5–36.5)
MCV RBC AUTO: 104 FL (ref 78–100)
PLATELET # BLD AUTO: 124 10E3/UL (ref 150–450)
POTASSIUM SERPL-SCNC: 3.7 MMOL/L (ref 3.4–5.3)
RBC # BLD AUTO: 2.57 10E6/UL (ref 3.8–5.2)
SODIUM SERPL-SCNC: 138 MMOL/L (ref 135–145)
WBC # BLD AUTO: 5.4 10E3/UL (ref 4–11)

## 2024-10-01 PROCEDURE — 99233 SBSQ HOSP IP/OBS HIGH 50: CPT | Performed by: HOSPITALIST

## 2024-10-01 PROCEDURE — 97530 THERAPEUTIC ACTIVITIES: CPT | Mod: GP | Performed by: PHYSICAL THERAPIST

## 2024-10-01 PROCEDURE — 250N000013 HC RX MED GY IP 250 OP 250 PS 637: Performed by: HOSPITALIST

## 2024-10-01 PROCEDURE — 120N000017 HC R&B RESPIRATORY CARE

## 2024-10-01 PROCEDURE — 250N000013 HC RX MED GY IP 250 OP 250 PS 637: Performed by: PHYSICIAN ASSISTANT

## 2024-10-01 PROCEDURE — 80048 BASIC METABOLIC PNL TOTAL CA: CPT | Performed by: PHYSICIAN ASSISTANT

## 2024-10-01 PROCEDURE — 82310 ASSAY OF CALCIUM: CPT | Performed by: PHYSICIAN ASSISTANT

## 2024-10-01 PROCEDURE — 97110 THERAPEUTIC EXERCISES: CPT | Mod: GO | Performed by: OCCUPATIONAL THERAPIST

## 2024-10-01 PROCEDURE — 85610 PROTHROMBIN TIME: CPT | Performed by: PHYSICIAN ASSISTANT

## 2024-10-01 PROCEDURE — 97116 GAIT TRAINING THERAPY: CPT | Mod: GP | Performed by: PHYSICAL THERAPIST

## 2024-10-01 RX ORDER — WARFARIN SODIUM 2 MG/1
2 TABLET ORAL
Status: COMPLETED | OUTPATIENT
Start: 2024-10-01 | End: 2024-10-01

## 2024-10-01 RX ADMIN — Medication 0.5 G: at 14:35

## 2024-10-01 RX ADMIN — DOCUSATE SODIUM AND SENNOSIDES 1 TABLET: 8.6; 5 TABLET, FILM COATED ORAL at 21:05

## 2024-10-01 RX ADMIN — ATORVASTATIN CALCIUM 80 MG: 40 TABLET, FILM COATED ORAL at 21:05

## 2024-10-01 RX ADMIN — Medication 1 CAPSULE: at 08:50

## 2024-10-01 RX ADMIN — SIMETHICONE 80 MG: 80 TABLET, CHEWABLE ORAL at 21:05

## 2024-10-01 RX ADMIN — SIMETHICONE 80 MG: 80 TABLET, CHEWABLE ORAL at 08:50

## 2024-10-01 RX ADMIN — DOCUSATE SODIUM AND SENNOSIDES 1 TABLET: 8.6; 5 TABLET, FILM COATED ORAL at 08:50

## 2024-10-01 RX ADMIN — Medication 1 TABLET: at 12:26

## 2024-10-01 RX ADMIN — LIDOCAINE 1 PATCH: 4 PATCH TOPICAL at 08:50

## 2024-10-01 RX ADMIN — METOPROLOL SUCCINATE 12.5 MG: 25 TABLET, EXTENDED RELEASE ORAL at 21:08

## 2024-10-01 RX ADMIN — Medication 5 MG: at 21:05

## 2024-10-01 RX ADMIN — Medication 0.5 G: at 21:25

## 2024-10-01 RX ADMIN — CLOPIDOGREL 75 MG: 75 TABLET ORAL at 08:51

## 2024-10-01 RX ADMIN — Medication 1 CAPSULE: at 21:05

## 2024-10-01 RX ADMIN — WARFARIN SODIUM 2 MG: 2 TABLET ORAL at 18:35

## 2024-10-01 RX ADMIN — Medication 0.5 G: at 08:49

## 2024-10-01 RX ADMIN — PANTOPRAZOLE SODIUM 40 MG: 40 TABLET, DELAYED RELEASE ORAL at 08:50

## 2024-10-01 RX ADMIN — SIMETHICONE 80 MG: 80 TABLET, CHEWABLE ORAL at 14:35

## 2024-10-01 ASSESSMENT — ACTIVITIES OF DAILY LIVING (ADL)
ADLS_ACUITY_SCORE: 40
ADLS_ACUITY_SCORE: 36
ADLS_ACUITY_SCORE: 36
ADLS_ACUITY_SCORE: 40
ADLS_ACUITY_SCORE: 36
ADLS_ACUITY_SCORE: 44
ADLS_ACUITY_SCORE: 36
ADLS_ACUITY_SCORE: 36
ADLS_ACUITY_SCORE: 44
ADLS_ACUITY_SCORE: 44
ADLS_ACUITY_SCORE: 36
ADLS_ACUITY_SCORE: 44
ADLS_ACUITY_SCORE: 44
ADLS_ACUITY_SCORE: 36
ADLS_ACUITY_SCORE: 45
ADLS_ACUITY_SCORE: 36
ADLS_ACUITY_SCORE: 40

## 2024-10-01 NOTE — PLAN OF CARE
Problem: Fall Injury Risk  Goal: Absence of Fall and Fall-Related Injury  Outcome: Progressing  Intervention: Identify and Manage Contributors  Recent Flowsheet Documentation  Taken 9/30/2024 1700 by Gricel Ray RN  Medication Review/Management: medications reviewed  Taken 9/30/2024 0900 by Gricel Ray RN  Medication Review/Management: medications reviewed  Intervention: Promote Injury-Free Environment  Recent Flowsheet Documentation  Taken 9/30/2024 1700 by Gricel Ray RN  Safety Promotion/Fall Prevention: activity supervised  Taken 9/30/2024 0900 by Gricel Ray RN  Safety Promotion/Fall Prevention: activity supervised     Problem: Pain Acute  Goal: Optimal Pain Control and Function  Outcome: Progressing  Intervention: Prevent or Manage Pain  Recent Flowsheet Documentation  Taken 9/30/2024 1700 by Gricel Ray RN  Sensory Stimulation Regulation: television on  Bowel Elimination Promotion: adequate fluid intake promoted  Medication Review/Management: medications reviewed  Taken 9/30/2024 0900 by Gricel Ray RN  Sensory Stimulation Regulation: television on  Bowel Elimination Promotion: adequate fluid intake promoted  Medication Review/Management: medications reviewed  Intervention: Optimize Psychosocial Wellbeing  Recent Flowsheet Documentation  Taken 9/30/2024 1700 by Gricel Ray RN  Spiritual Activities Assistance: personal rituals encouraged  Supportive Measures: active listening utilized  Taken 9/30/2024 0900 by Gricel Ray RN  Spiritual Activities Assistance: personal rituals encouraged  Supportive Measures: active listening utilized     Problem: Hemodialysis  Goal: Safe, Effective Therapy Delivery  Outcome: Progressing  Intervention: Optimize Device Care and Function  Recent Flowsheet Documentation  Taken 9/30/2024 1700 by Gricel Ray RN  Medication Review/Management: medications reviewed  Taken 9/30/2024 0900  by Gricel Ray RN  Medication Review/Management: medications reviewed  Goal: Effective Tissue Perfusion  Outcome: Progressing  Intervention: Optimize Blood Flow  Recent Flowsheet Documentation  Taken 9/30/2024 1700 by Gricel Ray RN  Stabilization Measures: legs elevated  Taken 9/30/2024 0900 by Gricel Ray RN  Stabilization Measures: legs elevated   Goal Outcome Evaluation:      Pt is AOX4, calm, pleasant and cooperative.       Her O2 was at 88% and so she was put on O2 at 2 LPM, she went up to 93% - 97% and she was weaned at 1 LPM.  Later on she was weaned off O2 and her O2 was at 94% on RA in the afternoon.       She c/o some discomfort on her right side when she breathes so CXR was ordered.  MD was updated of lab results for BMP, CBC and low HGB, Ferritin levels and CXR result with NNO.      She denied pain throughout the shift.  HD treatment ran for 3 hours and she was given Midodrine PRN at the start of HD treatment.  HD RN pulled 1L of fluid.  BP was stable post HD treatment.     Pt was continent of both bowel and bladder and had 1 soft BM this shift and with good urine output.  She is in bed, comfortable with no further complains at 1930.

## 2024-10-01 NOTE — PLAN OF CARE
Problem: Adult Inpatient Plan of Care  Goal: Optimal Comfort and Wellbeing  Intervention: Provide Person-Centered Care  Recent Flowsheet Documentation  Taken 10/1/2024 0300 by Rosita Raymundo RN  Trust Relationship/Rapport:   care explained   choices provided   questions answered   questions encouraged     Problem: Pain Acute  Goal: Optimal Pain Control and Function  Intervention: Optimize Psychosocial Wellbeing  Recent Flowsheet Documentation  Taken 10/1/2024 0300 by Rosita Raymundo RN  Spiritual Activities Assistance: personal rituals encouraged  Supportive Measures:   active listening utilized   self-care encouraged   self-reflection promoted     Problem: Hemodialysis  Goal: Safe, Effective Therapy Delivery  Intervention: Optimize Device Care and Function  Recent Flowsheet Documentation  Taken 10/1/2024 0300 by Rosita Raymundo RN  Medication Review/Management: medications reviewed   Patient is doing fine. Alert and oriented x 4, She denied being in pain. CVC, PICC are intact. BP low at bedtime and Metoprol was held. Patient complain of feeling cold and extra warm blankets give with good results (Patient said she gets cold always after dialysis  /55 (BP Location: Left arm, Patient Position: Semi-Bailon's, Cuff Size: Adult Regular)   Pulse 84   Temp 98.1  F (36.7  C) (Oral)   Resp 16   Wt 76.6 kg (168 lb 14 oz)   SpO2 94%   BMI 26.45 kg/m  .

## 2024-10-01 NOTE — PLAN OF CARE
Problem: Fall Injury Risk  Goal: Absence of Fall and Fall-Related Injury  Outcome: Progressing  Intervention: Promote Injury-Free Environment  Recent Flowsheet Documentation  Taken 10/1/2024 1300 by Chiquis Lance RN  Safety Promotion/Fall Prevention: activity supervised     Problem: Pain Acute  Goal: Optimal Pain Control and Function  Outcome: Progressing   Goal Outcome Evaluation: Patient was cooperative with cares this shift with fair appetite. No c/o pain so far.

## 2024-10-01 NOTE — PROGRESS NOTES
[facility] ([facility])  SPIRITUAL ASSESSMENT Progress Note  [facility]. Unit [facility]     REFERRAL SOURCE: Followup      Met briefly with Felicitas and her sister Marine. They had an extended visit so will followup on another day.      PLAN: [facility] will follow during Felicitas's hospitalization.      Dary Velazquez, Ph.D., [facility]      [facility] available 24/7 for emergency requests/referrals, either by having the on-call  paged or by entering an ASAP/STAT consult in Epic (this will also page the on-call ).

## 2024-10-01 NOTE — PROGRESS NOTES
[facility]    Medicine Progress Note - Hospitalist Service    Date of Admission:  9/16/2024    Felicitas Elliott is a 84 year old female admitted on 9/16/2024 to the LTAC for long-term antibiotics following MSSA bacteremia and endocarditis. 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  She was initially admitted to [facility] on 8/16/2024 for sepsis and HAMMAD.  She received empiric antibiotics on admission. Blood cultures grew MSSA.  Neurology was consulted for possible discitis.  ID was also consulted. Echo revealed large posterior leaflet mitral valve vegetation and small aortic valve vegetation with severe aortic stenosis.  Brain MRI revealed scattered embolic infarcts.  Coronary angiogram demonstrated coronary artery disease.  CT surgery was consulted. She underwent CABG alongside the procedures mentioned above.  She however has not had renal recovery.  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  She had dysphagia and momentarily required NG tube with tube feeds. She worked with speech therapy and is now cleared for regular diet and thin liquids.  CT surgery recommends warfarin for 3 months for bioprosthetic valves.  INR goal 2-3.  End date of warfarin approximately 12/10/2024, at which point she will be on Plavix alone (ASA allergy) indefinitely.  ID recommends IV vancomycin until 10/8/2024.    LTAC Course:  9/17 - 9/21.  Progressing well.  On Coumadin, initially bridging with heparin gtt. for bioprosthetic valve.  On arrival initial INR 1.49. Goal 2-3. Has been dialyzing for HAMMAD. 9/19, INR 2.19, heparin gtt discontinued. RD noted patient having trouble chewing and impedes her overall oral intake.  SLP consulted.    9/22-9/29: tolerating dialysis well. Pacer checked on 9/24, set at , top pacer is capturing 94%, bottom pacer is capturing 80%. Cardio did a televisit  with patient and recommend to decrease metoprolol by 1/2- changed from 25 to 12.5 bid. Added simethicone as patient has abdominal pain due to gas.    She is having neck pain -lidocaine and voltaren added.  Pharmacy to change medications around to have get getting less pills at the same time.  She complains that this contributes to a nausea.  On 9/29, BP is low, decreased toresimide dose from 100 to 50, but holding for now.  Also added prn midodrine.    9/30: Dialysis today. Patient BP has been running low but stable. Required midodrine prn. Will change metoprolol 12.5 mg po bid to metoprolol 12.5 mg XL daily. She remains on vancomycin IV for bacteremia  10/1. Progressing well. Continues with dialysis. Per speech is on regular texture diet and RD notes oral intake is improved. BP on the lower side but fairly well-controlled. Remains on IV vancomycin per ID for bacteremia.      BARRIERS TO DISCHARGE (why care is unable to be provided at a lower level of care):    -Long-term antibiotics.  -Dialysis    Assessment & Plan   -No new changes at this time.  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  Repeat blood cultures - 8/17/2024  -Mitral and aortic valve endocarditis as evidenced with large vegetation on mitral valve 8 mm x 15 at x 2 posterior leaflet of small vegetation on aortic valve.  With signs of vegetation combined with brain infarct.  S/p CV surgery  -Continue with IV vancomycin.  Dosing per pharmacy. Per ID last vancomycin dose will be after HD on 10/7  -Per ID will need to check immunoglobulin level in 4 to 6 weeks, sister with history of hypogammaglobinemia  -ID consulted and following  -Patient previously on heparin  5000 subQ every 8 hours and Coumadin.  Recommended INR goal is 2-3 per CT surgery.  Discontinued heparin 5000 units subcu every 8 hours on admission and started patient on heparin gtt. alongside Coumadin.  I also notified pharmacy and were agreeable with this plan.  -9/19, INR 2.19.  Discontinued heparin gtt. INR on goal  -Pharm.D. to continue dosing Coumadin   -INR 2.32 on 10/1    Peripheral neuropathy of b/l feet  -fredy-lidocaine-ketamine cream prn   -benadryl cream prn     Abnormal brain MRI suggestive of subacute infarct  Patient with a history of MSSA bacteremia and aortic valve endocarditis.  Most likely septic emboli.  -Continue with antibiotics per ID.  End date 10/8/2024    Acute respiratory failure with hypoxia  -Patient previously intubated.  Now extubated.    -Continue with oxygen support to maintain oxygenation above 92%.  Wean as tolerated  -RT while in LTAC    HAMMAD  -Previously on CCRT now transferred to HD  -Torsemide per nephrology  -HD per nephrology-MWF  -Monitor kidney function with BMP    CAD  A-fib  History of aortic stenosis  +pacemaker   Hypotension   -Fairly stable at this time.  -S/p CABG per CT surgery  -Continue with Plavix and statin  -Metoprolol- will decrease dose from 25 bid to 12.5 bid due to low BP on 9/24  -Previously on amiodarone for rate control, now s/p PPM  -pacer checked 9/23, set at , top pacer is capturing 94%, bottom pacer is capturing 80%.  -cardiothoracic surg tele appt- appreciate recs  -torsemide on hold on 9/29 due to hypotension  -prn midodrine     Neck pain due to arthritis  -lidocaine  -voltaren prn    Anemia of chronic disease  Thrombocytopenia   -monitor    Severe protein calorie malnutrition  -? esophogram  -RD following   -calorie counts    Physical deconditioning/critical illness myopathy  -Fall precautions.  -PT/OT  -WOC    Diet: Advance Diet as Tolerated: Regular Diet Adult  Snacks/Supplements Adult: Gelatein Plus; With Meals    DVT Prophylaxis:  Warfarin  Le Catheter: Not present  Lines: PRESENT      PICC 09/06/24 Triple Lumen Right Basilic Vascular access-Site Assessment: WDL  CVC Double Lumen Right Subclavian Tunneled-Site Assessment: Unable to visualize      Cardiac Monitoring: None  Code Status: No CPR- Do NOT Intubate      Clinically Significant Risk Factors              # Hypoalbuminemia: Lowest albumin = 3.1 g/dL at 9/19/2024  6:35 AM, will monitor as appropriate   # Thrombocytopenia: Lowest platelets = 124 in last 2 days, will monitor for bleeding              # Severe Malnutrition: based on nutrition assessment      # Financial/Environmental Concerns: none   # Pacemaker present  # History of CABG: noted on surgical history    Disposition Plan     Medically Ready for Discharge: Anticipated in 5+ Days    Adan Veloz MD  Hospitalist Service  [facility]  Securely message with [facility] (more info)  Text page via [employer] Paging/Directory   ______________________________________________________________________    Interval History   No new events overnight per RN.  Patient is in bed comfortably.  Daughter at bedside visiting.  She states she is doing well.  She remains on IV vancomycin for bacteremia    Physical Exam   Vital Signs: Temp: 97.8  F (36.6  C) Temp src: Oral BP: 100/55 Pulse: 87   Resp: 20 SpO2: 90 % O2 Device: None (Room air)   Weight: 168 lbs 13.96 oz  Constitutional: Patient is resting in bed comfortably appears in no distress.  Eyes: Pupils are equal round and reactive to light  Respiratory: Good respiratory effort, on auscultation good air entry is noted  Cardiovascular: Good S1 and S2 no obvious murmurs peripheral pulses are palpable  GI: Abdomen is soft nontender nondistended bowel sounds are noted  Skin: No obvious rashes noted on exposed areas, warm to touch please refer to nursing/wound care note for complete skin exam  Musculoskeletal: Good muscle tone nails and digits appear acyanotic  Neuropsychiatric: Awake alert oriented to person  normal affect    Medical Decision Making       52 MINUTES SPENT BY ME on the date of service doing chart review, history, exam, documentation & further activities per the note.  ------------------ MEDICAL DECISION MAKING ------------------------------------------------------------------------------------------------------  MANAGEMENT DISCUSSED with the following over the past 24 hours: Patient, patient's daughter in room, staff RN and pharmacist   NOTE(S)/MEDICAL RECORDS REVIEWED over the past 24 hours: RN       Data   Recent Labs   Lab 10/01/24  0625 09/30/24  0610 09/29/24  0614   WBC 5.4 5.8 5.4   HGB 7.9* 7.9* 7.7*   * 104* 107*   * 125* 129*   INR 2.32* 2.28* 2.50*    138 138   POTASSIUM 3.7 3.5 3.6   CHLORIDE 103 99 100   CO2 27 27 28   BUN 11.1 20.8 14.5   CR 2.43* 3.55* 3.08*   ANIONGAP 8 12 10   KAELYN 8.7* 8.3* 8.3*   GLC 99 93 97       Most Recent 3 CBC's:  Recent Labs   Lab Test 10/01/24  0625 09/30/24  0610 09/29/24  0614   WBC 5.4 5.8 5.4   HGB 7.9* 7.9* 7.7*   * 104* 107*   * 125* 129*     Most Recent 3 BMP's:  Recent Labs   Lab Test 10/01/24  0625 09/30/24  0610 09/29/24  0614    138 138   POTASSIUM 3.7 3.5 3.6   CHLORIDE 103 99 100   CO2 27 27 28   BUN 11.1 20.8 14.5   CR 2.43* 3.55* 3.08*   ANIONGAP 8 12 10   KAELYN 8.7* 8.3* 8.3*   GLC 99 93 97     Most Recent 2 LFT's:  Recent Labs   Lab Test 09/19/24  0635 09/18/24  0704   AST 36 34   ALT 31 29   ALKPHOS 78 75   BILITOTAL 0.7 0.7     Most Recent 3 INR's:  Recent Labs   Lab Test 10/01/24  0625 09/30/24  0610 09/29/24  0614   INR 2.32* 2.28* 2.50*

## 2024-10-02 ENCOUNTER — APPOINTMENT (OUTPATIENT)
Dept: SPEECH THERAPY | Facility: CLINIC | Age: 84
DRG: 288 | End: 2024-10-02
Attending: HOSPITALIST
Payer: COMMERCIAL

## 2024-10-02 ENCOUNTER — APPOINTMENT (OUTPATIENT)
Dept: OCCUPATIONAL THERAPY | Facility: CLINIC | Age: 84
DRG: 288 | End: 2024-10-02
Attending: HOSPITALIST
Payer: COMMERCIAL

## 2024-10-02 ENCOUNTER — APPOINTMENT (OUTPATIENT)
Dept: PHYSICAL THERAPY | Facility: CLINIC | Age: 84
DRG: 288 | End: 2024-10-02
Attending: HOSPITALIST
Payer: COMMERCIAL

## 2024-10-02 LAB
ANION GAP SERPL CALCULATED.3IONS-SCNC: 9 MMOL/L (ref 7–15)
BUN SERPL-MCNC: 18.9 MG/DL (ref 8–23)
CALCIUM SERPL-MCNC: 8.5 MG/DL (ref 8.8–10.4)
CHLORIDE SERPL-SCNC: 103 MMOL/L (ref 98–107)
CREAT SERPL-MCNC: 3.22 MG/DL (ref 0.51–0.95)
EGFRCR SERPLBLD CKD-EPI 2021: 14 ML/MIN/1.73M2
ERYTHROCYTE [DISTWIDTH] IN BLOOD BY AUTOMATED COUNT: 18.4 % (ref 10–15)
GLUCOSE SERPL-MCNC: 89 MG/DL (ref 70–99)
HBV CORE AB SERPL QL IA: NONREACTIVE
HBV SURFACE AB SERPL IA-ACNC: 21.7 M[IU]/ML
HBV SURFACE AB SERPL IA-ACNC: REACTIVE M[IU]/ML
HBV SURFACE AG SERPL QL IA: NONREACTIVE
HCO3 SERPL-SCNC: 26 MMOL/L (ref 22–29)
HCT VFR BLD AUTO: 24.7 % (ref 35–47)
HGB BLD-MCNC: 7.5 G/DL (ref 11.7–15.7)
INR PPP: 2.41 (ref 0.85–1.15)
MCH RBC QN AUTO: 31.6 PG (ref 26.5–33)
MCHC RBC AUTO-ENTMCNC: 30.4 G/DL (ref 31.5–36.5)
MCV RBC AUTO: 104 FL (ref 78–100)
PLATELET # BLD AUTO: 125 10E3/UL (ref 150–450)
POTASSIUM SERPL-SCNC: 3.8 MMOL/L (ref 3.4–5.3)
RBC # BLD AUTO: 2.37 10E6/UL (ref 3.8–5.2)
SODIUM SERPL-SCNC: 138 MMOL/L (ref 135–145)
VANCOMYCIN SERPL-MCNC: 19 UG/ML
WBC # BLD AUTO: 6.1 10E3/UL (ref 4–11)

## 2024-10-02 PROCEDURE — 250N000013 HC RX MED GY IP 250 OP 250 PS 637: Performed by: HOSPITALIST

## 2024-10-02 PROCEDURE — 87340 HEPATITIS B SURFACE AG IA: CPT

## 2024-10-02 PROCEDURE — 85610 PROTHROMBIN TIME: CPT | Performed by: PHYSICIAN ASSISTANT

## 2024-10-02 PROCEDURE — 258N000003 HC RX IP 258 OP 636: Performed by: HOSPITALIST

## 2024-10-02 PROCEDURE — 97530 THERAPEUTIC ACTIVITIES: CPT | Mod: GP

## 2024-10-02 PROCEDURE — 92526 ORAL FUNCTION THERAPY: CPT | Mod: GN | Performed by: SPEECH-LANGUAGE PATHOLOGIST

## 2024-10-02 PROCEDURE — 250N000011 HC RX IP 250 OP 636: Performed by: HOSPITALIST

## 2024-10-02 PROCEDURE — 97535 SELF CARE MNGMENT TRAINING: CPT | Mod: GO | Performed by: OCCUPATIONAL THERAPIST

## 2024-10-02 PROCEDURE — 90935 HEMODIALYSIS ONE EVALUATION: CPT

## 2024-10-02 PROCEDURE — 250N000011 HC RX IP 250 OP 636: Performed by: PHYSICIAN ASSISTANT

## 2024-10-02 PROCEDURE — 86706 HEP B SURFACE ANTIBODY: CPT

## 2024-10-02 PROCEDURE — 120N000017 HC R&B RESPIRATORY CARE

## 2024-10-02 PROCEDURE — 80048 BASIC METABOLIC PNL TOTAL CA: CPT | Performed by: PHYSICIAN ASSISTANT

## 2024-10-02 PROCEDURE — 97110 THERAPEUTIC EXERCISES: CPT | Mod: GO | Performed by: OCCUPATIONAL THERAPIST

## 2024-10-02 PROCEDURE — 250N000013 HC RX MED GY IP 250 OP 250 PS 637: Performed by: PHYSICIAN ASSISTANT

## 2024-10-02 PROCEDURE — 99232 SBSQ HOSP IP/OBS MODERATE 35: CPT

## 2024-10-02 PROCEDURE — 97116 GAIT TRAINING THERAPY: CPT | Mod: GP

## 2024-10-02 PROCEDURE — 86704 HEP B CORE ANTIBODY TOTAL: CPT

## 2024-10-02 PROCEDURE — 82435 ASSAY OF BLOOD CHLORIDE: CPT | Performed by: PHYSICIAN ASSISTANT

## 2024-10-02 PROCEDURE — 97110 THERAPEUTIC EXERCISES: CPT | Mod: GP

## 2024-10-02 PROCEDURE — 80202 ASSAY OF VANCOMYCIN: CPT | Performed by: HOSPITALIST

## 2024-10-02 PROCEDURE — 99233 SBSQ HOSP IP/OBS HIGH 50: CPT | Performed by: HOSPITALIST

## 2024-10-02 RX ORDER — WARFARIN SODIUM 2 MG/1
2 TABLET ORAL
Status: COMPLETED | OUTPATIENT
Start: 2024-10-02 | End: 2024-10-02

## 2024-10-02 RX ADMIN — WARFARIN SODIUM 2 MG: 2 TABLET ORAL at 18:12

## 2024-10-02 RX ADMIN — Medication 1 TABLET: at 11:41

## 2024-10-02 RX ADMIN — Medication 0.5 G: at 20:55

## 2024-10-02 RX ADMIN — SIMETHICONE 80 MG: 80 TABLET, CHEWABLE ORAL at 09:29

## 2024-10-02 RX ADMIN — Medication 0.5 G: at 09:37

## 2024-10-02 RX ADMIN — CLOPIDOGREL 75 MG: 75 TABLET ORAL at 09:37

## 2024-10-02 RX ADMIN — METOPROLOL SUCCINATE 12.5 MG: 25 TABLET, EXTENDED RELEASE ORAL at 20:53

## 2024-10-02 RX ADMIN — Medication 1 CAPSULE: at 09:29

## 2024-10-02 RX ADMIN — LIDOCAINE 1 PATCH: 4 PATCH TOPICAL at 09:30

## 2024-10-02 RX ADMIN — SIMETHICONE 80 MG: 80 TABLET, CHEWABLE ORAL at 20:54

## 2024-10-02 RX ADMIN — Medication 1 CAPSULE: at 20:54

## 2024-10-02 RX ADMIN — SIMETHICONE 80 MG: 80 TABLET, CHEWABLE ORAL at 13:05

## 2024-10-02 RX ADMIN — VANCOMYCIN HYDROCHLORIDE 1000 MG: 5 INJECTION, POWDER, LYOPHILIZED, FOR SOLUTION INTRAVENOUS at 16:22

## 2024-10-02 RX ADMIN — PANTOPRAZOLE SODIUM 40 MG: 40 TABLET, DELAYED RELEASE ORAL at 09:29

## 2024-10-02 RX ADMIN — ONDANSETRON HYDROCHLORIDE 8 MG: 4 TABLET, FILM COATED ORAL at 08:22

## 2024-10-02 RX ADMIN — Medication 5 MG: at 20:54

## 2024-10-02 RX ADMIN — DOCUSATE SODIUM AND SENNOSIDES 1 TABLET: 8.6; 5 TABLET, FILM COATED ORAL at 20:53

## 2024-10-02 RX ADMIN — MIDODRINE HYDROCHLORIDE 10 MG: 5 TABLET ORAL at 13:05

## 2024-10-02 RX ADMIN — ONDANSETRON HYDROCHLORIDE 8 MG: 4 TABLET, FILM COATED ORAL at 19:19

## 2024-10-02 RX ADMIN — ATORVASTATIN CALCIUM 80 MG: 40 TABLET, FILM COATED ORAL at 20:54

## 2024-10-02 RX ADMIN — Medication 0.5 G: at 14:33

## 2024-10-02 ASSESSMENT — ACTIVITIES OF DAILY LIVING (ADL)
ADLS_ACUITY_SCORE: 40
ADLS_ACUITY_SCORE: 40
ADLS_ACUITY_SCORE: 32
ADLS_ACUITY_SCORE: 40
ADLS_ACUITY_SCORE: 32
ADLS_ACUITY_SCORE: 40
ADLS_ACUITY_SCORE: 32
ADLS_ACUITY_SCORE: 40
ADLS_ACUITY_SCORE: 40
ADLS_ACUITY_SCORE: 32
ADLS_ACUITY_SCORE: 40
ADLS_ACUITY_SCORE: 32

## 2024-10-02 NOTE — PLAN OF CARE
Problem: Adult Inpatient Plan of Care  Goal: Plan of Care Review  Description: The Plan of Care Review/Shift note should be completed every shift.  The Outcome Evaluation is a brief statement about your assessment that the patient is improving, declining, or no change.  This information will be displayed automatically on your shift  note.  Outcome: Progressing  Goal: Absence of Hospital-Acquired Illness or Injury  Intervention: Identify and Manage Fall Risk  Recent Flowsheet Documentation  Taken 10/2/2024 0030 by Martha Bowman RN  Safety Promotion/Fall Prevention:   activity supervised   room near nurse's station  Intervention: Prevent Skin Injury  Recent Flowsheet Documentation  Taken 10/2/2024 0030 by Martha Bowman RN  Device Skin Pressure Protection: absorbent pad utilized/changed  Goal: Optimal Comfort and Wellbeing  Intervention: Provide Person-Centered Care  Recent Flowsheet Documentation  Taken 10/2/2024 0030 by Martha Bowman RN  Trust Relationship/Rapport:   care explained   choices provided     Problem: Pain Acute  Goal: Optimal Pain Control and Function  Outcome: Progressing  Intervention: Prevent or Manage Pain  Recent Flowsheet Documentation  Taken 10/2/2024 0030 by Martha Bowman RN  Sensory Stimulation Regulation:   lighting decreased   quiet environment promoted   television on  Sleep/Rest Enhancement: awakenings minimized  Bowel Elimination Promotion: adequate fluid intake promoted  Medication Review/Management: medications reviewed  Intervention: Optimize Psychosocial Wellbeing  Recent Flowsheet Documentation  Taken 10/2/2024 0030 by Martha Bowman RN  Supportive Measures: active listening utilized     Problem: Hemodialysis  Goal: Safe, Effective Therapy Delivery  Intervention: Optimize Device Care and Function  Recent Flowsheet Documentation  Taken 10/2/2024 0030 by Martha Bowman RN  Medication Review/Management: medications reviewed  Goal: Effective Tissue Perfusion  Intervention:  Optimize Blood Flow  Recent Flowsheet Documentation  Taken 10/2/2024 0030 by Martha Bowman RN  Stabilization Measures: legs elevated     Problem: Nausea and Vomiting  Goal: Nausea and Vomiting Relief  Outcome: Progressing       Goal Outcome Evaluation:       Patient was alert and oriented. She denies pain or discomfort. No nausea or vomiting reported. Hemodialysis patient. Pt was cooperative. She slept through the night. Skin; dry, clean and intact. VSS.     BP 96/54 (BP Location: Left arm, Cuff Size: Adult Regular)   Pulse 84   Temp 98.4  F (36.9  C) (Oral)   Resp 18   Wt 76.7 kg (169 lb 1.5 oz)   SpO2 94%   BMI 26.48 kg/m

## 2024-10-02 NOTE — PROGRESS NOTES
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 HAMMAD Lennie-op cardiac surgery. On MWF IDH, via  R CVC placed 9/11  Interdialytic rise in sCR persists, making some urine on Torsemidevolume unclear as she is incont,  avg 1-1.5 UF with HD. EDW ? 78 kg range On CRRT 8/29/24-9/8/24, Transition to iHD 9/9/24. Baseline CKD attributed to longstanding NSAID use, historical baseline Cr 1.3-1.8; proteinuria. Previously seen by Dr Anaya with [facility]. Prior extensive serologic testing was negative. Recent cystatin C in line with standard sCR of 4, eGFR 10. Imaging with left renal cortical thinning ~8cm kidney , rt 9.7cm kideny with simple cysts (4/2024). Inable to collect 24 hours Urine with incontinence. Midodrine as needed for BP support. Continue MWF HD schedule while here, follow for recovery. Continues to have IDWGs, and Rise in Cr.      Volume:Pt reports good urination, wt stable. ~UF 1-2 kg with dialysis      BP:on BB for Afib. + soft BPs. Has Midodrine and Albumin PRN BP support on dialysis     Macrocytic Anemia:hg in 7s  suspect 2/2 ACD, +/- infection/inflammation. On QW HILARY, 20K. Avoiding iron with active infection. Check b12/folic acid.      MSSA bacteremia/Prosthetic valve: IV vanco. Warfarin, INR goal 2-3     Respiratory Failure: Previously intubated, now extubated. On oxygen with goals to wean.      CAD, Afib: S/P CABG, valve surgery. On BB     HLD: statin        SUBJECTIVE:    Pt sitting in bed, appears comfortable  Denies pain, N, v, c, d, sob, fever, rash or CP  No edema  Pt tolerating HD therapy well  1.0 L UF with HD Monday. Plan for dialysis again today  Cr continues to rise between dialysis therapies, suspect she still needs HD. Could consider a 24 Cr Cl if seeing recovery  Wt stable ~76-78  OBJECTIVE:  Physical Exam   Temp: 98.1  F (36.7  C) Temp src: Oral BP: 104/56 Pulse: 77   Resp: 22 SpO2: (!) 88 % O2 Device: None (Room air)    Vitals:     09/29/24 0513 09/30/24 0100 10/02/24 0221   Weight: 77 kg (169 lb 12.1 oz) 76.6 kg (168 lb 14 oz) 76.7 kg (169 lb 1.5 oz)     Vital Signs with Ranges  Temp:  [98.1  F (36.7  C)-98.5  F (36.9  C)] 98.1  F (36.7  C)  Pulse:  [72-84] 77  Resp:  [18-22] 22  BP: ()/(54-59) 104/56  SpO2:  [88 %-96 %] 88 %  I/O last 3 completed shifts:  In: 380 [P.O.:320; I.V.:60]  Out: -       Patient Vitals for the past 72 hrs:   Weight   10/02/24 0221 76.7 kg (169 lb 1.5 oz)   09/30/24 0100 76.6 kg (168 lb 14 oz)     Intake/Output Summary (Last 24 hours) at 10/2/2024 0842  Last data filed at 10/1/2024 2114  Gross per 24 hour   Intake 380 ml   Output --   Net 380 ml       PHYSICAL EXAM:  GEN: NAD, aox3  CV: RRR   Lung: clear and equa, on 2L NC  Ab: soft and NT; not distended; normal bs  Ext: + LLE BL and well perfused  Skin: no rash  : poor documentation of UO, incontinent  Wt 76-78, most recently.        LABORATORY STUDIES:     Recent Labs   Lab 10/02/24  0605 10/01/24  0625 09/30/24  0610 09/29/24  0614 09/28/24  0557 09/27/24  0616   WBC 6.1 5.4 5.8 5.4 5.1 5.7   RBC 2.37* 2.57* 2.46* 2.29* 2.23* 2.37*   HGB 7.5* 7.9* 7.9* 7.7* 7.7* 7.7*   HCT 24.7* 26.8* 25.5* 24.4* 23.8* 25.0*   * 124* 125* 129* 122* 125*       Basic Metabolic Panel:  Recent Labs   Lab 10/02/24  0605 10/01/24  0625 09/30/24  0610 09/29/24  0614 09/28/24  0557 09/27/24  0616    138 138 138 138 137   POTASSIUM 3.8 3.7 3.5 3.6 3.5 3.6   CHLORIDE 103 103 99 100 102 99   CO2 26 27 27 28 29 30*   BUN 18.9 11.1 20.8 14.5 8.0 17.5   CR 3.22* 2.43* 3.55* 3.08* 2.09* 2.92*   GLC 89 99 93 97 94 90   KAELYN 8.5* 8.7* 8.3* 8.3* 8.7* 8.4*       INR  Recent Labs   Lab 10/02/24  0605 10/01/24  0625 09/30/24  0610 09/29/24  0614   INR 2.41* 2.32* 2.28* 2.50*        Recent Labs   Lab Test 10/02/24  0605 10/01/24  0625   INR 2.41* 2.32*   WBC 6.1 5.4   HGB 7.5* 7.9*   * 124*       Personally reviewed current labs    Maisha YODERP-BC  [facility]  [phone]

## 2024-10-02 NOTE — PLAN OF CARE
Problem: Adult Inpatient Plan of Care  Goal: Plan of Care Review  Description: The Plan of Care Review/Shift note should be completed every shift.  The Outcome Evaluation is a brief statement about your assessment that the patient is improving, declining, or no change.  This information will be displayed automatically on your shift  note.  Outcome: Progressing     Problem: Pain Acute  Goal: Optimal Pain Control and Function  Outcome: Progressing  Intervention: Prevent or Manage Pain  Recent Flowsheet Documentation  Taken 10/2/2024 0930 by Ede Parnell RN  Bowel Elimination Promotion: adequate fluid intake promoted  Medication Review/Management: medications reviewed  Intervention: Optimize Psychosocial Wellbeing  Recent Flowsheet Documentation  Taken 10/2/2024 0930 by Ede Parnell RN  Spiritual Activities Assistance: personal rituals encouraged  Supportive Measures: active listening utilized     Problem: Nausea and Vomiting  Goal: Nausea and Vomiting Relief  Outcome: Progressing  Intervention: Prevent and Manage Nausea and Vomiting  Recent Flowsheet Documentation  Taken 10/2/2024 0930 by Ede Parnell RN  Environmental Support: calm environment promoted   Goal Outcome Evaluation:Patient denied of pain, she was feeling nauseated this am so zofran was given before scheduled am meds with effectiveness, no emesis. She has a dark red area on left buttock, left message to wound nurse to assess. VSS this am. Picc line is patent and intact.. patient reported had 2 bm so she didn't want senna and it's held.  Patient is on dialysis, bp at 1300 was 81/50, midrodrine 10 mg given, rechecked bp for 93/51.  visited. Explained meds and care plan to patient.

## 2024-10-02 NOTE — PROGRESS NOTES
Care Management Follow Up    Length of Stay (days): 16    Expected Discharge Date: 10/15/2024     Concerns to be Addressed: discharge planning     Patient plan of care discussed at interdisciplinary rounds: Yes    Anticipated Discharge Disposition:  [facility] is considering patient, but patient states she wants to go home.       Anticipated Discharge Services:  If patient doesn't go to rehab, she could go to OP dialysis in [place] and have home care nursing and PT.  She is on warfarin for DVT,  Anticipated Discharge DME:      Patient/family educated on Medicare website which has current facility and service quality ratings:    Education Provided on the Discharge Plan:    Patient/Family in Agreement with the Plan:  to be discussed at care conference    Referrals Placed by CM/SW:    Private pay costs discussed: Not applicable    Discussed  Partnership in Safe Discharge Planning  document with patient/family: Yes:    Handoff Completed: No, handoff not indicated or clinically appropriate    Additional Information:  Patient was discussed with the interdisciplinary team this week.  Reason patient is not able to discharge to a lower level of care:  Patient is doing well, but still quite fatigued after therapy.  ST has discharged patient as she is now on regular texture/thin liquids. She will complete IV antibiotics on 10/7/24 after dialysis run      Next Steps: Care conference on Monday 10/7/24 at 2 pm; will need to set up OP dialysis if she goes home.  EDD Pratt did ask Nephrology team to order labs that are needed with dialysis referral.    Leisa Hughes, RN, BSN  Care Coordination  [facility]  [phone]

## 2024-10-02 NOTE — PLAN OF CARE
Goal Outcome Evaluation:         Pt VSS. Alert and oriented x 4.  Complains of pain 0/10.  Denies nausea, dizziness, shortness of breath and lightheadedness.  On RA.  Adequate intake and output. On regular diet. Last BM: 10/1/24. Continent of bowel and bladder.  Pt has a red spot on her lower left buttocks, turn pt side to side to prevent skin break.  No PRN was given. Uses call light appropriately.  Very pleasant.  Continue to monitor.     Rose Becker RN  [facility]

## 2024-10-02 NOTE — PLAN OF CARE
Speech Language Therapy Discharge Summary    Reason for therapy discharge:    All goals and outcomes met, no further needs identified.    Progress towards therapy goal(s). See goals on Care Plan in Epic electronic health record for goal details.  Goals met    Therapy recommendation(s):    No further therapy is recommended. Patient is discharged from speech therapy with recommendations for diet of regular textures and thin liquids, selecting items that she can adequately chew, with good energy conservation, including taking breaks between bites, alternating bites and sips, liquids prior to food to improve viscosity.

## 2024-10-02 NOTE — PHARMACY-VANCOMYCIN DOSING SERVICE
Pharmacy Vancomycin Note  Date of Service 2024  Patient's  1940   84 year old, female    Indication: Endocarditis  Day of Therapy:   Started , planning therapy through 10/8    Current vancomycin regimen:  Intermittent dosing   Current vancomycin monitoring method: Renal Replacement Therapy  Current vancomycin therapeutic monitoring goal: 15-20 mg/L    Current estimated CrCl = Estimated Creatinine Clearance: 13.9 mL/min (A) (based on SCr of 3.22 mg/dL (H)).    Creatinine for last 3 days  2024:  6:10 AM Creatinine 3.55 mg/dL  10/1/2024:  6:25 AM Creatinine 2.43 mg/dL  10/2/2024:  6:05 AM Creatinine 3.22 mg/dL    Recent Vancomycin Levels (past 3 days)  2024:  6:10 AM Vancomycin 14.9 ug/mL  10/2/2024:  6:05 AM Vancomycin 19.0 ug/mL    Vancomycin IV Administrations (past 72 hours)                     vancomycin (VANCOCIN) 1,250 mg in sodium chloride 0.9 % 250 mL intermittent infusion (mg) 1,250 mg New Bag 24 1610                    Nephrotoxins and other renal medications (From now, onward)      Start     Dose/Rate Route Frequency Ordered Stop    24 0900  [Held by provider]  torsemide (DEMADEX) half-tab 50 mg        (On hold since Sun 2024 at 1001 until manually unheld; held by ServatiJosephine okeefe MDHold Reason: Change in Vitals)    50 mg Oral DAILY 24 1000      24 1254  vancomycin place horne - receiving intermittent dosing         1 each Intravenous SEE ADMIN INSTRUCTIONS 24 1254 10/08/24 0100               Contrast Orders - past 72 hours (72h ago, onward)      None            Interpretation of levels and current regimen:  Vancomycin level is reflective of therapeutic level    Has serum creatinine changed greater than 50% in last 72 hours: On HD MWF    Urine output:  unable to determine    Renal Function: ARF on Dialysis        Plan:  Give Vanco 1000mg once, (12mg/kg = 920mg, rounded to 1000mg)  Vancomycin monitoring method: Renal Replacement  Therapy  Vancomycin therapeutic monitoring goal: 15-20 mg/L  Pharmacy will check vancomycin level prior to HD on Friday   Serum creatinine levels will be ordered with dialysis .    Kisha Rodriguez, RPh,  BCCP, BPS

## 2024-10-02 NOTE — PROGRESS NOTES
[facility]    [facility] - Hospitalist Service    Date of Admission:  9/16/2024    Felicitas Elliott is a 84 year old female admitted on 9/16/2024 to the LTAC for long-term antibiotics following MSSA bacteremia and endocarditis. 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  She was initially admitted to [facility] on 8/16/2024 for sepsis and HAMMAD.  She received empiric antibiotics on admission. Blood cultures grew MSSA.  Neurology was consulted for possible discitis.  ID was also consulted. Echo revealed large posterior leaflet mitral valve vegetation and small aortic valve vegetation with severe aortic stenosis.  Brain MRI revealed scattered embolic infarcts.  Coronary angiogram demonstrated coronary artery disease.  CT surgery was consulted. She underwent CABG alongside the procedures mentioned above.  She however has not had renal recovery.  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  She had dysphagia and momentarily required NG tube with tube feeds. She worked with speech therapy and is now cleared for regular diet and thin liquids.  CT surgery recommends warfarin for 3 months for bioprosthetic valves.  INR goal 2-3.  End date of warfarin approximately 12/10/2024, at which point she will be on Plavix alone (ASA allergy) indefinitely.  ID recommends IV vancomycin until 10/8/2024.    LTAC Course:  9/17 - 9/21.  Progressing well.  On Coumadin, initially bridging with heparin gtt. for bioprosthetic valve.  On arrival initial INR 1.49. Goal 2-3. Has been dialyzing for HAMMAD. 9/19, INR 2.19, heparin gtt discontinued. RD noted patient having trouble chewing and impedes her overall oral intake.  SLP consulted.    9/22-9/29: tolerating dialysis well. Pacer checked on 9/24, set at , top pacer is capturing 94%, bottom pacer is capturing 80%. Cardio did a televisit  with patient and recommend to decrease metoprolol by 1/2- changed from 25 to 12.5 bid. Added simethicone as patient has abdominal pain due to gas.    She is having neck pain -lidocaine and voltaren added.  Pharmacy to change medications around to have get getting less pills at the same time.  She complains that this contributes to a nausea.  On 9/29, BP is low, decreased toresimide dose from 100 to 50, but holding for now.  Also added prn midodrine.    9/30: Dialysis today. Patient BP has been running low but stable. Required midodrine prn. Will change metoprolol 12.5 mg po bid to metoprolol 12.5 mg XL daily. She remains on vancomycin IV for bacteremia  10/1. Progressing well. Continues with dialysis. Per speech is on regular texture diet and RD notes oral intake is improved. BP on the lower side but fairly well-controlled. Remains on IV vancomycin per ID for bacteremia.  10/2.  Continues to make progress but remains weak.  On hemodialysis and remains on IV antibiotics for bacteremia.  Plan to continue current medical management.  No new changes at this time.    BARRIERS TO DISCHARGE (why care is unable to be provided at a lower level of care):    -Long-term antibiotics.  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  Repeat blood cultures - 8/17/2024  -Mitral and aortic valve endocarditis as evidenced with large vegetation on mitral valve 8 mm x 15 at x 2 posterior leaflet of small vegetation on aortic valve.  With signs of vegetation combined with brain infarct.  S/p CV surgery  -Continue with IV vancomycin.  Dosing per pharmacy. Per ID last vancomycin dose will be after HD on 10/7  -Per ID will need to check immunoglobulin  level in 4 to 6 weeks, sister with history of hypogammaglobinemia  -ID consulted and following  -Patient previously on heparin 5000 subQ every 8 hours and Coumadin.  Recommended INR goal is 2-3 per CT surgery.  Discontinued heparin 5000 units subcu every 8 hours on admission and started patient on heparin gtt. alongside Coumadin.  I also notified pharmacy and were agreeable with this plan.  -9/19, INR 2.19.  Discontinued heparin gtt. INR on goal  -Pharm.D. to continue dosing Coumadin   -INR 2.41 on 10/2    Peripheral neuropathy of b/l feet  -fredy-lidocaine-ketamine cream prn   -benadryl cream prn     Abnormal brain MRI suggestive of subacute infarct  Patient with a history of MSSA bacteremia and aortic valve endocarditis.  Most likely septic emboli.  -Continue with antibiotics per ID.  End date 10/8/2024    Acute respiratory failure with hypoxia  -Patient previously intubated.  Now extubated.    -Continue with oxygen support to maintain oxygenation above 92%.  Wean as tolerated  -RT while in LTAC    HAMMAD  -Previously on CCRT now transferred to HD  -Torsemide per nephrology  -HD per nephrology-MWF  -Monitor kidney function with BMP    CAD  A-fib  History of aortic stenosis  +pacemaker   Hypotension   -Fairly stable at this time.  -S/p CABG per CT surgery  -Continue with Plavix and statin  -Metoprolol- will decrease dose from 25 bid to 12.5 bid due to low BP on 9/24  -Previously on amiodarone for rate control, now s/p PPM  -pacer checked 9/23, set at , top pacer is capturing 94%, bottom pacer is capturing 80%.  -cardiothoracic surg tele appt- appreciate recs  -torsemide on hold on 9/29 due to hypotension  -prn midodrine     Neck pain due to arthritis  -lidocaine  -voltaren prn    Anemia of chronic disease  Thrombocytopenia   -monitor    Severe protein calorie malnutrition  -? esophogram  -RD following   -calorie counts    Physical deconditioning/critical illness myopathy  -Fall precautions.  -PT/OT  -WOC    Diet:  Advance Diet as Tolerated: Regular Diet Adult  Snacks/Supplements Adult: Gelatein Plus; With Meals    DVT Prophylaxis: Warfarin  Le Catheter: Not present  Lines: PRESENT      PICC 09/06/24 Triple Lumen Right Basilic Vascular access-Site Assessment: WDL  CVC Double Lumen Right Subclavian Tunneled-Site Assessment: WDL      Cardiac Monitoring: None  Code Status: No CPR- Do NOT Intubate      Clinically Significant Risk Factors              # Hypoalbuminemia: Lowest albumin = 3.1 g/dL at 9/19/2024  6:35 AM, will monitor as appropriate   # Thrombocytopenia: Lowest platelets = 124 in last 2 days, will monitor for bleeding              # Severe Malnutrition: based on nutrition assessment      # Financial/Environmental Concerns: none   # Pacemaker present  # History of CABG: noted on surgical history    Disposition Plan     Medically Ready for Discharge: Anticipated in 5+ Days    Adan Veloz MD  Hospitalist Service  [facility]  Securely message with [employer] (more info)  Text page via [employer] Paging/Directory   ______________________________________________________________________    Interval History   Patient is in bed comfortably.  at bedside. She states she is progressing well but remains weak. She is on oxygen per nasal canula but reports when the pulse oximetry is placed on the lobe of her ear, her saturations are good. However, her fingers give low oxygen saturations, she state she has neuropathy .    Physical Exam   Vital Signs: Temp: 98.1  F (36.7  C) Temp src: Oral BP: 104/56 Pulse: 77   Resp: 22 SpO2: (!) 88 % O2 Device: None (Room air)   Weight: 169 lbs 1.49 oz  Constitutional: Patient is resting in bed comfortably appears in no distress.  Eyes: Pupils are equal round and reactive to light  Respiratory: Good respiratory effort, on auscultation good air entry is noted  Cardiovascular: Good S1 and S2 no obvious murmurs peripheral pulses are palpable  GI: Abdomen is soft nontender nondistended bowel sounds are  noted  Skin: No obvious rashes noted on exposed areas, warm to touch please refer to nursing/wound care note for complete skin exam  Musculoskeletal: Good muscle tone nails and digits appear acyanotic  Neuropsychiatric: Awake alert oriented to person normal affect    Medical Decision Making       50 MINUTES SPENT BY ME on the date of service doing chart review, history, exam, documentation & further activities per the note.  ------------------ MEDICAL DECISION MAKING ------------------------------------------------------------------------------------------------------  MANAGEMENT DISCUSSED with the following over the past 24 hours: Patient, patient's daughter in room, staff RN and pharmacist   NOTE(S)/MEDICAL RECORDS REVIEWED over the past 24 hours: RN       Data   Recent Labs   Lab 10/02/24  0605 10/01/24  0625 09/30/24  0610   WBC 6.1 5.4 5.8   HGB 7.5* 7.9* 7.9*   * 104* 104*   * 124* 125*   INR 2.41* 2.32* 2.28*    138 138   POTASSIUM 3.8 3.7 3.5   CHLORIDE 103 103 99   CO2 26 27 27   BUN 18.9 11.1 20.8   CR 3.22* 2.43* 3.55*   ANIONGAP 9 8 12   KAELYN 8.5* 8.7* 8.3*   GLC 89 99 93       Most Recent 3 CBC's:  Recent Labs   Lab Test 10/02/24  0605 10/01/24  0625 09/30/24  0610   WBC 6.1 5.4 5.8   HGB 7.5* 7.9* 7.9*   * 104* 104*   * 124* 125*     Most Recent 3 BMP's:  Recent Labs   Lab Test 10/02/24  0605 10/01/24  0625 09/30/24  0610    138 138   POTASSIUM 3.8 3.7 3.5   CHLORIDE 103 103 99   CO2 26 27 27   BUN 18.9 11.1 20.8   CR 3.22* 2.43* 3.55*   ANIONGAP 9 8 12   KAELYN 8.5* 8.7* 8.3*   GLC 89 99 93     Most Recent 2 LFT's:  Recent Labs   Lab Test 09/19/24  0635 09/18/24  0704   AST 36 34   ALT 31 29   ALKPHOS 78 75   BILITOTAL 0.7 0.7     Most Recent 3 INR's:  Recent Labs   Lab Test 10/02/24  0605 10/01/24  0625 09/30/24  0610   INR 2.41* 2.32* 2.28*

## 2024-10-02 NOTE — PROGRESS NOTES
Date: 10/2/2024  Time:1530     Data:  Pre Wt:   81.1 kg (bed weight)  Desired Wt:   To be established  Post Wt:  80.8 kg (bed weight)  Weight change:  0.3 kg  Ultrafiltration - Post Run Net Total Removed (mL):  0 ml  Vascular Access Status: CVC patent  Dialyzer Rinse:  Light  Total Blood Volume Processed: 69.6 L   Total Dialysis (Treatment) Time:   3 Hrs  Dialysate Bath: K 3, Ca 2.25  Heparin: Heparin: None     Lab:   No     Interventions:  Seen and examined by MD Maisha. Ordered to pull 0-0.5L due to improving output.  Pt.dialyzed for 3 hours via right CVC  UF set to 0.3 Liters, accommodating  priming and rinse back volumes  ,   No lab drawn  PRN Midodrine given PCN  See flowsheet for crit-line and notes  CVC dressing changed aseptically  CVC lumen flushed with saline and locked with saline  Cvc clear guard changed post HD  Report given to PCN, remain to [place] [id number] room in stable condition     Assessment:  A/O x 4, calm and cooperative, denies pain  CVC dressing is clean,dry and intact                Plan:    Per Renal team    Steve Meyer, JAMN, RN  Acute Dialysis RN  [facility]

## 2024-10-03 ENCOUNTER — APPOINTMENT (OUTPATIENT)
Dept: OCCUPATIONAL THERAPY | Facility: CLINIC | Age: 84
DRG: 288 | End: 2024-10-03
Attending: HOSPITALIST
Payer: COMMERCIAL

## 2024-10-03 ENCOUNTER — APPOINTMENT (OUTPATIENT)
Dept: PHYSICAL THERAPY | Facility: CLINIC | Age: 84
DRG: 288 | End: 2024-10-03
Attending: HOSPITALIST
Payer: COMMERCIAL

## 2024-10-03 LAB
ANION GAP SERPL CALCULATED.3IONS-SCNC: 7 MMOL/L (ref 7–15)
BUN SERPL-MCNC: 9.8 MG/DL (ref 8–23)
CALCIUM SERPL-MCNC: 7.8 MG/DL (ref 8.8–10.4)
CHLORIDE SERPL-SCNC: 102 MMOL/L (ref 98–107)
CREAT SERPL-MCNC: 2.14 MG/DL (ref 0.51–0.95)
EGFRCR SERPLBLD CKD-EPI 2021: 22 ML/MIN/1.73M2
ERYTHROCYTE [DISTWIDTH] IN BLOOD BY AUTOMATED COUNT: 18.5 % (ref 10–15)
GLUCOSE SERPL-MCNC: 96 MG/DL (ref 70–99)
HCO3 SERPL-SCNC: 28 MMOL/L (ref 22–29)
HCT VFR BLD AUTO: 24.7 % (ref 35–47)
HGB BLD-MCNC: 7.5 G/DL (ref 11.7–15.7)
INR PPP: 2.27 (ref 0.85–1.15)
MCH RBC QN AUTO: 31.9 PG (ref 26.5–33)
MCHC RBC AUTO-ENTMCNC: 30.4 G/DL (ref 31.5–36.5)
MCV RBC AUTO: 105 FL (ref 78–100)
PLATELET # BLD AUTO: 126 10E3/UL (ref 150–450)
POTASSIUM SERPL-SCNC: 4 MMOL/L (ref 3.4–5.3)
RBC # BLD AUTO: 2.35 10E6/UL (ref 3.8–5.2)
SODIUM SERPL-SCNC: 137 MMOL/L (ref 135–145)
WBC # BLD AUTO: 5.8 10E3/UL (ref 4–11)

## 2024-10-03 PROCEDURE — 250N000011 HC RX IP 250 OP 636: Performed by: PHYSICIAN ASSISTANT

## 2024-10-03 PROCEDURE — 99233 SBSQ HOSP IP/OBS HIGH 50: CPT | Performed by: HOSPITALIST

## 2024-10-03 PROCEDURE — 86481 TB AG RESPONSE T-CELL SUSP: CPT

## 2024-10-03 PROCEDURE — 250N000013 HC RX MED GY IP 250 OP 250 PS 637: Performed by: PHYSICIAN ASSISTANT

## 2024-10-03 PROCEDURE — 250N000013 HC RX MED GY IP 250 OP 250 PS 637: Performed by: HOSPITALIST

## 2024-10-03 PROCEDURE — 97535 SELF CARE MNGMENT TRAINING: CPT | Mod: GO | Performed by: OCCUPATIONAL THERAPIST

## 2024-10-03 PROCEDURE — 97116 GAIT TRAINING THERAPY: CPT | Mod: GP

## 2024-10-03 PROCEDURE — 120N000017 HC R&B RESPIRATORY CARE

## 2024-10-03 PROCEDURE — 80048 BASIC METABOLIC PNL TOTAL CA: CPT | Performed by: PHYSICIAN ASSISTANT

## 2024-10-03 PROCEDURE — 85610 PROTHROMBIN TIME: CPT | Performed by: PHYSICIAN ASSISTANT

## 2024-10-03 RX ORDER — WARFARIN SODIUM 2 MG/1
2 TABLET ORAL
Status: COMPLETED | OUTPATIENT
Start: 2024-10-03 | End: 2024-10-03

## 2024-10-03 RX ADMIN — ATORVASTATIN CALCIUM 80 MG: 40 TABLET, FILM COATED ORAL at 21:45

## 2024-10-03 RX ADMIN — Medication 0.5 G: at 15:05

## 2024-10-03 RX ADMIN — Medication 0.5 G: at 21:46

## 2024-10-03 RX ADMIN — Medication 1 TABLET: at 12:08

## 2024-10-03 RX ADMIN — Medication 5 MG: at 21:45

## 2024-10-03 RX ADMIN — Medication 1 CAPSULE: at 09:55

## 2024-10-03 RX ADMIN — METOPROLOL SUCCINATE 12.5 MG: 25 TABLET, EXTENDED RELEASE ORAL at 21:43

## 2024-10-03 RX ADMIN — LIDOCAINE 1 PATCH: 4 PATCH TOPICAL at 09:56

## 2024-10-03 RX ADMIN — CLOPIDOGREL 75 MG: 75 TABLET ORAL at 09:55

## 2024-10-03 RX ADMIN — SIMETHICONE 80 MG: 80 TABLET, CHEWABLE ORAL at 21:45

## 2024-10-03 RX ADMIN — Medication 0.5 G: at 09:56

## 2024-10-03 RX ADMIN — SIMETHICONE 80 MG: 80 TABLET, CHEWABLE ORAL at 09:55

## 2024-10-03 RX ADMIN — ONDANSETRON HYDROCHLORIDE 8 MG: 4 TABLET, FILM COATED ORAL at 08:11

## 2024-10-03 RX ADMIN — WARFARIN SODIUM 2 MG: 2 TABLET ORAL at 17:38

## 2024-10-03 RX ADMIN — SIMETHICONE 80 MG: 80 TABLET, CHEWABLE ORAL at 15:04

## 2024-10-03 RX ADMIN — DOCUSATE SODIUM AND SENNOSIDES 1 TABLET: 8.6; 5 TABLET, FILM COATED ORAL at 09:54

## 2024-10-03 RX ADMIN — Medication 1 CAPSULE: at 21:45

## 2024-10-03 RX ADMIN — DOCUSATE SODIUM AND SENNOSIDES 1 TABLET: 8.6; 5 TABLET, FILM COATED ORAL at 21:45

## 2024-10-03 RX ADMIN — PANTOPRAZOLE SODIUM 40 MG: 40 TABLET, DELAYED RELEASE ORAL at 08:11

## 2024-10-03 ASSESSMENT — ACTIVITIES OF DAILY LIVING (ADL)
ADLS_ACUITY_SCORE: 32
ADLS_ACUITY_SCORE: 38
ADLS_ACUITY_SCORE: 32
ADLS_ACUITY_SCORE: 32
ADLS_ACUITY_SCORE: 38
ADLS_ACUITY_SCORE: 32
ADLS_ACUITY_SCORE: 38
ADLS_ACUITY_SCORE: 32
ADLS_ACUITY_SCORE: 36
ADLS_ACUITY_SCORE: 32
ADLS_ACUITY_SCORE: 32
ADLS_ACUITY_SCORE: 38
ADLS_ACUITY_SCORE: 32
ADLS_ACUITY_SCORE: 38

## 2024-10-03 NOTE — PROGRESS NOTES
[facility]    [facility] - [facility]ist Service    Date of Admission:  9/16/2024    Felicitas Elliott is a 84 year old female admitted on 9/16/2024 to the LTAC for long-term antibiotics following MSSA bacteremia and endocarditis. 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  She was initially admitted to [facility] on 8/16/2024 for sepsis and HAMMAD.  She received empiric antibiotics on admission. Blood cultures grew MSSA.  Neurology was consulted for possible discitis.  ID was also consulted. Echo revealed large posterior leaflet mitral valve vegetation and small aortic valve vegetation with severe aortic stenosis.  Brain MRI revealed scattered embolic infarcts.  Coronary angiogram demonstrated coronary artery disease.  CT surgery was consulted. She underwent CABG alongside the procedures mentioned above.  She however has not had renal recovery.  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  She had dysphagia and momentarily required NG tube with tube feeds. She worked with speech therapy and is now cleared for regular diet and thin liquids.  CT surgery recommends warfarin for 3 months for bioprosthetic valves.  INR goal 2-3.  End date of warfarin approximately 12/10/2024, at which point she will be on Plavix alone (ASA allergy) indefinitely.  ID recommends IV vancomycin until 10/8/2024.    LTAC Course:  9/17 - 9/21.  Progressing well.  On Coumadin, initially bridging with heparin gtt. for bioprosthetic valve.  On arrival initial INR 1.49. Goal 2-3. Has been dialyzing for HAMMAD. 9/19, INR 2.19, heparin gtt discontinued. RD noted patient having trouble chewing and impedes her overall oral intake.  SLP consulted.    9/22-9/29: tolerating dialysis well. Pacer checked on 9/24, set at , top pacer is capturing 94%, bottom pacer is capturing 80%. Cardio did a televisit  with patient and recommend to decrease metoprolol by 1/2- changed from 25 to 12.5 bid. Added simethicone as patient has abdominal pain due to gas.    She is having neck pain -lidocaine and voltaren added.  Pharmacy to change medications around to have get getting less pills at the same time.  She complains that this contributes to a nausea.  On 9/29, BP is low, decreased toresimide dose from 100 to 50, but holding for now.  Also added prn midodrine.    9/30: Dialysis today. Patient BP has been running low but stable. Required midodrine prn. Will change metoprolol 12.5 mg po bid to metoprolol 12.5 mg XL daily. She remains on vancomycin IV for bacteremia  10/1. Progressing well. Continues with dialysis. Per speech is on regular texture diet and RD notes oral intake is improved. BP on the lower side but fairly well-controlled. Remains on IV vancomycin per ID for bacteremia.  10/2.  Continues to make progress but remains weak.  On hemodialysis and remains on IV antibiotics for bacteremia.  Plan to continue current medical management.  No new changes at this time.  10/3. Just about the same compared to yesterday. She states she is gaining strength though remains weak. Continues to work with therapies. On hemodialysis and IV antibiotics. No new changes at this time.  Anticipate she may discharge home early next week    BARRIERS TO DISCHARGE (why care is unable to be provided at a lower level of care):    -Long-term antibiotics.  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  Repeat blood cultures - 8/17/2024  -Mitral and aortic valve endocarditis as evidenced with large vegetation on mitral  valve 8 mm x 15 at x 2 posterior leaflet of small vegetation on aortic valve.  With signs of vegetation combined with brain infarct.  S/p CV surgery  -Continue with IV vancomycin.  Dosing per pharmacy. Per ID last vancomycin dose will be after HD on 10/7  -Per ID will need to check immunoglobulin level in 4 to 6 weeks, sister with history of hypogammaglobinemia  -ID consulted and following  -Patient previously on heparin 5000 subQ every 8 hours and Coumadin.  Recommended INR goal is 2-3 per CT surgery.  Discontinued heparin 5000 units subcu every 8 hours on admission and started patient on heparin gtt. alongside Coumadin.  I also notified pharmacy and were agreeable with this plan.  -9/19, INR 2.19.  Discontinued heparin gtt. INR on goal  -Pharm.D. to continue dosing Coumadin   -INR 2.27 on 10/3    Peripheral neuropathy of b/l feet  -fredy-lidocaine-ketamine cream prn   -benadryl cream prn     Abnormal brain MRI suggestive of subacute infarct  Patient with a history of MSSA bacteremia and aortic valve endocarditis.  Most likely septic emboli.  -Continue with antibiotics per ID.  End date 10/8/2024    Acute respiratory failure with hypoxia  -Patient previously intubated.  Now extubated.    -Continue with oxygen support to maintain oxygenation above 92%.  Wean as tolerated  -RT while in LTAC    HAMMAD  -Previously on CCRT now transferred to HD  -Torsemide per nephrology  -HD per nephrology-MWF  -Monitor kidney function with BMP    CAD  A-fib  History of aortic stenosis  +pacemaker   Hypotension   -Fairly stable at this time.  -S/p CABG per CT surgery  -Continue with Plavix and statin  -Metoprolol- will decrease dose from 25 bid to 12.5 bid due to low BP on 9/24  -Previously on amiodarone for rate control, now s/p PPM  -pacer checked 9/23, set at , top pacer is capturing 94%, bottom pacer is capturing 80%.  -cardiothoracic surg tele appt- appreciate recs  -torsemide on hold on 9/29 due to hypotension  -prn midodrine      Neck pain due to arthritis  -lidocaine  -voltaren prn    Anemia of chronic disease  Thrombocytopenia   -monitor    Severe protein calorie malnutrition  -? esophogram  -RD following   -calorie counts    Physical deconditioning/critical illness myopathy  -Fall precautions.  -PT/OT  -WOC    Diet: Advance Diet as Tolerated: Regular Diet Adult  Snacks/Supplements Adult: Gelatein Plus; With Meals  Snacks/Supplements Adult: Ensure Enlive; With Meals    DVT Prophylaxis: Warfarin  Le Catheter: Not present  Lines: PRESENT      PICC 09/06/24 Triple Lumen Right Basilic Vascular access-Site Assessment: WDL  CVC Double Lumen Right Subclavian Tunneled-Site Assessment: WDL      Cardiac Monitoring: None  Code Status: No CPR- Do NOT Intubate      Clinically Significant Risk Factors              # Hypoalbuminemia: Lowest albumin = 3.1 g/dL at 9/19/2024  6:35 AM, will monitor as appropriate   # Thrombocytopenia: Lowest platelets = 125 in last 2 days, will monitor for bleeding              # Severe Malnutrition: based on nutrition assessment      # Financial/Environmental Concerns: none   # Pacemaker present  # History of CABG: noted on surgical history    Disposition Plan     Medically Ready for Discharge: Anticipated in 5+ Days    Adan Veloz MD  Hospitalist Service  [facility]  Securely message with [employer] (more info)  Text page via [facility] Paging/Directory   ______________________________________________________________________    Interval History   No new events overnight per RN.  Patient is in bed comfortably and  is at bedside visiting.  She states she is progressing well.  She reports no new complaints at this time    Physical Exam   Vital Signs: Temp: 97.6  F (36.4  C) Temp src: Oral BP: 96/54 Pulse: 77   Resp: 18 SpO2: 98 % O2 Device: None (Room air)   Weight: 170 lbs 10.18 oz  Constitutional: Patient is resting in bed comfortably appears in no distress.  Eyes: Pupils are equal round and reactive to  light  Respiratory: Good respiratory effort, on auscultation good air entry is noted  Cardiovascular: Good S1 and S2 no obvious murmurs peripheral pulses are palpable  GI: Abdomen is soft nontender nondistended bowel sounds are noted  Skin: No obvious rashes noted on exposed areas, warm to touch please refer to nursing/wound care note for complete skin exam  Musculoskeletal: Good muscle tone nails and digits appear acyanotic  Neuropsychiatric: Awake alert oriented to person normal affect    Medical Decision Making       52 MINUTES SPENT BY ME on the date of service doing chart review, history, exam, documentation & further activities per the note.  ------------------ MEDICAL DECISION MAKING ------------------------------------------------------------------------------------------------------  MANAGEMENT DISCUSSED with the following over the past 24 hours: Patient, patient's daughter in room, staff RN and pharmacist   NOTE(S)/MEDICAL RECORDS REVIEWED over the past 24 hours: RN       Data   Recent Labs   Lab 10/03/24  0537 10/02/24  0605 10/01/24  0625   WBC 5.8 6.1 5.4   HGB 7.5* 7.5* 7.9*   * 104* 104*   * 125* 124*   INR 2.27* 2.41* 2.32*    138 138   POTASSIUM 4.0 3.8 3.7   CHLORIDE 102 103 103   CO2 28 26 27   BUN 9.8 18.9 11.1   CR 2.14* 3.22* 2.43*   ANIONGAP 7 9 8   KAELYN 7.8* 8.5* 8.7*   GLC 96 89 99       Most Recent 3 CBC's:  Recent Labs   Lab Test 10/03/24  0537 10/02/24  0605 10/01/24  0625   WBC 5.8 6.1 5.4   HGB 7.5* 7.5* 7.9*   * 104* 104*   * 125* 124*     Most Recent 3 BMP's:  Recent Labs   Lab Test 10/03/24  0537 10/02/24  0605 10/01/24  0625    138 138   POTASSIUM 4.0 3.8 3.7   CHLORIDE 102 103 103   CO2 28 26 27   BUN 9.8 18.9 11.1   CR 2.14* 3.22* 2.43*   ANIONGAP 7 9 8   KAELYN 7.8* 8.5* 8.7*   GLC 96 89 99     Most Recent 2 LFT's:  Recent Labs   Lab Test 09/19/24  0635 09/18/24  0704   AST 36 34   ALT 31 29   ALKPHOS 78 75   BILITOTAL 0.7 0.7     Most Recent 3  INR's:  Recent Labs   Lab Test 10/03/24  0537 10/02/24  0605 10/01/24  0625   INR 2.27* 2.41* 2.32*

## 2024-10-03 NOTE — PLAN OF CARE
Problem: Adult Inpatient Plan of Care  Goal: Optimal Comfort and Wellbeing  Outcome: Progressing  Intervention: Provide Person-Centered Care  Recent Flowsheet Documentation  Taken 10/3/2024 1530 by Gricel Ray RN  Trust Relationship/Rapport:   care explained   choices provided   questions answered   questions encouraged     Problem: Fall Injury Risk  Goal: Absence of Fall and Fall-Related Injury  Outcome: Progressing  Intervention: Identify and Manage Contributors  Recent Flowsheet Documentation  Taken 10/3/2024 1530 by Gricel Ray RN  Medication Review/Management: medications reviewed  Intervention: Promote Injury-Free Environment  Recent Flowsheet Documentation  Taken 10/3/2024 1530 by Gricel Ray RN  Safety Promotion/Fall Prevention: activity supervised   Goal Outcome Evaluation:     Pt is AOX4, calm, peasant and cooperative.  She denied pain all throughout the shift.  She is continent of both bowel and bladder.  No BM this shift but with 2 medium BMs this morning.  Evening RN who was also the evening aid from 1500 to 1900 will update the urine output this shift.      She is able to make her needs known.  She is in bed, comfortable and stable with no further complaints at 1930.     Gricel Ray, JULES Ray, RN

## 2024-10-03 NOTE — PLAN OF CARE
Problem: Adult Inpatient Plan of Care  Goal: Plan of Care Review  Description: The Plan of Care Review/Shift note should be completed every shift.  The Outcome Evaluation is a brief statement about your assessment that the patient is improving, declining, or no change.  This information will be displayed automatically on your shift  note.  Outcome: Progressing     Problem: Pain Acute  Goal: Optimal Pain Control and Function  Outcome: Progressing  Intervention: Prevent or Manage Pain  Recent Flowsheet Documentation  Taken 10/3/2024 1000 by Ede Parnell RN  Bowel Elimination Promotion: adequate fluid intake promoted  Medication Review/Management: medications reviewed  Intervention: Optimize Psychosocial Wellbeing  Recent Flowsheet Documentation  Taken 10/3/2024 1000 by Ede Parnell RN  Spiritual Activities Assistance: personal rituals encouraged  Supportive Measures: active listening utilized     Problem: Nausea and Vomiting  Goal: Nausea and Vomiting Relief  Outcome: Progressing  Intervention: Prevent and Manage Nausea and Vomiting  Recent Flowsheet Documentation  Taken 10/3/2024 1000 by Ede Parnell RN  Nausea/Vomiting Interventions: antiemetic  Environmental Support: calm environment promoted   Goal Outcome Evaluation:Patient is alert and oriented x4. She denied of pain. She received  zofran this am per her request before other AM meds. No nausea or emesis. Patient ambulated to the bathroom, bm x2 per patient. VSS. Patient has a dark-red area at left buttock, no changed from yesterday, notified charge nurse and MD. Left message to wound nurse on 10/2/2024. Encouraged to reposition q2 hrs. Explained meds and care plan to patient.

## 2024-10-03 NOTE — PROGRESS NOTES
SPIRITUAL HEALTH SERVICES (SHS)  SPIRITUAL ASSESSMENT Progress Note  [facility]. Unit LTACH     REFERRAL SOURCE: Followup      Felicitas was very excited and grateful to learn that she will be going home soon and equally grateful to  receive communion on the day she received this happy news.      PLAN: [facility] will follow during the remainder of her hospitalization.      Dary Velazquez, Ph.D., [facility]      SHS available 24/7 for emergency requests/referrals, either by having the on-call  paged or by entering an ASAP/STAT consult in Epic (this will also page the on-call ).

## 2024-10-03 NOTE — PROGRESS NOTES
CLINICAL NUTRITION SERVICES - REASSESSMENT NOTE     Nutrition Prescription    RECOMMENDATIONS FOR MDs/PROVIDERS TO ORDER:  Increase bowel regimen. Patient interested in trying a stool softener other than miralax.     Malnutrition Status:    Severe malnutrition in the context of acute illness/injury    Recommendations already ordered by Registered Dietitian (RD):  Continue Gelatein Plus cherry once daily and add Ensure Enlive vanilla once daily with breakfast tray    Future/Additional Recommendations:  Monitor patient weight, PO intakes and preference/tolerance for supplements. Continue to encourage intake of high protein foods.     EVALUATION OF THE PROGRESS TOWARD GOALS   Diet: Orders Placed This Encounter      Advance Diet as Tolerated: Regular Diet Adult     Nutrition Support: Has been receiving cherry Gelatein Plus 1 x day - will continue. Add Ensure Enlive vanilla 1 x day.     Intake: Intakes from chart noted to be between % where documented, with appetite ranging from fair to good. Documented intakes may be unreliable - patient has received additional food items brought by spouse and not documented in chart. Patient reports that appetite is generally fairly low, but better in the AM than PM and still generally preferring more CHO food choices. Has tried hamburgers and found them to be too chewy. Patient's spouse brings some items from home - today brought corn chips, yesterday tacos with beef and lettuce - patient ate the taco filling but not the shell.     Reports she is having BM daily, but experiencing some straining as well as nausea while straining. Does not want to try miralax again but is open to a different stool softener. No troubles with swallowing or other GI symptoms.     Patient and spouse provided education on high PRO choices during continuation of stay as well as upon discharged. Provided handouts with lists of high PRO and high kcal food options, menu plans and snack ideas. Patient and  spouse verbalized understanding.     ASSESSED NUTRITION NEEDS:  Dosing Weight 79 kg (current weight)  Estimated Energy Needs: 3622-1595 kcals (25-30 Kcal/Kg)  Justification: maintenance and increased needs w/ acute illness  Estimated Protein Needs: 119-142 grams protein (1.5-1.8 g pro/Kg)  Justification: Repletion, hypercatabolism with critical illness, and dialysis  Estimated Fluid Needs: per provider pending fluid status     NEW FINDINGS   Patient progressing. She says she may be going home next week    ANTHROPOMETRICS  Height: 170.2 cm  Most Recent Weight: 77.4 kg (170 lb 10.2 oz)    IBW: 61.6 kg  BMI: Overweight BMI 25-29.9  Weight History:   Wt Readings from Last 5 Encounters:   10/03/24 77.4 kg (170 lb 10.2 oz)   09/16/24 79 kg (174 lb 3.2 oz)   08/15/24 90.1 kg (198 lb 9.6 oz)     Date/Time Weight Weight Method   10/03/24 0005 77.4 kg (170 lb 10.2 oz) Bed scale   10/02/24 0221 76.7 kg (169 lb 1.5 oz) Bed scale   09/30/24 0100 76.6 kg (168 lb 14 oz) Bed scale   09/29/24 0513 77 kg (169 lb 12.1 oz) Bed scale   09/28/24 0530 76.2 kg (167 lb 15.9 oz) Bed scale   09/27/24 0656 78.5 kg (173 lb 1 oz) Bed scale   09/26/24 0324 78.4 kg (172 lb 13.5 oz) Bed scale   09/25/24 1848 77 kg (169 lb 12.1 oz) Bed scale   09/25/24 1520 78.1 kg (172 lb 2.9 oz) Bed scale   09/25/24 0546 78.1 kg (172 lb 2.9 oz) Bed scale   09/24/24 0602 77.6 kg (171 lb 1.2 oz) Bed scale   09/23/24 0623 78.7 kg (173 lb 8 oz) Bed scale   09/21/24 0354 79 kg (174 lb 2.6 oz) Bed scale   09/20/24 1700 81.2 kg (179 lb 0.2 oz) Bed scale   09/20/24 1345 82.8 kg (182 lb 8.7 oz) Bed scale   09/20/24 0058 78.7 kg (173 lb 8 oz) Bed scale   09/19/24 0630 78.5 kg (173 lb 1 oz) Bed scale   09/18/24 0805 79.3 kg (174 lb 13.2 oz) --   09/18/24 0009 78.7 kg (173 lb 8 oz) Bed scale   09/17/24 0400 80.5 kg (177 lb 7.5 oz) Bed scale   09/16/24 1547 81.5 kg (179 lb 10.8 oz) Bed scale     Weight appears mostly stable in last two weeks. Previous records indicate weight  loss of 12.7 kg in last 1.5 months (weight loss of 14.1%)    PHYSICAL FINDINGS  See malnutrition section below.     GI CONCERNS  Patient previously reported constipation and gas. Currently having daily BM but with some straining and nausea while on commode. Patient is willing to try a stool softener other than miralax.     LABS  Labs:  Electrolytes  Potassium (mmol/L)   Date Value   10/03/2024 4.0   10/02/2024 3.8   10/01/2024 3.7     Potassium POCT (mmol/L)   Date Value   09/16/2024 3.2 (L)   08/27/2024 3.4   08/27/2024 3.9     Phosphorus (mg/dL)   Date Value   09/07/2024 3.1   09/06/2024 2.9   09/06/2024 2.8   09/06/2024 3.1   09/05/2024 2.8    Blood Glucose  Glucose (mg/dL)   Date Value   10/03/2024 96   10/02/2024 89   10/01/2024 99   09/30/2024 93   09/29/2024 97     GLUCOSE BY METER POCT (mg/dL)   Date Value   09/16/2024 102 (H)   09/14/2024 96   09/10/2024 112 (H)   09/10/2024 150 (H)   09/04/2024 102 (H)     Glucose Whole Blood POCT (mg/dL)   Date Value   09/16/2024 143 (H)     Hemoglobin A1C (%)   Date Value   08/16/2024 6.4 (H)    Inflammatory Markers  WBC Count (10e3/uL)   Date Value   10/03/2024 5.8   10/02/2024 6.1   10/01/2024 5.4     Albumin (g/dL)   Date Value   09/19/2024 3.1 (L)   09/18/2024 3.1 (L)   09/17/2024 3.1 (L)      Magnesium (mg/dL)   Date Value   09/07/2024 2.5 (H)   09/06/2024 2.3   09/06/2024 2.5 (H)     Sodium (mmol/L)   Date Value   10/03/2024 137   10/02/2024 138   10/01/2024 138    Renal  Urea Nitrogen (mg/dL)   Date Value   10/03/2024 9.8   10/02/2024 18.9   10/01/2024 11.1     Creatinine (mg/dL)   Date Value   10/03/2024 2.14 (H)   10/02/2024 3.22 (H)   10/01/2024 2.43 (H)     Additional  Ketones Urine (mg/dL)   Date Value   08/30/2024 Negative           MEDICATIONS  Current Facility-Administered Medications   Medication Dose Route Frequency Provider Last Rate Last Admin    albumin human 25 % injection 25 g  25 g Intravenous During Dialysis/CRRT (from stock) Tami Mcbride, NP    25 g at 09/27/24 1158    atorvastatin (LIPITOR) tablet 80 mg  80 mg Oral QPM Maryam Anthony PA-C   80 mg at 10/02/24 2054    clopidogrel (PLAVIX) tablet 75 mg  75 mg Oral Daily Maryam Anthony PA-C   75 mg at 10/02/24 0937    epoetin jose-epbx (RETACRIT) injection 20,000 Units  20,000 Units Subcutaneous Weekly Maryam Anthony PA-C   20,000 Units at 09/27/24 1751    ketamine 5%-gabapentin 8%-lidocaine 2.5% in PLO cream 0.5 g  0.5 g Topical TID Lori Raza MD   0.5 g at 10/02/24 2055    lactobacillus rhamnosus (GG) (CULTURELL) capsule 1 capsule  1 capsule Oral BID Maryam Anthony PA-C   1 capsule at 10/02/24 2054    Lidocaine (LIDOCARE) 4 % Patch 1 patch  1 patch Transdermal Q24H Josephine Thomson MD   1 patch at 10/02/24 0930    melatonin tablet 5 mg  5 mg Oral At Bedtime Lori Raza MD   5 mg at 10/02/24 2054    metoprolol succinate ER (TOPROL-XL) 24 hr half-tab 12.5 mg  12.5 mg Oral At Bedtime Adan Veloz MD   12.5 mg at 10/02/24 2053    midodrine (PROAMATINE) tablet 10 mg  10 mg Oral During Dialysis/CRRT (from stock) Maryam Anthony PA-C   10 mg at 09/30/24 1257    multivitamin w/minerals (THERA-VIT-M) tablet 1 tablet  1 tablet Oral Daily Adan Veloz MD   1 tablet at 10/02/24 1141    pantoprazole (PROTONIX) EC tablet 40 mg  40 mg Oral QAM AC Maryam Anthony PA-C   40 mg at 10/03/24 0811    senna-docusate (SENOKOT-S/PERICOLACE) 8.6-50 MG per tablet 1 tablet  1 tablet Oral BID Maryam Anthony PA-C   1 tablet at 10/02/24 2053    simethicone (MYLICON) chewable tablet 80 mg  80 mg Oral TID Josephine Thomson MD   80 mg at 10/02/24 2054    sodium chloride (PF) 0.9% PF flush 10-40 mL  10-40 mL Intracatheter Q8H Josephine Thomson MD   40 mL at 10/03/24 0543    [Held by provider] torsemide (DEMADEX) half-tab 50 mg  50 mg Oral Daily Josephine Thomson MD        vancomycin place horne - receiving intermittent dosing  1 each Intravenous  See Admin Instructions Adan Veloz MD        Warfarin Dose Required Daily - Pharmacist Managed  1 each Oral See Admin Instructions Maryam Anthony PA-C          Current Facility-Administered Medications   Medication Dose Route Frequency Provider Last Rate Last Admin    dextrose 10% infusion   Intravenous Continuous PRN Maryam Anthony PA-C        Patient is already receiving anticoagulation with heparin, enoxaparin (LOVENOX), warfarin (COUMADIN)  or other anticoagulant medication   Does not apply Continuous PRN Adan Veloz MD          Current Facility-Administered Medications   Medication Dose Route Frequency Provider Last Rate Last Admin    acetaminophen (TYLENOL) tablet 650 mg  650 mg Oral Q4H PRN Maryam Anthony PA-C   650 mg at 09/27/24 1015    sodium chloride (NEBUSAL) 3 % neb solution 3 mL  3 mL Nebulization Q6H PRN Maryam Anthony PA-C        And    albuterol (PROVENTIL) neb solution 2.5 mg  2.5 mg Nebulization Q6H PRN Maryam Anthony PA-C        alteplase (CATHFLO ACTIVASE) injection 2 mg  2 mg Intracatheter Q1H PRN Miroslava Casey PA-C        alteplase (CATHFLO ACTIVASE) injection 2 mg  2 mg Intracatheter Q1H PRN Miroslava Casey PA-C        bisacodyl (DULCOLAX) suppository 10 mg  10 mg Rectal Daily PRN Maryam Anthony PA-C        dextrose 10% infusion   Intravenous Continuous PRN Maryam Anthony PA-C        glucose gel 15-30 g  15-30 g Oral Q15 Min PRN Adan Veloz MD        Or    dextrose 50 % injection 25-50 mL  25-50 mL Intravenous Q15 Min PRN Adan Veloz MD        Or    glucagon injection 1 mg  1 mg Subcutaneous Q15 Min PRN Adan Veloz MD        diclofenac (VOLTAREN) 1 % topical gel 4 g  4 g Topical 4x Daily PRN Josephine Thomson MD        diphenhydrAMINE-zinc acetate (BENADRYL) 2-0.1 % cream   Topical TID PRN Lori Raza MD   Given at 09/22/24 2240    hydrOXYzine HCl (ATARAX) tablet 10 mg  10 mg Oral Q6H PRN  Lori Raza MD   10 mg at 09/22/24 1834    menthol (Topical Analgesic) 2.5% (BENGAY VANISHING SCENT) 2.5 % topical gel 1 g  1 g Topical Q8H PRN Maryam Anthony PA-C        miconazole (MICATIN) 2 % powder   Topical TID PRN Adan Veloz MD        midodrine (PROAMATINE) tablet 10 mg  10 mg Oral TID PRN Josephine Thomson MD   10 mg at 10/02/24 1305    naloxone (NARCAN) injection 0.2 mg  0.2 mg Intravenous Q2 Min PRN Maryam Anthony PA-C        Or    naloxone (NARCAN) injection 0.4 mg  0.4 mg Intravenous Q2 Min PRN Maryam Anthony PA-C        Or    naloxone (NARCAN) injection 0.2 mg  0.2 mg Intramuscular Q2 Min PRN Maryam Anthony PA-C        Or    naloxone (NARCAN) injection 0.4 mg  0.4 mg Intramuscular Q2 Min PRN Maryam Anthony PA-C        nicotine (NICORETTE) gum 2 mg  2 mg Buccal Q1H PRN Maryam Anthony PA-C        ondansetron (ZOFRAN) injection 4 mg  4 mg Intravenous Q6H PRN Maryam Anthony PA-C        Or    ondansetron (ZOFRAN) tablet 8 mg  8 mg Oral Q8H PRN Maryam Anthony PA-C   8 mg at 10/03/24 0811    Patient is already receiving anticoagulation with heparin, enoxaparin (LOVENOX), warfarin (COUMADIN)  or other anticoagulant medication   Does not apply Continuous PRN Adan Veloz MD        polyethylene glycol (MIRALAX) Packet 17 g  17 g Oral Daily PRN Josephine Thomson MD        prochlorperazine (COMPAZINE) injection 5 mg  5 mg Intravenous Q6H PRN Maryam Anthony PA-C        Or    prochlorperazine (COMPAZINE) tablet 5 mg  5 mg Oral Q6H PRN Maryam Anthony PA-C        Or    prochlorperazine (COMPAZINE) suppository 12.5 mg  12.5 mg Rectal Q12H PRN Maryam Anthony PA-C        sodium chloride (PF) 0.9% PF flush 10 mL  10 mL Intracatheter Q15 Min PRN Miroslava Casey PA-C   10 mL at 09/27/24 1752    sodium chloride (PF) 0.9% PF flush 10 mL  10 mL Intracatheter Q15 Min PRN Miroslava Casey PA-C        sodium  chloride (PF) 0.9% PF flush 2.5 mL  2.5 mL Intravenous q1 min prn Maryam Anthony PA-C        sodium chloride 0.9% BOLUS 100-150 mL  100-150 mL Intravenous Q15 Min PRN Miroslava Casey PA-C        traZODone (DESYREL) half-tab 25 mg  25 mg Oral At Bedtime PRN Lori Raza MD   25 mg at 09/22/24 2058         MALNUTRITION:  % Weight Loss:  > 5% in 1 month (severe malnutrition)  % Intake:  </= 50% for >/= 1 month (severe malnutrition)  Subcutaneous Fat Loss:  Orbital region mild depletion and Thoracic region mild depletion  Muscle Loss:  Temporal region moderate depletion, Dorsal hand region moderate depletion, and Posterior calf region moderate depletion  Fluid Retention:  None noted    Malnutrition Diagnosis: Severe malnutrition  In Context of:  Acute illness or injury    Previous Goals   PO intakes to meet >75% estimated nutrition needs - progressing  No further weight loss <78 kg - met    Previous Nutrition Diagnosis  Inadequate oral intake related to poor appetite, early satiety, fatigue w/ chewing, intermittent NPO as evidenced by PO intakes meeting <75% estimated nutrition needs x1 month.  Evaluation: Improving    CURRENT NUTRITION DIAGNOSIS  Malnutrition related to poor appetite as evidenced by PO intakes < 50% of estimated needs for >/= 1 month,       INTERVENTIONS  Implementation  Collaboration with other providers - rounds  Medical food supplement therapy  Modify composition of meals/snacks to include higher intake of PRO  Nutrition education for recommended modifications    Goals  Patient to consume % of nutritionally adequate meals three times per day, or the equivalent with supplements/snacks.   Patient to meet 100% of recommended PRO needs.    Monitoring/Evaluation  Progress toward goals will be monitored and evaluated per protocol.     Katy Molina, Dietetic Intern

## 2024-10-03 NOTE — PLAN OF CARE
Problem: Adult Inpatient Plan of Care  Goal: Plan of Care Review  Description: The Plan of Care Review/Shift note should be completed every shift.  The Outcome Evaluation is a brief statement about your assessment that the patient is improving, declining, or no change.  This information will be displayed automatically on your shift  note.  Outcome: Progressing  Goal: Absence of Hospital-Acquired Illness or Injury  Intervention: Identify and Manage Fall Risk  Recent Flowsheet Documentation  Taken 10/3/2024 0005 by Demetrice Mayes RN  Safety Promotion/Fall Prevention:   activity supervised   clutter free environment maintained   lighting adjusted  Goal: Optimal Comfort and Wellbeing  Intervention: Provide Person-Centered Care  Recent Flowsheet Documentation  Taken 10/3/2024 0005 by Demetrice Mayes RN  Trust Relationship/Rapport:   care explained   choices provided   Goal Outcome Evaluation:       Patient  was calm and cooperative of cares, denied pain and slept most of the shift, patient ambulated to bathroom x 1, no prn given.

## 2024-10-03 NOTE — PLAN OF CARE
Problem: Adult Inpatient Plan of Care  Goal: Absence of Hospital-Acquired Illness or Injury  Outcome: Progressing  Intervention: Identify and Manage Fall Risk  Recent Flowsheet Documentation  Taken 10/2/2024 1630 by Wilian Jones RN  Safety Promotion/Fall Prevention:   activity supervised   clutter free environment maintained   mobility aid in reach   lighting adjusted   room door open   room near nurse's station  Intervention: Prevent Skin Injury  Recent Flowsheet Documentation  Taken 10/2/2024 2100 by Wilian Jones RN  Skin Protection: adhesive use limited  Device Skin Pressure Protection:   absorbent pad utilized/changed   tubing/devices free from skin contact  Intervention: Prevent Infection  Recent Flowsheet Documentation  Taken 10/2/2024 1630 by Wilian Jones RN  Infection Prevention:   equipment surfaces disinfected   hand hygiene promoted   personal protective equipment utilized  Goal: Optimal Comfort and Wellbeing  Outcome: Progressing  Intervention: Provide Person-Centered Care  Recent Flowsheet Documentation  Taken 10/2/2024 1630 by Wilian Jones RN  Trust Relationship/Rapport:   care explained   choices provided   questions answered   thoughts/feelings acknowledged     Problem: Fall Injury Risk  Goal: Absence of Fall and Fall-Related Injury  Outcome: Progressing  Intervention: Identify and Manage Contributors  Recent Flowsheet Documentation  Taken 10/2/2024 1630 by Wilian Jones RN  Medication Review/Management: medications reviewed  Intervention: Promote Injury-Free Environment  Recent Flowsheet Documentation  Taken 10/2/2024 1630 by Wilian Jones RN  Safety Promotion/Fall Prevention:   activity supervised   clutter free environment maintained   mobility aid in reach   lighting adjusted   room door open   room near nurse's station     Problem: Pain Acute  Goal: Optimal Pain Control and Function  Outcome: Progressing  Intervention: Prevent or Manage Pain  Recent Flowsheet  Documentation  Taken 10/2/2024 2100 by Wilian Jones RN  Bowel Elimination Promotion: adequate fluid intake promoted  Taken 10/2/2024 1630 by Wilian Jones RN  Medication Review/Management: medications reviewed  Intervention: Optimize Psychosocial Wellbeing  Recent Flowsheet Documentation  Taken 10/2/2024 1630 by Wilian Jones RN  Spiritual Activities Assistance: personal rituals encouraged  Supportive Measures:   active listening utilized   verbalization of feelings encouraged     Problem: Wound  Goal: Absence of Infection Signs and Symptoms  Outcome: Progressing  Intervention: Prevent or Manage Infection  Recent Flowsheet Documentation  Taken 10/2/2024 1630 by Wilian Jones RN  Isolation Precautions: protective environment maintained  Goal: Improved Oral Intake  Outcome: Progressing   Goal Outcome Evaluation:    Pt noted awake, alert, and coherent. Not in respiratory distress. Noted with good appetite. Denied pain. PRN Zofran PO given for nausea - noted effective. Bedside care done. Needs attended. Pt is cooperative with cares. 2300 Pt in bed asleep, noted with normal breaths. Not in respiratory distress. Call light within reach. Plan of care ongoing.     Wilian Iqbal, RN

## 2024-10-04 ENCOUNTER — APPOINTMENT (OUTPATIENT)
Dept: PHYSICAL THERAPY | Facility: CLINIC | Age: 84
DRG: 288 | End: 2024-10-04
Attending: HOSPITALIST
Payer: COMMERCIAL

## 2024-10-04 LAB
ANION GAP SERPL CALCULATED.3IONS-SCNC: 9 MMOL/L (ref 7–15)
BUN SERPL-MCNC: 16.6 MG/DL (ref 8–23)
CALCIUM SERPL-MCNC: 8.3 MG/DL (ref 8.8–10.4)
CHLORIDE SERPL-SCNC: 102 MMOL/L (ref 98–107)
CREAT SERPL-MCNC: 3.01 MG/DL (ref 0.51–0.95)
EGFRCR SERPLBLD CKD-EPI 2021: 15 ML/MIN/1.73M2
ERYTHROCYTE [DISTWIDTH] IN BLOOD BY AUTOMATED COUNT: 17.9 % (ref 10–15)
GAMMA INTERFERON BACKGROUND BLD IA-ACNC: 0.11 IU/ML
GLUCOSE SERPL-MCNC: 103 MG/DL (ref 70–99)
HCO3 SERPL-SCNC: 27 MMOL/L (ref 22–29)
HCT VFR BLD AUTO: 25.3 % (ref 35–47)
HGB BLD-MCNC: 8.1 G/DL (ref 11.7–15.7)
INR PPP: 2.73 (ref 0.85–1.15)
M TB IFN-G BLD-IMP: NEGATIVE
M TB IFN-G CD4+ BCKGRND COR BLD-ACNC: 3.22 IU/ML
MCH RBC QN AUTO: 34 PG (ref 26.5–33)
MCHC RBC AUTO-ENTMCNC: 32 G/DL (ref 31.5–36.5)
MCV RBC AUTO: 106 FL (ref 78–100)
MITOGEN IGNF BCKGRD COR BLD-ACNC: 0.02 IU/ML
MITOGEN IGNF BCKGRD COR BLD-ACNC: 0.03 IU/ML
PLATELET # BLD AUTO: 155 10E3/UL (ref 150–450)
POTASSIUM SERPL-SCNC: 4 MMOL/L (ref 3.4–5.3)
QUANTIFERON MITOGEN: 3.33 IU/ML
QUANTIFERON NIL TUBE: 0.11 IU/ML
QUANTIFERON TB1 TUBE: 0.13 IU/ML
QUANTIFERON TB2 TUBE: 0.14
RBC # BLD AUTO: 2.38 10E6/UL (ref 3.8–5.2)
SODIUM SERPL-SCNC: 138 MMOL/L (ref 135–145)
VANCOMYCIN SERPL-MCNC: 18.9 UG/ML
WBC # BLD AUTO: 7.5 10E3/UL (ref 4–11)

## 2024-10-04 PROCEDURE — 120N000017 HC R&B RESPIRATORY CARE

## 2024-10-04 PROCEDURE — 250N000011 HC RX IP 250 OP 636: Performed by: HOSPITALIST

## 2024-10-04 PROCEDURE — 250N000013 HC RX MED GY IP 250 OP 250 PS 637: Performed by: HOSPITALIST

## 2024-10-04 PROCEDURE — 85610 PROTHROMBIN TIME: CPT | Performed by: PHYSICIAN ASSISTANT

## 2024-10-04 PROCEDURE — 99232 SBSQ HOSP IP/OBS MODERATE 35: CPT

## 2024-10-04 PROCEDURE — 250N000011 HC RX IP 250 OP 636: Performed by: PHYSICIAN ASSISTANT

## 2024-10-04 PROCEDURE — 80202 ASSAY OF VANCOMYCIN: CPT | Performed by: HOSPITALIST

## 2024-10-04 PROCEDURE — 99233 SBSQ HOSP IP/OBS HIGH 50: CPT | Performed by: HOSPITALIST

## 2024-10-04 PROCEDURE — P9047 ALBUMIN (HUMAN), 25%, 50ML: HCPCS | Mod: JZ | Performed by: NURSE PRACTITIONER

## 2024-10-04 PROCEDURE — 258N000003 HC RX IP 258 OP 636: Performed by: HOSPITALIST

## 2024-10-04 PROCEDURE — 97116 GAIT TRAINING THERAPY: CPT | Mod: GP

## 2024-10-04 PROCEDURE — 80048 BASIC METABOLIC PNL TOTAL CA: CPT | Performed by: PHYSICIAN ASSISTANT

## 2024-10-04 PROCEDURE — 250N000013 HC RX MED GY IP 250 OP 250 PS 637: Performed by: PHYSICIAN ASSISTANT

## 2024-10-04 PROCEDURE — 99232 SBSQ HOSP IP/OBS MODERATE 35: CPT | Performed by: INTERNAL MEDICINE

## 2024-10-04 PROCEDURE — 97530 THERAPEUTIC ACTIVITIES: CPT | Mod: GP

## 2024-10-04 PROCEDURE — G0463 HOSPITAL OUTPT CLINIC VISIT: HCPCS

## 2024-10-04 PROCEDURE — 250N000011 HC RX IP 250 OP 636: Mod: JZ | Performed by: NURSE PRACTITIONER

## 2024-10-04 PROCEDURE — 90935 HEMODIALYSIS ONE EVALUATION: CPT

## 2024-10-04 RX ADMIN — WARFARIN SODIUM 1.5 MG: 3 TABLET ORAL at 18:23

## 2024-10-04 RX ADMIN — MIDODRINE HYDROCHLORIDE 10 MG: 5 TABLET ORAL at 16:05

## 2024-10-04 RX ADMIN — Medication 1 CAPSULE: at 20:22

## 2024-10-04 RX ADMIN — Medication 5 MG: at 20:22

## 2024-10-04 RX ADMIN — ATORVASTATIN CALCIUM 80 MG: 40 TABLET, FILM COATED ORAL at 20:22

## 2024-10-04 RX ADMIN — Medication 1 TABLET: at 13:20

## 2024-10-04 RX ADMIN — Medication 0.5 G: at 21:11

## 2024-10-04 RX ADMIN — Medication 1 CAPSULE: at 09:22

## 2024-10-04 RX ADMIN — SIMETHICONE 80 MG: 80 TABLET, CHEWABLE ORAL at 13:20

## 2024-10-04 RX ADMIN — PANTOPRAZOLE SODIUM 40 MG: 40 TABLET, DELAYED RELEASE ORAL at 08:23

## 2024-10-04 RX ADMIN — ACETAMINOPHEN 650 MG: 325 TABLET, FILM COATED ORAL at 05:23

## 2024-10-04 RX ADMIN — ONDANSETRON HYDROCHLORIDE 8 MG: 4 TABLET, FILM COATED ORAL at 05:24

## 2024-10-04 RX ADMIN — SIMETHICONE 80 MG: 80 TABLET, CHEWABLE ORAL at 09:21

## 2024-10-04 RX ADMIN — EPOETIN ALFA-EPBX 20000 UNITS: 20000 INJECTION, SOLUTION INTRAVENOUS; SUBCUTANEOUS at 18:20

## 2024-10-04 RX ADMIN — Medication 0.5 G: at 13:20

## 2024-10-04 RX ADMIN — ALBUMIN (HUMAN) 25 G: 0.25 INJECTION, SOLUTION INTRAVENOUS at 16:25

## 2024-10-04 RX ADMIN — CLOPIDOGREL 75 MG: 75 TABLET ORAL at 09:22

## 2024-10-04 RX ADMIN — SIMETHICONE 80 MG: 80 TABLET, CHEWABLE ORAL at 20:22

## 2024-10-04 RX ADMIN — DOCUSATE SODIUM AND SENNOSIDES 1 TABLET: 8.6; 5 TABLET, FILM COATED ORAL at 20:22

## 2024-10-04 RX ADMIN — LIDOCAINE 1 PATCH: 4 PATCH TOPICAL at 08:23

## 2024-10-04 RX ADMIN — METOPROLOL SUCCINATE 12.5 MG: 25 TABLET, EXTENDED RELEASE ORAL at 20:23

## 2024-10-04 RX ADMIN — VANCOMYCIN HYDROCHLORIDE 1000 MG: 1 INJECTION, POWDER, LYOPHILIZED, FOR SOLUTION INTRAVENOUS at 18:24

## 2024-10-04 RX ADMIN — DOCUSATE SODIUM AND SENNOSIDES 1 TABLET: 8.6; 5 TABLET, FILM COATED ORAL at 09:21

## 2024-10-04 RX ADMIN — Medication 0.5 G: at 09:30

## 2024-10-04 ASSESSMENT — ACTIVITIES OF DAILY LIVING (ADL)
ADLS_ACUITY_SCORE: 36
ADLS_ACUITY_SCORE: 32
ADLS_ACUITY_SCORE: 36
ADLS_ACUITY_SCORE: 36
ADLS_ACUITY_SCORE: 32
ADLS_ACUITY_SCORE: 36
ADLS_ACUITY_SCORE: 32
ADLS_ACUITY_SCORE: 36
ADLS_ACUITY_SCORE: 32
ADLS_ACUITY_SCORE: 36
ADLS_ACUITY_SCORE: 32
ADLS_ACUITY_SCORE: 32
ADLS_ACUITY_SCORE: 36
ADLS_ACUITY_SCORE: 32
ADLS_ACUITY_SCORE: 36

## 2024-10-04 NOTE — PROGRESS NOTES
HEMODIALYSIS TREATMENT NOTE      Date: 10/4/2024  Time: 1915     Data:  Pre Wt:   78.8 kg (bed scale)  Desired Wt:   To be established  Post Wt:  75.8 kg (bed scale)  Weight change: - 3 kg  Ultrafiltration - Post Run Net Total Removed (mL):  3000 ml  Vascular Access Status: CVC patent  Dialyzer Rinse:  Light  Total Blood Volume Processed: 66.5 L   Total Dialysis (Treatment) Time:   3 Hrs  Dialysate Bath: K 3, Ca 3  Heparin: Heparin: None     Lab:   No    Interventions:  Dialysis done through right tunneled dialysis catheter. , . UF set to 3.5 Liters of fluid removal, accommodating priming and rinse back volumes  All med administered per MAR. 10mg midodrine given for BP Support.and 100ml albumin human 25% injection 25g. CVC dressing changed aseptically. See Flowsheet for Crit Profile throughout the run. Treatment has ended safely and blood is rinsed back completely. Catheter lumens flushed and locked with saline, catheter caps changed post HD. Post Tx assessment done. Patient remained in  [place] [address] room in stable condition  Report given to JULES Pino.     Assessment:  A/O x 4, calm & cooperative, denies pain  Lung sounds Clear anterior and lateral BUL, Diminished BLL  CVC intact, previous dressing clean and dry                Plan:    Per Renal team  Joe Dwyer RN, BSN

## 2024-10-04 NOTE — PLAN OF CARE
Problem: Adult Inpatient Plan of Care  Goal: Optimal Comfort and Wellbeing  Outcome: Progressing  Intervention: Monitor Pain and Promote Comfort  Recent Flowsheet Documentation  Taken 10/4/2024 0523 by Barbara Green RN  Pain Management Interventions: medication (see MAR)  Intervention: Provide Person-Centered Care  Recent Flowsheet Documentation  Taken 10/4/2024 0600 by Barbara Green RN  Trust Relationship/Rapport:   care explained   choices provided   emotional support provided     Problem: Pain Acute  Goal: Optimal Pain Control and Function  Outcome: Progressing  Intervention: Develop Pain Management Plan  Recent Flowsheet Documentation  Taken 10/4/2024 0523 by Barbara Green RN  Pain Management Interventions: medication (see MAR)  Intervention: Prevent or Manage Pain  Recent Flowsheet Documentation  Taken 10/4/2024 0600 by Barbara Green RN  Bowel Elimination Promotion: adequate fluid intake promoted  Medication Review/Management: medications reviewed   Goal Outcome Evaluation:         Vitals stable. C/o  headache this morning and nausea after getting up to use the restroom. Received PRN Tylenol and Zofran which was effective.    Blood pressure 126/58, pulse 88, temperature 98.5  F (36.9  C), temperature source Oral, resp. rate 16, weight 78.8 kg (173 lb 11.6 oz), SpO2 92%.

## 2024-10-04 NOTE — PROGRESS NOTES
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  Positive blood culture on 8/14/2024 and 8/16.  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  With the size of the vegetation combined with brain infarct, status post CV surgery, see details below active issue.   Abnormal brain MRI suggestive of subacute infarct. In a patient with MSSA bacteremia and aortic valve endocarditis, this is cerebral septic emboli. Active issue.       PROCEDURE PERFORMED: 8/27/24  Aortic root abscess debridement and aortic annular reconstruction.  Aortic root replacement (Konect composite 25 mm Silva Inspiris Resilia bioprosthetic valve within 30 mm Gelweave Valsalva graft with reimplantation of the coronary arteries).  Mitral valve replacement (31 mm St. Tim Silva bioprosthetic valve).  Coronary artery bypass grafting x 2 (reversed saphenous vein graft to the obtuse marginal branch of the left circumflex coronary artery, and pedicled left internal mammary artery to left anterior descending coronary artery).  Endoscopic vein harvest of the greater saphenous vein from the left lower extremity.    Recommendations:  Continue vancomycin-dosing per Pharm.D.  End date oct 8th - last dose would be after HD on 10/7.  Will need to check immunoglobin level in 4 to 6 weeks sister with history of hypogammaglobulinemia       Uday Aguirre,  MD [facility]  Direct messaging: [employer] Paging   On-Call ID provider: [phone], option: 9                   Interval History:     HPI:  No events. Feels well. Feels stronger. Plans to discharge early next week to home. Dialysis later today.    CRP is much improved:  Component      Latest Ref Rng 8/15/2024  5:32 AM 8/17/2024  4:28 AM 9/24/2024  6:14 AM   CRP Inflammation      <5.00 mg/L 310.00 (H)  242.30 (H)  28.60 (H)       Legend:  (H) High        Recent Inflammatory Biomarkers:   Recent Labs   Lab Test 10/04/24  0536 10/03/24  0537 10/02/24  0605 10/01/24  0625 09/30/24  0610 09/29/24  0614 08/30/24  0437 08/29/24  0359   PCAL  --   --   --   --   --   --   --  1.24*   WBC 7.5 5.8 6.1 5.4 5.8 5.4   < > 15.3*    < > = values in this interval not displayed.            Review of Systems:      Negative except for findings in the HPI.           Allergies:     Allergies   Allergen Reactions    Aspirin Rash    Cats Rash    Nickel Rash            Physical Exam:   Vitals were reviewed  Patient Vitals for the past 24 hrs:   BP Temp Temp src Pulse Resp SpO2 Weight   10/04/24 0814 104/56 98.1  F (36.7  C) Oral 70 18 -- --   10/04/24 0510 -- -- -- -- -- -- 78.8 kg (173 lb 11.6 oz)   10/03/24 2346 126/58 98.5  F (36.9  C) Oral 88 16 92 % --   10/03/24 2247 -- -- -- -- -- 90 % --   10/03/24 2210 -- -- -- -- -- (!) 87 % --   10/03/24 2143 119/61 -- -- 82 -- -- --   10/03/24 1542 99/52 98.1  F (36.7  C) Oral 84 18 (!) 87 % --       Physical Examination:    Resp: 18    Gen: no distress, sitting in chair   HEENT: opens eyes  PICC, chest wall catheter for hemodialysis  CV: Sternal incision  Lungs: normal resp pattern  Skin: Warm  Neuro: awake and alert   HD catheter  PICC- R basilic           Laboratory Data:   ID Labs:  Microbiology labs:  7-Day Micro Results       Collected Updated Procedure Result Status      10/03/2024 0920 10/04/2024 1413 Quantiferon TB Gold Plus Grey Tube [[id number]]   Blood  from Line, venous    Final result Component Value Units   Quantiferon Nil Tube 0.11 IU/mL            10/03/2024 0920 10/04/2024 1413 Quantiferon TB Gold Plus Green Tube [[id number]]   Blood from Line, venous    Final result Component Value Units   Quantiferon TB1 Tube 0.13 IU/mL            10/03/2024 0920 10/04/2024 1413 Quantiferon TB Gold Plus Yellow Tube [[id number]]   Blood from Line, venous    Final result Component Value   Quantiferon TB2 Tube 0.14            10/03/2024 0920 10/04/2024 1412 Quantiferon TB Gold Plus Purple Tube [[id number]]   Blood from Line, venous    Final result Component Value Units   Quantiferon Mitogen 3.33 IU/mL            10/03/2024 0920 10/04/2024 1423 Quantiferon TB Gold Plus [[id number]]   Blood from Line, venous    Final result Component Value Units   Quantiferon-TB Gold Plus Negative    No interferon gamma response to M.tuberculosis antigens was detected. Infection with M.tuberculosis is unlikely, however a single negative result does not exclude infection. In patients at high risk for infection, a second test should be considered in accordance with the 2017 ATS/IDSA/CDC Clinical Pract  ice Guidelines for Diagnosis of Tuberculosis in Adults and Children    TB1 Ag minus Nil Value 0.02 IU/mL   TB2 Ag minus Nil Value 0.03 IU/mL   Mitogen minus Nil Result 3.22 IU/mL   Nil Result 0.11 IU/mL                  Susceptibility data from last 90 days.  Collected Specimen Info Organism Ampicillin Ampicillin/Sulbactam Cefepime Ceftazidime Ceftriaxone Ciprofloxacin Clindamycin Daptomycin Doxycycline Erythromycin Gentamicin Levofloxacin Meropenem Oxacillin   08/29/24 Sputum from Expectorate Enterobacter cloacae complex R R  S R R  S      S  S  S      Normal samara                 08/16/24 Peripheral Blood Staphylococcus aureus                 08/14/24 Peripheral Blood Staphylococcus aureus        S  S  S  S  S    S     Collected Specimen Info Organism Piperacillin/Tazobactam Tetracycline  Tobramycin Trimethoprim/Sulfamethoxazole  Vancomycin   08/29/24 Sputum from Expectorate Enterobacter cloacae complex R   S  S      Normal samara        08/16/24 Peripheral Blood Staphylococcus aureus        08/14/24 Peripheral Blood Staphylococcus aureus   S   S  S       Reviewed      No lab results found.  Recent Labs   Lab Test 10/04/24  0536 10/03/24  0537 10/02/24  0605 10/01/24  0625 09/30/24  0610 09/29/24  0614   WBC 7.5 5.8 6.1 5.4 5.8 5.4     Recent Labs   Lab Test 10/04/24  0536 10/03/24  0537 10/02/24  0605 10/01/24  0625   CR 3.01* 2.14* 3.22* 2.43*   GFRESTIMATED 15* 22* 14* 19*       Hematology Studies  Recent Labs   Lab Test 10/04/24  0536 10/03/24  0537 10/02/24  0605 10/01/24  0625 09/30/24  0610 09/29/24  0614   WBC 7.5 5.8 6.1 5.4 5.8 5.4   HGB 8.1* 7.5* 7.5* 7.9* 7.9* 7.7*   HCT 25.3* 24.7* 24.7* 26.8* 25.5* 24.4*    126* 125* 124* 125* 129*       Metabolic  Recent Labs   Lab Test 10/04/24  0536 10/03/24  0537 10/02/24  0605    137 138   BUN 16.6 9.8 18.9   CO2 27 28 26   CR 3.01* 2.14* 3.22*   GFRESTIMATED 15* 22* 14*       Hepatic Studies  Recent Labs   Lab Test 09/19/24  0635 09/18/24  0704 09/17/24  1045   BILITOTAL 0.7 0.7 0.7   ALKPHOS 78 75 75   ALBUMIN 3.1* 3.1* 3.1*   AST 36 34 32   ALT 31 29 32          Imaging Data:   Reviewed     IR CVC Non Tunnel Placement > 5 Yrs    Result Date: 9/1/2024  [place] [facility] LOCATION: [facility] DATE: 9/1/2024 PROCEDURE: NON-TUNNELED DIALYSIS CATHETER PLACEMENT INTERVENTIONAL RADIOLOGIST: RU Reyes MD. INDICATION: HAMMAD requiring hemodialysis. CONSENT: The risks, benefits and alternatives of non-tunneled dialysis catheter placement were discussed with the patient in detail. All questions were answered. Informed consent was given to proceed with the procedure. MODERATE SEDATION: None. CONTRAST: None. ANTIBIOTICS: None. ADDITIONAL MEDICATIONS: Heparin 3800 U IV FLUOROSCOPIC TIME: 2.0 minutes. RADIATION DOSE:  Air Kerma: 36.04 mGy. COMPLICATIONS: No immediate complications. STERILE BARRIER TECHNIQUE: Maximum sterile barrier technique was used. Cutaneous antisepsis was performed at the operative site with application of 2% chlorhexidine and large sterile drape. Prior to the procedure, the  and assistant performed hand hygiene and wore hat, mask, sterile gown, and sterile gloves during the entire procedure. PROCEDURE: Limited left neck ultrasound was performed which demonstrated a patent internal jugular vein; images saved to the permanent patient medical record. The overlying skin and subcutaneous soft tissues were anesthetized using 1% lidocaine. Under ultrasound guidance, the left internal jugular vein was accessed using a micropuncture needle; images saved of the permanent patient medical record. Access was secured using a 4 Guyanese transitional sheath. A Frenzoo guidewire was advanced into the SVC. Under fluoroscopic guidance, the overlying skin and subcutaneous soft tissues were dilated and a 15.5 [place]ese nontunneled dialysis catheter was placed with the tip within the cavoatrial junction. The catheter was tested and found to flush and aspirate appropriately. The catheter was heparinized and secured to the skin.     IMPRESSION:  1.  Successful non-tunneled dialysis catheter placement. PLAN: 1. Dialysis catheter is ready to be used. 2. Nontunneled right groin dialysis catheter can be removed in ICU.       Study Result    Narrative & Impression   EXAM: MR BRAIN W/O and W CONTRAST  LOCATION: [facility]  DATE: 8/17/2024     INDICATION: Headache in a patient with MSSA bacteremia secondary to aortic valve endocarditis.  Look for cerebral septic emboli; Headache; Acute HA (< 3 months), no complicating features  COMPARISON: None.  CONTRAST: 9 ml gadavist  TECHNIQUE: Routine multiplanar multisequence head MRI without and with intravenous contrast.     FINDINGS: Assessment degraded due to  motion.  INTRACRANIAL CONTENTS:   Apparent focus of diffusion restriction with T2/FLAIR hyperintensity within the left superior parietal lobule cortex is hyperintense on ADC map, representing T2 shine through.  Focus of signal abnormality measures 13 x 9 mm in the axial plane and does   not have internal enhancement.     Additional punctate foci of hyperintensity on diffusion weighted with similar signal characteristics (periventricular right corona radiata, left superior parietal lobule, and left cerebellar hemisphere).     Patchy and confluent nonspecific T2/FLAIR hyperintensities within the cerebral white matter most consistent with moderate chronic microvascular ischemic change. Moderate generalized cerebral atrophy. No hydrocephalus. Normal position of the cerebellar   tonsils. No discernible abnormal intracranial enhancement; however, assessment substantially degraded due to motion. 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     SELLA: No abnormality accounting for technique.     OSSEOUS STRUCTURES/SOFT TISSUES: Normal marrow signal. The major intracranial vascular flow voids are maintained.      ORBITS: No abnormality accounting for technique.      SINUSES/MASTOIDS: Mild mucosal thickening scattered about the paranasal sinuses. Scattered fluid/membrane thickening in the left mastoid air cells. No apparent mass in the posterior nasopharynx or skull base.                                                                       IMPRESSION: Assessment degraded due to motion.  1.  13 mm focus of cortical signal abnormality within the left superior parietal lobule which is favored to represent a subacute infarct; low-grade glial neoplasm could conceivably have a similar appearance. Hyperacute intraparenchymal hematoma could   also have a similar appearance but is considered less likely. Recommend noncontrast head CT for further characterization. Also recommend follow-up MRI brain without and with contrast in 8-12 weeks to  document expected evolution.  2.  Additional smaller foci of signal abnormality with similar characteristics favored to represent punctate subacute infarcts.

## 2024-10-04 NOTE — PLAN OF CARE
"Pt attended therapies. Ate well. Wound care done to her buttock. Denied pain.  was here and helpful with her daily need.         Problem: Adult Inpatient Plan of Care  Goal: Plan of Care Review  Description: The Plan of Care Review/Shift note should be completed every shift.  The Outcome Evaluation is a brief statement about your assessment that the patient is improving, declining, or no change.  This information will be displayed automatically on your shift  note.  Outcome: Progressing  Goal: Patient-Specific Goal (Individualized)  Description: You can add care plan individualizations to a care plan. Examples of Individualization might be:  \"Parent requests to be called daily at 9am for status\", \"I have a hard time hearing out of my right ear\", or \"Do not touch me to wake me up as it startles  me\".  Outcome: Progressing  Goal: Absence of Hospital-Acquired Illness or Injury  Outcome: Progressing  Intervention: Identify and Manage Fall Risk  Recent Flowsheet Documentation  Taken 10/4/2024 0939 by Yaakov Esquivel RN  Safety Promotion/Fall Prevention:   activity supervised   mobility aid in reach  Intervention: Prevent Skin Injury  Recent Flowsheet Documentation  Taken 10/4/2024 0939 by Yaakov Esquivel RN  Body Position: position changed independently  Skin Protection: silicone foam dressing in place  Device Skin Pressure Protection: pressure points protected  Intervention: Prevent Infection  Recent Flowsheet Documentation  Taken 10/4/2024 0939 by Yaakov Esquivel RN  Infection Prevention:   equipment surfaces disinfected   hand hygiene promoted  Goal: Optimal Comfort and Wellbeing  Outcome: Progressing  Intervention: Provide Person-Centered Care  Recent Flowsheet Documentation  Taken 10/4/2024 0939 by Yaakov Esquivel RN  Trust Relationship/Rapport:   care explained   choices provided  Goal: Readiness for Transition of Care  Outcome: Progressing     "

## 2024-10-04 NOTE — PHARMACY-VANCOMYCIN DOSING SERVICE
Pharmacy Vancomycin Note  Date of Service 2024  Patient's  1940   84 year old, female    Indication: Endocarditis   Day of Therapy:   Started , planning therapy through 10/8    Current vancomycin regimen:  Intermittent dosing   Current vancomycin monitoring method: Renal Replacement Therapy  Current vancomycin therapeutic monitoring goal: 15-20 mg/L     Current estimated CrCl = Estimated Creatinine Clearance: 15 mL/min (A) (based on SCr of 3.01 mg/dL (H)).    Creatinine for last 3 days  10/2/2024:  6:05 AM Creatinine 3.22 mg/dL  10/3/2024:  5:37 AM Creatinine 2.14 mg/dL  10/4/2024:  5:36 AM Creatinine 3.01 mg/dL    Recent Vancomycin Levels (past 3 days)  10/2/2024:  6:05 AM Vancomycin 19.0 ug/mL  10/4/2024:  5:36 AM Vancomycin 18.9 ug/mL    Vancomycin IV Administrations (past 72 hours)                     vancomycin (VANCOCIN) 1,000 mg in sodium chloride 0.9 % 285 mL intermittent infusion (mg) 1,000 mg New Bag 10/02/24 1622                    Nephrotoxins and other renal medications (From now, onward)      Start     Dose/Rate Route Frequency Ordered Stop    24 1254  vancomycin place horne - receiving intermittent dosing         1 each Intravenous SEE ADMIN INSTRUCTIONS 24 1254 10/08/24 0100               Contrast Orders - past 72 hours (72h ago, onward)      None            Interpretation of levels and current regimen:  Vancomycin level is reflective of therapeutic level    Has serum creatinine changed greater than 50% in last 72 hours: on dialysis MWF    Urine output:  unable to determine    Renal Function: ARF on Dialysis         Plan:   Give Vanco 1000mg once, (12mg/kg = 920mg, rounded to 1000mg)   Vancomycin monitoring method: Renal Replacement Therapy  Vancomycin therapeutic monitoring goal: 15-20 mg/L  Pharmacy will check vancomycin levels Monday before dialysis   Serum creatinine levels will be ordered  with dialysis  .    Kisha Rodriguez, RPh,  BCCP, BPS

## 2024-10-04 NOTE — CONSULTS
[facility]  WO Nurse Inpatient Assessment     Consulted for: buttocks    Patient History (according to provider note(s):      Patient seen by this WOC nurse previously and signed off. New concern noted by nursing.    Assessment:      Areas visualized during today's visit:  sacrum and buttocks    Skin Injury Location: left buttock    Last photo: 10/4  Skin injury due to:  unknwon  Skin history and plan of care: no previous concerns noted at this facility  Affected area:      Skin assessment: Ecchymosis - purple discolorationis not over a bony prominence, and superior to this is noted yellow tinge to skin, consistent with bruising  Per patient's spouse, she has a history of bruising easily. Patient cannot remember an injury to this area. She is independently mobile.     Measurements (length x width x depth, in cm) purple area approximately 1.5 x 3cm      Color: purple     Temperature  normal      Drainage: none .      Color: none      Odor: none  Pain: denies   Treatment goal: Heal   STATUS: initial assessment  Supplies ordered: supplies stored on unit       Treatment Plan:     Left buttock   Cleanse with hygiene cares  Apply Mepilex, change every other day and as needed    Orders: Written    RECOMMEND PRIMARY TEAM ORDER: None, at this time  Education provided: plan of care  Discussed plan of care with: Patient, Family, and Nurse  WOC nurse follow-up plan: weekly  Notify WOC if wound(s) deteriorate.  Nursing to notify the Provider(s) and re-consult the WOC Nurse if new skin concern.    DATA:     Current support surface: Standard  Standard gel mattress (Isoflex)  Containment of urine/stool: Brief  BMI: Body mass index is 27.21 kg/m .   Active diet order: Orders Placed This Encounter      Advance Diet as Tolerated: Regular Diet Adult     Output: I/O last 3 completed shifts:  In: 990 [P.O.:960; I.V.:30]  Out: -      Labs:   Recent Labs   Lab 10/04/24  0536   HGB 8.1*   INR 2.73*   WBC 7.5      Pressure injury risk assessment:   Sensory Perception: 4-->no impairment  Moisture: 4-->rarely moist  Activity: 3-->walks occasionally  Mobility: 3-->slightly limited  Nutrition: 3-->adequate  Friction and Shear: 2-->potential problem  Sergey Score: 19    Evelina Herrera, JAMN, RN, PHN, HNB-BC, CWOCN  Pager no longer is use, please contact through [employer] group: [facility] [employer]

## 2024-10-04 NOTE — PROGRESS NOTES
[facility]    [facility] - Hospitalist Service    Date of Admission:  9/16/2024    Felicitas Elliott is a 84 year old female admitted on 9/16/2024 to the LTAC for long-term antibiotics following MSSA bacteremia and endocarditis. 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  She was initially admitted to [facility] on 8/16/2024 for sepsis and HAMMAD.  She received empiric antibiotics on admission. Blood cultures grew MSSA.  Neurology was consulted for possible discitis.  ID was also consulted. Echo revealed large posterior leaflet mitral valve vegetation and small aortic valve vegetation with severe aortic stenosis.  Brain MRI revealed scattered embolic infarcts.  Coronary angiogram demonstrated coronary artery disease.  CT surgery was consulted. She underwent CABG alongside the procedures mentioned above.  She however has not had renal recovery.  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  She had dysphagia and momentarily required NG tube with tube feeds. She worked with speech therapy and is now cleared for regular diet and thin liquids.  CT surgery recommends warfarin for 3 months for bioprosthetic valves.  INR goal 2-3.  End date of warfarin approximately 12/10/2024, at which point she will be on Plavix alone (ASA allergy) indefinitely.  ID recommends IV vancomycin until 10/8/2024.    LTAC Course:  9/17 - 9/21.  Progressing well.  On Coumadin, initially bridging with heparin gtt. for bioprosthetic valve.  On arrival initial INR 1.49. Goal 2-3. Has been dialyzing for HAMMAD. 9/19, INR 2.19, heparin gtt discontinued. RD noted patient having trouble chewing and impedes her overall oral intake.  SLP consulted.    9/22-9/29: tolerating dialysis well. Pacer checked on 9/24, set at , top pacer is capturing 94%, bottom pacer is capturing 80%. Cardio did a televisit  with patient and recommend to decrease metoprolol by 1/2- changed from 25 to 12.5 bid. Added simethicone as patient has abdominal pain due to gas.    She is having neck pain -lidocaine and voltaren added.  Pharmacy to change medications around to have get getting less pills at the same time.  She complains that this contributes to a nausea.  On 9/29, BP is low, decreased toresimide dose from 100 to 50, but holding for now.  Also added prn midodrine.    9/30: Dialysis today. Patient BP has been running low but stable. Required midodrine prn. Will change metoprolol 12.5 mg po bid to metoprolol 12.5 mg XL daily. She remains on vancomycin IV for bacteremia  10/1. Progressing well. Continues with dialysis. Per speech is on regular texture diet and RD notes oral intake is improved. BP on the lower side but fairly well-controlled. Remains on IV vancomycin per ID for bacteremia.  10/2.  Continues to make progress but remains weak.  On hemodialysis and remains on IV antibiotics for bacteremia.  Plan to continue current medical management.  No new changes at this time.  10/3. Just about the same compared to yesterday. She states she is gaining strength though remains weak. Continues to work with therapies. On hemodialysis and IV antibiotics. No new changes at this time.  Anticipate she may discharge home early next week  10/4. Doing well.Plan to continue with current medical management. Remains on HD and IV antibiotics    BARRIERS TO DISCHARGE (why care is unable to be provided at a lower level of care):    -Long-term antibiotics.  -Dialysis    Assessment & Plan   -No new changes at this time    MSSA bacteremia  Aortic root abscess s/p aortic root abscess debridement and aortic annular reconstruction, aortic root replacement, bioprosthetic aortic valve 8/27/2024  Gram-negative bacilli and respiratory culture s/p cefepime and ceftriaxone  -Positive blood cultures 8/14/2024 and 8/16 with MSSA.  Repeat blood cultures -  8/17/2024  -Mitral and aortic valve endocarditis as evidenced with large vegetation on mitral valve 8 mm x 15 at x 2 posterior leaflet of small vegetation on aortic valve.  With signs of vegetation combined with brain infarct.  S/p CV surgery  -Continue with IV vancomycin.  Dosing per pharmacy. Per ID last vancomycin dose will be after HD on 10/7  -Per ID will need to check immunoglobulin level in 4 to 6 weeks, sister with history of hypogammaglobinemia  -ID consulted and following  -Patient previously on heparin 5000 subQ every 8 hours and Coumadin.  Recommended INR goal is 2-3 per CT surgery.  Discontinued heparin 5000 units subcu every 8 hours on admission and started patient on heparin gtt. alongside Coumadin.  I also notified pharmacy and were agreeable with this plan.  -9/19, INR 2.19.  Discontinued heparin gtt. INR on goal  -Pharm.D. to continue dosing Coumadin   -INR 2.73 on 10/4    Peripheral neuropathy of b/l feet  -fredy-lidocaine-ketamine cream prn   -benadryl cream prn     Abnormal brain MRI suggestive of subacute infarct  Patient with a history of MSSA bacteremia and aortic valve endocarditis.  Most likely septic emboli.  -Continue with antibiotics per ID.  End date 10/8/2024    Acute respiratory failure with hypoxia  -Patient previously intubated.  Now extubated.    -Continue with oxygen support to maintain oxygenation above 92%.  Wean as tolerated  -RT while in LTAC    HAMMAD  -Previously on CCRT now transferred to HD  -Torsemide per nephrology  -HD per nephrology-MWF  -Monitor kidney function with BMP    CAD  A-fib  History of aortic stenosis  +pacemaker   Hypotension   -Fairly stable at this time.  -S/p CABG per CT surgery  -Continue with Plavix and statin  -Metoprolol- will decrease dose from 25 bid to 12.5 bid due to low BP on 9/24  -Previously on amiodarone for rate control, now s/p PPM  -pacer checked 9/23, set at , top pacer is capturing 94%, bottom pacer is capturing 80%.  -cardiothoracic  surg tele appt- appreciate recs  -torsemide on hold on 9/29 due to hypotension  -prn midodrine     Neck pain due to arthritis  -lidocaine  -voltaren prn    Anemia of chronic disease  Thrombocytopenia   -monitor    Severe protein calorie malnutrition  -? esophogram  -RD following   -calorie counts    Physical deconditioning/critical illness myopathy  -Fall precautions.  -PT/OT  -WOC    Diet: Advance Diet as Tolerated: Regular Diet Adult  Snacks/Supplements Adult: Gelatein Plus; With Meals  Snacks/Supplements Adult: Ensure Enlive; With Meals    DVT Prophylaxis: Warfarin  Le Catheter: Not present  Lines: PRESENT      PICC 09/06/24 Triple Lumen Right Basilic Vascular access-Site Assessment: WDL  CVC Double Lumen Right Subclavian Tunneled-Site Assessment: WDL      Cardiac Monitoring: None  Code Status: No CPR- Do NOT Intubate      Clinically Significant Risk Factors              # Hypoalbuminemia: Lowest albumin = 3.1 g/dL at 9/19/2024  6:35 AM, will monitor as appropriate   # Thrombocytopenia: Lowest platelets = 126 in last 2 days, will monitor for bleeding              # Severe Malnutrition: based on nutrition assessment      # Financial/Environmental Concerns: none   # Pacemaker present  # History of CABG: noted on surgical history    Disposition Plan     Medically Ready for Discharge: Anticipated in 5+ Days    Adan Veloz MD  Hospitalist Service  [facility]  Securely message with [employer] (more info)  Text page via [employer] Paging/Directory   ______________________________________________________________________    Interval History   No new events overnight per RN.  Patient is in bed comfortably and  is at bedside visiting.  She states she is progressing well. She reports no new complaints at this time    Physical Exam   Vital Signs: Temp: 98.1  F (36.7  C) Temp src: Oral BP: 104/56 Pulse: 70   Resp: 18 SpO2: 92 % O2 Device: None (Room air)   Weight: 173 lbs 11.56 oz  Constitutional: Patient is resting in bed  comfortably appears in no distress.  Eyes: Pupils are equal round and reactive to light  Respiratory: Good respiratory effort, on auscultation good air entry is noted  Cardiovascular: Good S1 and S2 no obvious murmurs peripheral pulses are palpable  GI: Abdomen is soft nontender nondistended bowel sounds are noted  Skin: No obvious rashes noted on exposed areas, warm to touch please refer to nursing/wound care note for complete skin exam  Musculoskeletal: Good muscle tone nails and digits appear acyanotic  Neuropsychiatric: Awake alert oriented to person normal affect    Medical Decision Making       50 MINUTES SPENT BY ME on the date of service doing chart review, history, exam, documentation & further activities per the note.  ------------------ MEDICAL DECISION MAKING ------------------------------------------------------------------------------------------------------  MANAGEMENT DISCUSSED with the following over the past 24 hours: Patient, patient's daughter in room, staff RN and pharmacist   NOTE(S)/MEDICAL RECORDS REVIEWED over the past 24 hours: RN       Data   Recent Labs   Lab 10/04/24  0536 10/03/24  0537 10/02/24  0605   WBC 7.5 5.8 6.1   HGB 8.1* 7.5* 7.5*   * 105* 104*    126* 125*   INR 2.73* 2.27* 2.41*    137 138   POTASSIUM 4.0 4.0 3.8   CHLORIDE 102 102 103   CO2 27 28 26   BUN 16.6 9.8 18.9   CR 3.01* 2.14* 3.22*   ANIONGAP 9 7 9   KAELYN 8.3* 7.8* 8.5*   * 96 89     Most Recent 3 CBC's:  Recent Labs   Lab Test 10/04/24  0536 10/03/24  0537 10/02/24  0605   WBC 7.5 5.8 6.1   HGB 8.1* 7.5* 7.5*   * 105* 104*    126* 125*     Most Recent 3 BMP's:  Recent Labs   Lab Test 10/04/24  0536 10/03/24  0537 10/02/24  0605    137 138   POTASSIUM 4.0 4.0 3.8   CHLORIDE 102 102 103   CO2 27 28 26   BUN 16.6 9.8 18.9   CR 3.01* 2.14* 3.22*   ANIONGAP 9 7 9   KAELYN 8.3* 7.8* 8.5*   * 96 89     Most Recent 2 LFT's:  Recent Labs   Lab Test 09/19/24  0635  09/18/24  0704   AST 36 34   ALT 31 29   ALKPHOS 78 75   BILITOTAL 0.7 0.7     Most Recent 3 INR's:  Recent Labs   Lab Test 10/04/24  0536 10/03/24  0537 10/02/24  0605   INR 2.73* 2.27* 2.41*

## 2024-10-04 NOTE — PLAN OF CARE
Goal Outcome Evaluation:       Patient alert and oriented. Denied pain. Incontinent of urine x 1.  was here this evening. On room air. Oxygen Sat  87-90% on RA. Decline oxygen use at this time. No distress noted. No other issues noted for the second four hr shift.  Problem: Adult Inpatient Plan of Care  Goal: Absence of Hospital-Acquired Illness or Injury  Intervention: Prevent Skin Injury  Recent Flowsheet Documentation  Taken 10/3/2024 1727 by Walker Lilly, RN  Body Position:   position changed independently   sitting up in bed  Taken 10/3/2024 1542 by Walker Lilly, RN  Body Position:   turned   supine, legs elevated     Problem: Wound  Goal: Optimal Functional Ability  Intervention: Optimize Functional Ability  Recent Flowsheet Documentation  Taken 10/3/2024 1700 by Walker Lilly, JULES  Assistive Device Utilized:   gait belt   walker  Activity Management: ambulated to bathroom  Activity Assistance Provided: assistance, 1 person  Goal: Skin Health and Integrity  Intervention: Optimize Skin Protection  Recent Flowsheet Documentation  Taken 10/3/2024 1727 by Walker Lilly, JULES  Head of Bed (HOB) Positioning: HOB at 60 degrees  Taken 10/3/2024 1700 by Walker Lilly, RN  Activity Management: ambulated to bathroom  Taken 10/3/2024 1542 by Walker Lilly RN  Head of Bed (HOB) Positioning: HOB at 30 degrees

## 2024-10-05 ENCOUNTER — APPOINTMENT (OUTPATIENT)
Dept: PHYSICAL THERAPY | Facility: CLINIC | Age: 84
DRG: 288 | End: 2024-10-05
Attending: HOSPITALIST
Payer: COMMERCIAL

## 2024-10-05 LAB
ANION GAP SERPL CALCULATED.3IONS-SCNC: 6 MMOL/L (ref 7–15)
BUN SERPL-MCNC: 8.2 MG/DL (ref 8–23)
CALCIUM SERPL-MCNC: 8.8 MG/DL (ref 8.8–10.4)
CHLORIDE SERPL-SCNC: 104 MMOL/L (ref 98–107)
CREAT SERPL-MCNC: 2.13 MG/DL (ref 0.51–0.95)
EGFRCR SERPLBLD CKD-EPI 2021: 22 ML/MIN/1.73M2
ERYTHROCYTE [DISTWIDTH] IN BLOOD BY AUTOMATED COUNT: 18.4 % (ref 10–15)
GLUCOSE SERPL-MCNC: 99 MG/DL (ref 70–99)
HCO3 SERPL-SCNC: 27 MMOL/L (ref 22–29)
HCT VFR BLD AUTO: 22.9 % (ref 35–47)
HGB BLD-MCNC: 7.5 G/DL (ref 11.7–15.7)
INR PPP: 2.25 (ref 0.85–1.15)
MCH RBC QN AUTO: 34.9 PG (ref 26.5–33)
MCHC RBC AUTO-ENTMCNC: 32.8 G/DL (ref 31.5–36.5)
MCV RBC AUTO: 107 FL (ref 78–100)
PLATELET # BLD AUTO: 131 10E3/UL (ref 150–450)
POTASSIUM SERPL-SCNC: 4.3 MMOL/L (ref 3.4–5.3)
RBC # BLD AUTO: 2.15 10E6/UL (ref 3.8–5.2)
SODIUM SERPL-SCNC: 137 MMOL/L (ref 135–145)
WBC # BLD AUTO: 5.5 10E3/UL (ref 4–11)

## 2024-10-05 PROCEDURE — 250N000011 HC RX IP 250 OP 636: Performed by: PHYSICIAN ASSISTANT

## 2024-10-05 PROCEDURE — 97110 THERAPEUTIC EXERCISES: CPT | Mod: GP

## 2024-10-05 PROCEDURE — 250N000013 HC RX MED GY IP 250 OP 250 PS 637: Performed by: HOSPITALIST

## 2024-10-05 PROCEDURE — 85610 PROTHROMBIN TIME: CPT | Performed by: PHYSICIAN ASSISTANT

## 2024-10-05 PROCEDURE — 120N000017 HC R&B RESPIRATORY CARE

## 2024-10-05 PROCEDURE — 82310 ASSAY OF CALCIUM: CPT | Performed by: PHYSICIAN ASSISTANT

## 2024-10-05 PROCEDURE — 99233 SBSQ HOSP IP/OBS HIGH 50: CPT | Performed by: HOSPITALIST

## 2024-10-05 PROCEDURE — 97116 GAIT TRAINING THERAPY: CPT | Mod: GP

## 2024-10-05 PROCEDURE — 80048 BASIC METABOLIC PNL TOTAL CA: CPT | Performed by: PHYSICIAN ASSISTANT

## 2024-10-05 PROCEDURE — 250N000013 HC RX MED GY IP 250 OP 250 PS 637: Performed by: PHYSICIAN ASSISTANT

## 2024-10-05 RX ORDER — WARFARIN SODIUM 2 MG/1
2 TABLET ORAL
Status: COMPLETED | OUTPATIENT
Start: 2024-10-05 | End: 2024-10-05

## 2024-10-05 RX ADMIN — Medication 0.5 G: at 21:18

## 2024-10-05 RX ADMIN — WARFARIN SODIUM 2 MG: 2 TABLET ORAL at 18:05

## 2024-10-05 RX ADMIN — SIMETHICONE 80 MG: 80 TABLET, CHEWABLE ORAL at 09:11

## 2024-10-05 RX ADMIN — Medication 1 CAPSULE: at 21:16

## 2024-10-05 RX ADMIN — Medication 1 TABLET: at 12:55

## 2024-10-05 RX ADMIN — Medication 0.5 G: at 13:54

## 2024-10-05 RX ADMIN — LIDOCAINE 1 PATCH: 4 PATCH TOPICAL at 09:11

## 2024-10-05 RX ADMIN — Medication 5 MG: at 21:15

## 2024-10-05 RX ADMIN — SIMETHICONE 80 MG: 80 TABLET, CHEWABLE ORAL at 13:54

## 2024-10-05 RX ADMIN — ONDANSETRON HYDROCHLORIDE 8 MG: 4 TABLET, FILM COATED ORAL at 01:05

## 2024-10-05 RX ADMIN — TRAZODONE HYDROCHLORIDE 25 MG: 50 TABLET ORAL at 01:56

## 2024-10-05 RX ADMIN — CLOPIDOGREL 75 MG: 75 TABLET ORAL at 09:21

## 2024-10-05 RX ADMIN — DOCUSATE SODIUM AND SENNOSIDES 1 TABLET: 8.6; 5 TABLET, FILM COATED ORAL at 21:16

## 2024-10-05 RX ADMIN — SIMETHICONE 80 MG: 80 TABLET, CHEWABLE ORAL at 21:15

## 2024-10-05 RX ADMIN — Medication 0.5 G: at 09:11

## 2024-10-05 RX ADMIN — Medication 1 CAPSULE: at 09:10

## 2024-10-05 RX ADMIN — DOCUSATE SODIUM AND SENNOSIDES 1 TABLET: 8.6; 5 TABLET, FILM COATED ORAL at 09:10

## 2024-10-05 RX ADMIN — PANTOPRAZOLE SODIUM 40 MG: 40 TABLET, DELAYED RELEASE ORAL at 07:07

## 2024-10-05 RX ADMIN — ATORVASTATIN CALCIUM 80 MG: 40 TABLET, FILM COATED ORAL at 21:16

## 2024-10-05 ASSESSMENT — ACTIVITIES OF DAILY LIVING (ADL)
ADLS_ACUITY_SCORE: 34
ADLS_ACUITY_SCORE: 35
ADLS_ACUITY_SCORE: 35
ADLS_ACUITY_SCORE: 34
ADLS_ACUITY_SCORE: 32
ADLS_ACUITY_SCORE: 32
ADLS_ACUITY_SCORE: 34
ADLS_ACUITY_SCORE: 32
ADLS_ACUITY_SCORE: 32
ADLS_ACUITY_SCORE: 34
ADLS_ACUITY_SCORE: 35
ADLS_ACUITY_SCORE: 32
ADLS_ACUITY_SCORE: 35
ADLS_ACUITY_SCORE: 32
ADLS_ACUITY_SCORE: 34
ADLS_ACUITY_SCORE: 35
ADLS_ACUITY_SCORE: 34
ADLS_ACUITY_SCORE: 32
ADLS_ACUITY_SCORE: 34
ADLS_ACUITY_SCORE: 32
ADLS_ACUITY_SCORE: 37

## 2024-10-05 NOTE — PROGRESS NOTES
[facility]    [facility] - [facility]    Date of Admission:  9/16/2024    Felicitas Elliott is a 84 year old female admitted on 9/16/2024 to the LTAC for long-term antibiotics following MSSA bacteremia and endocarditis. 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  She was initially admitted to [facility] on 8/16/2024 for sepsis and HAMMAD.  She received empiric antibiotics on admission. Blood cultures grew MSSA.  Neurology was consulted for possible discitis.  ID was also consulted. Echo revealed large posterior leaflet mitral valve vegetation and small aortic valve vegetation with severe aortic stenosis.  Brain MRI revealed scattered embolic infarcts.  Coronary angiogram demonstrated coronary artery disease.  CT surgery was consulted. She underwent CABG alongside the procedures mentioned above.  She however has not had renal recovery.  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  She had dysphagia and momentarily required NG tube with tube feeds. She worked with speech therapy and is now cleared for regular diet and thin liquids.  CT surgery recommends warfarin for 3 months for bioprosthetic valves.  INR goal 2-3.  End date of warfarin approximately 12/10/2024, at which point she will be on Plavix alone (ASA allergy) indefinitely.  ID recommends IV vancomycin until 10/8/2024.    LTAC Course:  9/17 - 9/21.  Progressing well.  On Coumadin, initially bridging with heparin gtt. for bioprosthetic valve.  On arrival initial INR 1.49. Goal 2-3. Has been dialyzing for HAMMAD. 9/19, INR 2.19, heparin gtt discontinued. RD noted patient having trouble chewing and impedes her overall oral intake.  SLP consulted.    9/22-9/29: tolerating dialysis well. Pacer checked on 9/24, set at , top pacer is capturing 94%, bottom pacer is capturing 80%. Cardio did a televisit  with patient and recommend to decrease metoprolol by 1/2- changed from 25 to 12.5 bid. Added simethicone as patient has abdominal pain due to gas.    She is having neck pain -lidocaine and voltaren added.  Pharmacy to change medications around to have get getting less pills at the same time.  She complains that this contributes to a nausea.  On 9/29, BP is low, decreased toresimide dose from 100 to 50, but holding for now.  Also added prn midodrine.    9/30: Dialysis today. Patient BP has been running low but stable. Required midodrine prn. Will change metoprolol 12.5 mg po bid to metoprolol 12.5 mg XL daily. She remains on vancomycin IV for bacteremia  10/1. Progressing well. Continues with dialysis. Per speech is on regular texture diet and RD notes oral intake is improved. BP on the lower side but fairly well-controlled. Remains on IV vancomycin per ID for bacteremia.  10/2.  Continues to make progress but remains weak.  On hemodialysis and remains on IV antibiotics for bacteremia.  Plan to continue current medical management.  No new changes at this time.  10/3. Just about the same compared to yesterday. She states she is gaining strength though remains weak. Continues to work with therapies. On hemodialysis and IV antibiotics. No new changes at this time.  Anticipate she may discharge home early next week  10/4. Doing well.Plan to continue with current medical management. Remains on HD and IV antibiotics  10/5. Making good progress.Plan to continue with current medical management    BARRIERS TO DISCHARGE (why care is unable to be provided at a lower level of care):    -Long-term antibiotics.  -Dialysis    Assessment & Plan     MSSA bacteremia  Aortic root abscess s/p aortic root abscess debridement and aortic annular reconstruction, aortic root replacement, bioprosthetic aortic valve 8/27/2024  Gram-negative bacilli and respiratory culture s/p cefepime and ceftriaxone  -Positive blood cultures 8/14/2024 and  8/16 with MSSA.  Repeat blood cultures - 8/17/2024  -Mitral and aortic valve endocarditis as evidenced with large vegetation on mitral valve 8 mm x 15 at x 2 posterior leaflet of small vegetation on aortic valve.  With signs of vegetation combined with brain infarct.  S/p CV surgery  -Continue with IV vancomycin.  Dosing per pharmacy. Per ID last vancomycin dose will be after HD on 10/7  -Per ID will need to check immunoglobulin level in 4 to 6 weeks, sister with history of hypogammaglobinemia  -ID consulted and following  -Patient previously on heparin 5000 subQ every 8 hours and Coumadin.  Recommended INR goal is 2-3 per CT surgery.  Discontinued heparin 5000 units subcu every 8 hours on admission and started patient on heparin gtt. alongside Coumadin.  I also notified pharmacy and were agreeable with this plan.  -9/19, INR 2.19.  Discontinued heparin gtt. INR on goal  -Pharm.D. to continue dosing Coumadin   -INR  2.25 on 10/5    Peripheral neuropathy of b/l feet  -fredy-lidocaine-ketamine cream prn   -benadryl cream prn     Abnormal brain MRI suggestive of subacute infarct  Patient with a history of MSSA bacteremia and aortic valve endocarditis.  Most likely septic emboli.  -Continue with antibiotics per ID.  End date 10/8/2024    Acute respiratory failure with hypoxia  -Patient previously intubated.  Now extubated.    -Continue with oxygen support to maintain oxygenation above 92%.  Wean as tolerated  -RT while in LTAC    HAMMAD  -Previously on CCRT now transferred to HD  -Torsemide per nephrology  -HD per nephrology-MWF  -Monitor kidney function with BMP    CAD  A-fib  History of aortic stenosis  +pacemaker   Hypotension   -Fairly stable at this time.  -S/p CABG per CT surgery  -Continue with Plavix and statin  -Metoprolol- will decrease dose from 25 bid to 12.5 bid due to low BP on 9/24  -Previously on amiodarone for rate control, now s/p PPM  -pacer checked 9/23, set at , top pacer is capturing 94%, bottom  pacer is capturing 80%.  -cardiothoracic surg tele appt- appreciate recs  -torsemide on hold on 9/29 due to hypotension  -prn midodrine     Neck pain due to arthritis  -lidocaine  -voltaren prn    Anemia of chronic disease  Thrombocytopenia   -monitor    Severe protein calorie malnutrition  -? esophogram  -RD following   -calorie counts    Physical deconditioning/critical illness myopathy  -Fall precautions.  -PT/OT  -WOC    Diet: Advance Diet as Tolerated: Regular Diet Adult  Snacks/Supplements Adult: Gelatein Plus; With Meals  Snacks/Supplements Adult: Ensure Enlive; With Meals    DVT Prophylaxis: Warfarin  Le Catheter: Not present  Lines: PRESENT      PICC 09/06/24 Triple Lumen Right Basilic Vascular access-Site Assessment: WDL  CVC Double Lumen Right Subclavian Tunneled-Site Assessment: WDL      Cardiac Monitoring: None  Code Status: No CPR- Do NOT Intubate      Clinically Significant Risk Factors              # Hypoalbuminemia: Lowest albumin = 3.1 g/dL at 9/19/2024  6:35 AM, will monitor as appropriate                # Severe Malnutrition: based on nutrition assessment      # Financial/Environmental Concerns: none   # Pacemaker present  # History of CABG: noted on surgical history    Disposition Plan     Medically Ready for Discharge: Anticipated in 5+ Days    Adan Veloz MD  Hospitalist Service  [facility]  Securely message with [employer] (more info)  Text page via [employer] Paging/Directory   ______________________________________________________________________    Interval History   Patient is resting in bed comfortably.   at bedside.  States that she is progressing well.  Gaining strength.  Continues on hemodialysis  on Monday Wednesday Friday on IV antibiotics.  She reports no new complaints at this time    Physical Exam   Vital Signs: Temp: 98.1  F (36.7  C) Temp src: Oral BP: 123/60 Pulse: 81   Resp: 20 SpO2: 94 % O2 Device: Nasal cannula   Weight: 170 lbs 13.7 oz  Constitutional: Patient is resting in  bed comfortably appears in no distress.  Eyes: Pupils are equal round and reactive to light  Respiratory: Good respiratory effort, on auscultation good air entry is noted  Cardiovascular: Good S1 and S2 no obvious murmurs peripheral pulses are palpable  GI: Abdomen is soft nontender nondistended bowel sounds are noted  Skin: No obvious rashes noted on exposed areas, warm to touch please refer to nursing/wound care note for complete skin exam  Musculoskeletal: Good muscle tone nails and digits appear acyanotic  Neuropsychiatric: Awake alert oriented to person normal affect    Medical Decision Making       52 MINUTES SPENT BY ME on the date of service doing chart review, history, exam, documentation & further activities per the note.  ------------------ MEDICAL DECISION MAKING ------------------------------------------------------------------------------------------------------  MANAGEMENT DISCUSSED with the following over the past 24 hours: Patient, patient's daughter in room, staff RN and pharmacist   NOTE(S)/MEDICAL RECORDS REVIEWED over the past 24 hours: RN       Data   Recent Labs   Lab 10/05/24  0540 10/04/24  0536 10/03/24  0537   WBC 5.5 7.5 5.8   HGB 7.5* 8.1* 7.5*   * 106* 105*   * 155 126*   INR 2.25* 2.73* 2.27*    138 137   POTASSIUM 4.3 4.0 4.0   CHLORIDE 104 102 102   CO2 27 27 28   BUN 8.2 16.6 9.8   CR 2.13* 3.01* 2.14*   ANIONGAP 6* 9 7   KAELYN 8.8 8.3* 7.8*   GLC 99 103* 96     Most Recent 3 CBC's:  Recent Labs   Lab Test 10/05/24  0540 10/04/24  0536 10/03/24  0537   WBC 5.5 7.5 5.8   HGB 7.5* 8.1* 7.5*   * 106* 105*   * 155 126*     Most Recent 3 BMP's:  Recent Labs   Lab Test 10/05/24  0540 10/04/24  0536 10/03/24  0537    138 137   POTASSIUM 4.3 4.0 4.0   CHLORIDE 104 102 102   CO2 27 27 28   BUN 8.2 16.6 9.8   CR 2.13* 3.01* 2.14*   ANIONGAP 6* 9 7   KAELYN 8.8 8.3* 7.8*   GLC 99 103* 96     Most Recent 2 LFT's:  Recent Labs   Lab Test 09/19/24  0635  09/18/24  0704   AST 36 34   ALT 31 29   ALKPHOS 78 75   BILITOTAL 0.7 0.7     Most Recent 3 INR's:  Recent Labs   Lab Test 10/05/24  0540 10/04/24  0536 10/03/24  0537   INR 2.25* 2.73* 2.27*

## 2024-10-05 NOTE — PLAN OF CARE
Problem: Pain Acute  Goal: Optimal Pain Control and Function  Outcome: Progressing     Problem: Nausea and Vomiting  Goal: Nausea and Vomiting Relief  Outcome: Progressing   Goal Outcome Evaluation:    Patient denied pain  Patient denied pain, had ketamine topical pain medication cream x 2 to each foot, calm and co-operative with cares-medication compliant too, no emesis this shift, ate 25 % of breakfast (still working on lunch at this time-1452). Blood pressure was 123/60 at 0820, 111/53 at 0909,  (138 on 10/4), potassium 4.3, creatinine 2.13 (3.01) on 10/4), GFR estimate 22, WBC 5.5, HGB 7.5 (8.1 on 10/4), platelet count 131 (155 on 10/6), INR 2.25 (2.73 on 10/4-coumadin 2 mg ordered for this evening at 1800-will have INR, CBC with platelets, ambulated to bathroom this shift (see flowsheet for details), up in chair too

## 2024-10-05 NOTE — PLAN OF CARE
Problem: Adult Inpatient Plan of Care  Goal: Plan of Care Review  Description: The Plan of Care Review/Shift note should be completed every shift.  The Outcome Evaluation is a brief statement about your assessment that the patient is improving, declining, or no change.  This information will be displayed automatically on your shift  note.  Outcome: Progressing     Problem: Adult Inpatient Plan of Care  Goal: Optimal Comfort and Wellbeing  Intervention: Provide Person-Centered Care  Recent Flowsheet Documentation  Taken 10/4/2024 1615 by Odette Brar RN  Trust Relationship/Rapport:   care explained   choices provided   emotional support provided   reassurance provided     Problem: Pain Acute  Goal: Optimal Pain Control and Function  Intervention: Prevent or Manage Pain  Recent Flowsheet Documentation  Taken 10/4/2024 1615 by Odette Brar RN  Sensory Stimulation Regulation:   care clustered   television on  Bowel Elimination Promotion:   adequate fluid intake promoted   privacy promoted   diet adjusted  Complementary Therapy: music therapy provided  Intervention: Optimize Psychosocial Wellbeing  Recent Flowsheet Documentation  Taken 10/4/2024 1615 by Odette Brar RN  Spiritual Activities Assistance: personal rituals encouraged  Supportive Measures:   relaxation techniques promoted   self-care encouraged   self-responsibility promoted   verbalization of feelings encouraged     Problem: Wound  Goal: Improved Oral Intake  Intervention: Promote and Optimize Oral Intake  Recent Flowsheet Documentation  Taken 10/4/2024 1615 by Odette Brar RN  Oral Nutrition Promotion: rest periods promoted   Goal Outcome Evaluation:  Vital signs stable during her evening dialysis run.  Compliant with cares and treatment, denied pain. Had poor appetite and weakness after dialysis. Cares continued.

## 2024-10-05 NOTE — PLAN OF CARE
Goal Outcome Evaluation:    Problem: Adult Inpatient Plan of Care  Goal: Plan of Care Review  Description: The Plan of Care Review/Shift note should be completed every shift.  The Outcome Evaluation is a brief statement about your assessment that the patient is improving, declining, or no change.  This information will be displayed automatically on your shift  note.  Outcome: Progressing     Problem: Pain Acute  Goal: Optimal Pain Control and Function  Outcome: Progressing  Intervention: Prevent or Manage Pain  Recent Flowsheet Documentation  Taken 10/4/2024 2330 by Nathalie Ordonez RN  Sensory Stimulation Regulation:   care clustered   television on  Bowel Elimination Promotion:   adequate fluid intake promoted   privacy promoted   diet adjusted  Complementary Therapy: music therapy provided  Intervention: Optimize Psychosocial Wellbeing  Recent Flowsheet Documentation  Taken 10/4/2024 2330 by Nathalie Ordonez RN  Spiritual Activities Assistance: personal rituals encouraged  Supportive Measures:   relaxation techniques promoted   self-care encouraged   self-responsibility promoted   verbalization of feelings encouraged     Problem: Nausea and Vomiting  Goal: Nausea and Vomiting Relief  Outcome: Progressing  Intervention: Prevent and Manage Nausea and Vomiting  Recent Flowsheet Documentation  Taken 10/4/2024 2330 by Nathalie Ordonez RN  Environmental Support: calm environment promoted     Problem: Wound  Goal: Optimal Coping  Outcome: Progressing  Intervention: Support Patient and Family Response  Recent Flowsheet Documentation  Taken 10/4/2024 2330 by Nathalie Ordonez RN  Supportive Measures:   relaxation techniques promoted   self-care encouraged   self-responsibility promoted   verbalization of feelings encouraged   Pt A/ O X 4, vital signs stable,on room air, SPO2  95 %, pt denies pain, pt up to bathroom x 1 with walker and gait belt at 0030,  voided x 1, pt reported nausea and dry heaves after bathroom,PRN Zofran 8 mg po given at  0105 with effective, pt denies nausea this AM, pt reported breathing a litter fast when having dry heaves, RR 28, oxygen 2 liters via NC offered and trazodone 25 mg po given at 0156 for sleep, pt fell asleep shortly and slept well the rest of shift, RR= 20 when asleep, will continue to monitor.

## 2024-10-06 ENCOUNTER — APPOINTMENT (OUTPATIENT)
Dept: OCCUPATIONAL THERAPY | Facility: CLINIC | Age: 84
DRG: 288 | End: 2024-10-06
Attending: HOSPITALIST
Payer: COMMERCIAL

## 2024-10-06 LAB
ANION GAP SERPL CALCULATED.3IONS-SCNC: 9 MMOL/L (ref 7–15)
BUN SERPL-MCNC: 16.7 MG/DL (ref 8–23)
CALCIUM SERPL-MCNC: 8.7 MG/DL (ref 8.8–10.4)
CHLORIDE SERPL-SCNC: 104 MMOL/L (ref 98–107)
CREAT SERPL-MCNC: 3.08 MG/DL (ref 0.51–0.95)
EGFRCR SERPLBLD CKD-EPI 2021: 14 ML/MIN/1.73M2
ERYTHROCYTE [DISTWIDTH] IN BLOOD BY AUTOMATED COUNT: 18.3 % (ref 10–15)
GLUCOSE SERPL-MCNC: 111 MG/DL (ref 70–99)
HCO3 SERPL-SCNC: 26 MMOL/L (ref 22–29)
HCT VFR BLD AUTO: 25 % (ref 35–47)
HGB BLD-MCNC: 8 G/DL (ref 11.7–15.7)
INR PPP: 2.02 (ref 0.85–1.15)
MCH RBC QN AUTO: 33.6 PG (ref 26.5–33)
MCHC RBC AUTO-ENTMCNC: 32 G/DL (ref 31.5–36.5)
MCV RBC AUTO: 105 FL (ref 78–100)
PLATELET # BLD AUTO: 151 10E3/UL (ref 150–450)
POTASSIUM SERPL-SCNC: 4.1 MMOL/L (ref 3.4–5.3)
RBC # BLD AUTO: 2.38 10E6/UL (ref 3.8–5.2)
SODIUM SERPL-SCNC: 139 MMOL/L (ref 135–145)
WBC # BLD AUTO: 5.4 10E3/UL (ref 4–11)

## 2024-10-06 PROCEDURE — 99233 SBSQ HOSP IP/OBS HIGH 50: CPT | Performed by: HOSPITALIST

## 2024-10-06 PROCEDURE — 250N000011 HC RX IP 250 OP 636: Performed by: PHYSICIAN ASSISTANT

## 2024-10-06 PROCEDURE — 85610 PROTHROMBIN TIME: CPT | Performed by: PHYSICIAN ASSISTANT

## 2024-10-06 PROCEDURE — 97535 SELF CARE MNGMENT TRAINING: CPT | Mod: GO | Performed by: OCCUPATIONAL THERAPIST

## 2024-10-06 PROCEDURE — 250N000013 HC RX MED GY IP 250 OP 250 PS 637: Performed by: PHYSICIAN ASSISTANT

## 2024-10-06 PROCEDURE — 250N000013 HC RX MED GY IP 250 OP 250 PS 637: Performed by: HOSPITALIST

## 2024-10-06 PROCEDURE — 120N000017 HC R&B RESPIRATORY CARE

## 2024-10-06 PROCEDURE — 999N000157 HC STATISTIC RCP TIME EA 10 MIN

## 2024-10-06 PROCEDURE — 80048 BASIC METABOLIC PNL TOTAL CA: CPT | Performed by: PHYSICIAN ASSISTANT

## 2024-10-06 RX ORDER — SIMETHICONE 80 MG
80 TABLET,CHEWABLE ORAL ONCE
Status: COMPLETED | OUTPATIENT
Start: 2024-10-06 | End: 2024-10-06

## 2024-10-06 RX ORDER — WARFARIN SODIUM 2 MG/1
2 TABLET ORAL
Status: COMPLETED | OUTPATIENT
Start: 2024-10-06 | End: 2024-10-06

## 2024-10-06 RX ADMIN — CLOPIDOGREL 75 MG: 75 TABLET ORAL at 09:53

## 2024-10-06 RX ADMIN — Medication 0.5 G: at 09:53

## 2024-10-06 RX ADMIN — SIMETHICONE 80 MG: 80 TABLET, CHEWABLE ORAL at 21:16

## 2024-10-06 RX ADMIN — Medication 0.5 G: at 14:23

## 2024-10-06 RX ADMIN — WARFARIN SODIUM 2 MG: 2 TABLET ORAL at 17:39

## 2024-10-06 RX ADMIN — SIMETHICONE 80 MG: 80 TABLET, CHEWABLE ORAL at 09:54

## 2024-10-06 RX ADMIN — DOCUSATE SODIUM AND SENNOSIDES 1 TABLET: 8.6; 5 TABLET, FILM COATED ORAL at 21:19

## 2024-10-06 RX ADMIN — METOPROLOL SUCCINATE 12.5 MG: 25 TABLET, EXTENDED RELEASE ORAL at 21:18

## 2024-10-06 RX ADMIN — SIMETHICONE 80 MG: 80 TABLET, CHEWABLE ORAL at 14:23

## 2024-10-06 RX ADMIN — LIDOCAINE 1 PATCH: 4 PATCH TOPICAL at 09:53

## 2024-10-06 RX ADMIN — ATORVASTATIN CALCIUM 80 MG: 40 TABLET, FILM COATED ORAL at 21:17

## 2024-10-06 RX ADMIN — DOCUSATE SODIUM AND SENNOSIDES 1 TABLET: 8.6; 5 TABLET, FILM COATED ORAL at 09:54

## 2024-10-06 RX ADMIN — Medication 1 CAPSULE: at 09:53

## 2024-10-06 RX ADMIN — PANTOPRAZOLE SODIUM 40 MG: 40 TABLET, DELAYED RELEASE ORAL at 06:40

## 2024-10-06 RX ADMIN — BISACODYL 10 MG: 10 SUPPOSITORY RECTAL at 04:29

## 2024-10-06 RX ADMIN — ACETAMINOPHEN 650 MG: 325 TABLET, FILM COATED ORAL at 21:17

## 2024-10-06 RX ADMIN — Medication 5 MG: at 21:19

## 2024-10-06 RX ADMIN — ONDANSETRON HYDROCHLORIDE 8 MG: 4 TABLET, FILM COATED ORAL at 04:52

## 2024-10-06 RX ADMIN — Medication 1 CAPSULE: at 21:18

## 2024-10-06 RX ADMIN — Medication 0.5 G: at 21:18

## 2024-10-06 RX ADMIN — Medication 1 TABLET: at 12:27

## 2024-10-06 ASSESSMENT — ACTIVITIES OF DAILY LIVING (ADL)
ADLS_ACUITY_SCORE: 36
ADLS_ACUITY_SCORE: 34
ADLS_ACUITY_SCORE: 38
ADLS_ACUITY_SCORE: 34
ADLS_ACUITY_SCORE: 38
ADLS_ACUITY_SCORE: 34
ADLS_ACUITY_SCORE: 36
ADLS_ACUITY_SCORE: 35
ADLS_ACUITY_SCORE: 36
ADLS_ACUITY_SCORE: 34
ADLS_ACUITY_SCORE: 36
ADLS_ACUITY_SCORE: 36
ADLS_ACUITY_SCORE: 38
ADLS_ACUITY_SCORE: 34
ADLS_ACUITY_SCORE: 36
ADLS_ACUITY_SCORE: 34
ADLS_ACUITY_SCORE: 38
ADLS_ACUITY_SCORE: 36
ADLS_ACUITY_SCORE: 35
ADLS_ACUITY_SCORE: 34
ADLS_ACUITY_SCORE: 36
ADLS_ACUITY_SCORE: 34
ADLS_ACUITY_SCORE: 36

## 2024-10-06 NOTE — PROVIDER NOTIFICATION
10/06/24 1242   Tech Time   $Tech Time (10 minute increments) 2   Vital Signs   Resp 20   Pulse 75   Pulse Rate Source Monitor   Oxygen Therapy   SpO2 95 %  (98% 0n 2L)   O2 Device None (Room air)   FiO2 (%) 21 %   Assessment   Rhythm/Pattern, Respiratory no shortness of breath reported     Pt was placed on continuous Pulse oximetry and her saturation was 98%. She was placed RA and sats 95% at rest. Pt expressed concern about desaturation with activities. Pt might need O2 with activities.

## 2024-10-06 NOTE — PROGRESS NOTES
[facility]    Med[facility] Progress Note - Hospitalist Service    Date of Admission:  9/16/2024    Felicitas Elliott is a 84 year old female admitted on 9/16/2024 to the LTAC for long-term antibiotics following MSSA bacteremia and endocarditis. 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  She was initially admitted to [facility] on 8/16/2024 for sepsis and HAMMAD.  She received empiric antibiotics on admission. Blood cultures grew MSSA.  Neurology was consulted for possible discitis.  ID was also consulted. Echo revealed large posterior leaflet mitral valve vegetation and small aortic valve vegetation with severe aortic stenosis.  Brain MRI revealed scattered embolic infarcts.  Coronary angiogram demonstrated coronary artery disease.  CT surgery was consulted. She underwent CABG alongside the procedures mentioned above.  She however has not had renal recovery.  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  She had dysphagia and momentarily required NG tube with tube feeds. She worked with speech therapy and is now cleared for regular diet and thin liquids.  CT surgery recommends warfarin for 3 months for bioprosthetic valves.  INR goal 2-3.  End date of warfarin approximately 12/10/2024, at which point she will be on Plavix alone (ASA allergy) indefinitely.  ID recommends IV vancomycin until 10/8/2024.    LT[facility] Course:  9/17 - 9/21.  Progressing well.  On Coumadin, initially bridging with heparin gtt. for bioprosthetic valve.  On arrival initial INR 1.49. Goal 2-3. Has been dialyzing for HAMMAD. 9/19, INR 2.19, heparin gtt discontinued. RD noted patient having trouble chewing and impedes her overall oral intake.  SLP consulted.    9/22-9/29: tolerating dialysis well. Pacer checked on 9/24, set at , top pacer is capturing 94%, bottom pacer is capturing 80%. Cardio did a televisit  with patient and recommend to decrease metoprolol by 1/2- changed from 25 to 12.5 bid. Added simethicone as patient has abdominal pain due to gas.    She is having neck pain -lidocaine and voltaren added.  Pharmacy to change medications around to have get getting less pills at the same time.  She complains that this contributes to a nausea.  On 9/29, BP is low, decreased toresimide dose from 100 to 50, but holding for now.  Also added prn midodrine.      9/30 - 10/6. Has been progressing well. Gaining strength.On dialysis MWF.  BP was running low, metoprolol 12.5 mg p.o. twice daily changed to metoprolol 12.5 mg XL daily.  She remains on IV vancomycin for bacteremia.10/6, patient desaturates at night per RN. Home oxygen test ordered together with continuous pulse ox. Otherwise no new other changes continue with current medical management      BARRIERS TO DISCHARGE (why care is unable to be provided at a lower level of care):    -Long-term antibiotics.  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  Repeat blood cultures - 8/17/2024  -Mitral and aortic valve endocarditis as evidenced with large vegetation on mitral valve 8 mm x 15 at x 2 posterior leaflet of small vegetation on aortic valve.  With signs of vegetation combined with brain infarct.  S/p CV surgery  -Continue with IV vancomycin.  Dosing per pharmacy. Per ID last vancomycin dose will be after HD on 10/7  -Per ID will need to check immunoglobulin level in 4 to 6 weeks, sister with history of hypogammaglobinemia  -ID consulted and following  -Patient previously on heparin 5000 subQ every 8 hours and Coumadin.  Recommended INR goal is 2-3 per CT surgery.  Discontinued  heparin 5000 units subcu every 8 hours on admission and started patient on heparin gtt. alongside Coumadin. MD also notified pharmacy and were agreeable with this plan.  -9/19, INR 2.19.  Discontinued heparin gtt. INR on goal  -Pharm.D. to continue dosing Coumadin   -INR  2.02 on 10/6    Peripheral neuropathy of b/l feet  -fredy-lidocaine-ketamine cream prn   -benadryl cream prn     Abnormal brain MRI suggestive of subacute infarct  Patient with a history of MSSA bacteremia and aortic valve endocarditis.  Most likely septic emboli.  -Continue with antibiotics per ID.  End date 10/8/2024    Acute respiratory failure with hypoxia  -Patient previously intubated.  Now extubated.    -Continue with oxygen support to maintain oxygenation above 92%.  Wean as tolerated  -RT while in LTAC    HAMMAD  -Previously on CCRT now transferred to HD  -Torsemide per nephrology  -HD per nephrology-MWF  -Monitor kidney function with BMP    CAD  A-fib  History of aortic stenosis  +pacemaker   Hypotension   -Fairly stable at this time.  -S/p CABG per CT surgery  -Continue with Plavix and statin  -Metoprolol- will decrease dose from 25 bid to 12.5 bid due to low BP on 9/24. Metoprolol later changed to 12.5 mg daily. BP now fairly stable  -Previously on amiodarone for rate control, now s/p PPM  -pacer checked 9/23, set at , top pacer is capturing 94%, bottom pacer is capturing 80%.  -cardiothoracic surg tele appt- appreciate recs  -torsemide on hold on 9/29 due to hypotension  -prn midodrine     Neck pain due to arthritis  -lidocaine  -voltaren prn    Anemia of chronic disease  Thrombocytopenia   -monitor    Severe protein calorie malnutrition  -? esophogram  -RD following   -calorie counts    Physical deconditioning/critical illness myopathy  -Fall precautions.  -PT/OT  -WOC    Diet: Advance Diet as Tolerated: Regular Diet Adult  Snacks/Supplements Adult: Gelatein Plus; With Meals  Snacks/Supplements Adult: Ensure Enlive; With Meals     DVT Prophylaxis: Warfarin  Le Catheter: Not present  Lines: PRESENT      PICC 09/06/24 Triple Lumen Right Basilic Vascular access-Site Assessment: WDL  CVC Double Lumen Right Subclavian Tunneled-Site Assessment: WDL      Cardiac Monitoring: None  Code Status: No CPR- Do NOT Intubate      Clinically Significant Risk Factors              # Hypoalbuminemia: Lowest albumin = 3.1 g/dL at 9/19/2024  6:35 AM, will monitor as appropriate                # Severe Malnutrition: based on nutrition assessment      # Financial/Environmental Concerns: none   # Pacemaker present  # History of CABG: noted on surgical history    Disposition Plan     Medically Ready for Discharge: Anticipated in 5+ Days    Adan Veloz MD  Hospitalist Service  [facility]  Securely message with [employer] (more info)  Text page via [employer] Paging/Directory   ______________________________________________________________________    Interval History   No new events overnight per RN. Patient is in bed comfortably.  visiting at bedside. She states she is doing well. She had a good night. Dialyzes tomorrow. Reports no new complaints at this time    Physical Exam   Vital Signs: Temp: 98  F (36.7  C) Temp src: Oral BP: 116/56 Pulse: 75   Resp: 20 SpO2: 95 % (98% 0n 2L) O2 Device: None (Room air)   Weight: 169 lbs 1.49 oz  Constitutional: Patient is resting in bed comfortably appears in no distress.  Eyes: Pupils are equal round and reactive to light  Respiratory: Good respiratory effort, on auscultation good air entry is noted  Cardiovascular: Good S1 and S2 no obvious murmurs peripheral pulses are palpable  GI: Abdomen is soft nontender nondistended bowel sounds are noted  Skin: No obvious rashes noted on exposed areas, warm to touch please refer to nursing/wound care note for complete skin exam  Musculoskeletal: Good muscle tone nails and digits appear acyanotic  Neuropsychiatric: Awake alert oriented to person normal affect    Medical Decision Making        50 MINUTES SPENT BY ME on the date of service doing chart review, history, exam, documentation & further activities per the note.  ------------------ MEDICAL DECISION MAKING ------------------------------------------------------------------------------------------------------  MANAGEMENT DISCUSSED with the following over the past 24 hours: Patient, patient's daughter in room, staff RN and pharmacist   NOTE(S)/MEDICAL RECORDS REVIEWED over the past 24 hours: RN       Data   Recent Labs   Lab 10/06/24  0603 10/05/24  0540 10/04/24  0536   WBC 5.4 5.5 7.5   HGB 8.0* 7.5* 8.1*   * 107* 106*    131* 155   INR 2.02* 2.25* 2.73*    137 138   POTASSIUM 4.1 4.3 4.0   CHLORIDE 104 104 102   CO2 26 27 27   BUN 16.7 8.2 16.6   CR 3.08* 2.13* 3.01*   ANIONGAP 9 6* 9   KAELYN 8.7* 8.8 8.3*   * 99 103*     Most Recent 3 CBC's:  Recent Labs   Lab Test 10/06/24  0603 10/05/24  0540 10/04/24  0536   WBC 5.4 5.5 7.5   HGB 8.0* 7.5* 8.1*   * 107* 106*    131* 155     Most Recent 3 BMP's:  Recent Labs   Lab Test 10/06/24  0603 10/05/24  0540 10/04/24  0536    137 138   POTASSIUM 4.1 4.3 4.0   CHLORIDE 104 104 102   CO2 26 27 27   BUN 16.7 8.2 16.6   CR 3.08* 2.13* 3.01*   ANIONGAP 9 6* 9   KAELYN 8.7* 8.8 8.3*   * 99 103*     Most Recent 2 LFT's:  Recent Labs   Lab Test 09/19/24  0635 09/18/24  0704   AST 36 34   ALT 31 29   ALKPHOS 78 75   BILITOTAL 0.7 0.7     Most Recent 3 INR's:  Recent Labs   Lab Test 10/06/24  0603 10/05/24  0540 10/04/24  0536   INR 2.02* 2.25* 2.73*

## 2024-10-06 NOTE — PLAN OF CARE
Problem: Pain Acute  Goal: Optimal Pain Control and Function  Outcome: Progressing     Problem: Nausea and Vomiting  Goal: Nausea and Vomiting Relief  Outcome: Progressing   Goal Outcome Evaluation:    Patient has been calm so far this shift (1334) , affect noted to be flat at times, co-operative with cares and has been compliant with medications, up to commode this shift, no complaint of nausea or oxygen desaturation as of now (1336-oxygen saturation was 93 % room air at 0737, spot checked to be 94 % later in the shift at 1018-pulse oximetry continuous by RT plus home oxygen assessment ordered. Blood pressure was 121/63 at 0737, 116/56 at 0956, ate 50 % of meals, denied pain, , potassium 4.1 (4.3 on 10/5), urea 16.7, creatinine 3.08 (2.13 on 10/5), GFR estimate 14 (22 on 10/5), calcium 8.7 (8.8 on 10/5), WBC 5.4, HGB 8.0, platelet count 151, INR 2.02 (2.25 on 10/5-coumadin 2 mg ordered for this evening, spouse visited with patient on and off this shift, will have daily labs (CBC with platelet and INR in am plus vancomycin level)

## 2024-10-06 NOTE — PLAN OF CARE
Problem: Adult Inpatient Plan of Care  Goal: Optimal Comfort and Wellbeing  Intervention: Provide Person-Centered Care  Recent Flowsheet Documentation  Taken 10/5/2024 1600 by Odette Brar RN  Trust Relationship/Rapport:   care explained   choices provided   emotional support provided   reassurance provided     Problem: Pain Acute  Goal: Optimal Pain Control and Function  Outcome: Progressing  Intervention: Prevent or Manage Pain  Recent Flowsheet Documentation  Taken 10/5/2024 1600 by Odette Brar RN  Sensory Stimulation Regulation:   care clustered   television on  Bowel Elimination Promotion:   adequate fluid intake promoted   privacy promoted   diet adjusted  Complementary Therapy: music therapy provided  Intervention: Optimize Psychosocial Wellbeing  Recent Flowsheet Documentation  Taken 10/5/2024 1600 by Odette Brar RN  Spiritual Activities Assistance: personal rituals encouraged  Supportive Measures:   relaxation techniques promoted   self-care encouraged   self-responsibility promoted   verbalization of feelings encouraged     Problem: Wound  Goal: Improved Oral Intake  Outcome: Progressing  Intervention: Promote and Optimize Oral Intake  Recent Flowsheet Documentation  Taken 10/5/2024 1600 by Odette Brar RN  Oral Nutrition Promotion: rest periods promoted   Goal Outcome Evaluation:  Pt calm and co-operative this shift. Ate 100% supper as hubby brought her a meal from home. Vss, denied pain. Was up in the chair, sleeping when we started this shift. Nothing changed.

## 2024-10-06 NOTE — PLAN OF CARE
Goal Outcome Evaluation:    Problem: Adult Inpatient Plan of Care  Goal: Plan of Care Review  Description: The Plan of Care Review/Shift note should be completed every shift.  The Outcome Evaluation is a brief statement about your assessment that the patient is improving, declining, or no change.  This information will be displayed automatically on your shift  note.  Outcome: Progressing     Problem: Pain Acute  Goal: Optimal Pain Control and Function  Outcome: Progressing  Intervention: Prevent or Manage Pain  Recent Flowsheet Documentation  Taken 10/6/2024 0100 by Nathalie Ordonez RN  Sensory Stimulation Regulation:   care clustered   television on  Bowel Elimination Promotion:   adequate fluid intake promoted   privacy promoted   diet adjusted  Complementary Therapy: music therapy provided  Intervention: Optimize Psychosocial Wellbeing  Recent Flowsheet Documentation  Taken 10/6/2024 0100 by Nathalie Ordonez RN  Spiritual Activities Assistance: personal rituals encouraged  Supportive Measures:   relaxation techniques promoted   self-care encouraged   self-responsibility promoted   verbalization of feelings encouraged     Problem: Nausea and Vomiting  Goal: Nausea and Vomiting Relief  Outcome: Progressing  Intervention: Prevent and Manage Nausea and Vomiting  Recent Flowsheet Documentation  Taken 10/6/2024 0100 by Nathalie Ordonez RN  Fluid/Electrolyte Management: fluids restricted  Environmental Support: calm environment promoted     Problem: Wound  Goal: Optimal Coping  Outcome: Progressing  Intervention: Support Patient and Family Response  Recent Flowsheet Documentation  Taken 10/6/2024 0100 by Nathalie Ordonez RN  Supportive Measures:   relaxation techniques promoted   self-care encouraged   self-responsibility promoted   verbalization of feelings encouraged   Pt A/ O X 4, pt denies pain, BP stable, pt slept well until 0430, then up to bedside commode x 1 with walker and gait belt with assist of 1, pt had 1 small formed stool,  then pt C/O nausea, abdominal gas pain and constipation, SPO2 77-80 % on room air, oxygen 2 L NC placed and SPO2 up to 94 %, Dr Goldman notified, PRN suppository and Zofran 8 mg po given, pt able to pass gas and small loose stool after, pt states nausea and gas pain much better, no need simethicone for gas pain, pt is sleep now, will continue to monitor.

## 2024-10-07 ENCOUNTER — APPOINTMENT (OUTPATIENT)
Dept: OCCUPATIONAL THERAPY | Facility: CLINIC | Age: 84
DRG: 288 | End: 2024-10-07
Attending: HOSPITALIST
Payer: COMMERCIAL

## 2024-10-07 ENCOUNTER — APPOINTMENT (OUTPATIENT)
Dept: PHYSICAL THERAPY | Facility: CLINIC | Age: 84
DRG: 288 | End: 2024-10-07
Attending: HOSPITALIST
Payer: COMMERCIAL

## 2024-10-07 VITALS
BODY MASS INDEX: 26.48 KG/M2 | WEIGHT: 169.09 LBS | OXYGEN SATURATION: 98 % | TEMPERATURE: 97.9 F | RESPIRATION RATE: 22 BRPM | DIASTOLIC BLOOD PRESSURE: 56 MMHG | HEART RATE: 85 BPM | SYSTOLIC BLOOD PRESSURE: 119 MMHG

## 2024-10-07 LAB
ANION GAP SERPL CALCULATED.3IONS-SCNC: 9 MMOL/L (ref 7–15)
BUN SERPL-MCNC: 21.6 MG/DL (ref 8–23)
CALCIUM SERPL-MCNC: 8.8 MG/DL (ref 8.8–10.4)
CHLORIDE SERPL-SCNC: 103 MMOL/L (ref 98–107)
CREAT SERPL-MCNC: 3.57 MG/DL (ref 0.51–0.95)
EGFRCR SERPLBLD CKD-EPI 2021: 12 ML/MIN/1.73M2
ERYTHROCYTE [DISTWIDTH] IN BLOOD BY AUTOMATED COUNT: 17.9 % (ref 10–15)
GLUCOSE SERPL-MCNC: 96 MG/DL (ref 70–99)
HCO3 SERPL-SCNC: 25 MMOL/L (ref 22–29)
HCT VFR BLD AUTO: 25.3 % (ref 35–47)
HGB BLD-MCNC: 8.3 G/DL (ref 11.7–15.7)
INR PPP: 1.76 (ref 0.85–1.15)
MCH RBC QN AUTO: 34.6 PG (ref 26.5–33)
MCHC RBC AUTO-ENTMCNC: 32.8 G/DL (ref 31.5–36.5)
MCV RBC AUTO: 105 FL (ref 78–100)
PLATELET # BLD AUTO: 147 10E3/UL (ref 150–450)
POTASSIUM SERPL-SCNC: 4.2 MMOL/L (ref 3.4–5.3)
RBC # BLD AUTO: 2.4 10E6/UL (ref 3.8–5.2)
RETICS # AUTO: 0.06 10E6/UL (ref 0.03–0.1)
RETICS/RBC NFR AUTO: 2.7 % (ref 0.5–2)
SODIUM SERPL-SCNC: 137 MMOL/L (ref 135–145)
VANCOMYCIN SERPL-MCNC: 17 UG/ML
WBC # BLD AUTO: 7.5 10E3/UL (ref 4–11)

## 2024-10-07 PROCEDURE — 250N000013 HC RX MED GY IP 250 OP 250 PS 637: Performed by: PHYSICIAN ASSISTANT

## 2024-10-07 PROCEDURE — 97116 GAIT TRAINING THERAPY: CPT | Mod: GP

## 2024-10-07 PROCEDURE — 999N000150 HC STATISTIC PT MED CONFERENCE < 30 MIN

## 2024-10-07 PROCEDURE — 258N000003 HC RX IP 258 OP 636: Performed by: HOSPITALIST

## 2024-10-07 PROCEDURE — 80202 ASSAY OF VANCOMYCIN: CPT | Performed by: HOSPITALIST

## 2024-10-07 PROCEDURE — 85610 PROTHROMBIN TIME: CPT | Performed by: PHYSICIAN ASSISTANT

## 2024-10-07 PROCEDURE — 99232 SBSQ HOSP IP/OBS MODERATE 35: CPT | Performed by: PHYSICIAN ASSISTANT

## 2024-10-07 PROCEDURE — 999N000125 HC STATISTIC PATIENT MED CONFERENCE < 30 MIN: Performed by: OCCUPATIONAL THERAPIST

## 2024-10-07 PROCEDURE — 250N000011 HC RX IP 250 OP 636: Performed by: HOSPITALIST

## 2024-10-07 PROCEDURE — 250N000013 HC RX MED GY IP 250 OP 250 PS 637: Performed by: HOSPITALIST

## 2024-10-07 PROCEDURE — 120N000017 HC R&B RESPIRATORY CARE

## 2024-10-07 PROCEDURE — 97535 SELF CARE MNGMENT TRAINING: CPT | Mod: GO | Performed by: OCCUPATIONAL THERAPIST

## 2024-10-07 PROCEDURE — 80048 BASIC METABOLIC PNL TOTAL CA: CPT | Performed by: PHYSICIAN ASSISTANT

## 2024-10-07 PROCEDURE — 250N000011 HC RX IP 250 OP 636: Performed by: PHYSICIAN ASSISTANT

## 2024-10-07 PROCEDURE — 90935 HEMODIALYSIS ONE EVALUATION: CPT

## 2024-10-07 PROCEDURE — 85045 AUTOMATED RETICULOCYTE COUNT: CPT | Performed by: PHYSICIAN ASSISTANT

## 2024-10-07 PROCEDURE — 99233 SBSQ HOSP IP/OBS HIGH 50: CPT | Performed by: HOSPITALIST

## 2024-10-07 RX ORDER — AMOXICILLIN 250 MG
2 CAPSULE ORAL 2 TIMES DAILY
Status: DISPENSED | OUTPATIENT
Start: 2024-10-07

## 2024-10-07 RX ORDER — WARFARIN SODIUM 2.5 MG/1
2.5 TABLET ORAL
Status: COMPLETED | OUTPATIENT
Start: 2024-10-07 | End: 2024-10-07

## 2024-10-07 RX ORDER — TORSEMIDE 100 MG/1
100 TABLET ORAL DAILY
Status: DISPENSED | OUTPATIENT
Start: 2024-10-07

## 2024-10-07 RX ADMIN — Medication 1 TABLET: at 12:43

## 2024-10-07 RX ADMIN — PANTOPRAZOLE SODIUM 40 MG: 40 TABLET, DELAYED RELEASE ORAL at 06:53

## 2024-10-07 RX ADMIN — METOPROLOL SUCCINATE 12.5 MG: 25 TABLET, EXTENDED RELEASE ORAL at 20:59

## 2024-10-07 RX ADMIN — ONDANSETRON HYDROCHLORIDE 8 MG: 4 TABLET, FILM COATED ORAL at 05:16

## 2024-10-07 RX ADMIN — Medication 0.5 G: at 09:27

## 2024-10-07 RX ADMIN — VANCOMYCIN HYDROCHLORIDE 1000 MG: 1 INJECTION, POWDER, LYOPHILIZED, FOR SOLUTION INTRAVENOUS at 18:57

## 2024-10-07 RX ADMIN — WARFARIN SODIUM 2.5 MG: 2.5 TABLET ORAL at 19:07

## 2024-10-07 RX ADMIN — DOCUSATE SODIUM AND SENNOSIDES 2 TABLET: 8.6; 5 TABLET, FILM COATED ORAL at 20:59

## 2024-10-07 RX ADMIN — SIMETHICONE 80 MG: 80 TABLET, CHEWABLE ORAL at 13:47

## 2024-10-07 RX ADMIN — CLOPIDOGREL 75 MG: 75 TABLET ORAL at 09:26

## 2024-10-07 RX ADMIN — TORSEMIDE 100 MG: 100 TABLET ORAL at 12:42

## 2024-10-07 RX ADMIN — Medication 0.5 G: at 13:48

## 2024-10-07 RX ADMIN — DOCUSATE SODIUM AND SENNOSIDES 1 TABLET: 8.6; 5 TABLET, FILM COATED ORAL at 09:26

## 2024-10-07 RX ADMIN — Medication 1 CAPSULE: at 09:27

## 2024-10-07 RX ADMIN — Medication 0.5 G: at 21:11

## 2024-10-07 RX ADMIN — ATORVASTATIN CALCIUM 80 MG: 40 TABLET, FILM COATED ORAL at 20:59

## 2024-10-07 RX ADMIN — SIMETHICONE 80 MG: 80 TABLET, CHEWABLE ORAL at 09:26

## 2024-10-07 RX ADMIN — SIMETHICONE 80 MG: 80 TABLET, CHEWABLE ORAL at 20:59

## 2024-10-07 RX ADMIN — LIDOCAINE 1 PATCH: 4 PATCH TOPICAL at 09:24

## 2024-10-07 RX ADMIN — Medication 5 MG: at 21:00

## 2024-10-07 RX ADMIN — Medication 1 CAPSULE: at 21:00

## 2024-10-07 RX ADMIN — MIDODRINE HYDROCHLORIDE 10 MG: 5 TABLET ORAL at 15:30

## 2024-10-07 ASSESSMENT — ACTIVITIES OF DAILY LIVING (ADL)
ADLS_ACUITY_SCORE: 42
ADLS_ACUITY_SCORE: 40
ADLS_ACUITY_SCORE: 38
ADLS_ACUITY_SCORE: 36
ADLS_ACUITY_SCORE: 42
ADLS_ACUITY_SCORE: 36
ADLS_ACUITY_SCORE: 40
ADLS_ACUITY_SCORE: 40
ADLS_ACUITY_SCORE: 42
ADLS_ACUITY_SCORE: 40
ADLS_ACUITY_SCORE: 42
ADLS_ACUITY_SCORE: 38
ADLS_ACUITY_SCORE: 38
ADLS_ACUITY_SCORE: 42
ADLS_ACUITY_SCORE: 38
ADLS_ACUITY_SCORE: 40
ADLS_ACUITY_SCORE: 38
ADLS_ACUITY_SCORE: 36
ADLS_ACUITY_SCORE: 40
ADLS_ACUITY_SCORE: 36
ADLS_ACUITY_SCORE: 40

## 2024-10-07 NOTE — PLAN OF CARE
Problem: Adult Inpatient Plan of Care  Goal: Optimal Comfort and Wellbeing  Outcome: Progressing  Intervention: Provide Person-Centered Care  Recent Flowsheet Documentation  Taken 10/7/2024 0044 by Cordelia Salomon RN  Trust Relationship/Rapport:   care explained   choices provided   empathic listening provided     Problem: Fall Injury Risk  Goal: Absence of Fall and Fall-Related Injury  Outcome: Progressing  Intervention: Identify and Manage Contributors  Recent Flowsheet Documentation  Taken 10/7/2024 0044 by Cordelia Salomon RN  Medication Review/Management: medications reviewed  Intervention: Promote Injury-Free Environment  Recent Flowsheet Documentation  Taken 10/7/2024 0044 by Cordelia Salomon RN  Safety Promotion/Fall Prevention:   room near nurse's station   safety round/check completed   lighting adjusted   Goal Outcome Evaluation:       Vital signs were stable. Pt. Is alert and oriented and let her needs known. Pt. denied any pain and slept most of the shift. Pt. was repositioned every 2 hours. Continue to monitor.   Cordelia Salomon RN                  Lab Results   Component Value Date    HGBA1C 8 6 (H) 09/02/2022   I have counselled the pt to follow a healthy and balanced diet ,and recommend routine exercise    Target A1c under 7 working with endocrinology

## 2024-10-07 NOTE — CARE CONFERENCE
"Care progression meeting in patient's room, 4116, from 2-3 pm today.    Family Present: -Ed, daughters-Sherri and Shruthi; son Ari in room. Patient's son, Mganus, was on the phone.    Staff:  Jason-PT; Kirti-OT; Graciela-RD; Leisa Lei-RN CC. There were also 3 students present: Sonia-PT Student; Maisha-OT Student and Katy-RD Student.    PT: Jason, Physical Therapist, shared that he has been working with patient and she is doing well with walking with her walker - she goes from 75 - 150 ft, with stand by assist.  Jason reports that patient has trouble with getting up from lower surfaces - less than 21\" high, and patient agrees that its because of her knee replacements.  Jason did say that it is also a result of the muscle loss during her recent illness. Jason reports that patient has been able to do 4 steps x 2 today in therapy. Family asks if patient needs to be using a belt when she is up. PT encouraged family to be present and discussed that they can also help give a boost by holding on to patient's pants.  OT added that its good to use a belt, especially if she is getting into bath, and doesn't have pants on.   PT said that patient just needs more strengthening and also discussed that patient may need to go to OP cardiac rehab.      OT:  Kirti, Occupational Therapist, shared that patient has made very good progress.  She is able to walk to the bathroom and get on and off the toilet with only stand by assist.  OT said that patient may occasionally need a boost, but encourage patient to do as much as she can by herself, so that she keeps on getting stronger.  Patient's  said that he will be there all the time and he will help her as needed.  OT did encourage patient to try to get her own clothes ready for the day by herself, again, to help her increase her endurance.   did ask how long sternal precautions are needed?  Dr to answer when she is talking.    RD: Graciela, Registered Dietician, shared that " she is working on getting patient to eat enough to maintain her nutrition.  Patient had some difficulty chewing early on, and she was getting full quickly.  RD says that this is slowly improving.  Graciela reports that patient's leg muscles are still weak and she will benefit from keeping up with higher protein foods and snacks, like protein shakes, and high protein jello while here.  Patient's  says they have protein powder at home and he can make shakes with that.  RD shared that patient should continue to focus on eating more foods, and to not restrict foods at this time. Patient shared that she doesn't really like meat, but will eat a nice, juicy hamburger.  says that he is a good cook and patient likes his chili and pork roast, too.    MD: Dr Josephine Thomson, introduced herself and shared the medical issues that she has been managing while patient has been here at [facility].  She said that patient initially had an MSSA infection of her bloodstream and in her heart.  She had heart surgery on 8/27/24 and has been on IV antibiotics through 10/7/24, (today.)    explained that patient is on a blood thinner, warfarin, due to heart valves and reports that patient's INR has been fairly stable since 9/13/24.   explained that its important for patient to continue to eat leafy green foods because they are good for her, and also says that patient should eat them consistently.  INR will need to be monitored after discharge from [facility].  Patient can follow up with her PCP to have this checked.    Dr Thomson did review with patient and family that patient did have a stroke - a septic emboli, but that she doesn't appear to have any residual deficits from this.  MD discussed patient's neuropathy pain in her feet - they have been using a cream with ketamine, neurontin and lidocaine which has been helping with this pain.  Will need to check to see if this medication is covered by her insurance.   Also reviewed that  patient had acute respiratory failure and is now decannulated, which is great.  Patient reports that she needs oxygen some times at night.  Dr explained that patient had HAMMAD and is now needing dialysis 3 x week.   We will need to set up OP dialysis at clinic close to patient's home.   MD reviewed that patient has anemia of chronic illness - due to patient's age and illness, her hgb is a bit low at 8.3 and MD also reviewed that patient's platelets are a little low, too at 147,000,   Dr also said that patient has protein-calorie malnutrition, so she also encouraged patient to continue to eat foods that she likes and to eat enough to maintain nutrition.    RN CC.  This writer visited with patient and family for  a few minutes after care conference. Patient confirmed that her pharmacy is [facility], on [address] in [place]. Patient and family want her to go home. This writer will work to set up OP dialysis and home care for patient with SN, PT, and HHA.  Discussed that it may take a day or two to set up OP dialysis clinic and explained that patient will need to go to clinic that is available, but we will certainly try to get a place at [facility].  Did explain to patient and family that home care will only be able to work with patient for a few weeks, and that patient has to be home bound, but that they should be able to work with her for a couple weeks during the transition from hospital to home.    This writer instructed family that we will keep them up to date on details as they are confirmed.      Leisa Hughes RN, BSN  Care Coordination  [facility]  [phone]

## 2024-10-07 NOTE — PROGRESS NOTES
RENAL PROGRESS NOTE    ASSESSMENT & PLAN:   Dialysis dependent ARF: Nonoliguric. History of CKD with creatinine 1.3-1.8 with proteinuria, followed by [facility] previously, thought to be on the basis of NSAID nephropathy and age-related decline in eGFR.  Then patient sustained hemodynamic ARF perioperatively with cardiac surgery.  Has been dialysis dependent.  CRRT 8/29/2024 through 9/8/2024, transition to hemodialysis 9/9/2024.  Recs:  Creatinine rising between treatments, still needing modest volume UF with HD  Unclear UOP, but patient does report she is making some urine  Maintaining MWF HD schedule while here  Start diuretic challenge with torsemide 100 mg daily  Order Shahrzad for accurate UOP tracking  Social work following for outpatient [facility] admissions, [facility], we will follow her there.  Discussed with hospitalist    HTN: On low-dose BB for PAF, midodrine available on HD for intradialytic hypotension.  Starting diuretic challenge torsemide 100 mg daily, follow blood pressure with initiating loop diuretic    Anemia: Suspect combination of ACD, inflammation, on HILARY 20,000 units weekly.  Check reticulocyte count. Hgb 8s    CAD, Afib: S/P CABG, valve surgery. On BB     HLD: statin    MSSA bacteremia/Prosthetic valve: IV vanco. Warfarin, INR goal 2-3       SUBJECTIVE:    Patient was seen bedside  Appears comfortable  Feels a bit worn out after working with therapies this morning  Says she is making some urine but unsure of the volume  Denies any cramping during her dialysis sessions recently      OBJECTIVE:  Physical Exam   Temp: 97.6  F (36.4  C) Temp src: Oral BP: 127/59 Pulse: 79   Resp: 20 SpO2: 92 % (to 93) O2 Device: None (Room air) Oxygen Delivery: 2 LPM  Vitals:    10/04/24 0510 10/05/24 0700 10/06/24 0500   Weight: 78.8 kg (173 lb 11.6 oz) 77.5 kg (170 lb 13.7 oz) 76.7 kg (169 lb 1.5 oz)     Vital Signs with Ranges  Temp:  [97.2  F (36.2  C)-98.1  F (36.7  C)] 97.6  F (36.4   C)  Pulse:  [71-79] 79  Resp:  [20] 20  BP: ()/(50-67) 127/59  FiO2 (%):  [21 %] 21 %  SpO2:  [92 %-98 %] 92 %  I/O last 3 completed shifts:  In: 910 [P.O.:880; I.V.:30]  Out: -     Patient Vitals for the past 72 hrs:   Weight   10/06/24 0500 76.7 kg (169 lb 1.5 oz)   10/05/24 0700 77.5 kg (170 lb 13.7 oz)     Intake/Output Summary (Last 24 hours) at 10/7/2024 1055  Last data filed at 10/7/2024 0932  Gross per 24 hour   Intake 910 ml   Output --   Net 910 ml       PHYSICAL EXAM:  GEN: NAD, aox3  CV: RRR   Lung: clear and equa, on 2L NC  Ab: soft and NT; not distended; normal bs  Ext: + LLE BL and well perfused  Skin: no rash  : poor documentation of UO, incontinent    LABORATORY STUDIES:     Recent Labs   Lab 10/07/24  0529 10/06/24  0603 10/05/24  0540 10/04/24  0536 10/03/24  0537 10/02/24  0605   WBC 7.5 5.4 5.5 7.5 5.8 6.1   RBC 2.40* 2.38* 2.15* 2.38* 2.35* 2.37*   HGB 8.3* 8.0* 7.5* 8.1* 7.5* 7.5*   HCT 25.3* 25.0* 22.9* 25.3* 24.7* 24.7*   * 151 131* 155 126* 125*       Basic Metabolic Panel:  Recent Labs   Lab 10/07/24  0529 10/06/24  0603 10/05/24  0540 10/04/24  0536 10/03/24  0537 10/02/24  0605    139 137 138 137 138   POTASSIUM 4.2 4.1 4.3 4.0 4.0 3.8   CHLORIDE 103 104 104 102 102 103   CO2 25 26 27 27 28 26   BUN 21.6 16.7 8.2 16.6 9.8 18.9   CR 3.57* 3.08* 2.13* 3.01* 2.14* 3.22*   GLC 96 111* 99 103* 96 89   KAELYN 8.8 8.7* 8.8 8.3* 7.8* 8.5*       INR  Recent Labs   Lab 10/07/24  0529 10/06/24  0603 10/05/24  0540 10/04/24  0536   INR 1.76* 2.02* 2.25* 2.73*        Recent Labs   Lab Test 10/07/24  0529 10/06/24  0603   INR 1.76* 2.02*   WBC 7.5 5.4   HGB 8.3* 8.0*   * 151       Personally reviewed current labs    This note was dictated using voice recognition     Michele Chilel PA-C  [facility]  [phone]

## 2024-10-07 NOTE — PLAN OF CARE
"Goal Outcome Evaluation:                        Problem: Fall Injury Risk  Goal: Absence of Fall and Fall-Related Injury  Outcome: Progressing  Intervention: Promote Injury-Free Environment  Recent Flowsheet Documentation  Taken 10/7/2024 8780 by Dania Elizabeth RN  Safety Promotion/Fall Prevention:   activity supervised   clutter free environment maintained   mobility aid in reach   nonskid shoes/slippers when out of bed   room door open   room near nurse's station   supervised activity     Problem: Pain Acute  Goal: Optimal Pain Control and Function  Outcome: Progressing     Problem: Nausea and Vomiting  Goal: Nausea and Vomiting Relief  Outcome: Progressing    Patient has been calm so far this shift (1322), medication compliant, co-operative with cares, said \"it is overwhelming when having multiple tasks at the same time\". Blood pressure was 127/59, oxygen saturation 98 % with oxygen at 2 LPM/NC, 92-93 % at room air (was in the bathroom at the time (0935- continuous oximetry was off at this time due to malfunctioning issue (probe) per RT (RT stated spot checking should be done instead-provider (Servatius) informe), ate 25 % and 100 % of meals, vancomycin 17.0 (18.9 on 10/4),  (139 on 10/6), potassium 4.2, creatinine 3.57 (3.08 on 10/6), GFR estimate 12 (14 on 10/6), WBC 7.5 (5.4 on 10/6), HGB 8.3 (8.0 on 10/6), platelet count 147 (151 on 10/6), INR 1.76 (2.02 on 10/6-coumadin 2.5 mg ordered for this evening), torsemide was given per order at 1242-patient has a new order for external catheter (purewick) for urinary output monitoring . Patient denied pain (Lidocaine patch was placed on the neck and ketamine topical cream to bilateral feet x 2     "

## 2024-10-07 NOTE — PROGRESS NOTES
HEMODIALYSIS TREATMENT NOTE      Date: 10/7/2024  Time: 1900     Data:  Pre Wt:   79.2 kg (bed scale)  Desired Wt:   To be established  Post Wt:  77.2 kg (bed scale)  Weight change: - 2 kg  Ultrafiltration - Post Run Net Total Removed (mL):  2000 ml  Vascular Access Status: CVC patent  Dialyzer Rinse:  Light  Total Blood Volume Processed: 67 L   Total Dialysis (Treatment) Time:   3 Hrs  Dialysate Bath: K 3, Ca 2.25  Heparin: Heparin: None     Lab:   No    Interventions:  Dialysis done through right chest tunneled dialysis catheter. ,   UF set to 2.3 Liters of fluid removal, accommodating priming and rinse back volumes  Meds administered per MAR  CVC dressing changed aseptically  See Flowsheet for Crit Profile throughout the run.Treatment has ended safely and blood is rinsed back completely, pt tolerated well with dialysis treatment.  Catheter lumens flushed with saline and locked with saline, catheter caps changed post HD. Post Tx assessment done. Patient remains her room in stable condition  Report given to BOBBY RN.     Assessment:  A/O x 4, calm & cooperative, denies pain  CVC intact, previous dressing clean and dry                Plan:    Per Renal team

## 2024-10-07 NOTE — PROGRESS NOTES
REHAB CARE PROGRESSION MEETING:    Medical Team Conference:  OT present for 15 minutes.  See progress noted dated today moody by RN Care Coordinator for details. Pt, spouse and children were present for conference.     Kirti Garland OTR/LEAH

## 2024-10-07 NOTE — PROGRESS NOTES
[facility]    Medicine Progress Note - Hospitalist Service    Date of Admission:  9/16/2024    Felicitas Elliott is a 84 year old female admitted on 9/16/2024 to the LTAC for long-term antibiotics following MSSA bacteremia and endocarditis. 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  She was initially admitted to [facility] on 8/16/2024 for sepsis and HAMMAD.  She received empiric antibiotics on admission. Blood cultures grew MSSA.  Neurology was consulted for possible discitis.  ID was also consulted. Echo revealed large posterior leaflet mitral valve vegetation and small aortic valve vegetation with severe aortic stenosis.  Brain MRI revealed scattered embolic infarcts.  Coronary angiogram demonstrated coronary artery disease.  CT surgery was consulted. She underwent CABG alongside the procedures mentioned above.  She however has not had renal recovery.  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  She had dysphagia and momentarily required NG tube with tube feeds. She worked with speech therapy and is now cleared for regular diet and thin liquids.  CT surgery recommends warfarin for 3 months for bioprosthetic valves.  INR goal 2-3.  End date of warfarin approximately 12/10/2024, at which point she will be on Plavix alone (ASA allergy) indefinitely.  ID recommends IV vancomycin until 10/8/2024.        LT[facility] Course:  9/17 - 9/21.  Progressing well.  On Coumadin, initially bridging with heparin gtt. for bioprosthetic valve.  On arrival initial INR 1.49. Goal 2-3. Has been dialyzing for HAMMAD. 9/19, INR 2.19, heparin gtt discontinued. RD noted patient having trouble chewing and impedes her overall oral intake.  SLP consulted.    9/22-9/29: tolerating dialysis well. Pacer checked on 9/24, set at , top pacer is capturing 94%, bottom pacer is capturing 80%. Cardio did a  televisit with patient and recommend to decrease metoprolol by 1/2- changed from 25 to 12.5 bid. Added simethicone as patient has abdominal pain due to gas.    She is having neck pain -lidocaine and voltaren added.  Pharmacy to change medications around to have get getting less pills at the same time.  She complains that this contributes to a nausea.  On 9/29, BP is low, decreased toresimide dose from 100 to 50, but holding for now.  Also added prn midodrine.      9/30 - 10/6. Has been progressing well. Gaining strength.On dialysis MWF.  BP was running low, metoprolol 12.5 mg p.o. twice daily changed to metoprolol 12.5 mg XL daily.  She remains on IV vancomycin for bacteremia.10/6, patient desaturates at night per RN. Home oxygen test ordered together with continuous pulse ox. Otherwise no new other changes continue with current medical management    10/7: care conference at 2 pm.      Follow up  -Sternal precautions end on 10/8      BARRIERS TO DISCHARGE (why care is unable to be provided at a lower level of care):    -Long-term antibiotics.  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  Repeat blood cultures - 8/17/2024  -Mitral and aortic valve endocarditis as evidenced with large vegetation on mitral valve 8 mm x 15 at x 2 posterior leaflet of small vegetation on aortic valve.  With signs of vegetation combined with brain infarct.  S/p CV surgery  -Continue with IV vancomycin.  Dosing per pharmacy. Per ID last vancomycin dose will be after HD on 10/7  -Per ID will need to check immunoglobulin level in 4 to 6 weeks, sister with history of hypogammaglobinemia  -ID consulted and following  -Patient previously on heparin 5000  subQ every 8 hours and Coumadin.  Recommended INR goal is 2-3 per CT surgery.  Discontinued heparin 5000 units subcu every 8 hours on admission and started patient on heparin gtt. alongside Coumadin. MD also notified pharmacy and were agreeable with this plan.  -9/19, INR 2.19.  Discontinued heparin gtt. INR on goal  -Pharm.D. to continue dosing Coumadin   -INR  2.02 on 10/6    Peripheral neuropathy of b/l feet  -fredy-lidocaine-ketamine cream prn   -benadryl cream prn     Abnormal brain MRI suggestive of subacute infarct  Patient with a history of MSSA bacteremia and aortic valve endocarditis.  Most likely septic emboli.  -Continue with antibiotics per ID.  End date 10/8/2024    Acute respiratory failure with hypoxia  -Patient previously intubated.  Now extubated.    -Continue with oxygen support to maintain oxygenation above 92%.  Wean as tolerated  -RT while in LTAC    HAMMAD  -Previously on CCRT now transferred to HD  -Torsemide per nephrology  -HD per nephrology-MWF  -Monitor kidney function with BMP    CAD  A-fib  History of aortic stenosis  +pacemaker   Hypotension   -Fairly stable at this time.  -S/p CABG per CT surgery  -Continue with Plavix and statin  -Metoprolol- will decrease dose from 25 bid to 12.5 bid due to low BP on 9/24. Metoprolol later changed to 12.5 mg daily. BP now fairly stable  -Previously on amiodarone for rate control, now s/p PPM  -pacer checked 9/23, set at , top pacer is capturing 94%, bottom pacer is capturing 80%.  -cardiothoracic surg tele appt- appreciate recs  -torsemide on hold on 9/29 due to hypotension  -prn midodrine     Neck pain due to arthritis  -lidocaine  -voltaren prn    Anemia of chronic disease  Thrombocytopenia   -monitor    Severe protein calorie malnutrition  -? esophogram  -RD following   -calorie counts    Physical deconditioning/critical illness myopathy  -Fall precautions.  -PT/OT  -WOC    Diet: Advance Diet as Tolerated: Regular Diet Adult  Snacks/Supplements  Adult: Gelatein Plus; With Meals  Snacks/Supplements Adult: Ensure Enlive; With Meals    DVT Prophylaxis: Warfarin  Le Catheter: Not present  Lines: PRESENT      PICC 09/06/24 Triple Lumen Right Basilic Vascular access-Site Assessment: WDL  CVC Double Lumen Right Subclavian Tunneled-Site Assessment: WDL      Cardiac Monitoring: None  Code Status: No CPR- Do NOT Intubate      Clinically Significant Risk Factors              # Hypoalbuminemia: Lowest albumin = 3.1 g/dL at 9/19/2024  6:35 AM, will monitor as appropriate                # Severe Malnutrition: based on nutrition assessment      # Financial/Environmental Concerns: none   # Pacemaker present  # History of CABG: noted on surgical history    Disposition Plan     Medically Ready for Discharge: Anticipated in 5+ Days    Josephine Thomson MD  Hospitalist Service  [facility]  Securely message with [employer] (more info)  Text page via [employer] Paging/Directory   ______________________________________________________________________    Interval History   Care conference done today at 2pm, all questions answered.  She slept well.  B/l feet pain.      Physical Exam   Vital Signs: Temp: 97.6  F (36.4  C) Temp src: Oral BP: 127/59 Pulse: 79   Resp: 20 SpO2: 92 % (to 93) O2 Device: None (Room air)   Weight: 169 lbs 1.49 oz  Constitutional: Patient is resting in bed comfortably appears in no distress.  Eyes: Pupils are equal round and reactive to light  Respiratory: Good respiratory effort, on auscultation good air entry is noted  Cardiovascular: Good S1 and S2 no obvious murmurs peripheral pulses are palpable  GI: Abdomen is soft nontender nondistended bowel sounds are noted  Skin: No obvious rashes noted on exposed areas, warm to touch please refer to nursing/wound care note for complete skin exam  Musculoskeletal: Good muscle tone nails and digits appear acyanotic  Neuropsychiatric: Awake alert oriented to person normal affect    Medical Decision Making       54 MINUTES  SPENT BY ME on the date of service doing chart review, history, exam, documentation & further activities per the note.  ------------------ MEDICAL DECISION MAKING ------------------------------------------------------------------------------------------------------  MANAGEMENT DISCUSSED with the following over the past 24 hours:     NOTE(S)/MEDICAL RECORDS REVIEWED over the past 24 hours: RN       Data   Recent Labs   Lab 10/07/24  0529 10/06/24  0603 10/05/24  0540   WBC 7.5 5.4 5.5   HGB 8.3* 8.0* 7.5*   * 105* 107*   * 151 131*   INR 1.76* 2.02* 2.25*    139 137   POTASSIUM 4.2 4.1 4.3   CHLORIDE 103 104 104   CO2 25 26 27   BUN 21.6 16.7 8.2   CR 3.57* 3.08* 2.13*   ANIONGAP 9 9 6*   KAELYN 8.8 8.7* 8.8   GLC 96 111* 99     Most Recent 3 CBC's:  Recent Labs   Lab Test 10/07/24  0529 10/06/24  0603 10/05/24  0540   WBC 7.5 5.4 5.5   HGB 8.3* 8.0* 7.5*   * 105* 107*   * 151 131*     Most Recent 3 BMP's:  Recent Labs   Lab Test 10/07/24  0529 10/06/24  0603 10/05/24  0540    139 137   POTASSIUM 4.2 4.1 4.3   CHLORIDE 103 104 104   CO2 25 26 27   BUN 21.6 16.7 8.2   CR 3.57* 3.08* 2.13*   ANIONGAP 9 9 6*   KAELYN 8.8 8.7* 8.8   GLC 96 111* 99     Most Recent 2 LFT's:  Recent Labs   Lab Test 09/19/24 0635 09/18/24  0704   AST 36 34   ALT 31 29   ALKPHOS 78 75   BILITOTAL 0.7 0.7     Most Recent 3 INR's:  Recent Labs   Lab Test 10/07/24  0529 10/06/24  0603 10/05/24  0540   INR 1.76* 2.02* 2.25*

## 2024-10-07 NOTE — PHARMACY-VANCOMYCIN DOSING SERVICE
Pharmacy Vancomycin Note  Date of Service 2024  Patient's  1940   84 year old, female    Indication: Endocarditis   Day of Therapy:   Started , planning therapy through 10/8    Current vancomycin regimen:  Intermittent dosing   Current vancomycin monitoring method: Renal Replacement Therapy  Current vancomycin therapeutic monitoring goal: 15-20 mg/L     Current estimated CrCl = Estimated Creatinine Clearance: 12.5 mL/min (A) (based on SCr of 3.57 mg/dL (H)).    Creatinine for last 3 days  10/5/2024:  5:40 AM Creatinine 2.13 mg/dL  10/6/2024:  6:03 AM Creatinine 3.08 mg/dL  10/7/2024:  5:29 AM Creatinine 3.57 mg/dL    Recent Vancomycin Levels (past 3 days)  10/7/2024:  5:29 AM Vancomycin 17.0 ug/mL    Vancomycin IV Administrations (past 72 hours)                     vancomycin (VANCOCIN) 1,000 mg in sodium chloride 0.9 % 285 mL intermittent infusion (mg) 1,000 mg New Bag 10/04/24 182                    Nephrotoxins and other renal medications (From now, onward)      Start     Dose/Rate Route Frequency Ordered Stop    24 1254  vancomycin place horne - receiving intermittent dosing         1 each Intravenous SEE ADMIN INSTRUCTIONS 24 1254 10/08/24 0100               Contrast Orders - past 72 hours (72h ago, onward)      None            Interpretation of levels and current regimen:  Vancomycin level is reflective of therapeutic level    Has serum creatinine changed greater than 50% in last 72 hours: Dialysis MWF    Urine output:  unable to determine    Renal Function: ARF on Dialysis         Plan:  Vanco  1000mg X (13mg/kg) once today   Vancomycin monitoring method: Renal Replacement Therapy  Vancomycin therapeutic monitoring goal: 15-20 mg/L  Pharmacy will check vancomycin levels none, last day of Vanco.   Serum creatinine levels will be ordered  with HD .    Kisha Rodriguez, RPh,  BCCP, BPS

## 2024-10-07 NOTE — PLAN OF CARE
Problem: Adult Inpatient Plan of Care  Goal: Optimal Comfort and Wellbeing  Intervention: Provide Person-Centered Care  Recent Flowsheet Documentation  Taken 10/6/2024 1551 by Odette Brar RN  Trust Relationship/Rapport:   care explained   choices provided   emotional support provided   reassurance provided     Problem: Wound  Goal: Optimal Coping  Outcome: Progressing  Intervention: Support Patient and Family Response  Recent Flowsheet Documentation  Taken 10/6/2024 1551 by Odette Brar RN  Supportive Measures:   relaxation techniques promoted   self-care encouraged   self-responsibility promoted   verbalization of feelings encouraged     Problem: Wound  Goal: Skin Health and Integrity  Intervention: Optimize Skin Protection  Recent Flowsheet Documentation  Taken 10/6/2024 1551 by Odette Brar RN  Pressure Reduction Techniques: heels elevated off bed  Pressure Reduction Devices:   positioning supports utilized   heel offloading device utilized  Skin Protection: silicone foam dressing in place   Goal Outcome Evaluation:  Pt remains alert/o x 4, mvss, c/o headache this hs,prn tylenol 560 mg given. Oliver f/up later. Other cares given

## 2024-10-07 NOTE — PROGRESS NOTES
Care Management Follow Up    Length of Stay (days): 21    Expected Discharge Date: 10/15/2024     Concerns to be Addressed: discharge planning     Patient plan of care discussed at interdisciplinary rounds: Yes    Anticipated Discharge Disposition:  home with family              Anticipated Discharge Services:  home care and OP dialysis  Anticipated Discharge DME:  any    Patient/family educated on Medicare website which has current facility and service quality ratings:  yes  Education Provided on the Discharge Plan:  yes  Patient/Family in Agreement with the Plan:  yes    Referrals Placed by CM/SW:  sent referral to [facility] and faxed 34 pages to their admissions fax [phone]  Private pay costs discussed: Not applicable    Discussed  Partnership in Safe Discharge Planning  document with patient/family: Yes:     Handoff Completed: No, handoff not indicated or clinically appropriate    Additional Information:  Faxed referral to [facility].  Will send referral to home care agency for RN, PT and HHA    Next Steps:     Leisa Hughes RN, BSN  Care Coordination  [facility]  [phone]

## 2024-10-08 ENCOUNTER — APPOINTMENT (OUTPATIENT)
Dept: PHYSICAL THERAPY | Facility: CLINIC | Age: 84
DRG: 288 | End: 2024-10-08
Attending: HOSPITALIST
Payer: COMMERCIAL

## 2024-10-08 ENCOUNTER — APPOINTMENT (OUTPATIENT)
Dept: OCCUPATIONAL THERAPY | Facility: CLINIC | Age: 84
DRG: 288 | End: 2024-10-08
Attending: HOSPITALIST
Payer: COMMERCIAL

## 2024-10-08 LAB
ANION GAP SERPL CALCULATED.3IONS-SCNC: 6 MMOL/L (ref 7–15)
BUN SERPL-MCNC: 13.1 MG/DL (ref 8–23)
CALCIUM SERPL-MCNC: 8.1 MG/DL (ref 8.8–10.4)
CHLORIDE SERPL-SCNC: 101 MMOL/L (ref 98–107)
CREAT SERPL-MCNC: 2.39 MG/DL (ref 0.51–0.95)
EGFRCR SERPLBLD CKD-EPI 2021: 19 ML/MIN/1.73M2
ERYTHROCYTE [DISTWIDTH] IN BLOOD BY AUTOMATED COUNT: 17.8 % (ref 10–15)
GLUCOSE SERPL-MCNC: 89 MG/DL (ref 70–99)
HCO3 SERPL-SCNC: 29 MMOL/L (ref 22–29)
HCT VFR BLD AUTO: 24.7 % (ref 35–47)
HGB BLD-MCNC: 8 G/DL (ref 11.7–15.7)
INR PPP: 1.8 (ref 0.85–1.15)
MCH RBC QN AUTO: 33.9 PG (ref 26.5–33)
MCHC RBC AUTO-ENTMCNC: 32.4 G/DL (ref 31.5–36.5)
MCV RBC AUTO: 105 FL (ref 78–100)
PLATELET # BLD AUTO: 157 10E3/UL (ref 150–450)
POTASSIUM SERPL-SCNC: 4 MMOL/L (ref 3.4–5.3)
RBC # BLD AUTO: 2.36 10E6/UL (ref 3.8–5.2)
SODIUM SERPL-SCNC: 136 MMOL/L (ref 135–145)
WBC # BLD AUTO: 6.3 10E3/UL (ref 4–11)

## 2024-10-08 PROCEDURE — 250N000013 HC RX MED GY IP 250 OP 250 PS 637: Performed by: PHYSICIAN ASSISTANT

## 2024-10-08 PROCEDURE — G0463 HOSPITAL OUTPT CLINIC VISIT: HCPCS

## 2024-10-08 PROCEDURE — 250N000013 HC RX MED GY IP 250 OP 250 PS 637: Performed by: HOSPITALIST

## 2024-10-08 PROCEDURE — 85610 PROTHROMBIN TIME: CPT | Performed by: PHYSICIAN ASSISTANT

## 2024-10-08 PROCEDURE — 80048 BASIC METABOLIC PNL TOTAL CA: CPT | Performed by: PHYSICIAN ASSISTANT

## 2024-10-08 PROCEDURE — 999N000123 HC STATISTIC OXYGEN O2DAILY TECH TIME

## 2024-10-08 PROCEDURE — 250N000011 HC RX IP 250 OP 636: Performed by: PHYSICIAN ASSISTANT

## 2024-10-08 PROCEDURE — 97110 THERAPEUTIC EXERCISES: CPT | Mod: GP | Performed by: PHYSICAL THERAPIST

## 2024-10-08 PROCEDURE — 99233 SBSQ HOSP IP/OBS HIGH 50: CPT | Performed by: HOSPITALIST

## 2024-10-08 PROCEDURE — 97116 GAIT TRAINING THERAPY: CPT | Mod: GP | Performed by: PHYSICAL THERAPIST

## 2024-10-08 PROCEDURE — 99207 PR NO CHARGE LOS: CPT | Performed by: INTERNAL MEDICINE

## 2024-10-08 PROCEDURE — 999N000157 HC STATISTIC RCP TIME EA 10 MIN

## 2024-10-08 PROCEDURE — 97110 THERAPEUTIC EXERCISES: CPT | Mod: GO | Performed by: OCCUPATIONAL THERAPIST

## 2024-10-08 PROCEDURE — 120N000017 HC R&B RESPIRATORY CARE

## 2024-10-08 RX ORDER — WARFARIN SODIUM 2.5 MG/1
2.5 TABLET ORAL
Status: COMPLETED | OUTPATIENT
Start: 2024-10-08 | End: 2024-10-08

## 2024-10-08 RX ADMIN — Medication 0.5 G: at 20:49

## 2024-10-08 RX ADMIN — CLOPIDOGREL 75 MG: 75 TABLET ORAL at 09:43

## 2024-10-08 RX ADMIN — TORSEMIDE 100 MG: 100 TABLET ORAL at 09:43

## 2024-10-08 RX ADMIN — Medication 1 CAPSULE: at 20:47

## 2024-10-08 RX ADMIN — METOPROLOL SUCCINATE 12.5 MG: 25 TABLET, EXTENDED RELEASE ORAL at 20:48

## 2024-10-08 RX ADMIN — SIMETHICONE 80 MG: 80 TABLET, CHEWABLE ORAL at 20:47

## 2024-10-08 RX ADMIN — DOCUSATE SODIUM AND SENNOSIDES 2 TABLET: 8.6; 5 TABLET, FILM COATED ORAL at 20:49

## 2024-10-08 RX ADMIN — Medication 5 MG: at 20:47

## 2024-10-08 RX ADMIN — Medication 0.5 G: at 14:24

## 2024-10-08 RX ADMIN — Medication 0.5 G: at 09:44

## 2024-10-08 RX ADMIN — DOCUSATE SODIUM AND SENNOSIDES 2 TABLET: 8.6; 5 TABLET, FILM COATED ORAL at 09:43

## 2024-10-08 RX ADMIN — PANTOPRAZOLE SODIUM 40 MG: 40 TABLET, DELAYED RELEASE ORAL at 06:51

## 2024-10-08 RX ADMIN — ONDANSETRON HYDROCHLORIDE 8 MG: 4 TABLET, FILM COATED ORAL at 08:58

## 2024-10-08 RX ADMIN — ATORVASTATIN CALCIUM 80 MG: 40 TABLET, FILM COATED ORAL at 20:47

## 2024-10-08 RX ADMIN — LIDOCAINE 1 PATCH: 4 PATCH TOPICAL at 09:43

## 2024-10-08 RX ADMIN — WARFARIN SODIUM 2.5 MG: 2.5 TABLET ORAL at 18:27

## 2024-10-08 RX ADMIN — SIMETHICONE 80 MG: 80 TABLET, CHEWABLE ORAL at 09:43

## 2024-10-08 RX ADMIN — Medication 1 TABLET: at 12:05

## 2024-10-08 RX ADMIN — SIMETHICONE 80 MG: 80 TABLET, CHEWABLE ORAL at 14:24

## 2024-10-08 RX ADMIN — Medication 1 CAPSULE: at 09:43

## 2024-10-08 ASSESSMENT — ACTIVITIES OF DAILY LIVING (ADL)
ADLS_ACUITY_SCORE: 36
ADLS_ACUITY_SCORE: 43
ADLS_ACUITY_SCORE: 36
ADLS_ACUITY_SCORE: 36
ADLS_ACUITY_SCORE: 43
ADLS_ACUITY_SCORE: 36
ADLS_ACUITY_SCORE: 43
ADLS_ACUITY_SCORE: 43
ADLS_ACUITY_SCORE: 42
ADLS_ACUITY_SCORE: 43
ADLS_ACUITY_SCORE: 36
ADLS_ACUITY_SCORE: 36
ADLS_ACUITY_SCORE: 43
ADLS_ACUITY_SCORE: 36
ADLS_ACUITY_SCORE: 42
ADLS_ACUITY_SCORE: 43
ADLS_ACUITY_SCORE: 40
ADLS_ACUITY_SCORE: 36
ADLS_ACUITY_SCORE: 42
ADLS_ACUITY_SCORE: 36
ADLS_ACUITY_SCORE: 40

## 2024-10-08 NOTE — PROGRESS NOTES
[facility]    Medicine Progress Note - Hospitalist Service    Date of Admission:  9/16/2024    Felicitas Elliott is a 84 year old female admitted on 9/16/2024 to the LTAC for long-term antibiotics following MSSA bacteremia and endocarditis. 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  She was initially admitted to [facility] on 8/16/2024 for sepsis and HAMMAD.  She received empiric antibiotics on admission. Blood cultures grew MSSA.  Neurology was consulted for possible discitis.  ID was also consulted. Echo revealed large posterior leaflet mitral valve vegetation and small aortic valve vegetation with severe aortic stenosis.  Brain MRI revealed scattered embolic infarcts.  Coronary angiogram demonstrated coronary artery disease.  CT surgery was consulted. She underwent CABG alongside the procedures mentioned above.  She however has not had renal recovery.  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  She had dysphagia and momentarily required NG tube with tube feeds. She worked with speech therapy and is now cleared for regular diet and thin liquids.  CT surgery recommends warfarin for 3 months for bioprosthetic valves.  INR goal 2-3.  End date of warfarin approximately 12/10/2024, at which point she will be on Plavix alone (ASA allergy) indefinitely.  ID recommends IV vancomycin until 10/8/2024.        LTAC Course:  9/17 - 9/21.  Progressing well.  On Coumadin, initially bridging with heparin gtt. for bioprosthetic valve.  On arrival initial INR 1.49. Goal 2-3. Has been dialyzing for HAMMAD. 9/19, INR 2.19, heparin gtt discontinued. RD noted patient having trouble chewing and impedes her overall oral intake.  SLP consulted.    9/22-9/29: tolerating dialysis well. Pacer checked on 9/24, set at , top pacer is capturing 94%, bottom pacer is capturing 80%. Cardio did a  televisit with patient and recommend to decrease metoprolol by 1/2- changed from 25 to 12.5 bid. Added simethicone as patient has abdominal pain due to gas.    She is having neck pain -lidocaine and voltaren added.  Pharmacy to change medications around to have get getting less pills at the same time.  She complains that this contributes to a nausea.  On 9/29, BP is low, decreased toresimide dose from 100 to 50, but holding for now.  Also added prn midodrine.      9/30 - 10/6. Has been progressing well. Gaining strength.On dialysis MWF.  BP was running low, metoprolol 12.5 mg p.o. twice daily changed to metoprolol 12.5 mg XL daily.  She remains on IV vancomycin for bacteremia.10/6, patient desaturates at night per RN. Home oxygen test ordered together with continuous pulse ox. Otherwise no new other changes continue with current medical management    10/7: care conference at 2 pm.    10/8:  platelets back up to 157, no overnight events, ordered an RN walk test and overnight pulse ox for qualification for home o2.      Follow up  -Sternal precautions end on 10/8      BARRIERS TO DISCHARGE (why care is unable to be provided at a lower level of care):    -Long-term antibiotics.  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  Repeat blood cultures - 8/17/2024  -Mitral and aortic valve endocarditis as evidenced with large vegetation on mitral valve 8 mm x 15 at x 2 posterior leaflet of small vegetation on aortic valve.  With signs of vegetation combined with brain infarct.  S/p CV surgery  -Continue with IV vancomycin.  Dosing per pharmacy. Per ID last vancomycin dose will be after HD on 10/7  -Per ID will need to check immunoglobulin  level in 4 to 6 weeks, sister with history of hypogammaglobinemia  -ID consulted and following  -Patient previously on heparin 5000 subQ every 8 hours and Coumadin.  Recommended INR goal is 2-3 per CT surgery.  Discontinued heparin 5000 units subcu every 8 hours on admission and started patient on heparin gtt. alongside Coumadin. MD also notified pharmacy and were agreeable with this plan.  -9/19, INR 2.19.  Discontinued heparin gtt. INR on goal  -Pharm.D. to continue dosing Coumadin   -INR  2.02 on 10/6    Peripheral neuropathy of b/l feet  -fredy-lidocaine-ketamine cream prn   -benadryl cream prn     Abnormal brain MRI suggestive of subacute infarct  Patient with a history of MSSA bacteremia and aortic valve endocarditis.  Most likely septic emboli.  -Continue with antibiotics per ID.  End date 10/8/2024    Acute respiratory failure with hypoxia  -Patient previously intubated.  Now extubated.    -Continue with oxygen support to maintain oxygenation above 92%.  Wean as tolerated  -RT while in LTAC    HAMMAD  -Previously on CCRT now transferred to HD  -Torsemide per nephrology  -HD per nephrology-MWF  -Monitor kidney function with BMP    CAD  A-fib  History of aortic stenosis  +pacemaker   Hypotension   -Fairly stable at this time.  -S/p CABG per CT surgery  -Continue with Plavix and statin  -Metoprolol- will decrease dose from 25 bid to 12.5 bid due to low BP on 9/24. Metoprolol later changed to 12.5 mg daily. BP now fairly stable  -Previously on amiodarone for rate control, now s/p PPM  -pacer checked 9/23, set at , top pacer is capturing 94%, bottom pacer is capturing 80%.  -cardiothoracic surg tele appt- appreciate recs  -torsemide on hold on 9/29 due to hypotension  -prn midodrine     Neck pain due to arthritis  -lidocaine  -voltaren prn    Anemia of chronic disease  Thrombocytopenia   -monitor    Severe protein calorie malnutrition  -? esophogram  -RD following   -calorie counts    Physical  deconditioning/critical illness myopathy  -Fall precautions.  -PT/OT  -WOC    Diet: Advance Diet as Tolerated: Regular Diet Adult  Snacks/Supplements Adult: Gelatein Plus; With Meals  Snacks/Supplements Adult: Ensure Enlive; With Meals    DVT Prophylaxis: Warfarin  Le Catheter: Not present  Lines: PRESENT      PICC 09/06/24 Triple Lumen Right Basilic Vascular access-Site Assessment: WDL  CVC Double Lumen Right Subclavian Tunneled-Site Assessment: WDL      Cardiac Monitoring: None  Code Status: No CPR- Do NOT Intubate      Clinically Significant Risk Factors              # Hypoalbuminemia: Lowest albumin = 3.1 g/dL at 9/19/2024  6:35 AM, will monitor as appropriate                # Severe Malnutrition: based on nutrition assessment      # Financial/Environmental Concerns: none   # Pacemaker present  # History of CABG: noted on surgical history    Disposition Plan     Medically Ready for Discharge: Anticipated in 5+ Days    Josephine Thomson MD  Hospitalist Service  LT[facility]  Securely message with [employer] (more info)  Text page via [employer] Paging/Directory   ______________________________________________________________________    Interval History   Will send over  the lido-fredy-ketamine cream to specialty compounding pharmacy to see the price and if  covered.  She reports that she is extremely sleepy during dialysis.        Physical Exam   Vital Signs: Temp: 98.8  F (37.1  C) Temp src: Oral BP: 110/54 Pulse: 83   Resp: 22 SpO2: 94 % O2 Device: Nasal cannula with humidification   Weight: 167 lbs 8.79 oz  Constitutional: Patient is resting in bed comfortably appears in no distress.  Eyes: Pupils are equal round and reactive to light  Respiratory: Good respiratory effort, on auscultation good air entry is noted  Cardiovascular: Good S1 and S2 no obvious murmurs peripheral pulses are palpable  GI: Abdomen is soft nontender nondistended bowel sounds are noted  Skin: No obvious rashes noted on exposed areas, warm to touch  please refer to nursing/wound care note for complete skin exam  Musculoskeletal: Good muscle tone nails and digits appear acyanotic  Neuropsychiatric: Awake alert oriented to person normal affect    Medical Decision Making       53 MINUTES SPENT BY ME on the date of service doing chart review, history, exam, documentation & further activities per the note.  ------------------ MEDICAL DECISION MAKING ------------------------------------------------------------------------------------------------------  MANAGEMENT DISCUSSED with the following over the past 24 hours:     NOTE(S)/MEDICAL RECORDS REVIEWED over the past 24 hours: RN       Data   Recent Labs   Lab 10/08/24  0615 10/07/24  0529 10/06/24  0603   WBC 6.3 7.5 5.4   HGB 8.0* 8.3* 8.0*   * 105* 105*    147* 151   INR 1.80* 1.76* 2.02*    137 139   POTASSIUM 4.0 4.2 4.1   CHLORIDE 101 103 104   CO2 29 25 26   BUN 13.1 21.6 16.7   CR 2.39* 3.57* 3.08*   ANIONGAP 6* 9 9   KAELYN 8.1* 8.8 8.7*   GLC 89 96 111*     Most Recent 3 CBC's:  Recent Labs   Lab Test 10/08/24  0615 10/07/24  0529 10/06/24  0603   WBC 6.3 7.5 5.4   HGB 8.0* 8.3* 8.0*   * 105* 105*    147* 151     Most Recent 3 BMP's:  Recent Labs   Lab Test 10/08/24  0615 10/07/24  0529 10/06/24  0603    137 139   POTASSIUM 4.0 4.2 4.1   CHLORIDE 101 103 104   CO2 29 25 26   BUN 13.1 21.6 16.7   CR 2.39* 3.57* 3.08*   ANIONGAP 6* 9 9   KAELYN 8.1* 8.8 8.7*   GLC 89 96 111*     Most Recent 2 LFT's:  Recent Labs   Lab Test 09/19/24  0635 09/18/24  0704   AST 36 34   ALT 31 29   ALKPHOS 78 75   BILITOTAL 0.7 0.7     Most Recent 3 INR's:  Recent Labs   Lab Test 10/08/24  0615 10/07/24  0529 10/06/24  0603   INR 1.80* 1.76* 2.02*

## 2024-10-08 NOTE — PLAN OF CARE
Problem: Adult Inpatient Plan of Care  Goal: Absence of Hospital-Acquired Illness or Injury  Outcome: Progressing  Intervention: Identify and Manage Fall Risk  Recent Flowsheet Documentation  Taken 10/7/2024 1800 by Joycelyn Randle RN  Safety Promotion/Fall Prevention:   activity supervised   clutter free environment maintained   mobility aid in reach   nonskid shoes/slippers when out of bed   room door open   room near nurse's station   supervised activity  Intervention: Prevent Skin Injury  Recent Flowsheet Documentation  Taken 10/7/2024 1245 by Joycelyn Randle RN  Body Position:   turned   right  Intervention: Prevent Infection  Recent Flowsheet Documentation  Taken 10/7/2024 1800 by Joycelyn Randle RN  Infection Prevention: hand hygiene promoted   Goal Outcome Evaluation:         Dialysis is done at 7 pm.PRN Midodrine 10 mg is given during dialysis.Vital signs is WDL during the procedure.  External cath is patent and draining adequate clear and pita urine.

## 2024-10-08 NOTE — CONSULTS
10/8- Test claim for PLO cream- not covered in the retail pharmacy- will have to continue to get it with a speciality pharmacy such as [facility], unsure of cost with insurance through a speciality pharmacy unfortunately. Melody Renee [facility]  Pharmacy Discharge Liaison [facility]

## 2024-10-08 NOTE — PROGRESS NOTES
Brief ID Follow-up Note    Chart reviewed. Patient completed course of vancomycin.    ID will sign-off.  No follow-up planned    Uday Aguirre MD  [facility]  Direct messaging: [facility] Paging   On-Call ID provider: [phone], option: 9

## 2024-10-08 NOTE — PLAN OF CARE
Problem: Adult Inpatient Plan of Care  Goal: Plan of Care Review  Description: The Plan of Care Review/Shift note should be completed every shift.  The Outcome Evaluation is a brief statement about your assessment that the patient is improving, declining, or no change.  This information will be displayed automatically on your shift  note.  Outcome: Progressing     Problem: Pain Acute  Goal: Optimal Pain Control and Function  Outcome: Progressing  Intervention: Prevent or Manage Pain  Recent Flowsheet Documentation  Taken 10/8/2024 1000 by Ede Parnell RN  Sensory Stimulation Regulation: quiet environment promoted  Medication Review/Management: medications reviewed  Intervention: Optimize Psychosocial Wellbeing  Recent Flowsheet Documentation  Taken 10/8/2024 1000 by Ede Parnell RN  Supportive Measures:   active listening utilized   positive reinforcement provided     Problem: Nausea and Vomiting  Goal: Nausea and Vomiting Relief  Outcome: Progressing  Intervention: Prevent and Manage Nausea and Vomiting  Recent Flowsheet Documentation  Taken 10/8/2024 1000 by Ede Parnell RN  Fluid/Electrolyte Management: fluids provided  Environmental Support: calm environment promoted   Goal Outcome Evaluation:  Patient is alert and oriented x4, she denied of pain. Zofran was given before am meds to prevent nausea with effective. She ambulated to the bathroom x1, had 1 bm. She had purewick on while  in bed. Bp this am was 89/50, rechecked for 95/55, patient denied of dizziness or nausea,  informed MD. Patient is up in chair now with  in the room. Explained meds and care plan to patient.

## 2024-10-08 NOTE — PLAN OF CARE
Problem: Pain Acute  Goal: Optimal Pain Control and Function  Outcome: Progressing  Intervention: Prevent or Manage Pain  Recent Flowsheet Documentation  Taken 10/8/2024 0154 by Paradise Sharma RN  Sensory Stimulation Regulation:   care clustered   lighting decreased   quiet environment promoted  Medication Review/Management: medications reviewed  Intervention: Optimize Psychosocial Wellbeing  Recent Flowsheet Documentation  Taken 10/8/2024 0154 by Paradise Sharma RN  Supportive Measures:   active listening utilized   positive reinforcement provided     Problem: Nausea and Vomiting  Goal: Nausea and Vomiting Relief  Outcome: Progressing  Intervention: Prevent and Manage Nausea and Vomiting  Recent Flowsheet Documentation  Taken 10/8/2024 0154 by Paradise Sharma RN  Environmental Support: calm environment promoted   Goal Outcome Evaluation:       Pt has been on continuous pulse oximetry with 02 per N/C  Sats 94%, other v/signs stable.  Pt slept > 6 hours, denied   Pain / discomforts, will continue to monitor.

## 2024-10-08 NOTE — PROGRESS NOTES
SPIRITUAL HEALTH SERVICES ([facility])  SPIRITUAL ASSESSMENT Progress Note  [facility]. Unit LTACH     REFERRAL SOURCE: Followup visit      Felicitas and her  are excited to learn that Felicitas will be able to receive dialysis in [place] when she returns home. Both are expecting the discharge home to take place either Wednesday Oct 9 or Thursday Oct 10.         PLAN: [facility] will follow until Felicitas's discharge.      Dary Velazquez, Ph.D., [facility]      SHS available 24/7 for emergency requests/referrals, either by having the on-call  paged or by entering an ASAP/STAT consult in Epic (this will also page the on-call ).

## 2024-10-08 NOTE — PROGRESS NOTES
Patient SpO2 was 82% on room air, placed on 2L nasal cannula. Pt may need O2 with sleep. /54 (BP Location: Left arm, Patient Position: Right side, Cuff Size: Adult Small)   Pulse 83   Temp 98.8  F (37.1  C) (Oral)   Resp 22   Wt 76 kg (167 lb 8.8 oz)   SpO2 94%   BMI 26.24 kg/m   Pt is on continues oxygen monitoring.

## 2024-10-08 NOTE — PLAN OF CARE
Problem: Adult Inpatient Plan of Care  Goal: Plan of Care Review  Description: The Plan of Care Review/Shift note should be completed every shift.  The Outcome Evaluation is a brief statement about your assessment that the patient is improving, declining, or no change.  This information will be displayed automatically on your shift  note.  Outcome: Progressing  Flowsheets (Taken 10/7/2024 6258)  Plan of Care Reviewed With: patient  Overall Patient Progress: improving  Goal: Absence of Hospital-Acquired Illness or Injury  Outcome: Progressing     Problem: Pain Acute  Goal: Optimal Pain Control and Function  Outcome: Progressing     Problem: Wound  Goal: Optimal Coping  Outcome: Progressing  Goal: Optimal Pain Control and Function  Outcome: Progressing  Goal: Skin Health and Integrity  Outcome: Progressing     Goal Outcome Evaluation:      Plan of Care Reviewed With: patient    Overall Patient Progress: improvingOverall Patient Progress: improving     Patient  assumed  care  for  this  patient  from  1900.  She is  alert  and  oriented  X  4.  Vital  signs  stable  after  hemodialysis.  She  ate  75%  of supper  and  drank  moderate  amounts  of  fluids  from  her tray. She  uses her  call  light  appropriately.  Pleasant  and  cooperative  with  cares. Mipelex  to  her bruised left ischial  tuberosity   lifted  and  skin  was  clean,  dry  and  intact. Vanco  IV  infusion  was  well-tolerated.  Will  keep  monitoring  patient's  progress  and  safety.

## 2024-10-08 NOTE — PROGRESS NOTES
[facility]  WO Nurse Inpatient Assessment     Consulted for: buttocks    Patient History (according to provider note(s):      Patient seen by this WOC nurse previously and signed off. New concern noted by nursing.    Assessment:      Areas visualized during today's visit:  sacrum and buttocks    Skin Injury Location: left buttock    Last photo: 10/4  Skin injury due to:  unknwon  Skin history and plan of care: no previous concerns noted at this facility  Affected area:      Skin assessment: Ecchymosis - purple discolorationis not over a bony prominence, and superior to this is noted yellow tinge to skin, consistent with bruising  Per patient's spouse, she has a history of bruising easily. Patient cannot remember an injury to this area. She is independently mobile.     Measurements (length x width x depth, in cm)  area approximately 1.5 x 3cm now red, all discoloration lightening     Color: red     Temperature  normal      Drainage: none .      Color: none      Odor: none  Pain: denies   Treatment goal: Heal   STATUS: improving  Supplies ordered: supplies stored on unit       Treatment Plan:     Left buttock   Cleanse with hygiene cares  Apply Mepilex, change every other day and as needed    Orders: Written    RECOMMEND PRIMARY TEAM ORDER: None, at this time  Education provided: plan of care  Discussed plan of care with: Patient, Family, and Nurse  WOC nurse follow-up plan: weekly  Notify WOC if wound(s) deteriorate.  Nursing to notify the Provider(s) and re-consult the WOC Nurse if new skin concern.    DATA:     Current support surface: Standard  Standard gel mattress (Isoflex)  Containment of urine/stool: Brief  BMI: Body mass index is 26.24 kg/m .   Active diet order: Orders Placed This Encounter      Advance Diet as Tolerated: Regular Diet Adult     Output: I/O last 3 completed shifts:  In: 902 [P.O.:590; I.V.:312]  Out: 3300 [Urine:1300; Other:2000]     Labs:   Recent Labs   Lab  10/08/24  0615   HGB 8.0*   INR 1.80*   WBC 6.3     Pressure injury risk assessment:   Sensory Perception: 4-->no impairment  Moisture: 3-->occasionally moist  Activity: 3-->walks occasionally  Mobility: 3-->slightly limited  Nutrition: 2-->probably inadequate  Friction and Shear: 2-->potential problem  Sergey Score: 17    JAM TinsleyN, RN, PHN, HNB-BC, CWOCN  Pager no longer is use, please contact through [employer] group: [facility]

## 2024-10-09 ENCOUNTER — APPOINTMENT (OUTPATIENT)
Dept: OCCUPATIONAL THERAPY | Facility: CLINIC | Age: 84
DRG: 288 | End: 2024-10-09
Attending: HOSPITALIST
Payer: COMMERCIAL

## 2024-10-09 VITALS
HEART RATE: 84 BPM | OXYGEN SATURATION: 93 % | TEMPERATURE: 98 F | RESPIRATION RATE: 22 BRPM | BODY MASS INDEX: 26.08 KG/M2 | SYSTOLIC BLOOD PRESSURE: 100 MMHG | DIASTOLIC BLOOD PRESSURE: 55 MMHG | WEIGHT: 166.5 LBS

## 2024-10-09 LAB
ANION GAP SERPL CALCULATED.3IONS-SCNC: 11 MMOL/L (ref 7–15)
BUN SERPL-MCNC: 22.5 MG/DL (ref 8–23)
CALCIUM SERPL-MCNC: 8.9 MG/DL (ref 8.8–10.4)
CHLORIDE SERPL-SCNC: 98 MMOL/L (ref 98–107)
CREAT SERPL-MCNC: 3.23 MG/DL (ref 0.51–0.95)
EGFRCR SERPLBLD CKD-EPI 2021: 14 ML/MIN/1.73M2
ERYTHROCYTE [DISTWIDTH] IN BLOOD BY AUTOMATED COUNT: 17.5 % (ref 10–15)
GLUCOSE SERPL-MCNC: 88 MG/DL (ref 70–99)
HCO3 SERPL-SCNC: 28 MMOL/L (ref 22–29)
HCT VFR BLD AUTO: 27 % (ref 35–47)
HGB BLD-MCNC: 8 G/DL (ref 11.7–15.7)
INR PPP: 1.91 (ref 0.85–1.15)
MCH RBC QN AUTO: 29.1 PG (ref 26.5–33)
MCHC RBC AUTO-ENTMCNC: 29.6 G/DL (ref 31.5–36.5)
MCV RBC AUTO: 98 FL (ref 78–100)
PLATELET # BLD AUTO: 170 10E3/UL (ref 150–450)
POTASSIUM SERPL-SCNC: 3.6 MMOL/L (ref 3.4–5.3)
RBC # BLD AUTO: 2.75 10E6/UL (ref 3.8–5.2)
SODIUM SERPL-SCNC: 137 MMOL/L (ref 135–145)
WBC # BLD AUTO: 7.2 10E3/UL (ref 4–11)

## 2024-10-09 PROCEDURE — 82310 ASSAY OF CALCIUM: CPT | Performed by: PHYSICIAN ASSISTANT

## 2024-10-09 PROCEDURE — 97535 SELF CARE MNGMENT TRAINING: CPT | Mod: GO | Performed by: OCCUPATIONAL THERAPIST

## 2024-10-09 PROCEDURE — 99239 HOSP IP/OBS DSCHRG MGMT >30: CPT | Performed by: HOSPITALIST

## 2024-10-09 PROCEDURE — 90935 HEMODIALYSIS ONE EVALUATION: CPT

## 2024-10-09 PROCEDURE — 99232 SBSQ HOSP IP/OBS MODERATE 35: CPT | Performed by: PHYSICIAN ASSISTANT

## 2024-10-09 PROCEDURE — 85610 PROTHROMBIN TIME: CPT | Performed by: PHYSICIAN ASSISTANT

## 2024-10-09 PROCEDURE — 250N000013 HC RX MED GY IP 250 OP 250 PS 637: Performed by: PHYSICIAN ASSISTANT

## 2024-10-09 PROCEDURE — 85027 COMPLETE CBC AUTOMATED: CPT | Performed by: PHYSICIAN ASSISTANT

## 2024-10-09 PROCEDURE — 80048 BASIC METABOLIC PNL TOTAL CA: CPT | Performed by: PHYSICIAN ASSISTANT

## 2024-10-09 PROCEDURE — 999N000157 HC STATISTIC RCP TIME EA 10 MIN

## 2024-10-09 PROCEDURE — 999N000123 HC STATISTIC OXYGEN O2DAILY TECH TIME

## 2024-10-09 PROCEDURE — 94762 N-INVAS EAR/PLS OXIMTRY CONT: CPT

## 2024-10-09 PROCEDURE — 250N000013 HC RX MED GY IP 250 OP 250 PS 637: Performed by: HOSPITALIST

## 2024-10-09 PROCEDURE — 250N000011 HC RX IP 250 OP 636: Performed by: PHYSICIAN ASSISTANT

## 2024-10-09 RX ORDER — LACTOBACILLUS RHAMNOSUS GG 10B CELL
1 CAPSULE ORAL 2 TIMES DAILY
Qty: 60 CAPSULE | Refills: 0 | Status: SHIPPED | OUTPATIENT
Start: 2024-10-09

## 2024-10-09 RX ORDER — AMOXICILLIN 250 MG
2 CAPSULE ORAL 2 TIMES DAILY
Qty: 60 TABLET | Refills: 0 | Status: SHIPPED | OUTPATIENT
Start: 2024-10-09

## 2024-10-09 RX ORDER — LIDOCAINE 4 G/G
1 PATCH TOPICAL EVERY 24 HOURS
Qty: 30 PATCH | Refills: 0 | Status: SHIPPED | OUTPATIENT
Start: 2024-10-09

## 2024-10-09 RX ORDER — WARFARIN SODIUM 2.5 MG/1
2.5 TABLET ORAL DAILY
Qty: 30 TABLET | Refills: 0 | Status: SHIPPED | OUTPATIENT
Start: 2024-10-09

## 2024-10-09 RX ORDER — POLYETHYLENE GLYCOL 3350 17 G/17G
17 POWDER, FOR SOLUTION ORAL DAILY
Qty: 510 G | Refills: 0 | Status: SHIPPED | OUTPATIENT
Start: 2024-10-09

## 2024-10-09 RX ORDER — WARFARIN SODIUM 2.5 MG/1
2.5 TABLET ORAL
Status: DISCONTINUED | OUTPATIENT
Start: 2024-10-09 | End: 2024-10-09 | Stop reason: HOSPADM

## 2024-10-09 RX ORDER — ACETAMINOPHEN 325 MG/1
650 TABLET ORAL EVERY 4 HOURS PRN
Qty: 120 TABLET | Refills: 0 | Status: SHIPPED | OUTPATIENT
Start: 2024-10-09

## 2024-10-09 RX ORDER — MIDODRINE HYDROCHLORIDE 10 MG/1
10 TABLET ORAL
Qty: 15 TABLET | Refills: 0 | Status: SHIPPED | OUTPATIENT
Start: 2024-10-09 | End: 2024-10-09

## 2024-10-09 RX ORDER — ALBUTEROL SULFATE 0.83 MG/ML
2.5 SOLUTION RESPIRATORY (INHALATION) EVERY 6 HOURS PRN
Qty: 90 ML | Refills: 0 | Status: SHIPPED | OUTPATIENT
Start: 2024-10-09

## 2024-10-09 RX ORDER — SODIUM CHLORIDE FOR INHALATION 3 %
3 VIAL, NEBULIZER (ML) INHALATION EVERY 6 HOURS PRN
Qty: 15 ML | Refills: 0 | Status: SHIPPED | OUTPATIENT
Start: 2024-10-09

## 2024-10-09 RX ORDER — MIDODRINE HYDROCHLORIDE 10 MG/1
10 TABLET ORAL 3 TIMES DAILY PRN
Qty: 60 TABLET | Refills: 0 | Status: SHIPPED | OUTPATIENT
Start: 2024-10-09

## 2024-10-09 RX ORDER — SIMETHICONE 80 MG
80 TABLET,CHEWABLE ORAL 3 TIMES DAILY
Qty: 90 TABLET | Refills: 0 | Status: SHIPPED | OUTPATIENT
Start: 2024-10-09

## 2024-10-09 RX ORDER — MULTIPLE VITAMINS W/ MINERALS TAB 9MG-400MCG
1 TAB ORAL DAILY
Qty: 30 TABLET | Refills: 0 | Status: SHIPPED | OUTPATIENT
Start: 2024-10-09

## 2024-10-09 RX ORDER — ATORVASTATIN CALCIUM 80 MG/1
80 TABLET, FILM COATED ORAL EVERY EVENING
Qty: 30 TABLET | Refills: 0 | Status: SHIPPED | OUTPATIENT
Start: 2024-10-09 | End: 2024-10-31

## 2024-10-09 RX ORDER — TORSEMIDE 100 MG/1
100 TABLET ORAL DAILY
Qty: 30 TABLET | Refills: 0 | Status: SHIPPED | OUTPATIENT
Start: 2024-10-10

## 2024-10-09 RX ORDER — MIDODRINE HYDROCHLORIDE 10 MG/1
10 TABLET ORAL 3 TIMES DAILY PRN
Qty: 60 TABLET | Refills: 0 | Status: SHIPPED | OUTPATIENT
Start: 2024-10-09 | End: 2024-10-09

## 2024-10-09 RX ORDER — METOPROLOL SUCCINATE 25 MG/1
12.5 TABLET, EXTENDED RELEASE ORAL AT BEDTIME
Qty: 15 TABLET | Refills: 0 | Status: SHIPPED | OUTPATIENT
Start: 2024-10-09 | End: 2024-10-31

## 2024-10-09 RX ORDER — TRAZODONE HYDROCHLORIDE 50 MG/1
25 TABLET, FILM COATED ORAL
Qty: 30 TABLET | Refills: 0 | Status: SHIPPED | OUTPATIENT
Start: 2024-10-09

## 2024-10-09 RX ORDER — PANTOPRAZOLE SODIUM 40 MG/1
40 TABLET, DELAYED RELEASE ORAL
Qty: 30 TABLET | Refills: 0 | Status: SHIPPED | OUTPATIENT
Start: 2024-10-10

## 2024-10-09 RX ORDER — ONDANSETRON 8 MG/1
8 TABLET, FILM COATED ORAL EVERY 8 HOURS PRN
Qty: 30 TABLET | Refills: 0 | Status: SHIPPED | OUTPATIENT
Start: 2024-10-09

## 2024-10-09 RX ORDER — BISACODYL 10 MG
10 SUPPOSITORY, RECTAL RECTAL DAILY PRN
Qty: 30 SUPPOSITORY | Refills: 0 | Status: SHIPPED | OUTPATIENT
Start: 2024-10-09

## 2024-10-09 RX ORDER — CLOPIDOGREL BISULFATE 75 MG/1
75 TABLET ORAL DAILY
Qty: 30 TABLET | Refills: 0 | Status: SHIPPED | OUTPATIENT
Start: 2024-10-10 | End: 2024-10-31

## 2024-10-09 RX ADMIN — Medication 0.5 G: at 15:01

## 2024-10-09 RX ADMIN — Medication 0.5 G: at 09:16

## 2024-10-09 RX ADMIN — ONDANSETRON HYDROCHLORIDE 8 MG: 4 TABLET, FILM COATED ORAL at 05:42

## 2024-10-09 RX ADMIN — Medication 1 CAPSULE: at 09:04

## 2024-10-09 RX ADMIN — MIDODRINE HYDROCHLORIDE 10 MG: 5 TABLET ORAL at 11:57

## 2024-10-09 RX ADMIN — LIDOCAINE 1 PATCH: 4 PATCH TOPICAL at 09:13

## 2024-10-09 RX ADMIN — Medication 1 TABLET: at 13:01

## 2024-10-09 RX ADMIN — TORSEMIDE 100 MG: 100 TABLET ORAL at 09:07

## 2024-10-09 RX ADMIN — SIMETHICONE 80 MG: 80 TABLET, CHEWABLE ORAL at 14:58

## 2024-10-09 RX ADMIN — SIMETHICONE 80 MG: 80 TABLET, CHEWABLE ORAL at 09:06

## 2024-10-09 RX ADMIN — DOCUSATE SODIUM AND SENNOSIDES 2 TABLET: 8.6; 5 TABLET, FILM COATED ORAL at 09:04

## 2024-10-09 RX ADMIN — CLOPIDOGREL 75 MG: 75 TABLET ORAL at 09:07

## 2024-10-09 RX ADMIN — PANTOPRAZOLE SODIUM 40 MG: 40 TABLET, DELAYED RELEASE ORAL at 06:34

## 2024-10-09 ASSESSMENT — ACTIVITIES OF DAILY LIVING (ADL)
ADLS_ACUITY_SCORE: 40
ADLS_ACUITY_SCORE: 40
ADLS_ACUITY_SCORE: 36
ADLS_ACUITY_SCORE: 32
ADLS_ACUITY_SCORE: 36
ADLS_ACUITY_SCORE: 40
ADLS_ACUITY_SCORE: 40
ADLS_ACUITY_SCORE: 32
ADLS_ACUITY_SCORE: 32
ADLS_ACUITY_SCORE: 40
ADLS_ACUITY_SCORE: 40
ADLS_ACUITY_SCORE: 32
ADLS_ACUITY_SCORE: 40
ADLS_ACUITY_SCORE: 40
ADLS_ACUITY_SCORE: 36
ADLS_ACUITY_SCORE: 40

## 2024-10-09 NOTE — PROGRESS NOTES
Pt discharged to home at 1615. Writer reviewed AVS with pt and her . Pt verbalized understanding. Pt has all her belonging and going home with her . Her  is driving her home. Staff member working as NA escorted pt out of the hospital, to the car.

## 2024-10-09 NOTE — PLAN OF CARE
Occupational Therapy Discharge Summary    Reason for therapy discharge:    Discharged to home with home therapy.    Progress towards therapy goal(s). [facility] for goal details.  Goals partially met.  Barriers to achieving goals:   discharge from facility and gaols met at this level of care. .    Therapy recommendation(s):    Continued therapy is recommended.  Rationale/Recommendations:  cont to be below baseline for ADL/IADL and fctn mobility. Would benefit from further skilled OT to address these areas in her home environment. .

## 2024-10-09 NOTE — PLAN OF CARE
"  Problem: Adult Inpatient Plan of Care  Goal: Plan of Care Review  Description: The Plan of Care Review/Shift note should be completed every shift.  The Outcome Evaluation is a brief statement about your assessment that the patient is improving, declining, or no change.  This information will be displayed automatically on your shift  note.  Outcome: Adequate for Care Transition  Goal: Patient-Specific Goal (Individualized)  Description: You can add care plan individualizations to a care plan. Examples of Individualization might be:  \"Parent requests to be called daily at 9am for status\", \"I have a hard time hearing out of my right ear\", or \"Do not touch me to wake me up as it startles  me\".  Outcome: Adequate for Care Transition  Goal: Absence of Hospital-Acquired Illness or Injury  Outcome: Adequate for Care Transition  Intervention: Identify and Manage Fall Risk  Recent Flowsheet Documentation  Taken 10/9/2024 1018 by Melody Gu, RN  Safety Promotion/Fall Prevention:   activity supervised   lighting adjusted   room near nurse's station   safety round/check completed  Intervention: Prevent Skin Injury  Recent Flowsheet Documentation  Taken 10/9/2024 1018 by Melody Gu, RN  Skin Protection: adhesive use limited  Device Skin Pressure Protection:   absorbent pad utilized/changed   adhesive use limited  Intervention: Prevent Infection  Recent Flowsheet Documentation  Taken 10/9/2024 1018 by Melody Gu RN  Infection Prevention:   hand hygiene promoted   rest/sleep promoted  Goal: Optimal Comfort and Wellbeing  Outcome: Adequate for Care Transition  Intervention: Provide Person-Centered Care  Recent Flowsheet Documentation  Taken 10/9/2024 1018 by Melody Gu, RN  Trust Relationship/Rapport:   care explained   choices provided   emotional support provided   empathic listening provided   questions answered  Goal: Readiness for Transition of Care  Outcome: Adequate for Care Transition     Problem: Fall " Injury Risk  Goal: Absence of Fall and Fall-Related Injury  Outcome: Adequate for Care Transition  Intervention: Identify and Manage Contributors  Recent Flowsheet Documentation  Taken 10/9/2024 1018 by Melody Gu RN  Medication Review/Management: medications reviewed  Intervention: Promote Injury-Free Environment  Recent Flowsheet Documentation  Taken 10/9/2024 1018 by Melody Gu, RN  Safety Promotion/Fall Prevention:   activity supervised   lighting adjusted   room near nurse's station   safety round/check completed     Problem: Pain Acute  Goal: Optimal Pain Control and Function  Outcome: Adequate for Care Transition  Intervention: Prevent or Manage Pain  Recent Flowsheet Documentation  Taken 10/9/2024 1018 by Melody Gu RN  Medication Review/Management: medications reviewed     Problem: Hemodialysis  Goal: Safe, Effective Therapy Delivery  Outcome: Adequate for Care Transition  Intervention: Optimize Device Care and Function  Recent Flowsheet Documentation  Taken 10/9/2024 1018 by Melody Gu RN  Medication Review/Management: medications reviewed  Goal: Effective Tissue Perfusion  Outcome: Adequate for Care Transition  Goal: Absence of Infection Signs and Symptoms  Outcome: Adequate for Care Transition  Intervention: Prevent or Manage Infection  Recent Flowsheet Documentation  Taken 10/9/2024 1018 by Melody Gu RN  Infection Prevention:   hand hygiene promoted   rest/sleep promoted     Problem: Nausea and Vomiting  Goal: Nausea and Vomiting Relief  Outcome: Adequate for Care Transition     Problem: Wound  Goal: Optimal Coping  Outcome: Adequate for Care Transition  Intervention: Support Patient and Family Response  Recent Flowsheet Documentation  Taken 10/9/2024 1018 by Melody Gu RN  Family/Support System Care:   self-care encouraged   involvement promoted   presence promoted  Goal: Optimal Functional Ability  Outcome: Adequate for Care Transition  Goal: Absence of Infection Signs  and Symptoms  Outcome: Adequate for Care Transition  Intervention: Prevent or Manage Infection  Recent Flowsheet Documentation  Taken 10/9/2024 1018 by Melody Gu, RN  Isolation Precautions: protective environment maintained  Goal: Improved Oral Intake  Outcome: Adequate for Care Transition  Goal: Optimal Pain Control and Function  Outcome: Adequate for Care Transition  Goal: Skin Health and Integrity  Outcome: Adequate for Care Transition  Intervention: Optimize Skin Protection  Recent Flowsheet Documentation  Taken 10/9/2024 1018 by Melody Gu, RN  Pressure Reduction Techniques: heels elevated off bed  Pressure Reduction Devices: positioning supports utilized  Skin Protection: adhesive use limited  Goal: Optimal Wound Healing  Outcome: Adequate for Care Transition   Goal Outcome Evaluation:       Pt is calm and cooperative, excited about going home today. Has neck pain, uses Lidocaine patch and ice pack. HD done. Home oxygen use instructions is done.

## 2024-10-09 NOTE — PROGRESS NOTES
HEMODIALYSIS TREATMENT NOTE    Date: 10/9/2024  Time: 3.00 PM    Data:  Pre Wt: 75.5 kg (166 lb 7.2 oz)   Desired Wt:1-3   kg   Post Wt: 74.5 kg (170 lb 3.1 oz)  Weight change: 1 kg  Ultrafiltration - Post Run Net Total Removed (mL): 1000 mL  Vascular Access Status: CVC  patent  Dialyzer Rinse: Clear  Total Blood Volume Processed:66.1  L   Total Dialysis (Treatment) Time: 3 hrs   Dialysate Bath: K 3, Ca 2.25  Heparin: None    Lab:   No    Interventions:  Pt had 3.0 hours HD via RCVC,  with 600 DFR.  Seen by Nephrology during run with okay to keep UF at 1.0 kg.  10 mg Midodrine given during run to support blood pressure.  SBP soft but above 90's throughout the treatment.  Pt tolerating 1.0 kg UF well, crit-line steady with B profile at the end of HD run.  Post HD, blood rinsed back, CVC dressing changed per policy, handoff report given & pt left in a stable condition.      Assessment:  Pt alert & oriented x4, met on Nasal cannula with 1 LPM of 02 delivery.  CVC dressing CDI.  Monitoring every 15 minutes & PRN     Plan:    For possible discharge after HD

## 2024-10-09 NOTE — PROGRESS NOTES
Oxygen F2F:    Oxygen Documentation  I certify that this patient, Felicitas Elliott has been under my care (or a nurse practitioner or physican's assistant working with me). This is the face-to-face encounter for oxygen medical necessity.      At the time of this encounter, I have reviewed the qualifying testing and have determined that supplemental oxygen is reasonable and necessary and is expected to improve the patient's condition in a home setting.         Patient has continued oxygen desaturation due to Chronic Respiratory Failure with Hypoxia J96.11.    If portability is ordered, is the patient mobile within the home? yes    Was this visit performed as a telehealth visit: No    Dr. Josephine Thomson

## 2024-10-09 NOTE — PROGRESS NOTES
Care Management Discharge Note    Discharge Date: 10/09/2024       Discharge Disposition:  home with family    Discharge Services:  OP dialysis - [facility] in [place].  First OP dialysis run is set for Friday 10/11; patient to be at clinic at 1045 am.  Patient also is going to have [facility].  She will need RN and PT for sure.      Discharge DME:  Home O2 is being set up with [facility].  Still waiting on MD order, but will fax as soon as its available.      Discharge Transportation: Patient's , Ed, will drive her home.    Private pay costs discussed: Not applicable    Does the patient's insurance plan have a 3 day qualifying hospital stay waiver?  No    PAS Confirmation Code:  n/a  Patient/family educated on Medicare website which has current facility and service quality ratings:      Education Provided on the Discharge Plan:  yes. Reviewed plans with patient and family yesterday and this morning.  Persons Notified of Discharge Plans: Kim and Ed -   Patient/Family in Agreement with the Plan:      Handoff Referral Completed: Yes, non-[facility] PCP: External handoff communication completed    Additional Information:  This writer called to [facility] to set up home O2 for discharge.  Spoke with Lidia at [phone].  Will fax order, RN walk test and overnight oximetry to them as soon as MD order is complete to [phone].  Patient updated that she will have home oxygen and [facility] will reach out to patient/family to arrange delivery of home equipment.  Faxed the orders and supporting documentation for home O2 to [facility].  Spoke with Lidia at [facility] and she says they will be dropping off the O2 concentrator in a couple hours. [phone].  Sent a message to [facility] that patient is discharging to home today.    Leisa Hughes, RN, BSN  Care Coordination  [facility]  [phone]

## 2024-10-09 NOTE — PROGRESS NOTES
Home Oxygen Assessment Tools  Patient's 02 sat on RA at rest is 88% for1 minutes. Patient is on 1 LPM/%  (02 device) Sp02 85 %, after 1 minutes of standing on the side of the bed. If patient's Sp02 at rest is less than 88%, then oxygen may be needed and must monitor patient's Sp02 with activity. Patient 02 85% sat on  RA with activity after 1 minutes. Patient's Sp02 89% on 1 LPM/02% after 1 minutes of exercise.

## 2024-10-09 NOTE — PLAN OF CARE
Physical Therapy Discharge Summary    Reason for therapy discharge:    Discharged to home with home therapy.    Progress towards therapy goal(s). See goals on Care Plan in [facility] electronic health record for goal details.  Goals partially met.  Barriers to achieving goals:   discharge from facility.    Therapy recommendation(s):    Continued therapy is recommended.  Rationale/Recommendations:  Pt continues to have decreased endurance and strength with limit her ability to ambulate.

## 2024-10-09 NOTE — PROGRESS NOTES
Documentation of Face to Face and Certification for Home Health Services    I certify that patient: Felicitas Elliott is under my care and that I, or a nurse practitioner or physician's assistant working with me, had a face-to-face encounter that meets the physician face-to-face encounter requirements with this patient on: 10/9/2024.    This encounter with the patient was in whole, or in part, for the following medical condition, which is the primary reason for home health care: Acute on chronic respiratory failure, new diagnoses and medications for s/p aortic annular reconstruction, aortic root replacement, s/p bioprosthetic aortic valve requiring coumadin.      I certify that, based on my findings, the following services are medically necessary home health services: Nursing, Occupational Therapy, and Physical Therapy.    My clinical findings support the need for the above services because: Nurse is needed: For complex aftercare of surgical procedures because the patient needs instruction and cannot perform care on their own due to: teaching and education of new medications, requiring oxygen continuous and assistive devices for transfer, new medication of coumadin..., Occupational Therapy Services are needed to assess and treat cognitive ability and address ADL safety due to impairment in cognition ., and Physical Therapy Services are needed to assess and treat the following functional impairments: critical illness myopathy .    Further, I certify that my clinical findings support that this patient is homebound (i.e. absences from home require considerable and taxing effort and are for medical reasons or [facility] services or infrequently or of short duration when for other reasons) because: Requires assistance of another person or specialized equipment to access medical services because patient: Is unable to exit home safely on own due to: requiring oxygen and assistive devices for transfer...    Based on the above  findings. I certify that this patient is confined to the home and needs intermittent skilled nursing care, physical therapy and/or speech therapy.  The patient is under my care, and I have initiated the establishment of the plan of care.  This patient will be followed by a physician who will periodically review the plan of care.  Physician/Provider to provide follow up care: Clinic, [facility]    [facility] physician (the Medicare certified [facility] provider): Josephine Thomson MD  Physician Signature: See electronic signature associated with these discharge orders.  Date: 10/9/2024

## 2024-10-09 NOTE — DISCHARGE SUMMARY
[facility]  Hospitalist Discharge Summary      Date of Admission:  9/16/2024  Date of Discharge:  10/9/2024  Discharging Provider: Josephine Thomson MD  Discharge Service: Hospitalist Service    Discharge Diagnoses   As per hospital course     Clinically Significant Risk Factors     # Severe Malnutrition: based on nutrition assessment      Follow-ups Needed After Discharge   Follow-up Appointments     Follow-up and recommended labs and tests       10/14 7:50 AM (arrive by 7:35 AM) 30 min   NEW CARDIOLOGY with Dr. Chip Puga   [facility] - Second floor, [address]   [address], [address], [place]   [phone]     10/31 8:30 PM (arrive by 8:15AM) 20 min    [facility] HEART FAILURE RN NURSE ONLY   [facility] - Second floor, [address]   [address], [address], [place]   [phone]     F/up PCP in 1 week  F/up INR in 1 week        First INR check will be this Friday 10/11    Discharge Disposition   Discharged to home  Condition at discharge: Stable    Felicitas Elliott is a 84 year old female admitted on 9/16/2024 to the LTAC for long-term antibiotics following MSSA bacteremia and endocarditis. 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  She was initially admitted to [facility] on 8/16/2024 for sepsis and HAMMAD.  She received empiric antibiotics on admission. Blood cultures grew MSSA.  Neurology was consulted for possible discitis.  ID was also consulted. Echo revealed large posterior leaflet mitral valve vegetation and small aortic valve vegetation with severe aortic stenosis.  Brain MRI revealed scattered embolic infarcts.  Coronary angiogram demonstrated coronary artery disease.  CT surgery was consulted. She underwent CABG alongside the procedures mentioned above.  She however has not had renal recovery.  Initially she was on CCRT for HAMMAD and now has transitioned to HD per  nephrology.  She had dysphagia and momentarily required NG tube with tube feeds. She worked with speech therapy and is now cleared for regular diet and thin liquids.  CT surgery recommends warfarin for 3 months for bioprosthetic valves.  INR goal 2-3.  End date of warfarin approximately 12/10/2024, at which point she will be on Plavix alone (ASA allergy) indefinitely.  ID recommends IV vancomycin until 10/8/2024.          LT[facility] Course:  9/17 - 9/21.  Progressing well.  On Coumadin, initially bridging with heparin gtt. for bioprosthetic valve.  On arrival initial INR 1.49. Goal 2-3. Has been dialyzing for HAMMAD. 9/19, INR 2.19, heparin gtt discontinued. RD noted patient having trouble chewing and impedes her overall oral intake.  SLP consulted.     9/22-9/29: tolerating dialysis well. Pacer checked on 9/24, set at , top pacer is capturing 94%, bottom pacer is capturing 80%. Cardio did a televisit with patient and recommend to decrease metoprolol by 1/2- changed from 25 to 12.5 bid. Added simethicone as patient has abdominal pain due to gas.    She is having neck pain -lidocaine and voltaren added.  Pharmacy to change medications around to have get getting less pills at the same time.  She complains that this contributes to a nausea.  On 9/29, BP is low, decreased toresimide dose from 100 to 50, but holding for now.  Also added prn midodrine.       9/30 - 10/6. Has been progressing well. Gaining strength.On dialysis MWF.  BP was running low, metoprolol 12.5 mg p.o. twice daily changed to metoprolol 12.5 mg XL daily.  She remains on IV vancomycin for bacteremia.10/6, patient desaturates at night per RN. Home oxygen test ordered together with continuous pulse ox. Otherwise no new other changes continue with current medical management     10/7: care conference at 2 pm.    10/8:  platelets back up to 157, no overnight events, ordered an RN walk test and overnight pulse ox for qualification for home o2.       Follow  up  -Sternal precautions end on 10/8       Hospital Course   MSSA bacteremia  Aortic root abscess s/p aortic root abscess debridement and aortic annular reconstruction, aortic root replacement, bioprosthetic aortic valve 8/27/2024  Gram-negative bacilli and respiratory culture s/p cefepime and ceftriaxone  -Positive blood cultures 8/14/2024 and 8/16 with MSSA.  Repeat blood cultures - 8/17/2024  -Mitral and aortic valve endocarditis as evidenced with large vegetation on mitral valve 8 mm x 15 at x 2 posterior leaflet of small vegetation on aortic valve.  With signs of vegetation combined with brain infarct.  S/p CV surgery  -Continue with IV vancomycin.  Dosing per pharmacy. Per ID last vancomycin dose will be after HD on 10/7  -Per ID will need to check immunoglobulin level in 4 to 6 weeks, sister with history of hypogammaglobinemia  -ID consulted and following  -Patient previously on heparin 5000 subQ every 8 hours and Coumadin.  Recommended INR goal is 2-3 per CT surgery.  Discontinued heparin 5000 units subcu every 8 hours on admission and started patient on heparin gtt. alongside Coumadin. MD also notified pharmacy and were agreeable with this plan.  -9/19, INR 2.19.  Discontinued heparin gtt. INR on goal  -Pharm.D. to continue dosing Coumadin   -INR  2.02 on 10/6    Peripheral neuropathy of b/l feet  -fredy-lidocaine-ketamine cream prn   -benadryl cream prn     Abnormal brain MRI suggestive of subacute infarct  Patient with a history of MSSA bacteremia and aortic valve endocarditis.  Most likely septic emboli.  -Continue with antibiotics per ID.  End date 10/8/2024    Acute respiratory failure with hypoxia  -Patient previously intubated.  Now extubated.    -Continue with oxygen support to maintain oxygenation above 92%.  Wean as tolerated  -RT while in LTAC    HAMMAD  -Previously on CCRT now transferred to HD  -Torsemide per nephrology  -HD per nephrology-MWF  -Monitor kidney function with  BMP    CAD  A-fib  History of aortic stenosis  +pacemaker   Hypotension   -Fairly stable at this time.  -S/p CABG per CT surgery  -Continue with Plavix and statin  -Metoprolol- will decrease dose from 25 bid to 12.5 bid due to low BP on 9/24. Metoprolol later changed to 12.5 mg daily. BP now fairly stable  -Previously on amiodarone for rate control, now s/p PPM  -pacer checked 9/23, set at , top pacer is capturing 94%, bottom pacer is capturing 80%.  -cardiothoracic surg tele appt- appreciate recs  -torsemide on hold on 9/29 due to hypotension  -prn midodrine     Neck pain due to arthritis  -lidocaine  -voltaren prn    Anemia of chronic disease  Thrombocytopenia   -monitor    Severe protein calorie malnutrition  -? esophogram  -RD following   -calorie counts    Physical deconditioning/critical illness myopathy  -Fall precautions.  -PT/OT  -WOC    Diet: Advance Diet as Tolerated: Regular Diet Adult  Snacks/Supplements Adult: Gelatein Plus; With Meals  Snacks/Supplements Adult: Ensure Enlive; With Meals    DVT Prophylaxis: Warfarin  Le Catheter: Not present  Lines: PRESENT      PICC 09/06/24 Triple Lumen Right Basilic Vascular access-Site Assessment: WDL  CVC Double Lumen Right Subclavian Tunneled-Site Assessment: WDL      Cardiac Monitoring: None  Code Status: No CPR- Do NOT Intubate      Consultations This Hospital Stay   THERAPEUTIC RECREATION EVAL & TREAT  INFECTIOUS DISEASES IP CONSULT  OCCUPATIONAL THERAPY ADULT IP CONSULT  PHYSICAL THERAPY ADULT IP CONSULT  NEPHROLOGY IP CONSULT  PHARMACY IP CONSULT  WOUND OSTOMY CONTINENCE NURSE  IP CONSULT  PHARMACY IP CONSULT  PHARMACY TO DOSE WARFARIN  PHARMACY TO DOSE VANCO  CARE MANAGEMENT / SOCIAL WORK IP CONSULT  SPIRITUAL HEALTH SERVICES IP CONSULT  VASCULAR ACCESS ADULT IP CONSULT  SPEECH LANGUAGE PATH ADULT IP CONSULT  WOUND OSTOMY CONTINENCE NURSE  IP CONSULT  PHARMACY LIAISON FOR MEDICATION COVERAGE CONSULT    Code Status   No CPR- Do NOT Intubate    Time  Spent on this Encounter   I, Josephine Thomson MD, personally saw the patient today and spent greater than 30 minutes discharging this patient.       Josephine Thomson MD  [facility] [address] [place] [address]  Phone: [phone]  Fax: [phone]  ______________________________________________________________________    Physical Exam   Vital Signs: Temp: 97.4  F (36.3  C) Temp src: Oral BP: 108/64 Pulse: 74   Resp: 18 SpO2: 96 % O2 Device: Nasal cannula Oxygen Delivery: 1 LPM  Weight: 166 lbs 8 oz       Constitutional: Patient is resting in bed comfortably appears in no distress.  Eyes: Pupils are equal round and reactive to light  Respiratory: Good respiratory effort, on auscultation good air entry is noted  Cardiovascular: Good S1 and S2 no obvious murmurs peripheral pulses are palpable  GI: Abdomen is soft nontender nondistended bowel sounds are noted  Skin: No obvious rashes noted on exposed areas, warm to touch please refer to nursing/wound care note for complete skin exam  Musculoskeletal: Good muscle tone nails and digits appear acyanotic  Neuropsychiatric: Awake alert oriented to person normal affect  Primary Care Physician   Timbo Hussein [facility]    Discharge Orders      Home Care Referral      Reason for your hospital stay    84 year old female admitted on 9/16/2024 to the AC for long-term antibiotics following MSSA bacteremia and endocarditis. 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  She also had a CABG.  She has finished her IV antibiotics and sternal precautions were lifted on 10/8/24     Follow-up and recommended labs and tests     10/14 7:50 AM (arrive by 7:35 AM) 30 min   NEW CARDIOLOGY with Dr. Chip Puga   [facility] - Second floor, [address]   [address]   [phone]     10/31 8:30 PM (arrive  by 8:15AM) 20 min    [facility] HEART FAILURE RN NURSE ONLY   [facility] - Second floor, [address]   [address], [address], [place]   [phone]     F/up PCP in 1 week  F/up INR in 1 week     Activity    Your activity upon discharge: no lifting, driving, or strenuous exercise for 4 weeks.     Oxygen Adult/Peds    Oxygen Documentation  I certify that this patient, Felicitas Elliott has been under my care (or a nurse practitioner or physican's assistant working with me). This is the face-to-face encounter for oxygen medical necessity.      At the time of this encounter, I have reviewed the qualifying testing and have determined that supplemental oxygen is reasonable and necessary and is expected to improve the patient's condition in a home setting.         Patient has continued oxygen desaturation due to Chronic Respiratory Failure with Hypoxia J96.11.    If portability is ordered, is the patient mobile within the home? yes    Was this visit performed as a telehealth visit: No     Diet    Follow this diet upon discharge: Current Diet:Orders Placed This Encounter      Calorie Counts      Calorie Counts      Snacks/Supplements Adult: Gelatein Plus; With Meals      Snacks/Supplements Adult: Ensure Enlive; With Meals      Advance Diet as Tolerated: Regular Diet Adult       Significant Results and Procedures   Most Recent 3 CBC's:  Recent Labs   Lab Test 10/09/24  0549 10/08/24  0615 10/07/24  0529   WBC 7.2 6.3 7.5   HGB 8.0* 8.0* 8.3*   MCV 98 105* 105*    157 147*     Most Recent 3 BMP's:  Recent Labs   Lab Test 10/09/24  0549 10/08/24  0615 10/07/24  0529    136 137   POTASSIUM 3.6 4.0 4.2   CHLORIDE 98 101 103   CO2 28 29 25   BUN 22.5 13.1 21.6   CR 3.23* 2.39* 3.57*   ANIONGAP 11 6* 9   KAELYN 8.9 8.1* 8.8   GLC 88 89 96     Most Recent 2 LFT's:  Recent Labs   Lab Test 09/19/24  0635 09/18/24  0704   AST 36 34   ALT 31 29   ALKPHOS 78 75   BILITOTAL 0.7 0.7     Most Recent 3 INR's:  Recent  Labs   Lab Test 10/09/24  0549 10/08/24  0615 10/07/24  0529   INR 1.91* 1.80* 1.76*       Discharge Medications   Current Discharge Medication List        START taking these medications    Details   albuterol (PROVENTIL) (2.5 MG/3ML) 0.083% neb solution Take 1 vial (2.5 mg) by nebulization every 6 hours as needed for wheezing.  Qty: 90 mL, Refills: 0    Associated Diagnoses: Coronary artery disease involving native coronary artery of native heart, unspecified whether angina present      atorvastatin (LIPITOR) 80 MG tablet Take 1 tablet (80 mg) by mouth every evening.  Qty: 30 tablet, Refills: 0    Associated Diagnoses: Coronary artery disease involving native coronary artery of native heart, unspecified whether angina present      bisacodyl (DULCOLAX) 10 MG suppository Place 1 suppository (10 mg) rectally daily as needed for constipation (Use if Magnesium hydroxide (MILK of MAGNESIA) not effective after 24 hours. May discontinue if patient having bowel movement.).  Qty: 30 suppository, Refills: 0    Associated Diagnoses: Coronary artery disease involving native coronary artery of native heart, unspecified whether angina present      clopidogrel (PLAVIX) 75 MG tablet Take 1 tablet (75 mg) by mouth daily.  Qty: 30 tablet, Refills: 0    Associated Diagnoses: Coronary artery disease involving native coronary artery of native heart, unspecified whether angina present      ketamine 5%-gabapentin 8%-lidocaine 2.5% in PLO cream Apply 2 clicks (0.5 g) topically 3 times daily.  Qty: 90 g, Refills: 0    Comments: Dispense in dosing applicator. 1 click = 0.25g of product.  Associated Diagnoses: S/P CABG (coronary artery bypass graft)      lactobacillus rhamnosus, GG, (CULTURELL) capsule Take 1 capsule by mouth 2 times daily.  Qty: 60 capsule, Refills: 0    Associated Diagnoses: Coronary artery disease involving native coronary artery of native heart, unspecified whether angina present      Lidocaine (LIDOCARE) 4 % Patch Place  1 patch over 12 hours onto the skin every 24 hours. To prevent lidocaine toxicity, patient should be patch free for 12 hrs daily.  Qty: 30 patch, Refills: 0    Associated Diagnoses: Coronary artery disease involving native coronary artery of native heart, unspecified whether angina present      melatonin 5 MG tablet Take 1 tablet (5 mg) by mouth at bedtime.  Qty: 30 tablet, Refills: 0    Associated Diagnoses: Coronary artery disease involving native coronary artery of native heart, unspecified whether angina present      metoprolol succinate ER (TOPROL XL) 25 MG 24 hr tablet Take 0.5 tablets (12.5 mg) by mouth at bedtime.  Qty: 15 tablet, Refills: 0    Associated Diagnoses: Coronary artery disease involving native coronary artery of native heart, unspecified whether angina present      midodrine (PROAMATINE) 10 MG tablet Take 1 tablet (10 mg) by mouth 3 times daily as needed (SBP <90).  Qty: 60 tablet, Refills: 0    Comments: Please give 10 mg po if pt has a systolic blood Pressure <90 and on Monday, Wednesday, Friday prior to diaylsis  Associated Diagnoses: Coronary artery disease involving native coronary artery of native heart, unspecified whether angina present      multivitamin w/minerals (THERA-VIT-M) tablet Take 1 tablet by mouth daily.  Qty: 30 tablet, Refills: 0    Associated Diagnoses: Coronary artery disease involving native coronary artery of native heart, unspecified whether angina present      ondansetron (ZOFRAN) 8 MG tablet Take 1 tablet (8 mg) by mouth every 8 hours as needed for nausea or vomiting.  Qty: 30 tablet, Refills: 0    Associated Diagnoses: Coronary artery disease involving native coronary artery of native heart, unspecified whether angina present      pantoprazole (PROTONIX) 40 MG EC tablet Take 1 tablet (40 mg) by mouth every morning (before breakfast).  Qty: 30 tablet, Refills: 0    Associated Diagnoses: Coronary artery disease involving native coronary artery of native heart,  unspecified whether angina present      polyethylene glycol (MIRALAX) 17 GM/Dose powder Take 17 g by mouth daily.  Qty: 510 g, Refills: 0    Associated Diagnoses: Coronary artery disease involving native coronary artery of native heart, unspecified whether angina present      senna-docusate (SENOKOT-S/PERICOLACE) 8.6-50 MG tablet Take 2 tablets by mouth 2 times daily.  Qty: 60 tablet, Refills: 0    Associated Diagnoses: Coronary artery disease involving native coronary artery of native heart, unspecified whether angina present      simethicone (MYLICON) 80 MG chewable tablet Take 1 tablet (80 mg) by mouth 3 times daily.  Qty: 90 tablet, Refills: 0    Associated Diagnoses: Coronary artery disease involving native coronary artery of native heart, unspecified whether angina present      sodium chloride (NEBUSAL) 3 % neb solution Take 3 mLs by nebulization every 6 hours as needed for wheezing.  Qty: 15 mL, Refills: 0    Associated Diagnoses: Coronary artery disease involving native coronary artery of native heart, unspecified whether angina present      torsemide (DEMADEX) 100 MG tablet Take 1 tablet (100 mg) by mouth daily.  Qty: 30 tablet, Refills: 0    Associated Diagnoses: Coronary artery disease involving native coronary artery of native heart, unspecified whether angina present      traZODone (DESYREL) 50 MG tablet Take 0.5 tablets (25 mg) by mouth nightly as needed for sleep.  Qty: 30 tablet, Refills: 0    Associated Diagnoses: Coronary artery disease involving native coronary artery of native heart, unspecified whether angina present      warfarin ANTICOAGULANT (COUMADIN) 2.5 MG tablet Take 1 tablet (2.5 mg) by mouth daily.  Qty: 30 tablet, Refills: 0    Associated Diagnoses: Coronary artery disease involving native coronary artery of native heart, unspecified whether angina present           CONTINUE these medications which have CHANGED    Details   acetaminophen (TYLENOL) 325 MG tablet Take 2 tablets (650 mg)  by mouth every 4 hours as needed for other (mild pain (1-3)).  Qty: 120 tablet, Refills: 0    Associated Diagnoses: Coronary artery disease involving native coronary artery of native heart, unspecified whether angina present           Allergies   Allergies   Allergen Reactions    Aspirin Rash    Cats Rash    Nickel Rash

## 2024-10-09 NOTE — PROGRESS NOTES
Patient started on overnight oximetry study, SpO2 was 82% on room air, placed on 1L nasal cannula. SPO2 93% for the rest of the study. /54   Pulse 80   Temp 97.4  F (36.3  C) (Oral)   Resp 20   Wt 76 kg (167 lb 8.8 oz)   SpO2 95%   BMI 26.24 kg/m    RT will continue to monitor.

## 2024-10-09 NOTE — PLAN OF CARE
Problem: Adult Inpatient Plan of Care  Goal: Absence of Hospital-Acquired Illness or Injury  Outcome: Progressing  Intervention: Identify and Manage Fall Risk  Recent Flowsheet Documentation  Taken 10/9/2024 0137 by Cordelia Salomon RN  Safety Promotion/Fall Prevention:   activity supervised   lighting adjusted   room near nurse's station   safety round/check completed  Taken 10/8/2024 2124 by Cordelia Salomon RN  Safety Promotion/Fall Prevention:   activity supervised   lighting adjusted   room near nurse's station   safety round/check completed  Intervention: Prevent Skin Injury  Recent Flowsheet Documentation  Taken 10/9/2024 0137 by Cordelia Salomon RN  Skin Protection: adhesive use limited  Device Skin Pressure Protection:   absorbent pad utilized/changed   adhesive use limited  Taken 10/8/2024 2124 by Cordelia Salomon RN  Skin Protection: adhesive use limited  Device Skin Pressure Protection:   absorbent pad utilized/changed   adhesive use limited  Intervention: Prevent Infection  Recent Flowsheet Documentation  Taken 10/9/2024 0137 by Cordelia Salomon RN  Infection Prevention:   hand hygiene promoted   rest/sleep promoted  Taken 10/8/2024 2124 by Cordelia Salomon RN  Infection Prevention:   hand hygiene promoted   rest/sleep promoted     Problem: Adult Inpatient Plan of Care  Goal: Optimal Comfort and Wellbeing  Outcome: Progressing  Intervention: Provide Person-Centered Care  Recent Flowsheet Documentation  Taken 10/9/2024 0137 by Cordelia Salomon RN  Trust Relationship/Rapport:   care explained   choices provided   emotional support provided   empathic listening provided   questions answered  Taken 10/8/2024 2124 by Cordelia Salomon RN  Trust Relationship/Rapport:   care explained   choices provided   emotional support provided   empathic listening provided   questions answered   Goal Outcome Evaluation:  Vital signs were stable. Pt. Is alert and oriented and let her needs  known. Pt. denied any pain and slept most of the shift. 600 ml urine output thru pure wick. Overnight oximetry study done by RT. Continue to monitor.   Cordelia Salomon RN

## 2024-10-09 NOTE — PROGRESS NOTES
RENAL PROGRESS NOTE    ASSESSMENT & PLAN:   Dialysis dependent ARF: Nonoliguric. 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  Then patient sustained hemodynamic ARF perioperatively with cardiac surgery.  Has been dialysis dependent.  CRRT 8/29/2024 through 9/8/2024, transition to hemodialysis 9/9/2024.  Recs:  Creatinine rising between treatments, still needing modest volume UF with HD  Started on diuretic challenge this week with torsemide 100 mg daily  Making around 1L urine daily since diuretics initiated  Continuing MWF HD schedule while here  Discharging after dialysis today, accepted at [facility]  Orders called into CDU  Updated accepting nephrologist Dr. Winkler   Last nephrology note and discharge summary faxed to CDU.    HTN: On low-dose BB for PAF, midodrine available on HD for intradialytic hypotension.  Starting diuretic challenge torsemide 100 mg daily, follow blood pressure with initiating loop diuretic    Anemia: Suspect combination of ACD, inflammation, on HILARY 20,000 units weekly.  Check reticulocyte count. Hgb 8s    CAD, Afib: S/P CABG, valve surgery. On BB     HLD: statin    MSSA bacteremia/Prosthetic valve: IV vanco. Warfarin, INR goal 2-3       SUBJECTIVE:    Patient was seen bedside  Currently running on dialysis  Eating some lunch  Dialysis RN bedside  1.5L UF  And profile C, trending down UF goal  No cramping  Denies shortness of breath  Making urine  No urinary complaints  All questions answered    OBJECTIVE:  Physical Exam   Temp: 97.4  F (36.3  C) Temp src: Oral BP: 107/50 Pulse: 69   Resp: 18 SpO2: 96 % O2 Device: Nasal cannula Oxygen Delivery: 1 LPM  Vitals:    10/08/24 0058 10/09/24 0652 10/09/24 1100   Weight: 76 kg (167 lb 8.8 oz) 77.9 kg (171 lb 11.8 oz) 75.5 kg (166 lb 8 oz)     Vital Signs with Ranges  Temp:  [97.4  F (36.3  C)-98.3   F (36.8  C)] 97.4  F (36.3  C)  Pulse:  [69-80] 69  Resp:  [18-22] 18  BP: ()/(46-64) 107/50  SpO2:  [94 %-96 %] 96 %  I/O last 3 completed shifts:  In: 500 [P.O.:440; I.V.:60]  Out: 1000 [Urine:1000]    Patient Vitals for the past 72 hrs:   Weight   10/09/24 1100 75.5 kg (166 lb 8 oz)   10/09/24 0652 77.9 kg (171 lb 11.8 oz)   10/08/24 0058 76 kg (167 lb 8.8 oz)     Intake/Output Summary (Last 24 hours) at 10/7/2024 1055  Last data filed at 10/7/2024 0932  Gross per 24 hour   Intake 910 ml   Output --   Net 910 ml       PHYSICAL EXAM:  GEN: NAD, aox3  CV: RRR   Lung: clear and equa, on 2L NC  Ab: soft and NT; not distended; normal bs  Ext: + LLE BL and well perfused  Skin: no rash  : poor documentation of UO, incontinent    LABORATORY STUDIES:     Recent Labs   Lab 10/09/24  0549 10/08/24  0615 10/07/24  0529 10/06/24  0603 10/05/24  0540 10/04/24  0536   WBC 7.2 6.3 7.5 5.4 5.5 7.5   RBC 2.75* 2.36* 2.40* 2.38* 2.15* 2.38*   HGB 8.0* 8.0* 8.3* 8.0* 7.5* 8.1*   HCT 27.0* 24.7* 25.3* 25.0* 22.9* 25.3*    157 147* 151 131* 155       Basic Metabolic Panel:  Recent Labs   Lab 10/09/24  0549 10/08/24  0615 10/07/24  0529 10/06/24  0603 10/05/24  0540 10/04/24  0536    136 137 139 137 138   POTASSIUM 3.6 4.0 4.2 4.1 4.3 4.0   CHLORIDE 98 101 103 104 104 102   CO2 28 29 25 26 27 27   BUN 22.5 13.1 21.6 16.7 8.2 16.6   CR 3.23* 2.39* 3.57* 3.08* 2.13* 3.01*   GLC 88 89 96 111* 99 103*   KAELYN 8.9 8.1* 8.8 8.7* 8.8 8.3*       INR  Recent Labs   Lab 10/09/24  0549 10/08/24  0615 10/07/24  0529 10/06/24  0603   INR 1.91* 1.80* 1.76* 2.02*        Recent Labs   Lab Test 10/09/24  0549 10/08/24  0615   INR 1.91* 1.80*   WBC 7.2 6.3   HGB 8.0* 8.0*    157       Personally reviewed current labs    This note was dictated using voice recognition     Michele Chilel PA-C  [facility]  [phone]

## 2024-10-11 ENCOUNTER — TELEPHONE (OUTPATIENT)
Dept: ANTICOAGULATION | Facility: CLINIC | Age: 84
End: 2024-10-11
Payer: COMMERCIAL

## 2024-10-11 ENCOUNTER — PATIENT OUTREACH (OUTPATIENT)
Dept: CARE COORDINATION | Facility: CLINIC | Age: 84
End: 2024-10-11
Payer: COMMERCIAL

## 2024-10-11 ENCOUNTER — ANTICOAGULATION THERAPY VISIT (OUTPATIENT)
Dept: ANTICOAGULATION | Facility: CLINIC | Age: 84
End: 2024-10-11
Payer: COMMERCIAL

## 2024-10-11 NOTE — PROGRESS NOTES
SCOTT Banda calling to inquire on patient warfarin management. No order for [facility] to manage . Nurse will check with [facility].

## 2024-10-11 NOTE — TELEPHONE ENCOUNTER
Veronika RN calling for assistance with patient INR result today. Nurse contacted [facility] as they were unwilling to manage patient due to patient needing to establish care with a new provider. Patient has appointment with [facility] primary care 10/17/24.    Todays INR 2.1, patient has been taking 2.5 mgs daily since hospital discharge 10/9/24. Hosp orders noted 2.5 mgs of warfarin daily.     Orders Given: 2.5 mgs daily until seen by provider,  sg Barrera [facility].

## 2024-10-11 NOTE — PROGRESS NOTES
Clinic Care Coordination Contact  Transitions of Care Outreach    Chief Complaint   Patient presents with    Clinic Care Coordination - Post Hospital       Most Recent Admission Date: 9/16/2024   Most Recent Admission Diagnosis: Endocarditis - I38  Acute on chronic respiratory failure (H) - J96.20     Most Recent Discharge Date: 10/9/2024   Most Recent Discharge Diagnosis: At risk for impaired skin integrity - Z91.89  S/P CABG (coronary artery bypass graft) - Z95.1  Acute renal failure with tubular necrosis (H) - N17.0  Mitral valve disorder - I05.9  Coronary artery disease involving native coronary artery of native heart, unspecified whether angina present - I25.10     Transitions of Care Assessment    Discharge Assessment  How are you doing now that you are home?: feeling better  How are your symptoms? (Red Flag symptoms escalate to triage hotline per guidelines): Improved  Do you know how to contact your clinic care team if you have future questions or changes to your health status? : Yes  Does the patient have their discharge instructions? : Yes  Does the patient have questions regarding their discharge instructions? : No  Were you started on any new medications or were there changes to any of your previous medications? : Yes  Does the patient have all of their medications?: Yes  Do you have questions regarding any of your medications? : No  Do you have all of your needed medical supplies or equipment (DME)?  (i.e. oxygen tank, CPAP, cane, etc.): Yes    Post-op ([facility] Only)  If the patient had a surgery or procedure, do they have any questions for a nurse?: No         Follow up Plan     Follow-up and recommended labs and tests  10/14 7:50 AM (arrive by 7:35 AM) 30 min  NEW CARDIOLOGY with Dr. Chip Puga  [facility] - Second floor, [address]  [address], [address], [place]  [phone]  10/31 8:30 PM (arrive by 8:15AM) 20 min  [facility] HEART FAILURE RN NURSE ONLY  [facility]  floor, [address]  [address], [address], [place]  [phone]  F/up PCP in 1 week  F/up INR in 1 wee    Future Appointments   Date Time Provider Department Center   10/14/2024  7:50 AM [facility]   10/31/2024  7:50 AM Sampson Shaw APRN CNP [facility]   10/31/2024  8:30 AM [facility] HEART FAILURE RN [facility]   10/31/2024  1:00 PM MARIBEL SANCHEZ DEVICE NURSE 1 [facility]       Outpatient Plan as outlined on AVS reviewed with patient.      For any urgent concerns, please contact our 24 hour nurse triage line: [phone]     ELODIA Woody  [phone]  [facility]

## 2024-10-21 ENCOUNTER — TELEPHONE (OUTPATIENT)
Dept: CARDIOLOGY | Facility: CLINIC | Age: 84
End: 2024-10-21
Payer: COMMERCIAL

## 2024-10-21 NOTE — TELEPHONE ENCOUNTER
Called patient and reported these values are ok, but to continue to call if he has concerns or if she is symptomatic.  Patient received a CABG approximately a month ago, and is receiving hemodialysis.    Patient has appointment with Sampson Shaw 10/31/2024.    Thank you     Jason Benites RN    -----------------------------------------    [facility] Center     Phone Message     May a detailed message be left on voicemail: yes      Reason for Call: Symptoms or Concerns      If patient has red-flag symptoms, warm transfer to triage line     Current symptom or concern: Spouse concerned with pt's BP, top number running between 106-110 since procedure on 9/12/24. Spouse, Ed would like to discuss further with a nurse. Does not meet criteria for Red Flag transfer per protocols.      Symptoms have been present for:  1 month(s)     Has patient previously been seen for this? No     By : NA     Date: NA     Are there any new or worsening symptoms? Yes: New     Action Taken: Message routed to:  Other: [facility] [[id number]]     Travel Screening: Not Applicable          Date of Service:

## 2024-10-21 NOTE — TELEPHONE ENCOUNTER
[facility] Call Center    Phone Message    May a detailed message be left on voicemail: yes     Reason for Call: Symptoms or Concerns     If patient has red-flag symptoms, warm transfer to triage line    Current symptom or concern: Spouse concerned with pt's BP, top number running between 106-110 since procedure on 9/12/24. Spouse, Ed would like to discuss further with a nurse. Does not meet criteria for Red Flag transfer per protocols.     Symptoms have been present for:  1 month(s)    Has patient previously been seen for this? No    By : NA    Date: NA    Are there any new or worsening symptoms? Yes: New    Action Taken: Message routed to:  Other: [facility] [[id number]]    Travel Screening: Not Applicable     Date of Service:

## 2024-10-23 ENCOUNTER — TELEPHONE (OUTPATIENT)
Dept: CARDIOLOGY | Facility: CLINIC | Age: 84
End: 2024-10-23
Payer: COMMERCIAL

## 2024-10-23 NOTE — TELEPHONE ENCOUNTER
Ed is contacted to discuss his concerns with post dialysis hypotension. He is reassured that this is a common issue especially with patients that have a lot of fluid pulled from their system. Increased fatigue and hypotension is very expected.     It was also identified that [facility] follows this patient and she has been having hemodialysis at [facility].   Provided the patient's  with the phone number for [facility] to make an appt  for follow up. To also ask them to assist in management of any medications that are affecting the b/p.    Chasidy SORIA RN BSN, CHFN, PCCN-K

## 2024-10-30 PROBLEM — N17.9 ACUTE KIDNEY FAILURE, UNSPECIFIED (H): Status: RESOLVED | Noted: 2024-09-08 | Resolved: 2024-10-30

## 2024-10-30 PROBLEM — I50.31 ACUTE HEART FAILURE WITH PRESERVED EJECTION FRACTION (HFPEF) (H): Status: ACTIVE | Noted: 2024-10-30

## 2024-10-30 NOTE — PROGRESS NOTES
Assessment/Recommendations     #    Acute Diastolic heart failure, NYHA Class NYHA Class II-III:    Discharge weight 166 lbs on 10/8/2024.  She does not monitor her weight at home.  She is having her weight checked at dialysis 3 times a week.  She is unsure what her readings are.    Clinic weight today is 158 pounds.  Weight is down approximately 8 pounds.  Reports poor appetite but it is improving slowly.    Patient is well compensated on exam.    On low salt diet< 2000 mg per day    Heart failure regimen includes:    -Not on Aldosterone blocker/MRA therapy likely due to renal dysfunction    -Not on SGLT2 Inhibitor likely due to renal dysfunction    -Diuretic therapy with Torsemide 100 mg daily-managed by nephrology    We discussed and reviewed about heart failure, medication management, and lifestyle management including low sodium diet <2 g/day, daily weight, and staying physically active as tolerated. Patient met with Jason [facility] CORE nurse clinician for heart failure education.    #  ESRD on Hemodialysis every Monday Wednesday and Friday: Per patient she was told by nephrology her kidney numbers are looking better.  Nephrology note is not available for review.    #  Coronary artery disease with status post CABG: No chest pain  She has not started cardiac rehab due to weakness and physical deconditioning.  She tried home physical therapy    #  Dyslipidemia: On atorvastatin.    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  She completed antibiotic course.  She denies fever or chills.  She had a follow-up with PCP recently.    On warfarin therapy.  INR has been therapeutic.    # Chronic hypoxia: On O2 supplement at 1.5 L/NC    # Sick  "sinus syndrome with status post pacemaker: Stable device check today with CSP of 97.5% except EGM showed burst of AT/AF.     # Hypotension: On as needed midodrine.  Per , she gets midodrine before dialysis and during dialysis.    Blood pressure in clinic today is 92/61.    # Anemia of chronic disease: Per PCP recent hemoglobin 9 at dialysis.  Result not available for review.    # Physical deconditioning/critical illness myopathy: Patient attempted home PT.She reports feeling better every day but slowly.      Plan/Recommendation:  -Hold metoprolol succinate the night prior to dialysis and see if this helps with hypotensive episode during dialysis.  -Consider stopping metoprolol if continues to have issues with low blood pressure.  -Continue on low-sodium diet <2000 mg per day, daily weight monitoring, and maintain fluid intake at 50 to 60 ounces per day  -Follow-up with PCP regarding issue with abdominal discomfort  -Defer to nephrology team for diuretic management and midodrine.  -Consider reattempt of physical therapy once much stronger.  Consider cardiac rehab in the near future once more stable on her feet.    Follow up with Dr. Santana in November as scheduled. Follow up with me in 3-4 months      The longitudinal plan of care for acute diastolic heart failure was addressed during this visit.?Due to the added   complexity in care, I will continue to support Felicitas Elliott in the subsequent management of this   condition(s) and with the ongoing continuity of care of this condition(s)\".     History of Present Illness/Subjective    Ms. Felicitas Elliott is a 84 year old female who is seen at [facility] for post hospitalization heart failure follow up.     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      Patient was hospitalized from August 16 " through September 16 with fatigue, weakness, chills, decreased appetite and was found in's sepsis and HAMMAD.  ID was consulted patient was treated with empiric antibiotic.  Neurology was consulted for possible discitis.  Brain MRI showed scattered embolic infarcts.  Patient underwent mitral and aortic valve replacement and  CABG on 8/27/2024 with Dr. Renae.  Procedure was uneventful.  Unfortunately patient developed hypoxia on postop day 2.  CRRT was initiated with minimal urine output.  Patient was transitioned to hemodialysis.  Due to arrhythmia and bradycardia, patient underwent pacemaker placement.    Today, Felicitas is accompanied by her spouse.  She reports feeling better every day and making progress very slowly.  She does get shortness of breath with minimal exertion.  She is symptomatic with a low blood pressure.  She has been taking as needed midodrine.  She denies shortness of breath, orthopnea, PND, palpitations, chest pain, abdominal fullness/bloating, and lower extremity edema.  She has been experiencing poor appetite although it is improving.  She is also having some abdominal discomfort and constipation.    Patient lives with her  at home.  He is primary caregiver.  He helps her manage her medications.      ECHO from 8/2024-Reviewed:   Interpretation Summary   1. Normal left ventricular size and systolic performance with a visually  estimated ejection fraction of 60%.  2. There is abnormal septal motion likely related to altered electrical  activation due to bundle branch block.  3. There is a bio-prosthetic aortic valve (documented 25 mm [employer] Konect Resilia aortic valved conduit).  Â  Normal aortic valve prosthesis metrics with a mean systolic gradient of 4  mmHg and a peak anterograde velocity of 1.5 m/sec.  Â  No aortic insufficiency is detected.  4. There is a bio-prosthetic mitral valve (documented 31 mm [employer] porcine pericardial tissue valve).  Â   Normal mitral valve prosthesis function; mean diastolic gradient is 5 mm Hg.   Â  No significant mitral insufficiency is detected.  5. Probable normal right ventricular size and systolic performance though  right-sided structures are not clearly visualized on all views on this study.     Physical Examination Review of Systems   BP 92/61   Pulse 96   Resp 16   Wt 71.7 kg (158 lb)   SpO2 97%   BMI 24.75 kg/m    Body mass index is 24.75 kg/m .  Wt Readings from Last 3 Encounters:   10/31/24 71.7 kg (158 lb)   10/09/24 75.5 kg (166 lb 8 oz)   09/16/24 79 kg (174 lb 3.2 oz)     General Appearance:   no distress, normal body habitus   ENT/Mouth: membranes moist, no oral lesions or bleeding gums.      EYES:  no scleral icterus, normal conjunctivae   Neck: no carotid bruits or thyromegaly   Chest/Lungs:   lungs are clear to auscultation, no rales or wheezing, equal chest wall expansion    Cardiovascular:   Normal first and second heart sounds with , rubs, or gallops; neck vein assessment limited as patient was examined in the chair due to safety concerns of getting on the exam table  . No edema bilaterally    Abdomen:  no organomegaly, masses, bruits, or tenderness; bowel sounds are present   Extremities   no cyanosis or clubbing    CMS intact.   Skin: no xanthelasma, warm.    Neurologic: Alert and oriented; no tremors   Psychiatric: calm and cooperative                                                   Negative unless noted in HPI     Medical History  Surgical History Family History Social History   No past medical history on file. Past Surgical History:   Procedure Laterality Date    CORONARY ARTERY BYPASS GRAFT, WITH AORTIC VALVE REPLACEMENT, WITH ENDOSCOPIC VESSEL PROCUREMENT N/A 8/27/2024    Procedure: CORONARY ARTERY BYPASS GRAFT TIMES TWO, WITH AORTIC ROOT REPLACEMENT, WITH LEFT INTERNAL MAMMARY ARTERY HARVEST, LEFT SAPHNENOUS ENDOSCOPIC VESSEL PROCUREMENT,;  Surgeon: Dylan Renae MD;  Location:   [facility] OR    CV CORONARY ANGIOGRAM N/A 8/20/2024    Procedure: CV CORONARY ANGIOGRAM;  Surgeon: Den Wylie MD;  Location: [facility] LAB CV    EP PACEMAKER DEVICE & IMPLANT- HIS LEAD DUAL N/A 9/12/2024    Procedure: Pacemaker Device & Lead Implant - HIS Lead Dual;  Surgeon: Minh Pina MD;  Location: [facility] CATH LAB CV    IR CVC NON TUNNEL PLACEMENT > 5 YRS  9/1/2024    IR CVC TUNNEL PLACEMENT > 5 YRS OF AGE  9/11/2024    PICC TRIPLE LUMEN PLACEMENT  9/6/2024    REPLACE VALVE MITRAL N/A 8/27/2024    Procedure: MITRAL VALVE REPLACEMENT,;  Surgeon: Dylan Renae MD;  Location: [facility] OR    TRANSESOPHAGEAL ECHOCARDIOGRAM INTRAOPERATIVE  8/27/2024    Procedure: ANESTHESIA TRANSESOPHAGEAL ECHOCARDIOGRAM, EPI-AORTIC ULTRASOUND;  Surgeon: Dylan Renae MD;  Location: [facility] OR    No family history on file. Social History     Socioeconomic History    Marital status:      Spouse name: Ed    Number of children: 7    Years of education: [employer]    Highest education level: 12th grade   Occupational History    Occupation:    Tobacco Use    Smoking status: Former     Current packs/day: 8.00     Average packs/day: 8.0 packs/day for 0.8 years (6.6 ttl pk-yrs)     Types: Cigarettes     Start date: 2024    Smokeless tobacco: Former   Vaping Use    Vaping status: Never Used   Substance and Sexual Activity    Alcohol use: Not Currently    Drug use: Not on file    Sexual activity: Not Currently     Partners: Male     Birth control/protection: None   Other Topics Concern    Not on file   Social History Narrative    Not on file     Social Drivers of Health     Financial Resource Strain: Low Risk  (9/16/2024)    Financial Resource Strain     Within the past 12 months, have you or your family members you live with been unable to get utilities (heat, electricity) when it was really needed?: No   Food Insecurity: Low Risk  (9/16/2024)    Food Insecurity      Within the past 12 months, did you worry that your food would run out before you got money to buy more?: No     Within the past 12 months, did the food you bought just not last and you didn t have money to get more?: No   Transportation Needs: Low Risk  (9/16/2024)    Transportation Needs     Within the past 12 months, has lack of transportation kept you from medical appointments, getting your medicines, non-medical meetings or appointments, work, or from getting things that you need?: No   Physical Activity: Not on file   Stress: Not on file   Social Connections: Moderately Integrated (9/16/2024)    Social Connection and Isolation Panel [NHANES]     Frequency of Communication with Friends and Family: More than three times a week     Frequency of Social Gatherings with Friends and Family: Patient unable to answer     Attends [employer] Services: More than 4 times per year     Active Member of Clubs or Organizations: Patient declined     Attends Club or Organization Meetings: Never     Marital Status:    Interpersonal Safety: Low Risk  (9/16/2024)    Interpersonal Safety     Do you feel physically and emotionally safe where you currently live?: Yes     Within the past 12 months, have you been hit, slapped, kicked or otherwise physically hurt by someone?: No     Within the past 12 months, have you been humiliated or emotionally abused in other ways by your partner or ex-partner?: No   Recent Concern: Interpersonal Safety - High Risk (8/23/2024)    Interpersonal Safety     Do you feel physically and emotionally safe where you currently live?: No     Within the past 12 months, have you been hit, slapped, kicked or otherwise physically hurt by someone?: No     Within the past 12 months, have you been humiliated or emotionally abused in other ways by your partner or ex-partner?: No   Housing Stability: Low Risk  (9/16/2024)    Housing Stability     Do you have housing? : Yes     Are you worried about losing your  housing?: No   Recent Concern: Housing Stability - High Risk (9/16/2024)    Housing Stability     Do you have housing? : No     Are you worried about losing your housing?: No          Medications  Allergies   Current Outpatient Medications   Medication Sig Dispense Refill    acetaminophen (TYLENOL) 325 MG tablet Take 2 tablets (650 mg) by mouth every 4 hours as needed for other (mild pain (1-3)). 120 tablet 0    albuterol (PROVENTIL) (2.5 MG/3ML) 0.083% neb solution Take 1 vial (2.5 mg) by nebulization every 6 hours as needed for wheezing. 90 mL 0    atorvastatin (LIPITOR) 80 MG tablet Take 1 tablet (80 mg) by mouth every evening. 90 tablet 0    bisacodyl (DULCOLAX) 10 MG suppository Place 1 suppository (10 mg) rectally daily as needed for constipation (Use if Magnesium hydroxide (MILK of MAGNESIA) not effective after 24 hours. May discontinue if patient having bowel movement.). 30 suppository 0    clopidogrel (PLAVIX) 75 MG tablet Take 1 tablet (75 mg) by mouth daily. 90 tablet 0    Lidocaine (LIDOCARE) 4 % Patch Place 1 patch over 12 hours onto the skin every 24 hours. To prevent lidocaine toxicity, patient should be patch free for 12 hrs daily. 30 patch 0    melatonin 5 MG tablet Take 1 tablet (5 mg) by mouth at bedtime. 30 tablet 0    metoprolol succinate ER (TOPROL XL) 25 MG 24 hr tablet Hold on Sunday, Tuesday, and Thursday 45 tablet 0    midodrine (PROAMATINE) 10 MG tablet Take 1 tablet (10 mg) by mouth 3 times daily as needed (SBP <90). 60 tablet 0    multivitamin w/minerals (THERA-VIT-M) tablet Take 1 tablet by mouth daily. 30 tablet 0    ondansetron (ZOFRAN) 8 MG tablet Take 1 tablet (8 mg) by mouth every 8 hours as needed for nausea or vomiting. 30 tablet 0    pantoprazole (PROTONIX) 40 MG EC tablet Take 1 tablet (40 mg) by mouth every morning (before breakfast). 30 tablet 0    polyethylene glycol (MIRALAX) 17 GM/Dose powder Take 17 g by mouth daily. 510 g 0    senna-docusate (SENOKOT-S/PERICOLACE) 8.6-50  MG tablet Take 2 tablets by mouth 2 times daily. 60 tablet 0    simethicone (MYLICON) 80 MG chewable tablet Take 1 tablet (80 mg) by mouth 3 times daily. 90 tablet 0    sodium chloride (NEBUSAL) 3 % neb solution Take 3 mLs by nebulization every 6 hours as needed for wheezing. 15 mL 0    torsemide (DEMADEX) 100 MG tablet Take 1 tablet (100 mg) by mouth daily. 30 tablet 0    traZODone (DESYREL) 50 MG tablet Take 0.5 tablets (25 mg) by mouth nightly as needed for sleep. 30 tablet 0    warfarin ANTICOAGULANT (COUMADIN) 2.5 MG tablet Take 1 tablet (2.5 mg) by mouth daily. 30 tablet 0    ketamine 5%-gabapentin 8%-lidocaine 2.5% in PLO cream Apply 2 clicks (0.5 g) topically 3 times daily. (Patient not taking: Reported on 10/31/2024) 90 g 0    lactobacillus rhamnosus, GG, (CULTURELL) capsule Take 1 capsule by mouth 2 times daily. (Patient not taking: Reported on 10/31/2024) 60 capsule 0    Allergies   Allergen Reactions    Aspirin Rash    Cats Rash    Nickel Rash         Lab Results    Chemistry/lipid CBC Cardiac Enzymes/BNP/TSH/INR   Lab Results   Component Value Date    BUN 22.5 10/09/2024     10/09/2024    CO2 28 10/09/2024    Lab Results   Component Value Date    WBC 7.2 10/09/2024    HGB 8.0 (L) 10/09/2024    HCT 27.0 (L) 10/09/2024    MCV 98 10/09/2024     10/09/2024    Lab Results   Component Value Date    INR 1.91 (H) 10/09/2024        45 minutes spent on the date of encounter doing chart review, review of test results, interpretation with above tests, patient visit, documentation, and discussion with family.      This note has been dictated using voice recognition software. Any grammatical, typographical, or context distortions are unintentional and inherent to the software

## 2024-10-31 ENCOUNTER — OFFICE VISIT (OUTPATIENT)
Dept: CARDIOLOGY | Facility: CLINIC | Age: 84
End: 2024-10-31
Attending: INTERNAL MEDICINE
Payer: COMMERCIAL

## 2024-10-31 ENCOUNTER — TELEPHONE (OUTPATIENT)
Dept: CARDIOLOGY | Facility: CLINIC | Age: 84
End: 2024-10-31

## 2024-10-31 VITALS
BODY MASS INDEX: 24.75 KG/M2 | WEIGHT: 158 LBS | OXYGEN SATURATION: 97 % | RESPIRATION RATE: 16 BRPM | DIASTOLIC BLOOD PRESSURE: 61 MMHG | HEART RATE: 96 BPM | SYSTOLIC BLOOD PRESSURE: 92 MMHG

## 2024-10-31 DIAGNOSIS — Z95.0 CARDIAC PACEMAKER IN SITU: ICD-10-CM

## 2024-10-31 DIAGNOSIS — Z99.2 ESRD (END STAGE RENAL DISEASE) ON DIALYSIS (H): ICD-10-CM

## 2024-10-31 DIAGNOSIS — A41.01: ICD-10-CM

## 2024-10-31 DIAGNOSIS — N17.9: ICD-10-CM

## 2024-10-31 DIAGNOSIS — E78.2 MIXED HYPERLIPIDEMIA: ICD-10-CM

## 2024-10-31 DIAGNOSIS — I50.31 ACUTE HEART FAILURE WITH PRESERVED EJECTION FRACTION (HFPEF) (H): Primary | ICD-10-CM

## 2024-10-31 DIAGNOSIS — R78.81 BACTEREMIA: ICD-10-CM

## 2024-10-31 DIAGNOSIS — I50.9 ACUTE DECOMPENSATED HEART FAILURE (H): ICD-10-CM

## 2024-10-31 DIAGNOSIS — N18.6 ESRD (END STAGE RENAL DISEASE) ON DIALYSIS (H): ICD-10-CM

## 2024-10-31 DIAGNOSIS — I05.9 MITRAL VALVE DISORDER: ICD-10-CM

## 2024-10-31 DIAGNOSIS — I33.0 ACUTE BACTERIAL ENDOCARDITIS: ICD-10-CM

## 2024-10-31 DIAGNOSIS — R53.81 PHYSICAL DECONDITIONING: ICD-10-CM

## 2024-10-31 DIAGNOSIS — R65.21: ICD-10-CM

## 2024-10-31 DIAGNOSIS — I35.0 NONRHEUMATIC AORTIC VALVE STENOSIS: ICD-10-CM

## 2024-10-31 DIAGNOSIS — Z95.1 S/P CABG (CORONARY ARTERY BYPASS GRAFT): ICD-10-CM

## 2024-10-31 DIAGNOSIS — I25.10 CORONARY ARTERY DISEASE INVOLVING NATIVE CORONARY ARTERY OF NATIVE HEART, UNSPECIFIED WHETHER ANGINA PRESENT: ICD-10-CM

## 2024-10-31 PROBLEM — Z98.61 POSTSURGICAL PERCUTANEOUS TRANSLUMINAL CORONARY ANGIOPLASTY STATUS: Status: RESOLVED | Noted: 2024-08-27 | Resolved: 2024-10-31

## 2024-10-31 PROBLEM — R03.0 BORDERLINE HYPERTENSION: Status: RESOLVED | Noted: 2024-04-08 | Resolved: 2024-10-31

## 2024-10-31 PROBLEM — A41.9 SEPSIS WITH ACUTE RENAL FAILURE, DUE TO UNSPECIFIED ORGANISM, UNSPECIFIED ACUTE RENAL FAILURE TYPE, UNSPECIFIED WHETHER SEPTIC SHOCK PRESENT (H): Status: RESOLVED | Noted: 2024-08-14 | Resolved: 2024-10-31

## 2024-10-31 PROBLEM — R65.20 SEPSIS WITH ACUTE RENAL FAILURE, DUE TO UNSPECIFIED ORGANISM, UNSPECIFIED ACUTE RENAL FAILURE TYPE, UNSPECIFIED WHETHER SEPTIC SHOCK PRESENT (H): Status: RESOLVED | Noted: 2024-08-14 | Resolved: 2024-10-31

## 2024-10-31 PROCEDURE — 93000 ELECTROCARDIOGRAM COMPLETE: CPT | Performed by: STUDENT IN AN ORGANIZED HEALTH CARE EDUCATION/TRAINING PROGRAM

## 2024-10-31 PROCEDURE — 99215 OFFICE O/P EST HI 40 MIN: CPT | Performed by: NURSE PRACTITIONER

## 2024-10-31 PROCEDURE — G2211 COMPLEX E/M VISIT ADD ON: HCPCS | Performed by: NURSE PRACTITIONER

## 2024-10-31 RX ORDER — METOPROLOL SUCCINATE 25 MG/1
TABLET, EXTENDED RELEASE ORAL
Qty: 45 TABLET | Refills: 0 | Status: SHIPPED | OUTPATIENT
Start: 2024-10-31

## 2024-10-31 RX ORDER — ATORVASTATIN CALCIUM 80 MG/1
80 TABLET, FILM COATED ORAL EVERY EVENING
Qty: 90 TABLET | Refills: 0 | Status: SHIPPED | OUTPATIENT
Start: 2024-10-31

## 2024-10-31 RX ORDER — CLOPIDOGREL BISULFATE 75 MG/1
75 TABLET ORAL DAILY
Qty: 90 TABLET | Refills: 0 | Status: SHIPPED | OUTPATIENT
Start: 2024-10-31

## 2024-10-31 NOTE — PROGRESS NOTES
[facility]: Heart Failure Care Coordination   Heart Failure Education    Situation/Background:      RN CC provided heart failure education to Felicitas and  Ed during clinic visit.    Assessment:      Living situation: home with family    Barriers to Heart Failure follow-up:  Nephrology following, patient currently receiving M/W/F hemodialysis.  Patient reports not drinking a lot of fluid daily.      Medication management: independent      Intervention/Plan:      CM/HF education topics reviewed:  Low sodium: 2000 mg or less daily, meal choices and label reading   Fluid Restriction: 50-60 oz daily   Daily weight monitoring and logging   Medication review and importance of compliance   Overview of C.O.R.E. clinic   Overview of heart failure appointments and testing   Importance of exercise .  Patient reports not being strong enough to follow with PT.  We reviewed chair exercises she can do at home when feeling able.       Education materials provided:  Low sodium food and drink handout  Low sodium food product examples  HF stoplight tool  Guide to HF booklet  C.O.R.E. information brochure     HF resources reviewed:  Offered [facility] [employer], they will revisit this in the future if they feel it is needed.  Gave them the HF Nurse Line phone number for assistance.        Patient to see device department following HF  education today.     Patient expressed understanding of above education/instructions and denied further questions at this time.

## 2024-10-31 NOTE — PATIENT INSTRUCTIONS
Felicitas Elliott,    It was a pleasure to see you today at the [facility].     My recommendations after this visit include:    -Hold metoprolol succinate on Sunday, Tuesday, and Thursday (days prior to dialysis) and see if this helps with her low blood pressure issue    -Follow up with Dr. Santana as scheduled on 11/19    -Please contact nephrology team further refills for your torsemide and midodrine    -Please contact your primary provider regarding issue with your constipation and abdominal discomfort    - Follow up with Sampson Shaw CNP in 4 months in Heart Failure clinic    - Please call Heart Failure Nurse Line at [phone], if you have any questions or concerns    Sampson Shaw CNP       What is the [facility] Heart Failure [facility]?     The [facility] is aheart failure specialty clinic within [facility].  You will work with your cardiologist, nurse practitioner, and nurses to carefully adjust medications and learn how to live with heart failure.  The Heart FailureProgram will help you:    Better understand your chronic heart condition  Feel better and avoid hospital stays    Monitoring for Symptoms      Call the Heart Failure Phone Line ([phone]) if you have any of these symptoms:   Increased shortness of breath/shortness ofbreath at rest  Waking up at night with difficulty breathing  Unable to lie down for sleep due to symptoms or needing to sit uprightfor sleep  Weight gain of 2 pounds a day for 2 days in a row OR 5 pounds in 1 week  Increased swelling in your ankles or legs  Dizziness or lightheadedness    Medications     Take your medications as prescribed  Bring all your medications in their original bottles to every appointment  Avoid non-steroidal anti-inflammatory medications (Advil,Aleve, Ibuprofen, Naprosyn, Naproxen, Celebrex)  Do not stop taking your medications or begin taking over-the-counter or herbal medications without first  talking to your doctor ornurse practitioner    Diet and Lifestyle     Limit sodium/salt to 2000 mg daily   Read food labels for sodium content  Do not add salt when cooking or add salt at the table  Weigh yourself every day and record in your daily weight log   Call if you gain 2 pounds a day for 2 days in a row OR 5 pounds in 1 week  Bring daily weight log to every appointment  Stay active, pace yourself, listen to yourbody, and rest when tired  Elevate your legs if they are swollen. Ask about using compression/support stockings  Stop smoking  Lose weight if you are overweight  Avoid drinking alcohol or limit amount  Stay updated on your immunizations including flu and pneumonia vaccines

## 2024-10-31 NOTE — TELEPHONE ENCOUNTER
Pharmacy is contacted to discuss clarification on the Metoprolol dosing. Chasidy SORIA RN BSN, CHFN, PCCN-K

## 2024-10-31 NOTE — TELEPHONE ENCOUNTER
[facility] Call Center    Phone Message    May a detailed message be left on voicemail: no     Reason for Call: Medication Question or concern regarding medication   Prescription Clarification  Name of Medication: metoprolol succinate ER (TOPROL XL) 25 MG 24 hr tablet   Prescribing Provider: Sampson    Pharmacy:    [facility] [address] [place] [address]       What on the order needs clarification?   Pharmacy was reach out regarding dosage. Please review and call pharmacy back. Thank you      Action Taken: Other: cardiology     Travel Screening: Not Applicable    Thank you!  [facility]       Date of Service:

## 2024-10-31 NOTE — LETTER
10/31/2024    [facility]  [address] [address]    RE: Felicitas Elliott       Dear Colleague,     I had the pleasure of seeing Felicitas Elliott in the [facility].          Assessment/Recommendations     #    Acute Diastolic heart failure, NYHA Class NYHA Class II-III:    Discharge weight 166 lbs on 10/8/2024.  She does not monitor her weight at home.  She is having her weight checked at dialysis 3 times a week.  She is unsure what her readings are.    Clinic weight today is 158 pounds.  Weight is down approximately 8 pounds.  Reports poor appetite but it is improving slowly.    Patient is well compensated on exam.    On low salt diet< 2000 mg per day    Heart failure regimen includes:    -Not on Aldosterone blocker/MRA therapy likely due to renal dysfunction    -Not on SGLT2 Inhibitor likely due to renal dysfunction    -Diuretic therapy with Torsemide 100 mg daily-managed by nephrology    We discussed and reviewed about heart failure, medication management, and lifestyle management including low sodium diet <2 g/day, daily weight, and staying physically active as tolerated. Patient met with Jason HF CORE nurse clinician for heart failure education.    #  ESRD on Hemodialysis every Monday Wednesday and Friday: Per patient she was told by nephrology her kidney numbers are looking better.  Nephrology note is not available for review.    #  Coronary artery disease with status post CABG: No chest pain  She has not started cardiac rehab due to weakness and physical deconditioning.  She tried home physical therapy    #  Dyslipidemia: On atorvastatin.    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  She completed antibiotic course.  She denies fever or chills.  She had a follow-up with PCP recently.    On warfarin therapy.  INR has been therapeutic.    # Chronic hypoxia: On O2 supplement at 1.5 L/NC    # Sick sinus syndrome with status post pacemaker: Stable device check today with CSP of 97.5% except EGM showed burst of AT/AF.     # Hypotension: On as needed midodrine.  Per , she gets midodrine before dialysis and during dialysis.    Blood pressure in clinic today is 92/61.    # Anemia of chronic disease: Per PCP recent hemoglobin 9 at dialysis.  Result not available for review.    # Physical deconditioning/critical illness myopathy: Patient attempted home PT.She reports feeling better every day but slowly.      Plan/Recommendation:  -Hold metoprolol succinate the night prior to dialysis and see if this helps with hypotensive episode during dialysis.  -Consider stopping metoprolol if continues to have issues with low blood pressure.  -Continue on low-sodium diet <2000 mg per day, daily weight monitoring, and maintain fluid intake at 50 to 60 ounces per day  -Follow-up with PCP regarding issue with abdominal discomfort  -Defer to nephrology team for diuretic management and midodrine.  -Consider reattempt of physical therapy once much stronger.  Consider cardiac rehab in the near future once more stable on her feet.    Follow up with Dr. Satnana in November as scheduled. Follow up with me in 3-4 months      The longitudinal plan of care for acute diastolic heart failure was addressed during this visit.?Due to the added   complexity in care, I will continue to support Felicitas Elliott in the subsequent management of this   condition(s) and with the ongoing continuity of care of this condition(s)\".     History of Present Illness/Subjective    Ms. Felicitas Elliott is a 84 year old female who is seen at [facility] for post hospitalization heart failure follow up. 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      Patient was hospitalized from August 16 through September 16 with fatigue, weakness, chills, decreased appetite and was found in's sepsis and HAMMAD.  ID was consulted patient was treated with empiric antibiotic.  Neurology was consulted for possible discitis.  Brain MRI showed scattered embolic infarcts.  Patient underwent mitral and aortic valve replacement and  CABG on 8/27/2024 with Dr. Renae.  Procedure was uneventful.  Unfortunately patient developed hypoxia on postop day 2.  CRRT was initiated with minimal urine output.  Patient was transitioned to hemodialysis.  Due to arrhythmia and bradycardia, patient underwent pacemaker placement.    Today, Felicitas is accompanied by her spouse.  She reports feeling better every day and making progress very slowly.  She does get shortness of breath with minimal exertion.  She is symptomatic with a low blood pressure.  She has been taking as needed midodrine.  She denies shortness of breath, orthopnea, PND, palpitations, chest pain, abdominal fullness/bloating, and lower extremity edema.  She has been experiencing poor appetite although it is improving.  She is also having some abdominal discomfort and constipation.    Patient lives with her  at home.  He is primary caregiver.  He helps her manage her medications.      ECHO from 8/2024-Reviewed:   Interpretation Summary   1. Normal left ventricular size and systolic performance with a visually  estimated ejection fraction of 60%.  2. There is abnormal septal motion likely related to altered electrical  activation due to bundle branch block.  3. There is a bio-prosthetic aortic valve (documented 25 mm [employer] Konect Resilia aortic valved conduit).  Â  Normal aortic valve prosthesis metrics with a mean systolic gradient of  4  mmHg and a peak anterograde velocity of 1.5 m/sec.  Â  No aortic insufficiency is detected.  4. There is a bio-prosthetic mitral valve (documented 31 mm [employer] porcine pericardial tissue valve).  Â  Normal mitral valve prosthesis function; mean diastolic gradient is 5 mm Hg.   Â  No significant mitral insufficiency is detected.  5. Probable normal right ventricular size and systolic performance though  right-sided structures are not clearly visualized on all views on this study.     Physical Examination Review of Systems   BP 92/61   Pulse 96   Resp 16   Wt 71.7 kg (158 lb)   SpO2 97%   BMI 24.75 kg/m    Body mass index is 24.75 kg/m .  Wt Readings from Last 3 Encounters:   10/31/24 71.7 kg (158 lb)   10/09/24 75.5 kg (166 lb 8 oz)   09/16/24 79 kg (174 lb 3.2 oz)     General Appearance:   no distress, normal body habitus   ENT/Mouth: membranes moist, no oral lesions or bleeding gums.      EYES:  no scleral icterus, normal conjunctivae   Neck: no carotid bruits or thyromegaly   Chest/Lungs:   lungs are clear to auscultation, no rales or wheezing, equal chest wall expansion    Cardiovascular:   Normal first and second heart sounds with , rubs, or gallops; neck vein assessment limited as patient was examined in the chair due to safety concerns of getting on the exam table  . No edema bilaterally    Abdomen:  no organomegaly, masses, bruits, or tenderness; bowel sounds are present   Extremities   no cyanosis or clubbing    CMS intact.   Skin: no xanthelasma, warm.    Neurologic: Alert and oriented; no tremors   Psychiatric: calm and cooperative                                                   Negative unless noted in HPI     Medical History  Surgical History Family History Social History   No past medical history on file. Past Surgical History:   Procedure Laterality Date     CORONARY ARTERY BYPASS GRAFT, WITH AORTIC VALVE REPLACEMENT, WITH ENDOSCOPIC VESSEL PROCUREMENT N/A 8/27/2024     Procedure: CORONARY ARTERY BYPASS GRAFT TIMES TWO, WITH AORTIC ROOT REPLACEMENT, WITH LEFT INTERNAL MAMMARY ARTERY HARVEST, LEFT SAPHNENOUS ENDOSCOPIC VESSEL PROCUREMENT,;  Surgeon: Dylan Renae MD;  Location: [facility] OR     CV CORONARY ANGIOGRAM N/A 8/20/2024    Procedure: CV CORONARY ANGIOGRAM;  Surgeon: Den Wylie MD;  Location: [facility] CATH LAB CV     EP PACEMAKER DEVICE & IMPLANT- HIS LEAD DUAL N/A 9/12/2024    Procedure: Pacemaker Device & Lead Implant - HIS Lead Dual;  Surgeon: Minh Pina MD;  Location: [facility] CATH LAB CV     IR CVC NON TUNNEL PLACEMENT > 5 YRS  9/1/2024     IR CVC TUNNEL PLACEMENT > 5 YRS OF AGE  9/11/2024     PICC TRIPLE LUMEN PLACEMENT  9/6/2024     REPLACE VALVE MITRAL N/A 8/27/2024    Procedure: MITRAL VALVE REPLACEMENT,;  Surgeon: Dylan Renae MD;  Location: [facility] OR     TRANSESOPHAGEAL ECHOCARDIOGRAM INTRAOPERATIVE  8/27/2024    Procedure: ANESTHESIA TRANSESOPHAGEAL ECHOCARDIOGRAM, EPI-AORTIC ULTRASOUND;  Surgeon: Dylan Renae MD;  Location: [facility] OR    No family history on file. Social History     Socioeconomic History     Marital status:      Spouse name: Ed     Number of children: 7     Years of education: [employer]     Highest education level: 12th grade   Occupational History     Occupation:    Tobacco Use     Smoking status: Former     Current packs/day: 8.00     Average packs/day: 8.0 packs/day for 0.8 years (6.6 ttl pk-yrs)     Types: Cigarettes     Start date: 2024     Smokeless tobacco: Former   Vaping Use     Vaping status: Never Used   Substance and Sexual Activity     Alcohol use: Not Currently     Drug use: Not on file     Sexual activity: Not Currently     Partners: Male     Birth control/protection: None   Other Topics Concern     Not on file   Social History Narrative     Not on file     Social Drivers of Health     Financial Resource Strain: Low Risk  (9/16/2024)     Financial Resource Strain      Within the past 12 months, have you or your family members you live with been unable to get utilities (heat, electricity) when it was really needed?: No   Food Insecurity: Low Risk  (9/16/2024)    Food Insecurity      Within the past 12 months, did you worry that your food would run out before you got money to buy more?: No      Within the past 12 months, did the food you bought just not last and you didn t have money to get more?: No   Transportation Needs: Low Risk  (9/16/2024)    Transportation Needs      Within the past 12 months, has lack of transportation kept you from medical appointments, getting your medicines, non-medical meetings or appointments, work, or from getting things that you need?: No   Physical Activity: Not on file   Stress: Not on file   Social Connections: Moderately Integrated (9/16/2024)    Social Connection and Isolation Panel [NHANES]      Frequency of Communication with Friends and Family: More than three times a week      Frequency of Social Gatherings with Friends and Family: Patient unable to answer      Attends [employer] Services: More than 4 times per year      Active Member of Clubs or Organizations: Patient declined      Attends Club or Organization Meetings: Never      Marital Status:    Interpersonal Safety: Low Risk  (9/16/2024)    Interpersonal Safety      Do you feel physically and emotionally safe where you currently live?: Yes      Within the past 12 months, have you been hit, slapped, kicked or otherwise physically hurt by someone?: No      Within the past 12 months, have you been humiliated or emotionally abused in other ways by your partner or ex-partner?: No   Recent Concern: Interpersonal Safety - High Risk (8/23/2024)    Interpersonal Safety      Do you feel physically and emotionally safe where you currently live?: No      Within the past 12 months, have you been hit, slapped, kicked or otherwise physically hurt by someone?: No       Within the past 12 months, have you been humiliated or emotionally abused in other ways by your partner or ex-partner?: No   Housing Stability: Low Risk  (9/16/2024)    Housing Stability      Do you have housing? : Yes      Are you worried about losing your housing?: No   Recent Concern: Housing Stability - High Risk (9/16/2024)    Housing Stability      Do you have housing? : No      Are you worried about losing your housing?: No          Medications  Allergies   Current Outpatient Medications   Medication Sig Dispense Refill     acetaminophen (TYLENOL) 325 MG tablet Take 2 tablets (650 mg) by mouth every 4 hours as needed for other (mild pain (1-3)). 120 tablet 0     albuterol (PROVENTIL) (2.5 MG/3ML) 0.083% neb solution Take 1 vial (2.5 mg) by nebulization every 6 hours as needed for wheezing. 90 mL 0     atorvastatin (LIPITOR) 80 MG tablet Take 1 tablet (80 mg) by mouth every evening. 90 tablet 0     bisacodyl (DULCOLAX) 10 MG suppository Place 1 suppository (10 mg) rectally daily as needed for constipation (Use if Magnesium hydroxide (MILK of MAGNESIA) not effective after 24 hours. May discontinue if patient having bowel movement.). 30 suppository 0     clopidogrel (PLAVIX) 75 MG tablet Take 1 tablet (75 mg) by mouth daily. 90 tablet 0     Lidocaine (LIDOCARE) 4 % Patch Place 1 patch over 12 hours onto the skin every 24 hours. To prevent lidocaine toxicity, patient should be patch free for 12 hrs daily. 30 patch 0     melatonin 5 MG tablet Take 1 tablet (5 mg) by mouth at bedtime. 30 tablet 0     metoprolol succinate ER (TOPROL XL) 25 MG 24 hr tablet Hold on Sunday, Tuesday, and Thursday 45 tablet 0     midodrine (PROAMATINE) 10 MG tablet Take 1 tablet (10 mg) by mouth 3 times daily as needed (SBP <90). 60 tablet 0     multivitamin w/minerals (THERA-VIT-M) tablet Take 1 tablet by mouth daily. 30 tablet 0     ondansetron (ZOFRAN) 8 MG tablet Take 1 tablet (8 mg) by mouth every 8 hours as needed for nausea  or vomiting. 30 tablet 0     pantoprazole (PROTONIX) 40 MG EC tablet Take 1 tablet (40 mg) by mouth every morning (before breakfast). 30 tablet 0     polyethylene glycol (MIRALAX) 17 GM/Dose powder Take 17 g by mouth daily. 510 g 0     senna-docusate (SENOKOT-S/PERICOLACE) 8.6-50 MG tablet Take 2 tablets by mouth 2 times daily. 60 tablet 0     simethicone (MYLICON) 80 MG chewable tablet Take 1 tablet (80 mg) by mouth 3 times daily. 90 tablet 0     sodium chloride (NEBUSAL) 3 % neb solution Take 3 mLs by nebulization every 6 hours as needed for wheezing. 15 mL 0     torsemide (DEMADEX) 100 MG tablet Take 1 tablet (100 mg) by mouth daily. 30 tablet 0     traZODone (DESYREL) 50 MG tablet Take 0.5 tablets (25 mg) by mouth nightly as needed for sleep. 30 tablet 0     warfarin ANTICOAGULANT (COUMADIN) 2.5 MG tablet Take 1 tablet (2.5 mg) by mouth daily. 30 tablet 0     ketamine 5%-gabapentin 8%-lidocaine 2.5% in PLO cream Apply 2 clicks (0.5 g) topically 3 times daily. (Patient not taking: Reported on 10/31/2024) 90 g 0     lactobacillus rhamnosus, GG, (CULTURELL) capsule Take 1 capsule by mouth 2 times daily. (Patient not taking: Reported on 10/31/2024) 60 capsule 0    Allergies   Allergen Reactions     Aspirin Rash     Cats Rash     Nickel Rash         Lab Results    Chemistry/lipid CBC Cardiac Enzymes/BNP/TSH/INR   Lab Results   Component Value Date    BUN 22.5 10/09/2024     10/09/2024    CO2 28 10/09/2024    Lab Results   Component Value Date    WBC 7.2 10/09/2024    HGB 8.0 (L) 10/09/2024    HCT 27.0 (L) 10/09/2024    MCV 98 10/09/2024     10/09/2024    Lab Results   Component Value Date    INR 1.91 (H) 10/09/2024        45 minutes spent on the date of encounter doing chart review, review of test results, interpretation with above tests, patient visit, documentation, and discussion with family.      This note has been dictated using voice recognition software. Any grammatical, typographical, or context  distortions are unintentional and inherent to the software             [facility]: Heart Failure Care Coordination   Heart Failure Education    Situation/Background:      RN CC provided heart failure education to Felicitas and  Ed during clinic visit.    Assessment:      Living situation: home with family    Barriers to Heart Failure follow-up:  Nephrology following, patient currently receiving M/W/F hemodialysis.  Patient reports not drinking a lot of fluid daily.      Medication management: independent      Intervention/Plan:      CM/HF education topics reviewed:  Low sodium: 2000 mg or less daily, meal choices and label reading   Fluid Restriction: 50-60 oz daily   Daily weight monitoring and logging   Medication review and importance of compliance   Overview of C.O.R.E. clinic   Overview of heart failure appointments and testing   Importance of exercise .  Patient reports not being strong enough to follow with PT.  We reviewed chair exercises she can do at home when feeling able.       Education materials provided:  Low sodium food and drink handout  Low sodium food product examples  HF stoplight tool  Guide to HF booklet  C.O.R.E. information brochure     HF resources reviewed:  Offered [facility], they will revisit this in the future if they feel it is needed.  Gave them the HF Nurse Line phone number for assistance.        Patient to see device department following HF  education today.     Patient expressed understanding of above education/instructions and denied further questions at this time.       Thank you for allowing me to participate in the care of your patient.      Sincerely,     CRUZITO Molina CNP     [facility]  cc:   Navneet Branch MD  [address]  [place] [address]

## 2024-11-01 LAB
ATRIAL RATE - MUSE: 92 BPM
DIASTOLIC BLOOD PRESSURE - MUSE: NORMAL MMHG
INTERPRETATION ECG - MUSE: NORMAL
P AXIS - MUSE: 203 DEGREES
PR INTERVAL - MUSE: 146 MS
QRS DURATION - MUSE: 112 MS
QT - MUSE: 400 MS
QTC - MUSE: 494 MS
R AXIS - MUSE: -88 DEGREES
SYSTOLIC BLOOD PRESSURE - MUSE: NORMAL MMHG
T AXIS - MUSE: 71 DEGREES
VENTRICULAR RATE- MUSE: 92 BPM

## 2024-11-04 LAB
MDC_IDC_EPISODE_DTM: NORMAL
MDC_IDC_EPISODE_DURATION: 113 S
MDC_IDC_EPISODE_DURATION: 128 S
MDC_IDC_EPISODE_DURATION: 82 S
MDC_IDC_EPISODE_DURATION: 91 S
MDC_IDC_EPISODE_ID: 30
MDC_IDC_EPISODE_ID: 31
MDC_IDC_EPISODE_ID: 32
MDC_IDC_EPISODE_ID: 33
MDC_IDC_EPISODE_TYPE: NORMAL
MDC_IDC_EPISODE_TYPE_INDUCED: NO
MDC_IDC_LEAD_CONNECTION_STATUS: NORMAL
MDC_IDC_LEAD_CONNECTION_STATUS: NORMAL
MDC_IDC_LEAD_IMPLANT_DT: NORMAL
MDC_IDC_LEAD_IMPLANT_DT: NORMAL
MDC_IDC_LEAD_LOCATION: NORMAL
MDC_IDC_LEAD_LOCATION: NORMAL
MDC_IDC_LEAD_LOCATION_DETAIL_1: NORMAL
MDC_IDC_LEAD_LOCATION_DETAIL_1: NORMAL
MDC_IDC_LEAD_MFG: NORMAL
MDC_IDC_LEAD_MFG: NORMAL
MDC_IDC_LEAD_MODEL: NORMAL
MDC_IDC_LEAD_MODEL: NORMAL
MDC_IDC_LEAD_POLARITY_TYPE: NORMAL
MDC_IDC_LEAD_POLARITY_TYPE: NORMAL
MDC_IDC_LEAD_SERIAL: NORMAL
MDC_IDC_LEAD_SERIAL: NORMAL
MDC_IDC_LEAD_SPECIAL_FUNCTION: NORMAL
MDC_IDC_LEAD_SPECIAL_FUNCTION: NORMAL
MDC_IDC_MSMT_BATTERY_DTM: NORMAL
MDC_IDC_MSMT_BATTERY_REMAINING_LONGEVITY: 144 MO
MDC_IDC_MSMT_BATTERY_RRT_TRIGGER: 2.62
MDC_IDC_MSMT_BATTERY_STATUS: NORMAL
MDC_IDC_MSMT_BATTERY_VOLTAGE: 3.21 V
MDC_IDC_MSMT_LEADCHNL_RA_IMPEDANCE_VALUE: 570 OHM
MDC_IDC_MSMT_LEADCHNL_RA_IMPEDANCE_VALUE: 684 OHM
MDC_IDC_MSMT_LEADCHNL_RA_PACING_THRESHOLD_AMPLITUDE: 0.75 V
MDC_IDC_MSMT_LEADCHNL_RA_PACING_THRESHOLD_AMPLITUDE: 0.88 V
MDC_IDC_MSMT_LEADCHNL_RA_PACING_THRESHOLD_PULSEWIDTH: 0.4 MS
MDC_IDC_MSMT_LEADCHNL_RA_PACING_THRESHOLD_PULSEWIDTH: 0.4 MS
MDC_IDC_MSMT_LEADCHNL_RA_SENSING_INTR_AMPL: 1.75 MV
MDC_IDC_MSMT_LEADCHNL_RA_SENSING_INTR_AMPL: 2.12 MV
MDC_IDC_MSMT_LEADCHNL_RV_IMPEDANCE_VALUE: 475 OHM
MDC_IDC_MSMT_LEADCHNL_RV_IMPEDANCE_VALUE: 627 OHM
MDC_IDC_MSMT_LEADCHNL_RV_PACING_THRESHOLD_AMPLITUDE: 1 V
MDC_IDC_MSMT_LEADCHNL_RV_PACING_THRESHOLD_AMPLITUDE: 1.12 V
MDC_IDC_MSMT_LEADCHNL_RV_PACING_THRESHOLD_PULSEWIDTH: 0.4 MS
MDC_IDC_MSMT_LEADCHNL_RV_PACING_THRESHOLD_PULSEWIDTH: 0.4 MS
MDC_IDC_MSMT_LEADCHNL_RV_SENSING_INTR_AMPL: 16.38 MV
MDC_IDC_MSMT_LEADCHNL_RV_SENSING_INTR_AMPL: 17.25 MV
MDC_IDC_PG_IMPLANT_DTM: NORMAL
MDC_IDC_PG_MFG: NORMAL
MDC_IDC_PG_MODEL: NORMAL
MDC_IDC_PG_SERIAL: NORMAL
MDC_IDC_PG_TYPE: NORMAL
MDC_IDC_SESS_CLINIC_NAME: NORMAL
MDC_IDC_SESS_DTM: NORMAL
MDC_IDC_SESS_TYPE: NORMAL
MDC_IDC_SET_BRADY_AT_MODE_SWITCH_RATE: 171 {BEATS}/MIN
MDC_IDC_SET_BRADY_HYSTRATE: NORMAL
MDC_IDC_SET_BRADY_LOWRATE: 70 {BEATS}/MIN
MDC_IDC_SET_BRADY_MAX_SENSOR_RATE: 120 {BEATS}/MIN
MDC_IDC_SET_BRADY_MAX_TRACKING_RATE: 120 {BEATS}/MIN
MDC_IDC_SET_BRADY_MODE: NORMAL
MDC_IDC_SET_BRADY_PAV_DELAY_LOW: 180 MS
MDC_IDC_SET_BRADY_SAV_DELAY_LOW: 150 MS
MDC_IDC_SET_LEADCHNL_RA_PACING_AMPLITUDE: 2 V
MDC_IDC_SET_LEADCHNL_RA_PACING_ANODE_ELECTRODE_1: NORMAL
MDC_IDC_SET_LEADCHNL_RA_PACING_ANODE_LOCATION_1: NORMAL
MDC_IDC_SET_LEADCHNL_RA_PACING_CAPTURE_MODE: NORMAL
MDC_IDC_SET_LEADCHNL_RA_PACING_CATHODE_ELECTRODE_1: NORMAL
MDC_IDC_SET_LEADCHNL_RA_PACING_CATHODE_LOCATION_1: NORMAL
MDC_IDC_SET_LEADCHNL_RA_PACING_POLARITY: NORMAL
MDC_IDC_SET_LEADCHNL_RA_PACING_PULSEWIDTH: 0.4 MS
MDC_IDC_SET_LEADCHNL_RA_SENSING_ANODE_ELECTRODE_1: NORMAL
MDC_IDC_SET_LEADCHNL_RA_SENSING_ANODE_LOCATION_1: NORMAL
MDC_IDC_SET_LEADCHNL_RA_SENSING_CATHODE_ELECTRODE_1: NORMAL
MDC_IDC_SET_LEADCHNL_RA_SENSING_CATHODE_LOCATION_1: NORMAL
MDC_IDC_SET_LEADCHNL_RA_SENSING_POLARITY: NORMAL
MDC_IDC_SET_LEADCHNL_RA_SENSING_SENSITIVITY: 0.15 MV
MDC_IDC_SET_LEADCHNL_RV_PACING_AMPLITUDE: 2.25 V
MDC_IDC_SET_LEADCHNL_RV_PACING_ANODE_ELECTRODE_1: NORMAL
MDC_IDC_SET_LEADCHNL_RV_PACING_ANODE_LOCATION_1: NORMAL
MDC_IDC_SET_LEADCHNL_RV_PACING_CAPTURE_MODE: NORMAL
MDC_IDC_SET_LEADCHNL_RV_PACING_CATHODE_ELECTRODE_1: NORMAL
MDC_IDC_SET_LEADCHNL_RV_PACING_CATHODE_LOCATION_1: NORMAL
MDC_IDC_SET_LEADCHNL_RV_PACING_POLARITY: NORMAL
MDC_IDC_SET_LEADCHNL_RV_PACING_PULSEWIDTH: 0.4 MS
MDC_IDC_SET_LEADCHNL_RV_SENSING_ANODE_ELECTRODE_1: NORMAL
MDC_IDC_SET_LEADCHNL_RV_SENSING_ANODE_LOCATION_1: NORMAL
MDC_IDC_SET_LEADCHNL_RV_SENSING_CATHODE_ELECTRODE_1: NORMAL
MDC_IDC_SET_LEADCHNL_RV_SENSING_CATHODE_LOCATION_1: NORMAL
MDC_IDC_SET_LEADCHNL_RV_SENSING_POLARITY: NORMAL
MDC_IDC_SET_LEADCHNL_RV_SENSING_SENSITIVITY: 0.9 MV
MDC_IDC_SET_ZONE_DETECTION_INTERVAL: 350 MS
MDC_IDC_SET_ZONE_DETECTION_INTERVAL: 400 MS
MDC_IDC_SET_ZONE_STATUS: NORMAL
MDC_IDC_SET_ZONE_STATUS: NORMAL
MDC_IDC_SET_ZONE_TYPE: NORMAL
MDC_IDC_SET_ZONE_VENDOR_TYPE: NORMAL
MDC_IDC_STAT_AT_BURDEN_PERCENT: 0.4 %
MDC_IDC_STAT_AT_DTM_END: NORMAL
MDC_IDC_STAT_AT_DTM_START: NORMAL
MDC_IDC_STAT_BRADY_AP_VP_PERCENT: 41.95 %
MDC_IDC_STAT_BRADY_AP_VS_PERCENT: 0.04 %
MDC_IDC_STAT_BRADY_AS_VP_PERCENT: 49.63 %
MDC_IDC_STAT_BRADY_AS_VS_PERCENT: 8.38 %
MDC_IDC_STAT_BRADY_DTM_END: NORMAL
MDC_IDC_STAT_BRADY_DTM_START: NORMAL
MDC_IDC_STAT_BRADY_RA_PERCENT_PACED: 47.63 %
MDC_IDC_STAT_BRADY_RV_PERCENT_PACED: 91.54 %
MDC_IDC_STAT_EPISODE_RECENT_COUNT: 0
MDC_IDC_STAT_EPISODE_RECENT_COUNT: 26
MDC_IDC_STAT_EPISODE_RECENT_COUNT_DTM_END: NORMAL
MDC_IDC_STAT_EPISODE_RECENT_COUNT_DTM_START: NORMAL
MDC_IDC_STAT_EPISODE_TOTAL_COUNT: 0
MDC_IDC_STAT_EPISODE_TOTAL_COUNT: 33
MDC_IDC_STAT_EPISODE_TOTAL_COUNT_DTM_END: NORMAL
MDC_IDC_STAT_EPISODE_TOTAL_COUNT_DTM_START: NORMAL
MDC_IDC_STAT_EPISODE_TYPE: NORMAL
MDC_IDC_STAT_TACHYTHERAPY_RECENT_DTM_END: NORMAL
MDC_IDC_STAT_TACHYTHERAPY_RECENT_DTM_START: NORMAL
MDC_IDC_STAT_TACHYTHERAPY_TOTAL_DTM_END: NORMAL
MDC_IDC_STAT_TACHYTHERAPY_TOTAL_DTM_START: NORMAL

## 2024-11-04 PROCEDURE — 93280 PM DEVICE PROGR EVAL DUAL: CPT | Performed by: INTERNAL MEDICINE

## 2024-11-19 ENCOUNTER — OFFICE VISIT (OUTPATIENT)
Dept: CARDIOLOGY | Facility: CLINIC | Age: 84
End: 2024-11-19
Payer: COMMERCIAL

## 2024-11-19 VITALS
RESPIRATION RATE: 16 BRPM | SYSTOLIC BLOOD PRESSURE: 80 MMHG | HEART RATE: 87 BPM | BODY MASS INDEX: 24.43 KG/M2 | DIASTOLIC BLOOD PRESSURE: 62 MMHG | WEIGHT: 156 LBS

## 2024-11-19 DIAGNOSIS — I50.31 ACUTE HEART FAILURE WITH PRESERVED EJECTION FRACTION (HFPEF) (H): ICD-10-CM

## 2024-11-19 DIAGNOSIS — Z95.1 S/P CABG (CORONARY ARTERY BYPASS GRAFT): ICD-10-CM

## 2024-11-19 DIAGNOSIS — I25.10 CORONARY ARTERY DISEASE INVOLVING NATIVE CORONARY ARTERY OF NATIVE HEART, UNSPECIFIED WHETHER ANGINA PRESENT: Primary | ICD-10-CM

## 2024-11-19 DIAGNOSIS — Z99.2 ESRD (END STAGE RENAL DISEASE) ON DIALYSIS (H): ICD-10-CM

## 2024-11-19 DIAGNOSIS — N18.6 ESRD (END STAGE RENAL DISEASE) ON DIALYSIS (H): ICD-10-CM

## 2024-11-19 PROCEDURE — 99214 OFFICE O/P EST MOD 30 MIN: CPT | Performed by: STUDENT IN AN ORGANIZED HEALTH CARE EDUCATION/TRAINING PROGRAM

## 2024-11-19 PROCEDURE — G2211 COMPLEX E/M VISIT ADD ON: HCPCS | Performed by: STUDENT IN AN ORGANIZED HEALTH CARE EDUCATION/TRAINING PROGRAM

## 2024-11-19 RX ORDER — MIDODRINE HYDROCHLORIDE 10 MG/1
10 TABLET ORAL 3 TIMES DAILY PRN
Qty: 60 TABLET | Refills: 3 | Status: SHIPPED | OUTPATIENT
Start: 2024-11-19

## 2024-11-19 RX ORDER — TORSEMIDE 100 MG/1
50 TABLET ORAL DAILY
Qty: 30 TABLET | Refills: 0 | Status: SHIPPED | OUTPATIENT
Start: 2024-11-19

## 2024-11-19 RX ORDER — SENNOSIDES A AND B 8.6 MG/1
1 TABLET, FILM COATED ORAL DAILY
Qty: 30 TABLET | Refills: 3 | Status: SHIPPED | OUTPATIENT
Start: 2024-11-19

## 2024-11-19 NOTE — PATIENT INSTRUCTIONS
Stop warfarin on 11/27/2024, continue plavix alone.    STOP metoprolol.    Cut torsemide in half, now 50 mg daily.     Ultrasound of the heart.   
yes

## 2024-11-19 NOTE — PROGRESS NOTES
Cardiology Clinic    Felicitas Elliott is a 84 year old with a history of chronic hypoxia, sick sinus syndrome status post pacemaker, anemia, deconditioning, endocarditis complicated by stroke and surgical AVR/MVR, CABG 8/27/2024 complicated by hypoxia, renal failure, and severe deconditioning who presents to [facility] care.    As above, hospitalized with endocarditis and ultimately underwent aortic root abscess debridement, annular reconstruction, aortic root replacement, MVR, and coronary artery bypass grafting x 2 (SVG to OM, LIMA to LAD) which was complicated by severe deconditioning, hypoxia and renal failure, now on dialysis.  Was in an LTAC until 10/6/2024.  Since discharge, she has continued to feel weak.  Her blood pressure has been low at home, ranging from 80-90 systolic.  Gets dialysis 3 times per week and sometimes is even lower blood pressures with this.  Taking midodrine as needed for this.  Gets dizzy with standing.  Continues to be weak but her appetite and strength are improving.    General: Chronically ill-appearing, appears older than stated age  CV: Normal rate and rhythm. No m/r/g. No JVD.   Pulm: Normal effort. Normal breath sounds.  Lower extremities: No lower extremity edema.    Labs:   Reviewed in epic.  Creatinine 3.23  Hemoglobin 8.0  Platelets 170  INR 1.0  Hemoglobin A1c 6.4%    Testing:  EKG 10/31/2024: Sinus rhythm, incomplete right bundle branch block, left anterior fascicular block  TTE 8/16/2024: Bioprosthetic aortic valve with normal gradients, bioprosthetic mitral valve with normal gradients, normal EF, septal wall motion abnormality due to bundle branch block    Assessment and Plan:    1.  Endocarditis status post MVR/AVR, aortic abscess debridement, CABG x 2.  Will continue warfarin until 11/27/2024, then stop.  Continue monotherapy of Plavix indefinitely.  Will get a repeat TTE.    2.  Hypotension, deconditioning.  Will stop metoprolol and decrease torsemide to 50 mg daily.  Advised her that if she has volume overload, can increase.  However should be getting good clearance with dialysis.    3.  Constipation.  Added Senokot.    The longitudinal plan of care for the diagnosis(es)/condition(s) as documented were addressed during this visit. Due to the added complexity in care, I will continue to support Felicitas in the subsequent management and with ongoing continuity of care.    No follow-ups on file.    Chip Puga MD  Interventional Cardiology

## 2024-12-10 ENCOUNTER — HOSPITAL ENCOUNTER (OUTPATIENT)
Dept: CARDIOLOGY | Facility: CLINIC | Age: 84
Discharge: HOME | End: 2024-12-10
Attending: STUDENT IN AN ORGANIZED HEALTH CARE EDUCATION/TRAINING PROGRAM
Payer: COMMERCIAL

## 2024-12-10 DIAGNOSIS — I50.31 ACUTE HEART FAILURE WITH PRESERVED EJECTION FRACTION (HFPEF) (H): ICD-10-CM

## 2024-12-10 DIAGNOSIS — I25.10 CORONARY ARTERY DISEASE INVOLVING NATIVE CORONARY ARTERY OF NATIVE HEART, UNSPECIFIED WHETHER ANGINA PRESENT: ICD-10-CM

## 2024-12-10 LAB — LVEF ECHO: NORMAL

## 2024-12-10 PROCEDURE — C8929 TTE W OR WO FOL WCON,DOPPLER: HCPCS

## 2024-12-10 PROCEDURE — 255N000002 HC RX 255 OP 636: Performed by: STUDENT IN AN ORGANIZED HEALTH CARE EDUCATION/TRAINING PROGRAM

## 2024-12-10 PROCEDURE — 93306 TTE W/DOPPLER COMPLETE: CPT | Mod: 26 | Performed by: INTERNAL MEDICINE

## 2024-12-10 PROCEDURE — 999N000208 ECHOCARDIOGRAM COMPLETE

## 2024-12-10 RX ADMIN — PERFLUTREN 2 ML: 6.52 INJECTION, SUSPENSION INTRAVENOUS at 11:05

## 2025-01-13 ENCOUNTER — APPOINTMENT (OUTPATIENT)
Dept: CT IMAGING | Facility: CLINIC | Age: 85
DRG: 193 | End: 2025-01-13
Attending: EMERGENCY MEDICINE
Payer: COMMERCIAL

## 2025-01-13 ENCOUNTER — APPOINTMENT (OUTPATIENT)
Dept: CT IMAGING | Facility: CLINIC | Age: 85
DRG: 193 | End: 2025-01-13
Attending: HOSPITALIST
Payer: COMMERCIAL

## 2025-01-13 ENCOUNTER — HOSPITAL ENCOUNTER (INPATIENT)
Facility: CLINIC | Age: 85
LOS: 10 days | Discharge: HOME HEALTH | DRG: 193 | End: 2025-01-23
Attending: EMERGENCY MEDICINE | Admitting: HOSPITALIST
Payer: COMMERCIAL

## 2025-01-13 DIAGNOSIS — G62.9 NEUROPATHY: ICD-10-CM

## 2025-01-13 DIAGNOSIS — R25.2 CRAMPS OF RIGHT LOWER EXTREMITY: ICD-10-CM

## 2025-01-13 DIAGNOSIS — Z91.89 AT HIGH RISK FOR ALTERED SKIN INTEGRITY: Primary | ICD-10-CM

## 2025-01-13 DIAGNOSIS — K92.0 HEMATEMESIS WITH NAUSEA: ICD-10-CM

## 2025-01-13 DIAGNOSIS — N17.9 ACUTE RENAL FAILURE, UNSPECIFIED ACUTE RENAL FAILURE TYPE: ICD-10-CM

## 2025-01-13 DIAGNOSIS — J18.9 COMMUNITY ACQUIRED PNEUMONIA OF RIGHT LOWER LOBE OF LUNG: ICD-10-CM

## 2025-01-13 DIAGNOSIS — I26.99 PE (PULMONARY THROMBOEMBOLISM) (H): ICD-10-CM

## 2025-01-13 DIAGNOSIS — R53.81 PHYSICAL DECONDITIONING: ICD-10-CM

## 2025-01-13 DIAGNOSIS — K21.9 GASTROESOPHAGEAL REFLUX DISEASE WITHOUT ESOPHAGITIS: ICD-10-CM

## 2025-01-13 DIAGNOSIS — R79.89 ELEVATED D-DIMER: ICD-10-CM

## 2025-01-13 DIAGNOSIS — R11.2 NAUSEA AND VOMITING, UNSPECIFIED VOMITING TYPE: ICD-10-CM

## 2025-01-13 DIAGNOSIS — I82.409 DVT (DEEP VENOUS THROMBOSIS) (H): ICD-10-CM

## 2025-01-13 LAB
ALBUMIN SERPL BCG-MCNC: 3.3 G/DL (ref 3.5–5.2)
ALP SERPL-CCNC: 89 U/L (ref 40–150)
ALT SERPL W P-5'-P-CCNC: <5 U/L (ref 0–50)
ANION GAP SERPL CALCULATED.3IONS-SCNC: 16 MMOL/L (ref 7–15)
AST SERPL W P-5'-P-CCNC: 18 U/L (ref 0–45)
ATRIAL RATE - MUSE: 104 BPM
BASOPHILS # BLD AUTO: 0 10E3/UL (ref 0–0.2)
BASOPHILS NFR BLD AUTO: 0 %
BILIRUB SERPL-MCNC: 0.8 MG/DL
BUN SERPL-MCNC: 43.5 MG/DL (ref 8–23)
CALCIUM SERPL-MCNC: 9.4 MG/DL (ref 8.8–10.4)
CHLORIDE SERPL-SCNC: 98 MMOL/L (ref 98–107)
CREAT SERPL-MCNC: 4.03 MG/DL (ref 0.51–0.95)
D DIMER PPP FEU-MCNC: 14.84 UG/ML FEU (ref 0–0.5)
DIASTOLIC BLOOD PRESSURE - MUSE: NORMAL MMHG
EGFRCR SERPLBLD CKD-EPI 2021: 10 ML/MIN/1.73M2
EOSINOPHIL # BLD AUTO: 0 10E3/UL (ref 0–0.7)
EOSINOPHIL NFR BLD AUTO: 0 %
ERYTHROCYTE [DISTWIDTH] IN BLOOD BY AUTOMATED COUNT: 17.5 % (ref 10–15)
EST. AVERAGE GLUCOSE BLD GHB EST-MCNC: 114 MG/DL
FLUAV RNA SPEC QL NAA+PROBE: NEGATIVE
FLUBV RNA RESP QL NAA+PROBE: NEGATIVE
GLUCOSE SERPL-MCNC: 124 MG/DL (ref 70–99)
HBA1C MFR BLD: 5.6 %
HCO3 SERPL-SCNC: 26 MMOL/L (ref 22–29)
HCT VFR BLD AUTO: 39.3 % (ref 35–47)
HGB BLD-MCNC: 12.3 G/DL (ref 11.7–15.7)
HOLD SPECIMEN: NORMAL
HOLD SPECIMEN: NORMAL
IMM GRANULOCYTES # BLD: 0.2 10E3/UL
IMM GRANULOCYTES NFR BLD: 1 %
INTERPRETATION ECG - MUSE: NORMAL
LIPASE SERPL-CCNC: 15 U/L (ref 13–60)
LYMPHOCYTES # BLD AUTO: 1 10E3/UL (ref 0.8–5.3)
LYMPHOCYTES NFR BLD AUTO: 7 %
MCH RBC QN AUTO: 27.8 PG (ref 26.5–33)
MCHC RBC AUTO-ENTMCNC: 31.3 G/DL (ref 31.5–36.5)
MCV RBC AUTO: 89 FL (ref 78–100)
MONOCYTES # BLD AUTO: 1.2 10E3/UL (ref 0–1.3)
MONOCYTES NFR BLD AUTO: 8 %
NEUTROPHILS # BLD AUTO: 12.1 10E3/UL (ref 1.6–8.3)
NEUTROPHILS NFR BLD AUTO: 83 %
NRBC # BLD AUTO: 0 10E3/UL
NRBC BLD AUTO-RTO: 0 /100
NT-PROBNP SERPL-MCNC: 8775 PG/ML (ref 0–1800)
P AXIS - MUSE: NORMAL DEGREES
PLATELET # BLD AUTO: 122 10E3/UL (ref 150–450)
POTASSIUM SERPL-SCNC: 3.9 MMOL/L (ref 3.4–5.3)
PR INTERVAL - MUSE: NORMAL MS
PROT SERPL-MCNC: 7 G/DL (ref 6.4–8.3)
QRS DURATION - MUSE: 140 MS
QT - MUSE: 408 MS
QTC - MUSE: 531 MS
R AXIS - MUSE: -82 DEGREES
RBC # BLD AUTO: 4.42 10E6/UL (ref 3.8–5.2)
RSV RNA SPEC NAA+PROBE: NEGATIVE
SARS-COV-2 RNA RESP QL NAA+PROBE: NEGATIVE
SODIUM SERPL-SCNC: 140 MMOL/L (ref 135–145)
SYSTOLIC BLOOD PRESSURE - MUSE: NORMAL MMHG
T AXIS - MUSE: 83 DEGREES
TROPONIN T SERPL HS-MCNC: 73 NG/L
TROPONIN T SERPL HS-MCNC: 77 NG/L
VENTRICULAR RATE- MUSE: 102 BPM
WBC # BLD AUTO: 14.5 10E3/UL (ref 4–11)

## 2025-01-13 PROCEDURE — 120N000001 HC R&B MED SURG/OB

## 2025-01-13 PROCEDURE — 82610 CYSTATIN C: CPT | Performed by: NURSE PRACTITIONER

## 2025-01-13 PROCEDURE — 84630 ASSAY OF ZINC: CPT

## 2025-01-13 PROCEDURE — 250N000013 HC RX MED GY IP 250 OP 250 PS 637

## 2025-01-13 PROCEDURE — 36415 COLL VENOUS BLD VENIPUNCTURE: CPT | Performed by: EMERGENCY MEDICINE

## 2025-01-13 PROCEDURE — 83880 ASSAY OF NATRIURETIC PEPTIDE: CPT | Performed by: EMERGENCY MEDICINE

## 2025-01-13 PROCEDURE — 84484 ASSAY OF TROPONIN QUANT: CPT | Performed by: EMERGENCY MEDICINE

## 2025-01-13 PROCEDURE — 99207 PR APP CREDIT; MD BILLING SHARED VISIT: CPT

## 2025-01-13 PROCEDURE — 82040 ASSAY OF SERUM ALBUMIN: CPT | Performed by: EMERGENCY MEDICINE

## 2025-01-13 PROCEDURE — 99222 1ST HOSP IP/OBS MODERATE 55: CPT | Mod: FS | Performed by: HOSPITALIST

## 2025-01-13 PROCEDURE — 85025 COMPLETE CBC W/AUTO DIFF WBC: CPT | Performed by: EMERGENCY MEDICINE

## 2025-01-13 PROCEDURE — 99285 EMERGENCY DEPT VISIT HI MDM: CPT | Mod: 25

## 2025-01-13 PROCEDURE — 250N000011 HC RX IP 250 OP 636: Performed by: EMERGENCY MEDICINE

## 2025-01-13 PROCEDURE — 96376 TX/PRO/DX INJ SAME DRUG ADON: CPT | Mod: 59

## 2025-01-13 PROCEDURE — 258N000003 HC RX IP 258 OP 636

## 2025-01-13 PROCEDURE — 74176 CT ABD & PELVIS W/O CONTRAST: CPT

## 2025-01-13 PROCEDURE — 83036 HEMOGLOBIN GLYCOSYLATED A1C: CPT

## 2025-01-13 PROCEDURE — 82435 ASSAY OF BLOOD CHLORIDE: CPT | Performed by: EMERGENCY MEDICINE

## 2025-01-13 PROCEDURE — 250N000011 HC RX IP 250 OP 636

## 2025-01-13 PROCEDURE — 250N000009 HC RX 250: Performed by: EMERGENCY MEDICINE

## 2025-01-13 PROCEDURE — 96375 TX/PRO/DX INJ NEW DRUG ADDON: CPT | Mod: 59

## 2025-01-13 PROCEDURE — 83690 ASSAY OF LIPASE: CPT | Performed by: EMERGENCY MEDICINE

## 2025-01-13 PROCEDURE — 87637 SARSCOV2&INF A&B&RSV AMP PRB: CPT | Performed by: EMERGENCY MEDICINE

## 2025-01-13 PROCEDURE — 36415 COLL VENOUS BLD VENIPUNCTURE: CPT

## 2025-01-13 PROCEDURE — 85379 FIBRIN DEGRADATION QUANT: CPT | Performed by: EMERGENCY MEDICINE

## 2025-01-13 PROCEDURE — 96365 THER/PROPH/DIAG IV INF INIT: CPT | Mod: 59

## 2025-01-13 PROCEDURE — 71250 CT THORAX DX C-: CPT

## 2025-01-13 PROCEDURE — 93005 ELECTROCARDIOGRAM TRACING: CPT | Performed by: EMERGENCY MEDICINE

## 2025-01-13 RX ORDER — CLOPIDOGREL BISULFATE 75 MG/1
75 TABLET ORAL DAILY
Status: DISCONTINUED | OUTPATIENT
Start: 2025-01-13 | End: 2025-01-23 | Stop reason: HOSPADM

## 2025-01-13 RX ORDER — ALBUTEROL SULFATE 0.83 MG/ML
2.5 SOLUTION RESPIRATORY (INHALATION) EVERY 6 HOURS PRN
Status: DISCONTINUED | OUTPATIENT
Start: 2025-01-13 | End: 2025-01-23 | Stop reason: HOSPADM

## 2025-01-13 RX ORDER — PIPERACILLIN SODIUM, TAZOBACTAM SODIUM 3; .375 G/15ML; G/15ML
3.38 INJECTION, POWDER, LYOPHILIZED, FOR SOLUTION INTRAVENOUS EVERY 8 HOURS
Status: DISCONTINUED | OUTPATIENT
Start: 2025-01-13 | End: 2025-01-13

## 2025-01-13 RX ORDER — AMOXICILLIN 250 MG
1 CAPSULE ORAL 2 TIMES DAILY PRN
Status: DISCONTINUED | OUTPATIENT
Start: 2025-01-13 | End: 2025-01-22

## 2025-01-13 RX ORDER — PIPERACILLIN SODIUM, TAZOBACTAM SODIUM 2; .25 G/10ML; G/10ML
2.25 INJECTION, POWDER, LYOPHILIZED, FOR SOLUTION INTRAVENOUS EVERY 8 HOURS
Status: DISCONTINUED | OUTPATIENT
Start: 2025-01-13 | End: 2025-01-13

## 2025-01-13 RX ORDER — CALCIUM CARBONATE 500 MG/1
1000 TABLET, CHEWABLE ORAL 4 TIMES DAILY PRN
Status: DISCONTINUED | OUTPATIENT
Start: 2025-01-13 | End: 2025-01-14

## 2025-01-13 RX ORDER — SIMETHICONE 80 MG
80 TABLET,CHEWABLE ORAL 3 TIMES DAILY PRN
Status: DISCONTINUED | OUTPATIENT
Start: 2025-01-13 | End: 2025-01-23 | Stop reason: HOSPADM

## 2025-01-13 RX ORDER — PIPERACILLIN SODIUM, TAZOBACTAM SODIUM 3; .375 G/15ML; G/15ML
3.38 INJECTION, POWDER, LYOPHILIZED, FOR SOLUTION INTRAVENOUS ONCE
Status: COMPLETED | OUTPATIENT
Start: 2025-01-13 | End: 2025-01-13

## 2025-01-13 RX ORDER — PROCHLORPERAZINE MALEATE 5 MG/1
5 TABLET ORAL EVERY 6 HOURS PRN
Status: DISCONTINUED | OUTPATIENT
Start: 2025-01-13 | End: 2025-01-23 | Stop reason: HOSPADM

## 2025-01-13 RX ORDER — MIDODRINE HYDROCHLORIDE 5 MG/1
5-10 TABLET ORAL 3 TIMES DAILY PRN
Status: DISCONTINUED | OUTPATIENT
Start: 2025-01-13 | End: 2025-01-13

## 2025-01-13 RX ORDER — LIDOCAINE 40 MG/G
CREAM TOPICAL
Status: DISCONTINUED | OUTPATIENT
Start: 2025-01-13 | End: 2025-01-23 | Stop reason: HOSPADM

## 2025-01-13 RX ORDER — ONDANSETRON 4 MG/1
4 TABLET, ORALLY DISINTEGRATING ORAL EVERY 6 HOURS PRN
Status: DISCONTINUED | OUTPATIENT
Start: 2025-01-13 | End: 2025-01-17

## 2025-01-13 RX ORDER — NALOXONE HYDROCHLORIDE 0.4 MG/ML
0.2 INJECTION, SOLUTION INTRAMUSCULAR; INTRAVENOUS; SUBCUTANEOUS
Status: DISCONTINUED | OUTPATIENT
Start: 2025-01-13 | End: 2025-01-23 | Stop reason: HOSPADM

## 2025-01-13 RX ORDER — TORSEMIDE 100 MG
50 TABLET ORAL DAILY
Status: DISCONTINUED | OUTPATIENT
Start: 2025-01-13 | End: 2025-01-17

## 2025-01-13 RX ORDER — ACETAMINOPHEN 325 MG/1
650 TABLET ORAL EVERY 4 HOURS PRN
Status: DISCONTINUED | OUTPATIENT
Start: 2025-01-13 | End: 2025-01-22

## 2025-01-13 RX ORDER — ONDANSETRON 2 MG/ML
4 INJECTION INTRAMUSCULAR; INTRAVENOUS EVERY 6 HOURS PRN
Status: DISCONTINUED | OUTPATIENT
Start: 2025-01-13 | End: 2025-01-17

## 2025-01-13 RX ORDER — BISACODYL 5 MG/1
5 TABLET, DELAYED RELEASE ORAL DAILY PRN
COMMUNITY

## 2025-01-13 RX ORDER — LIDOCAINE 4 G/G
1 PATCH TOPICAL DAILY PRN
Status: DISCONTINUED | OUTPATIENT
Start: 2025-01-13 | End: 2025-01-22

## 2025-01-13 RX ORDER — NALOXONE HYDROCHLORIDE 0.4 MG/ML
0.4 INJECTION, SOLUTION INTRAMUSCULAR; INTRAVENOUS; SUBCUTANEOUS
Status: DISCONTINUED | OUTPATIENT
Start: 2025-01-13 | End: 2025-01-23 | Stop reason: HOSPADM

## 2025-01-13 RX ORDER — GUAIFENESIN 200 MG/10ML
200 LIQUID ORAL EVERY 4 HOURS PRN
Status: DISCONTINUED | OUTPATIENT
Start: 2025-01-13 | End: 2025-01-23 | Stop reason: HOSPADM

## 2025-01-13 RX ORDER — PANTOPRAZOLE SODIUM 40 MG/1
40 TABLET, DELAYED RELEASE ORAL
Status: DISCONTINUED | OUTPATIENT
Start: 2025-01-14 | End: 2025-01-18

## 2025-01-13 RX ORDER — MORPHINE SULFATE 4 MG/ML
4 INJECTION, SOLUTION INTRAMUSCULAR; INTRAVENOUS EVERY 30 MIN PRN
Status: DISCONTINUED | OUTPATIENT
Start: 2025-01-13 | End: 2025-01-13

## 2025-01-13 RX ORDER — MIDODRINE HYDROCHLORIDE 5 MG/1
5-10 TABLET ORAL 3 TIMES DAILY
Status: DISCONTINUED | OUTPATIENT
Start: 2025-01-14 | End: 2025-01-23 | Stop reason: HOSPADM

## 2025-01-13 RX ORDER — CEFTRIAXONE 1 G/1
1 INJECTION, POWDER, FOR SOLUTION INTRAMUSCULAR; INTRAVENOUS EVERY 24 HOURS
Status: DISCONTINUED | OUTPATIENT
Start: 2025-01-13 | End: 2025-01-15

## 2025-01-13 RX ORDER — AMOXICILLIN 250 MG
2 CAPSULE ORAL 2 TIMES DAILY PRN
Status: DISCONTINUED | OUTPATIENT
Start: 2025-01-13 | End: 2025-01-23 | Stop reason: HOSPADM

## 2025-01-13 RX ORDER — ONDANSETRON 2 MG/ML
4 INJECTION INTRAMUSCULAR; INTRAVENOUS ONCE
Status: COMPLETED | OUTPATIENT
Start: 2025-01-13 | End: 2025-01-13

## 2025-01-13 RX ORDER — DOXYCYCLINE 100 MG/10ML
100 INJECTION, POWDER, LYOPHILIZED, FOR SOLUTION INTRAVENOUS EVERY 12 HOURS
Status: DISCONTINUED | OUTPATIENT
Start: 2025-01-13 | End: 2025-01-14

## 2025-01-13 RX ORDER — MIDODRINE HYDROCHLORIDE 5 MG/1
5-10 TABLET ORAL 3 TIMES DAILY
Status: DISCONTINUED | OUTPATIENT
Start: 2025-01-13 | End: 2025-01-13

## 2025-01-13 RX ORDER — AZITHROMYCIN 500 MG/5ML
500 INJECTION, POWDER, LYOPHILIZED, FOR SOLUTION INTRAVENOUS EVERY 24 HOURS
Status: DISCONTINUED | OUTPATIENT
Start: 2025-01-13 | End: 2025-01-13

## 2025-01-13 RX ORDER — OXYCODONE HYDROCHLORIDE 5 MG/1
5 TABLET ORAL EVERY 4 HOURS PRN
Status: DISCONTINUED | OUTPATIENT
Start: 2025-01-13 | End: 2025-01-22

## 2025-01-13 RX ORDER — ATORVASTATIN CALCIUM 40 MG/1
80 TABLET, FILM COATED ORAL EVERY EVENING
Status: DISCONTINUED | OUTPATIENT
Start: 2025-01-13 | End: 2025-01-23 | Stop reason: HOSPADM

## 2025-01-13 RX ORDER — HEPARIN SODIUM 5000 [USP'U]/.5ML
5000 INJECTION, SOLUTION INTRAVENOUS; SUBCUTANEOUS EVERY 8 HOURS
Status: DISCONTINUED | OUTPATIENT
Start: 2025-01-13 | End: 2025-01-14

## 2025-01-13 RX ORDER — ACETAMINOPHEN 650 MG/1
650 SUPPOSITORY RECTAL EVERY 4 HOURS PRN
Status: DISCONTINUED | OUTPATIENT
Start: 2025-01-13 | End: 2025-01-22

## 2025-01-13 RX ADMIN — ONDANSETRON 4 MG: 2 INJECTION, SOLUTION INTRAMUSCULAR; INTRAVENOUS at 10:31

## 2025-01-13 RX ADMIN — MORPHINE SULFATE 4 MG: 4 INJECTION, SOLUTION INTRAMUSCULAR; INTRAVENOUS at 11:14

## 2025-01-13 RX ADMIN — PANTOPRAZOLE SODIUM 40 MG: 40 INJECTION, POWDER, FOR SOLUTION INTRAVENOUS at 10:32

## 2025-01-13 RX ADMIN — MORPHINE SULFATE 4 MG: 4 INJECTION, SOLUTION INTRAMUSCULAR; INTRAVENOUS at 10:30

## 2025-01-13 RX ADMIN — ATORVASTATIN CALCIUM 80 MG: 40 TABLET, FILM COATED ORAL at 21:25

## 2025-01-13 RX ADMIN — PIPERACILLIN AND TAZOBACTAM 3.38 G: 3; .375 INJECTION, POWDER, FOR SOLUTION INTRAVENOUS at 13:24

## 2025-01-13 RX ADMIN — CLOPIDOGREL BISULFATE 75 MG: 75 TABLET ORAL at 16:46

## 2025-01-13 RX ADMIN — SODIUM CHLORIDE 500 ML: 9 INJECTION, SOLUTION INTRAVENOUS at 18:56

## 2025-01-13 RX ADMIN — DOXYCYCLINE 100 MG: 100 INJECTION, POWDER, LYOPHILIZED, FOR SOLUTION INTRAVENOUS at 17:47

## 2025-01-13 RX ADMIN — HEPARIN SODIUM 5000 UNITS: 5000 INJECTION, SOLUTION INTRAVENOUS; SUBCUTANEOUS at 23:57

## 2025-01-13 RX ADMIN — CEFTRIAXONE 1 G: 1 INJECTION, POWDER, FOR SOLUTION INTRAMUSCULAR; INTRAVENOUS at 16:46

## 2025-01-13 RX ADMIN — HEPARIN SODIUM 5000 UNITS: 5000 INJECTION, SOLUTION INTRAVENOUS; SUBCUTANEOUS at 16:47

## 2025-01-13 ASSESSMENT — ACTIVITIES OF DAILY LIVING (ADL)
ADLS_ACUITY_SCORE: 58
ADLS_ACUITY_SCORE: 58
ADLS_ACUITY_SCORE: 65
ADLS_ACUITY_SCORE: 58
ADLS_ACUITY_SCORE: 65
ADLS_ACUITY_SCORE: 64
ADLS_ACUITY_SCORE: 65
ADLS_ACUITY_SCORE: 65
ADLS_ACUITY_SCORE: 64
ADLS_ACUITY_SCORE: 65
ADLS_ACUITY_SCORE: 58
ADLS_ACUITY_SCORE: 58

## 2025-01-13 ASSESSMENT — COLUMBIA-SUICIDE SEVERITY RATING SCALE - C-SSRS
1. IN THE PAST MONTH, HAVE YOU WISHED YOU WERE DEAD OR WISHED YOU COULD GO TO SLEEP AND NOT WAKE UP?: NO
2. HAVE YOU ACTUALLY HAD ANY THOUGHTS OF KILLING YOURSELF IN THE PAST MONTH?: NO
6. HAVE YOU EVER DONE ANYTHING, STARTED TO DO ANYTHING, OR PREPARED TO DO ANYTHING TO END YOUR LIFE?: NO

## 2025-01-13 NOTE — PHARMACY-ADMISSION MEDICATION HISTORY
Pharmacist Admission Medication History    Admission medication history is complete. The information provided in this note is only as accurate as the sources available at the time of the update.    Information Source(s): Patient, Clinic records, and CareEverywhere/SureScripts via in-person    Pertinent Information: Patient has all her pills crush and do not tolerate many pills despite being crushed (feeling nausea/vomits).     Changes made to PTA medication list:  Added: A+D ointment to her buttocks, has pressure ulcer/bruise per patient's .   Deleted: Miralax, multivitamin  Changed: lidocaine to prn  Midodrine 10mg when SBP <90. If SBP >90 and <120, her  SCHEDULES 5mg TID stating this was recommended to them to start. Does not give midodrine when SBP>120.   Simethicone to PRN    Allergies reviewed with patient and updates made in EHR: yes    Medication History Completed By: Fantasma Junior RPH 1/13/2025 3:06 PM    PTA Med List   Medication Sig Note Last Dose/Taking    acetaminophen (TYLENOL) 325 MG tablet Take 2 tablets (650 mg) by mouth every 4 hours as needed for other (mild pain (1-3)).  Unknown    albuterol (PROVENTIL) (2.5 MG/3ML) 0.083% neb solution Take 1 vial (2.5 mg) by nebulization every 6 hours as needed for wheezing.  Unknown    atorvastatin (LIPITOR) 80 MG tablet Take 1 tablet (80 mg) by mouth every evening.  1/12/2025 Morning    bisacodyl (DULCOLAX) 10 MG suppository Place 1 suppository (10 mg) rectally daily as needed for constipation (Use if Magnesium hydroxide (MILK of MAGNESIA) not effective after 24 hours. May discontinue if patient having bowel movement.).  Unknown    bisacodyl (DULCOLAX) 5 MG EC tablet Take 5 mg by mouth daily as needed for constipation.  Unknown    clopidogrel (PLAVIX) 75 MG tablet Take 1 tablet (75 mg) by mouth daily.  1/12/2025    Lidocaine (LIDOCARE) 4 % Patch Place 1 patch over 12 hours onto the skin every 24 hours. To prevent lidocaine toxicity, patient  should be patch free for 12 hrs daily. (Patient taking differently: Place 1 patch onto the skin daily as needed for other (neck pain). To prevent lidocaine toxicity, patient should be patch free for 12 hrs daily.)  Taking Differently    midodrine (PROAMATINE) 10 MG tablet Take 1 tablet (10 mg) by mouth 3 times daily as needed (SBP <90). (Patient taking differently: Take 5-10 mg by mouth 3 times daily as needed (SBP <90). 10mg when patient's SBP<90. Gives 5mg TID when patient's SBP >90. HOLD doses when SBP >120.)  1/12/2025    ondansetron (ZOFRAN) 8 MG tablet Take 1 tablet (8 mg) by mouth every 8 hours as needed for nausea or vomiting.  Past Week    pantoprazole (PROTONIX) 40 MG EC tablet Take 1 tablet (40 mg) by mouth every morning (before breakfast).  1/12/2025 Morning    simethicone (MYLICON) 80 MG chewable tablet Take 1 tablet (80 mg) by mouth 3 times daily. (Patient taking differently: Take 80 mg by mouth 3 times daily as needed.)  Taking Differently    sodium chloride (NEBUSAL) 3 % neb solution Take 3 mLs by nebulization every 6 hours as needed for wheezing.  Taking As Needed    torsemide (DEMADEX) 100 MG tablet Take 0.5 tablets (50 mg) by mouth daily.  1/12/2025 Morning    traZODone (DESYREL) 50 MG tablet Take 0.5 tablets (25 mg) by mouth nightly as needed for sleep.  1/12/2025 Bedtime    zinc oxide 10 % OINT Externally apply topically 4 times daily as needed (for buttock wound). 1/13/2025: A&D ointment Taking As Needed

## 2025-01-13 NOTE — ED TRIAGE NOTES
Pt arrives to ED with c/o ongoing abdominal pain nausea and vomiting for the past couple of days. No appetite with weakness. Pt was hospitalized in August and needed open heart surgery and dialysis. No longer on dialysis. Has pacemaker.  states she has been coughing up blood.      Triage Assessment (Adult)       Row Name 01/13/25 0942          Triage Assessment    Airway WDL WDL        Respiratory WDL    Respiratory WDL X;rhythm/pattern     Rhythm/Pattern, Respiratory dyspnea upon exertion;shortness of breath        Skin Circulation/Temperature WDL    Skin Circulation/Temperature WDL WDL        Cardiac WDL    Cardiac WDL WDL        Peripheral/Neurovascular WDL    Peripheral Neurovascular WDL WDL        Cognitive/Neuro/Behavioral WDL    Cognitive/Neuro/Behavioral WDL WDL

## 2025-01-13 NOTE — ED NOTES
Unable to get oxygenation and pulse in triage. Pt brought back to room and oxygen at 88% applied 2L of nasal cannula. Pt endorses she was on supplimental oxygen at home but recently states the doctor said she didn't need it anymore but use to it at night.

## 2025-01-13 NOTE — ED PROVIDER NOTES
EMERGENCY DEPARTMENT ENCOUNTER      NAME: Felicitas Elliott  AGE: 84 year old female  YOB: 1940  MRN: [id number]  EVALUATION DATE & TIME: 1/13/2025  9:46 AM    PCP: Timbo Medina    ED PROVIDER: Marvel Acosta M.D.      Chief Complaint   Patient presents with    Abdominal Pain         FINAL IMPRESSION:  1.  Acute abdominal pain.  2.  Acute hematemesis.  3.  Chronic nausea vomiting x 3 to 4 months.  4.  Acute hypoxia.  5.  Acute right lower lobe pneumonia.  6.  Acute elevated D-dimer.    ED COURSE & MEDICAL DECISION MAKING:    10:05 AM I met with the patient to gather history and to perform my initial exam. We discussed plans for the ED course, including diagnostic testing and treatment. PPE worn: cloth mask.  October patient underwent coronary artery bypass grafting and temporary dialysis.  No longer on dialysis.  Patient notes protracted nausea vomiting since October 2 or 3 episodes a day.  Now with 2 days of diffuse abdominal pain and 2 days of hematemesis.  Hypoxic on room air at 88%.  1:01 PM.  White count elevated.  Left shifted.  BUN/creatinine elevated.  BNP elevated.  D-dimer elevated.  Creatinine too high to do scan.  Viral testing negative.  We did start antibiotics for pneumonia.  Plan admit patient to cardiac telemetry inpatient.  Will page the hospitalist service.  We did a quite family with results.  1:43 PM.  Hospitalist in agreement with the [facility] inpatient admission.    Pertinent Labs & Imaging studies reviewed. (See chart for details)  84 year old female presents to the Emergency Department for evaluation of nausea and vomiting.  At the conclusion of the encounter I discussed the results of all of the tests and the disposition. The questions were answered. The patient or family acknowledged understanding and was agreeable with the care plan.              Medical Decision Making  Obtained supplemental history:Supplemental history obtained?: Documented in chart and  Family Member/Significant Other  Reviewed external records: External records reviewed?: No  Care impacted by chronic illness:Chronic Kidney Disease, Heart Disease, Hyperlipidemia, and Other: sepsis, CABG, CAD  Did you consider but not order tests?: Work up considered but not performed and documented in chart, if applicable  Did you interpret images independently?: Independent interpretation of ECG and images noted in documentation, when applicable.  Consultation discussion with other provider: If admitted, will discuss with the hospitalist service.  Possible admission given symptoms and hypoxia.    MIPS: Not Applicable        MEDICATIONS GIVEN IN THE EMERGENCY:  Medications - No data to display    NEW PRESCRIPTIONS STARTED AT TODAY'S ER VISIT  New Prescriptions    No medications on file          =================================================================    HPI    Patient information was obtained from: patient     Use of : N/A         Felicitasthomas Elliott is a 84 year old female with a pertinent history of CAD, sepsis, s/p CABG, arthritis, HFpEF, CKD-3, mitral valve disorder, hyperlipidemia, and spinal stenosis of cervicothoracic region, who presents to this ED by private car for evaluation of abdominal pain.    Patient reports to vomiting 2-3 times a day since October. Patient sates the vomiting started after her visit at [facility]. Patient reports she started throwing up blood yesterday. She reports her abdominal pain started yesterday. She states the pain is generalized and the pain is at a 7/10. Patient does not have much of an appetite. Patient states she is off dialysis now.     She does not identify any waxing or waning symptoms otherwise, exacerbating or alleviating features, associated symptoms except as mentioned. She denies any other pain related complaints.    REVIEW OF SYSTEMS   Review of Systems patient complaining of nausea vomiting, hematemesis, diffuse abdominal pain.    PAST  MEDICAL HISTORY:  No past medical history on file.    PAST SURGICAL HISTORY:  Past Surgical History:   Procedure Laterality Date    CORONARY ARTERY BYPASS GRAFT, WITH AORTIC VALVE REPLACEMENT, WITH ENDOSCOPIC VESSEL PROCUREMENT N/A 8/27/2024    Procedure: CORONARY ARTERY BYPASS GRAFT TIMES TWO, WITH AORTIC ROOT REPLACEMENT, WITH LEFT INTERNAL MAMMARY ARTERY HARVEST, LEFT SAPHNENOUS ENDOSCOPIC VESSEL PROCUREMENT,;  Surgeon: Dylan Renae MD;  Location: [facility] OR    CV CORONARY ANGIOGRAM N/A 8/20/2024    Procedure: CV CORONARY ANGIOGRAM;  Surgeon: Den Wylie MD;  Location: [facility] CATH LAB CV    EP PACEMAKER DEVICE & IMPLANT- HIS LEAD DUAL N/A 9/12/2024    Procedure: Pacemaker Device & Lead Implant - HIS Lead Dual;  Surgeon: Minh Pina MD;  Location: [facility] CATH LAB CV    IR CVC NON TUNNEL PLACEMENT > 5 YRS  9/1/2024    IR CVC TUNNEL PLACEMENT > 5 YRS OF AGE  9/11/2024    PICC TRIPLE LUMEN PLACEMENT  9/6/2024    REPLACE VALVE MITRAL N/A 8/27/2024    Procedure: MITRAL VALVE REPLACEMENT,;  Surgeon: Dylan Renae MD;  Location: [facility] OR    TRANSESOPHAGEAL ECHOCARDIOGRAM INTRAOPERATIVE  8/27/2024    Procedure: ANESTHESIA TRANSESOPHAGEAL ECHOCARDIOGRAM, EPI-AORTIC ULTRASOUND;  Surgeon: Dylan Renae MD;  Location: [facility] OR           CURRENT MEDICATIONS:    acetaminophen (TYLENOL) 325 MG tablet  albuterol (PROVENTIL) (2.5 MG/3ML) 0.083% neb solution  atorvastatin (LIPITOR) 80 MG tablet  bisacodyl (DULCOLAX) 10 MG suppository  clopidogrel (PLAVIX) 75 MG tablet  Lidocaine (LIDOCARE) 4 % Patch  melatonin 5 MG tablet  midodrine (PROAMATINE) 10 MG tablet  multivitamin w/minerals (THERA-VIT-M) tablet  ondansetron (ZOFRAN) 8 MG tablet  pantoprazole (PROTONIX) 40 MG EC tablet  polyethylene glycol (MIRALAX) 17 GM/Dose powder  senna (SENOKOT) 8.6 MG tablet  senna-docusate (SENOKOT-S/PERICOLACE) 8.6-50 MG tablet  simethicone (MYLICON) 80 MG chewable  tablet  sodium chloride (NEBUSAL) 3 % neb solution  torsemide (DEMADEX) 100 MG tablet  traZODone (DESYREL) 50 MG tablet        ALLERGIES:  Allergies   Allergen Reactions    Aspirin Rash    Cats Rash    Nickel Rash       FAMILY HISTORY:  No family history on file.    SOCIAL HISTORY:   Social History     Socioeconomic History    Marital status:      Spouse name: Ed    Number of children: 7    Years of education: [employer]    Highest education level: 12th grade   Occupational History    Occupation:    Tobacco Use    Smoking status: Former     Current packs/day: 8.00     Average packs/day: 8.0 packs/day for 1 year (8.3 ttl pk-yrs)     Types: Cigarettes     Start date: 2024    Smokeless tobacco: Former   Vaping Use    Vaping status: Never Used   Substance and Sexual Activity    Alcohol use: Not Currently    Sexual activity: Not Currently     Partners: Male     Birth control/protection: None     Social Drivers of Health     Financial Resource Strain: Low Risk  (9/16/2024)    Financial Resource Strain     Within the past 12 months, have you or your family members you live with been unable to get utilities (heat, electricity) when it was really needed?: No   Food Insecurity: Low Risk  (9/16/2024)    Food Insecurity     Within the past 12 months, did you worry that your food would run out before you got money to buy more?: No     Within the past 12 months, did the food you bought just not last and you didn t have money to get more?: No   Transportation Needs: Low Risk  (9/16/2024)    Transportation Needs     Within the past 12 months, has lack of transportation kept you from medical appointments, getting your medicines, non-medical meetings or appointments, work, or from getting things that you need?: No   Social Connections: Moderately Integrated (9/16/2024)    Social Connection and Isolation Panel [NHANES]     Frequency of Communication with Friends and Family: More than three times a week     Frequency  of Social Gatherings with Friends and Family: Patient unable to answer     Attends Tenriism Services: More than 4 times per year     Active Member of Clubs or Organizations: Patient declined     Attends Club or Organization Meetings: Never     Marital Status:    Interpersonal Safety: Low Risk  (9/16/2024)    Interpersonal Safety     Do you feel physically and emotionally safe where you currently live?: Yes     Within the past 12 months, have you been hit, slapped, kicked or otherwise physically hurt by someone?: No     Within the past 12 months, have you been humiliated or emotionally abused in other ways by your partner or ex-partner?: No   Recent Concern: Interpersonal Safety - High Risk (8/23/2024)    Interpersonal Safety     Do you feel physically and emotionally safe where you currently live?: No     Within the past 12 months, have you been hit, slapped, kicked or otherwise physically hurt by someone?: No     Within the past 12 months, have you been humiliated or emotionally abused in other ways by your partner or ex-partner?: No   Housing Stability: Low Risk  (9/16/2024)    Housing Stability     Do you have housing? : Yes     Are you worried about losing your housing?: No   Recent Concern: Housing Stability - High Risk (9/16/2024)    Housing Stability     Do you have housing? : No     Are you worried about losing your housing?: No     Former smoker.  No current drugs, alcohol, or tobacco.    VITALS:  /63   Pulse 100   Temp 97.2  F (36.2  C) (Temporal)   Resp 30   Wt 64.9 kg (143 lb)   SpO2 94%   BMI 22.40 kg/m      PHYSICAL EXAM    Vital Signs:  /63   Pulse 100   Temp 97.2  F (36.2  C) (Temporal)   Resp 30   Wt 64.9 kg (143 lb)   SpO2 94%   BMI 22.40 kg/m    General:  On entering the room she is in no apparent distress.    Neck:  Neck supple with full range of motion and nontender.    Back:  Back and spine are nontender.  No costovertebral angle tenderness.    HEENT:  Oropharynx  clear with moist mucous membranes.  HEENT unremarkable.    Pulmonary:  Chest clear to auscultation without rhonchi rales or wheezing.    Cardiovascular:  Cardiac regular rate and rhythm without murmurs rubs or gallops.    Abdomen:  Abdomen soft and diffusely mildly tender throughout.  There is no rebound or guarding.    Muskuloskeletal:  She moves all 4 without any difficulty and has normal neurovascular exams.  Extremities without clubbing, cyanosis, or edema.  Legs and calves are nontender.    Neuro:  She is alert and oriented ×3 and moves all extremities symmetrically.    Psych:  Normal affect.    Skin:  Unremarkable and warm and dry.       LAB:  All pertinent labs reviewed and interpreted.  Labs Ordered and Resulted from Time of ED Arrival to Time of ED Departure - No data to display    RADIOLOGY:  Reviewed all pertinent imaging. Please see official radiology report.  No orders to display              EKG:    Wide-complex tachycardia at 102.  Right bundle branch block, left axis deviation, similar to previous EKG October 31, 2024.    I have independently reviewed and interpreted the EKG(s) documented above.    PROCEDURES:         I, Sylvain Ang, am serving as a scribe to document services personally performed by Dr. Acosta based on my observation and the provider's statements to me. I, Marvel Acosta MD attest that Sylvain Ang is acting in a scribe capacity, has observed my performance of the services and has documented them in accordance with my direction.    Marvel Acosta M.D.  Emergency Medicine  [facility] EMERGENCY ROOM  [facility] [facility] [id number] [phone]  Dept: [phone]       Marvel Acosta MD  01/13/25 1302       Marvel Acosta MD  01/13/25 3330

## 2025-01-13 NOTE — H&P
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    ED Course: Mildly tachycardic on arrival with , BP soft with systolic 100s, afebrile. O2 sats dropped to 88% on RA, improved to mid 90s on 2L/NC oxygen supplement. Lab workup is remarkable for elevated creatinine 4.03, eGFR 10, anion gap mildly elevated at 16.  LFT with mildly low albumin 3.3, remainder grossly unremarkable.  CBC with mild leukocytosis 14.5, mildly decreased platelets 122.  D-dimer elevated at 14.84.  Lipase normal at 15.  proBNP elevated at 8775.  Initial troponin T elevated to 77, delta troponin 73.  COVID-19, influenza, RSV PCR panel negative.  CT A/P without contrast negative for significant abnormality in the abdomen, though does note \"Some mild infiltrates right lower lobe may represent a mild pneumonia with associated mild atelectasis both lung bases.\"  ECG shows wide QRS rhythm, RBBB, left axis deviation, prolonged Qtc 531, no obvious acute ischemic changes per my read. Given Zofran, 40 mg IV Protonix, and started on Zosyn.      Acute respiratory failure with hypoxia  Community acquired pneumonia of RLL  Presents with roughly 1 week of productive cough, with new hemoptysis on 1/12. Denies fever/chills. CT A/P revealed infiltrates in RLL. Hypoxic in ED to 88%, improved to mid 90s on 2L/NC with occasional desats with conversation.  - " Continue IV antibiotic treatment with ceftriaxone and doxycycline  - NS bolus 500 mL x 1 now  - Continuous pulse oximetry  - Supplemental oxygen as needed to maintain saturations >92%  - Robitussin PRN for cough expectorant  - Encourage incentive spirometry  - AM labs: CBC, BMP    Nausea with vomiting  Poor oral intake  Physical deconditioning   Ongoing problems for patient since prolonged hospitalization in 8/2024, with lack of appetite, poor oral intake, and dry heaving with occasional vomiting. CT A/P negative for acute intra-abdominal process. Also has associated ongoing weakness.  - Nutrition consult - would benefit from high nutrition drinks like Ensure  - PRN Zofran, Compazine for nausea management  - Resume home Protonix  - PT/OT evaluation and treatment  -  consult for safe discharge planning    Acute on chronic kidney disease stage 3b-4  Patient had persistent post-operative HAMMAD during last hospitalization in 8/2024 requiring initiation of hemodialysis MWF on 9/9/2024, with Cr goal 2-3 per patient. Last dialysis run was on 1/2/2024, and patient was due for follow-up labs 1/13 but presented to ED instead.   Cr 4.03, eGFR 10 on arrival. Baseline Cr unclear, no recent labs in chart since 10/2024.  - Nephrology consult  - Avoid nephrotoxic agents  - Recheck BMP in AM    Elevated D-dimer  D-dimer elevated to 14.84 on arrival. Cr too elevated to perform CT Chest PE Study. Patient does report some shortness of breath with productive cough and mild hemoptysis but this seems more consistent with acute pneumonia at this time.  - Monitor for improvement on antibiotics as above - if no improvement seen or symptoms are worsening, then consider further evaluation for PE    History of endocarditis complicated by surgical MVR/AVR (8/2024)   History of sick sinus syndrome s/p pacemaker placement (9/2024)  Severe CAD s/p CABG x 2 (8/2024)  Chronic heart failure with preserved EF  Chronic hypotension  Follows with [facility]. Most recent echo with LVEF 55 to 60% (as of 12/10/2024). proBNP elevated on arrival but patient appears grossly euvolemic. ECG with prolonged Qtc 531 on arrival.  - Telemetry  - Avoid QT prolonging medications as able  - Resume home Plavix  - Resume home atorvastatin  - Resume home midodrine TID (10 mg if SBP <90, 5 mg if SBP >90 but <120, hold if SBP >120)  - Hold home torsemide for now    History of prediabetes  Glucose 124 on arrival. Hgb A1c 6.4 (as of 8/16/2024).  - Recheck Hgb A1c - now normalized to 5.6        Diet: Combination Diet Low Saturated Fat Na <2400mg Diet  Snacks/Supplements Adult: Ensure Enlive; With Meals  DVT Prophylaxis: Heparin SQ  Le Catheter: Not present  Lines: PRESENT             Cardiac Monitoring: ACTIVE order. Indication: QTc prolonging medication (48 hours)  Code Status: No CPR- Do NOT Intubate    Clinically Significant Risk Factors Present on Admission               # Hypoalbuminemia: Lowest albumin = 3.3 g/dL at 1/13/2025 10:23 AM, will monitor as appropriate   # Drug Induced Platelet Defect: home medication list includes an antiplatelet medication                 # Financial/Environmental Concerns:     # Pacemaker present  # History of CABG: noted on surgical history       Disposition Plan     Medically Ready for Discharge: Anticipated in 2-4 Days         The patient's care was discussed with the Attending Physician, Dr. Noé Velez .    Brandi Montoya PA-C  Hospitalist Service  [facility]  Securely message with qcue (more info)  Text page via [employer] Paging/Directory     ______________________________________________________________________    Chief Complaint   Coughing up blood, Nausea, Abdominal pain    History is obtained from the patient    History of Present Illness   Felicitas Elliott is a 84 year old female with PMHx significant for recent prolonged hospitalization 8-10/2024 notable for CAD s/p CABG x2 (8/27/2024), endocarditis  complicated by CVA and surgical AVR/MVR, sick sinus syndrome s/p pacemaker,  chronic heart failure with preserved EF 55-60%, CKD 3b requiring temporary dialysis 9/2024-1/2024, former tobacco use disorder, anxiety and depression who presented to the ED for evaluation of coughing up blood.    Of note, patient has had complicated medical course over the past several months.  She was recently admitted to [facility] from 8/14 - 8/16/2024 after presenting with weakness, chills, fatigue, and poor appetite. TTE showed severe aortic stenosis.  MIGDALIA was subsequently performed and was positive for mitral and aortic valve vegetations.  Patient was transferred to [facility] for CT surgery evaluation.  At [facility], patient was found to have sepsis and HAMMAD as well.  Patient was initiated on empiric antibiotics (cefazolin, vancomycin) and ID was consulted.  Cultures positive for MSSA.  On 8/27/2024, patient underwent aortic root replacement and mitral valve replacement, as well as CABG x 2.  Postoperative state was complicated by hypoxia requiring reintubation and hypotension requiring pressor support.  CRRT was initiated on postop day 2 given postop HAMMAD with minimal urine output.  She was transitioned to hemodialysis MWF on 9/9/2024.  Additionally, patient had postoperative arrhythmias and bradycardia resulting in hypotension, so pacemaker was placed on 9/12/2024.  Patient was discharged with plan for warfarin for 3 months (discontinued 11/27/2024) for prosthetic heart valve followed by Plavix indefinitely.  Also was treated with IV vancomycin through October 8 per ID. She was discharged to [facility] on 9/16/2024.    Patient reports that she has had ongoing lack of appetite, poor oral intake, and dry heaves since past admission.  Her  states that over the past week or so she has barely eaten anything each day.  Patient reports that she has had a productive cough for the past week or so, yesterday noted that she coughed up some  foamy mucus with blood.  Additionally she was unable to tolerate her medications yesterday and vomited them up shortly after taking them.  She reports anxiety which she feels affects her breathing and she has had occasional pains in her chest with breathing.  She uses 1.5 L/NC oxygen supplement overnight but is on room air during the day.  She is also very nervous about getting constipated.   states that her stools are soft to loose.     states that patient's last day of dialysis was 1/2/2025.  Patient was supposed to present for repeat lab work today but this appointment was missed due to for coming to the ED.  Patient denies any problems with urination but states that she does not typically feel the urge to urinate, so when she does she ends up being incontinent of urine.    Patient lives with her  in a multilevel home.  She states that she is too weak to go up the stairs currently.  She ambulates with a walker at baseline.  Her  manages her medications.  Patient is a former smoker but quit several months ago.  She is frustrated with her  because he keeps urging her to eat but she does not have an appetite.      Past Medical History    No past medical history on file.    Past Surgical History   Past Surgical History:   Procedure Laterality Date    CORONARY ARTERY BYPASS GRAFT, WITH AORTIC VALVE REPLACEMENT, WITH ENDOSCOPIC VESSEL PROCUREMENT N/A 8/27/2024    Procedure: CORONARY ARTERY BYPASS GRAFT TIMES TWO, WITH AORTIC ROOT REPLACEMENT, WITH LEFT INTERNAL MAMMARY ARTERY HARVEST, LEFT SAPHNENOUS ENDOSCOPIC VESSEL PROCUREMENT,;  Surgeon: Dylan Renae MD;  Location: [facility] Main OR    CV CORONARY ANGIOGRAM N/A 8/20/2024    Procedure: CV CORONARY ANGIOGRAM;  Surgeon: Den Wylie MD;  Location: [facility] CATH LAB CV    EP PACEMAKER DEVICE & IMPLANT- HIS LEAD DUAL N/A 9/12/2024    Procedure: Pacemaker Device & Lead Implant - HIS Lead Dual;  Surgeon: Minh Pina  MD;  Location: [facility] CATH LAB CV    IR CVC NON TUNNEL PLACEMENT > 5 YRS  9/1/2024    IR CVC TUNNEL PLACEMENT > 5 YRS OF AGE  9/11/2024    PICC TRIPLE LUMEN PLACEMENT  9/6/2024    REPLACE VALVE MITRAL N/A 8/27/2024    Procedure: MITRAL VALVE REPLACEMENT,;  Surgeon: Dylan Renae MD;  [place] [facility] [place]    TRANSESOPHAGEAL ECHOCARDIOGRAM INTRAOPERATIVE  8/27/2024    Procedure: ANESTHESIA TRANSESOPHAGEAL ECHOCARDIOGRAM, EPI-AORTIC ULTRASOUND;  Surgeon: Dylan Renae MD;  [facility] [facility]       Prior to Admission Medications   Prior to Admission Medications   Prescriptions Last Dose Informant Patient Reported? Taking?   Lidocaine (LIDOCARE) 4 % Patch   No Yes   Sig: Place 1 patch over 12 hours onto the skin every 24 hours. To prevent lidocaine toxicity, patient should be patch free for 12 hrs daily.   Patient taking differently: Place 1 patch onto the skin daily as needed for other (neck pain). To prevent lidocaine toxicity, patient should be patch free for 12 hrs daily.   acetaminophen (TYLENOL) 325 MG tablet Unknown  No Yes   Sig: Take 2 tablets (650 mg) by mouth every 4 hours as needed for other (mild pain (1-3)).   albuterol (PROVENTIL) (2.5 MG/3ML) 0.083% neb solution Unknown  No Yes   Sig: Take 1 vial (2.5 mg) by nebulization every 6 hours as needed for wheezing.   atorvastatin (LIPITOR) 80 MG tablet 1/12/2025 Morning  No Yes   Sig: Take 1 tablet (80 mg) by mouth every evening.   bisacodyl (DULCOLAX) 10 MG suppository Unknown  No Yes   Sig: Place 1 suppository (10 mg) rectally daily as needed for constipation (Use if Magnesium hydroxide (MILK of MAGNESIA) not effective after 24 hours. May discontinue if patient having bowel movement.).   bisacodyl (DULCOLAX) 5 MG EC tablet Unknown  Yes Yes   Sig: Take 5 mg by mouth daily as needed for constipation.   clopidogrel (PLAVIX) 75 MG tablet 1/12/2025  No Yes   Sig: Take 1 tablet (75 mg) by mouth daily.   midodrine  (PROAMATINE) 10 MG tablet 1/12/2025  No Yes   Sig: Take 1 tablet (10 mg) by mouth 3 times daily as needed (SBP <90).   Patient taking differently: Take 5-10 mg by mouth 3 times daily as needed (SBP <90). 10mg when patient's SBP<90. Gives 5mg TID when patient's SBP >90. HOLD doses when SBP >120.   ondansetron (ZOFRAN) 8 MG tablet Past Week  No Yes   Sig: Take 1 tablet (8 mg) by mouth every 8 hours as needed for nausea or vomiting.   pantoprazole (PROTONIX) 40 MG EC tablet 1/12/2025 Morning  No Yes   Sig: Take 1 tablet (40 mg) by mouth every morning (before breakfast).   senna (SENOKOT) 8.6 MG tablet Not Taking  No No   Sig: Take 1 tablet by mouth daily.   Patient not taking: Reported on 1/13/2025   simethicone (MYLICON) 80 MG chewable tablet   No Yes   Sig: Take 1 tablet (80 mg) by mouth 3 times daily.   Patient taking differently: Take 80 mg by mouth 3 times daily as needed.   sodium chloride (NEBUSAL) 3 % neb solution   No Yes   Sig: Take 3 mLs by nebulization every 6 hours as needed for wheezing.   torsemide (DEMADEX) 100 MG tablet 1/12/2025 Morning  No Yes   Sig: Take 0.5 tablets (50 mg) by mouth daily.   traZODone (DESYREL) 50 MG tablet 1/12/2025 Bedtime  No Yes   Sig: Take 0.5 tablets (25 mg) by mouth nightly as needed for sleep.   zinc oxide 10 % OINT   Yes Yes   Sig: Externally apply topically 4 times daily as needed (for buttock wound).      Facility-Administered Medications: None        Review of Systems    The 10 point Review of Systems is negative other than noted in the HPI or here.     Social History   I have reviewed this patient's social history and updated it with pertinent information if needed.  Social History     Tobacco Use    Smoking status: Former     Current packs/day: 8.00     Average packs/day: 8.0 packs/day for 1 year (8.3 ttl pk-yrs)     Types: Cigarettes     Start date: 2024    Smokeless tobacco: Former   Vaping Use    Vaping status: Never Used   Substance Use Topics    Alcohol use: Not  Currently         Allergies   Allergies   Allergen Reactions    Aspirin Rash    Cats Rash    Nickel Rash        Physical Exam   Vital Signs: Temp: 97.1  F (36.2  C) Temp src: Oral BP: 117/57 Pulse: 90   Resp: 18 SpO2: 94 % O2 Device: Nasal cannula Oxygen Delivery: 2 LPM  Weight: 142 lbs 6.67 oz    General Appearance: Awake, alert, frail appearing, in no acute distress.  Respiratory: Clear to auscultation bilaterally. Normal respiratory effort on 2L/NC.  Chest: Dialysis port in place to right chest wall.  Cardiovascular: Regular rate and rhythm, no murmur. Extremities are warm and well-perfused.  GI: Soft, tender to palpation over the epigastrium, non-distended. Normal bowel sounds.  Skin: No obvious rashes or lesions on observed skin.  Musculoskeletal: Able to move all extremities freely. Trace bilateral lower extremity edema.  Neurologic: Alert and oriented x 3. Strength and sensation are grossly equal and intact in bilateral upper and lower extremities.  Psychiatric: Calm, cooperative with exam.      Medical Decision Making       90 MINUTES SPENT BY ME on the date of service doing chart review, history, exam, documentation & further activities per the note.      Data     I have personally reviewed the following data over the past 24 hrs:    14.5 (H)  \   12.3   / 122 (L)     140 98 43.5 (H) /  124 (H)   3.9 26 4.03 (H) \     ALT: <5 AST: 18 AP: 89 TBILI: 0.8   ALB: 3.3 (L) TOT PROTEIN: 7.0 LIPASE: 15     Trop: 73 (H) BNP: 8,775 (H)     TSH: N/A T4: N/A A1C: 5.6     INR:  N/A PTT:  N/A   D-dimer:  14.84 (H) Fibrinogen:  N/A       Imaging results reviewed over the past 24 hrs:   Recent Results (from the past 24 hours)   Abd/pelvis CT no contrast - Stone Protocol    Narrative    EXAM: CT ABDOMEN PELVIS W/O CONTRAST  LOCATION: [facility]  DATE: 1/13/2025    INDICATION: diffuse AP with nausea and vomiting  COMPARISON: Chest x-ray 9/30/2024. CT chest abdomen pelvis 8/14/2024. And ultrasound  8/30/2024  TECHNIQUE: CT scan of the abdomen and pelvis was performed without IV contrast. Multiplanar reformats were obtained. Dose reduction techniques were used.  CONTRAST: None.    FINDINGS:   LOWER CHEST: Mild infiltrates right lower lobe with associated minimal atelectasis in both lung bases more prominent than 8/14/2024.    HEPATOBILIARY: Normal.    PANCREAS: Normal.    SPLEEN: Normal.    ADRENAL GLANDS: Small left adrenal adenoma unchanged. No follow-up needed for this.    KIDNEYS/BLADDER: No renal stones and no hydronephrosis. Mild atrophy left kidney. Faint hypodensity right mid kidney posterior laterally again measures 11 mm likely cyst. No specific follow-up needed for this.    BOWEL: Scattered diverticula. Otherwise normal. Normal appendix.    LYMPH NODES: Normal.    VASCULATURE: Moderate calcified plaque. No aneurysms.    PELVIC ORGANS: Normal.    MUSCULOSKELETAL: No concerning bone lesion.      Impression    IMPRESSION:   1.  No significant abnormality in the abdomen or pelvis.    2.  Some mild infiltrates right lower lobe may represent a mild pneumonia with associated mild atelectasis both lung bases.

## 2025-01-14 ENCOUNTER — APPOINTMENT (OUTPATIENT)
Dept: OCCUPATIONAL THERAPY | Facility: CLINIC | Age: 85
DRG: 193 | End: 2025-01-14
Payer: COMMERCIAL

## 2025-01-14 ENCOUNTER — APPOINTMENT (OUTPATIENT)
Dept: CT IMAGING | Facility: CLINIC | Age: 85
DRG: 193 | End: 2025-01-14
Attending: HOSPITALIST
Payer: COMMERCIAL

## 2025-01-14 LAB
ANION GAP SERPL CALCULATED.3IONS-SCNC: 13 MMOL/L (ref 7–15)
BUN SERPL-MCNC: 45.3 MG/DL (ref 8–23)
CALCIUM SERPL-MCNC: 9.3 MG/DL (ref 8.8–10.4)
CHLORIDE SERPL-SCNC: 99 MMOL/L (ref 98–107)
CREAT SERPL-MCNC: 3.71 MG/DL (ref 0.51–0.95)
CYSTATIN C (ROCHE): 4 MG/L (ref 0.6–1)
EGFRCR SERPLBLD CKD-EPI 2021: 11 ML/MIN/1.73M2
ERYTHROCYTE [DISTWIDTH] IN BLOOD BY AUTOMATED COUNT: 17.5 % (ref 10–15)
ERYTHROCYTE [DISTWIDTH] IN BLOOD BY AUTOMATED COUNT: 17.6 % (ref 10–15)
GFR/BSA.PRED SERPLBLD CYS-BASED-ARV: 10 ML/MIN/1.73M2
GLUCOSE SERPL-MCNC: 79 MG/DL (ref 70–99)
HCO3 SERPL-SCNC: 28 MMOL/L (ref 22–29)
HCT VFR BLD AUTO: 33.1 % (ref 35–47)
HCT VFR BLD AUTO: 34.6 % (ref 35–47)
HGB BLD-MCNC: 10 G/DL (ref 11.7–15.7)
HGB BLD-MCNC: 10.8 G/DL (ref 11.7–15.7)
MCH RBC QN AUTO: 27 PG (ref 26.5–33)
MCH RBC QN AUTO: 28.7 PG (ref 26.5–33)
MCHC RBC AUTO-ENTMCNC: 30.2 G/DL (ref 31.5–36.5)
MCHC RBC AUTO-ENTMCNC: 31.2 G/DL (ref 31.5–36.5)
MCV RBC AUTO: 90 FL (ref 78–100)
MCV RBC AUTO: 92 FL (ref 78–100)
PLATELET # BLD AUTO: 108 10E3/UL (ref 150–450)
PLATELET # BLD AUTO: 115 10E3/UL (ref 150–450)
POTASSIUM SERPL-SCNC: 3.6 MMOL/L (ref 3.4–5.3)
RADIOLOGIST FLAGS: ABNORMAL
RADIOLOGIST FLAGS: ABNORMAL
RBC # BLD AUTO: 3.7 10E6/UL (ref 3.8–5.2)
RBC # BLD AUTO: 3.76 10E6/UL (ref 3.8–5.2)
SODIUM SERPL-SCNC: 140 MMOL/L (ref 135–145)
UFH PPP CHRO-ACNC: >1.1 IU/ML
WBC # BLD AUTO: 8.5 10E3/UL (ref 4–11)
WBC # BLD AUTO: 9.4 10E3/UL (ref 4–11)

## 2025-01-14 PROCEDURE — 85041 AUTOMATED RBC COUNT: CPT | Performed by: HOSPITALIST

## 2025-01-14 PROCEDURE — 97535 SELF CARE MNGMENT TRAINING: CPT | Mod: GO | Performed by: OCCUPATIONAL THERAPIST

## 2025-01-14 PROCEDURE — 99233 SBSQ HOSP IP/OBS HIGH 50: CPT | Performed by: HOSPITALIST

## 2025-01-14 PROCEDURE — 71275 CT ANGIOGRAPHY CHEST: CPT

## 2025-01-14 PROCEDURE — 85014 HEMATOCRIT: CPT | Performed by: HOSPITALIST

## 2025-01-14 PROCEDURE — 99222 1ST HOSP IP/OBS MODERATE 55: CPT | Performed by: NURSE PRACTITIONER

## 2025-01-14 PROCEDURE — 82565 ASSAY OF CREATININE: CPT

## 2025-01-14 PROCEDURE — 82310 ASSAY OF CALCIUM: CPT

## 2025-01-14 PROCEDURE — 250N000013 HC RX MED GY IP 250 OP 250 PS 637: Performed by: HOSPITALIST

## 2025-01-14 PROCEDURE — 85520 HEPARIN ASSAY: CPT | Performed by: HOSPITALIST

## 2025-01-14 PROCEDURE — 258N000003 HC RX IP 258 OP 636: Performed by: NURSE PRACTITIONER

## 2025-01-14 PROCEDURE — 250N000011 HC RX IP 250 OP 636: Performed by: HOSPITALIST

## 2025-01-14 PROCEDURE — 5A1D70Z PERFORMANCE OF URINARY FILTRATION, INTERMITTENT, LESS THAN 6 HOURS PER DAY: ICD-10-PCS | Performed by: INTERNAL MEDICINE

## 2025-01-14 PROCEDURE — 90935 HEMODIALYSIS ONE EVALUATION: CPT

## 2025-01-14 PROCEDURE — 250N000013 HC RX MED GY IP 250 OP 250 PS 637

## 2025-01-14 PROCEDURE — 36415 COLL VENOUS BLD VENIPUNCTURE: CPT | Performed by: HOSPITALIST

## 2025-01-14 PROCEDURE — 85041 AUTOMATED RBC COUNT: CPT

## 2025-01-14 PROCEDURE — 36415 COLL VENOUS BLD VENIPUNCTURE: CPT

## 2025-01-14 PROCEDURE — 97165 OT EVAL LOW COMPLEX 30 MIN: CPT | Mod: GO | Performed by: OCCUPATIONAL THERAPIST

## 2025-01-14 PROCEDURE — 250N000011 HC RX IP 250 OP 636: Performed by: NURSE PRACTITIONER

## 2025-01-14 PROCEDURE — 250N000011 HC RX IP 250 OP 636

## 2025-01-14 PROCEDURE — 120N000001 HC R&B MED SURG/OB

## 2025-01-14 PROCEDURE — 85018 HEMOGLOBIN: CPT

## 2025-01-14 PROCEDURE — 80048 BASIC METABOLIC PNL TOTAL CA: CPT

## 2025-01-14 RX ORDER — IOPAMIDOL 755 MG/ML
75 INJECTION, SOLUTION INTRAVASCULAR ONCE
Status: COMPLETED | OUTPATIENT
Start: 2025-01-14 | End: 2025-01-14

## 2025-01-14 RX ORDER — HEPARIN SODIUM 10000 [USP'U]/100ML
0-5000 INJECTION, SOLUTION INTRAVENOUS CONTINUOUS
Status: DISCONTINUED | OUTPATIENT
Start: 2025-01-14 | End: 2025-01-19

## 2025-01-14 RX ORDER — DOXYCYCLINE 100 MG/1
100 CAPSULE ORAL 2 TIMES DAILY
Status: DISCONTINUED | OUTPATIENT
Start: 2025-01-14 | End: 2025-01-17

## 2025-01-14 RX ORDER — POLYETHYLENE GLYCOL 3350 17 G/17G
17 POWDER, FOR SOLUTION ORAL DAILY
Status: DISCONTINUED | OUTPATIENT
Start: 2025-01-14 | End: 2025-01-23

## 2025-01-14 RX ORDER — SENNOSIDES 8.6 MG
8.6 TABLET ORAL 2 TIMES DAILY
Status: DISCONTINUED | OUTPATIENT
Start: 2025-01-14 | End: 2025-01-23

## 2025-01-14 RX ADMIN — MIDODRINE HYDROCHLORIDE 5 MG: 5 TABLET ORAL at 08:42

## 2025-01-14 RX ADMIN — DOXYCYCLINE HYCLATE 100 MG: 100 CAPSULE ORAL at 21:17

## 2025-01-14 RX ADMIN — SENNOSIDES 8.6 MG: 8.6 TABLET, FILM COATED ORAL at 21:17

## 2025-01-14 RX ADMIN — OXYCODONE 5 MG: 5 TABLET ORAL at 05:01

## 2025-01-14 RX ADMIN — HEPARIN SODIUM 2100 UNITS: 1000 INJECTION INTRAVENOUS; SUBCUTANEOUS at 17:30

## 2025-01-14 RX ADMIN — IOPAMIDOL 75 ML: 755 INJECTION, SOLUTION INTRAVENOUS at 14:29

## 2025-01-14 RX ADMIN — SENNOSIDES 8.6 MG: 8.6 TABLET, FILM COATED ORAL at 17:48

## 2025-01-14 RX ADMIN — CLOPIDOGREL BISULFATE 75 MG: 75 TABLET ORAL at 08:42

## 2025-01-14 RX ADMIN — CEFTRIAXONE 1 G: 1 INJECTION, POWDER, FOR SOLUTION INTRAMUSCULAR; INTRAVENOUS at 17:14

## 2025-01-14 RX ADMIN — MIDODRINE HYDROCHLORIDE 5 MG: 5 TABLET ORAL at 21:22

## 2025-01-14 RX ADMIN — HEPARIN SODIUM 2000 UNITS: 1000 INJECTION INTRAVENOUS; SUBCUTANEOUS at 17:30

## 2025-01-14 RX ADMIN — SODIUM CHLORIDE 300 ML: 9 INJECTION, SOLUTION INTRAVENOUS at 15:27

## 2025-01-14 RX ADMIN — DOXYCYCLINE 100 MG: 100 INJECTION, POWDER, LYOPHILIZED, FOR SOLUTION INTRAVENOUS at 04:55

## 2025-01-14 RX ADMIN — MIDODRINE HYDROCHLORIDE 5 MG: 5 TABLET ORAL at 13:47

## 2025-01-14 RX ADMIN — HEPARIN SODIUM 5000 UNITS: 5000 INJECTION, SOLUTION INTRAVENOUS; SUBCUTANEOUS at 08:42

## 2025-01-14 RX ADMIN — SODIUM CHLORIDE 250 ML: 9 INJECTION, SOLUTION INTRAVENOUS at 15:27

## 2025-01-14 RX ADMIN — PANTOPRAZOLE SODIUM 40 MG: 40 TABLET, DELAYED RELEASE ORAL at 08:42

## 2025-01-14 RX ADMIN — POLYETHYLENE GLYCOL 3350 17 G: 17 POWDER, FOR SOLUTION ORAL at 17:48

## 2025-01-14 RX ADMIN — ATORVASTATIN CALCIUM 80 MG: 40 TABLET, FILM COATED ORAL at 21:17

## 2025-01-14 RX ADMIN — HEPARIN SODIUM 1150 UNITS/HR: 10000 INJECTION, SOLUTION INTRAVENOUS at 15:45

## 2025-01-14 ASSESSMENT — ACTIVITIES OF DAILY LIVING (ADL)
ADLS_ACUITY_SCORE: 43
ADLS_ACUITY_SCORE: 43
ADLS_ACUITY_SCORE: 65
ADLS_ACUITY_SCORE: 43
ADLS_ACUITY_SCORE: 65
ADLS_ACUITY_SCORE: 65
ADLS_ACUITY_SCORE: 43
ADLS_ACUITY_SCORE: 43
ADLS_ACUITY_SCORE: 39
ADLS_ACUITY_SCORE: 65
ADLS_ACUITY_SCORE: 43
ADLS_ACUITY_SCORE: 43
ADLS_ACUITY_SCORE: 65
ADLS_ACUITY_SCORE: 43
ADLS_ACUITY_SCORE: 39
ADLS_ACUITY_SCORE: 65
ADLS_ACUITY_SCORE: 43
ADLS_ACUITY_SCORE: 64
ADLS_ACUITY_SCORE: 43

## 2025-01-14 NOTE — PROGRESS NOTES
Renal  Consult received.   Pt with HAMMAD, CKD, recently on HD at [facility], and ?recently came off HD. I reviewed HD records, full run recorded 1/2/25, no UF, soft BP and labs resulted from 1/6/25 Creat 3.1.   Now admit with PNA and Creat 4. So far received bolus fluid and holding maintenance diuretic. Lytes ok and no apparent severe fluid overload.   No emergent need for HD tonight.   Will see pt in am and review outpt plans to ?discontinue dialysis.     Ashley Walton MD  [facility]  [phone]

## 2025-01-14 NOTE — PLAN OF CARE
Patient A&Ox 4, VSS on 2L via NC. Pt reported increased buttock pain, sacral mepi applied and offloading her heels. Pt will start HD today. Tele discontinued. Cardiac diet. STAT chest CT done this afternoon to r/o PE.    IV access: Saline Lock    Pain 0/10, PRNs used: No    Ambulates assist of 1 GBW    Discharge plan: Home vs TCU- pt and family do not want TCU    HLM Admission: 8- Walk 250 feet or more  HLM Daily8- Walk 250 feet or more         Problem: Infection  Goal: Absence of Infection Signs and Symptoms  Outcome: Progressing     Problem: Adult Inpatient Plan of Care  Goal: Optimal Comfort and Wellbeing  1/14/2025 1421 by Horacio Flores, RN  Outcome: Progressing  1/14/2025 1421 by Horacio Flores, RN  Outcome: Progressing     Problem: Gas Exchange Impaired  Goal: Optimal Gas Exchange  Outcome: Progressing   Goal Outcome Evaluation:

## 2025-01-14 NOTE — PLAN OF CARE
Problem: Infection  Goal: Absence of Infection Signs and Symptoms  Outcome: Progressing   Goal Outcome Evaluation:       Complaints of neck pain, PRN Oxycodone given to alleviate. Received IV antibiotic on shift. Patient's IV started hurting at start of Doxycycline infusion, was removed and new one placed. Patient getting intermittent repositioning, occasionally refuses. Heels floated. On 2L O2.

## 2025-01-14 NOTE — PROGRESS NOTES
SPIRITUAL HEALTH SERVICES Progress Note  Radha, KELSIE    Saw pt Felicitas Elliott per admission screening.    Patient/Family Understanding of Illness and Goals of Care - Kim shared about her hospital course related to her pneumonia. She also shared generally about her health history and past hospitalizations. She stated that she has a DNR code status and is wondering what the future looks like for her with all that is going on medically.     Distress and Loss - Ongoing health challenges and changes in lifestyle due to increased limitations. She is no longer able to drive and has said that certain aspects of her quality of life have decreased.     Strengths, Coping, and Resources - Her  Ed is great source of support. She has 7 children. She is the oldest of 6 siblings (2 of whom are now ). She reports good relational support.     Meaning, Beliefs, and Spirituality - Patient comes from [employer] amanda background and derives meaning, purpose, and comfort from amanda. She has Rastafarian background; reports that she does not have current amanda community. She practices daily prayer and spiritual journaling. She welcomed prayer and expressed appreciation for the visit.      Plan of Care - A  will continue to visit as able or per request by patient/family/staff.      Sinan Moyer MDiv, [facility]  /Manager Spiritual Health Services  [phone]       Spiritual Health Services is available  for emergent requests and consults, either by paging the on-call  or by entering an ASAP/STAT consult in [facility], which will also page the on-call .

## 2025-01-14 NOTE — PLAN OF CARE
Problem: Infection  Goal: Absence of Infection Signs and Symptoms  Outcome: Progressing   Goal Outcome Evaluation:       Alert and oriented x 4 with intermittent forgetfulness. Ambulating in room with assistance of one, walker and gait belt. Maintaining oxygen saturation in the low 90's on 2 liters of oxygen via nasal cannula. Afebrile, receiving iv antibiotics.

## 2025-01-14 NOTE — CONSULTS
Care Management Initial Consult    General Information  Assessment completed with: Patient,    Type of CM/SW Visit: Initial Assessment    Primary Care Provider verified and updated as needed:     Readmission within the last 30 days:     Reason for Consult: discharge planning  Advance Care Planning:          Communication Assessment  Patient's communication style: spoken language (English or Bilingual)    Hearing Difficulty or Deaf: no   Wear Glasses or Blind: yes    Cognitive  Cognitive/Neuro/Behavioral: WDL                      Living Environment:   People in home: spouse  Ed  Current living Arrangements: house      Able to return to prior arrangements:       Family/Social Support:  Care provided by: self, spouse/significant other  Provides care for: no one, unable/limited ability to care for self  Marital Status:   Support system:   Ed       Description of Support System: Supportive, Involved    Support Assessment: Adequate family and caregiver support    Current Resources:   Patient receiving home care services: Yes  Skilled Home Care Services: Skilled Nursing, Physical Therapy     Community Resources: None  Equipment currently used at home: other (see comments) (4WW, BSC,)  Supplies currently used at home: Incontinence Supplies    Employment/Financial:  Employment Status:          Financial Concerns: none   Referral to Financial Worker: No     Does the patient's insurance plan have a 3 day qualifying hospital stay waiver?  No    Lifestyle & Psychosocial Needs:  Social Drivers of Health     Food Insecurity: Low Risk  (1/14/2025)    Food Insecurity     Within the past 12 months, did you worry that your food would run out before you got money to buy more?: No     Within the past 12 months, did the food you bought just not last and you didn t have money to get more?: No   Depression: Not at risk (9/16/2024)    PHQ-2     PHQ-2 Score: 0   Housing Stability: Low Risk  (1/14/2025)    Housing  Stability     Do you have housing? : Yes     Are you worried about losing your housing?: No   Tobacco Use: Medium Risk (1/7/2025)    Received from [facility]    Patient History     Smoking Tobacco Use: Former     Smokeless Tobacco Use: Never     Passive Exposure: Not on file   Financial Resource Strain: Low Risk  (1/14/2025)    Financial Resource Strain     Within the past 12 months, have you or your family members you live with been unable to get utilities (heat, electricity) when it was really needed?: No   Alcohol Use: Not on file   Transportation Needs: Low Risk  (1/14/2025)    Transportation Needs     Within the past 12 months, has lack of transportation kept you from medical appointments, getting your medicines, non-medical meetings or appointments, work, or from getting things that you need?: No   Physical Activity: Not on file   Interpersonal Safety: Low Risk  (1/14/2025)    Interpersonal Safety     Do you feel physically and emotionally safe where you currently live?: Yes     Within the past 12 months, have you been hit, slapped, kicked or otherwise physically hurt by someone?: No     Within the past 12 months, have you been humiliated or emotionally abused in other ways by your partner or ex-partner?: No   Stress: Not on file   Social Connections: Socially Integrated (1/3/2025)    Received from [facility]    Social Connections     Do you often feel lonely or isolated from those around you?: 0   Health Literacy: Adequate Health Literacy (9/16/2024)     Health Literacy     Frequency of need for help with medical instructions: Never     Functional Status:  Prior to admission patient needed assistance:      Mental Health Status:        Chemical Dependency Status:        Values/Beliefs:  Spiritual, Cultural Beliefs, Shinto Practices, Values that affect care: no             Discussed  Partnership in Safe Discharge Planning  document with  patient/family: No    Additional Information:  Met with patient to review role of care management, progression of care and possible need for services at discharge, including OP services, home care, or skilled nursing care.   Pt reports she resides in a multi-level house with spouse.  She states she has slept in a recliner for several months, even prior to previous illness.  She states her spouse assists her with all her cares.  She states she has Oxygen at home for use at night.  She had been going to [facility] after last hospital discharge and completed visits there on 1/2/2025.  She states she has Home Care RN and PT visits, but cannot recall name of agency.    Discussed discharge planning, including OT recommendation for TCU.  PT eval pending.  Pt states she does not want to go to TCU.  Her goal is to return home with resumption of Home Care services.  She gives writer permission to contact spouse.    Call placed to spouse and call with him was disconnected x2 and then no answer.    Contacted [facility] Liaison and confirmed pt is open to Home Care services with [facility] for SN, PT and SW.      Next Steps: PT recs, follow up with pt and spouse on discharge plans    Alize Plasencia RN

## 2025-01-14 NOTE — PROGRESS NOTES
"CLINICAL NUTRITION SERVICES - ASSESSMENT NOTE    RECOMMENDATIONS FOR MDs/PROVIDERS TO ORDER:  Pt may benefit from an appetite stimulant     Malnutrition Status:    % Intake: </=50% for >/= 1 month (severe)  % Weight Loss: > 7.5% in 3 months (severe)   Subcutaneous Fat Loss: Orbital: Severe  Muscle Loss: Clavicles (pectoralis and deltoids): Moderate  Fluid Accumulation/Edema: Mild, 1+  Malnutrition Diagnosis: Severe malnutrition in the context of chronic illness  Malnutrition Present on Admission: Yes    Registered Dietitian Interventions:  Trial Nepo daily and Gel+ daily     Future/Additional Recommendations:  Will monitor progress towards goals      REASON FOR ASSESSMENT  Provider order - poor oral intake    Pt admitted with respiratory failure, N/V, poor oral intake, CKD, preDM, resuming dialysis    SUBJECTIVE INFORMATION  Assessed patient in room.      NUTRITION HISTORY  Per chart review pt was on TF in September due to poor oral intake. Was later transferred to [place] where she was given Gelatein Plus and Ensure enlive daily     Pt was noted to not be a big meater in the past and  often brought in food     Pt states she doesn't have an appetite. She eats a few bites and then will gag or vomit. She does not like Ensure and felt it was too chalky. She did like the Gel l+ in cherry. She is to resume dialysis today. She is open to also trying nepro. She also states she was on TF after surgery because she was not eating enough, but she hated it.     CURRENT NUTRITION ORDERS  Diet: Low Saturated Fat/2400 mg Sodium  Ensure Enlive daily     CURRENT INTAKE/TOLERANCE  Pt newly admitted has only ordered lemon ice thus far    LABS  Nutrition-relevant labs:  BUN-43.5, Cr-4.03, A1c-5.6    MEDICATIONS  Nutrition-relevant medications:  Protonix    ANTHROPOMETRICS  Height: 167.6 cm (5' 6\")  Most Recent Weight: 65.2 kg (143 lb 11.8 oz)  IBW: 59 kg  % IBW: 106%  BMI (kg/m ): Normal BMI  Weight History:   Wt Readings from " Last 10 Encounters:   01/14/25 65.2 kg (143 lb 11.8 oz)   11/19/24 70.8 kg (156 lb)   10/31/24 71.7 kg (158 lb)   10/09/24 75.5 kg (166 lb 8 oz)   09/16/24 79 kg (174 lb 3.2 oz)   08/15/24 90.1 kg (198 lb 9.6 oz)   Pt down 55# (27%) in 5 months    Dosing Weight: 65.2 kg, based on actual wt    ASSESSED NUTRITION NEEDS  Estimated Energy Needs: 0878-1220 kcals/day (30 - 35 kcals/kg)  Justification: Repletion  Estimated Protein Needs: 65-78 grams protein/day (1 - 1.2 grams of pro/kg)  Justification: Repletion  Estimated Fluid Needs: 9418-1147 mL/day (25 - 30 mL/kg)  Justification: Maintenance    SYSTEM FINDINGS    Edema: +1 bilateral ankle  Skin/wounds: no skin breakdown noted   GI symptoms: WDL    MALNUTRITION  % Intake: </=50% for >/= 1 month (severe)  % Weight Loss: > 7.5% in 3 months (severe)   Subcutaneous Fat Loss: Orbital: Severe  Muscle Loss: Clavicles (pectoralis and deltoids): Moderate  Fluid Accumulation/Edema: Mild, 1+  Malnutrition Diagnosis: Severe malnutrition in the context of chronic illness  Malnutrition Present on Admission: Yes    NUTRITION DIAGNOSIS  Malnutrition (undernutrition) related to ongoing poor po as evidenced by wt loss, reduced po, fat and muscle loss     INTERVENTIONS  Trial nepro and Gel + daily   Encourage po  May benefit from an appetite stimulant  Will not add further dietary restrictions for dialysis to try and optimize intake     Goals  Patient to consume % of nutritionally adequate meal trays TID, or the equivalent with supplements/snacks.  Maintain wt near 65kg     Monitoring/Evaluation  Progress toward goals will be monitored and evaluated per policy.

## 2025-01-14 NOTE — PROGRESS NOTES
"   01/14/25 0900   Appointment Info   Signing Clinician's Name / Credentials (OT) Analy Calderon OTR   Rehab Comments (OT) OT EVAL   Living Environment   People in Home spouse   Current Living Arrangements house;other (see comments)  (Multi-level home)   Home Accessibility stairs within home;stairs to enter home   Number of Stairs, Main Entrance 2   Stair Railings, Main Entrance none   Transportation Anticipated family or friend will provide   Living Environment Comments Pt has had to stay on the main level of her home since being discharged from LT[facility] in November; sleeping in a recliner, using a BSC that her  would manage.  Her  would assist with sponge bathing due to dialysis port and no accessible bathroom on the main level.  Her  assists with all ADL.   Self-Care   Usual Activity Tolerance poor   Current Activity Tolerance poor   Equipment Currently Used at Home other (see comments)  (4WW, BSC,)   Fall history within last six months no   Activity/Exercise/Self-Care Comment Jacquelin does hersponge bathing, dressing, toileting, all IADL   Instrumental Activities of Daily Living (IADL)   IADL Comments  does everything   General Information   Onset of Illness/Injury or Date of Surgery 01/13/25   Referring Physician Noé Velez   Patient/Family Therapy Goal Statement (OT) Pt waffling between \"wanting to die\" and wanting to get stronger/do more   Additional Occupational Profile Info/Pertinent History of Current Problem Felicitas Elliott is a 84 year old female with PMHx significant for recent prolonged hospitalization 8-10/2024 notable for CAD s/p CABG x2 (8/27/2024), 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   Cognitive Status Examination   Orientation Status orientation to person, place and time   Pain Assessment   Patient Currently in Pain No   Bed Mobility   Bed Mobility supine-sit   Comment (Bed Mobility) SBA   Transfers   Transfers bed-chair transfer;sit-stand transfer;toilet transfer   Transfer Comments Min A   Activities of Daily Living   BADL Assessment/Intervention lower body dressing;grooming;toileting;bathing   Bathing Assessment/Intervention   Comment, (Bathing) Clinical judgement; pt with poor endurance, would need at least mod a   Lower Body Dressing Assessment/Training   Rankin Level (Lower Body Dressing) moderate assist (50% patient effort)   Grooming Assessment/Training   Rankin Level (Grooming) minimum assist (75% patient effort)   Toileting   Rankin Level (Toileting) minimum assist (75% patient effort)   Clinical Impression   Criteria for Skilled Therapeutic Interventions Met (OT) Yes, treatment indicated   OT Diagnosis Decreased indep with ADL   Influenced by the following impairments medical status, mental health   OT Problem List-Impairments impacting ADL problems related to;activity tolerance impaired;fear & anxiety;mobility;strength;other (see comments)  (depression)   Assessment of Occupational Performance 1-3 Performance Deficits   Identified Performance Deficits bathing, dressing, toileting, mobility at home, stairs   Planned Therapy Interventions (OT) ADL retraining;transfer training;progressive activity/exercise   Clinical Decision Making Complexity (OT) problem focused assessment/low complexity   Risk & Benefits of therapy have been explained evaluation/treatment results reviewed   OT Total Evaluation Time   OT Eval, Low Complexity Minutes (16427) 10   OT Goals   Therapy Frequency (OT) 4 times/week   OT Predicted Duration/Target Date for Goal Attainment 01/22/25   OT Goals Lower Body Dressing;Bed Mobility;Transfers;Toilet  "Transfer/Toileting;Aerobic Activity   OT: Lower Body Dressing Supervision/stand-by assist;using adaptive equipment   OT: Bed Mobility Modified independent;supine to/from sitting   OT: Transfer Supervision/stand-by assist;with assistive device  (SHOWER TRANSFER)   OT: Toilet Transfer/Toileting Modified independent;toilet transfer;cleaning and garment management;using adaptive equipment   OT: Perform aerobic activity with stable cardiovascular response intermittent activity;5 minutes;ambulation   Interventions   Interventions Quick Adds Self-Care/Home Management   Self-Care/Home Management   Self-Care/Home Mgmt/ADL, Compensatory, Meal Prep Minutes (13084) 33   Symptoms Noted During/After Treatment (Meal Preparation/Planning Training) behavioral stress signs   Treatment Detail/Skilled Intervention Pt in bed, her daughter present. Pt agreeable to OT session; explained that she is very sedentary at home and her  does \"everything\" for her as she is too weak. She reports she has to stay on the main level of her home as she cannot manage the stairs. Pt able to transfer/walk within room short distances with CGA and use of RW. She feels very weak, and c/o dizziness when standing. Educated pt on the importance of daily activity, including do her own self-care as much as possible, in order to regain her strength. Pt seems limited by her mental health/outlook.   OT Discharge Planning   OT Plan stnading ADL/other ADL activities to increase act teagan, mobiltiy. Walking   OT Discharge Recommendation (DC Rec) (S)  Transitional Care Facility   OT Rationale for DC Rec Pt has been failing to thrive since leaving LTAC; unable to go up stairs,  doing everything for her. She said she had home PT 1x/every other week, which is not nearly enough to increase her strength/mobiltiy   OT Brief overview of current status Min A mobility, mod assist ADL   OT Total Distance Amb During Session (feet) 10   Total Session Time   Timed Code " Treatment Minutes 33   Total Session Time (sum of timed and untimed services) 43

## 2025-01-14 NOTE — PROGRESS NOTES
Date: 11/4/2024  Time:1730     Data:  Pre Wt:   68.6 kg (bed weight)  Desired Wt:   To be established  Post Wt:  68.6 kg (bed weight)  Weight change:  0 kg  Ultrafiltration - Post Run Net Total Removed (mL):  0 ml  Vascular Access Status: CVC patent  Dialyzer Rinse:  Light  Total Blood Volume Processed: 29.5 L   Total Dialysis (Treatment) Time:   2.5 Hrs  Dialysate Bath: K 3, Ca 2.25  Heparin: Heparin: None     Lab:   Yes: HGB     Interventions:  Pt.dialyzed for 2.5  hours via  left CVC  UF set to 0.3 Liters, accommodating  priming and rinse back volumes  ,    lab drawn  See administered per MAR  See flowsheet for crit-line and notes  Treatment has ended safely and blood rinsed back completely  Tolerated well with dialysis treatment  CVC dressing changed aseptically and due on 1/21/2025  CVC lumen flushed with saline and locked with heparin  CVC clear guard changed post HD  Report given to PCN in stable condition     Assessment:  A/O x 4, calm and cooperative, denies pain  CVC is clean,dry and intact                Plan:    Per Renal team

## 2025-01-14 NOTE — CONSULTS
RENAL CONSULT NOTE    REQUESTING PHYSICIAN: MD Agustin     REASON FOR CONSULT: CKD 5, recently stopped HD, worsening sCR    PLAN:  Ok for CT PE run, do not need to coordinate around HD  Dialysis today, then trial BIW tx, M and F outpt, she has a spot held for her at the [facility] 7 a.m. (I have discussed with them)  IF better, could discharge as early as tomorrow (discussed with DR Velez)     ASSESSMENT:  CKD5:   was on IHD until 1/2/25, trial of holding with pt report of inc UO, but pt noticing inc uremic s/s, fatigue etc; will resume HD today, reduced BFR/DFR and shorter TT, and consider BIW HD outpt and trend  ACCESS:  R CVC (placed 9/11)  Last sCR 3.07   EDW ~ 69 kg (she is trending in the 64-65 range, ongoing wt loss with min activity and low intake)  Had [facility] Lennie-op cardiac surgery in Aug/Sept, On CRRT 8/29/24-9/8/24, Transition to iHD 9/9/24,   CKD attributed to longstanding NSAID use, historical baseline Cr 1.3-1.8; proteinuria.    Prior extensive serologic testing was negative.   Imaging with left renal cortical thinning ~8cm kidney , rt 9.7cm kideny with simple cysts (4/2024)  Midodrine as needed for BP support     Volume  Looks euvolemic by exam, did not need much if any UF with HD d/t decent residual UO  on supp 02 at HS chronically, does feel sl SOB, wt is 4-5 kg below her last dialysis target wt, pt endorses decent UO, though incont,  changes her adult diapers several times a day, no edema  Min to no UF with dialysis today      Blood Pressures   Controlled, SBP   on BB for Afib.      Anemia  ACD, infection/inflammation, hgb 10-11     Respiratory Failure/PNA  On oxygen as needed (chronically at HS)  Antibx and supportive therapies per [facility] team  Some mild hemoptysis, ? 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  She is well known to our service, more recently for IHD management at the [facility] under the care of DR Winkler.  Pert h/o HAMMAD on CKD (diana-op cardiac surg in Aug), was on CRRT and then transitioned to intermittent IHD in Sept '24.  She was on MWF tx, with last  txon 1/2/25, when decision was made to trial holding HD d/t pt reports of inc UO and somewhat stable renal labs.  She did have a f/up sCR on 1/6 (4 days after her last tx) that was 3.07 (up from mid 2's).   Her admission sCR was 4.0.  NO sig metabolic derangement, but she is exhibiting some s/s c/w probable uremia (BUN in the 40's).  Notably has felt worse since stopping HD, poor appetite, fatigue and daily nausea in the a.m., which is chronic but worse.   She presented to the  ED with hypoxia, n/v, productive cough, SOB and CXR suggestive of PNA.  IV antibx started   Long discussion with pt and her  Ed.  Both reporting that pt seems worse since stopping HD.  Could this be d/t URI to some degree, maybe, but she is having more n/v, weakness, losing wt since stopping HD.  They are amenable to restarting HD here today, and we can consider possible BIW tx as we continue to monitor for any further signs of renal recovery enough to go off dialysis (which seems less likely) or enough function that she can do ok on reduced frequency of HD.  Ultimately, QOL decisions to be made here.  Dialysis makes her feel very tired afterward, but she thinks she feels sl better the day after.     Discussed with DR Velez after visit, reviewed POC re resuming HD, likely BIW for now. I also called her outpt HD unit, they have held her MWF 7 a.m spot andwill expect her back there on Friday.       REVIEW OF  SYSTEMS:  COMPLETE REVIEW OF SYSTEMS: General: see HPI  Eyes: denies acute changes   Ears/Nose/Throat: no acute changes   Cardiovascular: no CP  Respiratory: NEGRON and SOB, productive cough with some hemoptysis reported  Gastrointestinal: chronic mild nausea, worsening since stopping HD on 1/2, a.m. emesis typical   Musculoskeletal: gen weakness, ongoing wt loss and reduced muscle mass  Skin: denies lesions  Neurologic: nothing acute  Psychiatric: disappointed about being ill and the need to resume dialysis for now, but understands that she feels worse off HD currently       No past medical history on file.            Social History     Tobacco Use    Smoking status: Former     Current packs/day: 8.00     Average packs/day: 8.0 packs/day for 1 year (8.3 ttl pk-yrs)     Types: Cigarettes     Start date: 2024    Smokeless tobacco: Former   Vaping Use    Vaping status: Never Used   Substance Use Topics    Alcohol use: Not Currently          Current Facility-Administered Medications   Medication Dose Route Frequency Provider Last Rate Last Admin    acetaminophen (TYLENOL) tablet 650 mg  650 mg Oral Q4H PRN Brandi Montoya PA-C        Or    acetaminophen (TYLENOL) Suppository 650 mg  650 mg Rectal Q4H PRN Brandi Montoya PA-C        albuterol (PROVENTIL) neb solution 2.5 mg  2.5 mg Nebulization Q6H PRN Brandi Montoya PA-C        atorvastatin (LIPITOR) tablet 80 mg  80 mg Oral QPM Brandi Montoya PA-C   80 mg at 01/13/25 2125    calcium carbonate (TUMS) chewable tablet 1,000 mg  1,000 mg Oral 4x Daily PRN Brandi Montoya PA-C        cefTRIAXone (ROCEPHIN) 1 g vial to attach to  mL bag for ADULTS or NS 50 mL bag for PEDS  1 g Intravenous Q24H Brandi Montoya PA-C   1 g at 01/13/25 1646    clopidogrel (PLAVIX) tablet 75 mg  75 mg Oral Daily Brandi Montoya PA-C   75 mg at 01/14/25 0842    doxycycline (VIBRAMYCIN) 100 mg vial to attach to  mL bag  100 mg Intravenous Q12H Brandi Montoya PA-C   100 mg at 01/14/25 8272     guaiFENesin (ROBITUSSIN) 20 mg/mL solution 200 mg  200 mg Oral Q4H PRN Brandi Montoya PA-C        heparin ANTICOAGULANT injection 5,000 Units  5,000 Units Subcutaneous Q8H Brandi Montoya PA-C   5,000 Units at 01/14/25 0842    Lidocaine (LIDOCARE) 4 % Patch 1 patch  1 patch Transdermal Daily PRN Brandi Montoya PA-C        lidocaine (LMX4) cream   Topical Q1H PRN Brandi Montoya PA-C        lidocaine 1 % 0.1-1 mL  0.1-1 mL Other Q1H PRN Brandi Montoya PA-C        midodrine (PROAMATINE) tablet 5-10 mg  5-10 mg Oral TID Brandi Montoya PA-C   5 mg at 01/14/25 0842    naloxone (NARCAN) injection 0.2 mg  0.2 mg Intravenous Q2 Min PRN Noé Velez MD        Or    naloxone (NARCAN) injection 0.4 mg  0.4 mg Intravenous Q2 Min PRN Noé Velez MD        Or    naloxone (NARCAN) injection 0.2 mg  0.2 mg Intramuscular Q2 Min PRN Noé Velez MD        Or    naloxone (NARCAN) injection 0.4 mg  0.4 mg Intramuscular Q2 Min PRN Noé Velez MD        ondansetron (ZOFRAN ODT) ODT tab 4 mg  4 mg Oral Q6H PRN Brandi Montoya PA-C        Or    ondansetron (ZOFRAN) injection 4 mg  4 mg Intravenous Q6H PRN Brandi Montoya PA-C        oxyCODONE (ROXICODONE) tablet 5 mg  5 mg Oral Q4H PRN Brandi Montoya PA-C   5 mg at 01/14/25 0501    oxyCODONE IR (ROXICODONE) half-tab 2.5 mg  2.5 mg Oral Q4H PRN Brandi Montoya PA-C        pantoprazole (PROTONIX) EC tablet 40 mg  40 mg Oral QAM AC Brandi Montoya PA-C   40 mg at 01/14/25 0842    prochlorperazine (COMPAZINE) injection 5 mg  5 mg Intravenous Q6H PRN Brandi Montoya PA-C        Or    prochlorperazine (COMPAZINE) tablet 5 mg  5 mg Oral Q6H PRN Brandi Montoya PA-C        senna-docusate (SENOKOT-S/PERICOLACE) 8.6-50 MG per tablet 1 tablet  1 tablet Oral BID PRN Brandi Montoya PA-C        Or    senna-docusate (SENOKOT-S/PERICOLACE) 8.6-50 MG per tablet 2 tablet  2 tablet Oral BID PRN Brandi Montoya PA-C        simethicone (MYLICON) chewable tablet 80 mg  80 mg Oral TID PRN Brandi Montoya PA-C         "sodium chloride (PF) 0.9% PF flush 3 mL  3 mL Intracatheter Q8H Brandi Mnotoya PA-C   3 mL at 01/14/25 0842    sodium chloride (PF) 0.9% PF flush 3 mL  3 mL Intracatheter q1 min prn Brandi Montoya PA-C        [Held by provider] torsemide (DEMADEX) half-tab 50 mg  50 mg Oral Daily Brandi Montoya PA-C        traZODone (DESYREL) half-tab 25 mg  25 mg Oral At Bedtime PRN Brandi Montoya PA-C             ALLERGIES/SENSITIVITIES:  Allergies   Allergen Reactions    Aspirin Rash    Cats Rash    Nickel Rash          PHYSICAL EXAM:  /58 (BP Location: Left arm)   Pulse 73   Temp 97.4  F (36.3  C) (Oral)   Resp 18   Ht 1.676 m (5' 6\")   Wt 65.2 kg (143 lb 11.8 oz)   SpO2 95%   BMI 23.20 kg/m      Patient Vitals for the past 72 hrs:   Weight   01/14/25 0501 65.2 kg (143 lb 11.8 oz)   01/13/25 1558 64.6 kg (142 lb 6.7 oz)   01/13/25 0940 64.9 kg (143 lb)     Body mass index is 23.2 kg/m .    Intake/Output Summary (Last 24 hours) at 1/14/2025 0934  Last data filed at 1/13/2025 2107  Gross per 24 hour   Intake 120 ml   Output --   Net 120 ml         General - A & O x 3, NAD, sitting up in chair,  Ed is chairside   HEENT - PERRLA, no scleral icterus, R internal jugular intact   Neck - as above, no obvious JVD  Respiratory - on 1-2 L per NC, sats mid 90's   Cardiovascular - SBP and rate controlled to sl tachy   Abdomen - soft  Extremities - well perfused, no edema, somewhat cachetic appearance   Integumentary - intact, good turgor, no rash/lesions  Neurologic - grossly intact  Psych:  Judgement intact, affect WNL  :  unable to measure UO, voids several times daily, saturates diapers  Wt is 4-5 kg below her outpt EDW     Laboratory:  Recent Labs   Lab Test 01/14/25  0808 01/13/25  1023 10/09/24  0549 10/08/24  0615   WBC 9.4 14.5* 7.2 6.3   HGB 10.8* 12.3 8.0* 8.0*   MCV 92 89 98 105*   * 122* 170 157   INR  --   --  1.91* 1.80*      Recent Labs   Lab Test 01/14/25  0808 01/13/25  1023   POTASSIUM 3.6 3.9 "   CHLORIDE 99 98   BUN 45.3* 43.5*      Recent Labs   Lab Test 01/13/25  1023 09/19/24  0635 08/30/24  1322 08/30/24  0952 08/27/24  1644 08/24/24  2303   ALBUMIN 3.3* 3.1*   < >  --    < >  --    BILITOTAL 0.8 0.7   < >  --    < >  --    ALT <5 31   < >  --    < >  --    AST 18 36   < >  --    < >  --    PROTEIN  --   --   --  20*  --  10*    < > = values in this interval not displayed.       Personally reviewed today's laboratory studies      Thank you for involving us in the care of this patient. We will continue to follow along with you.      LORENZO Plummer-BC  [facility]  [phone]

## 2025-01-14 NOTE — PLAN OF CARE
Goal Outcome Evaluation:      Plan of Care Reviewed With: patient    Overall Patient Progress: improvingOverall Patient Progress: improving    Outcome Evaluation: Pt's goal is to return home with resumption of Home Care services.  PT evaluation pending.

## 2025-01-14 NOTE — PROGRESS NOTES
[facility]    Medicine Progress Note - Hospitalist Service    Date of Admission:  1/13/2025    Assessment & Plan   Felicitas Elliott is a 84 year old female admitted with community acquired pneumonia.  She is clinically stable, on room air.  Continue empiric abx.  She has underlying CKD 5 and recently had discontinued dialysis.  Concern that anorexia could be related to uremia.  Nephrology consulted to assess need for ongoing renal replacement.    Report of pleuritic chest pain, ongoing hemoptysis, and intermittent tachycardia are concerning for concomitant PE.  Therefore, evaluate further with CTA chest.  Her dialysis schedule should not be impacted by contrast administration in this setting.  Discussed with nephrology.    CT chest ordered last night to evaluate small fluctuating area at the sternotomy site with associated TTP.  Imaging was unremarkable.  Exam grossly unchanged today.  Advise patient to call her CT surgery office to check in related to the wound.  QT correction accounting for wide QRS is 480, which is unremarkable.  Abdominal pain may be related to constipation.  CT ab/pelvis yesterday was unrevealing.  Start bowel regimen.    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  Start heparin gtt for now.          Diet: Combination Diet Low Saturated Fat Na <2400mg Diet  Snacks/Supplements Adult: Ensure Enlive; With Meals      Le Catheter: Not present  Lines: PRESENT              Cardiac Monitoring: ACTIVE order. Indication: QTc prolonging medication (48 hours)  Code Status: No CPR- Do NOT Intubate      Clinically Significant Risk Factors Present on Admission               # Hypoalbuminemia: Lowest albumin = 3.3 g/dL at 1/13/2025 10:23 AM, will monitor as appropriate   # Drug Induced Platelet Defect: home medication list includes an antiplatelet medication        # Anemia: based on hgb <11  # Anemia: based on hgb <11           # Financial/Environmental Concerns:     # Pacemaker present  # History of CABG: noted on surgical history       Social Drivers of Health    Tobacco Use: Medium Risk (1/7/2025)    Received from [employer]    Patient History     Smoking Tobacco Use: Former     Smokeless Tobacco Use: Never          Disposition Plan     Medically Ready for Discharge: Anticipated Tomorrow             Noé Velez MD  Hospitalist Service  [facility]  Securely message with [employer] (more info)  Text page via [employer] Paging/Directory   ______________________________________________________________________    Interval History   Reports having a poor appetite.  Endorses nausea.  Endorses having two subsequent episodes of hemoptysis.  Endorses dyspnea.  Has had bilateral pleuritic chest pain for the last 1-2 days.    Endorses flatus.  Last bowel movement was two days ago.  She is urinating.    Physical Exam   Vital Signs: Temp: 97.4  F (36.3  C) Temp src: Oral BP: 118/58 Pulse: 73   Resp: 18 SpO2: 95 % O2 Device: None (Room air) Oxygen Delivery: 2 LPM  Weight: 143 lbs 11.84 oz    Gen:  sitting in bed in no extremis  Neuro:  alert, conversant  CV:  borderline tachycardia, regular rhythm  Pulm:  no acute resp distress, ctab anteriorly  GI:  abdomen soft, mild bilateral TTP  Skin:  sternotomy site with grossly unchanged mild fluctuant areas and without notable erythema    Medical Decision Making             Data   Reviewed:    Edda  140  K 3.6  BUN 45  Cr 3.7    WBC 9.4  Hgb 11  Plts 115    CT chest  No evidence of fluid at the sternotomy site

## 2025-01-15 ENCOUNTER — APPOINTMENT (OUTPATIENT)
Dept: PHYSICAL THERAPY | Facility: CLINIC | Age: 85
DRG: 193 | End: 2025-01-15
Payer: COMMERCIAL

## 2025-01-15 LAB
ALBUMIN SERPL BCG-MCNC: 2.6 G/DL (ref 3.5–5.2)
ANION GAP SERPL CALCULATED.3IONS-SCNC: 11 MMOL/L (ref 7–15)
BUN SERPL-MCNC: 24.8 MG/DL (ref 8–23)
CALCIUM SERPL-MCNC: 9 MG/DL (ref 8.8–10.4)
CHLORIDE SERPL-SCNC: 101 MMOL/L (ref 98–107)
CREAT SERPL-MCNC: 2.54 MG/DL (ref 0.51–0.95)
EGFRCR SERPLBLD CKD-EPI 2021: 18 ML/MIN/1.73M2
GLUCOSE SERPL-MCNC: 85 MG/DL (ref 70–99)
HCO3 SERPL-SCNC: 23 MMOL/L (ref 22–29)
HOLD SPECIMEN: NORMAL
INR PPP: 1.33 (ref 0.85–1.15)
PHOSPHATE SERPL-MCNC: 2.9 MG/DL (ref 2.5–4.5)
POTASSIUM SERPL-SCNC: 3.6 MMOL/L (ref 3.4–5.3)
SODIUM SERPL-SCNC: 135 MMOL/L (ref 135–145)
UFH PPP CHRO-ACNC: 0.51 IU/ML
UFH PPP CHRO-ACNC: 1.04 IU/ML

## 2025-01-15 PROCEDURE — 250N000013 HC RX MED GY IP 250 OP 250 PS 637: Performed by: HOSPITALIST

## 2025-01-15 PROCEDURE — 99232 SBSQ HOSP IP/OBS MODERATE 35: CPT | Performed by: HOSPITALIST

## 2025-01-15 PROCEDURE — 85520 HEPARIN ASSAY: CPT | Performed by: HOSPITALIST

## 2025-01-15 PROCEDURE — 97161 PT EVAL LOW COMPLEX 20 MIN: CPT | Mod: GP

## 2025-01-15 PROCEDURE — 80069 RENAL FUNCTION PANEL: CPT | Performed by: HOSPITALIST

## 2025-01-15 PROCEDURE — 85610 PROTHROMBIN TIME: CPT | Performed by: HOSPITALIST

## 2025-01-15 PROCEDURE — 36415 COLL VENOUS BLD VENIPUNCTURE: CPT | Performed by: HOSPITALIST

## 2025-01-15 PROCEDURE — 99232 SBSQ HOSP IP/OBS MODERATE 35: CPT | Performed by: NURSE PRACTITIONER

## 2025-01-15 PROCEDURE — 97530 THERAPEUTIC ACTIVITIES: CPT | Mod: GP

## 2025-01-15 PROCEDURE — 250N000011 HC RX IP 250 OP 636: Performed by: HOSPITALIST

## 2025-01-15 PROCEDURE — 250N000013 HC RX MED GY IP 250 OP 250 PS 637

## 2025-01-15 PROCEDURE — 97116 GAIT TRAINING THERAPY: CPT | Mod: GP

## 2025-01-15 PROCEDURE — 120N000001 HC R&B MED SURG/OB

## 2025-01-15 RX ORDER — CEFDINIR 300 MG/1
300 CAPSULE ORAL AT BEDTIME
Status: DISCONTINUED | OUTPATIENT
Start: 2025-01-15 | End: 2025-01-16

## 2025-01-15 RX ORDER — WARFARIN SODIUM 1 MG/1
3 TABLET ORAL
Status: COMPLETED | OUTPATIENT
Start: 2025-01-15 | End: 2025-01-15

## 2025-01-15 RX ADMIN — HEPARIN SODIUM 650 UNITS/HR: 10000 INJECTION, SOLUTION INTRAVENOUS at 15:01

## 2025-01-15 RX ADMIN — DOXYCYCLINE HYCLATE 100 MG: 100 CAPSULE ORAL at 20:31

## 2025-01-15 RX ADMIN — SENNOSIDES 8.6 MG: 8.6 TABLET, FILM COATED ORAL at 10:14

## 2025-01-15 RX ADMIN — MIDODRINE HYDROCHLORIDE 5 MG: 5 TABLET ORAL at 10:33

## 2025-01-15 RX ADMIN — DOXYCYCLINE HYCLATE 100 MG: 100 CAPSULE ORAL at 10:14

## 2025-01-15 RX ADMIN — OXYCODONE 5 MG: 5 TABLET ORAL at 05:23

## 2025-01-15 RX ADMIN — ATORVASTATIN CALCIUM 80 MG: 40 TABLET, FILM COATED ORAL at 20:31

## 2025-01-15 RX ADMIN — OXYCODONE 5 MG: 5 TABLET ORAL at 01:34

## 2025-01-15 RX ADMIN — OXYCODONE 5 MG: 5 TABLET ORAL at 17:34

## 2025-01-15 RX ADMIN — WARFARIN SODIUM 3 MG: 1 TABLET ORAL at 17:35

## 2025-01-15 RX ADMIN — MIDODRINE HYDROCHLORIDE 5 MG: 5 TABLET ORAL at 14:57

## 2025-01-15 RX ADMIN — PANTOPRAZOLE SODIUM 40 MG: 40 TABLET, DELAYED RELEASE ORAL at 10:14

## 2025-01-15 RX ADMIN — CLOPIDOGREL BISULFATE 75 MG: 75 TABLET ORAL at 10:21

## 2025-01-15 RX ADMIN — CEFDINIR 300 MG: 300 CAPSULE ORAL at 20:31

## 2025-01-15 RX ADMIN — SENNOSIDES 8.6 MG: 8.6 TABLET, FILM COATED ORAL at 20:31

## 2025-01-15 ASSESSMENT — ACTIVITIES OF DAILY LIVING (ADL)
ADLS_ACUITY_SCORE: 44
ADLS_ACUITY_SCORE: 42
ADLS_ACUITY_SCORE: 50
ADLS_ACUITY_SCORE: 50
ADLS_ACUITY_SCORE: 44
ADLS_ACUITY_SCORE: 42
ADLS_ACUITY_SCORE: 44
ADLS_ACUITY_SCORE: 43
ADLS_ACUITY_SCORE: 44
ADLS_ACUITY_SCORE: 44
ADLS_ACUITY_SCORE: 50
ADLS_ACUITY_SCORE: 44
ADLS_ACUITY_SCORE: 50
ADLS_ACUITY_SCORE: 44
ADLS_ACUITY_SCORE: 50
ADLS_ACUITY_SCORE: 50
ADLS_ACUITY_SCORE: 42
ADLS_ACUITY_SCORE: 50
ADLS_ACUITY_SCORE: 43
ADLS_ACUITY_SCORE: 50
ADLS_ACUITY_SCORE: 50

## 2025-01-15 NOTE — PROGRESS NOTES
RENAL PROGRESS NOTE    REASON FOR CONSULT: CKD 5, recently stopped HD, worsening sCR     PLAN:  Dialysis BIW tx, M and F outpt, next Friday; reduced BFR/DFR to ease her back into HD, given 2 week hiatus  she has a spot held for her at the [facility] 7 a.m. (I have discussed with them)  No objection to discharge when ready from PE tx perspective per [facility]     ASSESSMENT:  CKD5:   was on IHD until 1/2/25, trial of holding with pt report of inc UO, but pt noticing inc uremic s/s, fatigue etc; resumed HD 1/14 (and feeling better today), considering BIW HD outpt and trend  ACCESS:  R CVC (placed 9/11)  Last sCR 3.07   EDW ~ 69 kg (she is trending in the 64-65 range, ongoing wt loss with min activity and low intake from uremia presumably)  Had [facility] Lennie-op cardiac surgery in Aug/Sept, On CRRT 8/29/24-9/8/24, Transition to iHD 9/9/24,   CKD attributed to longstanding NSAID use, historical baseline Cr 1.3-1.8; proteinuria.    Midodrine as needed for BP support     Volume  Looks euvolemic by exam, did not need any UF with HD d/t decent residual UO (incontinent)  on supp 02 at HS chronically, does feel sl SOB (has PE's), wt is 4-5 kg below her last dialysis target wt,  Min to no UF with dialysi     Blood Pressures   Controlled, SBP   on BB for Afib.       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    Discussed continued HD for now, on BIW schedule, and not opposed to trial off again in the future, but uremic s/s concerning after 2 weeks off HD.  Granted, some s/s could be d/t URI and PE's.  She is amenable to giving it some time back on tx, track how she feels between M and F to determine if she needs to go back to three times per week, stay on BIW or trial going off again once she is over this acute hump.  Defer to DR Winkler  on this moving forward outpt.     OBJECTIVE:  Physical Exam   Temp: 97.7  F (36.5  C) Temp src: Oral BP: 117/63 Pulse: 85   Resp: 18 SpO2: 97 % O2 Device: Nasal cannula Oxygen Delivery: 2.5 LPM  Vitals:    25 0940 25 1558 25 0501   Weight: 64.9 kg (143 lb) 64.6 kg (142 lb 6.7 oz) 65.2 kg (143 lb 11.8 oz)     Vital Signs with Ranges  Temp:  [97.2  F (36.2  C)-97.8  F (36.6  C)] 97.7  F (36.5  C)  Pulse:  [70-99] 85  Resp:  [18-19] 18  BP: ()/(53-66) 117/63  SpO2:  [91 %-99 %] 97 %  I/O last 3 completed shifts:  In: 120 [P.O.:120]  Out: 0     Temp (24hrs), Av.6  F (36.4  C), Min:97.2  F (36.2  C), Max:97.8  F (36.6  C)      Patient Vitals for the past 72 hrs:   Weight   25 0501 65.2 kg (143 lb 11.8 oz)   25 1558 64.6 kg (142 lb 6.7 oz)   25 0940 64.9 kg (143 lb)     Intake/Output Summary (Last 24 hours) at 1/15/2025 0845  Last data filed at 2025 1730  Gross per 24 hour   Intake 120 ml   Output 0 ml   Net 120 ml       PHYSICAL EXAM:  General - Alert and oriented x3, appears comfortable, NAD, sitting up in bed  Cardiovascular - Regular rate and rhythm, no rub BP controlled   Respiratory - sats good on 2.5L   Abd: soft, no ascites  Extremities - No lower extremity edema bilaterally, cachectic appearance   Skin: dry, intact, no rash, fair turgor  Neuro:  Grossly intact, no focal deficits  MSK:  Grossly intact  Psych:  Normal affect  : incont.     LABORATORY STUDIES:     Recent Labs   Lab 25  1530 25  0808  01/13/25  1023   WBC 8.5 9.4 14.5*   RBC 3.70* 3.76* 4.42   HGB 10.0* 10.8* 12.3   HCT 33.1* 34.6* 39.3   * 115* 122*       Basic Metabolic Panel:  Recent Labs   Lab 01/15/25  0749 01/14/25  0808 01/13/25  1023    140 140   POTASSIUM 3.6 3.6 3.9   CHLORIDE 101 99 98   CO2 23 28 26   BUN 24.8* 45.3* 43.5*   CR 2.54* 3.71* 4.03*   GLC 85 79 124*   KAELYN 9.0 9.3 9.4       INR  Recent Labs   Lab 01/15/25  0538   INR 1.33*        Recent Labs   Lab Test 01/15/25  0538 01/14/25  1530 01/14/25  0808 01/13/25  1023 10/09/24  0549   INR 1.33*  --   --   --  1.91*   WBC  --  8.5 9.4   < > 7.2   HGB  --  10.0* 10.8*   < > 8.0*   PLT  --  108* 115*   < > 170    < > = values in this interval not displayed.       Personally reviewed current labs      LORENZO Plummer-BC  [facility]  [phone]

## 2025-01-15 NOTE — PLAN OF CARE
Problem: Gas Exchange Impaired  Goal: Optimal Gas Exchange  Outcome: Progressing   Goal Outcome Evaluation:       Decreased lung sounds bilaterally. Maintaining oxygen saturation in the 90's on 2 liters of oxygen per nasal cannula. Heparin drip running at 850 units/hour. Alert and oriented x 4. Pleasant and cooperative. No c/o pain this shift. Afebrile, receiving iv and oral antibiotics.

## 2025-01-15 NOTE — PROGRESS NOTES
Care Management Follow Up    Length of Stay (days): 2    Expected Discharge Date: 01/18/2025     Concerns to be Addressed: discharge planning     Patient plan of care discussed at interdisciplinary rounds: Yes    Anticipated Discharge Disposition: Home, Home Care  Anticipated Discharge Services:  none  Anticipated Discharge DME:  Per Therapy    Patient/family educated on Medicare website which has current facility and service quality ratings:    Education Provided on the Discharge Plan: Yes  Patient/Family in Agreement with the Plan: yes    Referrals Placed by CM/SW: Homecare  Private pay costs discussed: Not applicable    Discussed  Partnership in Safe Discharge Planning  document with patient/family: No     Handoff Completed: No, handoff not indicated or clinically appropriate    Additional Information:  Chart reviewed and plan of care discussed with hospitalist.  Anticipate discharge on Saturday, 1/18/2025, home with resumption of Home Care services. Hospitalist anticipates no change in home Oxygen orders.    PT recommends Home PT.    Met with pt, spouse and son to discuss discharge planning.  Confirmed plan for resumption of [facility] services for Skilled Nursing, Physical Therapy, and .  Spouse confirm they company they use for home Oxygen (night use) is [facility].  Spouse states he will provide transport home.    Call placed to [facility] ([phone]) and spoke with Analy to provide update.  She is aware Nephrologist has been in contact with their office to arrange re-start of HD on Mondays and Fridays at 0700.  Analy is questioning this information, and reviewed lab work via phone.  Confirmed that pt and family are planning on this.  She requests to see if pt can have U/S, Vein Mapping for internal dialysis access.  Sticky Note placed to Physicians.    Next Steps: Medical readiness.  Coordinate with [facility] - have their request addressed.     Alize Plasencia  RN

## 2025-01-15 NOTE — PLAN OF CARE
Problem: Adult Inpatient Plan of Care  Goal: Absence of Hospital-Acquired Illness or Injury  Intervention: Identify and Manage Fall Risk  Recent Flowsheet Documentation  Taken 1/15/2025 0752 by Alysia Alexander RN  Safety Promotion/Fall Prevention: safety round/check completed     Problem: Adult Inpatient Plan of Care  Goal: Absence of Hospital-Acquired Illness or Injury  Intervention: Prevent Infection  Recent Flowsheet Documentation  Taken 1/15/2025 0752 by Alysia Alexander RN  Infection Prevention:   rest/sleep promoted   hand hygiene promoted     Problem: Adult Inpatient Plan of Care  Goal: Optimal Comfort and Wellbeing  Outcome: Progressing     Problem: Gas Exchange Impaired  Goal: Optimal Gas Exchange  Outcome: Progressing    Goal Outcome Evaluation: A&Ox4, vital signs WDL, pain and comfort managed per plan of care, on heparin high intensity infusion, oral intake is tolerated. Dr. Velez has seen patient at the bedside, patients spouse at the bedside updated. Will continue to monitor. -Alysia SKELTON RN

## 2025-01-15 NOTE — PLAN OF CARE
Problem: Adult Inpatient Plan of Care  Goal: Optimal Comfort and Wellbeing  Outcome: Progressing  Intervention: Monitor Pain and Promote Comfort  Recent Flowsheet Documentation  Taken 1/15/2025 0134 by Lidia Osman RN  Pain Management Interventions: medication (see MAR)     Problem: Infection  Goal: Absence of Infection Signs and Symptoms  Outcome: Progressing     Problem: Gas Exchange Impaired  Goal: Optimal Gas Exchange  Outcome: Progressing  Intervention: Optimize Oxygenation and Ventilation  Recent Flowsheet Documentation  Taken 1/15/2025 0130 by Lidia Osman RN  Head of Bed (HOB) Positioning: HOB at 60 degrees     Goal Outcome Evaluation:     Pt is A&O, calls appropriately for needs and is an assist 1 with GB/Walker for ambulation. Vitals signs are stable, afebrile, oxygen is on 2.5 liter via NC. Pt complains of pain 8/10, to right under breast/rib area, PRN oxycodone given for this, with relief. Pt is tolerating regular diet. Pt is voiding spontaneously, last bowel movement 1/13/2025. IV heparin running at 650/hr. Alarms in place.     Lidia Osman RN  January 15, 2025, 7:00 AM

## 2025-01-15 NOTE — PHARMACY-ANTICOAGULATION SERVICE
Clinical Pharmacy - Warfarin Dosing Consult     Pharmacy has been consulted to manage this patient s warfarin therapy.  Indication: DVT/PE Prophylaxis  Therapy Goal: INR 2-3  [place] [facility]  [place] Clinic: n/a  Warfarin Prior to Admission: No  Recent documented change in oral intake/nutrition: Unknown  Dose Comments: Baseline INR of 1.33 - high sensitivity. Start initial dose as 3mg.    INR   Date Value Ref Range Status   01/15/2025 1.33 (H) 0.85 - 1.15 Final   10/09/2024 1.91 (H) 0.85 - 1.15 Final       Recommend warfarin 3 mg today.  Pharmacy will monitor Felicitas SOM Elliott daily and order warfarin doses to achieve specified goal.      Please contact pharmacy as soon as possible if the warfarin needs to be held for a procedure or if the warfarin goals change.

## 2025-01-15 NOTE — PROGRESS NOTES
[facility]    Medicine Progress Note - Hospitalist Service    Date of Admission:  1/13/2025    Assessment & Plan   Felicitas Elliott is a 84 year old female admitted with community acquired pneumonia.  She is clinically stable, on 2L oxygen.  Community acquired pneumonia is complicated by pulmonary embolism.  Given instability of renal function, warfarin is the most appropriate choice for anticoagulation at this time.  This is the most limiting factor in her hospital stay.  Start bridge today.    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)  # Chronic heart failure with preserved EF  # Chronic hypotension  - midodrine  - clopidogrel  - atorvastatin  - torsemide on hold    # Hx afib  # History of sick sinus syndrome s/p pacemaker placement (9/2024)  - metoprolol previously discontinued by cardiac surgery            Diet: Combination Diet Low Saturated Fat Na <2400mg Diet  Snacks/Supplements Adult: Gelatein Plus; With Meals  Snacks/Supplements Adult: Nepro Oral Supplement; With Meals      Le Catheter: Not present  Lines: PRESENT             Cardiac Monitoring: None  Code Status: No CPR- Do NOT Intubate      Clinically Significant Risk Factors               # Hypoalbuminemia: Lowest albumin = 3.3 g/dL at 1/13/2025 10:23 AM, will monitor as appropriate   # Thrombocytopenia: Lowest platelets = 108 in last 2 days, will monitor for bleeding               # Severe Malnutrition: based on nutrition assessment, PRESENT ON ADMISSION   # Financial/Environmental Concerns: none   # Pacemaker present  # History of CABG: noted on surgical history       Social Drivers of Health    Tobacco Use: Medium Risk (1/7/2025)     [facility]    Patient History     Smoking Tobacco Use: Former     Smokeless Tobacco Use: Never          Disposition Plan     Medically Ready for Discharge: Anticipated in 2-4 Days             Noé Velez MD  Hospitalist Service  [facility]  Securely message with [employer] (more info)  Text page via [employer] Paging/Directory   ______________________________________________________________________    Interval History   Reports right sided pleuritic chest pain which is overall worse.  Endorses dyspnea. Denies hemoptysis.  Reports nausea.  Also has abdominal pain, which improves with burping.  Endorses flatus, has not had a bowel movement in a couple days.  She is urinating.    Physical Exam   Vital Signs: Temp: 97.7  F (36.5  C) Temp src: Oral BP: 108/58 Pulse: 70   Resp: 18 SpO2: 99 % O2 Device: Nasal cannula Oxygen Delivery: 2 LPM  Weight: 143 lbs 11.84 oz    Gen:  sitting in bed in no extremis  Neuro:  alert, conversant  CV:  nl rate, regular rhythm  Pulm:  no acute resp distress, ctab anteriorly  GI:  abdomen soft, mild right sided TTP    Medical Decision Making             Data

## 2025-01-15 NOTE — PROGRESS NOTES
01/15/25 1350   Appointment Info   Signing Clinician's Name / Credentials (PT) Terence Shaw, PT   Living Environment   People in Home spouse   Current Living Arrangements house;other (see comments)   Home Accessibility stairs within home;stairs to enter home   Number of Stairs, Main Entrance 2   Stair Railings, Main Entrance none   Number of Stairs, Within Home, Primary eight  (Hasn't done for past couple months)   Transportation Anticipated family or friend will provide   Living Environment Comments Has been sleeping in recliner   Self-Care   Usual Activity Tolerance fair   Current Activity Tolerance poor   Equipment Currently Used at Home walker, rolling   Fall history within last six months no   Activity/Exercise/Self-Care Comment  does most cares for patient   General Information   Onset of Illness/Injury or Date of Surgery 01/13/25   Referring Physician Brandi Montoya PA-C   Pertinent History of Current Problem (include personal factors and/or comorbidities that impact the POC) Felicitas Elliott is a 84 year old female admitted with community acquired pneumonia.  She is clinically stable, on 2L oxygen.  Community acquired pneumonia is complicated by pulmonary embolism.  Given instability of renal function, warfarin is the most appropriate choice for anticoagulation at this time.  This is the most limiting factor in her hospital stay.  Start bridge today.   Range of Motion (ROM)   ROM Comment B LE PROM WFL except B DF to neutral, B SLR 45 degrees, B knee flexion 110   Strength (Manual Muscle Testing)   Strength Comments B LE grossly 4/5   Bed Mobility   Bed Mobility rolling right;supine-sit   Rolling Right [place] (Bed Mobility) supervision   Supine-Sit [place] (Bed Mobility) minimum assist (75% patient effort)   Transfers   Transfers sit-stand transfer   Sit-Stand Transfer   Sit-Stand [place] (Transfers) contact guard   Gait/Stairs (Locomotion)   [place] Level (Gait) contact guard    Assistive Device (Gait) walker, front-wheeled   Distance in Feet (Gait) 5   Sensory Examination   Sensory Perception Comments B LE light touch intact   Clinical Impression   Criteria for Skilled Therapeutic Intervention Yes, treatment indicated   PT Diagnosis (PT) weakness, impaired mobility   Influenced by the following impairments MMT, ROM   Functional limitations due to impairments bed mobility, transfrs, gait   Clinical Presentation (PT Evaluation Complexity) evolving   Clinical Presentation Rationale per exm findings   Clinical Decision Making (Complexity) low complexity   Planned Therapy Interventions (PT) balance training;bed mobility training;gait training;neuromuscular re-education;patient/family education;ROM (range of motion);stair training;stretching;strengthening;progressive activity/exercise;transfer training   Risk & Benefits of therapy have been explained evaluation/treatment results reviewed;care plan/treatment goals reviewed;risks/benefits reviewed;current/potential barriers reviewed;participants voiced agreement with care plan;participants included;patient;spouse/significant other;son   PT Total Evaluation Time   PT Eval, Low Complexity Minutes (06465) 20   Physical Therapy Goals   PT Frequency Daily   PT Predicted Duration/Target Date for Goal Attainment 01/20/25   PT Goals Bed Mobility;Transfers;Gait;Stairs   PT: Bed Mobility Modified independent;Supine to/from sit   PT: Transfers Supervision/stand-by assist;Bed to/from chair;Assistive device;Sit to/from stand   PT: Gait Supervision/stand-by assist;Rolling walker;150 feet   PT: Stairs Minimal assist;3 stairs;Rail on both sides   Interventions   Interventions Quick Adds Gait Training;Therapeutic Activity   Therapeutic Activity   Therapeutic Activities: dynamic activities to improve functional performance Minutes (45148) 10   Symptoms Noted During/After Treatment None   Treatment Detail/Skilled Intervention Pt. min A supine to sit.  Pt.  performing sit to stand from EOB CGA and from recliner SBA.  Pt. performing stand pivot transfer CGA.   Gait Training   Gait Training Minutes (52923) 15   Symptoms Noted During/After Treatment (Gait Training) fatigue   Treatment Detail/Skilled Intervention Pt. amb with FWW CGA to SBA.  Pt. with slow pace and decreased step length.  Pt. performing 180 turn without LOB   Distance in Feet 5, 45   [place] Level (Gait Training) contact guard   Physical Assistance Level (Gait Training) 1 person assist   Weight Bearing (Gait Training) full weight-bearing   Assistive Device (Gait Training) rolling walker   Pattern Analysis (Gait Training) swing-through gait   Gait Analysis Deviations decreased ck;decreased velocity of limb motion;decreased step length   Impairments (Gait Analysis/Training) strength decreased   PT Discharge Planning   PT Plan Needs to be pushed, gait, transfers, stairs   PT Discharge Recommendation (DC Rec) (S)  home with home care physical therapy;home with assist   PT Rationale for DC Rec Pt. is CGA to min A with mobility but that is with minimal effort.  Spouse and pt. feel comfortable d/cing home   PT Brief overview of current status Pt. is min A supine to sit, CGA/SBA sit to stand and amb with FWW SBA/CGA.  Pt. needs to be pushed and could likely perform all activities SBA   Physical Therapy Time and Intention   Timed Code Treatment Minutes 25   Total Session Time (sum of timed and untimed services) 45

## 2025-01-16 ENCOUNTER — APPOINTMENT (OUTPATIENT)
Dept: PHYSICAL THERAPY | Facility: CLINIC | Age: 85
DRG: 193 | End: 2025-01-16
Attending: STUDENT IN AN ORGANIZED HEALTH CARE EDUCATION/TRAINING PROGRAM
Payer: COMMERCIAL

## 2025-01-16 LAB
ALBUMIN SERPL BCG-MCNC: 2.7 G/DL (ref 3.5–5.2)
ANION GAP SERPL CALCULATED.3IONS-SCNC: 12 MMOL/L (ref 7–15)
BUN SERPL-MCNC: 24.2 MG/DL (ref 8–23)
CALCIUM SERPL-MCNC: 9.6 MG/DL (ref 8.8–10.4)
CHLORIDE SERPL-SCNC: 102 MMOL/L (ref 98–107)
CREAT SERPL-MCNC: 2.7 MG/DL (ref 0.51–0.95)
EGFRCR SERPLBLD CKD-EPI 2021: 17 ML/MIN/1.73M2
GLUCOSE SERPL-MCNC: 89 MG/DL (ref 70–99)
HCO3 SERPL-SCNC: 25 MMOL/L (ref 22–29)
INR PPP: 1.14 (ref 0.85–1.15)
PHOSPHATE SERPL-MCNC: 2.8 MG/DL (ref 2.5–4.5)
POTASSIUM SERPL-SCNC: 3.7 MMOL/L (ref 3.4–5.3)
SODIUM SERPL-SCNC: 139 MMOL/L (ref 135–145)
UFH PPP CHRO-ACNC: 0.25 IU/ML
UFH PPP CHRO-ACNC: 0.66 IU/ML
ZINC SERPL-MCNC: 43.5 UG/DL

## 2025-01-16 PROCEDURE — 85520 HEPARIN ASSAY: CPT | Performed by: HOSPITALIST

## 2025-01-16 PROCEDURE — 250N000013 HC RX MED GY IP 250 OP 250 PS 637

## 2025-01-16 PROCEDURE — 120N000001 HC R&B MED SURG/OB

## 2025-01-16 PROCEDURE — 85610 PROTHROMBIN TIME: CPT | Performed by: HOSPITALIST

## 2025-01-16 PROCEDURE — 82310 ASSAY OF CALCIUM: CPT | Performed by: HOSPITALIST

## 2025-01-16 PROCEDURE — 97530 THERAPEUTIC ACTIVITIES: CPT | Mod: GP

## 2025-01-16 PROCEDURE — 99232 SBSQ HOSP IP/OBS MODERATE 35: CPT | Performed by: HOSPITALIST

## 2025-01-16 PROCEDURE — 36415 COLL VENOUS BLD VENIPUNCTURE: CPT | Performed by: HOSPITALIST

## 2025-01-16 PROCEDURE — 250N000011 HC RX IP 250 OP 636: Performed by: HOSPITALIST

## 2025-01-16 PROCEDURE — 99232 SBSQ HOSP IP/OBS MODERATE 35: CPT | Performed by: PHYSICIAN ASSISTANT

## 2025-01-16 PROCEDURE — 82040 ASSAY OF SERUM ALBUMIN: CPT | Performed by: HOSPITALIST

## 2025-01-16 PROCEDURE — 250N000013 HC RX MED GY IP 250 OP 250 PS 637: Performed by: HOSPITALIST

## 2025-01-16 RX ORDER — METHOCARBAMOL 500 MG/1
500 TABLET, FILM COATED ORAL ONCE
Status: COMPLETED | OUTPATIENT
Start: 2025-01-16 | End: 2025-01-16

## 2025-01-16 RX ORDER — CEFDINIR 300 MG/1
300 CAPSULE ORAL
Status: DISCONTINUED | OUTPATIENT
Start: 2025-01-17 | End: 2025-01-17

## 2025-01-16 RX ORDER — WARFARIN SODIUM 5 MG/1
5 TABLET ORAL
Status: COMPLETED | OUTPATIENT
Start: 2025-01-16 | End: 2025-01-16

## 2025-01-16 RX ORDER — ESCITALOPRAM OXALATE 5 MG/1
5 TABLET ORAL AT BEDTIME
Status: DISCONTINUED | OUTPATIENT
Start: 2025-01-16 | End: 2025-01-23

## 2025-01-16 RX ORDER — BISACODYL 10 MG
10 SUPPOSITORY, RECTAL RECTAL ONCE
Status: COMPLETED | OUTPATIENT
Start: 2025-01-16 | End: 2025-01-16

## 2025-01-16 RX ADMIN — CLOPIDOGREL BISULFATE 75 MG: 75 TABLET ORAL at 09:20

## 2025-01-16 RX ADMIN — ESCITALOPRAM OXALATE 5 MG: 5 TABLET, FILM COATED ORAL at 23:44

## 2025-01-16 RX ADMIN — PANTOPRAZOLE SODIUM 40 MG: 40 TABLET, DELAYED RELEASE ORAL at 06:22

## 2025-01-16 RX ADMIN — OXYCODONE 5 MG: 5 TABLET ORAL at 00:34

## 2025-01-16 RX ADMIN — WARFARIN SODIUM 5 MG: 5 TABLET ORAL at 18:48

## 2025-01-16 RX ADMIN — ATORVASTATIN CALCIUM 80 MG: 40 TABLET, FILM COATED ORAL at 21:19

## 2025-01-16 RX ADMIN — SENNOSIDES 8.6 MG: 8.6 TABLET, FILM COATED ORAL at 09:19

## 2025-01-16 RX ADMIN — POLYETHYLENE GLYCOL 3350 17 G: 17 POWDER, FOR SOLUTION ORAL at 09:20

## 2025-01-16 RX ADMIN — BISACODYL 10 MG: 10 SUPPOSITORY RECTAL at 10:03

## 2025-01-16 RX ADMIN — DOXYCYCLINE HYCLATE 100 MG: 100 CAPSULE ORAL at 21:19

## 2025-01-16 RX ADMIN — ACETAMINOPHEN 650 MG: 325 TABLET ORAL at 23:46

## 2025-01-16 RX ADMIN — DOXYCYCLINE HYCLATE 100 MG: 100 CAPSULE ORAL at 09:19

## 2025-01-16 RX ADMIN — OXYCODONE 5 MG: 5 TABLET ORAL at 06:22

## 2025-01-16 RX ADMIN — SENNOSIDES 8.6 MG: 8.6 TABLET, FILM COATED ORAL at 21:19

## 2025-01-16 RX ADMIN — MIDODRINE HYDROCHLORIDE 5 MG: 5 TABLET ORAL at 14:28

## 2025-01-16 RX ADMIN — HEPARIN SODIUM 800 UNITS/HR: 10000 INJECTION, SOLUTION INTRAVENOUS at 23:44

## 2025-01-16 RX ADMIN — TRAZODONE HYDROCHLORIDE 25 MG: 50 TABLET ORAL at 21:19

## 2025-01-16 RX ADMIN — METHOCARBAMOL 500 MG: 500 TABLET ORAL at 09:19

## 2025-01-16 RX ADMIN — MIDODRINE HYDROCHLORIDE 5 MG: 5 TABLET ORAL at 09:20

## 2025-01-16 ASSESSMENT — ACTIVITIES OF DAILY LIVING (ADL)
ADLS_ACUITY_SCORE: 50
ADLS_ACUITY_SCORE: 48
ADLS_ACUITY_SCORE: 50
ADLS_ACUITY_SCORE: 48
ADLS_ACUITY_SCORE: 48
ADLS_ACUITY_SCORE: 43
ADLS_ACUITY_SCORE: 43
ADLS_ACUITY_SCORE: 50
ADLS_ACUITY_SCORE: 50
ADLS_ACUITY_SCORE: 48
ADLS_ACUITY_SCORE: 50
ADLS_ACUITY_SCORE: 50
ADLS_ACUITY_SCORE: 48
ADLS_ACUITY_SCORE: 48
ADLS_ACUITY_SCORE: 50

## 2025-01-16 NOTE — PLAN OF CARE
Goal Outcome Evaluation: A&O X 4. VSS on 3 LPM. Reports R side abd pain, PRN and scheduled meds given. Denies SOB, dizziness and chest pain. PIV infusing with high intensity heparin infusion. Able to make needs known. Call light within reach. Plan of care ongoing.   Problem: Infection  Goal: Absence of Infection Signs and Symptoms  1/15/2025 2235 by Diana Mcnair, RN  Outcome: Progressing  1/15/2025 1859 by Diana Mcnair, RN  Outcome: Progressing     Problem: Adult Inpatient Plan of Care  Goal: Optimal Comfort and Wellbeing  Outcome: Progressing  Intervention: Monitor Pain and Promote Comfort  Recent Flowsheet Documentation  Taken 1/15/2025 1816 by Diana Mcnair, RN  Pain Management Interventions:   medication offered but refused   emotional support  Taken 1/15/2025 1732 by Diana Mcnair, RN  Pain Management Interventions: medication (see MAR)

## 2025-01-16 NOTE — PLAN OF CARE
Goal Outcome Evaluation:      Plan of Care Reviewed With: patient    Overall Patient Progress: improvingOverall Patient Progress: improving    Alert and oriented x4, able to make needs known, c/o right rib pain, gave PRN oxy for good relief, CVC CDI,  on 3 L of O2 Via NC, O2 saturation mid 90s, c/o SOB and NEGRON, encouraged deep breathing, Heparin currently running at 650 units/hr, patient c/o right calf pain, worsen with movement, sharp and stabbing, no swelling or redness noted, pulse intact, MD aware, US ordered.

## 2025-01-16 NOTE — PROGRESS NOTES
RENAL PROGRESS NOTE    ASSESSMENT & PLAN:   Chronic kidney disease stage 5, ?ESKD - With history of HAMMAD diana-operatively with cardiac surgery in late August/early September 2024 with CRRT 8/29/24-9/8/24 then transitioned to hemodialysis. Was on intermittent hemodialysis until 1/2/25, has been on trial of holding dialysis but patient reported increased uremic symptoms thus resumed dialysis 1/14/25. Considering twice weekly dialysis as outpatient and trending. Dialyzing via right CVC. Has outpatient placement at [facility] on MWF;will plan to dialyze on Monday-Friday schedule while here with next dialysis treatment scheduled for 1/17    Volume - Appears euvolemic on exam; historically has not needed UF with dialysis    Pulmonary embolism - Acute, provoked. Planning 3-6 months warfarin    Chronic hypotension - On midodrine. Torsemide on hold    Anemia - Of chronic disease, infection/inflammation. Hemoglobin has been 10-11    Respiratory failure, pneumonia - On oxygen as needed (chronically at night). Receiving antibiotics, supportive therapies    Coronary artery disease, atrial fibrillation - S/p CABG, valve surgery in 2024    Hyperlipidemia - On statin    H/o endocarditis complicated by surgical MVR/AVR (8/2024)    H/o sick sinus syndrome s/p pacemaker placement    Pre-diabetes    SUBJECTIVE:  Eating a little more today. Denies nausea or vomiting but gets full quickly. Chest pain improved, trying to take deeper breaths. Denies shortness of breath. Tolerated dialysis this week without issue    OBJECTIVE:  Physical Exam   Temp: 98.4  F (36.9  C) Temp src: Oral BP: 108/61 Pulse: 83   Resp: 18 SpO2: (!) 88 % O2 Device: Nasal cannula Oxygen Delivery: 3 LPM  Vitals:    01/13/25 0940 01/13/25 1558 01/14/25 0501   Weight: 64.9 kg (143 lb) 64.6 kg (142 lb 6.7 oz) 65.2 kg (143 lb 11.8 oz)     Vital Signs with Ranges  Temp:  [97.8  F (36.6  C)-98.5  F (36.9  C)] 98.4  F (36.9  C)  Pulse:  [77-85] 83  Resp:  [18-20]  18  BP: ()/(51-61) 108/61  SpO2:  [88 %-98 %] 88 %  I/O last 3 completed shifts:  In: 288 [P.O.:285; I.V.:3]  Out: 0         Patient Vitals for the past 72 hrs:   Weight   01/14/25 0501 65.2 kg (143 lb 11.8 oz)   01/13/25 1558 64.6 kg (142 lb 6.7 oz)   01/13/25 0940 64.9 kg (143 lb)     Intake/Output Summary (Last 24 hours) at 1/16/2025 0927  Last data filed at 1/16/2025 0035  Gross per 24 hour   Intake 288 ml   Output 0 ml   Net 288 ml       PHYSICAL EXAM:  General - Alert and appears comfortable  Cardiovascular - Normal S1S2, no rub  Respiratory - Clear to auscultation bilaterally, no crackles or wheezes  Abdomen - Soft, non-tender, bowel sounds present.    Extremities - No lower extremity edema bilaterally  Integumentary - Warm, dry, no rash  Neurologic - Grossly intact, no focal deficits      LABORATORY STUDIES:     Recent Labs   Lab 01/14/25  1530 01/14/25  0808 01/13/25  1023   WBC 8.5 9.4 14.5*   RBC 3.70* 3.76* 4.42   HGB 10.0* 10.8* 12.3   HCT 33.1* 34.6* 39.3   * 115* 122*       Basic Metabolic Panel:  Recent Labs   Lab 01/16/25  0836 01/15/25  0749 01/14/25  0808 01/13/25  1023    135 140 140   POTASSIUM 3.7 3.6 3.6 3.9   CHLORIDE 102 101 99 98   CO2 25 23 28 26   BUN 24.2* 24.8* 45.3* 43.5*   CR 2.70* 2.54* 3.71* 4.03*   GLC 89 85 79 124*   KAELYN 9.6 9.0 9.3 9.4       Recent Labs   Lab Test 01/16/25  0836 01/15/25  0538 01/14/25  1530 01/14/25  0808   INR 1.14 1.33*  --   --    WBC  --   --  8.5 9.4   HGB  --   --  10.0* 10.8*   PLT  --   --  108* 115*         Personally reviewed current labs      Tami Jacinto PA-C  [facility]  [phone]

## 2025-01-16 NOTE — CONSULTS
Discharge Pharmacy Test Claim    Patient has pharmacy benefits through [employer] Medicare advantage plan. Patient has an unmet drug deductible of $273.74 remaining. Eliquis is covered with an initial copay is $320.74 due to the unmet drug deductible. Once the drug deductible is met, eliquis copays reduce to $47 per month.     Test Claim Initial Copay Copay after Drug Deductible is met   eliquis 320.74 47.00     Patient is eligible to use a free 30-day free trial voucher for eliquis. Visit the link below to print a voucher. Connie added a voucher to patient's [facility] pharmacy profile and will be automatically applied if filling at [facility] pharmacy.     Voucher Link   [email or web address] [email or web address]     Alicia Oliver Víctor 1/16  [facility] JOSE Rosales  Ph: [phone]  Fax: [phone]  Available on Teams and [employer]  Disclaimer: Pharmacy test claims are estimates and may not reflect final costs. Suggested alternatives aim to be cost-effective and may not be therapeutically equivalent. This consult is informational and does not constitute medical advice. Clinical decisions should be made by qualified healthcare providers.

## 2025-01-16 NOTE — PROGRESS NOTES
"CLINICAL NUTRITION SERVICES - REASSESSMENT NOTE     Nutrition Prescription    RECOMMENDATIONS FOR MDs/PROVIDERS TO ORDER:  None    Malnutrition Status:    Malnutrition Diagnosis: Severe malnutrition in the context of chronic illness     Recommendations already ordered by Registered Dietitian (RD):  - Modify composition of meals/snacks - Add cottage cheese + fruit for snack BID. Discontinue Gelatein  - Liberalize diet to regular to encourage po intake    Future/Additional Recommendations:  Monitor po intake, electrolytes, ONS needs, wts     EVALUATION OF THE PROGRESS TOWARD GOALS   Diet: Low Saturated Fat/2400 mg Sodium  Nutrition Support: Gelatein + Nepro daily  Intake: Pt ordering sparingly, and eating ~50% or less of meals she does order.       NEW FINDINGS   Met with pt and family in room this afternoon. Pt having difficulty eating much at one time 2/2 fatigue after 3-4 bites of food, and some difficulty swallowing (though not choking). Family bringing in some food for pt to try (panini, chili).    We discussed which foods pt tolerates well, and how to increase daily nutrition intake via many small meals and snacks throughout the day. Pt's spouse and family say they can help encourage this, and help with ordering. Denoted on menu which foods are good options for pt, and explained that half orders of meals are available.     Pt dislikes most ONS 2/2 \"chalky\" taste and mouth feel, does not want Gelatein. Pt is open to getting cottage cheese + fruit as snack BID.     Pt's diet order will be downgraded from cardiac diet to regular to remove restrictions and encourage pt order more food. Will monitor electrolytes.    Labs:  Cr: 2.7 (H)  BUN: 24 (H)    Meds:  Protonix  Miralax  Senna    Previous Goals   Patient to consume % of nutritionally adequate meal trays TID, or the equivalent with supplements/snacks. - not met  Maintain wt near 65kg - not met    CURRENT NUTRITION DIAGNOSIS  Malnutrition (undernutrition) " related to ongoing poor po as evidenced by wt loss, reduced po, fat and muscle loss     INTERVENTIONS  Implementation  - Modify composition of meals/snacks - Add cottage cheese + fruit for snack BID. Discontinue Gelatein  - Liberalize diet to regular to encourage po intake    Goals  Patient to consume % of nutritionally adequate meal trays TID, or the equivalent with supplements/snacks. -   Maintain wt near 65kg -   Electrolytes WNL -     Monitoring/Evaluation  Progress toward goals will be monitored and evaluated per protocol.

## 2025-01-16 NOTE — PROGRESS NOTES
Nursing staff reports that patient is having right calf pain, new. Stabbing. Worsens with movement.  Ultrasound ordered.

## 2025-01-16 NOTE — PROGRESS NOTES
[facility]    Medicine Progress Note - Hospitalist Service    Date of Admission:  1/13/2025    Assessment & Plan   Felicitas Elliott is a 84 year old female admitted with community acquired pneumonia.  She is clinically stable. Presentation was complicated by pulmonary embolism.  Continue with heparin bridge to warfarin.  Lower extremity duplex discontinued as diagnosis of a lower extremity DVT is not unexpected and would not .  Right calf pain could also be consistent with muscle spasm.    With underlying CKD 5 and concern for uremia, renal replacement therapy has been resumed at direction of nephrology.    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)  # Chronic heart failure with preserved EF  # Chronic hypotension  - midodrine  - clopidogrel  - atorvastatin  - torsemide on hold    # Hx afib  # History of sick sinus syndrome s/p pacemaker placement (9/2024)  - metoprolol previously discontinued by cardiac surgery            Diet: Combination Diet Low Saturated Fat Na <2400mg Diet  Snacks/Supplements Adult: Gelatein Plus; With Meals  Snacks/Supplements Adult: Nepro Oral Supplement; With Meals      Le Catheter: Not present  Lines: PRESENT             Cardiac Monitoring: None  Code Status: No CPR- Do NOT Intubate      Clinically Significant Risk Factors               # Hypoalbuminemia: Lowest albumin = 2.6 g/dL at 1/15/2025  7:49 AM, will monitor as appropriate   # Thrombocytopenia: Lowest platelets = 108 in last 2 days, will monitor for bleeding               # Severe Malnutrition: based on nutrition assessment, PRESENT ON ADMISSION   # Financial/Environmental Concerns: none   #  Pacemaker present  # History of CABG: noted on surgical history       Social Drivers of Health    Tobacco Use: Medium Risk (1/7/2025)    Received from [employer]    Patient History     Smoking Tobacco Use: Former     Smokeless Tobacco Use: Never          Disposition Plan     Medically Ready for Discharge: Anticipated in 2-4 Days             Noé Velez MD  Hospitalist Service  [facility]  Securely message with [employer] (more info)  Text page via [employer] Paging/Directory   ______________________________________________________________________    Interval History   Reports dyspnea.  Chest pain is better when she does not take deep breaths.  Developed acute onset right calf pain last night.  Has not had a bowel movement.    Physical Exam   Vital Signs: Temp: 98.3  F (36.8  C) Temp src: Oral BP: 107/54 Pulse: 77   Resp: 18 SpO2: (!) 88 % O2 Device: Nasal cannula Oxygen Delivery: 3 LPM  Weight: 143 lbs 11.84 oz    Bloody material on tissue at bedside    Gen:  lying in bed in no extremis  Neuro:  alert, conversant  CV:  nl rate, regular rhythm  Pulm:  no acute resp distress, ctab anteriorly  GI:  abdomen soft, non distended, mild RLQ TTP  MSK:  point tenderness in the right calf    Medical Decision Making             Data

## 2025-01-16 NOTE — PLAN OF CARE
Problem: Adult Inpatient Plan of Care  Goal: Absence of Hospital-Acquired Illness or Injury  Intervention: Identify and Manage Fall Risk  Recent Flowsheet Documentation  Taken 1/16/2025 0913 by Alysia Alexander RN  Safety Promotion/Fall Prevention:   activity supervised   clutter free environment maintained   lighting adjusted   increase visualization of patient   nonskid shoes/slippers when out of bed   safety round/check completed   supervised activity   toileting scheduled   room near nurse's station     Problem: Adult Inpatient Plan of Care  Goal: Absence of Hospital-Acquired Illness or Injury  Intervention: Prevent Skin Injury  Recent Flowsheet Documentation  Taken 1/16/2025 1019 by Alysia Alexander RN  Body Position: log-rolled  Taken 1/16/2025 0915 by Alysia Alexander RN  Body Position: position changed independently  Taken 1/16/2025 0913 by Alysia Alexander RN  Body Position: position changed independently     Problem: Gas Exchange Impaired  Goal: Optimal Gas Exchange  Intervention: Optimize Oxygenation and Ventilation  Recent Flowsheet Documentation  Taken 1/16/2025 1019 by Alysia Alexander RN  Head of Bed (HOB) Positioning: HOB at 20-30 degrees  Taken 1/16/2025 0915 by Alysia Alexander RN  Head of Bed (HOB) Positioning: HOB at 60 degrees  Taken 1/16/2025 0913 by Alysia Alexander RN  Head of Bed (HOB) Positioning: HOB at 30 degrees    Goal Outcome Evaluation: A&Ox4, VSS afebrile on 3LPM O2 via nasal cannula, SpO2 at 93%, c/o of pain when breathing, encouraged the use of incentive spirometer, and deep breathing exercises, on scheduled Robaxin, appetite improved ate 50% breakfast, oral intake tolerated fairly well. On miralalx, senna, and dulcolax for constipation prevention, walked to the bathroom assistive 1 walker and gaitbelt, had a bowel movement-medium, loose, and brown. On heparin high intensity infusion, currently running at 800 units/hr based on Anti-Xa level of 0.25, per heparin high  intensity protocol. Patient's family is visiting, updated. HLM Admission: 6- Walk 10 steps or more. HLM Daily6- Walk 10 steps or more. -Alysia SKELTON RN

## 2025-01-17 ENCOUNTER — APPOINTMENT (OUTPATIENT)
Dept: OCCUPATIONAL THERAPY | Facility: CLINIC | Age: 85
DRG: 193 | End: 2025-01-17
Attending: STUDENT IN AN ORGANIZED HEALTH CARE EDUCATION/TRAINING PROGRAM
Payer: COMMERCIAL

## 2025-01-17 LAB
ALBUMIN SERPL BCG-MCNC: 2.9 G/DL (ref 3.5–5.2)
ANION GAP SERPL CALCULATED.3IONS-SCNC: 13 MMOL/L (ref 7–15)
BUN SERPL-MCNC: 21.8 MG/DL (ref 8–23)
CALCIUM SERPL-MCNC: 9.8 MG/DL (ref 8.8–10.4)
CHLORIDE SERPL-SCNC: 100 MMOL/L (ref 98–107)
CREAT SERPL-MCNC: 2.65 MG/DL (ref 0.51–0.95)
EGFRCR SERPLBLD CKD-EPI 2021: 17 ML/MIN/1.73M2
ERYTHROCYTE [DISTWIDTH] IN BLOOD BY AUTOMATED COUNT: 17.3 % (ref 10–15)
GLUCOSE SERPL-MCNC: 98 MG/DL (ref 70–99)
HCO3 SERPL-SCNC: 24 MMOL/L (ref 22–29)
HCT VFR BLD AUTO: 35.2 % (ref 35–47)
HGB BLD-MCNC: 10.6 G/DL (ref 11.7–15.7)
INR PPP: 1.41 (ref 0.85–1.15)
MCH RBC QN AUTO: 27.5 PG (ref 26.5–33)
MCHC RBC AUTO-ENTMCNC: 30.1 G/DL (ref 31.5–36.5)
MCV RBC AUTO: 91 FL (ref 78–100)
PHOSPHATE SERPL-MCNC: 2.5 MG/DL (ref 2.5–4.5)
PLATELET # BLD AUTO: 145 10E3/UL (ref 150–450)
POTASSIUM SERPL-SCNC: 3.1 MMOL/L (ref 3.4–5.3)
RBC # BLD AUTO: 3.85 10E6/UL (ref 3.8–5.2)
SODIUM SERPL-SCNC: 137 MMOL/L (ref 135–145)
UFH PPP CHRO-ACNC: 0.55 IU/ML
WBC # BLD AUTO: 6.4 10E3/UL (ref 4–11)

## 2025-01-17 PROCEDURE — 36415 COLL VENOUS BLD VENIPUNCTURE: CPT | Performed by: HOSPITALIST

## 2025-01-17 PROCEDURE — 99232 SBSQ HOSP IP/OBS MODERATE 35: CPT | Performed by: PHYSICIAN ASSISTANT

## 2025-01-17 PROCEDURE — 99223 1ST HOSP IP/OBS HIGH 75: CPT | Mod: AI | Performed by: CLINICAL NURSE SPECIALIST

## 2025-01-17 PROCEDURE — 85520 HEPARIN ASSAY: CPT | Performed by: HOSPITALIST

## 2025-01-17 PROCEDURE — 250N000013 HC RX MED GY IP 250 OP 250 PS 637: Performed by: HOSPITALIST

## 2025-01-17 PROCEDURE — 99418 PROLNG IP/OBS E/M EA 15 MIN: CPT | Performed by: CLINICAL NURSE SPECIALIST

## 2025-01-17 PROCEDURE — 90935 HEMODIALYSIS ONE EVALUATION: CPT

## 2025-01-17 PROCEDURE — 250N000013 HC RX MED GY IP 250 OP 250 PS 637

## 2025-01-17 PROCEDURE — 97535 SELF CARE MNGMENT TRAINING: CPT | Mod: GO

## 2025-01-17 PROCEDURE — 250N000011 HC RX IP 250 OP 636: Performed by: HOSPITALIST

## 2025-01-17 PROCEDURE — G0463 HOSPITAL OUTPT CLINIC VISIT: HCPCS

## 2025-01-17 PROCEDURE — 80069 RENAL FUNCTION PANEL: CPT | Performed by: HOSPITALIST

## 2025-01-17 PROCEDURE — 85041 AUTOMATED RBC COUNT: CPT | Performed by: HOSPITALIST

## 2025-01-17 PROCEDURE — 120N000001 HC R&B MED SURG/OB

## 2025-01-17 PROCEDURE — 99232 SBSQ HOSP IP/OBS MODERATE 35: CPT | Performed by: HOSPITALIST

## 2025-01-17 PROCEDURE — 250N000011 HC RX IP 250 OP 636

## 2025-01-17 PROCEDURE — 85014 HEMATOCRIT: CPT | Performed by: HOSPITALIST

## 2025-01-17 PROCEDURE — 85610 PROTHROMBIN TIME: CPT | Performed by: HOSPITALIST

## 2025-01-17 RX ORDER — DOXYCYCLINE 100 MG/1
100 CAPSULE ORAL 2 TIMES DAILY
Status: COMPLETED | OUTPATIENT
Start: 2025-01-17 | End: 2025-01-18

## 2025-01-17 RX ORDER — ESCITALOPRAM OXALATE 5 MG/1
5 TABLET ORAL AT BEDTIME
COMMUNITY

## 2025-01-17 RX ORDER — ONDANSETRON 4 MG/1
4 TABLET, ORALLY DISINTEGRATING ORAL 3 TIMES DAILY
Status: DISCONTINUED | OUTPATIENT
Start: 2025-01-17 | End: 2025-01-23 | Stop reason: HOSPADM

## 2025-01-17 RX ORDER — CEFDINIR 300 MG/1
300 CAPSULE ORAL
Status: COMPLETED | OUTPATIENT
Start: 2025-01-17 | End: 2025-01-17

## 2025-01-17 RX ORDER — WARFARIN SODIUM 5 MG/1
5 TABLET ORAL
Status: COMPLETED | OUTPATIENT
Start: 2025-01-17 | End: 2025-01-17

## 2025-01-17 RX ADMIN — ESCITALOPRAM OXALATE 5 MG: 5 TABLET, FILM COATED ORAL at 20:58

## 2025-01-17 RX ADMIN — ONDANSETRON 4 MG: 4 TABLET, ORALLY DISINTEGRATING ORAL at 20:56

## 2025-01-17 RX ADMIN — DOXYCYCLINE HYCLATE 100 MG: 100 CAPSULE ORAL at 08:07

## 2025-01-17 RX ADMIN — MIDODRINE HYDROCHLORIDE 5 MG: 5 TABLET ORAL at 20:56

## 2025-01-17 RX ADMIN — WARFARIN SODIUM 5 MG: 5 TABLET ORAL at 17:15

## 2025-01-17 RX ADMIN — MIDODRINE HYDROCHLORIDE 5 MG: 5 TABLET ORAL at 14:54

## 2025-01-17 RX ADMIN — DOXYCYCLINE HYCLATE 100 MG: 100 CAPSULE ORAL at 20:56

## 2025-01-17 RX ADMIN — ONDANSETRON 4 MG: 4 TABLET, ORALLY DISINTEGRATING ORAL at 10:19

## 2025-01-17 RX ADMIN — PANTOPRAZOLE SODIUM 40 MG: 40 TABLET, DELAYED RELEASE ORAL at 06:30

## 2025-01-17 RX ADMIN — CLOPIDOGREL BISULFATE 75 MG: 75 TABLET ORAL at 08:07

## 2025-01-17 RX ADMIN — CEFDINIR 300 MG: 300 CAPSULE ORAL at 17:15

## 2025-01-17 RX ADMIN — ONDANSETRON 4 MG: 4 TABLET, ORALLY DISINTEGRATING ORAL at 14:54

## 2025-01-17 RX ADMIN — MIDODRINE HYDROCHLORIDE 5 MG: 5 TABLET ORAL at 08:17

## 2025-01-17 RX ADMIN — ATORVASTATIN CALCIUM 80 MG: 40 TABLET, FILM COATED ORAL at 20:56

## 2025-01-17 RX ADMIN — SENNOSIDES 8.6 MG: 8.6 TABLET, FILM COATED ORAL at 20:56

## 2025-01-17 ASSESSMENT — ACTIVITIES OF DAILY LIVING (ADL)
ADLS_ACUITY_SCORE: 48

## 2025-01-17 NOTE — PLAN OF CARE
Pt A&O x4, calls appropriately and can make needs known. Call light within reach. Assist x1 with GBW.  at bedside throughout the day. Palliative consult today. IV abx transitioned to orals. Hep drip at 800. Anti-xa recheck tomorrow. Pt is currently dialyzing. Cardiac diet. Working with PT/OT. Possible discharge in 2-4 days.   Problem: Adult Inpatient Plan of Care  Goal: Plan of Care Review  Description: The Plan of Care Review/Shift note should be completed every shift.  The Outcome Evaluation is a brief statement about your assessment that the patient is improving, declining, or no change.  This information will be displayed automatically on your shift  note.  Outcome: Progressing     Problem: Gas Exchange Impaired  Goal: Optimal Gas Exchange  Outcome: Progressing     Problem: Palliative Care  Goal: Enhanced Quality of Life  Outcome: Progressing  Intervention: Optimize Function  Recent Flowsheet Documentation  Taken 1/17/2025 0900 by Maisha Gibbons, RN  Sensory Stimulation Regulation: quiet environment promoted  Intervention: Optimize Psychosocial Wellbeing  Recent Flowsheet Documentation  Taken 1/17/2025 0900 by Maisha Gibbons, RN  Supportive Measures: active listening utilized     Problem: Hemodialysis  Goal: Safe, Effective Therapy Delivery  Outcome: Progressing  Intervention: Optimize Device Care and Function  Recent Flowsheet Documentation  Taken 1/17/2025 0900 by Maisha Gibbons, RN  Medication Review/Management: medications reviewed   Goal Outcome Evaluation:

## 2025-01-17 NOTE — PROGRESS NOTES
Care Management Follow Up    Length of Stay (days): 4    Expected Discharge Date: 01/20/2025     Concerns to be Addressed: discharge planning     Patient plan of care discussed at interdisciplinary rounds: Yes    Anticipated Discharge Disposition: Home, Home Care  Anticipated Discharge Services:  Resumption of Home SN, PT and SW; re-start community dialysis at [facility]   Anticipated Discharge DME:      Patient/family educated on Medicare website which has current facility and service quality ratings:    Education Provided on the Discharge Plan: Yes  Patient/Family in Agreement with the Plan: yes    Referrals Placed by CM/SW: Homecare  Private pay costs discussed:  not at this time    Discussed  Partnership in Safe Discharge Planning  document with patient/family: No     Handoff Completed: No, handoff not indicated or clinically appropriate    Additional Information:  Chart reviewed and plan of care addressed with hospitalist. Pt is not medically ready for discharge and potential discharge on Monday, 1/20/2025.    Anticipate discharge home with resumption of Home Care services from [facility] for SN, PT and SW.  Pt will also be re-starting community dialysis services at [facility] after having discharged from their services earlier in January.      Call placed to Analy at [facility] ([phone]) and provided update.  Anticipate re-starting dialysis at their location on Friday, 1/24/2025.  Pt will be on a 2 day/wk schedule, on Mondays and Fridays.    Next Steps: medical readiness, keep  and [facility] updated    Alize Plasencia RN

## 2025-01-17 NOTE — CONSULTS
"[facility]  WO Nurse Inpatient Assessment     Consulted for: known buttock wound per patient    Summary: 1/17 Seen in room with  present.  No visible wound.  Patient has been in several facilities over the past month or more.  Spent a lot of time in bed not very active.  [facility] nurse follow-up plan: weekly    Patient History (according to provider note(s):      Felicitas Elliott is a 84 year old female admitted with community acquired pneumonia.     Assessment:      Areas visualized during today's visit: Sacrum/coccyx    Skin Injury Location: buttocks        Last photo: 1/17  Skin injury due to: Chronic skin injury (often related to prolonged recliner use)  Skin history and plan of care:   see above, several notes from [facility] Sept/Oct and wounds are visible then  Affected area:      Skin assessment: Intact, Erythema, and Hemosiderin staining  whole area blanchable, no pain and understands need for re positioning     Measurements (length x width x depth, in cm) unable to obtain     Color: normal and consistent with surrounding tissue     Temperature  normal      Drainage: none .      Color: none      Odor: none  Pain: denies , none  Pain interventions prior to dressing change: N/A  Treatment goal: Maintain (prevention of deterioration) and Protection  STATUS: initial assessment  Supplies ordered: supplies stored on unit       Treatment Plan:     Pressure Injury Prevention (PIP) Plan:  If patient is declining pressure injury prevention interventions: Explore reason why and address patient's concerns, Educate on pressure injury risk and prevention intervention(s), If patient is still declining, document \"informed refusal\" , and Ensure Care team is aware ( provider, charge nurse, etc)  Mattress: Follow bed algorithm, add Low Air Loss (Air+) mattress pump if skin is very moist or constantly moist.   HOB: Maintain at or below 30 degrees, unless contraindicated  Repositioning in bed: Every 1-2 " hours  and Raise foot of bed prior to raising head of bed, to reduce patient sliding down (shear)  Heels: Keep elevated off mattress  Protective Dressing: Sacral Mepilex for prevention (#027642),  especially for the agitated patient   Positioning Equipment:None  Chair positioning: Chair cushion (#[id number]) , Assist patient to reposition hourly, and Do NOT use a donut for sitting (this increases pressure to smaller area and creates a higher potential for injury)    If patient has a buttock pressure injury, or high risk for PI use chair cushion or SPS.  Moisture Management: Perineal cleansing /protection: Follow Incontinence Protocol, Avoid brief in bed, Clean and dry skin folds with bathing , and Moisturize dry skin  Under Devices: Inspect skin under all medical devices during skin inspection , Ensure tubes are stabilized without tension, and Ensure patient is not lying on medical devices or equipment when repositioned  Ask provider to discontinue device when no longer needed.      Orders: Written    RECOMMEND PRIMARY TEAM ORDER: None, at this time  Education provided: importance of repositioning  Discussed plan of care with: Patient and Family  Notify WOC if wound(s) deteriorate.  Nursing to notify the Provider(s) and re-consult the WOC Nurse if new skin concern.    DATA:     Current support surface: Standard  Standard gel mattress (Isoflex)  Containment of urine/stool: Brief  BMI: Body mass index is 23.2 kg/m .   Active diet order: Orders Placed This Encounter      Combination Diet Low Saturated Fat Na <2400mg Diet     Output: I/O last 3 completed shifts:  In: 360 [P.O.:357; I.V.:3]  Out: -      Labs:   Recent Labs   Lab 01/17/25  0847 01/14/25  0808 01/13/25  1023   ALBUMIN 2.9*   < > 3.3*   HGB 10.6*   < > 12.3   INR 1.41*   < >  --    WBC 6.4   < > 14.5*   A1C  --   --  5.6    < > = values in this interval not displayed.     Pressure injury risk assessment:   Sensory Perception: 3-->slightly limited  Moisture:  3-->occasionally moist  Activity: 3-->walks occasionally  Mobility: 3-->slightly limited  Nutrition: 2-->probably inadequate  Friction and Shear: 2-->potential problem  Sergey Score: 16    JAM KulkarniN RN CWOCN  Pager no longer in use, please contact through [employer] group: [facility]

## 2025-01-17 NOTE — PROGRESS NOTES
HEMODIALYSIS TREATMENT NOTE      Date: 1/17/2025  Time: 6:57 PM     Data:  Pre Wt:   67.9 kg (bed scale)  Desired Wt:   0-0.5 Liters UF  Post Wt:  67.2 kg (bed scale)  Weight change: - 0.7 kg  Ultrafiltration - Post Run Net Total Removed (mL):  500 ml  Vascular Access Status: CVC patent  Dialyzer Rinse:  Light   Total Blood Volume Processed: 68.1 L   Total Dialysis (Treatment) Time:   3.0 Hrs  Dialysate Bath: K 4, Ca 3  Heparin: Heparin: None     Lab:   No  HbsAg - 10/2/2024 (Non Reactive)  HbsAb - 10/2/2024 21.70 (Immune)     Interventions:  Dialysis done through Right subclavian tunneled dialysis catheter. ,   UF set to 0.8 Liter of fluid removal, accommodating priming and rinse back volumes  No dialysis-related medication administered  See Flowsheet for Crit Profile throughout the run  Treatment has ended safely and blood is rinsed back completely  Catheter lumens flushed with saline and locked with Saline, catheter caps changed post HD  Post Tx assessment done. Patient remained in  in stable condition, having dinner.  Report given to Maisha CORTES RN.     Assessment:  A/O x 4, calm & cooperative, denies pain  CVC intact, previous dressing clean and dry                Plan:    Per Renal team

## 2025-01-17 NOTE — PROGRESS NOTES
[facility]    Medicine Progress Note - Hospitalist Service    Date of Admission:  1/13/2025    Assessment & Plan   Felicitas Elliott is a 84 year old female admitted with community acquired pneumonia.  She is clinically stable.  Presentation was complicated by pulmonary embolism.  Continue with heparin bridge to warfarin. Renal replacement therapy has been resumed at direction of nephrology.    Extended discussion today related to goals of her medical care.  She is clear that she accepts she is dying and communicates initial preferences to avoid future hospitalization and possibly discontinue dialysis.  Primary barriers to this decision are her concerns that family, particularly her , is having difficulty accepting these decisions.  Palliative care consulted to facilitate meeting and support patient communicating these preferences with her family.    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)  # Chronic heart failure with preserved EF  # Chronic hypotension  - midodrine  - clopidogrel  - atorvastatin  - torsemide on hold    # Hx afib  # History of sick sinus syndrome s/p pacemaker placement (9/2024)  - metoprolol previously discontinued by cardiac surgery    # Buttock wound  - WOC consult            Diet: Combination Diet Low Saturated Fat Na <2400mg Diet  Snacks/Supplements Adult: Gelatein Plus; With Meals  Snacks/Supplements Adult: Nepro Oral Supplement; With Meals      Le Catheter: Not present  Lines: PRESENT             Cardiac Monitoring: None  Code Status: No CPR- Do NOT Intubate      Clinically Significant Risk Factors            # Hypercalcemia: corrected calcium is >10.1, will  monitor as appropriate    # Hypoalbuminemia: Lowest albumin = 2.6 g/dL at 1/15/2025  7:49 AM, will monitor as appropriate                 # Severe Malnutrition: based on nutrition assessment, PRESENT ON ADMISSION   # Financial/Environmental Concerns: none   # Pacemaker present  # History of CABG: noted on surgical history       Social Drivers of Health    Tobacco Use: Medium Risk (1/7/2025)    Received from [employer] [facility]    Patient History     Smoking Tobacco Use: Former     Smokeless Tobacco Use: Never          Disposition Plan     Medically Ready for Discharge: Anticipated in 2-4 Days             Noé Velez MD  Hospitalist Service  [facility]  Securely message with [employer] (more info)  Text page via [employer] Paging/Directory   ______________________________________________________________________    Interval History   Had two bowel movements.  Reports having right lower rib pain with breathing.  Denies other abdominal pain.  Reports dyspnea.    Physical Exam   Vital Signs: Temp: 98.5  F (36.9  C) Temp src: Oral BP: 124/57 Pulse: 88   Resp: 16 SpO2: 93 % O2 Device: Nasal cannula Oxygen Delivery: 3 LPM  Weight: 143 lbs 11.84 oz    Gen:  lying in bed in no extremis  Neuro:  alert, conversant  CV:  nl rate, regular rhythm  Pulm: no acute resp distress, ctab anteriorly  GI:  abdomen soft, mild RLQ and central lower TTP    Medical Decision Making             Data

## 2025-01-17 NOTE — DISCHARGE INSTRUCTIONS
"WO DISCHARGE INSTRUCTIONS:  Pressure Injury Prevention (PIP) Plan:  If patient is declining pressure injury prevention interventions: Explore reason why and address patient's concerns, Educate on pressure injury risk and prevention intervention(s), If patient is still declining, document \"informed refusal\" , and Ensure Care team is aware ( provider, charge nurse, etc)  Mattress: Follow bed algorithm, add Low Air Loss (Air+) mattress pump if skin is very moist or constantly moist.   HOB: Maintain at or below 30 degrees, unless contraindicated  Repositioning in bed: Every 1-2 hours  and Raise foot of bed prior to raising head of bed, to reduce patient sliding down (shear)  Heels: Keep elevated off mattress  Protective Dressing: Sacral Mepilex for prevention (#[id number]),  especially for the agitated patient   Positioning Equipment:None  Chair positioning: Chair cushion (#[id number]) , Assist patient to reposition hourly, and Do NOT use a donut for sitting (this increases pressure to smaller area and creates a higher potential for injury)    If patient has a buttock pressure injury, or high risk for PI use chair cushion or SPS.  Moisture Management: Perineal cleansing /protection: Follow Incontinence Protocol, Avoid brief in bed, Clean and dry skin folds with bathing , and Moisturize dry skin  Under Devices: Inspect skin under all medical devices during skin inspection , Ensure tubes are stabilized without tension, and Ensure patient is not lying on medical devices or equipment when repositioned  Ask provider to discontinue device when no longer needed.  "

## 2025-01-17 NOTE — PLAN OF CARE
"Kim is A & O x4. We had a long conversation today at our first meeting. She expressed fatigue and frustration with all of her treatments, excercises, breathing difficulty, fatigue, and pressure from family to participate greater than she feels she is able to. She c/o her 's frustration her when she has no appetite or can't move around as much as he wants her to.  Ed joined conversation and reinforced his frustrations with her effort. I suggested a Palliative Consult to assist them with goal setting, and to align with a medical specialty that can speak the language of both treatment and comfort. Ed stated that she sometimes wants to \"lay there and die\". He used this phrase two times during our conversation. I explained there is a vast spectrum between being too fatigued to participate in therapy and \"laying there and dying\". Kim welcomed the idea of a Palliative Consult enthusiastically. MD paged and updated.         Fatigued, refused dinner but drinking fluids and drank Nepro. Wanted to sleep after discussion. Took all meds, IV heparin infusing. Midrodrine held per parameter. No c/o pain. 3L o2. VSS.                  "

## 2025-01-17 NOTE — CONSULTS
"      Palliative Care Consultation Note  [facility]      Patient: Felicitas Elliott  Date of Admission:  1/13/2025    Requesting Clinician / Team: Dr. Velez  Reason for consult:   Goals of care  Decisional support  Patient and family support       Recommendations & Counseling     GOALS OF CARE:   Life-prolonging with limits -clearly DNR/DNI.   Patient is wanting to continue with dialysis if indicated.  States she was tolerating dialysis prior to stopping on 1/2/25 and hoping resuming dialysis will help with uremic symptoms.  If she continues to feel overburdened by continuing with medical interventions (including dialysis), would consider pivot to comfort focused care and possibly hospice.    What is most important: Being home, having good symptom management (improvement in nausea and shortness of breath), and feeling like she can \"get well.\"  To Kim, getting well means having improvement in appetite, being able to continue with exercises/therapy, and not feeling short of breath.  She acknowledges that although in her mind she is motivated to \"get well\", body is not tolerating at times.   Patient states she is accepting of death when EOL comes.  She does not want to feel like she is a burden to her  and children and hoping they will come to accept her wishes as she approaches end of life.   In meeting with patient/ today,  states he will support her wishes, while still \"hoping\" that she will be able to improve appetite, nutrition, and strength.  Patient would likely benefit from outpatient follow up in palliative care clinic for symptom management and ongoing support regarding goals of care.      ADVANCE CARE PLANNING:  No health care directive on file. Per system policy, Surrogate Decision-makers for Patients With Diminished Decision-making Capacity offers guidance on possible decision-makers.  Ed has been identified as a surrogate decision maker.  Provided " with blank HCD document and POLST form today.  Patient states she would want  Ed as primary health care agent and daughter Sanam as secondary.   There is no POLST form on file, recommend to complete prior to DC.  Code status: No CPR- Do NOT Intubate    MEDICAL MANAGEMENT:   #Dyspnea, 2/2 pneumonia and PE  Management per [facility]-antibiotics, heparin gtt-warfarin, oxygen    #Nausea  #Anorexia  Likely related to renal failure, worse since stopping dialysis on 1/2/25  -Pharmacologic management   Zofran ODT 4 mg TID   Compazine 5 mg po every 6 hours PRN  -Nonpharmacologic management  Small, frequent intake  Assess and avoid aggravating factors  Accupressure (C-band)  Aromatherapy, alcohol swabs known to be effective     #General Weakness/Debility, Fatigue  Physical Therapy  Occupational therapy   provides assistance at home.   Hoping to have home PT/OT at discharge    PSYCHOSOCIAL/SPIRITUAL SUPPORT:  Family  to Ed for 41 years.  They have one son together.  Patient has 6 other children from a previous marriage.    Palliative Care will continue to follow. Thank you for the consult and allowing us to aid in the care of Felicitas Elliott.    These recommendations have been discussed with Dr. Velez and Maisha VICENTE.    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     Patient had been on dialysis until 1/2/2025 and was on a trial of holding dialysis, but reported increased uremic symptoms and resumed dialysis on 1/14/2025.  Renal is following.     Today, the patient was seen for:  Initial palliative care visit, goals of care.    History of Present Illness   Met with patient and  in room.   Introduced the role of palliative care as an interdisciplinary team that cares for patients with serious illness to help support symptom management, communication, coping for patients and their families as well as support with medical decision making.    Patient reports that she has had many challenges since her lengthy hospitalization starting in August 2024.  States she was initially hospitalized at [facility] and then transferred to [facility] for cardiac care.  Then discharged to [facility].  Has been home since November 2024.  Initially was feeling strong and like she could complete her exercises and therapies.  States she has continued to struggle with loss of appetite, losing weight, progressively getting weaker.  Decision was made per nephrology to stop dialysis as her kidney function seemed to be improving.  Last dialysis run prior to this admission was on 1/2/2025.    Patient expresses a desire to \"get well\".  She describes this by having improved appetite, being able to participate in therapy/exercises, improvement in her breathing, resuming dialysis.   She also verbalized that she feels she is a burden to her family, \"I wish they had more empathy for how I am feeling.\"  Describes family members expecting her to eat more, exercise more, even when her body is not tolerating.      Patient states she is at peace with end-of-life when it is her time.  She worries that her family may not be accepting if she chooses to pivot to comfort care or stop dialysis.     present and verbalized that he is hoping for improvement in her " "strength and appetite.  Also verbalized he now realizes that may be out of her control due to her disease processes and doesn't equate this to \"giving up\".    Prognosis, Goals, & Planning:   Functional Status just prior to this current hospitalization:  ECOG3 (Capable of only limited self-care; needs help with ADLs; in bed/chair >50% of waking hours)    Prognosis, Goals, and/or Advance Care Planning:  We discussed general treatment options (full/restorative, selective/conservatives, and comfort only/hospice). We then discussed how these specifically apply to fKathleen.  Based on this discussion, patient has decided to continue with selective medical treatments including dialysis.   Education provided regarding hospice philosophy, prognostic,and eligibility criteria. Discussed what services are provided and those that are not,  Discussed common misconceptions. We explored the various disposition options where they can receive hospice care (home, residential hospice homes, LTC with hospice) including subsequent financial and familial implications. Discussed typical anticipated timing of discharge.    Code Status was addressed today:   Confirmed DNR/DNI    Patient's decision making preferences: independently        Patient has decision-making capacity today for complex decisions: Intact          Coping, Meaning, & Spirituality:   Mood, coping, and/or meaning in the context of serious illness were addressed today: Yes    Social:   Living situation:lives with significant other/spouse  Important relationships/caregivers: to Ed for 41 years.  7 children, first 6 children were from a previous relationship  Occupation: homemaker and worked as a  for 7 years  Areas of fulfillment/samira: watching TV, spending time with family    Medications:  Reviewed this patient's medication profile and medications from this hospitalization.     ROS:  Comprehensive ROS is reviewed and is negative except as here & per " HPI:   Nauseated, retching during visit.  Coughing up pink-tinged sputum.  Feels short of breath.  Dizzy and lightheaded when retching, sometimes with standing up.   Some pain in abdomen and ribcage due to retching.  Feels weak and fatigued.  Very poor appetite.     Physical Exam   Vital Signs with Ranges  Temp:  [98.5  F (36.9  C)-98.6  F (37  C)] 98.5  F (36.9  C)  Pulse:  [76-88] 88  Resp:  [16-18] 16  BP: (102-124)/(56-57) 124/57  SpO2:  [93 %-96 %] 93 %  Wt Readings from Last 10 Encounters:   01/14/25 65.2 kg (143 lb 11.8 oz)   11/19/24 70.8 kg (156 lb)   10/31/24 71.7 kg (158 lb)   10/09/24 75.5 kg (166 lb 8 oz)   09/16/24 79 kg (174 lb 3.2 oz)   08/15/24 90.1 kg (198 lb 9.6 oz)     143 lbs 11.84 oz    PHYSICAL EXAM:  Constitutional: Alert, pleasant, retching periodically during visit   Respiratory: breathing slightly labored with talking  NEURO: Oriented X 4    Data reviewed:  Results for orders placed or performed during the hospital encounter of 01/13/25 (from the past 24 hours)   Heparin Unfractionated Anti Xa Level   Result Value Ref Range    Anti Xa Unfractionated Heparin 0.66 For Reference Range, See Comment IU/mL    Narrative    Therapeutic Range: UFH: 0.25-0.50 IU/mL for low intensity dosing,  0.30-0.70 IU/mL for high intensity dosing DVT and PE.  This test is not validated for other direct factor X inhibitors (e.g. rivaroxaban, apixaban, edoxaban, betrixaban, fondaparinux) and should not be used for monitoring of other medications.     Creatinine   Date Value Ref Range Status   01/17/2025 2.65 (H) 0.51 - 0.95 mg/dL Final         110 MINUTES SPENT BY ME on the date of service doing chart review, history, exam, documentation & further activities per the note.

## 2025-01-17 NOTE — PROGRESS NOTES
RENAL PROGRESS NOTE    ASSESSMENT & PLAN:   Chronic kidney disease stage 5, ?ESKD - With history of HAMMAD diana-operatively with cardiac surgery in late August/early September 2024 with CRRT 8/29/24-9/8/24 then transitioned to hemodialysis. Was on intermittent hemodialysis until 1/2/25, has been on trial of holding dialysis but patient reported increased uremic symptoms thus resumed dialysis 1/14/25. Considering twice weekly dialysis as outpatient and trending. Dialyzing via right CVC. Has outpatient placement at [facility] on MWF;will plan to dialyze on Monday-Friday schedule while here     Volume - Appears euvolemic on exam; historically has not needed UF with dialysis    Pulmonary embolism - Planning 3-6 months warfarin; being bridged with heparin until therapeutic    Chronic hypotension - On midodrine. Torsemide on hold    Anemia - Of chronic disease, infection/inflammation. Hemoglobin has been 10-11    Respiratory failure, pneumonia - On oxygen as needed (chronically at night). Receiving antibiotics, supportive therapies    Coronary artery disease, atrial fibrillation - S/p CABG, valve surgery in 2024    Hyperlipidemia - On statin    H/o endocarditis complicated by surgical MVR/AVR (8/2024)    H/o sick sinus syndrome s/p pacemaker placement    Pre-diabetes    SUBJECTIVE:  Had nausea and vomiting earlier this morning. Received antiemetic and thinks symptoms improved. Generally no appetite, forcing herself to drink some Nepro. She reports she thinks she'll likely want to continue dialysis for now until she gets a sense of her recovery potential. Notes that if further setbacks she'd likely want to discontinue. Difficulty taking deep breaths secondary to pleuritic pain    OBJECTIVE:  Physical Exam   Temp: 98.5  F (36.9  C) Temp src: Oral BP: 102/57 Pulse: 86   Resp: 18 SpO2: 96 % O2 Device: Nasal cannula Oxygen Delivery: 3 LPM  Vitals:    01/13/25 0940 01/13/25 1558 01/14/25 0501   Weight: 64.9 kg (143  lb) 64.6 kg (142 lb 6.7 oz) 65.2 kg (143 lb 11.8 oz)     Vital Signs with Ranges  Temp:  [98.5  F (36.9  C)-98.6  F (37  C)] 98.5  F (36.9  C)  Pulse:  [76-88] 86  Resp:  [16-18] 18  BP: (102-124)/(56-57) 102/57  SpO2:  [93 %-96 %] 96 %  I/O last 3 completed shifts:  In: 603 [P.O.:597; I.V.:6]  Out: -         No data found.    Intake/Output Summary (Last 24 hours) at 1/16/2025 0940  Last data filed at 1/16/2025 0035  Gross per 24 hour   Intake 288 ml   Output 0 ml   Net 288 ml       PHYSICAL EXAM:  General - Alert and appears comfortable  Cardiovascular - Normal S1S2, no rub  Respiratory - Clear to auscultation bilaterally, no crackles or wheezes  Abdomen - Soft, non-tender, bowel sounds present.    Extremities - No lower extremity edema bilaterally  Integumentary - Warm, dry, no rash  Neurologic - Grossly intact, no focal deficits      LABORATORY STUDIES:     Recent Labs   Lab 01/17/25  0847 01/14/25  1530 01/14/25  0808 01/13/25  1023   WBC 6.4 8.5 9.4 14.5*   RBC 3.85 3.70* 3.76* 4.42   HGB 10.6* 10.0* 10.8* 12.3   HCT 35.2 33.1* 34.6* 39.3   * 108* 115* 122*       Basic Metabolic Panel:  Recent Labs   Lab 01/17/25  0847 01/16/25  0836 01/15/25  0749 01/14/25  0808 01/13/25  1023    139 135 140 140   POTASSIUM 3.1* 3.7 3.6 3.6 3.9   CHLORIDE 100 102 101 99 98   CO2 24 25 23 28 26   BUN 21.8 24.2* 24.8* 45.3* 43.5*   CR 2.65* 2.70* 2.54* 3.71* 4.03*   GLC 98 89 85 79 124*   KAELYN 9.8 9.6 9.0 9.3 9.4       Recent Labs   Lab Test 01/17/25  0847 01/16/25  0836 01/15/25  0538 01/14/25  1530   INR 1.41* 1.14   < >  --    WBC 6.4  --   --  8.5   HGB 10.6*  --   --  10.0*   *  --   --  108*    < > = values in this interval not displayed.         Personally reviewed current labs      Tami Jacinto PA-C  [facility]  [phone]

## 2025-01-17 NOTE — PLAN OF CARE
Goal Outcome Evaluation:      Plan of Care Reviewed With: patient    Overall Patient Progress: improvingOverall Patient Progress: improving    Alert and oriented x4, able to make needs known, c/o right rib pain with breathing, gave PRN tylenol for good relief, heparin running at 800 units/hr, slept comfortably.

## 2025-01-18 ENCOUNTER — APPOINTMENT (OUTPATIENT)
Dept: PHYSICAL THERAPY | Facility: CLINIC | Age: 85
DRG: 193 | End: 2025-01-18
Attending: STUDENT IN AN ORGANIZED HEALTH CARE EDUCATION/TRAINING PROGRAM
Payer: COMMERCIAL

## 2025-01-18 LAB
ALBUMIN SERPL BCG-MCNC: 2.6 G/DL (ref 3.5–5.2)
ANION GAP SERPL CALCULATED.3IONS-SCNC: 10 MMOL/L (ref 7–15)
BUN SERPL-MCNC: 7.2 MG/DL (ref 8–23)
CALCIUM SERPL-MCNC: 9.1 MG/DL (ref 8.8–10.4)
CHLORIDE SERPL-SCNC: 102 MMOL/L (ref 98–107)
CREAT SERPL-MCNC: 1.73 MG/DL (ref 0.51–0.95)
EGFRCR SERPLBLD CKD-EPI 2021: 29 ML/MIN/1.73M2
GLUCOSE SERPL-MCNC: 103 MG/DL (ref 70–99)
HCO3 SERPL-SCNC: 25 MMOL/L (ref 22–29)
HOLD SPECIMEN: NORMAL
INR PPP: 1.87 (ref 0.85–1.15)
PHOSPHATE SERPL-MCNC: 1.5 MG/DL (ref 2.5–4.5)
POTASSIUM SERPL-SCNC: 3.6 MMOL/L (ref 3.4–5.3)
SODIUM SERPL-SCNC: 137 MMOL/L (ref 135–145)
UFH PPP CHRO-ACNC: 0.63 IU/ML

## 2025-01-18 PROCEDURE — 82947 ASSAY GLUCOSE BLOOD QUANT: CPT | Performed by: HOSPITALIST

## 2025-01-18 PROCEDURE — 82040 ASSAY OF SERUM ALBUMIN: CPT | Performed by: HOSPITALIST

## 2025-01-18 PROCEDURE — 250N000013 HC RX MED GY IP 250 OP 250 PS 637: Performed by: HOSPITALIST

## 2025-01-18 PROCEDURE — 97116 GAIT TRAINING THERAPY: CPT | Mod: GP

## 2025-01-18 PROCEDURE — 82565 ASSAY OF CREATININE: CPT | Performed by: HOSPITALIST

## 2025-01-18 PROCEDURE — 99232 SBSQ HOSP IP/OBS MODERATE 35: CPT | Performed by: PHYSICIAN ASSISTANT

## 2025-01-18 PROCEDURE — 85610 PROTHROMBIN TIME: CPT | Performed by: HOSPITALIST

## 2025-01-18 PROCEDURE — 36415 COLL VENOUS BLD VENIPUNCTURE: CPT | Performed by: HOSPITALIST

## 2025-01-18 PROCEDURE — 250N000011 HC RX IP 250 OP 636

## 2025-01-18 PROCEDURE — 99232 SBSQ HOSP IP/OBS MODERATE 35: CPT | Performed by: STUDENT IN AN ORGANIZED HEALTH CARE EDUCATION/TRAINING PROGRAM

## 2025-01-18 PROCEDURE — 250N000013 HC RX MED GY IP 250 OP 250 PS 637: Performed by: STUDENT IN AN ORGANIZED HEALTH CARE EDUCATION/TRAINING PROGRAM

## 2025-01-18 PROCEDURE — 85520 HEPARIN ASSAY: CPT | Performed by: HOSPITALIST

## 2025-01-18 PROCEDURE — 120N000001 HC R&B MED SURG/OB

## 2025-01-18 PROCEDURE — 250N000013 HC RX MED GY IP 250 OP 250 PS 637

## 2025-01-18 PROCEDURE — 250N000013 HC RX MED GY IP 250 OP 250 PS 637: Performed by: PHYSICIAN ASSISTANT

## 2025-01-18 PROCEDURE — 250N000011 HC RX IP 250 OP 636: Performed by: HOSPITALIST

## 2025-01-18 PROCEDURE — 82435 ASSAY OF BLOOD CHLORIDE: CPT | Performed by: HOSPITALIST

## 2025-01-18 RX ORDER — GABAPENTIN 100 MG/1
100 CAPSULE ORAL 3 TIMES DAILY
Status: DISCONTINUED | OUTPATIENT
Start: 2025-01-18 | End: 2025-01-19

## 2025-01-18 RX ORDER — FAMOTIDINE 10 MG
10 TABLET ORAL 2 TIMES DAILY
Status: DISCONTINUED | OUTPATIENT
Start: 2025-01-18 | End: 2025-01-23 | Stop reason: HOSPADM

## 2025-01-18 RX ORDER — PANTOPRAZOLE SODIUM 40 MG/1
40 TABLET, DELAYED RELEASE ORAL EVERY EVENING
Status: DISCONTINUED | OUTPATIENT
Start: 2025-01-18 | End: 2025-01-23 | Stop reason: HOSPADM

## 2025-01-18 RX ORDER — WARFARIN SODIUM 5 MG/1
5 TABLET ORAL
Status: COMPLETED | OUTPATIENT
Start: 2025-01-18 | End: 2025-01-18

## 2025-01-18 RX ADMIN — ATORVASTATIN CALCIUM 80 MG: 40 TABLET, FILM COATED ORAL at 20:04

## 2025-01-18 RX ADMIN — DOXYCYCLINE HYCLATE 100 MG: 100 CAPSULE ORAL at 08:21

## 2025-01-18 RX ADMIN — FAMOTIDINE 10 MG: 10 TABLET ORAL at 20:04

## 2025-01-18 RX ADMIN — PANTOPRAZOLE SODIUM 40 MG: 40 TABLET, DELAYED RELEASE ORAL at 20:05

## 2025-01-18 RX ADMIN — GABAPENTIN 100 MG: 100 CAPSULE ORAL at 20:05

## 2025-01-18 RX ADMIN — OXYCODONE 5 MG: 5 TABLET ORAL at 15:52

## 2025-01-18 RX ADMIN — ESCITALOPRAM OXALATE 5 MG: 5 TABLET, FILM COATED ORAL at 20:05

## 2025-01-18 RX ADMIN — WARFARIN SODIUM 5 MG: 5 TABLET ORAL at 17:18

## 2025-01-18 RX ADMIN — HEPARIN SODIUM 800 UNITS/HR: 10000 INJECTION, SOLUTION INTRAVENOUS at 08:13

## 2025-01-18 RX ADMIN — GABAPENTIN 100 MG: 100 CAPSULE ORAL at 12:47

## 2025-01-18 RX ADMIN — ONDANSETRON 4 MG: 4 TABLET, ORALLY DISINTEGRATING ORAL at 20:05

## 2025-01-18 RX ADMIN — MIDODRINE HYDROCHLORIDE 5 MG: 5 TABLET ORAL at 08:24

## 2025-01-18 RX ADMIN — CLOPIDOGREL BISULFATE 75 MG: 75 TABLET ORAL at 08:21

## 2025-01-18 RX ADMIN — MIDODRINE HYDROCHLORIDE 5 MG: 5 TABLET ORAL at 20:04

## 2025-01-18 RX ADMIN — MIDODRINE HYDROCHLORIDE 5 MG: 5 TABLET ORAL at 14:32

## 2025-01-18 RX ADMIN — FAMOTIDINE 10 MG: 10 TABLET ORAL at 12:47

## 2025-01-18 RX ADMIN — PANTOPRAZOLE SODIUM 40 MG: 40 TABLET, DELAYED RELEASE ORAL at 08:21

## 2025-01-18 RX ADMIN — ONDANSETRON 4 MG: 4 TABLET, ORALLY DISINTEGRATING ORAL at 08:21

## 2025-01-18 RX ADMIN — ONDANSETRON 4 MG: 4 TABLET, ORALLY DISINTEGRATING ORAL at 14:33

## 2025-01-18 RX ADMIN — PROCHLORPERAZINE EDISYLATE 5 MG: 5 INJECTION INTRAMUSCULAR; INTRAVENOUS at 09:39

## 2025-01-18 ASSESSMENT — ACTIVITIES OF DAILY LIVING (ADL)
ADLS_ACUITY_SCORE: 41
ADLS_ACUITY_SCORE: 44
ADLS_ACUITY_SCORE: 41
ADLS_ACUITY_SCORE: 44
ADLS_ACUITY_SCORE: 41
ADLS_ACUITY_SCORE: 41
ADLS_ACUITY_SCORE: 44
ADLS_ACUITY_SCORE: 41
ADLS_ACUITY_SCORE: 44
ADLS_ACUITY_SCORE: 41
ADLS_ACUITY_SCORE: 44
ADLS_ACUITY_SCORE: 44
ADLS_ACUITY_SCORE: 41
ADLS_ACUITY_SCORE: 44
ADLS_ACUITY_SCORE: 41
ADLS_ACUITY_SCORE: 44

## 2025-01-18 NOTE — PLAN OF CARE
Problem: Palliative Care  Goal: Enhanced Quality of Life  Outcome: Progressing  Intervention: Optimize Function    Intervention: Optimize Psychosocial Wellbeing    Problem: Infection  Goal: Absence of Infection Signs and Symptoms  Outcome: Progressing     Problem: Gas Exchange Impaired  Goal: Optimal Gas Exchange  Outcome: Progressing  Intervention: Optimize Oxygenation and Ventilation    Goal Outcome Evaluation:    Pt is A&O. On 3L O2. Up Ax1 with FWW. Bathroom voiding + bm this shift. Heparin drip infusing at 8 ml/hr per protocol. Hep XA recheck in morning.

## 2025-01-18 NOTE — PROGRESS NOTES
[facility]    Medicine Progress Note - Hospitalist Service    Date of Admission:  1/13/2025    Assessment & Plan   Felicitas Elliott is a 84 year old female admitted with community acquired pneumonia.  She is clinically stable.  Presentation was complicated by pulmonary embolism.  Continue with heparin bridge to warfarin. Renal replacement therapy has been resumed at direction of nephrology.    Extended discussion today related to goals of her medical care.  She is clear that she accepts she is dying and communicates initial preferences to avoid future hospitalization and possibly discontinue dialysis.  Primary barriers to this decision are her concerns that family, particularly her , is having difficulty accepting these decisions.  Palliative care consulted to facilitate meeting and support patient communicating these preferences with her family.    Currently INR bridge to goal 2-3. Updated her . Possibly tmr or 2 days.  ----    # Acute pulmonary embolism, provoked  - heparin gtt bridge to warfarin (plan for 3-6 months treatment)    # Community acquired pneumonia  - completing x5 days of therapy (zosyn, ceftriaxone then cefdinir, w/ doxy)    # CKD 5  - per nephrology, appreciate    # Constipation  - bowel regimen    # Prediabetes  Noted, outpt follow up     # History of endocarditis complicated by surgical MVR/AVR (8/2024)   # Severe CAD s/p CABG x 2 (8/2024)  # Chronic heart failure with preserved EF  # Chronic hypotension  -on 2L, stable and improving from 3L day prior; uses 02 at night time  - midodrine  - clopidogrel  - atorvastatin  - torsemide on hold; nephrology following     # Hx afib  # History of sick sinus syndrome s/p pacemaker placement (9/2024)  - metoprolol previously discontinued by cardiac surgery    # Buttock wound  - WOC consult    # neuropathy  - after pros/cons discussion, will trial gabapentin low dose 100mg tid on 1/18/2025             Diet: Combination  Diet Low Saturated Fat Na <2400mg Diet  Snacks/Supplements Adult: Gelatein Plus; With Meals  Snacks/Supplements Adult: Nepro Oral Supplement; With Meals      Le Catheter: Not present  Lines: PRESENT             Cardiac Monitoring: None  Code Status: No CPR- Do NOT Intubate      Clinically Significant Risk Factors        # Hypokalemia: Lowest K = 3.1 mmol/L in last 2 days, will replace as needed     # Hypercalcemia: corrected calcium is >10.1, will monitor as appropriate    # Hypoalbuminemia: Lowest albumin = 2.6 g/dL at 1/15/2025  7:49 AM, will monitor as appropriate                 # Severe Malnutrition: based on nutrition assessment    # Financial/Environmental Concerns: none   # Pacemaker present  # History of CABG: noted on surgical history       Social Drivers of Health    Tobacco Use: Medium Risk (1/7/2025)    [facility] [employer] [facility]    Patient History     Smoking Tobacco Use: Former     Smokeless Tobacco Use: Never          Disposition Plan     Medically Ready for Discharge: Anticipated in 2-4 Days         Heri Roberts MD  Hospitalist Service  [facility]  Securely message with [employer] (more info)  Text page via [employer] Paging/Directory   ______________________________________________________________________    Interval History   New writer  Discussed inr  Pt has concerns about nerve pain in her feet  She says has had for weeks but has not had any success in treating symptoms  Discussed gabapentin and she'd like to try (or re-try, as she had previously it seems)  Updated and discussed w/ her   Discussed w/ rn and sw       Physical Exam   Vital Signs: Temp: 99.3  F (37.4  C) Temp src: Oral BP: 114/60 Pulse: 87   Resp: 18 SpO2: 95 % O2 Device: Nasal cannula Oxygen Delivery: 3 LPM  Weight: 148 lbs 5.91 oz    Gen:  lying in bed in NAD, pleasant  Neuro:  alert, conversant  CV:  nl rate, regular rhythm  Pulm: no acute resp distress, ctab  anteriorly  GI:  abdomen soft, not tender    Medical Decision Making             Data   1/18/2025 - bmp: Cr 1.7 from 2.6, electrolytes wnl  Hgb yesterday 10.6 stable

## 2025-01-18 NOTE — PLAN OF CARE
Problem: Adult Inpatient Plan of Care  Goal: Optimal Comfort and Wellbeing  Outcome: Progressing    Goal Outcome Evaluation:    Patient is awake and alert and oriented to time, place and person. Pleasant.    B/P: 113/61, T: 98.4, P: 92, R: 18. Tele: None.    NC L/minute decreased from 3 L to 1 L today  to maintain saturations > 92 %.    4-8/10. PRN's administered: None, only scheduled medications. Gabapentin helpful, per patient report.    Nausea on and off. PO Zofran not effective this morning. IV Compazine provided with good relief. Nausea maintained with PO Zofran remainder of the shift.    Miralax and Senna held due to loose stools overnight.    Assist of 1, Gait belt, and Walker. Up to the chair and walked in the hallway with PT.    Orders Placed This Encounter      Combination Diet Low Saturated Fat Na <2400mg Diet    Infusion: Heparin at 8 ml/hr. Per protocol, recheck TKA in the morning.    Alarms on. Call light within reach.    Discharge plan home with homecare  unknown .    HLM Admission: 6- Walk 10 steps or more  HLM Daily7-Walk 25 feet or more        Problem: Infection  Goal: Absence of Infection Signs and Symptoms  Outcome: Progressing     Problem: Gas Exchange Impaired  Goal: Optimal Gas Exchange  Outcome: Progressing     Problem: Palliative Care  Goal: Enhanced Quality of Life  Outcome: Progressing

## 2025-01-18 NOTE — PROGRESS NOTES
RENAL PROGRESS NOTE    ASSESSMENT & PLAN:   Chronic kidney disease stage 5, ?ESKD - With history of HAMMAD diana-operatively with cardiac surgery in late August/early September 2024 with CRRT 8/29/24-9/8/24 then transitioned to hemodialysis. Was on intermittent hemodialysis until 1/2/25, has been on trial of holding dialysis but patient reported increased uremic symptoms thus resumed dialysis 1/14/25. Considering twice weekly dialysis as outpatient and trending. Dialyzing via right CVC. Has outpatient placement at [facility] on MWF;will plan to dialyze on Monday-Friday schedule while here     Volume - Appears euvolemic on exam; historically has not needed UF with dialysis    Pulmonary embolism - Planning 3-6 months warfarin; being bridged with heparin until therapeutic    Nausea/vomiting - Unclear etiology. Initially thought to be uremic but has not improved with resuming dialysis. No acute pathology on CT. Appears worst in the morning, ?component of reflux. Will try changing pantoprazole dosing to evening    Chronic hypotension - On midodrine. Torsemide on hold    Anemia - Of chronic disease, infection/inflammation. Hemoglobin has been 10-11    Respiratory failure, pneumonia - On oxygen as needed (chronically at night). Receiving antibiotics, supportive therapies    Coronary artery disease, atrial fibrillation - S/p CABG, valve surgery in 2024    Hyperlipidemia - On statin    H/o endocarditis complicated by surgical MVR/AVR (8/2024)    H/o sick sinus syndrome s/p pacemaker placement    Pre-diabetes    SUBJECTIVE:  Still having morning nausea and vomiting. Appetite poor. No shortness of breath. Tolerated dialysis yesterday without issue    OBJECTIVE:  Physical Exam   Temp: 98.4  F (36.9  C) Temp src: Oral BP: 113/61 Pulse: 92   Resp: 18 SpO2: 94 % O2 Device: Nasal cannula Oxygen Delivery: 2 LPM  Vitals:    01/13/25 1558 01/14/25 0501 01/18/25 0631   Weight: 64.6 kg (142 lb 6.7 oz) 65.2 kg (143 lb 11.8 oz)  67.3 kg (148 lb 5.9 oz)     Vital Signs with Ranges  Temp:  [98.4  F (36.9  C)-99.3  F (37.4  C)] 98.4  F (36.9  C)  Pulse:  [73-92] 92  Resp:  [18-22] 18  BP: ()/(56-80) 113/61  SpO2:  [94 %-100 %] 94 %  I/O last 3 completed shifts:  In: 0   Out: 500 [Other:500]        Patient Vitals for the past 72 hrs:   Weight   01/18/25 0631 67.3 kg (148 lb 5.9 oz)       Intake/Output Summary (Last 24 hours) at 1/16/2025 0927  Last data filed at 1/16/2025 0035  Gross per 24 hour   Intake 288 ml   Output 0 ml   Net 288 ml       PHYSICAL EXAM:  General - Alert and appears comfortable  Cardiovascular - Normal S1S2, no rub  Respiratory - Clear to auscultation bilaterally, no crackles or wheezes  Abdomen - Soft, non-tender, bowel sounds present.    Extremities - No lower extremity edema bilaterally  Integumentary - Warm, dry, no rash  Neurologic - Grossly intact, no focal deficits      LABORATORY STUDIES:     Recent Labs   Lab 01/17/25  0847 01/14/25  1530 01/14/25  0808 01/13/25  1023   WBC 6.4 8.5 9.4 14.5*   RBC 3.85 3.70* 3.76* 4.42   HGB 10.6* 10.0* 10.8* 12.3   HCT 35.2 33.1* 34.6* 39.3   * 108* 115* 122*       Basic Metabolic Panel:  Recent Labs   Lab 01/18/25  0751 01/17/25  0847 01/16/25  0836 01/15/25  0749 01/14/25  0808 01/13/25  1023    137 139 135 140 140   POTASSIUM 3.6 3.1* 3.7 3.6 3.6 3.9   CHLORIDE 102 100 102 101 99 98   CO2 25 24 25 23 28 26   BUN 7.2* 21.8 24.2* 24.8* 45.3* 43.5*   CR 1.73* 2.65* 2.70* 2.54* 3.71* 4.03*   * 98 89 85 79 124*   KAELYN 9.1 9.8 9.6 9.0 9.3 9.4       Recent Labs   Lab Test 01/18/25  0751 01/17/25  0847 01/15/25  0538 01/14/25  1530   INR 1.87* 1.41*   < >  --    WBC  --  6.4  --  8.5   HGB  --  10.6*  --  10.0*   PLT  --  145*  --  108*    < > = values in this interval not displayed.         Personally reviewed current labs      Tami Jacinto PA-C  [facility]  [phone]

## 2025-01-19 ENCOUNTER — APPOINTMENT (OUTPATIENT)
Dept: OCCUPATIONAL THERAPY | Facility: CLINIC | Age: 85
DRG: 193 | End: 2025-01-19
Attending: STUDENT IN AN ORGANIZED HEALTH CARE EDUCATION/TRAINING PROGRAM
Payer: COMMERCIAL

## 2025-01-19 PROBLEM — I82.409 DVT (DEEP VENOUS THROMBOSIS) (H): Status: ACTIVE | Noted: 2025-01-19

## 2025-01-19 PROBLEM — I26.99 PE (PULMONARY THROMBOEMBOLISM) (H): Status: ACTIVE | Noted: 2025-01-19

## 2025-01-19 LAB
ALBUMIN SERPL BCG-MCNC: 2.6 G/DL (ref 3.5–5.2)
ANION GAP SERPL CALCULATED.3IONS-SCNC: 8 MMOL/L (ref 7–15)
BUN SERPL-MCNC: 10.8 MG/DL (ref 8–23)
CALCIUM SERPL-MCNC: 9.3 MG/DL (ref 8.8–10.4)
CHLORIDE SERPL-SCNC: 103 MMOL/L (ref 98–107)
CREAT SERPL-MCNC: 2.33 MG/DL (ref 0.51–0.95)
EGFRCR SERPLBLD CKD-EPI 2021: 20 ML/MIN/1.73M2
ERYTHROCYTE [DISTWIDTH] IN BLOOD BY AUTOMATED COUNT: 17.9 % (ref 10–15)
GLUCOSE SERPL-MCNC: 89 MG/DL (ref 70–99)
HCO3 SERPL-SCNC: 26 MMOL/L (ref 22–29)
HCT VFR BLD AUTO: 32 % (ref 35–47)
HGB BLD-MCNC: 9.4 G/DL (ref 11.7–15.7)
HGB BLD-MCNC: 9.8 G/DL (ref 11.7–15.7)
INR PPP: 2.48 (ref 0.85–1.15)
MCH RBC QN AUTO: 28.2 PG (ref 26.5–33)
MCHC RBC AUTO-ENTMCNC: 30.6 G/DL (ref 31.5–36.5)
MCV RBC AUTO: 92 FL (ref 78–100)
PHOSPHATE SERPL-MCNC: 1.6 MG/DL (ref 2.5–4.5)
PLATELET # BLD AUTO: 150 10E3/UL (ref 150–450)
PLATELET # BLD AUTO: 154 10E3/UL (ref 150–450)
POTASSIUM SERPL-SCNC: 3.4 MMOL/L (ref 3.4–5.3)
RBC # BLD AUTO: 3.48 10E6/UL (ref 3.8–5.2)
SODIUM SERPL-SCNC: 137 MMOL/L (ref 135–145)
UFH PPP CHRO-ACNC: 0.85 IU/ML
WBC # BLD AUTO: 7.1 10E3/UL (ref 4–11)

## 2025-01-19 PROCEDURE — 250N000011 HC RX IP 250 OP 636: Performed by: HOSPITALIST

## 2025-01-19 PROCEDURE — 120N000001 HC R&B MED SURG/OB

## 2025-01-19 PROCEDURE — 85027 COMPLETE CBC AUTOMATED: CPT

## 2025-01-19 PROCEDURE — 85610 PROTHROMBIN TIME: CPT | Performed by: HOSPITALIST

## 2025-01-19 PROCEDURE — 36415 COLL VENOUS BLD VENIPUNCTURE: CPT | Performed by: HOSPITALIST

## 2025-01-19 PROCEDURE — 99232 SBSQ HOSP IP/OBS MODERATE 35: CPT | Performed by: PHYSICIAN ASSISTANT

## 2025-01-19 PROCEDURE — 250N000013 HC RX MED GY IP 250 OP 250 PS 637: Performed by: STUDENT IN AN ORGANIZED HEALTH CARE EDUCATION/TRAINING PROGRAM

## 2025-01-19 PROCEDURE — 80069 RENAL FUNCTION PANEL: CPT | Performed by: HOSPITALIST

## 2025-01-19 PROCEDURE — 85049 AUTOMATED PLATELET COUNT: CPT | Performed by: STUDENT IN AN ORGANIZED HEALTH CARE EDUCATION/TRAINING PROGRAM

## 2025-01-19 PROCEDURE — 250N000013 HC RX MED GY IP 250 OP 250 PS 637

## 2025-01-19 PROCEDURE — 36415 COLL VENOUS BLD VENIPUNCTURE: CPT

## 2025-01-19 PROCEDURE — 85049 AUTOMATED PLATELET COUNT: CPT

## 2025-01-19 PROCEDURE — 250N000013 HC RX MED GY IP 250 OP 250 PS 637: Performed by: PHYSICIAN ASSISTANT

## 2025-01-19 PROCEDURE — 97535 SELF CARE MNGMENT TRAINING: CPT | Mod: GO | Performed by: OCCUPATIONAL THERAPIST

## 2025-01-19 PROCEDURE — 36415 COLL VENOUS BLD VENIPUNCTURE: CPT | Performed by: STUDENT IN AN ORGANIZED HEALTH CARE EDUCATION/TRAINING PROGRAM

## 2025-01-19 PROCEDURE — 99232 SBSQ HOSP IP/OBS MODERATE 35: CPT | Performed by: STUDENT IN AN ORGANIZED HEALTH CARE EDUCATION/TRAINING PROGRAM

## 2025-01-19 PROCEDURE — 250N000013 HC RX MED GY IP 250 OP 250 PS 637: Performed by: HOSPITALIST

## 2025-01-19 PROCEDURE — 85520 HEPARIN ASSAY: CPT | Performed by: STUDENT IN AN ORGANIZED HEALTH CARE EDUCATION/TRAINING PROGRAM

## 2025-01-19 PROCEDURE — 85018 HEMOGLOBIN: CPT | Performed by: STUDENT IN AN ORGANIZED HEALTH CARE EDUCATION/TRAINING PROGRAM

## 2025-01-19 RX ORDER — GABAPENTIN 100 MG/1
200 CAPSULE ORAL 3 TIMES DAILY
Status: DISCONTINUED | OUTPATIENT
Start: 2025-01-19 | End: 2025-01-22

## 2025-01-19 RX ORDER — WARFARIN SODIUM 4 MG/1
4 TABLET ORAL
Status: COMPLETED | OUTPATIENT
Start: 2025-01-19 | End: 2025-01-19

## 2025-01-19 RX ADMIN — MIDODRINE HYDROCHLORIDE 5 MG: 5 TABLET ORAL at 20:12

## 2025-01-19 RX ADMIN — ONDANSETRON 4 MG: 4 TABLET, ORALLY DISINTEGRATING ORAL at 09:09

## 2025-01-19 RX ADMIN — PANTOPRAZOLE SODIUM 40 MG: 40 TABLET, DELAYED RELEASE ORAL at 20:12

## 2025-01-19 RX ADMIN — ESCITALOPRAM OXALATE 5 MG: 5 TABLET, FILM COATED ORAL at 20:11

## 2025-01-19 RX ADMIN — MIDODRINE HYDROCHLORIDE 5 MG: 5 TABLET ORAL at 13:13

## 2025-01-19 RX ADMIN — CLOPIDOGREL BISULFATE 75 MG: 75 TABLET ORAL at 09:10

## 2025-01-19 RX ADMIN — WARFARIN SODIUM 4 MG: 4 TABLET ORAL at 18:08

## 2025-01-19 RX ADMIN — GABAPENTIN 100 MG: 100 CAPSULE ORAL at 09:09

## 2025-01-19 RX ADMIN — GABAPENTIN 200 MG: 100 CAPSULE ORAL at 20:12

## 2025-01-19 RX ADMIN — OXYCODONE 5 MG: 5 TABLET ORAL at 01:45

## 2025-01-19 RX ADMIN — GABAPENTIN 200 MG: 100 CAPSULE ORAL at 13:13

## 2025-01-19 RX ADMIN — ATORVASTATIN CALCIUM 80 MG: 40 TABLET, FILM COATED ORAL at 20:12

## 2025-01-19 RX ADMIN — FAMOTIDINE 10 MG: 10 TABLET ORAL at 20:11

## 2025-01-19 RX ADMIN — ONDANSETRON 4 MG: 4 TABLET, ORALLY DISINTEGRATING ORAL at 20:12

## 2025-01-19 RX ADMIN — SENNOSIDES 8.6 MG: 8.6 TABLET, FILM COATED ORAL at 20:13

## 2025-01-19 RX ADMIN — ONDANSETRON 4 MG: 4 TABLET, ORALLY DISINTEGRATING ORAL at 13:13

## 2025-01-19 RX ADMIN — MIDODRINE HYDROCHLORIDE 5 MG: 5 TABLET ORAL at 09:10

## 2025-01-19 RX ADMIN — SENNOSIDES 8.6 MG: 8.6 TABLET, FILM COATED ORAL at 09:10

## 2025-01-19 RX ADMIN — FAMOTIDINE 10 MG: 10 TABLET ORAL at 09:32

## 2025-01-19 ASSESSMENT — ACTIVITIES OF DAILY LIVING (ADL)
ADLS_ACUITY_SCORE: 41
ADLS_ACUITY_SCORE: 41
ADLS_ACUITY_SCORE: 44
ADLS_ACUITY_SCORE: 41
ADLS_ACUITY_SCORE: 44
ADLS_ACUITY_SCORE: 41
ADLS_ACUITY_SCORE: 44
ADLS_ACUITY_SCORE: 44
ADLS_ACUITY_SCORE: 41
ADLS_ACUITY_SCORE: 44
ADLS_ACUITY_SCORE: 41
ADLS_ACUITY_SCORE: 44

## 2025-01-19 NOTE — PLAN OF CARE
Alert and oriented x4. Able to make needs known via call light. Heparin drip. PRN oxycodone 5 mg given for pain.      Problem: Infection  Goal: Absence of Infection Signs and Symptoms  Outcome: Progressing     Problem: Gas Exchange Impaired  Goal: Optimal Gas Exchange  Outcome: Progressing

## 2025-01-19 NOTE — PLAN OF CARE
Problem: Infection  Goal: Absence of Infection Signs and Symptoms  Outcome: Progressing     Problem: Gas Exchange Impaired  Goal: Optimal Gas Exchange  Outcome: Progressing  Intervention: Optimize Oxygenation and Ventilation  Recent Flowsheet Documentation  Taken 1/18/2025 1959 by Maddie Rich RN  Head of Bed (HOB) Positioning: HOB at 15 degrees   Goal Outcome Evaluation:  Patient A&O x4, able make needs known. VSS on 1L O2 via NC. Denied pain throughout shift. Right PIV infusing Heparin at 800Unit/hr. Recheck Xa in the morning. Left PIV SL. Right CVC WDL. Takes pills whole one at a time. Denied SOB and chest pain. Has NEGRON. Lung sounds diminished. +1 ankle and feet edema. Scattered bruising. Redness blanchable on bottom, mepilex covering bottom. Has generalized weakness. Did not void or have a BM during shift. Cardiac diet, tolerating well. Assist of 1 with walker and gait belt, not out of bed during shift. Discharge pending.

## 2025-01-19 NOTE — PROGRESS NOTES
[facility]    Medicine Progress Note - Hospitalist Service    Date of Admission:  1/13/2025    Assessment & Plan   Felicitas Elliott is a 84 year old female admitted with community acquired pneumonia. Presentation was complicated by pulmonary embolism.  Have been bridging heparin bridge to warfarin. Renal replacement therapy has been resumed at direction of nephrology.     Extended discussion last week related to goals of her medical care.  She was clear that she accepts she is dying and communicates initial preferences to avoid future hospitalization and possibly discontinue dialysis.  Primary barriers to this decision are her concerns that family, particularly her , is having difficulty accepting these decisions.  Palliative care was consulted to facilitate meeting and support patient communicating these preferences with her family.    Currently INR bridge to goal 2-3. Now at goal at 2.4. stop heparin, discussed with pharmacy and eventually will plan for discharge on 4 mg warfarin and anticoagulation clinic referral on discharge-earliest would be lab draw on Tuesday.  However, she is currently not at her baseline from a breathing standpoint-she is currently 2 L at rest.    If able to wean, possible discharge in afternoon versus further monitoring overnight.  Torsemide has been on hold, consider resuming tomorrow if not able to wean to room air. She uses up to 2L with activity and night. Reheck hgb.    Dispo- tonight (then outpatient palliative care) or tmr? 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 If able to wean, possible versus further monitoring.  Torsemide has been on hold, consider resuming tomorrow if not able to wean to room air. She uses up to 2L with activity and night.  - midodrine  - clopidogrel  - atorvastatin  - torsemide on hold; nephrology following    - see above, consider resuming 1/20    # Hx afib  # History of sick sinus syndrome s/p pacemaker placement (9/2024)  - metoprolol previously discontinued by cardiac surgery    # Buttock wound  - WOC consult    # neuropathy  - after pros/cons discussion, will trial gabapentin low dose 100mg tid on 1/18/2025     #anemia  A/p- recheck hgb as downtrending, but no signs/sx of bleed             Diet: Combination Diet Low Saturated Fat Na <2400mg Diet  Snacks/Supplements Adult: Gelatein Plus; With Meals  Snacks/Supplements Adult: Nepro Oral Supplement; With Meals      Le Catheter: Not present  Lines: PRESENT      CVC Double Lumen Right Subclavian Tunneled-Site Assessment: WDL      Cardiac Monitoring: None  Code Status: No CPR- Do NOT Intubate      Clinically Significant Risk Factors               # Hypoalbuminemia: Lowest albumin = 2.6 g/dL at 1/19/2025  6:17 AM, will monitor as appropriate                 # Severe Malnutrition: based on nutrition assessment    # Financial/Environmental Concerns: none   # Pacemaker present  # History of CABG: noted on surgical history       Social Drivers of Health    Tobacco Use: Medium Risk (1/7/2025)    Received from [employer] [facility]    Patient History     Smoking Tobacco Use: Former     Smokeless Tobacco Use: Never          Disposition Plan     Medically Ready for Discharge:  Anticipated in 2-4 Days         Heri Roberts MD  Hospitalist Service  [facility]  Securely message with [employer] (more info)  Text page via [employer] Paging/Directory   ______________________________________________________________________    Interval History   No new sx  Discussed INR  Discussed warfarin  Discussed w/ pharmacy  Updated rn and sw  Pt at 2L at rest  Baseline is room air, but 2L with exertion  Discussed dispo this afternoon vs tmr  If still here tmr then palliative and dialysis       Physical Exam   Vital Signs: Temp: 98.8  F (37.1  C) Temp src: Oral BP: 101/57 Pulse: 70   Resp: 16 SpO2: 92 % O2 Device: Nasal cannula Oxygen Delivery: 2 LPM  Weight: 145 lbs 4.53 oz    Gen:  sitting up in chair, in NAD  Neuro:  alert, conversant  CV:  nl rate, regular rhythm  Pulm: no acute resp distress, ctab anteriorly; however on 2L which is not baseline at rest   GI:  abdomen soft, not tender    Medical Decision Making             Data   1/19/2025 - na 137, k 3.4, cr 2.33 from 1.73 yt  Hgb 9.4

## 2025-01-19 NOTE — PLAN OF CARE
Pt A&O x4. Calls appropriately and can make needs known. Call light within reach. Assist x1.  at bedside throughout shift. Hep drip discontinued. O2 increased to 2L. Weaned back to O2@1L at 1400. Will continue to monitor and wean as appropriate. Gary increased. Sacral Mepilex CDI. Low sat fat diet. Pills one at a time with water. Possible discharge later today or tomorrow.   Problem: Adult Inpatient Plan of Care  Goal: Plan of Care Review  Description: The Plan of Care Review/Shift note should be completed every shift.  The Outcome Evaluation is a brief statement about your assessment that the patient is improving, declining, or no change.  This information will be displayed automatically on your shift  note.  Outcome: Progressing     Problem: Palliative Care  Goal: Enhanced Quality of Life  Outcome: Progressing  Intervention: Optimize Psychosocial Wellbeing  Recent Flowsheet Documentation  Taken 1/19/2025 0816 by Maisha Gibbons RN  Supportive Measures: active listening utilized     Problem: Hemodialysis  Goal: Safe, Effective Therapy Delivery  Outcome: Progressing  Intervention: Optimize Device Care and Function  Recent Flowsheet Documentation  Taken 1/19/2025 0816 by Maisha Gibbons RN  Medication Review/Management: medications reviewed     Problem: Gas Exchange Impaired  Goal: Optimal Gas Exchange  Outcome: Not Progressing   Goal Outcome Evaluation:

## 2025-01-20 ENCOUNTER — DOCUMENTATION ONLY (OUTPATIENT)
Dept: OTHER | Facility: CLINIC | Age: 85
End: 2025-01-20
Payer: COMMERCIAL

## 2025-01-20 LAB
ALBUMIN SERPL BCG-MCNC: 2.5 G/DL (ref 3.5–5.2)
ANION GAP SERPL CALCULATED.3IONS-SCNC: 11 MMOL/L (ref 7–15)
BUN SERPL-MCNC: 16.3 MG/DL (ref 8–23)
CALCIUM SERPL-MCNC: 8.8 MG/DL (ref 8.8–10.4)
CHLORIDE SERPL-SCNC: 101 MMOL/L (ref 98–107)
CREAT SERPL-MCNC: 2.83 MG/DL (ref 0.51–0.95)
EGFRCR SERPLBLD CKD-EPI 2021: 16 ML/MIN/1.73M2
ERYTHROCYTE [DISTWIDTH] IN BLOOD BY AUTOMATED COUNT: 17.9 % (ref 10–15)
GLUCOSE SERPL-MCNC: 80 MG/DL (ref 70–99)
HCO3 SERPL-SCNC: 24 MMOL/L (ref 22–29)
HCT VFR BLD AUTO: 30.7 % (ref 35–47)
HGB BLD-MCNC: 9.5 G/DL (ref 11.7–15.7)
INR PPP: 2.38 (ref 0.85–1.15)
MCH RBC QN AUTO: 28.2 PG (ref 26.5–33)
MCHC RBC AUTO-ENTMCNC: 30.9 G/DL (ref 31.5–36.5)
MCV RBC AUTO: 91 FL (ref 78–100)
PHOSPHATE SERPL-MCNC: 1.7 MG/DL (ref 2.5–4.5)
PLATELET # BLD AUTO: 162 10E3/UL (ref 150–450)
POTASSIUM SERPL-SCNC: 3.5 MMOL/L (ref 3.4–5.3)
RBC # BLD AUTO: 3.37 10E6/UL (ref 3.8–5.2)
SODIUM SERPL-SCNC: 136 MMOL/L (ref 135–145)
WBC # BLD AUTO: 7 10E3/UL (ref 4–11)

## 2025-01-20 PROCEDURE — 85027 COMPLETE CBC AUTOMATED: CPT | Performed by: STUDENT IN AN ORGANIZED HEALTH CARE EDUCATION/TRAINING PROGRAM

## 2025-01-20 PROCEDURE — 82310 ASSAY OF CALCIUM: CPT | Performed by: HOSPITALIST

## 2025-01-20 PROCEDURE — 99232 SBSQ HOSP IP/OBS MODERATE 35: CPT | Performed by: STUDENT IN AN ORGANIZED HEALTH CARE EDUCATION/TRAINING PROGRAM

## 2025-01-20 PROCEDURE — 90935 HEMODIALYSIS ONE EVALUATION: CPT

## 2025-01-20 PROCEDURE — 90935 HEMODIALYSIS ONE EVALUATION: CPT | Mod: FS | Performed by: STUDENT IN AN ORGANIZED HEALTH CARE EDUCATION/TRAINING PROGRAM

## 2025-01-20 PROCEDURE — 250N000011 HC RX IP 250 OP 636: Performed by: HOSPITALIST

## 2025-01-20 PROCEDURE — 36415 COLL VENOUS BLD VENIPUNCTURE: CPT | Performed by: HOSPITALIST

## 2025-01-20 PROCEDURE — 82947 ASSAY GLUCOSE BLOOD QUANT: CPT | Performed by: HOSPITALIST

## 2025-01-20 PROCEDURE — 250N000013 HC RX MED GY IP 250 OP 250 PS 637

## 2025-01-20 PROCEDURE — 250N000013 HC RX MED GY IP 250 OP 250 PS 637: Performed by: PHYSICIAN ASSISTANT

## 2025-01-20 PROCEDURE — 82040 ASSAY OF SERUM ALBUMIN: CPT | Performed by: HOSPITALIST

## 2025-01-20 PROCEDURE — 99207 PR APP CREDIT; MD BILLING SHARED VISIT: CPT | Mod: FS | Performed by: PHYSICIAN ASSISTANT

## 2025-01-20 PROCEDURE — 258N000003 HC RX IP 258 OP 636: Performed by: PHYSICIAN ASSISTANT

## 2025-01-20 PROCEDURE — 85610 PROTHROMBIN TIME: CPT | Performed by: HOSPITALIST

## 2025-01-20 PROCEDURE — 250N000013 HC RX MED GY IP 250 OP 250 PS 637: Performed by: STUDENT IN AN ORGANIZED HEALTH CARE EDUCATION/TRAINING PROGRAM

## 2025-01-20 PROCEDURE — 120N000001 HC R&B MED SURG/OB

## 2025-01-20 PROCEDURE — 250N000013 HC RX MED GY IP 250 OP 250 PS 637: Performed by: HOSPITALIST

## 2025-01-20 RX ORDER — WARFARIN SODIUM 4 MG/1
4 TABLET ORAL
Status: COMPLETED | OUTPATIENT
Start: 2025-01-20 | End: 2025-01-20

## 2025-01-20 RX ORDER — HEPARIN SODIUM 1000 [USP'U]/ML
500 INJECTION, SOLUTION INTRAVENOUS; SUBCUTANEOUS CONTINUOUS
Status: DISCONTINUED | OUTPATIENT
Start: 2025-01-20 | End: 2025-01-23 | Stop reason: HOSPADM

## 2025-01-20 RX ORDER — MIDODRINE HYDROCHLORIDE 5 MG/1
5 TABLET ORAL DAILY PRN
Status: DISCONTINUED | OUTPATIENT
Start: 2025-01-20 | End: 2025-01-23 | Stop reason: HOSPADM

## 2025-01-20 RX ORDER — GABAPENTIN 100 MG/1
200 CAPSULE ORAL 3 TIMES DAILY
Qty: 42 CAPSULE | Refills: 0 | Status: SHIPPED | OUTPATIENT
Start: 2025-01-20 | End: 2025-01-22

## 2025-01-20 RX ORDER — FAMOTIDINE 10 MG
10 TABLET ORAL 2 TIMES DAILY
Qty: 60 TABLET | Refills: 0 | Status: SHIPPED | OUTPATIENT
Start: 2025-01-20

## 2025-01-20 RX ORDER — WARFARIN SODIUM 2 MG/1
TABLET ORAL
Qty: 15 TABLET | Refills: 0 | Status: SHIPPED | OUTPATIENT
Start: 2025-01-20

## 2025-01-20 RX ORDER — TORSEMIDE 100 MG
50 TABLET ORAL DAILY
Status: DISCONTINUED | OUTPATIENT
Start: 2025-01-20 | End: 2025-01-23 | Stop reason: HOSPADM

## 2025-01-20 RX ADMIN — GABAPENTIN 200 MG: 100 CAPSULE ORAL at 20:13

## 2025-01-20 RX ADMIN — GABAPENTIN 200 MG: 100 CAPSULE ORAL at 14:14

## 2025-01-20 RX ADMIN — MIDODRINE HYDROCHLORIDE 5 MG: 5 TABLET ORAL at 08:28

## 2025-01-20 RX ADMIN — SODIUM CHLORIDE 250 ML: 9 INJECTION, SOLUTION INTRAVENOUS at 09:34

## 2025-01-20 RX ADMIN — GABAPENTIN 200 MG: 100 CAPSULE ORAL at 08:28

## 2025-01-20 RX ADMIN — CLOPIDOGREL BISULFATE 75 MG: 75 TABLET ORAL at 08:28

## 2025-01-20 RX ADMIN — ESCITALOPRAM OXALATE 5 MG: 5 TABLET, FILM COATED ORAL at 20:13

## 2025-01-20 RX ADMIN — MIDODRINE HYDROCHLORIDE 5 MG: 5 TABLET ORAL at 14:14

## 2025-01-20 RX ADMIN — ONDANSETRON 4 MG: 4 TABLET, ORALLY DISINTEGRATING ORAL at 20:13

## 2025-01-20 RX ADMIN — OXYCODONE 5 MG: 5 TABLET ORAL at 09:46

## 2025-01-20 RX ADMIN — ONDANSETRON 4 MG: 4 TABLET, ORALLY DISINTEGRATING ORAL at 14:14

## 2025-01-20 RX ADMIN — TORSEMIDE 50 MG: 100 TABLET ORAL at 14:20

## 2025-01-20 RX ADMIN — ATORVASTATIN CALCIUM 80 MG: 40 TABLET, FILM COATED ORAL at 20:13

## 2025-01-20 RX ADMIN — WARFARIN SODIUM 4 MG: 4 TABLET ORAL at 17:09

## 2025-01-20 RX ADMIN — SODIUM CHLORIDE 300 ML: 9 INJECTION, SOLUTION INTRAVENOUS at 09:34

## 2025-01-20 RX ADMIN — SENNOSIDES 8.6 MG: 8.6 TABLET, FILM COATED ORAL at 20:13

## 2025-01-20 RX ADMIN — MIDODRINE HYDROCHLORIDE 5 MG: 5 TABLET ORAL at 11:55

## 2025-01-20 RX ADMIN — PANTOPRAZOLE SODIUM 40 MG: 40 TABLET, DELAYED RELEASE ORAL at 20:13

## 2025-01-20 RX ADMIN — MIDODRINE HYDROCHLORIDE 5 MG: 5 TABLET ORAL at 20:16

## 2025-01-20 RX ADMIN — ONDANSETRON 4 MG: 4 TABLET, ORALLY DISINTEGRATING ORAL at 08:28

## 2025-01-20 RX ADMIN — ACETAMINOPHEN 650 MG: 325 TABLET ORAL at 00:26

## 2025-01-20 RX ADMIN — FAMOTIDINE 10 MG: 10 TABLET ORAL at 08:28

## 2025-01-20 RX ADMIN — FAMOTIDINE 10 MG: 10 TABLET ORAL at 20:13

## 2025-01-20 ASSESSMENT — ACTIVITIES OF DAILY LIVING (ADL)
ADLS_ACUITY_SCORE: 43
ADLS_ACUITY_SCORE: 43
ADLS_ACUITY_SCORE: 47
ADLS_ACUITY_SCORE: 44
ADLS_ACUITY_SCORE: 44
ADLS_ACUITY_SCORE: 47
ADLS_ACUITY_SCORE: 47
ADLS_ACUITY_SCORE: 44
ADLS_ACUITY_SCORE: 43
ADLS_ACUITY_SCORE: 47
ADLS_ACUITY_SCORE: 44
ADLS_ACUITY_SCORE: 47
ADLS_ACUITY_SCORE: 43
ADLS_ACUITY_SCORE: 44
ADLS_ACUITY_SCORE: 44
ADLS_ACUITY_SCORE: 47
ADLS_ACUITY_SCORE: 43
ADLS_ACUITY_SCORE: 47
ADLS_ACUITY_SCORE: 44
ADLS_ACUITY_SCORE: 47
ADLS_ACUITY_SCORE: 44
ADLS_ACUITY_SCORE: 43
ADLS_ACUITY_SCORE: 43

## 2025-01-20 NOTE — PLAN OF CARE
Goal Outcome Evaluation:      Plan of Care Reviewed With: patient    Overall Patient Progress: no changeOverall Patient Progress: no change    Patient is A&Ox3-4, slightly forgetful, but calls appropriately. VSS, using 1Lnc, denies chest pain or SOB. Pt is up with A1, BG/RW, turns q2-3hrs when in bed. Pt denies pain or discomfort. Patient tolerating current diet orders and drinking fluids. Yoandy PIVs--SL, right dialysis CVC line intact.  Pt denies nausea. Bed alarm on and call within reach.

## 2025-01-20 NOTE — PROGRESS NOTES
Date: 11/4/2024  Time:1240     Data:  Pre Wt:   68 kg (bed weight)  Desired Wt:   To be established  Post Wt:  68 kg (bed weight)  Weight change:  0 kg  Ultrafiltration - Post Run Net Total Removed (mL):  0 ml  Vascular Access Status: CVC patent  Dialyzer Rinse:  Light  Total Blood Volume Processed: 66.5 L   Total Dialysis (Treatment) Time:   3 Hrs  Dialysate Bath: K 4, Ca 3  Heparin: Heparin: None     Lab:   No     Interventions:  Pt.dialyzed for 3 hours via  Right cvc aspirates and flushes easily, no issues with BFR. Dressing C/D/I with bio patch in place. Catheter labeled, wrapped in gauze and secured with tape.  CVC  UF set to 0.3 Liters, accommodating  priming and rinse back volumes  ,     Midodrine administered per MAR  See flowsheet for crit-line and notes  Treatment has ended safely and blood rinsed back completely  Tolerated well with dialysis treatment  CVC dressing changed aseptically and due on 1/27/25  CVC lumen flushed with saline and locked with saline  CVC clear guard changed post HD  Report given to PCN, pt in stable condition     Assessment:  Alert, calm and cooperative, denies pain  Lung sounds diminished, clear anterior and lateral BUL/BLL  CVC is clean,dry and intact                Plan:    Per Renal team

## 2025-01-20 NOTE — PROGRESS NOTES
MD paged for PRN dose of Midodrine per Dialysis RN request.    Relayed to dialysis RN to page Neph.

## 2025-01-20 NOTE — PROGRESS NOTES
RENAL PROGRESS NOTE    ASSESSMENT & PLAN:   ESRD: Failed trial off dialysis, now resumed due to uremic symptoms.  Dialyzing on Mondays, and Fridays.  Excepted back at [facility] in [facility].  They will expect her to start back with them on 1/20/2025.  Chair time below.  [facility] [place]  Hemodialysis on Mondays and Fridays  Arrive at 6:20 AM 1/24/25  Phone: [phone] [address] [facility] [facility] [facility] [address], [place], [address]    Volume: Euvolemic.  Requiring no UF with HD.  Resume torsemide 50 mg daily.  Ordered no UF with HD for now.    Pulmonary embolism: Anticoagulation per primary team.    Nausea with vomiting: Thought related to uremia.  On PPI and H2 blocker for presumed GERD.  As needed antiemetics.  Follow with adequate clearance on HD.    Chronic hypotension: On midodrine.  Resuming PTA torsemide.    Respiratory failure, pneumonia: Supportive cares per primary team.    CAD: S/p CABG    HLD: Statin    History of endocarditis complicated by MVR/AVR    SSS: S/p PPM    Prediabetes    SUBJECTIVE:    Seen during dialysis  No UF planned  Intradialytic hypotension on midodrine  No cramps  Less nausea/vomiting today, tells me she ate breakfast and her appetite might be coming back.  Reviewed plans for outpatient HD  Called and updated CDU with discharge plans and orders  Fax note over to CDU.  OBJECTIVE:  Physical Exam   Temp: 97.8  F (36.6  C) Temp src: Axillary BP: 97/55 Pulse: 74   Resp: 15 SpO2: 99 % O2 Device: Nasal cannula Oxygen Delivery: 1 LPM  Vitals:    01/18/25 0631 01/19/25 0420 01/20/25 0645   Weight: 67.3 kg (148 lb 5.9 oz) 65.9 kg (145 lb 4.5 oz) 64.3 kg (141 lb 12.1 oz)     Vital Signs with Ranges  Temp:  [97.8  F (36.6  C)-99.2  F (37.3  C)] 97.8  F (36.6  C)  Pulse:  [69-76] 74  Resp:  [15-18] 15  BP: ()/(50-71) 97/55  SpO2:  [88 %-99 %] 99 %  I/O last 3 completed shifts:  In: 1367 [P.O.:1367]  Out: -     Patient Vitals for the past 72 hrs:   Weight   01/20/25 0645  64.3 kg (141 lb 12.1 oz)   01/19/25 0420 65.9 kg (145 lb 4.5 oz)   01/18/25 0631 67.3 kg (148 lb 5.9 oz)     Intake/Output Summary (Last 24 hours) at 1/20/2025 1216  Last data filed at 1/19/2025 2000  Gross per 24 hour   Intake 847 ml   Output --   Net 847 ml       PHYSICAL EXAM:  General: Alert, NAD  HENT: Supple, Non-tender, no obvious JVD  Eyes: No scleral icterus  Cardiovascular: Rate controlled  Respiratory: CTAB, non-labored  Gastrointestinal: Soft, non-tender, non-distended  Musculoskeletal: Grossly normal   Integumentary: Warm, dry, no rash  Neurologic: Non focal   Psychiatric: Cooperative  Access: Tunneled dialysis catheter right chest.  Clear dressing covering.  No signs of infection.    LABORATORY STUDIES:     Recent Labs   Lab 01/20/25  0744 01/19/25  1439 01/19/25  0617 01/19/25  0146 01/17/25  0847 01/14/25  1530 01/14/25  0808   WBC 7.0 7.1  --   --  6.4 8.5 9.4   RBC 3.37* 3.48*  --   --  3.85 3.70* 3.76*   HGB 9.5* 9.8* 9.4*  --  10.6* 10.0* 10.8*   HCT 30.7* 32.0*  --   --  35.2 33.1* 34.6*    154  --  150 145* 108* 115*       Basic Metabolic Panel:  Recent Labs   Lab 01/20/25  0744 01/19/25  0617 01/18/25  0751 01/17/25  0847 01/16/25  0836 01/15/25  0749    137 137 137 139 135   POTASSIUM 3.5 3.4 3.6 3.1* 3.7 3.6   CHLORIDE 101 103 102 100 102 101   CO2 24 26 25 24 25 23   BUN 16.3 10.8 7.2* 21.8 24.2* 24.8*   CR 2.83* 2.33* 1.73* 2.65* 2.70* 2.54*   GLC 80 89 103* 98 89 85   KAELYN 8.8 9.3 9.1 9.8 9.6 9.0       INR  Recent Labs   Lab 01/20/25  0744 01/19/25  0617 01/18/25  0751 01/17/25  0847   INR 2.38* 2.48* 1.87* 1.41*        Recent Labs   Lab Test 01/20/25  0744 01/19/25  1439 01/19/25  0617   INR 2.38*  --  2.48*   WBC 7.0 7.1  --    HGB 9.5* 9.8* 9.4*    154  --        Personally reviewed current labs    This note was dictated using voice recognition     Michele Chilel PA-C  [facility]  [phone]

## 2025-01-20 NOTE — PROGRESS NOTES
[facility]    Medicine Progress Note - Hospitalist Service    Date of Admission:  1/13/2025    Assessment & Plan   Felicitas Elliott is a 84 year old female admitted with community acquired pneumonia. Presentation was complicated by pulmonary embolism.  Have been bridging heparin bridge to warfarin. Renal replacement therapy has been resumed at direction of nephrology.     Extended discussion last week related to goals of her medical care.  She was clear that she accepts she is dying and communicates initial preferences to avoid future hospitalization and possibly discontinue dialysis.  Primary barriers to this decision are her concerns that family, particularly her , is having difficulty accepting these decisions.  Palliative care was consulted to facilitate meeting and support patient communicating these preferences with her family.    Currently INR bridge to goal 2-3. Now at goal at 2.4. stop heparin, discussed with pharmacy and eventually will plan for discharge on 4 mg warfarin and anticoagulation clinic referral on discharge-earliest would be lab draw on Tuesday.  However, she is currently not at her baseline from a breathing standpoint-she is currently 2 L at rest.    Plan was for discharge today on morning rounds. Torsemide resumed as per nephrology. Weaned to 1L -> room air -> 1L. After dialysis pt is lightheaded and dizzy and does not feel safe to discharge, even with home cares being arranged. As such, will continue to monitor. Plan to discharge tomorrow. Discussed w/ pt and her  when seen again mid-afternoon.    ----  #lightheaded and dizzy  A/p- during dialysis needed midodrine, now post run is very dizzy and lightheaded. Will monitor as pt is unsafe to discharge    # Acute pulmonary embolism, provoked  - heparin gtt bridge to warfarin (plan for 3-6 months treatment)   - as above, discussed w/ pharmacy, appreciate.   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 If able to wean, possible versus further monitoring.  Torsemide has been on hold, consider resuming tomorrow if not able to wean to room air. She uses up to 2L with activity and night.  - midodrine  - clopidogrel  - atorvastatin  - torsemide on hold; nephrology following    - see above, consider resuming 1/20    # Hx afib  # History of sick sinus syndrome s/p pacemaker placement (9/2024)  - metoprolol previously discontinued by cardiac surgery    # Buttock wound  - WOC consult    # neuropathy  - after pros/cons discussion, will trial gabapentin low dose 100mg tid on 1/18/2025     #anemia  A/p- recheck hgb as downtrending, but no signs/sx of bleed             Diet: Combination Diet Low Saturated Fat Na <2400mg Diet  Snacks/Supplements Adult: Gelatein Plus; With Meals  Snacks/Supplements Adult: Nepro Oral Supplement; With Meals  Diet      Le Catheter: Not present  Lines: PRESENT      CVC Double Lumen Right Subclavian Tunneled-Site Assessment: WDL      Cardiac Monitoring: None  Code Status: No CPR- Do NOT Intubate      Clinically Significant Risk Factors               # Hypoalbuminemia: Lowest albumin = 2.5 g/dL at 1/20/2025  7:44 AM, will monitor as appropriate                 # Severe Malnutrition: based on nutrition assessment    # Financial/Environmental Concerns: none   # Pacemaker present  # History of CABG: noted on surgical history       Social Drivers of Health     Tobacco Use: Medium Risk (1/7/2025)    [facility] [employer] [facility]    Patient History     Smoking Tobacco Use: Former     Smokeless Tobacco Use: Never          Disposition Plan     Medically Ready for Discharge: Anticipated Tomorrow         Heri Roberts MD  Hospitalist Service  [facility]  Securely message with [employer] (more info)  Text page via [employer] Paging/Directory   ______________________________________________________________________    Interval History   Plan was for discharge today on morning rounds.   Pt had no new pain or symptoms  Reviewed discharge instructions with pt and her   Discussed w/ RN and sw  And Torsemide resumed as per nephrology. Weaned to 1L -> room air -> 1L.     However, After dialysis pt is lightheaded and dizzy and does not feel safe to discharge, even with home cares being arranged. As such, will continue to monitor. Plan to discharge tomorrow. Discussed w/ pt and her  when seen again mid-afternoon.    Appreciate Spiritual Care meeting with pt this afternoon     Physical Exam   Vital Signs: Temp: 97.8  F (36.6  C) Temp src: Axillary BP: 93/53 Pulse: 74   Resp: 15 SpO2: 94 % O2 Device: Nasal cannula Oxygen Delivery: 1 LPM  Weight: 141 lbs 12.09 oz    Gen:  this AM- up and conversnat, no new sx or concerns; in the PM: in bed, very weak appearing and uncomfortable  Neuro:  alert, nonfocal  CV:  nl rate, regular rhythm  Pulm: no acute resp distress, ctab anteriorly; however on 2L which is not baseline at rest   GI:  abdomen soft, not tender    Medical Decision Making             Data   1/19/2025 - na 137, k 3.4, cr 2.33 from 1.73 yt  Hgb 9.4    1/20/2025 - na 136, k 3.5, cr 2.83- having dialysis -- hgb 9.5, wbc 7, plts 162    TT: 49 min

## 2025-01-20 NOTE — CONSULTS
SPIRITUAL HEALTH SERVICES Consult Note                Saw pt Felicitas Elliott per consult order for emotional support    Patient/Family Understanding of Illness and Goals of Care - Pt named feeling very fatigued this afternoon, she had a visit with Palliative Care this morning that she shared was helpful, and had dialysis which she shared took her energy.      Distress and Loss - She named sadness as she reflected on how her illness has strained her relationship with her  and he expressed caregiver fatigue    Strengths, Coping, and Resources - Despite her fatigue Kim easily engaged in emotional reflection.  They have family that is able to step in from time to time to argument care.     Meaning, Beliefs, and Spirituality - Kim welcomed blessing of comfort    Plan of Care - [facility] will cont to assess and support though duration of stay    Fernando Amezquita M.Div., [facility]  Staff    [phone]   Spiritual Health Services is available 24/7 for emergent requests and consults, either by paging the on-call  or by entering an ASAP/STAT consult in [employer], which will also page the on-call .

## 2025-01-20 NOTE — PLAN OF CARE
Goal Outcome Evaluation:      Plan of Care Reviewed With: patient, spouse    Overall Patient Progress: improvingOverall Patient Progress: improving    Patient is awake and alert and oriented to time, place and person.    B/P: 93/52, T: 97.8, P: 70, R: 16. Tele: None (only on with HD today).    NC NC L/minute 1  to maintain saturations > 90 %. Patient's saturation fell to 88 % without any oxygen use prior to HD. Still fell to < 90 % after HD.    7/10. PRN's administered: Oxycodone for pain in lower legs.    Assist of 1, Gait belt, and Walker.    Orders Placed This Encounter      Combination Diet Low Saturated Fat Na <2400mg Diet    Saline Loc (2). Right CVC CDI.    Alarms on. Call light within reach.    Discharge plan home with homecare  unknown at this time .    HLM Admission: 8- Walk 250 feet or more  HLM Daily8- Walk 250 feet or more        Problem: Hemodialysis  Goal: Safe, Effective Therapy Delivery  Intervention: Optimize Device Care and Function  Recent Flowsheet Documentation  Taken 1/20/2025 4585 by Kalyani Plasencia, RN  Medication Review/Management: medications reviewed     Problem: Pain Acute  Goal: Optimal Pain Control and Function  Outcome: Progressing

## 2025-01-20 NOTE — PROGRESS NOTES
"  PALLIATIVE CARE PROGRESS NOTE  [facility]     Patient Name: Felicitas Elliott  Date of Admission: 1/13/2025   Today the patient was seen for: follow up goals of care, advanced care planning.     Recommendations & Counseling       GOALS OF CARE:   Life-prolonging with limits (DNR/DNI).    Found QoL worse off HD (nausea, etc) and now noting improvement after resuming HD. Wants to continue with HD as long as it provides acceptable QoL for her and her body tolerates HD.  What is most important: Being home, having good symptom management (improvement in nausea and shortness of breath), and feeling like she can \"get well.\"  To Kim, getting well means having improvement in appetite, being able to continue with exercises/therapy, and not feeling short of breath.  She acknowledges that although in her mind she is motivated to \"get well\", body is not tolerating at times.   Kim voiced acceptance of death when EOL comes.  She does not want to feel like she is a burden to her  and children and hoping they will come to accept her wishes as she approaches end of life.   In meeting with patient/ 1/17/25,  stated he will support her wishes, while still \"hoping\" that she will be able to improve appetite, nutrition, and strength.  Kim would likely benefit from outpatient follow up in palliative care clinic for symptom management and ongoing support regarding goals of care.   She declined my placing a palliative medicine referral for clinic follow up--this can be ordered in outpatient setting depending upon how things progress for Kim should a follow up goals of care conversation be helpful.    ADVANCE CARE PLANNING:  No health care directive on file. Per system policy, Surrogate Decision-makers for Patients With Diminished Decision-making Capacity offers guidance on possible decision-makers.  Ed has been identified as a surrogate decision maker.  Provided with blank HCD " document 1/17/25 and encouraged to complete.  Patient states she would want  Ed as primary health care agent and daughter Sanam as secondary.   New POLST form completed today.   Advance Care Planning Discussion 1/20/2025. Kathy MAN MD met with Patient today at the hospital to discuss Advance Care Planning. Felicitas Elliott does have decisional capacity and was present for this discussion.  Those present were informed of the voluntary nature of this discussion and wished to proceed.  The discussion included:  review of goals of treatment/care.  We completed a POLST form (DNR/selective treatment) reflecting her present wishes, and original form plus copy provided to patient, and it was emailed to [facility] for inclusion to [employer] . This discussion began at 9:45 and ended at 10:03 for a total of 18 minutes.   Code status: No CPR- Do NOT Intubate    MEDICAL MANAGEMENT:   #Dyspnea, 2/2 pneumonia and PE  [place] [facility] (5-day treatment given), heparin gtt-warfarin, oxygen  Has been able to wean from 3L NC to 1L NC.     #Nausea multifactorial (uremia, lower lobe pneumonias have potential to cause nausea, also doxycycline oral can cause nausea/pill esophagitis) : Improved.    #Anorexia   Likely related to renal failure, worse since stopping dialysis on 1/2/25  ? If hypotension could have contributed? On midodrine.  -Pharmacologic management   Zofran ODT 4 mg TID -- recommend continuing at discharge.  Compazine 5 mg po every 6 hours PRN -- suggest continuing at discharge.  Compazine 5mg IV q6H prn second line; last dose 1/18/25.  Bowel regimen in place,  -Nonpharmacologic management  Small, frequent intake  Assess and avoid aggravating factors  Accupressure (C-band)  Aromatherapy, alcohol swabs known to be effective     #Pain: musculoskeletal/ankle.  Also neuropathic pain.  Palliative is not managing pain.  - receiving oxycodone prn (*last dose was 1/19/25 early in morning)  -  receiving APAP prn.  Consider scheduling if pain is occurring frequently/regularly.  - newly on gabapentin 200mg TID. (Started 1/18/25--caution as renally cleared and HD patient.  Defer to HMS who are managing).  Dose reduction further to lessen risk of falls, sleepiness, etc.    #General Weakness/Debility, Fatigue  Physical Therapy  Occupational therapy   provides assistance at home.   Hoping to have home PT/OT at discharge     PSYCHOSOCIAL/SPIRITUAL SUPPORT:  Family  to Ed for 41 years.  They have one son together.  Patient has 6 other children from a previous marriage.    Palliative Care will continue to follow. Thank you for the consult and allowing us to aid in the care of Felicitas Elliott.    These recommendations have been discussed with Dr Roberts.    Kathy Alfred MD  [facility], Palliative Care  Securely message with the Vocera Web Console (learn more here) or  Text page via 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      Patient had been on dialysis until 1/2/2025 and was on a trial of holding dialysis, but reported increased uremic symptoms and resumed dialysis on 1/14/2025.  Renal is following.       Interval History:     Multidisciplinary collaboration:  Chart reviewed, notes from RN, nephrology, [facility] [place] [facility] reviewed.  Labs reviewed, imaging studies reviewed.    Notable medications:  Oxycodone (not on prior to  admission)  Gabapentin (not on prior to admission)  Ondansetron ODT  Compazine oral, iv  Protonix     Patient/family narrative  Feeling better today than on admission.  Notes fatigue, tires easily here.    Feels better with resuming HD.  Notes feeling that the treatment is providing her with benefit as far as less nausea, better appetite.  Has had a few episodes of bloody emesis per patient.  She wants to continue with HD.  Kim affirms wanting to complete POLST form (reviewed this is optional) to reflect her preference for DNR/DNI.  See above for details.  I reviewed option of me placing outpatient palliative care clinic referral.  At this point, Kim wants to give it more time before opting in to palliative care follow up in outpatient setting.          Physical Exam:   Temp:  [98.5  F (36.9  C)-99.2  F (37.3  C)] 99.2  F (37.3  C)  Pulse:  [69-71] 70  Resp:  [15-18] 15  BP: (101-149)/(56-65) 102/59  SpO2:  [88 %-97 %] 92 %  141 lbs 12.09 oz    Body mass index is 22.88 kg/m .      Intake/Output Summary (Last 24 hours) at 1/20/2025 0913  Last data filed at 1/19/2025 2000  Gross per 24 hour   Intake 847 ml   Output --   Net 847 ml       Physical Exam  Alert woman, oriented x3 and fluent speech, aware of her medical issues and situation.  Tells me she's hoping to discharge today.  Receiving HD during my visit.  Breathing is nonlabored.  No JVD.  Abdomen soft.  No tremor.          Data Reviewed:     Last Comprehensive Metabolic Panel:  Lab Results   Component Value Date     01/20/2025    POTASSIUM 3.5 01/20/2025    CHLORIDE 101 01/20/2025    CO2 24 01/20/2025    ANIONGAP 11 01/20/2025    GLC 80 01/20/2025    BUN 16.3 01/20/2025    CR 2.83 (H) 01/20/2025    GFRESTIMATED 16 (L) 01/20/2025    KAELYN 8.8 01/20/2025       CBC RESULTS:   Recent Labs   Lab Test 01/20/25  0744   WBC 7.0   RBC 3.37*   HGB 9.5*   HCT 30.7*   MCV 91   MCH 28.2   MCHC 30.9*   RDW 17.9*        Lab Results   Component Value Date    AST  "18 01/13/2025     Lab Results   Component Value Date    ALT <5 01/13/2025     No results found for: \"BILICONJ\"   Lab Results   Component Value Date    BILITOTAL 0.8 01/13/2025     Lab Results   Component Value Date    ALBUMIN 2.5 01/20/2025     Lab Results   Component Value Date    PROTTOTAL 7.0 01/13/2025      Lab Results   Component Value Date    ALKPHOS 89 01/13/2025     CTA chest 1/13/25  1.  Bilateral pulmonary artery emboli as described above. Overall moderate clot burden. Associated mild right heart strain.   2.  Trace bilateral pleural effusions with adjacent consolidation likely reflecting atelectasis.   3.  Borderline dilated ascending thoracic aorta measuring 4.0 cm in diameter.     CT abdomen pelvis wo contrast 1/13/25  IMPRESSION:   1.  No significant abnormality in the abdomen or pelvis.     2.  Some mild infiltrates right lower lobe may represent a mild pneumonia with associated mild atelectasis both lung bases.     Advance Care Planning Discussion 1/20/2025. IKathy MD met with Patient today at the hospital to discuss Advance Care Planning. Felicitas Elliott does have decisional capacity and was present for this discussion.  Those present were informed of the voluntary nature of this discussion and wished to proceed.  The discussion included: review of goals of treatment/care.  We completed a POLST form (DNR/selective treatment) reflecting her present wishes, and original form plus copy provided to patient, and it was emailed to [facility] for inclusion to [facility]. This discussion began at 9:45 and ended at 10:03 for a total of 18 minutes.   Kathy Alfred MD  [facility], Palliative Care  Securely message with the Vocera Web Console (learn more here) or  Text page via [facility] Paging/Directory        "

## 2025-01-20 NOTE — PLAN OF CARE
Problem: Adult Inpatient Plan of Care  Goal: Plan of Care Review  Description: The Plan of Care Review/Shift note should be completed every shift.  The Outcome Evaluation is a brief statement about your assessment that the patient is improving, declining, or no change.  This information will be displayed automatically on your shift  note.  Outcome: Progressing   Goal Outcome Evaluation:  VSS and afebrile. O2 weaned to 1/2 LPM. Patient desats to 88-89% with no O2. Received PRN Tylenol once for ankle pain. Hopeful for discharge today after dialysis.

## 2025-01-21 ENCOUNTER — APPOINTMENT (OUTPATIENT)
Dept: ULTRASOUND IMAGING | Facility: CLINIC | Age: 85
DRG: 193 | End: 2025-01-21
Payer: COMMERCIAL

## 2025-01-21 ENCOUNTER — APPOINTMENT (OUTPATIENT)
Dept: ULTRASOUND IMAGING | Facility: CLINIC | Age: 85
DRG: 193 | End: 2025-01-21
Attending: STUDENT IN AN ORGANIZED HEALTH CARE EDUCATION/TRAINING PROGRAM
Payer: COMMERCIAL

## 2025-01-21 LAB
ALBUMIN SERPL BCG-MCNC: 2.8 G/DL (ref 3.5–5.2)
ANION GAP SERPL CALCULATED.3IONS-SCNC: 7 MMOL/L (ref 7–15)
BUN SERPL-MCNC: 11.5 MG/DL (ref 8–23)
CALCIUM SERPL-MCNC: 9.5 MG/DL (ref 8.8–10.4)
CHLORIDE SERPL-SCNC: 103 MMOL/L (ref 98–107)
CREAT SERPL-MCNC: 2.21 MG/DL (ref 0.51–0.95)
EGFRCR SERPLBLD CKD-EPI 2021: 21 ML/MIN/1.73M2
GLUCOSE SERPL-MCNC: 108 MG/DL (ref 70–99)
HCO3 SERPL-SCNC: 26 MMOL/L (ref 22–29)
HOLD SPECIMEN: NORMAL
INR PPP: 2.18 (ref 0.85–1.15)
PHOSPHATE SERPL-MCNC: 2.1 MG/DL (ref 2.5–4.5)
POTASSIUM SERPL-SCNC: 4.3 MMOL/L (ref 3.4–5.3)
RADIOLOGIST FLAGS: ABNORMAL
SODIUM SERPL-SCNC: 136 MMOL/L (ref 135–145)

## 2025-01-21 PROCEDURE — 250N000013 HC RX MED GY IP 250 OP 250 PS 637: Performed by: STUDENT IN AN ORGANIZED HEALTH CARE EDUCATION/TRAINING PROGRAM

## 2025-01-21 PROCEDURE — 250N000013 HC RX MED GY IP 250 OP 250 PS 637: Performed by: HOSPITALIST

## 2025-01-21 PROCEDURE — 250N000011 HC RX IP 250 OP 636: Performed by: HOSPITALIST

## 2025-01-21 PROCEDURE — 82310 ASSAY OF CALCIUM: CPT | Performed by: HOSPITALIST

## 2025-01-21 PROCEDURE — 120N000001 HC R&B MED SURG/OB

## 2025-01-21 PROCEDURE — 99207 PR NO CHARGE LOS: CPT | Performed by: STUDENT IN AN ORGANIZED HEALTH CARE EDUCATION/TRAINING PROGRAM

## 2025-01-21 PROCEDURE — 82947 ASSAY GLUCOSE BLOOD QUANT: CPT | Performed by: HOSPITALIST

## 2025-01-21 PROCEDURE — 99232 SBSQ HOSP IP/OBS MODERATE 35: CPT | Performed by: STUDENT IN AN ORGANIZED HEALTH CARE EDUCATION/TRAINING PROGRAM

## 2025-01-21 PROCEDURE — 93970 EXTREMITY STUDY: CPT

## 2025-01-21 PROCEDURE — 99232 SBSQ HOSP IP/OBS MODERATE 35: CPT | Performed by: PHYSICIAN ASSISTANT

## 2025-01-21 PROCEDURE — 250N000013 HC RX MED GY IP 250 OP 250 PS 637: Performed by: PHYSICIAN ASSISTANT

## 2025-01-21 PROCEDURE — 250N000013 HC RX MED GY IP 250 OP 250 PS 637

## 2025-01-21 PROCEDURE — 82040 ASSAY OF SERUM ALBUMIN: CPT | Performed by: HOSPITALIST

## 2025-01-21 PROCEDURE — 36415 COLL VENOUS BLD VENIPUNCTURE: CPT | Performed by: HOSPITALIST

## 2025-01-21 PROCEDURE — 85610 PROTHROMBIN TIME: CPT | Performed by: HOSPITALIST

## 2025-01-21 PROCEDURE — 93925 LOWER EXTREMITY STUDY: CPT

## 2025-01-21 RX ORDER — HYDROXYZINE HYDROCHLORIDE 25 MG/1
50 TABLET, FILM COATED ORAL EVERY 6 HOURS PRN
Status: DISCONTINUED | OUTPATIENT
Start: 2025-01-21 | End: 2025-01-22

## 2025-01-21 RX ORDER — HYDROMORPHONE HYDROCHLORIDE 1 MG/ML
0.5 INJECTION, SOLUTION INTRAMUSCULAR; INTRAVENOUS; SUBCUTANEOUS ONCE
Status: COMPLETED | OUTPATIENT
Start: 2025-01-21 | End: 2025-01-21

## 2025-01-21 RX ORDER — WARFARIN SODIUM 5 MG/1
5 TABLET ORAL ONCE
Status: COMPLETED | OUTPATIENT
Start: 2025-01-21 | End: 2025-01-21

## 2025-01-21 RX ORDER — WARFARIN SODIUM 5 MG/1
5 TABLET ORAL
Status: DISCONTINUED | OUTPATIENT
Start: 2025-01-21 | End: 2025-01-21

## 2025-01-21 RX ORDER — HYDROXYZINE HYDROCHLORIDE 25 MG/1
25 TABLET, FILM COATED ORAL EVERY 6 HOURS PRN
Status: DISCONTINUED | OUTPATIENT
Start: 2025-01-21 | End: 2025-01-22

## 2025-01-21 RX ADMIN — TORSEMIDE 50 MG: 100 TABLET ORAL at 09:25

## 2025-01-21 RX ADMIN — MIDODRINE HYDROCHLORIDE 5 MG: 5 TABLET ORAL at 09:24

## 2025-01-21 RX ADMIN — MIDODRINE HYDROCHLORIDE 5 MG: 5 TABLET ORAL at 13:10

## 2025-01-21 RX ADMIN — GABAPENTIN 200 MG: 100 CAPSULE ORAL at 13:10

## 2025-01-21 RX ADMIN — GABAPENTIN 200 MG: 100 CAPSULE ORAL at 21:27

## 2025-01-21 RX ADMIN — GABAPENTIN 200 MG: 100 CAPSULE ORAL at 09:25

## 2025-01-21 RX ADMIN — ONDANSETRON 4 MG: 4 TABLET, ORALLY DISINTEGRATING ORAL at 13:10

## 2025-01-21 RX ADMIN — FAMOTIDINE 10 MG: 10 TABLET ORAL at 09:26

## 2025-01-21 RX ADMIN — ESCITALOPRAM OXALATE 5 MG: 5 TABLET, FILM COATED ORAL at 21:31

## 2025-01-21 RX ADMIN — OXYCODONE 5 MG: 5 TABLET ORAL at 01:04

## 2025-01-21 RX ADMIN — PANTOPRAZOLE SODIUM 40 MG: 40 TABLET, DELAYED RELEASE ORAL at 21:27

## 2025-01-21 RX ADMIN — ONDANSETRON 4 MG: 4 TABLET, ORALLY DISINTEGRATING ORAL at 21:27

## 2025-01-21 RX ADMIN — WARFARIN SODIUM 5 MG: 5 TABLET ORAL at 13:10

## 2025-01-21 RX ADMIN — CLOPIDOGREL BISULFATE 75 MG: 75 TABLET ORAL at 09:24

## 2025-01-21 RX ADMIN — ONDANSETRON 4 MG: 4 TABLET, ORALLY DISINTEGRATING ORAL at 09:24

## 2025-01-21 RX ADMIN — FAMOTIDINE 10 MG: 10 TABLET ORAL at 21:31

## 2025-01-21 RX ADMIN — SENNOSIDES 8.6 MG: 8.6 TABLET, FILM COATED ORAL at 21:27

## 2025-01-21 RX ADMIN — MIDODRINE HYDROCHLORIDE 5 MG: 5 TABLET ORAL at 21:29

## 2025-01-21 RX ADMIN — ATORVASTATIN CALCIUM 80 MG: 40 TABLET, FILM COATED ORAL at 21:27

## 2025-01-21 ASSESSMENT — ACTIVITIES OF DAILY LIVING (ADL)
ADLS_ACUITY_SCORE: 50
ADLS_ACUITY_SCORE: 43
ADLS_ACUITY_SCORE: 46
ADLS_ACUITY_SCORE: 50
ADLS_ACUITY_SCORE: 46
ADLS_ACUITY_SCORE: 43
ADLS_ACUITY_SCORE: 50
ADLS_ACUITY_SCORE: 43
ADLS_ACUITY_SCORE: 50
ADLS_ACUITY_SCORE: 50
ADLS_ACUITY_SCORE: 42
ADLS_ACUITY_SCORE: 43
ADLS_ACUITY_SCORE: 46
ADLS_ACUITY_SCORE: 50

## 2025-01-21 NOTE — PLAN OF CARE
Continues on O2 at night, 2L. This is baseline per pt.  PRN oxycodone given for right leg pain with effectiveness  Up with x1 person assist with gaitbelt and walker  Denied nausea overnight. Continues on dialysis M-W-F. B/p better overnight after receiving midodrine.   Plan to discharge to home with  once medically ready.      HLM Admission: 7- Walk 25 feet or more  HLM Daily7-Walk 25 feet or more          Problem: Pain Acute  Goal: Optimal Pain Control and Function  Outcome: Progressing     Problem: Hemodialysis  Goal: Safe, Effective Therapy Delivery  1/21/2025 0237 by Gia Nayak, RN  Outcome: Progressing  1/21/2025 0236 by Gia Nayak, RN  Reactivated     Problem: Palliative Care  Goal: Enhanced Quality of Life  Intervention: Maximize Comfort  Recent Flowsheet Documentation  Taken 1/21/2025 0104 by Gia Nayak, RN  Pain Management Interventions: medication (see MAR)   Goal Outcome Evaluation:

## 2025-01-21 NOTE — PROGRESS NOTES
CLINICAL NUTRITION SERVICES - REASSESSMENT NOTE     RECOMMENDATIONS FOR MDs/PROVIDERS TO ORDER:  none    Malnutrition Status:    Severe malnutrition previously noted     Registered Dietitian Interventions:  Add Gel + to HT  Continue Nepro daily     Future/Additional Recommendations:  Will monitor progress towards goals     SUBJECTIVE INFORMATION  Assessed patient in room.    CURRENT NUTRITION ORDERS  Diet: Low Saturated Fat/2400 mg Sodium  Nutrition Support: Pt ordered Nepro daily, Gel + also to be ordered but not in HT    CURRENT INTAKE/TOLERANCE  Pt ordered 1237kcal and 41g protein on 1/20 with variable intake from %. Pt taking in Nepro.      NEW FINDINGS  Weight:   01/20/25 0645 64.3 kg (141 lb 12.1 oz) Bed scale   01/19/25 0420 65.9 kg (145 lb 4.5 oz) Bed scale   01/18/25 0631 67.3 kg (148 lb 5.9 oz) --   01/14/25 0501 65.2 kg (143 lb 11.8 oz) Bed scale   01/13/25 1558 64.6 kg (142 lb 6.7 oz) Bed scale   01/13/25 0940 64.9 kg (143 lb) --   Edema: +1 BLE   Skin/wounds: buttocks contusion  GI symptoms: WDL, last BM on 1/21  Nutrition-relevant labs:  Cr-2.21, PO4-2.1  Nutrition-relevant medications:  Pepcid, Protonix, Miralax, Senokot, coumadin    MALNUTRITION  Severe malnutrition previously noted     EVALUATION OF THE PROGRESS TOWARD GOALS   Previous Goals  Patient to consume % of nutritionally adequate meal trays TID, or the equivalent with supplements/snacks. - progressing  Maintain wt near 65kg - stable  Electrolytes WNL - PO4 at 2.1       Previous Nutrition Diagnosis  Malnutrition (undernutrition) related to ongoing poor po as evidenced by wt loss, reduced po, fat and muscle loss      Evaluation: No change    NUTRITION DIAGNOSIS  Malnutrition (undernutrition) related to ongoing poor po as evidenced by wt loss, reduced po, fat and muscle loss        INTERVENTIONS  Add Gel + to HT as ordered     Goals  Patient to consume % of nutritionally adequate meal trays TID, or the equivalent with  supplements/snacks. -   Maintain wt near 65kg -   Electrolytes WNL -         Monitoring/Evaluation      Progress toward goals will be monitored and evaluated per policy.

## 2025-01-21 NOTE — PROGRESS NOTES
Brief note  - paged about US findings at 2:03pm  - called and discussed  - updated bedside team, request to discuss w/ IR (review case vs formal consult)  - pause on potential discharge that was planned  - awaiting callback, appreciate radiology and forthecoming discussion w/ IR    Heri Roberts MD on 1/21/2025 at 2:24 PM       Addendum- returned to room and discussed w/ pt and  about findings including about the occlusive clots and they would feel more comfortable with formal IR consult and vascular surgery; these are now placed  Heri Roberts MD on 1/21/2025 at 3:21 PM

## 2025-01-21 NOTE — PLAN OF CARE
Problem: Adult Inpatient Plan of Care  Goal: Optimal Comfort and Wellbeing  Intervention: Monitor Pain and Promote Comfort    Problem: Pain Acute  Goal: Optimal Pain Control and Function  Outcome: Progressing  Intervention: Prevent or Manage Pain     Goal Outcome Evaluation:  Pt VSS, calls appropriately, report minimal pain to RLE, declined interventions, pain tolerable per pt, activity encourages, discharge pending.

## 2025-01-21 NOTE — PROGRESS NOTES
Care Management Follow Up    Length of Stay (days): 8    Expected Discharge Date: 01/21/2025     Concerns to be Addressed: discharge planning     Patient plan of care discussed at interdisciplinary rounds: Yes    Anticipated Discharge Disposition: Home, Home Care              Anticipated Discharge Services:    Anticipated Discharge DME:      Patient/family educated on Medicare website which has current facility and service quality ratings:    Education Provided on the Discharge Plan: Yes  Patient/Family in Agreement with the Plan: yes    Referrals Placed by CM/SW: Homecare  Private pay costs discussed: Not applicable    Discussed  Partnership in Safe Discharge Planning  document with patient/family: No     Handoff Completed: No, handoff not indicated or clinically appropriate  Care Management Discharge Note    Discharge Date: 01/21/2025       Discharge Disposition: Home, Home Care    Discharge Services: Home Care    Discharge DME: Oxygen    Discharge Transportation: family or friend will provide    Private pay costs discussed: Not applicable    Does the patient's insurance plan have a 3 day qualifying hospital stay waiver?  No    PAS Confirmation Code: N/A  Patient/family educated on Medicare website which has current facility and service quality ratings: yes    Education Provided on the Discharge Plan: Yes  Persons Notified of Discharge Plans: Pt and HC   Patient/Family in Agreement with the Plan: yes    Handoff Referral Completed: No, handoff not indicated or clinically appropriate      Additional Information:  SWCM called [facility] [phone] and are not open today.  Open M-W F to confirm dialysis.     11:33 AM  SWCM met with Pt and is ready for discharge today.  Neph PA was in seeing Pt and dialysis is all arranged and Pt will be seen on Friday.  Pt's  will transport to dialysis and to home.  Pt has portable 02.  SWCM notified [facility] for resumption of services.     3:18 PM  Pt is  not ready for discharge today. [facility] will notify [facility]     Next Steps: discharge when medically ready     KHADIJAH Omalley

## 2025-01-21 NOTE — PROGRESS NOTES
[facility]    Medicine Progress Note - Hospitalist Service    Date of Admission:  1/13/2025    Assessment & Plan   Felicitas Elliott is a 84 year old female admitted with community acquired pneumonia. Presentation was complicated by pulmonary embolism.  Have been bridging heparin bridge to warfarin. Renal replacement therapy has been resumed at direction of nephrology.     Extended discussion last week related to goals of her medical care.  She was clear that she accepts she is dying and communicates initial preferences to avoid future hospitalization and possibly discontinue dialysis.  Primary barriers to this decision are her concerns that family, particularly her , is having difficulty accepting these decisions.  Palliative care was consulted to facilitate meeting and support patient communicating these preferences with her family.    Currently INR bridge to goal 2-3. Now at goal at 2.4. stop heparin, discussed with pharmacy and eventually will plan for discharge on 4 mg warfarin and anticoagulation clinic referral on discharge-earliest would be lab draw on Tuesday.  However, she is currently not at her baseline from a breathing standpoint-she is currently 2 L at rest.    Plan was for discharge after dialysis on 1/20. Torsemide resumed as per nephrology. Weaned to 1L -> room air -> 1L. After dialysis pt was lightheaded and dizzy and does not feel safe to discharge, even with home cares being arranged. As such, monitored overnight.    On 1/21/2025, despite initial hopeful discharge, the patient developed acute on seemingly chronic right leg pain; she has had bilateral leg pain the past couple days that did respond to gabapentin but now right side is much worse than left.  Obtained lower extremity ultrasound which does demonstrate bilateral DVTs- and noted she is on therapeutic warfarin; however, both of these are noted to be occlusive in the popliteal region and the right side is  with a large clot burden from the right popliteal to right femoral.  Discussed with the patient and  again in the afternoon and they would feel most reassured with a formal consult from interventional radiology and vascular surgery.  These have been placed.     ----  #lightheaded and dizzy  A/p- during dialysis needed midodrine, now post run is very dizzy and lightheaded. Will monitor as pt is unsafe to discharge    #bilateral occlusive DVT- large clot burden on right  # Acute pulmonary embolism, provoked  - heparin gtt bridge to warfarin (plan for 3-6 months treatment)   - as above, discussed w/ pharmacy, appreciate.   - see above last paragraph in updated summary    # Community acquired pneumonia  - completed x5 days of therapy (zosyn, ceftriaxone then cefdinir, w/ doxy)    # CKD 5  - per nephrology, appreciate  - M-F dialysis    # Constipation  - bowel regimen    # Prediabetes  Noted, outpt follow up     # History of endocarditis complicated by surgical MVR/AVR (8/2024)   # Severe CAD s/p CABG x 2 (8/2024)  # Chronic heart failure with preserved EF  # Chronic hypotension  A- she is currently not at her baseline from a breathing standpoint-she is currently 2 L at rest. If able to wean, possible versus further monitoring.  Torsemide has been on hold, consider resuming tomorrow if not able to wean to room air. She uses up to 2L with activity and night.  - midodrine  - clopidogrel  - atorvastatin  - torsemide on hold; nephrology following    - see above, consider resuming 1/20    # Hx afib  # History of sick sinus syndrome s/p pacemaker placement (9/2024)  - metoprolol previously discontinued by cardiac surgery    # Buttock wound  - WOC consult    # neuropathy  - after pros/cons discussion, will trial gabapentin low dose 100mg tid on 1/18/2025     #anemia  A/p- recheck hgb as downtrending, but no signs/sx of bleed             Diet: Combination Diet Low Saturated Fat Na <2400mg Diet  Snacks/Supplements Adult:  Gelatein Plus; With Meals  Snacks/Supplements Adult: Nepro Oral Supplement; With Meals  Diet      Le Catheter: Not present  Lines: PRESENT      CVC Double Lumen Right Subclavian Tunneled-Site Assessment: WDL      Cardiac Monitoring: None  Code Status: No CPR- Do NOT Intubate      Clinically Significant Risk Factors            # Hypercalcemia: corrected calcium is >10.1, will monitor as appropriate    # Hypoalbuminemia: Lowest albumin = 2.5 g/dL at 1/20/2025  7:44 AM, will monitor as appropriate                 # Severe Malnutrition: based on nutrition assessment    # Financial/Environmental Concerns: none   # Pacemaker present  # History of CABG: noted on surgical history       Social GO-SIM of Health    Tobacco Use: Medium Risk (1/7/2025)    Received from [facility]    Patient History     Smoking Tobacco Use: Former     Smokeless Tobacco Use: Never          Disposition Plan     Medically Ready for Discharge: Anticipated Tomorrow         Heri Roberts MD  Hospitalist Service  [facility]  Securely message with [employer] (more info)  Text page via [employer] Paging/Directory   ______________________________________________________________________    Interval History   Naeo  No recurrent lightheadedness  However, her right leg pain is markedly worsened and now acute  Over the past couple days the gabapentin was reported to be helpful for her leg pain bilaterally  There is no new swelling but there is acute pain in the right side that is intractable  We discussed ultrasound- notable that she is on warfarin but would see what findings show and patient and  would like to proceed  Discussed with bedside team  Discussed with social work  In the afternoon, received message from radiology about the acute findings and discussed with their team  Subsequently paged interventional radiology to review the case  Paged again and specialist is currently not  available  Returned to room discuss with and updated the patient and  about the findings and they would like to stay for a formal consult with interventional radiology and vascular surgery  Discussed with bedside team and updated nurse and charge nurse    Physical Exam   Vital Signs: Temp: 98.4  F (36.9  C) Temp src: Oral BP: 119/68 Pulse: 69   Resp: 17 SpO2: 93 % O2 Device: Nasal cannula Oxygen Delivery: 1.5 LPM  Weight: 141 lbs 12.09 oz    Gen:  resting in bed, a bit anxious appearing  Neuro:  alert, nonfocal  CV:  nl rate, regular rhythm  Pulm: no acute resp distress, ctab anteriorly; on 1L  GI:  abdomen soft, not tender  Extremities-right lower limb is painful along the lower leg and entire foot, they do not appear asymmetrical but the right side is markedly more painful with any palpation     Medical Decision Making             Data   1/19/2025 - na 137, k 3.4, cr 2.33 from 1.73 yt  Hgb 9.4    1/20/2025 - na 136, k 3.5, cr 2.83- having dialysis -- hgb 9.5, wbc 7, plts 162    1/21/2025 -136, potassium 4.3, creatinine 2.21    TT: 52 min for 1/21/2025

## 2025-01-21 NOTE — PLAN OF CARE
Patient has been A&Ox3 with disorientation to time. She has had pain especially in the RLE and had new pain of RUQ/rib pain that radiated toward the back. The pt refused any opoid interventions and wanted to continue current course of scheduled medications at this time. She ambulates with assistance x1, gaitbelt, and walker and has been OOB to the chair today. +1 pulses BLE. BP's have been more stable with midodrine and have remained >100 systolic throughout the day.     HLM Admission: 7- Walk 25 feet or more  HLM Daily7-Walk 25 feet or more                  Goal Outcome Evaluation:      Plan of Care Reviewed With: patient    Overall Patient Progress: improvingOverall Patient Progress: improving       Problem: Adult Inpatient Plan of Care  Goal: Plan of Care Review  Description: The Plan of Care Review/Shift note should be completed every shift.  The Outcome Evaluation is a brief statement about your assessment that the patient is improving, declining, or no change.  This information will be displayed automatically on your shift  note.  Recent Flowsheet Documentation  Taken 1/21/2025 1725 by Niyah Coleman RN  Plan of Care Reviewed With: patient  Overall Patient Progress: improving  Goal: Absence of Hospital-Acquired Illness or Injury  Intervention: Identify and Manage Fall Risk  Recent Flowsheet Documentation  Taken 1/21/2025 1708 by Niyah Coleman RN  Safety Promotion/Fall Prevention: safety round/check completed  Taken 1/21/2025 0924 by Niyah Coleman RN  Safety Promotion/Fall Prevention: safety round/check completed  Intervention: Prevent Infection  Recent Flowsheet Documentation  Taken 1/21/2025 1708 by Niyah Coleman RN  Infection Prevention:   hand hygiene promoted   rest/sleep promoted  Taken 1/21/2025 0924 by Niyah Coleman RN  Infection Prevention:   hand hygiene promoted   rest/sleep promoted  Goal: Optimal Comfort and Wellbeing  Intervention: Monitor Pain and Promote Comfort  Recent Flowsheet  Documentation  Taken 1/21/2025 0913 by Niyah Coleman, RN  Pain Management Interventions: medication (see MAR)

## 2025-01-21 NOTE — CONSULTS
Vascular Surgery Note    Please note, patient not seen on this day, chart reviewed.     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 .     Venous duplexes completed today demonstrating bilateral DVT, Occlusion on RLE from popliteal vein to femoral vein, LLE with nearly occlusive thrombus of L popliteal vein     Per discussion with bedside nurse patient has palpable pulse in her lower extremities and minimal edema. She is motor and sensory in tact.      Recommendations:     - Stat IR consult for consideration of thrombectomy given extent of DVTs   - Management and timing per IR team   - Will obtain arterial duplex to ensure adequate arterial perfusion to the lower extremities as well given pain.   - Would transition back to heparin drip in case of intervention from IR perspective  - Possible patient's DVTs were present on admission and source of PE in  and thus do not represent a heparin failure  - Will follow up results arterial duplex.     Arnold Gilliam MD   Vascular Surgery PGY4  To reach [place]'s vascular surgery team on call, please go to [facility], and then find   the below in the drop down menu. Please page fellow first.

## 2025-01-21 NOTE — PROGRESS NOTES
Documentation of Face to Face and Certification for Home Health Services    I certify that patient: Felicitas Elliott is under my care and that I, or a nurse practitioner or physician's assistant working with me, had a face-to-face encounter that meets the physician face-to-face encounter requirements with this patient on: 1/21/2025 .    This encounter with the patient was in whole, or in part, for the following medical condition, which is the primary reason for home health care:   Patient Active Problem List   Diagnosis    Bacteremia    Endocarditis    Sepsis (H)    CKD stage 3b, GFR 30-44 ml/min (H)    Mixed hyperlipidemia    Spinal stenosis of cervicothoracic region    Nonrheumatic aortic valve stenosis    Mitral valve disorder    Coronary artery disease involving native coronary artery of native heart, unspecified whether angina present    S/P CABG (coronary artery bypass graft)    Acute heart failure with preserved ejection fraction (HFpEF) (H)    Physical deconditioning    Elevated d-dimer    Hematemesis with nausea    Acute renal failure, unspecified acute renal failure type    Community acquired pneumonia of right lower lobe of lung    Nausea and vomiting, unspecified vomiting type    PE (pulmonary thromboembolism) (H)    DVT (deep venous thrombosis) (H)    .    I certify that, based on my findings, the following services are medically necessary home health services: Nursing, Occupational Therapy, Physical Therapy, and Social Work.    My clinical findings support the need for the above services because: Nurse is needed: For complex aftercare of surgical procedures because the patient needs instruction and cannot perform care on their own due to: INR.., Occupational Therapy Services are needed to assess and treat cognitive ability and address ADL safety due to impairment in weakness., and Physical Therapy Services are needed to assess and treat the following functional impairments: weakness.    Further, I  certify that my clinical findings support that this patient is homebound (i.e. absences from home require considerable and taxing effort and are for medical reasons or [employer] services or infrequently or of short duration when for other reasons) because: Leaving home is medically contraindicated for the following reason(s): Dyspnea on exertion that makes it so they cannot leave their home for needed services without clinical deterioration. and Infection risk / immunocompromised state where it is safer for them to receive services in the home...    Based on the above findings. I certify that this patient is confined to the home and needs intermittent skilled nursing care, physical therapy and/or speech therapy.  The patient is under my care, and I have initiated the establishment of the plan of care.  This patient will be followed by a physician who will periodically review the plan of care.  Physician/Provider to provide follow up care: Clinic, [facility]    [facility] physician (the Medicare certified [facility] provider): Heri Roberts MD  Physician Signature: See electronic signature associated with these discharge orders.  Date: 1/21/2025

## 2025-01-21 NOTE — PROGRESS NOTES
SPIRITUAL HEALTH SERVICES Progress Note          Referral Source/Reason for Visit: follow up visit               Summary and Recommendations -   Pt was more alert and appeared to have more energy then yesterday, writer supported through reflective conversation and validation of feelings      PLAN: [facility] will cont to follow through duration of stay    Fernando Amezquita M.Div., [facility]  Staff    [phone]   Spiritual Health Services is available 24/7 for emergent requests and consults, either by paging the on-call  or by entering an ASAP/STAT consult in [employer] which will also page the on-call .

## 2025-01-21 NOTE — PLAN OF CARE
Occupational Therapy Discharge Summary    Reason for therapy discharge:    Discharged to home with home therapy.    Progress towards therapy goal(s). See goals on Care Plan in [facility] electronic health record for goal details.  Goals partially met.  Barriers to achieving goals:   discharge from facility.    Therapy recommendation(s):    Continued therapy is recommended.  Rationale/Recommendations:  maximize ADL IND.

## 2025-01-21 NOTE — PROGRESS NOTES
RENAL PROGRESS NOTE    ASSESSMENT & PLAN:   ESRD: Failed trial off dialysis, now resumed due to uremic symptoms.  Dialyzing on Mondays, and Fridays.  Accepted back at [facility] in [facility].  They will expect her to start back with them on 1/24/2025.  Chair time below.  Delon [place]  Hemodialysis on Mondays and Fridays  Arrive at 6:20 AM 1/24/25  Phone: [phone] [address] [facility], [facility] [address], [place], [address]    Volume: Euvolemic.  Requiring no UF with HD.  Torsemide 50 mg daily.  Ordered no UF with HD for now.    Pulmonary embolism: Anticoagulation per primary team.    Nausea with vomiting: Thought related to uremia.  On PPI and H2 blocker for presumed GERD.  As needed antiemetics.  Follow with adequate clearance on HD.    Chronic hypotension: On midodrine.     Respiratory failure, pneumonia: Supportive cares per primary team.    CAD: S/p CABG    HLD: Statin    History of endocarditis complicated by MVR/AVR    SSS: S/p PPM    Prediabetes    SUBJECTIVE:    Feels less weak today  Appetite improving, had breakfast  Denies nausea or vomiting  Denies shortness of breath  She feels she is ready to discharge home  Her  will bring her to and from dialysis    OBJECTIVE:  Physical Exam   Temp: 98.4  F (36.9  C) Temp src: Oral BP: 101/56 Pulse: 80   Resp: 17 SpO2: 93 % O2 Device: Nasal cannula Oxygen Delivery: 1.5 LPM  Vitals:    01/18/25 0631 01/19/25 0420 01/20/25 0645   Weight: 67.3 kg (148 lb 5.9 oz) 65.9 kg (145 lb 4.5 oz) 64.3 kg (141 lb 12.1 oz)     Vital Signs with Ranges  Temp:  [97.8  F (36.6  C)-100.1  F (37.8  C)] 98.4  F (36.9  C)  Pulse:  [69-90] 80  Resp:  [15-18] 17  BP: ()/(50-71) 101/56  SpO2:  [84 %-99 %] 93 %  No intake/output data recorded.    Patient Vitals for the past 72 hrs:   Weight   01/20/25 0645 64.3 kg (141 lb 12.1 oz)   01/19/25 0420 65.9 kg (145 lb 4.5 oz)     Intake/Output Summary (Last 24 hours) at 1/20/2025 1216  Last data filed at  1/19/2025 2000  Gross per 24 hour   Intake 847 ml   Output --   Net 847 ml       PHYSICAL EXAM:  General: Alert, NAD  HENT: Supple, Non-tender, no obvious JVD  Eyes: No scleral icterus  Cardiovascular: Rate controlled  Respiratory: CTAB, non-labored  Gastrointestinal: Soft, non-tender, non-distended  Musculoskeletal: Grossly normal   Integumentary: Warm, dry, no rash  Neurologic: Non focal   Psychiatric: Cooperative  Access: Tunneled dialysis catheter right chest.  Clear dressing covering.  No signs of infection.    LABORATORY STUDIES:     Recent Labs   Lab 01/20/25  0744 01/19/25  1439 01/19/25  0617 01/19/25  0146 01/17/25  0847 01/14/25  1530   WBC 7.0 7.1  --   --  6.4 8.5   RBC 3.37* 3.48*  --   --  3.85 3.70*   HGB 9.5* 9.8* 9.4*  --  10.6* 10.0*   HCT 30.7* 32.0*  --   --  35.2 33.1*    154  --  150 145* 108*       Basic Metabolic Panel:  Recent Labs   Lab 01/21/25  0729 01/20/25  0744 01/19/25  0617 01/18/25  0751 01/17/25  0847 01/16/25  0836    136 137 137 137 139   POTASSIUM 4.3 3.5 3.4 3.6 3.1* 3.7   CHLORIDE 103 101 103 102 100 102   CO2 26 24 26 25 24 25   BUN 11.5 16.3 10.8 7.2* 21.8 24.2*   CR 2.21* 2.83* 2.33* 1.73* 2.65* 2.70*   * 80 89 103* 98 89   KAELYN 9.5 8.8 9.3 9.1 9.8 9.6       INR  Recent Labs   Lab 01/21/25  0729 01/20/25  0744 01/19/25  0617 01/18/25  0751   INR 2.18* 2.38* 2.48* 1.87*        Recent Labs   Lab Test 01/21/25  0729 01/20/25  0744 01/19/25  1439   INR 2.18* 2.38*  --    WBC  --  7.0 7.1   HGB  --  9.5* 9.8*   PLT  --  162 154       Personally reviewed current labs    This note was dictated using voice recognition     Mihcele Chilel PA-C  [facility]  [phone]

## 2025-01-22 LAB
ALBUMIN SERPL BCG-MCNC: 2.6 G/DL (ref 3.5–5.2)
ANION GAP SERPL CALCULATED.3IONS-SCNC: 11 MMOL/L (ref 7–15)
BUN SERPL-MCNC: 21.1 MG/DL (ref 8–23)
CALCIUM SERPL-MCNC: 9.6 MG/DL (ref 8.8–10.4)
CHLORIDE SERPL-SCNC: 98 MMOL/L (ref 98–107)
CREAT SERPL-MCNC: 2.79 MG/DL (ref 0.51–0.95)
EGFRCR SERPLBLD CKD-EPI 2021: 16 ML/MIN/1.73M2
GLUCOSE SERPL-MCNC: 92 MG/DL (ref 70–99)
HCO3 SERPL-SCNC: 27 MMOL/L (ref 22–29)
INR PPP: 2.39 (ref 0.85–1.15)
PHOSPHATE SERPL-MCNC: 2.2 MG/DL (ref 2.5–4.5)
PLATELET # BLD AUTO: 187 10E3/UL (ref 150–450)
POTASSIUM SERPL-SCNC: 3.6 MMOL/L (ref 3.4–5.3)
SODIUM SERPL-SCNC: 136 MMOL/L (ref 135–145)

## 2025-01-22 PROCEDURE — 99232 SBSQ HOSP IP/OBS MODERATE 35: CPT | Performed by: PHYSICIAN ASSISTANT

## 2025-01-22 PROCEDURE — 250N000013 HC RX MED GY IP 250 OP 250 PS 637: Performed by: PHYSICIAN ASSISTANT

## 2025-01-22 PROCEDURE — 99222 1ST HOSP IP/OBS MODERATE 55: CPT | Performed by: PAIN MEDICINE

## 2025-01-22 PROCEDURE — 85610 PROTHROMBIN TIME: CPT | Performed by: HOSPITALIST

## 2025-01-22 PROCEDURE — 250N000013 HC RX MED GY IP 250 OP 250 PS 637

## 2025-01-22 PROCEDURE — 250N000013 HC RX MED GY IP 250 OP 250 PS 637: Performed by: PAIN MEDICINE

## 2025-01-22 PROCEDURE — 120N000001 HC R&B MED SURG/OB

## 2025-01-22 PROCEDURE — 85049 AUTOMATED PLATELET COUNT: CPT | Performed by: STUDENT IN AN ORGANIZED HEALTH CARE EDUCATION/TRAINING PROGRAM

## 2025-01-22 PROCEDURE — 36415 COLL VENOUS BLD VENIPUNCTURE: CPT | Performed by: STUDENT IN AN ORGANIZED HEALTH CARE EDUCATION/TRAINING PROGRAM

## 2025-01-22 PROCEDURE — 250N000011 HC RX IP 250 OP 636: Performed by: HOSPITALIST

## 2025-01-22 PROCEDURE — 99232 SBSQ HOSP IP/OBS MODERATE 35: CPT | Performed by: STUDENT IN AN ORGANIZED HEALTH CARE EDUCATION/TRAINING PROGRAM

## 2025-01-22 PROCEDURE — 250N000013 HC RX MED GY IP 250 OP 250 PS 637: Performed by: HOSPITALIST

## 2025-01-22 PROCEDURE — 250N000009 HC RX 250: Performed by: PAIN MEDICINE

## 2025-01-22 PROCEDURE — 250N000013 HC RX MED GY IP 250 OP 250 PS 637: Performed by: STUDENT IN AN ORGANIZED HEALTH CARE EDUCATION/TRAINING PROGRAM

## 2025-01-22 PROCEDURE — 82040 ASSAY OF SERUM ALBUMIN: CPT | Performed by: HOSPITALIST

## 2025-01-22 RX ORDER — LIDOCAINE 50 MG/G
OINTMENT TOPICAL 3 TIMES DAILY
Status: DISCONTINUED | OUTPATIENT
Start: 2025-01-22 | End: 2025-01-23 | Stop reason: HOSPADM

## 2025-01-22 RX ORDER — HYDROMORPHONE HYDROCHLORIDE 2 MG/1
2 TABLET ORAL EVERY 4 HOURS PRN
Status: DISCONTINUED | OUTPATIENT
Start: 2025-01-22 | End: 2025-01-22

## 2025-01-22 RX ORDER — METHOCARBAMOL 500 MG/1
250 TABLET, FILM COATED ORAL 2 TIMES DAILY PRN
Qty: 8 TABLET | Refills: 0 | Status: SHIPPED | OUTPATIENT
Start: 2025-01-22

## 2025-01-22 RX ORDER — LIDOCAINE 50 MG/G
OINTMENT TOPICAL 3 TIMES DAILY
Qty: 240 G | Refills: 1 | Status: SHIPPED | OUTPATIENT
Start: 2025-01-22

## 2025-01-22 RX ORDER — ACETAMINOPHEN 325 MG/1
975 TABLET ORAL 3 TIMES DAILY
Status: DISCONTINUED | OUTPATIENT
Start: 2025-01-22 | End: 2025-01-23 | Stop reason: HOSPADM

## 2025-01-22 RX ORDER — WARFARIN SODIUM 5 MG/1
5 TABLET ORAL
Status: COMPLETED | OUTPATIENT
Start: 2025-01-22 | End: 2025-01-22

## 2025-01-22 RX ORDER — METHOCARBAMOL 500 MG/1
250 TABLET ORAL 2 TIMES DAILY PRN
Status: DISCONTINUED | OUTPATIENT
Start: 2025-01-22 | End: 2025-01-23 | Stop reason: HOSPADM

## 2025-01-22 RX ADMIN — TORSEMIDE 50 MG: 100 TABLET ORAL at 08:19

## 2025-01-22 RX ADMIN — LIDOCAINE: 50 OINTMENT TOPICAL at 20:44

## 2025-01-22 RX ADMIN — ONDANSETRON 4 MG: 4 TABLET, ORALLY DISINTEGRATING ORAL at 08:19

## 2025-01-22 RX ADMIN — LIDOCAINE: 50 OINTMENT TOPICAL at 14:09

## 2025-01-22 RX ADMIN — FAMOTIDINE 10 MG: 10 TABLET ORAL at 08:20

## 2025-01-22 RX ADMIN — CLOPIDOGREL BISULFATE 75 MG: 75 TABLET ORAL at 08:19

## 2025-01-22 RX ADMIN — WARFARIN SODIUM 5 MG: 5 TABLET ORAL at 18:25

## 2025-01-22 RX ADMIN — ONDANSETRON 4 MG: 4 TABLET, ORALLY DISINTEGRATING ORAL at 14:08

## 2025-01-22 RX ADMIN — GABAPENTIN 200 MG: 100 CAPSULE ORAL at 08:19

## 2025-01-22 RX ADMIN — FAMOTIDINE 10 MG: 10 TABLET ORAL at 20:41

## 2025-01-22 RX ADMIN — PANTOPRAZOLE SODIUM 40 MG: 40 TABLET, DELAYED RELEASE ORAL at 20:39

## 2025-01-22 RX ADMIN — ESCITALOPRAM OXALATE 5 MG: 5 TABLET, FILM COATED ORAL at 20:39

## 2025-01-22 RX ADMIN — MIDODRINE HYDROCHLORIDE 10 MG: 5 TABLET ORAL at 14:08

## 2025-01-22 RX ADMIN — ONDANSETRON 4 MG: 4 TABLET, ORALLY DISINTEGRATING ORAL at 20:38

## 2025-01-22 RX ADMIN — ACETAMINOPHEN 975 MG: 325 TABLET ORAL at 14:08

## 2025-01-22 RX ADMIN — HYDROMORPHONE HYDROCHLORIDE 2 MG: 2 TABLET ORAL at 08:28

## 2025-01-22 RX ADMIN — MIDODRINE HYDROCHLORIDE 5 MG: 5 TABLET ORAL at 20:40

## 2025-01-22 RX ADMIN — ATORVASTATIN CALCIUM 80 MG: 40 TABLET, FILM COATED ORAL at 20:39

## 2025-01-22 RX ADMIN — ACETAMINOPHEN 975 MG: 325 TABLET ORAL at 20:40

## 2025-01-22 RX ADMIN — MIDODRINE HYDROCHLORIDE 5 MG: 5 TABLET ORAL at 08:20

## 2025-01-22 RX ADMIN — SENNOSIDES 8.6 MG: 8.6 TABLET, FILM COATED ORAL at 20:39

## 2025-01-22 RX ADMIN — SENNOSIDES 8.6 MG: 8.6 TABLET, FILM COATED ORAL at 08:22

## 2025-01-22 ASSESSMENT — ACTIVITIES OF DAILY LIVING (ADL)
ADLS_ACUITY_SCORE: 57
ADLS_ACUITY_SCORE: 50
ADLS_ACUITY_SCORE: 60
ADLS_ACUITY_SCORE: 60
ADLS_ACUITY_SCORE: 50
ADLS_ACUITY_SCORE: 57
ADLS_ACUITY_SCORE: 50
ADLS_ACUITY_SCORE: 50
ADLS_ACUITY_SCORE: 57
ADLS_ACUITY_SCORE: 50
ADLS_ACUITY_SCORE: 50
ADLS_ACUITY_SCORE: 57
ADLS_ACUITY_SCORE: 50
ADLS_ACUITY_SCORE: 57
ADLS_ACUITY_SCORE: 50
ADLS_ACUITY_SCORE: 57
ADLS_ACUITY_SCORE: 50
ADLS_ACUITY_SCORE: 57

## 2025-01-22 NOTE — PROGRESS NOTES
[facility]    Medicine Progress Note - Hospitalist Service    Date of Admission:  1/13/2025    Assessment & Plan   Felicitas Elliott is a 84 year old female admitted with community acquired pneumonia. Presentation was complicated by pulmonary embolism.  Have been bridging heparin bridge to warfarin. Renal replacement therapy has been resumed at direction of nephrology.     Extended discussion last week related to goals of her medical care.  She was clear that she accepts she is dying and communicates initial preferences to avoid future hospitalization and possibly discontinue dialysis.  Primary barriers to this decision are her concerns that family, particularly her , is having difficulty accepting these decisions.  Palliative care was consulted to facilitate meeting and support patient communicating these preferences with her family.    Currently INR bridge to goal 2-3. Now at goal at 2.4. stop heparin, discussed with pharmacy and eventually will plan for discharge on 4 mg warfarin and anticoagulation clinic referral on discharge-earliest would be lab draw on Tuesday.  However, she is currently not at her baseline from a breathing standpoint-she is currently 2 L at rest.    Plan was for discharge after dialysis on 1/20. Torsemide resumed as per nephrology. Weaned to 1L -> room air -> 1L. After dialysis pt was lightheaded and dizzy and does not feel safe to discharge, even with home cares being arranged. As such, monitored overnight.    On 1/21/2025, despite initial hopeful discharge, the patient developed acute on seemingly chronic right leg pain; she has had bilateral leg pain the past couple days that did respond to gabapentin but now right side is much worse than left.  Obtained lower extremity ultrasound which does demonstrate bilateral DVTs- and noted she is on therapeutic warfarin; however, both of these are noted to be occlusive in the popliteal region and the right side is  with a large clot burden from the right popliteal to right femoral.  Discussed with the patient and  again in the afternoon and they would feel most reassured with a formal consult from interventional radiology and vascular surgery.  These were placed.     -PM update: greatly appreciate interventional radiology and also vascular surgery.  We discussed in the evening: greatly appreciate bedside team as well discussing throughout the late afternoon and evening as well.  Fortunately the arterial duplex are reassuring, and also discussed directly with the interventional radiologist and the patient has a clot burden that does not extend to the external iliac vein-thrombectomy could be considered if the burden was more proximal to the iliac vessel but it does not extend there, and goes from popliteal on the right side to the femoral.  And additionally there is a consideration for poor surgical candidacy as additionally to the comorbidities there is typically with projected 10+ years after thrombectomy and preventing post thrombotic syndrome, as such recommendation was for conservative therapy with ongoing warfarin and pain control.  IR mentioned could consider follow up US in the future. Updated bedside team last night.    Today: hopeful possible discharge but Patient was reporting having severe pain 7-10 out of 10, and also she reports having tremulousness and reports a bad reactions to yesterday's oxycodone (shaking arms started after the oxy).  We discussed a trial of Dilaudid.  This only minimally helped with the pain by midday.  As such, we will consult pain team and appreciate forthcoming input.  Hopefully the updated regimen will help control pain throughout the day and can discharge tomorrow morning.  Discussed with the patient's  as well. Also discussed with today's interventional radiology team in agreement with yesterday's proposed plan, and also there will not need to be a follow-up ultrasound in  the future though.    ----  #bilateral occlusive DVT- large clot burden on right (popliteal to femoral, does not extend to iliac)  #intractable pain in right leg and now reporting arms with tremulous from oxycodone  # Acute pulmonary embolism, provoked  - heparin gtt bridge to warfarin (plan for 3-6 months treatment)   - as above, discussed w/ pharmacy, appreciate.   - see above in updated summary  -Appreciate vascular surgery and interventional radiology consults, and follow-up conversation with interventional radiology today as well    # Community acquired pneumonia  - completed x5 days of therapy (zosyn, ceftriaxone then cefdinir, w/ doxy)    # CKD 5  - per nephrology, appreciate  - M-F dialysis    # Constipation  - bowel regimen    # Prediabetes  Noted, outpt follow up     # History of endocarditis complicated by surgical MVR/AVR (8/2024)   # Severe CAD s/p CABG x 2 (8/2024)  # Chronic heart failure with preserved EF  # Chronic hypotension  A- she is currently not at her baseline from a breathing standpoint-she is currently 2 L at rest. If able to wean, possible versus further monitoring.  Torsemide has been on hold, consider resuming tomorrow if not able to wean to room air. She uses up to 2L with activity and night.  - midodrine  - clopidogrel  - atorvastatin  - torsemide on hold; nephrology following    - see above, consider resuming 1/20    # Hx afib  # History of sick sinus syndrome s/p pacemaker placement (9/2024)  - metoprolol previously discontinued by cardiac surgery    # Buttock wound  - WOC consult    # neuropathy  - after pros/cons discussion, will trial gabapentin low dose 100mg tid on 1/18/2025     #anemia  A/p- recheck hgb as downtrending, but no signs/sx of bleed             Diet: Combination Diet Low Saturated Fat Na <2400mg Diet  Snacks/Supplements Adult: Gelatein Plus; With Meals  Snacks/Supplements Adult: Nepro Oral Supplement; With Meals  Diet      Le Catheter: Not present  Lines: PRESENT       CVC Double Lumen Right Subclavian Tunneled-Site Assessment: WDL      Cardiac Monitoring: None  Code Status: No CPR- Do NOT Intubate      Clinically Significant Risk Factors            # Hypercalcemia: corrected calcium is >10.1, will monitor as appropriate    # Hypoalbuminemia: Lowest albumin = 2.5 g/dL at 1/20/2025  7:44 AM, will monitor as appropriate                 # Severe Malnutrition: based on nutrition assessment    # Financial/Environmental Concerns: none   # Pacemaker present  # History of CABG: noted on surgical history       Social Drivers of Health    Tobacco Use: Medium Risk (1/7/2025)    Received from [facility]    Patient History     Smoking Tobacco Use: Former     Smokeless Tobacco Use: Never          Disposition Plan     Medically Ready for Discharge: Anticipated Tomorrow         Heri Roberts MD  Hospitalist Service  [facility]  Securely message with [employer] (more info)  Text page via [employer] Paging/Directory   ______________________________________________________________________    Interval History   See updated summary above, includes evening events and this morning's events  Discussed w/ pt, her , rn, charge, sw, and pain team and interventional radiology both yesterday and today  Pt reports oxy caused her arms to shake, wants to try something else; discussed dilaudid trial  No other sx  Reviewed the care plans and both IR and Vasc Surg consults with patient, twice, when seen initially, again with the  when he arrived; then discussed pain with  outside of room who was worried about her    Physical Exam   Vital Signs: Temp: 98.2  F (36.8  C) Temp src: Oral BP: 108/59 Pulse: 69   Resp: 18 SpO2: 92 % O2 Device: None (Room air) Oxygen Delivery: 1/2 LPM  Weight: 141 lbs 14.4 oz    Gen:  resting in bed, anxious appearing  Neuro:  alert, nonfocal, upper limbs are tremulous  CV:  nl rate, regular rhythm  Pulm: no acute  resp distress, ctab anteriorly; on room air or 1/2 L  GI:  abdomen soft, not tender  Msk- stable, right leg painful     Medical Decision Making             Data   1/19/2025 - na 137, k 3.4, cr 2.33 from 1.73 yt  Hgb 9.4    1/20/2025 - na 136, k 3.5, cr 2.83- having dialysis -- hgb 9.5, wbc 7, plts 162    1/21/2025 -136, potassium 4.3, creatinine 2.21    1/22/2025 - na 136, k 3.6, cr 2.79, inr 2.39     TT: 65 min for 1/22/2025

## 2025-01-22 NOTE — PROGRESS NOTES
Brief update note- greatly appreciate interventional radiology and also vascular surgery.  We discussed in the evening: greatly appreciate bedside team as well discussing throughout the late afternoon and evening as well.  Fortunately the arterial duplex are reassuring, and also discussed directly with the interventional radiologist and the patient has a clot burden that does not extend to the external iliac vein-thrombectomy could be considered if the burden was more proximal to the iliac vessel but it does not extend there, and goes from popliteal on the right side to the femoral.  And additionally there is a consideration for poor surgical candidacy as additionally to the comorbidities there is typically with projected 10+ years after thrombectomy and preventing post thrombotic syndrome, as such recommendation was for conservative therapy with ongoing warfarin and pain control.  Updated bedside team  Heri Roberts MD

## 2025-01-22 NOTE — PLAN OF CARE
Problem: Adult Inpatient Plan of Care  Goal: Absence of Hospital-Acquired Illness or Injury  Intervention: Prevent Skin Injury  Recent Flowsheet Documentation  Taken 1/22/2025 0300 by Queenie Youssef RN  Body Position:   turned   right   weight shifting   heels elevated  Taken 1/21/2025 2359 by Queenie Youssef RN  Body Position:   turned   left   upper extremity elevated   heels elevated  Taken 1/21/2025 2343 by Queenie Youssef RN  Body Position:   refuses positioning   position maintained  Taken 1/21/2025 2127 by Queenie Youssef RN  Body Position:   supine, head elevated   weight shifting     Problem: Adult Inpatient Plan of Care  Goal: Absence of Hospital-Acquired Illness or Injury  Intervention: Identify and Manage Fall Risk  Recent Flowsheet Documentation  Taken 1/21/2025 2127 by Queenie Youssef RN  Safety Promotion/Fall Prevention: safety round/check completed     Problem: Adult Inpatient Plan of Care  Goal: Optimal Comfort and Wellbeing  Intervention: Monitor Pain and Promote Comfort  Recent Flowsheet Documentation  Taken 1/21/2025 2127 by Queenie Youssef RN  Pain Management Interventions:   distraction   emotional support   Goal Outcome Evaluation:       Pt is alert and oriented x4 but can be forgetful of time. Has intermittent pain to right side just below the ribs. Pt reported pain when taking a deep breath. Slept well overnight. Encouraged weight shifting for pressure relief. Applied barrier cream to buttocks. Mepilex remains intact. Educated risk of skin injury. Pt reluctant to interventions at first but later agreed but not as often as every 2 hours. Can be incontinent of urine. Titrated O2 down to 1/2 lpm, sating at 93% via nasal cannula. VSS.

## 2025-01-22 NOTE — CONSULTS
"  Interventional Radiology  1/22/2025    IR consulted for \"complex pmhx admitted w/ likely provoked PE, new acute pain now w/ bilateral Occlusive DVTs on right lower limb from femoral to popliteal, appreciate further diagnostics or options/interventions. thank you\".    In brief, patient admitted with CAP c/b PE - started on warfarin, most recent INR 2.39 today. Developed RLE pain 01/21 - US demonstrated BLE DVT with \"Clot burden is much more extensive on the right as noted above with occlusive or nearly occlusive thrombus extending from the right common femoral vein into the right popliteal vein\".    Case and imaging reviewed with Dr. Echavarria. No IR intervention recommended at this time. Recommend continuing anticoagulation. D/w Dr. Roberts with Medicine team, who is in agreement with plan.    Consult completed. Please re-consult IR should the need arise.    Total time: 20 minutes    CRUZITO RASHID CNP  Interventional Radiology  " ASP

## 2025-01-22 NOTE — CONSULTS
"[facility] ACUTE INPATIENT PAIN SERVICE    [facility], [facility], [facility], [facility], [place]   PAIN Consult      Assessment/Plan:  Felicitas Elliott is a 84 year old female who was admitted on 1/13/2025.  I was asked by Dr. Roberts to see the patient for right leg pain. Admitted for CAP and PE. History of chronic leg pain as well, I can see via the  that she has tried gaabpentin/ketamine PLO cream for her leg pain.    shows ketamine. Describes pain as 4/10 and asking to go home.      PLAN:  Acute pain secondary to bilateral occlusive DVT- large clot burden on right.  Notable opioid intolerances and had some \"shaking with oxycodone\". Appears to be myoclonic jerking not \"shaking\".  Likely has developed myoclonic jerking from the low dose opioids. Given ESRD (crtCl 15mL/min) gabapentin dosing would ideally be once daily (or only on dialysis days)- pt and her  elected to stop gabapentin instead.   Multimodal Medication Therapy:   Adjuvants:  add tylenol and lidocaine, gabapentin was started on 1/19 (renal dosing - would suggest BID - although pt asked to stop gabapentin)   Opioids:  intolerant to opioids   Non-medication interventions- Ice   Constipation Prophylaxis- senna   Follow up /Discharge Recommendations - We recommend prescribing the following at the time of discharge: Robaxin and lidocaine           Subjective:    Met with patient and her .     The patient presents with complaints of pain. They describe the pain as Cramping and rates it at 4/10. The pain is located RLE / calf and under the right breast. It began 10 days ago and has been 1 to 2 weeks    The patient notes that the  RLE pain is better when she crosses her legs.    Right sided breast pain (PE) is worse with inspiration.     The pain believes the pain is related to her clots. But she has had chronic pain in the hands and feet.     The patient has experienced similar episodes for many years.     We discussed gabapentin - and " "she states she feels very confused on it. Asked to stop it. Asking to go home. .       as follows:      Filled  Drug  QTY    10/01/2024 Ketamine Hcl Powder 1.50   09/23/2024 Ketamine Hcl Powder 1.50       History   Drug Use Not on file         Tobacco Use      Smoking status: Former        Packs/day: 8.00        Years: 8.0 packs/day for 1.1 years (8.5 ttl pk-yrs)        Types: Cigarettes        Start date: 2024      Smokeless tobacco: Former        Objective:  Vital signs in last 24 hours:  B/P: 108/59, T: 98.2, P: 69, R: 18   Blood pressure 108/59, pulse 69, temperature 98.2  F (36.8  C), temperature source Oral, resp. rate 18, height 1.676 m (5' 6\"), weight 64.4 kg (141 lb 14.4 oz), SpO2 92%.        Review of Systems:   As per subjective, all others negative.    Physical Exam    General: in no apparent distress and non-toxic    HEENT: Head normocephalic atraumatic, oral mucosa moist. Sclerae anicteric  CV: Regular rhythm, normal rate, no murmurs  Resp: No wheezes, no rales or rhonchi, no focal consolidations  GI:  active   Skin: No rashes or lesions, thick toe nails              Imaging:  Personally Reviewed.    Results for orders placed or performed during the hospital encounter of 01/13/25   Abd/pelvis CT no contrast - Stone Protocol    Impression    IMPRESSION:   1.  No significant abnormality in the abdomen or pelvis.    2.  Some mild infiltrates right lower lobe may represent a mild pneumonia with associated mild atelectasis both lung bases.     CT Chest w/o Contrast    Impression    IMPRESSION:   No evidence of fluid at the sternotomy site      CT Chest Pulmonary Embolism w Contrast   Result Value Ref Range    Radiologist flags New diagnosis of pulmonary embolism     Radiologist flags New diagnosis of pulmonary embolism (AA)     Impression    IMPRESSION:  1.  Bilateral pulmonary artery emboli as described above. Overall moderate clot burden. Associated mild right heart strain.  2.  Trace bilateral pleural " effusions with adjacent consolidation likely reflecting atelectasis.  3.  Borderline dilated ascending thoracic aorta measuring 4.0 cm in diameter.      [Critical Result: New diagnosis of pulmonary embolism]    Finding was identified on 1/14/2025 2:59 PM CST.     Dr. Velez was contacted by me on 1/14/2025 3:30 PM CST and verbalized understanding of the critical result. Results: The now the critical result communication   US Lower Extremity Venous Duplex Bilateral   Result Value Ref Range    Radiologist flags (Urgent)      See above report regarding residual, acute deep venous thrombus in this patient with known pulmonary emboli.    Impression    IMPRESSION:  1.  There is acute deep venous thrombus in both legs.  2.  Clot burden is much more extensive on the right as noted above with occlusive or nearly occlusive thrombus extending from the right common femoral vein into the right popliteal vein.  3.  No extension superiorly into the right external iliac vein.  4.  There is nearly occlusive thrombus in the left popliteal vein as well.      [Access Center: See above report regarding residual, acute deep venous thrombus in this patient with known pulmonary emboli.     This report will be copied to the [facility] to ensure a provider acknowledges the finding. Access Center is available Monday through Friday 8am-3:30 pm.      US Lower Extremity Arterial Duplex Bilateral    Impression    IMPRESSION:  1.  RIGHT LOWER EXTREMITY: Patent right lower extremity arteries without significant velocity elevation or gradient. Multiphasic flow throughout the right lower extremity.  2.  LEFT LOWER EXTREMITY: Patent left lower extremity arteries without significant velocity elevation or gradient. Multiphasic flow throughout the left lower extremity.        Lab Results:  Personally Reviewed.   Last Comprehensive Metabolic Panel:  Sodium   Date Value Ref Range Status   01/22/2025 136 135 - 145 mmol/L Final     Potassium  "  Date Value Ref Range Status   01/22/2025 3.6 3.4 - 5.3 mmol/L Final     Potassium POCT   Date Value Ref Range Status   09/16/2024 3.2 (L) 3.4 - 5.3 mmol/L Final     Chloride   Date Value Ref Range Status   01/22/2025 98 98 - 107 mmol/L Final     Carbon Dioxide (CO2)   Date Value Ref Range Status   01/22/2025 27 22 - 29 mmol/L Final     Anion Gap   Date Value Ref Range Status   01/22/2025 11 7 - 15 mmol/L Final     Glucose   Date Value Ref Range Status   01/22/2025 92 70 - 99 mg/dL Final     GLUCOSE BY METER POCT   Date Value Ref Range Status   09/16/2024 102 (H) 70 - 99 mg/dL Final     Glucose Whole Blood POCT   Date Value Ref Range Status   09/16/2024 143 (H) 70 - 99 mg/dL Final     Urea Nitrogen   Date Value Ref Range Status   01/22/2025 21.1 8.0 - 23.0 mg/dL Final     Creatinine   Date Value Ref Range Status   01/22/2025 2.79 (H) 0.51 - 0.95 mg/dL Final     GFR Estimate   Date Value Ref Range Status   01/22/2025 16 (L) >60 mL/min/1.73m2 Final     Comment:     eGFR calculated using 2021 CKD-EPI equation.     Calcium   Date Value Ref Range Status   01/22/2025 9.6 8.8 - 10.4 mg/dL Final     Comment:     Reference intervals for this test were updated on 7/16/2024 to reflect our healthy population more accurately. There may be differences in the flagging of prior results with similar values performed with this method. Those prior results can be interpreted in the context of the updated reference intervals.        UA: No results found for: \"UAMP\", \"UBARB\", \"BENZODIAZEUR\", \"UCANN\", \"UCOC\", \"OPIT\", \"UPCP\"           Please see A&P for additional details of medical decision making.  MANAGEMENT DISCUSSED with the following over the past 24 hours: patient and    NOTE(S)/MEDICAL RECORDS REVIEWED over the past 24 hours: medicine   Tests personally interpreted in the past 24 hours:  - ultrasound showing nearly occlusive clot  Tests ORDERED & REVIEWED in the past 24 hours:  - UDS  SUPPLEMENTAL HISTORY, in addition to " the patient's history, over the past 24 hours obtained from:   -  and MD  Medical complexity over the past 24 hours:  -------------------------- MODERATE RISK FOR MORBIDITY --------------------------------------------------  - Prescription DRUG MANAGEMENT performed           Denise EAST, CNS, CNP  Acute Care Pain Management  Team  Hours of pain coverage Mon-Fri 8-1600  After hours contact the primary team  [facility] (ROE, MARIBELs, [place], RH)   Page via [employer] web console -Click for [email or web address]

## 2025-01-22 NOTE — PROGRESS NOTES
Vascular Surgery Note    Arterial duplex reviewed, patient with patent bilateral vasculature with triphasic flow throughout. No concern for acute limb ischemia at this time.   Low suspicion for phlegmasia based on timeline and patient's clinical picture.     No indication for vascular surgical intervention, do still recommend discussion with IR re: management of bilateral DVTs.     Vascular surgery will sign off at this time, please do not hesitate to reach out with questions or concerns.       Arnold Gilliam MD   Vascular Surgery PGY4  To reach [place]'s vascular surgery team on call, please go to [facility], and then find   the below in the drop down menu. Please page fellow first.

## 2025-01-22 NOTE — PLAN OF CARE
A/O x 3, VSS on 1L via NC. Pt complains of bilateral leg pain, R sided pain w/ deep inspiration, and hip pain; prn Dilaudid administered - no change in pain when re-assessing pain. L and R PIV SL; flushed. INR of 2.39 today (goal 2-3). Tolerating diet. A x 1 w/ walker and gait belt.     Call light within reach, Bed alarm on for safety.  at bedside currently.     Discharge pending medical clearance and pain control.     Problem: Pain Acute  Goal: Optimal Pain Control and Function  Outcome: Progressing     Problem: Gas Exchange Impaired  Goal: Optimal Gas Exchange  Outcome: Progressing  Intervention: Optimize Oxygenation and Ventilation  Recent Flowsheet Documentation  Taken 1/22/2025 0801 by Silva Delcid, RN  Head of Bed (HOB) Positioning: HOB at 30-45 degrees

## 2025-01-22 NOTE — PROGRESS NOTES
RENAL PROGRESS NOTE    ASSESSMENT & PLAN:   ESRD: Failed trial off dialysis, now resumed due to uremic symptoms.  Dialyzing on Mondays, and Fridays.  Accepted back at [facility] in [facility].  They will expect her to start back with them on 1/24/2025.  Chair time below.  Delon Cardale  Hemodialysis on Mondays and Fridays  Arrive at 6:20 AM 1/24/25  Phone: [phone] [address], [facility] [address], [place], [place], [address]    Volume: Euvolemic.  Requiring no UF with HD.  Torsemide 50 mg daily.  Ordered no UF with HD for now.    Pulmonary embolism: Anticoagulation per primary team.    Bilateral occlusive DVT: Popliteal to femoral, does not extend to iliac.  Vascular surgery and IR consulted.  No plan for surgery or thrombectomy with IR.  Anticoagulated.  Management per other team members.    Nausea with vomiting: Thought related to uremia.  On PPI and H2 blocker for presumed GERD.  As needed antiemetics.  Follow with adequate clearance on HD.  Denies any nausea or vomiting today, her  reports improvement in appetite.    Chronic hypotension: On midodrine.     Respiratory failure, pneumonia: Supportive cares per primary team.    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  Orders called into dialysis unit.      OBJECTIVE:  Physical Exam   Temp: 98.2  F (36.8  C) Temp src: Oral BP: 108/59 Pulse: 69   Resp: 18 SpO2: 92 % O2 Device: None (Room air) Oxygen Delivery: 1/2 LPM  Vitals:    01/19/25 0420 01/20/25 0645 01/22/25 0530   Weight: 65.9 kg (145 lb 4.5 oz) 64.3 kg (141 lb 12.1 oz) 64.4 kg (141 lb 14.4 oz)     Vital Signs with Ranges  Temp:  [98.1  F (36.7  C)-98.3  F  (36.8  C)] 98.2  F (36.8  C)  Pulse:  [69-70] 69  Resp:  [16-18] 18  BP: (107-122)/(55-68) 108/59  SpO2:  [92 %-99 %] 92 %  I/O last 3 completed shifts:  In: 237 [P.O.:237]  Out: -     Patient Vitals for the past 72 hrs:   Weight   01/22/25 0530 64.4 kg (141 lb 14.4 oz)   01/20/25 0645 64.3 kg (141 lb 12.1 oz)     Intake/Output Summary (Last 24 hours) at 1/20/2025 1216  Last data filed at 1/19/2025 2000  Gross per 24 hour   Intake 847 ml   Output --   Net 847 ml       PHYSICAL EXAM:  General: Alert, NAD  HENT: Supple, Non-tender, no obvious JVD  Eyes: No scleral icterus  Cardiovascular: Rate controlled  Respiratory: CTAB, non-labored  Gastrointestinal: Soft, non-tender, non-distended  Musculoskeletal: Grossly normal   Integumentary: Warm, dry, no rash  Neurologic: Non focal   Psychiatric: Cooperative  Access: Tunneled dialysis catheter right chest.  Clear dressing covering.  No signs of infection.    LABORATORY STUDIES:     Recent Labs   Lab 01/22/25  0628 01/20/25  0744 01/19/25  1439 01/19/25  0617 01/19/25  0146 01/17/25  0847   WBC  --  7.0 7.1  --   --  6.4   RBC  --  3.37* 3.48*  --   --  3.85   HGB  --  9.5* 9.8* 9.4*  --  10.6*   HCT  --  30.7* 32.0*  --   --  35.2    162 154  --  150 145*       Basic Metabolic Panel:  Recent Labs   Lab 01/22/25  0629 01/21/25  0729 01/20/25  0744 01/19/25  0617 01/18/25  0751 01/17/25  0847    136 136 137 137 137   POTASSIUM 3.6 4.3 3.5 3.4 3.6 3.1*   CHLORIDE 98 103 101 103 102 100   CO2 27 26 24 26 25 24   BUN 21.1 11.5 16.3 10.8 7.2* 21.8   CR 2.79* 2.21* 2.83* 2.33* 1.73* 2.65*   GLC 92 108* 80 89 103* 98   KAELYN 9.6 9.5 8.8 9.3 9.1 9.8       INR  Recent Labs   Lab 01/22/25  0629 01/21/25  0729 01/20/25  0744 01/19/25  0617   INR 2.39* 2.18* 2.38* 2.48*        Recent Labs   Lab Test 01/22/25  0629 01/22/25  0628 01/21/25  0729 01/20/25  0744 01/19/25  1439   INR 2.39*  --  2.18* 2.38*  --    WBC  --   --   --  7.0 7.1   HGB  --   --   --  9.5* 9.8*   PLT  --   187  --  162 154       Personally reviewed current labs    This note was dictated using voice recognition     Michele Chilel PA-C  [facility]  [phone]

## 2025-01-23 VITALS
TEMPERATURE: 98 F | WEIGHT: 139.99 LBS | BODY MASS INDEX: 22.5 KG/M2 | OXYGEN SATURATION: 95 % | SYSTOLIC BLOOD PRESSURE: 128 MMHG | HEIGHT: 66 IN | HEART RATE: 69 BPM | RESPIRATION RATE: 18 BRPM | DIASTOLIC BLOOD PRESSURE: 75 MMHG

## 2025-01-23 LAB
ALBUMIN SERPL BCG-MCNC: 2.7 G/DL (ref 3.5–5.2)
ANION GAP SERPL CALCULATED.3IONS-SCNC: 10 MMOL/L (ref 7–15)
BUN SERPL-MCNC: 31.8 MG/DL (ref 8–23)
CALCIUM SERPL-MCNC: 9.2 MG/DL (ref 8.8–10.4)
CHLORIDE SERPL-SCNC: 98 MMOL/L (ref 98–107)
CREAT SERPL-MCNC: 3.35 MG/DL (ref 0.51–0.95)
EGFRCR SERPLBLD CKD-EPI 2021: 13 ML/MIN/1.73M2
GLUCOSE SERPL-MCNC: 78 MG/DL (ref 70–99)
HCO3 SERPL-SCNC: 29 MMOL/L (ref 22–29)
HOLD SPECIMEN: NORMAL
INR PPP: 2.65 (ref 0.85–1.15)
PHOSPHATE SERPL-MCNC: 2.6 MG/DL (ref 2.5–4.5)
POTASSIUM SERPL-SCNC: 3.4 MMOL/L (ref 3.4–5.3)
SODIUM SERPL-SCNC: 137 MMOL/L (ref 135–145)

## 2025-01-23 PROCEDURE — 250N000011 HC RX IP 250 OP 636: Performed by: HOSPITALIST

## 2025-01-23 PROCEDURE — 250N000013 HC RX MED GY IP 250 OP 250 PS 637: Performed by: PHYSICIAN ASSISTANT

## 2025-01-23 PROCEDURE — 99239 HOSP IP/OBS DSCHRG MGMT >30: CPT | Performed by: STUDENT IN AN ORGANIZED HEALTH CARE EDUCATION/TRAINING PROGRAM

## 2025-01-23 PROCEDURE — 36415 COLL VENOUS BLD VENIPUNCTURE: CPT | Performed by: HOSPITALIST

## 2025-01-23 PROCEDURE — 99232 SBSQ HOSP IP/OBS MODERATE 35: CPT | Performed by: PAIN MEDICINE

## 2025-01-23 PROCEDURE — G0463 HOSPITAL OUTPT CLINIC VISIT: HCPCS

## 2025-01-23 PROCEDURE — 250N000013 HC RX MED GY IP 250 OP 250 PS 637

## 2025-01-23 PROCEDURE — 99232 SBSQ HOSP IP/OBS MODERATE 35: CPT | Performed by: PHYSICIAN ASSISTANT

## 2025-01-23 PROCEDURE — 250N000013 HC RX MED GY IP 250 OP 250 PS 637: Performed by: PAIN MEDICINE

## 2025-01-23 PROCEDURE — 85610 PROTHROMBIN TIME: CPT | Performed by: HOSPITALIST

## 2025-01-23 PROCEDURE — 80069 RENAL FUNCTION PANEL: CPT | Performed by: HOSPITALIST

## 2025-01-23 RX ORDER — ESCITALOPRAM OXALATE 10 MG/1
10 TABLET ORAL AT BEDTIME
Status: DISCONTINUED | OUTPATIENT
Start: 2025-01-23 | End: 2025-01-23 | Stop reason: HOSPADM

## 2025-01-23 RX ORDER — METHOCARBAMOL 500 MG/1
500 TABLET, FILM COATED ORAL 2 TIMES DAILY PRN
Qty: 12 TABLET | Refills: 0 | Status: SHIPPED | OUTPATIENT
Start: 2025-01-23

## 2025-01-23 RX ORDER — WARFARIN SODIUM 5 MG/1
5 TABLET ORAL
Status: DISCONTINUED | OUTPATIENT
Start: 2025-01-23 | End: 2025-01-23 | Stop reason: HOSPADM

## 2025-01-23 RX ADMIN — ACETAMINOPHEN 975 MG: 325 TABLET ORAL at 08:51

## 2025-01-23 RX ADMIN — TORSEMIDE 50 MG: 100 TABLET ORAL at 08:51

## 2025-01-23 RX ADMIN — CLOPIDOGREL BISULFATE 75 MG: 75 TABLET ORAL at 08:51

## 2025-01-23 RX ADMIN — FAMOTIDINE 10 MG: 10 TABLET ORAL at 08:51

## 2025-01-23 RX ADMIN — MIDODRINE HYDROCHLORIDE 5 MG: 5 TABLET ORAL at 08:53

## 2025-01-23 RX ADMIN — ONDANSETRON 4 MG: 4 TABLET, ORALLY DISINTEGRATING ORAL at 08:51

## 2025-01-23 ASSESSMENT — ACTIVITIES OF DAILY LIVING (ADL)
ADLS_ACUITY_SCORE: 56
ADLS_ACUITY_SCORE: 60
ADLS_ACUITY_SCORE: 56
ADLS_ACUITY_SCORE: 60
ADLS_ACUITY_SCORE: 56
ADLS_ACUITY_SCORE: 60
ADLS_ACUITY_SCORE: 60
ADLS_ACUITY_SCORE: 56

## 2025-01-23 NOTE — PROGRESS NOTES
RENAL PROGRESS NOTE    ASSESSMENT & PLAN:   ESRD: Failed trial off dialysis, now resumed due to uremic symptoms.  Dialyzing on Mondays, and Fridays.  Accepted back at [facility] in [facility].  They will expect her to start back with them on 1/24/2025.  Chair time below.  Delon Houston  Hemodialysis on Mondays and Fridays  Arrive at 6:20 AM 1/24/25  Phone: [phone] [address] [facility], [facility] [address], [place], [place], [address]    Volume: Euvolemic.  Requiring no UF with HD.  Torsemide 50 mg daily.  Ordered no UF with HD for now.    Pulmonary embolism: Anticoagulation per primary team.    Bilateral occlusive DVT: Popliteal to femoral, does not extend to iliac.  Vascular surgery and IR consulted.  No plan for surgery or thrombectomy with IR.  Anticoagulated.  Management per other team members.    Nausea with vomiting: Thought related to uremia.  On PPI and H2 blocker for presumed GERD.  As needed antiemetics.  Follow with adequate clearance on HD.  Denies any nausea or vomiting today, her  reports improvement in appetite.    Chronic hypotension: On midodrine.     Respiratory failure, pneumonia: Supportive cares per primary team.    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  Titrating dose today.    SUBJECTIVE:    Patient was seen bedside, her  is also present during encounter.  She is concerned that she is dying, tells me that she thinks she has 6 weeks left to live.  She does not feel that she has improved much since she was admitted, we did discuss some of the improvements that she has made including resolving nausea and vomiting, improving appetite, improving strength.  She feels a bit down and depressed, she is on Lexapro, we discussed increasing her dose of Lexapro to see if that helps with her mood.  She is all set for outpatient dialysis tomorrow at [facility]  [place], reviewed chair time, and outpatient dialysis schedule.  All questions were answered  Fax most updated nephrology rounding note to [facility] today.        OBJECTIVE:  Physical Exam   Temp: 98  F (36.7  C) Temp src: Oral BP: 117/68 Pulse: 69   Resp: 18 SpO2: 95 % O2 Device: Nasal cannula Oxygen Delivery: 1 LPM  Vitals:    01/20/25 0645 01/22/25 0530 01/23/25 0439   Weight: 64.3 kg (141 lb 12.1 oz) 64.4 kg (141 lb 14.4 oz) 63.5 kg (139 lb 15.9 oz)     Vital Signs with Ranges  Temp:  [97.2  F (36.2  C)-98  F (36.7  C)] 98  F (36.7  C)  Pulse:  [69-75] 69  Resp:  [16-18] 18  BP: ()/(53-70) 117/68  SpO2:  [94 %-96 %] 95 %  I/O last 3 completed shifts:  In: 677 [P.O.:677]  Out: -     Patient Vitals for the past 72 hrs:   Weight   01/23/25 0439 63.5 kg (139 lb 15.9 oz)   01/22/25 0530 64.4 kg (141 lb 14.4 oz)     Intake/Output Summary (Last 24 hours) at 1/20/2025 1216  Last data filed at 1/19/2025 2000  Gross per 24 hour   Intake 847 ml   Output --   Net 847 ml       PHYSICAL EXAM:  General: Alert, NAD  HENT: Supple, Non-tender, no obvious JVD  Eyes: No scleral icterus  Cardiovascular: Rate controlled  Respiratory: CTAB, non-labored  Gastrointestinal: Soft, non-tender, non-distended  Musculoskeletal: Grossly normal   Integumentary: Warm, dry, no rash  Neurologic: Non focal   Psychiatric: Cooperative  Access: Tunneled dialysis catheter right chest.  Clear dressing covering.  No signs of infection.    LABORATORY STUDIES:     Recent Labs   Lab 01/22/25  0628 01/20/25  0744 01/19/25  1439 01/19/25  0617 01/19/25  0146 01/17/25  0847   WBC  --  7.0 7.1  --   --  6.4   RBC  --  3.37* 3.48*  --   --  3.85   HGB  --  9.5* 9.8* 9.4*  --  10.6*   HCT  --  30.7* 32.0*  --   --  35.2    162 154  --  150 145*       Basic Metabolic Panel:  Recent Labs   Lab 01/23/25  0805 01/22/25  0629 01/21/25  0729 01/20/25  0744 01/19/25  0617 01/18/25  0751    136 136 136 137 137   POTASSIUM 3.4 3.6 4.3 3.5 3.4 3.6    CHLORIDE 98 98 103 101 103 102   CO2 29 27 26 24 26 25   BUN 31.8* 21.1 11.5 16.3 10.8 7.2*   CR 3.35* 2.79* 2.21* 2.83* 2.33* 1.73*   GLC 78 92 108* 80 89 103*   KAELYN 9.2 9.6 9.5 8.8 9.3 9.1       INR  Recent Labs   Lab 01/23/25  0805 01/22/25  0629 01/21/25  0729 01/20/25  0744   INR 2.65* 2.39* 2.18* 2.38*        Recent Labs   Lab Test 01/23/25  0805 01/22/25  0629 01/22/25  0628 01/21/25  0729 01/20/25  0744 01/19/25  1439   INR 2.65* 2.39*  --    < > 2.38*  --    WBC  --   --   --   --  7.0 7.1   HGB  --   --   --   --  9.5* 9.8*   PLT  --   --  187  --  162 154    < > = values in this interval not displayed.       Personally reviewed current labs    This note was dictated using voice recognition     Michele Chilel PA-C  [facility]  [phone]

## 2025-01-23 NOTE — PLAN OF CARE
Physical Therapy Discharge Summary    Reason for therapy discharge:    Discharged to home with home therapy.    Progress towards therapy goal(s). See goals on Care Plan in Psychiatric electronic health record for goal details.  Goals not met.  Barriers to achieving goals:   discharge from facility.    Therapy recommendation(s):    Continued therapy is recommended.  Rationale/Recommendations:  continued PT with home PT to improve functional mobility.  Continue home exercise program.

## 2025-01-23 NOTE — DISCHARGE SUMMARY
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  The following labs/tests are recommended: bmp.  - ensure she is going for INR checks; she is discharging as per pharmacy recs- with 4mg daily of warfarin until INR lab check on Monday  - ensure Follow up with Kerry in [place]. Friday at 6:20 AM 1/24/25 Phone: [phone].   -med changes: warfarin and pepcid (for reflux) and gabapentin (for neuropathic pain)- she was given a week for the last one   -Home cares with nurse, social work and therapies are ordered.  -Pain team saw and added prn robaxin and scheduled lidocaine cream  -regarding clots in legs- follow clinically and not a surgical candidate and thrombectomy not indicated as the clot burden in right leg from popliteal goes to femoral (would consider thrombectomy if higher up in iliac vessels)                 Unresulted Labs Ordered in the Past 30 Days  of this Admission       No orders found from 12/14/2024 to 1/14/2025.        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     She completed a course of antibiotics for the pneumonia but she was then found with PE and required anticoagulation with bridging heparin to warfarin.  Subsequently, she was found with acute right leg pain and ultrasound lower limbs showed bilateral popliteal clots, read as near occlusive, as well as the clot burden on the right side extending to the femoral; vascular surgery consulted and arterial duplex was reassuring without concern for critical ischemia.  Interventional radiology assessed and recommended conservative management with warfarin and pain control.  Palliative care also followed during hospital stay for [facility].    Specifically, discussed directly with the interventional radiologist and the patient has a clot burden that does not extend to the external iliac vein- thrombectomy could be considered if the burden was more proximal to the iliac vessel but it does not extend there. And additionally there is a consideration for poor surgical candidacy as additionally to her multiple comorbidities there is typically a projected 10+ years after thrombectomy for preventing post thrombotic syndrome, as such recommendation was for conservative therapy with ongoing warfarin and pain control.  "    On 1/22 the patient reported intractable pain and Pain Team was consulted, and the patient has been clear about declining strong pain medication such as oxycodone or Dilaudid, and hence lidocaine cream and Robaxin helped tide her over until 1/23 when she is stable and remains on room air with tolerable pain.  We did discuss potential further workup for her labile pain and they opt to return home with close follow-up.  As such, she will be discharged with recommendations for close follow-up with primary care provider; consider outpatient palliative care follow up.  She will have dialysis next on tomorrow on Friday 1/24 and instructions and contact information were provided to the patient.  She is also instructed to follow-up on Monday for an INR check and she is continued on 4mg daily of the warfarin until then.  Remains in the therapeutic range.  Home cares with home nursing home physical therapy and home occupational therapy and home  arranged.     For reference, her Admission ED Course: Mildly tachycardic on arrival with , BP soft with systolic 100s, afebrile. O2 sats dropped to 88% on RA, improved to mid 90s on 2L/NC oxygen supplement. Lab workup is remarkable for elevated creatinine 4.03, eGFR 10, anion gap mildly elevated at 16.  LFT with mildly low albumin 3.3, remainder grossly unremarkable.  CBC with mild leukocytosis 14.5, mildly decreased platelets 122.  D-dimer elevated at 14.84.  Lipase normal at 15.  proBNP elevated at 8775.  Initial troponin T elevated to 77, delta troponin 73.  COVID-19, influenza, RSV PCR panel negative.  CT A/P without contrast negative for significant abnormality in the abdomen, though does note \"Some mild infiltrates right lower lobe may represent a mild pneumonia with associated mild atelectasis both lung bases.\"  ECG wide QRS rhythm, RBBB, left axis deviation, prolonged Qtc 531, no obvious acute ischemic changes per my read. Given Zofran, 40 mg IV " Protonix, and started on Zosyn.       Consultations This Hospital Stay   PHYSICAL THERAPY ADULT IP CONSULT  OCCUPATIONAL THERAPY ADULT IP CONSULT  CARE MANAGEMENT / SOCIAL WORK IP CONSULT  NUTRITION SERVICES ADULT IP CONSULT  NEPHROLOGY IP CONSULT  CARE MANAGEMENT / SOCIAL WORK IP CONSULT  PHARMACY IP CONSULT  PHARMACY TO DOSE WARFARIN  PHARMACY LIAISON FOR MEDICATION COVERAGE CONSULT  PALLIATIVE CARE ADULT IP CONSULT  WOUND OSTOMY CONTINENCE NURSE  IP CONSULT  SPIRITUAL HEALTH SERVICES IP CONSULT  INTERVENTIONAL RADIOLOGY ADULT/PEDS IP CONSULT  VASCULAR SURGERY IP CONSULT  PAIN MANAGEMENT ADULT IP CONSULT    Code Status   No CPR- Do NOT Intubate    Time Spent on this Encounter   I, Heri Roberts MD, personally saw the patient today and spent greater than 30 minutes discharging this patient.       Heri Roberts MD  [address] [address]  Phone: [phone]  Fax: [phone]  ______________________________________________________________________    Physical Exam   Vital Signs: Temp: 97.8  F (36.6  C) Temp src: Oral BP: 115/55 Pulse: 75   Resp: 18 SpO2: 95 % O2 Device: Nasal cannula on room air or 1L  Weight: 139 lbs 15.87 oz     Gen:  resting in bed, anxious appearing  Neuro:  alert, nonfocal, upper limbs are tremulous  CV:  nl rate, regular rhythm  Pulm: no acute resp distress, ctab anteriorly; on room air (or 1/2L or 1L for comfort)  GI:  abdomen soft, not tender  Msk- stable, right leg painful - stable with prior days            Primary Care Physician   [facility]    Discharge Orders      Anticoagulation Clinic Referral      Anticoagulation Clinic Referral      Home Care Referral      Follow Up (TCM)    [facility]  Hemodialysis on Mondays and Fridays  Arrive at 6:20 AM 1/24/25  Phone: [phone] [address], [address]     Activity    Your activity upon discharge:  activity as tolerated     Reason for your hospital stay    Pneumonia, and pulmonary embolism or clot in lungs (take blood thinner 3-6 months) and follow up with your primary care provider and also nephrology. As per discussion with pharmacy, you are discharging with 4mg daily of warfarin until INR lab check next on Monday- see your primary care provider within a week for general follow up. Follow up with Kerry in [place]. Arrive at 6:20 AM on Friday 1/24/25  Phone: [phone]. New medications include the warfarin and pepcid (for reflux) and gabapentin (for neuropathic pain)- you are given a week for the last one and follow up with primary care team on how it is going. Home cares with home Nurse, therapies and Social Work are being arranged. Pain team saw and you will be given robaxin and lidocaine for the pain.     Follow-up and recommended labs and tests     Follow up with primary care provider, [facility] [place] [facility], within 7 days for hospital follow- up.  The following labs/tests are recommended: bmp.  - ensure she is going for INR checks; she is discharging as per pharmacy recs- with 4mg daily of warfarin until INR lab check on Monday  - ensure Follow up with Kerry in [place]. Friday at 6:20 AM 1/24/25 Phone: [phone].   -med changes: warfarin and pepcid (for reflux) and gabapentin (for neuropathic pain)- she was given a week for the last one   -Home cares with nurse, social work and therapies are ordered.  -Pain team saw and added prn robaxin and scheduled lidocaine cream  -regarding clots in legs- follow clinically and not a surgical candidate and thrombectomy not indicated as the clot burden in right leg from popliteal goes to femoral (would consider thrombectomy if higher up in iliac vessels)     Diet    Follow this diet upon discharge: Current Diet:Orders Placed This Encounter      Snacks/Supplements Adult: Gelatein Plus; With Meals      Snacks/Supplements Adult: Nepro Oral  Supplement; With Meals      Combination Diet Low Saturated Fat Na <2400mg Diet       Significant Results and Procedures   Results for orders placed or performed during the hospital encounter of 01/13/25   Abd/pelvis CT no contrast - Stone Protocol    Narrative    EXAM: CT ABDOMEN PELVIS W/O CONTRAST  LOCATION: [facility]  DATE: 1/13/2025    INDICATION: diffuse AP with nausea and vomiting  COMPARISON: Chest x-ray 9/30/2024. CT chest abdomen pelvis 8/14/2024. And ultrasound 8/30/2024  TECHNIQUE: CT scan of the abdomen and pelvis was performed without IV contrast. Multiplanar reformats were obtained. Dose reduction techniques were used.  CONTRAST: None.    FINDINGS:   LOWER CHEST: Mild infiltrates right lower lobe with associated minimal atelectasis in both lung bases more prominent than 8/14/2024.    HEPATOBILIARY: Normal.    PANCREAS: Normal.    SPLEEN: Normal.    ADRENAL GLANDS: Small left adrenal adenoma unchanged. No follow-up needed for this.    KIDNEYS/BLADDER: No renal stones and no hydronephrosis. Mild atrophy left kidney. Faint hypodensity right mid kidney posterior laterally again measures 11 mm likely cyst. No specific follow-up needed for this.    BOWEL: Scattered diverticula. Otherwise normal. Normal appendix.    LYMPH NODES: Normal.    VASCULATURE: Moderate calcified plaque. No aneurysms.    PELVIC ORGANS: Normal.    MUSCULOSKELETAL: No concerning bone lesion.      Impression    IMPRESSION:   1.  No significant abnormality in the abdomen or pelvis.    2.  Some mild infiltrates right lower lobe may represent a mild pneumonia with associated mild atelectasis both lung bases.     CT Chest w/o Contrast    Narrative    EXAM: CT CHEST W/O CONTRAST  LOCATION: [facility]  DATE: 1/13/2025    INDICATION: possible fluid at sternotomy site, tenderness to palpation  COMPARISON: None.  TECHNIQUE: CT chest without IV contrast. Multiplanar reformats were obtained. Dose  reduction techniques were used.  CONTRAST: None.    FINDINGS:   LUNGS AND PLEURA: Segmental atelectasis in both lower lobes. Benign intrapulmonary lymph node in a subpleural position in the left upper lobe.     MEDIASTINUM/AXILLAE: Sternotomy with CABG and aortic and mitral valve replacements. Small amount of scarring in the anterior mediastinum subjacent to the sternotomy, but no focal fluid collections. Normal appearance of the overlying subcutaneous fat.   Dual-chamber cardiac pacer. Small sliding-type esophageal hiatal hernia. Right IJ dual lumen central line tip at the SVC/RA junction.     CORONARY ARTERY CALCIFICATION: Moderate atherosclerosis in the native coronary arteries.     UPPER ABDOMEN: Normal.     MUSCULOSKELETAL: Old left rib fractures. Moderate degenerative change thoracic spine.       Impression    IMPRESSION:   No evidence of fluid at the sternotomy site      CT Chest Pulmonary Embolism w Contrast     Value    Radiologist flags New diagnosis of pulmonary embolism    Radiologist flags New diagnosis of pulmonary embolism (AA)    Narrative    EXAM: CT CHEST PULMONARY EMBOLISM W CONTRAST  LOCATION: [facility]  DATE: 1/14/2025    INDICATION: tachycardia, pleuritic chest pain, ongoing hemoptysis  COMPARISON: CT chest 1/13/2025  TECHNIQUE: CT chest pulmonary angiogram during arterial phase injection of IV contrast. Multiplanar reformats and MIP reconstructions were performed. Dose reduction techniques were used.   CONTRAST: Isovue 370 75ml    FINDINGS:  ANGIOGRAM CHEST: Bilateral pulmonary artery emboli. On the right, there are emboli within the distal main, upper lobar, middle lobar and respective segmental and subsegmental branches. There are also emboli within multiple right lower lobe segmental and   subsegmental branches. On the left, there are emboli within the distal main, upper lobar, lingular and left lower lobar pulmonary arteries as well as respective segmental and  subsegmental branches. Mildly dilated ascending thoracic aorta measuring 4.0 cm   in diameter. No thoracic aortic dissection. RV/LV ratio abnormal, greater than 1.0.    LUNGS AND PLEURA: Trace bilateral pleural effusions with adjacent consolidation likely reflecting atelectasis. Stable anterior left upper lobe noncalcified pleural plaque.    MEDIASTINUM/AXILLAE: Sternotomy with aortic and mitral valve replacement. Left-sided pacemaker/ICD device. Prominent likely reactive mediastinal and hilar lymph nodes. Normal heart size and no pericardial effusion. Small hiatal hernia.    CORONARY ARTERY CALCIFICATION: Previous intervention (stents or CABG).    UPPER ABDOMEN: Normal.    MUSCULOSKELETAL: Spinal degenerative changes.      Impression    IMPRESSION:  1.  Bilateral pulmonary artery emboli as described above. Overall moderate clot burden. Associated mild right heart strain.  2.  Trace bilateral pleural effusions with adjacent consolidation likely reflecting atelectasis.  3.  Borderline dilated ascending thoracic aorta measuring 4.0 cm in diameter.      [Critical Result: New diagnosis of pulmonary embolism]    Finding was identified on 1/14/2025 2:59 PM CST.     Dr. Velez was contacted by me on 1/14/2025 3:30 PM CST and verbalized understanding of the critical result. Results: The now the critical result communication   US Lower Extremity Venous Duplex Bilateral     Value    Radiologist flags (Urgent)     See above report regarding residual, acute deep venous thrombus in this patient with known pulmonary emboli.    Narrative    EXAM: US LOWER EXTREMITY VENOUS DUPLEX BILATERAL  LOCATION: [facility]  DATE: 1/21/2025    INDICATION: bilateral leg pain but worse in right leg. Known pulmonary emboli from CTA done 1/14/2025.  COMPARISON: None.  TECHNIQUE: Venous Duplex ultrasound of bilateral lower extremities with and without compression, augmentation and duplex. Color flow and spectral Doppler  with waveform analysis performed.    FINDINGS: Linear and curved transducers used due to body habitus. Exam includes the common femoral, femoral, popliteal veins as well as segmentally visualized deep calf veins and greater saphenous vein.     RIGHT: There is acute deep venous thrombus. Nearly occlusive thrombus is seen in the right common femoral vein and in the femoral vein in the upper thigh. Occlusive thrombus is seen in the femoral vein to the mid to distal thigh with nearly occlusive   thrombus in the right popliteal vein. No thrombus extending superiorly into the right external iliac vein or below the knee. No superficial thrombophlebitis. No popliteal cyst.    LEFT: There is acute deep venous thrombus. There is nearly occlusive thrombus in the left popliteal vein with trace flow. No thrombus inferiorly in the calf. No superficial thrombophlebitis. No popliteal cyst.      Impression    IMPRESSION:  1.  There is acute deep venous thrombus in both legs.  2.  Clot burden is much more extensive on the right as noted above with occlusive or nearly occlusive thrombus extending from the right common femoral vein into the right popliteal vein.  3.  No extension superiorly into the right external iliac vein.  4.  There is nearly occlusive thrombus in the left popliteal vein as well.      [Access Center: See above report regarding residual, acute deep venous thrombus in this patient with known pulmonary emboli.     This report will be copied to the [facility] to ensure a provider acknowledges the finding. Access Center is available Monday through Friday 8am-3:30 pm.      US Lower Extremity Arterial Duplex Bilateral    Narrative    EXAM: US LOWER EXTREMITY ARTERIAL DUPLEX BILATERAL  LOCATION: [facility]  DATE: 1/21/2025    INDICATION: ensure adequate arterial flow bilaterally in setting of acute onset pain w  new DVTs  COMPARISON: None.  TECHNIQUE: Duplex utilizing 2D gray-scale  imaging, Doppler interrogation with color-flow and spectral waveform analysis.    FINDINGS:     RIGHT LOWER EXTREMITY ARTERIAL ASSESSMENT:  External iliac artery 99 cm/s, triphasic  Common femoral artery: 76 cm/s, triphasic  Profunda femoris artery: 60 cm/s, triphasic  SFA (proximal): 80 cm/s, triphasic  SFA (mid): 88 cm/s, triphasic  SFA (distal): 88 cm/s, triphasic  Popliteal artery: 73-53 cm/s, triphasic  Posterior tibial artery: 65 cm/s, triphasic  Anterior tibial artery: 52 cm/s, triphasic  Dorsalis pedis artery: 56 cm/s, triphasic    LEFT LOWER EXTREMITY ARTERIAL ASSESSMENT:  External iliac artery 115 cm/s, triphasic  Common femoral artery: 107 cm/s, triphasic  Profunda femoris artery: 88 cm/s, triphasic  SFA (proximal): 101 cm/s, triphasic  SFA (mid): 75 cm/s, triphasic  SFA (distal): 78 cm/s, triphasic  Popliteal artery: 64-60 cm/s, triphasic  Posterior tibial artery: 61 cm/s, triphasic  Anterior tibial artery: 62 cm/s, triphasic  Dorsalis pedis artery: 80 cm/s, triphasic      Impression    IMPRESSION:  1.  RIGHT LOWER EXTREMITY: Patent right lower extremity arteries without significant velocity elevation or gradient. Multiphasic flow throughout the right lower extremity.  2.  LEFT LOWER EXTREMITY: Patent left lower extremity arteries without significant velocity elevation or gradient. Multiphasic flow throughout the left lower extremity.       Discharge Medications   Current Discharge Medication List        START taking these medications    Details   famotidine (PEPCID) 10 MG tablet Take 1 tablet (10 mg) by mouth 2 times daily.  Qty: 60 tablet, Refills: 0    Associated Diagnoses: Gastroesophageal reflux disease without esophagitis      lidocaine (XYLOCAINE) 5 % external ointment Apply topically 3 times daily.  Qty: 240 g, Refills: 1    Associated Diagnoses: Cramps of right lower extremity      !! methocarbamol (ROBAXIN) 500 MG tablet Take 1 tablet (500 mg) by mouth 2 times daily as needed for muscle spasms  (for any muscle spasms).  Qty: 12 tablet, Refills: 0    Associated Diagnoses: Cramps of right lower extremity      !! methocarbamol (ROBAXIN) 500 MG tablet Take 0.5 tablets (250 mg) by mouth 2 times daily as needed for muscle spasms.  Qty: 8 tablet, Refills: 0    Associated Diagnoses: Cramps of right lower extremity      warfarin ANTICOAGULANT (COUMADIN) 2 MG tablet Take 4mg daily until next INR lab draw and dose will be updated. Earliest visit can be 1/21 Tuesday.  Qty: 15 tablet, Refills: 0    Associated Diagnoses: PE (pulmonary thromboembolism) (H)       !! - Potential duplicate medications found. Please discuss with provider.        CONTINUE these medications which have NOT CHANGED    Details   acetaminophen (TYLENOL) 325 MG tablet Take 2 tablets (650 mg) by mouth every 4 hours as needed for other (mild pain (1-3)).  Qty: 120 tablet, Refills: 0    Associated Diagnoses: Coronary artery disease involving native coronary artery of native heart, unspecified whether angina present      albuterol (PROVENTIL) (2.5 MG/3ML) 0.083% neb solution Take 1 vial (2.5 mg) by nebulization every 6 hours as needed for wheezing.  Qty: 90 mL, Refills: 0    Associated Diagnoses: Coronary artery disease involving native coronary artery of native heart, unspecified whether angina present      atorvastatin (LIPITOR) 80 MG tablet Take 1 tablet (80 mg) by mouth every evening.  Qty: 90 tablet, Refills: 0    Associated Diagnoses: Coronary artery disease involving native coronary artery of native heart, unspecified whether angina present      bisacodyl (DULCOLAX) 10 MG suppository Place 1 suppository (10 mg) rectally daily as needed for constipation (Use if Magnesium hydroxide (MILK of MAGNESIA) not effective after 24 hours. May discontinue if patient having bowel movement.).  Qty: 30 suppository, Refills: 0    Associated Diagnoses: Coronary artery disease involving native coronary artery of native heart, unspecified whether angina present       bisacodyl (DULCOLAX) 5 MG EC tablet Take 5 mg by mouth daily as needed for constipation.      clopidogrel (PLAVIX) 75 MG tablet Take 1 tablet (75 mg) by mouth daily.  Qty: 90 tablet, Refills: 0    Associated Diagnoses: Coronary artery disease involving native coronary artery of native heart, unspecified whether angina present      escitalopram (LEXAPRO) 5 MG tablet Take 5 mg by mouth at bedtime.      Lidocaine (LIDOCARE) 4 % Patch Place 1 patch over 12 hours onto the skin every 24 hours. To prevent lidocaine toxicity, patient should be patch free for 12 hrs daily.  Qty: 30 patch, Refills: 0    Associated Diagnoses: Coronary artery disease involving native coronary artery of native heart, unspecified whether angina present      midodrine (PROAMATINE) 10 MG tablet Take 1 tablet (10 mg) by mouth 3 times daily as needed (SBP <90).  Qty: 60 tablet, Refills: 3    Comments: Smallest if possible, has trouble swallowing larger pills  Associated Diagnoses: Coronary artery disease involving native coronary artery of native heart, unspecified whether angina present      ondansetron (ZOFRAN) 8 MG tablet Take 1 tablet (8 mg) by mouth every 8 hours as needed for nausea or vomiting.  Qty: 30 tablet, Refills: 0    Associated Diagnoses: Coronary artery disease involving native coronary artery of native heart, unspecified whether angina present      pantoprazole (PROTONIX) 40 MG EC tablet Take 1 tablet (40 mg) by mouth every morning (before breakfast).  Qty: 30 tablet, Refills: 0    Associated Diagnoses: Coronary artery disease involving native coronary artery of native heart, unspecified whether angina present      senna (SENOKOT) 8.6 MG tablet Take 1 tablet by mouth daily.  Qty: 30 tablet, Refills: 3    Associated Diagnoses: Coronary artery disease involving native coronary artery of native heart, unspecified whether angina present; S/P CABG (coronary artery bypass graft); ESRD (end stage renal disease) on dialysis (H); Acute  heart failure with preserved ejection fraction (HFpEF) (H)      simethicone (MYLICON) 80 MG chewable tablet Take 1 tablet (80 mg) by mouth 3 times daily.  Qty: 90 tablet, Refills: 0    Associated Diagnoses: Coronary artery disease involving native coronary artery of native heart, unspecified whether angina present      sodium chloride (NEBUSAL) 3 % neb solution Take 3 mLs by nebulization every 6 hours as needed for wheezing.  Qty: 15 mL, Refills: 0    Associated Diagnoses: Coronary artery disease involving native coronary artery of native heart, unspecified whether angina present      torsemide (DEMADEX) 100 MG tablet Take 0.5 tablets (50 mg) by mouth daily.  Qty: 30 tablet, Refills: 0    Associated Diagnoses: Coronary artery disease involving native coronary artery of native heart, unspecified whether angina present      traZODone (DESYREL) 50 MG tablet Take 0.5 tablets (25 mg) by mouth nightly as needed for sleep.  Qty: 30 tablet, Refills: 0    Associated Diagnoses: Coronary artery disease involving native coronary artery of native heart, unspecified whether angina present      zinc oxide 10 % OINT Externally apply topically 4 times daily as needed (for buttock wound).           Allergies   Allergies   Allergen Reactions    Aspirin Rash    Cats Rash    Nickel Rash

## 2025-01-23 NOTE — PROGRESS NOTES
Documentation of Face to Face and Certification for Home Health Services    I certify that patient: Felicitas Elliott is under my care and that I, or a nurse practitioner or physician's assistant working with me, had a face-to-face encounter that meets the physician face-to-face encounter requirements with this patient on: 1/23/2025 .    This encounter with the patient was in whole, or in part, for the following medical condition, which is the primary reason for home health care:   Patient Active Problem List   Diagnosis    Bacteremia    Endocarditis    Sepsis (H)    CKD stage 3b, GFR 30-44 ml/min (H)    Mixed hyperlipidemia    Spinal stenosis of cervicothoracic region    Nonrheumatic aortic valve stenosis    Mitral valve disorder    Coronary artery disease involving native coronary artery of native heart, unspecified whether angina present    S/P CABG (coronary artery bypass graft)    Acute heart failure with preserved ejection fraction (HFpEF) (H)    Physical deconditioning    Elevated d-dimer    Hematemesis with nausea    Acute renal failure, unspecified acute renal failure type    Community acquired pneumonia of right lower lobe of lung    Nausea and vomiting, unspecified vomiting type    PE (pulmonary thromboembolism) (H)    DVT (deep venous thrombosis) (H)    .    I certify that, based on my findings, the following services are medically necessary home health services: Nursing, Occupational Therapy, Physical Therapy, and Social Work.    My clinical findings support the need for the above services because: Nurse is needed: For complex aftercare of surgical procedures because the patient needs instruction and cannot perform care on their own due to: INR draws and complex cares.., Occupational Therapy Services are needed to assess and treat cognitive ability and address ADL safety due to impairment in weakness., and Physical Therapy Services are needed to assess and treat the following functional impairments:  weakness.    Further, I certify that my clinical findings support that this patient is homebound (i.e. absences from home require considerable and taxing effort and are for medical reasons or [employer] or infrequently or of short duration when for other reasons) because: Leaving home is medically contraindicated for the following reason(s): Dyspnea on exertion that makes it so they cannot leave their home for needed services without clinical deterioration. and Infection risk / immunocompromised state where it is safer for them to receive services in the home...    Based on the above findings. I certify that this patient is confined to the home and needs intermittent skilled nursing care, physical therapy and/or speech therapy.  The patient is under my care, and I have initiated the establishment of the plan of care.  This patient will be followed by a physician who will periodically review the plan of care.  Physician/Provider to provide follow up care: Clinic, [facility]    [facility] physician (the Medicare certified PECOS provider): Heri Roberts MD  Physician Signature: See electronic signature associated with these discharge orders.  Date: 1/23/2025    English

## 2025-01-23 NOTE — PROGRESS NOTES
"  SPIRITUAL HEALTH SERVICES Progress Note          Referral Source/Reason for Visit: Follow up visit before patient discharges              Summary and Recommendations -   Kim engaged in grief work and expressed sorrow and stated \"I am dying\", she expressed grief about her declining independence and dependence on her  and named feeling a \"Vanleer\".  We explored her family/children as potential support.     PLAN: No plans to follow per discharge    Fernando Amezquita M.Div., [facility]  Staff    [phone]   Spiritual Health Services is available 24/7 for emergent requests and consults, either by paging the on-call  or by entering an ASAP/STAT consult in [employer], which will also page the on-call .    "

## 2025-01-23 NOTE — PROGRESS NOTES
Care Management Discharge Note    Discharge Date: 01/23/2025       Discharge Disposition: Home, Home Care    Discharge Services: Home Care    Discharge DME: Oxygen    Discharge Transportation: family or friend will provide    Private pay costs discussed: Not applicable    Does the patient's insurance plan have a 3 day qualifying hospital stay waiver?  No    PAS Confirmation Code: N/A  Patient/family educated on Medicare website which has current facility and service quality ratings: yes    Education Provided on the Discharge Plan: Yes  Persons Notified of Discharge Plans: Pt and  and home care   Patient/Family in Agreement with the Plan: yes    Handoff Referral Completed: No, handoff not indicated or clinically appropriate    Additional Information:  Pt is discharging home today with .  [facility] will resume Home Care. Pt has 02 at home and portable.  Pt will do to dialysis tomorrow.  Pt's  will transport home.      KHADIJAH Omalley

## 2025-01-23 NOTE — PLAN OF CARE
Problem: Adult Inpatient Plan of Care  Goal: Optimal Comfort and Wellbeing  Intervention: Monitor Pain and Promote Comfort  Recent Flowsheet Documentation  Taken 1/22/2025 2040 by Queenie Youssef RN  Pain Management Interventions: medication (see MAR)   Goal Outcome Evaluation:         Pt is alert, disoriented to time. Intermittent pain to right side of chest under the ribs and bilateral ankle and calf.  Applied with lidocaine cream with good effect per pt. O2 @ 1LPM via nasal cannula. Can be incontinent of stools and urine. Has not been out of bed this shift. Repositioned for pressure relief. VSS.

## 2025-01-23 NOTE — PROGRESS NOTES
[facility] ACUTE INPATIENT PAIN SERVICE    [facility], [facility], [facility], [facility], [place]   PAIN follow up      Assessment/Plan:  Felicitas Elliott is a 84 year old female who was admitted on 1/13/2025.  I was asked by Dr. Roberts to see the patient for right leg pain. Admitted for CAP and PE. History of chronic leg pain as well, I can see via the  that she has tried gaabpentin/ketamine PLO cream for her leg pain.    shows ketamine. Describes pain as 4/10 and asking to go home.  Right breast pain and RUQ pain resolved.      PLAN:  Acute pain secondary to bilateral occlusive DVT- large clot burden on right.  Likely had some myoclonic jerking with opioids.  And mental status changes with gabapentin.  Unclear cause of right upper quadrant abdominal pain, would consider bleeding, diarrhea, musculoskeletal pain from coughing on the differentials.  Patient would prefer not to workup abdominal pain any further and it appears to be fleeting/intermittent.  Multimodal Medication Therapy:   Adjuvants:  add tylenol and lidocaine, gabapentin was started on 1/19 (renal dosing - would suggest BID - although pt asked to stop gabapentin)   Opioids:  intolerant to opioids   Non-medication interventions- Ice   Constipation Prophylaxis- senna   Follow up /Discharge Recommendations - We recommend prescribing the following at the time of discharge: Robaxin and lidocaine                  Subjective:    Discussed pain with patient and her .  Right lower extremity pain tolerable.  Right breast pain anterior chest pain improved.  She did have some intermittent right upper quadrant abdominal pain which she states is now resolved.  She had 1 episode of diarrhea.  Ductal pain management as well as nonpharmacological options including ice, heat, ambulation.  We talked about the Robaxin.  Also concern for point tenderness and monitoring of diarrhea.      History   Drug Use Not on file         Tobacco Use      Smoking status:  "Former        Packs/day: 8.00        Years: 8.0 packs/day for 1.1 years (8.5 ttl pk-yrs)        Types: Cigarettes        Start date: 2024      Smokeless tobacco: Former        Objective:  Vital signs in last 24 hours:  B/P: 117/68, T: 98, P: 69, R: 18   Blood pressure 117/68, pulse 69, temperature 98  F (36.7  C), temperature source Oral, resp. rate 18, height 1.676 m (5' 6\"), weight 63.5 kg (139 lb 15.9 oz), SpO2 95%.        Review of Systems:   As per subjective, all others negative.    Physical Exam    General: in no apparent distress and non-toxic  -sitting up in bed eating  HEENT: Head normocephalic atraumatic, oral mucosa moist. Sclerae anicteric  GI: No tenderness upon palpation renaissance active  Skin: No rashes or lesions  Extremities: No peripheral edema  Psych: Normal affect, mood euthymic  Neuro: Grossly normal          Imaging:  Personally Reviewed.    Results for orders placed or performed during the hospital encounter of 01/13/25   Abd/pelvis CT no contrast - Stone Protocol    Impression    IMPRESSION:   1.  No significant abnormality in the abdomen or pelvis.    2.  Some mild infiltrates right lower lobe may represent a mild pneumonia with associated mild atelectasis both lung bases.     CT Chest w/o Contrast    Impression    IMPRESSION:   No evidence of fluid at the sternotomy site      CT Chest Pulmonary Embolism w Contrast   Result Value Ref Range    Radiologist flags New diagnosis of pulmonary embolism     Radiologist flags New diagnosis of pulmonary embolism (AA)     Impression    IMPRESSION:  1.  Bilateral pulmonary artery emboli as described above. Overall moderate clot burden. Associated mild right heart strain.  2.  Trace bilateral pleural effusions with adjacent consolidation likely reflecting atelectasis.  3.  Borderline dilated ascending thoracic aorta measuring 4.0 cm in diameter.      [Critical Result: New diagnosis of pulmonary embolism]    Finding was identified on 1/14/2025 2:59 PM " CST.     Dr. Velez was contacted by me on 1/14/2025 3:30 PM CST and verbalized understanding of the critical result. Results: The now the critical result communication   US Lower Extremity Venous Duplex Bilateral   Result Value Ref Range    Radiologist flags (Urgent)      See above report regarding residual, acute deep venous thrombus in this patient with known pulmonary emboli.    Impression    IMPRESSION:  1.  There is acute deep venous thrombus in both legs.  2.  Clot burden is much more extensive on the right as noted above with occlusive or nearly occlusive thrombus extending from the right common femoral vein into the right popliteal vein.  3.  No extension superiorly into the right external iliac vein.  4.  There is nearly occlusive thrombus in the left popliteal vein as well.      [Access Center: See above report regarding residual, acute deep venous thrombus in this patient with known pulmonary emboli.     This report will be copied to the [facility] to ensure a provider acknowledges the finding. Access Center is available Monday through Friday 8am-3:30 pm.      US Lower Extremity Arterial Duplex Bilateral    Impression    IMPRESSION:  1.  RIGHT LOWER EXTREMITY: Patent right lower extremity arteries without significant velocity elevation or gradient. Multiphasic flow throughout the right lower extremity.  2.  LEFT LOWER EXTREMITY: Patent left lower extremity arteries without significant velocity elevation or gradient. Multiphasic flow throughout the left lower extremity.        Lab Results:  Personally Reviewed.   Last Comprehensive Metabolic Panel:  Sodium   Date Value Ref Range Status   01/23/2025 137 135 - 145 mmol/L Final     Potassium   Date Value Ref Range Status   01/23/2025 3.4 3.4 - 5.3 mmol/L Final     Potassium POCT   Date Value Ref Range Status   09/16/2024 3.2 (L) 3.4 - 5.3 mmol/L Final     Chloride   Date Value Ref Range Status   01/23/2025 98 98 - 107 mmol/L Final     Carbon  "Dioxide (CO2)   Date Value Ref Range Status   01/23/2025 29 22 - 29 mmol/L Final     Anion Gap   Date Value Ref Range Status   01/23/2025 10 7 - 15 mmol/L Final     Glucose   Date Value Ref Range Status   01/23/2025 78 70 - 99 mg/dL Final     GLUCOSE BY METER POCT   Date Value Ref Range Status   09/16/2024 102 (H) 70 - 99 mg/dL Final     Glucose Whole Blood POCT   Date Value Ref Range Status   09/16/2024 143 (H) 70 - 99 mg/dL Final     Urea Nitrogen   Date Value Ref Range Status   01/23/2025 31.8 (H) 8.0 - 23.0 mg/dL Final     Creatinine   Date Value Ref Range Status   01/23/2025 3.35 (H) 0.51 - 0.95 mg/dL Final     GFR Estimate   Date Value Ref Range Status   01/23/2025 13 (L) >60 mL/min/1.73m2 Final     Comment:     eGFR calculated using 2021 CKD-EPI equation.     Calcium   Date Value Ref Range Status   01/23/2025 9.2 8.8 - 10.4 mg/dL Final     Comment:     Reference intervals for this test were updated on 7/16/2024 to reflect our healthy population more accurately. There may be differences in the flagging of prior results with similar values performed with this method. Those prior results can be interpreted in the context of the updated reference intervals.        UA: No results found for: \"UAMP\", \"UBARB\", \"BENZODIAZEUR\", \"UCANN\", \"UCOC\", \"OPIT\", \"UPCP\"           Please see A&P for additional details of medical decision making.  MANAGEMENT DISCUSSED with the following over the past 24 hours: Patient, , also discussed with attending MD  NOTE(S)/MEDICAL RECORDS REVIEWED over the past 24 hours: Reviewed notes from medicine as well as social work and nursing  Tests personally interpreted in the past 24 hours:  - Abdominal CT from 1/13 showing KIDNEYS/BLADDER: No renal stones and no hydronephrosis. Mild atrophy left kidney. Faint hypodensity right mid kidney posterior laterally again measures 11 mm likely cyst. No specific follow-up needed for this.  Tests ORDERED & REVIEWED in the past 24 hours:  - " crtcl  SUPPLEMENTAL HISTORY, in addition to the patient's history, over the past 24 hours obtained from:   - Spouse or significant other  Medical complexity over the past 24 hours:  -------------------------- MODERATE RISK FOR MORBIDITY --------------------------------------------------  - Prescription DRUG MANAGEMENT performed           Denise EAST, CNS, CNP  Acute Care Pain Management  Team  Hours of pain coverage Mon-Fri 8-1600  After hours contact the primary team  [facility] (ROE, [place], [place], [place])   Page via [employer] web console -Click for Medialive

## 2025-01-23 NOTE — PROGRESS NOTES
"[facility]  WO Nurse Inpatient Assessment     Consulted for: known buttock wound per patient    Summary: 1/17 Seen in room with  present.  No visible wound.  Patient has been in several facilities over the past month or more.  Spent a lot of time in bed not very active.  [facility] nurse follow-up plan: weekly    Patient History (according to provider note(s):      Felicitas Elliott is a 84 year old female admitted with community acquired pneumonia.     Assessment:      Areas visualized during today's visit: Sacrum/coccyx    Skin Injury Location: buttocks        1/17 1/23    Last photo: 1/23  Skin injury due to: Chronic skin injury (often related to prolonged recliner use)  Skin history and plan of care:   see above, several notes from [facility] Sept/Oct and wounds are visible then  Affected area:      Skin assessment: Intact, Erythema, and Hemosiderin staining  whole area blanchable, no pain and understands need for re positioning. Chronic incontinence skin color changes.      Measurements (length x width x depth, in cm) no open areas     Color: normal and consistent with surrounding tissue     Temperature  normal      Drainage: none .      Color: none      Odor: none  Pain: denies , none  Pain interventions prior to dressing change: N/A  Treatment goal: Maintain (prevention of deterioration) and Protection  STATUS: stable  Supplies ordered: supplies stored on unit       Treatment Plan:     Pressure Injury Prevention (PIP) Plan:  If patient is declining pressure injury prevention interventions: Explore reason why and address patient's concerns, Educate on pressure injury risk and prevention intervention(s), If patient is still declining, document \"informed refusal\" , and Ensure Care team is aware ( provider, charge nurse, etc)  Mattress: Follow bed algorithm, add Low Air Loss (Air+) mattress pump if skin is very moist or constantly " moist.   HOB: Maintain at or below 30 degrees, unless contraindicated  Repositioning in bed: Every 1-2 hours  and Raise foot of bed prior to raising head of bed, to reduce patient sliding down (shear)  Heels: Keep elevated off mattress  Protective Dressing: Sacral Mepilex for prevention (#[id number]),  especially for the agitated patient   Positioning Equipment:None  Chair positioning: Chair cushion (#[id number]) , Assist patient to reposition hourly, and Do NOT use a donut for sitting (this increases pressure to smaller area and creates a higher potential for injury)    If patient has a buttock pressure injury, or high risk for PI use chair cushion or SPS.  Moisture Management: Perineal cleansing /protection: Follow Incontinence Protocol, Avoid brief in bed, Clean and dry skin folds with bathing , and Moisturize dry skin  Under Devices: Inspect skin under all medical devices during skin inspection , Ensure tubes are stabilized without tension, and Ensure patient is not lying on medical devices or equipment when repositioned  Ask provider to discontinue device when no longer needed.      Orders: Written    RECOMMEND PRIMARY TEAM ORDER: None, at this time  Education provided: importance of repositioning  Discussed plan of care with: Patient and Family  Notify WOC if wound(s) deteriorate.  Nursing to notify the Provider(s) and re-consult the WOC Nurse if new skin concern.    DATA:     Current support surface: Standard  Standard gel mattress (Isoflex)  Containment of urine/stool: Brief  BMI: Body mass index is 22.6 kg/m .   Active diet order: Orders Placed This Encounter      Combination Diet Low Saturated Fat Na <2400mg Diet      Diet     Output: I/O last 3 completed shifts:  In: 677 [P.O.:677]  Out: -      Labs:   Recent Labs   Lab 01/23/25  0805 01/21/25  0729 01/20/25  0744   ALBUMIN 2.7*   < > 2.5*   HGB  --   --  9.5*   INR 2.65*   < > 2.38*   WBC  --   --  7.0    < > = values in this interval not displayed.     Pressure  injury risk assessment:   Sensory Perception: 3-->slightly limited  Moisture: 3-->occasionally moist  Activity: 3-->walks occasionally  Mobility: 3-->slightly limited  Nutrition: 2-->probably inadequate  Friction and Shear: 2-->potential problem  Sergey Score: 16    JAM MayN RN CWOCN  Pager no longer in use, please contact through [employer] group: [facility] [employer]

## 2025-01-23 NOTE — PROGRESS NOTES
Patient cleared for discharge by all specialist and MD. IV removed. Belongings collected and returned to the patient. AVS printed, reviewed, and copy provided to the patient and her . Ride home provided by  in personal vehicle.

## 2025-01-23 NOTE — PROVIDER NOTIFICATION
Patient experienced dizziness with standing to get dressed to return home. BP's checked and noted in the chart. BP 89/53 with sitting and returned to 118/65 when sitting and to 128/75 with laying. MD informed verbally when present on the floor. No changes to plan of discharge.

## 2025-01-25 ENCOUNTER — NURSE TRIAGE (OUTPATIENT)
Dept: NURSING | Facility: CLINIC | Age: 85
End: 2025-01-25
Payer: COMMERCIAL

## 2025-01-25 ENCOUNTER — PATIENT OUTREACH (OUTPATIENT)
Dept: CARE COORDINATION | Facility: CLINIC | Age: 85
End: 2025-01-25
Payer: COMMERCIAL

## 2025-01-25 NOTE — TELEPHONE ENCOUNTER
Nurse Triage SBAR    Is this a 2nd Level Triage? NO    Situation: weakness    Background:  on line reporting increased weakness and inability to stand on own following discharge from hospital.  Notes that she had some generalized weakness after returning home from hospital, however, this AM she required nearly full support to get to the restroom which is new for her. Patient was complaining of back pain upon waking this AM but this pain has since resolved after taking Tylenol.      Assessment: severe weakness requiring full support to stand/walk    Protocol Recommended Disposition:   Call  Now    Recommendation: Advised patient to call 911.       Does the patient meet one of the following criteria for ADS visit consideration? 16+ years old, with an [facility] PCP     Carline Gutierrez RN on 1/25/2025 at 10:24 AM      Reason for Disposition   [1] SEVERE weakness (i.e., unable to walk or barely able to walk, requires support) AND [2] new-onset or worsening    Additional Information   Negative: SEVERE difficulty breathing (e.g., struggling for each breath, speaks in single words)   Negative: Shock suspected (e.g., cold/pale/clammy skin, too weak to stand, low BP, rapid pulse)   Negative: Difficult to awaken or acting confused (e.g., disoriented, slurred speech)   Negative: [1] Fainted > 15 minutes ago AND [2] still feels too weak or dizzy to stand    Protocols used: Weakness (Generalized) and Fatigue-A-AH

## 2025-01-25 NOTE — PROGRESS NOTES
Clinic Care Coordination Contact  Transitions of Care Outreach    Chief Complaint   Patient presents with    Clinic Care Coordination - Post Hospital       Most Recent Admission Date: 1/13/2025   Most Recent Admission Diagnosis: Elevated d-dimer - R79.89  Hematemesis with nausea - K92.0  Acute renal failure, unspecified acute renal failure type - N17.9  Community acquired pneumonia of right lower lobe of lung - J18.9  Nausea and vomiting, unspecified vomiting type - R11.2     Most Recent Discharge Date: 1/23/2025   Most Recent Discharge Diagnosis: Community acquired pneumonia of right lower lobe of lung - J18.9  Nausea and vomiting, unspecified vomiting type - R11.2  Hematemesis with nausea - K92.0  Elevated d-dimer - R79.89  Acute renal failure, unspecified acute renal failure type - N17.9  At high risk for altered skin integrity - Z91.89  DVT (deep venous thrombosis) (H) - I82.409  PE (pulmonary thromboembolism) (H) - I26.99  Neuropathy - G62.9  Gastroesophageal reflux disease without esophagitis - K21.9  Physical deconditioning - R53.81  Cramps of right lower extremity - R25.2     Transitions of Care Assessment    Discharge Assessment  How are you doing now that you are home?: Pt stated that she wake up with alot of pain, not doing well, requested to talk with medical staff, transferred call to [facility] to assist Pt.  How are your symptoms? (Red Flag symptoms escalate to triage hotline per guidelines): Unchanged  Do you know how to contact your clinic care team if you have future questions or changes to your health status? : Yes  Does the patient have their discharge instructions? : Yes  Does the patient have questions regarding their discharge instructions? : No  Were you started on any new medications or were there changes to any of your previous medications? : Yes  Does the patient have all of their medications?: Yes  Do you have questions regarding any of your medications? : No  Do you have all of your  needed medical supplies or equipment (DME)?  (i.e. oxygen tank, CPAP, cane, etc.): Yes    Post-op ([facility] CTA Only)  If the patient had a surgery or procedure, do they have any questions for a nurse?: No         [facility] Explained and offered Care Coordination support to eligible patients: Yes    Patient accepted? Yes    Follow up Plan     Future Appointments   Date Time Provider Department Center   2/10/2025 12:00 AM [facility] REMOTE DEVICE CHECK FROM HOME [facility]   2/20/2025  9:10 AM Sampson Shaw, [facility]O [facility]       Outpatient Plan as outlined on AVS reviewed with patient.      For any urgent concerns, please contact our 24 hour nurse triage line: [phone]     ELODIA Woody  [phone]  [facility] Care Resource [facility]

## 2025-01-29 ENCOUNTER — DOCUMENTATION ONLY (OUTPATIENT)
Dept: ANTICOAGULATION | Facility: CLINIC | Age: 85
End: 2025-01-29
Payer: COMMERCIAL

## 2025-01-29 DIAGNOSIS — I82.409 DVT (DEEP VENOUS THROMBOSIS) (H): ICD-10-CM

## 2025-01-29 DIAGNOSIS — I26.99 PE (PULMONARY THROMBOEMBOLISM) (H): Primary | ICD-10-CM

## 2025-01-29 NOTE — PROGRESS NOTES
ANTICOAGULATION  MANAGEMENT    Felicitas Elliott is being discharged from the [facility] Anticoagulation Management Program (ACC).    Reason for discharge: care has been transferred to - patient has resumed care with her PCP who is with [facility].    Anticoagulation episode resolved and ACC referral closed    If patient needs warfarin management in the future, please send a new referral    Flores Dhillon RN

## 2025-01-30 ENCOUNTER — ANCILLARY PROCEDURE (OUTPATIENT)
Dept: CARDIOLOGY | Facility: CLINIC | Age: 85
End: 2025-01-30
Attending: INTERNAL MEDICINE
Payer: COMMERCIAL

## 2025-01-30 ENCOUNTER — APPOINTMENT (OUTPATIENT)
Dept: ULTRASOUND IMAGING | Facility: CLINIC | Age: 85
End: 2025-01-30
Attending: EMERGENCY MEDICINE
Payer: COMMERCIAL

## 2025-01-30 ENCOUNTER — HOSPITAL ENCOUNTER (EMERGENCY)
Facility: CLINIC | Age: 85
Discharge: HOME | End: 2025-01-30
Attending: EMERGENCY MEDICINE
Payer: COMMERCIAL

## 2025-01-30 ENCOUNTER — APPOINTMENT (OUTPATIENT)
Dept: CT IMAGING | Facility: CLINIC | Age: 85
End: 2025-01-30
Attending: EMERGENCY MEDICINE
Payer: COMMERCIAL

## 2025-01-30 ENCOUNTER — APPOINTMENT (OUTPATIENT)
Dept: RADIOLOGY | Facility: CLINIC | Age: 85
End: 2025-01-30
Attending: EMERGENCY MEDICINE
Payer: COMMERCIAL

## 2025-01-30 VITALS
TEMPERATURE: 98.3 F | DIASTOLIC BLOOD PRESSURE: 51 MMHG | OXYGEN SATURATION: 98 % | BODY MASS INDEX: 24.61 KG/M2 | RESPIRATION RATE: 25 BRPM | HEART RATE: 69 BPM | SYSTOLIC BLOOD PRESSURE: 106 MMHG | WEIGHT: 152.5 LBS

## 2025-01-30 DIAGNOSIS — I49.5 SICK SINUS SYNDROME (H): ICD-10-CM

## 2025-01-30 DIAGNOSIS — Z95.0 CARDIAC PACEMAKER IN SITU: ICD-10-CM

## 2025-01-30 DIAGNOSIS — I44.2 CHB (COMPLETE HEART BLOCK) (H): ICD-10-CM

## 2025-01-30 DIAGNOSIS — I95.89 CHRONIC HYPOTENSION: ICD-10-CM

## 2025-01-30 LAB
ALBUMIN SERPL BCG-MCNC: 3 G/DL (ref 3.5–5.2)
ALBUMIN UR-MCNC: NEGATIVE MG/DL
ALP SERPL-CCNC: 292 U/L (ref 40–150)
ALT SERPL W P-5'-P-CCNC: 21 U/L (ref 0–50)
ANION GAP SERPL CALCULATED.3IONS-SCNC: 12 MMOL/L (ref 7–15)
APPEARANCE UR: CLEAR
AST SERPL W P-5'-P-CCNC: 31 U/L (ref 0–45)
BASOPHILS # BLD AUTO: 0 10E3/UL (ref 0–0.2)
BASOPHILS NFR BLD AUTO: 1 %
BILIRUB SERPL-MCNC: 0.5 MG/DL
BILIRUB UR QL STRIP: NEGATIVE
BUN SERPL-MCNC: 26.5 MG/DL (ref 8–23)
CALCIUM SERPL-MCNC: 9 MG/DL (ref 8.8–10.4)
CHLORIDE SERPL-SCNC: 100 MMOL/L (ref 98–107)
COLOR UR AUTO: COLORLESS
CREAT SERPL-MCNC: 3.34 MG/DL (ref 0.51–0.95)
EGFRCR SERPLBLD CKD-EPI 2021: 13 ML/MIN/1.73M2
EOSINOPHIL # BLD AUTO: 0.3 10E3/UL (ref 0–0.7)
EOSINOPHIL NFR BLD AUTO: 4 %
ERYTHROCYTE [DISTWIDTH] IN BLOOD BY AUTOMATED COUNT: 18 % (ref 10–15)
GLUCOSE SERPL-MCNC: 87 MG/DL (ref 70–99)
GLUCOSE UR STRIP-MCNC: NEGATIVE MG/DL
HCO3 SERPL-SCNC: 25 MMOL/L (ref 22–29)
HCT VFR BLD AUTO: 33 % (ref 35–47)
HGB BLD-MCNC: 10.1 G/DL (ref 11.7–15.7)
HGB UR QL STRIP: NEGATIVE
HYALINE CASTS: 29 /LPF
IMM GRANULOCYTES # BLD: 0 10E3/UL
IMM GRANULOCYTES NFR BLD: 1 %
INR PPP: 2.1 (ref 0.85–1.15)
KETONES UR STRIP-MCNC: NEGATIVE MG/DL
LACTATE SERPL-SCNC: 0.8 MMOL/L (ref 0.7–2)
LEUKOCYTE ESTERASE UR QL STRIP: ABNORMAL
LYMPHOCYTES # BLD AUTO: 0.9 10E3/UL (ref 0.8–5.3)
LYMPHOCYTES NFR BLD AUTO: 12 %
MCH RBC QN AUTO: 27.6 PG (ref 26.5–33)
MCHC RBC AUTO-ENTMCNC: 30.6 G/DL (ref 31.5–36.5)
MCV RBC AUTO: 90 FL (ref 78–100)
MDC_IDC_LEAD_CONNECTION_STATUS: NORMAL
MDC_IDC_LEAD_CONNECTION_STATUS: NORMAL
MDC_IDC_LEAD_IMPLANT_DT: NORMAL
MDC_IDC_LEAD_IMPLANT_DT: NORMAL
MDC_IDC_LEAD_LOCATION: NORMAL
MDC_IDC_LEAD_LOCATION: NORMAL
MDC_IDC_LEAD_LOCATION_DETAIL_1: NORMAL
MDC_IDC_LEAD_LOCATION_DETAIL_1: NORMAL
MDC_IDC_LEAD_MFG: NORMAL
MDC_IDC_LEAD_MFG: NORMAL
MDC_IDC_LEAD_MODEL: NORMAL
MDC_IDC_LEAD_MODEL: NORMAL
MDC_IDC_LEAD_POLARITY_TYPE: NORMAL
MDC_IDC_LEAD_POLARITY_TYPE: NORMAL
MDC_IDC_LEAD_SERIAL: NORMAL
MDC_IDC_LEAD_SERIAL: NORMAL
MDC_IDC_LEAD_SPECIAL_FUNCTION: NORMAL
MDC_IDC_LEAD_SPECIAL_FUNCTION: NORMAL
MDC_IDC_MSMT_BATTERY_DTM: NORMAL
MDC_IDC_MSMT_BATTERY_REMAINING_LONGEVITY: 156 MO
MDC_IDC_MSMT_BATTERY_RRT_TRIGGER: 2.62
MDC_IDC_MSMT_BATTERY_STATUS: NORMAL
MDC_IDC_MSMT_BATTERY_VOLTAGE: 3.19 V
MDC_IDC_MSMT_LEADCHNL_RA_IMPEDANCE_VALUE: 437 OHM
MDC_IDC_MSMT_LEADCHNL_RA_IMPEDANCE_VALUE: 551 OHM
MDC_IDC_MSMT_LEADCHNL_RA_PACING_THRESHOLD_AMPLITUDE: 1.12 V
MDC_IDC_MSMT_LEADCHNL_RA_PACING_THRESHOLD_PULSEWIDTH: 0.4 MS
MDC_IDC_MSMT_LEADCHNL_RA_SENSING_INTR_AMPL: 1.5 MV
MDC_IDC_MSMT_LEADCHNL_RA_SENSING_INTR_AMPL: 1.5 MV
MDC_IDC_MSMT_LEADCHNL_RV_IMPEDANCE_VALUE: 456 OHM
MDC_IDC_MSMT_LEADCHNL_RV_IMPEDANCE_VALUE: 589 OHM
MDC_IDC_MSMT_LEADCHNL_RV_PACING_THRESHOLD_AMPLITUDE: 1 V
MDC_IDC_MSMT_LEADCHNL_RV_PACING_THRESHOLD_PULSEWIDTH: 0.4 MS
MDC_IDC_MSMT_LEADCHNL_RV_SENSING_INTR_AMPL: 16.5 MV
MDC_IDC_MSMT_LEADCHNL_RV_SENSING_INTR_AMPL: 16.5 MV
MDC_IDC_PG_IMPLANT_DTM: NORMAL
MDC_IDC_PG_MFG: NORMAL
MDC_IDC_PG_MODEL: NORMAL
MDC_IDC_PG_SERIAL: NORMAL
MDC_IDC_PG_TYPE: NORMAL
MDC_IDC_SESS_CLINIC_NAME: NORMAL
MDC_IDC_SESS_DTM: NORMAL
MDC_IDC_SESS_TYPE: NORMAL
MDC_IDC_SET_BRADY_AT_MODE_SWITCH_RATE: 171 {BEATS}/MIN
MDC_IDC_SET_BRADY_HYSTRATE: NORMAL
MDC_IDC_SET_BRADY_LOWRATE: 70 {BEATS}/MIN
MDC_IDC_SET_BRADY_MAX_SENSOR_RATE: 120 {BEATS}/MIN
MDC_IDC_SET_BRADY_MAX_TRACKING_RATE: 120 {BEATS}/MIN
MDC_IDC_SET_BRADY_MODE: NORMAL
MDC_IDC_SET_BRADY_PAV_DELAY_LOW: 180 MS
MDC_IDC_SET_BRADY_SAV_DELAY_LOW: 150 MS
MDC_IDC_SET_LEADCHNL_RA_PACING_AMPLITUDE: 1.75 V
MDC_IDC_SET_LEADCHNL_RA_PACING_ANODE_ELECTRODE_1: NORMAL
MDC_IDC_SET_LEADCHNL_RA_PACING_ANODE_LOCATION_1: NORMAL
MDC_IDC_SET_LEADCHNL_RA_PACING_CAPTURE_MODE: NORMAL
MDC_IDC_SET_LEADCHNL_RA_PACING_CATHODE_ELECTRODE_1: NORMAL
MDC_IDC_SET_LEADCHNL_RA_PACING_CATHODE_LOCATION_1: NORMAL
MDC_IDC_SET_LEADCHNL_RA_PACING_POLARITY: NORMAL
MDC_IDC_SET_LEADCHNL_RA_PACING_PULSEWIDTH: 0.4 MS
MDC_IDC_SET_LEADCHNL_RA_SENSING_ANODE_ELECTRODE_1: NORMAL
MDC_IDC_SET_LEADCHNL_RA_SENSING_ANODE_LOCATION_1: NORMAL
MDC_IDC_SET_LEADCHNL_RA_SENSING_CATHODE_ELECTRODE_1: NORMAL
MDC_IDC_SET_LEADCHNL_RA_SENSING_CATHODE_LOCATION_1: NORMAL
MDC_IDC_SET_LEADCHNL_RA_SENSING_POLARITY: NORMAL
MDC_IDC_SET_LEADCHNL_RA_SENSING_SENSITIVITY: 0.15 MV
MDC_IDC_SET_LEADCHNL_RV_PACING_AMPLITUDE: 1.5 V
MDC_IDC_SET_LEADCHNL_RV_PACING_ANODE_ELECTRODE_1: NORMAL
MDC_IDC_SET_LEADCHNL_RV_PACING_ANODE_LOCATION_1: NORMAL
MDC_IDC_SET_LEADCHNL_RV_PACING_CAPTURE_MODE: NORMAL
MDC_IDC_SET_LEADCHNL_RV_PACING_CATHODE_ELECTRODE_1: NORMAL
MDC_IDC_SET_LEADCHNL_RV_PACING_CATHODE_LOCATION_1: NORMAL
MDC_IDC_SET_LEADCHNL_RV_PACING_POLARITY: NORMAL
MDC_IDC_SET_LEADCHNL_RV_PACING_PULSEWIDTH: 0.4 MS
MDC_IDC_SET_LEADCHNL_RV_SENSING_ANODE_ELECTRODE_1: NORMAL
MDC_IDC_SET_LEADCHNL_RV_SENSING_ANODE_LOCATION_1: NORMAL
MDC_IDC_SET_LEADCHNL_RV_SENSING_CATHODE_ELECTRODE_1: NORMAL
MDC_IDC_SET_LEADCHNL_RV_SENSING_CATHODE_LOCATION_1: NORMAL
MDC_IDC_SET_LEADCHNL_RV_SENSING_POLARITY: NORMAL
MDC_IDC_SET_LEADCHNL_RV_SENSING_SENSITIVITY: 0.9 MV
MDC_IDC_SET_ZONE_DETECTION_INTERVAL: 350 MS
MDC_IDC_SET_ZONE_DETECTION_INTERVAL: 400 MS
MDC_IDC_SET_ZONE_STATUS: NORMAL
MDC_IDC_SET_ZONE_STATUS: NORMAL
MDC_IDC_SET_ZONE_TYPE: NORMAL
MDC_IDC_SET_ZONE_VENDOR_TYPE: NORMAL
MDC_IDC_STAT_AT_BURDEN_PERCENT: 0 %
MDC_IDC_STAT_AT_DTM_END: NORMAL
MDC_IDC_STAT_AT_DTM_START: NORMAL
MDC_IDC_STAT_BRADY_AP_VP_PERCENT: 24.81 %
MDC_IDC_STAT_BRADY_AP_VS_PERCENT: 0.06 %
MDC_IDC_STAT_BRADY_AS_VP_PERCENT: 63.36 %
MDC_IDC_STAT_BRADY_AS_VS_PERCENT: 11.76 %
MDC_IDC_STAT_BRADY_DTM_END: NORMAL
MDC_IDC_STAT_BRADY_DTM_START: NORMAL
MDC_IDC_STAT_BRADY_RA_PERCENT_PACED: 24.9 %
MDC_IDC_STAT_BRADY_RV_PERCENT_PACED: 88.18 %
MDC_IDC_STAT_EPISODE_RECENT_COUNT: 0
MDC_IDC_STAT_EPISODE_RECENT_COUNT_DTM_END: NORMAL
MDC_IDC_STAT_EPISODE_RECENT_COUNT_DTM_START: NORMAL
MDC_IDC_STAT_EPISODE_TOTAL_COUNT: 0
MDC_IDC_STAT_EPISODE_TOTAL_COUNT: 33
MDC_IDC_STAT_EPISODE_TOTAL_COUNT_DTM_END: NORMAL
MDC_IDC_STAT_EPISODE_TOTAL_COUNT_DTM_START: NORMAL
MDC_IDC_STAT_EPISODE_TYPE: NORMAL
MDC_IDC_STAT_TACHYTHERAPY_RECENT_DTM_END: NORMAL
MDC_IDC_STAT_TACHYTHERAPY_RECENT_DTM_START: NORMAL
MDC_IDC_STAT_TACHYTHERAPY_TOTAL_DTM_END: NORMAL
MDC_IDC_STAT_TACHYTHERAPY_TOTAL_DTM_START: NORMAL
MONOCYTES # BLD AUTO: 0.5 10E3/UL (ref 0–1.3)
MONOCYTES NFR BLD AUTO: 6 %
MUCOUS THREADS #/AREA URNS LPF: PRESENT /LPF
NEUTROPHILS # BLD AUTO: 6.3 10E3/UL (ref 1.6–8.3)
NEUTROPHILS NFR BLD AUTO: 78 %
NITRATE UR QL: NEGATIVE
NRBC # BLD AUTO: 0 10E3/UL
NRBC BLD AUTO-RTO: 0 /100
PH UR STRIP: 5.5 [PH] (ref 5–7)
PLATELET # BLD AUTO: 294 10E3/UL (ref 150–450)
POTASSIUM SERPL-SCNC: 3.1 MMOL/L (ref 3.4–5.3)
PROT SERPL-MCNC: 6.5 G/DL (ref 6.4–8.3)
RBC # BLD AUTO: 3.66 10E6/UL (ref 3.8–5.2)
RBC URINE: 1 /HPF
SODIUM SERPL-SCNC: 137 MMOL/L (ref 135–145)
SP GR UR STRIP: 1.01 (ref 1–1.03)
SQUAMOUS EPITHELIAL: 1 /HPF
TROPONIN T SERPL HS-MCNC: 71 NG/L
TROPONIN T SERPL HS-MCNC: 73 NG/L
UROBILINOGEN UR STRIP-MCNC: <2 MG/DL
WBC # BLD AUTO: 8.1 10E3/UL (ref 4–11)
WBC URINE: 3 /HPF

## 2025-01-30 PROCEDURE — 93971 EXTREMITY STUDY: CPT | Mod: RT,GZ

## 2025-01-30 PROCEDURE — 99285 EMERGENCY DEPT VISIT HI MDM: CPT | Mod: 25

## 2025-01-30 PROCEDURE — 82310 ASSAY OF CALCIUM: CPT | Performed by: EMERGENCY MEDICINE

## 2025-01-30 PROCEDURE — 93005 ELECTROCARDIOGRAM TRACING: CPT | Performed by: EMERGENCY MEDICINE

## 2025-01-30 PROCEDURE — 82040 ASSAY OF SERUM ALBUMIN: CPT | Performed by: EMERGENCY MEDICINE

## 2025-01-30 PROCEDURE — 85004 AUTOMATED DIFF WBC COUNT: CPT | Performed by: EMERGENCY MEDICINE

## 2025-01-30 PROCEDURE — 250N000011 HC RX IP 250 OP 636: Performed by: EMERGENCY MEDICINE

## 2025-01-30 PROCEDURE — 84155 ASSAY OF PROTEIN SERUM: CPT | Performed by: EMERGENCY MEDICINE

## 2025-01-30 PROCEDURE — 96360 HYDRATION IV INFUSION INIT: CPT | Mod: 59

## 2025-01-30 PROCEDURE — 71046 X-RAY EXAM CHEST 2 VIEWS: CPT

## 2025-01-30 PROCEDURE — 85610 PROTHROMBIN TIME: CPT | Mod: GZ | Performed by: EMERGENCY MEDICINE

## 2025-01-30 PROCEDURE — 83605 ASSAY OF LACTIC ACID: CPT | Performed by: EMERGENCY MEDICINE

## 2025-01-30 PROCEDURE — 258N000003 HC RX IP 258 OP 636: Performed by: EMERGENCY MEDICINE

## 2025-01-30 PROCEDURE — 36415 COLL VENOUS BLD VENIPUNCTURE: CPT | Performed by: EMERGENCY MEDICINE

## 2025-01-30 PROCEDURE — 71275 CT ANGIOGRAPHY CHEST: CPT

## 2025-01-30 PROCEDURE — 81003 URINALYSIS AUTO W/O SCOPE: CPT | Performed by: EMERGENCY MEDICINE

## 2025-01-30 PROCEDURE — 85048 AUTOMATED LEUKOCYTE COUNT: CPT | Performed by: EMERGENCY MEDICINE

## 2025-01-30 PROCEDURE — 82374 ASSAY BLOOD CARBON DIOXIDE: CPT | Performed by: EMERGENCY MEDICINE

## 2025-01-30 PROCEDURE — 84484 ASSAY OF TROPONIN QUANT: CPT | Performed by: EMERGENCY MEDICINE

## 2025-01-30 RX ORDER — IOPAMIDOL 755 MG/ML
75 INJECTION, SOLUTION INTRAVASCULAR ONCE
Status: COMPLETED | OUTPATIENT
Start: 2025-01-30 | End: 2025-01-30

## 2025-01-30 RX ADMIN — SODIUM CHLORIDE 500 ML: 9 INJECTION, SOLUTION INTRAVENOUS at 14:19

## 2025-01-30 RX ADMIN — IOPAMIDOL 75 ML: 755 INJECTION, SOLUTION INTRAVENOUS at 13:43

## 2025-01-30 ASSESSMENT — COLUMBIA-SUICIDE SEVERITY RATING SCALE - C-SSRS
1. IN THE PAST MONTH, HAVE YOU WISHED YOU WERE DEAD OR WISHED YOU COULD GO TO SLEEP AND NOT WAKE UP?: NO
6. HAVE YOU EVER DONE ANYTHING, STARTED TO DO ANYTHING, OR PREPARED TO DO ANYTHING TO END YOUR LIFE?: NO
2. HAVE YOU ACTUALLY HAD ANY THOUGHTS OF KILLING YOURSELF IN THE PAST MONTH?: NO

## 2025-01-30 ASSESSMENT — ACTIVITIES OF DAILY LIVING (ADL)
ADLS_ACUITY_SCORE: 58

## 2025-01-30 NOTE — DISCHARGE INSTRUCTIONS
Follow-up with your primary care doctor.  Go to dialysis as planned tomorrow.  Take your midodrine as scheduled.  Continue taking your Coumadin.    Return to the ER for any new or worsening concerns.

## 2025-01-30 NOTE — ED TRIAGE NOTES
Patient brought in by EMS from home, home health nurse called for hypotension, EMS reports systolic 50's, 1 bag of .9 hours fluids infused 1L, blood sugar 168, dialysis M--F, doing runs as scheduled. Took her Medradrine 1 hour prior to coming. History of recent bypass, pacemaker placed recently.        Triage Assessment (Adult)       Row Name 01/30/25 110          Triage Assessment    Airway WDL WDL        Respiratory WDL    Respiratory WDL WDL        Skin Circulation/Temperature WDL    Skin Circulation/Temperature WDL WDL        Cardiac WDL    Cardiac WDL WDL        Peripheral/Neurovascular WDL    Peripheral Neurovascular WDL WDL        Cognitive/Neuro/Behavioral WDL    Cognitive/Neuro/Behavioral WDL WDL

## 2025-01-30 NOTE — ED NOTES
Up to BR to provider UA. Missed hat. Unable to obtain urine sample. Ambulatory with walker and SBA. Gait steady. Reports mild lightheadedness with ambulation. Pt has mepilex to coccyx PTA. Mepilex changed at this time.

## 2025-01-31 LAB
ATRIAL RATE - MUSE: 76 BPM
DIASTOLIC BLOOD PRESSURE - MUSE: 57 MMHG
INTERPRETATION ECG - MUSE: NORMAL
P AXIS - MUSE: NORMAL DEGREES
PR INTERVAL - MUSE: 172 MS
QRS DURATION - MUSE: 130 MS
QT - MUSE: 458 MS
QTC - MUSE: 515 MS
R AXIS - MUSE: 86 DEGREES
SYSTOLIC BLOOD PRESSURE - MUSE: 95 MMHG
T AXIS - MUSE: -75 DEGREES
VENTRICULAR RATE- MUSE: 76 BPM

## 2025-02-10 ENCOUNTER — ANCILLARY PROCEDURE (OUTPATIENT)
Dept: CARDIOLOGY | Facility: CLINIC | Age: 85
End: 2025-02-10
Attending: INTERNAL MEDICINE
Payer: COMMERCIAL

## 2025-02-10 DIAGNOSIS — Z95.0 CARDIAC PACEMAKER IN SITU: ICD-10-CM

## 2025-02-10 DIAGNOSIS — I49.5 SICK SINUS SYNDROME (H): ICD-10-CM

## 2025-02-10 DIAGNOSIS — I44.2 CHB (COMPLETE HEART BLOCK) (H): ICD-10-CM

## 2025-02-10 LAB
MDC_IDC_LEAD_CONNECTION_STATUS: NORMAL
MDC_IDC_LEAD_CONNECTION_STATUS: NORMAL
MDC_IDC_LEAD_IMPLANT_DT: NORMAL
MDC_IDC_LEAD_IMPLANT_DT: NORMAL
MDC_IDC_LEAD_LOCATION: NORMAL
MDC_IDC_LEAD_LOCATION: NORMAL
MDC_IDC_LEAD_LOCATION_DETAIL_1: NORMAL
MDC_IDC_LEAD_LOCATION_DETAIL_1: NORMAL
MDC_IDC_LEAD_MFG: NORMAL
MDC_IDC_LEAD_MFG: NORMAL
MDC_IDC_LEAD_MODEL: NORMAL
MDC_IDC_LEAD_MODEL: NORMAL
MDC_IDC_LEAD_POLARITY_TYPE: NORMAL
MDC_IDC_LEAD_POLARITY_TYPE: NORMAL
MDC_IDC_LEAD_SERIAL: NORMAL
MDC_IDC_LEAD_SERIAL: NORMAL
MDC_IDC_LEAD_SPECIAL_FUNCTION: NORMAL
MDC_IDC_LEAD_SPECIAL_FUNCTION: NORMAL
MDC_IDC_MSMT_BATTERY_DTM: NORMAL
MDC_IDC_MSMT_BATTERY_REMAINING_LONGEVITY: 156 MO
MDC_IDC_MSMT_BATTERY_RRT_TRIGGER: 2.62
MDC_IDC_MSMT_BATTERY_STATUS: NORMAL
MDC_IDC_MSMT_BATTERY_VOLTAGE: 3.18 V
MDC_IDC_MSMT_LEADCHNL_RA_IMPEDANCE_VALUE: 437 OHM
MDC_IDC_MSMT_LEADCHNL_RA_IMPEDANCE_VALUE: 551 OHM
MDC_IDC_MSMT_LEADCHNL_RA_PACING_THRESHOLD_AMPLITUDE: 1 V
MDC_IDC_MSMT_LEADCHNL_RA_PACING_THRESHOLD_PULSEWIDTH: 0.4 MS
MDC_IDC_MSMT_LEADCHNL_RA_SENSING_INTR_AMPL: 0.38 MV
MDC_IDC_MSMT_LEADCHNL_RA_SENSING_INTR_AMPL: 0.38 MV
MDC_IDC_MSMT_LEADCHNL_RV_IMPEDANCE_VALUE: 437 OHM
MDC_IDC_MSMT_LEADCHNL_RV_IMPEDANCE_VALUE: 608 OHM
MDC_IDC_MSMT_LEADCHNL_RV_PACING_THRESHOLD_AMPLITUDE: 1 V
MDC_IDC_MSMT_LEADCHNL_RV_PACING_THRESHOLD_PULSEWIDTH: 0.4 MS
MDC_IDC_MSMT_LEADCHNL_RV_SENSING_INTR_AMPL: 13 MV
MDC_IDC_MSMT_LEADCHNL_RV_SENSING_INTR_AMPL: 13 MV
MDC_IDC_PG_IMPLANT_DTM: NORMAL
MDC_IDC_PG_MFG: NORMAL
MDC_IDC_PG_MODEL: NORMAL
MDC_IDC_PG_SERIAL: NORMAL
MDC_IDC_PG_TYPE: NORMAL
MDC_IDC_SESS_CLINIC_NAME: NORMAL
MDC_IDC_SESS_DTM: NORMAL
MDC_IDC_SESS_TYPE: NORMAL
MDC_IDC_SET_BRADY_AT_MODE_SWITCH_RATE: 171 {BEATS}/MIN
MDC_IDC_SET_BRADY_HYSTRATE: NORMAL
MDC_IDC_SET_BRADY_LOWRATE: 70 {BEATS}/MIN
MDC_IDC_SET_BRADY_MAX_SENSOR_RATE: 120 {BEATS}/MIN
MDC_IDC_SET_BRADY_MAX_TRACKING_RATE: 120 {BEATS}/MIN
MDC_IDC_SET_BRADY_MODE: NORMAL
MDC_IDC_SET_BRADY_PAV_DELAY_LOW: 180 MS
MDC_IDC_SET_BRADY_SAV_DELAY_LOW: 150 MS
MDC_IDC_SET_LEADCHNL_RA_PACING_AMPLITUDE: 1.5 V
MDC_IDC_SET_LEADCHNL_RA_PACING_ANODE_ELECTRODE_1: NORMAL
MDC_IDC_SET_LEADCHNL_RA_PACING_ANODE_LOCATION_1: NORMAL
MDC_IDC_SET_LEADCHNL_RA_PACING_CAPTURE_MODE: NORMAL
MDC_IDC_SET_LEADCHNL_RA_PACING_CATHODE_ELECTRODE_1: NORMAL
MDC_IDC_SET_LEADCHNL_RA_PACING_CATHODE_LOCATION_1: NORMAL
MDC_IDC_SET_LEADCHNL_RA_PACING_POLARITY: NORMAL
MDC_IDC_SET_LEADCHNL_RA_PACING_PULSEWIDTH: 0.4 MS
MDC_IDC_SET_LEADCHNL_RA_SENSING_ANODE_ELECTRODE_1: NORMAL
MDC_IDC_SET_LEADCHNL_RA_SENSING_ANODE_LOCATION_1: NORMAL
MDC_IDC_SET_LEADCHNL_RA_SENSING_CATHODE_ELECTRODE_1: NORMAL
MDC_IDC_SET_LEADCHNL_RA_SENSING_CATHODE_LOCATION_1: NORMAL
MDC_IDC_SET_LEADCHNL_RA_SENSING_POLARITY: NORMAL
MDC_IDC_SET_LEADCHNL_RA_SENSING_SENSITIVITY: 0.15 MV
MDC_IDC_SET_LEADCHNL_RV_PACING_AMPLITUDE: 1.5 V
MDC_IDC_SET_LEADCHNL_RV_PACING_ANODE_ELECTRODE_1: NORMAL
MDC_IDC_SET_LEADCHNL_RV_PACING_ANODE_LOCATION_1: NORMAL
MDC_IDC_SET_LEADCHNL_RV_PACING_CAPTURE_MODE: NORMAL
MDC_IDC_SET_LEADCHNL_RV_PACING_CATHODE_ELECTRODE_1: NORMAL
MDC_IDC_SET_LEADCHNL_RV_PACING_CATHODE_LOCATION_1: NORMAL
MDC_IDC_SET_LEADCHNL_RV_PACING_POLARITY: NORMAL
MDC_IDC_SET_LEADCHNL_RV_PACING_PULSEWIDTH: 0.4 MS
MDC_IDC_SET_LEADCHNL_RV_SENSING_ANODE_ELECTRODE_1: NORMAL
MDC_IDC_SET_LEADCHNL_RV_SENSING_ANODE_LOCATION_1: NORMAL
MDC_IDC_SET_LEADCHNL_RV_SENSING_CATHODE_ELECTRODE_1: NORMAL
MDC_IDC_SET_LEADCHNL_RV_SENSING_CATHODE_LOCATION_1: NORMAL
MDC_IDC_SET_LEADCHNL_RV_SENSING_POLARITY: NORMAL
MDC_IDC_SET_LEADCHNL_RV_SENSING_SENSITIVITY: 0.9 MV
MDC_IDC_SET_ZONE_DETECTION_INTERVAL: 350 MS
MDC_IDC_SET_ZONE_DETECTION_INTERVAL: 400 MS
MDC_IDC_SET_ZONE_STATUS: NORMAL
MDC_IDC_SET_ZONE_STATUS: NORMAL
MDC_IDC_SET_ZONE_TYPE: NORMAL
MDC_IDC_SET_ZONE_VENDOR_TYPE: NORMAL
MDC_IDC_STAT_AT_BURDEN_PERCENT: 0 %
MDC_IDC_STAT_AT_DTM_END: NORMAL
MDC_IDC_STAT_AT_DTM_START: NORMAL
MDC_IDC_STAT_BRADY_AP_VP_PERCENT: 56.04 %
MDC_IDC_STAT_BRADY_AP_VS_PERCENT: 0 %
MDC_IDC_STAT_BRADY_AS_VP_PERCENT: 36.4 %
MDC_IDC_STAT_BRADY_AS_VS_PERCENT: 7.56 %
MDC_IDC_STAT_BRADY_DTM_END: NORMAL
MDC_IDC_STAT_BRADY_DTM_START: NORMAL
MDC_IDC_STAT_BRADY_RA_PERCENT_PACED: 56.11 %
MDC_IDC_STAT_BRADY_RV_PERCENT_PACED: 92.44 %
MDC_IDC_STAT_EPISODE_RECENT_COUNT: 0
MDC_IDC_STAT_EPISODE_RECENT_COUNT_DTM_END: NORMAL
MDC_IDC_STAT_EPISODE_RECENT_COUNT_DTM_START: NORMAL
MDC_IDC_STAT_EPISODE_TOTAL_COUNT: 0
MDC_IDC_STAT_EPISODE_TOTAL_COUNT: 33
MDC_IDC_STAT_EPISODE_TOTAL_COUNT_DTM_END: NORMAL
MDC_IDC_STAT_EPISODE_TOTAL_COUNT_DTM_START: NORMAL
MDC_IDC_STAT_EPISODE_TYPE: NORMAL
MDC_IDC_STAT_TACHYTHERAPY_RECENT_DTM_END: NORMAL
MDC_IDC_STAT_TACHYTHERAPY_RECENT_DTM_START: NORMAL
MDC_IDC_STAT_TACHYTHERAPY_TOTAL_DTM_END: NORMAL
MDC_IDC_STAT_TACHYTHERAPY_TOTAL_DTM_START: NORMAL

## 2025-02-10 PROCEDURE — 93296 REM INTERROG EVL PM/IDS: CPT | Performed by: INTERNAL MEDICINE

## 2025-02-10 PROCEDURE — 93294 REM INTERROG EVL PM/LDLS PM: CPT | Performed by: INTERNAL MEDICINE

## 2025-02-14 NOTE — ED PROVIDER NOTES
EMERGENCY DEPARTMENT ENCOUNTER      NAME: Felicitas Elliott  AGE: 84 year old female  YOB: 1940  MRN: [id number]  EVALUATION DATE & TIME: 1/30/2025 11:00 AM    PCP: Timbo Medina    ED PROVIDER: Bell Aggarwal MD      Chief Complaint   Patient presents with    Hypotension         FINAL IMPRESSION:  1. Chronic hypotension          ED COURSE & MEDICAL DECISION MAKING:    Pertinent Labs & Imaging studies reviewed. (See chart for details)    11:59 PM I introduced myself to the patient, obtained patient history, performed a physical exam, and discussed plan for ED workup including potential diagnostic laboratory/imaging studies and interventions.    84 year old female presents to the Emergency Department for evaluation of ***         At the conclusion of the encounter I discussed the results of all of the tests and the disposition. The questions were answered. The patient or family acknowledged understanding and was agreeable with the care plan.     *** minutes of critical care time       Medical Decision Making  Obtained supplemental history:{Supplemental history obtained?:056915}  Reviewed external records: {External records reviewed?:914762}  Care impacted by chronic illness:{CHRONIC ILLNESS:793797}  Care significantly affected by social determinants of health:{SOCIAL DETERMINANTS OF HEALTH:499020}  {Did you consider but not order tests?:820029}  {Did you interpret images independently?:274456}  Consultation discussion with other provider:{Did you involve another provider (consultant, , pharmacy, etc.)?:534404}  {ADMIT VS D/C:002207}    {[facility] DOCUMENTATION:036092}      MEDICATIONS GIVEN IN THE EMERGENCY:  Medications   iopamidol (ISOVUE-370) solution 75 mL (75 mLs Intravenous $Given 1/30/25 1343)   sodium chloride 0.9% BOLUS 500 mL (0 mLs Intravenous Stopped 1/30/25 1518)       NEW PRESCRIPTIONS STARTED AT TODAY'S ER VISIT  Discharge Medication List as of 1/30/2025  3:19 PM              =================================================================    [facility]    Patient information was obtained from: ***    Use of : N/A ***, Yes (MARTII *** Phone ***In Person) - Language ***        Felicitas Elliott is a 84 year old female with a pertinent history of *** who presents to this ED for evaluation of ***      REVIEW OF SYSTEMS   Pertinent positives and negatives are documented in the HPI. All other systems reviewed and are negative.      PAST MEDICAL HISTORY:  No past medical history on file.    PAST SURGICAL HISTORY:  Past Surgical History:   Procedure Laterality Date    CORONARY ARTERY BYPASS GRAFT, WITH AORTIC VALVE REPLACEMENT, WITH ENDOSCOPIC VESSEL PROCUREMENT N/A 8/27/2024    Procedure: CORONARY ARTERY BYPASS GRAFT TIMES TWO, WITH AORTIC ROOT REPLACEMENT, WITH LEFT INTERNAL MAMMARY ARTERY HARVEST, LEFT SAPHNENOUS ENDOSCOPIC VESSEL PROCUREMENT,;  Surgeon: Dylan Renae MD;  Location: [facility] OR    CV CORONARY ANGIOGRAM N/A 8/20/2024    Procedure: CV CORONARY ANGIOGRAM;  Surgeon: Den Wylie MD;  Location: [facility] LAB CV    EP PACEMAKER DEVICE & IMPLANT- HIS LEAD DUAL N/A 9/12/2024    Procedure: Pacemaker Device & Lead Implant - HIS Lead Dual;  Surgeon: Minh Pina MD;  Location: [facility] CATH LAB CV    IR CVC NON TUNNEL PLACEMENT > 5 YRS  9/1/2024    IR CVC TUNNEL PLACEMENT > 5 YRS OF AGE  9/11/2024    PICC TRIPLE LUMEN PLACEMENT  9/6/2024    REPLACE VALVE MITRAL N/A 8/27/2024    Procedure: MITRAL VALVE REPLACEMENT,;  Surgeon: Dylan Renae MD;  Location: [facility] OR    TRANSESOPHAGEAL ECHOCARDIOGRAM INTRAOPERATIVE  8/27/2024    Procedure: ANESTHESIA TRANSESOPHAGEAL ECHOCARDIOGRAM, EPI-AORTIC ULTRASOUND;  Surgeon: Dylan Renae MD;  Location: [facility] OR           CURRENT MEDICATIONS:    acetaminophen (TYLENOL) 325 MG tablet  albuterol (PROVENTIL) (2.5 MG/3ML) 0.083% neb solution  atorvastatin (LIPITOR) 80 MG  tablet  bisacodyl (DULCOLAX) 10 MG suppository  bisacodyl (DULCOLAX) 5 MG EC tablet  clopidogrel (PLAVIX) 75 MG tablet  escitalopram (LEXAPRO) 5 MG tablet  famotidine (PEPCID) 10 MG tablet  Lidocaine (LIDOCARE) 4 % Patch  lidocaine (XYLOCAINE) 5 % external ointment  methocarbamol (ROBAXIN) 500 MG tablet  methocarbamol (ROBAXIN) 500 MG tablet  midodrine (PROAMATINE) 10 MG tablet  ondansetron (ZOFRAN) 8 MG tablet  pantoprazole (PROTONIX) 40 MG EC tablet  senna (SENOKOT) 8.6 MG tablet  simethicone (MYLICON) 80 MG chewable tablet  sodium chloride (NEBUSAL) 3 % neb solution  torsemide (DEMADEX) 100 MG tablet  traZODone (DESYREL) 50 MG tablet  warfarin ANTICOAGULANT (COUMADIN) 2 MG tablet  zinc oxide 10 % OINT        ALLERGIES:  Allergies   Allergen Reactions    Aspirin Rash    Cats Rash    Nickel Rash       FAMILY HISTORY:  No family history on file.    SOCIAL HISTORY:   Social History     Socioeconomic History    Marital status:      Spouse name: Ed    Number of children: 7    Years of education: [employer]    Highest education level: 12th grade   Occupational History    Occupation:    Tobacco Use    Smoking status: Former     Current packs/day: 8.00     Average packs/day: 8.0 packs/day for 1.1 years (8.9 ttl pk-yrs)     Types: Cigarettes     Start date: 2024    Smokeless tobacco: Former   Vaping Use    Vaping status: Never Used   Substance and Sexual Activity    Alcohol use: Not Currently    Sexual activity: Not Currently     Partners: Male     Birth control/protection: None     Social Drivers of Health     Financial Resource Strain: Low Risk  (1/14/2025)    Financial Resource Strain     Within the past 12 months, have you or your family members you live with been unable to get utilities (heat, electricity) when it was really needed?: No   Food Insecurity: Low Risk  (1/14/2025)    Food Insecurity     Within the past 12 months, did you worry that your food would run out before you got money to buy  more?: No     Within the past 12 months, did the food you bought just not last and you didn t have money to get more?: No   Transportation Needs: Low Risk  (1/14/2025)    Transportation Needs     Within the past 12 months, has lack of transportation kept you from medical appointments, getting your medicines, non-medical meetings or appointments, work, or from getting things that you need?: No   Social Connections: Socially Integrated (1/3/2025)    Received from [facility]    Social Connections     Do you often feel lonely or isolated from those around you?: 0   Interpersonal Safety: Low Risk  (1/14/2025)    Interpersonal Safety     Do you feel physically and emotionally safe where you currently live?: Yes     Within the past 12 months, have you been hit, slapped, kicked or otherwise physically hurt by someone?: No     Within the past 12 months, have you been humiliated or emotionally abused in other ways by your partner or ex-partner?: No   Housing Stability: Low Risk  (1/14/2025)    Housing Stability     Do you have housing? : Yes     Are you worried about losing your housing?: No       VITALS:  /51   Pulse 69   Temp 98.3  F (36.8  C) (Oral)   Resp 25   Wt 69.2 kg (152 lb 8 oz)   SpO2 98%   BMI 24.61 kg/m      PHYSICAL EXAM    Physical Exam  Constitutional: Well developed, Well nourished, NAD, GCS 15  HENT: Normocephalic, Atraumatic, Bilateral external ears normal, Oropharynx normal, mucous membranes moist, Nose normal. Neck-  Normal range of motion, No tenderness, Supple, No stridor.    Eyes: PERRL, EOMI, Conjunctiva normal, No discharge.   Respiratory: Normal breath sounds, No respiratory distress, No wheezing or crackles, Speaks in full sentences easily.    Cardiovascular: Normal heart rate, Regular rhythm, No murmurs, No rubs, No gallops. 2+ radial pulses bilaterally  GI: Bowel sounds normal, Soft, No tenderness, No masses, No rebound or guarding.  Musculoskeletal:  2+ DP pulses.Mild bilateral lower extremity edema. Good range of motion in all major joints. No tenderness to palpation or major deformities noted.   Integument: Warm, Dry, No erythema, No rash. No petechiae.   Neurologic: Alert & oriented x 3, 5/5 strength in all 4 extremities bilaterally. Sensation intact to light touch in all 4 extremities and the face bilaterally. No focal deficits noted.   Psychiatric: Affect normal, Judgment normal, Mood normal. Cooperative.      LAB:  All pertinent labs reviewed and interpreted.  Results for orders placed or performed during the hospital encounter of 01/30/25   US Lower Extremity Venous Duplex Right    Impression    IMPRESSION:  1.  Interval progressive occlusive thrombus within the upper and lower segments of the right femoral vein.  2.  Interval resolution of the previously seen thrombus in the right popliteal vein.   Chest XR,  PA & LAT    Impression    IMPRESSION: No change in dual-lead cardiac pacer right IJ venous catheter. 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 Heart size is normal. No acute airspace opacities. Small right pleural effusion has decreased from prior study.   CT Chest Pulmonary Embolism w Contrast    Impression    IMPRESSION:  1.  Persistent bilateral pulmonary emboli with overall decreased clot burden compared to 1/14/2025.  2.  Borderline right heart strain.  3.  Unchanged small bilateral pleural effusions and bilateral lower lobe consolidation.       Comprehensive metabolic panel   Result Value Ref Range    Sodium 137 135 - 145 mmol/L    Potassium 3.1 (L) 3.4 - 5.3 mmol/L    Carbon Dioxide (CO2) 25 22 - 29 mmol/L    Anion Gap 12 7 - 15 mmol/L    Urea Nitrogen 26.5 (H) 8.0 - 23.0 mg/dL    Creatinine 3.34 (H) 0.51 - 0.95 mg/dL    GFR Estimate 13 (L) >60 mL/min/1.73m2    Calcium 9.0 8.8 - 10.4 mg/dL    Chloride 100 98 - 107 mmol/L    Glucose 87 70 - 99 mg/dL    Alkaline Phosphatase 292 (H) 40 - 150 U/L    AST 31 0 - 45 U/L    ALT 21 0 - 50 U/L     Protein Total 6.5 6.4 - 8.3 g/dL    Albumin 3.0 (L) 3.5 - 5.2 g/dL    Bilirubin Total 0.5 <=1.2 mg/dL   Lactic acid whole blood with 1x repeat in 2 hr when >2   Result Value Ref Range    Lactic Acid, Initial 0.8 0.7 - 2.0 mmol/L   Result Value Ref Range    Troponin T, High Sensitivity 73 (H) <=14 ng/L   UA with Microscopic reflex to Culture    Specimen: Urine, Clean Catch   Result Value Ref Range    Color Urine Colorless Colorless, Straw, Light Yellow, Yellow    Appearance Urine Clear Clear    Glucose Urine Negative Negative mg/dL    Bilirubin Urine Negative Negative    Ketones Urine Negative Negative mg/dL    Specific Gravity Urine 1.013 1.001 - 1.030    Blood Urine Negative Negative    pH Urine 5.5 5.0 - 7.0    Protein Albumin Urine Negative Negative mg/dL    Urobilinogen Urine <2.0 <2.0 mg/dL    Nitrite Urine Negative Negative    Leukocyte Esterase Urine 25 Marty/uL (A) Negative    Mucus Urine Present (A) None Seen /LPF    RBC Urine 1 <=2 /HPF    WBC Urine 3 <=5 /HPF    Squamous Epithelials Urine 1 <=1 /HPF    Hyaline Casts Urine 29 (H) <=2 /LPF   CBC with platelets and differential   Result Value Ref Range    WBC Count 8.1 4.0 - 11.0 10e3/uL    RBC Count 3.66 (L) 3.80 - 5.20 10e6/uL    Hemoglobin 10.1 (L) 11.7 - 15.7 g/dL    Hematocrit 33.0 (L) 35.0 - 47.0 %    MCV 90 78 - 100 fL    MCH 27.6 26.5 - 33.0 pg    MCHC 30.6 (L) 31.5 - 36.5 g/dL    RDW 18.0 (H) 10.0 - 15.0 %    Platelet Count 294 150 - 450 10e3/uL    % Neutrophils 78 %    % Lymphocytes 12 %    % Monocytes 6 %    % Eosinophils 4 %    % Basophils 1 %    % Immature Granulocytes 1 %    NRBCs per 100 WBC 0 <1 /100    Absolute Neutrophils 6.3 1.6 - 8.3 10e3/uL    Absolute Lymphocytes 0.9 0.8 - 5.3 10e3/uL    Absolute Monocytes 0.5 0.0 - 1.3 10e3/uL    Absolute Eosinophils 0.3 0.0 - 0.7 10e3/uL    Absolute Basophils 0.0 0.0 - 0.2 10e3/uL    Absolute Immature Granulocytes 0.0 <=0.4 10e3/uL    Absolute NRBCs 0.0 10e3/uL   Result Value Ref Range    Troponin T,  High Sensitivity 71 (H) <=14 ng/L   Result Value Ref Range    INR 2.10 (H) 0.85 - 1.15   ECG 12-LEAD WITH MUSE (LHE)   Result Value Ref Range    Systolic Blood Pressure 95 mmHg    Diastolic Blood Pressure 57 mmHg    Ventricular Rate 76 BPM    Atrial Rate 76 BPM    GA Interval 172 ms    QRS Duration 130 ms     ms    QTc 515 ms    P Axis  degrees    R AXIS 86 degrees    T Axis -75 degrees    Interpretation ECG       Sinus rhythm  Non-specific intra-ventricular conduction block  Cannot rule out Anteroseptal infarct , age undetermined  T wave abnormality, consider inferior ischemia  Abnormal ECG  When compared with ECG of 13-Jan-2025 09:59,  Sinus rhythm has replaced Wide QRS rhythm  Confirmed by SEE ED PROVIDER NOTE FOR, ECG INTERPRETATION ([id number]),  KUSH QUEEN ([id number]) on 1/31/2025 9:02:22 AM         RADIOLOGY:  Reviewed all pertinent imaging. Please see official radiology report.  CT Chest Pulmonary Embolism w Contrast   Final Result   IMPRESSION:   1.  Persistent bilateral pulmonary emboli with overall decreased clot burden compared to 1/14/2025.   2.  Borderline right heart strain.   3.  Unchanged small bilateral pleural effusions and bilateral lower lobe consolidation.            Chest XR,  PA & LAT   Final Result   IMPRESSION: No change in dual-lead cardiac pacer right IJ venous catheter. 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 Heart size is normal. No acute airspace opacities. Small right pleural effusion has decreased from prior study.      US Lower Extremity Venous Duplex Right   Final Result   IMPRESSION:   1.  Interval progressive occlusive thrombus within the upper and lower segments of the right femoral vein.   2.  Interval resolution of the previously seen thrombus in the right popliteal vein.      EP Report - HIM Scan   Final Result          EKG:    Performed at: ***    Impression: ***      I have independently reviewed and interpreted the EKG(s) documented above.    Bell Aggarwal,  MD  [facility] EMERGENCY ROOM  [address] [id number] [phone]

## 2025-02-20 ENCOUNTER — OFFICE VISIT (OUTPATIENT)
Dept: CARDIOLOGY | Facility: CLINIC | Age: 85
End: 2025-02-20
Payer: COMMERCIAL

## 2025-02-20 VITALS
SYSTOLIC BLOOD PRESSURE: 90 MMHG | DIASTOLIC BLOOD PRESSURE: 68 MMHG | BODY MASS INDEX: 23.3 KG/M2 | WEIGHT: 145 LBS | RESPIRATION RATE: 18 BRPM | OXYGEN SATURATION: 93 % | HEART RATE: 102 BPM | HEIGHT: 66 IN

## 2025-02-20 DIAGNOSIS — I49.5 SSS (SICK SINUS SYNDROME) (H): ICD-10-CM

## 2025-02-20 DIAGNOSIS — I50.32 CHRONIC HEART FAILURE WITH PRESERVED EJECTION FRACTION (HFPEF) (H): Primary | ICD-10-CM

## 2025-02-20 DIAGNOSIS — I26.99 PE (PULMONARY THROMBOEMBOLISM) (H): ICD-10-CM

## 2025-02-20 DIAGNOSIS — Z99.2 ESRD (END STAGE RENAL DISEASE) ON DIALYSIS (H): ICD-10-CM

## 2025-02-20 DIAGNOSIS — I95.89 OTHER SPECIFIED HYPOTENSION: ICD-10-CM

## 2025-02-20 DIAGNOSIS — I35.0 NONRHEUMATIC AORTIC VALVE STENOSIS: ICD-10-CM

## 2025-02-20 DIAGNOSIS — N18.6 ESRD (END STAGE RENAL DISEASE) ON DIALYSIS (H): ICD-10-CM

## 2025-02-20 DIAGNOSIS — I05.9 MITRAL VALVE DISORDER: ICD-10-CM

## 2025-02-20 DIAGNOSIS — I25.10 CORONARY ARTERY DISEASE INVOLVING NATIVE CORONARY ARTERY OF NATIVE HEART, UNSPECIFIED WHETHER ANGINA PRESENT: ICD-10-CM

## 2025-02-20 PROBLEM — K92.0 HEMATEMESIS WITH NAUSEA: Status: RESOLVED | Noted: 2025-01-13 | Resolved: 2025-02-20

## 2025-02-20 PROBLEM — I95.9 HYPOTENSION: Status: ACTIVE | Noted: 2025-02-20

## 2025-02-20 PROBLEM — R78.81 BACTEREMIA: Status: RESOLVED | Noted: 2024-08-16 | Resolved: 2025-02-20

## 2025-02-20 PROBLEM — R11.2 NAUSEA AND VOMITING, UNSPECIFIED VOMITING TYPE: Status: RESOLVED | Noted: 2025-01-13 | Resolved: 2025-02-20

## 2025-02-20 PROBLEM — A41.9 SEPSIS (H): Status: RESOLVED | Noted: 2024-08-16 | Resolved: 2025-02-20

## 2025-02-20 PROBLEM — J18.9 COMMUNITY ACQUIRED PNEUMONIA OF RIGHT LOWER LOBE OF LUNG: Status: RESOLVED | Noted: 2025-01-13 | Resolved: 2025-02-20

## 2025-02-20 RX ORDER — MUPIROCIN 20 MG/G
OINTMENT TOPICAL 3 TIMES DAILY
COMMUNITY
Start: 2025-02-17 | End: 2025-02-22

## 2025-02-20 NOTE — LETTER
2/20/2025    [facility]  [address] [address]    RE: Felicitas Elliott       Dear Colleague,     I had the pleasure of seeing Felicitas Elliott in the [facility].          Assessment/Recommendations     #   Chronic diastolic heart failure, NYHA Class NYHA Class II-III:  Echocardiogram in December 2024 showed preserved LVEF of 55 to 60%.  Clinic weight today is 145 pounds.  Weight is down 20 pounds in the past 4 months.    Patient is well compensated on exam except mild pedal edema.  Reports following low-sodium diet.  Reports adequate fluid intake.    Heart failure regimen includes:    -Not on Aldosterone blocker/MRA therapy likely due to renal dysfunction    -Not on SGLT2 Inhibitor likely due to renal dysfunction    -Diuretic therapy with Torsemide 50 mg daily.    #  ESRD on Hemodialysis: Per patient her dialysis is reduced from 3 times a week to twice a week.  She makes urine.  She is on torsemide 50 mg daily.    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    # Chronic hypoxia: On home O2 supplement at night.    # Sick sinus syndrome with status post pacemaker: Stable device check in February 2024.     # Hypotension: On as needed midodrine.  Blood pressure today is 90/68.    # Anemia of chronic disease: Hemoglobin is 11.8 on 2/6/2025.    # DVT/PE: Patient was hospitalized from January 13 to 23 with DVT, acute pulmonary embolism, community-acquired pneumonia.  On warfarin therapy.    Plan/Recommendation:  -No medication changes made  "today  -Compression stocking for lower extremity edema.  -Elevate legs.  -Recommended patient to follow-up with PCP to discuss alternative therapy for anticoagulation therapy.  Patient and spouse expressed frustration about frequent needing INR check and were able INR readings.    Follow up with Dr. Santana in May 2025.  Follow-up with me in 6 months in heart failure clini     The longitudinal plan of care for chronic diastolic heart failure was addressed during this visit.?Due to the added   complexity in care, I will continue to support Felicitas Elliott in the subsequent management of this   condition(s) and with the ongoing continuity of care of this condition(s)\".     History of Present Illness/Subjective    Ms. Felicitas Elliott is a 84 year old female who is seen at [facility] for post hospitalization heart failure follow up.     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      Patient was hospitalized from August 16 through September 16 with fatigue, weakness, chills, decreased appetite and was found in's sepsis and HAMMAD.  ID was consulted patient was treated with empiric antibiotic.  Neurology was consulted for possible discitis.  Brain MRI showed scattered embolic infarcts.  Patient underwent mitral and aortic valve replacement and  CABG on 8/27/2024 with Dr. Renae.  Procedure was uneventful.  Unfortunately patient developed hypoxia on postop day 2.  CRRT was initiated with minimal urine output.  Patient was transitioned to hemodialysis.  Due to arrhythmia and bradycardia, patient underwent pacemaker placement.    Unfortunately, patient was rehospitalized in January with DVT, acute pulmonary embolism suspected provoked, and community-acquired pneumonia.    Patient saw Dr. Santana in November.  Her metoprolol was stopped due to hypotension.  Her " torsemide dose was reduced.    Today, Felicitas kaplan is accompanied by her spouse.  She is comfortably sitting in wheelchair.  She continues to have some shortness of breath on exertion and lower extremity edema.  She denies worsening since her torsemide dose was reduced.  She has been experiencing some pain and pressure in her right eye and has follow-up appointment with ophthalmology next week.  Her appetite is improving.  She denies shortness of breath, orthopnea, PND, palpitations, chest pain, abdominal fullness/bloating, and lower extremity edema.  She has issue with chronic constipation and chronic hypotension.    Patient lives with her  at home.  He is primary caregiver.  He helps her manage her medications.    Echocardiogram from December 2024-reviewed  Interpretation Summary     1. Left ventricular size, wall motion and function are normal. The ejection  fraction is 55-60%.  2. Normal right ventricle size and systolic function.  3. Normal left atrial size.  4. There is a bioprosthetic valve in the mitral position (documented 31 mm [employer] porcine pericardial tissue valve). Normal mean prosthetic  mitral valve gradient of 4 mmHg.  5. There is a bioprosthetic valve in the aortic position (documented 25 mm  [employer] Konect Resilia aortic valved conduit). The peak and mean  prosthetic valve gradients are normal at 15 and 9 mmHg respectively.  6. Compared to prior study, there is no significant change.    ECHO from 8/2024-Reviewed:   Interpretation Summary   1. Normal left ventricular size and systolic performance with a visually  estimated ejection fraction of 60%.  2. There is abnormal septal motion likely related to altered electrical  activation due to bundle branch block.  3. There is a bio-prosthetic aortic valve (documented 25 mm Silva  [employer] Konect Resilia aortic valved conduit).  Â  Normal aortic valve prosthesis metrics with a mean systolic gradient of 4  mmHg  "and a peak anterograde velocity of 1.5 m/sec.  Â  No aortic insufficiency is detected.  4. There is a bio-prosthetic mitral valve (documented 31 mm [employer]  Epic porcine pericardial tissue valve).  Â  Normal mitral valve prosthesis function; mean diastolic gradient is 5 mm Hg.   Â  No significant mitral insufficiency is detected.  5. Probable normal right ventricular size and systolic performance though  right-sided structures are not clearly visualized on all views on this study.     Physical Examination Review of Systems   BP 90/68 (BP Location: Left arm, Patient Position: Sitting, Cuff Size: Adult Regular)   Pulse 102   Resp 18   Ht 1.676 m (5' 6\")   Wt 65.8 kg (145 lb)   BMI 23.40 kg/m    Body mass index is 23.4 kg/m .  Wt Readings from Last 3 Encounters:   02/20/25 65.8 kg (145 lb)   01/30/25 69.2 kg (152 lb 8 oz)   01/23/25 63.5 kg (139 lb 15.9 oz)     General Appearance:   no distress, normal body habitus   ENT/Mouth: membranes moist, no oral lesions or bleeding gums.      EYES:  no scleral icterus, normal conjunctivae   Neck: no carotid bruits or thyromegaly   Chest/Lungs:   lungs are clear to auscultation, no rales or wheezing, equal chest wall expansion    Cardiovascular:   Normal first and second heart sounds with , rubs, or gallops; neck vein assessment limited as patient was examined in the chair due to safety concerns of getting on the exam table; mild pedal edema bilaterally    Abdomen:  no organomegaly, masses, bruits, or tenderness; bowel sounds are present   Extremities   no cyanosis or clubbing    CMS intact.   Skin: no xanthelasma, warm.    Neurologic: Alert and oriented; no tremors   Psychiatric: calm and cooperative                                                   Negative unless noted in HPI     Medical History  Surgical History Family History Social History   No past medical history on file. Past Surgical History:   Procedure Laterality Date     CORONARY ARTERY BYPASS GRAFT, " WITH AORTIC VALVE REPLACEMENT, WITH ENDOSCOPIC VESSEL PROCUREMENT N/A 8/27/2024    Procedure: CORONARY ARTERY BYPASS GRAFT TIMES TWO, WITH AORTIC ROOT REPLACEMENT, WITH LEFT INTERNAL MAMMARY ARTERY HARVEST, LEFT SAPHNENOUS ENDOSCOPIC VESSEL PROCUREMENT,;  Surgeon: Dylan Renae MD;  Location: [facility] OR     CV CORONARY ANGIOGRAM N/A 8/20/2024    Procedure: CV CORONARY ANGIOGRAM;  Surgeon: Den Wylie MD;  Location: [facility] CATH LAB CV     EP PACEMAKER DEVICE & IMPLANT- HIS LEAD DUAL N/A 9/12/2024    Procedure: Pacemaker Device & Lead Implant - HIS Lead Dual;  Surgeon: Minh Pina MD;  Location: [facility] CATH LAB CV     IR CVC NON TUNNEL PLACEMENT > 5 YRS  9/1/2024     IR CVC TUNNEL PLACEMENT > 5 YRS OF AGE  9/11/2024     PICC TRIPLE LUMEN PLACEMENT  9/6/2024     REPLACE VALVE MITRAL N/A 8/27/2024    Procedure: MITRAL VALVE REPLACEMENT,;  Surgeon: Dylan Renae MD;  Location: [facility] OR     TRANSESOPHAGEAL ECHOCARDIOGRAM INTRAOPERATIVE  8/27/2024    Procedure: ANESTHESIA TRANSESOPHAGEAL ECHOCARDIOGRAM, EPI-AORTIC ULTRASOUND;  Surgeon: Dylan Renae MD;  Location: [facility] OR    No family history on file. Social History     Socioeconomic History     Marital status:      Spouse name: Ed     Number of children: 7     Years of education: [employer]     Highest education level: 12th grade   Occupational History     Occupation:    Tobacco Use     Smoking status: Former     Current packs/day: 8.00     Average packs/day: 8.0 packs/day for 1.1 years (9.1 ttl pk-yrs)     Types: Cigarettes     Start date: 2024     Smokeless tobacco: Former   Vaping Use     Vaping status: Never Used   Substance and Sexual Activity     Alcohol use: Not Currently     Drug use: Not on file     Sexual activity: Not Currently     Partners: Male     Birth control/protection: None   Other Topics Concern     Not on file   Social History Narrative     Not on file      Social Drivers of Health     Financial Resource Strain: Low Risk  (1/14/2025)    Financial Resource Strain      Within the past 12 months, have you or your family members you live with been unable to get utilities (heat, electricity) when it was really needed?: No   Food Insecurity: Low Risk  (1/14/2025)    Food Insecurity      Within the past 12 months, did you worry that your food would run out before you got money to buy more?: No      Within the past 12 months, did the food you bought just not last and you didn t have money to get more?: No   Transportation Needs: Low Risk  (1/14/2025)    Transportation Needs      Within the past 12 months, has lack of transportation kept you from medical appointments, getting your medicines, non-medical meetings or appointments, work, or from getting things that you need?: No   Physical Activity: Not on file   Stress: Not on file   Social Connections: Socially Integrated (1/3/2025)    Received from [facility]    Social Connections      Do you often feel lonely or isolated from those around you?: 0   Interpersonal Safety: Low Risk  (1/14/2025)    Interpersonal Safety      Do you feel physically and emotionally safe where you currently live?: Yes      Within the past 12 months, have you been hit, slapped, kicked or otherwise physically hurt by someone?: No      Within the past 12 months, have you been humiliated or emotionally abused in other ways by your partner or ex-partner?: No   Housing Stability: Low Risk  (1/14/2025)    Housing Stability      Do you have housing? : Yes      Are you worried about losing your housing?: No          Medications  Allergies   Current Outpatient Medications   Medication Sig Dispense Refill     acetaminophen (TYLENOL) 325 MG tablet Take 2 tablets (650 mg) by mouth every 4 hours as needed for other (mild pain (1-3)). 120 tablet 0     albuterol (PROVENTIL) (2.5 MG/3ML) 0.083% neb solution Take 1 vial (2.5 mg) by  nebulization every 6 hours as needed for wheezing. 90 mL 0     atorvastatin (LIPITOR) 80 MG tablet Take 1 tablet (80 mg) by mouth every evening. 90 tablet 0     bisacodyl (DULCOLAX) 10 MG suppository Place 1 suppository (10 mg) rectally daily as needed for constipation (Use if Magnesium hydroxide (MILK of MAGNESIA) not effective after 24 hours. May discontinue if patient having bowel movement.). 30 suppository 0     bisacodyl (DULCOLAX) 5 MG EC tablet Take 5 mg by mouth daily as needed for constipation.       clopidogrel (PLAVIX) 75 MG tablet Take 1 tablet (75 mg) by mouth daily. 90 tablet 0     escitalopram (LEXAPRO) 5 MG tablet Take 5 mg by mouth at bedtime.       famotidine (PEPCID) 10 MG tablet Take 1 tablet (10 mg) by mouth 2 times daily. 60 tablet 0     Lidocaine (LIDOCARE) 4 % Patch Place 1 patch over 12 hours onto the skin every 24 hours. To prevent lidocaine toxicity, patient should be patch free for 12 hrs daily. (Patient taking differently: Place 1 patch onto the skin daily as needed for other (neck pain). To prevent lidocaine toxicity, patient should be patch free for 12 hrs daily.) 30 patch 0     lidocaine (XYLOCAINE) 5 % external ointment Apply topically 3 times daily. 240 g 1     methocarbamol (ROBAXIN) 500 MG tablet Take 1 tablet (500 mg) by mouth 2 times daily as needed for muscle spasms (for any muscle spasms). 12 tablet 0     methocarbamol (ROBAXIN) 500 MG tablet Take 0.5 tablets (250 mg) by mouth 2 times daily as needed for muscle spasms. 8 tablet 0     midodrine (PROAMATINE) 10 MG tablet Take 1 tablet (10 mg) by mouth 3 times daily as needed (SBP <90). (Patient taking differently: Take 5-10 mg by mouth 3 times daily as needed (SBP <90). 10mg when patient's SBP<90. Gives 5mg TID when patient's SBP >90. HOLD doses when SBP >120.) 60 tablet 3     mupirocin (BACTROBAN) 2 % external ointment Apply topically 3 times daily.       ondansetron (ZOFRAN) 8 MG tablet Take 1 tablet (8 mg) by mouth every 8  hours as needed for nausea or vomiting. 30 tablet 0     pantoprazole (PROTONIX) 40 MG EC tablet Take 1 tablet (40 mg) by mouth every morning (before breakfast). 30 tablet 0     senna (SENOKOT) 8.6 MG tablet Take 1 tablet by mouth daily. 30 tablet 3     simethicone (MYLICON) 80 MG chewable tablet Take 1 tablet (80 mg) by mouth 3 times daily. (Patient taking differently: Take 80 mg by mouth 3 times daily as needed.) 90 tablet 0     sodium chloride (NEBUSAL) 3 % neb solution Take 3 mLs by nebulization every 6 hours as needed for wheezing. 15 mL 0     torsemide (DEMADEX) 100 MG tablet Take 0.5 tablets (50 mg) by mouth daily. 30 tablet 0     traZODone (DESYREL) 50 MG tablet Take 0.5 tablets (25 mg) by mouth nightly as needed for sleep. 30 tablet 0     warfarin ANTICOAGULANT (COUMADIN) 2 MG tablet Take 4mg daily until next INR lab draw and dose will be updated. Earliest visit can be 1/21 Tuesday. 15 tablet 0     zinc oxide 10 % OINT Externally apply topically 4 times daily as needed (for buttock wound).      Allergies   Allergen Reactions     Aspirin Rash     Cats Rash     Morphine Rash     Only reacted once, had again without problems.     Nickel Rash         Lab Results    Chemistry/lipid CBC Cardiac Enzymes/BNP/TSH/INR   Lab Results   Component Value Date    BUN 26.5 (H) 01/30/2025     01/30/2025    CO2 25 01/30/2025    Lab Results   Component Value Date    WBC 8.1 01/30/2025    HGB 10.1 (L) 01/30/2025    HCT 33.0 (L) 01/30/2025    MCV 90 01/30/2025     01/30/2025    Lab Results   Component Value Date    INR 2.10 (H) 01/30/2025        30 minutes spent on the date of encounter doing chart review, review of test results, interpretation with above tests, patient visit, documentation, and discussion with family.      This note has been dictated using voice recognition software. Any grammatical, typographical, or context distortions are unintentional and inherent to the software          Thank you for allowing  me to participate in the care of your patient.      Sincerely,     CRUZITO Molina CNP     [facility]  cc:   CRUZITO Molina CNP  [address]  [place] [address]

## 2025-02-20 NOTE — PROGRESS NOTES
Assessment/Recommendations     #   Chronic diastolic heart failure, NYHA Class NYHA Class II-III:  Echocardiogram in December 2024 showed preserved LVEF of 55 to 60%.  Clinic weight today is 145 pounds.  Weight is down 20 pounds in the past 4 months.    Patient is well compensated on exam except mild pedal edema.  Reports following low-sodium diet.  Reports adequate fluid intake.    Heart failure regimen includes:    -Not on Aldosterone blocker/MRA therapy likely due to renal dysfunction    -Not on SGLT2 Inhibitor likely due to renal dysfunction    -Diuretic therapy with Torsemide 50 mg daily.    #  ESRD on Hemodialysis: Per patient her dialysis is reduced from 3 times a week to twice a week.  She makes urine.  She is on torsemide 50 mg daily.    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    # Chronic hypoxia: On home O2 supplement at night.    # Sick sinus syndrome with status post pacemaker: Stable device check in February 2024.     # Hypotension: On as needed midodrine.  Blood pressure today is 90/68.    # Anemia of chronic disease: Hemoglobin is 11.8 on 2/6/2025.    # DVT/PE: Patient was hospitalized from January 13 to 23 with DVT, acute pulmonary embolism, community-acquired pneumonia.  On warfarin therapy.    Plan/Recommendation:  -No medication changes made today  -Compression stocking for lower extremity edema.  -Elevate legs.  -Recommended patient to follow-up with PCP to discuss alternative therapy for anticoagulation therapy.  Patient and spouse expressed frustration about frequent needing  "INR check and were able INR readings.    Follow up with Dr. Santana in May 2025.  Follow-up with me in 6 months in heart failure clini     The longitudinal plan of care for chronic diastolic heart failure was addressed during this visit.?Due to the added   complexity in care, I will continue to support Felicitas Elliott in the subsequent management of this   condition(s) and with the ongoing continuity of care of this condition(s)\".     History of Present Illness/Subjective    Ms. Felicitas Elliott is a 84 year old female who is seen at [facility] for post hospitalization heart failure follow up.     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      Patient was hospitalized from August 16 through September 16 with fatigue, weakness, chills, decreased appetite and was found in's sepsis and HAMMAD.  ID was consulted patient was treated with empiric antibiotic.  Neurology was consulted for possible discitis.  Brain MRI showed scattered embolic infarcts.  Patient underwent mitral and aortic valve replacement and  CABG on 8/27/2024 with Dr. Renae.  Procedure was uneventful.  Unfortunately patient developed hypoxia on postop day 2.  CRRT was initiated with minimal urine output.  Patient was transitioned to hemodialysis.  Due to arrhythmia and bradycardia, patient underwent pacemaker placement.    Unfortunately, patient was rehospitalized in January with DVT, acute pulmonary embolism suspected provoked, and community-acquired pneumonia.    Patient saw Dr. Santana in November.  Her metoprolol was stopped due to hypotension.  Her torsemide dose was reduced.    Today, Felicitas kaplan is accompanied by her spouse.  She is comfortably sitting in wheelchair.  She continues to have some shortness of breath on exertion and lower extremity edema.  She denies worsening since " her torsemide dose was reduced.  She has been experiencing some pain and pressure in her right eye and has follow-up appointment with ophthalmology next week.  Her appetite is improving.  She denies shortness of breath, orthopnea, PND, palpitations, chest pain, abdominal fullness/bloating, and lower extremity edema.  She has issue with chronic constipation and chronic hypotension.    Patient lives with her  at home.  He is primary caregiver.  He helps her manage her medications.    Echocardiogram from December 2024-reviewed  Interpretation Summary     1. Left ventricular size, wall motion and function are normal. The ejection  fraction is 55-60%.  2. Normal right ventricle size and systolic function.  3. Normal left atrial size.  4. There is a bioprosthetic valve in the mitral position (documented 31 mm [employer] porcine pericardial tissue valve). Normal mean prosthetic  mitral valve gradient of 4 mmHg.  5. There is a bioprosthetic valve in the aortic position (documented 25 mm  [employer] Konect Resilia aortic valved conduit). The peak and mean  prosthetic valve gradients are normal at 15 and 9 mmHg respectively.  6. Compared to prior study, there is no significant change.    ECHO from 8/2024-Reviewed:   Interpretation Summary   1. Normal left ventricular size and systolic performance with a visually  estimated ejection fraction of 60%.  2. There is abnormal septal motion likely related to altered electrical  activation due to bundle branch block.  3. There is a bio-prosthetic aortic valve (documented 25 mm Silva  [employer] Konect Resilia aortic valved conduit).  Â  Normal aortic valve prosthesis metrics with a mean systolic gradient of 4  mmHg and a peak anterograde velocity of 1.5 m/sec.  Â  No aortic insufficiency is detected.  4. There is a bio-prosthetic mitral valve (documented 31 mm [employer]  Epic porcine pericardial tissue valve).  Â  Normal mitral valve prosthesis  "function; mean diastolic gradient is 5 mm Hg.   Â  No significant mitral insufficiency is detected.  5. Probable normal right ventricular size and systolic performance though  right-sided structures are not clearly visualized on all views on this study.     Physical Examination Review of Systems   BP 90/68 (BP Location: Left arm, Patient Position: Sitting, Cuff Size: Adult Regular)   Pulse 102   Resp 18   Ht 1.676 m (5' 6\")   Wt 65.8 kg (145 lb)   BMI 23.40 kg/m    Body mass index is 23.4 kg/m .  Wt Readings from Last 3 Encounters:   02/20/25 65.8 kg (145 lb)   01/30/25 69.2 kg (152 lb 8 oz)   01/23/25 63.5 kg (139 lb 15.9 oz)     General Appearance:   no distress, normal body habitus   ENT/Mouth: membranes moist, no oral lesions or bleeding gums.      EYES:  no scleral icterus, normal conjunctivae   Neck: no carotid bruits or thyromegaly   Chest/Lungs:   lungs are clear to auscultation, no rales or wheezing, equal chest wall expansion    Cardiovascular:   Normal first and second heart sounds with , rubs, or gallops; neck vein assessment limited as patient was examined in the chair due to safety concerns of getting on the exam table; mild pedal edema bilaterally    Abdomen:  no organomegaly, masses, bruits, or tenderness; bowel sounds are present   Extremities   no cyanosis or clubbing    CMS intact.   Skin: no xanthelasma, warm.    Neurologic: Alert and oriented; no tremors   Psychiatric: calm and cooperative                                                   Negative unless noted in HPI     Medical History  Surgical History Family History Social History   No past medical history on file. Past Surgical History:   Procedure Laterality Date    CORONARY ARTERY BYPASS GRAFT, WITH AORTIC VALVE REPLACEMENT, WITH ENDOSCOPIC VESSEL PROCUREMENT N/A 8/27/2024    Procedure: CORONARY ARTERY BYPASS GRAFT TIMES TWO, WITH AORTIC ROOT REPLACEMENT, WITH LEFT INTERNAL MAMMARY ARTERY HARVEST, LEFT SAPHNENOUS ENDOSCOPIC VESSEL " PROCUREMENT,;  Surgeon: Dylan Renae MD;  Location: [facility] OR    CV CORONARY ANGIOGRAM N/A 8/20/2024    Procedure: CV CORONARY ANGIOGRAM;  Surgeon: Den Wylie MD;  Location: [facility] LAB CV    EP PACEMAKER DEVICE & IMPLANT- HIS LEAD DUAL N/A 9/12/2024    Procedure: Pacemaker Device & Lead Implant - HIS Lead Dual;  Surgeon: Minh Pina MD;  Location: [facility] CATH LAB CV    IR CVC NON TUNNEL PLACEMENT > 5 YRS  9/1/2024    IR CVC TUNNEL PLACEMENT > 5 YRS OF AGE  9/11/2024    PICC TRIPLE LUMEN PLACEMENT  9/6/2024    REPLACE VALVE MITRAL N/A 8/27/2024    Procedure: MITRAL VALVE REPLACEMENT,;  Surgeon: Dylan Renae MD;  Location: [facility] OR    TRANSESOPHAGEAL ECHOCARDIOGRAM INTRAOPERATIVE  8/27/2024    Procedure: ANESTHESIA TRANSESOPHAGEAL ECHOCARDIOGRAM, EPI-AORTIC ULTRASOUND;  Surgeon: Dylan Renae MD;  Location: [facility] OR    No family history on file. Social History     Socioeconomic History    Marital status:      Spouse name: Ed    Number of children: 7    Years of education: [employer]    Highest education level: 12th grade   Occupational History    Occupation:    Tobacco Use    Smoking status: Former     Current packs/day: 8.00     Average packs/day: 8.0 packs/day for 1.1 years (9.1 ttl pk-yrs)     Types: Cigarettes     Start date: 2024    Smokeless tobacco: Former   Vaping Use    Vaping status: Never Used   Substance and Sexual Activity    Alcohol use: Not Currently    Drug use: Not on file    Sexual activity: Not Currently     Partners: Male     Birth control/protection: None   Other Topics Concern    Not on file   Social History Narrative    Not on file     Social Drivers of Health     Financial Resource Strain: Low Risk  (1/14/2025)    Financial Resource Strain     Within the past 12 months, have you or your family members you live with been unable to get utilities (heat, electricity) when it was really needed?: No    Food Insecurity: Low Risk  (1/14/2025)    Food Insecurity     Within the past 12 months, did you worry that your food would run out before you got money to buy more?: No     Within the past 12 months, did the food you bought just not last and you didn t have money to get more?: No   Transportation Needs: Low Risk  (1/14/2025)    Transportation Needs     Within the past 12 months, has lack of transportation kept you from medical appointments, getting your medicines, non-medical meetings or appointments, work, or from getting things that you need?: No   Physical Activity: Not on file   Stress: Not on file   Social Connections: Socially Integrated (1/3/2025)    [facility]    Social Connections     Do you often feel lonely or isolated from those around you?: 0   Interpersonal Safety: Low Risk  (1/14/2025)    Interpersonal Safety     Do you feel physically and emotionally safe where you currently live?: Yes     Within the past 12 months, have you been hit, slapped, kicked or otherwise physically hurt by someone?: No     Within the past 12 months, have you been humiliated or emotionally abused in other ways by your partner or ex-partner?: No   Housing Stability: Low Risk  (1/14/2025)    Housing Stability     Do you have housing? : Yes     Are you worried about losing your housing?: No          Medications  Allergies   Current Outpatient Medications   Medication Sig Dispense Refill    acetaminophen (TYLENOL) 325 MG tablet Take 2 tablets (650 mg) by mouth every 4 hours as needed for other (mild pain (1-3)). 120 tablet 0    albuterol (PROVENTIL) (2.5 MG/3ML) 0.083% neb solution Take 1 vial (2.5 mg) by nebulization every 6 hours as needed for wheezing. 90 mL 0    atorvastatin (LIPITOR) 80 MG tablet Take 1 tablet (80 mg) by mouth every evening. 90 tablet 0    bisacodyl (DULCOLAX) 10 MG suppository Place 1 suppository (10 mg) rectally daily as needed for constipation (Use if  Magnesium hydroxide (MILK of MAGNESIA) not effective after 24 hours. May discontinue if patient having bowel movement.). 30 suppository 0    bisacodyl (DULCOLAX) 5 MG EC tablet Take 5 mg by mouth daily as needed for constipation.      clopidogrel (PLAVIX) 75 MG tablet Take 1 tablet (75 mg) by mouth daily. 90 tablet 0    escitalopram (LEXAPRO) 5 MG tablet Take 5 mg by mouth at bedtime.      famotidine (PEPCID) 10 MG tablet Take 1 tablet (10 mg) by mouth 2 times daily. 60 tablet 0    Lidocaine (LIDOCARE) 4 % Patch Place 1 patch over 12 hours onto the skin every 24 hours. To prevent lidocaine toxicity, patient should be patch free for 12 hrs daily. (Patient taking differently: Place 1 patch onto the skin daily as needed for other (neck pain). To prevent lidocaine toxicity, patient should be patch free for 12 hrs daily.) 30 patch 0    lidocaine (XYLOCAINE) 5 % external ointment Apply topically 3 times daily. 240 g 1    methocarbamol (ROBAXIN) 500 MG tablet Take 1 tablet (500 mg) by mouth 2 times daily as needed for muscle spasms (for any muscle spasms). 12 tablet 0    methocarbamol (ROBAXIN) 500 MG tablet Take 0.5 tablets (250 mg) by mouth 2 times daily as needed for muscle spasms. 8 tablet 0    midodrine (PROAMATINE) 10 MG tablet Take 1 tablet (10 mg) by mouth 3 times daily as needed (SBP <90). (Patient taking differently: Take 5-10 mg by mouth 3 times daily as needed (SBP <90). 10mg when patient's SBP<90. Gives 5mg TID when patient's SBP >90. HOLD doses when SBP >120.) 60 tablet 3    mupirocin (BACTROBAN) 2 % external ointment Apply topically 3 times daily.      ondansetron (ZOFRAN) 8 MG tablet Take 1 tablet (8 mg) by mouth every 8 hours as needed for nausea or vomiting. 30 tablet 0    pantoprazole (PROTONIX) 40 MG EC tablet Take 1 tablet (40 mg) by mouth every morning (before breakfast). 30 tablet 0    senna (SENOKOT) 8.6 MG tablet Take 1 tablet by mouth daily. 30 tablet 3    simethicone (MYLICON) 80 MG chewable  tablet Take 1 tablet (80 mg) by mouth 3 times daily. (Patient taking differently: Take 80 mg by mouth 3 times daily as needed.) 90 tablet 0    sodium chloride (NEBUSAL) 3 % neb solution Take 3 mLs by nebulization every 6 hours as needed for wheezing. 15 mL 0    torsemide (DEMADEX) 100 MG tablet Take 0.5 tablets (50 mg) by mouth daily. 30 tablet 0    traZODone (DESYREL) 50 MG tablet Take 0.5 tablets (25 mg) by mouth nightly as needed for sleep. 30 tablet 0    warfarin ANTICOAGULANT (COUMADIN) 2 MG tablet Take 4mg daily until next INR lab draw and dose will be updated. Earliest visit can be 1/21 Tuesday. 15 tablet 0    zinc oxide 10 % OINT Externally apply topically 4 times daily as needed (for buttock wound).      Allergies   Allergen Reactions    Aspirin Rash    Cats Rash    Morphine Rash     Only reacted once, had again without problems.    Nickel Rash         Lab Results    Chemistry/lipid CBC Cardiac Enzymes/BNP/TSH/INR   Lab Results   Component Value Date    BUN 26.5 (H) 01/30/2025     01/30/2025    CO2 25 01/30/2025    Lab Results   Component Value Date    WBC 8.1 01/30/2025    HGB 10.1 (L) 01/30/2025    HCT 33.0 (L) 01/30/2025    MCV 90 01/30/2025     01/30/2025    Lab Results   Component Value Date    INR 2.10 (H) 01/30/2025        30 minutes spent on the date of encounter doing chart review, review of test results, interpretation with above tests, patient visit, documentation, and discussion with family.      This note has been dictated using voice recognition software. Any grammatical, typographical, or context distortions are unintentional and inherent to the software

## 2025-02-20 NOTE — PATIENT INSTRUCTIONS
Felicitas Elliott,    It was a pleasure to see you today at the [facility].     My recommendations after this visit include:    - No medications changes made today    - Wear compression for leg swelling    - Please discuss with primary provider regarding alternative therapy for blood thinner    - Follow up with eye doctor as scheduled next Thursday     - Please visit ER if worsening symptom with headache, blurred vision, weakness or difficult with speech    - Low sodium diet < 2000 mg/day, daily weight monitoring, and maintain adequate fluid intake at 50 to 60 ounces per day    -Please call if you experience persistent weight gain 2 to 3 pounds 2 days in a row or 5 pounds in a week with shortness of breath, abdominal bloating and leg swelling    - Follow up with Dr. Santana in May 2025 as recommended    - Follow up with Sampson Shaw CNP in 6 months     - Please call Heart Failure Nurse Line at [phone], if you have any questions or concerns

## 2025-02-27 ENCOUNTER — HOSPITAL ENCOUNTER (EMERGENCY)
Facility: CLINIC | Age: 85
Discharge: HOME | End: 2025-02-27
Attending: STUDENT IN AN ORGANIZED HEALTH CARE EDUCATION/TRAINING PROGRAM | Admitting: STUDENT IN AN ORGANIZED HEALTH CARE EDUCATION/TRAINING PROGRAM
Payer: COMMERCIAL

## 2025-02-27 VITALS
BODY MASS INDEX: 23.14 KG/M2 | WEIGHT: 144 LBS | TEMPERATURE: 97.8 F | SYSTOLIC BLOOD PRESSURE: 103 MMHG | OXYGEN SATURATION: 95 % | HEIGHT: 66 IN | RESPIRATION RATE: 16 BRPM | DIASTOLIC BLOOD PRESSURE: 62 MMHG | HEART RATE: 81 BPM

## 2025-02-27 DIAGNOSIS — R51.9 NONINTRACTABLE HEADACHE, UNSPECIFIED CHRONICITY PATTERN, UNSPECIFIED HEADACHE TYPE: ICD-10-CM

## 2025-02-27 DIAGNOSIS — G45.3 AMAUROSIS FUGAX OF RIGHT EYE: ICD-10-CM

## 2025-02-27 LAB
ANION GAP SERPL CALCULATED.3IONS-SCNC: 10 MMOL/L (ref 7–15)
BASOPHILS # BLD AUTO: 0.1 10E3/UL (ref 0–0.2)
BASOPHILS NFR BLD AUTO: 1 %
BUN SERPL-MCNC: 40.1 MG/DL (ref 8–23)
CALCIUM SERPL-MCNC: 9.8 MG/DL (ref 8.8–10.4)
CHLORIDE SERPL-SCNC: 97 MMOL/L (ref 98–107)
CREAT SERPL-MCNC: 3.02 MG/DL (ref 0.51–0.95)
CRP SERPL-MCNC: 10.3 MG/L
EGFRCR SERPLBLD CKD-EPI 2021: 15 ML/MIN/1.73M2
EOSINOPHIL # BLD AUTO: 0.3 10E3/UL (ref 0–0.7)
EOSINOPHIL NFR BLD AUTO: 3 %
ERYTHROCYTE [DISTWIDTH] IN BLOOD BY AUTOMATED COUNT: 18.6 % (ref 10–15)
ERYTHROCYTE [SEDIMENTATION RATE] IN BLOOD BY WESTERGREN METHOD: 59 MM/HR (ref 0–30)
GLUCOSE SERPL-MCNC: 108 MG/DL (ref 70–99)
HCO3 SERPL-SCNC: 28 MMOL/L (ref 22–29)
HCT VFR BLD AUTO: 39.4 % (ref 35–47)
HGB BLD-MCNC: 12.3 G/DL (ref 11.7–15.7)
HOLD SPECIMEN: NORMAL
IMM GRANULOCYTES # BLD: 0 10E3/UL
IMM GRANULOCYTES NFR BLD: 0 %
INR PPP: 1.19 (ref 0.85–1.15)
LYMPHOCYTES # BLD AUTO: 2 10E3/UL (ref 0.8–5.3)
LYMPHOCYTES NFR BLD AUTO: 26 %
MCH RBC QN AUTO: 30.1 PG (ref 26.5–33)
MCHC RBC AUTO-ENTMCNC: 31.2 G/DL (ref 31.5–36.5)
MCV RBC AUTO: 96 FL (ref 78–100)
MONOCYTES # BLD AUTO: 0.8 10E3/UL (ref 0–1.3)
MONOCYTES NFR BLD AUTO: 10 %
NEUTROPHILS # BLD AUTO: 4.6 10E3/UL (ref 1.6–8.3)
NEUTROPHILS NFR BLD AUTO: 59 %
NRBC # BLD AUTO: 0 10E3/UL
NRBC BLD AUTO-RTO: 0 /100
PLATELET # BLD AUTO: 201 10E3/UL (ref 150–450)
POTASSIUM SERPL-SCNC: 4.7 MMOL/L (ref 3.4–5.3)
RBC # BLD AUTO: 4.09 10E6/UL (ref 3.8–5.2)
SODIUM SERPL-SCNC: 135 MMOL/L (ref 135–145)
WBC # BLD AUTO: 7.7 10E3/UL (ref 4–11)

## 2025-02-27 PROCEDURE — 99283 EMERGENCY DEPT VISIT LOW MDM: CPT

## 2025-02-27 PROCEDURE — 85610 PROTHROMBIN TIME: CPT | Performed by: STUDENT IN AN ORGANIZED HEALTH CARE EDUCATION/TRAINING PROGRAM

## 2025-02-27 PROCEDURE — 86140 C-REACTIVE PROTEIN: CPT | Performed by: STUDENT IN AN ORGANIZED HEALTH CARE EDUCATION/TRAINING PROGRAM

## 2025-02-27 PROCEDURE — 85004 AUTOMATED DIFF WBC COUNT: CPT | Performed by: STUDENT IN AN ORGANIZED HEALTH CARE EDUCATION/TRAINING PROGRAM

## 2025-02-27 PROCEDURE — 36415 COLL VENOUS BLD VENIPUNCTURE: CPT | Performed by: STUDENT IN AN ORGANIZED HEALTH CARE EDUCATION/TRAINING PROGRAM

## 2025-02-27 PROCEDURE — 85652 RBC SED RATE AUTOMATED: CPT | Performed by: STUDENT IN AN ORGANIZED HEALTH CARE EDUCATION/TRAINING PROGRAM

## 2025-02-27 PROCEDURE — 85049 AUTOMATED PLATELET COUNT: CPT | Performed by: STUDENT IN AN ORGANIZED HEALTH CARE EDUCATION/TRAINING PROGRAM

## 2025-02-27 PROCEDURE — 82565 ASSAY OF CREATININE: CPT | Performed by: STUDENT IN AN ORGANIZED HEALTH CARE EDUCATION/TRAINING PROGRAM

## 2025-02-27 PROCEDURE — 80048 BASIC METABOLIC PNL TOTAL CA: CPT | Performed by: STUDENT IN AN ORGANIZED HEALTH CARE EDUCATION/TRAINING PROGRAM

## 2025-02-27 ASSESSMENT — COLUMBIA-SUICIDE SEVERITY RATING SCALE - C-SSRS
2. HAVE YOU ACTUALLY HAD ANY THOUGHTS OF KILLING YOURSELF IN THE PAST MONTH?: NO
6. HAVE YOU EVER DONE ANYTHING, STARTED TO DO ANYTHING, OR PREPARED TO DO ANYTHING TO END YOUR LIFE?: NO
1. IN THE PAST MONTH, HAVE YOU WISHED YOU WERE DEAD OR WISHED YOU COULD GO TO SLEEP AND NOT WAKE UP?: NO

## 2025-02-27 ASSESSMENT — ACTIVITIES OF DAILY LIVING (ADL)
ADLS_ACUITY_SCORE: 58
ADLS_ACUITY_SCORE: 58

## 2025-02-27 NOTE — ED PROVIDER NOTES
Emergency Department Encounter   NAME: Felicitas Elliott ; AGE: 84 year old female ; YOB: 1940 ; MRN: [id number] ; PCP: Carol, Timbo Malik   ED PROVIDER: Suyapa Glaser PA-C    Evaluation Date & Time:   2/27/2025  5:14 PM    CHIEF COMPLAINT:  Headache and Eye Pain        Impression and Plan   FINAL IMPRESSION:    ICD-10-CM    1. Nonintractable headache, unspecified chronicity pattern, unspecified headache type  R51.9 Adult Neurology  Referral      2. 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  Patient states she has almost daily headaches for quite some time now, present first thing in the morning when she wakes up and then usually improves as the day goes on. She takes tylenol as needed for headaches. On Tuesday this week she stood up from her chair and had monocular right-sided painless vision loss.  Left eye was unaffected.  After about 30 seconds she noted to be able to see light in her right eye and had blurry blurry vision.  She states after a few minutes her symptoms completely resolved.  She has not had any return of her symptoms since or any lingering blurry vision.  Patient was seen at her  [facility] ophthalmology clinic earlier today and her acuity, dilated eye exam, and pressures were unremarkable.  Her eye doctor sent her to the emergency department for further evaluation of headaches and episode of vision changes.    Per chart review, patient's last INR check on 2/25/2025 found her she was subtherapeutic with INR of 1.2.    Vitals reviewed, initial BP in triage was 82/53 but repeat with correct cuff 3 minutes following was 104/63.  Patient states she has been struggling on and off with hypotension and has seen  "cardiology regarding this. On exam she is resting comfortably, nontoxic-appearing. Differential diagnosis/emergent conditions considered and evaluated for includes but not limited to retinal detachment, vitreous hemorrhage, CRVO/CRAO, giant cell arteritis, amaurosis fugax, stroke.    She is alert and responding to questions appropriately. No neurologic deficits on exam today aside from pupillary reaction as patient just had dilated eye exam.  NIHSS 0. She does not currently have any blurry vision or vision changes. She does not wear glasses or contacts.  No signs of trauma to the eye.  He does not have any reproducible pain on the right side of her face.  Patient states currently, her headache is around a 2/10.  Her story sounds concerning for amaurosis fugax vs possible TIA.  Today, her INR is still subtherapeutic at 1.19.  ESR and CRP are mildly elevated but not significantly elevated that would be consistent with temporal arteritis.  I spoke with neurology Dr. Ledezma regarding the patients symptoms to discuss best imaging to proceed with.  He felt story and clinical picture was concerning for amaurosis fugax.  Patient has a pacemaker and unfortunately it is after hours here at [facility] so she would require admission for MRA. She has very poor kidney function but does go to dialysis tomorrow so CTA head and neck to evaluate vasculature is an option.  I discussed imaging options with patient and her .  Ultimately, patient declined any form of imaging today including admission for MRI.  She states \"she is old and does not have a long life expectancy\" so they \"would not do anything anyways\" for her.  I discussed with patient that this is not necessarily true, there are many causes of amaurosis fugax and imaging would help us rule in/out certain causes.  Patient understands but still would like to discharge home without any imaging today.  She signed AMA paperwork and left.  I did place a neurology referral " for her to follow-up on an outpatient basis if she would like regarding the headaches and episode of vision loss, she states she may or may not do this.  She and her  were educated on concerning symptoms of warrant return to the emergency department and are understanding and agreeable to this plan.        Supplemental history:  Obtained supplemental history:Supplemental history obtained?: Documented in chart and Family Member/Significant Other  Reviewed external records: External records reviewed?: Documented in chart and Inpatient Record: 2/28/25  Patient information was obtained from: patient  Use of Intrepreter: N/A     Complicating Factors:  Care impacted by chronic illness:Anticoagulated [place]  Care significantly affected by social determinants of health:N/A    Work Up:  Did you consider but not order tests?: In addition to work-up documented, I considered the following work up: MRA brain and neck, CTA head and neck  Did you interpret images independently?: Independent interpretation of ECG and images noted in documentation, when applicable.    External Consults:  Consultation discussion with other provider: Dr. Trejo, Dr. Ledezma  AMMARU          ED COURSE:  1621 I met and introduced myself to the patient. I gathered initial history and performed my physical exam. We discussed plan for initial workup.   1704 I have staffed the patient with Dr. Trejo ED MD, who has evaluated the patient and agrees with all aspects of today's care.   1754 I spoke with Dr. Ledezma Neurology  1913 pt signed AMA paperwork and left    At the conclusion of the encounter I discussed the results of all the tests and the disposition. The questions were answered. The patient or family acknowledged understanding and was agreeable with the care plan.          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  Patient states she has almost daily headaches for quite some time now, present first thing in the morning when she wakes up and then usually improves as the day goes on. She takes tylenol as needed for headaches. On Tuesday this week she stood up from her chair and had monocular right-sided painless vision loss.  Left eye was unaffected.  After about 30 seconds she noted to be able to see light in her right eye and had blurry blurry vision.  She states after a few minutes her symptoms completely resolved.  She has not had any return of her symptoms since or any lingering blurry vision.  Patient was seen at her  [facility] ophthalmology clinic earlier today and her acuity, dilated eye exam, and pressures were unremarkable.  Her eye doctor sent her to the emergency department for further evaluation of headaches and episode of vision changes.    Per chart review, patient's last INR check on 2/25/2025 found her she was subtherapeutic with INR of 1.2.        Physical Exam     First Vitals:  Patient Vitals for the past 24 hrs:   BP Temp Temp src Pulse Resp SpO2 Height Weight   02/27/25 1718 103/62 -- -- 81 -- 95 % -- --   02/27/25 1540 104/63 -- -- -- -- -- -- --   02/27/25 1537 (!) 82/53 97.8  F (36.6  C) Oral 91 16 94 % 1.676 m (5' 6\") 65.3 kg (144 lb)         PHYSICAL EXAM    General Appearance:  Alert, cooperative, no distress, appears stated age  HENT: Normocephalic without obvious deformity, atraumatic. Mucous membranes moist   Eyes: Conjunctiva clear, Lids normal. No discharge. Pupils dilated bilaterally and non-reactive due to recent dilated eye exam. Peripheral vision intact. Extraocular movements intact.  Respiratory: No distress. Lungs clear to ausculation bilaterally. No wheezes, rhonchi or " stridor  Cardiovascular: Regular rate   Musculoskeletal: Moving all extremities. No gross deformities  Integument: Warm, dry, no rashes or lesions  Neurologic: Alert and orientated x3. No focal deficits. GCS 15. ALISIA. Cranial nerves III through XII intact.  No facial asymmetry.  Sensation to light touch intact to face and all extremities.  Finger/nose/finger intact.  Negative pronator drift.  Intact straight leg raise.  5 out of 5 strength with , flexion extension at elbow joints, flexion at shoulder and hip joint against resistance.  Dorsiflexion and plantarflexion are intact.  Answering questions appropriately with normal speech.  No aphasia or dysarthria.  Following commands.  Intact visual fields.    Psych: Normal mood and affect        Results     LAB:  All pertinent labs reviewed and interpreted  Labs Ordered and Resulted from Time of ED Arrival to Time of ED Departure   BASIC METABOLIC PANEL - Abnormal       Result Value    Sodium 135      Potassium 4.7      Chloride 97 (*)     Carbon Dioxide (CO2) 28      Anion Gap 10      Urea Nitrogen 40.1 (*)     Creatinine 3.02 (*)     GFR Estimate 15 (*)     Calcium 9.8      Glucose 108 (*)    ERYTHROCYTE SEDIMENTATION RATE AUTO - Abnormal    Erythrocyte Sedimentation Rate 59 (*)    CBC WITH PLATELETS AND DIFFERENTIAL - Abnormal    WBC Count 7.7      RBC Count 4.09      Hemoglobin 12.3      Hematocrit 39.4      MCV 96      MCH 30.1      MCHC 31.2 (*)     RDW 18.6 (*)     Platelet Count 201      % Neutrophils 59      % Lymphocytes 26      % Monocytes 10      % Eosinophils 3      % Basophils 1      % Immature Granulocytes 0      NRBCs per 100 WBC 0      Absolute Neutrophils 4.6      Absolute Lymphocytes 2.0      Absolute Monocytes 0.8      Absolute Eosinophils 0.3      Absolute Basophils 0.1      Absolute Immature Granulocytes 0.0      Absolute NRBCs 0.0     CRP INFLAMMATION - Abnormal    CRP Inflammation 10.30 (*)    INR - Abnormal    INR 1.19 (*)         RADIOLOGY:  No orders to display         ECG:  N/A      PROCEDURES:  N/A          Suyapa Glaser PA-C   Emergency Medicine   [facility] EMERGENCY ROOM       Suyapa Glaser PA-C  02/28/25 0158

## 2025-02-27 NOTE — ED TRIAGE NOTES
The patient presents to the ED with headache and pain behind her right eye that began several days ago. She reports on Tuesday of this week she could not see out of her right eye for several minutes. The symptoms resolved. The patient went to her eye doctor today and they instructed her to be seen in the ED to be evaluated for the headaches and eye pain. The patient reports a normal eye exam today. She does report history of DVT and PE.

## 2025-02-28 NOTE — ED PROVIDER NOTES
"Emergency Department Midlevel Supervisory Note     I had a face to face encounter with this patient seen by the Advanced Practice Provider (ABRAHAN). I personally made/approved the management plan and take responsibility for the patient management. I personally saw patient and performed a substantive portion of the visit including all aspects of the medical decision making.     ED Course:   Suyapa Glaser PA-C staffed patient with me. I agree with their assessment and plan of management, and I will see the patient.   I met with the patient to introduce myself, gather additional history, perform my initial exam, and discuss the plan.     Brief HPI:     Felicitas Elliott is a 84 year old female who presents for evaluation of transient visual changes in the right eye and right-sided headache.  Tuesday of this week noted relatively abrupt onset right-sided painless vision loss that seem to be localized this over the right eye with no left eye involvement.  This lasted for a few seconds and then spontaneously resolved.  She was seen earlier today by her ophthalmologist with a reportedly negative dilated eye exam sent to the emergency department for further evaluation of possible CVA versus amaurosis fugax workup for potentially GCA        Brief Physical Exam: /62   Pulse 81   Temp 97.8  F (36.6  C) (Oral)   Resp 16   Ht 1.676 m (5' 6\")   Wt 65.3 kg (144 lb)   SpO2 95%   BMI 23.24 kg/m    Constitutional:  Alert, in no acute distress  EYES: Conjunctivae clear, no ciliary flush, no periorbital edema, reactive pupils bilaterally  HENT:  Atraumatic  Respiratory:  Respirations even, unlabored, in no acute respiratory distress  Cardiovascular:  Regular rate and rhythm, good peripheral perfusion  GI: Soft, non-distended, non-tender  Musculoskeletal:  Moves all 4 extremities equally, grossly symmetrical strength  Integument: Warm & dry. No appreciable rash, erythema.  Neurologic: Alert, answers questions appropriately, no " facial droop, symmetric strength in upper and lower extremities, no pronator drift  Psych: Normal mood and affect       MDM:  Patient is an 84-year-old female presenting to the emergency department for evaluation of transient vision loss in the right eye as well as right-sided headaches.  Patient notes somewhat frequent headaches over the past several weeks to several months.  Most recent event occurred on Tuesday when she got up from chair and had painless vision loss slowly out of her right eye.  Did not notice any vision changes of the left eye.  Estimates lasted about 30 seconds to a minute and resolved.  No recurrent visual symptoms since.  Had a normal eye exam at her eye doctor today and sent in for further evaluation of transient monocular vision loss.    Labs reviewed interpreted myself.  No significant leukocytosis or anemia.  Creatinine elevated at baseline with significant electrolyte derangements.  ESR slightly elevated at 59.  CRP 10.    Limited ability to get MRI as she has a pacemaker.  Options of CT with dialysis tomorrow was discussed with the patient.  However after discussion of options patient stated she did not want to be admitted to the hospital endorsing that she would not want any aggressive intervention even if there was an acute abnormality. Potential risks  including that this could be a warning sign of a bigger stroke to come.  Patient expressed understanding and is still requesting discharge she has capacity make this decision.  Discharged AGAINST MEDICAL ADVICE..       1. Nonintractable headache, unspecified chronicity pattern, unspecified headache type    2. Amaurosis fugax of right eye        Consults:      Labs and Imaging:  Results for orders placed or performed during the hospital encounter of 02/27/25   Basic metabolic panel   Result Value Ref Range    Sodium 135 135 - 145 mmol/L    Potassium 4.7 3.4 - 5.3 mmol/L    Chloride 97 (L) 98 - 107 mmol/L    Carbon Dioxide (CO2) 28 22 -  29 mmol/L    Anion Gap 10 7 - 15 mmol/L    Urea Nitrogen 40.1 (H) 8.0 - 23.0 mg/dL    Creatinine 3.02 (H) 0.51 - 0.95 mg/dL    GFR Estimate 15 (L) >60 mL/min/1.73m2    Calcium 9.8 8.8 - 10.4 mg/dL    Glucose 108 (H) 70 - 99 mg/dL   Erythrocyte sedimentation rate auto   Result Value Ref Range    Erythrocyte Sedimentation Rate 59 (H) 0 - 30 mm/hr   CBC with platelets and differential   Result Value Ref Range    WBC Count 7.7 4.0 - 11.0 10e3/uL    RBC Count 4.09 3.80 - 5.20 10e6/uL    Hemoglobin 12.3 11.7 - 15.7 g/dL    Hematocrit 39.4 35.0 - 47.0 %    MCV 96 78 - 100 fL    MCH 30.1 26.5 - 33.0 pg    MCHC 31.2 (L) 31.5 - 36.5 g/dL    RDW 18.6 (H) 10.0 - 15.0 %    Platelet Count 201 150 - 450 10e3/uL    % Neutrophils 59 %    % Lymphocytes 26 %    % Monocytes 10 %    % Eosinophils 3 %    % Basophils 1 %    % Immature Granulocytes 0 %    NRBCs per 100 WBC 0 <1 /100    Absolute Neutrophils 4.6 1.6 - 8.3 10e3/uL    Absolute Lymphocytes 2.0 0.8 - 5.3 10e3/uL    Absolute Monocytes 0.8 0.0 - 1.3 10e3/uL    Absolute Eosinophils 0.3 0.0 - 0.7 10e3/uL    Absolute Basophils 0.1 0.0 - 0.2 10e3/uL    Absolute Immature Granulocytes 0.0 <=0.4 10e3/uL    Absolute NRBCs 0.0 10e3/uL   Extra Blue Top Tube   Result Value Ref Range    Hold Specimen JIC    Result Value Ref Range    CRP Inflammation 10.30 (H) <5.00 mg/L   Result Value Ref Range    INR 1.19 (H) 0.85 - 1.15       I have reviewed the relevant laboratory studies above.    I independently interpreted the following imaging study(s):       EKG: I reviewed and independently interpreted the patient's EKG, with comments made as listed below. Please see scanned EKG for full report.         Procedures:  I was present for the key portions of procedures documented in ABRAHAN/midlevel note, see midlevel note for further details.    Karel Trejo MD  [facility] EMERGENCY ROOM  [facility] [id number] [phone]       Karel Trejo MD  03/04/25  [id number]

## 2025-02-28 NOTE — DISCHARGE INSTRUCTIONS
You were seen in the emergency department for headache and vision changes in the right eye.  Your eye clinic visit today found a normal exam and they recommended you be seen in the emergency department.  Ultimately, I suspect what happened with your eyes called amaurosis fugax.  This is transient painless vision loss and can be caused by several different processes including circulatory issues, eye issues, or neurologic issues.  We also could not rule out a mini stroke or TIA today. Your INR today is still subtherapeutic at 1.19.  Continue taking your higher dose of warfarin as prescribed and be sure you are following closely with your INR clinic.  We were unable to get an MRI here at [facility] and this would have required admitting you to the hospital, which you declined.  We discussed getting a CT scan with contrast dye to look at the blood supply in your brain and neck but you declined this as well.  I have placed a neurology follow-up for you, they should call you to schedule follow-up in their clinic regarding the headaches  I also recommend keeping your primary care provider in the loop.    Return to the emergency department for any slurred speech, facial asymmetry, one-sided weakness, or any continued episodes of vision loss.

## 2025-03-06 ENCOUNTER — TRANSFERRED RECORDS (OUTPATIENT)
Dept: HEALTH INFORMATION MANAGEMENT | Facility: CLINIC | Age: 85
End: 2025-03-06
Payer: COMMERCIAL

## 2025-03-07 ENCOUNTER — MEDICAL CORRESPONDENCE (OUTPATIENT)
Dept: HEALTH INFORMATION MANAGEMENT | Facility: CLINIC | Age: 85
End: 2025-03-07
Payer: COMMERCIAL

## 2025-04-03 PROBLEM — J44.9 CHRONIC OBSTRUCTIVE PULMONARY DISEASE, UNSPECIFIED COPD TYPE (H): Status: ACTIVE | Noted: 2025-01-29

## 2025-04-03 PROBLEM — I44.2 CHB (COMPLETE HEART BLOCK) (H): Status: ACTIVE | Noted: 2025-01-29

## 2025-04-03 PROBLEM — Z95.2 HISTORY OF PROSTHETIC HEART VALVE: Status: ACTIVE | Noted: 2024-10-22

## 2025-04-03 PROBLEM — Z79.01 ANTICOAGULATION MONITORING, INR RANGE 2-3: Status: ACTIVE | Noted: 2025-01-24

## 2025-04-16 NOTE — PROGRESS NOTES
Vascular Clinic Rooming Questions      Patient is here for consult for Dialysis Access. Pt  does dialysis on Monday and Friday located at [facility] .      /82 (BP Location: Left arm)   Pulse 79   Resp (!) 95     The provider has been notified that the patient has no concerns.     Questions patient would like addressed today are: N/A.    Refills are needed: N/A    Has homecare services and agency name:  No

## 2025-04-17 ENCOUNTER — OFFICE VISIT (OUTPATIENT)
Dept: VASCULAR SURGERY | Facility: CLINIC | Age: 85
End: 2025-04-17
Attending: INTERNAL MEDICINE
Payer: COMMERCIAL

## 2025-04-17 ENCOUNTER — ANCILLARY PROCEDURE (OUTPATIENT)
Dept: VASCULAR ULTRASOUND | Facility: CLINIC | Age: 85
End: 2025-04-17
Attending: STUDENT IN AN ORGANIZED HEALTH CARE EDUCATION/TRAINING PROGRAM
Payer: COMMERCIAL

## 2025-04-17 VITALS — HEART RATE: 79 BPM | RESPIRATION RATE: 95 BRPM | SYSTOLIC BLOOD PRESSURE: 135 MMHG | DIASTOLIC BLOOD PRESSURE: 82 MMHG

## 2025-04-17 DIAGNOSIS — Z01.818 ENCOUNTER FOR OTHER PREPROCEDURAL EXAMINATION: ICD-10-CM

## 2025-04-17 DIAGNOSIS — I82.413 ACUTE DEEP VEIN THROMBOSIS (DVT) OF FEMORAL VEIN OF BOTH LOWER EXTREMITIES (H): ICD-10-CM

## 2025-04-17 DIAGNOSIS — R09.89 OTHER SPECIFIED SYMPTOMS AND SIGNS INVOLVING THE CIRCULATORY AND RESPIRATORY SYSTEMS: ICD-10-CM

## 2025-04-17 DIAGNOSIS — N18.6 ESRD (END STAGE RENAL DISEASE) ON DIALYSIS (H): ICD-10-CM

## 2025-04-17 DIAGNOSIS — Z99.2 ESRD (END STAGE RENAL DISEASE) ON DIALYSIS (H): ICD-10-CM

## 2025-04-17 DIAGNOSIS — Z79.01 ANTICOAGULATION MONITORING, INR RANGE 2-3: ICD-10-CM

## 2025-04-17 DIAGNOSIS — Z13.6 ENCOUNTER FOR SCREENING FOR STENOSIS OF CAROTID ARTERY: ICD-10-CM

## 2025-04-17 DIAGNOSIS — G45.3 AMAUROSIS FUGAX OF RIGHT EYE: ICD-10-CM

## 2025-04-17 DIAGNOSIS — T82.898A OTHER SPECIFIED COMPLICATION OF VASCULAR PROSTHETIC DEVICES, IMPLANTS AND GRAFTS, INITIAL ENCOUNTER: ICD-10-CM

## 2025-04-17 DIAGNOSIS — I87.003 POSTTHROMBOTIC SYNDROME OF BOTH LOWER EXTREMITIES: ICD-10-CM

## 2025-04-17 DIAGNOSIS — I26.99 PE (PULMONARY THROMBOEMBOLISM) (H): Primary | ICD-10-CM

## 2025-04-17 PROCEDURE — 93970 EXTREMITY STUDY: CPT | Mod: 26 | Performed by: SURGERY

## 2025-04-17 PROCEDURE — G0463 HOSPITAL OUTPT CLINIC VISIT: HCPCS | Performed by: STUDENT IN AN ORGANIZED HEALTH CARE EDUCATION/TRAINING PROGRAM

## 2025-04-17 PROCEDURE — 93970 EXTREMITY STUDY: CPT

## 2025-04-17 NOTE — PATIENT INSTRUCTIONS
This is the plan that was discussed at your appointment.    Referral to hematology regarding anticoagulation  Restart Plavix (clopidogrel)  Read instructions below regarding surgery, we will contact you to schedule. Dr. Goodson would like you to see hematology first.        I am including additional information on these things and our contact information if you have any questions or concerns.   Please do not hesitate to reach out to us if you felt we did not answer your questions or you are unsure of the treatment plan after your visit today. Our number is [phone].  Thank you for trusting us with your care.         Again thank you for your time.                   Felicitas    Your visit to [facility] for your surgery is coming soon and we look forward to seeing you! This friendly reminder and pre-procedure checklist will help to ensure your surgery goes smoothly and meets your expectations. At [facility], our goal is to provide you with a great patient experience and to deliver genuine, professional care to every patient.     Please complete all the steps in advance of your visit. If you have any questions about the items listed below, please give our office a call. We can be reached at [phone] or visit our website at [email or web address]  for more information.     Procedure: Left AVF    Procedure Date :  TBD - We will contact you to schedule    Arrival Time:  Tentative time and subject to change. The pre-admission nurse will call you 1-2 days before surgery to tell you time of arrival.     Procedure Location: [facility]    Surgeon: Dr. Saba Goodson    Admission Type: Outpatient - YOU WILL NEED A  AND SOMEONE TO STAY WITH YOU 24 HOURS AFTERWARDS    COVID-19 Test: is no longer required. If you are experiencing COVID symptoms or have tested positive for COVID-19 within 14 days of procedure date, reach out to the care team to reschedule  please.     Post Operative Appointment: AVF: 6 weeks with surgeon and fistula ultrasound      If you take blood thinners:   PLEASE DO NOT STOP YOUR ASPIRIN OR PLAVIX UNLESS SPECIFICALLY DIRECTED BY THE VASCULAR SURGEON TO STOP!  In most cases Vascular surgeons want you to continue these. This is different from most NON vascular surgeries and may not be well known by your Primary Care Provider    Coumadin: THIS MUST BE HELD 5 DAYS PRIOR TO THE PROCEDURE/ SURGERY. We may need to have you do blood thinner injections during this time. This is called Bridging. Please contact your primary doctor or INR clinic to see if bridging is needed.     If you take these diabetic medications, please discuss with your primary doctor and follow the hold instructions:   []  Hold seven (7) days prior for once weekly injectable doses [semaglutide (Ozempic, Wegovy), dulaglutide (Trulicity), exenatide ER (Bydureon), tirzepatide (Mounjaro)]  []  Hold the day before and day of for once daily injectable GLP-1 agonists [exenatide (Byetta), liraglutide (Saxenda, Victoza)]  []  Hold seven (7) days for oral semaglutide (Rybelsus)       Notify our office right away if you have any changes in your health status or if you develop a cold, flu, diarrhea, infection, fever or sore throat before your scheduled surgery date.   We can be reached at [phone] Monday-Friday 8 am-4:30 pm if you have any questions.       Complete the following checklist before your surgery    []  You will need a Pre-op Physical within 30 days before surgery with your primary care provider. This exam is required by the anesthesiologist to ensure a safe surgical experience.    Failure  to obtain your pre-op physical will lead to cancellation of your surgery  Call them right away to schedule this. Please ensure your Preoperative Physical is faxed to the Hospital if done outside of  [facility] system.     Pre-surgery labs need to be drawn no more than 3 days prior to your  surgery. If labs are completed more than 3 days in advance, your labs will need to be repeated. (Labs to be drawn: CBC, BMP, Type and Cross Screen, UA and urine culture; and if you are on Warfarin, an INR). Please call [phone] to make an appointment with your preferred lab location, or may go as walk-in appointment to [facility] or [facility] labs.    [] Preoperative Medication Instructions  Contact your prescribing provider and/or vascular surgeon for instructions before discontinuing any medications especially anticoagulants. (Examples: Coumadin, Plavix, Xarelto, Eliquis, Pradaxa, Effient, Brilinta)   Hold Ibuprofen 24 hours prior.   Hold Herbal Supplements and vitamins 14 days prior.   Stop Cialis, Levitra and Viagra 2-3 days before surgery.   Please discuss with your primary care provider if you need to hold any blood pressure medications or others.     [] Fasting Requirements  Nothing to eat for 8 hours before surgery unless instructed differently by the surgery nurse.   You may have clear liquids up to two hours before your arrival time (coffee, tea, water, or Gatorade. No cream or milk)  If your primary care provider has instructed you to continue oral medications, you may take them on the morning of surgery with a small sip of water.    No alcohol or smoking 24 hours before surgery.     [] Contact your insurance regarding coverage  If you would like a Good [facility] Estimate for your upcoming procedure at [facility] Location, contact Cost of Care Estimates   Advocates are available Monday through Friday 8am - 5pm   [phone]  You may also submit a request online through your [employer] account.   If your procedure is at [facility] please contact the numbers below for Cost of Care Estimates.   - Facility Charge: [phone]    Anesthesiology charge:  [phone]      [] DO NOT BRING FMLA WITH TO SURGERY.  These should be sent to the provider's office by fax to [phone].      [] Day of Surgery  Medications - Take as indicated with sips of water.   Wear comfortable loose fitting clothes. Wear your glasses-Not contacts. Do not wear jewelry including rings and body piercings.   You may have 1 family member wait in the lobby during your surgery. Visitor restrictions are subject to change. Please verify with the surgery nurse when they call.   If same day surgery-Have an adult  come with you to surgery that can help you understand the surgeon's instructions, drive you home and stay with you overnight the first night.    [] You will receive a call from a surgery nurse 1-3 days prior to surgery. They will go over more details with you.     [] If the hospital is at [facility] and does not have available inpatient beds, your procedure may need to be postponed. We will contact you if this happens.      Use Chlorhexidine Gluconate 4% soap to help prevent surgical site infections    You play an important part in helping to prevent a surgical site infection. Let s review what you will need to do.    Chlorhexidine Gluconate 4% is a powerful antiseptic that will help make sure your skin is free of germs. It looks and feels just like liquid soap and should be used liked a shower gel.    **[facility] patients can receive free Chlorhexidine Gluconate 4% soap for surgery at any outpatient [facility] pharmacy. You can also buy this over the counter at any pharmacy.**    Do not shave within 12 inches of your incision (surgical cut) area for at least 3 days before surgery. Shaving can make small cuts in the skin. This puts you at a higher risk of infection.    Items you will need for each shower:   1 newly washed towel   4 ounces of one of the above soaps   Clean pajamas or clothes to change into    Follow these steps the evening before surgery and the morning of surgery.  Remove all body piercings and jewelry, and leave them off until after your surgery.  Wash your hair and body with your regular  shampoo and soap. Make sure you rinse the shampoo and soap from your hair and body.  Using clean hands, apply about 2 ounces of soap gently on your skin from your ear lobes to your toes. You don t need to scrub your skin, but make sure you liberally wash the area where your surgery will be done. Use on your groin area last. Do not use this soap on your face or head. If you get any soap in your eyes, ears or mouth, rinse right away. It is very important to let the soap stay on your skin for at least 1 minute.  Repeat step 3.   Rinse well and dry off using a clean towel. If you feel any tingling, itching or other irritation, rinse right away. It is normal to feel some coolness on the skin after using the antiseptic soap. Your skin may feel a bit dry after the shower, but do not use any lotions, creams or moisturizers. Do not use hair spray or other products in your hair. Also, do not wash with your regular soap after using this.  Dress in freshly washed clothes or pajamas. Use fresh pillowcases and sheets on your bed.  Repeat these steps the morning of surgery.    If you are allergic or sensitive to Chlorhexidine Gluconate, use an antibacterial soap instead, following the same steps.    If you cannot use the shower, wash yourself with this soap at the sink. Make sure the sink is clean before you begin, and do the best you can to clean your entire body.           What to Expect After Your Dialysis Access Surgery  Hospital Stay  You can expect to go home the same day as your surgery.  Medications  Take all medications exactly as directed. You may need to stop some taking some blood thinners prior to surgery. Your specific medications will be discussed with you prior going home.    Incision Care   Your incision sites may have some bruising and minor swelling for about one week.  If your incision is sealed with Dermabond (glue) you may shower and leave the incision open to air the following day. When showering do not rub  incision, let the warm soapy water run over it and pat dry. DO NOT SOAK INCISION IN A TUB/POOL/Etc until healed. Keep incisions dry and covered until they begin to heal.     Restrictions  You may use the arm freely after the site heals. Keep the following in mind:  Avoid pressure on the arm, lifting heavy objects with the arm, sleeping on the arm with the graft or fistula, and wearing tight-sleeved shirts or jewelry around the graft or fistula.  Do not allow blood pressure monitoring or needle punctures (other than dialysis) on the side where the graft or fistula is located.  Fistula/Graft Use - Fistulas need to be assessed at a follow up appointment with your surgeon prior to it being used for dialysis.     Follow-Up  If a follow up appointment was not scheduled prior to your procedure please call [phone]. Follow up appointments are scheduled with either your Physician or one a member of our care team.     Risks and Possible Complications   Steal  Syndrome - You may initially feel some coolness or numbness in the hand with the fistula. These sensations usually go away in a few weeks as your circulation compensates for the fistula. However, if these sensations are severe or don't disappear, tell your physician as soon as possible, because the fistula may be causing too much blood to flow away from your hand, a condition physicians call a  steal.   Infection/ Drainage/Bleeding/ Excessive Swelling - If there is any drainage or bleeding it should be a very small amount. If you have excessive bleeding, any milky drainage, or redness from the incisions call your doctor right away. Minor swelling is normal, if your experience excessive swelling call right away.   Graft/Fistula Narrowing or Occlusion - Grafts and fistulas may develop thrombosis or stenosis, essentially blocking or partially blocking blood flow within the created graft or fistula. To assess blood flow place your fingers over the graft or fistula and  feel for a vibration or a  thrill . THIS SHOULD BE DONE EVERY DAY! IF THRILL CANNOT BE FELT PLEASE CONTACT YOUR SURGEON AS SOON AS POSSIBLE.      Exercising for your fistula after surgery    An AV fistula must mature for several weeks or months before it can be used for hemodialysis, so after it is surgically created, your doctor will ask you to work on strengthening it. The more access arm exercises you do to help strengthen it, the sooner you ll be able to use your fistula. Your doctor may recommend certain arm and finger exercises that will strengthen the fistula. The exercises your doctor recommends will depend on where your fistula is located. Fistulas are usually located in the forearm or upper arm. Before you start any exercise, it s important to consult your doctor.    Use your arm for all of your usual activities. After seven days from the date of your surgery apply a tourniquet around your arm above your fistula tight enough to make the veins distend but not so tight that your hand goes to sleep or becomes extremely uncomfortable. Lightly squeeze a stress ball and hold for 10 seconds. Do a total of 5 repetitions. It is recommended to do this 6 times per day. Keep doing this until the fistula is used for dialysis.      Keeping your fistula clean  Once your AV fistula is strong enough to be used for hemodialysis, it is crucial that you keep it clean. Although a fistula is less prone to infection than other dialysis types, proper hygiene is still important:  Look for redness or swelling around the fistula area.   If you experience any pain in the fistula area, tell your doctor immediately.   If you get a fever, this can be a sign of infection.   Wash and pat dry your fistula arm thoroughly right before each treatment. Your dialysis facility will provide you with supplies.    Please scan the QR code to watch a video on caring for your vascular access at home. There is a link provided if you are unable to use  the code.

## 2025-04-17 NOTE — PROGRESS NOTES
VASCULAR SURGERY CLINIC CONSULTATION    VASCULAR SURGEON: Saba Goodson DO RPVI     LOCATION:  [facility]    Felicitas Elliott   Medical Record #:  [id number]  YOB: 1940  Age:  85 year old     Date of Service: 4/17/2025    PRIMARY CARE PROVIDER: Carol, Timbo Malik      Reason for visit: AV fistula consult    IMPRESSION: Very pleasant 85-year-old female who presents for evaluation of AV fistula creation now on HD via right IJ tunnel catheter on Monday and Friday, continues to produce urine however requiring permanent dialysis access at this time.  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  Patient was started on Coumadin at that time but has reported subtherapeutic INR until this week where she has achieved goal of 2-3.  She was also on Plavix daily as recommended by cardiology however did discontinue this when she was started on Coumadin.  Ultrasound reviewed, does have adequate basilic or cephalic vein in left arm.  Wishes to proceed with AV fistula placement.    On review of chart it was noted that she did present to the emergency department last month for headache and eye pressure.  She did leave AMA at that time but did see ophthalmology as an outpatient who did not have any concerns.  She did report complete loss of vision in right eye which did occur on 2 separate occasions over the last month, denies any history of TIA or CVA.    Overall she is very functional at baseline and has improved significantly since her long hospital stay at the end of last year.  She is able to climb stairs without difficulty and does report 1 L nasal cannula oxygen overnight however does not report any dyspnea on ambulation around her house or at rest.  She is in wheelchair today  but states this is simply to help her get in and out of the car.    RECOMMENDATION/RISKS: Long discussion held regarding the process of AV fistula placement as well as indication for use of basilic versus cephalic vein.  At this time cephalic vein does appear greater than 3 mm with the exception of 1 small segment on the left arm, will reevaluate this on day of operation but plan to proceed with likely left brachial cephalic possible brachiobasilic fistula.  Patient did agree with this plan.  Also discussed her challenges with maintaining therapeutic INR as well as significant aching in right leg which is constant and likely secondary to significant DVT found earlier this year.  Will refer to hematology as it does not appear she had any follow-up and encouraged her to discuss DOAC for ease of use as well as therapeutic benefit.  We will also obtain carotid duplex as it does sound like she has symptoms amaurosis fugax and then we will proceed with this accordingly.  Advised her strongly to resume Plavix 75 mg daily as she refuses to take ASA 81 mg daily due to GI discomfort and reported allergy.  Will obtain LDL at labs next week with LDL goal less than 70, also advised her to restart her statin therapy today.  Educated her on left arm limb alert and she agreed to this for the future.    HPI:  Felicitas Elliott is a 85 year old female who was seen today in consultation for AV fistula placement    REVIEW OF SYSTEMS:    A 12 point ROS was reviewed and is negative except for noted in HPI.     PHH:    Past Medical History:   Diagnosis Date    Community acquired pneumonia of right lower lobe of lung 01/13/2025    Sepsis (H) 08/16/2024         Past Surgical History:   Procedure Laterality Date    CORONARY ARTERY BYPASS GRAFT, WITH AORTIC VALVE REPLACEMENT, WITH ENDOSCOPIC VESSEL PROCUREMENT N/A 8/27/2024    Procedure: CORONARY ARTERY BYPASS GRAFT TIMES TWO, WITH AORTIC ROOT REPLACEMENT, WITH LEFT INTERNAL MAMMARY ARTERY  HARVEST, LEFT SAPHNENOUS ENDOSCOPIC VESSEL PROCUREMENT,;  Surgeon: Dylan Renae MD;  Location: [facility] OR    CV CORONARY ANGIOGRAM N/A 8/20/2024    Procedure: CV CORONARY ANGIOGRAM;  Surgeon: Den Wylie MD;  Location: [facility] CV    EP PACEMAKER DEVICE & IMPLANT- HIS LEAD DUAL N/A 9/12/2024    Procedure: Pacemaker Device & Lead Implant - HIS Lead Dual;  Surgeon: Minh Pina MD;  Location: [facility] CATH LAB CV    IR CVC NON TUNNEL PLACEMENT > 5 YRS  9/1/2024    IR CVC TUNNEL PLACEMENT > 5 YRS OF AGE  9/11/2024    PICC TRIPLE LUMEN PLACEMENT  9/6/2024    REPLACE VALVE MITRAL N/A 8/27/2024    Procedure: MITRAL VALVE REPLACEMENT,;  Surgeon: Dylan Renae MD;  Location: [facility]    TRANSESOPHAGEAL ECHOCARDIOGRAM INTRAOPERATIVE  8/27/2024    Procedure: ANESTHESIA TRANSESOPHAGEAL ECHOCARDIOGRAM, EPI-AORTIC ULTRASOUND;  Surgeon: Dylan Renae MD;  Location: [facility]        ALLERGIES:     Allergies   Allergen Reactions    Aspirin Rash    Cats Rash    Morphine Rash     Only reacted once, had again without problems.    Nickel Rash        MEDS:    Current Outpatient Medications   Medication Sig Dispense Refill    acetaminophen (TYLENOL) 325 MG tablet Take 2 tablets (650 mg) by mouth every 4 hours as needed for other (mild pain (1-3)). 120 tablet 0    albuterol (PROVENTIL) (2.5 MG/3ML) 0.083% neb solution Take 1 vial (2.5 mg) by nebulization every 6 hours as needed for wheezing. 90 mL 0    atorvastatin (LIPITOR) 80 MG tablet Take 1 tablet (80 mg) by mouth every evening. 90 tablet 0    bisacodyl (DULCOLAX) 10 MG suppository Place 1 suppository (10 mg) rectally daily as needed for constipation (Use if Magnesium hydroxide (MILK of MAGNESIA) not effective after 24 hours. May discontinue if patient having bowel movement.). 30 suppository 0    bisacodyl (DULCOLAX) 5 MG EC tablet Take 5 mg by mouth daily as needed for constipation.      escitalopram  (LEXAPRO) 5 MG tablet Take 5 mg by mouth at bedtime.      famotidine (PEPCID) 10 MG tablet Take 1 tablet (10 mg) by mouth 2 times daily. 60 tablet 0    Lidocaine (LIDOCARE) 4 % Patch Place 1 patch over 12 hours onto the skin every 24 hours. To prevent lidocaine toxicity, patient should be patch free for 12 hrs daily. (Patient taking differently: Place 1 patch onto the skin daily as needed for other (neck pain). To prevent lidocaine toxicity, patient should be patch free for 12 hrs daily.) 30 patch 0    lidocaine (XYLOCAINE) 5 % external ointment Apply topically 3 times daily. 240 g 1    methocarbamol (ROBAXIN) 500 MG tablet Take 1 tablet (500 mg) by mouth 2 times daily as needed for muscle spasms (for any muscle spasms). 12 tablet 0    methocarbamol (ROBAXIN) 500 MG tablet Take 0.5 tablets (250 mg) by mouth 2 times daily as needed for muscle spasms. 8 tablet 0    midodrine (PROAMATINE) 10 MG tablet Take 1 tablet (10 mg) by mouth 3 times daily as needed (SBP <90). (Patient taking differently: Take 5-10 mg by mouth 3 times daily as needed (SBP <90). 10mg when patient's SBP<90. Gives 5mg TID when patient's SBP >90. HOLD doses when SBP >120.) 60 tablet 3    ondansetron (ZOFRAN) 8 MG tablet Take 1 tablet (8 mg) by mouth every 8 hours as needed for nausea or vomiting. 30 tablet 0    pantoprazole (PROTONIX) 40 MG EC tablet Take 1 tablet (40 mg) by mouth every morning (before breakfast). 30 tablet 0    senna (SENOKOT) 8.6 MG tablet Take 1 tablet by mouth daily. 30 tablet 3    simethicone (MYLICON) 80 MG chewable tablet Take 1 tablet (80 mg) by mouth 3 times daily. (Patient taking differently: Take 80 mg by mouth 3 times daily as needed.) 90 tablet 0    sodium chloride (NEBUSAL) 3 % neb solution Take 3 mLs by nebulization every 6 hours as needed for wheezing. 15 mL 0    torsemide (DEMADEX) 100 MG tablet Take 0.5 tablets (50 mg) by mouth daily. 30 tablet 0    traZODone (DESYREL) 50 MG tablet Take 0.5 tablets (25 mg) by mouth  nightly as needed for sleep. 30 tablet 0    warfarin ANTICOAGULANT (COUMADIN) 2 MG tablet Take 4mg daily until next INR lab draw and dose will be updated. Earliest visit can be 1/21 Tuesday. 15 tablet 0    zinc oxide 10 % OINT Externally apply topically 4 times daily as needed (for buttock wound).      clopidogrel (PLAVIX) 75 MG tablet Take 1 tablet (75 mg) by mouth daily. (Patient not taking: Reported on 4/17/2025) 90 tablet 0     No current facility-administered medications for this visit.        SOCIAL HABITS:    Tobacco Use      Smoking status: Former        Packs/day: 8.00        Years: 8.0 packs/day for 1.3 years (10.3 ttl pk-yrs)        Types: Cigarettes        Start date: 2024      Smokeless tobacco: Former       Alcohol Use: Not on file       History   Drug Use Not on file        FAMILY HISTORY:  No family history on file.    PE:  /82 (BP Location: Left arm)   Pulse 79   Resp (!) 95   Wt Readings from Last 1 Encounters:   02/27/25 65.3 kg (144 lb)     There is no height or weight on file to calculate BMI.    EXAM:  GENERAL: This is a well-developed 85 year old female who appears her stated age  EYES: Grossly normal.  CARDIAC:  RRR  CHEST/LUNG:  normal work of breathing on room air  GASTROINTESINAL (ABDOMEN):soft, non tender, non distended, no pulsatile abdominal mass  MUSCULOSKELETAL: Grossly normal and both lower extremities are intact.  HEME/LYMPH: No lymphedema  NEUROLOGIC: Focally intact, Alert and oriented x 3.   PSYCH: appropriate affect  INTEGUMENT: No open lesions or ulcers  Pulse Exam: Strong 2+ radial pulse bilaterally, motor and sensory intact bilaterally with no issue on left arm.            DIAGNOSTIC STUDIES:     Images:  US Upper Extremity Venous Mapping Bilateral    Result Date: 4/17/2025  Table formatting from the original result was not included. BILATERAL Vein Mapping Ultrasound Upper Extremity (Date: 04/17/25) Indication: Mapping for dialysis access Technique: Ultrasound of the  Superficial Veins with Compression Maneuvers. Duplex Imaging is performed on the arteries utilizing gray-scale, Two-dimensional images, color-flow imaging, Doppler waveform analysis, and Spectral doppler imaging done. Location Shoulder (mm) Prox Upper Arm (mm) Mid Upper Arm (mm) Distal Upper Arm (mm) Ante- cubital (mm) Prox Forearm (mm) Mid Forearm (mm) Distal Forearm (mm) Right Basilic V.  3.1 3.2 3.5 3.5 2.6 1.3 NV Right Cephalic V. 2.4 2.5 1.5 1.5 1.6 1.9 2.1 1.5 Left Basilic V.  5.0 4.5 5.5 3.8 2.0 1.6 1.6 Left Cephalic V. 3.6 3.5 3.0 2.6 3.0 1.6 2.0 1.6  Location Right (cm/sec)  Left (cm/sec) Subclavian Artery 24 58 Brachial Artery 79 56 Ulnar Artery 27 42 Radial Artery 31 34 Impression: 1.  Bilateral upper extremities basilic and cephalic vein measurements as noted above. 2.  Brisk multiphasic waveforms in the arteries of both upper extremities. Reference: Compressibility: FC= Fully compressible, PC= Partially compressible, NC= Non-compressible, NV= Not Visualized Venous Doppler: (+) = Present  (0) = Absent  (-) = Decreased/Unable to Evaluate, (NV) = Not Visualized PATSY TEJEDA M.D., FACS, VI       I personally reviewed the images and my interpretation is adequate basilic and cephalic vein in left arm, multiphasic flow in bilateral upper extremity arterial system without evidence of hemodynamically significant stenosis    LABS:      Sodium   Date Value Ref Range Status   02/27/2025 135 135 - 145 mmol/L Final   01/30/2025 137 135 - 145 mmol/L Final   01/23/2025 137 135 - 145 mmol/L Final     Urea Nitrogen   Date Value Ref Range Status   02/27/2025 40.1 (H) 8.0 - 23.0 mg/dL Final   01/30/2025 26.5 (H) 8.0 - 23.0 mg/dL Final   01/23/2025 31.8 (H) 8.0 - 23.0 mg/dL Final     Hemoglobin   Date Value Ref Range Status   02/27/2025 12.3 11.7 - 15.7 g/dL Final   01/30/2025 10.1 (L) 11.7 - 15.7 g/dL Final   01/20/2025 9.5 (L) 11.7 - 15.7 g/dL Final     Platelet Count   Date Value Ref Range Status   02/27/2025 201  150 - 450 10e3/uL Final   01/30/2025 294 150 - 450 10e3/uL Final   01/22/2025 187 150 - 450 10e3/uL Final     INR   Date Value Ref Range Status   02/27/2025 1.19 (H) 0.85 - 1.15 Final   01/30/2025 2.10 (H) 0.85 - 1.15 Final   01/23/2025 2.65 (H) 0.85 - 1.15 Final       42 minutes spent on the day of encounter doing chart review, history and exam, documentation, and further activities as noted.     The longitudinal plan of care for the diagnosis(es)/condition(s) as documented were addressed during this visit. Due to the added complexity in care, I will continue to support Felicitas in the subsequent management and with ongoing continuity of care.   Saba Goodson DO, [facility]  VASCULAR SURGERY

## 2025-04-17 NOTE — Clinical Note
Dr. Goodson will be entering orders for Left AVF. Patient to see hematology first to review anticoagulation. If still on warfarin prior to surgery, will need to be bridged with lovenox

## 2025-04-23 NOTE — TELEPHONE ENCOUNTER
RECORDS STATUS - ALL OTHER DIAGNOSIS      RECORDS RECEIVED FROM: [facility]   NOTES STATUS DETAILS   OFFICE NOTE from referring provider Epic 4/17/25: Dr. Saba Goodson   MEDICATION LIST [facility]    LABS     ANYTHING RELATED TO DIAGNOSIS Epic Most recent 2/27/25   IMAGING (NEED IMAGES & REPORT)     CT SCANS PACS 1/30/25-1/13/25: CT Chest   XRAYS PACS 1/30/25-8/14/24: XR Chest   ULTRASOUND PACS 4/17/25-1/21/25: US Upper Extremity

## 2025-04-24 ENCOUNTER — ANCILLARY PROCEDURE (OUTPATIENT)
Dept: VASCULAR ULTRASOUND | Facility: CLINIC | Age: 85
End: 2025-04-24
Attending: STUDENT IN AN ORGANIZED HEALTH CARE EDUCATION/TRAINING PROGRAM
Payer: COMMERCIAL

## 2025-04-24 DIAGNOSIS — Z13.6 ENCOUNTER FOR SCREENING FOR STENOSIS OF CAROTID ARTERY: ICD-10-CM

## 2025-04-24 DIAGNOSIS — R09.89 OTHER SPECIFIED SYMPTOMS AND SIGNS INVOLVING THE CIRCULATORY AND RESPIRATORY SYSTEMS: ICD-10-CM

## 2025-04-24 PROCEDURE — 93880 EXTRACRANIAL BILAT STUDY: CPT | Mod: 26 | Performed by: SURGERY

## 2025-04-24 PROCEDURE — 93880 EXTRACRANIAL BILAT STUDY: CPT

## 2025-04-29 ENCOUNTER — PREP FOR PROCEDURE (OUTPATIENT)
Dept: VASCULAR SURGERY | Facility: CLINIC | Age: 85
End: 2025-04-29
Payer: COMMERCIAL

## 2025-04-29 DIAGNOSIS — N18.6 ESRD (END STAGE RENAL DISEASE) ON DIALYSIS (H): Primary | ICD-10-CM

## 2025-04-29 DIAGNOSIS — Z99.2 ESRD (END STAGE RENAL DISEASE) ON DIALYSIS (H): Primary | ICD-10-CM

## 2025-04-29 RX ORDER — ACETAMINOPHEN 325 MG/1
975 TABLET ORAL ONCE
OUTPATIENT
Start: 2025-04-29 | End: 2025-04-29

## 2025-04-30 ENCOUNTER — TELEPHONE (OUTPATIENT)
Dept: VASCULAR SURGERY | Facility: CLINIC | Age: 85
End: 2025-04-30
Payer: COMMERCIAL

## 2025-04-30 NOTE — TELEPHONE ENCOUNTER
Surgery Scheduled    Pt will schedule preop exam. Sending surgery packet to pt via letter.  Reviewed all surgery instructions and schedule, pending anticoagulation instructions from nursing.  Pt has hematology appt on 7/2/25.    Surgery/Procedure: CREATION, ARTERIOVENOUS FISTULA, UPPER EXTREMITY - LEFT    CPT: 02560   Special Equipment: none   Length of Surgery: 90 min   Preoperative physical needed: Yes   Product Rep needed? No   Company? NA     Location: [facility]:  [address] [place] [address] (phone: [phone], Fax: [phone])    Please park in Lot [address]. Enter through the main entrance. Check in at the Welcome Desk and you will be directed to the surgery unit.     Date: 7/9/2025    Time: 730AM (likely to adjust)    Admission Type: Outpatient    Surgeon: Dr. Saba Goodson    OR Confirmed & :  Yes with QV on 4/30/2025    Entered on provider calendar:  Yes    Post Op: See appt desk    Wound Vac Needed: No    Home Care Needed: No    Blood Thinners Addressed:  Message routed to nursing.

## 2025-04-30 NOTE — LETTER
April 30, 2025      Felicitas Elliott  [address] [facility] [address]               Felicitas     Your visit to [facility] for your surgery is coming soon and we look forward to seeing you! This friendly reminder and pre-procedure checklist will help to ensure your surgery goes smoothly and meets your expectations. At [facility], our goal is to provide you with a great patient experience and to deliver genuine, professional care to every patient.      Please complete all the steps in advance of your visit. If you have any questions about the items listed below, please give our office a call. We can be reached at [phone] or visit our website at [email or web address]  for more information.      Procedure: Left Arteriovenous Fistula creation     Procedure Date :  7/9/2025      Arrival Time:  Tentative time and subject to change. The pre-admission nurse will call you 1-2 days before surgery to tell you time of arrival.      Procedure Location:  [facility]:  [address] [place] [id number] (phone: [phone], Fax: [phone])    Please park in [address]. Enter through the main entrance. Check in at the Welcome Desk and you will be directed to the surgery unit.     Surgeon: Dr. Saba Goodson         Admission Type: Outpatient - YOU WILL NEED A  AND SOMEONE TO STAY WITH YOU 24 HOURS AFTERWARDS     COVID-19 Test: is no longer required. If you are experiencing COVID symptoms or have tested positive for COVID-19 within 14 days of procedure date, reach out to the care team to reschedule please.      Upcoming AND follow up appointments:   7/2/2025 11:00 AM (Arrive by 10:45 AM) Paxton Samuel MD [facility] [place] -     7/7/2025 2:00 PM Lab Draw [facility] - [address]  [place] [address]     8/18/2025 12:00 PM (Arrive by 11:45  AM) Ultrasound [facility] [place]  [place] [address]   Following Ultrasound Saba Goodson, DO      If you take blood thinners:   PLEASE DO NOT STOP YOUR ASPIRIN OR PLAVIX UNLESS SPECIFICALLY DIRECTED BY THE VASCULAR SURGEON TO STOP!  In most cases Vascular surgeons want you to continue these. This is different from most NON vascular surgeries and may not be well known by your Primary Care Provider     Coumadin: THIS MUST BE HELD 5 DAYS PRIOR TO THE PROCEDURE/ SURGERY. We may need to have you do blood thinner injections during this time. This is called Bridging. Please contact your primary doctor or INR clinic to see if bridging is needed. WE WILL BE CALLING YOU TO CONFIRM THESE INSTRUCTIONS.  IF YOU HAVE QUESTIONS PLEASE CALL US [phone].     If you take these diabetic medications, please discuss with your primary doctor and follow the hold instructions:   []  Hold seven (7) days prior for once weekly injectable doses [semaglutide (Ozempic, Wegovy), dulaglutide (Trulicity), exenatide ER (Bydureon), tirzepatide (Mounjaro)]  []  Hold the day before and day of for once daily injectable GLP-1 agonists [exenatide (Byetta), liraglutide (Saxenda, Victoza)]  []  Hold seven (7) days for oral semaglutide (Rybelsus)         Notify our office right away if you have any changes in your health status or if you develop a cold, flu, diarrhea, infection, fever or sore throat before your scheduled surgery date.   We can be reached at [phone] Monday-Friday 8 am-4:30 pm if you have any questions.         Complete the following checklist before your surgery     []  You will need a Pre-op Physical within 30 days before surgery with your primary care provider. This exam is required by the anesthesiologist to ensure a safe surgical experience.    Failure  to obtain your pre-op physical will lead to cancellation of your surgery  Call them right away to schedule  this. Please ensure your Preoperative Physical is faxed to the Hospital if done outside of [facility] system.      Pre-surgery labs need to be drawn no more than 3 days prior to your surgery. If labs are completed more than 3 days in advance, your labs will need to be repeated. (Labs to be drawn: CBC, BMP, Type and Cross Screen, UA and urine culture; and if you are on Warfarin, an INR). Please call [phone] to make an appointment with your preferred lab location, or may go as walk-in appointment to [facility] or [facility] labs.     [] Preoperative Medication Instructions  Contact your prescribing provider and/or vascular surgeon for instructions before discontinuing any medications especially anticoagulants. (Examples: Coumadin, Plavix, Xarelto, Eliquis, Pradaxa, Effient, Brilinta)   Hold Ibuprofen 24 hours prior.   Hold Herbal Supplements and vitamins 14 days prior.   Stop Cialis, Levitra and Viagra 2-3 days before surgery.   Please discuss with your primary care provider if you need to hold any blood pressure medications or others.      [] Fasting Requirements  Nothing to eat for 8 hours before surgery unless instructed differently by the surgery nurse.   You may have clear liquids up to two hours before your arrival time (coffee, tea, water, or Gatorade. No cream or milk)  If your primary care provider has instructed you to continue oral medications, you may take them on the morning of surgery with a small sip of water.    No alcohol or smoking 24 hours before surgery.      [] Contact your insurance regarding coverage  If you would like a [facility] Estimate for your upcoming procedure at [facility] Location, contact Cost of Care Estimates   Advocates are available Monday through Friday 8am - 5pm   [phone]  You may also submit a request online through your [employer] account.   If your procedure is at [facility] please contact the numbers below for Cost of Care  Estimates.   - Facility Charge: [phone]    Anesthesiology charge:  [phone]       [] DO NOT BRING FMLA WITH TO SURGERY.  These should be sent to the provider's office by fax to [phone].      [] Day of Surgery  Medications - Take as indicated with sips of water.   Wear comfortable loose fitting clothes. Wear your glasses-Not contacts. Do not wear jewelry including rings and body piercings.   You may have 1 family member wait in the lobby during your surgery. Visitor restrictions are subject to change. Please verify with the surgery nurse when they call.   If same day surgery-Have an adult  come with you to surgery that can help you understand the surgeon's instructions, drive you home and stay with you overnight the first night.     [] You will receive a call from a surgery nurse 1-3 days prior to surgery. They will go over more details with you.      [] If the hospital is at [facility] capacity and does not have available inpatient beds, your procedure may need to be postponed. We will contact you if this happens.        Use Chlorhexidine Gluconate 4% soap to help prevent surgical site infections     You play an important part in helping to prevent a surgical site infection. Let s review what you will need to do.     Chlorhexidine Gluconate 4% is a powerful antiseptic that will help make sure your skin is free of germs. It looks and feels just like liquid soap and should be used liked a shower gel.     **[facility] patients can receive free Chlorhexidine Gluconate 4% soap for surgery at any outpatient [facility] pharmacy. You can also buy this over the counter at any pharmacy.**     Do not shave within 12 inches of your incision (surgical cut) area for at least 3 days before surgery. Shaving can make small cuts in the skin. This puts you at a higher risk of infection.     Items you will need for each shower:   1 newly washed towel   4 ounces of one of the above soaps   Clean pajamas or clothes to change  into     Follow these steps the evening before surgery and the morning of surgery.  Remove all body piercings and jewelry, and leave them off until after your surgery.  Wash your hair and body with your regular shampoo and soap. Make sure you rinse the shampoo and soap from your hair and body.  Using clean hands, apply about 2 ounces of soap gently on your skin from your ear lobes to your toes. You don t need to scrub your skin, but make sure you liberally wash the area where your surgery will be done. Use on your groin area last. Do not use this soap on your face or head. If you get any soap in your eyes, ears or mouth, rinse right away. It is very important to let the soap stay on your skin for at least 1 minute.  Repeat step 3.   Rinse well and dry off using a clean towel. If you feel any tingling, itching or other irritation, rinse right away. It is normal to feel some coolness on the skin after using the antiseptic soap. Your skin may feel a bit dry after the shower, but do not use any lotions, creams or moisturizers. Do not use hair spray or other products in your hair. Also, do not wash with your regular soap after using this.  Dress in freshly washed clothes or pajamas. Use fresh pillowcases and sheets on your bed.  Repeat these steps the morning of surgery.     If you are allergic or sensitive to Chlorhexidine Gluconate, use an antibacterial soap instead, following the same steps.     If you cannot use the shower, wash yourself with this soap at the sink. Make sure the sink is clean before you begin, and do the best you can to clean your entire body.              What to Expect After Your Dialysis Access Surgery  Hospital Stay  You can expect to go home the same day as your surgery.  Medications  Take all medications exactly as directed. You may need to stop some taking some blood thinners prior to surgery. Your specific medications will be discussed with you prior going home.     Incision Care   Your  incision sites may have some bruising and minor swelling for about one week.  If your incision is sealed with Dermabond (glue) you may shower and leave the incision open to air the following day. When showering do not rub incision, let the warm soapy water run over it and pat dry. DO NOT SOAK INCISION IN A TUB/POOL/Etc until healed. Keep incisions dry and covered until they begin to heal.      Restrictions  You may use the arm freely after the site heals. Keep the following in mind:  Avoid pressure on the arm, lifting heavy objects with the arm, sleeping on the arm with the graft or fistula, and wearing tight-sleeved shirts or jewelry around the graft or fistula.  Do not allow blood pressure monitoring or needle punctures (other than dialysis) on the side where the graft or fistula is located.  Fistula/Graft Use - Fistulas need to be assessed at a follow up appointment with your surgeon prior to it being used for dialysis.      Follow-Up  If a follow up appointment was not scheduled prior to your procedure please call [phone]. Follow up appointments are scheduled with either your Physician or one a member of our care team.      Risks and Possible Complications   Steal  Syndrome - You may initially feel some coolness or numbness in the hand with the fistula. These sensations usually go away in a few weeks as your circulation compensates for the fistula. However, if these sensations are severe or don't disappear, tell your physician as soon as possible, because the fistula may be causing too much blood to flow away from your hand, a condition physicians call a  steal.   Infection/ Drainage/Bleeding/ Excessive Swelling - If there is any drainage or bleeding it should be a very small amount. If you have excessive bleeding, any milky drainage, or redness from the incisions call your doctor right away. Minor swelling is normal, if your experience excessive swelling call right away.   Graft/Fistula Narrowing or  Occlusion - Grafts and fistulas may develop thrombosis or stenosis, essentially blocking or partially blocking blood flow within the created graft or fistula. To assess blood flow place your fingers over the graft or fistula and feel for a vibration or a  thrill . THIS SHOULD BE DONE EVERY DAY! IF THRILL CANNOT BE FELT PLEASE CONTACT YOUR SURGEON AS SOON AS POSSIBLE.        Exercising for your fistula after surgery     An AV fistula must mature for several weeks or months before it can be used for hemodialysis, so after it is surgically created, your doctor will ask you to work on strengthening it. The more access arm exercises you do to help strengthen it, the sooner you ll be able to use your fistula. Your doctor may recommend certain arm and finger exercises that will strengthen the fistula. The exercises your doctor recommends will depend on where your fistula is located. Fistulas are usually located in the forearm or upper arm. Before you start any exercise, it s important to consult your doctor.     Use your arm for all of your usual activities. After seven days from the date of your surgery apply a tourniquet around your arm above your fistula tight enough to make the veins distend but not so tight that your hand goes to sleep or becomes extremely uncomfortable. Lightly squeeze a stress ball and hold for 10 seconds. Do a total of 5 repetitions. It is recommended to do this 6 times per day. Keep doing this until the fistula is used for dialysis.       Keeping your fistula clean  Once your AV fistula is strong enough to be used for hemodialysis, it is crucial that you keep it clean. Although a fistula is less prone to infection than other dialysis types, proper hygiene is still important:  Look for redness or swelling around the fistula area.   If you experience any pain in the fistula area, tell your doctor immediately.   If you get a fever, this can be a sign of infection.   Wash and pat dry your fistula arm  thoroughly right before each treatment. Your dialysis facility will provide you with supplies.     Please scan the QR code to watch a video on caring for your vascular access at home. There is a link provided if you are unable to use the code.

## 2025-05-21 ENCOUNTER — ANCILLARY PROCEDURE (OUTPATIENT)
Dept: CARDIOLOGY | Facility: CLINIC | Age: 85
End: 2025-05-21
Attending: INTERNAL MEDICINE
Payer: COMMERCIAL

## 2025-05-21 DIAGNOSIS — I49.5 SICK SINUS SYNDROME (H): ICD-10-CM

## 2025-05-21 DIAGNOSIS — I44.2 CHB (COMPLETE HEART BLOCK) (H): ICD-10-CM

## 2025-05-21 DIAGNOSIS — Z95.0 CARDIAC PACEMAKER IN SITU: ICD-10-CM

## 2025-05-21 LAB
MDC_IDC_LEAD_CONNECTION_STATUS: NORMAL
MDC_IDC_LEAD_CONNECTION_STATUS: NORMAL
MDC_IDC_LEAD_IMPLANT_DT: NORMAL
MDC_IDC_LEAD_IMPLANT_DT: NORMAL
MDC_IDC_LEAD_LOCATION: NORMAL
MDC_IDC_LEAD_LOCATION: NORMAL
MDC_IDC_LEAD_LOCATION_DETAIL_1: NORMAL
MDC_IDC_LEAD_LOCATION_DETAIL_1: NORMAL
MDC_IDC_LEAD_MFG: NORMAL
MDC_IDC_LEAD_MFG: NORMAL
MDC_IDC_LEAD_MODEL: NORMAL
MDC_IDC_LEAD_MODEL: NORMAL
MDC_IDC_LEAD_POLARITY_TYPE: NORMAL
MDC_IDC_LEAD_POLARITY_TYPE: NORMAL
MDC_IDC_LEAD_SERIAL: NORMAL
MDC_IDC_LEAD_SERIAL: NORMAL
MDC_IDC_LEAD_SPECIAL_FUNCTION: NORMAL
MDC_IDC_LEAD_SPECIAL_FUNCTION: NORMAL
MDC_IDC_MSMT_BATTERY_DTM: NORMAL
MDC_IDC_MSMT_BATTERY_REMAINING_LONGEVITY: 151 MO
MDC_IDC_MSMT_BATTERY_RRT_TRIGGER: 2.62
MDC_IDC_MSMT_BATTERY_STATUS: NORMAL
MDC_IDC_MSMT_BATTERY_VOLTAGE: 3.14 V
MDC_IDC_MSMT_LEADCHNL_RA_IMPEDANCE_VALUE: 342 OHM
MDC_IDC_MSMT_LEADCHNL_RA_IMPEDANCE_VALUE: 437 OHM
MDC_IDC_MSMT_LEADCHNL_RA_PACING_THRESHOLD_AMPLITUDE: 0.75 V
MDC_IDC_MSMT_LEADCHNL_RA_PACING_THRESHOLD_PULSEWIDTH: 0.4 MS
MDC_IDC_MSMT_LEADCHNL_RA_SENSING_INTR_AMPL: 1.25 MV
MDC_IDC_MSMT_LEADCHNL_RA_SENSING_INTR_AMPL: 1.25 MV
MDC_IDC_MSMT_LEADCHNL_RV_IMPEDANCE_VALUE: 437 OHM
MDC_IDC_MSMT_LEADCHNL_RV_IMPEDANCE_VALUE: 589 OHM
MDC_IDC_MSMT_LEADCHNL_RV_PACING_THRESHOLD_AMPLITUDE: 0.75 V
MDC_IDC_MSMT_LEADCHNL_RV_PACING_THRESHOLD_PULSEWIDTH: 0.4 MS
MDC_IDC_MSMT_LEADCHNL_RV_SENSING_INTR_AMPL: 12.88 MV
MDC_IDC_MSMT_LEADCHNL_RV_SENSING_INTR_AMPL: 12.88 MV
MDC_IDC_PG_IMPLANT_DTM: NORMAL
MDC_IDC_PG_MFG: NORMAL
MDC_IDC_PG_MODEL: NORMAL
MDC_IDC_PG_SERIAL: NORMAL
MDC_IDC_PG_TYPE: NORMAL
MDC_IDC_SESS_CLINIC_NAME: NORMAL
MDC_IDC_SESS_DTM: NORMAL
MDC_IDC_SESS_TYPE: NORMAL
MDC_IDC_SET_BRADY_AT_MODE_SWITCH_RATE: 171 {BEATS}/MIN
MDC_IDC_SET_BRADY_HYSTRATE: NORMAL
MDC_IDC_SET_BRADY_LOWRATE: 70 {BEATS}/MIN
MDC_IDC_SET_BRADY_MAX_SENSOR_RATE: 120 {BEATS}/MIN
MDC_IDC_SET_BRADY_MAX_TRACKING_RATE: 120 {BEATS}/MIN
MDC_IDC_SET_BRADY_MODE: NORMAL
MDC_IDC_SET_BRADY_PAV_DELAY_LOW: 180 MS
MDC_IDC_SET_BRADY_SAV_DELAY_LOW: 150 MS
MDC_IDC_SET_LEADCHNL_RA_PACING_AMPLITUDE: 1.5 V
MDC_IDC_SET_LEADCHNL_RA_PACING_ANODE_ELECTRODE_1: NORMAL
MDC_IDC_SET_LEADCHNL_RA_PACING_ANODE_LOCATION_1: NORMAL
MDC_IDC_SET_LEADCHNL_RA_PACING_CAPTURE_MODE: NORMAL
MDC_IDC_SET_LEADCHNL_RA_PACING_CATHODE_ELECTRODE_1: NORMAL
MDC_IDC_SET_LEADCHNL_RA_PACING_CATHODE_LOCATION_1: NORMAL
MDC_IDC_SET_LEADCHNL_RA_PACING_POLARITY: NORMAL
MDC_IDC_SET_LEADCHNL_RA_PACING_PULSEWIDTH: 0.4 MS
MDC_IDC_SET_LEADCHNL_RA_SENSING_ANODE_ELECTRODE_1: NORMAL
MDC_IDC_SET_LEADCHNL_RA_SENSING_ANODE_LOCATION_1: NORMAL
MDC_IDC_SET_LEADCHNL_RA_SENSING_CATHODE_ELECTRODE_1: NORMAL
MDC_IDC_SET_LEADCHNL_RA_SENSING_CATHODE_LOCATION_1: NORMAL
MDC_IDC_SET_LEADCHNL_RA_SENSING_POLARITY: NORMAL
MDC_IDC_SET_LEADCHNL_RA_SENSING_SENSITIVITY: 0.15 MV
MDC_IDC_SET_LEADCHNL_RV_PACING_AMPLITUDE: 1.5 V
MDC_IDC_SET_LEADCHNL_RV_PACING_ANODE_ELECTRODE_1: NORMAL
MDC_IDC_SET_LEADCHNL_RV_PACING_ANODE_LOCATION_1: NORMAL
MDC_IDC_SET_LEADCHNL_RV_PACING_CAPTURE_MODE: NORMAL
MDC_IDC_SET_LEADCHNL_RV_PACING_CATHODE_ELECTRODE_1: NORMAL
MDC_IDC_SET_LEADCHNL_RV_PACING_CATHODE_LOCATION_1: NORMAL
MDC_IDC_SET_LEADCHNL_RV_PACING_POLARITY: NORMAL
MDC_IDC_SET_LEADCHNL_RV_PACING_PULSEWIDTH: 0.4 MS
MDC_IDC_SET_LEADCHNL_RV_SENSING_ANODE_ELECTRODE_1: NORMAL
MDC_IDC_SET_LEADCHNL_RV_SENSING_ANODE_LOCATION_1: NORMAL
MDC_IDC_SET_LEADCHNL_RV_SENSING_CATHODE_ELECTRODE_1: NORMAL
MDC_IDC_SET_LEADCHNL_RV_SENSING_CATHODE_LOCATION_1: NORMAL
MDC_IDC_SET_LEADCHNL_RV_SENSING_POLARITY: NORMAL
MDC_IDC_SET_LEADCHNL_RV_SENSING_SENSITIVITY: 0.9 MV
MDC_IDC_SET_ZONE_DETECTION_INTERVAL: 350 MS
MDC_IDC_SET_ZONE_DETECTION_INTERVAL: 400 MS
MDC_IDC_SET_ZONE_STATUS: NORMAL
MDC_IDC_SET_ZONE_STATUS: NORMAL
MDC_IDC_SET_ZONE_TYPE: NORMAL
MDC_IDC_SET_ZONE_VENDOR_TYPE: NORMAL
MDC_IDC_STAT_AT_BURDEN_PERCENT: 0 %
MDC_IDC_STAT_AT_DTM_END: NORMAL
MDC_IDC_STAT_AT_DTM_START: NORMAL
MDC_IDC_STAT_BRADY_AP_VP_PERCENT: 35.32 %
MDC_IDC_STAT_BRADY_AP_VS_PERCENT: 0.01 %
MDC_IDC_STAT_BRADY_AS_VP_PERCENT: 58.39 %
MDC_IDC_STAT_BRADY_AS_VS_PERCENT: 6.28 %
MDC_IDC_STAT_BRADY_DTM_END: NORMAL
MDC_IDC_STAT_BRADY_DTM_START: NORMAL
MDC_IDC_STAT_BRADY_RA_PERCENT_PACED: 35.38 %
MDC_IDC_STAT_BRADY_RV_PERCENT_PACED: 93.71 %
MDC_IDC_STAT_EPISODE_RECENT_COUNT: 0
MDC_IDC_STAT_EPISODE_RECENT_COUNT_DTM_END: NORMAL
MDC_IDC_STAT_EPISODE_RECENT_COUNT_DTM_START: NORMAL
MDC_IDC_STAT_EPISODE_TOTAL_COUNT: 0
MDC_IDC_STAT_EPISODE_TOTAL_COUNT: 33
MDC_IDC_STAT_EPISODE_TOTAL_COUNT_DTM_END: NORMAL
MDC_IDC_STAT_EPISODE_TOTAL_COUNT_DTM_START: NORMAL
MDC_IDC_STAT_EPISODE_TYPE: NORMAL
MDC_IDC_STAT_TACHYTHERAPY_RECENT_DTM_END: NORMAL
MDC_IDC_STAT_TACHYTHERAPY_RECENT_DTM_START: NORMAL
MDC_IDC_STAT_TACHYTHERAPY_TOTAL_DTM_END: NORMAL
MDC_IDC_STAT_TACHYTHERAPY_TOTAL_DTM_START: NORMAL

## 2025-05-21 PROCEDURE — 93296 REM INTERROG EVL PM/IDS: CPT | Performed by: INTERNAL MEDICINE

## 2025-05-21 PROCEDURE — 93294 REM INTERROG EVL PM/LDLS PM: CPT | Performed by: INTERNAL MEDICINE

## 2025-06-03 ENCOUNTER — ANCILLARY PROCEDURE (OUTPATIENT)
Dept: CARDIOLOGY | Facility: CLINIC | Age: 85
End: 2025-06-03
Attending: INTERNAL MEDICINE
Payer: COMMERCIAL

## 2025-06-03 ENCOUNTER — TELEPHONE (OUTPATIENT)
Dept: CARDIOLOGY | Facility: CLINIC | Age: 85
End: 2025-06-03

## 2025-06-03 DIAGNOSIS — Z95.0 CARDIAC PACEMAKER IN SITU: ICD-10-CM

## 2025-06-03 DIAGNOSIS — I49.5 SICK SINUS SYNDROME (H): ICD-10-CM

## 2025-06-03 DIAGNOSIS — I44.2 CHB (COMPLETE HEART BLOCK) (H): ICD-10-CM

## 2025-06-03 LAB
MDC_IDC_EPISODE_DTM: NORMAL
MDC_IDC_EPISODE_DURATION: 10 S
MDC_IDC_EPISODE_DURATION: 100 S
MDC_IDC_EPISODE_DURATION: 103 S
MDC_IDC_EPISODE_DURATION: 103 S
MDC_IDC_EPISODE_DURATION: 109 S
MDC_IDC_EPISODE_DURATION: 110 S
MDC_IDC_EPISODE_DURATION: 134 S
MDC_IDC_EPISODE_DURATION: 158 S
MDC_IDC_EPISODE_DURATION: 161 S
MDC_IDC_EPISODE_DURATION: 165 S
MDC_IDC_EPISODE_DURATION: 181 S
MDC_IDC_EPISODE_DURATION: 184 S
MDC_IDC_EPISODE_DURATION: 194 S
MDC_IDC_EPISODE_DURATION: 21 S
MDC_IDC_EPISODE_DURATION: 2214 S
MDC_IDC_EPISODE_DURATION: 25 S
MDC_IDC_EPISODE_DURATION: 256 S
MDC_IDC_EPISODE_DURATION: 27 S
MDC_IDC_EPISODE_DURATION: 28 S
MDC_IDC_EPISODE_DURATION: 289 S
MDC_IDC_EPISODE_DURATION: 29 S
MDC_IDC_EPISODE_DURATION: 299 S
MDC_IDC_EPISODE_DURATION: 35 S
MDC_IDC_EPISODE_DURATION: 35 S
MDC_IDC_EPISODE_DURATION: 37 S
MDC_IDC_EPISODE_DURATION: 39 S
MDC_IDC_EPISODE_DURATION: 39 S
MDC_IDC_EPISODE_DURATION: 4017 S
MDC_IDC_EPISODE_DURATION: 41 S
MDC_IDC_EPISODE_DURATION: 41 S
MDC_IDC_EPISODE_DURATION: 42 S
MDC_IDC_EPISODE_DURATION: 45 S
MDC_IDC_EPISODE_DURATION: 46 S
MDC_IDC_EPISODE_DURATION: 49 S
MDC_IDC_EPISODE_DURATION: 494 S
MDC_IDC_EPISODE_DURATION: 5 S
MDC_IDC_EPISODE_DURATION: 52 S
MDC_IDC_EPISODE_DURATION: 53 S
MDC_IDC_EPISODE_DURATION: 67 S
MDC_IDC_EPISODE_DURATION: 68 S
MDC_IDC_EPISODE_DURATION: 74 S
MDC_IDC_EPISODE_DURATION: 74 S
MDC_IDC_EPISODE_DURATION: 77 S
MDC_IDC_EPISODE_DURATION: 80 S
MDC_IDC_EPISODE_DURATION: 83 S
MDC_IDC_EPISODE_DURATION: 855 S
MDC_IDC_EPISODE_DURATION: 91 S
MDC_IDC_EPISODE_DURATION: 92 S
MDC_IDC_EPISODE_DURATION: 95 S
MDC_IDC_EPISODE_DURATION: NORMAL S
MDC_IDC_EPISODE_ID: 100
MDC_IDC_EPISODE_ID: 101
MDC_IDC_EPISODE_ID: 102
MDC_IDC_EPISODE_ID: 103
MDC_IDC_EPISODE_ID: 104
MDC_IDC_EPISODE_ID: 105
MDC_IDC_EPISODE_ID: 55
MDC_IDC_EPISODE_ID: 56
MDC_IDC_EPISODE_ID: 57
MDC_IDC_EPISODE_ID: 58
MDC_IDC_EPISODE_ID: 59
MDC_IDC_EPISODE_ID: 60
MDC_IDC_EPISODE_ID: 61
MDC_IDC_EPISODE_ID: 62
MDC_IDC_EPISODE_ID: 63
MDC_IDC_EPISODE_ID: 64
MDC_IDC_EPISODE_ID: 65
MDC_IDC_EPISODE_ID: 66
MDC_IDC_EPISODE_ID: 67
MDC_IDC_EPISODE_ID: 68
MDC_IDC_EPISODE_ID: 69
MDC_IDC_EPISODE_ID: 70
MDC_IDC_EPISODE_ID: 71
MDC_IDC_EPISODE_ID: 72
MDC_IDC_EPISODE_ID: 73
MDC_IDC_EPISODE_ID: 74
MDC_IDC_EPISODE_ID: 75
MDC_IDC_EPISODE_ID: 76
MDC_IDC_EPISODE_ID: 77
MDC_IDC_EPISODE_ID: 78
MDC_IDC_EPISODE_ID: 79
MDC_IDC_EPISODE_ID: 80
MDC_IDC_EPISODE_ID: 81
MDC_IDC_EPISODE_ID: 82
MDC_IDC_EPISODE_ID: 83
MDC_IDC_EPISODE_ID: 84
MDC_IDC_EPISODE_ID: 85
MDC_IDC_EPISODE_ID: 86
MDC_IDC_EPISODE_ID: 87
MDC_IDC_EPISODE_ID: 88
MDC_IDC_EPISODE_ID: 89
MDC_IDC_EPISODE_ID: 90
MDC_IDC_EPISODE_ID: 91
MDC_IDC_EPISODE_ID: 92
MDC_IDC_EPISODE_ID: 93
MDC_IDC_EPISODE_ID: 94
MDC_IDC_EPISODE_ID: 95
MDC_IDC_EPISODE_ID: 96
MDC_IDC_EPISODE_ID: 97
MDC_IDC_EPISODE_ID: 98
MDC_IDC_EPISODE_ID: 99
MDC_IDC_EPISODE_TYPE: NORMAL
MDC_IDC_LEAD_CONNECTION_STATUS: NORMAL
MDC_IDC_LEAD_CONNECTION_STATUS: NORMAL
MDC_IDC_LEAD_IMPLANT_DT: NORMAL
MDC_IDC_LEAD_IMPLANT_DT: NORMAL
MDC_IDC_LEAD_LOCATION: NORMAL
MDC_IDC_LEAD_LOCATION: NORMAL
MDC_IDC_LEAD_LOCATION_DETAIL_1: NORMAL
MDC_IDC_LEAD_LOCATION_DETAIL_1: NORMAL
MDC_IDC_LEAD_MFG: NORMAL
MDC_IDC_LEAD_MFG: NORMAL
MDC_IDC_LEAD_MODEL: NORMAL
MDC_IDC_LEAD_MODEL: NORMAL
MDC_IDC_LEAD_POLARITY_TYPE: NORMAL
MDC_IDC_LEAD_POLARITY_TYPE: NORMAL
MDC_IDC_LEAD_SERIAL: NORMAL
MDC_IDC_LEAD_SERIAL: NORMAL
MDC_IDC_LEAD_SPECIAL_FUNCTION: NORMAL
MDC_IDC_LEAD_SPECIAL_FUNCTION: NORMAL
MDC_IDC_MSMT_BATTERY_DTM: NORMAL
MDC_IDC_MSMT_BATTERY_REMAINING_LONGEVITY: 150 MO
MDC_IDC_MSMT_BATTERY_RRT_TRIGGER: 2.62
MDC_IDC_MSMT_BATTERY_STATUS: NORMAL
MDC_IDC_MSMT_BATTERY_VOLTAGE: 3.14 V
MDC_IDC_MSMT_LEADCHNL_RA_IMPEDANCE_VALUE: 342 OHM
MDC_IDC_MSMT_LEADCHNL_RA_IMPEDANCE_VALUE: 418 OHM
MDC_IDC_MSMT_LEADCHNL_RA_PACING_THRESHOLD_AMPLITUDE: 0.62 V
MDC_IDC_MSMT_LEADCHNL_RA_PACING_THRESHOLD_PULSEWIDTH: 0.4 MS
MDC_IDC_MSMT_LEADCHNL_RA_SENSING_INTR_AMPL: 1 MV
MDC_IDC_MSMT_LEADCHNL_RA_SENSING_INTR_AMPL: 1 MV
MDC_IDC_MSMT_LEADCHNL_RV_IMPEDANCE_VALUE: 437 OHM
MDC_IDC_MSMT_LEADCHNL_RV_IMPEDANCE_VALUE: 589 OHM
MDC_IDC_MSMT_LEADCHNL_RV_PACING_THRESHOLD_AMPLITUDE: 0.75 V
MDC_IDC_MSMT_LEADCHNL_RV_PACING_THRESHOLD_PULSEWIDTH: 0.4 MS
MDC_IDC_MSMT_LEADCHNL_RV_SENSING_INTR_AMPL: 14.25 MV
MDC_IDC_MSMT_LEADCHNL_RV_SENSING_INTR_AMPL: 14.25 MV
MDC_IDC_PG_IMPLANT_DTM: NORMAL
MDC_IDC_PG_MFG: NORMAL
MDC_IDC_PG_MODEL: NORMAL
MDC_IDC_PG_SERIAL: NORMAL
MDC_IDC_PG_TYPE: NORMAL
MDC_IDC_SESS_CLINIC_NAME: NORMAL
MDC_IDC_SESS_DTM: NORMAL
MDC_IDC_SESS_TYPE: NORMAL
MDC_IDC_SET_BRADY_AT_MODE_SWITCH_RATE: 171 {BEATS}/MIN
MDC_IDC_SET_BRADY_HYSTRATE: NORMAL
MDC_IDC_SET_BRADY_LOWRATE: 70 {BEATS}/MIN
MDC_IDC_SET_BRADY_MAX_SENSOR_RATE: 120 {BEATS}/MIN
MDC_IDC_SET_BRADY_MAX_TRACKING_RATE: 120 {BEATS}/MIN
MDC_IDC_SET_BRADY_MODE: NORMAL
MDC_IDC_SET_BRADY_PAV_DELAY_LOW: 180 MS
MDC_IDC_SET_BRADY_SAV_DELAY_LOW: 150 MS
MDC_IDC_SET_LEADCHNL_RA_PACING_AMPLITUDE: 1.5 V
MDC_IDC_SET_LEADCHNL_RA_PACING_ANODE_ELECTRODE_1: NORMAL
MDC_IDC_SET_LEADCHNL_RA_PACING_ANODE_LOCATION_1: NORMAL
MDC_IDC_SET_LEADCHNL_RA_PACING_CAPTURE_MODE: NORMAL
MDC_IDC_SET_LEADCHNL_RA_PACING_CATHODE_ELECTRODE_1: NORMAL
MDC_IDC_SET_LEADCHNL_RA_PACING_CATHODE_LOCATION_1: NORMAL
MDC_IDC_SET_LEADCHNL_RA_PACING_POLARITY: NORMAL
MDC_IDC_SET_LEADCHNL_RA_PACING_PULSEWIDTH: 0.4 MS
MDC_IDC_SET_LEADCHNL_RA_SENSING_ANODE_ELECTRODE_1: NORMAL
MDC_IDC_SET_LEADCHNL_RA_SENSING_ANODE_LOCATION_1: NORMAL
MDC_IDC_SET_LEADCHNL_RA_SENSING_CATHODE_ELECTRODE_1: NORMAL
MDC_IDC_SET_LEADCHNL_RA_SENSING_CATHODE_LOCATION_1: NORMAL
MDC_IDC_SET_LEADCHNL_RA_SENSING_POLARITY: NORMAL
MDC_IDC_SET_LEADCHNL_RA_SENSING_SENSITIVITY: 0.15 MV
MDC_IDC_SET_LEADCHNL_RV_PACING_AMPLITUDE: 1.5 V
MDC_IDC_SET_LEADCHNL_RV_PACING_ANODE_ELECTRODE_1: NORMAL
MDC_IDC_SET_LEADCHNL_RV_PACING_ANODE_LOCATION_1: NORMAL
MDC_IDC_SET_LEADCHNL_RV_PACING_CAPTURE_MODE: NORMAL
MDC_IDC_SET_LEADCHNL_RV_PACING_CATHODE_ELECTRODE_1: NORMAL
MDC_IDC_SET_LEADCHNL_RV_PACING_CATHODE_LOCATION_1: NORMAL
MDC_IDC_SET_LEADCHNL_RV_PACING_POLARITY: NORMAL
MDC_IDC_SET_LEADCHNL_RV_PACING_PULSEWIDTH: 0.4 MS
MDC_IDC_SET_LEADCHNL_RV_SENSING_ANODE_ELECTRODE_1: NORMAL
MDC_IDC_SET_LEADCHNL_RV_SENSING_ANODE_LOCATION_1: NORMAL
MDC_IDC_SET_LEADCHNL_RV_SENSING_CATHODE_ELECTRODE_1: NORMAL
MDC_IDC_SET_LEADCHNL_RV_SENSING_CATHODE_LOCATION_1: NORMAL
MDC_IDC_SET_LEADCHNL_RV_SENSING_POLARITY: NORMAL
MDC_IDC_SET_LEADCHNL_RV_SENSING_SENSITIVITY: 0.9 MV
MDC_IDC_SET_ZONE_DETECTION_INTERVAL: 350 MS
MDC_IDC_SET_ZONE_DETECTION_INTERVAL: 400 MS
MDC_IDC_SET_ZONE_STATUS: NORMAL
MDC_IDC_SET_ZONE_STATUS: NORMAL
MDC_IDC_SET_ZONE_TYPE: NORMAL
MDC_IDC_SET_ZONE_VENDOR_TYPE: NORMAL
MDC_IDC_STAT_AT_BURDEN_PERCENT: 4.3 %
MDC_IDC_STAT_AT_DTM_END: NORMAL
MDC_IDC_STAT_AT_DTM_START: NORMAL
MDC_IDC_STAT_BRADY_AP_VP_PERCENT: 41.03 %
MDC_IDC_STAT_BRADY_AP_VS_PERCENT: 0.09 %
MDC_IDC_STAT_BRADY_AS_VP_PERCENT: 51.71 %
MDC_IDC_STAT_BRADY_AS_VS_PERCENT: 7.17 %
MDC_IDC_STAT_BRADY_DTM_END: NORMAL
MDC_IDC_STAT_BRADY_DTM_START: NORMAL
MDC_IDC_STAT_BRADY_RA_PERCENT_PACED: 39.61 %
MDC_IDC_STAT_BRADY_RV_PERCENT_PACED: 91.27 %
MDC_IDC_STAT_EPISODE_RECENT_COUNT: 0
MDC_IDC_STAT_EPISODE_RECENT_COUNT: 72
MDC_IDC_STAT_EPISODE_RECENT_COUNT_DTM_END: NORMAL
MDC_IDC_STAT_EPISODE_RECENT_COUNT_DTM_START: NORMAL
MDC_IDC_STAT_EPISODE_TOTAL_COUNT: 0
MDC_IDC_STAT_EPISODE_TOTAL_COUNT: 105
MDC_IDC_STAT_EPISODE_TOTAL_COUNT_DTM_END: NORMAL
MDC_IDC_STAT_EPISODE_TOTAL_COUNT_DTM_START: NORMAL
MDC_IDC_STAT_EPISODE_TYPE: NORMAL
MDC_IDC_STAT_TACHYTHERAPY_RECENT_DTM_END: NORMAL
MDC_IDC_STAT_TACHYTHERAPY_RECENT_DTM_START: NORMAL
MDC_IDC_STAT_TACHYTHERAPY_TOTAL_DTM_END: NORMAL
MDC_IDC_STAT_TACHYTHERAPY_TOTAL_DTM_START: NORMAL

## 2025-06-03 PROCEDURE — 99207 CARDIAC DEVICE CHECK - REMOTE: CPT | Performed by: INTERNAL MEDICINE

## 2025-06-03 NOTE — TELEPHONE ENCOUNTER
"Encounter Type: Courtesy remote check for Alert for AT/AF >6 hours.   Device: [employer], [place] (D) PPM  Presenting: AF/ & VS @ 70 to 95 bpm  Findings: AF on going since 8:30 AM today, no previous history of AF noted.     Spoke to patient in regards to on going AF. Patient stated, \"I haven't felt a thing.\" Patient went on to say she always feels fatigued and dizzy because she is on dialysis. Patient confirmed upcomming procedure for Fistula.     AF is new for this patient. She confirms taking Warfarin as prescribed. Last saw Dr. Puga 5/9/2025. Routed for clinical management.      Mike Bennett RN on 6/3/2025 at 3:19 PM                      "

## 2025-06-04 NOTE — TELEPHONE ENCOUNTER
Updated patient with Dr. Puga's recommendations. Understanding expressed.     Mike Bennett RN on 6/4/2025 at 8:33 AM

## 2025-07-02 ENCOUNTER — PRE VISIT (OUTPATIENT)
Dept: ONCOLOGY | Facility: HOSPITAL | Age: 85
End: 2025-07-02
Payer: COMMERCIAL

## 2025-07-18 ENCOUNTER — HOSPITAL ENCOUNTER (OUTPATIENT)
Dept: INTERVENTIONAL RADIOLOGY/VASCULAR | Facility: HOSPITAL | Age: 85
Discharge: HOME | End: 2025-07-18
Attending: STUDENT IN AN ORGANIZED HEALTH CARE EDUCATION/TRAINING PROGRAM
Payer: COMMERCIAL

## 2025-07-18 ENCOUNTER — HOSPITAL ENCOUNTER (OUTPATIENT)
Dept: INTERVENTIONAL RADIOLOGY/VASCULAR | Facility: HOSPITAL | Age: 85
Discharge: HOME | End: 2025-07-18
Attending: INTERNAL MEDICINE
Payer: COMMERCIAL

## 2025-07-18 VITALS
OXYGEN SATURATION: 93 % | HEART RATE: 70 BPM | RESPIRATION RATE: 14 BRPM | DIASTOLIC BLOOD PRESSURE: 60 MMHG | SYSTOLIC BLOOD PRESSURE: 110 MMHG | TEMPERATURE: 98.2 F

## 2025-07-18 DIAGNOSIS — N18.6 ESRD (END STAGE RENAL DISEASE) (H): ICD-10-CM

## 2025-07-18 PROCEDURE — 36589 REMOVAL TUNNELED CV CATH: CPT

## 2025-07-18 PROCEDURE — C1769 GUIDE WIRE: HCPCS

## 2025-07-18 PROCEDURE — 250N000011 HC RX IP 250 OP 636

## 2025-07-18 PROCEDURE — 77001 FLUOROGUIDE FOR VEIN DEVICE: CPT

## 2025-07-18 RX ORDER — NALOXONE HYDROCHLORIDE 0.4 MG/ML
0.4 INJECTION, SOLUTION INTRAMUSCULAR; INTRAVENOUS; SUBCUTANEOUS
Status: DISCONTINUED | OUTPATIENT
Start: 2025-07-18 | End: 2025-07-19 | Stop reason: HOSPADM

## 2025-07-18 RX ORDER — FENTANYL CITRATE 50 UG/ML
25-50 INJECTION, SOLUTION INTRAMUSCULAR; INTRAVENOUS EVERY 5 MIN PRN
Refills: 0 | Status: DISCONTINUED | OUTPATIENT
Start: 2025-07-18 | End: 2025-07-19 | Stop reason: HOSPADM

## 2025-07-18 RX ORDER — NALOXONE HYDROCHLORIDE 0.4 MG/ML
0.2 INJECTION, SOLUTION INTRAMUSCULAR; INTRAVENOUS; SUBCUTANEOUS
Status: DISCONTINUED | OUTPATIENT
Start: 2025-07-18 | End: 2025-07-19 | Stop reason: HOSPADM

## 2025-07-18 RX ORDER — ONDANSETRON 2 MG/ML
4 INJECTION INTRAMUSCULAR; INTRAVENOUS
Status: DISCONTINUED | OUTPATIENT
Start: 2025-07-18 | End: 2025-07-19 | Stop reason: HOSPADM

## 2025-07-18 RX ORDER — FLUMAZENIL 0.1 MG/ML
0.2 INJECTION, SOLUTION INTRAVENOUS
Status: DISCONTINUED | OUTPATIENT
Start: 2025-07-18 | End: 2025-07-19 | Stop reason: HOSPADM

## 2025-07-18 RX ADMIN — FENTANYL CITRATE 50 MCG: 50 INJECTION, SOLUTION INTRAMUSCULAR; INTRAVENOUS at 13:04

## 2025-07-18 RX ADMIN — FENTANYL CITRATE 50 MCG: 50 INJECTION, SOLUTION INTRAMUSCULAR; INTRAVENOUS at 13:07

## 2025-07-18 RX ADMIN — MIDAZOLAM HYDROCHLORIDE 2 MG: 1 INJECTION, SOLUTION INTRAMUSCULAR; INTRAVENOUS at 13:00

## 2025-07-18 NOTE — PRE-PROCEDURE
GENERAL PRE-PROCEDURE:   Procedure:  HD catheter removal with sedation  Date/Time:  7/18/2025 12:58 PM    Written consent obtained?: Yes    Risks and benefits: Risks, benefits and alternatives were discussed    Consent given by:  Patient  Patient states understanding of procedure being performed: Yes    Patient's understanding of procedure matches consent: Yes    Procedure consent matches procedure scheduled: Yes    Expected level of sedation:  Moderate  Appropriately NPO:  Yes  ASA Class:  3  Mallampati  :  Grade 2- soft palate, base of uvula, tonsillar pillars, and portion of posterior pharyngeal wall visible  Lungs:  Lungs clear with good breath sounds bilaterally  Heart:  Normal heart sounds and rate  History & Physical reviewed:  History and physical reviewed and no updates needed  Statement of review:  I have reviewed the lab findings, diagnostic data, medications, and the plan for sedation

## 2025-07-18 NOTE — PROGRESS NOTES
Interventional Radiology - Pre-Procedure Note/H&P  7/18/2025  Outpatient - [facility]    Procedure Requested: Right tunneled dialysis catheter removal WITH sedation per  request     Brief HPI: Felicitas Elliott is a 85 year old female with hx of chronic hypoxia, SSS s/p pacemaker 2024, CABG x2, HF, ESRD on HD via HD catheter. Plans to undergo creation of LUE AVF.     Orders to remove tunneled HD catheter today.      NPO: Last ate at 0600 - okay per Paulette   ANTICOAGULANTS: -Clopidogrel (Plavix) 75 mg daily - No hold required per SIR guidelines.  -Warfarin (Coumadin) - No hold required per SIR guidelines.  ANTIBIOTICS: None required or indicated for IR procedure per SIR guidelines.  GLP-1 Agonist: None.    ALLERGIES  Allergies   Allergen Reactions    Aspirin Rash    Cats Rash    Morphine Rash     Only reacted once, had again without problems.    Nickel Rash     Review of Systems: A comprehensive 10-point review of systems was performed. All systems were reviewed and negative with exception to those reported in the HPI.    PMH:  Past Medical History:   Diagnosis Date    Community acquired pneumonia of right lower lobe of lung 01/13/2025    Sepsis (H) 08/16/2024     Patient Active Problem List   Diagnosis    Endocarditis    ESRD (end stage renal disease) on dialysis (H)    Mixed hyperlipidemia    Spinal stenosis of cervicothoracic region    Nonrheumatic aortic valve stenosis    Mitral valve disorder    Coronary artery disease involving native coronary artery of native heart, unspecified whether angina present    S/P CABG (coronary artery bypass graft)    Acute heart failure with preserved ejection fraction (HFpEF) (H)    Physical deconditioning    Elevated d-dimer    Acute renal failure, unspecified acute renal failure type    PE (pulmonary thromboembolism) (H)    DVT (deep venous thrombosis) (H)    SSS (sick sinus syndrome) (H)    Hypotension    CHB (complete heart block) (H)    Chronic  obstructive pulmonary disease, unspecified COPD type (H)    Anticoagulation monitoring, INR range 2-3    History of prosthetic heart valve    Arthritis     PSH:  Past Surgical History:   Procedure Laterality Date    CORONARY ARTERY BYPASS GRAFT, WITH AORTIC VALVE REPLACEMENT, WITH ENDOSCOPIC VESSEL PROCUREMENT N/A 8/27/2024    Procedure: CORONARY ARTERY BYPASS GRAFT TIMES TWO, WITH AORTIC ROOT REPLACEMENT, WITH LEFT INTERNAL MAMMARY ARTERY HARVEST, LEFT SAPHNENOUS ENDOSCOPIC VESSEL PROCUREMENT,;  Surgeon: Dylan Renae MD;  Location: [facility] [place] OR    CV CORONARY ANGIOGRAM N/A 8/20/2024    Procedure: CV CORONARY ANGIOGRAM;  Surgeon: Den Wylie MD;  Location: [facility] CATH LAB CV    EP PACEMAKER DEVICE & IMPLANT- HIS LEAD DUAL N/A 9/12/2024    Procedure: Pacemaker Device & Lead Implant - HIS Lead Dual;  Surgeon: Minh Pina MD;  Location: [facility] CATH LAB CV    IR CVC NON TUNNEL PLACEMENT > 5 YRS  9/1/2024    IR CVC TUNNEL PLACEMENT > 5 YRS OF AGE  9/11/2024    PICC TRIPLE LUMEN PLACEMENT  9/6/2024    REPLACE VALVE MITRAL N/A 8/27/2024    Procedure: MITRAL VALVE REPLACEMENT,;  Surgeon: Dylan Renae MD;  Location: [facility] [place] OR    TRANSESOPHAGEAL ECHOCARDIOGRAM INTRAOPERATIVE  8/27/2024    Procedure: ANESTHESIA TRANSESOPHAGEAL ECHOCARDIOGRAM, EPI-AORTIC ULTRASOUND;  Surgeon: Dylan Renae MD;  Location: [facility] [place] OR     FAMILY Hx:  No family history on file.     SOCIAL Hx:  Social History     Socioeconomic History    Marital status:      Spouse name: Ed    Number of children: 7    Years of education: [employer]    Highest education level: 12th grade   Occupational History    Occupation:    Tobacco Use    Smoking status: Former     Current packs/day: 8.00     Average packs/day: 8.0 packs/day for 1.5 years (12.3 ttl pk-yrs)     Types: Cigarettes     Start date: 2024    Smokeless tobacco: Former   Vaping Use    Vaping status: Never Used    Substance and Sexual Activity    Alcohol use: Not Currently    Sexual activity: Not Currently     Partners: Male     Birth control/protection: None     Social Drivers of Health     Financial Resource Strain: Low Risk  (1/14/2025)    Financial Resource Strain     Within the past 12 months, have you or your family members you live with been unable to get utilities (heat, electricity) when it was really needed?: No   Food Insecurity: Low Risk  (1/14/2025)    Food Insecurity     Within the past 12 months, did you worry that your food would run out before you got money to buy more?: No     Within the past 12 months, did the food you bought just not last and you didn t have money to get more?: No   Transportation Needs: Low Risk  (1/14/2025)    Transportation Needs     Within the past 12 months, has lack of transportation kept you from medical appointments, getting your medicines, non-medical meetings or appointments, work, or from getting things that you need?: No   Social Connections: Socially Integrated (1/3/2025)    Received from [facility]    Social Connections     Do you often feel lonely or isolated from those around you?: 0   Interpersonal Safety: Low Risk  (7/18/2025)    Interpersonal Safety     Do you feel physically and emotionally safe where you currently live?: Yes     Within the past 12 months, have you been hit, slapped, kicked or otherwise physically hurt by someone?: No     Within the past 12 months, have you been humiliated or emotionally abused in other ways by your partner or ex-partner?: No   Housing Stability: Low Risk  (1/14/2025)    Housing Stability     Do you have housing? : Yes     Are you worried about losing your housing?: No      ALLERGIES:  Allergies   Allergen Reactions    Aspirin Rash    Cats Rash    Morphine Rash     Only reacted once, had again without problems.    Nickel Rash     MEDICATIONS:  Current Outpatient Medications   Medication Sig Dispense  Refill    acetaminophen (TYLENOL) 325 MG tablet Take 2 tablets (650 mg) by mouth every 4 hours as needed for other (mild pain (1-3)). 120 tablet 0    albuterol (PROVENTIL) (2.5 MG/3ML) 0.083% neb solution Take 1 vial (2.5 mg) by nebulization every 6 hours as needed for wheezing. 90 mL 0    bisacodyl (DULCOLAX) 10 MG suppository Place 1 suppository (10 mg) rectally daily as needed for constipation (Use if Magnesium hydroxide (MILK of MAGNESIA) not effective after 24 hours. May discontinue if patient having bowel movement.). 30 suppository 0    bisacodyl (DULCOLAX) 5 MG EC tablet Take 5 mg by mouth daily as needed for constipation.      clopidogrel (PLAVIX) 75 MG tablet Take 1 tablet (75 mg) by mouth daily. 90 tablet 0    Lidocaine (LIDOCARE) 4 % Patch Place 1 patch over 12 hours onto the skin every 24 hours. To prevent lidocaine toxicity, patient should be patch free for 12 hrs daily. (Patient taking differently: Place 1 patch onto the skin daily as needed for other (neck pain). To prevent lidocaine toxicity, patient should be patch free for 12 hrs daily.) 30 patch 0    lidocaine (XYLOCAINE) 5 % external ointment Apply topically 3 times daily. 240 g 1    midodrine (PROAMATINE) 10 MG tablet Take 1 tablet (10 mg) by mouth 3 times daily as needed (SBP <90). 60 tablet 3    senna (SENOKOT) 8.6 MG tablet Take 1 tablet by mouth daily. 30 tablet 3    simethicone (MYLICON) 80 MG chewable tablet Take 1 tablet (80 mg) by mouth 3 times daily. 90 tablet 0    torsemide (DEMADEX) 100 MG tablet Take 0.5 tablets (50 mg) by mouth daily. 30 tablet 0    traZODone (DESYREL) 50 MG tablet Take 0.5 tablets (25 mg) by mouth nightly as needed for sleep. 30 tablet 0    warfarin ANTICOAGULANT (COUMADIN) 2 MG tablet Take 4mg daily until next INR lab draw and dose will be updated. Earliest visit can be 1/21 Tuesday. 15 tablet 0     Current Facility-Administered Medications   Medication Dose Route Frequency Provider Last Rate Last Admin     fentaNYL (PF) (SUBLIMAZE) injection 25-50 mcg  25-50 mcg Intravenous Q5 Min PRN Nienow, Arnoldcee M, APRN CNP        flumazenil (ROMAZICON) injection 0.2 mg  0.2 mg Intravenous q1 min prn Nienow, Arnoldcee M, APRN CNP        lidocaine 1 % 1-30 mL  1-30 mL Intradermal Once PRN Nienow, Arnoldcee M, APRN CNP        midazolam (VERSED) injection 0.5-2 mg  0.5-2 mg Intravenous Q4 Min PRN Nienow, Arnoldcee M, APRN CNP        naloxone (NARCAN) injection 0.2 mg  0.2 mg Intravenous Q2 Min PRN Nienow, Alexcee M, APRN CNP        Or    naloxone (NARCAN) injection 0.4 mg  0.4 mg Intravenous Q2 Min PRN Nienow, Alexcee M, APRN CNP        Or    naloxone (NARCAN) injection 0.2 mg  0.2 mg Intramuscular Q2 Min PRN Nienow, Arnoldcee M, APRN CNP        Or    naloxone (NARCAN) injection 0.4 mg  0.4 mg Intramuscular Q2 Min PRN Nienow, Arnoldcee M, APRN CNP        ondansetron (ZOFRAN) injection 4 mg  4 mg Intravenous Once PRN Nienow, Arnoldcee M, APRN CNP         LABS:  INR   Date Value Ref Range Status   02/27/2025 1.19 (H) 0.85 - 1.15 Final      Hemoglobin   Date Value Ref Range Status   02/27/2025 12.3 11.7 - 15.7 g/dL Final     Platelet Count   Date Value Ref Range Status   02/27/2025 201 150 - 450 10e3/uL Final     Creatinine   Date Value Ref Range Status   02/27/2025 3.02 (H) 0.51 - 0.95 mg/dL Final     Potassium   Date Value Ref Range Status   02/27/2025 4.7 3.4 - 5.3 mmol/L Final     Potassium POCT   Date Value Ref Range Status   09/16/2024 3.2 (L) 3.4 - 5.3 mmol/L Final     EXAM:  There were no vitals taken for this visit.  General: Stable. In no acute distress.    Neuro: Alert and oriented x 3. Speech clear. No focal deficits.  Psych: Appropriate mood and affect. Cooperative. Answering questions appropriately.  Resp: Normal respirations. Unlabored breathing. Lungs clear to auscultation bilaterally.  Cardio: S1S2, regular rate and rhythm, without murmur, clicks or rubs.  Skin: Warm and dry.     Pre-Sedation Assessment:  Mallampati Airway  Classification:  II - Faucial pillars and soft palate may be seen, but uvula is masked by the base of the tongue  Previous reaction to anesthesia/sedation:  No  Sedation plan based on assessment: Moderate (conscious) sedation  ASA Classification: Class 3 - SEVERE SYSTEMIC DISEASE, DEFINITE FUNCTIONAL LIMITATIONS.   Code Status: FULL CODE    ASSESSMENT/PLAN:   #ESRD     Right sided tunneled dialysis catheter removal with sedation    Procedural education reviewed with patient/family in detail including, but not limited to risks, benefits and alternatives with understanding verbalized by patient/family.    Total time spent on the date of the encounter: 35 minutes.    CRUZITO Cannon CNP  Interventional Radiology

## 2025-07-18 NOTE — IR NOTE
Patient Name: Felicitas Elliott  Medical Record Number: [id number]  Today's Date: 7/18/2025    Procedure: tunneled removal  Proceduralist: dr. black    Sedation medications administered: 2 mg midazolam and 100 mcg fentanyl   Sedation time: 5 minutes

## 2025-07-18 NOTE — DISCHARGE INSTRUCTIONS
Tunneled Catheter Removal Discharge Instructions  You had your tunneled venous catheter removed today. Please follow the below instructions following your catheter removal:    Care Instructions:  - Ok to remove dressing from catheter exit site 24-48 hours after catheter removal.  - Avoid tub baths, Jacuzzi and pool soaks for 3 days.  - You may shower beginning tomorrow. Do not scrub site until well healed; pat dry.  - If you experience significant bleeding at site, apply pressure with hands above the clavicle bone, sit upright. If bleeding does not resolve quickly, seek immediate medical assistance.    You may call [facility] RN Line at [phone] with questions/concerns. [Monday to Friday (7:30AM-4:00PM). Any voicemails left will be returned within 24 hours.]      Seek medical assistance for any of the following:   - Uncontrolled bleeding.  - You have a fever (greater than 101 F (38.3C)).  - Purulent (yellow/green/foul smelling) drainage from previous catheter insertion site.  - Increasing redness at previous catheter insertion site.  - Increasing pain at previous catheter insertion site.  - Increasing swelling at previous catheter insertion site

## 2025-07-26 ENCOUNTER — APPOINTMENT (OUTPATIENT)
Dept: CT IMAGING | Facility: CLINIC | Age: 85
End: 2025-07-26
Attending: EMERGENCY MEDICINE
Payer: COMMERCIAL

## 2025-07-26 ENCOUNTER — HOSPITAL ENCOUNTER (EMERGENCY)
Facility: CLINIC | Age: 85
Discharge: HOME | End: 2025-07-26
Attending: EMERGENCY MEDICINE | Admitting: EMERGENCY MEDICINE
Payer: COMMERCIAL

## 2025-07-26 ENCOUNTER — APPOINTMENT (OUTPATIENT)
Dept: RADIOLOGY | Facility: CLINIC | Age: 85
End: 2025-07-26
Attending: EMERGENCY MEDICINE
Payer: COMMERCIAL

## 2025-07-26 VITALS
HEART RATE: 69 BPM | SYSTOLIC BLOOD PRESSURE: 112 MMHG | WEIGHT: 156 LBS | TEMPERATURE: 97.8 F | DIASTOLIC BLOOD PRESSURE: 58 MMHG | OXYGEN SATURATION: 96 % | BODY MASS INDEX: 24.48 KG/M2 | RESPIRATION RATE: 17 BRPM | HEIGHT: 67 IN
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
## 2025-07-26 DIAGNOSIS — S99.922A INJURY OF LEFT GREAT TOE, INITIAL ENCOUNTER: ICD-10-CM

## 2025-07-26 DIAGNOSIS — S01.81XA LACERATION OF BROW WITHOUT COMPLICATION, INITIAL ENCOUNTER: ICD-10-CM

## 2025-07-26 DIAGNOSIS — S80.01XA CONTUSION OF RIGHT KNEE, INITIAL ENCOUNTER: ICD-10-CM

## 2025-07-26 DIAGNOSIS — R07.89 RIGHT-SIDED CHEST WALL PAIN: ICD-10-CM

## 2025-07-26 DIAGNOSIS — S80.02XA CONTUSION OF LEFT KNEE, INITIAL ENCOUNTER: ICD-10-CM

## 2025-07-26 DIAGNOSIS — S09.90XA INJURY OF HEAD, INITIAL ENCOUNTER: ICD-10-CM

## 2025-07-26 DIAGNOSIS — S22.41XA CLOSED FRACTURE OF MULTIPLE RIBS OF RIGHT SIDE, INITIAL ENCOUNTER: ICD-10-CM

## 2025-07-26 DIAGNOSIS — S93.401A SPRAIN OF RIGHT ANKLE, UNSPECIFIED LIGAMENT, INITIAL ENCOUNTER: ICD-10-CM

## 2025-07-26 LAB
ALBUMIN SERPL BCG-MCNC: 4.4 G/DL (ref 3.5–5.2)
ALP SERPL-CCNC: 60 U/L (ref 40–150)
ALT SERPL W P-5'-P-CCNC: 28 U/L (ref 0–50)
ANION GAP SERPL CALCULATED.3IONS-SCNC: 12 MMOL/L (ref 7–15)
APTT PPP: 38 SECONDS (ref 22–38)
AST SERPL W P-5'-P-CCNC: 47 U/L (ref 0–45)
BASOPHILS # BLD AUTO: 0 10E3/UL (ref 0–0.2)
BASOPHILS NFR BLD AUTO: 1 %
BILIRUB SERPL-MCNC: 0.6 MG/DL
BUN SERPL-MCNC: 47.2 MG/DL (ref 8–23)
CALCIUM SERPL-MCNC: 10.1 MG/DL (ref 8.8–10.4)
CHLORIDE SERPL-SCNC: 98 MMOL/L (ref 98–107)
CREAT SERPL-MCNC: 3.03 MG/DL (ref 0.51–0.95)
EGFRCR SERPLBLD CKD-EPI 2021: 15 ML/MIN/1.73M2
EOSINOPHIL # BLD AUTO: 0.4 10E3/UL (ref 0–0.7)
EOSINOPHIL NFR BLD AUTO: 6 %
ERYTHROCYTE [DISTWIDTH] IN BLOOD BY AUTOMATED COUNT: 14.9 % (ref 10–15)
ETHANOL SERPL-MCNC: <0.01 G/DL
GLUCOSE SERPL-MCNC: 105 MG/DL (ref 70–99)
HCO3 SERPL-SCNC: 27 MMOL/L (ref 22–29)
HCT VFR BLD AUTO: 36.1 % (ref 35–47)
HGB BLD-MCNC: 11.4 G/DL (ref 11.7–15.7)
IMM GRANULOCYTES # BLD: 0.1 10E3/UL
IMM GRANULOCYTES NFR BLD: 1 %
INR PPP: 2.31 (ref 0.85–1.15)
LYMPHOCYTES # BLD AUTO: 1.1 10E3/UL (ref 0.8–5.3)
LYMPHOCYTES NFR BLD AUTO: 15 %
MCH RBC QN AUTO: 30.2 PG (ref 26.5–33)
MCHC RBC AUTO-ENTMCNC: 31.6 G/DL (ref 31.5–36.5)
MCV RBC AUTO: 96 FL (ref 78–100)
MONOCYTES # BLD AUTO: 0.5 10E3/UL (ref 0–1.3)
MONOCYTES NFR BLD AUTO: 6 %
NEUTROPHILS # BLD AUTO: 5.5 10E3/UL (ref 1.6–8.3)
NEUTROPHILS NFR BLD AUTO: 72 %
NRBC # BLD AUTO: 0 10E3/UL
NRBC BLD AUTO-RTO: 0 /100
PLATELET # BLD AUTO: 172 10E3/UL (ref 150–450)
POTASSIUM SERPL-SCNC: 4.9 MMOL/L (ref 3.4–5.3)
PROT SERPL-MCNC: 7.6 G/DL (ref 6.4–8.3)
PROTHROMBIN TIME: 25.3 SECONDS (ref 11.8–14.8)
RBC # BLD AUTO: 3.77 10E6/UL (ref 3.8–5.2)
SODIUM SERPL-SCNC: 137 MMOL/L (ref 135–145)
WBC # BLD AUTO: 7.6 10E3/UL (ref 4–11)

## 2025-07-26 PROCEDURE — 73660 X-RAY EXAM OF TOE(S): CPT | Mod: LT

## 2025-07-26 PROCEDURE — 36415 COLL VENOUS BLD VENIPUNCTURE: CPT | Performed by: EMERGENCY MEDICINE

## 2025-07-26 PROCEDURE — 90471 IMMUNIZATION ADMIN: CPT | Performed by: EMERGENCY MEDICINE

## 2025-07-26 PROCEDURE — 250N000013 HC RX MED GY IP 250 OP 250 PS 637: Performed by: EMERGENCY MEDICINE

## 2025-07-26 PROCEDURE — 85730 THROMBOPLASTIN TIME PARTIAL: CPT | Performed by: EMERGENCY MEDICINE

## 2025-07-26 PROCEDURE — 70450 CT HEAD/BRAIN W/O DYE: CPT

## 2025-07-26 PROCEDURE — 99285 EMERGENCY DEPT VISIT HI MDM: CPT | Mod: 25 | Performed by: EMERGENCY MEDICINE

## 2025-07-26 PROCEDURE — 85610 PROTHROMBIN TIME: CPT | Performed by: EMERGENCY MEDICINE

## 2025-07-26 PROCEDURE — 82077 ASSAY SPEC XCP UR&BREATH IA: CPT | Performed by: EMERGENCY MEDICINE

## 2025-07-26 PROCEDURE — 250N000011 HC RX IP 250 OP 636: Performed by: EMERGENCY MEDICINE

## 2025-07-26 PROCEDURE — 73610 X-RAY EXAM OF ANKLE: CPT | Mod: RT

## 2025-07-26 PROCEDURE — 71250 CT THORAX DX C-: CPT

## 2025-07-26 PROCEDURE — 85004 AUTOMATED DIFF WBC COUNT: CPT | Performed by: EMERGENCY MEDICINE

## 2025-07-26 PROCEDURE — 73562 X-RAY EXAM OF KNEE 3: CPT | Mod: 50

## 2025-07-26 PROCEDURE — 72125 CT NECK SPINE W/O DYE: CPT

## 2025-07-26 PROCEDURE — 90714 TD VACC NO PRESV 7 YRS+ IM: CPT | Performed by: EMERGENCY MEDICINE

## 2025-07-26 PROCEDURE — 84132 ASSAY OF SERUM POTASSIUM: CPT | Performed by: EMERGENCY MEDICINE

## 2025-07-26 RX ORDER — POLYETHYLENE GLYCOL 3350 17 G/17G
1 POWDER, FOR SOLUTION ORAL 2 TIMES DAILY
Qty: 527 G | Refills: 0 | Status: SHIPPED | OUTPATIENT
Start: 2025-07-26 | End: 2025-08-25

## 2025-07-26 RX ORDER — HYDROCODONE BITARTRATE AND ACETAMINOPHEN 5; 325 MG/1; MG/1
1 TABLET ORAL ONCE
Refills: 0 | Status: COMPLETED | OUTPATIENT
Start: 2025-07-26 | End: 2025-07-26

## 2025-07-26 RX ORDER — OXYCODONE HYDROCHLORIDE 5 MG/1
5 TABLET ORAL EVERY 6 HOURS PRN
Qty: 12 TABLET | Refills: 0 | Status: SHIPPED | OUTPATIENT
Start: 2025-07-26 | End: 2025-07-29

## 2025-07-26 RX ORDER — LIDOCAINE 40 MG/G
CREAM TOPICAL
Status: DISCONTINUED | OUTPATIENT
Start: 2025-07-26 | End: 2025-07-26 | Stop reason: HOSPADM

## 2025-07-26 RX ADMIN — HYDROCODONE BITARTRATE AND ACETAMINOPHEN 1 TABLET: 5; 325 TABLET ORAL at 07:34

## 2025-07-26 RX ADMIN — CLOSTRIDIUM TETANI TOXOID ANTIGEN (FORMALDEHYDE INACTIVATED) AND CORYNEBACTERIUM DIPHTHERIAE TOXOID ANTIGEN (FORMALDEHYDE INACTIVATED) 0.5 ML: 5; 2 INJECTION, SUSPENSION INTRAMUSCULAR at 07:35

## 2025-07-26 ASSESSMENT — ACTIVITIES OF DAILY LIVING (ADL)
ADLS_ACUITY_SCORE: 58

## 2025-07-26 ASSESSMENT — COLUMBIA-SUICIDE SEVERITY RATING SCALE - C-SSRS
6. HAVE YOU EVER DONE ANYTHING, STARTED TO DO ANYTHING, OR PREPARED TO DO ANYTHING TO END YOUR LIFE?: NO
2. HAVE YOU ACTUALLY HAD ANY THOUGHTS OF KILLING YOURSELF IN THE PAST MONTH?: NO
1. IN THE PAST MONTH, HAVE YOU WISHED YOU WERE DEAD OR WISHED YOU COULD GO TO SLEEP AND NOT WAKE UP?: NO

## 2025-07-26 ASSESSMENT — ENCOUNTER SYMPTOMS
NECK PAIN: 1
ABDOMINAL PAIN: 0

## 2025-07-26 NOTE — ED TRIAGE NOTES
To ED per POV, BIB     Fell this AM at 0600 while taking morning meds. Hit her R side of face, R rib cage, and R hand during the fall. Fell due to her L great toenail catching. Pt states her toenail was removed and bleeding is not controlled without a bandage.     Pt on coumadin.      Triage Assessment (Adult)       Row Name 07/26/25 0674          Triage Assessment    Airway WDL WDL        Respiratory WDL    Respiratory WDL WDL        Skin Circulation/Temperature WDL    Skin Circulation/Temperature WDL WDL        Cardiac WDL    Cardiac WDL WDL        Peripheral/Neurovascular WDL    Peripheral Neurovascular WDL WDL        Cognitive/Neuro/Behavioral WDL    Cognitive/Neuro/Behavioral WDL WDL

## 2025-07-26 NOTE — ED NOTES
Introduced self to patient. Assessed her injuries from the fall. Pain is expressed on her right eye located on eyebrow 6/10- Pain on inspiration located on right rib cage 6/10, as well as pain located on her ankle 7/10 on rotation and flexation, CMS in tact. See MAR for medication relief. Noted scattered bruised patient is on warfarin noted no traumatic events, states been and out of the hospital and multiple sticks and pokes for blood work.  at bedside, call light in reach. Patient is aware of UA.    No change

## 2025-07-26 NOTE — ED NOTES
Patient back from CT and Xray- pure wick in place, and on the monitor. Patient expresses pain went down some but after moving for all the imaging she is very sore. Explained how this could be- patient understands, will contiune to monitor, waiting for image results.

## 2025-07-26 NOTE — ED PROVIDER NOTES
EMERGENCY DEPARTMENT ENCOUNTER      NAME: Felicitas Elliott  AGE: 85 year old female  YOB: 1940  MRN: [id number]  EVALUATION DATE & TIME: 7/26/2025  6:53 AM    PCP: Timbo Medina    ED PROVIDER: Candelario Lynn MD      Chief Complaint   Patient presents with    Fall         FINAL IMPRESSION:  1. Fall, initial encounter    2. Contusion of right knee, initial encounter    3. Contusion of left knee, initial encounter    4. Laceration of brow without complication, initial encounter    5. Injury of head, initial encounter    6. Injury of left great toe, initial encounter    7. Right-sided chest wall pain    8. Closed fracture of multiple ribs of right side, initial encounter    9. Sprain of right ankle, unspecified ligament, initial encounter          ED COURSE & MEDICAL DECISION MAKING:    Pertinent Labs & Imaging studies reviewed. (See chart for details)  85 year old female presents to the Emergency Department for evaluation of chemical fall with brow injury, right chest wall pain, bilateral knee pain, right ankle pain and left great toe pain    Clinically well-appearing    ED Course as of 07/26/25 0912   Sat Jul 26, 2025   0715 Patient follows with associated nephrology.  I reviewed Care Everywhere she was seen June 17 preop for fistula.  Patient states fistula was never created because her kidney function improved and dialysis was felt not to be helpful per patient and patient's  who also provide some additional history   0719 Per chart review no documented tetanus.  Tetanus updated   0721 Patient is here with mechanical fall.  Primary survey negative.  Secondary survey notable for right lateral chest wall tenderness concerning for rib fracture.  Doubt pneumothorax.  Will obtain abdominal imaging to evaluate for intra-abdominal trauma but that seems less likely based on exam.  Fracture unlikely.  Did hit head on blood thinners therefore trauma alert.  GCS 15   0722 Superficial  well-approximated laceration right brow not actively bleeding.  Left great toe has loosened nail but not dislodged.  Dysmorphic thickened nails.  Nail removal not indicated   0807 Indicates patient now has right ankle pain and is having difficulty ambulating because of the ankle pain.  Ankle x-ray added on   0807 Urea Nitrogen(!): 47.2   0807 Creatinine(!): 3.03  Chronic kidney disease.  Has been following with nephrology associate nephrology.  Plan for CT chest and abdomen without contrast since not doing dialysis   0808 INR(!): 2.31  Therapeutic   0808 PTT: 38   0808 WBC: 7.6   0808 Hemoglobin(!): 11.4   0808 Platelet Count: 172   0808 Hemoglobin(!): 11.4  Very mild anemia appears stable and similar to previous hemoglobins over the last year   0808 Tetanus updated.  Given [place] for pain   0808 CT imaging chest abdomen pelvis imaging ordered evaluate for rib fractures or liver injury.   Workup reveals 3 rib fractures.  Patient's breathing comfortably.  No hypoxemia.  Does have ankle sprain as well as left great toe injury.  Has walker at home.  Has incentive spirometer at home.  Plan for discharge home with  and recheck primary care next week and return to the ER for worsening symptoms      Will discharge home with oxycodone for breakthrough pain responding to Tylenol.  As well as MiraLAX    6:54 AM I met with the patient, obtained history, performed an initial exam, and discussed options and plan for diagnostics and treatment here in the ED.  8:07 AM Patient mentions of right ankle pain and is unable to get up.   8:58 AM I rechecked and updated the patient. We discussed the plan for discharge and the patient is agreeable. Reviewed supportive cares, symptomatic treatment, outpatient follow up, and reasons to return to the Emergency Department. Patient to be discharged by ED RN.     Medical Decision Making  I obtained history from Family Member/Significant Other  I reviewed the EMR: Outpatient Record: See  above  Care impacted by Anticoagulated State and Chronic Kidney Disease  I independently interpreted the CT imaging and note absence of pneumothorax. See radiology report for final interpretation.  Discharge. I prescribed additional prescription strength medication(s) as charted. I considered admission, but discharged the patient after share decision making conversation.    MIPS:  Adult Minor Head Trauma:Age 65 years or older and Currently taking anticoagulant medications: warfarin or other novel anticoagulant medications    SEPSIS: None              At the conclusion of the encounter I discussed the results of all of the tests and the disposition. The questions were answered. The patient or family acknowledged understanding and was agreeable with the care plan.         Voice recognition software used for this note,  any grammatical or spelling errors are non-intentional. Please contact the author of this note directly if you are in need of any clarification.      MEDICATIONS GIVEN IN THE EMERGENCY:  Medications   lidocaine 1 % 0.1-1 mL (has no administration in time range)   lidocaine (LMX4) cream (has no administration in time range)   sodium chloride (PF) 0.9% PF flush 3 mL (has no administration in time range)   sodium chloride (PF) 0.9% PF flush 3 mL (has no administration in time range)   Td (tetanus & diphtheria toxoids) -  adult formulation - for ages 7 years and older (0.5 mLs Intramuscular $Given 7/26/25 0796)   HYDROcodone-acetaminophen (NORCO) 5-325 MG per tablet 1 tablet (1 tablet Oral $Given 7/26/25 7089)       NEW PRESCRIPTIONS STARTED AT TODAY'S ER VISIT  New Prescriptions    OXYCODONE (ROXICODONE) 5 MG TABLET    Take 1 tablet (5 mg) by mouth every 6 hours as needed.    POLYETHYLENE GLYCOL (MIRALAX) 17 GM/DOSE POWDER    Take 17 g (1 Capful) by mouth 2 times daily.          =================================================================    TRIAGE ASSESSMENT:  To ED per POLACIE, BIB     Fell this  "AM at 0600 while taking morning meds. Hit her R side of face, R rib cage, and R hand during the fall. Fell due to her L great toenail catching. Pt states her toenail was removed and bleeding is not controlled without a bandage.     Pt on coumadin.      Triage Assessment (Adult)       Row Name 07/26/25 0655          Triage Assessment    Airway WDL WDL        Respiratory WDL    Respiratory WDL WDL        Skin Circulation/Temperature WDL    Skin Circulation/Temperature WDL WDL        Cardiac WDL    Cardiac WDL WDL        Peripheral/Neurovascular WDL    Peripheral Neurovascular WDL WDL        Cognitive/Neuro/Behavioral WDL    Cognitive/Neuro/Behavioral WDL WDL                HPI    Patient information was obtained from: Patient    Use of : N/A         Felicitas Elliott is a 85 year old female with a pertinent history of hyperlipidemia, spinal stenosis, nonrheumatic aortic valve stenosis hypotension, arthritis, DVT, PE, and acute renal failure, who presents to this ED via walk-in for evaluation after a fall.    Today (7/26/2025), at approximately 6 AM, the patient's left big toe was caught on something, causing her to fall on both of her knees. Patient hit her head and rib cage on a table when she went down. She sustained bruising on her left knee and a gash on her right knee. Patient's toenail was \"ripped back\" and she sustained a laceration on her right brow. She reports that her knee and toe were bleeding heavily. Patient endorses right-sided rib cage pain and states that breathing hurts. She denies syncope. Patient shares that she is currently taking blood thinner medication due to having blood clots in her legs.     Patient denies abdominal pain, diabetes, and dialysis.    Of note, patient had her dialysis catheter removed last week. However, there was difficulty during the removal process, causing her to endorse bruising to her upper chest wall.       REVIEW OF SYSTEMS   Review of Systems "   Gastrointestinal:  Negative for abdominal pain.   Musculoskeletal:  Positive for neck pain.   Neurological:  Negative for syncope.        PAST MEDICAL HISTORY:  Past Medical History:   Diagnosis Date    Community acquired pneumonia of right lower lobe of lung 01/13/2025    Sepsis (H) 08/16/2024       PAST SURGICAL HISTORY:  Past Surgical History:   Procedure Laterality Date    CORONARY ARTERY BYPASS GRAFT, WITH AORTIC VALVE REPLACEMENT, WITH ENDOSCOPIC VESSEL PROCUREMENT N/A 8/27/2024    Procedure: CORONARY ARTERY BYPASS GRAFT TIMES TWO, WITH AORTIC ROOT REPLACEMENT, WITH LEFT INTERNAL MAMMARY ARTERY HARVEST, LEFT SAPHNENOUS ENDOSCOPIC VESSEL PROCUREMENT,;  Surgeon: Dylan Renae MD;  Location: [facility] [place] OR    CV CORONARY ANGIOGRAM N/A 8/20/2024    Procedure: CV CORONARY ANGIOGRAM;  Surgeon: Den Wylie MD;  Location: [facility] CATH LAB CV    EP PACEMAKER DEVICE & IMPLANT- HIS LEAD DUAL N/A 9/12/2024    Procedure: Pacemaker Device & Lead Implant - HIS Lead Dual;  Surgeon: Minh Pina MD;  Location: [facility] CATH LAB CV    IR CVC NON TUNNEL PLACEMENT > 5 YRS  9/1/2024    IR CVC TUNNEL PLACEMENT > 5 YRS OF AGE  9/11/2024    IR CVC TUNNEL REMOVAL RIGHT  7/18/2025    IR FLUORO 0-1 HOUR  7/18/2025    PICC TRIPLE LUMEN PLACEMENT  9/6/2024    REPLACE VALVE MITRAL N/A 8/27/2024    Procedure: MITRAL VALVE REPLACEMENT,;  Surgeon: Dylan Renae MD;  Location: [facility] [place] OR    TRANSESOPHAGEAL ECHOCARDIOGRAM INTRAOPERATIVE  8/27/2024    Procedure: ANESTHESIA TRANSESOPHAGEAL ECHOCARDIOGRAM, EPI-AORTIC ULTRASOUND;  Surgeon: Dylan Renae MD;  Location: [facility] [place] OR           CURRENT MEDICATIONS:    acetaminophen (TYLENOL) 325 MG tablet  albuterol (PROVENTIL) (2.5 MG/3ML) 0.083% neb solution  bisacodyl (DULCOLAX) 10 MG suppository  bisacodyl (DULCOLAX) 5 MG EC tablet  clopidogrel (PLAVIX) 75 MG tablet  Lidocaine (LIDOCARE) 4 % Patch  lidocaine (XYLOCAINE) 5 % external  ointment  midodrine (PROAMATINE) 10 MG tablet  oxyCODONE (ROXICODONE) 5 MG tablet  polyethylene glycol (MIRALAX) 17 GM/Dose powder  senna (SENOKOT) 8.6 MG tablet  simethicone (MYLICON) 80 MG chewable tablet  torsemide (DEMADEX) 100 MG tablet  traZODone (DESYREL) 50 MG tablet  warfarin ANTICOAGULANT (COUMADIN) 2 MG tablet        ALLERGIES:  Allergies   Allergen Reactions    Aspirin Rash    Cats Rash    Morphine Rash     Only reacted once, had again without problems.    Nickel Rash       FAMILY HISTORY:  No family history on file.    SOCIAL HISTORY:   Social History     Socioeconomic History    Marital status:      Spouse name: Ed    Number of children: 7    Years of education: [employer]    Highest education level: 12th grade   Occupational History    Occupation:    Tobacco Use    Smoking status: Former     Current packs/day: 8.00     Average packs/day: 8.0 packs/day for 1.6 years (12.5 ttl pk-yrs)     Types: Cigarettes     Start date: 2024    Smokeless tobacco: Former   Vaping Use    Vaping status: Never Used   Substance and Sexual Activity    Alcohol use: Not Currently    Sexual activity: Not Currently     Partners: Male     Birth control/protection: None     Social Drivers of Health     Financial Resource Strain: Low Risk  (1/14/2025)    Financial Resource Strain     Within the past 12 months, have you or your family members you live with been unable to get utilities (heat, electricity) when it was really needed?: No   Food Insecurity: Low Risk  (1/14/2025)    Food Insecurity     Within the past 12 months, did you worry that your food would run out before you got money to buy more?: No     Within the past 12 months, did the food you bought just not last and you didn t have money to get more?: No   Transportation Needs: Low Risk  (1/14/2025)    Transportation Needs     Within the past 12 months, has lack of transportation kept you from medical appointments, getting your medicines, non-medical  "meetings or appointments, work, or from getting things that you need?: No   Social Connections: Socially Integrated (1/3/2025)    Received from [facility]    Social Connections     Do you often feel lonely or isolated from those around you?: 0   Interpersonal Safety: Low Risk  (7/18/2025)    Interpersonal Safety     Do you feel physically and emotionally safe where you currently live?: Yes     Within the past 12 months, have you been hit, slapped, kicked or otherwise physically hurt by someone?: No     Within the past 12 months, have you been humiliated or emotionally abused in other ways by your partner or ex-partner?: No   Housing Stability: Low Risk  (1/14/2025)    Housing Stability     Do you have housing? : Yes     Are you worried about losing your housing?: No       VITALS:  /58   Pulse 69   Temp 97.8  F (36.6  C) (Oral)   Resp 17   Ht 1.702 m (5' 7\")   Wt 70.8 kg (156 lb)   SpO2 96%   BMI 24.43 kg/m      PHYSICAL EXAM    /58   Pulse 69   Temp 97.8  F (36.6  C) (Oral)   Resp 17   Ht 1.702 m (5' 7\")   Wt 70.8 kg (156 lb)   SpO2 96%   BMI 24.43 kg/m      General Appearance: Alert, cooperative, normal speech and facial symmetry,  appears stated age    Primary survey:     Airway patent  Breath sounds: bilateral breath sounds  Cardiovascular: 2+ radial pulses and DP pulses  Disability GCS 15    Secondary survey    Head:  Normocephalic, without obvious abnormality. Superficial laceration to right brow.   Eyes:  PERRL,pupils midsized, conjunctiva/corneas clear, EOM's intact, George laceration to right brow  ENT:  No obvious facial deformity.  No tenderness to palpation.  No epistaxis.  Extraocular movements are intact.  No evidence of orbital injury.    Neck:  No midline cervical spine tenderness.  No paraspinal tenderness.  Chest:  Tenderness to right lateral chest wall.  No deformity, no crepitus. Bruising to upper chest wall consistent with recent " reported removal of dialysis catheter.  Well-healed sternotomy  Cardio:  Regular rate and rhythm, S1 and S2 normal, no murmur, rub    or gallop, 2+ pulses symmetric in all extremities  Pulm:  Clear to auscultation bilaterally, respirations unlabored,   Back:   no CVA tenderness, no spinal tenderness  Abdomen:  Soft, non-tender, no rebound or guarding, no pelvic pain to compression  Extremities:  No obvious deformity, all joints palpated in place with full range of motion.  No tenderness or instability. Contusion to right and left knees. Both knees well healed from surgical procedure. Abrasion to right knee. Looser nail, not dislodged, on left great toe. Dysmorphic nails on both feet.  Right ankle lateral malleoli tenderness.  Pain with inversion.  Skin:  Skin color, texture, turgor normal, no rashes or lesions  Neuro:  Awake, alert, responsive to voice, follows commands, normal speech, No gross motor weakness or sensory loss, moves all extremities, baseline ambulation, GCS 15      LAB:  All pertinent labs reviewed and interpreted.  Results for orders placed or performed during the hospital encounter of 07/26/25   Head CT w/o contrast    Impression    IMPRESSION:  1.  No CT evidence for intracranial hemorrhage or acute territorial infarction.  2.  Brain atrophy and presumed chronic microvascular ischemic changes as above.     CT Cervical Spine w/o Contrast    Impression    IMPRESSION:  1.  No acute fracture or posttraumatic subluxation.  2.  Moderate spinal canal stenosis at C4-C5 and C5-C6.  3.  Severe neural foraminal stenosis at C5-C6 and C6-C7 bilaterally.     XR Knee Bilateral 3 Views    Impression    IMPRESSION:   RIGHT KNEE: There is a total knee arthroplasty. Excellent position and alignment of components. No evidence of complication or periprosthetic fracture. No significant effusion.     LEFT KNEE: There is a left total knee arthroplasty. Excellent position and alignment of components. There is no evidence  of complication or periprosthetic fracture. No significant effusion.      XR Toe Left G/E 2 Views    Impression    IMPRESSION: Mild to moderate multifocal osteoarthrosis. There is no evidence of fracture. No subluxation or dislocation.      CT Chest Abdomen Pelvis w/o Contrast    Impression    IMPRESSION:  1.  Nondisplaced fractures right 8th-10th ribs. No significant hematoma, pneumothorax, or pleural effusion.  2.  No additional traumatic injury in the chest, abdomen, or pelvis.  3.  Cholelithiasis.   Ankle XR, G/E 3 views, right    Impression    IMPRESSION: Moderate-sized Achilles enthesophyte and tiny plantar calcaneal enthesophyte. No evidence of fracture or talar dome osteochondral lesion. The ankle mortise is symmetric.       Comprehensive Metabolic Panel (Limited Occurrences)   Result Value Ref Range    Sodium 137 135 - 145 mmol/L    Potassium 4.9 3.4 - 5.3 mmol/L    Carbon Dioxide (CO2) 27 22 - 29 mmol/L    Anion Gap 12 7 - 15 mmol/L    Urea Nitrogen 47.2 (H) 8.0 - 23.0 mg/dL    Creatinine 3.03 (H) 0.51 - 0.95 mg/dL    GFR Estimate 15 (L) >60 mL/min/1.73m2    Calcium 10.1 8.8 - 10.4 mg/dL    Chloride 98 98 - 107 mmol/L    Glucose 105 (H) 70 - 99 mg/dL    Alkaline Phosphatase 60 40 - 150 U/L    AST 47 (H) 0 - 45 U/L    ALT 28 0 - 50 U/L    Protein Total 7.6 6.4 - 8.3 g/dL    Albumin 4.4 3.5 - 5.2 g/dL    Bilirubin Total 0.6 <=1.2 mg/dL   INR   Result Value Ref Range    INR 2.31 (H) 0.85 - 1.15    PT 25.3 (H) 11.8 - 14.8 Seconds   Partial thromboplastin time   Result Value Ref Range    aPTT 38 22 - 38 Seconds   Result Value Ref Range    Ethanol Level Blood <0.01 <=0.01 g/dL   CBC with platelets and differential   Result Value Ref Range    WBC Count 7.6 4.0 - 11.0 10e3/uL    RBC Count 3.77 (L) 3.80 - 5.20 10e6/uL    Hemoglobin 11.4 (L) 11.7 - 15.7 g/dL    Hematocrit 36.1 35.0 - 47.0 %    MCV 96 78 - 100 fL    MCH 30.2 26.5 - 33.0 pg    MCHC 31.6 31.5 - 36.5 g/dL    RDW 14.9 10.0 - 15.0 %    Platelet Count 172  150 - 450 10e3/uL    % Neutrophils 72 %    % Lymphocytes 15 %    % Monocytes 6 %    % Eosinophils 6 %    % Basophils 1 %    % Immature Granulocytes 1 %    NRBCs per 100 WBC 0 <1 /100    Absolute Neutrophils 5.5 1.6 - 8.3 10e3/uL    Absolute Lymphocytes 1.1 0.8 - 5.3 10e3/uL    Absolute Monocytes 0.5 0.0 - 1.3 10e3/uL    Absolute Eosinophils 0.4 0.0 - 0.7 10e3/uL    Absolute Basophils 0.0 0.0 - 0.2 10e3/uL    Absolute Immature Granulocytes 0.1 <=0.4 10e3/uL    Absolute NRBCs 0.0 10e3/uL       RADIOLOGY:  Reviewed all pertinent imaging. Please see official radiology report.  Ankle XR, G/E 3 views, right   Preliminary Result   IMPRESSION: Moderate-sized Achilles enthesophyte and tiny plantar calcaneal enthesophyte. No evidence of fracture or talar dome osteochondral lesion. The ankle mortise is symmetric.            XR Toe Left G/E 2 Views   Preliminary Result   IMPRESSION: Mild to moderate multifocal osteoarthrosis. There is no evidence of fracture. No subluxation or dislocation.          XR Knee Bilateral 3 Views   Preliminary Result   IMPRESSION:    RIGHT KNEE: There is a total knee arthroplasty. Excellent position and alignment of components. No evidence of complication or periprosthetic fracture. No significant effusion.       LEFT KNEE: There is a left total knee arthroplasty. Excellent position and alignment of components. There is no evidence of complication or periprosthetic fracture. No significant effusion.          CT Cervical Spine w/o Contrast   Final Result   IMPRESSION:   1.  No acute fracture or posttraumatic subluxation.   2.  Moderate spinal canal stenosis at C4-C5 and C5-C6.   3.  Severe neural foraminal stenosis at C5-C6 and C6-C7 bilaterally.         Head CT w/o contrast   Final Result   IMPRESSION:   1.  No CT evidence for intracranial hemorrhage or acute territorial infarction.   2.  Brain atrophy and presumed chronic microvascular ischemic changes as above.         CT Chest Abdomen Pelvis w/o  Contrast   Preliminary Result   IMPRESSION:   1.  Nondisplaced fractures right 8th-10th ribs. No significant hematoma, pneumothorax, or pleural effusion.   2.  No additional traumatic injury in the chest, abdomen, or pelvis.   3.  Cholelithiasis.              I, Analy Cadet, am serving as a scribe to document services personally performed by Candelario Lynn MD based on my observation and the provider's statements to me. I, Candelario Lynn MD, attest that Analy Cadet is acting in a scribe capacity, has observed my performance of the services and has documented them in accordance with my direction.    Candelario Lynn MD  [facility] EMERGENCY ROOM  [facility] [facility] [address] [id number] [phone]      Candelario Lynn MD  07/26/25 0057

## 2025-07-26 NOTE — ED NOTES
Patient dressed and ambulated with walker to wheelchair SBA- expresses her knees hurt but was able to bear weight on both legs. Incentive spintometer taught, with teach back demonstration and verbalized understanding of use and why to use it at home.

## 2025-07-26 NOTE — DISCHARGE INSTRUCTIONS
I recommend Tylenol every 4 hours as needed for pain.  For breakthrough pain you can use 1 tablet oxycodone every 6 hours.  If you do use the oxycodone make sure you are using MiraLAX twice a day to prevent constipation.  Use a walker as needed.  Use incentive spirometry every 2 hours while awake.  Recheck with your primary care doctor.  Return to the ER for fever, sudden worsening headache, sudden onset of worsening chest pain or shortness of breath or difficulty breathing.  You can follow-up with [place] [facility] regarding your toenail injury.  Clean your wounds with soap and water.

## 2025-09-04 ENCOUNTER — TELEPHONE (OUTPATIENT)
Dept: CARDIOLOGY | Facility: CLINIC | Age: 85
End: 2025-09-04

## (undated) DEVICE — COUNTER NEEDLE ADH & FOAM 20CT 9106

## (undated) DEVICE — SU ETHIBOND 2-0 V-5SA SXP77

## (undated) DEVICE — CUSTOM PACK PACER ICD SAN5BPCHEA

## (undated) DEVICE — SURGICEL HEMOSTAT 4X8" 1952S

## (undated) DEVICE — SYR 05ML LL W/O NDL

## (undated) DEVICE — Device

## (undated) DEVICE — CONNECTOR BLAKE DRAIN SGL BCC1

## (undated) DEVICE — CLIP HORIZON SM YELLOW 001200

## (undated) DEVICE — SU PROLENE 7-0 BV175-6 24" 8735H

## (undated) DEVICE — CUSTOM PACK CAB SCV5BCBHEA

## (undated) DEVICE — SU DEVICE ENDO COR KNOT QUICK LOAD RELOAD 030874

## (undated) DEVICE — SU PLEDGET SOFT TFE 3/8"X3/26"X1/16" PCP40

## (undated) DEVICE — SU PROLENE 4-0 RB-1DA 36" 8557H

## (undated) DEVICE — PATCH SURGICAL EVARREST FIBRIN SEALANT 4X2IN EVT5024

## (undated) DEVICE — SHEATH PRELUDE SNAP 25CM 9FR

## (undated) DEVICE — SLITTER ADJSTBL 6232ADJ

## (undated) DEVICE — SLEEVE TR BAND RADIAL COMPRESSION DEVICE 24CM TRB24-REG

## (undated) DEVICE — SU PROLENE 5-0 RB-2DA 30" 8710H

## (undated) DEVICE — KIT ROTATION TOOL  6056

## (undated) DEVICE — NDL BLUNT 18GA 1.5" W/O FILTER 305180

## (undated) DEVICE — PACK MINOR SINGLE BASIN SSK3001

## (undated) DEVICE — SOL NACL 0.9% IRRIG 1000ML BOTTLE 2F7124

## (undated) DEVICE — SPONGE LAP 18X18" X8435

## (undated) DEVICE — SHEATH PRELUDE SNAP 25CM 8FR

## (undated) DEVICE — RX VISTASEAL FIBRIN SEALANT W/THROMBIN 4ML VST04

## (undated) DEVICE — SUCTION CANISTER MEDIVAC LINER 3000ML W/LID 65651-530

## (undated) DEVICE — CONNECTOR CARDIO BLAKE DRAIN BCC2

## (undated) DEVICE — SU MYOSTERNAL WIRE  046-267

## (undated) DEVICE — PITCHER STERILE 1000ML  SSK9004A

## (undated) DEVICE — GLOVE BIOGEL PI SZ 7.0 40870

## (undated) DEVICE — SU PROLENE 7-0 BV-1DA 4X30" M8703

## (undated) DEVICE — SYR ANGIOGRAPHY MULTIUSE KIT ACIST 014612

## (undated) DEVICE — SU PROLENE 4-0 SHDA 36" 8521H

## (undated) DEVICE — TAPE UMBILICAL COTTON 30X1/16IN 2 STRAND 8619-03A 8886861903

## (undated) DEVICE — DRSG DRAIN 4X4" 7086

## (undated) DEVICE — GLOVE UNDER INDICATOR PI SZ 6.5 LF 41665

## (undated) DEVICE — CUSTOM PACK CV ST JOES SCV5BCVHEA

## (undated) DEVICE — SOL WATER IRRIG 1000ML BOTTLE 2F7114

## (undated) DEVICE — CATH 5.7FRID/8.4FROD 9FR X 43CM INTRO SELECTSITE DEFLECT

## (undated) DEVICE — INTRO MICRO MINI STICK 4FR STIFF NITINOL 45-753

## (undated) DEVICE — SU PROLENE 3-0 SHDA 36" 8522H

## (undated) DEVICE — SUCTION DRY CHEST DRAIN OASIS 3600-100

## (undated) DEVICE — CATH DIAGNOSTIC RADIAL 5FR TIG 4.0

## (undated) DEVICE — SU ETHIBOND 2-0 V-5DA 10X30" PXX52

## (undated) DEVICE — SHTH INTRO 0.021IN ID 6FR DIA

## (undated) DEVICE — GOWN IMPERVIOUS BREATHABLE SMART XLG 89045

## (undated) DEVICE — SHEATH PRELUDE SNAP 13CM 8FR

## (undated) DEVICE — ESU GROUND PAD ADULT REM W/15' CORD E7507DB

## (undated) DEVICE — SU ETHIBOND 0 CT-1 CR 8X18" CX21D

## (undated) DEVICE — CATH THORACIC STRAIGHT CLOTSTOP 28FR

## (undated) DEVICE — DRSG D-STAT DRY HEMOSTATIC THROMBIN 4"X4" 4000

## (undated) DEVICE — TRAY CATH SURESTEP OD14 FR INTERMITTENT STER LTX INTS14

## (undated) DEVICE — SU ETHIBOND 2-0 RB-1DA 30" MX553

## (undated) DEVICE — GLOVE BIOGEL PI SZ 8.0 40880

## (undated) DEVICE — SYR 10ML LL W/O NDL 302995

## (undated) DEVICE — SU PDS II 3-0 SH 27" Z316H

## (undated) DEVICE — CATH SUCTION 14FR W/O CTRL DYND41962

## (undated) DEVICE — CUSTOM PACK EP

## (undated) DEVICE — SU DERMABOND ADVANCED .7ML DNX12

## (undated) DEVICE — HEMOSTASIS BONE OSTENE 2.5G SYNTHETIC 1503832

## (undated) DEVICE — SURGICEL POWDER ABSORBABLE HEMOSTAT 3GM 3013SP

## (undated) DEVICE — PREP CHLORAPREP 26ML TINTED HI-LITE ORANGE 930815

## (undated) DEVICE — KIT HAND CONTROL ACIST 014644 AR-P54

## (undated) DEVICE — ADAPTER SAFE SHEATH SEALING 9FR SS-SA-09

## (undated) DEVICE — SU STERNAL WIRE SINGLE #6 046-032

## (undated) DEVICE — MANIFOLD KIT ANGIO AUTOMATED 014613

## (undated) DEVICE — CELLSAVER

## (undated) DEVICE — GOWN IMPERVIOUS BREATHABLE SMART LG 89015

## (undated) DEVICE — GLOVE BIOGEL PI SZ 6.5 40865

## (undated) DEVICE — SYR BULB IRRIG DOVER 60 ML LATEX FREE 67000

## (undated) DEVICE — SET CANNULATION TOUNIQUET TS-10061

## (undated) DEVICE — ELECTRODE DEFIB CADENCE 22550R

## (undated) DEVICE — SU PROLENE 6-0 RB-2DA 30" 8711H

## (undated) DEVICE — CONTAINER URINE SPEC 4OZ STRL 1053

## (undated) DEVICE — SU 30IN EBND EXC SNTH 2-0 V-5 2 ARM BRD FIRM PLDGT PXX52N

## (undated) DEVICE — VESSEL LOOP BLUE MINI 30-713

## (undated) DEVICE — CABLE MYO/LEAD PACING WHITE DISP 019-530

## (undated) DEVICE — SPONGE RAY-TEC 4X8" 7318

## (undated) DEVICE — SU DEVICE COR-KNOT MINI 031300

## (undated) DEVICE — SYR 03ML LL W/O NDL 309657

## (undated) DEVICE — ESU GROUND PAD ADULT W/CORD E7507

## (undated) DEVICE — LEAD PACER MYOCARDIAL BIPOLAR TEMPORARY 53CM 6495F

## (undated) DEVICE — SU ETHIBOND EXCEL 2-0 RB-1 L30 MX523

## (undated) DEVICE — CUSTOM PACK CORONARY SAN5BCRHEA

## (undated) RX ORDER — HEPARIN SODIUM 1000 [USP'U]/ML
INJECTION, SOLUTION INTRAVENOUS; SUBCUTANEOUS
Status: DISPENSED
Start: 2024-09-11

## (undated) RX ORDER — FENTANYL CITRATE 50 UG/ML
INJECTION, SOLUTION INTRAMUSCULAR; INTRAVENOUS
Status: DISPENSED
Start: 2024-09-11

## (undated) RX ORDER — HEPARIN SODIUM (PORCINE) LOCK FLUSH IV SOLN 100 UNIT/ML 100 UNIT/ML
SOLUTION INTRAVENOUS
Status: DISPENSED
Start: 2024-09-11

## (undated) RX ORDER — FENTANYL CITRATE 50 UG/ML
INJECTION, SOLUTION INTRAMUSCULAR; INTRAVENOUS
Status: DISPENSED
Start: 2024-09-12

## (undated) RX ORDER — HEPARIN SODIUM 1000 [USP'U]/ML
INJECTION, SOLUTION INTRAVENOUS; SUBCUTANEOUS
Status: DISPENSED
Start: 2024-08-27

## (undated) RX ORDER — LIDOCAINE HYDROCHLORIDE 10 MG/ML
INJECTION, SOLUTION INFILTRATION; PERINEURAL
Status: DISPENSED
Start: 2025-07-18

## (undated) RX ORDER — FENTANYL CITRATE 50 UG/ML
INJECTION, SOLUTION INTRAMUSCULAR; INTRAVENOUS
Status: DISPENSED
Start: 2025-07-18

## (undated) RX ORDER — CEFAZOLIN SODIUM 1 G/3ML
INJECTION, POWDER, FOR SOLUTION INTRAMUSCULAR; INTRAVENOUS
Status: DISPENSED
Start: 2024-08-27

## (undated) RX ORDER — FENTANYL CITRATE 50 UG/ML
INJECTION, SOLUTION INTRAMUSCULAR; INTRAVENOUS
Status: DISPENSED
Start: 2024-08-27

## (undated) RX ORDER — PROTAMINE SULFATE 10 MG/ML
INJECTION, SOLUTION INTRAVENOUS
Status: DISPENSED
Start: 2024-08-27

## (undated) RX ORDER — LIDOCAINE HYDROCHLORIDE 10 MG/ML
INJECTION, SOLUTION INFILTRATION; PERINEURAL
Status: DISPENSED
Start: 2024-09-11

## (undated) RX ORDER — VANCOMYCIN HYDROCHLORIDE 1 G/20ML
INJECTION, POWDER, LYOPHILIZED, FOR SOLUTION INTRAVENOUS
Status: DISPENSED
Start: 2024-08-27

## (undated) RX ORDER — FENTANYL CITRATE 50 UG/ML
INJECTION, SOLUTION INTRAMUSCULAR; INTRAVENOUS
Status: DISPENSED
Start: 2024-08-20

## (undated) RX ORDER — HEPARIN SODIUM 1000 [USP'U]/ML
INJECTION, SOLUTION INTRAVENOUS; SUBCUTANEOUS
Status: DISPENSED
Start: 2024-09-01

## (undated) RX ORDER — ONDANSETRON 2 MG/ML
INJECTION INTRAMUSCULAR; INTRAVENOUS
Status: DISPENSED
Start: 2024-09-12

## (undated) RX ORDER — PROPOFOL 10 MG/ML
INJECTION, EMULSION INTRAVENOUS
Status: DISPENSED
Start: 2024-08-27

## (undated) RX ORDER — LIDOCAINE HYDROCHLORIDE 10 MG/ML
INJECTION, SOLUTION INFILTRATION; PERINEURAL
Status: DISPENSED
Start: 2024-09-01